# Patient Record
Sex: MALE | Race: WHITE | NOT HISPANIC OR LATINO | Employment: OTHER | URBAN - METROPOLITAN AREA
[De-identification: names, ages, dates, MRNs, and addresses within clinical notes are randomized per-mention and may not be internally consistent; named-entity substitution may affect disease eponyms.]

---

## 2021-05-22 ENCOUNTER — APPOINTMENT (OUTPATIENT)
Dept: CT IMAGING | Facility: HOSPITAL | Age: 76
End: 2021-05-22

## 2021-05-22 ENCOUNTER — APPOINTMENT (OUTPATIENT)
Dept: GENERAL RADIOLOGY | Facility: HOSPITAL | Age: 76
End: 2021-05-22

## 2021-05-22 ENCOUNTER — HOSPITAL ENCOUNTER (INPATIENT)
Facility: HOSPITAL | Age: 76
LOS: 27 days | Discharge: NURSING, REHAB OR LONG-TERM CARE | End: 2021-06-18
Attending: EMERGENCY MEDICINE | Admitting: STUDENT IN AN ORGANIZED HEALTH CARE EDUCATION/TRAINING PROGRAM

## 2021-05-22 ENCOUNTER — APPOINTMENT (OUTPATIENT)
Dept: ULTRASOUND IMAGING | Facility: HOSPITAL | Age: 76
End: 2021-05-22

## 2021-05-22 ENCOUNTER — APPOINTMENT (OUTPATIENT)
Dept: CARDIOLOGY | Facility: HOSPITAL | Age: 76
End: 2021-05-22

## 2021-05-22 DIAGNOSIS — I21.4 NON-STEMI (NON-ST ELEVATED MYOCARDIAL INFARCTION) (HCC): ICD-10-CM

## 2021-05-22 DIAGNOSIS — I50.9 ACUTE CONGESTIVE HEART FAILURE, UNSPECIFIED HEART FAILURE TYPE (HCC): ICD-10-CM

## 2021-05-22 DIAGNOSIS — Z86.79 HISTORY OF CORONARY ARTERY DISEASE: ICD-10-CM

## 2021-05-22 DIAGNOSIS — R13.12 OROPHARYNGEAL DYSPHAGIA: ICD-10-CM

## 2021-05-22 DIAGNOSIS — I25.10 CORONARY ARTERY DISEASE INVOLVING NATIVE CORONARY ARTERY OF NATIVE HEART, ANGINA PRESENCE UNSPECIFIED: Primary | ICD-10-CM

## 2021-05-22 DIAGNOSIS — N17.9 AKI (ACUTE KIDNEY INJURY) (HCC): ICD-10-CM

## 2021-05-22 PROBLEM — D72.829 LEUKOCYTOSIS: Status: ACTIVE | Noted: 2021-05-22

## 2021-05-22 PROBLEM — D72.829 LEUKOCYTOSIS: Status: RESOLVED | Noted: 2021-05-22 | Resolved: 2021-05-22

## 2021-05-22 PROBLEM — I10 ESSENTIAL HYPERTENSION: Status: ACTIVE | Noted: 2021-05-22

## 2021-05-22 PROBLEM — E78.5 HYPERLIPIDEMIA LDL GOAL <70: Status: ACTIVE | Noted: 2021-05-22

## 2021-05-22 LAB
ALBUMIN SERPL-MCNC: 3.9 G/DL (ref 3.5–5.2)
ALBUMIN/GLOB SERPL: 1.7 G/DL
ALP SERPL-CCNC: 112 U/L (ref 39–117)
ALT SERPL W P-5'-P-CCNC: 35 U/L (ref 1–41)
ANION GAP SERPL CALCULATED.3IONS-SCNC: 11 MMOL/L (ref 5–15)
APTT PPP: 27.4 SECONDS (ref 50–95)
AST SERPL-CCNC: 67 U/L (ref 1–40)
BASOPHILS # BLD AUTO: 0.03 10*3/MM3 (ref 0–0.2)
BASOPHILS NFR BLD AUTO: 0.2 % (ref 0–1.5)
BH CV ECHO MEAS - AO MAX PG (FULL): 19.1 MMHG
BH CV ECHO MEAS - AO MAX PG: 23 MMHG
BH CV ECHO MEAS - AO MEAN PG (FULL): 9.5 MMHG
BH CV ECHO MEAS - AO MEAN PG: 13 MMHG
BH CV ECHO MEAS - AO ROOT AREA (BSA CORRECTED): 1.6
BH CV ECHO MEAS - AO ROOT AREA: 8.6 CM^2
BH CV ECHO MEAS - AO ROOT DIAM: 3.3 CM
BH CV ECHO MEAS - AO V2 MAX: 242 CM/SEC
BH CV ECHO MEAS - AO V2 MEAN: 156 CM/SEC
BH CV ECHO MEAS - AO V2 VTI: 47.9 CM
BH CV ECHO MEAS - ASC AORTA: 3.3 CM
BH CV ECHO MEAS - AVA(I,A): 1.3 CM^2
BH CV ECHO MEAS - AVA(I,D): 1.3 CM^2
BH CV ECHO MEAS - AVA(V,A): 1.1 CM^2
BH CV ECHO MEAS - AVA(V,D): 1.1 CM^2
BH CV ECHO MEAS - BSA(HAYCOCK): 2.1 M^2
BH CV ECHO MEAS - BSA: 2 M^2
BH CV ECHO MEAS - BZI_BMI: 30.5 KILOGRAMS/M^2
BH CV ECHO MEAS - BZI_METRIC_HEIGHT: 170.2 CM
BH CV ECHO MEAS - BZI_METRIC_WEIGHT: 88.5 KG
BH CV ECHO MEAS - CI(CUBED): 2 L/MIN/M^2
BH CV ECHO MEAS - CI(MOD-SP2): 0.84 L/MIN/M^2
BH CV ECHO MEAS - CI(MOD-SP4): 1.2 L/MIN/M^2
BH CV ECHO MEAS - CI(TEICH): 1.8 L/MIN/M^2
BH CV ECHO MEAS - CO(CUBED): 4 L/MIN
BH CV ECHO MEAS - CO(MOD-SP2): 1.7 L/MIN
BH CV ECHO MEAS - CO(MOD-SP4): 2.5 L/MIN
BH CV ECHO MEAS - CO(TEICH): 3.7 L/MIN
BH CV ECHO MEAS - EDV(CUBED): 79.5 ML
BH CV ECHO MEAS - EDV(MOD-SP2): 51.4 ML
BH CV ECHO MEAS - EDV(MOD-SP4): 76.7 ML
BH CV ECHO MEAS - EDV(TEICH): 83.1 ML
BH CV ECHO MEAS - EF(CUBED): 69.3 %
BH CV ECHO MEAS - EF(MOD-BP): 45.1 %
BH CV ECHO MEAS - EF(MOD-SP2): 45.1 %
BH CV ECHO MEAS - EF(MOD-SP4): 45.1 %
BH CV ECHO MEAS - EF(TEICH): 61.2 %
BH CV ECHO MEAS - ESV(CUBED): 24.4 ML
BH CV ECHO MEAS - ESV(MOD-SP2): 28.2 ML
BH CV ECHO MEAS - ESV(MOD-SP4): 42.1 ML
BH CV ECHO MEAS - ESV(TEICH): 32.2 ML
BH CV ECHO MEAS - FS: 32.6 %
BH CV ECHO MEAS - IVS/LVPW: 1
BH CV ECHO MEAS - IVSD: 1.1 CM
BH CV ECHO MEAS - LA DIMENSION: 3.9 CM
BH CV ECHO MEAS - LA/AO: 1.2
BH CV ECHO MEAS - LV DIASTOLIC VOL/BSA (35-75): 38.3 ML/M^2
BH CV ECHO MEAS - LV MASS(C)D: 162.9 GRAMS
BH CV ECHO MEAS - LV MASS(C)DI: 81.5 GRAMS/M^2
BH CV ECHO MEAS - LV MAX PG: 2.9 MMHG
BH CV ECHO MEAS - LV MEAN PG: 2 MMHG
BH CV ECHO MEAS - LV SYSTOLIC VOL/BSA (12-30): 21 ML/M^2
BH CV ECHO MEAS - LV V1 MAX: 85.5 CM/SEC
BH CV ECHO MEAS - LV V1 MEAN: 63.1 CM/SEC
BH CV ECHO MEAS - LV V1 VTI: 20.4 CM
BH CV ECHO MEAS - LVIDD: 4.3 CM
BH CV ECHO MEAS - LVIDS: 2.9 CM
BH CV ECHO MEAS - LVLD AP2: 9.1 CM
BH CV ECHO MEAS - LVLD AP4: 8.8 CM
BH CV ECHO MEAS - LVLS AP2: 8.5 CM
BH CV ECHO MEAS - LVLS AP4: 8.7 CM
BH CV ECHO MEAS - LVOT AREA (M): 3.1 CM^2
BH CV ECHO MEAS - LVOT AREA: 3.1 CM^2
BH CV ECHO MEAS - LVOT DIAM: 2 CM
BH CV ECHO MEAS - LVPWD: 1.1 CM
BH CV ECHO MEAS - MM HR: 72 BPM
BH CV ECHO MEAS - MM R-R INT: 0.83 SEC
BH CV ECHO MEAS - MV A MAX VEL: 139 CM/SEC
BH CV ECHO MEAS - MV DEC SLOPE: 213 CM/SEC^2
BH CV ECHO MEAS - MV DEC TIME: 0.24 SEC
BH CV ECHO MEAS - MV E MAX VEL: 110 CM/SEC
BH CV ECHO MEAS - MV E/A: 0.79
BH CV ECHO MEAS - MV MAX PG: 10.1 MMHG
BH CV ECHO MEAS - MV MEAN PG: 4 MMHG
BH CV ECHO MEAS - MV P1/2T MAX VEL: 97.1 CM/SEC
BH CV ECHO MEAS - MV P1/2T: 133.5 MSEC
BH CV ECHO MEAS - MV V2 MAX: 159 CM/SEC
BH CV ECHO MEAS - MV V2 MEAN: 95 CM/SEC
BH CV ECHO MEAS - MV V2 VTI: 36.8 CM
BH CV ECHO MEAS - MVA P1/2T LCG: 2.3 CM^2
BH CV ECHO MEAS - MVA(P1/2T): 1.6 CM^2
BH CV ECHO MEAS - MVA(VTI): 1.7 CM^2
BH CV ECHO MEAS - RAP SYSTOLE: 3 MMHG
BH CV ECHO MEAS - RVSP: 18 MMHG
BH CV ECHO MEAS - SI(AO): 204.6 ML/M^2
BH CV ECHO MEAS - SI(CUBED): 27.6 ML/M^2
BH CV ECHO MEAS - SI(LVOT): 32 ML/M^2
BH CV ECHO MEAS - SI(MOD-SP2): 11.6 ML/M^2
BH CV ECHO MEAS - SI(MOD-SP4): 17.3 ML/M^2
BH CV ECHO MEAS - SI(TEICH): 25.4 ML/M^2
BH CV ECHO MEAS - SV(AO): 409.3 ML
BH CV ECHO MEAS - SV(CUBED): 55.1 ML
BH CV ECHO MEAS - SV(LVOT): 64.1 ML
BH CV ECHO MEAS - SV(MOD-SP2): 23.2 ML
BH CV ECHO MEAS - SV(MOD-SP4): 34.6 ML
BH CV ECHO MEAS - SV(TEICH): 50.9 ML
BH CV ECHO MEAS - TR MAX PG: 15 MMHG
BH CV ECHO MEAS - TR MAX VEL: 191 CM/SEC
BH CV VAS BP LEFT ARM: NORMAL MMHG
BILIRUB SERPL-MCNC: 0.2 MG/DL (ref 0–1.2)
BUN SERPL-MCNC: 43 MG/DL (ref 8–23)
BUN/CREAT SERPL: 22.6 (ref 7–25)
CALCIUM SPEC-SCNC: 9 MG/DL (ref 8.6–10.5)
CHLORIDE SERPL-SCNC: 105 MMOL/L (ref 98–107)
CHOLEST SERPL-MCNC: 168 MG/DL (ref 0–200)
CO2 SERPL-SCNC: 24 MMOL/L (ref 22–29)
CREAT SERPL-MCNC: 1.9 MG/DL (ref 0.76–1.27)
CRP SERPL-MCNC: <0.3 MG/DL (ref 0–0.5)
D-LACTATE SERPL-SCNC: 1.5 MMOL/L (ref 0.5–2)
DEPRECATED RDW RBC AUTO: 47.5 FL (ref 37–54)
EOSINOPHIL # BLD AUTO: 0.28 10*3/MM3 (ref 0–0.4)
EOSINOPHIL NFR BLD AUTO: 1.6 % (ref 0.3–6.2)
ERYTHROCYTE [DISTWIDTH] IN BLOOD BY AUTOMATED COUNT: 13.9 % (ref 12.3–15.4)
FLUAV SUBTYP SPEC NAA+PROBE: NOT DETECTED
FLUBV RNA ISLT QL NAA+PROBE: NOT DETECTED
GFR SERPL CREATININE-BSD FRML MDRD: 35 ML/MIN/1.73
GLOBULIN UR ELPH-MCNC: 2.3 GM/DL
GLUCOSE BLDC GLUCOMTR-MCNC: 279 MG/DL (ref 70–130)
GLUCOSE BLDC GLUCOMTR-MCNC: 83 MG/DL (ref 70–130)
GLUCOSE SERPL-MCNC: 118 MG/DL (ref 65–99)
HBA1C MFR BLD: 7.4 % (ref 4.8–5.6)
HCT VFR BLD AUTO: 37.5 % (ref 37.5–51)
HDLC SERPL-MCNC: 34 MG/DL (ref 40–60)
HGB BLD-MCNC: 12.8 G/DL (ref 13–17.7)
HOLD SPECIMEN: NORMAL
IMM GRANULOCYTES # BLD AUTO: 0.13 10*3/MM3 (ref 0–0.05)
IMM GRANULOCYTES NFR BLD AUTO: 0.8 % (ref 0–0.5)
INR PPP: 0.95 (ref 0.85–1.16)
LDLC SERPL CALC-MCNC: 88 MG/DL (ref 0–100)
LDLC/HDLC SERPL: 2.34 {RATIO}
LIPASE SERPL-CCNC: 32 U/L (ref 13–60)
LV EF 2D ECHO EST: 55 %
LYMPHOCYTES # BLD AUTO: 2.46 10*3/MM3 (ref 0.7–3.1)
LYMPHOCYTES NFR BLD AUTO: 14.5 % (ref 19.6–45.3)
MAGNESIUM SERPL-MCNC: 1.9 MG/DL (ref 1.6–2.4)
MCH RBC QN AUTO: 32.2 PG (ref 26.6–33)
MCHC RBC AUTO-ENTMCNC: 34.1 G/DL (ref 31.5–35.7)
MCV RBC AUTO: 94.5 FL (ref 79–97)
MONOCYTES # BLD AUTO: 1.09 10*3/MM3 (ref 0.1–0.9)
MONOCYTES NFR BLD AUTO: 6.4 % (ref 5–12)
NEUTROPHILS NFR BLD AUTO: 13.02 10*3/MM3 (ref 1.7–7)
NEUTROPHILS NFR BLD AUTO: 76.5 % (ref 42.7–76)
NRBC BLD AUTO-RTO: 0 /100 WBC (ref 0–0.2)
NT-PROBNP SERPL-MCNC: 4175 PG/ML (ref 0–900)
PLATELET # BLD AUTO: 226 10*3/MM3 (ref 140–450)
PMV BLD AUTO: 10.4 FL (ref 6–12)
POTASSIUM SERPL-SCNC: 3.9 MMOL/L (ref 3.5–5.2)
PROCALCITONIN SERPL-MCNC: 0.08 NG/ML (ref 0–0.25)
PROCALCITONIN SERPL-MCNC: 0.13 NG/ML (ref 0–0.25)
PROT SERPL-MCNC: 6.2 G/DL (ref 6–8.5)
PROTHROMBIN TIME: 12.4 SECONDS (ref 11.4–14.4)
QT INTERVAL: 416 MS
QT INTERVAL: 418 MS
QTC INTERVAL: 474 MS
QTC INTERVAL: 491 MS
RBC # BLD AUTO: 3.97 10*6/MM3 (ref 4.14–5.8)
SARS-COV-2 RNA PNL SPEC NAA+PROBE: NOT DETECTED
SODIUM SERPL-SCNC: 140 MMOL/L (ref 136–145)
TRIGL SERPL-MCNC: 273 MG/DL (ref 0–150)
TROPONIN T SERPL-MCNC: 1.55 NG/ML (ref 0–0.03)
TROPONIN T SERPL-MCNC: 1.82 NG/ML (ref 0–0.03)
TROPONIN T SERPL-MCNC: 1.89 NG/ML (ref 0–0.03)
UFH PPP CHRO-ACNC: 0.1 IU/ML (ref 0.3–0.7)
UFH PPP CHRO-ACNC: 0.25 IU/ML (ref 0.3–0.7)
UFH PPP CHRO-ACNC: 0.45 IU/ML (ref 0.3–0.7)
VLDLC SERPL-MCNC: 46 MG/DL (ref 5–40)
WBC # BLD AUTO: 17.01 10*3/MM3 (ref 3.4–10.8)
WHOLE BLOOD HOLD SPECIMEN: NORMAL
WHOLE BLOOD HOLD SPECIMEN: NORMAL

## 2021-05-22 PROCEDURE — 85520 HEPARIN ASSAY: CPT

## 2021-05-22 PROCEDURE — 84145 PROCALCITONIN (PCT): CPT | Performed by: INTERNAL MEDICINE

## 2021-05-22 PROCEDURE — 84484 ASSAY OF TROPONIN QUANT: CPT | Performed by: EMERGENCY MEDICINE

## 2021-05-22 PROCEDURE — 85025 COMPLETE CBC W/AUTO DIFF WBC: CPT | Performed by: EMERGENCY MEDICINE

## 2021-05-22 PROCEDURE — 84484 ASSAY OF TROPONIN QUANT: CPT | Performed by: NURSE PRACTITIONER

## 2021-05-22 PROCEDURE — 63710000001 INSULIN LISPRO (HUMAN) PER 5 UNITS: Performed by: INTERNAL MEDICINE

## 2021-05-22 PROCEDURE — 25010000002 FUROSEMIDE PER 20 MG: Performed by: EMERGENCY MEDICINE

## 2021-05-22 PROCEDURE — 71275 CT ANGIOGRAPHY CHEST: CPT

## 2021-05-22 PROCEDURE — 86140 C-REACTIVE PROTEIN: CPT | Performed by: INTERNAL MEDICINE

## 2021-05-22 PROCEDURE — 84145 PROCALCITONIN (PCT): CPT | Performed by: NURSE PRACTITIONER

## 2021-05-22 PROCEDURE — 83690 ASSAY OF LIPASE: CPT | Performed by: EMERGENCY MEDICINE

## 2021-05-22 PROCEDURE — 71045 X-RAY EXAM CHEST 1 VIEW: CPT

## 2021-05-22 PROCEDURE — 87636 SARSCOV2 & INF A&B AMP PRB: CPT | Performed by: INTERNAL MEDICINE

## 2021-05-22 PROCEDURE — 85730 THROMBOPLASTIN TIME PARTIAL: CPT | Performed by: EMERGENCY MEDICINE

## 2021-05-22 PROCEDURE — 80053 COMPREHEN METABOLIC PANEL: CPT | Performed by: EMERGENCY MEDICINE

## 2021-05-22 PROCEDURE — 99284 EMERGENCY DEPT VISIT MOD MDM: CPT

## 2021-05-22 PROCEDURE — 83735 ASSAY OF MAGNESIUM: CPT | Performed by: INTERNAL MEDICINE

## 2021-05-22 PROCEDURE — 80061 LIPID PANEL: CPT | Performed by: NURSE PRACTITIONER

## 2021-05-22 PROCEDURE — 99223 1ST HOSP IP/OBS HIGH 75: CPT | Performed by: INTERNAL MEDICINE

## 2021-05-22 PROCEDURE — 85610 PROTHROMBIN TIME: CPT | Performed by: EMERGENCY MEDICINE

## 2021-05-22 PROCEDURE — 93321 DOPPLER ECHO F-UP/LMTD STD: CPT

## 2021-05-22 PROCEDURE — 99222 1ST HOSP IP/OBS MODERATE 55: CPT | Performed by: INTERNAL MEDICINE

## 2021-05-22 PROCEDURE — 83880 ASSAY OF NATRIURETIC PEPTIDE: CPT | Performed by: EMERGENCY MEDICINE

## 2021-05-22 PROCEDURE — 93308 TTE F-UP OR LMTD: CPT

## 2021-05-22 PROCEDURE — 83605 ASSAY OF LACTIC ACID: CPT | Performed by: NURSE PRACTITIONER

## 2021-05-22 PROCEDURE — 93005 ELECTROCARDIOGRAM TRACING: CPT | Performed by: EMERGENCY MEDICINE

## 2021-05-22 PROCEDURE — 93325 DOPPLER ECHO COLOR FLOW MAPG: CPT

## 2021-05-22 PROCEDURE — 93306 TTE W/DOPPLER COMPLETE: CPT | Performed by: INTERNAL MEDICINE

## 2021-05-22 PROCEDURE — 76775 US EXAM ABDO BACK WALL LIM: CPT

## 2021-05-22 PROCEDURE — 82962 GLUCOSE BLOOD TEST: CPT

## 2021-05-22 PROCEDURE — 63710000001 INSULIN DETEMIR PER 5 UNITS: Performed by: INTERNAL MEDICINE

## 2021-05-22 PROCEDURE — 25010000002 HEPARIN (PORCINE) PER 1000 UNITS: Performed by: EMERGENCY MEDICINE

## 2021-05-22 PROCEDURE — 85520 HEPARIN ASSAY: CPT | Performed by: EMERGENCY MEDICINE

## 2021-05-22 PROCEDURE — 83036 HEMOGLOBIN GLYCOSYLATED A1C: CPT | Performed by: INTERNAL MEDICINE

## 2021-05-22 PROCEDURE — 0 IOPAMIDOL PER 1 ML: Performed by: EMERGENCY MEDICINE

## 2021-05-22 PROCEDURE — 25010000002 HEPARIN (PORCINE) 25000-0.45 UT/250ML-% SOLUTION: Performed by: EMERGENCY MEDICINE

## 2021-05-22 RX ORDER — ASPIRIN 81 MG/1
81 TABLET ORAL DAILY
COMMUNITY
End: 2021-06-18 | Stop reason: HOSPADM

## 2021-05-22 RX ORDER — ONDANSETRON 2 MG/ML
4 INJECTION INTRAMUSCULAR; INTRAVENOUS EVERY 6 HOURS PRN
Status: DISCONTINUED | OUTPATIENT
Start: 2021-05-22 | End: 2021-05-29

## 2021-05-22 RX ORDER — ACETAMINOPHEN 160 MG/5ML
650 SOLUTION ORAL EVERY 4 HOURS PRN
Status: DISCONTINUED | OUTPATIENT
Start: 2021-05-22 | End: 2021-05-29

## 2021-05-22 RX ORDER — ASPIRIN 81 MG/1
81 TABLET ORAL DAILY
Status: DISCONTINUED | OUTPATIENT
Start: 2021-05-22 | End: 2021-05-28

## 2021-05-22 RX ORDER — MAGNESIUM SULFATE 1 G/100ML
1 INJECTION INTRAVENOUS AS NEEDED
Status: DISCONTINUED | OUTPATIENT
Start: 2021-05-22 | End: 2021-06-18 | Stop reason: HOSPADM

## 2021-05-22 RX ORDER — MAGNESIUM SULFATE HEPTAHYDRATE 40 MG/ML
2 INJECTION, SOLUTION INTRAVENOUS AS NEEDED
Status: DISCONTINUED | OUTPATIENT
Start: 2021-05-22 | End: 2021-06-18 | Stop reason: HOSPADM

## 2021-05-22 RX ORDER — GABAPENTIN 300 MG/1
600 CAPSULE ORAL 3 TIMES DAILY
COMMUNITY
End: 2021-06-25 | Stop reason: HOSPADM

## 2021-05-22 RX ORDER — SODIUM CHLORIDE 0.9 % (FLUSH) 0.9 %
10 SYRINGE (ML) INJECTION AS NEEDED
Status: DISCONTINUED | OUTPATIENT
Start: 2021-05-22 | End: 2021-05-29

## 2021-05-22 RX ORDER — CLOPIDOGREL 300 MG/1
300 TABLET, FILM COATED ORAL DAILY
Status: ON HOLD | COMMUNITY
End: 2021-05-22

## 2021-05-22 RX ORDER — METOPROLOL SUCCINATE 50 MG/1
75 TABLET, EXTENDED RELEASE ORAL DAILY
COMMUNITY
End: 2021-06-18 | Stop reason: HOSPADM

## 2021-05-22 RX ORDER — CLOPIDOGREL BISULFATE 75 MG/1
300 TABLET ORAL ONCE
Status: COMPLETED | OUTPATIENT
Start: 2021-05-22 | End: 2021-05-22

## 2021-05-22 RX ORDER — AMLODIPINE BESYLATE AND BENAZEPRIL HYDROCHLORIDE 10; 20 MG/1; MG/1
1 CAPSULE ORAL DAILY
COMMUNITY
End: 2021-06-18 | Stop reason: HOSPADM

## 2021-05-22 RX ORDER — ACETAMINOPHEN 650 MG/1
650 SUPPOSITORY RECTAL EVERY 4 HOURS PRN
Status: DISCONTINUED | OUTPATIENT
Start: 2021-05-22 | End: 2021-05-29

## 2021-05-22 RX ORDER — DEXTROSE MONOHYDRATE 25 G/50ML
25 INJECTION, SOLUTION INTRAVENOUS
Status: DISCONTINUED | OUTPATIENT
Start: 2021-05-22 | End: 2021-05-31 | Stop reason: ALTCHOICE

## 2021-05-22 RX ORDER — CLONIDINE HYDROCHLORIDE 0.1 MG/1
0.1 TABLET ORAL DAILY
COMMUNITY
End: 2021-06-18 | Stop reason: HOSPADM

## 2021-05-22 RX ORDER — MAGNESIUM SULFATE HEPTAHYDRATE 40 MG/ML
4 INJECTION, SOLUTION INTRAVENOUS AS NEEDED
Status: DISCONTINUED | OUTPATIENT
Start: 2021-05-22 | End: 2021-06-18 | Stop reason: HOSPADM

## 2021-05-22 RX ORDER — ATORVASTATIN CALCIUM 40 MG/1
80 TABLET, FILM COATED ORAL NIGHTLY
Status: DISCONTINUED | OUTPATIENT
Start: 2021-05-22 | End: 2021-05-22

## 2021-05-22 RX ORDER — ALLOPURINOL 300 MG/1
300 TABLET ORAL DAILY
COMMUNITY

## 2021-05-22 RX ORDER — ASPIRIN 81 MG/1
81 TABLET ORAL DAILY
Status: DISCONTINUED | OUTPATIENT
Start: 2021-05-23 | End: 2021-05-22 | Stop reason: SDUPTHER

## 2021-05-22 RX ORDER — GLIPIZIDE 5 MG/1
5 TABLET ORAL
COMMUNITY

## 2021-05-22 RX ORDER — ALLOPURINOL 100 MG/1
100 TABLET ORAL DAILY
Status: DISCONTINUED | OUTPATIENT
Start: 2021-05-22 | End: 2021-05-24

## 2021-05-22 RX ORDER — HYDROCHLOROTHIAZIDE 12.5 MG/1
12.5 TABLET ORAL DAILY
Status: ON HOLD | COMMUNITY
End: 2021-05-24

## 2021-05-22 RX ORDER — CLOPIDOGREL BISULFATE 75 MG/1
75 TABLET ORAL DAILY
Status: DISCONTINUED | OUTPATIENT
Start: 2021-05-23 | End: 2021-05-22 | Stop reason: SDUPTHER

## 2021-05-22 RX ORDER — ATORVASTATIN CALCIUM 40 MG/1
40 TABLET, FILM COATED ORAL NIGHTLY
Status: DISCONTINUED | OUTPATIENT
Start: 2021-05-22 | End: 2021-05-29

## 2021-05-22 RX ORDER — HEPARIN SODIUM 1000 [USP'U]/ML
4000 INJECTION, SOLUTION INTRAVENOUS; SUBCUTANEOUS ONCE
Status: COMPLETED | OUTPATIENT
Start: 2021-05-22 | End: 2021-05-22

## 2021-05-22 RX ORDER — PANTOPRAZOLE SODIUM 40 MG/1
40 TABLET, DELAYED RELEASE ORAL
Status: DISCONTINUED | OUTPATIENT
Start: 2021-05-22 | End: 2021-05-29

## 2021-05-22 RX ORDER — CLOPIDOGREL BISULFATE 75 MG/1
75 TABLET ORAL DAILY
Status: DISCONTINUED | OUTPATIENT
Start: 2021-05-23 | End: 2021-05-24

## 2021-05-22 RX ORDER — CLOPIDOGREL BISULFATE 75 MG/1
75 TABLET ORAL DAILY
Status: ON HOLD | COMMUNITY
End: 2021-05-24

## 2021-05-22 RX ORDER — HEPARIN SODIUM 1000 [USP'U]/ML
60 INJECTION, SOLUTION INTRAVENOUS; SUBCUTANEOUS AS NEEDED
Status: DISCONTINUED | OUTPATIENT
Start: 2021-05-22 | End: 2021-05-22 | Stop reason: SDUPTHER

## 2021-05-22 RX ORDER — INSULIN GLARGINE 100 [IU]/ML
30 INJECTION, SOLUTION SUBCUTANEOUS NIGHTLY
Status: ON HOLD | COMMUNITY
End: 2021-06-25 | Stop reason: SDUPTHER

## 2021-05-22 RX ORDER — HEPARIN SODIUM 1000 [USP'U]/ML
30 INJECTION, SOLUTION INTRAVENOUS; SUBCUTANEOUS AS NEEDED
Status: DISCONTINUED | OUTPATIENT
Start: 2021-05-22 | End: 2021-05-22 | Stop reason: SDUPTHER

## 2021-05-22 RX ORDER — AMOXICILLIN 250 MG
2 CAPSULE ORAL 2 TIMES DAILY PRN
Status: DISCONTINUED | OUTPATIENT
Start: 2021-05-22 | End: 2021-05-29

## 2021-05-22 RX ORDER — SODIUM CHLORIDE 0.9 % (FLUSH) 0.9 %
10 SYRINGE (ML) INJECTION EVERY 12 HOURS SCHEDULED
Status: DISCONTINUED | OUTPATIENT
Start: 2021-05-22 | End: 2021-06-18 | Stop reason: HOSPADM

## 2021-05-22 RX ORDER — POLYETHYLENE GLYCOL 3350 17 G/17G
17 POWDER, FOR SOLUTION ORAL DAILY PRN
Status: DISCONTINUED | OUTPATIENT
Start: 2021-05-22 | End: 2021-05-29

## 2021-05-22 RX ORDER — ACETAMINOPHEN 325 MG/1
650 TABLET ORAL EVERY 4 HOURS PRN
Status: DISCONTINUED | OUTPATIENT
Start: 2021-05-22 | End: 2021-05-29

## 2021-05-22 RX ORDER — HEPARIN SODIUM 10000 [USP'U]/100ML
10.5 INJECTION, SOLUTION INTRAVENOUS
Status: DISCONTINUED | OUTPATIENT
Start: 2021-05-22 | End: 2021-05-28

## 2021-05-22 RX ORDER — NICOTINE POLACRILEX 4 MG
15 LOZENGE BUCCAL
Status: DISCONTINUED | OUTPATIENT
Start: 2021-05-22 | End: 2021-05-31 | Stop reason: ALTCHOICE

## 2021-05-22 RX ORDER — BISACODYL 10 MG
10 SUPPOSITORY, RECTAL RECTAL DAILY PRN
Status: DISCONTINUED | OUTPATIENT
Start: 2021-05-22 | End: 2021-05-29

## 2021-05-22 RX ORDER — BISACODYL 5 MG/1
5 TABLET, DELAYED RELEASE ORAL DAILY PRN
Status: DISCONTINUED | OUTPATIENT
Start: 2021-05-22 | End: 2021-05-29

## 2021-05-22 RX ORDER — NITROGLYCERIN 20 MG/100ML
5-200 INJECTION INTRAVENOUS
Status: DISCONTINUED | OUTPATIENT
Start: 2021-05-22 | End: 2021-06-02

## 2021-05-22 RX ORDER — HYDRALAZINE HYDROCHLORIDE 10 MG/1
10 TABLET, FILM COATED ORAL EVERY 8 HOURS SCHEDULED
Status: DISCONTINUED | OUTPATIENT
Start: 2021-05-22 | End: 2021-05-24

## 2021-05-22 RX ORDER — FUROSEMIDE 10 MG/ML
40 INJECTION INTRAMUSCULAR; INTRAVENOUS ONCE
Status: COMPLETED | OUTPATIENT
Start: 2021-05-22 | End: 2021-05-22

## 2021-05-22 RX ORDER — SIMVASTATIN 20 MG
20 TABLET ORAL NIGHTLY
Status: ON HOLD | COMMUNITY
End: 2021-05-24

## 2021-05-22 RX ORDER — GABAPENTIN 100 MG/1
200 CAPSULE ORAL 2 TIMES DAILY
Status: DISCONTINUED | OUTPATIENT
Start: 2021-05-22 | End: 2021-05-24

## 2021-05-22 RX ORDER — ASPIRIN 81 MG/1
324 TABLET, CHEWABLE ORAL ONCE
Status: DISCONTINUED | OUTPATIENT
Start: 2021-05-22 | End: 2021-05-22

## 2021-05-22 RX ADMIN — NITROGLYCERIN 10 MCG/MIN: 20 INJECTION INTRAVENOUS at 06:52

## 2021-05-22 RX ADMIN — INSULIN DETEMIR 20 UNITS: 100 INJECTION, SOLUTION SUBCUTANEOUS at 20:50

## 2021-05-22 RX ADMIN — INSULIN LISPRO 4 UNITS: 100 INJECTION, SOLUTION INTRAVENOUS; SUBCUTANEOUS at 17:40

## 2021-05-22 RX ADMIN — HEPARIN SODIUM 4000 UNITS: 1000 INJECTION, SOLUTION INTRAVENOUS; SUBCUTANEOUS at 06:45

## 2021-05-22 RX ADMIN — METOPROLOL TARTRATE 25 MG: 25 TABLET, FILM COATED ORAL at 17:35

## 2021-05-22 RX ADMIN — FUROSEMIDE 40 MG: 10 INJECTION, SOLUTION INTRAMUSCULAR; INTRAVENOUS at 06:30

## 2021-05-22 RX ADMIN — CLOPIDOGREL BISULFATE 300 MG: 75 TABLET ORAL at 06:30

## 2021-05-22 RX ADMIN — HEPARIN SODIUM 11.3 UNITS/KG/HR: 10000 INJECTION, SOLUTION INTRAVENOUS at 06:45

## 2021-05-22 RX ADMIN — PANTOPRAZOLE SODIUM 40 MG: 40 TABLET, DELAYED RELEASE ORAL at 17:37

## 2021-05-22 RX ADMIN — ATORVASTATIN CALCIUM 40 MG: 40 TABLET, FILM COATED ORAL at 20:50

## 2021-05-22 RX ADMIN — ALLOPURINOL 100 MG: 100 TABLET ORAL at 17:36

## 2021-05-22 RX ADMIN — IOPAMIDOL 66 ML: 755 INJECTION, SOLUTION INTRAVENOUS at 05:43

## 2021-05-22 RX ADMIN — ASPIRIN 81 MG: 81 TABLET, COATED ORAL at 17:35

## 2021-05-22 RX ADMIN — HYDRALAZINE HYDROCHLORIDE 10 MG: 10 TABLET ORAL at 17:35

## 2021-05-22 RX ADMIN — GABAPENTIN 200 MG: 100 CAPSULE ORAL at 17:35

## 2021-05-23 ENCOUNTER — APPOINTMENT (OUTPATIENT)
Dept: CARDIOLOGY | Facility: HOSPITAL | Age: 76
End: 2021-05-23

## 2021-05-23 LAB
ANION GAP SERPL CALCULATED.3IONS-SCNC: 9 MMOL/L (ref 5–15)
BASOPHILS # BLD AUTO: 0.05 10*3/MM3 (ref 0–0.2)
BASOPHILS NFR BLD AUTO: 0.5 % (ref 0–1.5)
BH CV ECHO MEAS - AO MAX PG (FULL): 17.4 MMHG
BH CV ECHO MEAS - AO MAX PG: 19.9 MMHG
BH CV ECHO MEAS - AO MEAN PG (FULL): 12.7 MMHG
BH CV ECHO MEAS - AO MEAN PG: 14.1 MMHG
BH CV ECHO MEAS - AO ROOT AREA (BSA CORRECTED): 1.9
BH CV ECHO MEAS - AO ROOT AREA: 10.5 CM^2
BH CV ECHO MEAS - AO ROOT DIAM: 3.7 CM
BH CV ECHO MEAS - AO V2 MAX: 223.3 CM/SEC
BH CV ECHO MEAS - AO V2 MEAN: 182 CM/SEC
BH CV ECHO MEAS - AO V2 VTI: 54.5 CM
BH CV ECHO MEAS - AVA(I,A): 1.2 CM^2
BH CV ECHO MEAS - AVA(I,D): 1.2 CM^2
BH CV ECHO MEAS - AVA(V,A): 1.1 CM^2
BH CV ECHO MEAS - AVA(V,D): 1.1 CM^2
BH CV ECHO MEAS - BSA(HAYCOCK): 2 M^2
BH CV ECHO MEAS - BSA: 1.9 M^2
BH CV ECHO MEAS - BZI_BMI: 28.3 KILOGRAMS/M^2
BH CV ECHO MEAS - BZI_METRIC_HEIGHT: 170.2 CM
BH CV ECHO MEAS - BZI_METRIC_WEIGHT: 82.1 KG
BH CV ECHO MEAS - EDV(CUBED): 67.5 ML
BH CV ECHO MEAS - EDV(MOD-SP2): 169 ML
BH CV ECHO MEAS - EDV(MOD-SP4): 177 ML
BH CV ECHO MEAS - EDV(TEICH): 73 ML
BH CV ECHO MEAS - EF(CUBED): 70.1 %
BH CV ECHO MEAS - EF(MOD-BP): 60 %
BH CV ECHO MEAS - EF(MOD-SP2): 59.8 %
BH CV ECHO MEAS - EF(MOD-SP4): 60.5 %
BH CV ECHO MEAS - EF(TEICH): 62.2 %
BH CV ECHO MEAS - ESV(CUBED): 20.2 ML
BH CV ECHO MEAS - ESV(MOD-SP2): 68 ML
BH CV ECHO MEAS - ESV(MOD-SP4): 70 ML
BH CV ECHO MEAS - ESV(TEICH): 27.6 ML
BH CV ECHO MEAS - FS: 33.1 %
BH CV ECHO MEAS - IVS/LVPW: 0.76
BH CV ECHO MEAS - IVSD: 1.1 CM
BH CV ECHO MEAS - LA DIMENSION: 3.7 CM
BH CV ECHO MEAS - LA/AO: 1
BH CV ECHO MEAS - LAD MAJOR: 4.6 CM
BH CV ECHO MEAS - LAT PEAK E' VEL: 7.5 CM/SEC
BH CV ECHO MEAS - LATERAL E/E' RATIO: 7.4
BH CV ECHO MEAS - LV DIASTOLIC VOL/BSA (35-75): 91.3 ML/M^2
BH CV ECHO MEAS - LV MASS(C)D: 175.5 GRAMS
BH CV ECHO MEAS - LV MASS(C)DI: 90.5 GRAMS/M^2
BH CV ECHO MEAS - LV MAX PG: 2.5 MMHG
BH CV ECHO MEAS - LV MEAN PG: 1.4 MMHG
BH CV ECHO MEAS - LV SYSTOLIC VOL/BSA (12-30): 36.1 ML/M^2
BH CV ECHO MEAS - LV V1 MAX: 79.4 CM/SEC
BH CV ECHO MEAS - LV V1 MEAN: 55.5 CM/SEC
BH CV ECHO MEAS - LV V1 VTI: 20.5 CM
BH CV ECHO MEAS - LVIDD: 4.1 CM
BH CV ECHO MEAS - LVIDS: 2.7 CM
BH CV ECHO MEAS - LVLD AP2: 10.4 CM
BH CV ECHO MEAS - LVLD AP4: 10.4 CM
BH CV ECHO MEAS - LVLS AP2: 9.2 CM
BH CV ECHO MEAS - LVLS AP4: 9.5 CM
BH CV ECHO MEAS - LVOT AREA (M): 3.1 CM^2
BH CV ECHO MEAS - LVOT AREA: 3.2 CM^2
BH CV ECHO MEAS - LVOT DIAM: 2 CM
BH CV ECHO MEAS - LVPWD: 1.4 CM
BH CV ECHO MEAS - MED PEAK E' VEL: 4.2 CM/SEC
BH CV ECHO MEAS - MEDIAL E/E' RATIO: 13
BH CV ECHO MEAS - MV A MAX VEL: 120.4 CM/SEC
BH CV ECHO MEAS - MV DEC TIME: 0.23 SEC
BH CV ECHO MEAS - MV E MAX VEL: 56.3 CM/SEC
BH CV ECHO MEAS - MV E/A: 0.47
BH CV ECHO MEAS - MV MAX PG: 8 MMHG
BH CV ECHO MEAS - MV MEAN PG: 3.2 MMHG
BH CV ECHO MEAS - MV V2 MAX: 141.2 CM/SEC
BH CV ECHO MEAS - MV V2 MEAN: 81.8 CM/SEC
BH CV ECHO MEAS - MV V2 VTI: 35.4 CM
BH CV ECHO MEAS - MVA(VTI): 1.8 CM^2
BH CV ECHO MEAS - RAP SYSTOLE: 3 MMHG
BH CV ECHO MEAS - RVSP: 21 MMHG
BH CV ECHO MEAS - SI(AO): 295.1 ML/M^2
BH CV ECHO MEAS - SI(CUBED): 24.4 ML/M^2
BH CV ECHO MEAS - SI(LVOT): 33.4 ML/M^2
BH CV ECHO MEAS - SI(MOD-SP2): 52.1 ML/M^2
BH CV ECHO MEAS - SI(MOD-SP4): 55.2 ML/M^2
BH CV ECHO MEAS - SI(TEICH): 23.4 ML/M^2
BH CV ECHO MEAS - SV(AO): 571.8 ML
BH CV ECHO MEAS - SV(CUBED): 47.3 ML
BH CV ECHO MEAS - SV(LVOT): 64.7 ML
BH CV ECHO MEAS - SV(MOD-SP2): 101 ML
BH CV ECHO MEAS - SV(MOD-SP4): 107 ML
BH CV ECHO MEAS - SV(TEICH): 45.4 ML
BH CV ECHO MEAS - TAPSE (>1.6): 2.9 CM
BH CV ECHO MEAS - TR MAX PG: 18 MMHG
BH CV ECHO MEAS - TR MAX VEL: 195.8 CM/SEC
BH CV ECHO MEASUREMENTS AVERAGE E/E' RATIO: 9.62
BH CV XLRA - RV BASE: 4.6 CM
BH CV XLRA - RV LENGTH: 8.6 CM
BH CV XLRA - RV MID: 3.1 CM
BH CV XLRA - TDI S': 12.7 CM/SEC
BUN SERPL-MCNC: 36 MG/DL (ref 8–23)
BUN/CREAT SERPL: 22.2 (ref 7–25)
CALCIUM SPEC-SCNC: 8.8 MG/DL (ref 8.6–10.5)
CHLORIDE SERPL-SCNC: 105 MMOL/L (ref 98–107)
CO2 SERPL-SCNC: 26 MMOL/L (ref 22–29)
CREAT SERPL-MCNC: 1.62 MG/DL (ref 0.76–1.27)
DEPRECATED RDW RBC AUTO: 47.6 FL (ref 37–54)
EOSINOPHIL # BLD AUTO: 0.29 10*3/MM3 (ref 0–0.4)
EOSINOPHIL NFR BLD AUTO: 2.8 % (ref 0.3–6.2)
ERYTHROCYTE [DISTWIDTH] IN BLOOD BY AUTOMATED COUNT: 13.8 % (ref 12.3–15.4)
GFR SERPL CREATININE-BSD FRML MDRD: 42 ML/MIN/1.73
GLUCOSE BLDC GLUCOMTR-MCNC: 139 MG/DL (ref 70–130)
GLUCOSE BLDC GLUCOMTR-MCNC: 178 MG/DL (ref 70–130)
GLUCOSE BLDC GLUCOMTR-MCNC: 192 MG/DL (ref 70–130)
GLUCOSE BLDC GLUCOMTR-MCNC: 282 MG/DL (ref 70–130)
GLUCOSE SERPL-MCNC: 87 MG/DL (ref 65–99)
HCT VFR BLD AUTO: 34.7 % (ref 37.5–51)
HGB BLD-MCNC: 11.7 G/DL (ref 13–17.7)
IMM GRANULOCYTES # BLD AUTO: 0.07 10*3/MM3 (ref 0–0.05)
IMM GRANULOCYTES NFR BLD AUTO: 0.7 % (ref 0–0.5)
LEFT ATRIUM VOLUME INDEX: 24.3 ML/M^2
LEFT ATRIUM VOLUME: 47 ML
LV EF 2D ECHO EST: 60 %
LYMPHOCYTES # BLD AUTO: 2.17 10*3/MM3 (ref 0.7–3.1)
LYMPHOCYTES NFR BLD AUTO: 21.2 % (ref 19.6–45.3)
MAGNESIUM SERPL-MCNC: 1.8 MG/DL (ref 1.6–2.4)
MAXIMAL PREDICTED HEART RATE: 146 BPM
MCH RBC QN AUTO: 31.8 PG (ref 26.6–33)
MCHC RBC AUTO-ENTMCNC: 33.7 G/DL (ref 31.5–35.7)
MCV RBC AUTO: 94.3 FL (ref 79–97)
MONOCYTES # BLD AUTO: 0.84 10*3/MM3 (ref 0.1–0.9)
MONOCYTES NFR BLD AUTO: 8.2 % (ref 5–12)
NEUTROPHILS NFR BLD AUTO: 6.8 10*3/MM3 (ref 1.7–7)
NEUTROPHILS NFR BLD AUTO: 66.6 % (ref 42.7–76)
NRBC BLD AUTO-RTO: 0 /100 WBC (ref 0–0.2)
PLATELET # BLD AUTO: 189 10*3/MM3 (ref 140–450)
PMV BLD AUTO: 10.8 FL (ref 6–12)
POTASSIUM SERPL-SCNC: 3.9 MMOL/L (ref 3.5–5.2)
RBC # BLD AUTO: 3.68 10*6/MM3 (ref 4.14–5.8)
SODIUM SERPL-SCNC: 140 MMOL/L (ref 136–145)
STRESS TARGET HR: 124 BPM
UFH PPP CHRO-ACNC: 0.33 IU/ML (ref 0.3–0.7)
UFH PPP CHRO-ACNC: 0.38 IU/ML (ref 0.3–0.7)
WBC # BLD AUTO: 10.22 10*3/MM3 (ref 3.4–10.8)

## 2021-05-23 PROCEDURE — 25010000003 MAGNESIUM SULFATE 4 GM/100ML SOLUTION: Performed by: INTERNAL MEDICINE

## 2021-05-23 PROCEDURE — 83735 ASSAY OF MAGNESIUM: CPT | Performed by: INTERNAL MEDICINE

## 2021-05-23 PROCEDURE — 99233 SBSQ HOSP IP/OBS HIGH 50: CPT | Performed by: INTERNAL MEDICINE

## 2021-05-23 PROCEDURE — 84300 ASSAY OF URINE SODIUM: CPT | Performed by: INTERNAL MEDICINE

## 2021-05-23 PROCEDURE — 84540 ASSAY OF URINE/UREA-N: CPT | Performed by: INTERNAL MEDICINE

## 2021-05-23 PROCEDURE — 81001 URINALYSIS AUTO W/SCOPE: CPT | Performed by: INTERNAL MEDICINE

## 2021-05-23 PROCEDURE — 85520 HEPARIN ASSAY: CPT

## 2021-05-23 PROCEDURE — 87205 SMEAR GRAM STAIN: CPT | Performed by: INTERNAL MEDICINE

## 2021-05-23 PROCEDURE — 99232 SBSQ HOSP IP/OBS MODERATE 35: CPT | Performed by: INTERNAL MEDICINE

## 2021-05-23 PROCEDURE — 85025 COMPLETE CBC W/AUTO DIFF WBC: CPT | Performed by: NURSE PRACTITIONER

## 2021-05-23 PROCEDURE — 93306 TTE W/DOPPLER COMPLETE: CPT | Performed by: INTERNAL MEDICINE

## 2021-05-23 PROCEDURE — 63710000001 INSULIN DETEMIR PER 5 UNITS: Performed by: INTERNAL MEDICINE

## 2021-05-23 PROCEDURE — 63710000001 INSULIN LISPRO (HUMAN) PER 5 UNITS: Performed by: INTERNAL MEDICINE

## 2021-05-23 PROCEDURE — 82962 GLUCOSE BLOOD TEST: CPT

## 2021-05-23 PROCEDURE — 93306 TTE W/DOPPLER COMPLETE: CPT

## 2021-05-23 PROCEDURE — 25010000002 HEPARIN (PORCINE) 25000-0.45 UT/250ML-% SOLUTION

## 2021-05-23 PROCEDURE — 80048 BASIC METABOLIC PNL TOTAL CA: CPT | Performed by: INTERNAL MEDICINE

## 2021-05-23 PROCEDURE — 82570 ASSAY OF URINE CREATININE: CPT | Performed by: INTERNAL MEDICINE

## 2021-05-23 RX ORDER — METOPROLOL TARTRATE 50 MG/1
50 TABLET, FILM COATED ORAL EVERY 12 HOURS SCHEDULED
Status: DISCONTINUED | OUTPATIENT
Start: 2021-05-23 | End: 2021-05-24

## 2021-05-23 RX ORDER — AMLODIPINE BESYLATE 5 MG/1
5 TABLET ORAL
Status: DISCONTINUED | OUTPATIENT
Start: 2021-05-23 | End: 2021-05-24

## 2021-05-23 RX ADMIN — GABAPENTIN 200 MG: 100 CAPSULE ORAL at 09:06

## 2021-05-23 RX ADMIN — SODIUM CHLORIDE, PRESERVATIVE FREE 10 ML: 5 INJECTION INTRAVENOUS at 21:29

## 2021-05-23 RX ADMIN — AMLODIPINE BESYLATE 5 MG: 5 TABLET ORAL at 12:46

## 2021-05-23 RX ADMIN — INSULIN LISPRO 2 UNITS: 100 INJECTION, SOLUTION INTRAVENOUS; SUBCUTANEOUS at 17:32

## 2021-05-23 RX ADMIN — PANTOPRAZOLE SODIUM 40 MG: 40 TABLET, DELAYED RELEASE ORAL at 05:38

## 2021-05-23 RX ADMIN — ATORVASTATIN CALCIUM 40 MG: 40 TABLET, FILM COATED ORAL at 21:26

## 2021-05-23 RX ADMIN — INSULIN LISPRO 4 UNITS: 100 INJECTION, SOLUTION INTRAVENOUS; SUBCUTANEOUS at 12:47

## 2021-05-23 RX ADMIN — METOPROLOL TARTRATE 50 MG: 50 TABLET, FILM COATED ORAL at 09:05

## 2021-05-23 RX ADMIN — CLOPIDOGREL BISULFATE 75 MG: 75 TABLET ORAL at 09:05

## 2021-05-23 RX ADMIN — INSULIN LISPRO 2 UNITS: 100 INJECTION, SOLUTION INTRAVENOUS; SUBCUTANEOUS at 21:27

## 2021-05-23 RX ADMIN — METOPROLOL TARTRATE 50 MG: 50 TABLET, FILM COATED ORAL at 21:27

## 2021-05-23 RX ADMIN — HYDRALAZINE HYDROCHLORIDE 10 MG: 10 TABLET ORAL at 21:27

## 2021-05-23 RX ADMIN — INSULIN DETEMIR 20 UNITS: 100 INJECTION, SOLUTION SUBCUTANEOUS at 21:57

## 2021-05-23 RX ADMIN — ALLOPURINOL 100 MG: 100 TABLET ORAL at 09:06

## 2021-05-23 RX ADMIN — HYDRALAZINE HYDROCHLORIDE 10 MG: 10 TABLET ORAL at 05:38

## 2021-05-23 RX ADMIN — HEPARIN SODIUM 13 UNITS/KG/HR: 10000 INJECTION, SOLUTION INTRAVENOUS at 04:12

## 2021-05-23 RX ADMIN — HYDRALAZINE HYDROCHLORIDE 10 MG: 10 TABLET ORAL at 12:46

## 2021-05-23 RX ADMIN — ASPIRIN 81 MG: 81 TABLET, COATED ORAL at 09:05

## 2021-05-23 RX ADMIN — GABAPENTIN 200 MG: 100 CAPSULE ORAL at 21:26

## 2021-05-23 RX ADMIN — MAGNESIUM SULFATE HEPTAHYDRATE 4 G: 40 INJECTION, SOLUTION INTRAVENOUS at 09:06

## 2021-05-24 ENCOUNTER — APPOINTMENT (OUTPATIENT)
Dept: CARDIOLOGY | Facility: HOSPITAL | Age: 76
End: 2021-05-24

## 2021-05-24 PROBLEM — Z79.4 TYPE 2 DIABETES MELLITUS, WITH LONG-TERM CURRENT USE OF INSULIN: Status: ACTIVE | Noted: 2021-05-24

## 2021-05-24 PROBLEM — I21.4 NON-STEMI (NON-ST ELEVATED MYOCARDIAL INFARCTION): Status: ACTIVE | Noted: 2021-05-22

## 2021-05-24 PROBLEM — E11.9 TYPE 2 DIABETES MELLITUS, WITH LONG-TERM CURRENT USE OF INSULIN (HCC): Status: ACTIVE | Noted: 2021-05-24

## 2021-05-24 PROBLEM — I21.4 NON-STEMI (NON-ST ELEVATED MYOCARDIAL INFARCTION): Status: RESOLVED | Noted: 2021-05-22 | Resolved: 2021-05-24

## 2021-05-24 LAB
ALBUMIN SERPL-MCNC: 3.2 G/DL (ref 3.5–5.2)
ALBUMIN/GLOB SERPL: 1.1 G/DL
ALP SERPL-CCNC: 107 U/L (ref 39–117)
ALT SERPL W P-5'-P-CCNC: 23 U/L (ref 1–41)
ANION GAP SERPL CALCULATED.3IONS-SCNC: 11 MMOL/L (ref 5–15)
AST SERPL-CCNC: 24 U/L (ref 1–40)
BACTERIA UR QL AUTO: ABNORMAL /HPF
BH CV ECHO MEAS - BSA(HAYCOCK): 2 M^2
BH CV ECHO MEAS - BSA: 1.9 M^2
BH CV ECHO MEAS - BZI_BMI: 27.7 KILOGRAMS/M^2
BH CV ECHO MEAS - BZI_METRIC_HEIGHT: 170.2 CM
BH CV ECHO MEAS - BZI_METRIC_WEIGHT: 80.3 KG
BH CV XLRA MEAS LEFT DIST CCA EDV: 13.2 CM/SEC
BH CV XLRA MEAS LEFT DIST CCA PSV: 57.1 CM/SEC
BH CV XLRA MEAS LEFT DIST ICA EDV: 23 CM/SEC
BH CV XLRA MEAS LEFT DIST ICA PSV: 73 CM/SEC
BH CV XLRA MEAS LEFT ICA/CCA RATIO: 0.92
BH CV XLRA MEAS LEFT MID CCA EDV: 15.4 CM/SEC
BH CV XLRA MEAS LEFT MID CCA PSV: 79 CM/SEC
BH CV XLRA MEAS LEFT MID ICA EDV: 17.6 CM/SEC
BH CV XLRA MEAS LEFT MID ICA PSV: 48.3 CM/SEC
BH CV XLRA MEAS LEFT PROX CCA EDV: 13.2 CM/SEC
BH CV XLRA MEAS LEFT PROX CCA PSV: 72.4 CM/SEC
BH CV XLRA MEAS LEFT PROX ECA EDV: 12.6 CM/SEC
BH CV XLRA MEAS LEFT PROX ECA PSV: 112.1 CM/SEC
BH CV XLRA MEAS LEFT PROX ICA EDV: 15.9 CM/SEC
BH CV XLRA MEAS LEFT PROX ICA PSV: 47.2 CM/SEC
BH CV XLRA MEAS LEFT PROX SCLA EDV: 0 CM/SEC
BH CV XLRA MEAS LEFT PROX SCLA PSV: 140.5 CM/SEC
BH CV XLRA MEAS LEFT VERTEBRAL A EDV: 9.8 CM/SEC
BH CV XLRA MEAS LEFT VERTEBRAL A PSV: 29.4 CM/SEC
BH CV XLRA MEAS RIGHT DIST CCA EDV: 14.9 CM/SEC
BH CV XLRA MEAS RIGHT DIST CCA PSV: 65.9 CM/SEC
BH CV XLRA MEAS RIGHT DIST ICA EDV: 21.2 CM/SEC
BH CV XLRA MEAS RIGHT DIST ICA PSV: 61.9 CM/SEC
BH CV XLRA MEAS RIGHT ICA/CCA RATIO: 1.11
BH CV XLRA MEAS RIGHT MID CCA EDV: 14.1 CM/SEC
BH CV XLRA MEAS RIGHT MID CCA PSV: 65.9 CM/SEC
BH CV XLRA MEAS RIGHT MID ICA EDV: 22 CM/SEC
BH CV XLRA MEAS RIGHT MID ICA PSV: 62.7 CM/SEC
BH CV XLRA MEAS RIGHT PROX CCA EDV: 14.1 CM/SEC
BH CV XLRA MEAS RIGHT PROX CCA PSV: 72.1 CM/SEC
BH CV XLRA MEAS RIGHT PROX ECA EDV: 12.5 CM/SEC
BH CV XLRA MEAS RIGHT PROX ECA PSV: 126.2 CM/SEC
BH CV XLRA MEAS RIGHT PROX ICA EDV: 16.5 CM/SEC
BH CV XLRA MEAS RIGHT PROX ICA PSV: 72.9 CM/SEC
BH CV XLRA MEAS RIGHT PROX SCLA EDV: 0 CM/SEC
BH CV XLRA MEAS RIGHT PROX SCLA PSV: 116 CM/SEC
BH CV XLRA MEAS RIGHT VERTEBRAL A EDV: 14.9 CM/SEC
BH CV XLRA MEAS RIGHT VERTEBRAL A PSV: 40.4 CM/SEC
BILIRUB SERPL-MCNC: 0.2 MG/DL (ref 0–1.2)
BILIRUB UR QL STRIP: NEGATIVE
BUN SERPL-MCNC: 36 MG/DL (ref 8–23)
BUN/CREAT SERPL: 18.5 (ref 7–25)
CALCIUM SPEC-SCNC: 9.2 MG/DL (ref 8.6–10.5)
CHLORIDE SERPL-SCNC: 104 MMOL/L (ref 98–107)
CLARITY UR: CLEAR
CO2 SERPL-SCNC: 25 MMOL/L (ref 22–29)
COLOR UR: YELLOW
CREAT SERPL-MCNC: 1.95 MG/DL (ref 0.76–1.27)
CREAT UR-MCNC: 65.5 MG/DL
EOSINOPHIL SPEC QL MICRO: 0 % EOS/100 CELLS (ref 0–0)
GFR SERPL CREATININE-BSD FRML MDRD: 34 ML/MIN/1.73
GLOBULIN UR ELPH-MCNC: 2.9 GM/DL
GLUCOSE BLDC GLUCOMTR-MCNC: 146 MG/DL (ref 70–130)
GLUCOSE BLDC GLUCOMTR-MCNC: 149 MG/DL (ref 70–130)
GLUCOSE BLDC GLUCOMTR-MCNC: 227 MG/DL (ref 70–130)
GLUCOSE BLDC GLUCOMTR-MCNC: 240 MG/DL (ref 70–130)
GLUCOSE SERPL-MCNC: 144 MG/DL (ref 65–99)
GLUCOSE UR STRIP-MCNC: ABNORMAL MG/DL
HGB UR QL STRIP.AUTO: ABNORMAL
HYALINE CASTS UR QL AUTO: ABNORMAL /LPF
KETONES UR QL STRIP: NEGATIVE
LEUKOCYTE ESTERASE UR QL STRIP.AUTO: NEGATIVE
MAXIMAL PREDICTED HEART RATE: 146 BPM
NITRITE UR QL STRIP: NEGATIVE
PH UR STRIP.AUTO: 6.5 [PH] (ref 5–8)
POTASSIUM SERPL-SCNC: 4.4 MMOL/L (ref 3.5–5.2)
PROT SERPL-MCNC: 6.1 G/DL (ref 6–8.5)
PROT UR QL STRIP: ABNORMAL
RBC # UR: ABNORMAL /HPF
REF LAB TEST METHOD: ABNORMAL
SODIUM SERPL-SCNC: 140 MMOL/L (ref 136–145)
SODIUM UR-SCNC: 84 MMOL/L
SP GR UR STRIP: 1.02 (ref 1–1.03)
SQUAMOUS #/AREA URNS HPF: ABNORMAL /HPF
STRESS TARGET HR: 124 BPM
UFH PPP CHRO-ACNC: 0.81 IU/ML (ref 0.3–0.7)
UFH PPP CHRO-ACNC: 0.84 IU/ML (ref 0.3–0.7)
UROBILINOGEN UR QL STRIP: ABNORMAL
UUN 24H UR-MCNC: 510 MG/DL
WBC UR QL AUTO: ABNORMAL /HPF

## 2021-05-24 PROCEDURE — 80053 COMPREHEN METABOLIC PANEL: CPT | Performed by: INTERNAL MEDICINE

## 2021-05-24 PROCEDURE — C1894 INTRO/SHEATH, NON-LASER: HCPCS | Performed by: INTERNAL MEDICINE

## 2021-05-24 PROCEDURE — 4A023N7 MEASUREMENT OF CARDIAC SAMPLING AND PRESSURE, LEFT HEART, PERCUTANEOUS APPROACH: ICD-10-PCS | Performed by: INTERNAL MEDICINE

## 2021-05-24 PROCEDURE — 99222 1ST HOSP IP/OBS MODERATE 55: CPT | Performed by: PHYSICIAN ASSISTANT

## 2021-05-24 PROCEDURE — 82962 GLUCOSE BLOOD TEST: CPT

## 2021-05-24 PROCEDURE — 85520 HEPARIN ASSAY: CPT

## 2021-05-24 PROCEDURE — 93880 EXTRACRANIAL BILAT STUDY: CPT | Performed by: INTERNAL MEDICINE

## 2021-05-24 PROCEDURE — 63710000001 INSULIN LISPRO (HUMAN) PER 5 UNITS: Performed by: INTERNAL MEDICINE

## 2021-05-24 PROCEDURE — 25010000002 FENTANYL CITRATE (PF) 50 MCG/ML SOLUTION: Performed by: INTERNAL MEDICINE

## 2021-05-24 PROCEDURE — 85520 HEPARIN ASSAY: CPT | Performed by: INTERNAL MEDICINE

## 2021-05-24 PROCEDURE — 63710000001 INSULIN DETEMIR PER 5 UNITS: Performed by: INTERNAL MEDICINE

## 2021-05-24 PROCEDURE — S0260 H&P FOR SURGERY: HCPCS | Performed by: PHYSICIAN ASSISTANT

## 2021-05-24 PROCEDURE — 93458 L HRT ARTERY/VENTRICLE ANGIO: CPT | Performed by: INTERNAL MEDICINE

## 2021-05-24 PROCEDURE — 25010000002 HEPARIN (PORCINE) 25000-0.45 UT/250ML-% SOLUTION

## 2021-05-24 PROCEDURE — B2111ZZ FLUOROSCOPY OF MULTIPLE CORONARY ARTERIES USING LOW OSMOLAR CONTRAST: ICD-10-PCS | Performed by: INTERNAL MEDICINE

## 2021-05-24 PROCEDURE — 0 IOPAMIDOL PER 1 ML: Performed by: INTERNAL MEDICINE

## 2021-05-24 PROCEDURE — 25010000002 MIDAZOLAM PER 1 MG: Performed by: INTERNAL MEDICINE

## 2021-05-24 PROCEDURE — 93880 EXTRACRANIAL BILAT STUDY: CPT

## 2021-05-24 PROCEDURE — 99233 SBSQ HOSP IP/OBS HIGH 50: CPT | Performed by: INTERNAL MEDICINE

## 2021-05-24 PROCEDURE — 25010000002 HEPARIN (PORCINE) PER 1000 UNITS: Performed by: INTERNAL MEDICINE

## 2021-05-24 RX ORDER — AMLODIPINE BESYLATE 10 MG/1
10 TABLET ORAL
Status: DISCONTINUED | OUTPATIENT
Start: 2021-05-25 | End: 2021-05-28

## 2021-05-24 RX ORDER — LIDOCAINE HYDROCHLORIDE 10 MG/ML
INJECTION, SOLUTION EPIDURAL; INFILTRATION; INTRACAUDAL; PERINEURAL AS NEEDED
Status: DISCONTINUED | OUTPATIENT
Start: 2021-05-24 | End: 2021-05-24 | Stop reason: HOSPADM

## 2021-05-24 RX ORDER — HYDRALAZINE HYDROCHLORIDE 25 MG/1
25 TABLET, FILM COATED ORAL EVERY 8 HOURS SCHEDULED
Status: DISCONTINUED | OUTPATIENT
Start: 2021-05-24 | End: 2021-05-25

## 2021-05-24 RX ORDER — GABAPENTIN 300 MG/1
600 CAPSULE ORAL NIGHTLY
Status: DISCONTINUED | OUTPATIENT
Start: 2021-05-25 | End: 2021-05-29

## 2021-05-24 RX ORDER — ASPIRIN 81 MG/1
81 TABLET ORAL DAILY
Status: DISCONTINUED | OUTPATIENT
Start: 2021-05-24 | End: 2021-05-24 | Stop reason: SDUPTHER

## 2021-05-24 RX ORDER — ALLOPURINOL 300 MG/1
300 TABLET ORAL DAILY
Status: DISCONTINUED | OUTPATIENT
Start: 2021-05-25 | End: 2021-05-28

## 2021-05-24 RX ORDER — FENTANYL CITRATE 50 UG/ML
INJECTION, SOLUTION INTRAMUSCULAR; INTRAVENOUS AS NEEDED
Status: DISCONTINUED | OUTPATIENT
Start: 2021-05-24 | End: 2021-05-24 | Stop reason: HOSPADM

## 2021-05-24 RX ORDER — ACETAMINOPHEN 325 MG/1
650 TABLET ORAL EVERY 4 HOURS PRN
Status: DISCONTINUED | OUTPATIENT
Start: 2021-05-24 | End: 2021-05-29

## 2021-05-24 RX ORDER — METOPROLOL TARTRATE 50 MG/1
100 TABLET, FILM COATED ORAL EVERY 12 HOURS SCHEDULED
Status: DISCONTINUED | OUTPATIENT
Start: 2021-05-24 | End: 2021-05-28

## 2021-05-24 RX ORDER — ALLOPURINOL 100 MG/1
200 TABLET ORAL ONCE
Status: COMPLETED | OUTPATIENT
Start: 2021-05-24 | End: 2021-05-24

## 2021-05-24 RX ORDER — SODIUM CHLORIDE 9 MG/ML
5 INJECTION, SOLUTION INTRAVENOUS CONTINUOUS
Status: ACTIVE | OUTPATIENT
Start: 2021-05-24 | End: 2021-05-24

## 2021-05-24 RX ORDER — MIDAZOLAM HYDROCHLORIDE 1 MG/ML
INJECTION INTRAMUSCULAR; INTRAVENOUS AS NEEDED
Status: DISCONTINUED | OUTPATIENT
Start: 2021-05-24 | End: 2021-05-24 | Stop reason: HOSPADM

## 2021-05-24 RX ADMIN — SODIUM CHLORIDE 5 ML/KG/HR: 9 INJECTION, SOLUTION INTRAVENOUS at 12:27

## 2021-05-24 RX ADMIN — ALLOPURINOL 100 MG: 100 TABLET ORAL at 08:45

## 2021-05-24 RX ADMIN — ASPIRIN 81 MG: 81 TABLET, COATED ORAL at 08:45

## 2021-05-24 RX ADMIN — PANTOPRAZOLE SODIUM 40 MG: 40 TABLET, DELAYED RELEASE ORAL at 05:40

## 2021-05-24 RX ADMIN — SODIUM CHLORIDE, PRESERVATIVE FREE 10 ML: 5 INJECTION INTRAVENOUS at 21:04

## 2021-05-24 RX ADMIN — INSULIN LISPRO 3 UNITS: 100 INJECTION, SOLUTION INTRAVENOUS; SUBCUTANEOUS at 17:25

## 2021-05-24 RX ADMIN — SODIUM CHLORIDE 5 ML/KG/HR: 9 INJECTION, SOLUTION INTRAVENOUS at 15:55

## 2021-05-24 RX ADMIN — NITROGLYCERIN 40 MCG/MIN: 20 INJECTION INTRAVENOUS at 15:55

## 2021-05-24 RX ADMIN — SODIUM CHLORIDE, PRESERVATIVE FREE 10 ML: 5 INJECTION INTRAVENOUS at 08:46

## 2021-05-24 RX ADMIN — AMLODIPINE BESYLATE 5 MG: 5 TABLET ORAL at 08:45

## 2021-05-24 RX ADMIN — INSULIN LISPRO 3 UNITS: 100 INJECTION, SOLUTION INTRAVENOUS; SUBCUTANEOUS at 21:03

## 2021-05-24 RX ADMIN — METOPROLOL TARTRATE 100 MG: 100 TABLET, FILM COATED ORAL at 21:03

## 2021-05-24 RX ADMIN — GABAPENTIN 200 MG: 100 CAPSULE ORAL at 08:45

## 2021-05-24 RX ADMIN — CLOPIDOGREL BISULFATE 75 MG: 75 TABLET ORAL at 08:44

## 2021-05-24 RX ADMIN — NITROGLYCERIN 50 MCG/MIN: 20 INJECTION INTRAVENOUS at 21:04

## 2021-05-24 RX ADMIN — HEPARIN SODIUM 13 UNITS/KG/HR: 10000 INJECTION, SOLUTION INTRAVENOUS at 02:47

## 2021-05-24 RX ADMIN — HYDRALAZINE HYDROCHLORIDE 10 MG: 10 TABLET ORAL at 05:40

## 2021-05-24 RX ADMIN — HYDRALAZINE HYDROCHLORIDE 25 MG: 25 TABLET, FILM COATED ORAL at 21:03

## 2021-05-24 RX ADMIN — ALLOPURINOL 200 MG: 100 TABLET ORAL at 13:14

## 2021-05-24 RX ADMIN — INSULIN DETEMIR 20 UNITS: 100 INJECTION, SOLUTION SUBCUTANEOUS at 21:05

## 2021-05-24 RX ADMIN — NITROGLYCERIN 50 MCG/MIN: 20 INJECTION INTRAVENOUS at 18:32

## 2021-05-24 RX ADMIN — METOPROLOL TARTRATE 50 MG: 50 TABLET, FILM COATED ORAL at 08:45

## 2021-05-24 RX ADMIN — HYDRALAZINE HYDROCHLORIDE 10 MG: 10 TABLET ORAL at 13:14

## 2021-05-24 RX ADMIN — ATORVASTATIN CALCIUM 40 MG: 40 TABLET, FILM COATED ORAL at 21:03

## 2021-05-25 ENCOUNTER — APPOINTMENT (OUTPATIENT)
Dept: PULMONOLOGY | Facility: HOSPITAL | Age: 76
End: 2021-05-25

## 2021-05-25 PROBLEM — I25.10 CORONARY ARTERY DISEASE INVOLVING NATIVE CORONARY ARTERY OF NATIVE HEART: Status: ACTIVE | Noted: 2021-05-22

## 2021-05-25 LAB
ANION GAP SERPL CALCULATED.3IONS-SCNC: 11 MMOL/L (ref 5–15)
BUN SERPL-MCNC: 40 MG/DL (ref 8–23)
BUN/CREAT SERPL: 19 (ref 7–25)
CALCIUM SPEC-SCNC: 9 MG/DL (ref 8.6–10.5)
CHLORIDE SERPL-SCNC: 105 MMOL/L (ref 98–107)
CO2 SERPL-SCNC: 21 MMOL/L (ref 22–29)
CREAT SERPL-MCNC: 2.1 MG/DL (ref 0.76–1.27)
DEPRECATED RDW RBC AUTO: 47.1 FL (ref 37–54)
ERYTHROCYTE [DISTWIDTH] IN BLOOD BY AUTOMATED COUNT: 13.6 % (ref 12.3–15.4)
GFR SERPL CREATININE-BSD FRML MDRD: 31 ML/MIN/1.73
GLUCOSE BLDC GLUCOMTR-MCNC: 166 MG/DL (ref 70–130)
GLUCOSE BLDC GLUCOMTR-MCNC: 181 MG/DL (ref 70–130)
GLUCOSE BLDC GLUCOMTR-MCNC: 296 MG/DL (ref 70–130)
GLUCOSE BLDC GLUCOMTR-MCNC: 300 MG/DL (ref 70–130)
GLUCOSE SERPL-MCNC: 239 MG/DL (ref 65–99)
HCT VFR BLD AUTO: 33.9 % (ref 37.5–51)
HGB BLD-MCNC: 11.7 G/DL (ref 13–17.7)
MCH RBC QN AUTO: 32.6 PG (ref 26.6–33)
MCHC RBC AUTO-ENTMCNC: 34.5 G/DL (ref 31.5–35.7)
MCV RBC AUTO: 94.4 FL (ref 79–97)
PA ADP PRP-ACNC: 173 PRU
PA ADP PRP-ACNC: 177 PRU
PLATELET # BLD AUTO: 202 10*3/MM3 (ref 140–450)
PMV BLD AUTO: 10.5 FL (ref 6–12)
POTASSIUM SERPL-SCNC: 4.1 MMOL/L (ref 3.5–5.2)
RBC # BLD AUTO: 3.59 10*6/MM3 (ref 4.14–5.8)
SODIUM SERPL-SCNC: 137 MMOL/L (ref 136–145)
UFH PPP CHRO-ACNC: 0.2 IU/ML (ref 0.3–0.7)
UFH PPP CHRO-ACNC: 0.51 IU/ML (ref 0.3–0.7)
UFH PPP CHRO-ACNC: 0.56 IU/ML (ref 0.3–0.7)
WBC # BLD AUTO: 11.56 10*3/MM3 (ref 3.4–10.8)

## 2021-05-25 PROCEDURE — 63710000001 INSULIN LISPRO (HUMAN) PER 5 UNITS: Performed by: NURSE PRACTITIONER

## 2021-05-25 PROCEDURE — 82962 GLUCOSE BLOOD TEST: CPT

## 2021-05-25 PROCEDURE — 25010000002 HEPARIN (PORCINE) 25000-0.45 UT/250ML-% SOLUTION

## 2021-05-25 PROCEDURE — 85027 COMPLETE CBC AUTOMATED: CPT | Performed by: INTERNAL MEDICINE

## 2021-05-25 PROCEDURE — 85576 BLOOD PLATELET AGGREGATION: CPT | Performed by: PHYSICIAN ASSISTANT

## 2021-05-25 PROCEDURE — G0108 DIAB MANAGE TRN  PER INDIV: HCPCS

## 2021-05-25 PROCEDURE — 99232 SBSQ HOSP IP/OBS MODERATE 35: CPT | Performed by: INTERNAL MEDICINE

## 2021-05-25 PROCEDURE — 86900 BLOOD TYPING SEROLOGIC ABO: CPT

## 2021-05-25 PROCEDURE — 80048 BASIC METABOLIC PNL TOTAL CA: CPT | Performed by: INTERNAL MEDICINE

## 2021-05-25 PROCEDURE — 63710000001 INSULIN LISPRO (HUMAN) PER 5 UNITS: Performed by: INTERNAL MEDICINE

## 2021-05-25 PROCEDURE — 25010000002 HEPARIN (PORCINE) PER 1000 UNITS

## 2021-05-25 PROCEDURE — 85520 HEPARIN ASSAY: CPT

## 2021-05-25 PROCEDURE — 94010 BREATHING CAPACITY TEST: CPT

## 2021-05-25 PROCEDURE — 99232 SBSQ HOSP IP/OBS MODERATE 35: CPT | Performed by: NURSE PRACTITIONER

## 2021-05-25 PROCEDURE — 94010 BREATHING CAPACITY TEST: CPT | Performed by: INTERNAL MEDICINE

## 2021-05-25 PROCEDURE — 86901 BLOOD TYPING SEROLOGIC RH(D): CPT

## 2021-05-25 PROCEDURE — 63710000001 INSULIN DETEMIR PER 5 UNITS: Performed by: NURSE PRACTITIONER

## 2021-05-25 PROCEDURE — 99232 SBSQ HOSP IP/OBS MODERATE 35: CPT | Performed by: PHYSICIAN ASSISTANT

## 2021-05-25 RX ORDER — IPRATROPIUM BROMIDE AND ALBUTEROL SULFATE 2.5; .5 MG/3ML; MG/3ML
3 SOLUTION RESPIRATORY (INHALATION) EVERY 4 HOURS PRN
Status: DISCONTINUED | OUTPATIENT
Start: 2021-05-25 | End: 2021-05-27

## 2021-05-25 RX ORDER — HEPARIN SODIUM 1000 [USP'U]/ML
2000 INJECTION, SOLUTION INTRAVENOUS; SUBCUTANEOUS ONCE
Status: COMPLETED | OUTPATIENT
Start: 2021-05-25 | End: 2021-05-25

## 2021-05-25 RX ORDER — CHLORHEXIDINE GLUCONATE 500 MG/1
1 CLOTH TOPICAL EVERY 12 HOURS PRN
Status: DISCONTINUED | OUTPATIENT
Start: 2021-05-27 | End: 2021-05-29

## 2021-05-25 RX ORDER — CHLORHEXIDINE GLUCONATE 0.12 MG/ML
15 RINSE ORAL EVERY 12 HOURS
Status: COMPLETED | OUTPATIENT
Start: 2021-05-27 | End: 2021-05-27

## 2021-05-25 RX ORDER — SODIUM CHLORIDE 0.9 % (FLUSH) 0.9 %
10 SYRINGE (ML) INJECTION EVERY 12 HOURS SCHEDULED
Status: DISCONTINUED | OUTPATIENT
Start: 2021-05-25 | End: 2021-05-29

## 2021-05-25 RX ORDER — HYDRALAZINE HYDROCHLORIDE 25 MG/1
50 TABLET, FILM COATED ORAL EVERY 8 HOURS SCHEDULED
Status: DISCONTINUED | OUTPATIENT
Start: 2021-05-25 | End: 2021-05-28

## 2021-05-25 RX ORDER — SODIUM CHLORIDE 0.9 % (FLUSH) 0.9 %
10 SYRINGE (ML) INJECTION AS NEEDED
Status: DISCONTINUED | OUTPATIENT
Start: 2021-05-25 | End: 2021-05-29

## 2021-05-25 RX ORDER — SODIUM CHLORIDE 9 MG/ML
50 INJECTION, SOLUTION INTRAVENOUS CONTINUOUS
Status: DISCONTINUED | OUTPATIENT
Start: 2021-05-25 | End: 2021-05-30

## 2021-05-25 RX ADMIN — INSULIN LISPRO 4 UNITS: 100 INJECTION, SOLUTION INTRAVENOUS; SUBCUTANEOUS at 12:38

## 2021-05-25 RX ADMIN — ATORVASTATIN CALCIUM 40 MG: 40 TABLET, FILM COATED ORAL at 21:08

## 2021-05-25 RX ADMIN — INSULIN LISPRO 3 UNITS: 100 INJECTION, SOLUTION INTRAVENOUS; SUBCUTANEOUS at 17:38

## 2021-05-25 RX ADMIN — HYDRALAZINE HYDROCHLORIDE 50 MG: 50 TABLET, FILM COATED ORAL at 21:08

## 2021-05-25 RX ADMIN — METOPROLOL TARTRATE 100 MG: 100 TABLET, FILM COATED ORAL at 08:29

## 2021-05-25 RX ADMIN — HYDRALAZINE HYDROCHLORIDE 25 MG: 25 TABLET, FILM COATED ORAL at 05:47

## 2021-05-25 RX ADMIN — ALLOPURINOL 300 MG: 300 TABLET ORAL at 08:30

## 2021-05-25 RX ADMIN — SODIUM CHLORIDE, PRESERVATIVE FREE 10 ML: 5 INJECTION INTRAVENOUS at 21:08

## 2021-05-25 RX ADMIN — PANTOPRAZOLE SODIUM 40 MG: 40 TABLET, DELAYED RELEASE ORAL at 05:47

## 2021-05-25 RX ADMIN — INSULIN LISPRO 2 UNITS: 100 INJECTION, SOLUTION INTRAVENOUS; SUBCUTANEOUS at 21:07

## 2021-05-25 RX ADMIN — HYDRALAZINE HYDROCHLORIDE 50 MG: 50 TABLET, FILM COATED ORAL at 14:28

## 2021-05-25 RX ADMIN — INSULIN LISPRO 3 UNITS: 100 INJECTION, SOLUTION INTRAVENOUS; SUBCUTANEOUS at 13:44

## 2021-05-25 RX ADMIN — INSULIN DETEMIR 25 UNITS: 100 INJECTION, SOLUTION SUBCUTANEOUS at 21:09

## 2021-05-25 RX ADMIN — ASPIRIN 81 MG: 81 TABLET, COATED ORAL at 08:30

## 2021-05-25 RX ADMIN — INSULIN LISPRO 5 UNITS: 100 INJECTION, SOLUTION INTRAVENOUS; SUBCUTANEOUS at 08:29

## 2021-05-25 RX ADMIN — SODIUM CHLORIDE, PRESERVATIVE FREE 10 ML: 5 INJECTION INTRAVENOUS at 14:15

## 2021-05-25 RX ADMIN — INSULIN LISPRO 2 UNITS: 100 INJECTION, SOLUTION INTRAVENOUS; SUBCUTANEOUS at 17:37

## 2021-05-25 RX ADMIN — METOPROLOL TARTRATE 100 MG: 100 TABLET, FILM COATED ORAL at 21:08

## 2021-05-25 RX ADMIN — HEPARIN SODIUM 2000 UNITS: 1000 INJECTION, SOLUTION INTRAVENOUS; SUBCUTANEOUS at 02:15

## 2021-05-25 RX ADMIN — SODIUM CHLORIDE 50 ML/HR: 9 INJECTION, SOLUTION INTRAVENOUS at 14:15

## 2021-05-25 RX ADMIN — AMLODIPINE BESYLATE 10 MG: 10 TABLET ORAL at 08:30

## 2021-05-25 RX ADMIN — NITROGLYCERIN 50 MCG/MIN: 20 INJECTION INTRAVENOUS at 17:37

## 2021-05-25 RX ADMIN — HEPARIN SODIUM 10.5 UNITS/KG/HR: 10000 INJECTION, SOLUTION INTRAVENOUS at 21:30

## 2021-05-25 RX ADMIN — SODIUM CHLORIDE, PRESERVATIVE FREE 10 ML: 5 INJECTION INTRAVENOUS at 08:34

## 2021-05-25 RX ADMIN — GABAPENTIN 600 MG: 300 CAPSULE ORAL at 21:08

## 2021-05-26 LAB
ABO GROUP BLD: NORMAL
ABO GROUP BLD: NORMAL
ANION GAP SERPL CALCULATED.3IONS-SCNC: 11 MMOL/L (ref 5–15)
BLD GP AB SCN SERPL QL: NEGATIVE
BUN SERPL-MCNC: 39 MG/DL (ref 8–23)
BUN/CREAT SERPL: 21.1 (ref 7–25)
CALCIUM SPEC-SCNC: 9.3 MG/DL (ref 8.6–10.5)
CHLORIDE SERPL-SCNC: 105 MMOL/L (ref 98–107)
CO2 SERPL-SCNC: 21 MMOL/L (ref 22–29)
CREAT SERPL-MCNC: 1.85 MG/DL (ref 0.76–1.27)
DEPRECATED RDW RBC AUTO: 46.6 FL (ref 37–54)
ERYTHROCYTE [DISTWIDTH] IN BLOOD BY AUTOMATED COUNT: 13.8 % (ref 12.3–15.4)
GFR SERPL CREATININE-BSD FRML MDRD: 36 ML/MIN/1.73
GLUCOSE BLDC GLUCOMTR-MCNC: 107 MG/DL (ref 70–130)
GLUCOSE BLDC GLUCOMTR-MCNC: 173 MG/DL (ref 70–130)
GLUCOSE BLDC GLUCOMTR-MCNC: 174 MG/DL (ref 70–130)
GLUCOSE BLDC GLUCOMTR-MCNC: 270 MG/DL (ref 70–130)
GLUCOSE SERPL-MCNC: 101 MG/DL (ref 65–99)
HCT VFR BLD AUTO: 32.8 % (ref 37.5–51)
HGB BLD-MCNC: 11.2 G/DL (ref 13–17.7)
MCH RBC QN AUTO: 31.6 PG (ref 26.6–33)
MCHC RBC AUTO-ENTMCNC: 34.1 G/DL (ref 31.5–35.7)
MCV RBC AUTO: 92.7 FL (ref 79–97)
PA ADP PRP-ACNC: 255 PRU
PLATELET # BLD AUTO: 205 10*3/MM3 (ref 140–450)
PMV BLD AUTO: 10.5 FL (ref 6–12)
POTASSIUM SERPL-SCNC: 4.1 MMOL/L (ref 3.5–5.2)
RBC # BLD AUTO: 3.54 10*6/MM3 (ref 4.14–5.8)
RH BLD: POSITIVE
RH BLD: POSITIVE
SODIUM SERPL-SCNC: 137 MMOL/L (ref 136–145)
T&S EXPIRATION DATE: NORMAL
UFH PPP CHRO-ACNC: 0.53 IU/ML (ref 0.3–0.7)
WBC # BLD AUTO: 12.67 10*3/MM3 (ref 3.4–10.8)

## 2021-05-26 PROCEDURE — 86923 COMPATIBILITY TEST ELECTRIC: CPT

## 2021-05-26 PROCEDURE — 85027 COMPLETE CBC AUTOMATED: CPT | Performed by: PHYSICIAN ASSISTANT

## 2021-05-26 PROCEDURE — 80048 BASIC METABOLIC PNL TOTAL CA: CPT | Performed by: PHYSICIAN ASSISTANT

## 2021-05-26 PROCEDURE — 63710000001 INSULIN DETEMIR PER 5 UNITS: Performed by: NURSE PRACTITIONER

## 2021-05-26 PROCEDURE — 86900 BLOOD TYPING SEROLOGIC ABO: CPT | Performed by: PHYSICIAN ASSISTANT

## 2021-05-26 PROCEDURE — 85520 HEPARIN ASSAY: CPT

## 2021-05-26 PROCEDURE — 63710000001 INSULIN LISPRO (HUMAN) PER 5 UNITS: Performed by: NURSE PRACTITIONER

## 2021-05-26 PROCEDURE — 85576 BLOOD PLATELET AGGREGATION: CPT | Performed by: PHYSICIAN ASSISTANT

## 2021-05-26 PROCEDURE — 86850 RBC ANTIBODY SCREEN: CPT | Performed by: PHYSICIAN ASSISTANT

## 2021-05-26 PROCEDURE — 99231 SBSQ HOSP IP/OBS SF/LOW 25: CPT | Performed by: NURSE PRACTITIONER

## 2021-05-26 PROCEDURE — 99232 SBSQ HOSP IP/OBS MODERATE 35: CPT | Performed by: PHYSICIAN ASSISTANT

## 2021-05-26 PROCEDURE — 86901 BLOOD TYPING SEROLOGIC RH(D): CPT | Performed by: PHYSICIAN ASSISTANT

## 2021-05-26 PROCEDURE — 63710000001 INSULIN LISPRO (HUMAN) PER 5 UNITS: Performed by: INTERNAL MEDICINE

## 2021-05-26 PROCEDURE — 99232 SBSQ HOSP IP/OBS MODERATE 35: CPT | Performed by: NURSE PRACTITIONER

## 2021-05-26 PROCEDURE — 82962 GLUCOSE BLOOD TEST: CPT

## 2021-05-26 RX ADMIN — INSULIN LISPRO 3 UNITS: 100 INJECTION, SOLUTION INTRAVENOUS; SUBCUTANEOUS at 11:57

## 2021-05-26 RX ADMIN — GABAPENTIN 600 MG: 300 CAPSULE ORAL at 21:01

## 2021-05-26 RX ADMIN — HYDRALAZINE HYDROCHLORIDE 50 MG: 50 TABLET, FILM COATED ORAL at 15:20

## 2021-05-26 RX ADMIN — METOPROLOL TARTRATE 100 MG: 100 TABLET, FILM COATED ORAL at 08:09

## 2021-05-26 RX ADMIN — HYDRALAZINE HYDROCHLORIDE 50 MG: 50 TABLET, FILM COATED ORAL at 06:10

## 2021-05-26 RX ADMIN — ACETAMINOPHEN 650 MG: 325 TABLET, FILM COATED ORAL at 15:21

## 2021-05-26 RX ADMIN — HYDRALAZINE HYDROCHLORIDE 50 MG: 50 TABLET, FILM COATED ORAL at 21:01

## 2021-05-26 RX ADMIN — INSULIN LISPRO 2 UNITS: 100 INJECTION, SOLUTION INTRAVENOUS; SUBCUTANEOUS at 21:01

## 2021-05-26 RX ADMIN — ACETAMINOPHEN 650 MG: 325 TABLET ORAL at 14:50

## 2021-05-26 RX ADMIN — ASPIRIN 81 MG: 81 TABLET, COATED ORAL at 08:09

## 2021-05-26 RX ADMIN — SODIUM CHLORIDE, PRESERVATIVE FREE 10 ML: 5 INJECTION INTRAVENOUS at 21:03

## 2021-05-26 RX ADMIN — SODIUM CHLORIDE, PRESERVATIVE FREE 10 ML: 5 INJECTION INTRAVENOUS at 21:01

## 2021-05-26 RX ADMIN — SODIUM CHLORIDE, PRESERVATIVE FREE 10 ML: 5 INJECTION INTRAVENOUS at 08:09

## 2021-05-26 RX ADMIN — INSULIN LISPRO 2 UNITS: 100 INJECTION, SOLUTION INTRAVENOUS; SUBCUTANEOUS at 17:42

## 2021-05-26 RX ADMIN — INSULIN LISPRO 3 UNITS: 100 INJECTION, SOLUTION INTRAVENOUS; SUBCUTANEOUS at 08:08

## 2021-05-26 RX ADMIN — METOPROLOL TARTRATE 100 MG: 100 TABLET, FILM COATED ORAL at 21:01

## 2021-05-26 RX ADMIN — INSULIN LISPRO 4 UNITS: 100 INJECTION, SOLUTION INTRAVENOUS; SUBCUTANEOUS at 11:56

## 2021-05-26 RX ADMIN — PANTOPRAZOLE SODIUM 40 MG: 40 TABLET, DELAYED RELEASE ORAL at 06:10

## 2021-05-26 RX ADMIN — CHLORHEXIDINE GLUCONATE 15 ML: 1.2 SOLUTION ORAL at 23:56

## 2021-05-26 RX ADMIN — SODIUM CHLORIDE 50 ML/HR: 9 INJECTION, SOLUTION INTRAVENOUS at 12:24

## 2021-05-26 RX ADMIN — INSULIN DETEMIR 25 UNITS: 100 INJECTION, SOLUTION SUBCUTANEOUS at 21:01

## 2021-05-26 RX ADMIN — ATORVASTATIN CALCIUM 40 MG: 40 TABLET, FILM COATED ORAL at 21:01

## 2021-05-26 RX ADMIN — ALLOPURINOL 300 MG: 300 TABLET ORAL at 08:09

## 2021-05-26 RX ADMIN — INSULIN LISPRO 6 UNITS: 100 INJECTION, SOLUTION INTRAVENOUS; SUBCUTANEOUS at 17:43

## 2021-05-26 RX ADMIN — AMLODIPINE BESYLATE 10 MG: 10 TABLET ORAL at 08:09

## 2021-05-26 RX ADMIN — ACETAMINOPHEN 650 MG: 325 TABLET ORAL at 23:32

## 2021-05-26 RX ADMIN — NITROGLYCERIN 50 MCG/MIN: 20 INJECTION INTRAVENOUS at 12:21

## 2021-05-26 RX ADMIN — SODIUM CHLORIDE, PRESERVATIVE FREE 10 ML: 5 INJECTION INTRAVENOUS at 08:10

## 2021-05-26 RX ADMIN — MUPIROCIN 1 APPLICATION: 20 OINTMENT TOPICAL at 23:57

## 2021-05-27 ENCOUNTER — APPOINTMENT (OUTPATIENT)
Dept: GENERAL RADIOLOGY | Facility: HOSPITAL | Age: 76
End: 2021-05-27

## 2021-05-27 ENCOUNTER — ANESTHESIA EVENT (OUTPATIENT)
Dept: PERIOP | Facility: HOSPITAL | Age: 76
End: 2021-05-27

## 2021-05-27 PROBLEM — N18.9 CKD (CHRONIC KIDNEY DISEASE): Status: ACTIVE | Noted: 2021-05-27

## 2021-05-27 PROBLEM — N18.4 CKD (CHRONIC KIDNEY DISEASE) STAGE 4, GFR 15-29 ML/MIN: Status: ACTIVE | Noted: 2021-05-27

## 2021-05-27 PROBLEM — N17.9 AKI (ACUTE KIDNEY INJURY): Status: RESOLVED | Noted: 2021-05-22 | Resolved: 2021-05-27

## 2021-05-27 PROBLEM — N18.32 STAGE 3B CHRONIC KIDNEY DISEASE (HCC): Status: ACTIVE | Noted: 2021-05-27

## 2021-05-27 LAB
ANION GAP SERPL CALCULATED.3IONS-SCNC: 12 MMOL/L (ref 5–15)
BUN SERPL-MCNC: 42 MG/DL (ref 8–23)
BUN/CREAT SERPL: 21.8 (ref 7–25)
CALCIUM SPEC-SCNC: 9 MG/DL (ref 8.6–10.5)
CHLORIDE SERPL-SCNC: 106 MMOL/L (ref 98–107)
CO2 SERPL-SCNC: 18 MMOL/L (ref 22–29)
CREAT SERPL-MCNC: 1.93 MG/DL (ref 0.76–1.27)
DEPRECATED RDW RBC AUTO: 47.8 FL (ref 37–54)
ERYTHROCYTE [DISTWIDTH] IN BLOOD BY AUTOMATED COUNT: 13.9 % (ref 12.3–15.4)
GFR SERPL CREATININE-BSD FRML MDRD: 34 ML/MIN/1.73
GLUCOSE BLDC GLUCOMTR-MCNC: 110 MG/DL (ref 70–130)
GLUCOSE BLDC GLUCOMTR-MCNC: 125 MG/DL (ref 70–130)
GLUCOSE BLDC GLUCOMTR-MCNC: 218 MG/DL (ref 70–130)
GLUCOSE BLDC GLUCOMTR-MCNC: 77 MG/DL (ref 70–130)
GLUCOSE SERPL-MCNC: 122 MG/DL (ref 65–99)
HCT VFR BLD AUTO: 30.6 % (ref 37.5–51)
HGB BLD-MCNC: 10.3 G/DL (ref 13–17.7)
MCH RBC QN AUTO: 32 PG (ref 26.6–33)
MCHC RBC AUTO-ENTMCNC: 33.7 G/DL (ref 31.5–35.7)
MCV RBC AUTO: 95 FL (ref 79–97)
PA ADP PRP-ACNC: 275 PRU
PLATELET # BLD AUTO: 182 10*3/MM3 (ref 140–450)
PMV BLD AUTO: 10.2 FL (ref 6–12)
POTASSIUM SERPL-SCNC: 4.1 MMOL/L (ref 3.5–5.2)
RBC # BLD AUTO: 3.22 10*6/MM3 (ref 4.14–5.8)
SODIUM SERPL-SCNC: 136 MMOL/L (ref 136–145)
UFH PPP CHRO-ACNC: 0.24 IU/ML (ref 0.3–0.7)
UFH PPP CHRO-ACNC: 0.44 IU/ML (ref 0.3–0.7)
UFH PPP CHRO-ACNC: 0.57 IU/ML (ref 0.3–0.7)
WBC # BLD AUTO: 10.55 10*3/MM3 (ref 3.4–10.8)

## 2021-05-27 PROCEDURE — 93010 ELECTROCARDIOGRAM REPORT: CPT | Performed by: INTERNAL MEDICINE

## 2021-05-27 PROCEDURE — 85576 BLOOD PLATELET AGGREGATION: CPT | Performed by: PHYSICIAN ASSISTANT

## 2021-05-27 PROCEDURE — 85027 COMPLETE CBC AUTOMATED: CPT | Performed by: NURSE PRACTITIONER

## 2021-05-27 PROCEDURE — 85520 HEPARIN ASSAY: CPT

## 2021-05-27 PROCEDURE — 25010000002 HEPARIN (PORCINE) 25000-0.45 UT/250ML-% SOLUTION

## 2021-05-27 PROCEDURE — 63710000001 INSULIN LISPRO (HUMAN) PER 5 UNITS: Performed by: NURSE PRACTITIONER

## 2021-05-27 PROCEDURE — 25010000002 HEPARIN (PORCINE) PER 1000 UNITS

## 2021-05-27 PROCEDURE — 93005 ELECTROCARDIOGRAM TRACING: CPT | Performed by: STUDENT IN AN ORGANIZED HEALTH CARE EDUCATION/TRAINING PROGRAM

## 2021-05-27 PROCEDURE — 71045 X-RAY EXAM CHEST 1 VIEW: CPT

## 2021-05-27 PROCEDURE — 82962 GLUCOSE BLOOD TEST: CPT

## 2021-05-27 PROCEDURE — 63710000001 INSULIN LISPRO (HUMAN) PER 5 UNITS: Performed by: INTERNAL MEDICINE

## 2021-05-27 PROCEDURE — 99231 SBSQ HOSP IP/OBS SF/LOW 25: CPT | Performed by: NURSE PRACTITIONER

## 2021-05-27 PROCEDURE — 99024 POSTOP FOLLOW-UP VISIT: CPT | Performed by: STUDENT IN AN ORGANIZED HEALTH CARE EDUCATION/TRAINING PROGRAM

## 2021-05-27 PROCEDURE — 99232 SBSQ HOSP IP/OBS MODERATE 35: CPT | Performed by: NURSE PRACTITIONER

## 2021-05-27 PROCEDURE — 80048 BASIC METABOLIC PNL TOTAL CA: CPT | Performed by: NURSE PRACTITIONER

## 2021-05-27 RX ORDER — CHLORHEXIDINE GLUCONATE 0.12 MG/ML
15 RINSE ORAL EVERY 12 HOURS
Status: COMPLETED | OUTPATIENT
Start: 2021-05-27 | End: 2021-05-27

## 2021-05-27 RX ORDER — SODIUM CHLORIDE 0.9 % (FLUSH) 0.9 %
10 SYRINGE (ML) INJECTION EVERY 12 HOURS SCHEDULED
Status: DISCONTINUED | OUTPATIENT
Start: 2021-05-27 | End: 2021-05-29

## 2021-05-27 RX ORDER — HEPARIN SODIUM 1000 [USP'U]/ML
2500 INJECTION, SOLUTION INTRAVENOUS; SUBCUTANEOUS ONCE
Status: COMPLETED | OUTPATIENT
Start: 2021-05-27 | End: 2021-05-27

## 2021-05-27 RX ORDER — SODIUM CHLORIDE 0.9 % (FLUSH) 0.9 %
10 SYRINGE (ML) INJECTION AS NEEDED
Status: DISCONTINUED | OUTPATIENT
Start: 2021-05-27 | End: 2021-05-29

## 2021-05-27 RX ORDER — ASPIRIN 81 MG/1
81 TABLET ORAL ONCE
Status: COMPLETED | OUTPATIENT
Start: 2021-05-27 | End: 2021-05-27

## 2021-05-27 RX ORDER — IPRATROPIUM BROMIDE AND ALBUTEROL SULFATE 2.5; .5 MG/3ML; MG/3ML
3 SOLUTION RESPIRATORY (INHALATION) EVERY 4 HOURS PRN
Status: DISCONTINUED | OUTPATIENT
Start: 2021-05-27 | End: 2021-06-18 | Stop reason: HOSPADM

## 2021-05-27 RX ORDER — CHLORHEXIDINE GLUCONATE 500 MG/1
1 CLOTH TOPICAL EVERY 12 HOURS PRN
Status: DISCONTINUED | OUTPATIENT
Start: 2021-05-27 | End: 2021-05-29

## 2021-05-27 RX ADMIN — NITROGLYCERIN 50 MCG/MIN: 20 INJECTION INTRAVENOUS at 04:48

## 2021-05-27 RX ADMIN — ASPIRIN 81 MG: 81 TABLET, COATED ORAL at 07:58

## 2021-05-27 RX ADMIN — ACETAMINOPHEN 650 MG: 325 TABLET ORAL at 17:39

## 2021-05-27 RX ADMIN — METOPROLOL TARTRATE 100 MG: 100 TABLET, FILM COATED ORAL at 21:00

## 2021-05-27 RX ADMIN — ATORVASTATIN CALCIUM 40 MG: 40 TABLET, FILM COATED ORAL at 21:00

## 2021-05-27 RX ADMIN — HYDRALAZINE HYDROCHLORIDE 50 MG: 50 TABLET, FILM COATED ORAL at 14:36

## 2021-05-27 RX ADMIN — METOPROLOL TARTRATE 100 MG: 100 TABLET, FILM COATED ORAL at 07:57

## 2021-05-27 RX ADMIN — HYDRALAZINE HYDROCHLORIDE 50 MG: 50 TABLET, FILM COATED ORAL at 06:25

## 2021-05-27 RX ADMIN — CHLORHEXIDINE GLUCONATE 15 ML: 1.2 SOLUTION ORAL at 21:00

## 2021-05-27 RX ADMIN — INSULIN LISPRO 6 UNITS: 100 INJECTION, SOLUTION INTRAVENOUS; SUBCUTANEOUS at 07:59

## 2021-05-27 RX ADMIN — CHLORHEXIDINE GLUCONATE 1 APPLICATION: 500 CLOTH TOPICAL at 21:00

## 2021-05-27 RX ADMIN — INSULIN LISPRO 3 UNITS: 100 INJECTION, SOLUTION INTRAVENOUS; SUBCUTANEOUS at 17:40

## 2021-05-27 RX ADMIN — HEPARIN SODIUM 10.5 UNITS/KG/HR: 10000 INJECTION, SOLUTION INTRAVENOUS at 04:47

## 2021-05-27 RX ADMIN — AMLODIPINE BESYLATE 10 MG: 10 TABLET ORAL at 07:56

## 2021-05-27 RX ADMIN — GABAPENTIN 600 MG: 300 CAPSULE ORAL at 21:00

## 2021-05-27 RX ADMIN — SODIUM CHLORIDE, PRESERVATIVE FREE 10 ML: 5 INJECTION INTRAVENOUS at 08:00

## 2021-05-27 RX ADMIN — INSULIN LISPRO 6 UNITS: 100 INJECTION, SOLUTION INTRAVENOUS; SUBCUTANEOUS at 17:40

## 2021-05-27 RX ADMIN — MUPIROCIN 1 APPLICATION: 20 OINTMENT TOPICAL at 17:48

## 2021-05-27 RX ADMIN — HYDRALAZINE HYDROCHLORIDE 50 MG: 50 TABLET, FILM COATED ORAL at 21:00

## 2021-05-27 RX ADMIN — CHLORHEXIDINE GLUCONATE 15 ML: 1.2 SOLUTION ORAL at 17:48

## 2021-05-27 RX ADMIN — MUPIROCIN 1 APPLICATION: 20 OINTMENT TOPICAL at 12:04

## 2021-05-27 RX ADMIN — MUPIROCIN 1 APPLICATION: 20 OINTMENT TOPICAL at 21:00

## 2021-05-27 RX ADMIN — ALLOPURINOL 300 MG: 300 TABLET ORAL at 07:57

## 2021-05-27 RX ADMIN — PANTOPRAZOLE SODIUM 40 MG: 40 TABLET, DELAYED RELEASE ORAL at 06:25

## 2021-05-27 RX ADMIN — HEPARIN SODIUM 2500 UNITS: 1000 INJECTION, SOLUTION INTRAVENOUS; SUBCUTANEOUS at 09:32

## 2021-05-27 RX ADMIN — SODIUM CHLORIDE, PRESERVATIVE FREE 10 ML: 5 INJECTION INTRAVENOUS at 21:00

## 2021-05-27 RX ADMIN — ASPIRIN 81 MG: 81 TABLET, COATED ORAL at 21:00

## 2021-05-27 RX ADMIN — CHLORHEXIDINE GLUCONATE 15 ML: 1.2 SOLUTION ORAL at 12:04

## 2021-05-27 RX ADMIN — INSULIN LISPRO 6 UNITS: 100 INJECTION, SOLUTION INTRAVENOUS; SUBCUTANEOUS at 12:03

## 2021-05-28 ENCOUNTER — ANCILLARY PROCEDURE (OUTPATIENT)
Dept: PERIOP | Facility: HOSPITAL | Age: 76
End: 2021-05-28

## 2021-05-28 ENCOUNTER — APPOINTMENT (OUTPATIENT)
Dept: GENERAL RADIOLOGY | Facility: HOSPITAL | Age: 76
End: 2021-05-28

## 2021-05-28 ENCOUNTER — ANESTHESIA (OUTPATIENT)
Dept: PERIOP | Facility: HOSPITAL | Age: 76
End: 2021-05-28

## 2021-05-28 PROBLEM — Z79.4 TYPE 2 DIABETES MELLITUS, WITH LONG-TERM CURRENT USE OF INSULIN (HCC): Chronic | Status: ACTIVE | Noted: 2021-05-24

## 2021-05-28 PROBLEM — E78.5 HYPERLIPIDEMIA LDL GOAL <70: Chronic | Status: ACTIVE | Noted: 2021-05-22

## 2021-05-28 PROBLEM — E11.9 TYPE 2 DIABETES MELLITUS, WITH LONG-TERM CURRENT USE OF INSULIN (HCC): Chronic | Status: ACTIVE | Noted: 2021-05-24

## 2021-05-28 PROBLEM — M10.9 GOUT: Status: ACTIVE | Noted: 2021-05-28

## 2021-05-28 PROBLEM — I10 ESSENTIAL HYPERTENSION: Chronic | Status: ACTIVE | Noted: 2021-05-22

## 2021-05-28 PROBLEM — M10.9 GOUT: Chronic | Status: ACTIVE | Noted: 2021-05-28

## 2021-05-28 PROBLEM — Z87.891 FORMER SMOKELESS TOBACCO USE: Chronic | Status: ACTIVE | Noted: 2021-05-28

## 2021-05-28 PROBLEM — Z87.891 FORMER SMOKELESS TOBACCO USE: Status: ACTIVE | Noted: 2021-05-28

## 2021-05-28 PROBLEM — I25.110 CORONARY ARTERY DISEASE INVOLVING NATIVE CORONARY ARTERY OF NATIVE HEART WITH UNSTABLE ANGINA PECTORIS (HCC): Status: ACTIVE | Noted: 2021-05-22

## 2021-05-28 PROBLEM — I25.10 CORONARY ARTERY DISEASE INVOLVING NATIVE CORONARY ARTERY OF NATIVE HEART: Chronic | Status: ACTIVE | Noted: 2021-05-22

## 2021-05-28 LAB
ALBUMIN SERPL-MCNC: 3.1 G/DL (ref 3.5–5.2)
ALBUMIN SERPL-MCNC: 3.4 G/DL (ref 3.5–5.2)
ALBUMIN SERPL-MCNC: 3.4 G/DL (ref 3.5–5.2)
ALBUMIN SERPL-MCNC: 3.5 G/DL (ref 3.5–5.2)
ALBUMIN SERPL-MCNC: 3.5 G/DL (ref 3.5–5.2)
ALBUMIN/GLOB SERPL: 1.8 G/DL
ALBUMIN/GLOB SERPL: 2.1 G/DL
ALP SERPL-CCNC: 63 U/L (ref 39–117)
ALP SERPL-CCNC: 66 U/L (ref 39–117)
ALT SERPL W P-5'-P-CCNC: 23 U/L (ref 1–41)
ALT SERPL W P-5'-P-CCNC: 25 U/L (ref 1–41)
AMPHET+METHAMPHET UR QL: NEGATIVE
AMPHETAMINES UR QL: NEGATIVE
ANION GAP SERPL CALCULATED.3IONS-SCNC: 12 MMOL/L (ref 5–15)
ANION GAP SERPL CALCULATED.3IONS-SCNC: 13 MMOL/L (ref 5–15)
ANION GAP SERPL CALCULATED.3IONS-SCNC: 14 MMOL/L (ref 5–15)
ANION GAP SERPL CALCULATED.3IONS-SCNC: 14 MMOL/L (ref 5–15)
ANION GAP SERPL CALCULATED.3IONS-SCNC: 15 MMOL/L (ref 5–15)
ANION GAP SERPL CALCULATED.3IONS-SCNC: 15 MMOL/L (ref 5–15)
APTT PPP: 31.9 SECONDS (ref 22–39)
APTT PPP: 35.5 SECONDS (ref 22–39)
ARTERIAL PATENCY WRIST A: ABNORMAL
ARTERIAL PATENCY WRIST A: ABNORMAL
AST SERPL-CCNC: 54 U/L (ref 1–40)
AST SERPL-CCNC: 60 U/L (ref 1–40)
ATMOSPHERIC PRESS: ABNORMAL MM[HG]
ATMOSPHERIC PRESS: ABNORMAL MM[HG]
BARBITURATES UR QL SCN: NEGATIVE
BASE EXCESS BLDA CALC-SCNC: -3.2 MMOL/L (ref 0–2)
BASE EXCESS BLDA CALC-SCNC: -6.6 MMOL/L (ref 0–2)
BDY SITE: ABNORMAL
BDY SITE: ABNORMAL
BENZODIAZ UR QL SCN: NEGATIVE
BILIRUB SERPL-MCNC: 0.5 MG/DL (ref 0–1.2)
BILIRUB SERPL-MCNC: 0.7 MG/DL (ref 0–1.2)
BODY TEMPERATURE: 37 C
BODY TEMPERATURE: 37 C
BUN SERPL-MCNC: 42 MG/DL (ref 8–23)
BUN SERPL-MCNC: 42 MG/DL (ref 8–23)
BUN SERPL-MCNC: 43 MG/DL (ref 8–23)
BUN SERPL-MCNC: 43 MG/DL (ref 8–23)
BUN SERPL-MCNC: 44 MG/DL (ref 8–23)
BUN SERPL-MCNC: 44 MG/DL (ref 8–23)
BUN/CREAT SERPL: 17.5 (ref 7–25)
BUN/CREAT SERPL: 18.6 (ref 7–25)
BUN/CREAT SERPL: 19.3 (ref 7–25)
BUN/CREAT SERPL: 19.3 (ref 7–25)
BUN/CREAT SERPL: 19.5 (ref 7–25)
BUN/CREAT SERPL: 21.2 (ref 7–25)
BUPRENORPHINE SERPL-MCNC: NEGATIVE NG/ML
CA-I SERPL ISE-MCNC: 1.45 MMOL/L (ref 1.12–1.32)
CALCIUM SPEC-SCNC: 10.2 MG/DL (ref 8.6–10.5)
CALCIUM SPEC-SCNC: 8.8 MG/DL (ref 8.6–10.5)
CALCIUM SPEC-SCNC: 8.9 MG/DL (ref 8.6–10.5)
CALCIUM SPEC-SCNC: 9.6 MG/DL (ref 8.6–10.5)
CANNABINOIDS SERPL QL: NEGATIVE
CHLORIDE SERPL-SCNC: 105 MMOL/L (ref 98–107)
CHLORIDE SERPL-SCNC: 105 MMOL/L (ref 98–107)
CHLORIDE SERPL-SCNC: 106 MMOL/L (ref 98–107)
CO2 BLDA-SCNC: 20.9 MMOL/L (ref 22–33)
CO2 BLDA-SCNC: 22.3 MMOL/L (ref 22–33)
CO2 SERPL-SCNC: 17 MMOL/L (ref 22–29)
CO2 SERPL-SCNC: 20 MMOL/L (ref 22–29)
CO2 SERPL-SCNC: 20 MMOL/L (ref 22–29)
CO2 SERPL-SCNC: 21 MMOL/L (ref 22–29)
CO2 SERPL-SCNC: 21 MMOL/L (ref 22–29)
CO2 SERPL-SCNC: 22 MMOL/L (ref 22–29)
COCAINE UR QL: NEGATIVE
COHGB MFR BLD: 0.9 % (ref 0–2)
COHGB MFR BLD: 0.9 % (ref 0–2)
CREAT SERPL-MCNC: 2.08 MG/DL (ref 0.76–1.27)
CREAT SERPL-MCNC: 2.18 MG/DL (ref 0.76–1.27)
CREAT SERPL-MCNC: 2.21 MG/DL (ref 0.76–1.27)
CREAT SERPL-MCNC: 2.23 MG/DL (ref 0.76–1.27)
CREAT SERPL-MCNC: 2.36 MG/DL (ref 0.76–1.27)
CREAT SERPL-MCNC: 2.4 MG/DL (ref 0.76–1.27)
DEPRECATED RDW RBC AUTO: 47.5 FL (ref 37–54)
DEPRECATED RDW RBC AUTO: 47.7 FL (ref 37–54)
DEPRECATED RDW RBC AUTO: 47.8 FL (ref 37–54)
DEPRECATED RDW RBC AUTO: 48.4 FL (ref 37–54)
DEPRECATED RDW RBC AUTO: 48.6 FL (ref 37–54)
EPAP: 0
EPAP: 0
ERYTHROCYTE [DISTWIDTH] IN BLOOD BY AUTOMATED COUNT: 13.9 % (ref 12.3–15.4)
ERYTHROCYTE [DISTWIDTH] IN BLOOD BY AUTOMATED COUNT: 14.1 % (ref 12.3–15.4)
ERYTHROCYTE [DISTWIDTH] IN BLOOD BY AUTOMATED COUNT: 14.2 % (ref 12.3–15.4)
ERYTHROCYTE [DISTWIDTH] IN BLOOD BY AUTOMATED COUNT: 14.5 % (ref 12.3–15.4)
ERYTHROCYTE [DISTWIDTH] IN BLOOD BY AUTOMATED COUNT: 14.6 % (ref 12.3–15.4)
FIBRINOGEN PPP-MCNC: 427 MG/DL (ref 220–470)
FSP PPP LA-ACNC: NORMAL
GFR SERPL CREATININE-BSD FRML MDRD: 27 ML/MIN/1.73
GFR SERPL CREATININE-BSD FRML MDRD: 27 ML/MIN/1.73
GFR SERPL CREATININE-BSD FRML MDRD: 29 ML/MIN/1.73
GFR SERPL CREATININE-BSD FRML MDRD: 29 ML/MIN/1.73
GFR SERPL CREATININE-BSD FRML MDRD: 30 ML/MIN/1.73
GFR SERPL CREATININE-BSD FRML MDRD: 31 ML/MIN/1.73
GLOBULIN UR ELPH-MCNC: 1.6 GM/DL
GLOBULIN UR ELPH-MCNC: 1.9 GM/DL
GLUCOSE BLDC GLUCOMTR-MCNC: 112 MG/DL (ref 70–130)
GLUCOSE BLDC GLUCOMTR-MCNC: 113 MG/DL (ref 70–130)
GLUCOSE BLDC GLUCOMTR-MCNC: 114 MG/DL (ref 70–130)
GLUCOSE BLDC GLUCOMTR-MCNC: 115 MG/DL (ref 70–130)
GLUCOSE BLDC GLUCOMTR-MCNC: 123 MG/DL (ref 70–130)
GLUCOSE BLDC GLUCOMTR-MCNC: 124 MG/DL (ref 70–130)
GLUCOSE BLDC GLUCOMTR-MCNC: 126 MG/DL (ref 70–130)
GLUCOSE BLDC GLUCOMTR-MCNC: 126 MG/DL (ref 70–130)
GLUCOSE BLDC GLUCOMTR-MCNC: 141 MG/DL (ref 70–130)
GLUCOSE BLDC GLUCOMTR-MCNC: 161 MG/DL (ref 70–130)
GLUCOSE BLDC GLUCOMTR-MCNC: 165 MG/DL (ref 70–130)
GLUCOSE BLDC GLUCOMTR-MCNC: 184 MG/DL (ref 70–130)
GLUCOSE BLDC GLUCOMTR-MCNC: 84 MG/DL (ref 70–130)
GLUCOSE SERPL-MCNC: 101 MG/DL (ref 65–99)
GLUCOSE SERPL-MCNC: 109 MG/DL (ref 65–99)
GLUCOSE SERPL-MCNC: 110 MG/DL (ref 65–99)
GLUCOSE SERPL-MCNC: 114 MG/DL (ref 65–99)
GLUCOSE SERPL-MCNC: 162 MG/DL (ref 65–99)
GLUCOSE SERPL-MCNC: 180 MG/DL (ref 65–99)
HCO3 BLDA-SCNC: 19.7 MMOL/L (ref 20–26)
HCO3 BLDA-SCNC: 21.2 MMOL/L (ref 20–26)
HCT VFR BLD AUTO: 27.5 % (ref 37.5–51)
HCT VFR BLD AUTO: 28 % (ref 37.5–51)
HCT VFR BLD AUTO: 28.9 % (ref 37.5–51)
HCT VFR BLD AUTO: 29.9 % (ref 37.5–51)
HCT VFR BLD AUTO: 30.8 % (ref 37.5–51)
HCT VFR BLD CALC: 31.3 %
HCT VFR BLD CALC: 33.1 %
HGB BLD-MCNC: 10.1 G/DL (ref 13–17.7)
HGB BLD-MCNC: 10.2 G/DL (ref 13–17.7)
HGB BLD-MCNC: 9.3 G/DL (ref 13–17.7)
HGB BLD-MCNC: 9.5 G/DL (ref 13–17.7)
HGB BLD-MCNC: 9.7 G/DL (ref 13–17.7)
HGB BLDA-MCNC: 10.2 G/DL (ref 13.5–17.5)
HGB BLDA-MCNC: 10.8 G/DL (ref 13.5–17.5)
INHALED O2 CONCENTRATION: 100 %
INHALED O2 CONCENTRATION: 40 %
INR PPP: 1.25 (ref 0.85–1.16)
INR PPP: 1.29 (ref 0.85–1.16)
IPAP: 0
IPAP: 0
MAGNESIUM SERPL-MCNC: 2.1 MG/DL (ref 1.6–2.4)
MAGNESIUM SERPL-MCNC: 2.3 MG/DL (ref 1.6–2.4)
MAGNESIUM SERPL-MCNC: 2.7 MG/DL (ref 1.6–2.4)
MCH RBC QN AUTO: 30.7 PG (ref 26.6–33)
MCH RBC QN AUTO: 30.9 PG (ref 26.6–33)
MCH RBC QN AUTO: 31.2 PG (ref 26.6–33)
MCH RBC QN AUTO: 31.4 PG (ref 26.6–33)
MCH RBC QN AUTO: 32.5 PG (ref 26.6–33)
MCHC RBC AUTO-ENTMCNC: 33.1 G/DL (ref 31.5–35.7)
MCHC RBC AUTO-ENTMCNC: 33.6 G/DL (ref 31.5–35.7)
MCHC RBC AUTO-ENTMCNC: 33.8 G/DL (ref 31.5–35.7)
MCHC RBC AUTO-ENTMCNC: 33.8 G/DL (ref 31.5–35.7)
MCHC RBC AUTO-ENTMCNC: 33.9 G/DL (ref 31.5–35.7)
MCV RBC AUTO: 91.2 FL (ref 79–97)
MCV RBC AUTO: 92.3 FL (ref 79–97)
MCV RBC AUTO: 92.8 FL (ref 79–97)
MCV RBC AUTO: 93.5 FL (ref 79–97)
MCV RBC AUTO: 96.1 FL (ref 79–97)
METHADONE UR QL SCN: NEGATIVE
METHGB BLD QL: 0.6 % (ref 0–1.5)
METHGB BLD QL: 0.8 % (ref 0–1.5)
MODALITY: ABNORMAL
MODALITY: ABNORMAL
NOTE: ABNORMAL
NOTE: ABNORMAL
OPIATES UR QL: NEGATIVE
OXYCODONE UR QL SCN: NEGATIVE
OXYHGB MFR BLDV: 92.7 % (ref 94–99)
OXYHGB MFR BLDV: 98.6 % (ref 94–99)
PA ADP PRP-ACNC: 289 PRU
PAW @ PEAK INSP FLOW SETTING VENT: 0 CMH2O
PAW @ PEAK INSP FLOW SETTING VENT: 0 CMH2O
PCO2 BLDA: 34.8 MM HG (ref 35–45)
PCO2 BLDA: 41.1 MM HG (ref 35–45)
PCO2 TEMP ADJ BLD: 34.8 MM HG (ref 35–48)
PCO2 TEMP ADJ BLD: 41.1 MM HG (ref 35–48)
PCP UR QL SCN: NEGATIVE
PH BLDA: 7.29 PH UNITS (ref 7.35–7.45)
PH BLDA: 7.39 PH UNITS (ref 7.35–7.45)
PH, TEMP CORRECTED: 7.29 PH UNITS
PH, TEMP CORRECTED: 7.39 PH UNITS
PHOSPHATE SERPL-MCNC: 4.3 MG/DL (ref 2.5–4.5)
PHOSPHATE SERPL-MCNC: 4.9 MG/DL (ref 2.5–4.5)
PHOSPHATE SERPL-MCNC: 4.9 MG/DL (ref 2.5–4.5)
PLATELET # BLD AUTO: 138 10*3/MM3 (ref 140–450)
PLATELET # BLD AUTO: 146 10*3/MM3 (ref 140–450)
PLATELET # BLD AUTO: 160 10*3/MM3 (ref 140–450)
PLATELET # BLD AUTO: 167 10*3/MM3 (ref 140–450)
PLATELET # BLD AUTO: 185 10*3/MM3 (ref 140–450)
PMV BLD AUTO: 10.4 FL (ref 6–12)
PMV BLD AUTO: 10.5 FL (ref 6–12)
PMV BLD AUTO: 10.6 FL (ref 6–12)
PMV BLD AUTO: 10.6 FL (ref 6–12)
PMV BLD AUTO: 10.8 FL (ref 6–12)
PO2 BLDA: 244 MM HG (ref 83–108)
PO2 BLDA: 77.2 MM HG (ref 83–108)
PO2 TEMP ADJ BLD: 244 MM HG (ref 83–108)
PO2 TEMP ADJ BLD: 77.2 MM HG (ref 83–108)
POTASSIUM SERPL-SCNC: 3.8 MMOL/L (ref 3.5–5.2)
POTASSIUM SERPL-SCNC: 3.9 MMOL/L (ref 3.5–5.2)
POTASSIUM SERPL-SCNC: 4 MMOL/L (ref 3.5–5.2)
POTASSIUM SERPL-SCNC: 4.1 MMOL/L (ref 3.5–5.2)
POTASSIUM SERPL-SCNC: 4.1 MMOL/L (ref 3.5–5.2)
POTASSIUM SERPL-SCNC: 4.6 MMOL/L (ref 3.5–5.2)
PROPOXYPH UR QL: NEGATIVE
PROT SERPL-MCNC: 5 G/DL (ref 6–8.5)
PROT SERPL-MCNC: 5.3 G/DL (ref 6–8.5)
PROTHROMBIN TIME: 15.3 SECONDS (ref 11.4–14.4)
PROTHROMBIN TIME: 15.7 SECONDS (ref 11.4–14.4)
QT INTERVAL: 428 MS
QT INTERVAL: 428 MS
QTC INTERVAL: 493 MS
QTC INTERVAL: 505 MS
RBC # BLD AUTO: 2.98 10*6/MM3 (ref 4.14–5.8)
RBC # BLD AUTO: 3.07 10*6/MM3 (ref 4.14–5.8)
RBC # BLD AUTO: 3.09 10*6/MM3 (ref 4.14–5.8)
RBC # BLD AUTO: 3.11 10*6/MM3 (ref 4.14–5.8)
RBC # BLD AUTO: 3.32 10*6/MM3 (ref 4.14–5.8)
SODIUM SERPL-SCNC: 136 MMOL/L (ref 136–145)
SODIUM SERPL-SCNC: 139 MMOL/L (ref 136–145)
SODIUM SERPL-SCNC: 140 MMOL/L (ref 136–145)
SODIUM SERPL-SCNC: 141 MMOL/L (ref 136–145)
TOTAL RATE: 0 BREATHS/MINUTE
TOTAL RATE: 0 BREATHS/MINUTE
TRICYCLICS UR QL SCN: NEGATIVE
WBC # BLD AUTO: 10.61 10*3/MM3 (ref 3.4–10.8)
WBC # BLD AUTO: 12.89 10*3/MM3 (ref 3.4–10.8)
WBC # BLD AUTO: 13.12 10*3/MM3 (ref 3.4–10.8)
WBC # BLD AUTO: 13.26 10*3/MM3 (ref 3.4–10.8)
WBC # BLD AUTO: 15.39 10*3/MM3 (ref 3.4–10.8)

## 2021-05-28 PROCEDURE — 82962 GLUCOSE BLOOD TEST: CPT

## 2021-05-28 PROCEDURE — 5A1221Z PERFORMANCE OF CARDIAC OUTPUT, CONTINUOUS: ICD-10-PCS | Performed by: STUDENT IN AN ORGANIZED HEALTH CARE EDUCATION/TRAINING PROGRAM

## 2021-05-28 PROCEDURE — C1751 CATH, INF, PER/CENT/MIDLINE: HCPCS | Performed by: ANESTHESIOLOGY

## 2021-05-28 PROCEDURE — 86927 PLASMA FRESH FROZEN: CPT

## 2021-05-28 PROCEDURE — P9035 PLATELET PHERES LEUKOREDUCED: HCPCS

## 2021-05-28 PROCEDURE — 36430 TRANSFUSION BLD/BLD COMPNT: CPT

## 2021-05-28 PROCEDURE — 99231 SBSQ HOSP IP/OBS SF/LOW 25: CPT | Performed by: NURSE PRACTITIONER

## 2021-05-28 PROCEDURE — 83735 ASSAY OF MAGNESIUM: CPT | Performed by: STUDENT IN AN ORGANIZED HEALTH CARE EDUCATION/TRAINING PROGRAM

## 2021-05-28 PROCEDURE — 82330 ASSAY OF CALCIUM: CPT

## 2021-05-28 PROCEDURE — 06BP4ZZ EXCISION OF RIGHT SAPHENOUS VEIN, PERCUTANEOUS ENDOSCOPIC APPROACH: ICD-10-PCS | Performed by: STUDENT IN AN ORGANIZED HEALTH CARE EDUCATION/TRAINING PROGRAM

## 2021-05-28 PROCEDURE — 33518 CABG ARTERY-VEIN TWO: CPT | Performed by: STUDENT IN AN ORGANIZED HEALTH CARE EDUCATION/TRAINING PROGRAM

## 2021-05-28 PROCEDURE — 80069 RENAL FUNCTION PANEL: CPT | Performed by: STUDENT IN AN ORGANIZED HEALTH CARE EDUCATION/TRAINING PROGRAM

## 2021-05-28 PROCEDURE — 85384 FIBRINOGEN ACTIVITY: CPT | Performed by: STUDENT IN AN ORGANIZED HEALTH CARE EDUCATION/TRAINING PROGRAM

## 2021-05-28 PROCEDURE — 25010000002 PROPOFOL 10 MG/ML EMULSION: Performed by: ANESTHESIOLOGY

## 2021-05-28 PROCEDURE — P9016 RBC LEUKOCYTES REDUCED: HCPCS

## 2021-05-28 PROCEDURE — 82805 BLOOD GASES W/O2 SATURATION: CPT

## 2021-05-28 PROCEDURE — 97530 THERAPEUTIC ACTIVITIES: CPT

## 2021-05-28 PROCEDURE — 25010000002 CEFUROXIME: Performed by: PHYSICIAN ASSISTANT

## 2021-05-28 PROCEDURE — C1729 CATH, DRAINAGE: HCPCS | Performed by: STUDENT IN AN ORGANIZED HEALTH CARE EDUCATION/TRAINING PROGRAM

## 2021-05-28 PROCEDURE — 83050 HGB METHEMOGLOBIN QUAN: CPT

## 2021-05-28 PROCEDURE — 85730 THROMBOPLASTIN TIME PARTIAL: CPT | Performed by: PHYSICIAN ASSISTANT

## 2021-05-28 PROCEDURE — 25010000002 MIDAZOLAM PER 1 MG: Performed by: ANESTHESIOLOGY

## 2021-05-28 PROCEDURE — 82330 ASSAY OF CALCIUM: CPT | Performed by: PHYSICIAN ASSISTANT

## 2021-05-28 PROCEDURE — 80306 DRUG TEST PRSMV INSTRMNT: CPT | Performed by: PHYSICIAN ASSISTANT

## 2021-05-28 PROCEDURE — 33518 CABG ARTERY-VEIN TWO: CPT | Performed by: THORACIC SURGERY (CARDIOTHORACIC VASCULAR SURGERY)

## 2021-05-28 PROCEDURE — 80053 COMPREHEN METABOLIC PANEL: CPT | Performed by: NURSE PRACTITIONER

## 2021-05-28 PROCEDURE — 33508 ENDOSCOPIC VEIN HARVEST: CPT | Performed by: STUDENT IN AN ORGANIZED HEALTH CARE EDUCATION/TRAINING PROGRAM

## 2021-05-28 PROCEDURE — 86900 BLOOD TYPING SEROLOGIC ABO: CPT

## 2021-05-28 PROCEDURE — 85362 FIBRIN DEGRADATION PRODUCTS: CPT | Performed by: STUDENT IN AN ORGANIZED HEALTH CARE EDUCATION/TRAINING PROGRAM

## 2021-05-28 PROCEDURE — 94799 UNLISTED PULMONARY SVC/PX: CPT

## 2021-05-28 PROCEDURE — 63710000001 INSULIN REGULAR HUMAN PER 5 UNITS: Performed by: ANESTHESIOLOGY

## 2021-05-28 PROCEDURE — 33508 ENDOSCOPIC VEIN HARVEST: CPT | Performed by: THORACIC SURGERY (CARDIOTHORACIC VASCULAR SURGERY)

## 2021-05-28 PROCEDURE — 25010000002 HEPARIN (PORCINE) PER 1000 UNITS: Performed by: ANESTHESIOLOGY

## 2021-05-28 PROCEDURE — 93010 ELECTROCARDIOGRAM REPORT: CPT | Performed by: INTERNAL MEDICINE

## 2021-05-28 PROCEDURE — 84132 ASSAY OF SERUM POTASSIUM: CPT

## 2021-05-28 PROCEDURE — 33533 CABG ARTERIAL SINGLE: CPT | Performed by: THORACIC SURGERY (CARDIOTHORACIC VASCULAR SURGERY)

## 2021-05-28 PROCEDURE — 83735 ASSAY OF MAGNESIUM: CPT | Performed by: PHYSICIAN ASSISTANT

## 2021-05-28 PROCEDURE — 85027 COMPLETE CBC AUTOMATED: CPT | Performed by: PHYSICIAN ASSISTANT

## 2021-05-28 PROCEDURE — 82375 ASSAY CARBOXYHB QUANT: CPT

## 2021-05-28 PROCEDURE — 85014 HEMATOCRIT: CPT

## 2021-05-28 PROCEDURE — 021109W BYPASS CORONARY ARTERY, TWO ARTERIES FROM AORTA WITH AUTOLOGOUS VENOUS TISSUE, OPEN APPROACH: ICD-10-PCS | Performed by: STUDENT IN AN ORGANIZED HEALTH CARE EDUCATION/TRAINING PROGRAM

## 2021-05-28 PROCEDURE — 25010000003 CEFUROXIME SODIUM 1.5 G RECONSTITUTED SOLUTION: Performed by: STUDENT IN AN ORGANIZED HEALTH CARE EDUCATION/TRAINING PROGRAM

## 2021-05-28 PROCEDURE — 25010000003 CEFUROXIME SODIUM 1.5 G RECONSTITUTED SOLUTION: Performed by: ANESTHESIOLOGY

## 2021-05-28 PROCEDURE — 93318 ECHO TRANSESOPHAGEAL INTRAOP: CPT | Performed by: ANESTHESIOLOGY

## 2021-05-28 PROCEDURE — 99223 1ST HOSP IP/OBS HIGH 75: CPT | Performed by: INTERNAL MEDICINE

## 2021-05-28 PROCEDURE — 82803 BLOOD GASES ANY COMBINATION: CPT

## 2021-05-28 PROCEDURE — P9017 PLASMA 1 DONOR FRZ W/IN 8 HR: HCPCS

## 2021-05-28 PROCEDURE — 71045 X-RAY EXAM CHEST 1 VIEW: CPT

## 2021-05-28 PROCEDURE — 85610 PROTHROMBIN TIME: CPT | Performed by: PHYSICIAN ASSISTANT

## 2021-05-28 PROCEDURE — 84295 ASSAY OF SERUM SODIUM: CPT

## 2021-05-28 PROCEDURE — 80053 COMPREHEN METABOLIC PANEL: CPT | Performed by: STUDENT IN AN ORGANIZED HEALTH CARE EDUCATION/TRAINING PROGRAM

## 2021-05-28 PROCEDURE — 82947 ASSAY GLUCOSE BLOOD QUANT: CPT

## 2021-05-28 PROCEDURE — 25010000002 FENTANYL CITRATE (PF) 50 MCG/ML SOLUTION: Performed by: NURSE PRACTITIONER

## 2021-05-28 PROCEDURE — C1894 INTRO/SHEATH, NON-LASER: HCPCS | Performed by: STUDENT IN AN ORGANIZED HEALTH CARE EDUCATION/TRAINING PROGRAM

## 2021-05-28 PROCEDURE — 25010000002 EPINEPHRINE 1 MG/10ML SOLUTION PREFILLED SYRINGE: Performed by: NURSE PRACTITIONER

## 2021-05-28 PROCEDURE — A4648 IMPLANTABLE TISSUE MARKER: HCPCS | Performed by: STUDENT IN AN ORGANIZED HEALTH CARE EDUCATION/TRAINING PROGRAM

## 2021-05-28 PROCEDURE — 97165 OT EVAL LOW COMPLEX 30 MIN: CPT

## 2021-05-28 PROCEDURE — 25010000002 PROTAMINE SULFATE PER 10 MG: Performed by: ANESTHESIOLOGY

## 2021-05-28 PROCEDURE — C1889 IMPLANT/INSERT DEVICE, NOC: HCPCS | Performed by: STUDENT IN AN ORGANIZED HEALTH CARE EDUCATION/TRAINING PROGRAM

## 2021-05-28 PROCEDURE — 85730 THROMBOPLASTIN TIME PARTIAL: CPT | Performed by: STUDENT IN AN ORGANIZED HEALTH CARE EDUCATION/TRAINING PROGRAM

## 2021-05-28 PROCEDURE — 85576 BLOOD PLATELET AGGREGATION: CPT | Performed by: PHYSICIAN ASSISTANT

## 2021-05-28 PROCEDURE — C1751 CATH, INF, PER/CENT/MIDLINE: HCPCS | Performed by: STUDENT IN AN ORGANIZED HEALTH CARE EDUCATION/TRAINING PROGRAM

## 2021-05-28 PROCEDURE — 25010000002 PROPOFOL 10 MG/ML EMULSION: Performed by: STUDENT IN AN ORGANIZED HEALTH CARE EDUCATION/TRAINING PROGRAM

## 2021-05-28 PROCEDURE — 33533 CABG ARTERIAL SINGLE: CPT | Performed by: STUDENT IN AN ORGANIZED HEALTH CARE EDUCATION/TRAINING PROGRAM

## 2021-05-28 PROCEDURE — 85027 COMPLETE CBC AUTOMATED: CPT | Performed by: NURSE PRACTITIONER

## 2021-05-28 PROCEDURE — 94002 VENT MGMT INPAT INIT DAY: CPT

## 2021-05-28 PROCEDURE — 93005 ELECTROCARDIOGRAM TRACING: CPT | Performed by: THORACIC SURGERY (CARDIOTHORACIC VASCULAR SURGERY)

## 2021-05-28 PROCEDURE — 85347 COAGULATION TIME ACTIVATED: CPT

## 2021-05-28 PROCEDURE — 85027 COMPLETE CBC AUTOMATED: CPT | Performed by: STUDENT IN AN ORGANIZED HEALTH CARE EDUCATION/TRAINING PROGRAM

## 2021-05-28 PROCEDURE — 25010000002 PAPAVERINE PER 60 MG: Performed by: STUDENT IN AN ORGANIZED HEALTH CARE EDUCATION/TRAINING PROGRAM

## 2021-05-28 PROCEDURE — 25010000002 HEPARIN (PORCINE) PER 1000 UNITS: Performed by: STUDENT IN AN ORGANIZED HEALTH CARE EDUCATION/TRAINING PROGRAM

## 2021-05-28 PROCEDURE — 93005 ELECTROCARDIOGRAM TRACING: CPT | Performed by: PHYSICIAN ASSISTANT

## 2021-05-28 PROCEDURE — 25810000003 DEXTROSE 5 % WITH KCL 20 MEQ 20-5 MEQ/L-% SOLUTION: Performed by: PHYSICIAN ASSISTANT

## 2021-05-28 PROCEDURE — B24BZZ4 ULTRASONOGRAPHY OF HEART WITH AORTA, TRANSESOPHAGEAL: ICD-10-PCS | Performed by: ANESTHESIOLOGY

## 2021-05-28 PROCEDURE — 85610 PROTHROMBIN TIME: CPT | Performed by: STUDENT IN AN ORGANIZED HEALTH CARE EDUCATION/TRAINING PROGRAM

## 2021-05-28 PROCEDURE — 80069 RENAL FUNCTION PANEL: CPT | Performed by: PHYSICIAN ASSISTANT

## 2021-05-28 PROCEDURE — 02100Z9 BYPASS CORONARY ARTERY, ONE ARTERY FROM LEFT INTERNAL MAMMARY, OPEN APPROACH: ICD-10-PCS | Performed by: STUDENT IN AN ORGANIZED HEALTH CARE EDUCATION/TRAINING PROGRAM

## 2021-05-28 DEVICE — LIGACLIP MCA MULTIPLE CLIP APPLIERS, 20 SMALL CLIPS
Type: IMPLANTABLE DEVICE | Site: LEG | Status: FUNCTIONAL
Brand: LIGACLIP

## 2021-05-28 DEVICE — DISK-SHAPED STYLE, SILICONE (1 PER STERILE PKG)
Type: IMPLANTABLE DEVICE | Site: HEART | Status: FUNCTIONAL
Brand: SCANLAN® RADIOMARK® GRAFT MARKERS

## 2021-05-28 RX ORDER — MORPHINE SULFATE 2 MG/ML
2 INJECTION, SOLUTION INTRAMUSCULAR; INTRAVENOUS
Status: DISCONTINUED | OUTPATIENT
Start: 2021-05-28 | End: 2021-05-30

## 2021-05-28 RX ORDER — DOBUTAMINE HYDROCHLORIDE 100 MG/100ML
2-20 INJECTION INTRAVENOUS CONTINUOUS PRN
Status: DISCONTINUED | OUTPATIENT
Start: 2021-05-28 | End: 2021-05-30

## 2021-05-28 RX ORDER — CHLORHEXIDINE GLUCONATE 0.12 MG/ML
15 RINSE ORAL EVERY 12 HOURS SCHEDULED
Status: DISCONTINUED | OUTPATIENT
Start: 2021-05-28 | End: 2021-05-30

## 2021-05-28 RX ORDER — ONDANSETRON 2 MG/ML
4 INJECTION INTRAMUSCULAR; INTRAVENOUS EVERY 6 HOURS PRN
Status: DISCONTINUED | OUTPATIENT
Start: 2021-05-28 | End: 2021-06-18 | Stop reason: HOSPADM

## 2021-05-28 RX ORDER — PROTAMINE SULFATE 10 MG/ML
50 INJECTION, SOLUTION INTRAVENOUS ONCE
Status: DISCONTINUED | OUTPATIENT
Start: 2021-05-28 | End: 2021-05-29

## 2021-05-28 RX ORDER — CALCIUM CHLORIDE 100 MG/ML
INJECTION INTRAVENOUS; INTRAVENTRICULAR AS NEEDED
Status: DISCONTINUED | OUTPATIENT
Start: 2021-05-28 | End: 2021-05-28 | Stop reason: SURG

## 2021-05-28 RX ORDER — NOREPINEPHRINE BIT/0.9 % NACL 8 MG/250ML
.02-.3 INFUSION BOTTLE (ML) INTRAVENOUS CONTINUOUS PRN
Status: DISCONTINUED | OUTPATIENT
Start: 2021-05-28 | End: 2021-06-02

## 2021-05-28 RX ORDER — ROCURONIUM BROMIDE 10 MG/ML
INJECTION, SOLUTION INTRAVENOUS AS NEEDED
Status: DISCONTINUED | OUTPATIENT
Start: 2021-05-28 | End: 2021-05-28 | Stop reason: SURG

## 2021-05-28 RX ORDER — PAPAVERINE HYDROCHLORIDE 30 MG/ML
INJECTION INTRAMUSCULAR; INTRAVENOUS AS NEEDED
Status: DISCONTINUED | OUTPATIENT
Start: 2021-05-28 | End: 2021-05-28 | Stop reason: HOSPADM

## 2021-05-28 RX ORDER — POTASSIUM CHLORIDE 1.5 G/1.77G
40 POWDER, FOR SOLUTION ORAL AS NEEDED
Status: DISCONTINUED | OUTPATIENT
Start: 2021-05-28 | End: 2021-05-29

## 2021-05-28 RX ORDER — PHENYLEPHRINE HCL IN 0.9% NACL 0.5 MG/5ML
.5-3 SYRINGE (ML) INTRAVENOUS CONTINUOUS PRN
Status: DISCONTINUED | OUTPATIENT
Start: 2021-05-28 | End: 2021-05-31

## 2021-05-28 RX ORDER — NOREPINEPHRINE BITARTRATE 1 MG/ML
INJECTION, SOLUTION INTRAVENOUS CONTINUOUS PRN
Status: DISCONTINUED | OUTPATIENT
Start: 2021-05-28 | End: 2021-05-28 | Stop reason: SURG

## 2021-05-28 RX ORDER — MEPERIDINE HYDROCHLORIDE 25 MG/ML
25 INJECTION INTRAMUSCULAR; INTRAVENOUS; SUBCUTANEOUS EVERY 4 HOURS PRN
Status: DISCONTINUED | OUTPATIENT
Start: 2021-05-28 | End: 2021-05-30

## 2021-05-28 RX ORDER — POTASSIUM CHLORIDE 29.8 MG/ML
20 INJECTION INTRAVENOUS
Status: DISCONTINUED | OUTPATIENT
Start: 2021-05-28 | End: 2021-06-05 | Stop reason: SINTOL

## 2021-05-28 RX ORDER — EPINEPHRINE 0.1 MG/ML
SYRINGE (ML) INJECTION
Status: COMPLETED | OUTPATIENT
Start: 2021-05-28 | End: 2021-05-28

## 2021-05-28 RX ORDER — THROMBIN HUMAN AND FIBRINOGEN 2; 5.5 [USP'U]/1; MG/1
PATCH TOPICAL AS NEEDED
Status: DISCONTINUED | OUTPATIENT
Start: 2021-05-28 | End: 2021-05-28 | Stop reason: HOSPADM

## 2021-05-28 RX ORDER — PROTAMINE SULFATE 10 MG/ML
INJECTION, SOLUTION INTRAVENOUS AS NEEDED
Status: DISCONTINUED | OUTPATIENT
Start: 2021-05-28 | End: 2021-05-28 | Stop reason: SURG

## 2021-05-28 RX ORDER — CEFUROXIME SODIUM 1.5 G/16ML
INJECTION, POWDER, FOR SOLUTION INTRAVENOUS AS NEEDED
Status: DISCONTINUED | OUTPATIENT
Start: 2021-05-28 | End: 2021-05-28 | Stop reason: SURG

## 2021-05-28 RX ORDER — HEPARIN SODIUM 5000 [USP'U]/ML
5000 INJECTION, SOLUTION INTRAVENOUS; SUBCUTANEOUS EVERY 12 HOURS SCHEDULED
Status: DISCONTINUED | OUTPATIENT
Start: 2021-05-29 | End: 2021-05-30

## 2021-05-28 RX ORDER — ASPIRIN 325 MG
325 TABLET ORAL ONCE
Status: DISCONTINUED | OUTPATIENT
Start: 2021-05-28 | End: 2021-05-29

## 2021-05-28 RX ORDER — NALOXONE HYDROCHLORIDE 0.4 MG/ML
0.2 INJECTION, SOLUTION INTRAMUSCULAR; INTRAVENOUS; SUBCUTANEOUS AS NEEDED
Status: DISCONTINUED | OUTPATIENT
Start: 2021-05-28 | End: 2021-06-18 | Stop reason: HOSPADM

## 2021-05-28 RX ORDER — DOPAMINE HYDROCHLORIDE 160 MG/100ML
2-20 INJECTION, SOLUTION INTRAVENOUS CONTINUOUS PRN
Status: DISCONTINUED | OUTPATIENT
Start: 2021-05-28 | End: 2021-05-30

## 2021-05-28 RX ORDER — SODIUM CHLORIDE 0.9 % (FLUSH) 0.9 %
30 SYRINGE (ML) INJECTION ONCE AS NEEDED
Status: DISCONTINUED | OUTPATIENT
Start: 2021-05-28 | End: 2021-05-29

## 2021-05-28 RX ORDER — METOPROLOL TARTRATE 5 MG/5ML
2.5 INJECTION INTRAVENOUS EVERY 6 HOURS SCHEDULED
Status: DISCONTINUED | OUTPATIENT
Start: 2021-05-28 | End: 2021-05-29

## 2021-05-28 RX ORDER — OXYCODONE HYDROCHLORIDE AND ACETAMINOPHEN 5; 325 MG/1; MG/1
2 TABLET ORAL EVERY 4 HOURS PRN
Status: DISCONTINUED | OUTPATIENT
Start: 2021-05-28 | End: 2021-05-29

## 2021-05-28 RX ORDER — SODIUM CHLORIDE, SODIUM LACTATE, POTASSIUM CHLORIDE, CALCIUM CHLORIDE 600; 310; 30; 20 MG/100ML; MG/100ML; MG/100ML; MG/100ML
INJECTION, SOLUTION INTRAVENOUS CONTINUOUS PRN
Status: DISCONTINUED | OUTPATIENT
Start: 2021-05-28 | End: 2021-05-28 | Stop reason: SURG

## 2021-05-28 RX ORDER — AMINOCAPROIC ACID 250 MG/ML
INJECTION, SOLUTION INTRAVENOUS AS NEEDED
Status: DISCONTINUED | OUTPATIENT
Start: 2021-05-28 | End: 2021-05-28 | Stop reason: SURG

## 2021-05-28 RX ORDER — ACETAMINOPHEN 325 MG/1
650 TABLET ORAL EVERY 4 HOURS PRN
Status: DISCONTINUED | OUTPATIENT
Start: 2021-05-28 | End: 2021-05-29

## 2021-05-28 RX ORDER — SODIUM CHLORIDE 9 MG/ML
30 INJECTION, SOLUTION INTRAVENOUS CONTINUOUS PRN
Status: DISCONTINUED | OUTPATIENT
Start: 2021-05-28 | End: 2021-05-29

## 2021-05-28 RX ORDER — MAGNESIUM HYDROXIDE 1200 MG/15ML
LIQUID ORAL AS NEEDED
Status: DISCONTINUED | OUTPATIENT
Start: 2021-05-28 | End: 2021-05-28 | Stop reason: HOSPADM

## 2021-05-28 RX ORDER — ALBUTEROL SULFATE 2.5 MG/3ML
2.5 SOLUTION RESPIRATORY (INHALATION) EVERY 4 HOURS PRN
Status: ACTIVE | OUTPATIENT
Start: 2021-05-28 | End: 2021-05-29

## 2021-05-28 RX ORDER — LIDOCAINE HYDROCHLORIDE 10 MG/ML
0.5 INJECTION, SOLUTION EPIDURAL; INFILTRATION; INTRACAUDAL; PERINEURAL ONCE AS NEEDED
Status: DISCONTINUED | OUTPATIENT
Start: 2021-05-28 | End: 2021-05-28 | Stop reason: HOSPADM

## 2021-05-28 RX ORDER — HEPARIN SODIUM 1000 [USP'U]/ML
INJECTION, SOLUTION INTRAVENOUS; SUBCUTANEOUS AS NEEDED
Status: DISCONTINUED | OUTPATIENT
Start: 2021-05-28 | End: 2021-05-28 | Stop reason: SURG

## 2021-05-28 RX ORDER — SODIUM CHLORIDE 0.9 % (FLUSH) 0.9 %
10 SYRINGE (ML) INJECTION AS NEEDED
Status: DISCONTINUED | OUTPATIENT
Start: 2021-05-28 | End: 2021-05-28 | Stop reason: HOSPADM

## 2021-05-28 RX ORDER — ASPIRIN 325 MG
325 TABLET, DELAYED RELEASE (ENTERIC COATED) ORAL DAILY
Status: DISCONTINUED | OUTPATIENT
Start: 2021-05-29 | End: 2021-05-29

## 2021-05-28 RX ORDER — DEXMEDETOMIDINE HYDROCHLORIDE 4 UG/ML
.2-1.5 INJECTION, SOLUTION INTRAVENOUS CONTINUOUS PRN
Status: DISCONTINUED | OUTPATIENT
Start: 2021-05-28 | End: 2021-05-30

## 2021-05-28 RX ORDER — CHLORHEXIDINE GLUCONATE 0.12 MG/ML
15 RINSE ORAL EVERY 12 HOURS SCHEDULED
Status: COMPLETED | OUTPATIENT
Start: 2021-05-28 | End: 2021-05-28

## 2021-05-28 RX ORDER — MIDAZOLAM HYDROCHLORIDE 1 MG/ML
0.5 INJECTION INTRAMUSCULAR; INTRAVENOUS
Status: DISCONTINUED | OUTPATIENT
Start: 2021-05-28 | End: 2021-05-28 | Stop reason: HOSPADM

## 2021-05-28 RX ORDER — SODIUM CHLORIDE 0.9 % (FLUSH) 0.9 %
10 SYRINGE (ML) INJECTION EVERY 12 HOURS SCHEDULED
Status: DISCONTINUED | OUTPATIENT
Start: 2021-05-28 | End: 2021-05-28 | Stop reason: HOSPADM

## 2021-05-28 RX ORDER — ALBUMIN, HUMAN INJ 5% 5 %
500 SOLUTION INTRAVENOUS AS NEEDED
Status: DISCONTINUED | OUTPATIENT
Start: 2021-05-28 | End: 2021-05-29

## 2021-05-28 RX ORDER — FENTANYL CITRATE 50 UG/ML
50 INJECTION, SOLUTION INTRAMUSCULAR; INTRAVENOUS ONCE
Status: COMPLETED | OUTPATIENT
Start: 2021-05-28 | End: 2021-05-28

## 2021-05-28 RX ORDER — POTASSIUM CHLORIDE, DEXTROSE MONOHYDRATE 150; 5 MG/100ML; G/100ML
30 INJECTION, SOLUTION INTRAVENOUS CONTINUOUS
Status: DISCONTINUED | OUTPATIENT
Start: 2021-05-28 | End: 2021-06-04

## 2021-05-28 RX ORDER — ETOMIDATE 2 MG/ML
INJECTION INTRAVENOUS AS NEEDED
Status: DISCONTINUED | OUTPATIENT
Start: 2021-05-28 | End: 2021-05-28 | Stop reason: SURG

## 2021-05-28 RX ORDER — MIDAZOLAM HYDROCHLORIDE 1 MG/ML
INJECTION INTRAMUSCULAR; INTRAVENOUS AS NEEDED
Status: DISCONTINUED | OUTPATIENT
Start: 2021-05-28 | End: 2021-05-28 | Stop reason: SURG

## 2021-05-28 RX ORDER — PROPOFOL 10 MG/ML
VIAL (ML) INTRAVENOUS CONTINUOUS PRN
Status: DISCONTINUED | OUTPATIENT
Start: 2021-05-28 | End: 2021-05-28 | Stop reason: SURG

## 2021-05-28 RX ORDER — NITROGLYCERIN 20 MG/100ML
5-200 INJECTION INTRAVENOUS CONTINUOUS PRN
Status: DISCONTINUED | OUTPATIENT
Start: 2021-05-28 | End: 2021-05-30 | Stop reason: SDUPTHER

## 2021-05-28 RX ORDER — POTASSIUM CHLORIDE 750 MG/1
40 CAPSULE, EXTENDED RELEASE ORAL AS NEEDED
Status: DISCONTINUED | OUTPATIENT
Start: 2021-05-28 | End: 2021-05-29

## 2021-05-28 RX ORDER — SODIUM CHLORIDE, SODIUM LACTATE, POTASSIUM CHLORIDE, CALCIUM CHLORIDE 600; 310; 30; 20 MG/100ML; MG/100ML; MG/100ML; MG/100ML
9 INJECTION, SOLUTION INTRAVENOUS CONTINUOUS
Status: DISCONTINUED | OUTPATIENT
Start: 2021-05-28 | End: 2021-05-29

## 2021-05-28 RX ORDER — HYDROCODONE BITARTRATE AND ACETAMINOPHEN 7.5; 325 MG/1; MG/1
1 TABLET ORAL EVERY 4 HOURS PRN
Status: DISCONTINUED | OUTPATIENT
Start: 2021-05-28 | End: 2021-05-29

## 2021-05-28 RX ORDER — ATORVASTATIN CALCIUM 40 MG/1
40 TABLET, FILM COATED ORAL NIGHTLY
Status: DISCONTINUED | OUTPATIENT
Start: 2021-05-28 | End: 2021-05-28

## 2021-05-28 RX ORDER — SUFENTANIL CITRATE 50 UG/ML
INJECTION EPIDURAL; INTRAVENOUS AS NEEDED
Status: DISCONTINUED | OUTPATIENT
Start: 2021-05-28 | End: 2021-05-28 | Stop reason: SURG

## 2021-05-28 RX ORDER — FENTANYL CITRATE 50 UG/ML
25 INJECTION, SOLUTION INTRAMUSCULAR; INTRAVENOUS
Status: DISCONTINUED | OUTPATIENT
Start: 2021-05-28 | End: 2021-05-31

## 2021-05-28 RX ORDER — SODIUM CHLORIDE 9 MG/ML
INJECTION, SOLUTION INTRAVENOUS AS NEEDED
Status: DISCONTINUED | OUTPATIENT
Start: 2021-05-28 | End: 2021-05-28 | Stop reason: HOSPADM

## 2021-05-28 RX ADMIN — NOREPINEPHRINE BITARTRATE 0.02 MCG/KG/MIN: 1 INJECTION, SOLUTION, CONCENTRATE INTRAVENOUS at 11:12

## 2021-05-28 RX ADMIN — SUFENTANIL CITRATE 100 MCG: 50 INJECTION, SOLUTION EPIDURAL; INTRAVENOUS at 10:41

## 2021-05-28 RX ADMIN — LIDOCAINE HYDROCHLORIDE 0.5 ML: 10 INJECTION, SOLUTION EPIDURAL; INFILTRATION; INTRACAUDAL; PERINEURAL at 06:40

## 2021-05-28 RX ADMIN — CEFUROXIME 1.5 G: 1.5 INJECTION, POWDER, FOR SOLUTION INTRAVENOUS at 07:30

## 2021-05-28 RX ADMIN — FENTANYL CITRATE 50 MCG: 50 INJECTION, SOLUTION INTRAMUSCULAR; INTRAVENOUS at 18:24

## 2021-05-28 RX ADMIN — CEFUROXIME SODIUM 1.5 G: 1.5 INJECTION, POWDER, FOR SOLUTION INTRAVENOUS at 11:09

## 2021-05-28 RX ADMIN — SODIUM CHLORIDE, POTASSIUM CHLORIDE, SODIUM LACTATE AND CALCIUM CHLORIDE: 600; 310; 30; 20 INJECTION, SOLUTION INTRAVENOUS at 07:12

## 2021-05-28 RX ADMIN — CHLORHEXIDINE GLUCONATE 15 ML: 1.2 SOLUTION ORAL at 20:38

## 2021-05-28 RX ADMIN — SUFENTANIL CITRATE 100 MCG: 50 INJECTION, SOLUTION EPIDURAL; INTRAVENOUS at 07:12

## 2021-05-28 RX ADMIN — INSULIN HUMAN 3 UNITS/HR: 1 INJECTION, SOLUTION INTRAVENOUS at 12:58

## 2021-05-28 RX ADMIN — METOPROLOL TARTRATE 100 MG: 100 TABLET, FILM COATED ORAL at 05:13

## 2021-05-28 RX ADMIN — SODIUM CHLORIDE, PRESERVATIVE FREE 10 ML: 5 INJECTION INTRAVENOUS at 20:39

## 2021-05-28 RX ADMIN — ETOMIDATE 18 MG: 2 INJECTION, SOLUTION INTRAVENOUS at 07:12

## 2021-05-28 RX ADMIN — SODIUM CHLORIDE 3 UNITS/HR: 9 INJECTION, SOLUTION INTRAVENOUS at 07:31

## 2021-05-28 RX ADMIN — NITROGLYCERIN 20 MCG/MIN: 20 INJECTION INTRAVENOUS at 01:09

## 2021-05-28 RX ADMIN — PROPOFOL 50 MCG/KG/MIN: 10 INJECTION, EMULSION INTRAVENOUS at 14:25

## 2021-05-28 RX ADMIN — PROPOFOL 25 MCG/KG/MIN: 10 INJECTION, EMULSION INTRAVENOUS at 11:34

## 2021-05-28 RX ADMIN — PROPOFOL 50 MCG/KG/MIN: 10 INJECTION, EMULSION INTRAVENOUS at 12:56

## 2021-05-28 RX ADMIN — SUFENTANIL CITRATE 50 MCG: 50 INJECTION, SOLUTION EPIDURAL; INTRAVENOUS at 08:45

## 2021-05-28 RX ADMIN — PROPOFOL 50 MCG/KG/MIN: 10 INJECTION, EMULSION INTRAVENOUS at 18:22

## 2021-05-28 RX ADMIN — MIDAZOLAM HYDROCHLORIDE 2 MG: 1 INJECTION, SOLUTION INTRAMUSCULAR; INTRAVENOUS at 10:41

## 2021-05-28 RX ADMIN — CHLORHEXIDINE GLUCONATE 15 ML: 1.2 SOLUTION ORAL at 05:13

## 2021-05-28 RX ADMIN — CEFUROXIME SODIUM 1.5 G: 1.5 INJECTION, POWDER, FOR SOLUTION INTRAVENOUS at 20:38

## 2021-05-28 RX ADMIN — MIDAZOLAM HYDROCHLORIDE 3 MG: 1 INJECTION, SOLUTION INTRAMUSCULAR; INTRAVENOUS at 07:12

## 2021-05-28 RX ADMIN — ROCURONIUM BROMIDE 30 MG: 10 INJECTION, SOLUTION INTRAVENOUS at 10:41

## 2021-05-28 RX ADMIN — AMINOCAPROIC ACID 5 G: 250 INJECTION, SOLUTION INTRAVENOUS at 07:31

## 2021-05-28 RX ADMIN — SODIUM CHLORIDE, PRESERVATIVE FREE 10 ML: 5 INJECTION INTRAVENOUS at 20:40

## 2021-05-28 RX ADMIN — POTASSIUM CHLORIDE AND DEXTROSE MONOHYDRATE 30 ML/HR: 150; 5 INJECTION, SOLUTION INTRAVENOUS at 12:58

## 2021-05-28 RX ADMIN — HEPARIN SODIUM 35000 UNITS: 1000 INJECTION, SOLUTION INTRAVENOUS; SUBCUTANEOUS at 08:45

## 2021-05-28 RX ADMIN — MUPIROCIN 1 APPLICATION: 20 OINTMENT TOPICAL at 21:54

## 2021-05-28 RX ADMIN — NOREPINEPHRINE BITARTRATE 0.07 MCG/KG/MIN: 1 INJECTION, SOLUTION, CONCENTRATE INTRAVENOUS at 12:57

## 2021-05-28 RX ADMIN — PROPOFOL 40 MCG/KG/MIN: 10 INJECTION, EMULSION INTRAVENOUS at 21:54

## 2021-05-28 RX ADMIN — EPINEPHRINE 1 MG: 0.1 INJECTION, SOLUTION ENDOTRACHEAL; INTRACARDIAC; INTRAVENOUS at 18:14

## 2021-05-28 RX ADMIN — PANTOPRAZOLE SODIUM 40 MG: 40 TABLET, DELAYED RELEASE ORAL at 05:13

## 2021-05-28 RX ADMIN — MUPIROCIN: 20 OINTMENT TOPICAL at 05:14

## 2021-05-28 RX ADMIN — PROTAMINE SULFATE 350 MG: 10 INJECTION, SOLUTION INTRAVENOUS at 11:00

## 2021-05-28 RX ADMIN — DEXMEDETOMIDINE HYDROCHLORIDE 0.2 MCG/KG/HR: 4 INJECTION, SOLUTION INTRAVENOUS at 14:00

## 2021-05-28 RX ADMIN — CALCIUM CHLORIDE 1 G: 100 INJECTION INTRAVENOUS; INTRAVENTRICULAR at 11:00

## 2021-05-28 RX ADMIN — SODIUM CHLORIDE, PRESERVATIVE FREE 10 ML: 5 INJECTION INTRAVENOUS at 20:38

## 2021-05-28 RX ADMIN — ROCURONIUM BROMIDE 70 MG: 10 INJECTION, SOLUTION INTRAVENOUS at 07:12

## 2021-05-28 RX ADMIN — ATORVASTATIN CALCIUM 40 MG: 40 TABLET, FILM COATED ORAL at 20:39

## 2021-05-28 NOTE — ANESTHESIA POSTPROCEDURE EVALUATION
Patient: Augusto Lorenzana Jr.    Procedure Summary     Date: 05/28/21 Room / Location:  [facility]    Anesthesia Start: 0700 Anesthesia Stop: 1224    Procedures:       MEDIAN STERNOTOMY CORONARY ARTERY BYPASS X 3 UTILIZING THE LEFT INTERNAL MAMMARY ARTERY GRAFT, EVH OF THE GREATER RIGHT SAPHENOUS VEIN, AND PILO PER ANESTHESIA (N/A Chest)      TRANSESOPHAGEAL ECHOCARDIOGRAM WITH ANESTHESIA (N/A Chest) Diagnosis:       Coronary artery disease involving native coronary artery of native heart, angina presence unspecified      (Coronary artery disease involving native coronary artery of native heart, angina presence unspecified [I25.10])    Surgeons: Jacek Miller MD Provider: Torey Samayoa MD    Anesthesia Type: Not recorded ASA Status: 4          Anesthesia Type: No value filed.    Vitals  Vitals Value Taken Time   BP     Temp     Pulse 82 05/28/21 1223   Resp     SpO2 100 % 05/28/21 1223   Vitals shown include unvalidated device data.        Post Anesthesia Care and Evaluation    Patient location during evaluation: ICU  Patient participation: complete - patient cannot participate  Level of consciousness: obtunded/minimal responses  Airway patency: patent    Cardiovascular status: acceptable  Respiratory status: acceptable, ETT, ventilator and intubated  Hydration status: acceptable

## 2021-05-28 NOTE — ANESTHESIA PROCEDURE NOTES
Preanesthesia Checklist:  Patient identified, IV assessed, risks and benefits discussed, monitors and equipment assessed, procedure being performed at surgeon's request and anesthesia consent obtained.    General Procedure Information  Diagnostic Indications for Echo:  assessment of ascending aorta, assessment of surgical repair and hemodynamic monitoring  Physician Requesting Echo: Jacek Miller MD  Location performed:  OR  Intubated  Bite block not placed  Heart visualized  Probe Insertion:  Easy  Probe Type:  Multiplane  Modalities:  Color flow mapping, continuous wave Doppler and pulse wave Doppler    Echocardiographic and Doppler Measurements    Ventricles    Right Ventricle:  Cavity size normal.  Hypertrophy not present.  Thrombus not present.  Global function normal.    Left Ventricle:  Cavity size normal.  Thrombus not present.  Global Function normal.  Ejection Fraction 55%.          Valves    Aortic Valve:  Annulus normal.  Stenosis moderate.  Area: 1.3 cm².  Mean Gradient: 17 mean 10 mmHg.  Regurgitation absent.  Leaflets calcified.  Leaflet motions restricted.      Mitral Valve:  Annulus normal.  Stenosis not present.  Area: 3.8 cm².  Mean Gradient: 5 mean 2 mmHg.  Vena Contracta Width: 0.38 cm.  Regurgitation mild.  Leaflets normal.  Leaflet motions normal.      Tricuspid Valve:  Annulus normal.  Stenosis not present.  Regurgitation trace.  Leaflets normal.  Leaflet motions normal.    Pulmonic Valve:  Annulus normal.  Stenosis not present.  Regurgitation trace.          Aorta    Ascending Aorta:  Size dilated.  Diameter 3.6 cm.  Dissection not present.  Plaque thickness less than 3 mm.  Mobile plaque not present.    Aortic Arch:  Size normal.  Dissection not present.  Plaque thickness less than 3 mm.  Mobile plaque not present.    Descending Aorta:  Size normal.  Dissection not present.  Plaque thickness less than 3 mm.  Mobile plaque not present.          Atria    Right Atrium:  Size normal.   Spontaneous echo contrast not present.  Thrombus not present.  Tumor not present.  Device present.    Left Atrium:  Size normal.  Spontaneous echo contrast not present.  Thrombus not present.  Tumor not present.  Left atrial appendage normal.      Septa    Atrial Septum:  Intra-atrial septal morphology normal.      Ventricular Septum:  Intra-ventricular septum morphology normal.      Diastolic Function Measurements:  Diastolic Dysfunction Grade=  E= ms  A= ms  E/A Ratio= 1.2  DT= ms  S/D= 0.8  IVRT=    Other Findings  Pericardium:  normal  Pleural Effusion:  none  Pulmonary Arteries:  normal  Pulmonary Venous Flow:  normal    Anesthesia Information  Performed Personally  Anesthesiologist:  Torey Samayoa MD      Echocardiogram Comments:       Informed consent was obtained preoperatively.  Noé probe passed without difficulty. Post CABG:  Ef 45% on inotropess, marked HK inferior wall.  Findings discussed with surgeon  Findings discussed with surgeon.

## 2021-05-28 NOTE — ANESTHESIA PROCEDURE NOTES
Central Line      Patient reassessed immediately prior to procedure    Patient location during procedure: OR  Indications: vascular access  Preanesthetic Checklist  Completed: patient identified, IV checked, site marked, risks and benefits discussed, surgical consent, monitors and equipment checked, pre-op evaluation and timeout performed  Central Line Prep  Sterile Tech:cap, gloves, gown, mask and sterile barriers  Prep: chloraprep  Patient monitoring: blood pressure monitoring, continuous pulse oximetry and EKG  Central Line Procedure  Laterality:right  Location:internal jugular  Catheter Type:Cordis  Catheter Size:9 Fr  Guidance:ultrasound guided  PROCEDURE NOTE/ULTRASOUND INTERPRETATION.  Using ultrasound guidance the potential vascular sites for insertion of the catheter were visualized to determine the patency of the vessel to be used for vascular access.  After selecting the appropriate site for insertion, the needle was visualized under ultrasound being inserted into the internal jugular vein, followed by ultrasound confirmation of wire and catheter placement. There were no abnormalities seen on ultrasound; an image was taken; and the patient tolerated the procedure with no complications. Images: still images not obtained  Assessment  Post procedure:biopatch applied, line sutured, occlusive dressing applied and secured with tape  Assessement:blood return through all ports, free fluid flow, chest x-ray ordered and Willard Test  Complications:no  Patient Tolerance:patient tolerated the procedure well with no apparent complications

## 2021-05-28 NOTE — ANESTHESIA PROCEDURE NOTES
Airway  Urgency: elective    Date/Time: 5/28/2021 7:12 AM  Airway not difficult    General Information and Staff    Patient location during procedure: OR    Indications and Patient Condition  Indications for airway management: airway protection    Preoxygenated: yes  MILS not maintained throughout  Mask difficulty assessment: 0 - not attempted    Final Airway Details  Final airway type: endotracheal airway      Successful airway: ETT  Cuffed: yes   Successful intubation technique: direct laryngoscopy  Endotracheal tube insertion site: oral  Blade: Lauro  Blade size: 4  ETT size (mm): 8.0  Cormack-Lehane Classification: grade I - full view of glottis  Placement verified by: chest auscultation and capnometry   Measured from: lips  ETT/EBT  to lips (cm): 20  Number of attempts at approach: 1  Assessment: lips, teeth, and gum same as pre-op and atraumatic intubation    Additional Comments  Negative epigastric sounds, Breath sound equal bilaterally with symmetric chest rise and fall

## 2021-05-28 NOTE — ANESTHESIA PREPROCEDURE EVALUATION
Anesthesia Evaluation     Patient summary reviewed and Nursing notes reviewed   NPO Solid Status: > 8 hours  NPO Liquid Status: > 8 hours           Airway   Mallampati: I  TM distance: >3 FB  Neck ROM: full  No difficulty expected  Dental    (+) edentulous    Pulmonary     breath sounds clear to auscultation  Cardiovascular     ECG reviewed  Rhythm: regular  Rate: normal    (+) hypertension, valvular problems/murmurs AS, CAD, angina,       Neuro/Psych  GI/Hepatic/Renal/Endo    (+)   renal disease, diabetes mellitus using insulin,     Musculoskeletal     Abdominal    Substance History      OB/GYN          Other                        Anesthesia Plan    ASA 4     intravenous induction   Postoperative Plan: Expected vent after surgery  Anesthetic plan, all risks, benefits, and alternatives have been provided, discussed and informed consent has been obtained with: patient.

## 2021-05-29 ENCOUNTER — APPOINTMENT (OUTPATIENT)
Dept: GENERAL RADIOLOGY | Facility: HOSPITAL | Age: 76
End: 2021-05-29

## 2021-05-29 LAB
ARTERIAL PATENCY WRIST A: ABNORMAL
ATMOSPHERIC PRESS: ABNORMAL MM[HG]
BASE EXCESS BLDA CALC-SCNC: -2.4 MMOL/L (ref 0–2)
BASOPHILS # BLD AUTO: 0.03 10*3/MM3 (ref 0–0.2)
BASOPHILS NFR BLD AUTO: 0.2 % (ref 0–1.5)
BDY SITE: ABNORMAL
BH BB BLOOD EXPIRATION DATE: NORMAL
BH BB BLOOD TYPE BARCODE: 5100
BH BB BLOOD TYPE BARCODE: 6200
BH BB DISPENSE STATUS: NORMAL
BH BB PRODUCT CODE: NORMAL
BH BB UNIT NUMBER: NORMAL
BODY TEMPERATURE: 37 C
CO2 BLDA-SCNC: 22.7 MMOL/L (ref 22–33)
COHGB MFR BLD: 0.8 % (ref 0–2)
CROSSMATCH INTERPRETATION: NORMAL
DEPRECATED RDW RBC AUTO: 47.8 FL (ref 37–54)
EOSINOPHIL # BLD AUTO: 0.09 10*3/MM3 (ref 0–0.4)
EOSINOPHIL NFR BLD AUTO: 0.7 % (ref 0.3–6.2)
EPAP: 0
ERYTHROCYTE [DISTWIDTH] IN BLOOD BY AUTOMATED COUNT: 14.6 % (ref 12.3–15.4)
GLUCOSE BLDC GLUCOMTR-MCNC: 107 MG/DL (ref 70–130)
GLUCOSE BLDC GLUCOMTR-MCNC: 109 MG/DL (ref 70–130)
GLUCOSE BLDC GLUCOMTR-MCNC: 110 MG/DL (ref 70–130)
GLUCOSE BLDC GLUCOMTR-MCNC: 113 MG/DL (ref 70–130)
GLUCOSE BLDC GLUCOMTR-MCNC: 117 MG/DL (ref 70–130)
GLUCOSE BLDC GLUCOMTR-MCNC: 118 MG/DL (ref 70–130)
GLUCOSE BLDC GLUCOMTR-MCNC: 119 MG/DL (ref 70–130)
GLUCOSE BLDC GLUCOMTR-MCNC: 121 MG/DL (ref 70–130)
GLUCOSE BLDC GLUCOMTR-MCNC: 130 MG/DL (ref 70–130)
GLUCOSE BLDC GLUCOMTR-MCNC: 132 MG/DL (ref 70–130)
GLUCOSE BLDC GLUCOMTR-MCNC: 132 MG/DL (ref 70–130)
GLUCOSE BLDC GLUCOMTR-MCNC: 133 MG/DL (ref 70–130)
GLUCOSE BLDC GLUCOMTR-MCNC: 134 MG/DL (ref 70–130)
GLUCOSE BLDC GLUCOMTR-MCNC: 134 MG/DL (ref 70–130)
GLUCOSE BLDC GLUCOMTR-MCNC: 135 MG/DL (ref 70–130)
GLUCOSE BLDC GLUCOMTR-MCNC: 139 MG/DL (ref 70–130)
GLUCOSE BLDC GLUCOMTR-MCNC: 140 MG/DL (ref 70–130)
GLUCOSE BLDC GLUCOMTR-MCNC: 144 MG/DL (ref 70–130)
GLUCOSE BLDC GLUCOMTR-MCNC: 149 MG/DL (ref 70–130)
GLUCOSE BLDC GLUCOMTR-MCNC: 150 MG/DL (ref 70–130)
GLUCOSE BLDC GLUCOMTR-MCNC: 152 MG/DL (ref 70–130)
HCO3 BLDA-SCNC: 21.7 MMOL/L (ref 20–26)
HCT VFR BLD AUTO: 26.9 % (ref 37.5–51)
HCT VFR BLD CALC: 28.5 %
HGB BLD-MCNC: 9.1 G/DL (ref 13–17.7)
HGB BLDA-MCNC: 9.3 G/DL (ref 13.5–17.5)
IMM GRANULOCYTES # BLD AUTO: 0.11 10*3/MM3 (ref 0–0.05)
IMM GRANULOCYTES NFR BLD AUTO: 0.8 % (ref 0–0.5)
INHALED O2 CONCENTRATION: 40 %
INR PPP: 1.22 (ref 0.85–1.16)
IPAP: 0
LYMPHOCYTES # BLD AUTO: 1.09 10*3/MM3 (ref 0.7–3.1)
LYMPHOCYTES NFR BLD AUTO: 8 % (ref 19.6–45.3)
MCH RBC QN AUTO: 30.7 PG (ref 26.6–33)
MCHC RBC AUTO-ENTMCNC: 33.8 G/DL (ref 31.5–35.7)
MCV RBC AUTO: 90.9 FL (ref 79–97)
METHGB BLD QL: 0.5 % (ref 0–1.5)
MODALITY: ABNORMAL
MONOCYTES # BLD AUTO: 1.65 10*3/MM3 (ref 0.1–0.9)
MONOCYTES NFR BLD AUTO: 12.1 % (ref 5–12)
NEUTROPHILS NFR BLD AUTO: 10.69 10*3/MM3 (ref 1.7–7)
NEUTROPHILS NFR BLD AUTO: 78.2 % (ref 42.7–76)
NOTE: ABNORMAL
NRBC BLD AUTO-RTO: 0 /100 WBC (ref 0–0.2)
OXYHGB MFR BLDV: 95.9 % (ref 94–99)
PA ADP PRP-ACNC: 290 PRU
PAW @ PEAK INSP FLOW SETTING VENT: 0 CMH2O
PCO2 BLDA: 33.3 MM HG (ref 35–45)
PCO2 TEMP ADJ BLD: 33.3 MM HG (ref 35–48)
PH BLDA: 7.42 PH UNITS (ref 7.35–7.45)
PH, TEMP CORRECTED: 7.42 PH UNITS
PLATELET # BLD AUTO: 164 10*3/MM3 (ref 140–450)
PMV BLD AUTO: 11.3 FL (ref 6–12)
PO2 BLDA: 88.4 MM HG (ref 83–108)
PO2 TEMP ADJ BLD: 88.4 MM HG (ref 83–108)
PROTHROMBIN TIME: 15 SECONDS (ref 11.4–14.4)
RBC # BLD AUTO: 2.96 10*6/MM3 (ref 4.14–5.8)
TOTAL RATE: 0 BREATHS/MINUTE
UNIT  ABO: NORMAL
UNIT  RH: NORMAL
WBC # BLD AUTO: 13.66 10*3/MM3 (ref 3.4–10.8)

## 2021-05-29 PROCEDURE — 93005 ELECTROCARDIOGRAM TRACING: CPT | Performed by: PHYSICIAN ASSISTANT

## 2021-05-29 PROCEDURE — 99233 SBSQ HOSP IP/OBS HIGH 50: CPT | Performed by: INTERNAL MEDICINE

## 2021-05-29 PROCEDURE — 85576 BLOOD PLATELET AGGREGATION: CPT | Performed by: PHYSICIAN ASSISTANT

## 2021-05-29 PROCEDURE — 25010000002 HEPARIN (PORCINE) PER 1000 UNITS: Performed by: PHYSICIAN ASSISTANT

## 2021-05-29 PROCEDURE — 85025 COMPLETE CBC W/AUTO DIFF WBC: CPT | Performed by: PHYSICIAN ASSISTANT

## 2021-05-29 PROCEDURE — 94799 UNLISTED PULMONARY SVC/PX: CPT

## 2021-05-29 PROCEDURE — 25010000003 CEFUROXIME SODIUM 1.5 G RECONSTITUTED SOLUTION: Performed by: STUDENT IN AN ORGANIZED HEALTH CARE EDUCATION/TRAINING PROGRAM

## 2021-05-29 PROCEDURE — 83050 HGB METHEMOGLOBIN QUAN: CPT

## 2021-05-29 PROCEDURE — 82375 ASSAY CARBOXYHB QUANT: CPT

## 2021-05-29 PROCEDURE — 82805 BLOOD GASES W/O2 SATURATION: CPT

## 2021-05-29 PROCEDURE — 93005 ELECTROCARDIOGRAM TRACING: CPT | Performed by: INTERNAL MEDICINE

## 2021-05-29 PROCEDURE — 25010000002 MORPHINE PER 10 MG: Performed by: STUDENT IN AN ORGANIZED HEALTH CARE EDUCATION/TRAINING PROGRAM

## 2021-05-29 PROCEDURE — 25010000002 ALBUMIN HUMAN 5% PER 50 ML: Performed by: PHYSICIAN ASSISTANT

## 2021-05-29 PROCEDURE — 94003 VENT MGMT INPAT SUBQ DAY: CPT

## 2021-05-29 PROCEDURE — 71045 X-RAY EXAM CHEST 1 VIEW: CPT

## 2021-05-29 PROCEDURE — 25010000002 FENTANYL CITRATE (PF) 50 MCG/ML SOLUTION: Performed by: STUDENT IN AN ORGANIZED HEALTH CARE EDUCATION/TRAINING PROGRAM

## 2021-05-29 PROCEDURE — 93010 ELECTROCARDIOGRAM REPORT: CPT | Performed by: INTERNAL MEDICINE

## 2021-05-29 PROCEDURE — 99232 SBSQ HOSP IP/OBS MODERATE 35: CPT | Performed by: INTERNAL MEDICINE

## 2021-05-29 PROCEDURE — 82962 GLUCOSE BLOOD TEST: CPT

## 2021-05-29 PROCEDURE — 25010000002 PROPOFOL 10 MG/ML EMULSION: Performed by: STUDENT IN AN ORGANIZED HEALTH CARE EDUCATION/TRAINING PROGRAM

## 2021-05-29 PROCEDURE — 85610 PROTHROMBIN TIME: CPT | Performed by: PHYSICIAN ASSISTANT

## 2021-05-29 PROCEDURE — P9041 ALBUMIN (HUMAN),5%, 50ML: HCPCS | Performed by: PHYSICIAN ASSISTANT

## 2021-05-29 RX ORDER — FUROSEMIDE 10 MG/ML
80 INJECTION INTRAMUSCULAR; INTRAVENOUS ONCE
Status: DISCONTINUED | OUTPATIENT
Start: 2021-05-29 | End: 2021-05-29

## 2021-05-29 RX ORDER — ACETAMINOPHEN 325 MG/1
650 TABLET ORAL EVERY 4 HOURS PRN
Status: DISCONTINUED | OUTPATIENT
Start: 2021-05-29 | End: 2021-06-18 | Stop reason: HOSPADM

## 2021-05-29 RX ORDER — POTASSIUM CHLORIDE 750 MG/1
40 CAPSULE, EXTENDED RELEASE ORAL AS NEEDED
Status: DISCONTINUED | OUTPATIENT
Start: 2021-05-29 | End: 2021-06-05 | Stop reason: SINTOL

## 2021-05-29 RX ORDER — GABAPENTIN 300 MG/1
600 CAPSULE ORAL NIGHTLY
Status: DISCONTINUED | OUTPATIENT
Start: 2021-05-29 | End: 2021-06-04

## 2021-05-29 RX ORDER — METOPROLOL TARTRATE 5 MG/5ML
2.5 INJECTION INTRAVENOUS ONCE
Status: COMPLETED | OUTPATIENT
Start: 2021-05-29 | End: 2021-05-29

## 2021-05-29 RX ORDER — HYDROCODONE BITARTRATE AND ACETAMINOPHEN 7.5; 325 MG/1; MG/1
1 TABLET ORAL EVERY 4 HOURS PRN
Status: DISPENSED | OUTPATIENT
Start: 2021-05-29 | End: 2021-06-07

## 2021-05-29 RX ORDER — ASPIRIN 325 MG
325 TABLET ORAL DAILY
Status: DISCONTINUED | OUTPATIENT
Start: 2021-05-29 | End: 2021-06-13

## 2021-05-29 RX ORDER — ATORVASTATIN CALCIUM 40 MG/1
40 TABLET, FILM COATED ORAL NIGHTLY
Status: DISCONTINUED | OUTPATIENT
Start: 2021-05-29 | End: 2021-06-18 | Stop reason: HOSPADM

## 2021-05-29 RX ORDER — ACETAMINOPHEN 160 MG/5ML
650 SOLUTION ORAL EVERY 4 HOURS PRN
Status: DISCONTINUED | OUTPATIENT
Start: 2021-05-29 | End: 2021-06-18 | Stop reason: HOSPADM

## 2021-05-29 RX ORDER — DOCUSATE SODIUM 50 MG/5 ML
100 LIQUID (ML) ORAL 2 TIMES DAILY
Status: DISCONTINUED | OUTPATIENT
Start: 2021-05-29 | End: 2021-06-16

## 2021-05-29 RX ORDER — METOPROLOL TARTRATE 5 MG/5ML
INJECTION INTRAVENOUS
Status: ACTIVE
Start: 2021-05-29 | End: 2021-05-30

## 2021-05-29 RX ORDER — OXYCODONE HYDROCHLORIDE AND ACETAMINOPHEN 5; 325 MG/1; MG/1
2 TABLET ORAL EVERY 4 HOURS PRN
Status: DISPENSED | OUTPATIENT
Start: 2021-05-29 | End: 2021-06-07

## 2021-05-29 RX ORDER — POTASSIUM CHLORIDE 1.5 G/1.77G
40 POWDER, FOR SOLUTION ORAL AS NEEDED
Status: DISCONTINUED | OUTPATIENT
Start: 2021-05-29 | End: 2021-06-05 | Stop reason: SINTOL

## 2021-05-29 RX ORDER — PANTOPRAZOLE SODIUM 40 MG/10ML
40 INJECTION, POWDER, LYOPHILIZED, FOR SOLUTION INTRAVENOUS
Status: DISCONTINUED | OUTPATIENT
Start: 2021-05-29 | End: 2021-06-08

## 2021-05-29 RX ORDER — ACETAMINOPHEN 650 MG/1
650 SUPPOSITORY RECTAL EVERY 4 HOURS PRN
Status: DISCONTINUED | OUTPATIENT
Start: 2021-05-29 | End: 2021-06-18 | Stop reason: HOSPADM

## 2021-05-29 RX ORDER — POLYETHYLENE GLYCOL 3350 17 G/17G
17 POWDER, FOR SOLUTION ORAL DAILY PRN
Status: DISCONTINUED | OUTPATIENT
Start: 2021-05-29 | End: 2021-06-18 | Stop reason: HOSPADM

## 2021-05-29 RX ORDER — BISACODYL 10 MG
10 SUPPOSITORY, RECTAL RECTAL DAILY PRN
Status: DISCONTINUED | OUTPATIENT
Start: 2021-05-29 | End: 2021-06-18 | Stop reason: HOSPADM

## 2021-05-29 RX ORDER — BUMETANIDE 0.25 MG/ML
2 INJECTION INTRAMUSCULAR; INTRAVENOUS ONCE
Status: COMPLETED | OUTPATIENT
Start: 2021-05-29 | End: 2021-05-29

## 2021-05-29 RX ADMIN — ALBUMIN HUMAN 500 ML: 0.05 INJECTION, SOLUTION INTRAVENOUS at 08:17

## 2021-05-29 RX ADMIN — CEFUROXIME SODIUM 1.5 G: 1.5 INJECTION, POWDER, FOR SOLUTION INTRAVENOUS at 12:48

## 2021-05-29 RX ADMIN — CEFUROXIME SODIUM 1.5 G: 1.5 INJECTION, POWDER, FOR SOLUTION INTRAVENOUS at 20:07

## 2021-05-29 RX ADMIN — HEPARIN SODIUM 5000 UNITS: 5000 INJECTION INTRAVENOUS; SUBCUTANEOUS at 20:08

## 2021-05-29 RX ADMIN — PROPOFOL 50 MCG/KG/MIN: 10 INJECTION, EMULSION INTRAVENOUS at 04:19

## 2021-05-29 RX ADMIN — CHLORHEXIDINE GLUCONATE 15 ML: 1.2 SOLUTION ORAL at 08:25

## 2021-05-29 RX ADMIN — DOCUSATE SODIUM 100 MG: 50 LIQUID ORAL at 08:25

## 2021-05-29 RX ADMIN — ATORVASTATIN CALCIUM 40 MG: 40 TABLET, FILM COATED ORAL at 20:08

## 2021-05-29 RX ADMIN — HEPARIN SODIUM 5000 UNITS: 5000 INJECTION INTRAVENOUS; SUBCUTANEOUS at 08:25

## 2021-05-29 RX ADMIN — DOCUSATE SODIUM 100 MG: 50 LIQUID ORAL at 20:07

## 2021-05-29 RX ADMIN — INSULIN HUMAN 0.5 UNITS/HR: 1 INJECTION, SOLUTION INTRAVENOUS at 17:49

## 2021-05-29 RX ADMIN — PROPOFOL 50 MCG/KG/MIN: 10 INJECTION, EMULSION INTRAVENOUS at 08:17

## 2021-05-29 RX ADMIN — PANTOPRAZOLE SODIUM 40 MG: 40 INJECTION, POWDER, FOR SOLUTION INTRAVENOUS at 06:04

## 2021-05-29 RX ADMIN — CEFUROXIME SODIUM 1.5 G: 1.5 INJECTION, POWDER, FOR SOLUTION INTRAVENOUS at 03:58

## 2021-05-29 RX ADMIN — MORPHINE SULFATE 2 MG: 2 INJECTION, SOLUTION INTRAMUSCULAR; INTRAVENOUS at 23:40

## 2021-05-29 RX ADMIN — CHLORHEXIDINE GLUCONATE 15 ML: 1.2 SOLUTION ORAL at 20:07

## 2021-05-29 RX ADMIN — METOPROLOL TARTRATE 2.5 MG: 5 INJECTION INTRAVENOUS at 16:51

## 2021-05-29 RX ADMIN — METOPROLOL TARTRATE 12.5 MG: 25 TABLET, FILM COATED ORAL at 20:07

## 2021-05-29 RX ADMIN — Medication 0.12 MCG/KG/MIN: at 00:32

## 2021-05-29 RX ADMIN — BUMETANIDE 2 MG: 0.25 INJECTION, SOLUTION INTRAMUSCULAR; INTRAVENOUS at 10:55

## 2021-05-29 RX ADMIN — SENNOSIDES 10 ML: 8.8 LIQUID ORAL at 20:07

## 2021-05-29 RX ADMIN — FENTANYL CITRATE 25 MCG: 50 INJECTION, SOLUTION INTRAMUSCULAR; INTRAVENOUS at 02:43

## 2021-05-29 RX ADMIN — ACETAMINOPHEN 650 MG: 160 SOLUTION ORAL at 16:25

## 2021-05-29 RX ADMIN — SODIUM CHLORIDE, PRESERVATIVE FREE 10 ML: 5 INJECTION INTRAVENOUS at 20:08

## 2021-05-29 RX ADMIN — METOPROLOL TARTRATE 12.5 MG: 25 TABLET, FILM COATED ORAL at 15:40

## 2021-05-29 RX ADMIN — ASPIRIN 325 MG ORAL TABLET 325 MG: 325 PILL ORAL at 08:25

## 2021-05-29 RX ADMIN — PROPOFOL 50 MCG/KG/MIN: 10 INJECTION, EMULSION INTRAVENOUS at 00:34

## 2021-05-29 RX ADMIN — SENNOSIDES 10 ML: 8.8 LIQUID ORAL at 08:25

## 2021-05-29 RX ADMIN — SODIUM CHLORIDE, PRESERVATIVE FREE 10 ML: 5 INJECTION INTRAVENOUS at 08:26

## 2021-05-30 ENCOUNTER — APPOINTMENT (OUTPATIENT)
Dept: GENERAL RADIOLOGY | Facility: HOSPITAL | Age: 76
End: 2021-05-30

## 2021-05-30 LAB
ABO GROUP BLD: NORMAL
ANION GAP SERPL CALCULATED.3IONS-SCNC: 15 MMOL/L (ref 5–15)
APTT PPP: 43.6 SECONDS (ref 22–39)
BH BB BLOOD EXPIRATION DATE: NORMAL
BH BB BLOOD TYPE BARCODE: 5100
BH BB DISPENSE STATUS: NORMAL
BH BB PRODUCT CODE: NORMAL
BH BB UNIT NUMBER: NORMAL
BLD GP AB SCN SERPL QL: NEGATIVE
BUN SERPL-MCNC: 52 MG/DL (ref 8–23)
BUN/CREAT SERPL: 19.1 (ref 7–25)
CALCIUM SPEC-SCNC: 8.7 MG/DL (ref 8.6–10.5)
CHLORIDE SERPL-SCNC: 106 MMOL/L (ref 98–107)
CO2 SERPL-SCNC: 19 MMOL/L (ref 22–29)
CREAT SERPL-MCNC: 2.72 MG/DL (ref 0.76–1.27)
CREAT UR-MCNC: 136.1 MG/DL
CROSSMATCH INTERPRETATION: NORMAL
DEPRECATED RDW RBC AUTO: 49.2 FL (ref 37–54)
DEPRECATED RDW RBC AUTO: 49.7 FL (ref 37–54)
DEPRECATED RDW RBC AUTO: 49.7 FL (ref 37–54)
EOSINOPHIL SPEC QL MICRO: 0 % EOS/100 CELLS (ref 0–0)
ERYTHROCYTE [DISTWIDTH] IN BLOOD BY AUTOMATED COUNT: 14.6 % (ref 12.3–15.4)
ERYTHROCYTE [DISTWIDTH] IN BLOOD BY AUTOMATED COUNT: 14.7 % (ref 12.3–15.4)
ERYTHROCYTE [DISTWIDTH] IN BLOOD BY AUTOMATED COUNT: 15.2 % (ref 12.3–15.4)
FIBRINOGEN PPP-MCNC: 804 MG/DL (ref 220–470)
FSP PPP LA-ACNC: NORMAL
GFR SERPL CREATININE-BSD FRML MDRD: 23 ML/MIN/1.73
GLUCOSE BLDC GLUCOMTR-MCNC: 102 MG/DL (ref 70–130)
GLUCOSE BLDC GLUCOMTR-MCNC: 103 MG/DL (ref 70–130)
GLUCOSE BLDC GLUCOMTR-MCNC: 111 MG/DL (ref 70–130)
GLUCOSE BLDC GLUCOMTR-MCNC: 111 MG/DL (ref 70–130)
GLUCOSE BLDC GLUCOMTR-MCNC: 117 MG/DL (ref 70–130)
GLUCOSE BLDC GLUCOMTR-MCNC: 117 MG/DL (ref 70–130)
GLUCOSE BLDC GLUCOMTR-MCNC: 126 MG/DL (ref 70–130)
GLUCOSE BLDC GLUCOMTR-MCNC: 132 MG/DL (ref 70–130)
GLUCOSE BLDC GLUCOMTR-MCNC: 133 MG/DL (ref 70–130)
GLUCOSE BLDC GLUCOMTR-MCNC: 166 MG/DL (ref 70–130)
GLUCOSE BLDC GLUCOMTR-MCNC: 197 MG/DL (ref 70–130)
GLUCOSE BLDC GLUCOMTR-MCNC: 209 MG/DL (ref 70–130)
GLUCOSE BLDC GLUCOMTR-MCNC: 269 MG/DL (ref 70–130)
GLUCOSE BLDC GLUCOMTR-MCNC: 97 MG/DL (ref 70–130)
GLUCOSE SERPL-MCNC: 111 MG/DL (ref 65–99)
HCT VFR BLD AUTO: 23.7 % (ref 37.5–51)
HCT VFR BLD AUTO: 24.3 % (ref 37.5–51)
HCT VFR BLD AUTO: 25.2 % (ref 37.5–51)
HCT VFR BLD AUTO: 25.6 % (ref 37.5–51)
HGB BLD-MCNC: 8 G/DL (ref 13–17.7)
HGB BLD-MCNC: 8.1 G/DL (ref 13–17.7)
HGB BLD-MCNC: 8.3 G/DL (ref 13–17.7)
HGB BLD-MCNC: 8.4 G/DL (ref 13–17.7)
INR PPP: 1.73 (ref 0.85–1.16)
MCH RBC QN AUTO: 30.3 PG (ref 26.6–33)
MCH RBC QN AUTO: 30.5 PG (ref 26.6–33)
MCH RBC QN AUTO: 31.6 PG (ref 26.6–33)
MCHC RBC AUTO-ENTMCNC: 32.8 G/DL (ref 31.5–35.7)
MCHC RBC AUTO-ENTMCNC: 33.3 G/DL (ref 31.5–35.7)
MCHC RBC AUTO-ENTMCNC: 33.8 G/DL (ref 31.5–35.7)
MCV RBC AUTO: 91.4 FL (ref 79–97)
MCV RBC AUTO: 92.4 FL (ref 79–97)
MCV RBC AUTO: 93.7 FL (ref 79–97)
PLATELET # BLD AUTO: 129 10*3/MM3 (ref 140–450)
PLATELET # BLD AUTO: 131 10*3/MM3 (ref 140–450)
PLATELET # BLD AUTO: 140 10*3/MM3 (ref 140–450)
PMV BLD AUTO: 11.1 FL (ref 6–12)
PMV BLD AUTO: 11.3 FL (ref 6–12)
PMV BLD AUTO: 11.4 FL (ref 6–12)
POTASSIUM SERPL-SCNC: 3.8 MMOL/L (ref 3.5–5.2)
PROT UR-MCNC: 252.5 MG/DL
PROTHROMBIN TIME: 19.6 SECONDS (ref 11.4–14.4)
RBC # BLD AUTO: 2.53 10*6/MM3 (ref 4.14–5.8)
RBC # BLD AUTO: 2.66 10*6/MM3 (ref 4.14–5.8)
RBC # BLD AUTO: 2.77 10*6/MM3 (ref 4.14–5.8)
RH BLD: POSITIVE
SODIUM SERPL-SCNC: 140 MMOL/L (ref 136–145)
SODIUM UR-SCNC: 22 MMOL/L
T&S EXPIRATION DATE: NORMAL
UNIT  ABO: NORMAL
UNIT  RH: NORMAL
WBC # BLD AUTO: 12.36 10*3/MM3 (ref 3.4–10.8)
WBC # BLD AUTO: 12.4 10*3/MM3 (ref 3.4–10.8)
WBC # BLD AUTO: 12.41 10*3/MM3 (ref 3.4–10.8)

## 2021-05-30 PROCEDURE — 63710000001 INSULIN DETEMIR PER 5 UNITS: Performed by: INTERNAL MEDICINE

## 2021-05-30 PROCEDURE — 36430 TRANSFUSION BLD/BLD COMPNT: CPT

## 2021-05-30 PROCEDURE — 25010000002 HEPARIN (PORCINE) PER 1000 UNITS: Performed by: PHYSICIAN ASSISTANT

## 2021-05-30 PROCEDURE — 84300 ASSAY OF URINE SODIUM: CPT | Performed by: INTERNAL MEDICINE

## 2021-05-30 PROCEDURE — 25010000002 PROTAMINE SULFATE PER 10 MG: Performed by: THORACIC SURGERY (CARDIOTHORACIC VASCULAR SURGERY)

## 2021-05-30 PROCEDURE — 99232 SBSQ HOSP IP/OBS MODERATE 35: CPT | Performed by: INTERNAL MEDICINE

## 2021-05-30 PROCEDURE — 80048 BASIC METABOLIC PNL TOTAL CA: CPT | Performed by: PHYSICIAN ASSISTANT

## 2021-05-30 PROCEDURE — P9016 RBC LEUKOCYTES REDUCED: HCPCS

## 2021-05-30 PROCEDURE — 87205 SMEAR GRAM STAIN: CPT | Performed by: INTERNAL MEDICINE

## 2021-05-30 PROCEDURE — 25010000002 MORPHINE PER 10 MG: Performed by: STUDENT IN AN ORGANIZED HEALTH CARE EDUCATION/TRAINING PROGRAM

## 2021-05-30 PROCEDURE — 85027 COMPLETE CBC AUTOMATED: CPT | Performed by: THORACIC SURGERY (CARDIOTHORACIC VASCULAR SURGERY)

## 2021-05-30 PROCEDURE — 25010000003 CEFUROXIME SODIUM 1.5 G RECONSTITUTED SOLUTION: Performed by: STUDENT IN AN ORGANIZED HEALTH CARE EDUCATION/TRAINING PROGRAM

## 2021-05-30 PROCEDURE — 85384 FIBRINOGEN ACTIVITY: CPT | Performed by: PHYSICIAN ASSISTANT

## 2021-05-30 PROCEDURE — P9035 PLATELET PHERES LEUKOREDUCED: HCPCS

## 2021-05-30 PROCEDURE — 97163 PT EVAL HIGH COMPLEX 45 MIN: CPT

## 2021-05-30 PROCEDURE — 97530 THERAPEUTIC ACTIVITIES: CPT

## 2021-05-30 PROCEDURE — 71045 X-RAY EXAM CHEST 1 VIEW: CPT

## 2021-05-30 PROCEDURE — 85027 COMPLETE CBC AUTOMATED: CPT | Performed by: PHYSICIAN ASSISTANT

## 2021-05-30 PROCEDURE — P9017 PLASMA 1 DONOR FRZ W/IN 8 HR: HCPCS

## 2021-05-30 PROCEDURE — 93010 ELECTROCARDIOGRAM REPORT: CPT | Performed by: INTERNAL MEDICINE

## 2021-05-30 PROCEDURE — 82962 GLUCOSE BLOOD TEST: CPT

## 2021-05-30 PROCEDURE — 85730 THROMBOPLASTIN TIME PARTIAL: CPT | Performed by: PHYSICIAN ASSISTANT

## 2021-05-30 PROCEDURE — 84156 ASSAY OF PROTEIN URINE: CPT | Performed by: INTERNAL MEDICINE

## 2021-05-30 PROCEDURE — 86900 BLOOD TYPING SEROLOGIC ABO: CPT | Performed by: PHYSICIAN ASSISTANT

## 2021-05-30 PROCEDURE — 86923 COMPATIBILITY TEST ELECTRIC: CPT

## 2021-05-30 PROCEDURE — 85362 FIBRIN DEGRADATION PRODUCTS: CPT | Performed by: PHYSICIAN ASSISTANT

## 2021-05-30 PROCEDURE — 86900 BLOOD TYPING SEROLOGIC ABO: CPT

## 2021-05-30 PROCEDURE — 86901 BLOOD TYPING SEROLOGIC RH(D): CPT | Performed by: PHYSICIAN ASSISTANT

## 2021-05-30 PROCEDURE — 94799 UNLISTED PULMONARY SVC/PX: CPT

## 2021-05-30 PROCEDURE — 86927 PLASMA FRESH FROZEN: CPT

## 2021-05-30 PROCEDURE — 82570 ASSAY OF URINE CREATININE: CPT | Performed by: INTERNAL MEDICINE

## 2021-05-30 PROCEDURE — 63710000001 INSULIN LISPRO (HUMAN) PER 5 UNITS: Performed by: INTERNAL MEDICINE

## 2021-05-30 PROCEDURE — 99024 POSTOP FOLLOW-UP VISIT: CPT | Performed by: THORACIC SURGERY (CARDIOTHORACIC VASCULAR SURGERY)

## 2021-05-30 PROCEDURE — 86850 RBC ANTIBODY SCREEN: CPT | Performed by: PHYSICIAN ASSISTANT

## 2021-05-30 PROCEDURE — 85018 HEMOGLOBIN: CPT | Performed by: STUDENT IN AN ORGANIZED HEALTH CARE EDUCATION/TRAINING PROGRAM

## 2021-05-30 PROCEDURE — 85014 HEMATOCRIT: CPT | Performed by: STUDENT IN AN ORGANIZED HEALTH CARE EDUCATION/TRAINING PROGRAM

## 2021-05-30 PROCEDURE — 93005 ELECTROCARDIOGRAM TRACING: CPT | Performed by: PHYSICIAN ASSISTANT

## 2021-05-30 PROCEDURE — 85610 PROTHROMBIN TIME: CPT | Performed by: PHYSICIAN ASSISTANT

## 2021-05-30 PROCEDURE — 25010000002 ANTI-INHIBITOR COAGULANT COMPLEX: Performed by: THORACIC SURGERY (CARDIOTHORACIC VASCULAR SURGERY)

## 2021-05-30 RX ORDER — PROTAMINE SULFATE 10 MG/ML
50 INJECTION, SOLUTION INTRAVENOUS ONCE
Status: COMPLETED | OUTPATIENT
Start: 2021-05-30 | End: 2021-05-30

## 2021-05-30 RX ORDER — METOPROLOL TARTRATE 5 MG/5ML
2.5 INJECTION INTRAVENOUS ONCE
Status: COMPLETED | OUTPATIENT
Start: 2021-05-30 | End: 2021-05-30

## 2021-05-30 RX ORDER — MORPHINE SULFATE 2 MG/ML
2 INJECTION, SOLUTION INTRAMUSCULAR; INTRAVENOUS
Status: DISCONTINUED | OUTPATIENT
Start: 2021-05-30 | End: 2021-06-07

## 2021-05-30 RX ADMIN — MORPHINE SULFATE 2 MG: 2 INJECTION, SOLUTION INTRAMUSCULAR; INTRAVENOUS at 02:13

## 2021-05-30 RX ADMIN — PROTAMINE SULFATE 50 MG: 10 INJECTION, SOLUTION INTRAVENOUS at 12:53

## 2021-05-30 RX ADMIN — METOPROLOL TARTRATE 25 MG: 25 TABLET, FILM COATED ORAL at 08:53

## 2021-05-30 RX ADMIN — HYDROCODONE BITARTRATE AND ACETAMINOPHEN 1 TABLET: 7.5; 325 TABLET ORAL at 20:14

## 2021-05-30 RX ADMIN — INSULIN DETEMIR 10 UNITS: 100 INJECTION, SOLUTION SUBCUTANEOUS at 20:14

## 2021-05-30 RX ADMIN — NITROGLYCERIN 5 MCG/MIN: 20 INJECTION INTRAVENOUS at 04:28

## 2021-05-30 RX ADMIN — SENNOSIDES 10 ML: 8.8 LIQUID ORAL at 08:53

## 2021-05-30 RX ADMIN — HEPARIN SODIUM 5000 UNITS: 5000 INJECTION INTRAVENOUS; SUBCUTANEOUS at 08:52

## 2021-05-30 RX ADMIN — DOCUSATE SODIUM 100 MG: 50 LIQUID ORAL at 08:53

## 2021-05-30 RX ADMIN — SENNOSIDES 10 ML: 8.8 LIQUID ORAL at 20:14

## 2021-05-30 RX ADMIN — MORPHINE SULFATE 2 MG: 2 INJECTION, SOLUTION INTRAMUSCULAR; INTRAVENOUS at 13:43

## 2021-05-30 RX ADMIN — METOPROLOL TARTRATE 25 MG: 25 TABLET, FILM COATED ORAL at 20:14

## 2021-05-30 RX ADMIN — INSULIN LISPRO 2 UNITS: 100 INJECTION, SOLUTION INTRAVENOUS; SUBCUTANEOUS at 17:41

## 2021-05-30 RX ADMIN — NITROGLYCERIN 30 MCG/MIN: 20 INJECTION INTRAVENOUS at 21:49

## 2021-05-30 RX ADMIN — NICARDIPINE HYDROCHLORIDE 2.5 MG/HR: 0.2 INJECTION, SOLUTION INTRAVENOUS at 03:11

## 2021-05-30 RX ADMIN — PANTOPRAZOLE SODIUM 40 MG: 40 INJECTION, POWDER, FOR SOLUTION INTRAVENOUS at 06:09

## 2021-05-30 RX ADMIN — ATORVASTATIN CALCIUM 40 MG: 40 TABLET, FILM COATED ORAL at 20:14

## 2021-05-30 RX ADMIN — CHLORHEXIDINE GLUCONATE 15 ML: 1.2 SOLUTION ORAL at 08:53

## 2021-05-30 RX ADMIN — ASPIRIN 325 MG ORAL TABLET 325 MG: 325 PILL ORAL at 08:53

## 2021-05-30 RX ADMIN — METOPROLOL TARTRATE 2.5 MG: 5 INJECTION INTRAVENOUS at 00:36

## 2021-05-30 RX ADMIN — HYDROCODONE BITARTRATE AND ACETAMINOPHEN 1 TABLET: 7.5; 325 TABLET ORAL at 13:44

## 2021-05-30 RX ADMIN — DOCUSATE SODIUM 100 MG: 50 LIQUID ORAL at 20:14

## 2021-05-30 RX ADMIN — GABAPENTIN 600 MG: 300 CAPSULE ORAL at 20:14

## 2021-05-30 RX ADMIN — MORPHINE SULFATE 2 MG: 2 INJECTION, SOLUTION INTRAMUSCULAR; INTRAVENOUS at 20:13

## 2021-05-30 RX ADMIN — CEFUROXIME SODIUM 1.5 G: 1.5 INJECTION, POWDER, FOR SOLUTION INTRAVENOUS at 04:05

## 2021-05-31 ENCOUNTER — APPOINTMENT (OUTPATIENT)
Dept: GENERAL RADIOLOGY | Facility: HOSPITAL | Age: 76
End: 2021-05-31

## 2021-05-31 PROBLEM — Z98.890 POSTOPERATIVE HYPOXEMIA: Status: ACTIVE | Noted: 2021-05-31

## 2021-05-31 PROBLEM — R09.02 POSTOPERATIVE HYPOXEMIA: Status: ACTIVE | Noted: 2021-05-31

## 2021-05-31 PROBLEM — Z95.1 S/P CABG X 3: Status: ACTIVE | Noted: 2021-05-31

## 2021-05-31 LAB
ALBUMIN SERPL-MCNC: 3.3 G/DL (ref 3.5–5.2)
ALBUMIN SERPL-MCNC: 3.3 G/DL (ref 3.5–5.2)
ALP SERPL-CCNC: 91 U/L (ref 39–117)
ALT SERPL W P-5'-P-CCNC: 22 U/L (ref 1–41)
ANION GAP SERPL CALCULATED.3IONS-SCNC: 13 MMOL/L (ref 5–15)
ANION GAP SERPL CALCULATED.3IONS-SCNC: 14 MMOL/L (ref 5–15)
AST SERPL-CCNC: 22 U/L (ref 1–40)
BH BB BLOOD EXPIRATION DATE: NORMAL
BH BB BLOOD TYPE BARCODE: 5100
BH BB BLOOD TYPE BARCODE: 8400
BH BB DISPENSE STATUS: NORMAL
BH BB PRODUCT CODE: NORMAL
BH BB UNIT NUMBER: NORMAL
BILIRUB SERPL-MCNC: 0.6 MG/DL (ref 0–1.2)
BUN SERPL-MCNC: 71 MG/DL (ref 8–23)
BUN SERPL-MCNC: 76 MG/DL (ref 8–23)
BUN/CREAT SERPL: 25.4 (ref 7–25)
C3 SERPL-MCNC: 123 MG/DL (ref 82–167)
C4 SERPL-MCNC: 28 MG/DL (ref 14–44)
CALCIUM SPEC-SCNC: 9 MG/DL (ref 8.6–10.5)
CALCIUM SPEC-SCNC: 9.1 MG/DL (ref 8.6–10.5)
CHLORIDE SERPL-SCNC: 105 MMOL/L (ref 98–107)
CHLORIDE SERPL-SCNC: 105 MMOL/L (ref 98–107)
CHOLEST SERPL-MCNC: 99 MG/DL (ref 0–200)
CO2 SERPL-SCNC: 19 MMOL/L (ref 22–29)
CO2 SERPL-SCNC: 21 MMOL/L (ref 22–29)
CREAT SERPL-MCNC: 2.79 MG/DL (ref 0.76–1.27)
CREAT SERPL-MCNC: 3.09 MG/DL (ref 0.76–1.27)
CROSSMATCH INTERPRETATION: NORMAL
CRP SERPL-MCNC: 26.04 MG/DL (ref 0–0.5)
DEPRECATED RDW RBC AUTO: 52.6 FL (ref 37–54)
ERYTHROCYTE [DISTWIDTH] IN BLOOD BY AUTOMATED COUNT: 15.6 % (ref 12.3–15.4)
GFR SERPL CREATININE-BSD FRML MDRD: 22 ML/MIN/1.73
GLUCOSE BLDC GLUCOMTR-MCNC: 147 MG/DL (ref 70–130)
GLUCOSE BLDC GLUCOMTR-MCNC: 156 MG/DL (ref 70–130)
GLUCOSE BLDC GLUCOMTR-MCNC: 174 MG/DL (ref 70–130)
GLUCOSE SERPL-MCNC: 143 MG/DL (ref 65–99)
GLUCOSE SERPL-MCNC: 153 MG/DL (ref 65–99)
HCT VFR BLD AUTO: 24.8 % (ref 37.5–51)
HGB BLD-MCNC: 8.1 G/DL (ref 13–17.7)
IRON 24H UR-MRATE: 25 MCG/DL (ref 59–158)
IRON SATN MFR SERPL: 10 % (ref 20–50)
MAGNESIUM SERPL-MCNC: 2.4 MG/DL (ref 1.6–2.4)
MCH RBC QN AUTO: 30.5 PG (ref 26.6–33)
MCHC RBC AUTO-ENTMCNC: 32.7 G/DL (ref 31.5–35.7)
MCV RBC AUTO: 93.2 FL (ref 79–97)
PHOSPHATE SERPL-MCNC: 5.6 MG/DL (ref 2.5–4.5)
PHOSPHATE SERPL-MCNC: 5.9 MG/DL (ref 2.5–4.5)
PLATELET # BLD AUTO: 174 10*3/MM3 (ref 140–450)
PMV BLD AUTO: 10.8 FL (ref 6–12)
POTASSIUM SERPL-SCNC: 3.8 MMOL/L (ref 3.5–5.2)
POTASSIUM SERPL-SCNC: 4.3 MMOL/L (ref 3.5–5.2)
PREALB SERPL-MCNC: 12.9 MG/DL (ref 20–40)
PROT SERPL-MCNC: 6.2 G/DL (ref 6–8.5)
QT INTERVAL: 410 MS
QTC INTERVAL: 476 MS
RBC # BLD AUTO: 2.66 10*6/MM3 (ref 4.14–5.8)
SODIUM SERPL-SCNC: 138 MMOL/L (ref 136–145)
SODIUM SERPL-SCNC: 139 MMOL/L (ref 136–145)
TIBC SERPL-MCNC: 247 MCG/DL (ref 298–536)
TRANSFERRIN SERPL-MCNC: 166 MG/DL (ref 200–360)
TRIGL SERPL-MCNC: 101 MG/DL (ref 0–150)
UNIT  ABO: NORMAL
UNIT  RH: NORMAL
WBC # BLD AUTO: 12.45 10*3/MM3 (ref 3.4–10.8)

## 2021-05-31 PROCEDURE — 63710000001 INSULIN REGULAR HUMAN PER 5 UNITS: Performed by: NURSE PRACTITIONER

## 2021-05-31 PROCEDURE — 80053 COMPREHEN METABOLIC PANEL: CPT | Performed by: INTERNAL MEDICINE

## 2021-05-31 PROCEDURE — 25010000002 NA FERRIC GLUC CPLX PER 12.5 MG: Performed by: INTERNAL MEDICINE

## 2021-05-31 PROCEDURE — 92610 EVALUATE SWALLOWING FUNCTION: CPT

## 2021-05-31 PROCEDURE — 80069 RENAL FUNCTION PANEL: CPT | Performed by: INTERNAL MEDICINE

## 2021-05-31 PROCEDURE — 25010000002 MORPHINE PER 10 MG: Performed by: STUDENT IN AN ORGANIZED HEALTH CARE EDUCATION/TRAINING PROGRAM

## 2021-05-31 PROCEDURE — 94799 UNLISTED PULMONARY SVC/PX: CPT

## 2021-05-31 PROCEDURE — 86160 COMPLEMENT ANTIGEN: CPT | Performed by: INTERNAL MEDICINE

## 2021-05-31 PROCEDURE — 82962 GLUCOSE BLOOD TEST: CPT

## 2021-05-31 PROCEDURE — 99232 SBSQ HOSP IP/OBS MODERATE 35: CPT | Performed by: INTERNAL MEDICINE

## 2021-05-31 PROCEDURE — 71045 X-RAY EXAM CHEST 1 VIEW: CPT

## 2021-05-31 PROCEDURE — 97530 THERAPEUTIC ACTIVITIES: CPT

## 2021-05-31 PROCEDURE — 25010000002 ALBUMIN HUMAN 25% PER 50 ML: Performed by: INTERNAL MEDICINE

## 2021-05-31 PROCEDURE — P9047 ALBUMIN (HUMAN), 25%, 50ML: HCPCS | Performed by: INTERNAL MEDICINE

## 2021-05-31 PROCEDURE — 84466 ASSAY OF TRANSFERRIN: CPT | Performed by: INTERNAL MEDICINE

## 2021-05-31 PROCEDURE — 86140 C-REACTIVE PROTEIN: CPT | Performed by: INTERNAL MEDICINE

## 2021-05-31 PROCEDURE — 99024 POSTOP FOLLOW-UP VISIT: CPT | Performed by: PHYSICIAN ASSISTANT

## 2021-05-31 PROCEDURE — 82465 ASSAY BLD/SERUM CHOLESTEROL: CPT | Performed by: INTERNAL MEDICINE

## 2021-05-31 PROCEDURE — 83540 ASSAY OF IRON: CPT | Performed by: INTERNAL MEDICINE

## 2021-05-31 PROCEDURE — 84134 ASSAY OF PREALBUMIN: CPT | Performed by: INTERNAL MEDICINE

## 2021-05-31 PROCEDURE — 83735 ASSAY OF MAGNESIUM: CPT | Performed by: INTERNAL MEDICINE

## 2021-05-31 PROCEDURE — 84478 ASSAY OF TRIGLYCERIDES: CPT | Performed by: INTERNAL MEDICINE

## 2021-05-31 PROCEDURE — 63710000001 INSULIN DETEMIR PER 5 UNITS: Performed by: INTERNAL MEDICINE

## 2021-05-31 PROCEDURE — 84100 ASSAY OF PHOSPHORUS: CPT | Performed by: INTERNAL MEDICINE

## 2021-05-31 PROCEDURE — 85027 COMPLETE CBC AUTOMATED: CPT | Performed by: PHYSICIAN ASSISTANT

## 2021-05-31 PROCEDURE — 63710000001 INSULIN LISPRO (HUMAN) PER 5 UNITS: Performed by: INTERNAL MEDICINE

## 2021-05-31 RX ORDER — ALBUMIN (HUMAN) 12.5 G/50ML
25 SOLUTION INTRAVENOUS ONCE
Status: COMPLETED | OUTPATIENT
Start: 2021-05-31 | End: 2021-05-31

## 2021-05-31 RX ORDER — ISOSORBIDE MONONITRATE 30 MG/1
30 TABLET, EXTENDED RELEASE ORAL
Status: DISCONTINUED | OUTPATIENT
Start: 2021-05-31 | End: 2021-06-01

## 2021-05-31 RX ORDER — AMLODIPINE BESYLATE 10 MG/1
10 TABLET ORAL
Status: DISCONTINUED | OUTPATIENT
Start: 2021-05-31 | End: 2021-06-01

## 2021-05-31 RX ORDER — NICOTINE POLACRILEX 4 MG
15 LOZENGE BUCCAL
Status: DISCONTINUED | OUTPATIENT
Start: 2021-05-31 | End: 2021-06-03

## 2021-05-31 RX ORDER — DEXTROSE MONOHYDRATE 25 G/50ML
25 INJECTION, SOLUTION INTRAVENOUS
Status: DISCONTINUED | OUTPATIENT
Start: 2021-05-31 | End: 2021-06-18 | Stop reason: HOSPADM

## 2021-05-31 RX ORDER — BUMETANIDE 0.25 MG/ML
2 INJECTION INTRAMUSCULAR; INTRAVENOUS EVERY 12 HOURS
Status: DISPENSED | OUTPATIENT
Start: 2021-05-31 | End: 2021-06-01

## 2021-05-31 RX ADMIN — MORPHINE SULFATE 2 MG: 2 INJECTION, SOLUTION INTRAMUSCULAR; INTRAVENOUS at 05:35

## 2021-05-31 RX ADMIN — AMLODIPINE BESYLATE 10 MG: 10 TABLET ORAL at 17:49

## 2021-05-31 RX ADMIN — BUMETANIDE 2 MG: 0.25 INJECTION, SOLUTION INTRAMUSCULAR; INTRAVENOUS at 12:21

## 2021-05-31 RX ADMIN — HYDROCODONE BITARTRATE AND ACETAMINOPHEN 1 TABLET: 7.5; 325 TABLET ORAL at 21:03

## 2021-05-31 RX ADMIN — GABAPENTIN 600 MG: 300 CAPSULE ORAL at 21:04

## 2021-05-31 RX ADMIN — SODIUM CHLORIDE, PRESERVATIVE FREE 10 ML: 5 INJECTION INTRAVENOUS at 21:00

## 2021-05-31 RX ADMIN — METOPROLOL TARTRATE 25 MG: 25 TABLET, FILM COATED ORAL at 08:41

## 2021-05-31 RX ADMIN — SENNOSIDES 10 ML: 8.8 LIQUID ORAL at 08:42

## 2021-05-31 RX ADMIN — INSULIN LISPRO 2 UNITS: 100 INJECTION, SOLUTION INTRAVENOUS; SUBCUTANEOUS at 08:41

## 2021-05-31 RX ADMIN — ATORVASTATIN CALCIUM 40 MG: 40 TABLET, FILM COATED ORAL at 21:03

## 2021-05-31 RX ADMIN — NITROGLYCERIN 85 MCG/MIN: 20 INJECTION INTRAVENOUS at 10:03

## 2021-05-31 RX ADMIN — OXYCODONE HYDROCHLORIDE AND ACETAMINOPHEN 2 TABLET: 5; 325 TABLET ORAL at 02:00

## 2021-05-31 RX ADMIN — INSULIN HUMAN 2 UNITS: 100 INJECTION, SOLUTION PARENTERAL at 18:21

## 2021-05-31 RX ADMIN — METOPROLOL TARTRATE 25 MG: 25 TABLET, FILM COATED ORAL at 21:04

## 2021-05-31 RX ADMIN — INSULIN DETEMIR 10 UNITS: 100 INJECTION, SOLUTION SUBCUTANEOUS at 21:04

## 2021-05-31 RX ADMIN — SODIUM CHLORIDE 125 MG: 9 INJECTION, SOLUTION INTRAVENOUS at 13:52

## 2021-05-31 RX ADMIN — BUMETANIDE 2 MG: 0.25 INJECTION, SOLUTION INTRAMUSCULAR; INTRAVENOUS at 22:35

## 2021-05-31 RX ADMIN — PANTOPRAZOLE SODIUM 40 MG: 40 INJECTION, POWDER, FOR SOLUTION INTRAVENOUS at 05:35

## 2021-05-31 RX ADMIN — DOCUSATE SODIUM 100 MG: 50 LIQUID ORAL at 21:04

## 2021-05-31 RX ADMIN — SODIUM CHLORIDE, PRESERVATIVE FREE 10 ML: 5 INJECTION INTRAVENOUS at 02:00

## 2021-05-31 RX ADMIN — DOCUSATE SODIUM 100 MG: 50 LIQUID ORAL at 08:42

## 2021-05-31 RX ADMIN — ISOSORBIDE MONONITRATE 30 MG: 30 TABLET, EXTENDED RELEASE ORAL at 12:22

## 2021-05-31 RX ADMIN — NITROGLYCERIN 85 MCG/MIN: 20 INJECTION INTRAVENOUS at 22:34

## 2021-05-31 RX ADMIN — SENNOSIDES 10 ML: 8.8 LIQUID ORAL at 21:04

## 2021-05-31 RX ADMIN — ASPIRIN 325 MG ORAL TABLET 325 MG: 325 PILL ORAL at 08:41

## 2021-05-31 RX ADMIN — ALBUMIN HUMAN 25 G: 0.25 SOLUTION INTRAVENOUS at 12:21

## 2021-06-01 ENCOUNTER — APPOINTMENT (OUTPATIENT)
Dept: GENERAL RADIOLOGY | Facility: HOSPITAL | Age: 76
End: 2021-06-01

## 2021-06-01 ENCOUNTER — APPOINTMENT (OUTPATIENT)
Dept: CARDIOLOGY | Facility: HOSPITAL | Age: 76
End: 2021-06-01

## 2021-06-01 ENCOUNTER — ANCILLARY PROCEDURE (OUTPATIENT)
Dept: SPEECH THERAPY | Facility: HOSPITAL | Age: 76
End: 2021-06-01

## 2021-06-01 LAB
ACT BLD: 125 SECONDS (ref 82–152)
ACT BLD: 147 SECONDS (ref 82–152)
ACT BLD: 428 SECONDS (ref 82–152)
ACT BLD: 450 SECONDS (ref 82–152)
ACT BLD: 477 SECONDS (ref 82–152)
ACT BLD: 566 SECONDS (ref 82–152)
ANION GAP SERPL CALCULATED.3IONS-SCNC: 14 MMOL/L (ref 5–15)
ARTERIAL PATENCY WRIST A: ABNORMAL
ATMOSPHERIC PRESS: ABNORMAL MM[HG]
BASE EXCESS BLDA CALC-SCNC: -1 MMOL/L (ref -5–5)
BASE EXCESS BLDA CALC-SCNC: -2 MMOL/L (ref -5–5)
BASE EXCESS BLDA CALC-SCNC: -3.5 MMOL/L (ref 0–2)
BASE EXCESS BLDA CALC-SCNC: -5 MMOL/L (ref -5–5)
BASE EXCESS BLDA CALC-SCNC: -5 MMOL/L (ref -5–5)
BASE EXCESS BLDA CALC-SCNC: -6 MMOL/L (ref -5–5)
BASE EXCESS BLDA CALC-SCNC: -7 MMOL/L (ref -5–5)
BASE EXCESS BLDA CALC-SCNC: 2 MMOL/L (ref -5–5)
BASE EXCESS BLDA CALC-SCNC: 2 MMOL/L (ref -5–5)
BASE EXCESS BLDA CALC-SCNC: 4 MMOL/L (ref -5–5)
BDY SITE: ABNORMAL
BH CV ECHO MEAS - BSA(HAYCOCK): 2 M^2
BH CV ECHO MEAS - BSA: 2 M^2
BH CV ECHO MEAS - BZI_BMI: 29.3 KILOGRAMS/M^2
BH CV ECHO MEAS - BZI_METRIC_HEIGHT: 170.2 CM
BH CV ECHO MEAS - BZI_METRIC_WEIGHT: 84.8 KG
BH CV LOWER VASCULAR LEFT COMMON FEMORAL AUGMENT: NORMAL
BH CV LOWER VASCULAR LEFT COMMON FEMORAL COMPRESS: NORMAL
BH CV LOWER VASCULAR LEFT COMMON FEMORAL PHASIC: NORMAL
BH CV LOWER VASCULAR LEFT COMMON FEMORAL SPONT: NORMAL
BH CV LOWER VASCULAR LEFT DISTAL FEMORAL AUGMENT: NORMAL
BH CV LOWER VASCULAR LEFT DISTAL FEMORAL COMPRESS: NORMAL
BH CV LOWER VASCULAR LEFT DISTAL FEMORAL PHASIC: NORMAL
BH CV LOWER VASCULAR LEFT DISTAL FEMORAL SPONT: NORMAL
BH CV LOWER VASCULAR LEFT GASTRONEMIUS COMPRESS: NORMAL
BH CV LOWER VASCULAR LEFT GREATER SAPH AK COMPRESS: NORMAL
BH CV LOWER VASCULAR LEFT GREATER SAPH BK COMPRESS: NORMAL
BH CV LOWER VASCULAR LEFT LESSER SAPH COMPRESS: NORMAL
BH CV LOWER VASCULAR LEFT MID FEMORAL AUGMENT: NORMAL
BH CV LOWER VASCULAR LEFT MID FEMORAL COMPRESS: NORMAL
BH CV LOWER VASCULAR LEFT MID FEMORAL PHASIC: NORMAL
BH CV LOWER VASCULAR LEFT MID FEMORAL SPONT: NORMAL
BH CV LOWER VASCULAR LEFT PERONEAL COMPRESS: NORMAL
BH CV LOWER VASCULAR LEFT POPLITEAL AUGMENT: NORMAL
BH CV LOWER VASCULAR LEFT POPLITEAL COMPRESS: NORMAL
BH CV LOWER VASCULAR LEFT POPLITEAL PHASIC: NORMAL
BH CV LOWER VASCULAR LEFT POPLITEAL SPONT: NORMAL
BH CV LOWER VASCULAR LEFT POSTERIOR TIBIAL COMPRESS: NORMAL
BH CV LOWER VASCULAR LEFT PROFUNDA FEMORAL AUGMENT: NORMAL
BH CV LOWER VASCULAR LEFT PROFUNDA FEMORAL COMPRESS: NORMAL
BH CV LOWER VASCULAR LEFT PROFUNDA FEMORAL PHASIC: NORMAL
BH CV LOWER VASCULAR LEFT PROFUNDA FEMORAL SPONT: NORMAL
BH CV LOWER VASCULAR LEFT PROXIMAL FEMORAL AUGMENT: NORMAL
BH CV LOWER VASCULAR LEFT PROXIMAL FEMORAL COMPRESS: NORMAL
BH CV LOWER VASCULAR LEFT PROXIMAL FEMORAL PHASIC: NORMAL
BH CV LOWER VASCULAR LEFT PROXIMAL FEMORAL SPONT: NORMAL
BH CV LOWER VASCULAR LEFT SAPHENOFEMORAL JUNCTION AUGMENT: NORMAL
BH CV LOWER VASCULAR LEFT SAPHENOFEMORAL JUNCTION COMPRESS: NORMAL
BH CV LOWER VASCULAR LEFT SAPHENOFEMORAL JUNCTION PHASIC: NORMAL
BH CV LOWER VASCULAR LEFT SAPHENOFEMORAL JUNCTION SPONT: NORMAL
BH CV LOWER VASCULAR LEFT SOLEAL COMPRESS: NORMAL
BH CV LOWER VASCULAR RIGHT COMMON FEMORAL AUGMENT: NORMAL
BH CV LOWER VASCULAR RIGHT COMMON FEMORAL COMPRESS: NORMAL
BH CV LOWER VASCULAR RIGHT COMMON FEMORAL PHASIC: NORMAL
BH CV LOWER VASCULAR RIGHT COMMON FEMORAL SPONT: NORMAL
BH CV LOWER VASCULAR RIGHT DISTAL FEMORAL AUGMENT: NORMAL
BH CV LOWER VASCULAR RIGHT DISTAL FEMORAL COMPRESS: NORMAL
BH CV LOWER VASCULAR RIGHT DISTAL FEMORAL PHASIC: NORMAL
BH CV LOWER VASCULAR RIGHT DISTAL FEMORAL SPONT: NORMAL
BH CV LOWER VASCULAR RIGHT GASTRONEMIUS COMPRESS: NORMAL
BH CV LOWER VASCULAR RIGHT GREATER SAPH AK COMPRESS: NORMAL
BH CV LOWER VASCULAR RIGHT GREATER SAPH BK COMPRESS: NORMAL
BH CV LOWER VASCULAR RIGHT LESSER SAPH COMPRESS: NORMAL
BH CV LOWER VASCULAR RIGHT MID FEMORAL AUGMENT: NORMAL
BH CV LOWER VASCULAR RIGHT MID FEMORAL COMPRESS: NORMAL
BH CV LOWER VASCULAR RIGHT MID FEMORAL PHASIC: NORMAL
BH CV LOWER VASCULAR RIGHT MID FEMORAL SPONT: NORMAL
BH CV LOWER VASCULAR RIGHT PERONEAL COMPRESS: NORMAL
BH CV LOWER VASCULAR RIGHT POPLITEAL AUGMENT: NORMAL
BH CV LOWER VASCULAR RIGHT POPLITEAL COMPRESS: NORMAL
BH CV LOWER VASCULAR RIGHT POPLITEAL PHASIC: NORMAL
BH CV LOWER VASCULAR RIGHT POPLITEAL SPONT: NORMAL
BH CV LOWER VASCULAR RIGHT POSTERIOR TIBIAL COMPRESS: NORMAL
BH CV LOWER VASCULAR RIGHT PROFUNDA FEMORAL AUGMENT: NORMAL
BH CV LOWER VASCULAR RIGHT PROFUNDA FEMORAL COMPRESS: NORMAL
BH CV LOWER VASCULAR RIGHT PROFUNDA FEMORAL PHASIC: NORMAL
BH CV LOWER VASCULAR RIGHT PROFUNDA FEMORAL SPONT: NORMAL
BH CV LOWER VASCULAR RIGHT PROXIMAL FEMORAL AUGMENT: NORMAL
BH CV LOWER VASCULAR RIGHT PROXIMAL FEMORAL COMPRESS: NORMAL
BH CV LOWER VASCULAR RIGHT PROXIMAL FEMORAL PHASIC: NORMAL
BH CV LOWER VASCULAR RIGHT PROXIMAL FEMORAL SPONT: NORMAL
BH CV LOWER VASCULAR RIGHT SAPHENOFEMORAL JUNCTION AUGMENT: NORMAL
BH CV LOWER VASCULAR RIGHT SAPHENOFEMORAL JUNCTION COMPRESS: NORMAL
BH CV LOWER VASCULAR RIGHT SAPHENOFEMORAL JUNCTION PHASIC: NORMAL
BH CV LOWER VASCULAR RIGHT SAPHENOFEMORAL JUNCTION SPONT: NORMAL
BH CV LOWER VASCULAR RIGHT SOLEAL COMPRESS: NORMAL
BODY TEMPERATURE: 37 C
BUN SERPL-MCNC: 92 MG/DL (ref 8–23)
BUN/CREAT SERPL: 28.8 (ref 7–25)
CA-I BLDA-SCNC: 1.03 MMOL/L (ref 1.2–1.32)
CA-I BLDA-SCNC: 1.05 MMOL/L (ref 1.2–1.32)
CA-I BLDA-SCNC: 1.06 MMOL/L (ref 1.2–1.32)
CA-I BLDA-SCNC: 1.1 MMOL/L (ref 1.2–1.32)
CA-I BLDA-SCNC: 1.23 MMOL/L (ref 1.2–1.32)
CA-I BLDA-SCNC: 1.26 MMOL/L (ref 1.2–1.32)
CA-I BLDA-SCNC: 1.5 MMOL/L (ref 1.2–1.32)
CALCIUM SPEC-SCNC: 8.7 MG/DL (ref 8.6–10.5)
CHLORIDE SERPL-SCNC: 104 MMOL/L (ref 98–107)
CO2 BLDA-SCNC: 19 MMOL/L (ref 24–29)
CO2 BLDA-SCNC: 20.8 MMOL/L (ref 22–33)
CO2 BLDA-SCNC: 21 MMOL/L (ref 24–29)
CO2 BLDA-SCNC: 21 MMOL/L (ref 24–29)
CO2 BLDA-SCNC: 23 MMOL/L (ref 24–29)
CO2 BLDA-SCNC: 25 MMOL/L (ref 24–29)
CO2 BLDA-SCNC: 25 MMOL/L (ref 24–29)
CO2 BLDA-SCNC: 27 MMOL/L (ref 24–29)
CO2 BLDA-SCNC: 28 MMOL/L (ref 24–29)
CO2 BLDA-SCNC: 30 MMOL/L (ref 24–29)
CO2 SERPL-SCNC: 19 MMOL/L (ref 22–29)
COHGB MFR BLD: 0.9 % (ref 0–2)
CREAT SERPL-MCNC: 3.2 MG/DL (ref 0.76–1.27)
DEPRECATED RDW RBC AUTO: 51 FL (ref 37–54)
ERYTHROCYTE [DISTWIDTH] IN BLOOD BY AUTOMATED COUNT: 15.5 % (ref 12.3–15.4)
GFR SERPL CREATININE-BSD FRML MDRD: 19 ML/MIN/1.73
GLUCOSE BLDC GLUCOMTR-MCNC: 124 MG/DL (ref 70–130)
GLUCOSE BLDC GLUCOMTR-MCNC: 126 MG/DL (ref 70–130)
GLUCOSE BLDC GLUCOMTR-MCNC: 135 MG/DL (ref 70–130)
GLUCOSE BLDC GLUCOMTR-MCNC: 136 MG/DL (ref 70–130)
GLUCOSE BLDC GLUCOMTR-MCNC: 138 MG/DL (ref 70–130)
GLUCOSE BLDC GLUCOMTR-MCNC: 139 MG/DL (ref 70–130)
GLUCOSE BLDC GLUCOMTR-MCNC: 139 MG/DL (ref 70–130)
GLUCOSE BLDC GLUCOMTR-MCNC: 143 MG/DL (ref 70–130)
GLUCOSE BLDC GLUCOMTR-MCNC: 155 MG/DL (ref 70–130)
GLUCOSE BLDC GLUCOMTR-MCNC: 166 MG/DL (ref 70–130)
GLUCOSE BLDC GLUCOMTR-MCNC: 203 MG/DL (ref 70–130)
GLUCOSE BLDC GLUCOMTR-MCNC: 230 MG/DL (ref 70–130)
GLUCOSE BLDC GLUCOMTR-MCNC: 259 MG/DL (ref 70–130)
GLUCOSE BLDC GLUCOMTR-MCNC: 261 MG/DL (ref 70–130)
GLUCOSE BLDC GLUCOMTR-MCNC: 265 MG/DL (ref 70–130)
GLUCOSE SERPL-MCNC: 166 MG/DL (ref 65–99)
HCO3 BLDA-SCNC: 17.9 MMOL/L (ref 22–26)
HCO3 BLDA-SCNC: 19.5 MMOL/L (ref 22–26)
HCO3 BLDA-SCNC: 19.7 MMOL/L (ref 22–26)
HCO3 BLDA-SCNC: 19.9 MMOL/L (ref 20–26)
HCO3 BLDA-SCNC: 21.7 MMOL/L (ref 22–26)
HCO3 BLDA-SCNC: 23.5 MMOL/L (ref 22–26)
HCO3 BLDA-SCNC: 24.1 MMOL/L (ref 22–26)
HCO3 BLDA-SCNC: 26 MMOL/L (ref 22–26)
HCO3 BLDA-SCNC: 26.4 MMOL/L (ref 22–26)
HCO3 BLDA-SCNC: 28.7 MMOL/L (ref 22–26)
HCT VFR BLD AUTO: 23 % (ref 37.5–51)
HCT VFR BLD AUTO: 24.7 % (ref 37.5–51)
HCT VFR BLD CALC: 25.5 %
HCT VFR BLDA CALC: 21 % (ref 38–51)
HCT VFR BLDA CALC: 22 % (ref 38–51)
HCT VFR BLDA CALC: 23 % (ref 38–51)
HCT VFR BLDA CALC: 24 % (ref 38–51)
HCT VFR BLDA CALC: 24 % (ref 38–51)
HCT VFR BLDA CALC: 26 % (ref 38–51)
HCT VFR BLDA CALC: 26 % (ref 38–51)
HCT VFR BLDA CALC: 28 % (ref 38–51)
HCT VFR BLDA CALC: 28 % (ref 38–51)
HGB BLD-MCNC: 7.8 G/DL (ref 13–17.7)
HGB BLD-MCNC: 8.1 G/DL (ref 13–17.7)
HGB BLDA-MCNC: 7.1 G/DL (ref 12–17)
HGB BLDA-MCNC: 7.5 G/DL (ref 12–17)
HGB BLDA-MCNC: 7.8 G/DL (ref 12–17)
HGB BLDA-MCNC: 8.2 G/DL (ref 12–17)
HGB BLDA-MCNC: 8.2 G/DL (ref 12–17)
HGB BLDA-MCNC: 8.3 G/DL (ref 13.5–17.5)
HGB BLDA-MCNC: 8.8 G/DL (ref 12–17)
HGB BLDA-MCNC: 8.8 G/DL (ref 12–17)
HGB BLDA-MCNC: 9.5 G/DL (ref 12–17)
HGB BLDA-MCNC: 9.5 G/DL (ref 12–17)
INHALED O2 CONCENTRATION: 44 %
MCH RBC QN AUTO: 30.8 PG (ref 26.6–33)
MCHC RBC AUTO-ENTMCNC: 33.9 G/DL (ref 31.5–35.7)
MCV RBC AUTO: 90.9 FL (ref 79–97)
METHGB BLD QL: 0.9 % (ref 0–1.5)
MODALITY: ABNORMAL
NOTE: ABNORMAL
OXYHGB MFR BLDV: 93.9 % (ref 94–99)
PCO2 BLDA: 27.2 MM HG (ref 35–45)
PCO2 BLDA: 29 MM HG (ref 35–45)
PCO2 BLDA: 33.1 MM HG (ref 35–45)
PCO2 BLDA: 39.5 MM HG (ref 35–45)
PCO2 BLDA: 39.7 MM HG (ref 35–45)
PCO2 BLDA: 39.9 MM HG (ref 35–45)
PCO2 BLDA: 42.1 MM HG (ref 35–45)
PCO2 BLDA: 43.5 MM HG (ref 35–45)
PCO2 BLDA: 44.1 MM HG (ref 35–45)
PCO2 BLDA: 45.6 MM HG (ref 35–45)
PCO2 TEMP ADJ BLD: 29 MM HG (ref 35–48)
PH BLDA: 7.3 PH UNITS (ref 7.35–7.6)
PH BLDA: 7.3 PH UNITS (ref 7.35–7.6)
PH BLDA: 7.34 PH UNITS (ref 7.35–7.6)
PH BLDA: 7.38 PH UNITS (ref 7.35–7.6)
PH BLDA: 7.39 PH UNITS (ref 7.35–7.6)
PH BLDA: 7.4 PH UNITS (ref 7.35–7.6)
PH BLDA: 7.41 PH UNITS (ref 7.35–7.6)
PH BLDA: 7.42 PH UNITS (ref 7.35–7.6)
PH BLDA: 7.43 PH UNITS (ref 7.35–7.6)
PH BLDA: 7.45 PH UNITS (ref 7.35–7.45)
PH, TEMP CORRECTED: 7.45 PH UNITS
PLATELET # BLD AUTO: 185 10*3/MM3 (ref 140–450)
PMV BLD AUTO: 10.9 FL (ref 6–12)
PO2 BLDA: 224 MMHG (ref 80–105)
PO2 BLDA: 368 MMHG (ref 80–105)
PO2 BLDA: 378 MMHG (ref 80–105)
PO2 BLDA: 382 MMHG (ref 80–105)
PO2 BLDA: 39 MMHG (ref 80–105)
PO2 BLDA: 43 MMHG (ref 80–105)
PO2 BLDA: 436 MMHG (ref 80–105)
PO2 BLDA: 51 MMHG (ref 80–105)
PO2 BLDA: 74.2 MM HG (ref 83–108)
PO2 BLDA: 76 MMHG (ref 80–105)
PO2 TEMP ADJ BLD: 74.2 MM HG (ref 83–108)
POTASSIUM BLDA-SCNC: 3.8 MMOL/L (ref 3.5–4.9)
POTASSIUM BLDA-SCNC: 4 MMOL/L (ref 3.5–4.9)
POTASSIUM BLDA-SCNC: 4 MMOL/L (ref 3.5–4.9)
POTASSIUM BLDA-SCNC: 4.5 MMOL/L (ref 3.5–4.9)
POTASSIUM BLDA-SCNC: 4.6 MMOL/L (ref 3.5–4.9)
POTASSIUM BLDA-SCNC: 4.6 MMOL/L (ref 3.5–4.9)
POTASSIUM BLDA-SCNC: 4.7 MMOL/L (ref 3.5–4.9)
POTASSIUM BLDA-SCNC: 4.7 MMOL/L (ref 3.5–4.9)
POTASSIUM BLDA-SCNC: 4.8 MMOL/L (ref 3.5–4.9)
POTASSIUM SERPL-SCNC: 3.9 MMOL/L (ref 3.5–5.2)
QT INTERVAL: 400 MS
QT INTERVAL: 444 MS
QTC INTERVAL: 494 MS
QTC INTERVAL: 640 MS
RBC # BLD AUTO: 2.53 10*6/MM3 (ref 4.14–5.8)
SAO2 % BLDA: 100 % (ref 95–98)
SAO2 % BLDA: 73 % (ref 95–98)
SAO2 % BLDA: 73 % (ref 95–98)
SAO2 % BLDA: 87 % (ref 95–98)
SAO2 % BLDA: 95 % (ref 95–98)
SODIUM BLD-SCNC: 135 MMOL/L (ref 138–146)
SODIUM BLD-SCNC: 135 MMOL/L (ref 138–146)
SODIUM BLD-SCNC: 136 MMOL/L (ref 138–146)
SODIUM BLD-SCNC: 137 MMOL/L (ref 138–146)
SODIUM BLD-SCNC: 137 MMOL/L (ref 138–146)
SODIUM BLD-SCNC: 139 MMOL/L (ref 138–146)
SODIUM BLD-SCNC: 139 MMOL/L (ref 138–146)
SODIUM SERPL-SCNC: 137 MMOL/L (ref 136–145)
VENTILATOR MODE: ABNORMAL
WBC # BLD AUTO: 13.34 10*3/MM3 (ref 3.4–10.8)

## 2021-06-01 PROCEDURE — 63710000001 INSULIN REGULAR HUMAN PER 5 UNITS: Performed by: NURSE PRACTITIONER

## 2021-06-01 PROCEDURE — 25010000002 NA FERRIC GLUC CPLX PER 12.5 MG: Performed by: INTERNAL MEDICINE

## 2021-06-01 PROCEDURE — 82375 ASSAY CARBOXYHB QUANT: CPT

## 2021-06-01 PROCEDURE — 93970 EXTREMITY STUDY: CPT

## 2021-06-01 PROCEDURE — 25010000002 HEPARIN (PORCINE) PER 1000 UNITS: Performed by: INTERNAL MEDICINE

## 2021-06-01 PROCEDURE — 92612 ENDOSCOPY SWALLOW (FEES) VID: CPT

## 2021-06-01 PROCEDURE — 93970 EXTREMITY STUDY: CPT | Performed by: INTERNAL MEDICINE

## 2021-06-01 PROCEDURE — 71045 X-RAY EXAM CHEST 1 VIEW: CPT

## 2021-06-01 PROCEDURE — 99232 SBSQ HOSP IP/OBS MODERATE 35: CPT | Performed by: INTERNAL MEDICINE

## 2021-06-01 PROCEDURE — 85027 COMPLETE CBC AUTOMATED: CPT | Performed by: STUDENT IN AN ORGANIZED HEALTH CARE EDUCATION/TRAINING PROGRAM

## 2021-06-01 PROCEDURE — 82805 BLOOD GASES W/O2 SATURATION: CPT

## 2021-06-01 PROCEDURE — 99291 CRITICAL CARE FIRST HOUR: CPT | Performed by: INTERNAL MEDICINE

## 2021-06-01 PROCEDURE — 83050 HGB METHEMOGLOBIN QUAN: CPT

## 2021-06-01 PROCEDURE — 80048 BASIC METABOLIC PNL TOTAL CA: CPT | Performed by: PHYSICIAN ASSISTANT

## 2021-06-01 PROCEDURE — 63710000001 INSULIN DETEMIR PER 5 UNITS: Performed by: INTERNAL MEDICINE

## 2021-06-01 PROCEDURE — 85014 HEMATOCRIT: CPT | Performed by: INTERNAL MEDICINE

## 2021-06-01 PROCEDURE — 25010000002 CEFTRIAXONE PER 250 MG: Performed by: INTERNAL MEDICINE

## 2021-06-01 PROCEDURE — 97530 THERAPEUTIC ACTIVITIES: CPT

## 2021-06-01 PROCEDURE — 85018 HEMOGLOBIN: CPT | Performed by: INTERNAL MEDICINE

## 2021-06-01 PROCEDURE — 92610 EVALUATE SWALLOWING FUNCTION: CPT

## 2021-06-01 PROCEDURE — 82962 GLUCOSE BLOOD TEST: CPT

## 2021-06-01 RX ORDER — HEPARIN SODIUM 5000 [USP'U]/ML
5000 INJECTION, SOLUTION INTRAVENOUS; SUBCUTANEOUS EVERY 8 HOURS SCHEDULED
Status: DISCONTINUED | OUTPATIENT
Start: 2021-06-01 | End: 2021-06-08

## 2021-06-01 RX ORDER — AMLODIPINE BESYLATE 5 MG/1
5 TABLET ORAL
Status: DISCONTINUED | OUTPATIENT
Start: 2021-06-02 | End: 2021-06-03

## 2021-06-01 RX ORDER — ISOSORBIDE DINITRATE 10 MG/1
10 TABLET ORAL
Status: DISCONTINUED | OUTPATIENT
Start: 2021-06-02 | End: 2021-06-03

## 2021-06-01 RX ORDER — CARVEDILOL 12.5 MG/1
12.5 TABLET ORAL 2 TIMES DAILY WITH MEALS
Status: DISCONTINUED | OUTPATIENT
Start: 2021-06-01 | End: 2021-06-03

## 2021-06-01 RX ADMIN — HEPARIN SODIUM 5000 UNITS: 5000 INJECTION INTRAVENOUS; SUBCUTANEOUS at 21:00

## 2021-06-01 RX ADMIN — INSULIN HUMAN 2 UNITS: 100 INJECTION, SOLUTION PARENTERAL at 00:33

## 2021-06-01 RX ADMIN — SENNOSIDES 10 ML: 8.8 LIQUID ORAL at 09:01

## 2021-06-01 RX ADMIN — ASPIRIN 325 MG ORAL TABLET 325 MG: 325 PILL ORAL at 09:01

## 2021-06-01 RX ADMIN — SODIUM CHLORIDE 1 G: 900 INJECTION INTRAVENOUS at 17:59

## 2021-06-01 RX ADMIN — INSULIN HUMAN 3 UNITS: 100 INJECTION, SOLUTION PARENTERAL at 11:38

## 2021-06-01 RX ADMIN — ATORVASTATIN CALCIUM 40 MG: 40 TABLET, FILM COATED ORAL at 20:54

## 2021-06-01 RX ADMIN — CARVEDILOL 12.5 MG: 12.5 TABLET, FILM COATED ORAL at 09:02

## 2021-06-01 RX ADMIN — CARVEDILOL 12.5 MG: 12.5 TABLET, FILM COATED ORAL at 17:59

## 2021-06-01 RX ADMIN — ISOSORBIDE MONONITRATE 30 MG: 30 TABLET, EXTENDED RELEASE ORAL at 09:00

## 2021-06-01 RX ADMIN — SENNOSIDES 10 ML: 8.8 LIQUID ORAL at 20:54

## 2021-06-01 RX ADMIN — INSULIN HUMAN 4 UNITS: 100 INJECTION, SOLUTION PARENTERAL at 18:00

## 2021-06-01 RX ADMIN — AMLODIPINE BESYLATE 10 MG: 10 TABLET ORAL at 09:01

## 2021-06-01 RX ADMIN — GABAPENTIN 600 MG: 300 CAPSULE ORAL at 20:54

## 2021-06-01 RX ADMIN — INSULIN DETEMIR 10 UNITS: 100 INJECTION, SOLUTION SUBCUTANEOUS at 20:54

## 2021-06-01 RX ADMIN — SODIUM CHLORIDE 125 MG: 9 INJECTION, SOLUTION INTRAVENOUS at 09:00

## 2021-06-01 RX ADMIN — PANTOPRAZOLE SODIUM 40 MG: 40 INJECTION, POWDER, FOR SOLUTION INTRAVENOUS at 05:31

## 2021-06-01 RX ADMIN — NITROGLYCERIN 85 MCG/MIN: 20 INJECTION INTRAVENOUS at 09:03

## 2021-06-01 RX ADMIN — INSULIN HUMAN 3 UNITS: 100 INJECTION, SOLUTION PARENTERAL at 05:31

## 2021-06-01 RX ADMIN — DOCUSATE SODIUM 100 MG: 50 LIQUID ORAL at 09:01

## 2021-06-01 NOTE — PLAN OF CARE
Goal Outcome Evaluation:  Plan of Care Reviewed With: patient        SLP re-evaluation completed. Will  plan for FEES today to further assess swallow function . Please see note for further details and recommendations.

## 2021-06-01 NOTE — MBS/VFSS/FEES
Acute Care - Speech Language Pathology   Swallow Initial Evaluation [facility]   Fiberoptic Endoscopic Evaluation of Swallowing (FEES)     Patient Name: Augusto Lorenzana Jr.  : 1945  MRN: [id number]  Today's Date: 2021               Admit Date: 2021    Visit Dx:     ICD-10-CM ICD-9-CM   1. Non-STEMI (non-ST elevated myocardial infarction) (CMS/Formerly [facility])  I21.4 410.70   2. Acute congestive heart failure, unspecified heart failure type (CMS/Formerly [facility])  I50.9 428.0   3. History of coronary artery disease  Z86.79 V12.59   4. Coronary artery disease involving native coronary artery of native heart, angina presence unspecified  I25.10 414.01   5. Dysphagia, unspecified type  R13.10 787.20     Patient Active Problem List   Diagnosis   • NSTEMI 2021   • Hyperlipidemia LDL goal <70   • Essential hypertension   • T2DM on oral agents and insulin. 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   • Postoperative hypoxemia     Past Medical History:   Diagnosis Date   • CAD (coronary artery disease)    • CKD (chronic kidney disease) stage 3, GFR 30-59 ml/min (CMS/[facility])    • Diabetes mellitus (CMS/[facility])    • Hyperlipidemia    • Hypertension    • NSTEMI (non-ST elevated myocardial infarction) (CMS/Former[facility])      Past Surgical History:   Procedure Laterality Date   • CARDIAC CATHETERIZATION N/A 2021    Procedure: Left Heart Cath;  Surgeon: Blade Greene IV, MD;  Location: [facility] CATH INVASIVE LOCATION;  Service: Cardiovascular;  Laterality: N/A;   • CARDIAC SURGERY      2 stents placed .   • CORONARY ARTERY BYPASS GRAFT N/A 2021    Procedure: MEDIAN STERNOTOMY CORONARY ARTERY BYPASS X 3 UTILIZING THE LEFT INTERNAL MAMMARY ARTERY GRAFT, EVH OF THE GREATER RIGHT SAPHENOUS VEIN, AND PILO PER ANESTHESIA;  Surgeon: Jacek Miller MD;  Location: [facility] OR;  Service: Cardiothoracic;   Laterality: N/A;        SWALLOW EVALUATION (last 72 hours)      SLP Adult Swallow Evaluation     Row Name 06/01/21 1310 06/01/21 0935 05/31/21 1053             Rehab Evaluation    Document Type  evaluation  -MP  evaluation  -MP  evaluation  -AC      Subjective Information  no complaints  -MP  no complaints  -MP  no complaints  -AC      Patient Observations  alert;cooperative  -MP  alert;cooperative  -MP  lethargic;cooperative  -AC      Patient/Family/Caregiver Comments/Observations  No family present  -MP  --  Pt had difficulty keeping eyes open/maintaining alertness. No family present.   -AC      Care Plan Review  evaluation/treatment results reviewed;patient/other agree to care plan  -MP  evaluation/treatment results reviewed;patient/other agree to care plan  -MP  --      Patient Effort  good  -MP  good  -MP  fair  -AC         General Information    Patient Profile Reviewed  yes  -MP  yes  -MP  yes  -AC      Pertinent History Of Current Problem  See AM eval.  -MP  Adm w/ NSTEMI s/p CABG x3 5/28, post-op cardiac arrest w/ 1 round compressions. Extubated 5/29 1340 (~30 hrs total).  -MP  Adm w/ NSTEMI s/p CABG x3 5/28, post-op cardiac arrest w/ 1 round compressions. Extubated 5/29 1340 (~30 hrs total).  -AC      Current Method of Nutrition  NPO;nasogastric feedings  -MP  NPO;nasogastric feedings  -MP  NPO NG placed, TF ordered, but not started  -AC      Precautions/Limitations, Vision  --  WFL;for purposes of eval  -MP  WFL;for purposes of eval  -AC      Precautions/Limitations, Hearing  --  WFL;for purposes of eval  -MP  WFL;for purposes of eval  -AC      Prior Level of Function-Communication  --  WFL  -MP  --      Prior Level of Function-Swallowing  --  no diet consistency restrictions  -MP  no diet consistency restrictions  -AC      Plans/Goals Discussed with  --  patient;agreed upon  -MP  patient;agreed upon  -AC      Barriers to Rehab  --  medically complex  -MP  none identified  -AC      Patient's Goals for  Discharge  --  patient did not state  -MP  patient did not state  -AC         Pain    Additional Documentation  Pain Scale: FACES Pre/Post-Treatment (Group)  -MP  Pain Scale: FACES Pre/Post-Treatment (Group)  -MP  Pain Scale: FACES Pre/Post-Treatment (Group)  -AC         Pain Scale: FACES Pre/Post-Treatment    Pain: FACES Scale, Pretreatment  0-->no hurt  -MP  0-->no hurt  -MP  0-->no hurt  -AC      Posttreatment Pain Rating  0-->no hurt  -MP  0-->no hurt  -MP  0-->no hurt  -AC         Oral Motor Structure and Function    Dentition Assessment  --  edentulous, dentures not available  -  edentulous, dentures not available  -AC      Secretion Management  --  WNL/WFL  -MP  WNL/WFL  -AC      Mucosal Quality  --  moist, healthy  -MP  dry  -         Oral Musculature and Cranial Nerve Assessment    Oral Motor General Assessment  --  generalized oral motor weakness  -  generalized oral motor weakness  -         General Eating/Swallowing Observations    Respiratory Support Currently in Use  --  nasal cannula  -  nasal cannula  -      Eating/Swallowing Skills  --  fed by SLP  -  fed by SLP  -      Positioning During Eating  --  upright in chair  -  upright 90 degree;upright in chair  -      Utensils Used  --  spoon;cup  -  spoon  -      Consistencies Trialed  --  thin liquids;nectar/syrup-thick liquids;pureed  -  ice chips;thin liquids;nectar/syrup-thick liquids;pureed  -AC         Clinical Swallow Eval    Oral Prep Phase  --  --  impaired  -      Oral Transit  --  --  WFL  -      Oral Residue  --  --  WFL  -AC      Pharyngeal Phase  --  suspected pharyngeal impairment  -  suspected pharyngeal impairment  -      Clinical Swallow Evaluation Summary  --  Immediate hard cough w/ thin H2O. No s/sxs w/ nectar-thick or puree. Rec continue NPO & SLP will plan for FEES today to further assess.  -MP  --         Oral Prep Concerns    Oral Prep Concerns  --  --  incomplete or weak lip closure around  spoon  -AC      Incomplete or Weak Lip Closure Around Spoon  --  --  all consistencies  -AC      Oral Prep Concerns, Comment  --  --  2'  lethargy  -AC         Pharyngeal Phase Concerns    Pharyngeal Phase Concerns  --  cough  -MP  multiple swallows;throat clear  -AC      Multiple Swallows  --  --  all consistencies;other (see comments) x ice  -AC      Cough  --  thin  -MP  --      Throat Clear  --  --  pudding  -AC      Pharyngeal Phase Concerns, Comment  --  --  Pt had difficulty maintaining alertness t/o eval. Weak, slightly dysphonic vocal quality. Clinical s/sxs aspiration noted w/ all consistencies x ice.   -AC         Fiberoptic Endoscopic Evaluation of Swallowing (FEES)    Risks/Benefits Reviewed  risks/benefits explained;patient;agreed to eval  -MP  --  --      Nasal Entry  left:  -MP  --  --      Scope serial number/identification  [id number]  -MP  --  --         Anatomy and Physiology    Anatomic Considerations  erythema;vocal folds  -MP  --  --      Velopharyngeal  WFL  -MP  --  --      Base of Tongue  symmetrical  -MP  --  --      Epiglottis  WFL  -MP  --  --      Laryngeal Function Breathing  symmetrical  -MP  --  --      Laryngeal Function Phonation  symmetrical  -MP  --  --      Laryngeal Function to Breath Hold  TVF/FVF/Arytenoid;sustained closure  -MP  --  --      Secretion Rating Scale (Yuriy et al. 1996)  1- secretions present around the laryngeal vestibule  -MP  --  --      Secretion Description  thin;clear  -MP  --  --      Ice Chips  DNA  -MP  --  --      Spontaneous Swallow  frequency adequate  -MP  --  --      Sensory  sensed scope  -MP  --  --      Utensils Used  Spoon;Cup;Straw  -MP  --  --      Consistencies Trialed  thin liquids;nectar-thick liquids;honey-thick liquids;pudding/puree;regular textures  -MP  --  --         FEES Interpretation    Oral Phase  other (see comments) u/a to masticate solid  -MP  --  --         Initiation of Pharyngeal Swallow    Initiation of Pharyngeal Swallow  bolus  in valleculae  -MP  --  --      Pharyngeal Phase  impaired pharyngeal phase of swallowing  -MP  --  --      Aspiration During the Swallow  thin liquids;nectar-thick liquids;secondary to delayed swallow initiation or mistiming;secondary to reduced laryngeal elevation;secondary to reduced vestibular closure  -MP  --  --      Response to Aspiration  no response, silent aspiration  -MP  --  --      Rosenbek's Scale  thin:;nectar:;8-->Level 8  -MP  --  --      Residue  all consistencies tested;diffuse within pharynx;secondary to reduced base of tongue retraction;secondary to reduced posterior pharyngeal wall stripping;secondary to reduced laryngeal elevation;secondary to reduced hyolaryngeal excursion;other (see comments) mild-mod  -MP  --  --      Response to Residue  other (see comments) reduced w/ spontaneous subsequent swallows  -MP  --  --      Attempted Compensatory Maneuvers  bolus size;bolus presentation style  -MP  --  --      Response to Attempted Compensatory Maneuvers  did not prevent penetration;did not prevent aspiration  -MP  --  --      FEES Summary  Moderate pharyngeal dysphagia. U/a to masticate regular solid 2' edentulous. Aspiration during the swallow w/ thin & nectar-thick liquids. Aspiration was silent. Penetration during w/ honey-thick via straw - eliminated w/ cup. Mild-moderate diffuse pharyngeal residue w/ all consistencies - reduced w/ spontaneous subsequent swallows. Rec: dysphagia level III (puree/some mashed), honey-thick liquids. No straws. Meds whole w/ pudding/puree. SLP will continue to follow for dysphagia tx.  -MP  --  --         Clinical Impression    SLP Swallowing Diagnosis  moderate;pharyngeal dysphagia  -MP  suspected pharyngeal dysphagia  -MP  suspected pharyngeal dysphagia  -AC      Functional Impact  risk of aspiration/pneumonia  -MP  risk of aspiration/pneumonia  -MP  risk of aspiration/pneumonia;risk of malnutrition  -AC      Rehab Potential/Prognosis, Swallowing  good, to  achieve stated therapy goals  -MP  good, to achieve stated therapy goals  -MP  good, to achieve stated therapy goals  -AC      Swallow Criteria for Skilled Therapeutic Interventions Met  demonstrates skilled criteria  -MP  demonstrates skilled criteria  -MP  demonstrates skilled criteria  -         Recommendations    Therapy Frequency (Swallow)  5 days per week  -MP  --  --      Predicted Duration Therapy Intervention (Days)  until discharge  -MP  until discharge  -MP  --      SLP Diet Recommendation  puree with some mashed;honey thick liquids  -MP  NPO;temporary alternate methods of nutrition/hydration;other (see comments) until FEES today  -MP  NPO;temporary alternate methods of nutrition/hydration;other (see comments) single ice chips sparingly, after oral care, only if alert  -      Recommended Diagnostics  --  FEES  -MP  reassess via clinical swallow evaluation  -      Recommended Precautions and Strategies  upright posture during/after eating;small bites of food and sips of liquid;no straw;general aspiration precautions  -MP  --  --      Oral Care Recommendations  Oral Care BID/PRN  -MP  Oral Care BID/PRN  -MP  Oral Care BID/PRN  -AC      SLP Rec. for Method of Medication Administration  meds whole;with pudding or applesauce;as tolerated  -MP  meds via alternate route until FEES  -MP  meds via alternate route  -      Monitor for Signs of Aspiration  yes;notify SLP if any concerns  -MP  --  --      Anticipated Discharge Disposition (SLP)  unknown;anticipate therapy at next level of care  -MP  unknown;anticipate therapy at next level of care  -MP  anticipate therapy at next level of care  -        User Key  (r) = Recorded By, (t) = Taken By, (c) = Cosigned By    Initials Name Effective Dates     Tia Clark MS CCC-SLP 07/27/17 -     Ismael Velarde MS CCC-SLP 06/19/19 -           EDUCATION  The patient has been educated in the following areas:   Dysphagia (Swallowing Impairment).    SLP  Recommendation and Plan  SLP Swallowing Diagnosis: moderate, pharyngeal dysphagia  SLP Diet Recommendation: puree with some mashed, honey thick liquids  Recommended Precautions and Strategies: upright posture during/after eating, small bites of food and sips of liquid, no straw, general aspiration precautions  SLP Rec. for Method of Medication Administration: meds whole, with pudding or applesauce, as tolerated     Monitor for Signs of Aspiration: yes, notify SLP if any concerns  Recommended Diagnostics: FEES  Swallow Criteria for Skilled Therapeutic Interventions Met: demonstrates skilled criteria  Anticipated Discharge Disposition (SLP): unknown, anticipate therapy at next level of care  Rehab Potential/Prognosis, Swallowing: good, to achieve stated therapy goals  Therapy Frequency (Swallow): 5 days per week  Predicted Duration Therapy Intervention (Days): until discharge                         Plan of Care Reviewed With: patient    SLP GOALS     Row Name 06/01/21 1310             Oral Nutrition/Hydration Goal 1 (SLP)    Oral Nutrition/Hydration Goal 1, SLP  LTG: Pt will return to regular diet, thin liquids w/ no overt s/sxs aspiration/distress w/ 100% acc and no cues  -MP      Time Frame (Oral Nutrition/Hydration Goal 1, SLP)  by discharge  -MP         Oral Nutrition/Hydration Goal 2 (SLP)    Oral Nutrition/Hydration Goal 2, SLP  Pt will tolerate puree/some mashed & honey-thick liquids w/ no overt s/sxs aspiration/distress w/ 100% acc and no cues  -MP      Time Frame (Oral Nutrition/Hydration Goal 2, SLP)  by discharge  -MP         Oral Nutrition/Hydration Goal (SLP)    Oral Nutrition/Hydration Goal, SLP  Pt will tolerate therapeutic trials of thin H2O w/ no overt s/sxs aspiration/distress w/ 70% acc and no cues in order to assess readiness for repeat instrumental eval  -MP      Time Frame (Oral Nutrition/Hydration Goal, SLP)  short term goal (STG)  -MP         Lingual Strengthening Goal 1 (SLP)    Activity  (Lingual Strengthening Goal 1, SLP)  increase tongue back strength  -MP      Increase Tongue Back Strength  swallow trials;lingual resistance exercises  -MP      [place]/Accuracy (Lingual Strengthening Goal 1, SLP)  with minimal cues (75-90% accuracy)  -MP      Time Frame (Lingual Strengthening Goal 1, SLP)  short term goal (STG)  -MP         Pharyngeal Strengthening Exercise Goal 1 (SLP)    Activity (Pharyngeal Strengthening Goal 1, SLP)  increase timing;increase superior movement of the hyolaryngeal complex;increase anterior movement of the hyolaryngeal complex;increase closure at entrance to airway/closure of airway at glottis;increase squeeze/positive pressure generation;increase tongue base retraction  -MP      Increase Timing  prepping - 3 second prep or suck swallow or 3-step swallow  -MP      Increase Superior Movement of the Hyolaryngeal Complex  effortful pitch glide (falsetto + pharyngeal squeeze)  -MP      Increase Anterior Movement of the Hyolaryngeal Complex  chin tuck against resistance (CTAR)  -MP      Increase Closure at Entrance to Airway/Closure of Airway at Glottis  supraglottic swallow  -MP      Increase Squeeze/Positive Pressure Generation  hard effortful swallow  -MP      Increase Tongue Base Retraction  [place]wyn  -MP      [place]/Accuracy (Pharyngeal Strengthening Goal 1, SLP)  with minimal cues (75-90% accuracy)  -MP      Time Frame (Pharyngeal Strengthening Goal 1, SLP)  short term goal (STG)  -MP         Swallow Management Recall Goal 1 (SLP)    Activity (Swallow Management Recall Goal 1, SLP)  independent recall of;safe diet/liquid level;safe diet level/texture  -MP      [place]/Accuracy (Swallow Management Recall Goal 1, SLP)  independently (over 90% accuracy)  -MP      Time Frame (Swallow Management Recall Goal 1, SLP)  short term goal (STG)  -MP         Swallow Compensatory Strategies Goal 1 (SLP)    Activity (Swallow Compensatory Strategies/Techniques Goal 1, SLP)   compensatory strategies;small cup sips;other (see comments);during p.o. trials;during meal intake no straws  -MP      [place]/Accuracy (Swallow Compensatory Strategies/Techniques Goal 1, SLP)  independently (over 90% accuracy)  -MP      Time Frame (Swallow Compensatory Strategies/Techniques Goal 1, SLP)  short term goal (STG)  -MP        User Key  (r) = Recorded By, (t) = Taken By, (c) = Cosigned By    Initials Name Provider Type    Ismael Velarde MS CCC-SLP Speech and Language Pathologist             Time Calculation:   Time Calculation- SLP     Row Name 06/01/21 1417 06/01/21 1004          Time Calculation- SLP    SLP Start Time  1310  -MP  0935  -MP     SLP Received On  06/01/21  -MP  06/01/21  -MP        Untimed Charges    SLP Eval/Re-eval   ST Fiberoptic Endo Eval Swallow - 69206  -MP  --     [id number] Eval Oral Pharyng Swallow Minutes  --  40  -MP     [id number] Fiberoptic Endo Eval Swallow Minutes  90  -MP  --        Total Minutes    Untimed Charges Total Minutes  90  -MP  40  -MP      Total Minutes  90  -MP  40  -MP       User Key  (r) = Recorded By, (t) = Taken By, (c) = Cosigned By    Initials Name Provider Type    Ismael Velarde MS CCC-SLP Speech and Language Pathologist          Therapy Charges for Today     Code Description Service Date Service Provider Modifiers Qty    79626800390 HC ST EVAL ORAL PHARYNG SWALLOW 3 6/1/2021 Ismael Hall MS CCC-SLP GN 1    [id number] HC ST FIBEROPTIC ENDO EVAL SWALL 6 6/1/2021 Ismael Hall MS CCC-SLP GN 1          Patient was not wearing a face mask and did exhibit coughing during this therapy encounter.  Procedure performed was aerosolizing, involved close contact (within 6 feet for at least 15 minutes or longer), and did not involve contact with infectious secretions or specimens.  Therapist used appropriate personal protective equipment including gloves, standard procedure mask and eye protection.  Appropriate PPE was worn during the entire  therapy session.  Hand hygiene was completed before and after therapy session.        Ismael Hall, MS RUIZ-SLP  6/1/2021

## 2021-06-01 NOTE — PROGRESS NOTES
Intensivist Note     6/1/2021  Hospital Day: 10  4 Days Post-Op  ICU Stays Timeline            Hospital Admission: 05/22/21 0431 - Current  ICU stays: 1      In Date/Time Event Department ICU Stay Duration     05/22/21 0431 Admission [facility] EMERGENCY DEPT      05/22/21 0813 Transfer In [facility] 6B      05/24/21 1006 Transfer In [facility] CATH LAB      05/24/21 1122 Transfer In [facility] 6B      05/28/21 0619 Transfer In [facility] OR      05/28/21 1209 Transfer In [facility] 2HSIC 3 days 19 hours 25 minutes             Mr. Augusto Lorenzana ., 76 y.o. 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 HbA1c 7.4     Gout    Former smokeless tobacco use       SUBJECTIVE     76-year-old white male with a history of CAD s/p RCA stent 2009, diabetes mellitus (HbA1c 7.4), hypertension, hyperlipidemia, CKD, gout, and smokeless tobacco use.  She presented to [facility] ER 5/22/2021 complaining of dyspnea and was noted to be hypoxemic.  He had elevated troponins consistent with NSTEMI and cardiology proceeded with [facility] 5/24/2021 revealing multivessel CAD but preserved LVEF of 55%.  Creatinine was 1.9 on admission and transiently increased to 2.1 after cardiac catheterization only to return to baseline. Patient subsequently underwent CABG x 3 on 5/28/2021 although apparently developed ventricular fibrillation and asystole and coded twice the day of surgery requiring 48 hours of pacing before resuming a sinus rhythm.  In addition required multiple units of packed RBCs, FFP, and platelets (total postop was 3 units PRBC, 4 units FFP and 1 unit platelets). Patient was subsequently extubated 5/29/2021 although has had persistent high FiO2 requirement.  In addition his renal function deteriorated in association with low UOP despite significantly positive fluid balance    Interval history: Patient  "remains afebrile with WBC 13.34 off all antimicrobial coverage.  Hemodynamics are adequate with a blood pressure 131/59 although is requiring nitroglycerin drip to keep his pressure down. He continues with a loud pericardial rub.  Does not appear dyspneic but is requiring 7 L of nasal oxygen to maintain an O2 saturation of 94%.  UOP remains low and fluid balance is positive by 718 cc over the last 24 hours. This is in association with significant lower extremity edema right greater than left. Has been continued n.p.o. due to failed dysphagia evaluation but is tolerating Isosource 1.5. at goal and speech evaluation is to be repeated today.  MT/pleural tubes remain in place but only 100 cc out over the last 24 hours so will probably be removed today.      ROS: Per subjective, all other systems reviewed and were negative.    The patient's relevant PMH, PSH, FH, and SH were reviewed and updated in Epic as appropriate. Allergies and Medications reviewed.    OBJECTIVE     /73 (BP Location: Right arm, Patient Position: Lying)   Pulse 69   Temp 97.2 °F (36.2 °C) (Axillary)   Resp 18   Ht 170 cm (66.93\")   Wt 84.8 kg (187 lb)   SpO2 92%   BMI 29.35 kg/m²   Flow (L/min): 6    Flowsheet Rows      First Filed Value   Admission Height  170.2 cm (67\") Documented at 05/22/2021 0433   Admission Weight  88.5 kg (195 lb) Documented at 05/22/2021 0433        Intake & Output (last day)       05/31 0701 - 06/01 0700 06/01 0701 - 06/02 0700    I.V. (mL/kg) 951 (11.2) 245 (2.9)    Blood      Other 402 126    NG/ 226    IV Piggyback 100 100    Total Intake(mL/kg) 2008 (23.7) 697 (8.2)    Urine (mL/kg/hr) 1190 (0.6) 350 (0.4)    Chest Tube 100 50    Total Output 1290 400    Net +718 +297                Exam:  General Exam:  Elderly white male propped up in bed in NAD  HEENT: Pupils equal and reactive. Nose and throat clear but oral mucosa extremely dry diffusely.  NG tube in place  Neck:                          Supple, " no JVD, thyromegaly, or adenopathy. Right IJ cordis in place  Lungs: Clear anteriorly but diminished in the left base.  Chest:                         MT/CT tubes x 2 in place.  No air leak  Cardiovascular: RRR without murmurs or gallops.  HR 82 bpm.  Loud pericardial friction rub.  Abdomen: Soft nontender without organomegaly or masses.   and rectal: Last catheter in place  Extremities: No cyanosis or clubbing but bilateral lower extremity edema (2+ on the right and 1+ on the left).  Right radial arterial line in place  Neurologic:                 Symmetric strength but diffusely weak. No focal deficits.  Somewhat dysarthric but I think this is due to extremely dry oral mucosa    Chest X-Ray: Cardiomegaly with some bilateral mild congestive changes.  Left lower lobe mild atelectasis.  Multiple MT/CT tubes in position right IJ line in position.    INFUSIONS  dextrose 5 % with KCl 20 mEq, 30 mL/hr, Last Rate: 30 mL/hr (05/28/21 1258)  nitroglycerin, 5-200 mcg/min, Last Rate: 85 mcg/min (06/01/21 1041)      Results from last 7 days   Lab Units 06/01/21  1541 06/01/21  0353 05/31/21  0341 05/30/21  1544   WBC 10*3/mm3  --  13.34* 12.45* 12.40*   HEMOGLOBIN g/dL 8.1* 7.8* 8.1* 8.1*   HEMATOCRIT % 24.7* 23.0* 24.8* 24.3*   PLATELETS 10*3/mm3  --  185 174 140     Results from last 7 days   Lab Units 06/01/21  0353 05/31/21  1141   SODIUM mmol/L 137 138   POTASSIUM mmol/L 3.9 4.3   CHLORIDE mmol/L 104 105   CO2 mmol/L 19.0* 19.0*   BUN mg/dL 92* 76*   CREATININE mg/dL 3.20* 3.09*   GLUCOSE mg/dL 166* 143*   CALCIUM mg/dL 8.7 9.0     Results from last 7 days   Lab Units 05/31/21  1141 05/31/21  0341 05/28/21  2207 05/28/21  1559   MAGNESIUM mg/dL 2.4  --  2.1 2.3   PHOSPHORUS mg/dL 5.9* 5.6* 4.9* 4.3     Results from last 7 days   Lab Units 05/31/21  1141 05/28/21  1757 05/28/21  1559   ALK PHOS U/L 91 66 63   BILIRUBIN mg/dL 0.6 0.5 0.7   ALT (SGPT) U/L 22 25 23   AST (SGOT) U/L 22 60* 54*       No results found for:  SEDRATE  No results found for: BNP  Lab Results   Component Value Date    TROPONINT 1.550 (C) 05/22/2021     No results found for: TSH  Lab Results   Component Value Date    LACTATE 1.5 05/22/2021     No results found for: CORTISOL    Results from last 7 days   Lab Units 06/01/21  0934 05/29/21  1331 05/28/21  1827 05/28/21  1236 05/28/21  1116   PH, ARTERIAL pH units 7.446 7.421 7.288* 7.394 7.38   PCO2, ARTERIAL mm Hg 29.0* 33.3* 41.1 34.8*  --    PO2 ART mm Hg 74.2* 88.4 77.2* 244.0*  --    HCO3 ART mmol/L 19.9* 21.7 19.7* 21.2  --    FIO2 % 44 40 40 100  --          I reviewed the patient's results, images and medication.    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 HbA1c 7.4     Gout    Former smokeless tobacco use      DISCUSSION: Status remains marginal due to high FiO2 requirement, acute on chronic renal failure, ongoing dysphagia with the need for enteral feeds, and profound weakness.  He is a set up for secondary infections and will need to watch closely.  Because of his high FiO2 requirement and lower extremity edema I am going to rule out occult DVT as he has been sedentary for quite a while even before his surgery.  With his severe azotemia would avoid aggressive diuresis.  Despite his edema, he had a normal LVEF preop and he should be able to handle volume.  If any problems with gas exchange tonight would place on BiPAP    PLAN     1.  MT/CT tubes out today per surgery.  Hopefully will help decrease pain and improve ventilation and gas exchange  2.  Bilateral lower extremity venous duplexes to rule out occult DVT  3.  Once MT/CT tubes out will begin pharmacologic DVT prophylaxis  4.  Continue Levemir and SSI for glycemic control  5.  Resume antihypertensives and try to wean off nitroglycerin  6.  Dysphagia evaluation and if  passes can begin oral intake  7.  Would avoid diuresis at this point because of increasing BUN.  Reassess tomorrow  8.  If any problems with gas exchange tonight place on BiPAP  9.  Low-dose heparin for DVT prophylaxis    Plan of care and goals reviewed with multidisciplinary team at daily rounds.    I discussed the patient's findings and my recommendations with patient and nursing staff    Patient is critically ill due to innumerable problems as outlined above and discussion.  He has a high risk of life-threatening decline in condition and requires continuous monitoring and frequent reassessment of condition for adjustment of management in order to lessen risk.     Time spent Critical care 35 min (It does not include procedure time).    Electronically signed by Ish Loaiza MD, 06/01/21, 7:34 AM EDT.   Pulmonary / Critical care medicine

## 2021-06-01 NOTE — THERAPY TREATMENT NOTE
Patient Name: Augusto Lorenzana Jr.  : 1945    MRN: [id number]                              Today's Date: 2021       Admit Date: 2021    Visit Dx:     ICD-10-CM ICD-9-CM   1. Non-STEMI (non-ST elevated myocardial infarction) (CMS/[facility])  I21.4 410.70   2. Acute congestive heart failure, unspecified heart failure type (CMS/[facility])  I50.9 428.0   3. History of coronary artery disease  Z86.79 V12.59   4. Coronary artery disease involving native coronary artery of native heart, angina presence unspecified  I25.10 414.01   5. Dysphagia, unspecified type  R13.10 787.20     Patient Active Problem List   Diagnosis   • NSTEMI 2021   • Hyperlipidemia LDL goal <70   • Essential hypertension   • T2DM on oral agents and insulin. HbA1c 7.4    • Severe 3 vessel CAD with preserved LV function (CMS/[facility])   • CKD with post op MICHAEL   • Gout   • Former smokeless tobacco use   • S/P CABG x 3 on 21   • Postoperative hypoxemia     Past Medical History:   Diagnosis Date   • CAD (coronary artery disease)    • CKD (chronic kidney disease) stage 3, GFR 30-59 ml/min (CMS/[facility])    • Diabetes mellitus (CMS/[facility])    • Hyperlipidemia    • Hypertension    • NSTEMI (non-ST elevated myocardial infarction) (CMS/[facility])      Past Surgical History:   Procedure Laterality Date   • CARDIAC CATHETERIZATION N/A 2021    Procedure: Left Heart Cath;  Surgeon: Blade Greene IV, MD;  Location:  [facility];  Service: Cardiovascular;  Laterality: N/A;   • CARDIAC SURGERY      2 stents placed .   • CORONARY ARTERY BYPASS GRAFT N/A 2021    Procedure: MEDIAN STERNOTOMY CORONARY ARTERY BYPASS X 3 UTILIZING THE LEFT INTERNAL MAMMARY ARTERY GRAFT, EVH OF THE GREATER RIGHT SAPHENOUS VEIN, AND PILO PER ANESTHESIA;  Surgeon: Jacek Miller MD;  Location: [facility] OR;  Service: Cardiothoracic;  Laterality: N/A;     General Information     Row Name 21 7700          Physical Therapy Time and Intention    Document  Type  therapy note (daily note)  -KG     Mode of Treatment  physical therapy  -KG     Row Name 06/01/21 1330          General Information    Existing Precautions/Restrictions  cardiac;fall;oxygen therapy device and L/min;sternal  -KG     Row Name 06/01/21 1330          Cognition    Orientation Status (Cognition)  oriented to;person confusion; lethargy  -KG     Row Name 06/01/21 1330          Safety Issues, Functional Mobility    Safety Issues Affecting Function (Mobility)  ability to follow commands;awareness of need for assistance;insight into deficits/self-awareness;safety precaution awareness;safety precautions follow-through/compliance;sequencing abilities  -KG     Impairments Affecting Function (Mobility)  balance;cognition;coordination;endurance/activity tolerance;motor control;motor planning;postural/trunk control;pain;shortness of breath;strength  -KG     Cognitive Impairments, Mobility Safety/Performance  attention;awareness, need for assistance;insight into deficits/self-awareness;safety precaution awareness;safety precaution follow-through  -KG       User Key  (r) = Recorded By, (t) = Taken By, (c) = Cosigned By    Initials Name Provider Type    KG Aliza Shen, PT Physical Therapist        Mobility     Row Name 06/01/21 1331          Bed Mobility    Bed Mobility  sit-supine  -KG     Sit-Supine [place] (Bed Mobility)  maximum assist (25% patient effort);2 person assist;verbal cues  -KG     Assistive Device (Bed Mobility)  head of bed elevated  -KG     Comment (Bed Mobility)  VC's for sequencing. Pt required assistance at trunk and BLEs.  -KG     Row Name 06/01/21 1331          Transfers    Comment (Transfers)  STS x3 throughout session. Increased cueing for sequencing and safe hand placement to maintain sternal precautions. Upon standing pt slides butch feet too far in front contributing to significant posterior lean at hips. Pt unable to correct posture despite max verbal and tactile cues.  Unable to achieve full upright posture on any attempt.  -KG     Row Name 06/01/21 1331          Sit-Stand Transfer    Sit-Stand [place] (Transfers)  maximum assist (25% patient effort);2 person assist;verbal cues  -KG     Row Name 06/01/21 1331          Gait/Stairs (Locomotion)    [place] Level (Gait)  maximum assist (25% patient effort);2 person assist;verbal cues  -KG     Assistive Device (Gait)  other (see comments) B UE support  -KG     Distance in Feet (Gait)  3  -KG     Deviations/Abnormal Patterns (Gait)  chniyere decreased;festinating/shuffling;stride length decreased  -KG     Bilateral Gait Deviations  forward flexed posture;heel strike decreased;weight shift ability decreased  -KG     Comment (Gait/Stairs)  Pt able to take shuffled steps from chair to bed. Pt demonstrated slow, shuffled steps with forward flexed trunk and posterior lean at hips. Pt with decreased weight bearing on butch LEs due to tendency to keep feet too far out in front of hips. Pt required max cueing to correct. Minimal improvement noted. Deferred additional mobility.  -KG       User Key  (r) = Recorded By, (t) = Taken By, (c) = Cosigned By    Initials Name Provider Type    Aliza Bhandari, PT Physical Therapist        Obj/Interventions     Row Name 06/01/21 1515          Balance    Balance Assessment  sitting static balance;standing static balance;standing dynamic balance  -KG     Static Sitting Balance  mild impairment;supported;sitting in chair  -KG     Static Standing Balance  severe impairment;supported;standing  -KG     Dynamic Standing Balance  severe impairment;supported;standing  -KG       User Key  (r) = Recorded By, (t) = Taken By, (c) = Cosigned By    Initials Name Provider Type    Aliza Bhandari, PT Physical Therapist        Goals/Plan    No documentation.       Clinical Impression     Row Name 06/01/21 1515          Pain    Additional Documentation  Pain Scale: Numbers Pre/Post-Treatment (Group)   -KG     Row Name 06/01/21 1515          Pain Scale: Numbers Pre/Post-Treatment    Pretreatment Pain Rating  5/10  -KG     Posttreatment Pain Rating  5/10  -KG     Pain Location - Orientation  incisional  -KG     Pain Location  chest  -KG     Pain Intervention(s)  Repositioned;Ambulation/increased activity  -KG     Row Name 06/01/21 1515          Plan of Care Review    Plan of Care Reviewed With  patient  -KG     Progress  improving  -KG     Outcome Summary  Pt performed STS x3 with maxA x2. Pt able to ambulate 3ft from chair to bed with maxA x2 and B UE support. Pt continues to demonstrate poor posture and decreased weight bearing through B LEs. Required max cueing to correct. Continue to progress as appropriate.  -KG     Row Name 06/01/21 1515          Vital Signs    Pre Systolic BP Rehab  118  -KG     Pre Treatment Diastolic BP  74  -KG     Post Systolic BP Rehab  119  -KG     Post Treatment Diastolic BP  67  -KG     Pretreatment Heart Rate (beats/min)  80  -KG     Posttreatment Heart Rate (beats/min)  73  -KG     Pre SpO2 (%)  92  -KG     O2 Delivery Pre Treatment  supplemental O2  -KG     Post SpO2 (%)  92  -KG     O2 Delivery Post Treatment  supplemental O2  -KG     Pre Patient Position  Sitting  -KG     Intra Patient Position  Standing  -KG     Post Patient Position  Supine  -KG     Row Name 06/01/21 1515          Positioning and Restraints    Pre-Treatment Position  sitting in chair/recliner  -KG     Post Treatment Position  bed  -KG     In Bed  notified nsg;supine;call light within reach;encouraged to call for assist;side rails up x3;RUE elevated;LUE elevated  -KG       User Key  (r) = Recorded By, (t) = Taken By, (c) = Cosigned By    Initials Name Provider Type    KG Aliza Shen, PT Physical Therapist        Outcome Measures     Row Name 06/01/21 1517          How much help from another person do you currently need...    Turning from your back to your side while in flat bed without using bedrails?   2  -KG     Moving from lying on back to sitting on the side of a flat bed without bedrails?  2  -KG     Moving to and from a bed to a chair (including a wheelchair)?  2  -KG     Standing up from a chair using your arms (e.g., wheelchair, bedside chair)?  2  -KG     Climbing 3-5 steps with a railing?  1  -KG     To walk in hospital room?  1  -KG     AM-PAC 6 Clicks Score (PT)  10  -KG     Row Name 06/01/21 1517          Functional Assessment    Outcome Measure Options  AM-PAC 6 Clicks Basic Mobility (PT)  -KG       User Key  (r) = Recorded By, (t) = Taken By, (c) = Cosigned By    Initials Name Provider Type    KG Aliza Shen, PT Physical Therapist        Physical Therapy Education                 Title: PT OT SLP Therapies (In Progress)     Topic: Physical Therapy (In Progress)     Point: Mobility training (In Progress)     Learning Progress Summary           Patient Acceptance, E, NR by KG at 6/1/2021 0933    Acceptance, E, NR by KG at 5/31/2021 1118    Acceptance, E,TB, VU,NR by AY at 5/30/2021 1107                   Point: Home exercise program (In Progress)     Learning Progress Summary           Patient Acceptance, E, NR by KG at 6/1/2021 0933    Acceptance, E, NR by KG at 5/31/2021 1118                   Point: Body mechanics (In Progress)     Learning Progress Summary           Patient Acceptance, E, NR by KG at 6/1/2021 0933    Acceptance, E, NR by KG at 5/31/2021 1118    Acceptance, E,TB, VU,NR by AY at 5/30/2021 1107                   Point: Precautions (In Progress)     Learning Progress Summary           Patient Acceptance, E, NR by KG at 6/1/2021 0933    Acceptance, E, NR by KG at 5/31/2021 1118    Acceptance, E,TB, VU,NR by AY at 5/30/2021 1107                               User Key     Initials Effective Dates Name Provider Type Discipline    KG 05/22/20 -  Aliza Shen, PT Physical Therapist PT    JET 11/10/20 -  Megan Moffett PT Physical Therapist PT              PT Recommendation  and Plan     Plan of Care Reviewed With: patient  Progress: improving  Outcome Summary: Pt performed STS x3 with maxA x2. Pt able to ambulate 3ft from chair to bed with maxA x2 and B UE support. Pt continues to demonstrate poor posture and decreased weight bearing through B LEs. Required max cueing to correct. Continue to progress as appropriate.     Time Calculation:   PT Charges     Row Name 06/01/21 0933             Time Calculation    Start Time  0933  -KG      PT Received On  06/01/21  -KG      PT Goal Re-Cert Due Date  06/09/21  -KG         Time Calculation- PT    Total Timed Code Minutes- PT  24 minute(s)  -KG         Timed Charges    85996 - PT Therapeutic Activity Minutes  24  -KG         Total Minutes    Timed Charges Total Minutes  24  -KG       Total Minutes  24  -KG        User Key  (r) = Recorded By, (t) = Taken By, (c) = Cosigned By    Initials Name Provider Type    KG Aliza Shen, PT Physical Therapist        Therapy Charges for Today     Code Description Service Date Service Provider Modifiers Qty    75885950551 HC PT THERAPEUTIC ACT EA 15 MIN 5/31/2021 Aliza Shen, PT GP 2    99836534148 HC PT THER SUPP EA 15 MIN 5/31/2021 Aliza Shen, PT GP 2    22916630287 HC PT THERAPEUTIC ACT EA 15 MIN 6/1/2021 Aliza Shen, PT GP 2          PT G-Codes  Outcome Measure Options: AM-PAC 6 Clicks Basic Mobility (PT)  AM-PAC 6 Clicks Score (PT): 10    Jenise Shen PT  6/1/2021

## 2021-06-01 NOTE — PROGRESS NOTES
Cardiology Progress Note      Reason for visit:    · NSTEMI/multivessel CAD/status post CABG  · Acute CHF  · Hypertension    IDENTIFICATION: 76-year-old gentleman who resides in [place], [place]    Active Hospital Problems    Diagnosis  POA   • **NSTEMI 5/22/2021 [I21.4]  Yes     Priority: High     · History of previous PCI, data deficit  · [facility] ER presentation with chest pain and elevated troponin, 5/22/2021  · Echo (5/22/2021): LVEF 55%.  Inferolateral hypokinesis.  Mild MR.  Mild aortic stenosis.  · Cardiac catheterization for NSTEMI (5/24/2021): Severe multivessel CAD (LAD, LCx, RCA).  Surgical revascularization recommended  · CABG with Dr. Miller (5/28/2021):LIMA to LAD.  SVG to first OM.  SVG to PDA2. Greater saphenous vein to first obtuse marginal     • S/P CABG x 3 on 5/28/21 [Z95.1]  Not Applicable   • Postoperative hypoxemia [R09.02, Z98.890]  No   • Gout [M10.9]  Yes   • Former smokeless tobacco use [Z87.891]  Not Applicable   • CKD with post op MICHAEL [N18.9]  Yes   • T2DM on oral agents and insulin. HbA1c 7.4  [E11.9, Z79.4]  Not Applicable   • Hyperlipidemia LDL goal <70 [E78.5]  Yes   • Essential hypertension [I10]  Yes            · Up in chair  · Patient having difficult to control hypertension  · No present complaints other than dry mouth           Vital Sign Min/Max for last 24 hours  Temp  Min: 97.8 °F (36.6 °C)  Max: 98.1 °F (36.7 °C)   BP  Min: 110/70  Max: 148/79   Pulse  Min: 72  Max: 86   Resp  Min: 10  Max: 20   SpO2  Min: 89 %  Max: 95 %   Flow (L/min)  Min: 5  Max: 6      Intake/Output Summary (Last 24 hours) at 6/1/2021 0828  Last data filed at 6/1/2021 0600  Gross per 24 hour   Intake 2008 ml   Output 1260 ml   Net 748 ml           Physical Exam  Cardiovascular:      Rate and Rhythm: Normal rate and regular rhythm.   Pulmonary:      Effort: Pulmonary effort is normal.         Tele: NSR    Results Review (reviewed the patient's recent labs in the electronic medical record):     EKG  (5/27/2021): Normal sinus rhythm, right bundle branch block    CXR (5/27/2021): Borderline heart shadow enlargement mild pulmonary venous hypertension.  No new chest disease    ECHO (5/23/2021): LVEF 60%.  Grade 1 diastolic dysfunction.  Mild AS    Result Review:      Results from last 7 days   Lab Units 06/01/21  0353 05/31/21  1141 05/31/21  0341 05/30/21  0327 05/28/21  2207 05/28/21  1757 05/28/21  1559   SODIUM mmol/L 137 138 139 140 141 141 141  140   POTASSIUM mmol/L 3.9 4.3 3.8 3.8 4.1 4.6 3.9  3.8   CHLORIDE mmol/L 104 105 105 106 106 106 106  105   BUN mg/dL 92* 76* 71* 52* 44* 42* 42*  43*   CREATININE mg/dL 3.20* 3.09* 2.79* 2.72* 2.36* 2.40* 2.18*  2.21*   MAGNESIUM mg/dL  --  2.4  --   --  2.1  --  2.3         Results from last 7 days   Lab Units 06/01/21  0353 05/31/21  0341 05/30/21  1544   WBC 10*3/mm3 13.34* 12.45* 12.40*   HEMOGLOBIN g/dL 7.8* 8.1* 8.1*   HEMATOCRIT % 23.0* 24.8* 24.3*   PLATELETS 10*3/mm3 185 174 140       Lab Results   Component Value Date    HGBA1C 7.40 (H) 05/22/2021       Lab Results   Component Value Date    CHOL 99 05/31/2021    TRIG 101 05/31/2021    HDL 34 (L) 05/22/2021    LDL 88 05/22/2021                 NSTEMI  · Status post CABG 5/28/2021  · On aspirin and beta-blocker       Hypertension  · DC metoprolol and start carvedilol 12.5 mg twice daily for better blood pressure control  · No ACE/ARB due to MICHAEL     Hyperlipidemia  · Continue atorvastatin     Acute kidney injury  · Worsening     Type 2 diabetes mellitus  · Management per hospitalist  · Hemoglobin A1c 7.4             · Change metoprolol to carvedilol 12.5 mg twice daily for better blood pressure control  · No ACE/ARB due to MICHAEL    Electronically signed by Blade Greene IV, MD, 06/01/21, 8:30 AM EDT.

## 2021-06-01 NOTE — PROGRESS NOTES
Cardiothoracic Surgery Progress Note      POD # 4 s/p CABG       LOS: 10 days      Subjective:  Awake and talkative though drowsy      Objective:  Vital Signs  Temp:  [96.8 °F (36 °C)-98.1 °F (36.7 °C)] 97.8 °F (36.6 °C)  Heart Rate:  [72-86] 79  Resp:  [10-20] 16  BP: (110-148)/(67-90) 138/69  Arterial Line BP: (119-149)/(57-73) 137/59    Physical Exam:   General Appearance: alert, appears stated age and cooperative   Lungs: clear to auscultation, respirations regular, respirations even and respirations unlabored   Heart: regular rhythm & normal rate, normal S1, S2, no murmur, no gallop, no rub and no click   Skin: Incision c/d/i     Results:  Results from last 7 days   Lab Units 06/01/21  0353   WBC 10*3/mm3 13.34*   HEMOGLOBIN g/dL 7.8*   HEMATOCRIT % 23.0*   PLATELETS 10*3/mm3 185     Results from last 7 days   Lab Units 06/01/21  0353   SODIUM mmol/L 137   POTASSIUM mmol/L 3.9   CHLORIDE mmol/L 104   CO2 mmol/L 19.0*   BUN mg/dL 92*   CREATININE mg/dL 3.20*   GLUCOSE mg/dL 166*   CALCIUM mg/dL 8.7         Assessment:    NSTEMI 5/22/2021    Hyperlipidemia LDL goal <70    Essential hypertension    T2DM on oral agents and insulin. 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

## 2021-06-01 NOTE — PLAN OF CARE
Goal Outcome Evaluation:  Plan of Care Reviewed With: patient  Progress: improving  Outcome Summary: Pt performed STS x3 with maxA x2. Pt able to ambulate 3ft from chair to bed with maxA x2 and B UE support. Pt continues to demonstrate poor posture and decreased weight bearing through B LEs. Required max cueing to correct. Continue to progress as appropriate.

## 2021-06-01 NOTE — NURSING NOTE
Patient unavailable at time of visit patient working with OT.  Cardiac Rehab staff will follow up with patient at a later time.

## 2021-06-01 NOTE — DISCHARGE INSTR - DIET
FEES  6/1/2021  Reason for Referral 6/11/2021    Patient was referred for a FEES to assess the efficiency of his/her swallow function, rule out aspiration and make recommendations regarding safe dietary consistencies, effective compensatory strategies, and safe eating environment.         Recommendations/Treatment  SLP Swallowing Diagnosis: moderate, pharyngeal dysphagia  Functional Impact: risk of aspiration/pneumonia  Rehab Potential/Prognosis, Swallowing: good, to achieve stated therapy goals  Swallow Criteria for Skilled Therapeutic Interventions Met: demonstrates skilled criteria  Therapy Frequency (Swallow): 5 days per week  Predicted Duration Therapy Intervention (Days): until discharge  SLP Diet Recommendation: puree with some mashed, honey thick liquids  Recommended Diagnostics: FEES  Recommended Precautions and Strategies: upright posture during/after eating, small bites of food and sips of liquid, no straw, general aspiration precautions  SLP Rec. for Method of Medication Administration: meds whole, with pudding or applesauce, as tolerated  Monitor for Signs of Aspiration: yes, notify SLP if any concerns  Anticipated Discharge Disposition (SLP): unknown, anticipate therapy at next level of care    Instrumental Set-up  Risks/Benefits Reviewed: risks/benefits explained, patient, agreed to eval  Nasal Entry: left:  Anatomic Considerations: erythema, vocal folds  Utensils Used: Spoon, Cup, Straw  Consistencies Trialed: thin liquids, nectar-thick liquids, honey-thick liquids, pudding/puree, regular textures    Oral Preparation/ Oral Phase  Oral Phase: other (see comments) (u/a to masticate solid)    Pharyngeal Phase  Initiation of Pharyngeal Swallow: bolus in valleculae  Pharyngeal Phase: impaired pharyngeal phase of swallowing  Aspiration During the Swallow: thin liquids, nectar-thick liquids, secondary to delayed swallow initiation or mistiming, secondary to reduced laryngeal elevation, secondary to reduced  vestibular closure  Response to Aspiration: no response, silent aspiration  Rosenbek's Scale: thin:, nectar:, 8-->Level 8  Residue: all consistencies tested, diffuse within pharynx, secondary to reduced base of tongue retraction, secondary to reduced posterior pharyngeal wall stripping, secondary to reduced laryngeal elevation, secondary to reduced hyolaryngeal excursion, other (see comments) (mild-mod)  Response to Residue: other (see comments) (reduced w/ spontaneous subsequent swallows)  Attempted Compensatory Maneuvers: bolus size, bolus presentation style  Response to Attempted Compensatory Maneuvers: did not prevent penetration, did not prevent aspiration  FEES Summary: Moderate pharyngeal dysphagia. U/a to masticate regular solid 2' edentulous. Aspiration during the swallow w/ thin & nectar-thick liquids. Aspiration was silent. Penetration during w/ honey-thick via straw - eliminated w/ cup. Mild-moderate diffuse pharyngeal residue w/ all consistencies - reduced w/ spontaneous subsequent swallows. Rec: dysphagia level III (puree/some mashed), honey-thick liquids. No straws. Meds whole w/ pudding/puree. SLP will continue to follow for dysphagia tx.           FEES Reason for Referral  Patient was referred for a FEES to assess the efficiency of his/her swallow function, rule out aspiration and make recommendations regarding safe dietary consistencies, effective compensatory strategies, and safe eating environment.         Recommendations/Treatment  SLP Swallowing Diagnosis: severe, pharyngeal dysphagia  Functional Impact: risk of aspiration/pneumonia  Rehab Potential/Prognosis, Swallowing: good, to achieve stated therapy goals  Swallow Criteria for Skilled Therapeutic Interventions Met: demonstrates skilled criteria  Therapy Frequency (Swallow): 5 days per week  Predicted Duration Therapy Intervention (Days): until discharge  SLP Diet Recommendation: NPO, temporary alternate methods of nutrition/hydration, other  (see comments) (Ice chips x4/hr w/SPV, p oral care. REC: downsize to keofeed)  Recommended Precautions and Strategies: general aspiration precautions  SLP Rec. for Method of Medication Administration: meds via alternate route  Monitor for Signs of Aspiration: yes, notify SLP if any concerns  Anticipated Discharge Disposition (SLP): unknown, anticipate therapy at next level of care    Instrumental Set-up  Risks/Benefits Reviewed: risks/benefits explained, patient, agreed to eval  Nasal Entry: left:  Anatomic Considerations: edema, vocal folds, false vocal folds, arytenoids, anatomic deviation observed (see comments)  Comment: Small round protrusion noted on the R ventricular fold, anteriorly. Present on prior exam as well. Does not inferfere w/ closure. Diffuse pharyngeal edema, greater on the L side in line with large-bore NG tube.    Utensils Used: Spoon, Cup  Consistencies Trialed: pudding/puree, thin liquids    Oral Preparation/ Oral Phase  Oral Phase: WFL    Pharyngeal Phase  Initiation of Pharyngeal Swallow: bolus in valleculae  Pharyngeal Phase: impaired pharyngeal phase of swallowing  Aspiration During the Swallow: thin liquids, pudding/puree, secondary to reduced laryngeal elevation, secondary to reduced vestibular closure, secondary to reduced laryngeal (VF) closure  Response to Aspiration: inconsistent response, other (see comments) (cough w/ aspiration of thin, no response w/ pudding asp)  Rosenbek's Scale: thin:, 7-->Level 7, pudding/puree:, 8-->Level 8  Residue: all consistencies tested, diffuse within pharynx, secondary to reduced base of tongue retraction, secondary to reduced posterior pharyngeal wall stripping, secondary to reduced laryngeal elevation, secondary to reduced hyolaryngeal excursion  Response to Residue: unable to clear residue, with spontaneous subsequent swallow, with cued swallow  Attempted Compensatory Maneuvers: chin tuck, head turn to left, multiple swallows, effortful (hard  swallow), breath hold, throat clear after swallow, combination of maneuvers (see comments)  Response to Attempted Compensatory Maneuvers: did not prevent aspiration, did not reduce residue  FEES Summary: FEES completed this am. Severe pharyngeal dysphagia. Pt continues w/ diffuse pharyngeal edema, greater on the L side in line w/ large bore NG. Aspiration occurred during the swallow with both thin liquids and pudding. Pt exhibited cough response to aspiration of thin liquids, but silent response to aspiration of pudding. Pt unable to clear aspirated material with cued cough. Chin tuck, head turn to L, and breath hold ineffective to prevent aspiration. Moderate diffuse residue w/all trials - pt unable to fully clear w/ effortful or multiple swallows. Cannot safely recommend PO diet at this time. Continue NPO - discussed w/ RN the benefit of downsizing from large-bore NG to keofeed to reduce pt's edema and pain, and maximize swallow rehabilitation. Continue ice chips x4/hr w/SPV and after oral care is completed.           MBS/VFSS6/15/21   Reason for Referral  Patient was referred for a MBS to assess the efficiency of his/her swallow function, rule out aspiration and make recommendations regarding safe dietary consistencies, effective compensatory strategies, and safe eating environment.             Recommendations/Treatment  SLP Swallowing Diagnosis: mild-moderate, oral dysphagia, moderate, pharyngeal dysphagia  Functional Impact: risk of aspiration/pneumonia, risk of malnutrition, risk of dehydration  Rehab Potential/Prognosis, Swallowing: good, to achieve stated therapy goals  Swallow Criteria for Skilled Therapeutic Interventions Met: demonstrates skilled criteria  Therapy Frequency (Swallow): 5 days per week  Predicted Duration Therapy Intervention (Days): until discharge  SLP Diet Recommendation: mechanical soft with no mixed consistencies, honey thick liquids, other (see comments) (liquids via teaspoon  only)  Recommended Precautions and Strategies: upright posture during/after eating, small bites of food and sips of liquid, no straw, liquid via spoon only, general aspiration precautions, 1:1 supervision  SLP Rec. for Method of Medication Administration: meds whole, meds crushed, with pudding or applesauce, as tolerated  Monitor for Signs of Aspiration: yes, notify SLP if any concerns  Anticipated Discharge Disposition (SLP): unknown, anticipate therapy at next level of care    Instrumental Set-up  Utensils Used: spoon, cup, straw  Consistencies Trialed: thin liquids, nectar/syrup-thick liquids, honey-thick liquids, pudding thick, regular textures    Oral Preparation/ Oral Phase  Oral Prep Phase: impaired oral phase of swallowing  Oral Transit Phase: impaired  Oral Residue: impaired  Oral Preparatory Phase: prolonged manipulation  Prolonged Manipulation: regular textures, secondary to reduced lingual strength  Impaired Oral Transit Phase: piecemeal oral transit, premature spillage of liquids into pharynx  Piecemeal Oral Transit: thin liquids, nectar-thick liquids, secondary to reduced lingual control  Premature Spillage of Liquids into Pharynx: thin liquids, nectar-thick liquids, secondary to reduced lingual control  Impaired Oral Residue: diffuse residue throughout oral cavity  Diffuse Residue throughout Oral Cavity: all consistencies tested, secondary to reduced lingual strength  Response to Oral Residue: cleared residue, with spontaneous subsequent swallow    Pharyngeal Phase  Initiation of Pharyngeal Swallow: bolus in pyriform sinuses  Pharyngeal Phase: impaired pharyngeal phase of swallowing  Penetration Before the Swallow: thin liquids, nectar-thick liquids, secondary to reduced back of tongue control, secondary to delayed swallow initiation or mistiming  Penetration During the Swallow: thin liquids, nectar-thick liquids, honey-thick liquids, secondary to delayed swallow initiation or mistiming, secondary to  reduced laryngeal elevation, secondary to reduced vestibular closure, other (see comments) (honey-thick via cup only)  Aspiration After the Swallow: thin liquids, nectar-thick liquids, secondary to residue, in laryngeal vestibule  Response to Penetration: no response  Response to Aspiration: inconsistent response, throat clear, no response, silent aspiration, other (see comments) (inconsistent throat clear thins, silent nectar)  Pharyngeal Residue: all consistencies tested, base of tongue, valleculae, pyriform sinuses, laryngeal vestibule, secondary to reduced base of tongue retraction, secondary to reduced laryngeal elevation, secondary to reduced hyolaryngeal excursion, other (see comments) (mild )  Response to Residue: unable to clear residue  Attempted Compensatory Maneuvers: bolus size, bolus presentation style, additional subsequent swallow, throat clear after swallow, other (see comments) (cognition impacting carryover)  Response to Attempted Compensatory Maneuvers: prevented penetration, prevented aspiration  Pharyngeal Phase, Comment: Moderate pharyngeal dysphagia. Penetration before/during the swallow w/ thin & nectar-thick liquids (sometimes on second swallow w/ piecemeal oral transit) 2' pre-spill, delayed initiation, and reduced vestibular closure. Penetration did not clear from laryngeal vestibule & pt eventually aspirated thin/nectar-thick vestibular residue upon subsequent swallows. Penetration during the swallow w/ honey-thick liquids via cup that did not clear from vestibule posing aspiration risk. Pt did not sense to clear, eventual aspiration inevitable. No penetration/aspiration w/ honey-thick liquids via tsp only, pudding, or solids. Mild diffuse pharyngeal residue w/ all consistencies 2' reduced BOT retraction, decr'd elevation, and reduced excursion. Cognition impacting carryover of further compensations, so DNT at this time. Pt may resume modified PO diet, but given that diet is very  restricted, pt may need additional alternate nutrition per MD discretion. SLP will continue to address dysphagia w/ pharyngeal strengthening during treatment.     Cervical Esophageal Phase  Esophageal Phase: see radiology report for further details

## 2021-06-01 NOTE — PROGRESS NOTES
"   LOS: 10 days    Patient Care Team:  Rob Polanco MD as PCP - General (Internal Medicine)    Reason For Visit:  F/U MICHAEL.   Subjective           Review of Systems:    Pulm: SOME soa   CV:  No CP. + EDEMA. GI: NO N/V/D.       Objective     [START ON 6/2/2021] amLODIPine, 5 mg, Oral, Q24H  aspirin, 325 mg, Nasogastric, Daily  atorvastatin, 40 mg, Nasogastric, Nightly  bumetanide, 2 mg, Intravenous, Q12H  carvedilol, 12.5 mg, Oral, BID With Meals  sennosides, 10 mL, Nasogastric, BID   And  docusate sodium, 100 mg, Nasogastric, BID  ferric gluconate, 125 mg, Intravenous, Daily  gabapentin, 600 mg, Nasogastric, Nightly  insulin detemir, 10 Units, Subcutaneous, Nightly  insulin regular, 0-7 Units, Subcutaneous, Q6H  isosorbide mononitrate, 30 mg, Oral, Q24H  pantoprazole, 40 mg, Intravenous, Q AM  pharmacy consult - [place], , Does not apply, Daily  sodium chloride, 10 mL, Intravenous, Q12H      dextrose 5 % with KCl 20 mEq, 30 mL/hr, Last Rate: 30 mL/hr (05/28/21 1258)  niCARdipine, 5-15 mg/hr, Last Rate: Stopped (05/30/21 0350)  nitroglycerin, 10-50 mcg/min, Last Rate: 85 mcg/min (06/01/21 0903)  norepinephrine, 0.02-0.3 mcg/kg/min, Last Rate: Stopped (05/29/21 1245)          Vital Signs:  Blood pressure 138/69, pulse 79, temperature 97.8 °F (36.6 °C), temperature source Axillary, resp. rate 16, height 170.2 cm (67\"), weight 84.8 kg (187 lb), SpO2 94 %.    Flowsheet Rows      First Filed Value   Admission Height  170.2 cm (67\") Documented at 05/22/2021 0433   Admission Weight  88.5 kg (195 lb) Documented at 05/22/2021 0433          05/31 0701 - 06/01 0700  In: 2008 [I.V.:951]  Out: 1290 [Urine:1190]    Physical Exam:    General Appearance: NAD, alert and cooperative, Ox3  Eyes: PER, conjunctivae and sclerae normal, no icterus  Lungs: RHONCHI.  Heart/CV: regular rhythm & normal rate, no murmur, no gallop, no rub and 1-2+ edema  Abdomen: not distended, soft, non-tender, no masses,  bowel sounds present  Skin: No rash, " Warm and dry. : GRIFFITHS    Radiology:            Labs:  Results from last 7 days   Lab Units 06/01/21  0353 05/31/21  0341 05/30/21  1544   WBC 10*3/mm3 13.34* 12.45* 12.40*   HEMOGLOBIN g/dL 7.8* 8.1* 8.1*   HEMATOCRIT % 23.0* 24.8* 24.3*   PLATELETS 10*3/mm3 185 174 140     Results from last 7 days   Lab Units 06/01/21  0353 05/31/21  1141 05/31/21  0341 05/30/21  0327 05/28/21  2207 05/28/21  1757 05/28/21  1559 05/28/21  1231   SODIUM mmol/L 137 138 139 140 141 141 141  140 139   POTASSIUM mmol/L 3.9 4.3 3.8 3.8 4.1 4.6 3.9  3.8 4.0   CHLORIDE mmol/L 104 105 105 106 106 106 106  105 106   CO2 mmol/L 19.0* 19.0* 21.0* 19.0* 22.0 20.0* 20.0*  21.0* 21.0*   BUN mg/dL 92* 76* 71* 52* 44* 42* 42*  43* 43*   CREATININE mg/dL 3.20* 3.09* 2.79* 2.72* 2.36* 2.40* 2.18*  2.21* 2.23*   CALCIUM mg/dL 8.7 9.0 9.1 8.7 8.9 9.6 9.6  9.6 10.2   PHOSPHORUS mg/dL  --  5.9* 5.6*  --  4.9*  --  4.3 4.9*   MAGNESIUM mg/dL  --  2.4  --   --  2.1  --  2.3 2.7*   ALBUMIN g/dL  --  3.30* 3.30*  --  3.10* 3.40* 3.40*  3.50 3.50     Results from last 7 days   Lab Units 06/01/21  0353   GLUCOSE mg/dL 166*       Results from last 7 days   Lab Units 05/31/21  1141   ALK PHOS U/L 91   BILIRUBIN mg/dL 0.6   ALT (SGPT) U/L 22   AST (SGOT) U/L 22     Results from last 7 days   Lab Units 06/01/21  0934   PH, ARTERIAL pH units 7.446   PO2 ART mm Hg 74.2*   PCO2, ARTERIAL mm Hg 29.0*   HCO3 ART mmol/L 19.9*             Estimated Creatinine Clearance: 21.1 mL/min (A) (by C-G formula based on SCr of 3.2 mg/dL (H)).      Assessment       NSTEMI 5/22/2021    Hyperlipidemia LDL goal <70    Essential hypertension    T2DM on oral agents and insulin. 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 NONOLIGURIC. WORSE GFR/AZOTEMIA. HTN WITH SOME EDEMA FROM AMLODIPINE             Recommendations: DECREASE AMLODIPINE. MONITOR.      Aiden  Gavin Godfrey MD  06/01/21  09:46 EDT

## 2021-06-01 NOTE — CASE MANAGEMENT/SOCIAL WORK
Continued Stay Note  [facility]     Patient Name: Augusto Lorenzana Jr.  MRN: [id number]  Today's Date: 6/1/2021    Admit Date: 5/22/2021    Discharge Plan     Row Name 06/01/21 1325       Plan    Plan Comments  PT is currently recommending inpt rehab at discharge. Plan is for a FEES today. Once this is completed and plan of care is established for feeding CM can make referrals if pt agreeable.        Discharge Codes    No documentation.       Expected Discharge Date and Time     Expected Discharge Date Expected Discharge Time    Tesfaye 3, 2021             Ny Chou RN

## 2021-06-01 NOTE — PLAN OF CARE
Goal Outcome Evaluation:  Plan of Care Reviewed With: patient  Progress: improving  Pt still very weak and drowsy but Or x4. Pt has a productive cough and will use IS: 750-1000. Pt did de-sat when trying to cough early in the night, put on a partial rebreather for a couple hours and patient is now on 6L NC. Pt has had very minimal output in chest tubes. Pt SR and BP controlled with nitro per order. Vwires taped and not pacing. Pt tube feeding started yesterday, increased to 45ml/ hr @2000. Bumex given, creatinine still increasing. Pt will pivot with 3, max assist, very weak and not stable. Pt VSS, will continue to monitor.

## 2021-06-01 NOTE — THERAPY EVALUATION
Acute Care - Speech Language Pathology   Swallow Initial Evaluation [facility]   Clinical Swallow Re-Evaluation     Patient Name: Augusto Lorenzana Jr.  : 1945  MRN: [id number]  Today's Date: 2021               Admit Date: 2021    Visit Dx:     ICD-10-CM ICD-9-CM   1. Non-STEMI (non-ST elevated myocardial infarction) (CMS/[facility])  I21.4 410.70   2. Acute congestive heart failure, unspecified heart failure type (CMS/[facility])  I50.9 428.0   3. History of coronary artery disease  Z86.79 V12.59   4. Coronary artery disease involving native coronary artery of native heart, angina presence unspecified  I25.10 414.01   5. Dysphagia, unspecified type  R13.10 787.20     Patient Active Problem List   Diagnosis   • NSTEMI 2021   • Hyperlipidemia LDL goal <70   • Essential hypertension   • T2DM on oral agents and insulin. HbA1c 7.4    • Severe 3 vessel CAD with preserved LV function (CMS/[facility])   • CKD with post op MICHAEL   • Gout   • Former smokeless tobacco use   • S/P CABG x 3 on 21   • Postoperative hypoxemia     Past Medical History:   Diagnosis Date   • CAD (coronary artery disease)    • CKD (chronic kidney disease) stage 3, GFR 30-59 ml/min (CMS/[facility])    • Diabetes mellitus (CMS/[facility])    • Hyperlipidemia    • Hypertension    • NSTEMI (non-ST elevated myocardial infarction) (CMS/[facility])      Past Surgical History:   Procedure Laterality Date   • CARDIAC CATHETERIZATION N/A 2021    Procedure: Left Heart Cath;  Surgeon: Blade Greene IV, MD;  Location: [facility] CATH INVASIVE LOCATION;  Service: Cardiovascular;  Laterality: N/A;   • CARDIAC SURGERY      2 stents placed .   • CORONARY ARTERY BYPASS GRAFT N/A 2021    Procedure: MEDIAN STERNOTOMY CORONARY ARTERY BYPASS X 3 UTILIZING THE LEFT INTERNAL MAMMARY ARTERY GRAFT, EVH OF THE GREATER RIGHT SAPHENOUS VEIN, AND PILO PER ANESTHESIA;  Surgeon: Jacek Miller MD;  Location: [facility] OR;  Service: Cardiothoracic;  Laterality: N/A;         SWALLOW EVALUATION (last 72 hours)      SLP Adult Swallow Evaluation     Row Name 06/01/21 0935 05/31/21 1057                Rehab Evaluation    Document Type  evaluation  -MP  evaluation  -AC       Subjective Information  no complaints  -MP  no complaints  -AC       Patient Observations  alert;cooperative  -MP  lethargic;cooperative  -       Patient/Family/Caregiver Comments/Observations  --  Pt had difficulty keeping eyes open/maintaining alertness. No family present.   -AC       Care Plan Review  evaluation/treatment results reviewed;patient/other agree to care plan  -MP  --       Patient Effort  good  -MP  fair  -          General Information    Patient Profile Reviewed  yes  -MP  yes  -AC       Pertinent History Of Current Problem  Adm w/ NSTEMI s/p CABG x3 5/28, post-op cardiac arrest w/ 1 round compressions. Extubated 5/29 1340 (~30 hrs total).  -MP  Adm w/ NSTEMI s/p CABG x3 5/28, post-op cardiac arrest w/ 1 round compressions. Extubated 5/29 1340 (~30 hrs total).  -       Current Method of Nutrition  NPO;nasogastric feedings  -  NPO NG placed, TF ordered, but not started  -AC       Precautions/Limitations, Vision  WFL;for purposes of eval  -MP  WFL;for purposes of eval  -AC       Precautions/Limitations, Hearing  WFL;for purposes of eval  -MP  WFL;for purposes of eval  -AC       Prior Level of Function-Communication  WFL  -  --       Prior Level of Function-Swallowing  no diet consistency restrictions  -  no diet consistency restrictions  -AC       Plans/Goals Discussed with  patient;agreed upon  -MP  patient;agreed upon  -AC       Barriers to Rehab  medically complex  -MP  none identified  -AC       Patient's Goals for Discharge  patient did not state  -MP  patient did not state  -AC          Pain    Additional Documentation  Pain Scale: FACES Pre/Post-Treatment (Group)  -MP  Pain Scale: FACES Pre/Post-Treatment (Group)  -AC          Pain Scale: FACES Pre/Post-Treatment    Pain: FACES Scale,  Pretreatment  0-->no hurt  -MP  0-->no hurt  -AC       Posttreatment Pain Rating  0-->no hurt  -MP  0-->no hurt  -AC          Oral Motor Structure and Function    Dentition Assessment  edentulous, dentures not available  -MP  edentulous, dentures not available  -AC       Secretion Management  WNL/WFL  -MP  WNL/WFL  -AC       Mucosal Quality  moist, healthy  -MP  dry  -AC          Oral Musculature and Cranial Nerve Assessment    Oral Motor General Assessment  generalized oral motor weakness  -MP  generalized oral motor weakness  -AC          General Eating/Swallowing Observations    Respiratory Support Currently in Use  nasal cannula  -MP  nasal cannula  -AC       Eating/Swallowing Skills  fed by SLP  -MP  fed by SLP  -AC       Positioning During Eating  upright in chair  -MP  upright 90 degree;upright in chair  -AC       Utensils Used  spoon;cup  -MP  spoon  -AC       Consistencies Trialed  thin liquids;nectar/syrup-thick liquids;pureed  -MP  ice chips;thin liquids;nectar/syrup-thick liquids;pureed  -AC          Clinical Swallow Eval    Oral Prep Phase  --  impaired  -AC       Oral Transit  --  WFL  -AC       Oral Residue  --  WFL  -AC       Pharyngeal Phase  suspected pharyngeal impairment  -MP  suspected pharyngeal impairment  -AC       Clinical Swallow Evaluation Summary  Immediate hard cough w/ thin H2O. No s/sxs w/ nectar-thick or puree. Rec continue NPO & SLP will plan for FEES today to further assess.  -MP  --          Oral Prep Concerns    Oral Prep Concerns  --  incomplete or weak lip closure around spoon  -AC       Incomplete or Weak Lip Closure Around Spoon  --  all consistencies  -AC       Oral Prep Concerns, Comment  --  2'  lethargy  -AC          Pharyngeal Phase Concerns    Pharyngeal Phase Concerns  cough  -MP  multiple swallows;throat clear  -AC       Multiple Swallows  --  all consistencies;other (see comments) x ice  -AC       Cough  thin  -MP  --       Throat Clear  --  pudding  -AC        Pharyngeal Phase Concerns, Comment  --  Pt had difficulty maintaining alertness t/o eval. Weak, slightly dysphonic vocal quality. Clinical s/sxs aspiration noted w/ all consistencies x ice.   -AC          Clinical Impression    SLP Swallowing Diagnosis  suspected pharyngeal dysphagia  -MP  suspected pharyngeal dysphagia  -AC       Functional Impact  risk of aspiration/pneumonia  -MP  risk of aspiration/pneumonia;risk of malnutrition  -AC       Rehab Potential/Prognosis, Swallowing  good, to achieve stated therapy goals  -MP  good, to achieve stated therapy goals  -AC       Swallow Criteria for Skilled Therapeutic Interventions Met  demonstrates skilled criteria  -MP  demonstrates skilled criteria  -          Recommendations    Predicted Duration Therapy Intervention (Days)  until discharge  -  --       SLP Diet Recommendation  NPO;temporary alternate methods of nutrition/hydration;other (see comments) until FEES today  -MP  NPO;temporary alternate methods of nutrition/hydration;other (see comments) single ice chips sparingly, after oral care, only if alert  -       Recommended Diagnostics  FEES  -MP  reassess via clinical swallow evaluation  -       Oral Care Recommendations  Oral Care BID/PRN  -MP  Oral Care BID/PRN  -AC       SLP Rec. for Method of Medication Administration  meds via alternate route until FEES  -MP  meds via alternate route  -       Anticipated Discharge Disposition (SLP)  unknown;anticipate therapy at next level of care  -MP  anticipate therapy at next level of care  -         User Key  (r) = Recorded By, (t) = Taken By, (c) = Cosigned By    Initials Name Effective Dates    AC Tia Clark, MS CCC-SLP 07/27/17 -     Ismael Velarde MS CCC-SLP 06/19/19 -           EDUCATION  The patient has been educated in the following areas:   Dysphagia (Swallowing Impairment).    SLP Recommendation and Plan  SLP Swallowing Diagnosis: suspected pharyngeal dysphagia  SLP Diet Recommendation:  NPO, temporary alternate methods of nutrition/hydration, other (see comments) (until FEES today)     SLP Rec. for Method of Medication Administration: meds via alternate route (until FEES)        Recommended Diagnostics: FEES  Swallow Criteria for Skilled Therapeutic Interventions Met: demonstrates skilled criteria  Anticipated Discharge Disposition (SLP): unknown, anticipate therapy at next level of care  Rehab Potential/Prognosis, Swallowing: good, to achieve stated therapy goals     Predicted Duration Therapy Intervention (Days): until discharge                         Plan of Care Reviewed With: patient           Time Calculation:   Time Calculation- SLP     Row Name 06/01/21 1004             Time Calculation- SLP    SLP Start Time  0935  -MP      SLP Received On  06/01/21  -MP         Untimed Charges    [id number]-WV Eval Oral Pharyng Swallow Minutes  40  -MP         Total Minutes    Untimed Charges Total Minutes  40  -MP       Total Minutes  40  -MP        User Key  (r) = Recorded By, (t) = Taken By, (c) = Cosigned By    Initials Name Provider Type    Ismael Velarde MS CCC-SLP Speech and Language Pathologist          Therapy Charges for Today     Code Description Service Date Service Provider Modifiers Qty    [id number] HC ST EVAL ORAL PHARYNG SWALLOW 3 6/1/2021 Ismael Hall MS CCC-SLP GN 1          Patient was not wearing a face mask and did exhibit coughing during this therapy encounter.  Procedure performed was aerosolizing, involved close contact (within 6 feet for at least 15 minutes or longer), and did not involve contact with infectious secretions or specimens.  Therapist used appropriate personal protective equipment including gloves, standard procedure mask and eye protection.  Appropriate PPE was worn during the entire therapy session.  Hand hygiene was completed before and after therapy session.        Ismael Hall MS CCC-SLP  6/1/2021

## 2021-06-02 ENCOUNTER — APPOINTMENT (OUTPATIENT)
Dept: GENERAL RADIOLOGY | Facility: HOSPITAL | Age: 76
End: 2021-06-02

## 2021-06-02 LAB
ALBUMIN SERPL-MCNC: 3.2 G/DL (ref 3.5–5.2)
ANION GAP SERPL CALCULATED.3IONS-SCNC: 13 MMOL/L (ref 5–15)
APTT PPP: 38.6 SECONDS (ref 50–95)
BASOPHILS # BLD AUTO: 0.02 10*3/MM3 (ref 0–0.2)
BASOPHILS NFR BLD AUTO: 0.2 % (ref 0–1.5)
BUN SERPL-MCNC: 111 MG/DL (ref 8–23)
BUN/CREAT SERPL: 34.3 (ref 7–25)
CALCIUM SPEC-SCNC: 8.8 MG/DL (ref 8.6–10.5)
CHLORIDE SERPL-SCNC: 105 MMOL/L (ref 98–107)
CO2 SERPL-SCNC: 19 MMOL/L (ref 22–29)
CREAT SERPL-MCNC: 3.24 MG/DL (ref 0.76–1.27)
DEPRECATED RDW RBC AUTO: 51.8 FL (ref 37–54)
EOSINOPHIL # BLD AUTO: 0.24 10*3/MM3 (ref 0–0.4)
EOSINOPHIL NFR BLD AUTO: 2.1 % (ref 0.3–6.2)
ERYTHROCYTE [DISTWIDTH] IN BLOOD BY AUTOMATED COUNT: 15.8 % (ref 12.3–15.4)
GFR SERPL CREATININE-BSD FRML MDRD: 19 ML/MIN/1.73
GLUCOSE BLDC GLUCOMTR-MCNC: 205 MG/DL (ref 70–130)
GLUCOSE BLDC GLUCOMTR-MCNC: 230 MG/DL (ref 70–130)
GLUCOSE BLDC GLUCOMTR-MCNC: 325 MG/DL (ref 70–130)
GLUCOSE SERPL-MCNC: 227 MG/DL (ref 65–99)
HCT VFR BLD AUTO: 25.1 % (ref 37.5–51)
HGB BLD-MCNC: 8.2 G/DL (ref 13–17.7)
IMM GRANULOCYTES # BLD AUTO: 0.32 10*3/MM3 (ref 0–0.05)
IMM GRANULOCYTES NFR BLD AUTO: 2.7 % (ref 0–0.5)
LYMPHOCYTES # BLD AUTO: 0.87 10*3/MM3 (ref 0.7–3.1)
LYMPHOCYTES NFR BLD AUTO: 7.5 % (ref 19.6–45.3)
MCH RBC QN AUTO: 29.9 PG (ref 26.6–33)
MCHC RBC AUTO-ENTMCNC: 32.7 G/DL (ref 31.5–35.7)
MCV RBC AUTO: 91.6 FL (ref 79–97)
MONOCYTES # BLD AUTO: 0.87 10*3/MM3 (ref 0.1–0.9)
MONOCYTES NFR BLD AUTO: 7.5 % (ref 5–12)
NEUTROPHILS NFR BLD AUTO: 80 % (ref 42.7–76)
NEUTROPHILS NFR BLD AUTO: 9.32 10*3/MM3 (ref 1.7–7)
NRBC BLD AUTO-RTO: 1.2 /100 WBC (ref 0–0.2)
NT-PROBNP SERPL-MCNC: ABNORMAL PG/ML (ref 0–900)
PHOSPHATE SERPL-MCNC: 3.7 MG/DL (ref 2.5–4.5)
PLATELET # BLD AUTO: 199 10*3/MM3 (ref 140–450)
PMV BLD AUTO: 11 FL (ref 6–12)
POTASSIUM SERPL-SCNC: 3.8 MMOL/L (ref 3.5–5.2)
PROCALCITONIN SERPL-MCNC: 2.04 NG/ML (ref 0–0.25)
RBC # BLD AUTO: 2.74 10*6/MM3 (ref 4.14–5.8)
SODIUM SERPL-SCNC: 137 MMOL/L (ref 136–145)
WBC # BLD AUTO: 11.64 10*3/MM3 (ref 3.4–10.8)

## 2021-06-02 PROCEDURE — 99291 CRITICAL CARE FIRST HOUR: CPT | Performed by: INTERNAL MEDICINE

## 2021-06-02 PROCEDURE — 84145 PROCALCITONIN (PCT): CPT | Performed by: INTERNAL MEDICINE

## 2021-06-02 PROCEDURE — 85025 COMPLETE CBC W/AUTO DIFF WBC: CPT | Performed by: INTERNAL MEDICINE

## 2021-06-02 PROCEDURE — 99232 SBSQ HOSP IP/OBS MODERATE 35: CPT | Performed by: INTERNAL MEDICINE

## 2021-06-02 PROCEDURE — 82962 GLUCOSE BLOOD TEST: CPT

## 2021-06-02 PROCEDURE — 25010000002 CEFTRIAXONE PER 250 MG: Performed by: INTERNAL MEDICINE

## 2021-06-02 PROCEDURE — 25010000002 HEPARIN (PORCINE) PER 1000 UNITS: Performed by: INTERNAL MEDICINE

## 2021-06-02 PROCEDURE — 63710000001 INSULIN DETEMIR PER 5 UNITS: Performed by: INTERNAL MEDICINE

## 2021-06-02 PROCEDURE — 63710000001 INSULIN REGULAR HUMAN PER 5 UNITS: Performed by: NURSE PRACTITIONER

## 2021-06-02 PROCEDURE — 80069 RENAL FUNCTION PANEL: CPT | Performed by: INTERNAL MEDICINE

## 2021-06-02 PROCEDURE — 83880 ASSAY OF NATRIURETIC PEPTIDE: CPT | Performed by: INTERNAL MEDICINE

## 2021-06-02 PROCEDURE — 63710000001 INSULIN REGULAR HUMAN PER 5 UNITS: Performed by: INTERNAL MEDICINE

## 2021-06-02 PROCEDURE — 71045 X-RAY EXAM CHEST 1 VIEW: CPT

## 2021-06-02 PROCEDURE — 25010000002 NA FERRIC GLUC CPLX PER 12.5 MG: Performed by: INTERNAL MEDICINE

## 2021-06-02 PROCEDURE — 85730 THROMBOPLASTIN TIME PARTIAL: CPT | Performed by: INTERNAL MEDICINE

## 2021-06-02 PROCEDURE — 97530 THERAPEUTIC ACTIVITIES: CPT

## 2021-06-02 RX ORDER — SODIUM CHLORIDE 450 MG/100ML
70 INJECTION, SOLUTION INTRAVENOUS CONTINUOUS
Status: DISCONTINUED | OUTPATIENT
Start: 2021-06-03 | End: 2021-06-03

## 2021-06-02 RX ADMIN — INSULIN HUMAN 4 UNITS: 100 INJECTION, SOLUTION PARENTERAL at 00:56

## 2021-06-02 RX ADMIN — ATORVASTATIN CALCIUM 40 MG: 40 TABLET, FILM COATED ORAL at 22:05

## 2021-06-02 RX ADMIN — DOCUSATE SODIUM 100 MG: 50 LIQUID ORAL at 09:00

## 2021-06-02 RX ADMIN — ASPIRIN 325 MG ORAL TABLET 325 MG: 325 PILL ORAL at 08:59

## 2021-06-02 RX ADMIN — PANTOPRAZOLE SODIUM 40 MG: 40 INJECTION, POWDER, FOR SOLUTION INTRAVENOUS at 05:54

## 2021-06-02 RX ADMIN — INSULIN HUMAN 3 UNITS: 100 INJECTION, SOLUTION PARENTERAL at 05:55

## 2021-06-02 RX ADMIN — AMLODIPINE BESYLATE 5 MG: 5 TABLET ORAL at 08:59

## 2021-06-02 RX ADMIN — INSULIN DETEMIR 25 UNITS: 100 INJECTION, SOLUTION SUBCUTANEOUS at 22:05

## 2021-06-02 RX ADMIN — ISOSORBIDE DINITRATE 10 MG: 10 TABLET ORAL at 17:18

## 2021-06-02 RX ADMIN — HEPARIN SODIUM 5000 UNITS: 5000 INJECTION INTRAVENOUS; SUBCUTANEOUS at 22:06

## 2021-06-02 RX ADMIN — INSULIN HUMAN 7 UNITS: 100 INJECTION, SOLUTION PARENTERAL at 17:39

## 2021-06-02 RX ADMIN — INSULIN HUMAN 4 UNITS: 100 INJECTION, SOLUTION PARENTERAL at 12:29

## 2021-06-02 RX ADMIN — CARVEDILOL 12.5 MG: 12.5 TABLET, FILM COATED ORAL at 08:59

## 2021-06-02 RX ADMIN — ISOSORBIDE DINITRATE 10 MG: 10 TABLET ORAL at 09:03

## 2021-06-02 RX ADMIN — ISOSORBIDE DINITRATE 10 MG: 10 TABLET ORAL at 12:29

## 2021-06-02 RX ADMIN — SENNOSIDES 10 ML: 8.8 LIQUID ORAL at 22:06

## 2021-06-02 RX ADMIN — SODIUM CHLORIDE 125 MG: 9 INJECTION, SOLUTION INTRAVENOUS at 08:59

## 2021-06-02 RX ADMIN — ACETAMINOPHEN 650 MG: 160 SOLUTION ORAL at 04:24

## 2021-06-02 RX ADMIN — CARVEDILOL 12.5 MG: 12.5 TABLET, FILM COATED ORAL at 17:17

## 2021-06-02 RX ADMIN — HEPARIN SODIUM 5000 UNITS: 5000 INJECTION INTRAVENOUS; SUBCUTANEOUS at 05:54

## 2021-06-02 RX ADMIN — SODIUM CHLORIDE 1 G: 900 INJECTION INTRAVENOUS at 17:17

## 2021-06-02 RX ADMIN — ACETAMINOPHEN 650 MG: 160 SOLUTION ORAL at 22:06

## 2021-06-02 RX ADMIN — GABAPENTIN 600 MG: 300 CAPSULE ORAL at 22:05

## 2021-06-02 NOTE — PROGRESS NOTES
Intensivist Note     6/2/2021  Hospital Day: 11  5 Days Post-Op  ICU Stays Timeline            Hospital Admission: 05/22/21 0431 - Current  ICU stays: 1      In Date/Time Event Department ICU Stay Duration     05/22/21 0431 Admission [facility] EMERGENCY DEPT      05/22/21 0813 Transfer In [facility] 6B      05/24/21 1006 Transfer In [facility] CATH LAB      05/24/21 1122 Transfer In [facility] 6B      05/28/21 0619 Transfer In [facility] OR      05/28/21 1209 Transfer In [facility] 2HSIC 4 days 21 hours 53 minutes             Mr. Augusto Lorenzana ., 76 y.o. 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 HbA1c 7.4     Gout    Former smokeless tobacco use       SUBJECTIVE     76-year-old white male with a history of CAD s/p RCA stent 2009, diabetes mellitus (HbA1c 7.4), hypertension, hyperlipidemia, CKD, gout, and smokeless tobacco use.  She presented to [facility] ER 5/22/2021 complaining of dyspnea and was noted to be hypoxemic.  He had elevated troponins consistent with NSTEMI and cardiology proceeded with [facility] 5/24/2021 revealing multivessel CAD but preserved LVEF of 55%.  Creatinine was 1.9 on admission and transiently increased to 2.1 after cardiac catheterization only to return to baseline. Patient subsequently underwent CABG x 3 on 5/28/2021 and course complicated by the development of ventricular fibrillation and asystole requiring ACLS twice the day of surgery.  Subsequently required 48 hours of pacing before resuming to a sinus rhythm.  In addition required 3 units of packed RBCs, 4 units FFP, and 1 sixpack of platelets. Patient was subsequently extubated 5/29/2021 although has had persistent high FiO2 requirement.  In addition his renal function deteriorated in association with low UOP despite significantly positive fluid balance.    Interval history: Continues to have  "adequate hemodynamics with blood pressure 143/79 and no need for pressors.  In addition he has had no recurrence of arrhythmias.  Gas exchange has improved and O2 sat now 96% on 3 L.  Unfortunately UOP remains low and yet renal impairment persists.  Although creatinine has only increased slightly from 3.2, to 3.24.  BUN continues to rise from 92, to 111 (despite no diuretics being given yesterday).  He is hemodynamically stable, does not appear catabolic (is afebrile with WBC only 11.64), is receiving no steroids, no obvious GI blood losses, and is on no tetracycline agents to explain his excessive azotemia.  He remains profoundly weak although his dysarthria is better.  Nutritionally he is at goal with Isosource 1.5 enteral feeds.  He denies excessive chest pain, cough, purulent sputum production, hemoptysis, or pleuritic pain.  Denies dyspnea, nausea, vomiting, diarrhea, melena, hematochezia, or hematemesis.  Last catheter remains in place       ROS: Per subjective, all other systems reviewed and were negative.    The patient's relevant PMH, PSH, FH, and SH were reviewed and updated in Epic as appropriate. Allergies and Medications reviewed.    OBJECTIVE     /97   Pulse 69   Temp 98.6 °F (37 °C) (Oral)   Resp 20   Ht 170 cm (66.93\")   Wt 84.8 kg (187 lb)   SpO2 96%   BMI 29.35 kg/m²   Flow (L/min): 3    Flowsheet Rows      First Filed Value   Admission Height  170.2 cm (67\") Documented at 05/22/2021 0433   Admission Weight  88.5 kg (195 lb) Documented at 05/22/2021 0433        Intake & Output (last day)       06/01 0701 - 06/02 0700 06/02 0701 - 06/03 0700    I.V. (mL/kg) 433.4 (5.1)     Other 621     NG/     IV Piggyback 125     Total Intake(mL/kg) 2164.4 (25.5)     Urine (mL/kg/hr) 925 (0.5)     Chest Tube 50     Total Output 975     Net +1189.4                 Exam:  General Exam:  Elderly white male sitting up in a chair.  NAD but appears quite weak and debilitated  HEENT: Pupils equal and " reactive. Nose and throat clear.  Neck:                          Supple, no JVD, thyromegaly, or adenopathy.  Right IJ line in place  Lungs: Clear to auscultation and percussion anteriorly and posteriorly.  Cardiovascular: RRR without murmurs or gallops.  HR 60 bpm  Abdomen: Soft nontender without organomegaly or masses.   and rectal: Last catheter in place  Extremities: No cyanosis or clubbing but still with lower extremity edema right greater than left as well as edema of the dorsum of both hands associated with some ecchymoses of his forearms.  Neurologic:                 Symmetric strength but diffusely weak. No focal deficits.  Oriented x3    Chest X-Ray: Cardiomegaly with intact sternal wires.  Bilateral congestive changes persist.  Still with bibasilar atelectasis/effusions.  Right IJ catheter remains in place as does the NG tube which ends in the stomach    INFUSIONS  dextrose 5 % with KCl 20 mEq, 30 mL/hr, Last Rate: 30 mL/hr (05/28/21 1258)  niCARdipine, 5-15 mg/hr, Last Rate: Stopped (05/30/21 0350)  nitroglycerin, 5-200 mcg/min, Last Rate: 85 mcg/min (06/01/21 1041)  norepinephrine, 0.02-0.3 mcg/kg/min, Last Rate: Stopped (05/29/21 1245)        Results from last 7 days   Lab Units 06/02/21  0352 06/01/21  1541 06/01/21  0353 05/31/21  0341   WBC 10*3/mm3 11.64*  --  13.34* 12.45*   HEMOGLOBIN g/dL 8.2* 8.1* 7.8* 8.1*   HEMATOCRIT % 25.1* 24.7* 23.0* 24.8*   PLATELETS 10*3/mm3 199  --  185 174     Results from last 7 days   Lab Units 06/02/21  0352 06/01/21  0353   SODIUM mmol/L 137 137   POTASSIUM mmol/L 3.8 3.9   CHLORIDE mmol/L 105 104   CO2 mmol/L 19.0* 19.0*   BUN mg/dL 111* 92*   CREATININE mg/dL 3.24* 3.20*   GLUCOSE mg/dL 227* 166*   CALCIUM mg/dL 8.8 8.7     Results from last 7 days   Lab Units 06/02/21  0352 05/31/21  1141 05/31/21  0341 05/28/21  2207 05/28/21  1559   MAGNESIUM mg/dL  --  2.4  --  2.1 2.3   PHOSPHORUS mg/dL 3.7 5.9* 5.6* 4.9* 4.3     Results from last 7 days   Lab Units  05/31/21  1141 05/28/21  1757 05/28/21  1559   ALK PHOS U/L 91 66 63   BILIRUBIN mg/dL 0.6 0.5 0.7   ALT (SGPT) U/L 22 25 23   AST (SGOT) U/L 22 60* 54*       No results found for: SEDRATE  No results found for: BNP  Lab Results   Component Value Date    TROPONINT 1.550 (C) 05/22/2021     No results found for: TSH  Lab Results   Component Value Date    LACTATE 1.5 05/22/2021     No results found for: CORTISOL    Results from last 7 days   Lab Units 06/01/21  0934 05/29/21  1331 05/28/21  1827 05/28/21  1236 05/28/21  1116   PH, ARTERIAL pH units 7.446 7.421 7.288* 7.394 7.38   PCO2, ARTERIAL mm Hg 29.0* 33.3* 41.1 34.8*  --    PO2 ART mm Hg 74.2* 88.4 77.2* 244.0*  --    HCO3 ART mmol/L 19.9* 21.7 19.7* 21.2  --    FIO2 % 44 40 40 100  --          I reviewed the patient's results, images and medication.    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 HbA1c 7.4     Gout    Former smokeless tobacco use      DISCUSSION: Clinically looks improved but is profoundly weak and we need to continue with PT/OT.  Unfortunately renal recovery is not occurring, but if he continues to remain hemodynamically stable I am hoping to see a turnaround by tomorrow.  If continues to deteriorate or becomes confused with increased azotemia nephrology will consider RRT.  I'd like to avoid this if possible as will make renal recovery more difficult.    PLAN     1.  Continue to allow oral intake as per speech guidelines  2.  Change enteral feeds to Nepro to see if this helps minimize his azotemia  3.  OT to see as well as PT and continue mobilization  4.  Follow-up renal function in a.m.  5.  May need RRT if renal function does not improve  6.  When held n.p.o. at midnight tonight place on half-normal saline at 70 cc/hour until oral intake and NG feedings resumed  tomorrow    Plan of care and goals reviewed with multidisciplinary team at daily rounds.    I discussed the patient's findings and my recommendations with patient and nursing staff    Patient is critically ill due to progressive renal failure and has a high risk of life-threatening decline in condition, volume overload, and diastolic CHF.  He requires continuous monitoring and frequent reassessment of condition for adjustment of management in order to lessen risk.  I visited the patient's bedside and interacted with nurse numerous times today.    Time spent Critical care 35 min (It does not include procedure time).    Electronically signed by Ish Loaiza MD, 06/02/21, 10:02 AM EDT.   Pulmonary / Critical care medicine

## 2021-06-02 NOTE — PROGRESS NOTES
Order received for Phase II Cardiac Rehab. Patient was asleep during time of consult. Staff will follow up on a later date.

## 2021-06-02 NOTE — THERAPY TREATMENT NOTE
Patient Name: Augusto Lorenzana Jr.  : 1945    MRN: [id number]                              Today's Date: 2021       Admit Date: 2021    Visit Dx:     ICD-10-CM ICD-9-CM   1. Non-STEMI (non-ST elevated myocardial infarction) (CMS/[facility])  I21.4 410.70   2. Acute congestive heart failure, unspecified heart failure type (CMS/[facility])  I50.9 428.0   3. History of coronary artery disease  Z86.79 V12.59   4. Coronary artery disease involving native coronary artery of native heart, angina presence unspecified  I25.10 414.01   5. Dysphagia, unspecified type  R13.10 787.20     Patient Active Problem List   Diagnosis   • NSTEMI 2021   • Hyperlipidemia LDL goal <70   • Essential hypertension   • T2DM on oral agents and insulin. HbA1c 7.4    • Severe 3 vessel CAD with preserved LV function (CMS/[facility])   • CKD with post op MICHAEL   • Gout   • Former smokeless tobacco use   • S/P CABG x 3 on 21   • Postoperative hypoxemia     Past Medical History:   Diagnosis Date   • CAD (coronary artery disease)    • CKD (chronic kidney disease) stage 3, GFR 30-59 ml/min (CMS/[facility])    • Diabetes mellitus (CMS/[facility])    • Hyperlipidemia    • Hypertension    • NSTEMI (non-ST elevated myocardial infarction) (CMS/[facility])      Past Surgical History:   Procedure Laterality Date   • CARDIAC CATHETERIZATION N/A 2021    Procedure: Left Heart Cath;  Surgeon: Blade Greene IV, MD;  Location:  [facility];  Service: Cardiovascular;  Laterality: N/A;   • CARDIAC SURGERY      2 stents placed .   • CORONARY ARTERY BYPASS GRAFT N/A 2021    Procedure: MEDIAN STERNOTOMY CORONARY ARTERY BYPASS X 3 UTILIZING THE LEFT INTERNAL MAMMARY ARTERY GRAFT, EVH OF THE GREATER RIGHT SAPHENOUS VEIN, AND PILO PER ANESTHESIA;  Surgeon: Jacek Miller MD;  Location: [facility] OR;  Service: Cardiothoracic;  Laterality: N/A;     General Information     Row Name 21 1131          Physical Therapy Time and Intention    Document  Type  therapy note (daily note)  -KG     Mode of Treatment  physical therapy  -KG     Row Name 06/02/21 1131          General Information    Existing Precautions/Restrictions  cardiac;fall;oxygen therapy device and L/min;sternal;other (see comments) NG  -KG     Row Name 06/02/21 1131          Cognition    Orientation Status (Cognition)  oriented to;person  -KG     Row Name 06/02/21 1131          Safety Issues, Functional Mobility    Safety Issues Affecting Function (Mobility)  ability to follow commands;awareness of need for assistance;insight into deficits/self-awareness;safety precaution awareness;safety precautions follow-through/compliance  -KG     Impairments Affecting Function (Mobility)  balance;cognition;coordination;endurance/activity tolerance;motor control;motor planning;postural/trunk control;pain;shortness of breath;strength  -KG     Cognitive Impairments, Mobility Safety/Performance  attention;awareness, need for assistance;insight into deficits/self-awareness;safety precaution awareness;safety precaution follow-through  -KG     Comment, Safety Issues/Impairments (Mobility)  pt lethargic; intermittent confusion; delayed processing and slow to follow commands  -KG       User Key  (r) = Recorded By, (t) = Taken By, (c) = Cosigned By    Initials Name Provider Type    KG Aliza Shen, PT Physical Therapist        Mobility     Row Name 06/02/21 1132          Bed Mobility    Comment (Bed Mobility)  UIC  -KG     Row Name 06/02/21 1132          Transfers    Comment (Transfers)  STS x4 from chair with PT/tech in front on either side to block butch knees. Pt required max cueing for hand placement to maintain sternal precautions. Pt with delayed processing; required frequent repetition of instructions. Upon standing pt with tendency to slide butch feet too far in front decreasing ability to weight bear. Pt with significant posterior lean. Improved posture noted this date with increased verbal and tactile  cues. Pt able to achieve full upright posture on two standing attempts.  -KG     Row Name 06/02/21 1132          Sit-Stand Transfer    Sit-Stand [place] (Transfers)  maximum assist (25% patient effort);2 person assist;verbal cues progressed to modA x2 for 4th attempt  -KG     Row Name 06/02/21 1132          Gait/Stairs (Locomotion)    [place] Level (Gait)  maximum assist (25% patient effort);2 person assist;verbal cues  -KG     Assistive Device (Gait)  other (see comments) B UE support  -KG     Distance in Feet (Gait)  8  -KG     Deviations/Abnormal Patterns (Gait)  bilateral deviations;base of support, narrow;chinyere decreased;festinating/shuffling;stride length decreased  -KG     Bilateral Gait Deviations  forward flexed posture;heel strike decreased;weight shift ability decreased  -KG     Comment (Gait/Stairs)  Pt able to take steps forward and backward at edge of chair. Required increased cueing for sequencing and technique. Physical assistance required to weight shift to advance LEs. Pt unsteady with impaired coordination. Continued to demonstrate posterior lean during mobility.  Required one seated rest break. Limited by confusion and periods of difficulty following commands.  -KG       User Key  (r) = Recorded By, (t) = Taken By, (c) = Cosigned By    Initials Name Provider Type    KG Aliza Shen PT Physical Therapist        Obj/Interventions     Row Name 06/02/21 1137          Balance    Balance Assessment  sitting static balance;standing static balance;standing dynamic balance  -KG     Static Sitting Balance  mild impairment;supported;sitting in chair  -KG     Static Standing Balance  moderate impairment;supported;standing  -KG     Dynamic Standing Balance  severe impairment;supported;standing  -KG       User Key  (r) = Recorded By, (t) = Taken By, (c) = Cosigned By    Initials Name Provider Type    KG Aliza Shen, PT Physical Therapist        Goals/Plan    No documentation.        Clinical Impression     Row Name 06/02/21 1137          Pain    Additional Documentation  Pain Scale: Numbers Pre/Post-Treatment (Group)  -KG     Row Name 06/02/21 1137          Pain Scale: Numbers Pre/Post-Treatment    Pretreatment Pain Rating  7/10  -KG     Posttreatment Pain Rating  7/10  -KG     Pain Location - Orientation  incisional  -KG     Pain Location  chest  -KG     Pain Intervention(s)  Repositioned;Ambulation/increased activity  -KG     Row Name 06/02/21 1137          Plan of Care Review    Plan of Care Reviewed With  patient  -KG     Progress  improving  -KG     Outcome Summary  Pt performed STS x4 from chair with maxA x2, progressing to modA x2. Pt ambulated 8ft total with maxA x2 and B UE support. Required one seated rest break. Pt continues to demonstrate posteior lean throughout mobility. Required physical assistance to weight shift to advance LEs. Pt limited by periods of confusion and decreased command following. Continue to progress as appropriate.  -KG     Row Name 06/02/21 1137          Vital Signs    Pre Systolic BP Rehab  149  -KG     Pre Treatment Diastolic BP  97  -KG     Post Systolic BP Rehab  150  -KG     Post Treatment Diastolic BP  77  -KG     Pretreatment Heart Rate (beats/min)  68  -KG     Posttreatment Heart Rate (beats/min)  70  -KG     Pre SpO2 (%)  96  -KG     O2 Delivery Pre Treatment  supplemental O2  -KG     Post SpO2 (%)  97  -KG     O2 Delivery Post Treatment  supplemental O2  -KG     Pre Patient Position  Sitting  -KG     Intra Patient Position  Standing  -KG     Post Patient Position  Sitting  -KG     Row Name 06/02/21 1137          Positioning and Restraints    Pre-Treatment Position  sitting in chair/recliner  -KG     Post Treatment Position  chair  -KG     In Chair  notified nsg;reclined;call light within reach;encouraged to call for assist;RUE elevated;LUE elevated;legs elevated  -KG       User Key  (r) = Recorded By, (t) = Taken By, (c) = Cosigned By    Initials  Name Provider Type    Aliza Bhandari PT Physical Therapist        Outcome Measures     Row Name 06/02/21 1140          How much help from another person do you currently need...    Turning from your back to your side while in flat bed without using bedrails?  2  -KG     Moving from lying on back to sitting on the side of a flat bed without bedrails?  2  -KG     Moving to and from a bed to a chair (including a wheelchair)?  2  -KG     Standing up from a chair using your arms (e.g., wheelchair, bedside chair)?  2  -KG     Climbing 3-5 steps with a railing?  1  -KG     To walk in hospital room?  2  -KG     AM-PAC 6 Clicks Score (PT)  11  -KG     Row Name 06/02/21 1140          Functional Assessment    Outcome Measure Options  AM-PAC 6 Clicks Basic Mobility (PT)  -KG       User Key  (r) = Recorded By, (t) = Taken By, (c) = Cosigned By    Initials Name Provider Type    Aliza Bhandari PT Physical Therapist        Physical Therapy Education                 Title: PT OT SLP Therapies (In Progress)     Topic: Physical Therapy (In Progress)     Point: Mobility training (In Progress)     Learning Progress Summary           Patient Acceptance, E, NR by KG at 6/2/2021 0909    Acceptance, E, NR by KG at 6/1/2021 0933    Acceptance, E, NR by KG at 5/31/2021 1118    Acceptance, E,TB, VU,NR by AY at 5/30/2021 1107                   Point: Home exercise program (In Progress)     Learning Progress Summary           Patient Acceptance, E, NR by KG at 6/2/2021 0909    Acceptance, E, NR by KG at 6/1/2021 0933    Acceptance, E, NR by KG at 5/31/2021 1118                   Point: Body mechanics (In Progress)     Learning Progress Summary           Patient Acceptance, E, NR by KG at 6/2/2021 0909    Acceptance, E, NR by KG at 6/1/2021 0933    Acceptance, E, NR by KG at 5/31/2021 1118    Acceptance, E,TB, VU,NR by AY at 5/30/2021 1107                   Point: Precautions (In Progress)     Learning Progress Summary            Patient Acceptance, E, NR by KG at 6/2/2021 0909    Acceptance, E, NR by KG at 6/1/2021 0933    Acceptance, E, NR by KG at 5/31/2021 1118    Acceptance, E,TB, VU,NR by AY at 5/30/2021 1107                               User Key     Initials Effective Dates Name Provider Type Discipline    KG 05/22/20 -  Aliza Shen PT Physical Therapist PT    JET 11/10/20 -  Megan Moffett PT Physical Therapist PT              PT Recommendation and Plan     Plan of Care Reviewed With: patient  Progress: improving  Outcome Summary: Pt performed STS x4 from chair with maxA x2, progressing to modA x2. Pt ambulated 8ft total with maxA x2 and B UE support. Required one seated rest break. Pt continues to demonstrate posteior lean throughout mobility. Required physical assistance to weight shift to advance LEs. Pt limited by periods of confusion and decreased command following. Continue to progress as appropriate.     Time Calculation:   PT Charges     Row Name 06/02/21 0909             Time Calculation    Start Time  0909  -KG      PT Received On  06/02/21  -KG      PT Goal Re-Cert Due Date  06/09/21  -KG         Time Calculation- PT    Total Timed Code Minutes- PT  23 minute(s)  -KG         Timed Charges    31443 - PT Therapeutic Activity Minutes  23  -KG         Total Minutes    Timed Charges Total Minutes  23  -KG       Total Minutes  23  -KG        User Key  (r) = Recorded By, (t) = Taken By, (c) = Cosigned By    Initials Name Provider Type    KG Aliza Shen, PT Physical Therapist        Therapy Charges for Today     Code Description Service Date Service Provider Modifiers Qty    77116599058 HC PT THERAPEUTIC ACT EA 15 MIN 6/1/2021 Aliza Shen, PT GP 2    96653094042 HC PT THERAPEUTIC ACT EA 15 MIN 6/2/2021 Aliza Shen, PT GP 2    58939994580 HC PT THER SUPP EA 15 MIN 6/2/2021 Aliza Shen, PT GP 2          PT G-Codes  Outcome Measure Options: AM-PAC 6 Clicks Basic Mobility  (PT)  AM-[facility] 6 Clicks Score (PT): 11    Jenise Shen, PT  6/2/2021

## 2021-06-02 NOTE — PLAN OF CARE
Goal Outcome Evaluation:  Plan of Care Reviewed With: patient  Progress: improving  Outcome Summary: Pt performed STS x4 from chair with maxA x2, progressing to modA x2. Pt ambulated 8ft total with maxA x2 and B UE support. Required one seated rest break. Pt continues to demonstrate posteior lean throughout mobility. Required physical assistance to weight shift to advance LEs. Pt limited by periods of confusion and decreased command following. Continue to progress as appropriate.

## 2021-06-02 NOTE — PLAN OF CARE
Goal Outcome Evaluation:  Plan of Care Reviewed With: patient  Progress: improving  Outcome Summary: No significant changes overnight. Neurologically remains intact and oriented x4 however speech is somewhat garbled. Moves all extremities equally. Tolerating honey-thickened liquids with no signs of aspiration.Tube feed continues at goal rate, no residual. Still requiring 6L NC to maintain SpO2 >90%, lung sounds clear, intermittently coughing up very thick tan sputum. Remains in normal sinus rhythm. -140mmHg off all gtt's. UOP marginal but adequate for shift. AM labs, CXR pending.

## 2021-06-02 NOTE — PROGRESS NOTES
Cardiology Progress Note      Reason for visit:    · NSTEMI/multivessel CAD/status post CABG        IDENTIFICATION: 76-year-old gentleman who resides in [facility] Problems    Diagnosis  POA   • **NSTEMI 5/22/2021 [I21.4]  Yes     Priority: High     · History of previous PCI, data deficit  · [facility] ER presentation with chest pain and elevated troponin, 5/22/2021  · Echo (5/22/2021): LVEF 55%.  Inferolateral hypokinesis.  Mild MR.  Mild aortic stenosis.  · Cardiac catheterization for NSTEMI (5/24/2021): Severe multivessel CAD (LAD, LCx, RCA).  Surgical revascularization recommended  · CABG with Dr. Miller (5/28/2021):LIMA to LAD.  SVG to first OM.  SVG to PDA2. Greater saphenous vein to first obtuse marginal     • CKD with post op MICHAEL [N18.9]  Yes     Priority: High   • T2DM on oral agents and insulin. HbA1c 7.4  [E11.9, Z79.4]  Not Applicable     Priority: High   • Hyperlipidemia LDL goal <70 [E78.5]  Yes     Priority: Medium   • Essential hypertension [I10]  Yes     Priority: Medium   • Gout [M10.9]  Yes     Priority: Low   • Former smokeless tobacco use [Z87.891]  Not Applicable     Priority: Low   • S/P CABG x 3 on 5/28/21 [Z95.1]  Not Applicable   • Postoperative hypoxemia [R09.02, Z98.890]  No            · No new events overnight  · Ambulated with assistance this morning  · Discussed with daughter in room         Vital Sign Min/Max for last 24 hours  Temp  Min: 97.2 °F (36.2 °C)  Max: 98.9 °F (37.2 °C)   BP  Min: 89/60  Max: 151/71   Pulse  Min: 64  Max: 84   Resp  Min: 16  Max: 20   SpO2  Min: 90 %  Max: 96 %   Flow (L/min)  Min: 3  Max: 6      Intake/Output Summary (Last 24 hours) at 6/2/2021 0816  Last data filed at 6/2/2021 0600  Gross per 24 hour   Intake 2164.4 ml   Output 955 ml   Net 1209.4 ml           Physical Exam  Constitutional:       General: He is awake.   Cardiovascular:      Rate and Rhythm: Normal rate and regular rhythm.   Pulmonary:      Effort: Pulmonary  effort is normal.      Breath sounds: Normal breath sounds.   Musculoskeletal:      Right lower le+ Pitting Edema present.      Left lower le+ Pitting Edema present.   Skin:     General: Skin is warm and dry.   Neurological:      Mental Status: He is alert and oriented to person, place, and time.   Psychiatric:         Mood and Affect: Mood normal.         Behavior: Behavior normal. Behavior is cooperative.       Tele: NSR     Results Review (reviewed the patient's recent labs in the electronic medical record):      EKG (2021): Normal sinus rhythm, right bundle branch block     CXR (2021): Borderline heart shadow enlargement mild pulmonary venous hypertension.  No new chest disease     ECHO (2021): LVEF 60%.  Grade 1 diastolic dysfunction.  Mild AS       Result Review:      Results from last 7 days   Lab Units 21  0352 21  0353 21  1141 21  0341 21  0327 21  2207 21  1757 21  1559   SODIUM mmol/L 137 137 138 139 140 141 141 141  140   POTASSIUM mmol/L 3.8 3.9 4.3 3.8 3.8 4.1 4.6 3.9  3.8   CHLORIDE mmol/L 105 104 105 105 106 106 106 106  105   BUN mg/dL 111* 92* 76* 71* 52* 44* 42* 42*  43*   CREATININE mg/dL 3.24* 3.20* 3.09* 2.79* 2.72* 2.36* 2.40* 2.18*  2.21*   MAGNESIUM mg/dL  --   --  2.4  --   --  2.1  --  2.3         Results from last 7 days   Lab Units 21  0352 21  1541 21  0353 21  0341   WBC 10*3/mm3 11.64*  --  13.34* 12.45*   HEMOGLOBIN g/dL 8.2* 8.1* 7.8* 8.1*   HEMATOCRIT % 25.1* 24.7* 23.0* 24.8*   PLATELETS 10*3/mm3 199  --  185 174       Lab Results   Component Value Date    HGBA1C 7.40 (H) 2021       Lab Results   Component Value Date    CHOL 99 2021    TRIG 101 2021    HDL 34 (L) 2021    LDL 88 2021              NSTEMI  · CABG 2021  · Continue aspirin, beta-blocker, statin    Hypertension  · Controlled       Hyperlipidemia  · Continue atorvastatin     Acute kidney  injury  · Creatinine 3.24  · No ACE/ARB due to MICHAEL     Type 2 diabetes mellitus  · Management per hospitalist  · Hemoglobin A1c 7.4                · Continue aspirin, statin and beta-blocker  · Continue to work with physical therapy  · Monitor creatinine closely.  Avoid ACE/ARB/Entresto due to MICHAEL    Electronically signed by Blade Gerene IV, MD, 06/02/21, 11:49 AM EDT.

## 2021-06-02 NOTE — PROGRESS NOTES
Clinical Nutrition     Nutrition Support Assessment  Reason for Visit:   Follow-up protocol, NPO/Clear liquid, EN      Patient Name: Augusto Lorenzana Jr.  YOB: 1945  MRN: [id number]  Date of Encounter: 06/02/21 08:41 EDT  Admission date: 5/22/2021        Nutrition Assessment   Assessment   NSTEMI  CAD  s/p CABG x3 (5-28-21)  Coded x2  (5-28-21)  Extubated 5-29-21  MICHAEL/CKD    PMH: He  has a past medical history of CAD (coronary artery disease), CKD (chronic kidney disease) stage 3, GFR 30-59 ml/min (CMS/[facility]), Diabetes mellitus (CMS/[facility]), Hyperlipidemia, Hypertension, and NSTEMI (non-ST elevated myocardial infarction) (CMS/[facility]).Obesity, Anemia   PSxH: He  has a past surgical history that includes Cardiac surgery; Cardiac catheterization (N/A, 5/24/2021); and Coronary artery bypass graft (N/A, 5/28/2021).     Substance history: Tobacco remote    SLP Recommendation and Plan/ FEES 6-1-21  SLP Swallowing Diagnosis: moderate, pharyngeal dysphagia  SLP Diet Recommendation: puree with some mashed, honey thick liquids  Recommended Precautions and Strategies: upright posture during/after eating, small bites of food and sips of liquid, no straw, general aspiration precautions  SLP Rec. for Method of Medication Administration: meds whole, with pudding or applesauce, as tolerated  Monitor for Signs of Aspiration: yes, notify SLP if any concerns  Reported/Observed/Food/Nutrition Related History:     Pt sitting up in chair, on nasal cannula, his voice is stronger, pt attempting to eat breakfast, but hands are very swollen    Per RN: pt too weak to feed himself, plan to consult OT, tolerating TF    Anthropometrics     Height: 67in  Last filed wt: 187lb  Weight Method: Standing scale    BMI: BMI (Calculated): 29.4  Overweight: 25.0-29.9kg/m2             Last 15 Recorded Weights  View Complete Flowsheet  Weight Weight (kg) Weight (lbs) Weight Method   5/28/2021 84.823 kg 187 lb Standing scale   5/27/2021  85.73 kg 189 lb -   5/27/2021 85.503 kg 188 lb 8 oz Bed scale   5/26/2021 82.691 kg 182 lb 4.8 oz Bed scale   5/25/2021 81.421 kg 179 lb 8 oz Bed scale   5/24/2021 80.513 kg 177 lb 8 oz Standing scale   5/23/2021 82.328 kg 181 lb 8 oz Standing scale   5/22/2021 88.451 kg 195 lb -   5/22/2021 88.451 kg 195 lb Stated         Labs reviewed     Results from last 7 days   Lab Units 06/02/21  0352 06/01/21  0353 05/31/21  1141 05/31/21  0341 05/28/21  2207 05/28/21  1757 05/28/21  1559   GLUCOSE mg/dL 227* 166* 143* 153* 114* 180* 109*  110*   BUN mg/dL 111* 92* 76* 71* 44* 42* 42*  43*   CREATININE mg/dL 3.24* 3.20* 3.09* 2.79* 2.36* 2.40* 2.18*  2.21*   SODIUM mmol/L 137 137 138 139 141 141 141  140   CHLORIDE mmol/L 105 104 105 105 106 106 106  105   POTASSIUM mmol/L 3.8 3.9 4.3 3.8 4.1 4.6 3.9  3.8   PHOSPHORUS mg/dL 3.7  --  5.9* 5.6* 4.9*  --  4.3   MAGNESIUM mg/dL  --   --  2.4  --  2.1  --  2.3   ALT (SGPT) U/L  --   --  22  --   --  25 23     Results from last 7 days   Lab Units 06/02/21  0352 05/31/21  1141 05/31/21  0341 05/28/21  1231   ALBUMIN g/dL 3.20* 3.30* 3.30* 3.50   PREALBUMIN mg/dL  --  12.9*  --   --    CRP mg/dL  --  26.04*  --   --    IONIZED CALCIUM mmol/L  --   --   --  1.45*   CHOLESTEROL mg/dL  --  99  --   --    TRIGLYCERIDES mg/dL  --  101  --   --        Results from last 7 days   Lab Units 06/02/21  0538 06/01/21  2347 06/01/21  1753 06/01/21  1546 06/01/21  1121 06/01/21  0527   GLUCOSE mg/dL 205* 261* 265* 259* 230* 203*     Lab Results   Lab Value Date/Time    HGBA1C 7.40 (H) 05/22/2021 0433         Medications reviewed   Pertinent:abx, insulin, protonix, senokot, colace, iron, heparin      Intake/Ouptut 24 hrs (7:00AM - 6:59 AM)     Intake & Output (last day)       06/01 0701 - 06/02 0700 06/02 0701 - 06/03 0700    I.V. (mL/kg) 433.4 (5.1)     Other 621     NG/     IV Piggyback 125     Total Intake(mL/kg) 2164.4 (25.5)     Urine (mL/kg/hr) 925 (0.5)     Chest Tube 50      Total Output 975     Net +1189.4                      GI: wnl    SKIN: surgical sites, R. Arm tear, R. 5th toe- stubbed    Needs Assessment 5-31-21       Height used 67in   Weight used 187lb         Estimated need Method/Equation used Result    Energy/Calorie need   kcals/d 20kcal per kg  1700kcal    MSJ x 1.2 1859kcal     goal ~1800kcal   Protein g/day 0.8-1.2g protein per kg 68-102g protein     goal ~85g protein                         Current Nutrition Prescription     PO: Diet Dysphagia; III - Pureed With Some Mashed; Honey Thick; No Straws; Cardiac, Consistent Carbohydrate  NPO Diet  Intake: no data    EN: Isosource 1.5 @60ml/hr, free water @25ml/hr    Intake: 985ml, 82% goal volume    EN at goal will provide: 1200ml, 1800kcal, 82g protein, 1412ml free water including flush    Nutrition Diagnosis     5-31-21, 6-2  Problem Inadequate energy intake   Etiology Per Clinical Status   Signs/Symptoms NPO 3 days   Statua: improved -EN started 5-31    Nutrition Intervention   1.  Follow treatment progress, Nutrition support order placed    Rec maintain EN for now,  as do not anticipate pt is able to eat enough to support his recovery    Consider increasing long acting insulin, appears pt was on 30 units of lantus at home    Goal:   General: Nutrition support treatment  EN/PN: Maintain EN  Establish PO    Monitoring/Evaluation:   Per protocol, I&O, Pertinent labs, Weight, Skin status, GI status, Symptoms    Melvi Hawkins RD, CNSC  Time Spent: 30min

## 2021-06-02 NOTE — PROGRESS NOTES
Cardiothoracic Surgery Progress Note      POD # 5 s/p CABG       LOS: 11 days      Subjective:  On 6L NC. Productive cough.       Objective:  Vital Signs  Temp:  [97.2 °F (36.2 °C)-98.9 °F (37.2 °C)] 98.6 °F (37 °C)  Heart Rate:  [64-84] 64  Resp:  [16-22] 20  BP: ()/(59-81) 135/74  Arterial Line BP: (100-149)/(48-68) 127/56    Physical Exam:   General Appearance: alert, appears stated age and cooperative   Lungs: clear to auscultation, respirations regular, respirations even and respirations unlabored   Heart: regular rhythm & normal rate, normal S1, S2, no murmur, no gallop, no rub and no click   Skin: Incision c/d/i     Results:    Results from last 7 days   Lab Units 06/02/21  0352   WBC 10*3/mm3 11.64*   HEMOGLOBIN g/dL 8.2*   HEMATOCRIT % 25.1*   PLATELETS 10*3/mm3 199     Results from last 7 days   Lab Units 06/02/21  0352   SODIUM mmol/L 137   POTASSIUM mmol/L 3.8   CHLORIDE mmol/L 105   CO2 mmol/L 19.0*   BUN mg/dL 111*   CREATININE mg/dL 3.24*   GLUCOSE mg/dL 227*   CALCIUM mg/dL 8.8         Assessment:    NSTEMI 5/22/2021    Hyperlipidemia LDL goal <70    Essential hypertension    T2DM on oral agents and insulin. HbA1c 7.4     CKD with post op MICHAEL    Gout    Former smokeless tobacco use    S/P CABG x 3 on 5/28/21    Postoperative hypoxemia    POD 5 CABG x3    Plan:  Cr continues to increase, made 865 urine  SLP recommended honey thick liquids  Aggressive pulmonary toilet        Mireille Cedillo PA-C  06/02/21  06:59 EDT

## 2021-06-02 NOTE — PROGRESS NOTES
"   LOS: 11 days    Patient Care Team:  Rob Polanco MD as PCP - General (Internal Medicine)    Reason For Visit:  F/U MICHAEL  Subjective           Review of Systems:    Pulm: No soa   CV:  No CP. GI: NO N/V/D.      Objective     amLODIPine, 5 mg, Oral, Q24H  aspirin, 325 mg, Nasogastric, Daily  atorvastatin, 40 mg, Nasogastric, Nightly  carvedilol, 12.5 mg, Oral, BID With Meals  cefTRIAXone, 1 g, Intravenous, Q24H  sennosides, 10 mL, Nasogastric, BID   And  docusate sodium, 100 mg, Nasogastric, BID  ferric gluconate, 125 mg, Intravenous, Daily  gabapentin, 600 mg, Nasogastric, Nightly  heparin (porcine), 5,000 Units, Subcutaneous, Q8H  insulin detemir, 10 Units, Subcutaneous, Nightly  insulin regular, 0-7 Units, Subcutaneous, Q6H  isosorbide dinitrate, 10 mg, Oral, TID - Nitrates  pantoprazole, 40 mg, Intravenous, Q AM  pharmacy consult - [place], , Does not apply, Daily  sodium chloride, 10 mL, Intravenous, Q12H      dextrose 5 % with KCl 20 mEq, 30 mL/hr, Last Rate: 30 mL/hr (05/28/21 1258)  niCARdipine, 5-15 mg/hr, Last Rate: Stopped (05/30/21 0350)  nitroglycerin, 5-200 mcg/min, Last Rate: 85 mcg/min (06/01/21 1041)  norepinephrine, 0.02-0.3 mcg/kg/min, Last Rate: Stopped (05/29/21 1245)          Vital Signs:  Blood pressure 139/87, pulse 69, temperature 98.6 °F (37 °C), temperature source Oral, resp. rate 20, height 170 cm (66.93\"), weight 84.8 kg (187 lb), SpO2 96 %.    Flowsheet Rows      First Filed Value   Admission Height  170.2 cm (67\") Documented at 05/22/2021 0433   Admission Weight  88.5 kg (195 lb) Documented at 05/22/2021 0433          06/01 0701 - 06/02 0700  In: 2164.4 [I.V.:433.4]  Out: 975 [Urine:925]    Physical Exam:    General Appearance: NAD, alert and cooperative, Ox3  Eyes: PER, conjunctivae and sclerae normal, no icterus  Lungs: FEW RHONCHI  Heart/CV: regular rhythm & normal rate, no murmur, no gallop, no rub and 1+ edema  Abdomen: not distended, soft, non-tender, no masses,  bowel sounds " present  Skin: No rash, Warm and dry. : GRIFFITHS    Radiology:            Labs:  Results from last 7 days   Lab Units 06/02/21  0352 06/01/21  1541 06/01/21  0353 05/31/21  0341   WBC 10*3/mm3 11.64*  --  13.34* 12.45*   HEMOGLOBIN g/dL 8.2* 8.1* 7.8* 8.1*   HEMATOCRIT % 25.1* 24.7* 23.0* 24.8*   PLATELETS 10*3/mm3 199  --  185 174     Results from last 7 days   Lab Units 06/02/21  0352 06/01/21  0353 05/31/21  1141 05/31/21  0341 05/28/21  2207 05/28/21  1559 05/28/21  1231   SODIUM mmol/L 137 137 138 139 141 141  140 139   POTASSIUM mmol/L 3.8 3.9 4.3 3.8 4.1 3.9  3.8 4.0   CHLORIDE mmol/L 105 104 105 105 106 106  105 106   CO2 mmol/L 19.0* 19.0* 19.0* 21.0* 22.0 20.0*  21.0* 21.0*   BUN mg/dL 111* 92* 76* 71* 44* 42*  43* 43*   CREATININE mg/dL 3.24* 3.20* 3.09* 2.79* 2.36* 2.18*  2.21* 2.23*   CALCIUM mg/dL 8.8 8.7 9.0 9.1 8.9 9.6  9.6 10.2   PHOSPHORUS mg/dL 3.7  --  5.9* 5.6* 4.9* 4.3 4.9*   MAGNESIUM mg/dL  --   --  2.4  --  2.1 2.3 2.7*   ALBUMIN g/dL 3.20*  --  3.30* 3.30* 3.10* 3.40*  3.50 3.50     Results from last 7 days   Lab Units 06/02/21  0352   GLUCOSE mg/dL 227*       Results from last 7 days   Lab Units 05/31/21  1141   ALK PHOS U/L 91   BILIRUBIN mg/dL 0.6   ALT (SGPT) U/L 22   AST (SGOT) U/L 22     Results from last 7 days   Lab Units 06/01/21  0934   PH, ARTERIAL pH units 7.446   PO2 ART mm Hg 74.2*   PCO2, ARTERIAL mm Hg 29.0*   HCO3 ART mmol/L 19.9*             Estimated Creatinine Clearance: 20.8 mL/min (A) (by C-G formula based on SCr of 3.24 mg/dL (H)).      Assessment       NSTEMI 5/22/2021    Hyperlipidemia LDL goal <70    Essential hypertension    T2DM on oral agents and insulin. 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 STABLE GFR. WORSE AZOTEMIA. ANEMIA.             Recommendations: PRESENT CARE. WILL KEEP NPO AFTER MIDNIGHT IN CASE PATIENT NEEDS A THD  CATH PLACED FOR RRT.      Aiden Godfrey MD  06/02/21  08:16 EDT

## 2021-06-03 ENCOUNTER — APPOINTMENT (OUTPATIENT)
Dept: NEPHROLOGY | Facility: HOSPITAL | Age: 76
End: 2021-06-03

## 2021-06-03 ENCOUNTER — APPOINTMENT (OUTPATIENT)
Dept: GENERAL RADIOLOGY | Facility: HOSPITAL | Age: 76
End: 2021-06-03

## 2021-06-03 LAB
ALBUMIN SERPL-MCNC: 3.1 G/DL (ref 3.5–5.2)
ANION GAP SERPL CALCULATED.3IONS-SCNC: 13 MMOL/L (ref 5–15)
BASOPHILS # BLD AUTO: 0.04 10*3/MM3 (ref 0–0.2)
BASOPHILS NFR BLD AUTO: 0.3 % (ref 0–1.5)
BUN SERPL-MCNC: 123 MG/DL (ref 8–23)
BUN/CREAT SERPL: 37.2 (ref 7–25)
CALCIUM SPEC-SCNC: 8.7 MG/DL (ref 8.6–10.5)
CHLORIDE SERPL-SCNC: 106 MMOL/L (ref 98–107)
CO2 SERPL-SCNC: 20 MMOL/L (ref 22–29)
CORTIS SERPL-MCNC: 18.88 MCG/DL
CREAT SERPL-MCNC: 3.31 MG/DL (ref 0.76–1.27)
DEPRECATED RDW RBC AUTO: 52.5 FL (ref 37–54)
EOSINOPHIL # BLD AUTO: 0.36 10*3/MM3 (ref 0–0.4)
EOSINOPHIL NFR BLD AUTO: 2.6 % (ref 0.3–6.2)
ERYTHROCYTE [DISTWIDTH] IN BLOOD BY AUTOMATED COUNT: 15.6 % (ref 12.3–15.4)
ERYTHROCYTE [SEDIMENTATION RATE] IN BLOOD: 52 MM/HR (ref 0–20)
GFR SERPL CREATININE-BSD FRML MDRD: 18 ML/MIN/1.73
GLUCOSE BLDC GLUCOMTR-MCNC: 122 MG/DL (ref 70–130)
GLUCOSE BLDC GLUCOMTR-MCNC: 134 MG/DL (ref 70–130)
GLUCOSE BLDC GLUCOMTR-MCNC: 221 MG/DL (ref 70–130)
GLUCOSE BLDC GLUCOMTR-MCNC: 284 MG/DL (ref 70–130)
GLUCOSE BLDC GLUCOMTR-MCNC: 58 MG/DL (ref 70–130)
GLUCOSE BLDC GLUCOMTR-MCNC: 84 MG/DL (ref 70–130)
GLUCOSE SERPL-MCNC: 144 MG/DL (ref 65–99)
HCT VFR BLD AUTO: 26 % (ref 37.5–51)
HGB BLD-MCNC: 8.6 G/DL (ref 13–17.7)
IMM GRANULOCYTES # BLD AUTO: 0.63 10*3/MM3 (ref 0–0.05)
IMM GRANULOCYTES NFR BLD AUTO: 4.6 % (ref 0–0.5)
INR PPP: 1.19 (ref 0.85–1.16)
LYMPHOCYTES # BLD AUTO: 1.07 10*3/MM3 (ref 0.7–3.1)
LYMPHOCYTES NFR BLD AUTO: 7.8 % (ref 19.6–45.3)
MCH RBC QN AUTO: 30.9 PG (ref 26.6–33)
MCHC RBC AUTO-ENTMCNC: 33.1 G/DL (ref 31.5–35.7)
MCV RBC AUTO: 93.5 FL (ref 79–97)
MONOCYTES # BLD AUTO: 1.05 10*3/MM3 (ref 0.1–0.9)
MONOCYTES NFR BLD AUTO: 7.7 % (ref 5–12)
NEUTROPHILS NFR BLD AUTO: 10.49 10*3/MM3 (ref 1.7–7)
NEUTROPHILS NFR BLD AUTO: 77 % (ref 42.7–76)
NRBC BLD AUTO-RTO: 1 /100 WBC (ref 0–0.2)
PHOSPHATE SERPL-MCNC: 3.7 MG/DL (ref 2.5–4.5)
PLATELET # BLD AUTO: 225 10*3/MM3 (ref 140–450)
PMV BLD AUTO: 10.9 FL (ref 6–12)
POTASSIUM SERPL-SCNC: 3.9 MMOL/L (ref 3.5–5.2)
PROTHROMBIN TIME: 14.8 SECONDS (ref 11.4–14.4)
RBC # BLD AUTO: 2.78 10*6/MM3 (ref 4.14–5.8)
SODIUM SERPL-SCNC: 139 MMOL/L (ref 136–145)
SODIUM UR-SCNC: 20 MMOL/L
TSH SERPL DL<=0.05 MIU/L-ACNC: 4.03 UIU/ML (ref 0.27–4.2)
WBC # BLD AUTO: 13.64 10*3/MM3 (ref 3.4–10.8)

## 2021-06-03 PROCEDURE — 25010000002 HEPARIN (PORCINE) PER 1000 UNITS: Performed by: INTERNAL MEDICINE

## 2021-06-03 PROCEDURE — 80069 RENAL FUNCTION PANEL: CPT | Performed by: INTERNAL MEDICINE

## 2021-06-03 PROCEDURE — 82962 GLUCOSE BLOOD TEST: CPT

## 2021-06-03 PROCEDURE — 84300 ASSAY OF URINE SODIUM: CPT | Performed by: INTERNAL MEDICINE

## 2021-06-03 PROCEDURE — 63710000001 INSULIN DETEMIR PER 5 UNITS: Performed by: INTERNAL MEDICINE

## 2021-06-03 PROCEDURE — 85610 PROTHROMBIN TIME: CPT | Performed by: INTERNAL MEDICINE

## 2021-06-03 PROCEDURE — 71045 X-RAY EXAM CHEST 1 VIEW: CPT

## 2021-06-03 PROCEDURE — 63710000001 INSULIN REGULAR HUMAN PER 5 UNITS: Performed by: INTERNAL MEDICINE

## 2021-06-03 PROCEDURE — 99233 SBSQ HOSP IP/OBS HIGH 50: CPT | Performed by: INTERNAL MEDICINE

## 2021-06-03 PROCEDURE — 5A1D70Z PERFORMANCE OF URINARY FILTRATION, INTERMITTENT, LESS THAN 6 HOURS PER DAY: ICD-10-PCS | Performed by: INTERNAL MEDICINE

## 2021-06-03 PROCEDURE — 25010000002 NA FERRIC GLUC CPLX PER 12.5 MG: Performed by: INTERNAL MEDICINE

## 2021-06-03 PROCEDURE — 82533 TOTAL CORTISOL: CPT | Performed by: INTERNAL MEDICINE

## 2021-06-03 PROCEDURE — 97530 THERAPEUTIC ACTIVITIES: CPT

## 2021-06-03 PROCEDURE — B548ZZA ULTRASONOGRAPHY OF SUPERIOR VENA CAVA, GUIDANCE: ICD-10-PCS | Performed by: NURSE PRACTITIONER

## 2021-06-03 PROCEDURE — 02HV33Z INSERTION OF INFUSION DEVICE INTO SUPERIOR VENA CAVA, PERCUTANEOUS APPROACH: ICD-10-PCS | Performed by: NURSE PRACTITIONER

## 2021-06-03 PROCEDURE — 36556 INSERT NON-TUNNEL CV CATH: CPT | Performed by: NURSE PRACTITIONER

## 2021-06-03 PROCEDURE — 85025 COMPLETE CBC W/AUTO DIFF WBC: CPT | Performed by: INTERNAL MEDICINE

## 2021-06-03 PROCEDURE — 85652 RBC SED RATE AUTOMATED: CPT | Performed by: INTERNAL MEDICINE

## 2021-06-03 PROCEDURE — 92526 ORAL FUNCTION THERAPY: CPT

## 2021-06-03 PROCEDURE — 76937 US GUIDE VASCULAR ACCESS: CPT | Performed by: NURSE PRACTITIONER

## 2021-06-03 PROCEDURE — 84443 ASSAY THYROID STIM HORMONE: CPT | Performed by: INTERNAL MEDICINE

## 2021-06-03 PROCEDURE — 99231 SBSQ HOSP IP/OBS SF/LOW 25: CPT | Performed by: NURSE PRACTITIONER

## 2021-06-03 PROCEDURE — 25010000002 CEFTRIAXONE PER 250 MG: Performed by: INTERNAL MEDICINE

## 2021-06-03 RX ORDER — AMLODIPINE BESYLATE 5 MG/1
5 TABLET ORAL
Status: DISCONTINUED | OUTPATIENT
Start: 2021-06-04 | End: 2021-06-10

## 2021-06-03 RX ORDER — HEPARIN SODIUM 1000 [USP'U]/ML
1500 INJECTION, SOLUTION INTRAVENOUS; SUBCUTANEOUS AS NEEDED
Status: DISCONTINUED | OUTPATIENT
Start: 2021-06-03 | End: 2021-06-18 | Stop reason: HOSPADM

## 2021-06-03 RX ORDER — CARVEDILOL 12.5 MG/1
12.5 TABLET ORAL 2 TIMES DAILY WITH MEALS
Status: DISCONTINUED | OUTPATIENT
Start: 2021-06-03 | End: 2021-06-10

## 2021-06-03 RX ORDER — ALBUMIN (HUMAN) 12.5 G/50ML
12.5 SOLUTION INTRAVENOUS AS NEEDED
Status: CANCELLED | OUTPATIENT
Start: 2021-06-03 | End: 2021-06-04

## 2021-06-03 RX ORDER — MANNITOL 20 G/100ML
12.5 INJECTION, SOLUTION INTRAVENOUS AS NEEDED
Status: DISCONTINUED | OUTPATIENT
Start: 2021-06-03 | End: 2021-06-04

## 2021-06-03 RX ORDER — ISOSORBIDE DINITRATE 20 MG/1
10 TABLET ORAL
Status: DISCONTINUED | OUTPATIENT
Start: 2021-06-03 | End: 2021-06-14

## 2021-06-03 RX ADMIN — SODIUM CHLORIDE 70 ML/HR: 4.5 INJECTION, SOLUTION INTRAVENOUS at 01:45

## 2021-06-03 RX ADMIN — SENNOSIDES 10 ML: 8.8 LIQUID ORAL at 22:59

## 2021-06-03 RX ADMIN — Medication: at 15:29

## 2021-06-03 RX ADMIN — DOCUSATE SODIUM 100 MG: 50 LIQUID ORAL at 09:04

## 2021-06-03 RX ADMIN — ISOSORBIDE DINITRATE 10 MG: 20 TABLET ORAL at 17:46

## 2021-06-03 RX ADMIN — HEPARIN SODIUM 1500 UNITS: 1000 INJECTION INTRAVENOUS; SUBCUTANEOUS at 17:26

## 2021-06-03 RX ADMIN — CARVEDILOL 12.5 MG: 12.5 TABLET, FILM COATED ORAL at 09:29

## 2021-06-03 RX ADMIN — ISOSORBIDE DINITRATE 10 MG: 10 TABLET ORAL at 09:04

## 2021-06-03 RX ADMIN — CARVEDILOL 12.5 MG: 12.5 TABLET, FILM COATED ORAL at 17:46

## 2021-06-03 RX ADMIN — ASPIRIN 325 MG ORAL TABLET 325 MG: 325 PILL ORAL at 09:04

## 2021-06-03 RX ADMIN — ATORVASTATIN CALCIUM 40 MG: 40 TABLET, FILM COATED ORAL at 23:00

## 2021-06-03 RX ADMIN — MANNITOL 12.5 G: 20 INJECTION, SOLUTION INTRAVENOUS at 16:31

## 2021-06-03 RX ADMIN — HEPARIN SODIUM 5000 UNITS: 5000 INJECTION INTRAVENOUS; SUBCUTANEOUS at 22:59

## 2021-06-03 RX ADMIN — MANNITOL 12.5 G: 20 INJECTION, SOLUTION INTRAVENOUS at 15:44

## 2021-06-03 RX ADMIN — HEPARIN SODIUM 5000 UNITS: 5000 INJECTION INTRAVENOUS; SUBCUTANEOUS at 06:25

## 2021-06-03 RX ADMIN — GABAPENTIN 600 MG: 300 CAPSULE ORAL at 23:00

## 2021-06-03 RX ADMIN — INSULIN HUMAN 4 UNITS: 100 INJECTION, SOLUTION PARENTERAL at 01:44

## 2021-06-03 RX ADMIN — AMLODIPINE BESYLATE 5 MG: 5 TABLET ORAL at 09:04

## 2021-06-03 RX ADMIN — SODIUM CHLORIDE, PRESERVATIVE FREE 10 ML: 5 INJECTION INTRAVENOUS at 09:04

## 2021-06-03 RX ADMIN — INSULIN DETEMIR 25 UNITS: 100 INJECTION, SOLUTION SUBCUTANEOUS at 23:01

## 2021-06-03 RX ADMIN — PANTOPRAZOLE SODIUM 40 MG: 40 INJECTION, POWDER, FOR SOLUTION INTRAVENOUS at 06:25

## 2021-06-03 RX ADMIN — SODIUM CHLORIDE 1 G: 900 INJECTION INTRAVENOUS at 17:46

## 2021-06-03 RX ADMIN — SODIUM CHLORIDE, PRESERVATIVE FREE 10 ML: 5 INJECTION INTRAVENOUS at 23:03

## 2021-06-03 RX ADMIN — ISOSORBIDE DINITRATE 10 MG: 20 TABLET ORAL at 13:57

## 2021-06-03 RX ADMIN — SENNOSIDES 10 ML: 8.8 LIQUID ORAL at 09:04

## 2021-06-03 RX ADMIN — SODIUM CHLORIDE 125 MG: 9 INJECTION, SOLUTION INTRAVENOUS at 09:04

## 2021-06-03 RX ADMIN — DOCUSATE SODIUM 100 MG: 50 LIQUID ORAL at 22:59

## 2021-06-03 RX ADMIN — HYDROCODONE BITARTRATE AND ACETAMINOPHEN 1 TABLET: 7.5; 325 TABLET ORAL at 23:07

## 2021-06-03 NOTE — PLAN OF CARE
Goal Outcome Evaluation:         Pt had temporary dialysis access placed today and pt had HD this afternoon with plans for HD tomorrow.  Pt confused this am but now no longer confused.  VSS. Novosource renal TF started via NG tube and dysphagia diet reordered.  Pt to have FEES tomorrow. Spouse and daughter here today and updated on POC.

## 2021-06-03 NOTE — PROGRESS NOTES
Cardiology Progress Note      Reason for visit:    · NSTEMI/multivessel CAD/status post CABG    IDENTIFICATION: 76-year-old gentleman who resides in [facility] Problems    Diagnosis  POA   • **NSTEMI 5/22/2021 [I21.4]  Yes     Priority: High     · History of previous PCI, data deficit  · [facility] ER presentation with chest pain and elevated troponin, 5/22/2021  · Echo (5/22/2021): LVEF 55%.  Inferolateral hypokinesis.  Mild MR.  Mild aortic stenosis.  · Cardiac catheterization for NSTEMI (5/24/2021): Severe multivessel CAD (LAD, LCx, RCA).  Surgical revascularization recommended  · CABG with Dr. Miller (5/28/2021):LIMA to LAD.  SVG to first OM.  SVG to PDA2. Greater saphenous vein to first obtuse marginal     • S/P CABG x 3 on 5/28/21 [Z95.1]  Not Applicable     Priority: High   • CKD with post op MICHAEL [N18.9]  Yes     Priority: High   • T2DM on oral agents and insulin. HbA1c 7.4  [E11.9, Z79.4]  Not Applicable     Priority: High   • Hyperlipidemia LDL goal <70 [E78.5]  Yes     Priority: Medium   • Essential hypertension [I10]  Yes     Priority: Medium   • Postoperative hypoxemia [R09.02, Z98.890]  No     Priority: Low   • Gout [M10.9]  Yes     Priority: Low   • Former smokeless tobacco use [Z87.891]  Not Applicable     Priority: Low            No events noted overnight.  The patient is sitting up in the chair.  He denies any chest pain and is breathing easy on room air.  Creatinine remains elevated 3.31.           Vital Sign Min/Max for last 24 hours  Temp  Min: 97.5 °F (36.4 °C)  Max: 98.8 °F (37.1 °C)   BP  Min: 123/67  Max: 150/79   Pulse  Min: 61  Max: 69   Resp  Min: 18  Max: 24   SpO2  Min: 90 %  Max: 97 %   Flow (L/min)  Min: 2  Max: 4      Intake/Output Summary (Last 24 hours) at 6/3/2021 0825  Last data filed at 6/3/2021 0600  Gross per 24 hour   Intake 2024 ml   Output 670 ml   Net 1354 ml           Physical Exam  Constitutional:       General: He is awake.      Appearance: He  is well-developed.   HENT:      Head: Normocephalic.   Neck:      Vascular: No carotid bruit or JVD.   Cardiovascular:      Rate and Rhythm: Normal rate and regular rhythm.      Heart sounds: Normal heart sounds. No murmur heard.   No friction rub. No gallop.    Pulmonary:      Effort: Pulmonary effort is normal.      Breath sounds: Normal breath sounds.   Abdominal:      General: Bowel sounds are normal. There is no distension.      Palpations: Abdomen is soft.   Musculoskeletal:      Right lower le+ Pitting Edema present.      Left lower le+ Pitting Edema present.   Skin:     General: Skin is warm and dry.   Neurological:      Mental Status: He is alert.   Psychiatric:         Behavior: Behavior is cooperative.            Tele: NSR     Results Review (reviewed the patient's recent labs in the electronic medical record):      EKG (2021): Sinus tachycardia, right bundle branch block     CXR (2021): Borderline heart shadow enlargement mild pulmonary venous hypertension.  No new chest disease     ECHO (2021): LVEF 60%.  Grade 1 diastolic dysfunction.  Mild AS     Venous duplex study (2021): Normal without evidence of DVT    Result Review:  I have personally reviewed the results from the time of this admission to 6/3/2021 08:25 EDT and agree with these findings:  [x]  Laboratory  []  Microbiology  [x]  Radiology  [x]  EKG/Telemetry   [x]  Cardiology/Vascular   []  Pathology  []  Old records  []  Other:  Most notable findings include: WBC 13.64, hemoglobin 8.6, hematocrit 26.0, sedimentation rate 52, creatinine 3.31    Results from last 7 days   Lab Units 21  0343 21  0352 21  0353 21  1141 21  0341 21  0327 21  2207 21  1559   SODIUM mmol/L 139 137 137 138 139 140 141 141  140   POTASSIUM mmol/L 3.9 3.8 3.9 4.3 3.8 3.8 4.1 3.9  3.8   CHLORIDE mmol/L 106 105 104 105 105 106 106 106  105   BUN mg/dL 123* 111* 92* 76* 71* 52* 44* 42*  43*    CREATININE mg/dL 3.31* 3.24* 3.20* 3.09* 2.79* 2.72* 2.36* 2.18*  2.21*   MAGNESIUM mg/dL  --   --   --  2.4  --   --  2.1 2.3         Results from last 7 days   Lab Units 06/03/21  0343 06/02/21  0352 06/01/21  1541 06/01/21  0353   WBC 10*3/mm3 13.64* 11.64*  --  13.34*   HEMOGLOBIN g/dL 8.6* 8.2* 8.1* 7.8*   HEMATOCRIT % 26.0* 25.1* 24.7* 23.0*   PLATELETS 10*3/mm3 225 199  --  185       Lab Results   Component Value Date    HGBA1C 7.40 (H) 05/22/2021       Lab Results   Component Value Date    CHOL 99 05/31/2021    TRIG 101 05/31/2021    HDL 34 (L) 05/22/2021    LDL 88 05/22/2021                 NSTEMI  · CABG 5/28/2021  · Continue aspirin, beta-blocker, statin     Hypertension  · Controlled        Hyperlipidemia  · Continue atorvastatin     Acute kidney injury  · Creatinine 3.24  · No ACE/ARB due to MICHAEL  · Nephrology recommending dialysis.     Type 2 diabetes mellitus  · Management per hospitalist  · Hemoglobin A1c 7.4                · Defer ACE/ARB/Entresto due to acute kidney injury  · Continue aspirin, statin and beta-blocker  · Patient n.p.o. for dialysis catheter placement today per nephrology    Electronically signed by CHRALES Bernal, 06/03/21, 8:25 AM EDT.

## 2021-06-03 NOTE — PROGRESS NOTES
"   LOS: 12 days    Patient Care Team:  Rob Polanco MD as PCP - General (Internal Medicine)    Chief Complaint: CAD s/p CABG  Patient admission creatinine was initially 1.9, came down to 1.6 on a previous admission, patient had cardiac cath done creatinine went up to 2.1 on 25 May.  Since 2 episode of cardiac arrest his creatinine has gone up to 2.72 renal function is deteriorating urine output has dropped.  Patient is unable to give much information at this time.  Subjective     Interval History:   Nonoliguric.  Increasing BUN and creatinine, mild confusion  Critically ill in ICU setting.    Review of Systems:   Generalized weakness, mild confusion, positive edema noted. All other systems negative.    Objective     Vital Sign Min/Max for last 24 hours  Temp  Min: 97.5 °F (36.4 °C)  Max: 98.8 °F (37.1 °C)   BP  Min: 123/67  Max: 150/79   Pulse  Min: 61  Max: 68   Resp  Min: 18  Max: 24   SpO2  Min: 90 %  Max: 97 %   Flow (L/min)  Min: 2  Max: 4   No data recorded     Flowsheet Rows      First Filed Value   Admission Height  170.2 cm (67\") Documented at 05/22/2021 0433   Admission Weight  88.5 kg (195 lb) Documented at 05/22/2021 0433          I/O this shift:  In: -   Out: 100 [Urine:100]  I/O last 3 completed shifts:  In: 2813 [I.V.:430; Other:898; NG/GT:1285; IV Piggyback:200]  Out: 1170 [Urine:1170]    Physical Exam:  General Appearance: Pleasant  male sitting in chair, mild confusion.  Eyes: PER, EOMI.  Neck: Supple no JVD.  Lungs: Lateral rhonchi's are heard few Rales at the base. Equal chest movement, nonlabored.  Heart: No gallop, murmur, rub, RRR.  Abdomen: Soft, nontender, positive bowel sounds, no organomegaly.  Extremities: +2 to 3+ lower extremity edema, positive upper extremity edema.  No cyanosis.  Neuro: Confused at time according to the nursing, moving all extremities.  : Last catheter in place. Urine clear.  Skin: Warm and dry.  WBC WBC   Date Value Ref Range Status   06/03/2021 " 13.64 (H) 3.40 - 10.80 10*3/mm3 Final   06/02/2021 11.64 (H) 3.40 - 10.80 10*3/mm3 Final   06/01/2021 13.34 (H) 3.40 - 10.80 10*3/mm3 Final      HGB Hemoglobin   Date Value Ref Range Status   06/03/2021 8.6 (L) 13.0 - 17.7 g/dL Final   06/02/2021 8.2 (L) 13.0 - 17.7 g/dL Final   06/01/2021 8.1 (L) 13.0 - 17.7 g/dL Final   06/01/2021 7.8 (L) 13.0 - 17.7 g/dL Final      HCT Hematocrit   Date Value Ref Range Status   06/03/2021 26.0 (L) 37.5 - 51.0 % Final   06/02/2021 25.1 (L) 37.5 - 51.0 % Final   06/01/2021 24.7 (L) 37.5 - 51.0 % Final   06/01/2021 23.0 (L) 37.5 - 51.0 % Final      Platlets No results found for: LABPLAT   MCV MCV   Date Value Ref Range Status   06/03/2021 93.5 79.0 - 97.0 fL Final   06/02/2021 91.6 79.0 - 97.0 fL Final   06/01/2021 90.9 79.0 - 97.0 fL Final          Sodium Sodium   Date Value Ref Range Status   06/03/2021 139 136 - 145 mmol/L Final   06/02/2021 137 136 - 145 mmol/L Final   06/01/2021 137 136 - 145 mmol/L Final   05/31/2021 138 136 - 145 mmol/L Final      Potassium Potassium   Date Value Ref Range Status   06/03/2021 3.9 3.5 - 5.2 mmol/L Final   06/02/2021 3.8 3.5 - 5.2 mmol/L Final   06/01/2021 3.9 3.5 - 5.2 mmol/L Final   05/31/2021 4.3 3.5 - 5.2 mmol/L Final      Chloride Chloride   Date Value Ref Range Status   06/03/2021 106 98 - 107 mmol/L Final   06/02/2021 105 98 - 107 mmol/L Final   06/01/2021 104 98 - 107 mmol/L Final   05/31/2021 105 98 - 107 mmol/L Final      CO2 CO2   Date Value Ref Range Status   06/03/2021 20.0 (L) 22.0 - 29.0 mmol/L Final   06/02/2021 19.0 (L) 22.0 - 29.0 mmol/L Final   06/01/2021 19.0 (L) 22.0 - 29.0 mmol/L Final   05/31/2021 19.0 (L) 22.0 - 29.0 mmol/L Final      BUN BUN   Date Value Ref Range Status   06/03/2021 123 (H) 8 - 23 mg/dL Final   06/02/2021 111 (H) 8 - 23 mg/dL Final   06/01/2021 92 (H) 8 - 23 mg/dL Final   05/31/2021 76 (H) 8 - 23 mg/dL Final      Creatinine Creatinine   Date Value Ref Range Status   06/03/2021 3.31 (H) 0.76 - 1.27  mg/dL Final   06/02/2021 3.24 (H) 0.76 - 1.27 mg/dL Final   06/01/2021 3.20 (H) 0.76 - 1.27 mg/dL Final   05/31/2021 3.09 (H) 0.76 - 1.27 mg/dL Final      Calcium Calcium   Date Value Ref Range Status   06/03/2021 8.7 8.6 - 10.5 mg/dL Final   06/02/2021 8.8 8.6 - 10.5 mg/dL Final   06/01/2021 8.7 8.6 - 10.5 mg/dL Final   05/31/2021 9.0 8.6 - 10.5 mg/dL Final      PO4 No results found for: CAPO4   Albumin Albumin   Date Value Ref Range Status   06/03/2021 3.10 (L) 3.50 - 5.20 g/dL Final   06/02/2021 3.20 (L) 3.50 - 5.20 g/dL Final   05/31/2021 3.30 (L) 3.50 - 5.20 g/dL Final      Magnesium Magnesium   Date Value Ref Range Status   05/31/2021 2.4 1.6 - 2.4 mg/dL Final      Uric Acid No results found for: URICACID        Results Review:     I reviewed the patient's new clinical results.    amLODIPine, 5 mg, Oral, Q24H  aspirin, 325 mg, Nasogastric, Daily  atorvastatin, 40 mg, Nasogastric, Nightly  carvedilol, 12.5 mg, Oral, BID With Meals  cefTRIAXone, 1 g, Intravenous, Q24H  sennosides, 10 mL, Nasogastric, BID   And  docusate sodium, 100 mg, Nasogastric, BID  ferric gluconate, 125 mg, Intravenous, Daily  gabapentin, 600 mg, Nasogastric, Nightly  heparin (porcine), 5,000 Units, Subcutaneous, Q8H  insulin detemir, 25 Units, Subcutaneous, Nightly  insulin regular, 0-9 Units, Subcutaneous, Q6H  isosorbide dinitrate, 10 mg, Oral, TID - Nitrates  pantoprazole, 40 mg, Intravenous, Q AM  pharmacy consult - MTM, , Does not apply, Daily  sodium chloride, 10 mL, Intravenous, Q12H      dextrose 5 % with KCl 20 mEq, 30 mL/hr, Last Rate: 30 mL/hr (05/28/21 1258)  sodium chloride, 70 mL/hr, Last Rate: 70 mL/hr (06/03/21 0145)        Medication Review: Reviewed    Assessment/Plan       NSTEMI 5/22/2021    Hyperlipidemia LDL goal <70    Essential hypertension    T2DM on oral agents and insulin. 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  MICHAEL on  CKD baseline creatinine 1.6-1.9.  Recent events included cardiac catheterization with contrast, history of multiple units of blood transfusion during surgery, episode of cardiac arrest x2 post surgery.  Multiple blood transfusion    Ultrasound of the kidney shows right kidney 12.1 cm, left kidney 10.6 cm. Urine eos 0 urine sodium 122 urine creatinine 136. Goes along with prerenal state.  2.  Anemia: As noted above. Iron saturation 10% will need IV iron.  3.  CKD etiology unknown at this time.    4.  S/p CABG.  5.  Mild metabolic acidosis secondary to recent events  6. Proteinuria: Mild: Random urine protein 252, urine creatinine 136. Will need further work-up when patient stable.  Plan  Discussed with Dr. Loaiza regards to dialysis due to increasing BUN and creatinine, increasing encephalopathy.  We will start dialysis at this time to bring the BUN down.  May need a few dialysis.  We will try to keep him nonoliguric at this time.  Avoid nephrotoxic medications.  Keep systolic blood pressure greater than 100  Daily evaluation for renal replacement therapy will be done.  Adjust medication for the new GFR.  Case discussed with the medical staff taking care of the patient.  High risk patient with multiple medical problems.    Jovanny Chambers MD  06/03/21  09:30 EDT

## 2021-06-03 NOTE — PLAN OF CARE
Goal Outcome Evaluation:  Plan of Care Reviewed With: patient        SLP treatment completed. Will continue to address dysphagia with repeat FEES tomorrow, 6/4. Please see note for further details and recommendations.

## 2021-06-03 NOTE — PLAN OF CARE
Goal Outcome Evaluation:  Plan of Care Reviewed With: patient, daughter  Progress: improving  Outcome Summary: patient able to increase ambulation to 100 ft with one sitting rest. He has decreased step length on the right and needs assist in maneuvering the walker both on straightaways and to turn. patient also has decreased  strength to hold onto walker with right UE. anticipate patient to need IP rehab at D/C patient is wanting to go home with family assist.

## 2021-06-03 NOTE — THERAPY TREATMENT NOTE
Patient Name: Augusto Lorenzana Jr.  : 1945    MRN: [id number]                              Today's Date: 6/3/2021       Admit Date: 2021    Visit Dx:     ICD-10-CM ICD-9-CM   1. Non-STEMI (non-ST elevated myocardial infarction) (CMS/HCC)  I21.4 410.70   2. Acute congestive heart failure, unspecified heart failure type (CMS/HCC)  I50.9 428.0   3. History of coronary artery disease  Z86.79 V12.59   4. Coronary artery disease involving native coronary artery of native heart, angina presence unspecified  I25.10 414.01   5. Pharyngeal dysphagia  R13.13 787.23   6. MICHAEL (acute kidney injury) (CMS/HCC)  N17.9 584.9     Patient Active Problem List   Diagnosis   • NSTEMI 2021   • Hyperlipidemia LDL goal <70   • Essential hypertension   • T2DM on oral agents and insulin. 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   • Postoperative hypoxemia     Past Medical History:   Diagnosis Date   • CAD (coronary artery disease)    • CKD (chronic kidney disease) stage 3, GFR 30-59 ml/min (CMS/HCC)    • Diabetes mellitus (CMS/HCC)    • Hyperlipidemia    • Hypertension    • NSTEMI (non-ST elevated myocardial infarction) (CMS/HCC)      Past Surgical History:   Procedure Laterality Date   • CARDIAC CATHETERIZATION N/A 2021    Procedure: Left Heart Cath;  Surgeon: Blade Greene IV, MD;  Location: [facility] CATH INVASIVE LOCATION;  Service: Cardiovascular;  Laterality: N/A;   • CARDIAC SURGERY      2 stents placed .   • CORONARY ARTERY BYPASS GRAFT N/A 2021    Procedure: MEDIAN STERNOTOMY CORONARY ARTERY BYPASS X 3 UTILIZING THE LEFT INTERNAL MAMMARY ARTERY GRAFT, EVH OF THE GREATER RIGHT SAPHENOUS VEIN, AND PILO PER ANESTHESIA;  Surgeon: Jacek Miller MD;  Location: [facility] OR;  Service: Cardiothoracic;  Laterality: N/A;     General Information     Row Name 21 7825           Physical Therapy Time and Intention    Document Type  therapy note (daily note)  -     Mode of Treatment  physical therapy  -     Row Name 06/03/21 1340          General Information    Patient Profile Reviewed  yes  -EMANUEL     Prior Level of Function  independent:;gait;transfer;ADL's;bed mobility  -     Existing Precautions/Restrictions  cardiac;oxygen therapy device and L/min;sternal  -EMANUEL     Barriers to Rehab  medically complex  -     Row Name 06/03/21 1340          Living Environment    Lives With  spouse  -     Row Name 06/03/21 1340          Home Main Entrance    Number of Stairs, Main Entrance  four  -EMANUEL     Row Name 06/03/21 1340          Cognition    Orientation Status (Cognition)  oriented x 3  -EMANUEL     Row Name 06/03/21 1340          Safety Issues, Functional Mobility    Safety Issues Affecting Function (Mobility)  safety precautions follow-through/compliance;insight into deficits/self-awareness;awareness of need for assistance;safety precaution awareness  -     Impairments Affecting Function (Mobility)  balance;endurance/activity tolerance;shortness of breath;strength  -EMANUEL     Cognitive Impairments, Mobility Safety/Performance  safety precaution awareness;awareness, need for assistance;safety precaution follow-through;insight into deficits/self-awareness  -       User Key  (r) = Recorded By, (t) = Taken By, (c) = Cosigned By    Initials Name Provider Type    EMANUEL Sierra Kitchen PT Physical Therapist        Mobility     Row Name 06/03/21 1341          Bed Mobility    Comment (Bed Mobility)  patient is OOB and returns to the chair  -     Row Name 06/03/21 1341          Transfers    Comment (Transfers)  cues for sternal precautions and sequencing, initially with standing patient tends to get weight shifted backward. needs assist to gain midline but then is able to maintain midline with walker support  -     Row Name 06/03/21 1341          Bed-Chair Transfer    Bed-Chair [place]  (Transfers)  moderate assist (50% patient effort);2 person assist  -EMANUEL     Row Name 06/03/21 1341          Sit-Stand Transfer    Sit-Stand [place] (Transfers)  moderate assist (50% patient effort);2 person assist  -EMANUEL     Row Name 06/03/21 1341          Gait/Stairs (Locomotion)    [place] Level (Gait)  moderate assist (50% patient effort);2 person assist;1 person to manage equipment  -EMANUEL     Assistive Device (Gait)  walker, front-wheeled  -EMANUEL     Distance in Feet (Gait)  100  -EMANUEL     Deviations/Abnormal Patterns (Gait)  base of support, narrow;weight shifting decreased;stride length decreased  -EMANUEL     Bilateral Gait Deviations  forward flexed posture  -EMANUEL     Comment (Gait/Stairs)  patient has decreased step length on the right with decreased weight bearing on the left. patient also has weak  on the walker on the right side. patient is however able to advance his LE's on his own. patient required one sitting rest after 50 ft at the end of 100 ft patient had significant lean to the left chair brought up behind him.  -       User Key  (r) = Recorded By, (t) = Taken By, (c) = Cosigned By    Initials Name Provider Type    EMANUEL Sierra Kitchen, PT Physical Therapist        Obj/Interventions     Row Name 06/03/21 1346          Knee (Therapeutic Exercise)    Knee AROM (Therapeutic Exercise)  bilateral;flexion;extension;10 repetitions;sitting  -[facility] Name 06/03/21 1346          Ankle (Therapeutic Exercise)    Ankle (Therapeutic Exercise)  AROM (active range of motion)  -     Ankle AROM (Therapeutic Exercise)  bilateral;dorsiflexion;plantarflexion;10 repetitions;sitting  -     Row Name 06/03/21 1346          Balance    Balance Assessment  sitting static balance;sitting dynamic balance;standing static balance;standing dynamic balance  -     Static Sitting Balance  WFL  -EMANUEL     Dynamic Sitting Balance  mild impairment  -EMANUEL     Static Standing Balance  mild impairment  -EMANUEL     Dynamic Standing  Balance  moderate impairment  -EMANUEL     Balance Interventions  sit to stand;dynamic;weight shifting activity  -EMANUEL     Comment, Balance  patient able to maintain midline in sitting at the edge of the chair. patient initally on standing has significant backward lean it improves after he finds midline.  -EMANUEL       User Key  (r) = Recorded By, (t) = Taken By, (c) = Cosigned By    Initials Name Provider Type    EMANUEL Sierra Kitchen, PT Physical Therapist        Goals/Plan    No documentation.       Clinical Impression     Row Name 06/03/21 8180          Pain Scale: Numbers Pre/Post-Treatment    Pretreatment Pain Rating  2/10  -EMANUEL     Posttreatment Pain Rating  2/10  -EMANUEL     Pain Location - Orientation  incisional  -EMANUEL     Pain Location  chest  -EMANUEL     Pain Intervention(s)  Repositioned;Ambulation/increased activity;Splinting  -     Row Name 06/03/21 2353          Plan of Care Review    Plan of Care Reviewed With  patient;daughter  -EMANUEL     Progress  improving  -EMANUEL     Outcome Summary  patient able to increase ambulation to 100 ft with one sitting rest. He has decreased step length on the right and needs assist in maneuvering the walker both on straightaways and to turn. patient also has decreased  strength to hold onto walker with right UE. anticipate patient to need IP rehab at D/C patient is wanting to go home with family assist.  -     Row Name 06/03/21 9569          Therapy Assessment/Plan (PT)    Patient/Family Therapy Goals Statement (PT)  return to PLOF and go home  -EMANUEL     Rehab Potential (PT)  good, to achieve stated therapy goals  -EMANUEL     Criteria for Skilled Interventions Met (PT)  yes;skilled treatment is necessary  -     Row Name 06/03/21 0113          Vital Signs    Pre Systolic BP Rehab  130  -EMANUEL     Pre Treatment Diastolic BP  67  -EMANUEL     Post Systolic BP Rehab  118  -EMANUEL     Post Treatment Diastolic BP  74  -EMANUEL     Pretreatment Heart Rate (beats/min)  60  -EMANUEL     Posttreatment Heart Rate  (beats/min)  60  -EMANUEL     Pre SpO2 (%)  94  -EMANUEL     O2 Delivery Pre Treatment  room air  -EMANUEL     Post SpO2 (%)  95  -EMANUEL     O2 Delivery Post Treatment  room air  -EMANUEL     Pre Patient Position  Sitting  -EMANUEL     Intra Patient Position  Standing  -EMANUEL     Post Patient Position  Sitting  -EMANUEL     Row Name 06/03/21 1348          Positioning and Restraints    Pre-Treatment Position  sitting in chair/recliner  -EMANUEL     Post Treatment Position  chair  -EMANUEL     In Chair  notified nsg;reclined;sitting;call light within reach;encouraged to call for assist;with family/caregiver  -EMANUEL       User Key  (r) = Recorded By, (t) = Taken By, (c) = Cosigned By    Initials Name Provider Type    Sierra Betancourt PT Physical Therapist        Outcome Measures     Row Name 06/03/21 1352          How much help from another person do you currently need...    Turning from your back to your side while in flat bed without using bedrails?  2  -EMANUEL     Moving from lying on back to sitting on the side of a flat bed without bedrails?  2  -EMANUEL     Moving to and from a bed to a chair (including a wheelchair)?  2  -EMANUEL     Standing up from a chair using your arms (e.g., wheelchair, bedside chair)?  2  -EMANUEL     Climbing 3-5 steps with a railing?  1  -EMANUEL     To walk in hospital room?  2  -EMANUEL     AM-PAC 6 Clicks Score (PT)  11  -EMANUEL     Row Name 06/03/21 1352          Functional Assessment    Outcome Measure Options  AM-PAC 6 Clicks Basic Mobility (PT)  -EMANUEL       User Key  (r) = Recorded By, (t) = Taken By, (c) = Cosigned By    Initials Name Provider Type    Sierra Betancourt PT Physical Therapist        Physical Therapy Education                 Title: PT OT SLP Therapies (In Progress)     Topic: Physical Therapy (In Progress)     Point: Mobility training (In Progress)     Learning Progress Summary           Patient Acceptance, E, NR by EMANUEL at 6/3/2021 1015    Acceptance, E, NR by KG at 6/2/2021 0909    Acceptance, E, NR by KG at 6/1/2021 0933     Acceptance, E, NR by KG at 5/31/2021 1118    Acceptance, E,TB, VU,NR by AY at 5/30/2021 1107                   Point: Home exercise program (In Progress)     Learning Progress Summary           Patient Acceptance, E, NR by EMANUEL at 6/3/2021 1015    Acceptance, E, NR by KG at 6/2/2021 0909    Acceptance, E, NR by KG at 6/1/2021 0933    Acceptance, E, NR by KG at 5/31/2021 1118                   Point: Body mechanics (In Progress)     Learning Progress Summary           Patient Acceptance, E, NR by EMANUEL at 6/3/2021 1015    Acceptance, E, NR by KG at 6/2/2021 0909    Acceptance, E, NR by KG at 6/1/2021 0933    Acceptance, E, NR by KG at 5/31/2021 1118    Acceptance, E,TB, VU,NR by AY at 5/30/2021 1107                   Point: Precautions (In Progress)     Learning Progress Summary           Patient Acceptance, E, NR by EMANUEL at 6/3/2021 1015    Acceptance, E, NR by KG at 6/2/2021 0909    Acceptance, E, NR by KG at 6/1/2021 0933    Acceptance, E, NR by KG at 5/31/2021 1118    Acceptance, E,TB, VU,NR by AY at 5/30/2021 1107                               User Key     Initials Effective Dates Name Provider Type Discipline    EMANUEL 06/19/15 -  Sierra Kitchen, PT Physical Therapist PT    KG 05/22/20 -  Aliza Shen PT Physical Therapist PT    AY 11/10/20 -  Megan Moffett, [place] Physical Therapist PT              PT Recommendation and Plan  Planned Therapy Interventions (PT): balance training, bed mobility training, gait training, home exercise program, transfer training, strengthening  Plan of Care Reviewed With: patient, daughter  Progress: improving  Outcome Summary: patient able to increase ambulation to 100 ft with one sitting rest. He has decreased step length on the right and needs assist in maneuvering the walker both on straightaways and to turn. patient also has decreased  strength to hold onto walker with right UE. anticipate patient to need IP rehab at D/C patient is wanting to go home with family assist.      Time Calculation:   PT Charges     Row Name 06/03/21 1353             Time Calculation    Start Time  1015  -EMANUEL      PT Received On  06/03/21  -EMANUEL      PT Goal Re-Cert Due Date  06/09/21  -EMANUEL         Time Calculation- PT    Total Timed Code Minutes- PT  25 minute(s)  -EMANUEL         Timed Charges    58517 - PT Therapeutic Activity Minutes  25  -EMANUEL         Total Minutes    Timed Charges Total Minutes  25  -EMANUEL       Total Minutes  25  -EMANUEL        User Key  (r) = Recorded By, (t) = Taken By, (c) = Cosigned By    Initials Name Provider Type    Sierra Betancourt PT Physical Therapist        Therapy Charges for Today     Code Description Service Date Service Provider Modifiers Qty    80134337484 HC PT THERAPEUTIC ACT EA 15 MIN 6/3/2021 Sierra Kitchen PT GP 2          PT G-Codes  Outcome Measure Options: AM-PAC 6 Clicks Basic Mobility (PT)  AM-PAC 6 Clicks Score (PT): 11  AM-PAC 6 Clicks Score (OT): 11    Sierra Kitchen PT  6/3/2021

## 2021-06-03 NOTE — PROCEDURES
Non-Tunneled HD Catheter    Date/Time: 6/3/2021 1:02 PM  Performed by: Brooklynn Ng APRN  Authorized by: Brooklynn Ng APRN   Indications: vascular access and central pressure monitoring    Anesthesia:  Local Anesthetic: lidocaine 1% without epinephrine  Anesthetic total: 5 mL    Sedation:  Patient sedated: no    Preparation: skin prepped with ChloraPrep  Skin prep agent dried: skin prep agent completely dried prior to procedure  Sterile barriers: all five maximum sterile barriers used - cap, mask, sterile gown, sterile gloves, and large sterile sheet  Hand hygiene: hand hygiene performed prior to central venous catheter insertion  Location details: right internal jugular  Patient position: flat  Catheter type: triple lumen  Catheter size: 12 Fr  Pre-procedure: landmarks identified  Ultrasound guidance: yes  Number of attempts: 1  Post-procedure: line sutured and dressing applied  Assessment: blood return through all ports,  free fluid flow,  placement verified by x-ray and no pneumothorax on x-ray  Patient tolerance: patient tolerated the procedure well with no immediate complications  Comments: HD access placed via wire exchange with discontinuation of previous Cordis. Wire placement confirmed in the RIJ. Suture X2 and sterile dressing applied.     *ADDENDUM*   Additional suture placed around diameter of catheter lumen due to ongoing oozing from insertions site with adequate hemostasis.     CHARLES Cali, AGACNP-BC, FNP-BC   Pulmonary and Critical Care

## 2021-06-03 NOTE — CASE MANAGEMENT/SOCIAL WORK
Continued Stay Note  [facility]     Patient Name: Augusto Lorenzana Jr.  MRN: [id number]  Today's Date: 6/3/2021    Admit Date: 5/22/2021    Discharge Plan     Row Name 06/03/21 1608       Plan    Plan  discharge plan    Plan Comments  Pt currently receiving dialysis and not on dialysis prior to admission. CM spoke with pt's spouse, Hedy on cell regarding discharge plan. Pt and spouse lives 3 hours away and spouse states they plan to stay with daughter in [place] at discharge. Discharge plan evolving/pending hospital course. . Pt having a repeat fees test tomorrow. CM will cont to follow.    Final Discharge Disposition Code  30 - still a patient        Discharge Codes    No documentation.       Expected Discharge Date and Time     Expected Discharge Date Expected Discharge Time    Jun 5, 2021             Adore Lopez RN

## 2021-06-03 NOTE — THERAPY TREATMENT NOTE
Acute Care - Speech Language Pathology   Swallow Treatment Note [facility]     Patient Name: Augusto Lorenzana Jr.  : 1945  MRN: [id number]  Today's Date: 6/3/2021               Admit Date: 2021    Visit Dx:     ICD-10-CM ICD-9-CM   1. Non-STEMI (non-ST elevated myocardial infarction) (CMS/HCC)  I21.4 410.70   2. Acute congestive heart failure, unspecified heart failure type (CMS/HCC)  I50.9 428.0   3. History of coronary artery disease  Z86.79 V12.59   4. Coronary artery disease involving native coronary artery of native heart, angina presence unspecified  I25.10 414.01   5. Pharyngeal dysphagia  R13.13 787.23   6. MICHAEL (acute kidney injury) (CMS/HCC)  N17.9 584.9     Patient Active Problem List   Diagnosis   • NSTEMI 2021   • Hyperlipidemia LDL goal <70   • Essential hypertension   • T2DM on oral agents and insulin. 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   • Postoperative hypoxemia     Past Medical History:   Diagnosis Date   • CAD (coronary artery disease)    • CKD (chronic kidney disease) stage 3, GFR 30-59 ml/min (CMS/HCC)    • Diabetes mellitus (CMS/HCC)    • Hyperlipidemia    • Hypertension    • NSTEMI (non-ST elevated myocardial infarction) (CMS/HCC)      Past Surgical History:   Procedure Laterality Date   • CARDIAC CATHETERIZATION N/A 2021    Procedure: Left Heart Cath;  Surgeon: Blade Greene IV, MD;  Location: [facility] CATH INVASIVE LOCATION;  Service: Cardiovascular;  Laterality: N/A;   • CARDIAC SURGERY      2 stents placed .   • CORONARY ARTERY BYPASS GRAFT N/A 2021    Procedure: MEDIAN STERNOTOMY CORONARY ARTERY BYPASS X 3 UTILIZING THE LEFT INTERNAL MAMMARY ARTERY GRAFT, EVH OF THE GREATER RIGHT SAPHENOUS VEIN, AND PILO PER ANESTHESIA;  Surgeon: Jacek Miller MD;  Location: [facility] OR;  Service: Cardiothoracic;  Laterality: N/A;         SWALLOW EVALUATION (last 72 hours)      SLP Adult Swallow Evaluation     Row Name 06/03/21 1330 06/01/21 1310 06/01/21 0820             Rehab Evaluation    Document Type  therapy note (daily note)  -  evaluation  -  evaluation  -MP      Subjective Information  no complaints  -SM  no complaints  -MP  no complaints  -MP      Patient Observations  alert;cooperative  -SM  alert;cooperative  -MP  alert;cooperative  -MP      Patient/Family/Caregiver Comments/Observations  --  No family present  -MP  --      Care Plan Review  evaluation/treatment results reviewed;care plan/treatment goals reviewed;risks/benefits reviewed;patient/other agree to care plan  -SM  evaluation/treatment results reviewed;patient/other agree to care plan  -MP  evaluation/treatment results reviewed;patient/other agree to care plan  -MP      Care Plan Review, Other Participant(s)  spouse  -  --  --      Patient Effort  good  -SM  good  -MP  good  -MP      Comment  Limited session as observed with bleeding at site on R of neck. RNs made aware and arrived to address.   -  --  --         General Information    Patient Profile Reviewed  --  yes  -MP  yes  -MP      Pertinent History Of Current Problem  --  See AM eval.  -MP  Adm w/ NSTEMI s/p CABG x3 5/28, post-op cardiac arrest w/ 1 round compressions. Extubated 5/29 1340 (~30 hrs total).  -MP      Current Method of Nutrition  --  NPO;nasogastric feedings  -MP  NPO;nasogastric feedings  -MP      Precautions/Limitations, Vision  --  --  WFL;for purposes of eval  -MP      Precautions/Limitations, Hearing  --  --  WFL;for purposes of eval  -MP      Prior Level of Function-Communication  --  --  WFL  -MP      Prior Level of Function-Swallowing  --  --  no diet consistency restrictions  -MP      Plans/Goals Discussed with  --  --  patient;agreed upon  -MP      Barriers to Rehab  --  --  medically complex  -MP      Patient's Goals for Discharge  --  --  patient did not state  -         Pain     Additional Documentation  --  Pain Scale: FACES Pre/Post-Treatment (Group)  -MP  Pain Scale: FACES Pre/Post-Treatment (Group)  -MP         Pain Scale: Numbers Pre/Post-Treatment    Pretreatment Pain Rating  0/10 - no pain  -SM  --  --      Posttreatment Pain Rating  0/10 - no pain  -SM  --  --         Pain Scale: FACES Pre/Post-Treatment    Pain: FACES Scale, Pretreatment  --  0-->no hurt  -MP  0-->no hurt  -MP      Posttreatment Pain Rating  --  0-->no hurt  -MP  0-->no hurt  -MP         Oral Motor Structure and Function    Dentition Assessment  --  --  edentulous, dentures not available  -MP      Secretion Management  --  --  WNL/WFL  -MP      Mucosal Quality  --  --  moist, healthy  -MP         Oral Musculature and Cranial Nerve Assessment    Oral Motor General Assessment  --  --  generalized oral motor weakness  -         General Eating/Swallowing Observations    Respiratory Support Currently in Use  --  --  nasal cannula  -MP      Eating/Swallowing Skills  --  --  fed by SLP  -MP      Positioning During Eating  --  --  upright in chair  -MP      Utensils Used  --  --  spoon;cup  -MP      Consistencies Trialed  --  --  thin liquids;nectar/syrup-thick liquids;pureed  -MP         Clinical Swallow Eval    Pharyngeal Phase  --  --  suspected pharyngeal impairment  -      Clinical Swallow Evaluation Summary  --  --  Immediate hard cough w/ thin H2O. No s/sxs w/ nectar-thick or puree. Rec continue NPO & SLP will plan for FEES today to further assess.  -MP         Pharyngeal Phase Concerns    Pharyngeal Phase Concerns  --  --  cough  -MP      Cough  --  --  thin  -MP         Fiberoptic Endoscopic Evaluation of Swallowing (FEES)    Risks/Benefits Reviewed  --  risks/benefits explained;patient;agreed to eval  -MP  --      Nasal Entry  --  left:  -MP  --      Scope serial number/identification  --  338  -MP  --         Anatomy and Physiology    Anatomic Considerations  --  erythema;vocal folds  -MP  --       Velopharyngeal  --  WFL  -MP  --      Base of Tongue  --  symmetrical  -MP  --      Epiglottis  --  WFL  -MP  --      Laryngeal Function Breathing  --  symmetrical  -MP  --      Laryngeal Function Phonation  --  symmetrical  -MP  --      Laryngeal Function to Breath Hold  --  TVF/FVF/Arytenoid;sustained closure  -MP  --      Secretion Rating Scale (Yuriy et al. 1996)  --  1- secretions present around the laryngeal vestibule  -MP  --      Secretion Description  --  thin;clear  -MP  --      Ice Chips  --  DNA  -MP  --      Spontaneous Swallow  --  frequency adequate  -MP  --      Sensory  --  sensed scope  -MP  --      Utensils Used  --  Spoon;Cup;Straw  -MP  --      Consistencies Trialed  --  thin liquids;nectar-thick liquids;honey-thick liquids;pudding/puree;regular textures  -MP  --         FEES Interpretation    Oral Phase  --  other (see comments) u/a to masticate solid  -MP  --         Initiation of Pharyngeal Swallow    Initiation of Pharyngeal Swallow  --  bolus in valleculae  -MP  --      Pharyngeal Phase  --  impaired pharyngeal phase of swallowing  -MP  --      Aspiration During the Swallow  --  thin liquids;nectar-thick liquids;secondary to delayed swallow initiation or mistiming;secondary to reduced laryngeal elevation;secondary to reduced vestibular closure  -MP  --      Response to Aspiration  --  no response, silent aspiration  -MP  --      Rosenbek's Scale  --  thin:;nectar:;8-->Level 8  -MP  --      Residue  --  all consistencies tested;diffuse within pharynx;secondary to reduced base of tongue retraction;secondary to reduced posterior pharyngeal wall stripping;secondary to reduced laryngeal elevation;secondary to reduced hyolaryngeal excursion;other (see comments) mild-mod  -MP  --      Response to Residue  --  other (see comments) reduced w/ spontaneous subsequent swallows  -MP  --      Attempted Compensatory Maneuvers  --  bolus size;bolus presentation style  -MP  --      Response to Attempted  Compensatory Maneuvers  --  did not prevent penetration;did not prevent aspiration  -MP  --      FEES Summary  --  Moderate pharyngeal dysphagia. U/a to masticate regular solid 2' edentulous. Aspiration during the swallow w/ thin & nectar-thick liquids. Aspiration was silent. Penetration during w/ honey-thick via straw - eliminated w/ cup. Mild-moderate diffuse pharyngeal residue w/ all consistencies - reduced w/ spontaneous subsequent swallows. Rec: dysphagia level III (puree/some mashed), honey-thick liquids. No straws. Meds whole w/ pudding/puree. SLP will continue to follow for dysphagia tx.  -MP  --         Clinical Impression    Daily Summary of Progress (SLP)  progress toward functional goals is good  -  --  --      SLP Swallowing Diagnosis  --  moderate;pharyngeal dysphagia  -MP  suspected pharyngeal dysphagia  -MP      Functional Impact  --  risk of aspiration/pneumonia  -MP  risk of aspiration/pneumonia  -MP      Rehab Potential/Prognosis, Swallowing  --  good, to achieve stated therapy goals  -MP  good, to achieve stated therapy goals  -MP      Swallow Criteria for Skilled Therapeutic Interventions Met  --  demonstrates skilled criteria  -MP  demonstrates skilled criteria  -MP      Plan for Continued Treatment (SLP)  Repeat FEES tomorrow for ? PO diet advancement.   -SM  --  --         Recommendations    Therapy Frequency (Swallow)  --  5 days per week  -MP  --      Predicted Duration Therapy Intervention (Days)  --  until discharge  -MP  until discharge  -      SLP Diet Recommendation  --  puree with some mashed;honey thick liquids  -  NPO;temporary alternate methods of nutrition/hydration;other (see comments) until FEES today  -MP      Recommended Diagnostics  --  --  FEES  -MP      Recommended Precautions and Strategies  --  upright posture during/after eating;small bites of food and sips of liquid;no straw;general aspiration precautions  -  --      Oral Care Recommendations  --  Oral Care  BID/PRN  -MP  Oral Care BID/PRN  -MP      SLP Rec. for Method of Medication Administration  --  meds whole;with pudding or applesauce;as tolerated  -MP  meds via alternate route until FEES  -MP      Monitor for Signs of Aspiration  --  yes;notify SLP if any concerns  -MP  --      Anticipated Discharge Disposition (SLP)  --  unknown;anticipate therapy at next level of care  -MP  unknown;anticipate therapy at next level of care  -MP        User Key  (r) = Recorded By, (t) = Taken By, (c) = Cosigned By    Initials Name Effective Dates    Chantal Mcneil, MS CCC-SLP 08/09/20 -     MP Ismael Hall MS CCC-SLP 06/19/19 -           EDUCATION  The patient has been educated in the following areas:   Dysphagia (Swallowing Impairment) Oral Care/Hydration.    SLP Recommendation and Plan               Daily Summary of Progress (SLP): progress toward functional goals is good    Plan for Continued Treatment (SLP): Repeat FEES tomorrow for ? PO diet advancement.               Plan of Care Reviewed With: patient    SLP GOALS     Row Name 06/03/21 1330 06/01/21 1310          Oral Nutrition/Hydration Goal 1 (SLP)    Oral Nutrition/Hydration Goal 1, SLP  LTG: Pt will return to regular diet, thin liquids w/ no overt s/sxs aspiration/distress w/ 100% acc and no cues  -SM  LTG: Pt will return to regular diet, thin liquids w/ no overt s/sxs aspiration/distress w/ 100% acc and no cues  -MP     Time Frame (Oral Nutrition/Hydration Goal 1, SLP)  by discharge  -SM  by discharge  -MP     Progress/Outcomes (Oral Nutrition/Hydration Goal 1, SLP)  good progress toward goal  -SM  --        Oral Nutrition/Hydration Goal 2 (SLP)    Oral Nutrition/Hydration Goal 2, SLP  Pt will tolerate puree/some mashed & honey-thick liquids w/ no overt s/sxs aspiration/distress w/ 100% acc and no cues  -SM  Pt will tolerate puree/some mashed & honey-thick liquids w/ no overt s/sxs aspiration/distress w/ 100% acc and no cues  -MP     Time Frame (Oral  Nutrition/Hydration Goal 2, SLP)  by discharge  -SM  by discharge  -MP     Progress/Outcomes (Oral Nutrition/Hydration Goal 2, SLP)  goal ongoing  -SM  --        Oral Nutrition/Hydration Goal (SLP)    Oral Nutrition/Hydration Goal, SLP  Pt will tolerate therapeutic trials of thin H2O w/ no overt s/sxs aspiration/distress w/ 70% acc and no cues in order to assess readiness for repeat instrumental eval  -SM  Pt will tolerate therapeutic trials of thin H2O w/ no overt s/sxs aspiration/distress w/ 70% acc and no cues in order to assess readiness for repeat instrumental eval  -MP     Time Frame (Oral Nutrition/Hydration Goal, SLP)  short term goal (STG)  -SM  short term goal (STG)  -MP     Barriers (Oral Nutrition/Hydration Goal, SLP)  Cough with large straw sip. No s/s with small sips. Voice stronger. Showing overall readiness for repeat FEES. Pt/spouse in agreement.   -SM  --        Lingual Strengthening Goal 1 (SLP)    Activity (Lingual Strengthening Goal 1, SLP)  increase tongue back strength  -SM  increase tongue back strength  -MP     Increase Tongue Back Strength  swallow trials;lingual resistance exercises  -SM  swallow trials;lingual resistance exercises  -MP     [place]/Accuracy (Lingual Strengthening Goal 1, SLP)  with minimal cues (75-90% accuracy)  -SM  with minimal cues (75-90% accuracy)  -MP     Time Frame (Lingual Strengthening Goal 1, SLP)  short term goal (STG)  -SM  short term goal (STG)  -MP     Progress/Outcomes (Lingual Strengthening Goal 1, SLP)  goal ongoing  -SM  --        Pharyngeal Strengthening Exercise Goal 1 (SLP)    Activity (Pharyngeal Strengthening Goal 1, SLP)  --  increase timing;increase superior movement of the hyolaryngeal complex;increase anterior movement of the hyolaryngeal complex;increase closure at entrance to airway/closure of airway at glottis;increase squeeze/positive pressure generation;increase tongue base retraction  -MP     Increase Timing  prepping - 3 second prep  or suck swallow or 3-step swallow  -SM  prepping - 3 second prep or suck swallow or 3-step swallow  -MP     Increase Superior Movement of the Hyolaryngeal Complex  effortful pitch glide (falsetto + pharyngeal squeeze)  -SM  effortful pitch glide (falsetto + pharyngeal squeeze)  -MP     Increase Anterior Movement of the Hyolaryngeal Complex  chin tuck against resistance (CTAR)  -SM  chin tuck against resistance (CTAR)  -MP     Increase Closure at Entrance to Airway/Closure of Airway at Glottis  supraglottic swallow  -SM  supraglottic swallow  -MP     Increase Squeeze/Positive Pressure Generation  hard effortful swallow  -SM  hard effortful swallow  -MP     Increase Tongue Base Retraction  bronwyn  -SM  bronwyn  -MP     [place]/Accuracy (Pharyngeal Strengthening Goal 1, SLP)  with minimal cues (75-90% accuracy)  -SM  with minimal cues (75-90% accuracy)  -MP     Time Frame (Pharyngeal Strengthening Goal 1, SLP)  short term goal (STG)  -SM  short term goal (STG)  -MP     Progress/Outcomes (Pharyngeal Strengthening Goal 1, SLP)  goal ongoing  -SM  --        Swallow Management Recall Goal 1 (SLP)    Activity (Swallow Management Recall Goal 1, SLP)  independent recall of;safe diet/liquid level;safe diet level/texture  -SM  independent recall of;safe diet/liquid level;safe diet level/texture  -MP     [place]/Accuracy (Swallow Management Recall Goal 1, SLP)  independently (over 90% accuracy)  -SM  independently (over 90% accuracy)  -MP     Time Frame (Swallow Management Recall Goal 1, SLP)  short term goal (STG)  -SM  short term goal (STG)  -MP     Progress/Outcomes (Swallow Management Recall Goal 1, SLP)  goal ongoing  -SM  --        Swallow Compensatory Strategies Goal 1 (SLP)    Activity (Swallow Compensatory Strategies/Techniques Goal 1, SLP)  compensatory strategies;small cup sips;other (see comments);during p.o. trials;during meal intake  -SM  compensatory strategies;small cup sips;other (see comments);during  p.o. trials;during meal intake no straws  -MP     [place]/Accuracy (Swallow Compensatory Strategies/Techniques Goal 1, SLP)  independently (over 90% accuracy)  -SM  independently (over 90% accuracy)  -MP     Time Frame (Swallow Compensatory Strategies/Techniques Goal 1, SLP)  short term goal (STG)  -SM  short term goal (STG)  -MP     Progress/Outcomes (Swallow Compensatory Strategies/Techniques Goal 1, SLP)  goal ongoing  -SM  --       User Key  (r) = Recorded By, (t) = Taken By, (c) = Cosigned By    Initials Name Provider Type    Chantal Mcneil MS CCC-SLP Speech and Language Pathologist    Ismael Velarde MS CCC-SLP Speech and Language Pathologist             Time Calculation:   Time Calculation- SLP     Row Name 06/03/21 1357             Time Calculation- SLP    SLP Start Time  1330  -SM      SLP Received On  06/03/21  -SM         Untimed Charges    73239-FB Treatment Swallow Minutes  28  -SM         Total Minutes    Untimed Charges Total Minutes  28  -SM       Total Minutes  28  -SM        User Key  (r) = Recorded By, (t) = Taken By, (c) = Cosigned By    Initials Name Provider Type    Chantal Mcneil MS CCC-SLP Speech and Language Pathologist          Therapy Charges for Today     Code Description Service Date Service Provider Modifiers Qty    [id number]  ST TREATMENT SWALLOW 2 6/3/2021 Chantal Taylor MS CCC-SLP GN 1            Patient was not wearing a face mask and did exhibit coughing during this therapy encounter.  Procedure performed was aerosolizing, did not involve close contact (within 6 feet for at least 15 minutes or longer), and did not involve contact with infectious secretions or specimens.  Therapist used appropriate personal protective equipment including gloves, standard procedure mask and eye protection.  Appropriate PPE was worn during the entire therapy session.  Hand hygiene was completed before and after therapy session.       Chantal Taylor MS  CCC-SLP  6/3/2021

## 2021-06-03 NOTE — PROGRESS NOTES
Intensivist Note     6/3/2021  Hospital Day: 12  6 Days Post-Op  ICU Stays Timeline            Hospital Admission: 05/22/21 0431 - Current  ICU stays: 1      In Date/Time Event Department ICU Stay Duration     05/22/21 0431 Admission [facility] EMERGENCY DEPT      05/22/21 0813 Transfer In [facility] 6B      05/24/21 1006 Transfer In [facility] CATH LAB      05/24/21 1122 Transfer In [facility] 6B      05/28/21 0619 Transfer In [facility] OR      05/28/21 1209 Transfer In [facility] 2HSIC 5 days 19 hours 9 minutes             Mr. Augusto Lorenzana ., 76 y.o. 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 HbA1c 7.4     Gout    Former smokeless tobacco use       SUBJECTIVE     76-year-old white male with a history of CAD s/p RCA stent 2009, diabetes mellitus (HbA1c 7.4), hypertension, hyperlipidemia, CKD, gout, and smokeless tobacco use.  She presented to [facility] ER 5/22/2021 complaining of dyspnea and was noted to be hypoxemic.  He had elevated troponins consistent with NSTEMI and cardiology proceeded with [facility] 5/24/2021 revealing multivessel CAD but preserved LVEF of 55%.  Creatinine was 1.9 on admission and transiently increased to 2.1 after cardiac catheterization only to return to baseline. Patient subsequently underwent CABG x 3 on 5/28/2021 and course complicated by the development of ventricular fibrillation and asystole requiring ACLS twice the day of surgery.  Subsequently required 48 hours of pacing before resuming to a sinus rhythm.  In addition required 3 units of packed RBCs, 4 units FFP, and 1 sixpack of platelets. Patient was subsequently extubated 5/29/2021 although has had persistent high FiO2 requirement.  In addition his renal function deteriorated in association with low UOP despite significantly positive fluid balance.    Interval history: Hemodynamics adequate  "off all pressors.  Unfortunately renal function remains poor with decreased urine output and progressive worsening in BUN and creatinine.  Creatinine is slowly creeping upward but BUN increasing out of proportion.  He is not however catabolic at this time, not on steroids, not on doxycycline, and has no evidence of blood in the gut to explain his increasing BUN out of proportion to creatinine.  No fever although WBC of 13.6.  Gas exchange adequate on nasal oxygen.  Remains extremely weak but is able to mobilize.  Mental status worse with increased confusion as his BUN continues to increase ( today).  Nutritionally he is at goal with enteral feeds.        ROS: Unable to obtain due to confusion    The patient's relevant PMH, PSH, FH, and SH were reviewed and updated in Epic as appropriate. Allergies and Medications reviewed.    OBJECTIVE     /79 (BP Location: Left arm, Patient Position: Lying)   Pulse 65   Temp 98.1 °F (36.7 °C) (Oral)   Resp 24   Ht 170 cm (66.93\")   Wt 84.8 kg (187 lb)   SpO2 93%   BMI 29.35 kg/m²   Oxygen Concentration (%): 40  Flow (L/min): 2    Flowsheet Rows      First Filed Value   Admission Height  170.2 cm (67\") Documented at 05/22/2021 0433   Admission Weight  88.5 kg (195 lb) Documented at 05/22/2021 0433        Intake & Output (last day)       06/02 0701 - 06/03 0700 06/03 0701 - 06/04 0700    I.V. (mL/kg) 370 (4.4)     Other 553     NG/     IV Piggyback 200     Total Intake(mL/kg) 2024 (23.9)     Urine (mL/kg/hr) 770 (0.4)     Chest Tube      Total Output 770     Net +1254                 Exam:  General Exam:  Elderly white male sitting up in chair.  Appears chronically ill, weak, debilitated  HEENT: Pupils equal and reactive. Nose and throat clear.  Neck:                          Supple, no JVD, thyromegaly, or adenopathy.  Right IJ line in place  Lungs: Clear anteriorly and laterally  Cardiovascular: Regular rate and rhythm without murmurs or gallops.  HR 65 " bpm  Abdomen: Soft nontender without organomegaly or masses.   and rectal: Last catheter in place  Extremities: No cyanosis or clubbing but still with extensive ecchymosis of his forearms and lower extremity edema right much greater than left (lower extremity venous duplex negative)  Neurologic:                 Symmetric strength but diffusely weak. No focal deficits.  Confused but cooperative    Chest X-Ray: Cardiomegaly, patchy bibasilar atelectasis/infiltrate    INFUSIONS  dextrose 5 % with KCl 20 mEq, 30 mL/hr, Last Rate: 30 mL/hr (05/28/21 1258)  sodium chloride, 70 mL/hr, Last Rate: 70 mL/hr (06/03/21 0145)        Results from last 7 days   Lab Units 06/03/21  0343 06/02/21  0352 06/01/21  1541 06/01/21  0353   WBC 10*3/mm3 13.64* 11.64*  --  13.34*   HEMOGLOBIN g/dL 8.6* 8.2* 8.1* 7.8*   HEMATOCRIT % 26.0* 25.1* 24.7* 23.0*   PLATELETS 10*3/mm3 225 199  --  185     Results from last 7 days   Lab Units 06/03/21  0343 06/02/21  0352   SODIUM mmol/L 139 137   POTASSIUM mmol/L 3.9 3.8   CHLORIDE mmol/L 106 105   CO2 mmol/L 20.0* 19.0*   BUN mg/dL 123* 111*   CREATININE mg/dL 3.31* 3.24*   GLUCOSE mg/dL 144* 227*   CALCIUM mg/dL 8.7 8.8     Results from last 7 days   Lab Units 06/03/21  0343 06/02/21  0352 05/31/21  1141 05/28/21  2207 05/28/21  1559   MAGNESIUM mg/dL  --   --  2.4 2.1 2.3   PHOSPHORUS mg/dL 3.7 3.7 5.9* 4.9* 4.3     Results from last 7 days   Lab Units 05/31/21  1141 05/28/21  1757 05/28/21  1559   ALK PHOS U/L 91 66 63   BILIRUBIN mg/dL 0.6 0.5 0.7   ALT (SGPT) U/L 22 25 23   AST (SGOT) U/L 22 60* 54*       Lab Results   Component Value Date    SEDRATE 52 (H) 06/03/2021     No results found for: BNP  Lab Results   Component Value Date    TROPONINT 1.550 (C) 05/22/2021     Lab Results   Component Value Date    TSH 4.030 06/03/2021     Lab Results   Component Value Date    LACTATE 1.5 05/22/2021     Lab Results   Component Value Date    CORTISOL 18.88 06/03/2021       Results from last 7 days    Lab Units 06/01/21  0934 05/29/21  1331 05/28/21  1827 05/28/21  1236 05/28/21  1116   PH, ARTERIAL pH units 7.446 7.421 7.288* 7.394 7.38   PCO2, ARTERIAL mm Hg 29.0* 33.3* 41.1 34.8*  --    PO2 ART mm Hg 74.2* 88.4 77.2* 244.0*  --    HCO3 ART mmol/L 19.9* 21.7 19.7* 21.2  --    FIO2 % 44 40 40 100  --          I reviewed the patient's results, images and medication.    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 HbA1c 7.4     Gout    Former smokeless tobacco use      DISCUSSION: No improvement in renal function slowly worsening    PLAN     1.  Nephrology plans on proceeding with RRT today.  2.  We will have temporary dialysis catheter placed over patient's present IJ    Plan of care and goals reviewed with multidisciplinary team at daily rounds.    I discussed the patient's findings and my recommendations with patient and nursing staff    Patient is critically ill due to progressive renal failure with the need for dialysis and has a high risk of life-threatening decline in condition.  They require continuous monitoring and frequent reassessment of condition for adjustment of management in order to lessen risk.  I visited the patient's bedside and interacted with nurse numerous times today.    Time spent Critical care 30 min (It does not include procedure time).    Electronically signed by Ish Loaiza MD, 06/03/21, 7:18 AM EDT.   Pulmonary / Critical care medicine

## 2021-06-03 NOTE — PLAN OF CARE
Problem: Adult Inpatient Plan of Care  Goal: Plan of Care Review  Recent Flowsheet Documentation  Taken 6/3/2021 1245 by Carlos Harris OT  Progress: improving  Plan of Care Reviewed With:   patient   daughter  Outcome Summary: Initial OT evaluation completed. Pt presents w/ generalized weakness, decreased activitiy tolerance, and impaired balance/coordination warranting skilled OT services to promote return to PLOF. Pt received UIC, ModA w/2 for transfers, dependent for LB dressing, MaxA for UB dressing. Pt progressed to SBA for static sitting balance, CGA for unsupported targeted reaching activities. Frequent cues for posture and sequencing. Education on ADL completion w/in sternal precautions initiated, requires further training. Recommend d/c to IRF for optimal fxl outcomes.

## 2021-06-03 NOTE — PROGRESS NOTES
Clinical Nutrition     Multidisciplinary Rounds      Patient Name: Augusto Lorenzana Jr.  Date of Encounter: 06/03/21 10:49 EDT  MRN: [id number]  Admission date: 5/22/2021      Reason for visit: MDR. RD to continue to follow per protocol.     Pt sitting in chair, on room air, not alert, + 1/2NS@70ml/hr, TF held at midnight for placement of access    Per RN: pt intermittently confused    Plan to place temporary HD access today, start HD    Current diet: NPO Diet  Orders Placed This Encounter      Diet, Tube Feeding Tube Feeding Formula: Novasource Renal (Electrolyte Restricted)  goal rate @45ml/hr, free water 35ml/hr    Intervention:  Follow treatment plan  Care plan reviewed    Follow up:   Per protocol      Melvi Hawkins RD  10:49 EDT  Time: 20min

## 2021-06-03 NOTE — PLAN OF CARE
Goal Outcome Evaluation:  Plan of Care Reviewed With: patient  Progress: improving  Outcome Summary: No significant changes overnight. Gradually regaining strength. Remains neuro intact. Now maintaining SpO2 >90% on room air. Small amount of thin tan sputum. Remains in normal sinus rhythm. -140mmHg - now off all gtt's. Made NPO after midnight for possible HD access placement today. Denies pain overnight. AM labs reviewed - creatinine and BUN increasing. Electrolytes WNL. UOP remains marginal but adequate. WBC slightly elevated from yesterday. Afebrile overnight. Up to chair this AM.

## 2021-06-03 NOTE — PROGRESS NOTES
Cardiothoracic Surgery Progress Note      POD # 6 s/p CABG       LOS: 12 days      Subjective:  No changes      Objective:  Vital Signs  Temp:  [97.5 °F (36.4 °C)-98.8 °F (37.1 °C)] 98.1 °F (36.7 °C)  Heart Rate:  [61-70] 65  Resp:  [18-24] 24  BP: (123-150)/() 150/79    Physical Exam:   General Appearance: alert, appears stated age and cooperative   Lungs: clear to auscultation, respirations regular, respirations even and respirations unlabored   Heart: regular rhythm & normal rate, normal S1, S2, no murmur, no gallop, no rub and no click   Skin: Incision c/d/i     Results:    Results from last 7 days   Lab Units 06/03/21  0343   WBC 10*3/mm3 13.64*   HEMOGLOBIN g/dL 8.6*   HEMATOCRIT % 26.0*   PLATELETS 10*3/mm3 225     Results from last 7 days   Lab Units 06/03/21  0343   SODIUM mmol/L 139   POTASSIUM mmol/L 3.9   CHLORIDE mmol/L 106   CO2 mmol/L 20.0*   BUN mg/dL 123*   CREATININE mg/dL 3.31*   GLUCOSE mg/dL 144*   CALCIUM mg/dL 8.7         Assessment:    NSTEMI 5/22/2021    Hyperlipidemia LDL goal <70    Essential hypertension    T2DM on oral agents and insulin. 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

## 2021-06-03 NOTE — THERAPY EVALUATION
Patient Name: Augusto Lorenzana Jr.  : 1945    MRN: [id number]                              Today's Date: 6/3/2021       Admit Date: 2021    Visit Dx:     ICD-10-CM ICD-9-CM   1. Non-STEMI (non-ST elevated myocardial infarction) (CMS/Formerly [facility])  I21.4 410.70   2. Acute congestive heart failure, unspecified heart failure type (CMS/Formerly [facility])  I50.9 428.0   3. History of coronary artery disease  Z86.79 V12.59   4. Coronary artery disease involving native coronary artery of native heart, angina presence unspecified  I25.10 414.01   5. Dysphagia, unspecified type  R13.10 787.20     Patient Active Problem List   Diagnosis   • NSTEMI 2021   • Hyperlipidemia LDL goal <70   • Essential hypertension   • T2DM on oral agents and insulin. 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   • Postoperative hypoxemia     Past Medical History:   Diagnosis Date   • CAD (coronary artery disease)    • CKD (chronic kidney disease) stage 3, GFR 30-59 ml/min (CMS/Formerly [facility])    • Diabetes mellitus (CMS/Formerly [facility])    • Hyperlipidemia    • Hypertension    • NSTEMI (non-ST elevated myocardial infarction) (CMS/Formerly [facility])      Past Surgical History:   Procedure Laterality Date   • CARDIAC CATHETERIZATION N/A 2021    Procedure: Left Heart Cath;  Surgeon: Blade Greene IV, MD;  Location:  [facility];  Service: Cardiovascular;  Laterality: N/A;   • CARDIAC SURGERY      2 stents placed .   • CORONARY ARTERY BYPASS GRAFT N/A 2021    Procedure: MEDIAN STERNOTOMY CORONARY ARTERY BYPASS X 3 UTILIZING THE LEFT INTERNAL MAMMARY ARTERY GRAFT, EVH OF THE GREATER RIGHT SAPHENOUS VEIN, AND PILO PER ANESTHESIA;  Surgeon: Jacek Miller MD;  Location:  [facility];  Service: Cardiothoracic;  Laterality: N/A;     General Information     Row Name 21 1235          OT Time and Intention    Document Type   evaluation  -CS     Mode of Treatment  occupational therapy  -CS     Row Name 06/03/21 1235          General Information    Patient Profile Reviewed  yes  -CS     Prior Level of Function  independent:;bed mobility;transfer;all household mobility;home management;using stairs;yard work;ADL's;driving Pt/Dtr reports no use of AD at baseline  -CS     Existing Precautions/Restrictions  cardiac;sternal;other (see comments) [place] SEAMAN  -CS     Barriers to Rehab  medically complex  -CS     Row Name 06/03/21 1235          Living Environment    Lives With  spouse  -CS     Row Name 06/03/21 1235          Home Main Entrance    Number of Stairs, Main Entrance  four  -CS     Stair Railings, Main Entrance  railings on both sides of stairs;railings safe and in good condition  -CS     Row Name 06/03/21 1235          Stairs Within Home, Primary    Stairs, Within Home, Primary  One level home, both step-in and walk-in showers  -CS     Number of Stairs, Within Home, Primary  none  -CS     Row Name 06/03/21 1235          Cognition    Orientation Status (Cognition)  oriented to;person;place;verbal cues/prompts needed for orientation;time  -CS     Row Name 06/03/21 1235          Safety Issues, Functional Mobility    Safety Issues Affecting Function (Mobility)  awareness of need for assistance;insight into deficits/self-awareness;safety precaution awareness;safety precautions follow-through/compliance;sequencing abilities  -CS     Impairments Affecting Function (Mobility)  balance;coordination;endurance/activity tolerance;postural/trunk control;strength;cognition  -CS     Cognitive Impairments, Mobility Safety/Performance  insight into deficits/self-awareness;safety precaution awareness;safety precaution follow-through;awareness, need for assistance  -CS       User Key  (r) = Recorded By, (t) = Taken By, (c) = Cosigned By    Initials Name Provider Type    CS Carlos Harris OT Occupational Therapist          Mobility/ADL's     Row Name  06/03/21 1237          Bed Mobility    Comment (Bed Mobility)  UIC, returned to chair  -     Row Name 06/03/21 1237          Transfers    Transfers  sit-stand transfer  -CS     Comment (Transfers)  verbal cues for sequencing and maintenance of sternal precautions during STS transfers, verbal/tactile cues for upright posture and neck extension, mild posterior lean upon standing  -     Sit-Stand [place] (Transfers)  moderate assist (50% patient effort);2 person assist;verbal cues  -     Row Name 06/03/21 1237          Sit-Stand Transfer    Assistive Device (Sit-Stand Transfers)  walker, front-wheeled  -     Row Name 06/03/21 1237          Functional Mobility    Functional Mobility- Comment  MinAx2 and tactile cues for RW management for in-room functional distances  -     Row Name 06/03/21 1237          Activities of Daily Living    BADL Assessment/Intervention  lower body dressing;feeding;upper body dressing  -     Row Name 06/03/21 1237          Lower Body Dressing Assessment/Training    [place]aines Level (Lower Body Dressing)  doff;don;socks;dependent (less than 25% patient effort)  -CS     Position (Lower Body Dressing)  unsupported sitting  -     Row Name 06/03/21 1237          Self-Feeding Assessment/Training    [place]aines Level (Feeding)  not tested;feeding skills  -     Row Name 06/03/21 1237          Upper Body Dressing Assessment/Training    [place]aines Level (Upper Body Dressing)  don;doff;pajama/robe;maximum assist (25% patient effort)  -CS     Position (Upper Body Dressing)  supported sitting  -CS     Comment (Upper Body Dressing)  cues for sequencing and AAROM assist for BUE sleeve threading  -       User Key  (r) = Recorded By, (t) = Taken By, (c) = Cosigned By    Initials Name Provider Type    Carlos Lopez, OT Occupational Therapist        Obj/Interventions     Row Name 06/03/21 1241          Sensory Assessment (Somatosensory)    Sensory Assessment (Somatosensory)  UE  sensation intact  -     Row Name 06/03/21 1241          Vision Assessment/Intervention    Visual Impairment/Limitations  WFL;corrective lenses for reading  -     Row Name 06/03/21 1241          Range of Motion Comprehensive    General Range of Motion  bilateral upper extremity ROM WFL  -     Row Name 06/03/21 1241          Strength Comprehensive (MMT)    General Manual Muscle Testing (MMT) Assessment  upper extremity strength deficits identified  -     Comment, General Manual Muscle Testing (MMT) Assessment  Formal BUE MMT deferred d/t sternal precautions, B grasp 4-/5, demo'd 3+/5 BUE strength during mobility and therapeutic activities  -     Row Name 06/03/21 1241          Motor Skills    Motor Skills  functional endurance;posture  -     Functional Endurance  Pt fatigues w/ longer distances, O2 sats remained WNL on RA throughout activities  -     Row Name 06/03/21 1241          Balance    Balance Assessment  sitting static balance;sitting dynamic balance;standing static balance;standing dynamic balance  -     Static Sitting Balance  WFL;unsupported;sitting in chair  -     Dynamic Sitting Balance  mild impairment;unsupported;sitting in chair  -     Static Standing Balance  mild impairment;supported;standing  -     Dynamic Standing Balance  moderate impairment;supported;standing  -     Balance Interventions  sitting;weight shifting activity;dynamic reaching  -     Comment, Balance  Pt progressed to SBA for static sitting balance following A/P weight shifting activities and cues for BUE support on arm rests, CGA for targeted reaching in sitting  -     Row Name 06/03/21 1241          Posture    Posture  other (see comments) Mild posterior lean in both sitting/standing, improves w/ verbal/tactile cues  -       User Key  (r) = Recorded By, (t) = Taken By, (c) = Cosigned By    Initials Name Provider Type    Carlos Lopez OT Occupational Therapist        Goals/Plan     Row Name  06/03/21 1252          Bed Mobility Goal 1 (OT)    Activity/Assistive Device (Bed Mobility Goal 1, OT)  bed mobility activities, all  -CS     [place] Level/Cues Needed (Bed Mobility Goal 1, OT)  moderate assist (50-74% patient effort)  -CS     Time Frame (Bed Mobility Goal 1, OT)  long term goal (LTG);10 days  -CS     Strategies/Barriers (Bed Mobility Goal 1, OT)  sternal precautions, log rolling  -CS     Progress/Outcomes (Bed Mobility Goal 1, OT)  goal ongoing  -CS     Row Name 06/03/21 1252          Transfer Goal 1 (OT)    Activity/Assistive Device (Transfer Goal 1, OT)  sit-to-stand/stand-to-sit;toilet;walker, rolling  -CS     [place] Level/Cues Needed (Transfer Goal 1, OT)  contact guard assist  -CS     Time Frame (Transfer Goal 1, OT)  long term goal (LTG);10 days  -CS     Progress/Outcome (Transfer Goal 1, OT)  goal ongoing  -CS     Row Name 06/03/21 1252          Dressing Goal 1 (OT)    Activity/Device (Dressing Goal 1, OT)  upper body dressing  -CS     [place]/Cues Needed (Dressing Goal 1, OT)  supervision required  -CS     Time Frame (Dressing Goal 1, OT)  long term goal (LTG);10 days  -CS     Strategies/Barriers (Dressing Goal 1, OT)  w/in sternal precautions  -CS     Row Name 06/03/21 1252          ROM Goal 1 (OT)    ROM Goal 1 (OT)  Pt will perform 1/8reps BUE AROM HEP w/ verbal cues by discharge to promote fxl strength and ROM for independence w/ ADL completion  -CS     Time Frame (ROM Goal 1, OT)  long term goal (LTG);10 days  -CS     Progress/Outcome (ROM Goal 1, OT)  goal ongoing  -CS     Row Name 06/03/21 1252          Therapy Assessment/Plan (OT)    Planned Therapy Interventions (OT)  activity tolerance training;functional balance retraining;occupation/activity based interventions;strengthening exercise;ROM/therapeutic exercise;transfer/mobility retraining;BADL retraining;adaptive equipment training;patient/caregiver education/training  -CS       User Key  (r) = Recorded By, (t) =  Taken By, (c) = Cosigned By    Initials Name Provider Type    CS Carlos Harris, OT Occupational Therapist        Clinical Impression     Row Name 06/03/21 1245          Pain Assessment    Additional Documentation  Pain Scale: Numbers Pre/Post-Treatment (Group)  -CS     Row Name 06/03/21 1245          Pain Scale: Numbers Pre/Post-Treatment    Pretreatment Pain Rating  0/10 - no pain  -CS     Posttreatment Pain Rating  0/10 - no pain  -CS     Pre/Posttreatment Pain Comment  no visual/verbal indications of pain during eval  -CS     Pain Intervention(s)  Repositioned;Ambulation/increased activity;Rest  -CS     Row Name 06/03/21 124          Plan of Care Review    Plan of Care Reviewed With  patient;daughter  -CS     Progress  improving  -CS     Outcome Summary  Initial OT evaluation completed. Pt presents w/ generalized weakness, decreased activitiy tolerance, and impaired balance/coordination warranting skilled OT services to promote return to PLOF. Pt received UIC, ModA w/2 for transfers, dependent for LB dressing, MaxA for UB dressing. Pt progressed to SBA for static sitting balance, CGA for unsupported targeted reaching activities. Frequent cues for posture and sequencing. Education on ADL completion w/in sternal precautions initiated, requires further training. Recommend d/c to IRF for optimal fxl outcomes.  -CS     Row Name 06/03/21 124          Therapy Assessment/Plan (OT)    Rehab Potential (OT)  good, to achieve stated therapy goals  -CS     Criteria for Skilled Therapeutic Interventions Met (OT)  yes;meets criteria;skilled treatment is necessary  -CS     Therapy Frequency (OT)  daily  -CS     Row Name 06/03/21 1240          Therapy Plan Review/Discharge Plan (OT)    Anticipated Discharge Disposition (OT)  inpatient rehabilitation facility  -CS     Row Name 06/03/21 1247          Vital Signs    Pre Systolic BP Rehab  131 RN cleared for eval, VSS on RA  -CS     Pre Treatment Diastolic BP  67  -CS     Post  Systolic BP Rehab  118  -CS     Post Treatment Diastolic BP  74  -CS     Posttreatment Heart Rate (beats/min)  59  -CS     O2 Delivery Pre Treatment  room air  -CS     O2 Delivery Intra Treatment  room air  -CS     Post SpO2 (%)  94  -CS     O2 Delivery Post Treatment  room air  -CS     Pre Patient Position  Sitting  -CS     Intra Patient Position  Standing  -CS     Post Patient Position  Sitting  -CS     Row Name 06/03/21 1245          Positioning and Restraints    Pre-Treatment Position  in bed  -CS     Post Treatment Position  chair  -CS     In Chair  notified nsg;reclined;sitting;call light within reach;encouraged to call for assist;exit alarm on;with family/caregiver;RUE elevated;LUE elevated;waffle cushion;legs elevated  -CS       User Key  (r) = Recorded By, (t) = Taken By, (c) = Cosigned By    Initials Name Provider Type    Carlos Lopez OT Occupational Therapist        Outcome Measures     Row Name 06/03/21 1254          How much help from another is currently needed...    Putting on and taking off regular lower body clothing?  1  -CS     Bathing (including washing, rinsing, and drying)  1  -CS     Toileting (which includes using toilet bed pan or urinal)  2  -CS     Putting on and taking off regular upper body clothing  2  -CS     Taking care of personal grooming (such as brushing teeth)  3  -CS     Eating meals  2  -CS     AM-PAC 6 Clicks Score (OT)  11  -CS     Row Name 06/03/21 1254          Functional Assessment    Outcome Measure Options  AM-PAC 6 Clicks Daily Activity (OT)  -CS       User Key  (r) = Recorded By, (t) = Taken By, (c) = Cosigned By    Initials Name Provider Type    Carlos Lopez OT Occupational Therapist        Occupational Therapy Education                 Title: PT OT SLP Therapies (In Progress)     Topic: Occupational Therapy (In Progress)     Point: ADL training (In Progress)     Description:   Instruct learner(s) on proper safety adaptation and remediation techniques  during self care or transfers.   Instruct in proper use of assistive devices.              Learning Progress Summary           Patient Acceptance, E,D, NR by CS at 6/3/2021 1255    Comment: OT role and POC, sternal precautions re: ADL completion, safety awareness   Family Acceptance, E,D, NR by CS at 6/3/2021 1255    Comment: OT role and POC, sternal precautions re: ADL completion, safety awareness                   Point: Home exercise program (Not Started)     Description:   Instruct learner(s) on appropriate technique for monitoring, assisting and/or progressing therapeutic exercises/activities.              Learner Progress:  Not documented in this visit.          Point: Precautions (In Progress)     Description:   Instruct learner(s) on prescribed precautions during self-care and functional transfers.              Learning Progress Summary           Patient Acceptance, E,D, NR by CS at 6/3/2021 1255    Comment: OT role and POC, sternal precautions re: ADL completion, safety awareness   Family Acceptance, E,D, NR by CS at 6/3/2021 1255    Comment: OT role and POC, sternal precautions re: ADL completion, safety awareness                   Point: Body mechanics (In Progress)     Description:   Instruct learner(s) on proper positioning and spine alignment during self-care, functional mobility activities and/or exercises.              Learning Progress Summary           Patient Acceptance, E,D, NR by CS at 6/3/2021 1255    Comment: OT role and POC, sternal precautions re: ADL completion, safety awareness   Family Acceptance, E,D, NR by CS at 6/3/2021 1255    Comment: OT role and POC, sternal precautions re: ADL completion, safety awareness                               User Key     Initials Effective Dates Name Provider Type Discipline     10/21/20 -  Carlos Harris OT Occupational Therapist OT              OT Recommendation and Plan  Planned Therapy Interventions (OT): activity tolerance training, functional  balance retraining, occupation/activity based interventions, strengthening exercise, ROM/therapeutic exercise, transfer/mobility retraining, BADL retraining, adaptive equipment training, patient/caregiver education/training  Therapy Frequency (OT): daily  Plan of Care Review  Plan of Care Reviewed With: patient, daughter  Progress: improving  Outcome Summary: Initial OT evaluation completed. Pt presents w/ generalized weakness, decreased activitiy tolerance, and impaired balance/coordination warranting skilled OT services to promote return to PLOF. Pt received UIC, ModA w/2 for transfers, dependent for LB dressing, MaxA for UB dressing. Pt progressed to SBA for static sitting balance, CGA for unsupported targeted reaching activities. Frequent cues for posture and sequencing. Education on ADL completion w/in sternal precautions initiated, requires further training. Recommend d/c to IRF for optimal fxl outcomes.     Time Calculation:   Time Calculation- OT     Row Name 06/03/21 1256             Time Calculation- OT    OT Start Time  1012  -CS      OT Received On  06/03/21  -CS      OT Goal Re-Cert Due Date  06/13/21  -CS         Timed Charges    14256 - OT Therapeutic Activity Minutes  10  -CS         Untimed Charges    OT Eval/Re-eval Minutes  37  -CS         Total Minutes    Timed Charges Total Minutes  10  -CS      Untimed Charges Total Minutes  37  -CS       Total Minutes  47  -CS        User Key  (r) = Recorded By, (t) = Taken By, (c) = Cosigned By    Initials Name Provider Type    Carlos Lopez OT Occupational Therapist                 Carlos Harris OT  6/3/2021

## 2021-06-04 ENCOUNTER — APPOINTMENT (OUTPATIENT)
Dept: NEPHROLOGY | Facility: HOSPITAL | Age: 76
End: 2021-06-04

## 2021-06-04 ENCOUNTER — ANCILLARY PROCEDURE (OUTPATIENT)
Dept: SPEECH THERAPY | Facility: HOSPITAL | Age: 76
End: 2021-06-04

## 2021-06-04 PROBLEM — R09.02 POSTOPERATIVE HYPOXEMIA: Status: RESOLVED | Noted: 2021-05-31 | Resolved: 2021-06-04

## 2021-06-04 PROBLEM — I49.01 CARDIAC ARREST WITH VENTRICULAR FIBRILLATION (HCC): Status: ACTIVE | Noted: 2021-06-04

## 2021-06-04 PROBLEM — I46.9 CARDIAC ARREST WITH VENTRICULAR FIBRILLATION: Status: ACTIVE | Noted: 2021-06-04

## 2021-06-04 PROBLEM — Z98.890 POSTOPERATIVE HYPOXEMIA: Status: RESOLVED | Noted: 2021-05-31 | Resolved: 2021-06-04

## 2021-06-04 LAB
ALBUMIN SERPL-MCNC: 3 G/DL (ref 3.5–5.2)
ANION GAP SERPL CALCULATED.3IONS-SCNC: 11 MMOL/L (ref 5–15)
BASOPHILS # BLD AUTO: 0.04 10*3/MM3 (ref 0–0.2)
BASOPHILS NFR BLD AUTO: 0.3 % (ref 0–1.5)
BUN SERPL-MCNC: 98 MG/DL (ref 8–23)
BUN/CREAT SERPL: 34.8 (ref 7–25)
CALCIUM SPEC-SCNC: 8.7 MG/DL (ref 8.6–10.5)
CHLORIDE SERPL-SCNC: 102 MMOL/L (ref 98–107)
CO2 SERPL-SCNC: 24 MMOL/L (ref 22–29)
CREAT SERPL-MCNC: 2.82 MG/DL (ref 0.76–1.27)
DEPRECATED RDW RBC AUTO: 51.6 FL (ref 37–54)
EOSINOPHIL # BLD AUTO: 0.24 10*3/MM3 (ref 0–0.4)
EOSINOPHIL NFR BLD AUTO: 1.8 % (ref 0.3–6.2)
ERYTHROCYTE [DISTWIDTH] IN BLOOD BY AUTOMATED COUNT: 15.7 % (ref 12.3–15.4)
GFR SERPL CREATININE-BSD FRML MDRD: 22 ML/MIN/1.73
GLUCOSE BLDC GLUCOMTR-MCNC: 104 MG/DL (ref 70–130)
GLUCOSE BLDC GLUCOMTR-MCNC: 135 MG/DL (ref 70–130)
GLUCOSE BLDC GLUCOMTR-MCNC: 194 MG/DL (ref 70–130)
GLUCOSE BLDC GLUCOMTR-MCNC: 200 MG/DL (ref 70–130)
GLUCOSE SERPL-MCNC: 170 MG/DL (ref 65–99)
HAV IGM SERPL QL IA: NORMAL
HBV CORE IGM SERPL QL IA: NORMAL
HBV SURFACE AG SERPL QL IA: NORMAL
HCT VFR BLD AUTO: 25.5 % (ref 37.5–51)
HCV AB SER DONR QL: NORMAL
HGB BLD-MCNC: 8.1 G/DL (ref 13–17.7)
IMM GRANULOCYTES # BLD AUTO: 0.51 10*3/MM3 (ref 0–0.05)
IMM GRANULOCYTES NFR BLD AUTO: 3.8 % (ref 0–0.5)
LYMPHOCYTES # BLD AUTO: 1.19 10*3/MM3 (ref 0.7–3.1)
LYMPHOCYTES NFR BLD AUTO: 8.9 % (ref 19.6–45.3)
MAGNESIUM SERPL-MCNC: 2.4 MG/DL (ref 1.6–2.4)
MCH RBC QN AUTO: 30.1 PG (ref 26.6–33)
MCHC RBC AUTO-ENTMCNC: 31.8 G/DL (ref 31.5–35.7)
MCV RBC AUTO: 94.8 FL (ref 79–97)
MONOCYTES # BLD AUTO: 1.02 10*3/MM3 (ref 0.1–0.9)
MONOCYTES NFR BLD AUTO: 7.7 % (ref 5–12)
NEUTROPHILS NFR BLD AUTO: 10.33 10*3/MM3 (ref 1.7–7)
NEUTROPHILS NFR BLD AUTO: 77.5 % (ref 42.7–76)
NRBC BLD AUTO-RTO: 0.5 /100 WBC (ref 0–0.2)
PHOSPHATE SERPL-MCNC: 4.4 MG/DL (ref 2.5–4.5)
PLATELET # BLD AUTO: 225 10*3/MM3 (ref 140–450)
PMV BLD AUTO: 11 FL (ref 6–12)
POTASSIUM SERPL-SCNC: 3.8 MMOL/L (ref 3.5–5.2)
RBC # BLD AUTO: 2.69 10*6/MM3 (ref 4.14–5.8)
SODIUM SERPL-SCNC: 137 MMOL/L (ref 136–145)
WBC # BLD AUTO: 13.33 10*3/MM3 (ref 3.4–10.8)

## 2021-06-04 PROCEDURE — 25010000002 HALOPERIDOL LACTATE PER 5 MG: Performed by: NURSE PRACTITIONER

## 2021-06-04 PROCEDURE — 25010000002 NA FERRIC GLUC CPLX PER 12.5 MG: Performed by: INTERNAL MEDICINE

## 2021-06-04 PROCEDURE — 83735 ASSAY OF MAGNESIUM: CPT | Performed by: STUDENT IN AN ORGANIZED HEALTH CARE EDUCATION/TRAINING PROGRAM

## 2021-06-04 PROCEDURE — 25010000002 HEPARIN (PORCINE) PER 1000 UNITS: Performed by: INTERNAL MEDICINE

## 2021-06-04 PROCEDURE — 85025 COMPLETE CBC W/AUTO DIFF WBC: CPT | Performed by: STUDENT IN AN ORGANIZED HEALTH CARE EDUCATION/TRAINING PROGRAM

## 2021-06-04 PROCEDURE — 25010000002 CEFTRIAXONE PER 250 MG: Performed by: INTERNAL MEDICINE

## 2021-06-04 PROCEDURE — 99232 SBSQ HOSP IP/OBS MODERATE 35: CPT | Performed by: NURSE PRACTITIONER

## 2021-06-04 PROCEDURE — 99291 CRITICAL CARE FIRST HOUR: CPT | Performed by: INTERNAL MEDICINE

## 2021-06-04 PROCEDURE — 63710000001 INSULIN DETEMIR PER 5 UNITS: Performed by: INTERNAL MEDICINE

## 2021-06-04 PROCEDURE — 92612 ENDOSCOPY SWALLOW (FEES) VID: CPT

## 2021-06-04 PROCEDURE — 3E0G76Z INTRODUCTION OF NUTRITIONAL SUBSTANCE INTO UPPER GI, VIA NATURAL OR ARTIFICIAL OPENING: ICD-10-PCS | Performed by: INTERNAL MEDICINE

## 2021-06-04 PROCEDURE — 80074 ACUTE HEPATITIS PANEL: CPT | Performed by: INTERNAL MEDICINE

## 2021-06-04 PROCEDURE — 63710000001 INSULIN REGULAR HUMAN PER 5 UNITS: Performed by: INTERNAL MEDICINE

## 2021-06-04 PROCEDURE — 82962 GLUCOSE BLOOD TEST: CPT

## 2021-06-04 PROCEDURE — 80069 RENAL FUNCTION PANEL: CPT | Performed by: STUDENT IN AN ORGANIZED HEALTH CARE EDUCATION/TRAINING PROGRAM

## 2021-06-04 RX ORDER — QUETIAPINE FUMARATE 25 MG/1
25 TABLET, FILM COATED ORAL NIGHTLY
Status: DISCONTINUED | OUTPATIENT
Start: 2021-06-04 | End: 2021-06-05

## 2021-06-04 RX ORDER — DEXMEDETOMIDINE HYDROCHLORIDE 4 UG/ML
.2-1.5 INJECTION, SOLUTION INTRAVENOUS
Status: DISCONTINUED | OUTPATIENT
Start: 2021-06-04 | End: 2021-06-04

## 2021-06-04 RX ORDER — HALOPERIDOL 5 MG/ML
2 INJECTION INTRAMUSCULAR EVERY 6 HOURS PRN
Status: DISCONTINUED | OUTPATIENT
Start: 2021-06-04 | End: 2021-06-06

## 2021-06-04 RX ORDER — WHEY PROTEIN ISOLATE 6 G-25/7 G
1 POWDER (GRAM) ORAL 4 TIMES DAILY
Status: DISCONTINUED | OUTPATIENT
Start: 2021-06-04 | End: 2021-06-15

## 2021-06-04 RX ORDER — WATER 1000 ML/1000ML
INJECTION, SOLUTION INTRAVENOUS
Status: ACTIVE
Start: 2021-06-04 | End: 2021-06-04

## 2021-06-04 RX ORDER — ALBUMIN (HUMAN) 12.5 G/50ML
12.5 SOLUTION INTRAVENOUS AS NEEDED
Status: ACTIVE | OUTPATIENT
Start: 2021-06-05 | End: 2021-06-05

## 2021-06-04 RX ORDER — CHOLECALCIFEROL (VITAMIN D3) 125 MCG
5 CAPSULE ORAL NIGHTLY
Status: DISCONTINUED | OUTPATIENT
Start: 2021-06-05 | End: 2021-06-07

## 2021-06-04 RX ORDER — HALOPERIDOL 5 MG/ML
5 INJECTION INTRAMUSCULAR ONCE
Status: DISCONTINUED | OUTPATIENT
Start: 2021-06-05 | End: 2021-06-06

## 2021-06-04 RX ORDER — ALBUMIN (HUMAN) 12.5 G/50ML
12.5 SOLUTION INTRAVENOUS AS NEEDED
Status: DISCONTINUED | OUTPATIENT
Start: 2021-06-04 | End: 2021-06-04

## 2021-06-04 RX ORDER — GABAPENTIN 300 MG/1
300 CAPSULE ORAL NIGHTLY
Status: DISCONTINUED | OUTPATIENT
Start: 2021-06-04 | End: 2021-06-18 | Stop reason: HOSPADM

## 2021-06-04 RX ORDER — QUETIAPINE FUMARATE 25 MG/1
25 TABLET, FILM COATED ORAL ONCE
Status: COMPLETED | OUTPATIENT
Start: 2021-06-05 | End: 2021-06-05

## 2021-06-04 RX ADMIN — SODIUM CHLORIDE 125 MG: 9 INJECTION, SOLUTION INTRAVENOUS at 12:41

## 2021-06-04 RX ADMIN — ISOSORBIDE DINITRATE 10 MG: 20 TABLET ORAL at 18:33

## 2021-06-04 RX ADMIN — HEPARIN SODIUM 5000 UNITS: 5000 INJECTION INTRAVENOUS; SUBCUTANEOUS at 06:03

## 2021-06-04 RX ADMIN — SODIUM CHLORIDE, PRESERVATIVE FREE 10 ML: 5 INJECTION INTRAVENOUS at 12:38

## 2021-06-04 RX ADMIN — HEPARIN SODIUM 5000 UNITS: 5000 INJECTION INTRAVENOUS; SUBCUTANEOUS at 13:12

## 2021-06-04 RX ADMIN — SODIUM CHLORIDE, PRESERVATIVE FREE 10 ML: 5 INJECTION INTRAVENOUS at 20:26

## 2021-06-04 RX ADMIN — SENNOSIDES 10 ML: 8.8 LIQUID ORAL at 12:36

## 2021-06-04 RX ADMIN — INSULIN DETEMIR 25 UNITS: 100 INJECTION, SOLUTION SUBCUTANEOUS at 20:27

## 2021-06-04 RX ADMIN — DOCUSATE SODIUM 100 MG: 50 LIQUID ORAL at 12:35

## 2021-06-04 RX ADMIN — INSULIN HUMAN 2 UNITS: 100 INJECTION, SOLUTION PARENTERAL at 00:19

## 2021-06-04 RX ADMIN — SENNOSIDES 10 ML: 8.8 LIQUID ORAL at 20:25

## 2021-06-04 RX ADMIN — HEPARIN SODIUM 5000 UNITS: 5000 INJECTION INTRAVENOUS; SUBCUTANEOUS at 21:04

## 2021-06-04 RX ADMIN — SODIUM CHLORIDE 1 G: 900 INJECTION INTRAVENOUS at 18:25

## 2021-06-04 RX ADMIN — DOCUSATE SODIUM 100 MG: 50 LIQUID ORAL at 20:25

## 2021-06-04 RX ADMIN — HALOPERIDOL LACTATE 2 MG: 5 INJECTION, SOLUTION INTRAMUSCULAR at 22:08

## 2021-06-04 RX ADMIN — ATORVASTATIN CALCIUM 40 MG: 40 TABLET, FILM COATED ORAL at 20:26

## 2021-06-04 RX ADMIN — HEPARIN SODIUM 1500 UNITS: 1000 INJECTION INTRAVENOUS; SUBCUTANEOUS at 11:17

## 2021-06-04 RX ADMIN — INSULIN HUMAN 4 UNITS: 100 INJECTION, SOLUTION PARENTERAL at 07:43

## 2021-06-04 RX ADMIN — AMLODIPINE BESYLATE 5 MG: 5 TABLET ORAL at 12:36

## 2021-06-04 RX ADMIN — Medication 1 PACKET: at 18:23

## 2021-06-04 RX ADMIN — CARVEDILOL 12.5 MG: 12.5 TABLET, FILM COATED ORAL at 18:25

## 2021-06-04 RX ADMIN — QUETIAPINE FUMARATE 25 MG: 25 TABLET ORAL at 20:31

## 2021-06-04 RX ADMIN — ASPIRIN 325 MG ORAL TABLET 325 MG: 325 PILL ORAL at 12:36

## 2021-06-04 RX ADMIN — GABAPENTIN 300 MG: 300 CAPSULE ORAL at 20:26

## 2021-06-04 RX ADMIN — ISOSORBIDE DINITRATE 10 MG: 20 TABLET ORAL at 12:37

## 2021-06-04 RX ADMIN — DEXMEDETOMIDINE HYDROCHLORIDE 0.2 MCG/KG/HR: 4 INJECTION, SOLUTION INTRAVENOUS at 05:58

## 2021-06-04 RX ADMIN — Medication 1 PACKET: at 20:25

## 2021-06-04 RX ADMIN — CARVEDILOL 12.5 MG: 12.5 TABLET, FILM COATED ORAL at 12:41

## 2021-06-04 RX ADMIN — PANTOPRAZOLE SODIUM 40 MG: 40 INJECTION, POWDER, FOR SOLUTION INTRAVENOUS at 06:02

## 2021-06-04 NOTE — PROGRESS NOTES
Intensivist Note     6/4/2021  Hospital Day: 13  7 Days Post-Op  ICU Stays Timeline            Hospital Admission: 05/22/21 0431 - Current  ICU stays: 1      In Date/Time Event Department ICU Stay Duration     05/22/21 0431 Admission [facility] EMERGENCY DEPT      05/22/21 0813 Transfer In [facility] 6B      05/24/21 1006 Transfer In [facility] CATH LAB      05/24/21 1122 Transfer In [facility] 6B      05/28/21 0619 Transfer In [facility] OR      05/28/21 1209 Transfer In [facility] 2HSIC 6 days 19 hours 13 minutes             Mr. Augusto Lorenzana ., 76 y.o. 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 HbA1c 7.4     Gout    Former smokeless tobacco use       SUBJECTIVE     76-year-old white male with a history of CAD s/p RCA stent 2009, diabetes mellitus (HbA1c 7.4), hypertension, hyperlipidemia, CKD, gout, and smokeless tobacco use.  She presented to [facility] ER 5/22/2021 complaining of dyspnea and was noted to be hypoxemic.  He had elevated troponins consistent with NSTEMI and cardiology proceeded with [facility] 5/24/2021 revealing multivessel CAD but preserved LVEF of 55%.  Creatinine was 1.9 on admission and transiently increased to 2.1 after cardiac catheterization only to return to baseline. Patient subsequently underwent CABG x 3 on 5/28/2021 and course complicated by the development of ventricular fibrillation and asystole requiring ACLS twice the day of surgery.  Subsequently required 48 hours of pacing before resuming to a sinus rhythm.  In addition required 3 units of packed RBCs, 4 units FFP, and 1 sixpack of platelets. Patient was subsequently extubated 5/29/2021 although has had persistent high FiO2 requirement. Additionally developed MICHAEL with low UOP despite significantly positive fluid balance.  Unfortunately renal function continued to deteriorate slowly and by  "6/3/2021 BUN had increased to 123 in association with progressive confusional state.  Because of this patient was begun on dialysis.    Interval history: Patient tolerated dialysis yesterday without hemodynamic compromise and BUN/creatinine has improved to 98/2.82.  Blood pressure remains adequate without pressors and gas exchange is excellent on only 2 L nasal O2.  UOP remains low with a total of 615 cc out over 24 hours (only 235 cc over the last 12 hours).  At present he is again on dialysis today with plans to pull 2 L.  He remains lethargic but with loud verbal stimulation and tactile stimulation will awaken and answer questions.  Last evening was placed on Precedex because of some agitation but is better now.  He remains dysarthric due to profound diffuse weakness and remains difficult to understand, but is cooperative.  Failed fees study by speech today so will be continued n.p.o. and will continue enteral feeds which are at goal.    ROS: Dysarthria makes ROS difficult to obtain    The patient's relevant PMH, PSH, FH, and SH were reviewed and updated in Epic as appropriate. Allergies and Medications reviewed.    OBJECTIVE     /69   Pulse 65   Temp 98.8 °F (37.1 °C)   Resp 20   Ht 170 cm (66.93\")   Wt 84.8 kg (187 lb)   SpO2 96%   BMI 29.35 kg/m²   Flow (L/min): 2    Flowsheet Rows      First Filed Value   Admission Height  170.2 cm (67\") Documented at 05/22/2021 0433   Admission Weight  88.5 kg (195 lb) Documented at 05/22/2021 0433        Intake & Output (last day)       06/03 0701 - 06/04 0700 06/04 0701 - 06/05 0700    I.V. (mL/kg) 36.5 (0.4) 21.5 (0.3)    Other 410 50    NG/ 106    IV Piggyback 201     Total Intake(mL/kg) 1433.5 (16.9) 177.5 (2.1)    Urine (mL/kg/hr) 615 (0.3) 60 (0.1)    Other 2030 2550    Total Output 2645 2610    Net -1211.5 -2432.5                Exam:  General Exam:  Elderly, chronically ill white male who appears older than stated age.  Is in NAD  HEENT: Pupils " equal and reactive.  NG tube in place  Neck:                          Supple, no JVD, thyromegaly, or adenopathy.  Right IJ temporary dialysis catheter in place  Lungs: Clear anteriorly and laterally without wheezes, rales, rhonchi  Cardiovascular: RRR without murmurs or gallops.  Abdomen: Soft nontender without organomegaly or masses.  Tolerating enteral feeds at goal   and rectal: Last catheter in place  Extremities: No cyanosis or clubbing but ecchymoses over forearms.  Also has persistent lower extremity edema right greater than left but definitely improved.  Neurologic:                 Symmetric strength but diffusely weak. No focal deficits.  More arousable today to verbal and tactile stimulation.  Still with dysphagia and failed fees study    Chest X-Ray: No film today    INFUSIONS  dexmedetomidine, 0.2-1.5 mcg/kg/hr, Last Rate: 0.6 mcg/kg/hr (06/04/21 0630)        Results from last 7 days   Lab Units 06/04/21  0330 06/03/21  0343 06/02/21  0352   WBC 10*3/mm3 13.33* 13.64* 11.64*   HEMOGLOBIN g/dL 8.1* 8.6* 8.2*   HEMATOCRIT % 25.5* 26.0* 25.1*   PLATELETS 10*3/mm3 225 225 199     Results from last 7 days   Lab Units 06/04/21  0329 06/03/21  0343   SODIUM mmol/L 137 139   POTASSIUM mmol/L 3.8 3.9   CHLORIDE mmol/L 102 106   CO2 mmol/L 24.0 20.0*   BUN mg/dL 98* 123*   CREATININE mg/dL 2.82* 3.31*   GLUCOSE mg/dL 170* 144*   CALCIUM mg/dL 8.7 8.7     Results from last 7 days   Lab Units 06/04/21  0329 06/03/21  0343 06/02/21  0352 05/31/21  1141 05/28/21  2207   MAGNESIUM mg/dL 2.4  --   --  2.4 2.1   PHOSPHORUS mg/dL 4.4 3.7 3.7 5.9* 4.9*     Results from last 7 days   Lab Units 05/31/21  1141 05/28/21  1757 05/28/21  1559   ALK PHOS U/L 91 66 63   BILIRUBIN mg/dL 0.6 0.5 0.7   ALT (SGPT) U/L 22 25 23   AST (SGOT) U/L 22 60* 54*       Lab Results   Component Value Date    SEDRATE 52 (H) 06/03/2021     No results found for: BNP  Lab Results   Component Value Date    TROPONINT 1.550 (C) 05/22/2021     Lab  Results   Component Value Date    TSH 4.030 06/03/2021     Lab Results   Component Value Date    LACTATE 1.5 05/22/2021     Lab Results   Component Value Date    CORTISOL 18.88 06/03/2021       Results from last 7 days   Lab Units 06/01/21  0934 05/29/21  1331 05/28/21  1827   PH, ARTERIAL pH units 7.446 7.421 7.288*   PCO2, ARTERIAL mm Hg 29.0* 33.3* 41.1   PO2 ART mm Hg 74.2* 88.4 77.2*   HCO3 ART mmol/L 19.9* 21.7 19.7*   FIO2 % 44 40 40       I reviewed the patient's results, images and medication.    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 HbA1c 7.4     Gout    Former smokeless tobacco use      DISCUSSION: Still quite ill due to his acute kidney injury and severe azotemia.  Is however improving with dialysis (especially mentation) and is tolerating it well without hemodynamic compromise.  Still encephalopathic but improving as BUN is lowered with dialysis.  Had some agitation last evening and placed on Precedex but will DC this today.  Still with dysphagia requiring continued enteral feeds but I think as his mentation improves so will his dysphagia.  Remains anemic and is being given IV iron today by nephrology.    PLAN     1.  Continue dialysis (will be dialyzed again Saturday)  2.  Continue enteral feeds  3.  As mentation improves reassess dysphagia  4.  Mobilization with PT  5.  Follow-up labs/chest x-ray in a.m.  6.  Recheck WBC    Plan of care and goals reviewed with multidisciplinary team at daily rounds.    I discussed the patient's findings and my recommendations with patient, nursing staff and consulting provider    Patient is critically ill due to acute on chronic renal failure requiring dialysis, encephalopathy, and postoperative state.  He has a high risk of life-threatening decline in condition especially if develops  sepsis due to either deep lines poor Last catheter.  He requires continuous monitoring and frequent reassessment of condition for adjustment of management in order to lessen risk.  I visited the patient's bedside and interacted with nurse numerous times today.    Time spent Critical care 35 min (It does not include procedure time).    Electronically signed by Ish Loaiza MD, 06/04/21, 7:22 AM EDT.   Pulmonary / Critical care medicine     Electronically signed by Ish Loaiza MD, 06/04/21, 7:22 AM EDT.

## 2021-06-04 NOTE — CASE MANAGEMENT/SOCIAL WORK
Continued Stay Note  [facility]     Patient Name: Augusto Lorenzana Jr.  MRN: [id number]  Today's Date: 6/4/2021    Admit Date: 5/22/2021    Discharge Plan     Row Name 06/04/21 1646       Plan    Plan  TBD    Plan Comments  Chart reviewed. Patient is still NPO and on enteral feeds. Now on room air. He will have HD tomorrow. HD is for MICHAEL. He is not a regular HD patient. PT, OT, SLP following. CM anticipates patient will need some type of rehab at [facility]. POC ongoing. Remains in ICU. CM will continue to follow.    Final Discharge Disposition Code  30 - still a patient        Discharge Codes    No documentation.       Expected Discharge Date and Time     Expected Discharge Date Expected Discharge Time    Jun 5, 2021             Danii Schmid RN

## 2021-06-04 NOTE — PROGRESS NOTES
Clinical Nutrition     Nutrition Support Assessment  Reason for Visit:   Follow-up protocol, NPO/Clear liquid, EN      Patient Name: Augusto Lorenzana Jr.  YOB: 1945  MRN: [id number]  Date of Encounter: 06/04/21 10:55 EDT  Admission date: 5/22/2021        Nutrition Assessment   Assessment   NSTEMI  CAD  s/p CABG x3 (5-28-21)  Coded x2  (5-28-21)  Extubated 5-29-21  MICHAEL/CKD  HD initiated 6-3-21    PMH: He  has a past medical history of CAD (coronary artery disease), CKD (chronic kidney disease) stage 3, GFR 30-59 ml/min (CMS/[facility]), Diabetes mellitus (CMS/[facility]), Hyperlipidemia, Hypertension, and NSTEMI (non-ST elevated myocardial infarction) (CMS/[facility]).Obesity, Anemia   PSxH: He  has a past surgical history that includes Cardiac surgery; Cardiac catheterization (N/A, 5/24/2021); and Coronary artery bypass graft (N/A, 5/28/2021).     Substance history: Tobacco remote    Reported/Observed/Food/Nutrition Related History:     Pt resting in bed, on room air, more alert today, on HD at present    Per RN: pt tolerating TF, plan for FEES today, had 225ml UOP last night    Anthropometrics     Height: 67in  Last filed wt: 187lb  Weight Method: Standing scale    BMI: BMI (Calculated): 29.4  Overweight: 25.0-29.9kg/m2             Last 15 Recorded Weights  View Complete Flowsheet  Weight Weight (kg) Weight (lbs) Weight Method   5/28/2021 84.823 kg 187 lb Standing scale   5/27/2021 85.73 kg 189 lb -   5/27/2021 85.503 kg 188 lb 8 oz Bed scale   5/26/2021 82.691 kg 182 lb 4.8 oz Bed scale   5/25/2021 81.421 kg 179 lb 8 oz Bed scale   5/24/2021 80.513 kg 177 lb 8 oz Standing scale   5/23/2021 82.328 kg 181 lb 8 oz Standing scale   5/22/2021 88.451 kg 195 lb -   5/22/2021 88.451 kg 195 lb Stated         Labs reviewed     Results from last 7 days   Lab Units 06/04/21  0329 06/03/21  0343 06/02/21  0352 05/31/21  1141 05/30/21  0327 05/28/21  2207 05/28/21  1757 05/28/21  1559   GLUCOSE mg/dL 170* 144* 227*  143*  --  114* 180* 109*  110*   BUN mg/dL 98* 123* 111* 76*  --  44* 42* 42*  43*   CREATININE mg/dL 2.82* 3.31* 3.24* 3.09*  --  2.36* 2.40* 2.18*  2.21*   SODIUM mmol/L 137 139 137 138  --  141 141 141  140   CHLORIDE mmol/L 102 106 105 105  --  106 106 106  105   POTASSIUM mmol/L 3.8 3.9 3.8 4.3  --  4.1 4.6 3.9  3.8   PHOSPHORUS mg/dL 4.4 3.7 3.7 5.9*   < > 4.9*  --  4.3   MAGNESIUM mg/dL 2.4  --   --  2.4  --  2.1  --  2.3   ALT (SGPT) U/L  --   --   --  22  --   --  25 23    < > = values in this interval not displayed.     Results from last 7 days   Lab Units 06/04/21  0329 06/03/21  0343 06/02/21  0352 05/31/21  1141 05/28/21  1231   ALBUMIN g/dL 3.00* 3.10* 3.20* 3.30* 3.50   PREALBUMIN mg/dL  --   --   --  12.9*  --    CRP mg/dL  --   --   --  26.04*  --    IONIZED CALCIUM mmol/L  --   --   --   --  1.45*   CHOLESTEROL mg/dL  --   --   --  99  --    TRIGLYCERIDES mg/dL  --   --   --  101  --        Results from last 7 days   Lab Units 06/04/21  0550 06/03/21  2346 06/03/21  1806 06/03/21  1300 06/03/21  1204 06/03/21  0549   GLUCOSE mg/dL 200* 194* 134* 122 58* 84     Lab Results   Lab Value Date/Time    HGBA1C 7.40 (H) 05/22/2021 0433         Medications reviewed   Pertinent:abx, insulin, protonix, senokot, colace, iron, heparin      Intake/Ouptut 24 hrs (7:00AM - 6:59 AM)     Intake & Output (last day)       06/03 0701 - 06/04 0700 06/04 0701 - 06/05 0700    I.V. (mL/kg) 36.5 (0.4) 21.5 (0.3)    Other 410 50    NG/ 106    IV Piggyback 201     Total Intake(mL/kg) 1433.5 (16.9) 177.5 (2.1)    Urine (mL/kg/hr) 615 (0.3) 60 (0.2)    Other 2030     Total Output 2645 60    Net -1211.5 +117.5                     GI: wnl    SKIN: surgical sites, L. hand blisters R. 5th toe- stubbed/ nail ripped off    Needs Assessment 6-4-21       Height used 67in   Weight used 187lb   BMI 29.3     Estimated need Method/Equation used Result    Energy/Calorie need   kcals/d 20-25 kcal per kg  1700-2125kcal    MSJ x  1.2 1859kcal     goal ~1900kcal   Protein g/day 1.2g protein per kg 102g protein -goal                             Current Nutrition Prescription     PO: Diet Dysphagia; III - Pureed With Some Mashed; Honey Thick    Intake: 0% of 1 meal    EN: Novasource Renal @45ml/hr, free water @25ml/hr    Intake: 422ml, 47% goal volume    TF at goal volume will provide 900ml, 1800kcal, 95% kcal needs, 82g protein, 80% protein needs, 1348ml free water including flush        Nutrition Diagnosis     5-31-21, 6-4  Problem Inadequate energy intake/ protein intake   Etiology Per Clinical Status   Signs/Symptoms 47% goal volume EN   Statua: ongoing    Nutrition Intervention   1.  Follow treatment progress, Nutrition support order placed    As pt now on HD, nisa adjust EN to better meet est protein + kcal needs    Add Beneprotein 4x/day    TF + Beneprotein will  provide: 900ml, 1900kcal, 100% kcal needs,  106g protein, 104% protein needs, 1348ml free water    Goal:   General: Nutrition support treatment  EN/PN: Adjust EN     Establish PO    Monitoring/Evaluation:   Per protocol, I&O, Pertinent labs, Weight, Skin status, GI status, Symptoms    Melvi Hawkins RD, CNSC  Time Spent: 30min

## 2021-06-04 NOTE — PROGRESS NOTES
Cardiology Progress Note      Reason for visit:    · NSTEMI/multivessel CAD/status post CABG      IDENTIFICATION: 76-year-old gentleman who resides in [facility] Problems    Diagnosis  POA   • **NSTEMI 5/22/2021 [I21.4]  Yes     Priority: High     · History of previous PCI, data deficit  · [facility] ER presentation with chest pain and elevated troponin, 5/22/2021  · Echo (5/22/2021): LVEF 55%.  Inferolateral hypokinesis.  Mild MR.  Mild aortic stenosis.  · Cardiac catheterization for NSTEMI (5/24/2021): Severe multivessel CAD (LAD, LCx, RCA).  Surgical revascularization recommended  · CABG with Dr. Miller (5/28/2021):LIMA to LAD.  SVG to first OM.  SVG to PDA2. Greater saphenous vein to first obtuse marginal     • S/P CABG x 3 on 5/28/21 [Z95.1]  Not Applicable     Priority: High   • CKD with post op MICHAEL [N18.9]  Yes     Priority: High   • T2DM on oral agents and insulin. HbA1c 7.4  [E11.9, Z79.4]  Not Applicable     Priority: High   • Hyperlipidemia LDL goal <70 [E78.5]  Yes     Priority: Medium   • Essential hypertension [I10]  Yes     Priority: Medium   • Postoperative hypoxemia [R09.02, Z98.890]  No     Priority: Low   • Gout [M10.9]  Yes     Priority: Low   • Former smokeless tobacco use [Z87.891]  Not Applicable     Priority: Low          Patient had temporary dialysis catheter placed yesterday and underwent hemodialysis yesterday afternoon.  He is currently lying in bed confused.  He is undergoing repeat hemodialysis today.  He is maintaining normal sinus rhythm             Vital Sign Min/Max for last 24 hours  Temp  Min: 97.8 °F (36.6 °C)  Max: 98.3 °F (36.8 °C)   BP  Min: 105/60  Max: 154/79   Pulse  Min: 55  Max: 68   Resp  Min: 20  Max: 24   SpO2  Min: 87 %  Max: 97 %   No data recorded      Intake/Output Summary (Last 24 hours) at 6/4/2021 0805  Last data filed at 6/4/2021 0330  Gross per 24 hour   Intake 944.5 ml   Output 2515 ml   Net -1570.5 ml           Physical  Exam  Constitutional:       General: He is awake.   Cardiovascular:      Rate and Rhythm: Normal rate and regular rhythm.   Pulmonary:      Effort: Pulmonary effort is normal.      Breath sounds: Decreased breath sounds and rhonchi present.   Musculoskeletal:      Right lower le+ Pitting Edema present.      Left lower le+ Pitting Edema present.   Skin:     General: Skin is warm and dry.   Neurological:      Mental Status: He is disoriented.   Psychiatric:         Behavior: Behavior is cooperative.         Tele: NSR     Results Review (reviewed the patient's recent labs in the electronic medical record):      EKG (2021): Sinus tachycardia, right bundle branch block     CXR (6/3/2021): Mild improvement in small bilateral pleural effusions.  No pneumothorax.     ECHO (2021): LVEF 60%.  Grade 1 diastolic dysfunction.  Mild AS     Venous duplex study (2021): Normal without evidence of DVT       Result Review:  I have personally reviewed the results from the time of this admission to 2021 08:05 EDT and agree with these findings:  [x]  Laboratory  []  Microbiology  [x]  Radiology  [x]  EKG/Telemetry   [x]  Cardiology/Vascular   []  Pathology  []  Old records  []  Other:  Most notable findings include: Creatinine 2.82, BUN 98, WBC 13.33, hemoglobin 8.1, hematocrit 25.5    Results from last 7 days   Lab Units 21  0329 21  0343 21  0352 21  0353 21  1141 21  0341 21  0327 21  2207   SODIUM mmol/L 137 139 137 137 138 139 140 141   POTASSIUM mmol/L 3.8 3.9 3.8 3.9 4.3 3.8 3.8 4.1   CHLORIDE mmol/L 102 106 105 104 105 105 106 106   BUN mg/dL 98* 123* 111* 92* 76* 71* 52* 44*   CREATININE mg/dL 2.82* 3.31* 3.24* 3.20* 3.09* 2.79* 2.72* 2.36*   MAGNESIUM mg/dL 2.4  --   --   --  2.4  --   --  2.1         Results from last 7 days   Lab Units 21  0330 21  0343 21  0352   WBC 10*3/mm3 13.33* 13.64* 11.64*   HEMOGLOBIN g/dL 8.1* 8.6* 8.2*    HEMATOCRIT % 25.5* 26.0* 25.1*   PLATELETS 10*3/mm3 225 225 199       Lab Results   Component Value Date    HGBA1C 7.40 (H) 05/22/2021       Lab Results   Component Value Date    CHOL 99 05/31/2021    TRIG 101 05/31/2021    HDL 34 (L) 05/22/2021    LDL 88 05/22/2021              NSTEMI  · CABG 5/28/2021  · Continue aspirin, beta-blocker, statin     Hypertension  · Controlled        Hyperlipidemia  · Continue atorvastatin     Acute kidney injury  · Creatinine 2.82  · No ACE/ARB due to MICHAEL  · Nephrology following, patient receiving hemodialysis     Type 2 diabetes mellitus  · Management per hospitalist  · Hemoglobin A1c 7.4                   · Defer nephrotoxic medications due to elevated creatinine and on temporary hemodialysis  · Continue aspirin, statin and beta-blocker  · Please call over the weekend and we will revisit the patient on Monday    Electronically signed by CHARLES Bernal, 06/04/21, 8:05 AM EDT.

## 2021-06-04 NOTE — PROGRESS NOTES
"   LOS: 13 days    Patient Care Team:  Rob Polanco MD as PCP - General (Internal Medicine)    Chief Complaint: CAD s/p CABG  Patient admission creatinine was initially 1.9, came down to 1.6 on a previous admission, patient had cardiac cath done creatinine went up to 2.1 on 25 May.  Since 2 episode of cardiac arrest his creatinine has gone up to 2.72 renal function is deteriorating urine output has dropped.  Patient is unable to give much information at this time.  Subjective     Interval History:   Nonoliguric.  Increasing BUN and creatinine, mild confusion  Patient dialysis done today.  Critically ill in ICU setting.    Review of Systems:   Review of system unchanged, generalized weakness, mild confusion, positive edema noted. All other systems negative.    Objective     Vital Sign Min/Max for last 24 hours  Temp  Min: 97.8 °F (36.6 °C)  Max: 98.5 °F (36.9 °C)   BP  Min: 114/73  Max: 154/79   Pulse  Min: 55  Max: 68   Resp  Min: 20  Max: 24   SpO2  Min: 91 %  Max: 97 %   No data recorded   No data recorded     Flowsheet Rows      First Filed Value   Admission Height  170.2 cm (67\") Documented at 05/22/2021 0433   Admission Weight  88.5 kg (195 lb) Documented at 05/22/2021 0433          I/O this shift:  In: 177.5 [I.V.:21.5; Other:50; NG/GT:106]  Out: 60 [Urine:60]  I/O last 3 completed shifts:  In: 2203.5 [I.V.:345.5; Other:650; NG/GT:1007; IV Piggyback:201]  Out: 3015 [Urine:985; Other:2030]    Physical Exam:  General Appearance: Pleasantly confused comfortable in bed  male  Eyes: PER, EOMI.  Neck: Supple no JVD.  Lungs: Lateral rhonchi's are heard few Rales at the base. Equal chest movement, nonlabored.  Heart: No gallop, murmur, rub, RRR.  Abdomen: Soft, nontender, positive bowel sounds, no organomegaly.  Extremities: Bilateral lower extremity edema 2+, positive upper extremity edema.  No cyanosis.  Neuro: Confused at time according to the nursing, moving all extremities.  : Last catheter in " place. Urine clear.  Skin: Warm and dry.  WBC WBC   Date Value Ref Range Status   06/04/2021 13.33 (H) 3.40 - 10.80 10*3/mm3 Final   06/03/2021 13.64 (H) 3.40 - 10.80 10*3/mm3 Final   06/02/2021 11.64 (H) 3.40 - 10.80 10*3/mm3 Final      HGB Hemoglobin   Date Value Ref Range Status   06/04/2021 8.1 (L) 13.0 - 17.7 g/dL Final   06/03/2021 8.6 (L) 13.0 - 17.7 g/dL Final   06/02/2021 8.2 (L) 13.0 - 17.7 g/dL Final   06/01/2021 8.1 (L) 13.0 - 17.7 g/dL Final      HCT Hematocrit   Date Value Ref Range Status   06/04/2021 25.5 (L) 37.5 - 51.0 % Final   06/03/2021 26.0 (L) 37.5 - 51.0 % Final   06/02/2021 25.1 (L) 37.5 - 51.0 % Final   06/01/2021 24.7 (L) 37.5 - 51.0 % Final      Platlets No results found for: LABPLAT   MCV MCV   Date Value Ref Range Status   06/04/2021 94.8 79.0 - 97.0 fL Final   06/03/2021 93.5 79.0 - 97.0 fL Final   06/02/2021 91.6 79.0 - 97.0 fL Final          Sodium Sodium   Date Value Ref Range Status   06/04/2021 137 136 - 145 mmol/L Final   06/03/2021 139 136 - 145 mmol/L Final   06/02/2021 137 136 - 145 mmol/L Final      Potassium Potassium   Date Value Ref Range Status   06/04/2021 3.8 3.5 - 5.2 mmol/L Final   06/03/2021 3.9 3.5 - 5.2 mmol/L Final   06/02/2021 3.8 3.5 - 5.2 mmol/L Final      Chloride Chloride   Date Value Ref Range Status   06/04/2021 102 98 - 107 mmol/L Final   06/03/2021 106 98 - 107 mmol/L Final   06/02/2021 105 98 - 107 mmol/L Final      CO2 CO2   Date Value Ref Range Status   06/04/2021 24.0 22.0 - 29.0 mmol/L Final   06/03/2021 20.0 (L) 22.0 - 29.0 mmol/L Final   06/02/2021 19.0 (L) 22.0 - 29.0 mmol/L Final      BUN BUN   Date Value Ref Range Status   06/04/2021 98 (H) 8 - 23 mg/dL Final   06/03/2021 123 (H) 8 - 23 mg/dL Final   06/02/2021 111 (H) 8 - 23 mg/dL Final      Creatinine Creatinine   Date Value Ref Range Status   06/04/2021 2.82 (H) 0.76 - 1.27 mg/dL Final   06/03/2021 3.31 (H) 0.76 - 1.27 mg/dL Final   06/02/2021 3.24 (H) 0.76 - 1.27 mg/dL Final      Calcium  Calcium   Date Value Ref Range Status   06/04/2021 8.7 8.6 - 10.5 mg/dL Final   06/03/2021 8.7 8.6 - 10.5 mg/dL Final   06/02/2021 8.8 8.6 - 10.5 mg/dL Final      PO4 No results found for: CAPO4   Albumin Albumin   Date Value Ref Range Status   06/04/2021 3.00 (L) 3.50 - 5.20 g/dL Final   06/03/2021 3.10 (L) 3.50 - 5.20 g/dL Final   06/02/2021 3.20 (L) 3.50 - 5.20 g/dL Final      Magnesium Magnesium   Date Value Ref Range Status   06/04/2021 2.4 1.6 - 2.4 mg/dL Final      Uric Acid No results found for: URICACID        Results Review:     I reviewed the patient's new clinical results.    amLODIPine, 5 mg, Nasogastric, Q24H  aspirin, 325 mg, Nasogastric, Daily  atorvastatin, 40 mg, Nasogastric, Nightly  carvedilol, 12.5 mg, Nasogastric, BID With Meals  cefTRIAXone, 1 g, Intravenous, Q24H  sennosides, 10 mL, Nasogastric, BID   And  docusate sodium, 100 mg, Nasogastric, BID  ferric gluconate, 125 mg, Intravenous, Daily  gabapentin, 300 mg, Nasogastric, Nightly  heparin (porcine), 5,000 Units, Subcutaneous, Q8H  insulin detemir, 25 Units, Subcutaneous, Nightly  insulin regular, 0-9 Units, Subcutaneous, Q6H  isosorbide dinitrate, 10 mg, Nasogastric, TID - Nitrates  pantoprazole, 40 mg, Intravenous, Q AM  pharmacy consult - MTM, , Does not apply, Daily  sodium chloride, 10 mL, Intravenous, Q12H  sterile water (preservative free), , ,       dexmedetomidine, 0.2-1.5 mcg/kg/hr, Last Rate: 0.6 mcg/kg/hr (06/04/21 0630)  dextrose 5 % with KCl 20 mEq, 30 mL/hr, Last Rate: 30 mL/hr (05/28/21 1258)        Medication Review: Reviewed    Assessment/Plan       NSTEMI 5/22/2021    Hyperlipidemia LDL goal <70    Essential hypertension    T2DM on oral agents and insulin. 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  MICHAEL on CKD baseline creatinine 1.6-1.9.  Recent events included cardiac catheterization with contrast, history of multiple units of  blood transfusion during surgery, episode of cardiac arrest x2 post surgery.  Multiple blood transfusion    Ultrasound of the kidney shows right kidney 12.1 cm, left kidney 10.6 cm. Urine eos 0 urine sodium 122 urine creatinine 136. Goes along with prerenal state.  2.  Anemia: As noted above. Iron saturation 10% will need IV iron.  3.  CKD etiology unknown at this time.    4.  S/p CABG.  5.  Mild metabolic acidosis secondary to recent events  6. Proteinuria: Mild: Random urine protein 252, urine creatinine 136. Will need further work-up when patient stable.  Plan  Plan for dialysis again tomorrow.  This will bring the BUN down, also electrolytes and volume can be corrected.  Avoid nephrotoxic medications.  Keep systolic blood pressure greater than 100  Daily evaluation for renal replacement therapy will be done.  Adjust medication for the new GFR.  Case discussed with the medical staff.  High risk patient with multiple medical problems.    Jovanny Chambers MD  06/04/21  11:05 EDT

## 2021-06-04 NOTE — PROGRESS NOTES
Cardiothoracic Surgery Progress Note      POD # 7 s/p CABG       LOS: 13 days      Subjective:  Tolerated HD yesterday. Intermittent confusion.       Objective:  Vital Signs  Temp:  [97.8 °F (36.6 °C)-98.3 °F (36.8 °C)] 98.3 °F (36.8 °C)  Heart Rate:  [55-68] 67  Resp:  [20-24] 24  BP: (105-154)/() 154/79    Physical Exam:   General Appearance: alert, appears stated age and cooperative   Lungs: clear to auscultation, respirations regular, respirations even and respirations unlabored   Heart: regular rhythm & normal rate, normal S1, S2, no murmur, no gallop, no rub and no click   Skin: erythema surrounding right leg EVH incision, painful to touch     Results:    Results from last 7 days   Lab Units 06/04/21  0330   WBC 10*3/mm3 13.33*   HEMOGLOBIN g/dL 8.1*   HEMATOCRIT % 25.5*   PLATELETS 10*3/mm3 225     Results from last 7 days   Lab Units 06/04/21  0329   SODIUM mmol/L 137   POTASSIUM mmol/L 3.8   CHLORIDE mmol/L 102   CO2 mmol/L 24.0   BUN mg/dL 98*   CREATININE mg/dL 2.82*   GLUCOSE mg/dL 170*   CALCIUM mg/dL 8.7         Assessment:    NSTEMI 5/22/2021    Hyperlipidemia LDL goal <70    Essential hypertension    T2DM on oral agents and insulin. HbA1c 7.4     CKD with post op MICHAEL    Gout    Former smokeless tobacco use    S/P CABG x 3 on 5/28/21    Postoperative hypoxemia    POD 7 CABG x3    Plan:  Cr 2.8, 585 urine  HD today  FEES today  Marked edges of right leg EVH incision, continue to monitor for increased erythema, edema        Mireille Cedillo PA-C  06/04/21  07:08 EDT

## 2021-06-04 NOTE — MBS/VFSS/FEES
Acute Care - Speech Language Pathology   Swallow Re-Evaluation [facility]   Fiberoptic Endoscopic Evaluation of Swallowing (FEES)       Patient Name: Augusto Lorenzana Jr.  : 1945  MRN: [id number]  Today's Date: 2021               Admit Date: 2021    Visit Dx:     ICD-10-CM ICD-9-CM   1. Non-STEMI (non-ST elevated myocardial infarction) (CMS/HCC)  I21.4 410.70   2. Acute congestive heart failure, unspecified heart failure type (CMS/HCC)  I50.9 428.0   3. History of coronary artery disease  Z86.79 V12.59   4. Coronary artery disease involving native coronary artery of native heart, angina presence unspecified  I25.10 414.01   5. Pharyngeal dysphagia  R13.13 787.23   6. MICHAEL (acute kidney injury) (CMS/HCC)  N17.9 584.9     Patient Active Problem List   Diagnosis   • NSTEMI 2021   • Hyperlipidemia LDL goal <70   • Essential hypertension   • T2DM on oral agents and insulin. 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   • CORONARY ARTERY BYPASS GRAFT N/A 2021    Procedure: MEDIAN STERNOTOMY CORONARY ARTERY BYPASS X 3 UTILIZING THE LEFT INTERNAL MAMMARY ARTERY GRAFT, EVH OF THE GREATER RIGHT SAPHENOUS VEIN, AND PILO PER ANESTHESIA;  Surgeon: Jacek Miller MD;  Location:   [facility];  Service: Cardiothoracic;  Laterality: N/A;        SWALLOW EVALUATION (last 72 hours)      SLP Adult Swallow Evaluation     Row Name 06/04/21 1400 06/03/21 2067                Rehab Evaluation    Document Type  re-evaluation  -DV  therapy note (daily note)  -       Subjective Information  complains of;fatigue  -DV  no complaints  -       Patient Observations  lethargic;cooperative  -DV  alert;cooperative  -       Patient/Family/Caregiver Comments/Observations  spouse present upon arrival, left for exam  -DV  --       Care Plan Review  evaluation/treatment results reviewed;care plan/treatment goals reviewed;risks/benefits reviewed;current/potential barriers reviewed;patient/other agree to care plan  -DV  evaluation/treatment results reviewed;care plan/treatment goals reviewed;risks/benefits reviewed;patient/other agree to care plan  -SM       Care Plan Review, Other Participant(s)  spouse  -DV  spouse  -SM       Patient Effort  good  -DV  good  -SM       Comment  evaluation completed in collaboration with CJ  -DV  Limited session as observed with bleeding at site on R of neck. RNs made aware and arrived to address.   -          General Information    Patient Profile Reviewed  yes  -DV  --       Pertinent History Of Current Problem  see initial eval  -DV  --       Current Method of Nutrition  pureed;honey-thick liquids  -DV  --       Precautions/Limitations, Vision  WFL;for purposes of eval  -DV  --       Precautions/Limitations, Hearing  WFL;for purposes of eval  -DV  --       Prior Level of Function-Communication  WFL  -DV  --       Prior Level of Function-Swallowing  no diet consistency restrictions  -DV  --       Plans/Goals Discussed with  patient;spouse/S.O.;agreed upon  -DV  --       Barriers to Rehab  medically complex  -DV  --       Patient's Goals for Discharge  patient did not state  -DV  --          Pain    Additional Documentation  Pain Scale: Numbers Pre/Post-Treatment (Group)  -DV  --           Pain Scale: Numbers Pre/Post-Treatment    Pretreatment Pain Rating  0/10 - no pain  -DV  0/10 - no pain  -SM       Posttreatment Pain Rating  0/10 - no pain  -DV  0/10 - no pain  -SM          General Eating/Swallowing Observations    Respiratory Support Currently in Use  room air  -DV  --       Eating/Swallowing Skills  fed by SLP  -DV  --       Positioning During Eating  upright in chair  -DV  --          Respiratory    Respiratory Status  WFL  -DV  --          Fiberoptic Endoscopic Evaluation of Swallowing (FEES)    Risks/Benefits Reviewed  risks/benefits explained;patient;agreed to eval  -DV  --       Nasal Entry  right:  -DV  --       Scope serial number/identification  [id number]  -DV  --          Anatomy and Physiology    Anatomic Considerations  edema;erythema;vocal folds;false vocal folds;arytenoids;anatomic deviation observed (see comments)  -DV  --       Comment  Smooth, white, round lesion protruding from the R ventricular fold, anteriorly. Present on prior exam as well. Diffuse pharyngeal edema appears slightly greater compared to prior exam, especially on L side in line with large-bore NG tube.  -DV  --       Velopharyngeal  CNA  -DV  --       Base of Tongue  CNA  -DV  --       Epiglottis  edematous;other (see comments) greater on L  -DV  --       Laryngeal Function Breathing  symmetrical  -DV  --       Laryngeal Function Phonation  symmetrical  -DV  --       Laryngeal Function to Breath Hold  incomplete closure  -DV  --       Secretion Rating Scale (Yuriy et al. 1996)  3- secretions inside the laryngeal vestibule/aspiration, not cleared  -DV  --       Secretion Description  thick;white  -DV  --       Ice Chips  DNA  -DV  --       Spontaneous Swallow  frequency reduced  -DV  --       Sensory  reduced sensation  -DV  --       Utensils Used  Spoon  -DV  --       Consistencies Trialed  pudding/puree;honey-thick liquids  -DV  --          FEES Interpretation    Oral Phase  solids not tested  -DV  --           Initiation of Pharyngeal Swallow    Initiation of Pharyngeal Swallow  bolus in pyriform sinuses  -DV  --       Pharyngeal Phase  impaired pharyngeal phase of swallowing  -DV  --       Aspiration During the Swallow  honey-thick liquids;secondary to delayed swallow initiation or mistiming;secondary to reduced laryngeal elevation;secondary to reduced vestibular closure;secondary to reduced laryngeal (VF) closure  -DV  --       Aspiration After the Swallow  honey-thick liquids;pudding/puree;secondary to residue;in pyriform sinuses  -DV  --       Response to Aspiration  no response, silent aspiration  -DV  --       Rosenbek's Scale  honey:;pudding/puree:;8-->Level 8  -DV  --       Residue  all consistencies tested;diffuse within pharynx;secondary to reduced base of tongue retraction;secondary to reduced posterior pharyngeal wall stripping;secondary to reduced laryngeal elevation;secondary to reduced hyolaryngeal excursion  -DV  --       Response to Residue  unable to clear residue;with cued swallow  -DV  --       Attempted Compensatory Maneuvers  throat clear after swallow;additional subsequent swallow  -DV  --       Response to Attempted Compensatory Maneuvers  did not reduce residue;did not prevent aspiration  -DV  --       FEES Summary  FEES completed this pm. Severe pharyngeal dysphagia. Solids not tested. Pt with slightly greater pharyngeal edema today compared to prior exam on 6/1. Swallow initiation delayed to the pyriform sinuses. Pt aspirated honey-thick liquids during the swallow, and aspirated both honey-thick liquids and pudding after the swallow from residue in the pyriform sinuses. Moderate diffuse residue not well cleared with throat clear or subsequent swallows. REC: downgrade to NPO as pt not safe for any consistencies at this time. Ice chips x4/hr with SPV after oral care. Meds and nutrition via alternate route. SLP will continue to follow in tx.   -DV  --          Clinical Impression    Daily  Summary of Progress (SLP)  --  progress toward functional goals is good  -       SLP Swallowing Diagnosis  severe;pharyngeal dysphagia  -DV  --       Functional Impact  risk of aspiration/pneumonia  -DV  --       Rehab Potential/Prognosis, Swallowing  good, to achieve stated therapy goals  -DV  --       Swallow Criteria for Skilled Therapeutic Interventions Met  demonstrates skilled criteria  -DV  --       Plan for Continued Treatment (SLP)  --  Repeat FEES tomorrow for ? PO diet advancement.   -          Recommendations    Therapy Frequency (Swallow)  5 days per week  -DV  --       Predicted Duration Therapy Intervention (Days)  until discharge  -DV  --       SLP Diet Recommendation  NPO;temporary alternate methods of nutrition/hydration;other (see comments) ice chips x4/hr with SPV and after oral care.  -DV  --       Recommended Precautions and Strategies  general aspiration precautions  -DV  --       Oral Care Recommendations  Oral Care BID/PRN;Suction toothbrush  -DV  --       SLP Rec. for Method of Medication Administration  meds via alternate route  -DV  --       Anticipated Discharge Disposition (SLP)  unknown;anticipate therapy at next level of care  -DV  --         User Key  (r) = Recorded By, (t) = Taken By, (c) = Cosigned By    Initials Name Effective Dates     Chantal Taylor, MS CCC-SLP 08/09/20 -     DV Christine Sahni MS CCC-SLP 02/28/20 -           EDUCATION  The patient has been educated in the following areas:   Dysphagia (Swallowing Impairment) Oral Care/Hydration NPO rationale.    SLP Recommendation and Plan  SLP Swallowing Diagnosis: severe, pharyngeal dysphagia  SLP Diet Recommendation: NPO, temporary alternate methods of nutrition/hydration, other (see comments) (ice chips x4/hr with SPV and after oral care.)  Recommended Precautions and Strategies: general aspiration precautions  SLP Rec. for Method of Medication Administration: meds via alternate route           Swallow Criteria for  Skilled Therapeutic Interventions Met: demonstrates skilled criteria  Anticipated Discharge Disposition (SLP): unknown, anticipate therapy at next level of care  Rehab Potential/Prognosis, Swallowing: good, to achieve stated therapy goals  Therapy Frequency (Swallow): 5 days per week  Predicted Duration Therapy Intervention (Days): until discharge                         Plan of Care Reviewed With: patient    SLP GOALS     Row Name 06/04/21 1400 06/03/21 1330          Oral Nutrition/Hydration Goal 1 (SLP)    Oral Nutrition/Hydration Goal 1, SLP  LTG: Pt will return to regular diet, thin liquids w/ no overt s/sxs aspiration/distress w/ 100% acc and no cues  -DV  LTG: Pt will return to regular diet, thin liquids w/ no overt s/sxs aspiration/distress w/ 100% acc and no cues  -SM     Time Frame (Oral Nutrition/Hydration Goal 1, SLP)  by discharge  -DV  by discharge  -SM     Progress/Outcomes (Oral Nutrition/Hydration Goal 1, SLP)  progress slower than expected  -DV  good progress toward goal  -SM        Oral Nutrition/Hydration Goal 2 (SLP)    Oral Nutrition/Hydration Goal 2, SLP  Pt will tolerate puree/some mashed & honey-thick liquids w/ no overt s/sxs aspiration/distress w/ 100% acc and no cues  -DV  Pt will tolerate puree/some mashed & honey-thick liquids w/ no overt s/sxs aspiration/distress w/ 100% acc and no cues  -SM     Time Frame (Oral Nutrition/Hydration Goal 2, SLP)  by discharge  -DV  by discharge  -SM     Barriers (Oral Nutrition/Hydration Goal 2, SLP)  Pt currently NPO  -DV  --     Progress/Outcomes (Oral Nutrition/Hydration Goal 2, SLP)  goal no longer appropriate  -DV  goal ongoing  -SM        Oral Nutrition/Hydration Goal (SLP)    Oral Nutrition/Hydration Goal, SLP  Pt will tolerate therapeutic trials of ice chips, thin H2O, and puree w/ no overt s/sxs aspiration/distress w/ 70% acc and no cues in order to assess readiness for repeat instrumental eval.  -DV  Pt will tolerate therapeutic trials of thin  H2O w/ no overt s/sxs aspiration/distress w/ 70% acc and no cues in order to assess readiness for repeat instrumental eval  -SM     Time Frame (Oral Nutrition/Hydration Goal, SLP)  short term goal (STG)  -DV  short term goal (STG)  -SM     Barriers (Oral Nutrition/Hydration Goal, SLP)  --  Cough with large straw sip. No s/s with small sips. Voice stronger. Showing overall readiness for repeat FEES. Pt/spouse in agreement.   -SM     Progress/Outcomes (Oral Nutrition/Hydration Goal, SLP)  goal revised this date  -DV  --        Lingual Strengthening Goal 1 (SLP)    Activity (Lingual Strengthening Goal 1, SLP)  increase tongue back strength  -DV  increase tongue back strength  -SM     Increase Tongue Back Strength  swallow trials;lingual resistance exercises  -DV  swallow trials;lingual resistance exercises  -SM     [place]/Accuracy (Lingual Strengthening Goal 1, SLP)  with minimal cues (75-90% accuracy)  -DV  with minimal cues (75-90% accuracy)  -SM     Time Frame (Lingual Strengthening Goal 1, SLP)  short term goal (STG)  -DV  short term goal (STG)  -SM     Progress/Outcomes (Lingual Strengthening Goal 1, SLP)  goal ongoing  -DV  goal ongoing  -SM        Pharyngeal Strengthening Exercise Goal 1 (SLP)    Activity (Pharyngeal Strengthening Goal 1, SLP)  increase timing;increase superior movement of the hyolaryngeal complex;increase anterior movement of the hyolaryngeal complex;increase closure at entrance to airway/closure of airway at glottis;increase squeeze/positive pressure generation;increase tongue base retraction  -DV  --     Increase Timing  prepping - 3 second prep or suck swallow or 3-step swallow  -DV  prepping - 3 second prep or suck swallow or 3-step swallow  -SM     Increase Superior Movement of the Hyolaryngeal Complex  effortful pitch glide (falsetto + pharyngeal squeeze)  -DV  effortful pitch glide (falsetto + pharyngeal squeeze)  -SM     Increase Anterior Movement of the Hyolaryngeal Complex  chin  tuck against resistance (CTAR)  -DV  chin tuck against resistance (CTAR)  -SM     Increase Closure at Entrance to Airway/Closure of Airway at Glottis  supraglottic swallow  -DV  supraglottic swallow  -SM     Increase Squeeze/Positive Pressure Generation  hard effortful swallow  -DV  hard effortful swallow  -SM     Increase Tongue Base Retraction  bronwyn  -DV  bronwyn  -SM     [place]/Accuracy (Pharyngeal Strengthening Goal 1, SLP)  with minimal cues (75-90% accuracy)  -DV  with minimal cues (75-90% accuracy)  -SM     Time Frame (Pharyngeal Strengthening Goal 1, SLP)  short term goal (STG)  -DV  short term goal (STG)  -SM     Progress/Outcomes (Pharyngeal Strengthening Goal 1, SLP)  goal ongoing  -DV  goal ongoing  -SM        Swallow Management Recall Goal 1 (SLP)    Activity (Swallow Management Recall Goal 1, SLP)  independent recall of;safe diet/liquid level;safe diet level/texture  -DV  independent recall of;safe diet/liquid level;safe diet level/texture  -SM     [place]/Accuracy (Swallow Management Recall Goal 1, SLP)  independently (over 90% accuracy)  -DV  independently (over 90% accuracy)  -SM     Time Frame (Swallow Management Recall Goal 1, SLP)  short term goal (STG)  -DV  short term goal (STG)  -SM     Barriers (Swallow Management Recall Goal 1, SLP)  Pt now NPO  -DV  --     Progress/Outcomes (Swallow Management Recall Goal 1, SLP)  goal no longer appropriate  -DV  goal ongoing  -SM        Swallow Compensatory Strategies Goal 1 (SLP)    Activity (Swallow Compensatory Strategies/Techniques Goal 1, SLP)  compensatory strategies;small cup sips;other (see comments);during p.o. trials;during meal intake  -DV  compensatory strategies;small cup sips;other (see comments);during p.o. trials;during meal intake  -SM     [place]/Accuracy (Swallow Compensatory Strategies/Techniques Goal 1, SLP)  independently (over 90% accuracy)  -DV  independently (over 90% accuracy)  -SM     Time Frame (Swallow  Compensatory Strategies/Techniques Goal 1, SLP)  short term goal (STG)  -DV  short term goal (STG)  -SM     Barriers (Swallow Compensatory Strategies/Techniques Goal 1, SLP)  Pt now NPO  -DV  --     Progress/Outcomes (Swallow Compensatory Strategies/Techniques Goal 1, SLP)  goal no longer appropriate  -DV  goal ongoing  -SM       User Key  (r) = Recorded By, (t) = Taken By, (c) = Cosigned By    Initials Name Provider Type    Chantal Mcneil MS CCC-SLP Speech and Language Pathologist    Christine Atkinson MS CCC-SLP Speech and Language Pathologist             Time Calculation:   Time Calculation- SLP     Row Name 06/04/21 1452             Time Calculation- SLP    SLP Start Time  1400  -DV      SLP Received On  06/04/21  -DV         Untimed Charges    SLP Eval/Re-eval   ST Fiberoptic Endo Eval Swallow - 29091  -DV      05297-[facility] Fiberoptic Endo Eval Swallow Minutes  95  -DV         Total Minutes    Untimed Charges Total Minutes  95  -DV       Total Minutes  95  -DV        User Key  (r) = Recorded By, (t) = Taken By, (c) = Cosigned By    Initials Name Provider Type    Christine Atkinson MS CCC-SLP Speech and Language Pathologist          Therapy Charges for Today     Code Description Service Date Service Provider Modifiers Qty    [id number] HC ST FIBEROPTIC ENDO EVAL SWALL 6 6/4/2021 Christine Sahni MS CCC-SLP GN 1            Patient was not wearing a face mask and did exhibit coughing during this therapy encounter.  Procedure performed was aerosolizing, involved close contact (within 6 feet for at least 15 minutes or longer), and involved contact with infectious secretions or specimens.  Therapist used appropriate personal protective equipment including gloves, standard procedure mask and eye protection.  Appropriate PPE was worn during the entire therapy session.  Hand hygiene was completed before and after therapy session.  MIGUEL Ogden was also present during this encounter and the aforementioned applies to  them, as well.       Christine Sahni, MS CCC-SLP  6/4/2021

## 2021-06-05 ENCOUNTER — APPOINTMENT (OUTPATIENT)
Dept: GENERAL RADIOLOGY | Facility: HOSPITAL | Age: 76
End: 2021-06-05

## 2021-06-05 ENCOUNTER — APPOINTMENT (OUTPATIENT)
Dept: NEPHROLOGY | Facility: HOSPITAL | Age: 76
End: 2021-06-05

## 2021-06-05 LAB
ALBUMIN SERPL-MCNC: 2.9 G/DL (ref 3.5–5.2)
ANION GAP SERPL CALCULATED.3IONS-SCNC: 11 MMOL/L (ref 5–15)
BASOPHILS # BLD AUTO: 0.03 10*3/MM3 (ref 0–0.2)
BASOPHILS NFR BLD AUTO: 0.2 % (ref 0–1.5)
BUN SERPL-MCNC: 72 MG/DL (ref 8–23)
BUN/CREAT SERPL: 28.2 (ref 7–25)
CALCIUM SPEC-SCNC: 8.6 MG/DL (ref 8.6–10.5)
CHLORIDE SERPL-SCNC: 100 MMOL/L (ref 98–107)
CO2 SERPL-SCNC: 25 MMOL/L (ref 22–29)
CREAT SERPL-MCNC: 2.55 MG/DL (ref 0.76–1.27)
DEPRECATED RDW RBC AUTO: 51.2 FL (ref 37–54)
EOSINOPHIL # BLD AUTO: 0.23 10*3/MM3 (ref 0–0.4)
EOSINOPHIL NFR BLD AUTO: 1.5 % (ref 0.3–6.2)
ERYTHROCYTE [DISTWIDTH] IN BLOOD BY AUTOMATED COUNT: 16.3 % (ref 12.3–15.4)
GFR SERPL CREATININE-BSD FRML MDRD: 25 ML/MIN/1.73
GLUCOSE BLDC GLUCOMTR-MCNC: 127 MG/DL (ref 70–130)
GLUCOSE BLDC GLUCOMTR-MCNC: 127 MG/DL (ref 70–130)
GLUCOSE BLDC GLUCOMTR-MCNC: 132 MG/DL (ref 70–130)
GLUCOSE BLDC GLUCOMTR-MCNC: 185 MG/DL (ref 70–130)
GLUCOSE BLDC GLUCOMTR-MCNC: 70 MG/DL (ref 70–130)
GLUCOSE SERPL-MCNC: 100 MG/DL (ref 65–99)
HCT VFR BLD AUTO: 25.1 % (ref 37.5–51)
HGB BLD-MCNC: 8 G/DL (ref 13–17.7)
IMM GRANULOCYTES # BLD AUTO: 0.43 10*3/MM3 (ref 0–0.05)
IMM GRANULOCYTES NFR BLD AUTO: 2.8 % (ref 0–0.5)
LYMPHOCYTES # BLD AUTO: 1.23 10*3/MM3 (ref 0.7–3.1)
LYMPHOCYTES NFR BLD AUTO: 8 % (ref 19.6–45.3)
MAGNESIUM SERPL-MCNC: 2.1 MG/DL (ref 1.6–2.4)
MCH RBC QN AUTO: 30.5 PG (ref 26.6–33)
MCHC RBC AUTO-ENTMCNC: 31.9 G/DL (ref 31.5–35.7)
MCV RBC AUTO: 95.8 FL (ref 79–97)
MONOCYTES # BLD AUTO: 1.24 10*3/MM3 (ref 0.1–0.9)
MONOCYTES NFR BLD AUTO: 8.1 % (ref 5–12)
NEUTROPHILS NFR BLD AUTO: 12.17 10*3/MM3 (ref 1.7–7)
NEUTROPHILS NFR BLD AUTO: 79.4 % (ref 42.7–76)
NRBC BLD AUTO-RTO: 0.3 /100 WBC (ref 0–0.2)
PHOSPHATE SERPL-MCNC: 4.1 MG/DL (ref 2.5–4.5)
PLATELET # BLD AUTO: 220 10*3/MM3 (ref 140–450)
PMV BLD AUTO: 10.9 FL (ref 6–12)
POTASSIUM SERPL-SCNC: 3.8 MMOL/L (ref 3.5–5.2)
PROCALCITONIN SERPL-MCNC: 0.72 NG/ML (ref 0–0.25)
RBC # BLD AUTO: 2.62 10*6/MM3 (ref 4.14–5.8)
SODIUM SERPL-SCNC: 136 MMOL/L (ref 136–145)
WBC # BLD AUTO: 15.33 10*3/MM3 (ref 3.4–10.8)

## 2021-06-05 PROCEDURE — 25010000002 HEPARIN (PORCINE) PER 1000 UNITS: Performed by: INTERNAL MEDICINE

## 2021-06-05 PROCEDURE — 85025 COMPLETE CBC W/AUTO DIFF WBC: CPT | Performed by: INTERNAL MEDICINE

## 2021-06-05 PROCEDURE — 63710000001 INSULIN REGULAR HUMAN PER 5 UNITS: Performed by: INTERNAL MEDICINE

## 2021-06-05 PROCEDURE — 99233 SBSQ HOSP IP/OBS HIGH 50: CPT | Performed by: INTERNAL MEDICINE

## 2021-06-05 PROCEDURE — 25010000002 CEFTRIAXONE PER 250 MG: Performed by: INTERNAL MEDICINE

## 2021-06-05 PROCEDURE — 84145 PROCALCITONIN (PCT): CPT | Performed by: INTERNAL MEDICINE

## 2021-06-05 PROCEDURE — 83735 ASSAY OF MAGNESIUM: CPT | Performed by: INTERNAL MEDICINE

## 2021-06-05 PROCEDURE — 25010000002 ONDANSETRON PER 1 MG: Performed by: PHYSICIAN ASSISTANT

## 2021-06-05 PROCEDURE — 25010000002 HALOPERIDOL LACTATE PER 5 MG: Performed by: NURSE PRACTITIONER

## 2021-06-05 PROCEDURE — 80069 RENAL FUNCTION PANEL: CPT | Performed by: INTERNAL MEDICINE

## 2021-06-05 PROCEDURE — 87040 BLOOD CULTURE FOR BACTERIA: CPT | Performed by: INTERNAL MEDICINE

## 2021-06-05 PROCEDURE — 71045 X-RAY EXAM CHEST 1 VIEW: CPT

## 2021-06-05 PROCEDURE — 25010000002 MORPHINE PER 10 MG: Performed by: STUDENT IN AN ORGANIZED HEALTH CARE EDUCATION/TRAINING PROGRAM

## 2021-06-05 PROCEDURE — 82962 GLUCOSE BLOOD TEST: CPT

## 2021-06-05 PROCEDURE — 63710000001 INSULIN DETEMIR PER 5 UNITS: Performed by: INTERNAL MEDICINE

## 2021-06-05 RX ORDER — DEXMEDETOMIDINE HYDROCHLORIDE 4 UG/ML
.2-1.5 INJECTION, SOLUTION INTRAVENOUS
Status: DISCONTINUED | OUTPATIENT
Start: 2021-06-05 | End: 2021-06-10

## 2021-06-05 RX ORDER — HALOPERIDOL 5 MG/ML
5 INJECTION INTRAMUSCULAR ONCE
Status: COMPLETED | OUTPATIENT
Start: 2021-06-05 | End: 2021-06-05

## 2021-06-05 RX ORDER — ALBUMIN (HUMAN) 12.5 G/50ML
SOLUTION INTRAVENOUS
Status: DISCONTINUED
Start: 2021-06-05 | End: 2021-06-05 | Stop reason: WASHOUT

## 2021-06-05 RX ORDER — QUETIAPINE FUMARATE 25 MG/1
50 TABLET, FILM COATED ORAL NIGHTLY
Status: DISCONTINUED | OUTPATIENT
Start: 2021-06-05 | End: 2021-06-07

## 2021-06-05 RX ADMIN — HEPARIN SODIUM 5000 UNITS: 5000 INJECTION INTRAVENOUS; SUBCUTANEOUS at 21:06

## 2021-06-05 RX ADMIN — SENNOSIDES 10 ML: 8.8 LIQUID ORAL at 20:06

## 2021-06-05 RX ADMIN — QUETIAPINE FUMARATE 50 MG: 25 TABLET ORAL at 20:07

## 2021-06-05 RX ADMIN — ONDANSETRON 4 MG: 2 INJECTION INTRAMUSCULAR; INTRAVENOUS at 17:29

## 2021-06-05 RX ADMIN — DOCUSATE SODIUM 100 MG: 50 LIQUID ORAL at 12:43

## 2021-06-05 RX ADMIN — Medication 1 PACKET: at 08:30

## 2021-06-05 RX ADMIN — DEXMEDETOMIDINE HYDROCHLORIDE 0.8 MCG/KG/HR: 4 INJECTION, SOLUTION INTRAVENOUS at 13:50

## 2021-06-05 RX ADMIN — Medication 1 PACKET: at 21:06

## 2021-06-05 RX ADMIN — DOCUSATE SODIUM 100 MG: 50 LIQUID ORAL at 20:06

## 2021-06-05 RX ADMIN — Medication 1 PACKET: at 12:45

## 2021-06-05 RX ADMIN — HALOPERIDOL LACTATE 5 MG: 5 INJECTION, SOLUTION INTRAMUSCULAR at 00:52

## 2021-06-05 RX ADMIN — INSULIN HUMAN 2 UNITS: 100 INJECTION, SOLUTION PARENTERAL at 17:54

## 2021-06-05 RX ADMIN — ASPIRIN 325 MG ORAL TABLET 325 MG: 325 PILL ORAL at 12:44

## 2021-06-05 RX ADMIN — DEXMEDETOMIDINE HYDROCHLORIDE 0.3 MCG/KG/HR: 4 INJECTION, SOLUTION INTRAVENOUS at 23:22

## 2021-06-05 RX ADMIN — MORPHINE SULFATE 2 MG: 2 INJECTION, SOLUTION INTRAMUSCULAR; INTRAVENOUS at 00:10

## 2021-06-05 RX ADMIN — HEPARIN SODIUM 1500 UNITS: 1000 INJECTION INTRAVENOUS; SUBCUTANEOUS at 11:55

## 2021-06-05 RX ADMIN — HEPARIN SODIUM 5000 UNITS: 5000 INJECTION INTRAVENOUS; SUBCUTANEOUS at 13:51

## 2021-06-05 RX ADMIN — DEXMEDETOMIDINE HYDROCHLORIDE 0.5 MCG/KG/HR: 4 INJECTION, SOLUTION INTRAVENOUS at 09:02

## 2021-06-05 RX ADMIN — Medication 5 MG: at 00:14

## 2021-06-05 RX ADMIN — ATORVASTATIN CALCIUM 40 MG: 40 TABLET, FILM COATED ORAL at 20:07

## 2021-06-05 RX ADMIN — HYDROCODONE BITARTRATE AND ACETAMINOPHEN 1 TABLET: 7.5; 325 TABLET ORAL at 00:14

## 2021-06-05 RX ADMIN — ISOSORBIDE DINITRATE 10 MG: 20 TABLET ORAL at 12:44

## 2021-06-05 RX ADMIN — QUETIAPINE FUMARATE 25 MG: 25 TABLET ORAL at 00:14

## 2021-06-05 RX ADMIN — GABAPENTIN 300 MG: 300 CAPSULE ORAL at 20:06

## 2021-06-05 RX ADMIN — HEPARIN SODIUM 5000 UNITS: 5000 INJECTION INTRAVENOUS; SUBCUTANEOUS at 05:50

## 2021-06-05 RX ADMIN — Medication 5 MG: at 21:14

## 2021-06-05 RX ADMIN — CARVEDILOL 12.5 MG: 12.5 TABLET, FILM COATED ORAL at 12:44

## 2021-06-05 RX ADMIN — INSULIN DETEMIR 25 UNITS: 100 INJECTION, SOLUTION SUBCUTANEOUS at 20:07

## 2021-06-05 RX ADMIN — Medication 1 PACKET: at 17:33

## 2021-06-05 RX ADMIN — SODIUM CHLORIDE 1 G: 900 INJECTION INTRAVENOUS at 17:37

## 2021-06-05 RX ADMIN — ISOSORBIDE DINITRATE 10 MG: 20 TABLET ORAL at 17:37

## 2021-06-05 RX ADMIN — SENNOSIDES 10 ML: 8.8 LIQUID ORAL at 12:43

## 2021-06-05 RX ADMIN — AMLODIPINE BESYLATE 5 MG: 5 TABLET ORAL at 12:44

## 2021-06-05 RX ADMIN — PANTOPRAZOLE SODIUM 40 MG: 40 INJECTION, POWDER, FOR SOLUTION INTRAVENOUS at 05:49

## 2021-06-05 NOTE — PLAN OF CARE
Goal Outcome Evaluation:      Plan of care reviewed with family    Neuro: remains confused, oriented to self only, follows commands, moves all extremities, agitated and restless this AM - Precedex started - more calm, slept for approx 3 hours uninterrupted, once awake patient was more calm but still confused, picking and pulling at lines and tubes, restraints required, Seroquel increased to 50 at HS    Resp: remains on room air, sats > 90%, normal RR    Card: SB-SR 50-70's, -70's, no drips    GI: NPO, tolerating tube feedings, no BM    : HD today with UF of 2L, plan for HD on Monday,

## 2021-06-05 NOTE — PLAN OF CARE
Goal Outcome Evaluate    Pt. Still confused, oriented to person only but VEGA equal and follows commands.  Pt. Became restless/very agitated/thrashing in bed, attempting to get up: pt. Had to be medically sedated to ensure he did not bust his chest open. Bilateral upper restraints continue. On room air. In sinus rhythm. Tube feedings continue through NG. 170 ml UOP from aleman. Plan for HD today?

## 2021-06-05 NOTE — PROGRESS NOTES
Cardiothoracic Surgery Progress Note      POD # 7 s/p CABG       LOS: 14 days      Subjective:  More confused yesterday required Haldol      Objective:  Vital Signs  Temp:  [98.4 °F (36.9 °C)-98.8 °F (37.1 °C)] 98.4 °F (36.9 °C)  Heart Rate:  [61-76] 73  Resp:  [16-24] 20  BP: (114-172)/() 150/76    Physical Exam:   General Appearance: alert, appears stated age and cooperative   Lungs: clear to auscultation, respirations regular, respirations even and respirations unlabored   Heart: regular rhythm & normal rate, normal S1, S2, no murmur, no gallop, no rub and no click   Skin: erythema surrounding right leg EVH incision, painful to touch     Results:    Results from last 7 days   Lab Units 06/05/21  0337   WBC 10*3/mm3 15.33*   HEMOGLOBIN g/dL 8.0*   HEMATOCRIT % 25.1*   PLATELETS 10*3/mm3 220     Results from last 7 days   Lab Units 06/05/21  0337   SODIUM mmol/L 136   POTASSIUM mmol/L 3.8   CHLORIDE mmol/L 100   CO2 mmol/L 25.0   BUN mg/dL 72*   CREATININE mg/dL 2.55*   GLUCOSE mg/dL 100*   CALCIUM mg/dL 8.6         Assessment:    NSTEMI 5/22/2021    Hyperlipidemia LDL goal <70    Essential hypertension    T2DM on oral agents and insulin. HbA1c 7.4     A/C kidney disease.  Presumably ATN due to postoperative arrest and CPR    Gout    Former smokeless tobacco use    S/P CABG x 3 on 5/28/21    Cardiac arrest with ventricular fibrillation (CMS/HCC)    POD 8 CABG x3    Plan:  HD again today, creatinine decreased  Encephalopathic, received Haldol overnight.  Would avoid as able  Seroquevincent Miller MD  06/05/21  09:40 EDT

## 2021-06-05 NOTE — PROGRESS NOTES
"   LOS: 14 days    Patient Care Team:  Rob Polanco MD as PCP - General (Internal Medicine)    Chief Complaint: CAD s/p CABG  Patient admission creatinine was initially 1.9, came down to 1.6 on a previous admission, patient had cardiac cath done creatinine went up to 2.1 on 25 May.  Since 2 episode of cardiac arrest his creatinine has gone up to 2.72 renal function is deteriorating urine output has dropped.  Patient is unable to give much information at this time.  Subjective     Interval History:   Nonoliguric, patient remain confused, dialysis in progress.  BUN and creatinine slowly coming down.  Critically ill in ICU setting.    Review of Systems:   Review of system remain unchanged.  Generalized weakness, mild confusion, positive edema noted. All other systems negative.    Objective     Vital Sign Min/Max for last 24 hours  Temp  Min: 98.4 °F (36.9 °C)  Max: 98.8 °F (37.1 °C)   BP  Min: 114/73  Max: 172/73   Pulse  Min: 61  Max: 76   Resp  Min: 16  Max: 24   SpO2  Min: 89 %  Max: 98 %   Flow (L/min)  Min: 1  Max: 2   No data recorded     Flowsheet Rows      First Filed Value   Admission Height  170.2 cm (67\") Documented at 05/22/2021 0433   Admission Weight  88.5 kg (195 lb) Documented at 05/22/2021 0433          No intake/output data recorded.  I/O last 3 completed shifts:  In: 2803 [I.V.:409; Other:825; NG/GT:1493; IV Piggyback:76]  Out: 3150 [Urine:600; Other:2550]    Physical Exam:  General Appearance: Remain confused at this time he is oriented to person only.  Eyes: PER, EOMI.  Neck: Supple no JVD.  Lungs: Lateral rhonchi's are heard few Rales at the base. Equal chest movement, nonlabored.  Heart: No gallop, murmur, rub, RRR.  Abdomen: Soft, nontender, positive bowel sounds, no organomegaly.  Extremities: Bilateral lower extremity edema 1+, positive upper extremity edema.  No cyanosis.  Neuro: Confused at time according to the nursing, moving all extremities.  : Last catheter in place. Urine " clear.  Skin: Warm and dry.  WBC WBC   Date Value Ref Range Status   06/05/2021 15.33 (H) 3.40 - 10.80 10*3/mm3 Final   06/04/2021 13.33 (H) 3.40 - 10.80 10*3/mm3 Final   06/03/2021 13.64 (H) 3.40 - 10.80 10*3/mm3 Final      HGB Hemoglobin   Date Value Ref Range Status   06/05/2021 8.0 (L) 13.0 - 17.7 g/dL Final   06/04/2021 8.1 (L) 13.0 - 17.7 g/dL Final   06/03/2021 8.6 (L) 13.0 - 17.7 g/dL Final      HCT Hematocrit   Date Value Ref Range Status   06/05/2021 25.1 (L) 37.5 - 51.0 % Final   06/04/2021 25.5 (L) 37.5 - 51.0 % Final   06/03/2021 26.0 (L) 37.5 - 51.0 % Final      Platlets No results found for: LABPLAT   MCV MCV   Date Value Ref Range Status   06/05/2021 95.8 79.0 - 97.0 fL Final   06/04/2021 94.8 79.0 - 97.0 fL Final   06/03/2021 93.5 79.0 - 97.0 fL Final          Sodium Sodium   Date Value Ref Range Status   06/05/2021 136 136 - 145 mmol/L Final   06/04/2021 137 136 - 145 mmol/L Final   06/03/2021 139 136 - 145 mmol/L Final      Potassium Potassium   Date Value Ref Range Status   06/05/2021 3.8 3.5 - 5.2 mmol/L Final   06/04/2021 3.8 3.5 - 5.2 mmol/L Final   06/03/2021 3.9 3.5 - 5.2 mmol/L Final      Chloride Chloride   Date Value Ref Range Status   06/05/2021 100 98 - 107 mmol/L Final   06/04/2021 102 98 - 107 mmol/L Final   06/03/2021 106 98 - 107 mmol/L Final      CO2 CO2   Date Value Ref Range Status   06/05/2021 25.0 22.0 - 29.0 mmol/L Final   06/04/2021 24.0 22.0 - 29.0 mmol/L Final   06/03/2021 20.0 (L) 22.0 - 29.0 mmol/L Final      BUN BUN   Date Value Ref Range Status   06/05/2021 72 (H) 8 - 23 mg/dL Final   06/04/2021 98 (H) 8 - 23 mg/dL Final   06/03/2021 123 (H) 8 - 23 mg/dL Final      Creatinine Creatinine   Date Value Ref Range Status   06/05/2021 2.55 (H) 0.76 - 1.27 mg/dL Final   06/04/2021 2.82 (H) 0.76 - 1.27 mg/dL Final   06/03/2021 3.31 (H) 0.76 - 1.27 mg/dL Final      Calcium Calcium   Date Value Ref Range Status   06/05/2021 8.6 8.6 - 10.5 mg/dL Final   06/04/2021 8.7 8.6 - 10.5  mg/dL Final   06/03/2021 8.7 8.6 - 10.5 mg/dL Final      PO4 No results found for: CAPO4   Albumin Albumin   Date Value Ref Range Status   06/05/2021 2.90 (L) 3.50 - 5.20 g/dL Final   06/04/2021 3.00 (L) 3.50 - 5.20 g/dL Final   06/03/2021 3.10 (L) 3.50 - 5.20 g/dL Final      Magnesium Magnesium   Date Value Ref Range Status   06/05/2021 2.1 1.6 - 2.4 mg/dL Final   06/04/2021 2.4 1.6 - 2.4 mg/dL Final      Uric Acid No results found for: URICACID        Results Review:     I reviewed the patient's new clinical results.    albumin human, , ,   amLODIPine, 5 mg, Nasogastric, Q24H  aspirin, 325 mg, Nasogastric, Daily  atorvastatin, 40 mg, Nasogastric, Nightly  Beneprotein, 1 packet, Nasogastric, 4x Daily  carvedilol, 12.5 mg, Nasogastric, BID With Meals  cefTRIAXone, 1 g, Intravenous, Q24H  sennosides, 10 mL, Nasogastric, BID   And  docusate sodium, 100 mg, Nasogastric, BID  gabapentin, 300 mg, Nasogastric, Nightly  haloperidol lactate, 5 mg, Intravenous, Once  heparin (porcine), 5,000 Units, Subcutaneous, Q8H  insulin detemir, 25 Units, Subcutaneous, Nightly  insulin regular, 0-9 Units, Subcutaneous, Q6H  isosorbide dinitrate, 10 mg, Nasogastric, TID - Nitrates  melatonin, 5 mg, Oral, Nightly  pantoprazole, 40 mg, Intravenous, Q AM  pharmacy consult - MTM, , Does not apply, Daily  QUEtiapine, 25 mg, Oral, Nightly  sodium chloride, 10 mL, Intravenous, Q12H      dexmedetomidine, 0.2-1.5 mcg/kg/hr, Last Rate: 0.5 mcg/kg/hr (06/05/21 0902)        Medication Review: Reviewed    Assessment/Plan       NSTEMI 5/22/2021    Hyperlipidemia LDL goal <70    Essential hypertension    T2DM on oral agents and insulin. HbA1c 7.4     A/C kidney disease.  Presumably ATN due to postoperative arrest and CPR    Gout    Former smokeless tobacco use    S/P CABG x 3 on 5/28/21    Cardiac arrest with ventricular fibrillation (CMS/HCC)    1.  MICHAEL on CKD baseline creatinine 1.6-1.9.  Recent events included cardiac catheterization with contrast,  history of multiple units of blood transfusion during surgery, episode of cardiac arrest x2 post surgery.  Multiple blood transfusion    Ultrasound of the kidney shows right kidney 12.1 cm, left kidney 10.6 cm. Urine eos 0 urine sodium 122 urine creatinine 136. Goes along with prerenal state.  2.  Anemia:Iron saturation 10% will need IV iron.  3.  CKD etiology unknown at this time.    4.  S/p CABG.  5.  Mild metabolic acidosis: Corrected with dialysis  6. Proteinuria: Mild: Random urine protein 252, urine creatinine 136. Will need further work-up when patient stable.  Plan  Next dialysis on Monday.  Dialysis will bring the BUN down, also electrolytes and volume can be corrected.  Avoid nephrotoxic medications.  Keep systolic blood pressure greater than 100  Daily evaluation for renal replacement therapy will be done.  Adjust medication for the new GFR.  Case discussed with the medical staff.  High risk patient with multiple medical problems.  Case discussed with the dialysis RN, staff RN and Dr. Ish Loaiza.  Jovanny Chambers MD  06/05/21  10:05 EDT

## 2021-06-05 NOTE — PROGRESS NOTES
Intensivist Note     6/5/2021  Hospital Day: 14  8 Days Post-Op  ICU Stays Timeline            Hospital Admission: 05/22/21 0431 - Current  ICU stays: 1      In Date/Time Event Department ICU Stay Duration     05/22/21 0431 Admission [facility] EMERGENCY DEPT      05/22/21 0813 Transfer In [facility] 6B      05/24/21 1006 Transfer In [facility] CATH LAB      05/24/21 1122 Transfer In [facility] 6B      05/28/21 0619 Transfer In [facility] OR      05/28/21 1209 Transfer In [facility] 2HSIC 8 days 1 hour 12 minutes             Mr. Augusto Lorenzana ., 76 y.o. male is followed for:    NSTEMI 5/22/2021    S/P CABG x 3 on 5/28/21    Perioperative cardiac arrest with ventricular fibrillation (CMS/HCC)    A/C kidney disease. ATN due to postoperative arrest. Dialysis begun 6/3/2021    Hyperlipidemia LDL goal <70    Essential hypertension    T2DM on oral agents and insulin. HbA1c 7.4     Gout    Former smokeless tobacco use       SUBJECTIVE     76-year-old white male with a history of CAD s/p RCA stent 2009, diabetes mellitus (HbA1c 7.4), hypertension, hyperlipidemia, CKD, gout, and smokeless tobacco use.  She presented to [facility] ER 5/22/2021 complaining of dyspnea and was noted to be hypoxemic.  He had elevated troponins consistent with NSTEMI and cardiology proceeded with [facility] 5/24/2021 revealing multivessel CAD but preserved LVEF of 55%.  Creatinine was 1.9 on admission and transiently increased to 2.1 after cardiac catheterization only to return to baseline. Patient subsequently underwent CABG x 3 on 5/28/2021 and course complicated by the development of ventricular fibrillation and asystole requiring ACLS twice the day of surgery.  Subsequently required 48 hours of pacing before resuming to a sinus rhythm.  In addition required 3 units of packed RBCs, 4 units FFP, and 1 sixpack of platelets. Patient was subsequently extubated 5/29/2021 although has had persistent high FiO2 requirement. Additionally developed MICHAEL with low UOP despite significantly  "positive fluid balance.  Unfortunately renal function continued to deteriorate slowly and by 6/3/2021 BUN had increased to 123 in association with progressive confusional state and dialysis was begun.    Interval history: POD #8.  Patient on his third day of sequential dialysis and tolerating it well without hypotension. Still nonoliguric and put out 365 cc of urine over the last 24 hours (only 170 cc over the last 12). Mental status appeared good after dialysis yesterday, but last evening developed progressive agitated encephalopathy requiring Haldol, Precedex, and restraints.  Today he remains agitated, but can be redirected and will follow commands.  He remains afebrile on Rocephin alone which was started 6/1/2021 for cystitis (although nothing ever grew out).  WBC however has increased slightly to 15.33.  Gas exchange remains good and O2 sats are 95% on room air.  Continues to tolerate enteral feeds with nova source renal at goal.      ROS: Per subjective, all other systems reviewed and were negative.    The patient's relevant PMH, PSH, FH, and SH were reviewed and updated in Epic as appropriate. Allergies and Medications reviewed.    OBJECTIVE     /65   Pulse 56   Temp 98.2 °F (36.8 °C) (Axillary)   Resp 24   Ht 170 cm (66.93\")   Wt 84.8 kg (187 lb)   SpO2 92%   BMI 29.35 kg/m²   Flow (L/min): 1 liters nasal oxygen    Flowsheet Rows      First Filed Value   Admission Height  170.2 cm (67\") Documented at 05/22/2021 0433   Admission Weight  88.5 kg (195 lb) Documented at 05/22/2021 0433        Intake & Output (last day)       06/04 0701 - 06/05 0700 06/05 0701 - 06/06 0700    I.V. (mL/kg) 396.5 (4.7) 70 (0.8)    Other 575 246    NG/ 302    IV Piggyback      Total Intake(mL/kg) 1893.5 (22.3) 618 (7.3)    Urine (mL/kg/hr) 365 (0.2) 100 (0.1)    Other 2550 2330    Total Output 2915 2430    Net -1021.5 -1812                Exam:  General Exam:  Elderly confused white male propped up in bed pulling " at restraints but is not combative  HEENT: Pupils equal and reactive.  NG tube in place  Neck:                          Supple, no JVD, thyromegaly, or adenopathy.  Right temporary IJ dialysis catheter in place  Lungs: Scattered rhonchi.  No wheezes or rales  Cardiovascular: RRR without murmurs or gallops.  Abdomen: Soft nontender without organomegaly or masses.   and rectal: Last catheter in place  Extremities: No cyanosis or clubbing and only trace right lower extremity edema. Forearm ecchymoses. ower extremity edema almost completely resolved. Still with trace right ankle.  Neurologic:                 Symmetric strength but diffusely weak. No focal deficits.  Confused, restless, and pulling at restraints but able to redirect and follow commands.    Chest X-Ray: Persistent cardiomegaly (bag-like) with intact sternal wires.  Bilateral congestive changes that look the same (film today is much lighter making markings more prominent).  Right IJ temporary dialysis catheter in place    INFUSIONS  dexmedetomidine, 0.2-1.5 mcg/kg/hr, Last Rate: 0.6 mcg/kg/hr (06/05/21 1455)        Results from last 7 days   Lab Units 06/05/21 0337 06/04/21  0330 06/03/21  0343   WBC 10*3/mm3 15.33* 13.33* 13.64*   HEMOGLOBIN g/dL 8.0* 8.1* 8.6*   HEMATOCRIT % 25.1* 25.5* 26.0*   PLATELETS 10*3/mm3 220 225 225     Results from last 7 days   Lab Units 06/05/21  0337 06/04/21  0329   SODIUM mmol/L 136 137   POTASSIUM mmol/L 3.8 3.8   CHLORIDE mmol/L 100 102   CO2 mmol/L 25.0 24.0   BUN mg/dL 72* 98*   CREATININE mg/dL 2.55* 2.82*   GLUCOSE mg/dL 100* 170*   CALCIUM mg/dL 8.6 8.7     Results from last 7 days   Lab Units 06/05/21  0337 06/04/21  0329 06/03/21  0343 05/31/21  1141   MAGNESIUM mg/dL 2.1 2.4  --  2.4   PHOSPHORUS mg/dL 4.1 4.4 3.7 5.9*     Results from last 7 days   Lab Units 05/31/21  1141   ALK PHOS U/L 91   BILIRUBIN mg/dL 0.6   ALT (SGPT) U/L 22   AST (SGOT) U/L 22       Lab Results   Component Value Date    SEDRATE 52  (H) 06/03/2021     No results found for: BNP  Lab Results   Component Value Date    TROPONINT 1.550 (C) 05/22/2021     Lab Results   Component Value Date    TSH 4.030 06/03/2021     Lab Results   Component Value Date    LACTATE 1.5 05/22/2021     Lab Results   Component Value Date    CORTISOL 18.88 06/03/2021       Results from last 7 days   Lab Units 06/01/21  0934   PH, ARTERIAL pH units 7.446   PCO2, ARTERIAL mm Hg 29.0*   PO2 ART mm Hg 74.2*   HCO3 ART mmol/L 19.9*   FIO2 % 44       I reviewed the patient's results, images and medication.    Assessment/Plan   ASSESSMENT        NSTEMI 5/22/2021    S/P CABG x 3 on 5/28/21    Perioperative cardiac arrest with ventricular fibrillation (CMS/HCC)    A/C kidney disease. ATN due to postoperative arrest. Dialysis begun 6/3/2021    Hyperlipidemia LDL goal <70    Essential hypertension    T2DM on oral agents and insulin. HbA1c 7.4     Gout    Former smokeless tobacco use      DISCUSSION: Remains critically ill but hemodynamics and gas exchange are good at this time.  Renal failure persists and is on dialysis for the third consecutive day.  Encephalopathy also persists.  Although afebrile WBC increased slightly and is a set up for a secondary infection.  Presently on Rocephin but will watch closely and check WBC and procalcitonin in a.m.  Will also obtain blood cultures from 2 sites today    PLAN     1.  Blood cultures  2.  Continue Rocephin  3.  Repeat WBC and obtain procalcitonin in a.m.  4.  Dialysis as per nephrology  5.  Precedex but minimize dose and try to wean it as soon as possible  6.  Continue nocturnal Seroquel for encephalopathy and to help him sleep  7.  Continue mobilization  8.  Continue enteral feeds.  Since on dialysis can probably shift to a different formulation tomorrow    Plan of care and goals reviewed with multidisciplinary team at daily rounds.    I discussed the patient's findings and my recommendations with patient and nursing staff    Patient is  critically ill due to innumerable problems as outlined above and has a high risk of life-threatening decline in condition.  They require continuous monitoring and frequent reassessment of condition for adjustment of management in order to lessen risk.      Time spent Critical care 25 min (It does not include procedure time).    Electronically signed by Ish Loaiza MD, 06/05/21, 1:21 PM EDT.   Pulmonary / Critical care medicine     .

## 2021-06-06 ENCOUNTER — APPOINTMENT (OUTPATIENT)
Dept: GENERAL RADIOLOGY | Facility: HOSPITAL | Age: 76
End: 2021-06-06

## 2021-06-06 PROBLEM — G93.40 ENCEPHALOPATHY ACUTE: Status: ACTIVE | Noted: 2021-06-06

## 2021-06-06 LAB
ALBUMIN SERPL-MCNC: 2.8 G/DL (ref 3.5–5.2)
ALBUMIN/GLOB SERPL: 0.9 G/DL
ALP SERPL-CCNC: 159 U/L (ref 39–117)
ALT SERPL W P-5'-P-CCNC: 25 U/L (ref 1–41)
ANION GAP SERPL CALCULATED.3IONS-SCNC: 11 MMOL/L (ref 5–15)
AST SERPL-CCNC: 28 U/L (ref 1–40)
BASOPHILS # BLD AUTO: 0.03 10*3/MM3 (ref 0–0.2)
BASOPHILS NFR BLD AUTO: 0.2 % (ref 0–1.5)
BILIRUB SERPL-MCNC: 0.4 MG/DL (ref 0–1.2)
BUN SERPL-MCNC: 55 MG/DL (ref 8–23)
BUN/CREAT SERPL: 23.8 (ref 7–25)
CALCIUM SPEC-SCNC: 8.9 MG/DL (ref 8.6–10.5)
CHLORIDE SERPL-SCNC: 98 MMOL/L (ref 98–107)
CO2 SERPL-SCNC: 27 MMOL/L (ref 22–29)
CREAT SERPL-MCNC: 2.31 MG/DL (ref 0.76–1.27)
DEPRECATED RDW RBC AUTO: 51 FL (ref 37–54)
EOSINOPHIL # BLD AUTO: 0.2 10*3/MM3 (ref 0–0.4)
EOSINOPHIL NFR BLD AUTO: 1.6 % (ref 0.3–6.2)
ERYTHROCYTE [DISTWIDTH] IN BLOOD BY AUTOMATED COUNT: 16.5 % (ref 12.3–15.4)
GFR SERPL CREATININE-BSD FRML MDRD: 28 ML/MIN/1.73
GLOBULIN UR ELPH-MCNC: 3 GM/DL
GLUCOSE BLDC GLUCOMTR-MCNC: 147 MG/DL (ref 70–130)
GLUCOSE BLDC GLUCOMTR-MCNC: 160 MG/DL (ref 70–130)
GLUCOSE BLDC GLUCOMTR-MCNC: 172 MG/DL (ref 70–130)
GLUCOSE BLDC GLUCOMTR-MCNC: 190 MG/DL (ref 70–130)
GLUCOSE SERPL-MCNC: 133 MG/DL (ref 65–99)
HCT VFR BLD AUTO: 26.2 % (ref 37.5–51)
HGB BLD-MCNC: 8.5 G/DL (ref 13–17.7)
IMM GRANULOCYTES # BLD AUTO: 0.28 10*3/MM3 (ref 0–0.05)
IMM GRANULOCYTES NFR BLD AUTO: 2.2 % (ref 0–0.5)
LYMPHOCYTES # BLD AUTO: 0.88 10*3/MM3 (ref 0.7–3.1)
LYMPHOCYTES NFR BLD AUTO: 6.9 % (ref 19.6–45.3)
MAGNESIUM SERPL-MCNC: 2.1 MG/DL (ref 1.6–2.4)
MCH RBC QN AUTO: 30.5 PG (ref 26.6–33)
MCHC RBC AUTO-ENTMCNC: 32.4 G/DL (ref 31.5–35.7)
MCV RBC AUTO: 93.9 FL (ref 79–97)
MONOCYTES # BLD AUTO: 0.74 10*3/MM3 (ref 0.1–0.9)
MONOCYTES NFR BLD AUTO: 5.8 % (ref 5–12)
NEUTROPHILS NFR BLD AUTO: 10.62 10*3/MM3 (ref 1.7–7)
NEUTROPHILS NFR BLD AUTO: 83.3 % (ref 42.7–76)
NRBC BLD AUTO-RTO: 0.2 /100 WBC (ref 0–0.2)
PHOSPHATE SERPL-MCNC: 4.3 MG/DL (ref 2.5–4.5)
PLATELET # BLD AUTO: 231 10*3/MM3 (ref 140–450)
PMV BLD AUTO: 11.7 FL (ref 6–12)
POTASSIUM SERPL-SCNC: 4.4 MMOL/L (ref 3.5–5.2)
PROCALCITONIN SERPL-MCNC: 0.51 NG/ML (ref 0–0.25)
PROT SERPL-MCNC: 5.8 G/DL (ref 6–8.5)
RBC # BLD AUTO: 2.79 10*6/MM3 (ref 4.14–5.8)
SODIUM SERPL-SCNC: 136 MMOL/L (ref 136–145)
WBC # BLD AUTO: 12.75 10*3/MM3 (ref 3.4–10.8)

## 2021-06-06 PROCEDURE — 25010000002 HEPARIN (PORCINE) PER 1000 UNITS: Performed by: INTERNAL MEDICINE

## 2021-06-06 PROCEDURE — 84145 PROCALCITONIN (PCT): CPT | Performed by: INTERNAL MEDICINE

## 2021-06-06 PROCEDURE — 63710000001 INSULIN DETEMIR PER 5 UNITS: Performed by: INTERNAL MEDICINE

## 2021-06-06 PROCEDURE — 84100 ASSAY OF PHOSPHORUS: CPT | Performed by: INTERNAL MEDICINE

## 2021-06-06 PROCEDURE — 83735 ASSAY OF MAGNESIUM: CPT | Performed by: INTERNAL MEDICINE

## 2021-06-06 PROCEDURE — 25010000002 CEFTRIAXONE PER 250 MG: Performed by: INTERNAL MEDICINE

## 2021-06-06 PROCEDURE — 71045 X-RAY EXAM CHEST 1 VIEW: CPT

## 2021-06-06 PROCEDURE — 80053 COMPREHEN METABOLIC PANEL: CPT | Performed by: INTERNAL MEDICINE

## 2021-06-06 PROCEDURE — 25010000002 HALOPERIDOL LACTATE PER 5 MG: Performed by: NURSE PRACTITIONER

## 2021-06-06 PROCEDURE — 25010000002 MORPHINE PER 10 MG: Performed by: STUDENT IN AN ORGANIZED HEALTH CARE EDUCATION/TRAINING PROGRAM

## 2021-06-06 PROCEDURE — 82962 GLUCOSE BLOOD TEST: CPT

## 2021-06-06 PROCEDURE — 99291 CRITICAL CARE FIRST HOUR: CPT | Performed by: INTERNAL MEDICINE

## 2021-06-06 PROCEDURE — 85025 COMPLETE CBC W/AUTO DIFF WBC: CPT | Performed by: INTERNAL MEDICINE

## 2021-06-06 PROCEDURE — 63710000001 INSULIN REGULAR HUMAN PER 5 UNITS: Performed by: INTERNAL MEDICINE

## 2021-06-06 RX ORDER — HALOPERIDOL 5 MG/ML
2 INJECTION INTRAMUSCULAR EVERY 4 HOURS PRN
Status: DISCONTINUED | OUTPATIENT
Start: 2021-06-06 | End: 2021-06-18 | Stop reason: HOSPADM

## 2021-06-06 RX ORDER — HALOPERIDOL 5 MG/ML
5 INJECTION INTRAMUSCULAR ONCE
Status: COMPLETED | OUTPATIENT
Start: 2021-06-07 | End: 2021-06-06

## 2021-06-06 RX ADMIN — CARVEDILOL 12.5 MG: 12.5 TABLET, FILM COATED ORAL at 18:38

## 2021-06-06 RX ADMIN — HALOPERIDOL LACTATE 2 MG: 5 INJECTION, SOLUTION INTRAMUSCULAR at 22:51

## 2021-06-06 RX ADMIN — DOCUSATE SODIUM 100 MG: 50 LIQUID ORAL at 20:15

## 2021-06-06 RX ADMIN — ISOSORBIDE DINITRATE 10 MG: 20 TABLET ORAL at 18:38

## 2021-06-06 RX ADMIN — Medication 1 PACKET: at 18:39

## 2021-06-06 RX ADMIN — ASPIRIN 325 MG ORAL TABLET 325 MG: 325 PILL ORAL at 08:51

## 2021-06-06 RX ADMIN — GABAPENTIN 300 MG: 300 CAPSULE ORAL at 20:15

## 2021-06-06 RX ADMIN — AMLODIPINE BESYLATE 5 MG: 5 TABLET ORAL at 08:52

## 2021-06-06 RX ADMIN — PANTOPRAZOLE SODIUM 40 MG: 40 INJECTION, POWDER, FOR SOLUTION INTRAVENOUS at 05:32

## 2021-06-06 RX ADMIN — HEPARIN SODIUM 5000 UNITS: 5000 INJECTION INTRAVENOUS; SUBCUTANEOUS at 05:32

## 2021-06-06 RX ADMIN — QUETIAPINE FUMARATE 50 MG: 25 TABLET ORAL at 20:15

## 2021-06-06 RX ADMIN — ISOSORBIDE DINITRATE 10 MG: 20 TABLET ORAL at 12:03

## 2021-06-06 RX ADMIN — Medication 5 MG: at 20:15

## 2021-06-06 RX ADMIN — INSULIN HUMAN 2 UNITS: 100 INJECTION, SOLUTION PARENTERAL at 23:19

## 2021-06-06 RX ADMIN — MORPHINE SULFATE 2 MG: 2 INJECTION, SOLUTION INTRAMUSCULAR; INTRAVENOUS at 23:51

## 2021-06-06 RX ADMIN — HEPARIN SODIUM 5000 UNITS: 5000 INJECTION INTRAVENOUS; SUBCUTANEOUS at 21:49

## 2021-06-06 RX ADMIN — SODIUM CHLORIDE 1 G: 900 INJECTION INTRAVENOUS at 20:45

## 2021-06-06 RX ADMIN — ISOSORBIDE DINITRATE 10 MG: 20 TABLET ORAL at 08:51

## 2021-06-06 RX ADMIN — INSULIN DETEMIR 25 UNITS: 100 INJECTION, SOLUTION SUBCUTANEOUS at 20:15

## 2021-06-06 RX ADMIN — CARVEDILOL 12.5 MG: 12.5 TABLET, FILM COATED ORAL at 08:51

## 2021-06-06 RX ADMIN — DOCUSATE SODIUM 100 MG: 50 LIQUID ORAL at 08:51

## 2021-06-06 RX ADMIN — INSULIN HUMAN 2 UNITS: 100 INJECTION, SOLUTION PARENTERAL at 18:37

## 2021-06-06 RX ADMIN — SENNOSIDES 10 ML: 8.8 LIQUID ORAL at 20:15

## 2021-06-06 RX ADMIN — SENNOSIDES 10 ML: 8.8 LIQUID ORAL at 08:51

## 2021-06-06 RX ADMIN — Medication 1 PACKET: at 20:45

## 2021-06-06 RX ADMIN — HEPARIN SODIUM 5000 UNITS: 5000 INJECTION INTRAVENOUS; SUBCUTANEOUS at 14:25

## 2021-06-06 RX ADMIN — Medication 1 PACKET: at 12:03

## 2021-06-06 RX ADMIN — HALOPERIDOL LACTATE 5 MG: 5 INJECTION, SOLUTION INTRAMUSCULAR at 23:36

## 2021-06-06 RX ADMIN — Medication 1 PACKET: at 09:00

## 2021-06-06 RX ADMIN — ATORVASTATIN CALCIUM 40 MG: 40 TABLET, FILM COATED ORAL at 20:15

## 2021-06-06 NOTE — PLAN OF CARE
Goal Outcome Evaluation:  Plan of Care Reviewed With: patient  Progress: improving  Outcome Summary: Pt more alert/talkative today.  Pt able to stand with assist x 2.  TF continues.   Dialysis in the a.m. will continue to monitor.  at 6/6/2021 [id number]

## 2021-06-06 NOTE — PROGRESS NOTES
Cardiothoracic Surgery Progress Note      POD # 9 s/p CABG       LOS: 15 days      Subjective:  Rested well overnight, no agitation      Objective:  Vital Signs  Temp:  [98 °F (36.7 °C)-98.5 °F (36.9 °C)] 98.5 °F (36.9 °C)  Heart Rate:  [54-92] 67  Resp:  [18-24] 20  BP: (103-172)/() 130/69    Physical Exam:   General Appearance: alert, appears stated age and cooperative   Lungs: clear to auscultation, respirations regular, respirations even and respirations unlabored   Heart: regular rhythm & normal rate, normal S1, S2, no murmur, no gallop, no rub and no click   Skin: erythema surrounding right leg EVH incision, painful to touch     Results:    Results from last 7 days   Lab Units 06/06/21  0357   WBC 10*3/mm3 12.75*   HEMOGLOBIN g/dL 8.5*   HEMATOCRIT % 26.2*   PLATELETS 10*3/mm3 231     Results from last 7 days   Lab Units 06/06/21  0357   SODIUM mmol/L 136   POTASSIUM mmol/L 4.4   CHLORIDE mmol/L 98   CO2 mmol/L 27.0   BUN mg/dL 55*   CREATININE mg/dL 2.31*   GLUCOSE mg/dL 133*   CALCIUM mg/dL 8.9         Assessment:    NSTEMI 5/22/2021    Hyperlipidemia LDL goal <70    Essential hypertension    T2DM on oral agents and insulin. HbA1c 7.4     A/C kidney disease. ATN due to postoperative arrest. Dialysis begun 6/3/2021    Gout    Former smokeless tobacco use    S/P CABG x 3 on 5/28/21    Perioperative cardiac arrest with ventricular fibrillation (CMS/HCC)    POD 9 CABG x3    Plan:  Off Precedex  Encephalopathy improved  HD tomorrow        Mireille Cedillo PA-C  06/06/21  07:09 EDT

## 2021-06-06 NOTE — PROGRESS NOTES
"   LOS: 15 days    Patient Care Team:  Rob oPlanco MD as PCP - General (Internal Medicine)    Chief Complaint: CAD s/p CABG  Patient admission creatinine was initially 1.9, came down to 1.6 on a previous admission, patient had cardiac cath done creatinine went up to 2.1 on 25 May.  Since 2 episode of cardiac arrest his creatinine has gone up to 2.72 renal function is deteriorating urine output has dropped.  Patient is unable to give much information at this time.  Subjective     Interval History:   Nonoliguric, creatinine BUN remains high despite dialysis.  Encephalopathy slightly improved.  No added distress, no new events    Review of Systems:   Review of system remain unchanged.  Generalized weakness, mild confusion, no edema noted. All other systems negative.    Objective     Vital Sign Min/Max for last 24 hours  Temp  Min: 98 °F (36.7 °C)  Max: 98.5 °F (36.9 °C)   BP  Min: 103/61  Max: 172/73   Pulse  Min: 54  Max: 92   Resp  Min: 18  Max: 24   SpO2  Min: 90 %  Max: 97 %   No data recorded   No data recorded     Flowsheet Rows      First Filed Value   Admission Height  170.2 cm (67\") Documented at 05/22/2021 0433   Admission Weight  88.5 kg (195 lb) Documented at 05/22/2021 0433          No intake/output data recorded.  I/O last 3 completed shifts:  In: 3156.4 [I.V.:543.4; Other:1038; NG/GT:1475; IV Piggyback:100]  Out: 2970 [Urine:640; Other:2330]    Physical Exam:  General Appearance: Oriented to person only sitting in chair comfortable.  Eyes: PER, EOMI.  Neck: Supple no JVD.  Lungs: Bilateral rhonchi's are heard few Rales at the base. Equal chest movement, nonlabored.  Heart: No gallop, murmur, rub, RRR.  Abdomen: Soft, nontender, positive bowel sounds, no organomegaly.  Extremities: No edema left lower leg.  Trace edema noted on the right with redness around the incision site for vein harvesting.  No cyanosis.  Neuro: Confused at time according to the nursing, moving all extremities.  : Last " catheter in place. Urine clear.  Skin: Warm and dry.  WBC WBC   Date Value Ref Range Status   06/06/2021 12.75 (H) 3.40 - 10.80 10*3/mm3 Final   06/05/2021 15.33 (H) 3.40 - 10.80 10*3/mm3 Final   06/04/2021 13.33 (H) 3.40 - 10.80 10*3/mm3 Final      HGB Hemoglobin   Date Value Ref Range Status   06/06/2021 8.5 (L) 13.0 - 17.7 g/dL Final   06/05/2021 8.0 (L) 13.0 - 17.7 g/dL Final   06/04/2021 8.1 (L) 13.0 - 17.7 g/dL Final      HCT Hematocrit   Date Value Ref Range Status   06/06/2021 26.2 (L) 37.5 - 51.0 % Final   06/05/2021 25.1 (L) 37.5 - 51.0 % Final   06/04/2021 25.5 (L) 37.5 - 51.0 % Final      Platlets No results found for: LABPLAT   MCV MCV   Date Value Ref Range Status   06/06/2021 93.9 79.0 - 97.0 fL Final   06/05/2021 95.8 79.0 - 97.0 fL Final   06/04/2021 94.8 79.0 - 97.0 fL Final          Sodium Sodium   Date Value Ref Range Status   06/06/2021 136 136 - 145 mmol/L Final   06/05/2021 136 136 - 145 mmol/L Final   06/04/2021 137 136 - 145 mmol/L Final      Potassium Potassium   Date Value Ref Range Status   06/06/2021 4.4 3.5 - 5.2 mmol/L Final     Comment:     Slight hemolysis detected by analyzer. Results may be affected.   06/05/2021 3.8 3.5 - 5.2 mmol/L Final   06/04/2021 3.8 3.5 - 5.2 mmol/L Final      Chloride Chloride   Date Value Ref Range Status   06/06/2021 98 98 - 107 mmol/L Final   06/05/2021 100 98 - 107 mmol/L Final   06/04/2021 102 98 - 107 mmol/L Final      CO2 CO2   Date Value Ref Range Status   06/06/2021 27.0 22.0 - 29.0 mmol/L Final   06/05/2021 25.0 22.0 - 29.0 mmol/L Final   06/04/2021 24.0 22.0 - 29.0 mmol/L Final      BUN BUN   Date Value Ref Range Status   06/06/2021 55 (H) 8 - 23 mg/dL Final   06/05/2021 72 (H) 8 - 23 mg/dL Final   06/04/2021 98 (H) 8 - 23 mg/dL Final      Creatinine Creatinine   Date Value Ref Range Status   06/06/2021 2.31 (H) 0.76 - 1.27 mg/dL Final   06/05/2021 2.55 (H) 0.76 - 1.27 mg/dL Final   06/04/2021 2.82 (H) 0.76 - 1.27 mg/dL Final      Calcium Calcium    Date Value Ref Range Status   06/06/2021 8.9 8.6 - 10.5 mg/dL Final   06/05/2021 8.6 8.6 - 10.5 mg/dL Final   06/04/2021 8.7 8.6 - 10.5 mg/dL Final      PO4 No results found for: CAPO4   Albumin Albumin   Date Value Ref Range Status   06/06/2021 2.80 (L) 3.50 - 5.20 g/dL Final   06/05/2021 2.90 (L) 3.50 - 5.20 g/dL Final   06/04/2021 3.00 (L) 3.50 - 5.20 g/dL Final      Magnesium Magnesium   Date Value Ref Range Status   06/06/2021 2.1 1.6 - 2.4 mg/dL Final   06/05/2021 2.1 1.6 - 2.4 mg/dL Final   06/04/2021 2.4 1.6 - 2.4 mg/dL Final      Uric Acid No results found for: URICACID        Results Review:     I reviewed the patient's new clinical results.    amLODIPine, 5 mg, Nasogastric, Q24H  aspirin, 325 mg, Nasogastric, Daily  atorvastatin, 40 mg, Nasogastric, Nightly  Beneprotein, 1 packet, Nasogastric, 4x Daily  carvedilol, 12.5 mg, Nasogastric, BID With Meals  cefTRIAXone, 1 g, Intravenous, Q24H  sennosides, 10 mL, Nasogastric, BID   And  docusate sodium, 100 mg, Nasogastric, BID  gabapentin, 300 mg, Nasogastric, Nightly  haloperidol lactate, 5 mg, Intravenous, Once  heparin (porcine), 5,000 Units, Subcutaneous, Q8H  insulin detemir, 25 Units, Subcutaneous, Nightly  insulin regular, 0-9 Units, Subcutaneous, Q6H  isosorbide dinitrate, 10 mg, Nasogastric, TID - Nitrates  melatonin, 5 mg, Oral, Nightly  pantoprazole, 40 mg, Intravenous, Q AM  pharmacy consult - MTM, , Does not apply, Daily  QUEtiapine, 50 mg, Oral, Nightly  sodium chloride, 10 mL, Intravenous, Q12H      dexmedetomidine, 0.2-1.5 mcg/kg/hr, Last Rate: Stopped (06/06/21 0547)        Medication Review: Reviewed    Assessment/Plan       NSTEMI 5/22/2021    Hyperlipidemia LDL goal <70    Essential hypertension    T2DM on oral agents and insulin. HbA1c 7.4     A/C kidney disease. ATN due to postoperative arrest. Dialysis begun 6/3/2021    Gout    Former smokeless tobacco use    S/P CABG x 3 on 5/28/21    Perioperative cardiac arrest with ventricular  fibrillation (CMS/HCC)    1.  MICHAEL on CKD baseline creatinine 1.6-1.9.  Recent events included cardiac catheterization with contrast, history of multiple units of blood transfusion during surgery, episode of cardiac arrest x2 post surgery.  Multiple blood transfusion    Ultrasound of the kidney shows right kidney 12.1 cm, left kidney 10.6 cm. Urine eos 0 urine sodium 122 urine creatinine 136. Goes along with prerenal state.  2.  Anemia:Iron saturation 10% will need IV iron.  3.  CKD etiology unknown at this time.    4.  S/p CABG.  5.  Mild metabolic acidosis: Corrected with dialysis  6. Proteinuria: Mild: Random urine protein 252, urine creatinine 136. Will need further work-up when patient stable.  Plan  Dialysis planned for Monday to get the urea down, and see what his mental status, I think his encephalopathy is secondary to multiple episodes of hypotension code 500.  This may improve slowly.   Avoid nephrotoxic medications.  Keep systolic blood pressure greater than 100  Daily evaluation for renal replacement therapy will be done.  Adjust medication for the new GFR.  Case discussed with the medical staff.  High risk patient with multiple medical problems.  Case discussed with staff RN and Dr. Ish Loaiza.  Jovanny Chambers MD  06/06/21  08:30 EDT

## 2021-06-06 NOTE — PROGRESS NOTES
Intensivist Note     6/6/2021  Hospital Day: 15  9 Days Post-Op  ICU Stays Timeline            Hospital Admission: 05/22/21 0431 - Current  ICU stays: 1      In Date/Time Event Department ICU Stay Duration     05/22/21 0431 Admission [facility] EMERGENCY DEPT      05/22/21 0813 Transfer In [facility] 6B      05/24/21 1006 Transfer In [facility] CATH LAB      05/24/21 1122 Transfer In [facility] 6B      05/28/21 0619 Transfer In [facility] OR      05/28/21 1209 Transfer In [facility] 2HSIC 8 days 18 hours 29 minutes             Mr. Augusto Lorenzana ., 76 y.o. male is followed for:    NSTEMI 5/22/2021    S/P CABG x 3 on 5/28/21    Perioperative cardiac arrest with ventricular fibrillation (CMS/HCC)    Postop encephalopathy post code. Combination of anoxic insult and azotemia    A/C kidney disease. ATN due to postoperative arrest. Dialysis begun 6/3/2021    Hyperlipidemia LDL goal <70    Essential hypertension    T2DM on oral agents and insulin. HbA1c 7.4     Gout    Former smokeless tobacco use       SUBJECTIVE     76-year-old white male with a history of CAD s/p RCA stent 2009, diabetes mellitus (HbA1c 7.4), hypertension, hyperlipidemia, CKD, gout, and smokeless tobacco use.  She presented to [facility] ER 5/22/2021 complaining of dyspnea and was noted to be hypoxemic.  He had elevated troponins consistent with NSTEMI and cardiology proceeded with [facility] 5/24/2021 revealing multivessel CAD but preserved LVEF of 55%.  Creatinine was 1.9 on admission and transiently increased to 2.1 after cardiac catheterization only to return to baseline. Patient subsequently underwent CABG x 3 on 5/28/2021 and course complicated by the development of ventricular fibrillation and asystole requiring ACLS twice the day of surgery.  Subsequently required 48 hours of pacing before resuming sinus rhythm.  In addition required 3 units of packed RBCs, 4 units FFP, and 1 sixpack of platelets. Patient was subsequently extubated 5/29/2021 although temporarily had high FiO2  "requirement. Additionally developed MICHAEL with low UOP despite significantly positive fluid balance.  Unfortunately renal function continued to deteriorate slowly and by 6/3/2021 BUN had increased to 123 in association with progressive confusional state. Subsequently underwent dialysis 6/3, 6/4, and 6/5 with improvement in azotemia.    Interval history: POD #9 despite dialysis continued to have intermittent agitated delirium especially at night. Last evening however seem to improve with increasing Seroquel to 50 and adding Precedex. He slept better and did not require restraints. Today he sitting up in a chair and much more interactive. Last dialysis was 6/5/2021 and UOP remains low, but better over the last 12 hours (315 cc out) and BUN/creatinine better today (55/2.31). Plans are for dialysis tomorrow depending on the numbers. Continues to tolerate enteral feeds at goal (Novasource renal at 45 cc an hour with 35 cc an hour of free water) and glycemic control adequate on present insulin. Remains hypertensive today on amlodipine and Coreg. No complaints of dyspnea, chest pain, nausea, vomiting, diarrhea, purulent cough, or hemoptysis. Patient remains afebrile and WBC has decreased to 12.75 on Rocephin       ROS: Per subjective, all other systems reviewed and were negative.    The patient's relevant PMH, PSH, FH, and SH were reviewed and updated in Epic as appropriate. Allergies and Medications reviewed.    OBJECTIVE     /62   Pulse 67   Temp 98.9 °F (37.2 °C) (Oral)   Resp 18   Ht 170 cm (66.93\")   Wt 84.8 kg (187 lb)   SpO2 94%   BMI 29.35 kg/m²   Flow (L/min): 1    Flowsheet Rows      First Filed Value   Admission Height  170.2 cm (67\") Documented at 05/22/2021 0433   Admission Weight  88.5 kg (195 lb) Documented at 05/22/2021 0433        Intake & Output (last day)        intake 2,243 cc                 output 2,800 cc       Exam:  General Exam:  Elderly white male who appears somewhat older than stated " age. Sitting up in a chair in NAD  HEENT: Pupils equal and reactive. ND tube in place.  Neck:                          Supple, no JVD, thyromegaly, or adenopathy. Right IJ temporary dialysis catheter in place.  Lungs: Clear to auscultation and percussion anteriorly and posteriorly.  Cardiovascular: RRR without murmurs or gallops. HR 67 bpm  Abdomen: Soft nontender without organomegaly or masses. Protuberant.   and rectal: Last catheter in place  Extremities: No cyanosis or clubbing. Right leg swollen and tender especially at the saphenous vein harvest site.  Neurologic:                 Symmetric strength but diffusely weak. No focal deficits. Confused but much more interactive.    Chest X-Ray: Cardiomegaly with a bag like heart. Sternal wires intact. Right IJ temporary dialysis catheter in place. Still with bilateral hazy opacities in the bases that I suspect are combination of atelectasis/effusion    INFUSIONS  dexmedetomidine, 0.2-1.5 mcg/kg/hr, Last Rate: Stopped (06/06/21 0547)        Results from last 7 days   Lab Units 06/06/21 0357 06/05/21 0337 06/04/21  0330   WBC 10*3/mm3 12.75* 15.33* 13.33*   HEMOGLOBIN g/dL 8.5* 8.0* 8.1*   HEMATOCRIT % 26.2* 25.1* 25.5*   PLATELETS 10*3/mm3 231 220 225     Results from last 7 days   Lab Units 06/06/21  0357 06/05/21  0337   SODIUM mmol/L 136 136   POTASSIUM mmol/L 4.4 3.8   CHLORIDE mmol/L 98 100   CO2 mmol/L 27.0 25.0   BUN mg/dL 55* 72*   CREATININE mg/dL 2.31* 2.55*   GLUCOSE mg/dL 133* 100*   CALCIUM mg/dL 8.9 8.6     Results from last 7 days   Lab Units 06/06/21  0357 06/05/21  0337 06/04/21  0329   MAGNESIUM mg/dL 2.1 2.1 2.4   PHOSPHORUS mg/dL 4.3 4.1 4.4     Results from last 7 days   Lab Units 06/06/21  0357 05/31/21  1141   ALK PHOS U/L 159* 91   BILIRUBIN mg/dL 0.4 0.6   ALT (SGPT) U/L 25 22   AST (SGOT) U/L 28 22       Lab Results   Component Value Date    [facility] 52 (H) 06/03/2021     No results found for: BNP  Lab Results   Component Value Date     TROPONINT 1.550 (C) 05/22/2021     Lab Results   Component Value Date    TSH 4.030 06/03/2021     Lab Results   Component Value Date    LACTATE 1.5 05/22/2021     Lab Results   Component Value Date    CORTISOL 18.88 06/03/2021       Results from last 7 days   Lab Units 06/01/21  0934   PH, ARTERIAL pH units 7.446   PCO2, ARTERIAL mm Hg 29.0*   PO2 ART mm Hg 74.2*   HCO3 ART mmol/L 19.9*   FIO2 % 44       I reviewed the patient's results, images and medication.    Assessment/Plan   ASSESSMENT        NSTEMI 5/22/2021    S/P CABG x 3 on 5/28/21    Perioperative cardiac arrest with ventricular fibrillation (CMS/HCC)    Postop encephalopathy post code. Combination of anoxic insult and azotemia    A/C kidney disease. ATN due to postoperative arrest. Dialysis begun 6/3/2021    Hyperlipidemia LDL goal <70    Essential hypertension    T2DM on oral agents and insulin. HbA1c 7.4     Gout    Former smokeless tobacco use      DISCUSSION: Seems improved from the standpoint of his encephalopathy since dialysis begun. Also afebrile on day 6 Rocephin. This was begun because I felt he had a cystitis and his Last catheter had to remain in place because of progressive renal failure. We do not however have a positive culture. (Did have significantly elevated procalcitonin which has decreased on Rocephin). Renal numbers look better but he received 3 days of dialysis Wednesday Thursday Friday and is to be dialyzed tomorrow (unless UOP picks up and his renal numbers look good). I am a little concerned about the area of tenderness at his saphenous vein harvest site and this needs to be observed closely (venous duplex obtained 6/1/2021 was negative for DVT but I cannot rule out a cellulitis resistant to Rocephin).    PLAN     1. Dialysis tomorrow unless UOP picks up and renal numbers improve.  2. Watch right saphenous vein harvest site closely. If worsens may have to empirically begin MRSA coverage (last duplex was -5 days ago)  3. Continue  glycemic control  4. Continue mobilization  5. Continue Rocephin    Plan of care and goals reviewed with multidisciplinary team at daily rounds.    I discussed the patient's findings and my recommendations with patient and nursing staff    Patient is critically ill due to persistent renal failure requiring dialysis, concern about possible cellulitis right leg on Rocephin, and encephalopathy although it seems slightly improved. He continues to have a high risk of life-threatening decline in conditi and requires.  They requireon continuous monitoring and frequent reassessment of condition for adjustment of management in order to lessen risk.  I visited the patient's bedside and interacted with nurse numerous times today.    Time spent Critical care 30 min (It does not include procedure time).    Electronically signed by Ish Loaiza MD, 06/06/21, 6:38 AM EDT.   Pulmonary / Critical care medicine

## 2021-06-06 NOTE — PLAN OF CARE
Goal Outcome Evaluation:  Plan of Care Reviewed With: patient  Progress: improving       Pt stable overnight on RA, no vasoactive drips.  Neurologically, remains confused but calm, follows commands, oriented to self.  Rested well overnight, with no episodes of agitation, did not require use of restraints.  Weaning Precedex.   Tolerating tube feed at goal rate.  Awaiting AM lab results.   Will continue to monitor.

## 2021-06-07 ENCOUNTER — APPOINTMENT (OUTPATIENT)
Dept: NEPHROLOGY | Facility: HOSPITAL | Age: 76
End: 2021-06-07

## 2021-06-07 LAB
ALBUMIN SERPL-MCNC: 2.9 G/DL (ref 3.5–5.2)
ALP SERPL-CCNC: 158 U/L (ref 39–117)
ALT SERPL W P-5'-P-CCNC: 23 U/L (ref 1–41)
ANION GAP SERPL CALCULATED.3IONS-SCNC: 10 MMOL/L (ref 5–15)
AST SERPL-CCNC: 21 U/L (ref 1–40)
BASOPHILS # BLD AUTO: 0.03 10*3/MM3 (ref 0–0.2)
BASOPHILS NFR BLD AUTO: 0.2 % (ref 0–1.5)
BILIRUB SERPL-MCNC: 0.4 MG/DL (ref 0–1.2)
BUN SERPL-MCNC: 84 MG/DL (ref 8–23)
CALCIUM SPEC-SCNC: 8.8 MG/DL (ref 8.6–10.5)
CHLORIDE SERPL-SCNC: 98 MMOL/L (ref 98–107)
CHOLEST SERPL-MCNC: 60 MG/DL (ref 0–200)
CO2 SERPL-SCNC: 26 MMOL/L (ref 22–29)
CREAT SERPL-MCNC: 2.92 MG/DL (ref 0.76–1.27)
CRP SERPL-MCNC: 10.41 MG/DL (ref 0–0.5)
DEPRECATED RDW RBC AUTO: 52.3 FL (ref 37–54)
EOSINOPHIL # BLD AUTO: 0.17 10*3/MM3 (ref 0–0.4)
EOSINOPHIL NFR BLD AUTO: 1.2 % (ref 0.3–6.2)
ERYTHROCYTE [DISTWIDTH] IN BLOOD BY AUTOMATED COUNT: 16.5 % (ref 12.3–15.4)
GLUCOSE BLDC GLUCOMTR-MCNC: 166 MG/DL (ref 70–130)
GLUCOSE BLDC GLUCOMTR-MCNC: 90 MG/DL (ref 70–130)
GLUCOSE BLDC GLUCOMTR-MCNC: 94 MG/DL (ref 70–130)
GLUCOSE BLDC GLUCOMTR-MCNC: 96 MG/DL (ref 70–130)
GLUCOSE BLDC GLUCOMTR-MCNC: 97 MG/DL (ref 70–130)
GLUCOSE SERPL-MCNC: 106 MG/DL (ref 65–99)
HCT VFR BLD AUTO: 22.9 % (ref 37.5–51)
HGB BLD-MCNC: 7.5 G/DL (ref 13–17.7)
IMM GRANULOCYTES # BLD AUTO: 0.24 10*3/MM3 (ref 0–0.05)
IMM GRANULOCYTES NFR BLD AUTO: 1.7 % (ref 0–0.5)
LYMPHOCYTES # BLD AUTO: 1.04 10*3/MM3 (ref 0.7–3.1)
LYMPHOCYTES NFR BLD AUTO: 7.3 % (ref 19.6–45.3)
MAGNESIUM SERPL-MCNC: 2.4 MG/DL (ref 1.6–2.4)
MCH RBC QN AUTO: 30.7 PG (ref 26.6–33)
MCHC RBC AUTO-ENTMCNC: 32.8 G/DL (ref 31.5–35.7)
MCV RBC AUTO: 93.9 FL (ref 79–97)
MONOCYTES # BLD AUTO: 0.97 10*3/MM3 (ref 0.1–0.9)
MONOCYTES NFR BLD AUTO: 6.8 % (ref 5–12)
NEUTROPHILS NFR BLD AUTO: 11.87 10*3/MM3 (ref 1.7–7)
NEUTROPHILS NFR BLD AUTO: 82.8 % (ref 42.7–76)
NRBC BLD AUTO-RTO: 0 /100 WBC (ref 0–0.2)
PHOSPHATE SERPL-MCNC: 5.2 MG/DL (ref 2.5–4.5)
PLATELET # BLD AUTO: 212 10*3/MM3 (ref 140–450)
PMV BLD AUTO: 11.1 FL (ref 6–12)
POTASSIUM SERPL-SCNC: 3.9 MMOL/L (ref 3.5–5.2)
PREALB SERPL-MCNC: 12.3 MG/DL (ref 20–40)
PROT SERPL-MCNC: 5.6 G/DL (ref 6–8.5)
QT INTERVAL: 316 MS
QTC INTERVAL: 466 MS
RBC # BLD AUTO: 2.44 10*6/MM3 (ref 4.14–5.8)
SODIUM SERPL-SCNC: 134 MMOL/L (ref 136–145)
TRIGL SERPL-MCNC: 51 MG/DL (ref 0–150)
WBC # BLD AUTO: 14.32 10*3/MM3 (ref 3.4–10.8)

## 2021-06-07 PROCEDURE — 99232 SBSQ HOSP IP/OBS MODERATE 35: CPT | Performed by: NURSE PRACTITIONER

## 2021-06-07 PROCEDURE — 97110 THERAPEUTIC EXERCISES: CPT

## 2021-06-07 PROCEDURE — 63710000001 INSULIN REGULAR HUMAN PER 5 UNITS: Performed by: INTERNAL MEDICINE

## 2021-06-07 PROCEDURE — 86140 C-REACTIVE PROTEIN: CPT | Performed by: INTERNAL MEDICINE

## 2021-06-07 PROCEDURE — 82962 GLUCOSE BLOOD TEST: CPT

## 2021-06-07 PROCEDURE — 80053 COMPREHEN METABOLIC PANEL: CPT | Performed by: INTERNAL MEDICINE

## 2021-06-07 PROCEDURE — 63710000001 INSULIN DETEMIR PER 5 UNITS: Performed by: INTERNAL MEDICINE

## 2021-06-07 PROCEDURE — 82465 ASSAY BLD/SERUM CHOLESTEROL: CPT | Performed by: INTERNAL MEDICINE

## 2021-06-07 PROCEDURE — 85025 COMPLETE CBC W/AUTO DIFF WBC: CPT | Performed by: INTERNAL MEDICINE

## 2021-06-07 PROCEDURE — 25010000002 HEPARIN (PORCINE) PER 1000 UNITS: Performed by: INTERNAL MEDICINE

## 2021-06-07 PROCEDURE — 83735 ASSAY OF MAGNESIUM: CPT | Performed by: INTERNAL MEDICINE

## 2021-06-07 PROCEDURE — 99233 SBSQ HOSP IP/OBS HIGH 50: CPT | Performed by: INTERNAL MEDICINE

## 2021-06-07 PROCEDURE — 25010000002 CEFTRIAXONE PER 250 MG: Performed by: INTERNAL MEDICINE

## 2021-06-07 PROCEDURE — 97530 THERAPEUTIC ACTIVITIES: CPT

## 2021-06-07 PROCEDURE — 84134 ASSAY OF PREALBUMIN: CPT | Performed by: INTERNAL MEDICINE

## 2021-06-07 PROCEDURE — 84478 ASSAY OF TRIGLYCERIDES: CPT | Performed by: INTERNAL MEDICINE

## 2021-06-07 PROCEDURE — 25010000002 HALOPERIDOL LACTATE PER 5 MG: Performed by: NURSE PRACTITIONER

## 2021-06-07 PROCEDURE — 84100 ASSAY OF PHOSPHORUS: CPT | Performed by: INTERNAL MEDICINE

## 2021-06-07 RX ORDER — QUETIAPINE FUMARATE 25 MG/1
50 TABLET, FILM COATED ORAL NIGHTLY
Status: DISCONTINUED | OUTPATIENT
Start: 2021-06-07 | End: 2021-06-09

## 2021-06-07 RX ORDER — CHOLECALCIFEROL (VITAMIN D3) 125 MCG
5 CAPSULE ORAL NIGHTLY
Status: DISCONTINUED | OUTPATIENT
Start: 2021-06-07 | End: 2021-06-18 | Stop reason: HOSPADM

## 2021-06-07 RX ADMIN — ISOSORBIDE DINITRATE 10 MG: 20 TABLET ORAL at 08:03

## 2021-06-07 RX ADMIN — DEXMEDETOMIDINE HYDROCHLORIDE 1 MCG/KG/HR: 4 INJECTION, SOLUTION INTRAVENOUS at 22:30

## 2021-06-07 RX ADMIN — ISOSORBIDE DINITRATE 10 MG: 20 TABLET ORAL at 17:07

## 2021-06-07 RX ADMIN — HEPARIN SODIUM 5000 UNITS: 5000 INJECTION INTRAVENOUS; SUBCUTANEOUS at 21:49

## 2021-06-07 RX ADMIN — CARVEDILOL 12.5 MG: 12.5 TABLET, FILM COATED ORAL at 17:07

## 2021-06-07 RX ADMIN — HEPARIN SODIUM 5000 UNITS: 5000 INJECTION INTRAVENOUS; SUBCUTANEOUS at 06:30

## 2021-06-07 RX ADMIN — INSULIN DETEMIR 25 UNITS: 100 INJECTION, SOLUTION SUBCUTANEOUS at 20:15

## 2021-06-07 RX ADMIN — SENNOSIDES 10 ML: 8.8 LIQUID ORAL at 20:03

## 2021-06-07 RX ADMIN — PANTOPRAZOLE SODIUM 40 MG: 40 INJECTION, POWDER, FOR SOLUTION INTRAVENOUS at 06:30

## 2021-06-07 RX ADMIN — DOCUSATE SODIUM 100 MG: 50 LIQUID ORAL at 08:02

## 2021-06-07 RX ADMIN — AMLODIPINE BESYLATE 5 MG: 5 TABLET ORAL at 08:03

## 2021-06-07 RX ADMIN — DEXMEDETOMIDINE HYDROCHLORIDE 0.2 MCG/KG/HR: 4 INJECTION, SOLUTION INTRAVENOUS at 15:24

## 2021-06-07 RX ADMIN — Medication 1 PACKET: at 12:28

## 2021-06-07 RX ADMIN — CARVEDILOL 12.5 MG: 12.5 TABLET, FILM COATED ORAL at 08:03

## 2021-06-07 RX ADMIN — ATORVASTATIN CALCIUM 40 MG: 40 TABLET, FILM COATED ORAL at 20:04

## 2021-06-07 RX ADMIN — DEXMEDETOMIDINE HYDROCHLORIDE 0.91 MCG/KG/HR: 4 INJECTION, SOLUTION INTRAVENOUS at 04:00

## 2021-06-07 RX ADMIN — HEPARIN SODIUM 5000 UNITS: 5000 INJECTION INTRAVENOUS; SUBCUTANEOUS at 15:25

## 2021-06-07 RX ADMIN — SODIUM CHLORIDE 1 G: 900 INJECTION INTRAVENOUS at 19:30

## 2021-06-07 RX ADMIN — Medication 5 MG: at 20:04

## 2021-06-07 RX ADMIN — ISOSORBIDE DINITRATE 10 MG: 20 TABLET ORAL at 12:28

## 2021-06-07 RX ADMIN — Medication 1 PACKET: at 20:06

## 2021-06-07 RX ADMIN — Medication 1 PACKET: at 17:07

## 2021-06-07 RX ADMIN — SENNOSIDES 10 ML: 8.8 LIQUID ORAL at 08:03

## 2021-06-07 RX ADMIN — DOCUSATE SODIUM 100 MG: 50 LIQUID ORAL at 20:04

## 2021-06-07 RX ADMIN — Medication 1 PATCH: at 06:34

## 2021-06-07 RX ADMIN — ASPIRIN 325 MG ORAL TABLET 325 MG: 325 PILL ORAL at 08:03

## 2021-06-07 RX ADMIN — QUETIAPINE FUMARATE 50 MG: 25 TABLET ORAL at 20:04

## 2021-06-07 RX ADMIN — GABAPENTIN 300 MG: 300 CAPSULE ORAL at 20:04

## 2021-06-07 RX ADMIN — INSULIN HUMAN 2 UNITS: 100 INJECTION, SOLUTION PARENTERAL at 19:30

## 2021-06-07 RX ADMIN — HEPARIN SODIUM 1500 UNITS: 1000 INJECTION INTRAVENOUS; SUBCUTANEOUS at 11:59

## 2021-06-07 NOTE — PLAN OF CARE
Goal Outcome Evaluation:  Plan of Care Reviewed With: patient        Progress: no change  Outcome Summary: Pt performed STS x3 from EOB with modA x2 and B UE support. Pt demonstrated significant posterior lean with butch knee flexion and feet anterior to hips. Pt required max cueing to correct, minimal improvement noted. Pt able to MIP, but unable to advance LEs. Pt's mobility limited by confusion and increased fatigue. Continue to progress as appropriate.

## 2021-06-07 NOTE — PLAN OF CARE
Goal Outcome Evaluation:  Plan of Care Reviewed With: patient  Progress: no change       Hemodynamics stable overnight on RA, no vasoactive gtts.  Adequate UOP per aleman.  BUN/Cr elevated in AM labs, HD planned for today. Tolerating TF @ goal rate w/ increased residual (250 mL) noted this AM. No BM yet.  Of concern, pt's mental status sharply declined overnight.  Pt was AO to self and place at start of shift.  Scheduled doses of melatonin and Seroquel given before bed. Pt became increasingly confused, agitated, and eventually combative. PRN and scheduled Haldol given, as well as morphine, to no avail.  Pt placed in soft wrist restraints and Precedex restarted for pt safety.  Able to wean to low dose Precedex this morning, but pt remains confused. Pt states he has been kidnapped and that nursing staff is intending to kill him.

## 2021-06-07 NOTE — NURSING NOTE
WOC consult for RLE EVH incision.     Wound presents with light dry scab-left edge lifting-removed without difficulty.     Incision site is slightly red/edematous-scant sanguineous drainage noted-per border marking area has reduced.     Cleaned with sterile normal saline-covered with xeroform and foam dressing.     Staff to change dressing daily-ADEN Gandhi at bedside and updated on POC-no other concerns at this time. Skin tears resolving well.     Continue with good general skin care. WOC will continue to follow.

## 2021-06-07 NOTE — PROGRESS NOTES
Clinical Nutrition     Nutrition Support Assessment  Reason for Visit:   MDR, Follow-up protocol, EN      Patient Name: Augusto Lorenzana Jr.  YOB: 1945  MRN: [id number]  Date of Encounter: 06/07/21 09:54 EDT  Admission date: 5/22/2021        Nutrition Assessment   Assessment   NSTEMI  CAD  s/p CABG x3 (5-28)  Coded x2  (5-28)  Extubated 5-29  MICHAEL/CKD  HD initiated 6-3    PMH: He  has a past medical history of CAD (coronary artery disease), CKD (chronic kidney disease) stage 3, GFR 30-59 ml/min (CMS/[facility]), Diabetes mellitus (CMS/[facility]), Hyperlipidemia, Hypertension, and NSTEMI (non-ST elevated myocardial infarction) (CMS/[facility]).Obesity, Anemia   PSxH: He  has a past surgical history that includes Cardiac surgery; Cardiac catheterization (N/A, 5/24/2021); and Coronary artery bypass graft (N/A, 5/28/2021).       Applicable nutrition-related information:  (6/4) SLP FEES rec NPO, temporary alternate methods of nutrition/hydration (ice chips x4/hr with SPV and after oral care.)    Reported/Observed/Food/Nutrition Related History:     Pt receiving HD this AM. Planning for HD again tomorrow (6/8). Resting in bed at time of RD visit. Pt tolerating EN.     Per I&O- no bowel movement documented since admission (x16 days)    Anthropometrics     Height: 67in  Last filed wt: 187lb  Weight Method: Standing scale    BMI: BMI (Calculated): 29.4  Overweight: 25.0-29.9kg/m2     Date Weight (kg) Weight (lbs) Weight Method   6/1/2021 84.823 kg 187 lb -   5/28/2021 84.823 kg 187 lb Standing scale   5/27/2021 85.73 kg 189 lb -   5/27/2021 85.503 kg 188 lb 8 oz Bed scale   5/26/2021 82.691 kg 182 lb 4.8 oz Bed scale   5/25/2021 81.421 kg 179 lb 8 oz Bed scale   5/24/2021 80.513 kg 177 lb 8 oz Standing scale   5/23/2021 82.328 kg 181 lb 8 oz Standing scale   5/22/2021 88.451 kg 195 lb -   5/22/2021 88.451 kg 195 lb Stated       Labs reviewed     Results from last 7 days   Lab Units 06/07/21  0301 06/06/21  0353  06/05/21  0337 06/01/21  0353 05/31/21  1141   GLUCOSE mg/dL 106* 133* 100*  --  143*   BUN mg/dL 84* 55* 72*  --  76*   CREATININE mg/dL 2.92* 2.31* 2.55*  --  3.09*   SODIUM mmol/L 134* 136 136  --  138   CHLORIDE mmol/L 98 98 100  --  105   POTASSIUM mmol/L 3.9 4.4 3.8  --  4.3   PHOSPHORUS mg/dL 5.2* 4.3 4.1   < > 5.9*   MAGNESIUM mg/dL 2.4 2.1 2.1   < > 2.4   ALT (SGPT) U/L 23 25  --   --  22    < > = values in this interval not displayed.     Results from last 7 days   Lab Units 06/07/21  0301 06/06/21  0357 06/05/21  0337 05/31/21  1141   ALBUMIN g/dL 2.90* 2.80* 2.90* 3.30*   PREALBUMIN mg/dL 12.3*  --   --  12.9*   CRP mg/dL 10.41*  --   --  26.04*   CHOLESTEROL mg/dL 60  --   --  99   TRIGLYCERIDES mg/dL 51  --   --  101       Results from last 7 days   Lab Units 06/07/21  0628 06/07/21  0545 06/06/21  2305 06/06/21  1747 06/06/21  1210 06/06/21  0511   GLUCOSE mg/dL 94 90 190* 172* 160* 147*     Lab Results   Lab Value Date/Time    HGBA1C 7.40 (H) 05/22/2021 0433       Medications reviewed   Pertinent: antibiotic, senokot, colace, neurontin, insulin, melatonin, protonix, seroquel  GTT: precedex  PRN: haldol      Needs Assessment 6-4-21   Height used: 67 in/170 cm  Weight used: 187 lb/84 kg    Estimated calorie needs: ~1900 kcal/day  20-25 kcal/kg= 3884-8051 kcal    Estimated protein needs: ~100 g pro/day  1.2 g/kg= 100 g pro      Current Nutrition Prescription     PO: NPO Diet      EN: Novasource Renal at 45 ml/hr (goal volume= 900) with 1 packet beneprotein 4x/day and free water at 35 ml/hr  Route: NG  Verified at the bedside: Yes  Provides at goal volume: 1900 kcal, 105 g pro, 0 g fiber, 24 meq K, 737 mg phos, 645 ml water from EN, 1345 ml water total      EN delivery;  3 Days:  887 ml, 99% +4 beneprotein  1874 kcal, 99%  104 g pro, 104%  0 g fiber  24 meq K  726 mg phos  636 ml water from EN  1349 ml water total      Nutrition Diagnosis     5-31-21, 6-7  Problem Inadequate energy intake/ protein intake    Etiology Per Clinical Status   Signs/Symptoms 47% goal volume EN   Statua: improved    Nutrition Intervention    Follow treatment progress, Care plan reviewed   -Will continue with current EN order at this time and adjust as appropriate      Goal:   General: Nutrition support treatment  EN/PN: Maintain EN       Monitoring/Evaluation:   Per protocol, I&O, Pertinent labs, Weight, Skin status, GI status, Symptoms    Brit Be, MS RD/LD CNSC  Time Spent: 45 minutes

## 2021-06-07 NOTE — PROGRESS NOTES
0915Cardiology Progress Note      Reason for visit:    · NSTEMI/multivessel CAD/status post CABG    IDENTIFICATION: 76-year-old gentleman who resides in [facility] Hospital Problems    Diagnosis  POA   • **NSTEMI 5/22/2021 [I21.4]  Yes     Priority: High     · History of previous PCI, data deficit  · [facility] ER presentation with chest pain and elevated troponin, 5/22/2021  · Echo (5/22/2021): LVEF 55%.  Inferolateral hypokinesis.  Mild MR.  Mild aortic stenosis.  · Cardiac catheterization for NSTEMI (5/24/2021): Severe multivessel CAD (LAD, LCx, RCA).  Surgical revascularization recommended  · CABG with Dr. Miller (5/28/2021):LIMA to LAD.  SVG to first OM.  SVG to PDA2. Greater saphenous vein to first obtuse marginal     • S/P CABG x 3 on 5/28/21 [Z95.1]  Not Applicable     Priority: High   • A/C kidney disease. ATN due to postoperative arrest. Dialysis begun 6/3/2021 [N18.9]  Yes     Priority: High   • T2DM on oral agents and insulin. HbA1c 7.4  [E11.9, Z79.4]  Not Applicable     Priority: High   • Hyperlipidemia LDL goal <70 [E78.5]  Yes     Priority: Medium   • Essential hypertension [I10]  Yes     Priority: Medium   • Gout [M10.9]  Yes     Priority: Low   • Former smokeless tobacco use [Z87.891]  Not Applicable     Priority: Low   • Postop encephalopathy post code. Combination of anoxic insult and azotemia [G93.40]  No   • Perioperative cardiac arrest with ventricular fibrillation (CMS/HCC) [I46.9, I49.01]  Yes            Patient lying in bed in two-point wrist restraints.  He remains confused and is on Precedex drip.  He has been receiving Haldol intermittently.  He is currently undergoing hemodialysis.            Vital Sign Min/Max for last 24 hours  Temp  Min: 97 °F (36.1 °C)  Max: 98.9 °F (37.2 °C)   BP  Min: 111/61  Max: 153/96   Pulse  Min: 53  Max: 74   Resp  Min: 16  Max: 20   SpO2  Min: 90 %  Max: 98 %   No data recorded      Intake/Output Summary (Last 24 hours) at 6/7/2021  0800  Last data filed at 6/7/2021 0600  Gross per 24 hour   Intake 2232.2 ml   Output 690 ml   Net 1542.2 ml           Physical Exam  Cardiovascular:      Rate and Rhythm: Normal rate and regular rhythm.   Pulmonary:      Effort: Pulmonary effort is normal.      Breath sounds: Decreased breath sounds present.   Skin:     General: Skin is warm and dry.   Neurological:      Mental Status: He is alert.   Psychiatric:         Behavior: Behavior is uncooperative.         Thought Content: Thought content is paranoid and delusional.         Cognition and Memory: Memory is impaired.         Judgment: Judgment is impulsive and inappropriate.         Tele: NSR     Results Review (reviewed the patient's recent labs in the electronic medical record):      EKG (5/30/2021): Sinus tachycardia, right bundle branch block     CXR (6/3/2021): Mild improvement in small bilateral pleural effusions.  No pneumothorax.     ECHO (5/23/2021): LVEF 60%.  Grade 1 diastolic dysfunction.  Mild AS     Venous duplex study (6/1/2021): Normal without evidence of DVT       Result Review:  I have personally reviewed the results from the time of this admission to 6/7/2021 08:00 EDT and agree with these findings:  [x]  Laboratory  []  Microbiology  [x]  Radiology  [x]  EKG/Telemetry   [x]  Cardiology/Vascular   []  Pathology  []  Old records  []  Other:  Most notable findings include: C-reactive protein 10.41, hemoglobin 7.5, hematocrit 22.9, WBC 14.32, creatinine 2.31    Results from last 7 days   Lab Units 06/07/21  0301 06/06/21  0357 06/05/21  0337 06/04/21  0329 06/03/21  0343 06/02/21  0352 06/01/21  0353 05/31/21  1141   SODIUM mmol/L 134* 136 136 137 139 137 137   < >   POTASSIUM mmol/L 3.9 4.4 3.8 3.8 3.9 3.8 3.9   < >   CHLORIDE mmol/L 98 98 100 102 106 105 104  --    BUN mg/dL 84* 55* 72* 98* 123* 111* 92*   < >   CREATININE mg/dL 2.92* 2.31* 2.55* 2.82* 3.31* 3.24* 3.20*   < >   MAGNESIUM mg/dL 2.4 2.1 2.1 2.4  --   --   --   --     < > =  values in this interval not displayed.         Results from last 7 days   Lab Units 06/07/21  0301 06/06/21  0357 06/05/21  0337   WBC 10*3/mm3 14.32* 12.75* 15.33*   HEMOGLOBIN g/dL 7.5* 8.5* 8.0*   HEMATOCRIT % 22.9* 26.2* 25.1*   PLATELETS 10*3/mm3 212 231 220       Lab Results   Component Value Date    HGBA1C 7.40 (H) 05/22/2021       Lab Results   Component Value Date    CHOL 60 06/07/2021    TRIG 51 06/07/2021    HDL 34 (L) 05/22/2021    LDL 88 05/22/2021                    NSTEMI  · CABG 5/28/2021  · Continue aspirin, beta-blocker, statin     Hypertension  · Amlodipine 5 mg daily  · Carvedilol 12.5 mg twice daily  · Isordil 10 mg 3 times daily        Hyperlipidemia  · Continue atorvastatin     Acute kidney injury  · Creatinine 2.31, BUN 55  · No ACE/ARB due to MICHAEL  · Nephrology following, patient receiving hemodialysis     Type 2 diabetes mellitus  · Management per hospitalist  · Hemoglobin A1c 7.4     Acute encephalopathy  · Management per intensivist         · Continue aspirin, statin and beta-blocker  · Nothing new to add from cardiovascular standpoint    Electronically signed by CHARLES Bernal, 06/07/21, 8:00 AM EDT.

## 2021-06-07 NOTE — THERAPY TREATMENT NOTE
Patient Name: Augusto Lorenzana Jr.  : 1945    MRN: [id number]                              Today's Date: 2021       Admit Date: 2021    Visit Dx:     ICD-10-CM ICD-9-CM   1. Non-STEMI (non-ST elevated myocardial infarction) (CMS/HCC)  I21.4 410.70   2. Acute congestive heart failure, unspecified heart failure type (CMS/HCC)  I50.9 428.0   3. History of coronary artery disease  Z86.79 V12.59   4. Coronary artery disease involving native coronary artery of native heart, angina presence unspecified  I25.10 414.01   5. Pharyngeal dysphagia  R13.13 787.23   6. MICHAEL (acute kidney injury) (CMS/HCC)  N17.9 584.9     Patient Active Problem List   Diagnosis   • NSTEMI 2021   • Hyperlipidemia LDL goal <70   • Essential hypertension   • T2DM on oral agents and insulin. HbA1c 7.4    • Severe 3 vessel CAD with preserved LV function (CMS/HCC)   • A/C kidney disease. ATN due to postoperative arrest. 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 Combination of anoxic insult and azotemia     Past Medical History:   Diagnosis Date   • CAD (coronary artery disease)    • CKD (chronic kidney disease) stage 3, GFR 30-59 ml/min (CMS/HCC)    • Diabetes mellitus (CMS/[facility])    • Hyperlipidemia    • Hypertension    • NSTEMI (non-ST elevated myocardial infarction) (CMS/HCC)      Past Surgical History:   Procedure Laterality Date   • CARDIAC CATHETERIZATION N/A 2021    Procedure: Left Heart Cath;  Surgeon: Blade Greene IV, MD;  Location: [facility] CATH INVASIVE LOCATION;  Service: Cardiovascular;  Laterality: N/A;   • CARDIAC SURGERY      2 stents placed .   • CORONARY ARTERY BYPASS GRAFT N/A 2021    Procedure: MEDIAN STERNOTOMY CORONARY ARTERY BYPASS X 3 UTILIZING THE LEFT INTERNAL MAMMARY ARTERY GRAFT, EVH OF THE GREATER RIGHT SAPHENOUS VEIN, AND  PILO PER ANESTHESIA;  Surgeon: Jacek Miller MD;  Location: [facility];  Service: Cardiothoracic;  Laterality: N/A;     General Information     Row Name 06/07/21 1455          Physical Therapy Time and Intention    Document Type  therapy note (daily note)  -KG     Mode of Treatment  physical therapy  -KG     Row Name 06/07/21 1455          General Information    Existing Precautions/Restrictions  cardiac;fall;sternal;other (see comments) NG  -KG     Row Name 06/07/21 1455          Cognition    Orientation Status (Cognition)  oriented to;person;place  -KG     Row Name 06/07/21 1455          Safety Issues, Functional Mobility    Safety Issues Affecting Function (Mobility)  ability to follow commands;awareness of need for assistance;insight into deficits/self-awareness;safety precaution awareness;safety precautions follow-through/compliance;sequencing abilities  -KG     Impairments Affecting Function (Mobility)  balance;cognition;coordination;endurance/activity tolerance;postural/trunk control;strength  -KG     Cognitive Impairments, Mobility Safety/Performance  attention;awareness, need for assistance;insight into deficits/self-awareness;safety precaution awareness;safety precaution follow-through  -KG       User Key  (r) = Recorded By, (t) = Taken By, (c) = Cosigned By    Initials Name Provider Type    KG Aliza Shen, PT Physical Therapist        Mobility     Row Name 06/07/21 1456          Bed Mobility    Bed Mobility  scooting/bridging;supine-sit;sit-supine  -KG     Scooting/Bridging [place] (Bed Mobility)  maximum assist (25% patient effort);2 person assist;verbal cues  -KG     Supine-Sit [place] (Bed Mobility)  maximum assist (25% patient effort);2 person assist;verbal cues  -KG     Sit-Supine [place] (Bed Mobility)  maximum assist (25% patient effort);2 person assist;verbal cues  -KG     Assistive Device (Bed Mobility)  draw sheet;head of bed elevated  -KG     Comment (Bed Mobility)   VC's for sequencing. Pt required assistance at trunk and BLEs. Upon sitting EOB pt demonstrated significant posterior lean.  -KG     Row Name 06/07/21 1456          Transfers    Comment (Transfers)  STS x3 from EOB with PT/student in front on either side to block butch knees and provide B UE support. Pt demonstrated significant posterior lean with feet too far out in front. Pt required max cueing to correct posture. Minimal improvement despite verbal and tactile cues. Pt able to MIP and perform weight shifting L to R, but unable to advance LEs.  -KG     Row Name 06/07/21 1456          Sit-Stand Transfer    Sit-Stand [place] (Transfers)  moderate assist (50% patient effort);2 person assist;verbal cues  -KG     Assistive Device (Sit-Stand Transfers)  other (see comments) B UE support  -KG     Row Name 06/07/21 1456          Gait/Stairs (Locomotion)    [place] Level (Gait)  unable to assess  -KG     Comment (Gait/Stairs)  Ambulation deferred. Not appropriate to assess.  -KG       User Key  (r) = Recorded By, (t) = Taken By, (c) = Cosigned By    Initials Name Provider Type    KG Aliza Shen, PT Physical Therapist        Obj/Interventions     Row Name 06/07/21 1501          Motor Skills    Therapeutic Exercise  hip;knee;ankle  -KG     Row Name 06/07/21 1501          Hip (Therapeutic Exercise)    Hip (Therapeutic Exercise)  strengthening exercise;isometric exercises  -KG     Hip Isometrics (Therapeutic Exercise)  bilateral;gluteal sets;supine;10 repetitions  -KG     Hip Strengthening (Therapeutic Exercise)  bilateral;flexion;extension;aBduction;aDduction;heel slides;supine;10 repetitions  -KG     Row Name 06/07/21 1501          Knee (Therapeutic Exercise)    Knee (Therapeutic Exercise)  strengthening exercise;isometric exercises  -KG     Knee Isometrics (Therapeutic Exercise)  bilateral;quad sets;supine;10 repetitions  -KG     Knee Strengthening (Therapeutic Exercise)  bilateral;flexion;extension;SLR  (straight leg raise);supine;10 repetitions  -KG     Row Name 06/07/21 1501          Ankle (Therapeutic Exercise)    Ankle (Therapeutic Exercise)  strengthening exercise  -KG     Ankle Strengthening (Therapeutic Exercise)  bilateral;dorsiflexion;plantarflexion;supine;10 repetitions  -KG     [facility] Name 06/07/21 1501          Balance    Balance Assessment  sitting static balance;sitting dynamic balance;standing static balance  -KG     Static Sitting Balance  mild impairment;supported;sitting, edge of bed  -KG     Dynamic Sitting Balance  mild impairment;supported;sitting, edge of bed  -KG     Static Standing Balance  moderate impairment;supported;standing  -KG       User Key  (r) = Recorded By, (t) = Taken By, (c) = Cosigned By    Initials Name Provider Type    KG Aliza Shen, PT Physical Therapist        Goals/Plan    No documentation.       Clinical Impression     Row Name 06/07/21 1502          Pain    Additional Documentation  Pain Scale: Numbers Pre/Post-Treatment (Group)  -KG     Row Name 06/07/21 1502          Pain Scale: Numbers Pre/Post-Treatment    Pretreatment Pain Rating  0/10 - no pain  -KG     Posttreatment Pain Rating  0/10 - no pain  -KG     Row Name 06/07/21 1502          Plan of Care Review    Plan of Care Reviewed With  patient  -KG     Progress  no change  -KG     Outcome Summary  Pt performed STS x3 from EOB with modA x2 and B UE support. Pt demonstrated significant posterior lean with butch knee flexion and feet anterior to hips. Pt required max cueing to correct, minimal improvement noted. Pt able to MIP, but unable to advance LEs. Pt's mobility limited by confusion and increased fatigue. Continue to progress as appropriate.  -KG     [facility] Name 06/07/21 1502          Vital Signs    Pre Systolic BP Rehab  117  -KG     Pre Treatment Diastolic BP  60  -KG     Post Systolic BP Rehab  129  -KG     Post Treatment Diastolic BP  67  -KG     Pretreatment Heart Rate (beats/min)  65  -KG      Posttreatment Heart Rate (beats/min)  68  -KG     Pre SpO2 (%)  95  -KG     O2 Delivery Pre Treatment  room air  -KG     Post SpO2 (%)  92  -KG     O2 Delivery Post Treatment  room air  -KG     Pre Patient Position  Supine  -KG     Intra Patient Position  Standing  -KG     Post Patient Position  Supine  -KG     Row Name 06/07/21 1502          Positioning and Restraints    Pre-Treatment Position  in bed  -KG     Post Treatment Position  bed  -KG     In Bed  notified nsg;supine;call light within reach;encouraged to call for assist;side rails up x3;RUE elevated;LUE elevated  -KG     Restraints  released:;reapplied:;notified nsg:;soft limb  -KG       User Key  (r) = Recorded By, (t) = Taken By, (c) = Cosigned By    Initials Name Provider Type    Aliza Bhandari, PT Physical Therapist        Outcome Measures     Row Name 06/07/21 1505 06/07/21 0800       How much help from another person do you currently need...    Turning from your back to your side while in flat bed without using bedrails?  2  -KG  3  -DENNIS    Moving from lying on back to sitting on the side of a flat bed without bedrails?  2  -KG  2  -DENNIS    Moving to and from a bed to a chair (including a wheelchair)?  2  -KG  1  -DENNIS    Standing up from a chair using your arms (e.g., wheelchair, bedside chair)?  2  -KG  2  -DENNIS    Climbing 3-5 steps with a railing?  1  -KG  1  -DENNIS    To walk in hospital room?  1  -KG  1  -DENNIS    AM-PAC 6 Clicks Score (PT)  10  -KG  10  -DENNIS    Row Name 06/07/21 1505          Functional Assessment    Outcome Measure Options  AM-PAC 6 Clicks Basic Mobility (PT)  -KG       User Key  (r) = Recorded By, (t) = Taken By, (c) = Cosigned By    Initials Name Provider Type    Oksana Whitlock RN Registered Nurse    Aliza Bhandari, PT Physical Therapist        Physical Therapy Education                 Title: PT OT SLP Therapies (In Progress)     Topic: Physical Therapy (In Progress)     Point: Mobility training (In Progress)      Learning Progress Summary           Patient Acceptance, E, NR by KG at 6/7/2021 1305    Acceptance, E, NR by EMANUEL at 6/3/2021 1015    Acceptance, E, NR by KG at 6/2/2021 0909    Acceptance, E, NR by KG at 6/1/2021 0933    Acceptance, E, NR by KG at 5/31/2021 1118    Acceptance, E,TB, VU,NR by AY at 5/30/2021 1107                   Point: Home exercise program (In Progress)     Learning Progress Summary           Patient Acceptance, E, NR by KG at 6/7/2021 1305    Acceptance, E, NR by EMANUEL at 6/3/2021 1015    Acceptance, E, NR by KG at 6/2/2021 0909    Acceptance, E, NR by KG at 6/1/2021 0933    Acceptance, E, NR by KG at 5/31/2021 1118                   Point: Body mechanics (In Progress)     Learning Progress Summary           Patient Acceptance, E, NR by KG at 6/7/2021 1305    Acceptance, E, NR by EMANUEL at 6/3/2021 1015    Acceptance, E, NR by KG at 6/2/2021 0909    Acceptance, E, NR by KG at 6/1/2021 0933    Acceptance, E, NR by KG at 5/31/2021 1118    Acceptance, E,TB, VU,NR by AY at 5/30/2021 1107                   Point: Precautions (In Progress)     Learning Progress Summary           Patient Acceptance, E, NR by KG at 6/7/2021 1305    Acceptance, E, NR by EMANUEL at 6/3/2021 1015    Acceptance, E, NR by KG at 6/2/2021 0909    Acceptance, E, NR by KG at 6/1/2021 0933    Acceptance, E, NR by KG at 5/31/2021 1118    Acceptance, E,TB, VU,NR by AY at 5/30/2021 1107                               User Key     Initials Effective Dates Name Provider Type Discipline    EMANUEL 06/19/15 -  Sierra Kitchen, PT Physical Therapist PT    KG 05/22/20 -  Aliza Shen, PT Physical Therapist PT    AY 11/10/20 -  Megan Moffett, PT Physical Therapist PT              PT Recommendation and Plan     Plan of Care Reviewed With: patient  Progress: no change  Outcome Summary: Pt performed STS x3 from EOB with modA x2 and B UE support. Pt demonstrated significant posterior lean with butch knee flexion and feet anterior to hips. Pt required  max cueing to correct, minimal improvement noted. Pt able to MIP, but unable to advance LEs. Pt's mobility limited by confusion and increased fatigue. Continue to progress as appropriate.     Time Calculation:   PT Charges     Row Name 06/07/21 1305             Time Calculation    Start Time  1305  -KG      PT Received On  06/07/21  -KG      PT Goal Re-Cert Due Date  06/09/21  -KG         Time Calculation- PT    Total Timed Code Minutes- PT  23 minute(s)  -KG         Timed Charges    78918 - PT Therapeutic Exercise Minutes  8  -KG      97092 - PT Therapeutic Activity Minutes  15  -KG         Total Minutes    Timed Charges Total Minutes  23  -KG       Total Minutes  23  -KG        User Key  (r) = Recorded By, (t) = Taken By, (c) = Cosigned By    Initials Name Provider Type    KG Aliza Shen, PT Physical Therapist        Therapy Charges for Today     Code Description Service Date Service Provider Modifiers Qty    25925578525 HC PT THER PROC EA 15 MIN 6/7/2021 Aliza Shen, PT GP 1    86152963030 HC PT THERAPEUTIC ACT EA 15 MIN 6/7/2021 Aliza Shen, PT GP 1          PT G-Codes  Outcome Measure Options: AM-PAC 6 Clicks Basic Mobility (PT)  AM-PAC 6 Clicks Score (PT): 10  AM-PAC 6 Clicks Score (OT): 11    Jenise Shen PT  6/7/2021

## 2021-06-07 NOTE — CASE MANAGEMENT/SOCIAL WORK
Continued Stay Note  [facility]     Patient Name: Augusto Lorenzana Jr.  MRN: [id number]  Today's Date: 6/7/2021    Admit Date: 5/22/2021    Discharge Plan     Row Name 06/07/21 1052       Plan    Plan  TBD    Plan Comments  Per chart, patient remains confused in restraints and on Precedex gtt.  Still with tube feeds via NGT.  Nephrology assessing daily for hemodialysis.  Patient will likely need rehab at discharge.  CM will continue to follow and make referrals when appropriate.  Leslie Rodriguez RN [phone]    Final Discharge Disposition Code  30 - still a patient        Discharge Codes    No documentation.       Expected Discharge Date and Time     Expected Discharge Date Expected Discharge Time    Jun 14, 2021             Leslie Rodriguez RN

## 2021-06-07 NOTE — PROGRESS NOTES
Cardiothoracic Surgery Progress Note      POD # 10 s/p CABG       LOS: 16 days      Subjective:  Confused. Patient scratched EVH incision yesterday.       Objective:  Vital Signs  Temp:  [97 °F (36.1 °C)-98.9 °F (37.2 °C)] 97.3 °F (36.3 °C)  Heart Rate:  [53-74] 53  Resp:  [16-20] 20  BP: (111-153)/() 115/70    Physical Exam:   General Appearance: alert, appears stated age and cooperative   Lungs: clear to auscultation, respirations regular, respirations even and respirations unlabored   Heart: regular rhythm & normal rate, normal S1, S2, no murmur, no gallop, no rub and no click   Skin: Sternal Incision c/d/i, leg EVH incision hematoma slightly increased from yesterday with some drainage     Results:    Results from last 7 days   Lab Units 06/07/21  0301   WBC 10*3/mm3 14.32*   HEMOGLOBIN g/dL 7.5*   HEMATOCRIT % 22.9*   PLATELETS 10*3/mm3 212     Results from last 7 days   Lab Units 06/07/21  0301   SODIUM mmol/L 134*   POTASSIUM mmol/L 3.9   CHLORIDE mmol/L 98   CO2 mmol/L 26.0   BUN mg/dL 84*   CREATININE mg/dL 2.92*   GLUCOSE mg/dL 106*   CALCIUM mg/dL 8.8         Assessment:    NSTEMI 5/22/2021    Hyperlipidemia LDL goal <70    Essential hypertension    T2DM on oral agents and insulin. HbA1c 7.4     A/C kidney disease. ATN due to postoperative arrest. 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 Combination of anoxic insult and azotemia    POD 10 CABG x3    Plan:  HD today  Pulmonary toilet  BP >100  Wound culture EVH incision      Mireille Cedillo PA-C  06/07/21  06:52 EDT

## 2021-06-07 NOTE — PROGRESS NOTES
"   LOS: 16 days    Patient Care Team:  Rob Polanco MD as PCP - General (Internal Medicine)    Reason For Visit:  F/U MICHAEL ON CKD. SEEN ON DIALYSIS.  Subjective           Review of Systems: UNOBTAINABLE      Objective     amLODIPine, 5 mg, Nasogastric, Q24H  aspirin, 325 mg, Nasogastric, Daily  atorvastatin, 40 mg, Nasogastric, Nightly  Beneprotein, 1 packet, Nasogastric, 4x Daily  carvedilol, 12.5 mg, Nasogastric, BID With Meals  cefTRIAXone, 1 g, Intravenous, Q24H  sennosides, 10 mL, Nasogastric, BID   And  docusate sodium, 100 mg, Nasogastric, BID  gabapentin, 300 mg, Nasogastric, Nightly  heparin (porcine), 5,000 Units, Subcutaneous, Q8H  insulin detemir, 25 Units, Subcutaneous, Nightly  insulin regular, 0-9 Units, Subcutaneous, Q6H  isosorbide dinitrate, 10 mg, Nasogastric, TID - Nitrates  melatonin, 5 mg, Nasogastric, Nightly  pantoprazole, 40 mg, Intravenous, Q AM  pharmacy consult - MTM, , Does not apply, Daily  QUEtiapine, 50 mg, Nasogastric, Nightly  sodium chloride, 10 mL, Intravenous, Q12H      dexmedetomidine, 0.2-1.5 mcg/kg/hr, Last Rate: 0.4 mcg/kg/hr (06/07/21 0548)          Vital Signs:  Blood pressure 124/76, pulse 62, temperature 97.1 °F (36.2 °C), temperature source Axillary, resp. rate 20, height 170 cm (66.93\"), weight 84.8 kg (187 lb), SpO2 96 %.    Flowsheet Rows      First Filed Value   Admission Height  170.2 cm (67\") Documented at 05/22/2021 0433   Admission Weight  88.5 kg (195 lb) Documented at 05/22/2021 0433          06/06 0701 - 06/07 0700  In: 2232.2 [I.V.:315.2]  Out: 765 [Urine:765]    Physical Exam:    General Appearance: NAD. LETHARGIC. CONFUSED.   Eyes: PER, conjunctivae and sclerae normal, no icterus  Lungs: RHONCHI  Heart/CV: regular rhythm & normal rate, no murmur, no gallop, no rub and 1+ edema  Abdomen: not distended, soft, non-tender, no masses,  bowel sounds present  Skin: No rash, Warm and dry. TEMP IJ HD CATH.    Radiology:            Labs:  Results from last 7 " days   Lab Units 06/07/21  0301 06/06/21  0357 06/05/21  0337   WBC 10*3/mm3 14.32* 12.75* 15.33*   HEMOGLOBIN g/dL 7.5* 8.5* 8.0*   HEMATOCRIT % 22.9* 26.2* 25.1*   PLATELETS 10*3/mm3 212 231 220     Results from last 7 days   Lab Units 06/07/21  0301 06/06/21  0357 06/05/21  0337 06/04/21  0329   SODIUM mmol/L 134* 136 136 137   POTASSIUM mmol/L 3.9 4.4 3.8 3.8   CHLORIDE mmol/L 98 98 100 102   CO2 mmol/L 26.0 27.0 25.0 24.0   BUN mg/dL 84* 55* 72* 98*   CREATININE mg/dL 2.92* 2.31* 2.55* 2.82*   CALCIUM mg/dL 8.8 8.9 8.6 8.7   PHOSPHORUS mg/dL 5.2* 4.3 4.1 4.4   MAGNESIUM mg/dL 2.4 2.1 2.1 2.4   ALBUMIN g/dL 2.90* 2.80* 2.90* 3.00*     Results from last 7 days   Lab Units 06/07/21  0301   GLUCOSE mg/dL 106*       Results from last 7 days   Lab Units 06/07/21  0301   ALK PHOS U/L 158*   BILIRUBIN mg/dL 0.4   ALT (SGPT) U/L 23   AST (SGOT) U/L 21     Results from last 7 days   Lab Units 06/01/21  0934   PH, ARTERIAL pH units 7.446   PO2 ART mm Hg 74.2*   PCO2, ARTERIAL mm Hg 29.0*   HCO3 ART mmol/L 19.9*             Estimated Creatinine Clearance: 23.1 mL/min (A) (by C-G formula based on SCr of 2.92 mg/dL (H)).      Assessment       NSTEMI 5/22/2021    Hyperlipidemia LDL goal <70    Essential hypertension    T2DM on oral agents and insulin. HbA1c 7.4     A/C kidney disease. ATN due to postoperative arrest. 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 Combination of anoxic insult and azotemia            Impression: NONOLIGURIC MICHAEL WITH WORSE GFR/AZOTEMIA.. ANEMIA.            Recommendations: HD TODAY AND 6/8/21 DUE TO AZOTEMIA.      Aiden Godfrey MD  06/07/21  09:43 EDT

## 2021-06-07 NOTE — PROGRESS NOTES
Intensive Care Follow-up     Hospital:  LOS: 16 days   Mr. Augusto Lorenzana Jr., 76 y.o. male is followed for:   NSTEMI (non-ST elevated myocardial infarction) (CMS/Formerly [facility])            History of present illness:   76-year-old male with history of coronary disease status post RCA stent back in 2009, diabetes mellitus, hypertension, dyslipidemia, chronic kidney disease, gout and smokeless tobacco use.  Patient presented to [facility] ER on May 22 complaining of dyspnea.  Patient was found to have non-STEMI and cardiology team performed left heart catheter was found to have multivessel coronary disease.  Patient subsequently underwent coronary bypass graft surgery x3 on May 28.  Course was complicated by involvement of ventricular fibrillation and asystole requiring resuscitation x2 on the day of surgery.  Subsequently underwent paced rhythm for 48 hours before resuming sinus rhythm.  Patient also required 3 units of PRBCs, 4 units FFP and 1 sixpack of platelets.  Patient was extubated on May 29.  Patient however developed worsening MICHAEL azotemia requiring dialysis support.      Subjective   Interval History:  Overnight episodes of agitation requiring Precedex infusion.  Failed fees today so no source renal.  Patient is confused intermittently.  And is drainage noted from her right lower extremity EVH site will have wound care evaluate him.               The patient's past medical, surgical and social history were reviewed and updated in Epic as appropriate.       Objective     Infusions:  dexmedetomidine, 0.2-1.5 mcg/kg/hr, Last Rate: 0.4 mcg/kg/hr (06/07/21 0548)      Medications:  amLODIPine, 5 mg, Nasogastric, Q24H  aspirin, 325 mg, Nasogastric, Daily  atorvastatin, 40 mg, Nasogastric, Nightly  Beneprotein, 1 packet, Nasogastric, 4x Daily  carvedilol, 12.5 mg, Nasogastric, BID With Meals  cefTRIAXone, 1 g, Intravenous, Q24H  sennosides, 10 mL, Nasogastric, BID   And  docusate sodium, 100 mg, Nasogastric,  BID  gabapentin, 300 mg, Nasogastric, Nightly  heparin (porcine), 5,000 Units, Subcutaneous, Q8H  insulin detemir, 25 Units, Subcutaneous, Nightly  insulin regular, 0-9 Units, Subcutaneous, Q6H  isosorbide dinitrate, 10 mg, Nasogastric, TID - Nitrates  melatonin, 5 mg, Nasogastric, Nightly  pantoprazole, 40 mg, Intravenous, Q AM  pharmacy consult - MTM, , Does not apply, Daily  QUEtiapine, 50 mg, Nasogastric, Nightly  sodium chloride, 10 mL, Intravenous, Q12H        Vital Sign Min/Max for last 24 hours  Temp  Min: 96.9 °F (36.1 °C)  Max: 98.9 °F (37.2 °C)   BP  Min: 111/61  Max: 152/74   Pulse  Min: 53  Max: 71   Resp  Min: 16  Max: 20   SpO2  Min: 90 %  Max: 98 %   No data recorded       Input/Output for last 24 hour shift  06/06 0701 - 06/07 0700  In: 2232.2 [I.V.:315.2]  Out: 765 [Urine:765]      Objective  General Appearance: Awake, in no acute distress  Head:    Atraumatic, Normocephalic, without obvious abnormality, Pupils reactive & symmetrical B/L.  Lungs:   B/L Breath sounds present with decreased breath sounds on bases, no wheezing heard, no crackles.   Heart: S1 and S2 present, no murmur  Abdomen: Soft, nontender, no guarding or rigidity, bowel sounds positive  Extremities: Dressing intact  no edema, wamr to touch.  Pulses: Positive and symmetric.  Neurologic:  Moving all four extremities.  Following simple commands.  Results from last 7 days   Lab Units 06/07/21  0301 06/06/21 0357 06/05/21 0337   WBC 10*3/mm3 14.32* 12.75* 15.33*   HEMOGLOBIN g/dL 7.5* 8.5* 8.0*   PLATELETS 10*3/mm3 212 231 220     Results from last 7 days   Lab Units 06/07/21  0301 06/06/21  0357 06/05/21 0337   SODIUM mmol/L 134* 136 136   POTASSIUM mmol/L 3.9 4.4 3.8   CO2 mmol/L 26.0 27.0 25.0   BUN mg/dL 84* 55* 72*   CREATININE mg/dL 2.92* 2.31* 2.55*   MAGNESIUM mg/dL 2.4 2.1 2.1   PHOSPHORUS mg/dL 5.2* 4.3 4.1   GLUCOSE mg/dL 106* 133* 100*     Estimated Creatinine Clearance: 23.1 mL/min (A) (by C-G formula based on SCr of  2.92 mg/dL (H)).    Results from last 7 days   Lab Units 06/01/21  0934   PH, ARTERIAL pH units 7.446   PCO2, ARTERIAL mm Hg 29.0*   PO2 ART mm Hg 74.2*       Images:   None new  Previous chest x-rays reviewed personally and showed cardiomegaly with probable small bilateral pleural effusions.  No definite consolidation or significant atelectasis noted.  I reviewed the patient's results and images.     Assessment/Plan   Impression        NSTEMI 5/22/2021    Hyperlipidemia LDL goal <70    Essential hypertension    T2DM on oral agents and insulin. HbA1c 7.4     A/C kidney disease. ATN due to postoperative arrest. 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 Combination of anoxic insult and azotemia       Plan        1.  Patient with ongoing delirium state post coronary bypass graft surgery.  Will monitor QTC as patient continues to require as needed Haldol.  On Precedex we will try to wean that down.  On Seroquel nightly as well.  2.  On Rocephin for urine infection.  Will monitor for now.  No new fevers noted.  No new hemodynamic instability.  WBC count is still elevated.  Chest x-ray is not showing any pneumonia.  Will follow chest x-ray again in the morning.  3.  Drop in hemoglobin noted from yesterday without any ongoing acute bleed.  Will check again in the morning and if drops below 7 will consider transfusion.  4.  Nephrology is managing renal failure.  Plan is for dialysis today due to acidemia.  Bilateral help stabilize and neurological status as well.  5.  Enteral nutrition as tolerated.  Speech to reevaluate to make sure when he is able to tolerate oral diet.  Continue nova source renal for now.  6.  Wound care to evaluate right EVH site for drainage.  Does not appear cellulitis picture.  No new fevers.  Will keep close eye on it and if he continues to have  elevated WBC count will broaden antibiotics.  7.  No bowel movement is charted but per nursing staff he has had couple of smears.  Will monitor bowel function closely.  Continue bowel regimen for now.  8.  GI prophylaxis to continue.  9.  Heparin for DVT prophylaxis.  10.  Insulin regimen for hyperglycemia.    Overall guarded prognosis we will continue to provide supportive care at this time.    Plan of care and goals reviewed with multidisciplinary/antibiotic stewardship team during rounds.   I discussed the patient's findings and my recommendations with patient and nursing staff     Time spent 35 min (exclusive of procedure time)  including high complexity decision making to assess, manipulate, and support vital organ system failure in this individual who has impairment of one or more vital organ systems such that there is a high probability of imminent or life threatening deterioration in the patient’s condition.      Godwin Ko MD, [facility]  Pulmonary, Critical care and Sleep Medicine

## 2021-06-07 NOTE — SIGNIFICANT NOTE
06/07/21 1529   SLP Deferred Reason   SLP Deferred Reason Routine  (F/u this pm for dysphagia tx; RN requests deferral 2/2 pt just now resting. Will defer today and continue to f/u for dysphagia tx.)

## 2021-06-08 ENCOUNTER — APPOINTMENT (OUTPATIENT)
Dept: GENERAL RADIOLOGY | Facility: HOSPITAL | Age: 76
End: 2021-06-08

## 2021-06-08 ENCOUNTER — APPOINTMENT (OUTPATIENT)
Dept: NEPHROLOGY | Facility: HOSPITAL | Age: 76
End: 2021-06-08

## 2021-06-08 LAB
ALBUMIN SERPL-MCNC: 2.5 G/DL (ref 3.5–5.2)
ALBUMIN/GLOB SERPL: 0.8 G/DL
ALP SERPL-CCNC: 157 U/L (ref 39–117)
ALT SERPL W P-5'-P-CCNC: 21 U/L (ref 1–41)
AMMONIA BLD-SCNC: 23 UMOL/L (ref 16–60)
ANION GAP SERPL CALCULATED.3IONS-SCNC: 8 MMOL/L (ref 5–15)
AST SERPL-CCNC: 24 U/L (ref 1–40)
BASOPHILS # BLD AUTO: 0.02 10*3/MM3 (ref 0–0.2)
BASOPHILS NFR BLD AUTO: 0.2 % (ref 0–1.5)
BILIRUB SERPL-MCNC: 0.4 MG/DL (ref 0–1.2)
BUN SERPL-MCNC: 56 MG/DL (ref 8–23)
BUN/CREAT SERPL: 28.3 (ref 7–25)
CALCIUM SPEC-SCNC: 8.5 MG/DL (ref 8.6–10.5)
CHLORIDE SERPL-SCNC: 99 MMOL/L (ref 98–107)
CO2 SERPL-SCNC: 27 MMOL/L (ref 22–29)
CREAT SERPL-MCNC: 1.98 MG/DL (ref 0.76–1.27)
D-LACTATE SERPL-SCNC: 0.9 MMOL/L (ref 0.5–2)
DEPRECATED RDW RBC AUTO: 53.5 FL (ref 37–54)
EOSINOPHIL # BLD AUTO: 0.19 10*3/MM3 (ref 0–0.4)
EOSINOPHIL NFR BLD AUTO: 1.7 % (ref 0.3–6.2)
ERYTHROCYTE [DISTWIDTH] IN BLOOD BY AUTOMATED COUNT: 16.4 % (ref 12.3–15.4)
GFR SERPL CREATININE-BSD FRML MDRD: 33 ML/MIN/1.73
GLOBULIN UR ELPH-MCNC: 3 GM/DL
GLUCOSE BLDC GLUCOMTR-MCNC: 104 MG/DL (ref 70–130)
GLUCOSE BLDC GLUCOMTR-MCNC: 111 MG/DL (ref 70–130)
GLUCOSE BLDC GLUCOMTR-MCNC: 153 MG/DL (ref 70–130)
GLUCOSE BLDC GLUCOMTR-MCNC: 182 MG/DL (ref 70–130)
GLUCOSE SERPL-MCNC: 94 MG/DL (ref 65–99)
HCT VFR BLD AUTO: 24.3 % (ref 37.5–51)
HCT VFR BLD AUTO: 26.1 % (ref 37.5–51)
HGB BLD-MCNC: 7.8 G/DL (ref 13–17.7)
HGB BLD-MCNC: 8.3 G/DL (ref 13–17.7)
IMM GRANULOCYTES # BLD AUTO: 0.15 10*3/MM3 (ref 0–0.05)
IMM GRANULOCYTES NFR BLD AUTO: 1.3 % (ref 0–0.5)
LYMPHOCYTES # BLD AUTO: 0.81 10*3/MM3 (ref 0.7–3.1)
LYMPHOCYTES NFR BLD AUTO: 7.1 % (ref 19.6–45.3)
MAGNESIUM SERPL-MCNC: 2 MG/DL (ref 1.6–2.4)
MCH RBC QN AUTO: 30.7 PG (ref 26.6–33)
MCHC RBC AUTO-ENTMCNC: 32.1 G/DL (ref 31.5–35.7)
MCV RBC AUTO: 95.7 FL (ref 79–97)
MONOCYTES # BLD AUTO: 0.78 10*3/MM3 (ref 0.1–0.9)
MONOCYTES NFR BLD AUTO: 6.8 % (ref 5–12)
NEUTROPHILS NFR BLD AUTO: 82.9 % (ref 42.7–76)
NEUTROPHILS NFR BLD AUTO: 9.49 10*3/MM3 (ref 1.7–7)
NRBC BLD AUTO-RTO: 0 /100 WBC (ref 0–0.2)
PHOSPHATE SERPL-MCNC: 3.8 MG/DL (ref 2.5–4.5)
PLATELET # BLD AUTO: 185 10*3/MM3 (ref 140–450)
PMV BLD AUTO: 10.8 FL (ref 6–12)
POTASSIUM SERPL-SCNC: 3.9 MMOL/L (ref 3.5–5.2)
PROT SERPL-MCNC: 5.5 G/DL (ref 6–8.5)
RBC # BLD AUTO: 2.54 10*6/MM3 (ref 4.14–5.8)
SODIUM SERPL-SCNC: 134 MMOL/L (ref 136–145)
WBC # BLD AUTO: 11.44 10*3/MM3 (ref 3.4–10.8)

## 2021-06-08 PROCEDURE — 25010000002 CEFTRIAXONE PER 250 MG: Performed by: INTERNAL MEDICINE

## 2021-06-08 PROCEDURE — 25010000002 HEPARIN (PORCINE) PER 1000 UNITS: Performed by: INTERNAL MEDICINE

## 2021-06-08 PROCEDURE — 99233 SBSQ HOSP IP/OBS HIGH 50: CPT | Performed by: INTERNAL MEDICINE

## 2021-06-08 PROCEDURE — 63710000001 INSULIN DETEMIR PER 5 UNITS: Performed by: INTERNAL MEDICINE

## 2021-06-08 PROCEDURE — 99231 SBSQ HOSP IP/OBS SF/LOW 25: CPT | Performed by: NURSE PRACTITIONER

## 2021-06-08 PROCEDURE — 85025 COMPLETE CBC W/AUTO DIFF WBC: CPT | Performed by: INTERNAL MEDICINE

## 2021-06-08 PROCEDURE — 85018 HEMOGLOBIN: CPT | Performed by: INTERNAL MEDICINE

## 2021-06-08 PROCEDURE — 63710000001 INSULIN REGULAR HUMAN PER 5 UNITS: Performed by: INTERNAL MEDICINE

## 2021-06-08 PROCEDURE — 71045 X-RAY EXAM CHEST 1 VIEW: CPT

## 2021-06-08 PROCEDURE — 97530 THERAPEUTIC ACTIVITIES: CPT

## 2021-06-08 PROCEDURE — 84100 ASSAY OF PHOSPHORUS: CPT | Performed by: INTERNAL MEDICINE

## 2021-06-08 PROCEDURE — 85014 HEMATOCRIT: CPT | Performed by: INTERNAL MEDICINE

## 2021-06-08 PROCEDURE — 82962 GLUCOSE BLOOD TEST: CPT

## 2021-06-08 PROCEDURE — 83605 ASSAY OF LACTIC ACID: CPT | Performed by: INTERNAL MEDICINE

## 2021-06-08 PROCEDURE — 97110 THERAPEUTIC EXERCISES: CPT

## 2021-06-08 PROCEDURE — 74018 RADEX ABDOMEN 1 VIEW: CPT

## 2021-06-08 PROCEDURE — 82140 ASSAY OF AMMONIA: CPT | Performed by: INTERNAL MEDICINE

## 2021-06-08 PROCEDURE — 80053 COMPREHEN METABOLIC PANEL: CPT | Performed by: INTERNAL MEDICINE

## 2021-06-08 PROCEDURE — 83735 ASSAY OF MAGNESIUM: CPT | Performed by: INTERNAL MEDICINE

## 2021-06-08 PROCEDURE — 25010000002 METHYLNALTREXONE 12 MG/0.6ML SOLUTION: Performed by: PHYSICIAN ASSISTANT

## 2021-06-08 PROCEDURE — 99222 1ST HOSP IP/OBS MODERATE 55: CPT | Performed by: PHYSICIAN ASSISTANT

## 2021-06-08 RX ORDER — PANTOPRAZOLE SODIUM 40 MG/10ML
40 INJECTION, POWDER, LYOPHILIZED, FOR SOLUTION INTRAVENOUS 2 TIMES DAILY
Status: DISCONTINUED | OUTPATIENT
Start: 2021-06-08 | End: 2021-06-18 | Stop reason: HOSPADM

## 2021-06-08 RX ADMIN — SENNOSIDES 10 ML: 8.8 LIQUID ORAL at 08:30

## 2021-06-08 RX ADMIN — HEPARIN SODIUM 5000 UNITS: 5000 INJECTION INTRAVENOUS; SUBCUTANEOUS at 05:22

## 2021-06-08 RX ADMIN — METHYLNALTREXONE BROMIDE 12 MG: 12 INJECTION, SOLUTION SUBCUTANEOUS at 16:56

## 2021-06-08 RX ADMIN — DEXMEDETOMIDINE HYDROCHLORIDE 1 MCG/KG/HR: 4 INJECTION, SOLUTION INTRAVENOUS at 05:22

## 2021-06-08 RX ADMIN — HEPARIN SODIUM 1500 UNITS: 1000 INJECTION INTRAVENOUS; SUBCUTANEOUS at 17:56

## 2021-06-08 RX ADMIN — Medication 1 PACKET: at 08:31

## 2021-06-08 RX ADMIN — Medication 1 PACKET: at 11:59

## 2021-06-08 RX ADMIN — SODIUM CHLORIDE, PRESERVATIVE FREE 10 ML: 5 INJECTION INTRAVENOUS at 21:56

## 2021-06-08 RX ADMIN — INSULIN HUMAN 2 UNITS: 100 INJECTION, SOLUTION PARENTERAL at 12:10

## 2021-06-08 RX ADMIN — ACETAMINOPHEN 650 MG: 160 SOLUTION ORAL at 16:42

## 2021-06-08 RX ADMIN — SENNOSIDES 10 ML: 8.8 LIQUID ORAL at 21:57

## 2021-06-08 RX ADMIN — Medication 1 PACKET: at 18:30

## 2021-06-08 RX ADMIN — THIAMINE HCL TAB 100 MG 100 MG: 100 TAB at 11:59

## 2021-06-08 RX ADMIN — ATORVASTATIN CALCIUM 40 MG: 40 TABLET, FILM COATED ORAL at 21:56

## 2021-06-08 RX ADMIN — INSULIN HUMAN 2 UNITS: 100 INJECTION, SOLUTION PARENTERAL at 23:20

## 2021-06-08 RX ADMIN — CARVEDILOL 12.5 MG: 12.5 TABLET, FILM COATED ORAL at 18:30

## 2021-06-08 RX ADMIN — INSULIN DETEMIR 25 UNITS: 100 INJECTION, SOLUTION SUBCUTANEOUS at 21:57

## 2021-06-08 RX ADMIN — AMLODIPINE BESYLATE 5 MG: 5 TABLET ORAL at 08:31

## 2021-06-08 RX ADMIN — ISOSORBIDE DINITRATE 10 MG: 20 TABLET ORAL at 18:30

## 2021-06-08 RX ADMIN — GABAPENTIN 300 MG: 300 CAPSULE ORAL at 21:56

## 2021-06-08 RX ADMIN — SODIUM CHLORIDE, PRESERVATIVE FREE 10 ML: 5 INJECTION INTRAVENOUS at 08:32

## 2021-06-08 RX ADMIN — CARVEDILOL 12.5 MG: 12.5 TABLET, FILM COATED ORAL at 08:31

## 2021-06-08 RX ADMIN — ISOSORBIDE DINITRATE 10 MG: 20 TABLET ORAL at 08:30

## 2021-06-08 RX ADMIN — DOCUSATE SODIUM 100 MG: 50 LIQUID ORAL at 08:30

## 2021-06-08 RX ADMIN — Medication 1 PACKET: at 22:00

## 2021-06-08 RX ADMIN — ISOSORBIDE DINITRATE 10 MG: 20 TABLET ORAL at 16:43

## 2021-06-08 RX ADMIN — Medication 5 MG: at 21:56

## 2021-06-08 RX ADMIN — DOCUSATE SODIUM 100 MG: 50 LIQUID ORAL at 22:04

## 2021-06-08 RX ADMIN — DEXMEDETOMIDINE HYDROCHLORIDE 1.4 MCG/KG/HR: 4 INJECTION, SOLUTION INTRAVENOUS at 02:17

## 2021-06-08 RX ADMIN — SODIUM CHLORIDE 1 G: 900 INJECTION INTRAVENOUS at 18:32

## 2021-06-08 RX ADMIN — PANTOPRAZOLE SODIUM 40 MG: 40 INJECTION, POWDER, FOR SOLUTION INTRAVENOUS at 21:56

## 2021-06-08 RX ADMIN — PANTOPRAZOLE SODIUM 40 MG: 40 INJECTION, POWDER, FOR SOLUTION INTRAVENOUS at 05:21

## 2021-06-08 RX ADMIN — ASPIRIN 325 MG ORAL TABLET 325 MG: 325 PILL ORAL at 08:31

## 2021-06-08 NOTE — PROGRESS NOTES
Cardiothoracic Surgery Progress Note      POD # 11 s/p CABG       LOS: 17 days      Subjective:  Confused, on precedex      Objective:  Vital Signs  Temp:  [96.9 °F (36.1 °C)-98.9 °F (37.2 °C)] 97.7 °F (36.5 °C)  Heart Rate:  [49-92] 50  Resp:  [16-22] 18  BP: (105-150)/() 109/58    Physical Exam:   General Appearance: easily roused   Lungs: clear to auscultation, respirations regular, respirations even and respirations unlabored   Heart: regular rhythm & normal rate, normal S1, S2, no murmur, no gallop, no rub and no click   Skin: Sternal Incision c/d/i, leg EVH incision draining scant serosanguinous fluid     Results:    Results from last 7 days   Lab Units 06/08/21  0340   WBC 10*3/mm3 11.44*   HEMOGLOBIN g/dL 7.8*   HEMATOCRIT % 24.3*   PLATELETS 10*3/mm3 185     Results from last 7 days   Lab Units 06/08/21  0340   SODIUM mmol/L 134*   POTASSIUM mmol/L 3.9   CHLORIDE mmol/L 99   CO2 mmol/L 27.0   BUN mg/dL 56*   CREATININE mg/dL 1.98*   GLUCOSE mg/dL 94   CALCIUM mg/dL 8.5*         Assessment:    NSTEMI 5/22/2021    Hyperlipidemia LDL goal <70    Essential hypertension    T2DM on oral agents and insulin. HbA1c 7.4     A/C kidney disease. ATN due to postoperative arrest. 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 Combination of anoxic insult and azotemia    POD 11 CABG x3    Plan:  HD today  Pulmonary toilet  BP >100        Mireille Cedillo PA-C  06/08/21  06:40 EDT

## 2021-06-08 NOTE — PLAN OF CARE
Goal Outcome Evaluation:  Plan of Care Reviewed With: patient        Progress: improving  Outcome Summary: Pt restless, agitated and pleasantly confused.  Pt on room air sats >93%.  TF continues. Dialysis today.  Precedex gtt weaned.  will continue monitor.  at 6/7/2021 0942

## 2021-06-08 NOTE — PROGRESS NOTES
Multidisciplinary Rounds    Time: 20min  Patient Name: Augusto Lorenzana Jr.  Date of Encounter: 06/08/21 09:45 EDT  MRN: [id number]  Admission date: 5/22/2021      Reason for visit: MDR. RD to continue to follow per protocol.     Additional information obtained during MDR: Pt tolerating EN, with 100% of EN goal volume delivered over the past 24 hrs. Planning for HD today. GI consulted for bloody stools. Planning to start thiamin today.     Per I&O over the past 24 hrs:  1 bowel movement    Current diet: NPO Diet    EN: [employer] Renal at 45 ml/hr with 4 beneprotein/day and free water at 35 ml/hr via NGT    Intervention:  Follow treatment plan  Care plan reviewed    Follow up:   Per protocol      Brit Be MS RD/LD [facility]  09:45 EDT

## 2021-06-08 NOTE — THERAPY TREATMENT NOTE
Patient Name: Augusto Lorenzana Jr.  : 1945    MRN: [id number]                              Today's Date: 2021       Admit Date: 2021    Visit Dx:     ICD-10-CM ICD-9-CM   1. Non-STEMI (non-ST elevated myocardial infarction) (CMS/HCC)  I21.4 410.70   2. Acute congestive heart failure, unspecified heart failure type (CMS/HCC)  I50.9 428.0   3. History of coronary artery disease  Z86.79 V12.59   4. Coronary artery disease involving native coronary artery of native heart, angina presence unspecified  I25.10 414.01   5. Pharyngeal dysphagia  R13.13 787.23   6. MICHAEL (acute kidney injury) (CMS/HCC)  N17.9 584.9     Patient Active Problem List   Diagnosis   • NSTEMI 2021   • Hyperlipidemia LDL goal <70   • Essential hypertension   • T2DM on oral agents and insulin. HbA1c 7.4    • Severe 3 vessel CAD with preserved LV function (CMS/HCC)   • A/C kidney disease. ATN due to postoperative arrest. 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 Combination of anoxic insult and azotemia     Past Medical History:   Diagnosis Date   • CAD (coronary artery disease)    • CKD (chronic kidney disease) stage 3, GFR 30-59 ml/min (CMS/HCC)    • Diabetes mellitus (CMS/[facility])    • Hyperlipidemia    • Hypertension    • NSTEMI (non-ST elevated myocardial infarction) (CMS/[facility])      Past Surgical History:   Procedure Laterality Date   • CARDIAC CATHETERIZATION N/A 2021    Procedure: Left Heart Cath;  Surgeon: Blade Greene IV, MD;  Location: [facility] CATH INVASIVE LOCATION;  Service: Cardiovascular;  Laterality: N/A;   • CARDIAC SURGERY      2 stents placed .   • CORONARY ARTERY BYPASS GRAFT N/A 2021    Procedure: MEDIAN STERNOTOMY CORONARY ARTERY BYPASS X 3 UTILIZING THE LEFT INTERNAL MAMMARY ARTERY GRAFT, EVH OF THE GREATER RIGHT SAPHENOUS VEIN, AND  PILO PER ANESTHESIA;  Surgeon: Jacek Miller MD;  Location: [facility];  Service: Cardiothoracic;  Laterality: N/A;     General Information     Row Name 06/08/21 1310          OT Time and Intention    Document Type  therapy note (daily note)  -CS     Mode of Treatment  occupational therapy  -CS     Row Name 06/08/21 1310          General Information    Patient Profile Reviewed  yes  -CS     Existing Precautions/Restrictions  cardiac;fall;oxygen therapy device and L/min;sternal;other (see comments) URSZULA Last  -CS     Barriers to Rehab  medically complex  -CS     Row Name 06/08/21 1310          Cognition    Orientation Status (Cognition)  oriented to;person;place;verbal cues/prompts needed for orientation;time  -CS     Row Name 06/08/21 1310          Safety Issues, Functional Mobility    Safety Issues Affecting Function (Mobility)  awareness of need for assistance;insight into deficits/self-awareness;safety precaution awareness;safety precautions follow-through/compliance  -CS     Impairments Affecting Function (Mobility)  balance;endurance/activity tolerance;shortness of breath;strength  -CS     Cognitive Impairments, Mobility Safety/Performance  awareness, need for assistance;insight into deficits/self-awareness;safety precaution awareness;safety precaution follow-through  -CS       User Key  (r) = Recorded By, (t) = Taken By, (c) = Cosigned By    Initials Name Provider Type    CS Carlos Harris OT Occupational Therapist          Mobility/ADL's     Row Name 06/08/21 1311          Bed Mobility    Comment (Bed Mobility)  EOB/OOB activities deferred d/t scheduled dialysis tx  -CS     Row Name 06/08/21 1311          Transfers    Comment (Transfers)  EOB/OOB activities deferred d/t scheduled dialysis  -CS     Row Name 06/08/21 1311          Activities of Daily Living    BADL Assessment/Intervention  grooming  -CS     Row Name 06/08/21 1311          Grooming Assessment/Training    [place] Level (Grooming)  wash  face, hands;set up;supervision  -     Position (Grooming)  supine  -     Comment (Grooming)  cues for quality to task completion, supervision for safety d/t lines  -       User Key  (r) = Recorded By, (t) = Taken By, (c) = Cosigned By    Initials Name Provider Type     Carlos Harris OT Occupational Therapist        Obj/Interventions     Row Name 06/08/21 132          Shoulder (Therapeutic Exercise)    Shoulder (Therapeutic Exercise)  AROM (active range of motion)  -     Shoulder AROM (Therapeutic Exercise)  bilateral;flexion;extension;aBduction;aDduction;scapular retraction;supine;10 repetitions  -     Row Name 06/08/21 132          Elbow/Forearm (Therapeutic Exercise)    Elbow/Forearm (Therapeutic Exercise)  AROM (active range of motion)  -     Elbow/Forearm AROM (Therapeutic Exercise)  bilateral;flexion;extension;supination;pronation;10 repetitions;supine  -     Row Name 06/08/21 132          Wrist (Therapeutic Exercise)    Wrist (Therapeutic Exercise)  AROM (active range of motion)  -     Wrist AROM (Therapeutic Exercise)  bilateral;flexion;extension;10 repetitions  -     Row Name 06/08/21 132          Hand (Therapeutic Exercise)    Hand (Therapeutic Exercise)  AROM (active range of motion)  -     Hand AROM/AAROM (Therapeutic Exercise)  bilateral;AROM (active range of motion);finger flexion;finger extension;thumb flexion;thumb extension;2 sets;5 repetitions  -     Row Name 06/08/21 1327          Therapeutic Exercise    Therapeutic Exercise  shoulder;elbow/forearm;wrist;hand;other (see comments) All ther-ex performed w/in sternal precautions  -       User Key  (r) = Recorded By, (t) = Taken By, (c) = Cosigned By    Initials Name Provider Type    Carlos Lopez OT Occupational Therapist        Goals/Plan    No documentation.       Clinical Impression     Row Name 06/08/21 1329          Pain Assessment    Additional Documentation  Pain Scale: FACES Pre/Post-Treatment (Group)   -CS     Row Name 06/08/21 1329          Pain Scale: FACES Pre/Post-Treatment    Pain: FACES Scale, Pretreatment  2-->hurts little bit  -CS     Posttreatment Pain Rating  2-->hurts little bit  -CS     Pain Location - Orientation  incisional  -CS     Pain Location  chest  -CS     Pre/Posttreatment Pain Comment  c/o pain with coughing, reviewed splinting w/ pillow, nursing aware and managing  -CS     Row Name 06/08/21 1329          Plan of Care Review    Plan of Care Reviewed With  patient  -CS     Progress  no change  -CS     Outcome Summary  EOB/OOB activities deferred d/t pending dialysis treatment. Pt setup for grooming task w/ supervision for line safety. Pt performed 1/10reps BUE AROM ther-ex (all joints/planes) w/in sternal precautions. Will cont IPOT per POC as tolerated. Rec d/c to IRF.  -CS     Row Name 06/08/21 1325          Therapy Plan Review/Discharge Plan (OT)    Anticipated Discharge Disposition (OT)  inpatient rehabilitation facility  -CS     Row Name 06/08/21 1329          Vital Signs    Pre Systolic BP Rehab  122 RN cleared for tx, VSS on RA  -CS     Pre Treatment Diastolic BP  67  -CS     Posttreatment Heart Rate (beats/min)  62  -CS     Pre SpO2 (%)  97  -CS     O2 Delivery Pre Treatment  room air  -CS     O2 Delivery Intra Treatment  room air  -CS     Post SpO2 (%)  95  -CS     O2 Delivery Post Treatment  room air  -CS     Pre Patient Position  Supine  -CS     Intra Patient Position  Supine  -CS     Post Patient Position  Supine  -CS     Row Name 06/08/21 1322          Positioning and Restraints    Post Treatment Position  bed  -CS     In Bed  notified nsg;supine;call light within reach;encouraged to call for assist;exit alarm on  -CS       User Key  (r) = Recorded By, (t) = Taken By, (c) = Cosigned By    Initials Name Provider Type    CS Carlos Harris, OT Occupational Therapist        Outcome Measures     Row Name 06/08/21 2652          How much help from another is currently needed...     Putting on and taking off regular lower body clothing?  1  -CS     Bathing (including washing, rinsing, and drying)  1  -CS     Toileting (which includes using toilet bed pan or urinal)  1  -CS     Putting on and taking off regular upper body clothing  3  -CS     Taking care of personal grooming (such as brushing teeth)  3  -CS     Eating meals  1  -CS     AM-PAC 6 Clicks Score (OT)  10  -CS     Row Name 06/08/21 1113          How much help from another person do you currently need...    Turning from your back to your side while in flat bed without using bedrails?  2  -EMANUEL     Moving from lying on back to sitting on the side of a flat bed without bedrails?  2  -EMANUEL     Moving to and from a bed to a chair (including a wheelchair)?  2  -EMANUEL     Standing up from a chair using your arms (e.g., wheelchair, bedside chair)?  2  -EMANUEL     Climbing 3-5 steps with a railing?  1  -EMANUEL     To walk in hospital room?  1  -EMANUEL     AM-PAC 6 Clicks Score (PT)  10  -EMANUEL     Row Name 06/08/21 1335          Functional Assessment    Outcome Measure Options  AM-PAC 6 Clicks Daily Activity (OT)  -CS       User Key  (r) = Recorded By, (t) = Taken By, (c) = Cosigned By    Initials Name Provider Type    Sierra Betancourt, PT Physical Therapist    Carlos Lopez, OT Occupational Therapist        Occupational Therapy Education                 Title: PT OT SLP Therapies (In Progress)     Topic: Occupational Therapy (In Progress)     Point: ADL training (In Progress)     Description:   Instruct learner(s) on proper safety adaptation and remediation techniques during self care or transfers.   Instruct in proper use of assistive devices.              Learning Progress Summary           Patient Acceptance, E,D, NR by CS at 6/8/2021 1335    Acceptance, E,D, NR by CS at 6/3/2021 1255    Comment: OT role and POC, sternal precautions re: ADL completion, safety awareness   Family Acceptance, E,D, NR by CS at 6/3/2021 1255    Comment: OT role and POC,  sternal precautions re: ADL completion, safety awareness                   Point: Home exercise program (In Progress)     Description:   Instruct learner(s) on appropriate technique for monitoring, assisting and/or progressing therapeutic exercises/activities.              Learning Progress Summary           Patient Acceptance, E,D, NR by CS at 6/8/2021 1335                   Point: Precautions (In Progress)     Description:   Instruct learner(s) on prescribed precautions during self-care and functional transfers.              Learning Progress Summary           Patient Acceptance, E,D, NR by CS at 6/8/2021 1335    Acceptance, E,D, NR by CS at 6/3/2021 1255    Comment: OT role and POC, sternal precautions re: ADL completion, safety awareness   Family Acceptance, E,D, NR by CS at 6/3/2021 1255    Comment: OT role and POC, sternal precautions re: ADL completion, safety awareness                   Point: Body mechanics (In Progress)     Description:   Instruct learner(s) on proper positioning and spine alignment during self-care, functional mobility activities and/or exercises.              Learning Progress Summary           Patient Acceptance, E,D, NR by CS at 6/3/2021 1255    Comment: OT role and POC, sternal precautions re: ADL completion, safety awareness   Family Acceptance, E,D, NR by CS at 6/3/2021 1255    Comment: OT role and POC, sternal precautions re: ADL completion, safety awareness                               User Key     Initials Effective Dates Name Provider Type Discipline     10/21/20 -  Carlos Harris OT Occupational Therapist OT              OT Recommendation and Plan  Planned Therapy Interventions (OT): activity tolerance training, functional balance retraining, occupation/activity based interventions, strengthening exercise, ROM/therapeutic exercise, transfer/mobility retraining, BADL retraining, adaptive equipment training, patient/caregiver education/training  Therapy Frequency (OT):  daily  Plan of Care Review  Plan of Care Reviewed With: patient  Progress: no change  Outcome Summary: EOB/OOB activities deferred d/t pending dialysis treatment. Pt setup for grooming task w/ supervision for line safety. Pt performed 1/10reps BUE AROM ther-ex (all joints/planes) w/in sternal precautions. Will cont IPOT per POC as tolerated. Rec d/c to IRF.     Time Calculation:   Time Calculation- OT     Row Name 06/08/21 1336             Time Calculation- OT    OT Start Time  1236  -CS      OT Received On  06/08/21  -CS      OT Goal Re-Cert Due Date  06/13/21  -CS         Timed Charges    01580 - OT Therapeutic Exercise Minutes  10  -CS      18548 - OT Self Care/Mgmt Minutes  2  -CS         Total Minutes    Timed Charges Total Minutes  12  -CS       Total Minutes  12  -CS        User Key  (r) = Recorded By, (t) = Taken By, (c) = Cosigned By    Initials Name Provider Type    CS Carlos Harris, OT Occupational Therapist        Therapy Charges for Today     Code Description Service Date Service Provider Modifiers Qty    [id number] HC OT THER PROC EA 15 MIN 6/8/2021 Carlos Harris OT GO 1               Carlos Harris OT  6/8/2021

## 2021-06-08 NOTE — PLAN OF CARE
Problem: Adult Inpatient Plan of Care  Goal: Plan of Care Review  Recent Flowsheet Documentation  Taken 6/8/2021 1329 by Carlos Harris, OT  Progress: no change  Plan of Care Reviewed With: patient  Outcome Summary: EOB/OOB activities deferred d/t pending dialysis treatment. Pt setup for grooming task w/ supervision for line safety. Pt performed 1/10reps BUE AROM ther-ex (all joints/planes) w/in sternal precautions. Will cont IPOT per POC as tolerated. Rec d/c to IRF.

## 2021-06-08 NOTE — PROGRESS NOTES
Cardiology Progress Note      Reason for visit:    · NSTEMI/multivessel CAD/status post CABG    IDENTIFICATION: 76-year-old gentleman who resides in [facility] Problems    Diagnosis  POA   • **NSTEMI 5/22/2021 [I21.4]  Yes     Priority: High     · History of previous PCI, data deficit  · [facility] ER presentation with chest pain and elevated troponin, 5/22/2021  · Echo (5/22/2021): LVEF 55%.  Inferolateral hypokinesis.  Mild MR.  Mild aortic stenosis.  · Cardiac catheterization for NSTEMI (5/24/2021): Severe multivessel CAD (LAD, LCx, RCA).  Surgical revascularization recommended  · CABG with Dr. Miller (5/28/2021):LIMA to LAD.  SVG to first OM.  SVG to PDA2. Greater saphenous vein to first obtuse marginal     • S/P CABG x 3 on 5/28/21 [Z95.1]  Not Applicable     Priority: High   • A/C kidney disease. ATN due to postoperative arrest. Dialysis begun 6/3/2021 [N18.9]  Yes     Priority: High   • T2DM on oral agents and insulin. HbA1c 7.4  [E11.9, Z79.4]  Not Applicable     Priority: High   • Hyperlipidemia LDL goal <70 [E78.5]  Yes     Priority: Medium   • Essential hypertension [I10]  Yes     Priority: Medium   • Gout [M10.9]  Yes     Priority: Low   • Former smokeless tobacco use [Z87.891]  Not Applicable     Priority: Low   • Postop encephalopathy post code. Combination of anoxic insult and azotemia [G93.40]  No   • Perioperative cardiac arrest with ventricular fibrillation (CMS/HCC) [I46.9, I49.01]  Yes            Patient lying in bed confusion has improved but remains on Precedex drip.  He is still receiving tube feeds.  He is scheduled for repeat hemodialysis today.           Vital Sign Min/Max for last 24 hours  Temp  Min: 96.9 °F (36.1 °C)  Max: 98.9 °F (37.2 °C)   BP  Min: 105/61  Max: 150/79   Pulse  Min: 49  Max: 92   Resp  Min: 16  Max: 22   SpO2  Min: 89 %  Max: 98 %   No data recorded      Intake/Output Summary (Last 24 hours) at 6/8/2021 0743  Last data filed at 6/8/2021  0600  Gross per 24 hour   Intake 1800.75 ml   Output 840 ml   Net 960.75 ml           Physical Exam  Constitutional:       General: He is awake.   Cardiovascular:      Rate and Rhythm: Normal rate and regular rhythm.   Pulmonary:      Effort: Pulmonary effort is normal.      Breath sounds: Normal breath sounds.   Psychiatric:         Mood and Affect: Mood and affect normal.         Speech: Speech normal.         Behavior: Behavior is cooperative.         Cognition and Memory: Memory is impaired.         Tele: NSR    Results Review (reviewed the patient's recent labs in the electronic medical record):     EKG (5/29/2021): Sinus tachycardia, right bundle branch block, lateral and inferior infarct    CXR (6/6/2020): Some improvement in aeration of left lung with minimal residual markings throughout right perihilar region.  Small bilateral pleural effusions with low lung volumes bilaterally    ECHO (5/23/2021): LVEF 60%.  Grade 1 diastolic dysfunction.  Mild AS    Result Review:  I have personally reviewed the results from the time of this admission to 6/8/2021 07:43 EDT and agree with these findings:  [x]  Laboratory  []  Microbiology  [x]  Radiology  [x]  EKG/Telemetry    [x]  Cardiology/Vascular   []  Pathology  []  Old records  []  Other:  Most notable findings include: Creatinine 1.98, BUN 56    Results from last 7 days   Lab Units 06/08/21  0340 06/07/21  0301 06/06/21  0357 06/05/21  0337 06/04/21  0329 06/03/21  0343 06/03/21  0343 06/02/21  0352   SODIUM mmol/L 134* 134* 136 136 137  --  139 137   POTASSIUM mmol/L 3.9 3.9 4.4 3.8 3.8  --  3.9 3.8   CHLORIDE mmol/L 99 98 98 100 102  --  106 105   BUN mg/dL 56* 84* 55* 72* 98*  --  123* 111*   CREATININE mg/dL 1.98* 2.92* 2.31* 2.55* 2.82*  --  3.31* 3.24*   MAGNESIUM mg/dL 2.0 2.4 2.1 2.1 2.4   < >  --   --     < > = values in this interval not displayed.         Results from last 7 days   Lab Units 06/08/21  0340 06/07/21  0301 06/06/21  0357   WBC 10*3/mm3  11.44* 14.32* 12.75*   HEMOGLOBIN g/dL 7.8* 7.5* 8.5*   HEMATOCRIT % 24.3* 22.9* 26.2*   PLATELETS 10*3/mm3 185 212 231       Lab Results   Component Value Date    HGBA1C 7.40 (H) 05/22/2021       Lab Results   Component Value Date    CHOL 60 06/07/2021    TRIG 51 06/07/2021    HDL 34 (L) 05/22/2021    LDL 88 05/22/2021              NSTEMI  · CABG 5/28/2021  · Continue aspirin, beta-blocker, statin     Hypertension  · Amlodipine 5 mg daily  · Carvedilol 12.5 mg twice daily  · Isordil 10 mg 3 times daily        Hyperlipidemia  · Continue atorvastatin     Acute kidney injury  · Creatinine 2.31, BUN 55  · No ACE/ARB due to MICHAEL  · Nephrology following, patient receiving hemodialysis     Type 2 diabetes mellitus  · Management per hospitalist  · Hemoglobin A1c 7.4     Acute encephalopathy  · Management per intensivist             · Continue current medications  · Work with physical therapy  · Please call tomorrow with any questions we will revisit the patient on Thursday.    Electronically signed by CHARLES Bernal, 06/08/21, 7:43 AM EDT.

## 2021-06-08 NOTE — PROGRESS NOTES
"   LOS: 17 days    Patient Care Team:  Rob Polanco MD as PCP - General (Internal Medicine)    Reason For Visit:  F/U MICHAEL ON CKD  Subjective           Review of Systems: UNOBTAINABLE      Objective     amLODIPine, 5 mg, Nasogastric, Q24H  aspirin, 325 mg, Nasogastric, Daily  atorvastatin, 40 mg, Nasogastric, Nightly  Beneprotein, 1 packet, Nasogastric, 4x Daily  carvedilol, 12.5 mg, Nasogastric, BID With Meals  cefTRIAXone, 1 g, Intravenous, Q24H  sennosides, 10 mL, Nasogastric, BID   And  docusate sodium, 100 mg, Nasogastric, BID  gabapentin, 300 mg, Nasogastric, Nightly  heparin (porcine), 5,000 Units, Subcutaneous, Q8H  insulin detemir, 25 Units, Subcutaneous, Nightly  insulin regular, 0-9 Units, Subcutaneous, Q6H  isosorbide dinitrate, 10 mg, Nasogastric, TID - Nitrates  melatonin, 5 mg, Nasogastric, Nightly  pantoprazole, 40 mg, Intravenous, Q AM  pharmacy consult - MTM, , Does not apply, Daily  QUEtiapine, 50 mg, Nasogastric, Nightly  sodium chloride, 10 mL, Intravenous, Q12H      dexmedetomidine, 0.2-1.5 mcg/kg/hr, Last Rate: 0.4 mcg/kg/hr (06/08/21 0650)          Vital Signs:  Blood pressure 118/64, pulse 62, temperature 97.7 °F (36.5 °C), temperature source Axillary, resp. rate 18, height 170 cm (66.93\"), weight 84.8 kg (187 lb), SpO2 97 %.    Flowsheet Rows      First Filed Value   Admission Height  170.2 cm (67\") Documented at 05/22/2021 0433   Admission Weight  88.5 kg (195 lb) Documented at 05/22/2021 0433          06/07 0701 - 06/08 0700  In: 1800.8 [I.V.:72.8]  Out: 840 [Urine:840]    Physical Exam:    General Appearance: NAD. CONFUSED  Eyes: PER, conjunctivae and sclerae normal, no icterus  Lungs: FEW RHONCHI  Heart/CV: regular rhythm & normal rate, no murmur, no gallop, no rub and TR edema  Abdomen: not distended, soft, non-tender, no masses,  bowel sounds present  Skin: No rash, Warm and dry. TEMP IJ HD CATH. : [facility]    Radiology:            Labs:  Results from last 7 days   Lab Units " 06/08/21  0340 06/07/21  0301 06/06/21  0357   WBC 10*3/mm3 11.44* 14.32* 12.75*   HEMOGLOBIN g/dL 7.8* 7.5* 8.5*   HEMATOCRIT % 24.3* 22.9* 26.2*   PLATELETS 10*3/mm3 185 212 231     Results from last 7 days   Lab Units 06/08/21  0340 06/07/21  0301 06/06/21  0357 06/05/21  0337   SODIUM mmol/L 134* 134* 136 136   POTASSIUM mmol/L 3.9 3.9 4.4 3.8   CHLORIDE mmol/L 99 98 98 100   CO2 mmol/L 27.0 26.0 27.0 25.0   BUN mg/dL 56* 84* 55* 72*   CREATININE mg/dL 1.98* 2.92* 2.31* 2.55*   CALCIUM mg/dL 8.5* 8.8 8.9 8.6   PHOSPHORUS mg/dL 3.8 5.2* 4.3 4.1   MAGNESIUM mg/dL 2.0 2.4 2.1 2.1   ALBUMIN g/dL 2.50* 2.90* 2.80* 2.90*     Results from last 7 days   Lab Units 06/08/21  0340   GLUCOSE mg/dL 94       Results from last 7 days   Lab Units 06/08/21  0340   ALK PHOS U/L 157*   BILIRUBIN mg/dL 0.4   ALT (SGPT) U/L 21   AST (SGOT) U/L 24     Results from last 7 days   Lab Units 06/01/21  0934   PH, ARTERIAL pH units 7.446   PO2 ART mm Hg 74.2*   PCO2, ARTERIAL mm Hg 29.0*   HCO3 ART mmol/L 19.9*             Estimated Creatinine Clearance: 34 mL/min (A) (by C-G formula based on SCr of 1.98 mg/dL (H)).      Assessment       NSTEMI 5/22/2021    Hyperlipidemia LDL goal <70    Essential hypertension    T2DM on oral agents and insulin. HbA1c 7.4     A/C kidney disease. ATN due to postoperative arrest. 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 Combination of anoxic insult and azotemia            Impression: NONOLIGURIC MICHAEL ON CKD. AZOTEMIA BETTER BUT STILL AMS DESPITE IMPROVED AZOTEMIA. ANEMIA.            Recommendations: HD AGAIN TODAY TO TRY AND IMPROVE AZOTEMIA FURTHER.      Aiden Godfrey MD  06/08/21  08:37 EDT

## 2021-06-08 NOTE — THERAPY TREATMENT NOTE
Patient Name: Augusto Lorenzana Jr.  : 1945    MRN: [id number]                              Today's Date: 2021       Admit Date: 2021    Visit Dx:     ICD-10-CM ICD-9-CM   1. Non-STEMI (non-ST elevated myocardial infarction) (CMS/HCC)  I21.4 410.70   2. Acute congestive heart failure, unspecified heart failure type (CMS/HCC)  I50.9 428.0   3. History of coronary artery disease  Z86.79 V12.59   4. Coronary artery disease involving native coronary artery of native heart, angina presence unspecified  I25.10 414.01   5. Pharyngeal dysphagia  R13.13 787.23   6. MICHAEL (acute kidney injury) (CMS/HCC)  N17.9 584.9     Patient Active Problem List   Diagnosis   • NSTEMI 2021   • Hyperlipidemia LDL goal <70   • Essential hypertension   • T2DM on oral agents and insulin. HbA1c 7.4    • Severe 3 vessel CAD with preserved LV function (CMS/HCC)   • A/C kidney disease. ATN due to postoperative arrest. 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 Combination of anoxic insult and azotemia     Past Medical History:   Diagnosis Date   • CAD (coronary artery disease)    • CKD (chronic kidney disease) stage 3, GFR 30-59 ml/min (CMS/HCC)    • Diabetes mellitus (CMS/[facility])    • Hyperlipidemia    • Hypertension    • NSTEMI (non-ST elevated myocardial infarction) (CMS/[facility])      Past Surgical History:   Procedure Laterality Date   • CARDIAC CATHETERIZATION N/A 2021    Procedure: Left Heart Cath;  Surgeon: Blade Greene IV, MD;  Location: [facility] CATH INVASIVE LOCATION;  Service: Cardiovascular;  Laterality: N/A;   • CARDIAC SURGERY      2 stents placed .   • CORONARY ARTERY BYPASS GRAFT N/A 2021    Procedure: MEDIAN STERNOTOMY CORONARY ARTERY BYPASS X 3 UTILIZING THE LEFT INTERNAL MAMMARY ARTERY GRAFT, EVH OF THE GREATER RIGHT SAPHENOUS VEIN, AND  PILO PER ANESTHESIA;  Surgeon: Jacek Miller MD;  Location: [facility];  Service: Cardiothoracic;  Laterality: N/A;     General Information     Row Name 06/08/21 1103          Physical Therapy Time and Intention    Document Type  therapy note (daily note)  -EMANUEL     Mode of Treatment  physical therapy  -EMANUEL     Row Name 06/08/21 1103          General Information    Patient Profile Reviewed  yes  -EMANUEL     Prior Level of Function  independent:;gait;transfer;bed mobility;ADL's  -EMANUEL     Existing Precautions/Restrictions  cardiac;fall;oxygen therapy device and L/min;sternal  -EMANEUL     Barriers to Rehab  medically complex  -EMAUNEL     Row Name 06/08/21 1103          Living Environment    Lives With  spouse  -EMANUEL     Row Name 06/08/21 1103          Home Main Entrance    Number of Stairs, Main Entrance  four  -EMANUEL     Row Name 06/08/21 1103          Cognition    Orientation Status (Cognition)  oriented to;person;place  -EMANUEL     Row Name 06/08/21 1103          Safety Issues, Functional Mobility    Safety Issues Affecting Function (Mobility)  safety precautions follow-through/compliance;insight into deficits/self-awareness;awareness of need for assistance;safety precaution awareness  -EMANUEL     Impairments Affecting Function (Mobility)  balance;endurance/activity tolerance;shortness of breath;strength  -EMANUEL     Cognitive Impairments, Mobility Safety/Performance  attention;awareness, need for assistance;safety precaution awareness;safety precaution follow-through;insight into deficits/self-awareness  -EMANUEL     Comment, Safety Issues/Impairments (Mobility)  patient is easy to arouse. he fatigues easily and needs frequent verbal cues  -EMANUEL       User Key  (r) = Recorded By, (t) = Taken By, (c) = Cosigned By    Initials Name Provider Type    Sierra Betancourt PT Physical Therapist        Mobility     Row Name 06/08/21 1104          Bed Mobility    Bed Mobility  rolling left;rolling right;scooting/bridging;supine-sit;sit-supine  -EMANUEL      Rolling Left Gonzales (Bed Mobility)  moderate assist (50% patient effort);2 person assist  -EMANUEL     Rolling Right Gonzales (Bed Mobility)  moderate assist (50% patient effort);2 person assist  -EMANUEL     Scooting/Bridging Gonzales (Bed Mobility)  moderate assist (50% patient effort);2 person assist  -EMANUEL     Supine-Sit Gonzales (Bed Mobility)  moderate assist (50% patient effort);2 person assist  -EMANUEL     Sit-Supine Gonzales (Bed Mobility)  moderate assist (50% patient effort);2 person assist  -EMANUEL     Assistive Device (Bed Mobility)  draw sheet;head of bed elevated  -EMANUEL     Comment (Bed Mobility)  cues for sequencing and sternal precautions patient assists with legs needs most assist for trunk  -EMANUEL     Row Name 06/08/21 1104          Transfers    Comment (Transfers)  STS x3 from edge of the bed. patient able to get to a more upright posture today with less posterior lean than on previous treatments. patient was able to take a few side steps up toward the top of the bed. He requires assist for weight shifting  -EMANUEL     Row Name 06/08/21 1104          Bed-Chair Transfer    Bed-Chair Gonzales (Transfers)  moderate assist (50% patient effort);2 person assist  -EMANUEL     Row Name 06/08/21 1104          Sit-Stand Transfer    Sit-Stand Gonzales (Transfers)  moderate assist (50% patient effort);2 person assist  -EMANUEL     Row Name 06/08/21 1104          Gait/Stairs (Locomotion)    Gonzales Level (Gait)  maximum assist (25% patient effort);2 person assist  -EMANUEL     Distance in Feet (Gait)  5  -EMANUEL     Deviations/Abnormal Patterns (Gait)  base of support, narrow;stride length decreased  -EMANUEL     Comment (Gait/Stairs)  patient needs assist with weight shifting. but is able to take a few steps today he is limited by fatigue  -EMANUEL       User Key  (r) = Recorded By, (t) = Taken By, (c) = Cosigned By    Initials Name Provider Type    Sierra Betancourt PT Physical Therapist        Obj/Interventions     Row Name  06/08/21 1108          Motor Skills    Therapeutic Exercise  hip;knee;ankle  -[facility] Name 06/08/21 1108          Hip (Therapeutic Exercise)    Hip (Therapeutic Exercise)  AAROM (active assistive range of motion)  -     Hip AAROM (Therapeutic Exercise)  bilateral;flexion;extension;aBduction;aDduction;10 repetitions;sitting  -[facility] Name 06/08/21 1108          Knee (Therapeutic Exercise)    Knee (Therapeutic Exercise)  AAROM (active assistive range of motion)  -EMANUEL     Knee AAROM (Therapeutic Exercise)  bilateral;flexion;extension;sitting;10 repetitions  -[facility] Name 06/08/21 1108          Ankle (Therapeutic Exercise)    Ankle AROM (Therapeutic Exercise)  bilateral;dorsiflexion;plantarflexion;10 repetitions;sitting  -[facility] Name 06/08/21 1108          Balance    Balance Assessment  sitting static balance;sitting dynamic balance;standing static balance;standing dynamic balance  -     Static Sitting Balance  sitting, edge of bed;mild impairment  -     Dynamic Sitting Balance  mild impairment;sitting, edge of bed;supported  -     Static Standing Balance  moderate impairment;supported  -     Dynamic Standing Balance  moderate impairment;supported  -EMANUEL     Balance Interventions  sitting;dynamic;weight shifting activity  -       User Key  (r) = Recorded By, (t) = Taken By, (c) = Cosigned By    Initials Name Provider Type    Sierra Betancourt, PT Physical Therapist        Goals/Plan    No documentation.       Clinical Impression     [facility] Name 06/08/21 1110          Pain Scale: Numbers Pre/Post-Treatment    Pretreatment Pain Rating  0/10 - no pain  -EMANUEL     Posttreatment Pain Rating  0/10 - no pain  -[facility] Name 06/08/21 1110          Plan of Care Review    Plan of Care Reviewed With  patient  -EMANUEL     Progress  no change  -     Outcome Summary  patient was able to perform bed mobility with mod assist and stand with mod assist of 2. he had better standing balance today with less posterior  lean. patient was able to take a few steps toward the head of the bed. we will continue to work on pre gait activity. patient fatigues easily  -EMANUEL     Row Name 06/08/21 1110          Therapy Assessment/Plan (PT)    Patient/Family Therapy Goals Statement (PT)  return to PLOF  -EMANUEL     Rehab Potential (PT)  fair, will monitor progress closely  -EMANUEL     Criteria for Skilled Interventions Met (PT)  yes;skilled treatment is necessary  -EMANUEL     Row Name 06/08/21 1110          Vital Signs    Pre Systolic BP Rehab  118  -EMANUEL     Pre Treatment Diastolic BP  64  -EMANUEL     Post Systolic BP Rehab  136  -EMANUEL     Post Treatment Diastolic BP  80  -EMANUEL     Pretreatment Heart Rate (beats/min)  55  -EMANUEL     Posttreatment Heart Rate (beats/min)  59  -EMANUEL     Pre SpO2 (%)  97  -EMANUEL     O2 Delivery Pre Treatment  room air  -EMANUEL     Post SpO2 (%)  95  -EMANUEL     O2 Delivery Post Treatment  room air  -EMANUEL     Pre Patient Position  Supine  -EMANUEL     Intra Patient Position  Standing  -EMANUEL     Post Patient Position  Supine  -EMANUEL     Row Name 06/08/21 1110          Positioning and Restraints    Pre-Treatment Position  in bed  -EMANUEL     Post Treatment Position  bed  -EMANUEL     In Bed  notified nsg;supine;encouraged to call for assist;call light within reach;with nsg  -EMANUEL     Restraints  released:;reapplied:;notified nsg:;soft limb  -EMANUEL       User Key  (r) = Recorded By, (t) = Taken By, (c) = Cosigned By    Initials Name Provider Type    Sierra Betancourt, PT Physical Therapist        Outcome Measures     Row Name 06/08/21 1113          How much help from another person do you currently need...    Turning from your back to your side while in flat bed without using bedrails?  2  -EMANUEL     Moving from lying on back to sitting on the side of a flat bed without bedrails?  2  -EMANUEL     Moving to and from a bed to a chair (including a wheelchair)?  2  -EMANUEL     Standing up from a chair using your arms (e.g., wheelchair, bedside chair)?  2  -EMANUEL     Climbing 3-5 steps with a  railing?  1  -EMANUEL     To walk in hospital room?  1  -EMANUEL     AM-PAC 6 Clicks Score (PT)  10  -EMANUEL       User Key  (r) = Recorded By, (t) = Taken By, (c) = Cosigned By    Initials Name Provider Type    Sierra Betancourt PT Physical Therapist        Physical Therapy Education                 Title: PT OT SLP Therapies (In Progress)     Topic: Physical Therapy (In Progress)     Point: Mobility training (In Progress)     Learning Progress Summary           Patient Acceptance, E, NR by EMANUEL at 6/8/2021 0800    Acceptance, E, NR by KG at 6/7/2021 1305    Acceptance, E, NR by EMANUEL at 6/3/2021 1015    Acceptance, E, NR by KG at 6/2/2021 0909    Acceptance, E, NR by KG at 6/1/2021 0933    Acceptance, E, NR by KG at 5/31/2021 1118    Acceptance, E,TB, VU,NR by AY at 5/30/2021 1107                   Point: Home exercise program (In Progress)     Learning Progress Summary           Patient Acceptance, E, NR by EMANUEL at 6/8/2021 0800    Acceptance, E, NR by KG at 6/7/2021 1305    Acceptance, E, NR by EMANUEL at 6/3/2021 1015    Acceptance, E, NR by KG at 6/2/2021 0909    Acceptance, E, NR by KG at 6/1/2021 0933    Acceptance, E, NR by KG at 5/31/2021 1118                   Point: Body mechanics (In Progress)     Learning Progress Summary           Patient Acceptance, E, NR by EMANUEL at 6/8/2021 0800    Acceptance, E, NR by KG at 6/7/2021 1305    Acceptance, E, NR by EMANUEL at 6/3/2021 1015    Acceptance, E, NR by KG at 6/2/2021 0909    Acceptance, E, NR by KG at 6/1/2021 0933    Acceptance, E, NR by KG at 5/31/2021 1118    Acceptance, E,TB, VU,NR by AY at 5/30/2021 1107                   Point: Precautions (In Progress)     Learning Progress Summary           Patient Acceptance, E, NR by EMANUEL at 6/8/2021 0800    Acceptance, E, NR by KG at 6/7/2021 1305    Acceptance, E, NR by EMANUEL at 6/3/2021 1015    Acceptance, E, NR by KG at 6/2/2021 0909    Acceptance, E, NR by KG at 6/1/2021 0933    Acceptance, E, NR by KG at 5/31/2021 1118    Acceptance, E,TB,  CARIDAD,NR by AY at 5/30/2021 1107                               User Key     Initials Effective Dates Name Provider Type Discipline    EMANUEL 06/19/15 -  Sierra Kitchen, PT Physical Therapist PT    KG 05/22/20 -  Aliza Shen, PT Physical Therapist PT    AY 11/10/20 -  Megan Moffett PT Physical Therapist PT              PT Recommendation and Plan  Planned Therapy Interventions (PT): balance training, bed mobility training, gait training, home exercise program, transfer training, strengthening  Plan of Care Reviewed With: patient  Progress: no change  Outcome Summary: patient was able to perform bed mobility with mod assist and stand with mod assist of 2. he had better standing balance today with less posterior lean. patient was able to take a few steps toward the head of the bed. we will continue to work on pre gait activity. patient fatigues easily     Time Calculation:   PT Charges     Row Name 06/08/21 1114             Time Calculation    Start Time  0800  -EMANUEL      PT Received On  06/08/21  -EMANUEL      PT Goal Re-Cert Due Date  06/09/21  -EMANUEL         Time Calculation- PT    Total Timed Code Minutes- PT  24 minute(s)  -EMANUEL         Timed Charges    32815 - PT Therapeutic Activity Minutes  24  -EMANUEL         Total Minutes    Timed Charges Total Minutes  24  -EMANUEL       Total Minutes  24  -EMANUEL        User Key  (r) = Recorded By, (t) = Taken By, (c) = Cosigned By    Initials Name Provider Type    Sierra Betancourt, PT Physical Therapist        Therapy Charges for Today     Code Description Service Date Service Provider Modifiers Qty    23111138267 HC PT THERAPEUTIC ACT EA 15 MIN 6/8/2021 Sierra Kitchen, PT GP 2    52079219341 HC PT THER SUPP EA 15 MIN 6/8/2021 Sierra Kitchen, PT GP 2          PT G-Codes  Outcome Measure Options: AM-PAC 6 Clicks Basic Mobility (PT)  AM-PAC 6 Clicks Score (PT): 10  AM-PAC 6 Clicks Score (OT): 11    Sierra Kitchen PT  6/8/2021

## 2021-06-08 NOTE — PLAN OF CARE
Goal Outcome Evaluation:  Plan of Care Reviewed With: patient        Progress: no change  Outcome Summary: patient was able to perform bed mobility with mod assist and stand with mod assist of 2. he had better standing balance today with less posterior lean. patient was able to take a few steps toward the head of the bed. we will continue to work on pre gait activity. patient fatigues easily

## 2021-06-08 NOTE — PLAN OF CARE
Goal Outcome Evaluation:      Pt. Confused, agitated, attempting to pull at lines/get out of bed: precedex drip continues. On room air. SR, HR 45-95. VSS. Tube feedings continue through NG. 530 ml UOP from aleman. Plan for HD today.

## 2021-06-08 NOTE — PROGRESS NOTES
Intensive Care Follow-up     Hospital:  LOS: 17 days   Mr. Augusto Lorenzana Jr., 76 y.o. male is followed for:   NSTEMI (non-ST elevated myocardial infarction) (CMS/[facility])            History of present illness:   76-year-old male with history of coronary disease status post RCA stent back in 2009, diabetes mellitus, hypertension, dyslipidemia, chronic kidney disease, gout and smokeless tobacco use.  Patient presented to [facility] ER on May 22 complaining of dyspnea.  Patient was found to have non-STEMI and cardiology team performed left heart catheter was found to have multivessel coronary disease.  Patient subsequently underwent coronary bypass graft surgery x3 on May 28.  Course was complicated by involvement of ventricular fibrillation and asystole requiring resuscitation x2 on the day of surgery.  Subsequently underwent paced rhythm for 48 hours before resuming sinus rhythm.  Patient also required 3 units of PRBCs, 4 units FFP and 1 sixpack of platelets.  Patient was extubated on May 29.  Patient however developed worsening MICHAEL azotemia requiring dialysis support.  Lower extremity Doppler was negative for DVT.    Subjective   Interval History:  Overnight episodes of agitation requiring Precedex infusion and wrist restraints.  Remains on tube feeds.  Had hemodialysis yesterday and plan is to do another hemodialysis today to improve his BUN.  Wound care is evaluating the right lower extremity wound site and acute infection noted.             The patient's past medical, surgical and social history were reviewed and updated in Epic as appropriate.       Objective     Infusions:  dexmedetomidine, 0.2-1.5 mcg/kg/hr, Last Rate: 0.4 mcg/kg/hr (06/08/21 0650)      Medications:  amLODIPine, 5 mg, Nasogastric, Q24H  aspirin, 325 mg, Nasogastric, Daily  atorvastatin, 40 mg, Nasogastric, Nightly  Beneprotein, 1 packet, Nasogastric, 4x Daily  carvedilol, 12.5 mg, Nasogastric, BID With Meals  cefTRIAXone, 1 g, Intravenous,  "Q24H  sennosides, 10 mL, Nasogastric, BID   And  docusate sodium, 100 mg, Nasogastric, BID  gabapentin, 300 mg, Nasogastric, Nightly  insulin detemir, 25 Units, Subcutaneous, Nightly  insulin regular, 0-9 Units, Subcutaneous, Q6H  isosorbide dinitrate, 10 mg, Nasogastric, TID - Nitrates  melatonin, 5 mg, Nasogastric, Nightly  pantoprazole, 40 mg, Intravenous, BID  pharmacy consult - MTM, , Does not apply, Daily  QUEtiapine, 50 mg, Nasogastric, Nightly  sodium chloride, 10 mL, Intravenous, Q12H  thiamine, 100 mg, Nasogastric, Daily        Vital Sign Min/Max for last 24 hours  Temp  Min: 96.9 °F (36.1 °C)  Max: 98.9 °F (37.2 °C)   BP  Min: 105/61  Max: 142/78   Pulse  Min: 49  Max: 92   Resp  Min: 16  Max: 22   SpO2  Min: 89 %  Max: 97 %   No data recorded       Input/Output for last 24 hour shift  06/07 0701 - 06/08 0700  In: 1800.8 [I.V.:72.8]  Out: 840 [Urine:840]      Objective:  Vital signs: (most recent): Blood pressure 118/64, pulse 62, temperature 97.7 °F (36.5 °C), temperature source Axillary, resp. rate 18, height 170 cm (66.93\"), weight 84.8 kg (187 lb), SpO2 97 %.            General Appearance: Awake, in no acute distress  Head:    Pupils reactive & symmetrical B/L.  Lungs:   B/L Breath sounds present with decreased breath sounds on bases, no wheezing heard, no crackles.   Heart: S1 and S2 present, no murmur  Abdomen: Soft, nontender, no guarding or rigidity, bowel sounds positive  Extremities: Dressing intact  no edema, warm to touch.  Pulses: Positive and symmetric.  Neurologic:  Moving all four extremities.  Following simple commands.  Confused as to place    Results from last 7 days   Lab Units 06/08/21  0340 06/07/21  0301 06/06/21  0357   WBC 10*3/mm3 11.44* 14.32* 12.75*   HEMOGLOBIN g/dL 7.8* 7.5* 8.5*   PLATELETS 10*3/mm3 185 212 231     Results from last 7 days   Lab Units 06/08/21  0340 06/07/21  0301 06/06/21  0357   SODIUM mmol/L 134* 134* 136   POTASSIUM mmol/L 3.9 3.9 4.4   CO2 mmol/L 27.0 " 26.0 27.0   BUN mg/dL 56* 84* 55*   CREATININE mg/dL 1.98* 2.92* 2.31*   MAGNESIUM mg/dL 2.0 2.4 2.1   PHOSPHORUS mg/dL 3.8 5.2* 4.3   GLUCOSE mg/dL 94 106* 133*     Estimated Creatinine Clearance: 34 mL/min (A) (by C-G formula based on SCr of 1.98 mg/dL (H)).          Images:   None new  Chest x-ray reviewed personally and showed cardiomegaly with probable small layering pleural effusion bilaterally.  I reviewed the patient's results and images.     Assessment/Plan   Impression        NSTEMI 5/22/2021    Hyperlipidemia LDL goal <70    Essential hypertension    T2DM on oral agents and insulin. HbA1c 7.4     A/C kidney disease. ATN due to postoperative arrest. 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 Combination of anoxic insult and azotemia       Plan        1.  Patient with ongoing delirium state post coronary bypass graft surgery.  Was on Precedex but taken off today morning.  Somewhat confused.  Mostly has sundowning episodes at night.  Unclear if Seroquel is causing the problem.  Will have nursing staff monitor his status after getting Seroquel dose and see if that is playing a role then we can wean that off.  No history of alcohol abuse.  Patient is prolonged delirium state after surgery and taking time to recover.  We will add thiamine daily.  2.  On Rocephin for urine infection.  Will monitor for now.  No new fevers noted.  No new hemodynamic instability.  WBC count is improving.  Chest x-ray without any definite consolidation either.    3.  Hemoglobin stable overnight.  Apparently had some bloody bowel movement.  Will increase Protonix to twice a day.  Recheck hemoglobin later on today and if drop below 7 will consider transfusion.  4.  Nephrology is managing renal failure.  Plan is for dialysis today due to azotemia.  5.  Enteral nutrition as  tolerated.  Speech to reevaluate to make sure when he is able to tolerate oral diet.  Continue nova source renal for now.  6.  Wound care following EVH site or drainage.  Continue current dressings.  No definite evidence of cellulitis.  DVT study was negative.  7.  Heparin for DVT prophylaxis, unless starts to have more bleeding per rectum..  8.  Insulin regimen for hyperglycemia.    Overall guarded prognosis we will continue to provide supportive care at this time.    Plan of care and goals reviewed with multidisciplinary/antibiotic stewardship team during rounds.   I discussed the patient's findings and my recommendations with patient and nursing staff     Time spent 35 min (exclusive of procedure time)  including high complexity decision making to assess, manipulate, and support vital organ system failure in this individual who has impairment of one or more vital organ systems such that there is a high probability of imminent or life threatening deterioration in the patient’s condition.      Godwin Ko MD, [facility]  Pulmonary, Critical care and Sleep Medicine

## 2021-06-09 ENCOUNTER — APPOINTMENT (OUTPATIENT)
Dept: CARDIOLOGY | Facility: HOSPITAL | Age: 76
End: 2021-06-09

## 2021-06-09 LAB
ALBUMIN SERPL-MCNC: 2.4 G/DL (ref 3.5–5.2)
ALBUMIN/GLOB SERPL: 0.7 G/DL
ALP SERPL-CCNC: 162 U/L (ref 39–117)
ALT SERPL W P-5'-P-CCNC: 21 U/L (ref 1–41)
ANION GAP SERPL CALCULATED.3IONS-SCNC: 11 MMOL/L (ref 5–15)
AST SERPL-CCNC: 24 U/L (ref 1–40)
BASOPHILS # BLD AUTO: 0.03 10*3/MM3 (ref 0–0.2)
BASOPHILS NFR BLD AUTO: 0.2 % (ref 0–1.5)
BH CV LOWER VASCULAR LEFT COMMON FEMORAL AUGMENT: NORMAL
BH CV LOWER VASCULAR LEFT COMMON FEMORAL COMPETENT: NORMAL
BH CV LOWER VASCULAR LEFT COMMON FEMORAL COMPRESS: NORMAL
BH CV LOWER VASCULAR LEFT COMMON FEMORAL PHASIC: NORMAL
BH CV LOWER VASCULAR LEFT COMMON FEMORAL SPONT: NORMAL
BH CV LOWER VASCULAR RIGHT COMMON FEMORAL AUGMENT: NORMAL
BH CV LOWER VASCULAR RIGHT COMMON FEMORAL COMPETENT: NORMAL
BH CV LOWER VASCULAR RIGHT COMMON FEMORAL COMPRESS: NORMAL
BH CV LOWER VASCULAR RIGHT COMMON FEMORAL PHASIC: NORMAL
BH CV LOWER VASCULAR RIGHT COMMON FEMORAL SPONT: NORMAL
BH CV LOWER VASCULAR RIGHT DISTAL FEMORAL AUGMENT: NORMAL
BH CV LOWER VASCULAR RIGHT DISTAL FEMORAL COMPETENT: NORMAL
BH CV LOWER VASCULAR RIGHT DISTAL FEMORAL COMPRESS: NORMAL
BH CV LOWER VASCULAR RIGHT DISTAL FEMORAL PHASIC: NORMAL
BH CV LOWER VASCULAR RIGHT DISTAL FEMORAL SPONT: NORMAL
BH CV LOWER VASCULAR RIGHT GASTRONEMIUS COMPRESS: NORMAL
BH CV LOWER VASCULAR RIGHT GREATER SAPH BK COMPRESS: NORMAL
BH CV LOWER VASCULAR RIGHT LESSER SAPH COMPRESS: NORMAL
BH CV LOWER VASCULAR RIGHT MID FEMORAL AUGMENT: NORMAL
BH CV LOWER VASCULAR RIGHT MID FEMORAL COMPETENT: NORMAL
BH CV LOWER VASCULAR RIGHT MID FEMORAL COMPRESS: NORMAL
BH CV LOWER VASCULAR RIGHT MID FEMORAL PHASIC: NORMAL
BH CV LOWER VASCULAR RIGHT MID FEMORAL SPONT: NORMAL
BH CV LOWER VASCULAR RIGHT PERONEAL AUGMENT: NORMAL
BH CV LOWER VASCULAR RIGHT POPLITEAL AUGMENT: NORMAL
BH CV LOWER VASCULAR RIGHT POPLITEAL COMPETENT: NORMAL
BH CV LOWER VASCULAR RIGHT POPLITEAL COMPRESS: NORMAL
BH CV LOWER VASCULAR RIGHT POPLITEAL PHASIC: NORMAL
BH CV LOWER VASCULAR RIGHT POPLITEAL SPONT: NORMAL
BH CV LOWER VASCULAR RIGHT POSTERIOR TIBIAL AUGMENT: NORMAL
BH CV LOWER VASCULAR RIGHT PROFUNDA FEMORAL AUGMENT: NORMAL
BH CV LOWER VASCULAR RIGHT PROFUNDA FEMORAL COMPETENT: NORMAL
BH CV LOWER VASCULAR RIGHT PROFUNDA FEMORAL COMPRESS: NORMAL
BH CV LOWER VASCULAR RIGHT PROFUNDA FEMORAL PHASIC: NORMAL
BH CV LOWER VASCULAR RIGHT PROFUNDA FEMORAL SPONT: NORMAL
BH CV LOWER VASCULAR RIGHT PROXIMAL FEMORAL AUGMENT: NORMAL
BH CV LOWER VASCULAR RIGHT PROXIMAL FEMORAL COMPETENT: NORMAL
BH CV LOWER VASCULAR RIGHT PROXIMAL FEMORAL COMPRESS: NORMAL
BH CV LOWER VASCULAR RIGHT PROXIMAL FEMORAL PHASIC: NORMAL
BH CV LOWER VASCULAR RIGHT PROXIMAL FEMORAL SPONT: NORMAL
BH CV LOWER VASCULAR RIGHT SAPHENOFEMORAL JUNCTION AUGMENT: NORMAL
BH CV LOWER VASCULAR RIGHT SAPHENOFEMORAL JUNCTION COMPETENT: NORMAL
BH CV LOWER VASCULAR RIGHT SAPHENOFEMORAL JUNCTION COMPRESS: NORMAL
BH CV LOWER VASCULAR RIGHT SAPHENOFEMORAL JUNCTION PHASIC: NORMAL
BH CV LOWER VASCULAR RIGHT SAPHENOFEMORAL JUNCTION SPONT: NORMAL
BILIRUB SERPL-MCNC: 0.4 MG/DL (ref 0–1.2)
BUN SERPL-MCNC: 46 MG/DL (ref 8–23)
BUN/CREAT SERPL: 26.9 (ref 7–25)
CALCIUM SPEC-SCNC: 8.2 MG/DL (ref 8.6–10.5)
CHLORIDE SERPL-SCNC: 100 MMOL/L (ref 98–107)
CO2 SERPL-SCNC: 25 MMOL/L (ref 22–29)
CREAT SERPL-MCNC: 1.71 MG/DL (ref 0.76–1.27)
DEPRECATED RDW RBC AUTO: 53.1 FL (ref 37–54)
EOSINOPHIL # BLD AUTO: 0.15 10*3/MM3 (ref 0–0.4)
EOSINOPHIL NFR BLD AUTO: 1.1 % (ref 0.3–6.2)
ERYTHROCYTE [DISTWIDTH] IN BLOOD BY AUTOMATED COUNT: 16.5 % (ref 12.3–15.4)
GFR SERPL CREATININE-BSD FRML MDRD: 39 ML/MIN/1.73
GLOBULIN UR ELPH-MCNC: 3.3 GM/DL
GLUCOSE BLDC GLUCOMTR-MCNC: 142 MG/DL (ref 70–130)
GLUCOSE BLDC GLUCOMTR-MCNC: 155 MG/DL (ref 70–130)
GLUCOSE BLDC GLUCOMTR-MCNC: 87 MG/DL (ref 70–130)
GLUCOSE BLDC GLUCOMTR-MCNC: 89 MG/DL (ref 70–130)
GLUCOSE SERPL-MCNC: 112 MG/DL (ref 65–99)
HCT VFR BLD AUTO: 26 % (ref 37.5–51)
HGB BLD-MCNC: 8.3 G/DL (ref 13–17.7)
IMM GRANULOCYTES # BLD AUTO: 0.16 10*3/MM3 (ref 0–0.05)
IMM GRANULOCYTES NFR BLD AUTO: 1.1 % (ref 0–0.5)
LYMPHOCYTES # BLD AUTO: 0.88 10*3/MM3 (ref 0.7–3.1)
LYMPHOCYTES NFR BLD AUTO: 6.2 % (ref 19.6–45.3)
MAXIMAL PREDICTED HEART RATE: 146 BPM
MCH RBC QN AUTO: 30.1 PG (ref 26.6–33)
MCHC RBC AUTO-ENTMCNC: 31.9 G/DL (ref 31.5–35.7)
MCV RBC AUTO: 94.2 FL (ref 79–97)
MONOCYTES # BLD AUTO: 0.91 10*3/MM3 (ref 0.1–0.9)
MONOCYTES NFR BLD AUTO: 6.4 % (ref 5–12)
NEUTROPHILS NFR BLD AUTO: 11.99 10*3/MM3 (ref 1.7–7)
NEUTROPHILS NFR BLD AUTO: 85 % (ref 42.7–76)
NRBC BLD AUTO-RTO: 0 /100 WBC (ref 0–0.2)
PHOSPHATE SERPL-MCNC: 3.1 MG/DL (ref 2.5–4.5)
PLATELET # BLD AUTO: 235 10*3/MM3 (ref 140–450)
PMV BLD AUTO: 10.9 FL (ref 6–12)
POTASSIUM SERPL-SCNC: 3.5 MMOL/L (ref 3.5–5.2)
PROT SERPL-MCNC: 5.7 G/DL (ref 6–8.5)
RBC # BLD AUTO: 2.76 10*6/MM3 (ref 4.14–5.8)
SODIUM SERPL-SCNC: 136 MMOL/L (ref 136–145)
STRESS TARGET HR: 124 BPM
WBC # BLD AUTO: 14.12 10*3/MM3 (ref 3.4–10.8)

## 2021-06-09 PROCEDURE — 93971 EXTREMITY STUDY: CPT | Performed by: INTERNAL MEDICINE

## 2021-06-09 PROCEDURE — 25010000002 CEFTRIAXONE PER 250 MG: Performed by: INTERNAL MEDICINE

## 2021-06-09 PROCEDURE — 92526 ORAL FUNCTION THERAPY: CPT

## 2021-06-09 PROCEDURE — 99232 SBSQ HOSP IP/OBS MODERATE 35: CPT | Performed by: INTERNAL MEDICINE

## 2021-06-09 PROCEDURE — 63710000001 INSULIN REGULAR HUMAN PER 5 UNITS: Performed by: INTERNAL MEDICINE

## 2021-06-09 PROCEDURE — 99233 SBSQ HOSP IP/OBS HIGH 50: CPT | Performed by: INTERNAL MEDICINE

## 2021-06-09 PROCEDURE — 97530 THERAPEUTIC ACTIVITIES: CPT

## 2021-06-09 PROCEDURE — 85025 COMPLETE CBC W/AUTO DIFF WBC: CPT | Performed by: INTERNAL MEDICINE

## 2021-06-09 PROCEDURE — 84100 ASSAY OF PHOSPHORUS: CPT | Performed by: INTERNAL MEDICINE

## 2021-06-09 PROCEDURE — 82962 GLUCOSE BLOOD TEST: CPT

## 2021-06-09 PROCEDURE — 80053 COMPREHEN METABOLIC PANEL: CPT | Performed by: INTERNAL MEDICINE

## 2021-06-09 PROCEDURE — 63710000001 INSULIN DETEMIR PER 5 UNITS: Performed by: INTERNAL MEDICINE

## 2021-06-09 PROCEDURE — 93971 EXTREMITY STUDY: CPT

## 2021-06-09 RX ORDER — GUAIFENESIN/DEXTROMETHORPHAN 100-10MG/5
5 SYRUP ORAL EVERY 4 HOURS PRN
Status: DISCONTINUED | OUTPATIENT
Start: 2021-06-09 | End: 2021-06-18 | Stop reason: HOSPADM

## 2021-06-09 RX ORDER — BUMETANIDE 0.25 MG/ML
2 INJECTION INTRAMUSCULAR; INTRAVENOUS ONCE
Status: COMPLETED | OUTPATIENT
Start: 2021-06-09 | End: 2021-06-09

## 2021-06-09 RX ADMIN — DOCUSATE SODIUM 100 MG: 50 LIQUID ORAL at 20:15

## 2021-06-09 RX ADMIN — GUAIFENESIN AND DEXTROMETHORPHAN 5 ML: 100; 10 SYRUP ORAL at 19:25

## 2021-06-09 RX ADMIN — SODIUM CHLORIDE, PRESERVATIVE FREE 10 ML: 5 INJECTION INTRAVENOUS at 20:16

## 2021-06-09 RX ADMIN — ACETAMINOPHEN 650 MG: 160 SOLUTION ORAL at 08:27

## 2021-06-09 RX ADMIN — GABAPENTIN 300 MG: 300 CAPSULE ORAL at 20:15

## 2021-06-09 RX ADMIN — SODIUM CHLORIDE, PRESERVATIVE FREE 10 ML: 5 INJECTION INTRAVENOUS at 08:24

## 2021-06-09 RX ADMIN — INSULIN DETEMIR 25 UNITS: 100 INJECTION, SOLUTION SUBCUTANEOUS at 20:14

## 2021-06-09 RX ADMIN — DOCUSATE SODIUM 100 MG: 50 LIQUID ORAL at 08:24

## 2021-06-09 RX ADMIN — CARVEDILOL 12.5 MG: 12.5 TABLET, FILM COATED ORAL at 17:39

## 2021-06-09 RX ADMIN — SENNOSIDES 10 ML: 8.8 LIQUID ORAL at 20:15

## 2021-06-09 RX ADMIN — PANTOPRAZOLE SODIUM 40 MG: 40 INJECTION, POWDER, FOR SOLUTION INTRAVENOUS at 20:16

## 2021-06-09 RX ADMIN — Medication 1 PACKET: at 08:32

## 2021-06-09 RX ADMIN — Medication 5 MG: at 20:16

## 2021-06-09 RX ADMIN — INSULIN HUMAN 2 UNITS: 100 INJECTION, SOLUTION PARENTERAL at 18:03

## 2021-06-09 RX ADMIN — ATORVASTATIN CALCIUM 40 MG: 40 TABLET, FILM COATED ORAL at 20:16

## 2021-06-09 RX ADMIN — ACETAMINOPHEN 650 MG: 160 SOLUTION ORAL at 14:25

## 2021-06-09 RX ADMIN — SODIUM CHLORIDE 1 G: 900 INJECTION INTRAVENOUS at 17:45

## 2021-06-09 RX ADMIN — ISOSORBIDE DINITRATE 10 MG: 20 TABLET ORAL at 17:39

## 2021-06-09 RX ADMIN — CARVEDILOL 12.5 MG: 12.5 TABLET, FILM COATED ORAL at 08:23

## 2021-06-09 RX ADMIN — ASPIRIN 325 MG ORAL TABLET 325 MG: 325 PILL ORAL at 08:23

## 2021-06-09 RX ADMIN — SENNOSIDES 10 ML: 8.8 LIQUID ORAL at 08:23

## 2021-06-09 RX ADMIN — Medication 1 PACKET: at 17:45

## 2021-06-09 RX ADMIN — THIAMINE HCL TAB 100 MG 100 MG: 100 TAB at 08:23

## 2021-06-09 RX ADMIN — BUMETANIDE 2 MG: 0.25 INJECTION, SOLUTION INTRAMUSCULAR; INTRAVENOUS at 08:23

## 2021-06-09 RX ADMIN — Medication 1 PACKET: at 12:26

## 2021-06-09 RX ADMIN — ISOSORBIDE DINITRATE 10 MG: 20 TABLET ORAL at 12:35

## 2021-06-09 RX ADMIN — ISOSORBIDE DINITRATE 10 MG: 20 TABLET ORAL at 08:27

## 2021-06-09 RX ADMIN — Medication 1 PACKET: at 20:19

## 2021-06-09 RX ADMIN — AMLODIPINE BESYLATE 5 MG: 5 TABLET ORAL at 08:23

## 2021-06-09 RX ADMIN — PANTOPRAZOLE SODIUM 40 MG: 40 INJECTION, POWDER, FOR SOLUTION INTRAVENOUS at 08:23

## 2021-06-09 NOTE — PLAN OF CARE
Goal Outcome Evaluation:  Plan of Care Reviewed With: patient        Progress: improving  Outcome Summary: Pt oriented x 3.  Pt on room air sats >93%.  Pt had hemodialysis 2 liters taken off.  Pt stood with pt today.  TF continues.  GI consulted.  will continue to monitor.  at 6/8/2021 2013

## 2021-06-09 NOTE — PROGRESS NOTES
Cardiothoracic Surgery Progress Note      POD # 11 s/p CABG       LOS: 18 days      Subjective:  Mentation much improved      Objective:  Vital Signs  Temp:  [98.2 °F (36.8 °C)-99.4 °F (37.4 °C)] 98.2 °F (36.8 °C)  Heart Rate:  [63-77] 70  Resp:  [18-22] 18  BP: (113-164)/(61-98) 133/67    Physical Exam:   General Appearance: easily roused   Lungs: clear to auscultation, respirations regular, respirations even and respirations unlabored   Heart: regular rhythm & normal rate, normal S1, S2, no murmur, no gallop, no rub and no click   Skin: Sternal Incision c/d/i, leg EVH incision draining scant serosanguinous fluid     Results:    Results from last 7 days   Lab Units 06/09/21  0346   WBC 10*3/mm3 14.12*   HEMOGLOBIN g/dL 8.3*   HEMATOCRIT % 26.0*   PLATELETS 10*3/mm3 235     Results from last 7 days   Lab Units 06/09/21  1013   SODIUM mmol/L 136   POTASSIUM mmol/L 3.5   CHLORIDE mmol/L 100   CO2 mmol/L 25.0   BUN mg/dL 46*   CREATININE mg/dL 1.71*   GLUCOSE mg/dL 112*   CALCIUM mg/dL 8.2*         Assessment:    NSTEMI 5/22/2021    Hyperlipidemia LDL goal <70    Essential hypertension    T2DM on oral agents and insulin. HbA1c 7.4     A/C kidney disease. ATN due to postoperative arrest. 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 Combination of anoxic insult and azotemia    POD 11 CABG x3    Plan:  Doing much better  Cr 1.7  If does not require restraints/precedex etc can tx to tele 6/10  Will need rehab at discharge  Question of need for HD and feeding issues to be addressed in the next few days        Jacek Miller MD  06/09/21  14:47 EDT

## 2021-06-09 NOTE — PLAN OF CARE
Goal Outcome Evaluation:  Plan of Care Reviewed With: patient        Progress: improving  Outcome Summary: Pt increased ambulation distance to 110ft with Michaela x2 and B UE support on tele monitor. Frequent cues for upright posture and increased stride length. Pt required one standing rest break. Limited by fatigue and weakness. Continue to progress as appropriate.

## 2021-06-09 NOTE — PROGRESS NOTES
Intensive Care Follow-up     Hospital:  LOS: 18 days   Mr. Augusto Lorenzana Jr., 76 y.o. male is followed for:   NSTEMI (non-ST elevated myocardial infarction) (CMS/Formerly [facility])            History of present illness:   76-year-old male with history of coronary disease status post RCA stent back in 2009, diabetes mellitus, hypertension, dyslipidemia, chronic kidney disease, gout and smokeless tobacco use.  Patient presented to [facility] ER on May 22 complaining of dyspnea.  Patient was found to have non-STEMI and cardiology team performed left heart catheter was found to have multivessel coronary disease.  Patient subsequently underwent coronary bypass graft surgery x3 on May 28.  Course was complicated by involvement of ventricular fibrillation and asystole requiring resuscitation x2 on the day of surgery.  Subsequently underwent paced rhythm for 48 hours before resuming sinus rhythm.  Patient also required 3 units of PRBCs, 4 units FFP and 1 sixpack of platelets.  Patient was extubated on May 29.  Patient however developed worsening MICHAEL azotemia requiring dialysis support.  Lower extremity Doppler was negative for DVT.    Subjective   Interval History:  Overnight did not have any evidence of excessive GI bleed.  There is some bleeding noted with the stool smears.  No other acute issues overnight.  Remained off Precedex.  Seroquel was held yesterday and did not have any more periods of agitation overnight.  Off wrist restraints as well..             The patient's past medical, surgical and social history were reviewed and updated in Epic as appropriate.       Objective     Infusions:  dexmedetomidine, 0.2-1.5 mcg/kg/hr, Last Rate: Stopped (06/08/21 1030)      Medications:  amLODIPine, 5 mg, Nasogastric, Q24H  aspirin, 325 mg, Nasogastric, Daily  atorvastatin, 40 mg, Nasogastric, Nightly  Beneprotein, 1 packet, Nasogastric, 4x Daily  carvedilol, 12.5 mg, Nasogastric, BID With Meals  cefTRIAXone, 1 g, Intravenous,  "Q24H  sennosides, 10 mL, Nasogastric, BID   And  docusate sodium, 100 mg, Nasogastric, BID  gabapentin, 300 mg, Nasogastric, Nightly  insulin detemir, 25 Units, Subcutaneous, Nightly  insulin regular, 0-9 Units, Subcutaneous, Q6H  isosorbide dinitrate, 10 mg, Nasogastric, TID - Nitrates  melatonin, 5 mg, Nasogastric, Nightly  pantoprazole, 40 mg, Intravenous, BID  pharmacy consult - MTM, , Does not apply, Daily  sodium chloride, 10 mL, Intravenous, Q12H  thiamine, 100 mg, Nasogastric, Daily        Vital Sign Min/Max for last 24 hours  Temp  Min: 97.7 °F (36.5 °C)  Max: 99.4 °F (37.4 °C)   BP  Min: 113/61  Max: 164/80   Pulse  Min: 63  Max: 77   Resp  Min: 18  Max: 22   SpO2  Min: 92 %  Max: 97 %   No data recorded       Input/Output for last 24 hour shift  06/08 0701 - 06/09 0700  In: 1697.4 [I.V.:187]  Out: 3065 [Urine:715]         Objective:  Vital signs: (most recent): Blood pressure 135/68, pulse 68, temperature 98.8 °F (37.1 °C), temperature source Oral, resp. rate 20, height 170 cm (66.93\"), weight 84.8 kg (187 lb), SpO2 95 %.            General Appearance: Awake, in no acute distress  Head:    Pupils reactive & symmetrical B/L.  Lungs:   B/L Breath sounds present with decreased breath sounds on bases, no wheezing heard, no crackles.   Heart: S1 and S2 present, no murmur  Abdomen: Soft, nontender, no guarding or rigidity, bowel sounds positive  Extremities: Dressing intact, right lower extremity edematous  Pulses: Positive and symmetric.  Neurologic:  Moving all four extremities.  Following simple commands.  More awake and alert today.    Results from last 7 days   Lab Units 06/09/21  0346 06/08/21  1211 06/08/21  0340 06/07/21  0301   WBC 10*3/mm3 14.12*  --  11.44* 14.32*   HEMOGLOBIN g/dL 8.3* 8.3* 7.8* 7.5*   PLATELETS 10*3/mm3 235  --  185 212     Results from last 7 days   Lab Units 06/09/21  1013 06/08/21  0340 06/07/21  0301 06/06/21  0357   SODIUM mmol/L 136 134* 134* 136   POTASSIUM mmol/L 3.5 3.9 " 3.9 4.4   CO2 mmol/L 25.0 27.0 26.0 27.0   BUN mg/dL 46* 56* 84* 55*   CREATININE mg/dL 1.71* 1.98* 2.92* 2.31*   MAGNESIUM mg/dL  --  2.0 2.4 2.1   PHOSPHORUS mg/dL 3.1 3.8 5.2* 4.3   GLUCOSE mg/dL 112* 94 106* 133*     Estimated Creatinine Clearance: 39.4 mL/min (A) (by C-G formula based on SCr of 1.71 mg/dL (H)).          Images:   None new    I reviewed the patient's results and images.     Assessment/Plan   Impression        NSTEMI 5/22/2021    Hyperlipidemia LDL goal <70    Essential hypertension    T2DM on oral agents and insulin. HbA1c 7.4     A/C kidney disease. ATN due to postoperative arrest. 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 Combination of anoxic insult and azotemia       Plan        1.  Patient with ongoing delirium state post coronary bypass graft surgery.  Continue thiamine.  Improving neurologically and more awake and alert.  We will continue monitoring.  Will discontinue Seroquel.  2.  On Rocephin for urine infection.  Will monitor for now.  No new fevers noted.  No new hemodynamic instability.  WBC count is slightly worse but no bandemia noted.    3.  Hemoglobin stable overnight.  Continue Protonix twice a day.  GI team evaluated and no intervention planned at this time.    4.  Nephrology is managing renal failure.  BUN has improved after aggressive hemodialysis.  Plan is to give him a dose of Bumex today.  5.  Enteral nutrition as tolerated.  Speech to reevaluate to make sure when he is able to tolerate oral diet.  Continue nova source renal for now.  If unable to clear speech evaluation then will likely need PEG tube placement.  6.  Wound care following EVH site or drainage.  Continue current dressings.  No definite evidence of cellulitis.  DVT study was negative.  We will go ahead and repeat lower extremity Doppler.  7.  Heparin for  DVT prophylaxis.  8.  Insulin regimen for hyperglycemia.  Blood sugars are acceptable.    Overall guarded prognosis we will continue to provide supportive care at this time.  Patient need continued ICU care due to delirium state and multiple other medical issues and high risk of decline.    Plan of care and goals reviewed with multidisciplinary/antibiotic stewardship team during rounds.   I discussed the patient's findings and my recommendations with patient and nursing staff     Time spent 35 min (exclusive of procedure time)  including high complexity decision making to assess, manipulate, and support vital organ system failure in this individual who has impairment of one or more vital organ systems such that there is a high probability of imminent or life threatening deterioration in the patient’s condition.      Godwin Ko MD, FCCP  Pulmonary, Critical care and Sleep Medicine

## 2021-06-09 NOTE — PROGRESS NOTES
"   LOS: 18 days    Patient Care Team:  Rob Polanco MD as PCP - General (Internal Medicine)    Reason For Visit:  F/U MICHAEL ON CKD  Subjective           Review of Systems:    Pulm: No soa   CV:  No CP      Objective     amLODIPine, 5 mg, Nasogastric, Q24H  aspirin, 325 mg, Nasogastric, Daily  atorvastatin, 40 mg, Nasogastric, Nightly  Beneprotein, 1 packet, Nasogastric, 4x Daily  bumetanide, 2 mg, Intravenous, Once  carvedilol, 12.5 mg, Nasogastric, BID With Meals  cefTRIAXone, 1 g, Intravenous, Q24H  sennosides, 10 mL, Nasogastric, BID   And  docusate sodium, 100 mg, Nasogastric, BID  gabapentin, 300 mg, Nasogastric, Nightly  insulin detemir, 25 Units, Subcutaneous, Nightly  insulin regular, 0-9 Units, Subcutaneous, Q6H  isosorbide dinitrate, 10 mg, Nasogastric, TID - Nitrates  melatonin, 5 mg, Nasogastric, Nightly  pantoprazole, 40 mg, Intravenous, BID  pharmacy consult - [place], , Does not apply, Daily  QUEtiapine, 50 mg, Nasogastric, Nightly  sodium chloride, 10 mL, Intravenous, Q12H  thiamine, 100 mg, Nasogastric, Daily      dexmedetomidine, 0.2-1.5 mcg/kg/hr, Last Rate: Stopped (06/08/21 1030)          Vital Signs:  Blood pressure 164/80, pulse 75, temperature 99.4 °F (37.4 °C), temperature source Oral, resp. rate 20, height 170 cm (66.93\"), weight 84.8 kg (187 lb), SpO2 95 %.    Flowsheet Rows      First Filed Value   Admission Height  170.2 cm (67\") Documented at 05/22/2021 0433   Admission Weight  88.5 kg (195 lb) Documented at 05/22/2021 0433          06/08 0701 - 06/09 0700  In: 1697.4 [I.V.:187]  Out: 3065 [Urine:715]    Physical Exam:    General Appearance: NAD, MORE alert and cooperative. Ox2  Eyes: PER, conjunctivae and sclerae normal, no icterus  Lungs: FEW RHONCHI  Heart/CV: regular rhythm & normal rate, no murmur, no gallop, no rub and 2+ edema  Abdomen: not distended, soft, non-tender, no masses,  bowel sounds present  Skin: No rash, Warm and dry. . GRIFFITHS. TEMP IJ HD " CATH.    Radiology:            Labs:  Results from last 7 days   Lab Units 06/09/21  0346 06/08/21  1211 06/08/21  0340 06/07/21  0301   WBC 10*3/mm3 14.12*  --  11.44* 14.32*   HEMOGLOBIN g/dL 8.3* 8.3* 7.8* 7.5*   HEMATOCRIT % 26.0* 26.1* 24.3* 22.9*   PLATELETS 10*3/mm3 235  --  185 212     Results from last 7 days   Lab Units 06/08/21  0340 06/07/21  0301 06/06/21  0357 06/05/21  0337   SODIUM mmol/L 134* 134* 136 136   POTASSIUM mmol/L 3.9 3.9 4.4 3.8   CHLORIDE mmol/L 99 98 98 100   CO2 mmol/L 27.0 26.0 27.0 25.0   BUN mg/dL 56* 84* 55* 72*   CREATININE mg/dL 1.98* 2.92* 2.31* 2.55*   CALCIUM mg/dL 8.5* 8.8 8.9 8.6   PHOSPHORUS mg/dL 3.8 5.2* 4.3 4.1   MAGNESIUM mg/dL 2.0 2.4 2.1 2.1   ALBUMIN g/dL 2.50* 2.90* 2.80* 2.90*     Results from last 7 days   Lab Units 06/08/21  0340   GLUCOSE mg/dL 94       Results from last 7 days   Lab Units 06/08/21  0340   ALK PHOS U/L 157*   BILIRUBIN mg/dL 0.4   ALT (SGPT) U/L 21   AST (SGOT) U/L 24                 Estimated Creatinine Clearance: 34 mL/min (A) (by C-G formula based on SCr of 1.98 mg/dL (H)).      Assessment       NSTEMI 5/22/2021    Hyperlipidemia LDL goal <70    Essential hypertension    T2DM on oral agents and insulin. HbA1c 7.4     A/C kidney disease. ATN due to postoperative arrest. 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 Combination of anoxic insult and azotemia            Impression: NONOLIGURIC MICHAEL. AMS BETTER WITH HD AND IMPROVED AZOTEMIA. ANEMIA. VOLUME OVERLOAD. LAB PENDING TODAY.            Recommendations: BUMEX IV. HD  TX'S PRN.      Aiden Godfrey MD  06/09/21  08:11 EDT

## 2021-06-09 NOTE — PLAN OF CARE
Goal Outcome Evaluation:           Frequent coughing today, robitussin ordered, pulmonary hygiene promoted.  Pt more alert and oriented today. He ambulated 110-200ft, he is unsteady and often leans back when just standing but was steady with assist x 2 for walking.   NSR, on room air.  Bumex given, 800ml UOP.

## 2021-06-09 NOTE — CASE MANAGEMENT/SOCIAL WORK
Continued Stay Note  [facility]     Patient Name: Augusto Lorenzana Jr.  MRN: [id number]  Today's Date: 6/9/2021    Admit Date: 5/22/2021    Discharge Plan     Row Name 06/09/21 1342       Plan    Plan  TBD    Plan Comments  Per chart, patient out of restraints.  Still with tube feeds via NGT.  SLP eval pending, may possibly need PEG.  Nephrology following, evaluating for HD.  CM will continue to follow.  Callie Rodriguez RN [phone]    Final Discharge Disposition Code  30 - still a patient        Discharge Codes    No documentation.       Expected Discharge Date and Time     Expected Discharge Date Expected Discharge Time    Jun 14, 2021             Leslie Rodriguez RN

## 2021-06-09 NOTE — PLAN OF CARE
Problem: Adult Inpatient Plan of Care  Goal: Plan of Care Review  Outcome: Ongoing, Progressing  Flowsheets (Taken 6/9/2021 0930)  Plan of Care Reviewed With:   patient   daughter   Goal Outcome Evaluation:  Plan of Care Reviewed With: patient, daughter            SLP treatment completed. Will continue to address dysphagia in tx. Please see note for further details and recommendations.

## 2021-06-09 NOTE — THERAPY TREATMENT NOTE
Acute Care - Speech Language Pathology   Swallow Treatment Note [facility]     Patient Name: Augusto Lorenzana Jr.  : 1945  MRN: [id number]  Today's Date: 2021               Admit Date: 2021    Visit Dx:     ICD-10-CM ICD-9-CM   1. Non-STEMI (non-ST elevated myocardial infarction) (CMS/Formerly [facility])  I21.4 410.70   2. Acute congestive heart failure, unspecified heart failure type (CMS/Formerly [facility])  I50.9 428.0   3. History of coronary artery disease  Z86.79 V12.59   4. Coronary artery disease involving native coronary artery of native heart, angina presence unspecified  I25.10 414.01   5. Pharyngeal dysphagia  R13.13 787.23   6. MICHAEL (acute kidney injury) (CMS/Formerly [facility])  N17.9 584.9     Patient Active Problem List   Diagnosis   • NSTEMI 2021   • Hyperlipidemia LDL goal <70   • Essential hypertension   • T2DM on oral agents and insulin. HbA1c 7.4    • Severe 3 vessel CAD with preserved LV function (CMS/Formerly [facility])   • A/C kidney disease. ATN due to postoperative arrest. Dialysis begun 6/3/2021   • Gout   • Former smokeless tobacco use   • S/P CABG x 3 on 21   • Perioperative cardiac arrest with ventricular fibrillation (CMS/Formerly [facility])   • Postop encephalopathy post code. Combination of anoxic insult and azotemia     Past Medical History:   Diagnosis Date   • CAD (coronary artery disease)    • CKD (chronic kidney disease) stage 3, GFR 30-59 ml/min (CMS/[facility]ly [facility])    • Diabetes mellitus (CMS/[facility]ly [facility])    • Hyperlipidemia    • Hypertension    • NSTEMI (non-ST elevated myocardial infarction) (CMS/Formerly [facility])      Past Surgical History:   Procedure Laterality Date   • CARDIAC CATHETERIZATION N/A 2021    Procedure: Left Heart Cath;  Surgeon: Blade Greene IV, MD;  Location: [facility] INVASIVE LOCATION;  Service: Cardiovascular;  Laterality: N/A;   • CARDIAC SURGERY      2 stents placed .   • CORONARY ARTERY BYPASS GRAFT N/A 2021    Procedure: MEDIAN STERNOTOMY CORONARY ARTERY BYPASS X 3 UTILIZING THE LEFT INTERNAL MAMMARY  ARTERY GRAFT, EVH OF THE GREATER RIGHT SAPHENOUS VEIN, AND PILO PER ANESTHESIA;  Surgeon: Jacek Miller MD;  Location: Critical access hospital;  Service: Cardiothoracic;  Laterality: N/A;        SWALLOW EVALUATION (last 72 hours)      SLP Adult Swallow Evaluation     Row Name 06/09/21 5950                   Rehab Evaluation    Document Type  therapy note (daily note)  -DV        Subjective Information  complains of;fatigue;pain  -DV        Patient Observations  alert;cooperative  -DV        Patient/Family/Caregiver Comments/Observations  daughter present  -DV        Care Plan Review  evaluation/treatment results reviewed;care plan/treatment goals reviewed;risks/benefits reviewed;current/potential barriers reviewed;patient/other agree to care plan  -DV        Care Plan Review, Other Participant(s)  daughter  -DV        Patient Effort  good  -DV        Symptoms Noted During/After Treatment  none  -DV           Pain    Additional Documentation  Pain Scale: Numbers Pre/Post-Treatment (Group)  -DV           Pain Scale: Numbers Pre/Post-Treatment    Pretreatment Pain Rating  0/10 - no pain  -DV        Posttreatment Pain Rating  0/10 - no pain  -DV        Pre/Posttreatment Pain Comment  incisional pain when coughing  -DV           Clinical Impression    Daily Summary of Progress (SLP)  progress toward functional goals as expected  -DV        Plan for Continued Treatment (SLP)  Pt continues with very hoarse vocal quality and increased coughing per RN, almost constant throughout this encounter, with or without PO. Overt WVQ with thin liquid. Pudding and nectar-thick liquids trialed as well. Difficult assessment 2' hoarse vocal quality and coughing at baseline. Pt requiring suction for secretion management. Not ready for repeat FEES. Exercises completed.  -DV          User Key  (r) = Recorded By, (t) = Taken By, (c) = Cosigned By    Initials Name Effective Dates    DV Christine Sahni MS CCC-SLP 02/28/20 -           EDUCATION  The patient  has been educated in the following areas:   Dysphagia (Swallowing Impairment) Oral Care/Hydration NPO rationale.    SLP Recommendation and Plan                                         Daily Summary of Progress (SLP): progress toward functional goals as expected    Plan for Continued Treatment (SLP): Pt continues with very hoarse vocal quality and increased coughing per RN, almost constant throughout this encounter, with or without PO. Overt WVQ with thin liquid. Pudding and nectar-thick liquids trialed as well. Difficult assessment 2' hoarse vocal quality and coughing at baseline. Pt requiring suction for secretion management. Not ready for repeat FEES. Exercises completed.              Plan of Care Reviewed With: patient, daughter    SLP GOALS     Row Name 06/09/21 2235             Oral Nutrition/Hydration Goal 1 (SLP)    Oral Nutrition/Hydration Goal 1, SLP  LTG: Pt will return to regular diet, thin liquids w/ no overt s/sxs aspiration/distress w/ 100% acc and no cues  -DV      Time Frame (Oral Nutrition/Hydration Goal 1, SLP)  by discharge  -DV      Progress/Outcomes (Oral Nutrition/Hydration Goal 1, SLP)  progress slower than expected  -DV         Oral Nutrition/Hydration Goal 2 (SLP)    Oral Nutrition/Hydration Goal 2, SLP  Pt will tolerate puree/some mashed & honey-thick liquids w/ no overt s/sxs aspiration/distress w/ 100% acc and no cues  -DV      Time Frame (Oral Nutrition/Hydration Goal 2, SLP)  by discharge  -DV      Progress/Outcomes (Oral Nutrition/Hydration Goal 2, SLP)  goal no longer appropriate  -DV         Oral Nutrition/Hydration Goal (SLP)    Oral Nutrition/Hydration Goal, SLP  Pt will tolerate therapeutic trials of ice chips, thin H2O, and puree w/ no overt s/sxs aspiration/distress w/ 70% acc and no cues in order to assess readiness for repeat instrumental eval.  -DV      Time Frame (Oral Nutrition/Hydration Goal, SLP)  short term goal (STG)  -DV      Barriers (Oral Nutrition/Hydration Goal,  SLP)  overt WVQ with thin liquid. hoarse vocal quality and coughing at baseline and t/o trials.   -DV      Progress/Outcomes (Oral Nutrition/Hydration Goal, SLP)  continuing progress toward goal  -DV         Lingual Strengthening Goal 1 (SLP)    Activity (Lingual Strengthening Goal 1, SLP)  increase tongue back strength  -DV      Increase Tongue Back Strength  swallow trials;lingual resistance exercises  -DV      [place]/Accuracy (Lingual Strengthening Goal 1, SLP)  with minimal cues (75-90% accuracy)  -DV      Time Frame (Lingual Strengthening Goal 1, SLP)  short term goal (STG)  -DV      Progress/Outcomes (Lingual Strengthening Goal 1, SLP)  goal ongoing  -DV         Pharyngeal Strengthening Exercise Goal 1 (SLP)    Activity (Pharyngeal Strengthening Goal 1, SLP)  increase timing;increase superior movement of the hyolaryngeal complex;increase anterior movement of the hyolaryngeal complex;increase closure at entrance to airway/closure of airway at glottis;increase squeeze/positive pressure generation;increase tongue base retraction  -DV      Increase Timing  prepping - 3 second prep or suck swallow or 3-step swallow  -DV      Increase Superior Movement of the Hyolaryngeal Complex  effortful pitch glide (falsetto + pharyngeal squeeze)  -DV      Increase Anterior Movement of the Hyolaryngeal Complex  chin tuck against resistance (CTAR)  -DV      Increase Closure at Entrance to Airway/Closure of Airway at Glottis  supraglottic swallow  -DV      Increase Squeeze/Positive Pressure Generation  hard effortful swallow  -DV      Increase Tongue Base Retraction  [place]wyn  -DV      [place]/Accuracy (Pharyngeal Strengthening Goal 1, SLP)  with minimal cues (75-90% accuracy)  -DV      Time Frame (Pharyngeal Strengthening Goal 1, SLP)  short term goal (STG)  -DV      Barriers (Pharyngeal Strengthening Goal 1, SLP)  exercises completed  -DV      Progress/Outcomes (Pharyngeal Strengthening Goal 1, SLP)  continuing progress  toward goal  -DV         Swallow Management Recall Goal 1 (SLP)    Activity (Swallow Management Recall Goal 1, SLP)  independent recall of;safe diet/liquid level;safe diet level/texture  -DV      [place]/Accuracy (Swallow Management Recall Goal 1, SLP)  independently (over 90% accuracy)  -DV      Time Frame (Swallow Management Recall Goal 1, SLP)  short term goal (STG)  -DV      Progress/Outcomes (Swallow Management Recall Goal 1, SLP)  goal no longer appropriate  -DV         Swallow Compensatory Strategies Goal 1 (SLP)    Activity (Swallow Compensatory Strategies/Techniques Goal 1, SLP)  compensatory strategies;small cup sips;other (see comments);during p.o. trials;during meal intake  -DV      [place]/Accuracy (Swallow Compensatory Strategies/Techniques Goal 1, SLP)  independently (over 90% accuracy)  -DV      Time Frame (Swallow Compensatory Strategies/Techniques Goal 1, SLP)  short term goal (STG)  -DV      Progress/Outcomes (Swallow Compensatory Strategies/Techniques Goal 1, SLP)  goal no longer appropriate  -DV        User Key  (r) = Recorded By, (t) = Taken By, (c) = Cosigned By    Initials Name Provider Type    Christine Atkinson MS CCC-SLP Speech and Language Pathologist             Time Calculation:   Time Calculation- SLP     Row Name 06/09/21 1142             Time Calculation- SLP    SLP Start Time  0930  -DV      SLP Received On  06/09/21  -DV         Untimed Charges    32300-XH Treatment Swallow Minutes  50  -DV         Total Minutes    Untimed Charges Total Minutes  50  -DV       Total Minutes  50  -DV        User Key  (r) = Recorded By, (t) = Taken By, (c) = Cosigned By    Initials Name Provider Type    Christine Atkinson MS CCC-SLP Speech and Language Pathologist          Therapy Charges for Today     Code Description Service Date Service Provider Modifiers Qty    [id number]  ST TREATMENT SWALLOW 3 6/9/2021 Christine Sahni MS CCC-MIGUEL GN 1               Christine Sahni MS CCC-MIGUEL  6/9/2021

## 2021-06-09 NOTE — NURSING NOTE
Pt. Referred for Phase II Cardiac Rehab. Staff discussed benefits of exercise and program protocol. Pt. Refused the program at this time related to 1.5 hour drive to nearest Cardiac Rehab facility which is too far to travel. Home exercise guideline teaching completed with patient and his daughter with printed reference and contact information for [facility]. Teach back verified.

## 2021-06-09 NOTE — PROGRESS NOTES
"GI Daily Progress Note  Subjective:    Chief Complaint:  Constipation/blood in stool    Patient reports had a good bowel movements.  He is sitting in chair.  When he coughs he uses his own suction to get out white phlegm.  Spoke with RN who reports stool had small amount of blood but suspects from hemorrhoids.  No blood in NG.  Patient on tube feeds    Objective:    /80 (BP Location: Left arm, Patient Position: Lying)   Pulse 75   Temp 99.4 °F (37.4 °C) (Oral)   Resp 20   Ht 170 cm (66.93\")   Wt 84.8 kg (187 lb)   SpO2 95%   BMI 29.35 kg/m²     Physical Exam  HENT:      Head: Normocephalic and atraumatic.      Nose:      Comments: NG tube in place     Mouth/Throat:      Mouth: Mucous membranes are dry.      Pharynx: Oropharynx is clear.   Eyes:      Pupils: Pupils are equal, round, and reactive to light.   Cardiovascular:      Rate and Rhythm: Normal rate.      Comments: ISHMAEL  Pulmonary:      Effort: Pulmonary effort is normal.      Breath sounds: Normal breath sounds.      Comments: Chest wound c/d/i  Abdominal:      General: Abdomen is flat. Bowel sounds are normal.      Palpations: Abdomen is soft.   Skin:     General: Skin is warm and dry.   Neurological:      General: No focal deficit present.      Mental Status: He is alert and oriented to person, place, and time.   Psychiatric:         Mood and Affect: Mood normal.         Behavior: Behavior normal.         Thought Content: Thought content normal.         Lab  Lab Results   Component Value Date    WBC 14.12 (H) 06/09/2021    HGB 8.3 (L) 06/09/2021    HGB 8.3 (L) 06/08/2021    HGB 7.8 (L) 06/08/2021    MCV 94.2 06/09/2021     06/09/2021    INR 1.19 (H) 06/03/2021    INR 1.73 (H) 05/30/2021    INR 1.22 (H) 05/29/2021    INR 1.25 (H) 05/28/2021    INR 1.29 (H) 05/28/2021       Lab Results   Component Value Date    GLUCOSE 94 06/08/2021    BUN 56 (H) 06/08/2021    CREATININE 1.98 (H) 06/08/2021    EGFRIFNONA 33 (L) 06/08/2021    BCR 28.3 (H) " 06/08/2021     (L) 06/08/2021    K 3.9 06/08/2021    CO2 27.0 06/08/2021    CALCIUM 8.5 (L) 06/08/2021    ALBUMIN 2.50 (L) 06/08/2021    ALKPHOS 157 (H) 06/08/2021    BILITOT 0.4 06/08/2021    ALT 21 06/08/2021    AST 24 06/08/2021   KUB June 8    IMPRESSION:  A nasogastric tube terminates in the stomach. Bowel gas  pattern is nonobstructive. There is mild fecal loading of the colon. No  overt pneumoperitoneum.         Assessment:      NSTEMI 5/22/2021    Hyperlipidemia LDL goal <70    Essential hypertension    T2DM on oral agents and insulin. HbA1c 7.4     A/C kidney disease. ATN due to postoperative arrest. 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 Combination of anoxic insult and azotemia      Small amount of blood in stool suspect hemorrhoids  Constipation    Plan:    1.  I do not recommend endoscopy at this time; last colonoscopy feb 2021  2.  May repeat relistor 12 mg SQ if needed for opiate induced constipation  3.  Continue colace 100 BID  4.  Continue PPI    Will sign off for now.  Please call for questions or concerns.  Discussed with patient's RN    Marcela Peres MD  06/09/21  09:39 EDT

## 2021-06-09 NOTE — PROGRESS NOTES
Clinical Nutrition     Nutrition Support Assessment  Reason for Visit:   MDR, Follow-up protocol, EN      Patient Name: Augusto Lorenzana Jr.  YOB: 1945  MRN: [id number]  Date of Encounter: 06/09/21 10:09 EDT  Admission date: 5/22/2021        Nutrition Assessment   Assessment   NSTEMI  CAD  s/p CABG x3 (5-28)  Coded x2  (5-28)  Extubated 5-29  MICHAEL/CKD  HD initiated 6-3  BRBPR, likely hemorrhoids    PMH: He  has a past medical history of CAD (coronary artery disease), CKD (chronic kidney disease) stage 3, GFR 30-59 ml/min (CMS/[facility]), Diabetes mellitus (CMS/[facility]), Hyperlipidemia, Hypertension, and NSTEMI (non-ST elevated myocardial infarction) (CMS/[facility]).Obesity, Anemia   PSxH: He  has a past surgical history that includes Cardiac surgery; Cardiac catheterization (N/A, 5/24/2021); and Coronary artery bypass graft (N/A, 5/28/2021).       Applicable nutrition-related information:  (6/4) SLP FEES rec NPO, temporary alternate methods of nutrition/hydration (ice chips x4/hr with SPV and after oral care.)    Reported/Observed/Food/Nutrition Related History:     Pt tolerating EN. Pt up in chair at time of RD visit. States that he is not having any abdominal pain and no nausea. Planning to have SLP re-assess pt's swallow.     Per I&O over the past 24 hrs:  1 bowel movement    Anthropometrics     Height: 67in  Last filed wt: 187lb  Weight Method: Standing scale    BMI: BMI (Calculated): 29.4  Overweight: 25.0-29.9kg/m2     Date Weight (kg) Weight (lbs) Weight Method   6/1/2021 84.823 kg 187 lb -   5/28/2021 84.823 kg 187 lb Standing scale   5/27/2021 85.73 kg 189 lb -   5/27/2021 85.503 kg 188 lb 8 oz Bed scale   5/26/2021 82.691 kg 182 lb 4.8 oz Bed scale   5/25/2021 81.421 kg 179 lb 8 oz Bed scale   5/24/2021 80.513 kg 177 lb 8 oz Standing scale   5/23/2021 82.328 kg 181 lb 8 oz Standing scale   5/22/2021 88.451 kg 195 lb -   5/22/2021 88.451 kg 195 lb Stated       Labs reviewed     Results from  last 7 days   Lab Units 06/08/21  0340 06/07/21  0301 06/06/21  0357   GLUCOSE mg/dL 94 106* 133*   BUN mg/dL 56* 84* 55*   CREATININE mg/dL 1.98* 2.92* 2.31*   SODIUM mmol/L 134* 134* 136   CHLORIDE mmol/L 99 98 98   POTASSIUM mmol/L 3.9 3.9 4.4   PHOSPHORUS mg/dL 3.8 5.2* 4.3   MAGNESIUM mg/dL 2.0 2.4 2.1   ALT (SGPT) U/L 21 23 25     Results from last 7 days   Lab Units 06/08/21  0340 06/07/21  0301 06/06/21  0357   ALBUMIN g/dL 2.50* 2.90* 2.80*   PREALBUMIN mg/dL  --  12.3*  --    CRP mg/dL  --  10.41*  --    CHOLESTEROL mg/dL  --  60  --    TRIGLYCERIDES mg/dL  --  51  --        Results from last 7 days   Lab Units 06/09/21  0646 06/08/21  2312 06/08/21  1744 06/08/21  1149 06/08/21  0519 06/07/21  2349   GLUCOSE mg/dL 87 153* 104 182* 111 96     Lab Results   Lab Value Date/Time    HGBA1C 7.40 (H) 05/22/2021 0433       Medications reviewed   Pertinent: antibiotic, senokot, colace, neurontin, insulin, melatonin, protonix  PRN: tylenol    Pt received 1 dose of relistor on (6/8)      Needs Assessment 6-4-21   Height used: 67 in/170 cm  Weight used: 187 lb/84 kg    Estimated calorie needs: ~1900 kcal/day  20-25 kcal/kg= 2133-8578 kcal    Estimated protein needs: ~100 g pro/day  1.2 g/kg= 100 g pro      Current Nutrition Prescription     PO: NPO Diet      EN: Novasource Renal at 45 ml/hr (goal volume= 900) with 1 packet beneprotein 4x/day and free water at 35 ml/hr  Route: NG  Verified at the bedside: Yes  Provides at goal volume: 1900 kcal, 105 g pro, 0 g fiber, 24 meq K, 737 mg phos, 645 ml water from EN, 1345 ml water total      EN delivery;  1 Day:  745 ml, 83% +4 beneprotein  1590 kcal, 84%  91 g pro, 91%  0 g fiber  21 meq K  610 mg phos  534 ml water from EN  1114 ml water total      Nutrition Diagnosis     5-31-21, 6-7  Problem Inadequate energy intake/ protein intake   Etiology Per Clinical Status   Signs/Symptoms 47% goal volume EN   Statua: improved    Nutrition Intervention    Follow treatment  progress, Care plan reviewed   -Will continue with current EN order at this time and will adjust as appropriate      Goal:   General: Nutrition support treatment  EN/PN: Maintain EN       Monitoring/Evaluation:   Per protocol, I&O, Pertinent labs, Weight, Skin status, GI status, Symptoms    Brit Be MS RD/LD CNSC  Time Spent: 45 minutes

## 2021-06-09 NOTE — PLAN OF CARE
Goal Outcome Evaluation:  Plan of Care Reviewed With: patient        Progress: improving       Pt alert, disoriented only to time. Very pleasant throughout shift, Seroquel withheld due to possible side effects, no outburst/confusion noted. Up x 3 to chair this AM.    Respiratory remains stable on room air. Frequent productive cough with thick tan secretions noted.    NSR throughout shift.     Tube feed continued. Residual 25 mL. Large BM x 1, see charting.      mL.     Skin care completed per WOC orders, see charting. Temperature max 99.4*F.

## 2021-06-09 NOTE — THERAPY PROGRESS REPORT/RE-CERT
Patient Name: Augusto Lorenzana Jr.  : 1945    MRN: [id number]                              Today's Date: 2021       Admit Date: 2021    Visit Dx:     ICD-10-CM ICD-9-CM   1. Non-STEMI (non-ST elevated myocardial infarction) (CMS/HCC)  I21.4 410.70   2. Acute congestive heart failure, unspecified heart failure type (CMS/HCC)  I50.9 428.0   3. History of coronary artery disease  Z86.79 V12.59   4. Coronary artery disease involving native coronary artery of native heart, angina presence unspecified  I25.10 414.01   5. Pharyngeal dysphagia  R13.13 787.23   6. MICHAEL (acute kidney injury) (CMS/HCC)  N17.9 584.9     Patient Active Problem List   Diagnosis   • NSTEMI 2021   • Hyperlipidemia LDL goal <70   • Essential hypertension   • T2DM on oral agents and insulin. HbA1c 7.4    • Severe 3 vessel CAD with preserved LV function (CMS/HCC)   • A/C kidney disease. ATN due to postoperative arrest. 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 Combination of anoxic insult and azotemia     Past Medical History:   Diagnosis Date   • CAD (coronary artery disease)    • CKD (chronic kidney disease) stage 3, GFR 30-59 ml/min (CMS/HCC)    • Diabetes mellitus (CMS/[facility])    • Hyperlipidemia    • Hypertension    • NSTEMI (non-ST elevated myocardial infarction) (CMS/[facility])      Past Surgical History:   Procedure Laterality Date   • CARDIAC CATHETERIZATION N/A 2021    Procedure: Left Heart Cath;  Surgeon: Blade Greene IV, MD;  Location: [facility] CATH INVASIVE LOCATION;  Service: Cardiovascular;  Laterality: N/A;   • CARDIAC SURGERY      2 stents placed .   • CORONARY ARTERY BYPASS GRAFT N/A 2021    Procedure: MEDIAN STERNOTOMY CORONARY ARTERY BYPASS X 3 UTILIZING THE LEFT INTERNAL MAMMARY ARTERY GRAFT, EVH OF THE GREATER RIGHT SAPHENOUS VEIN, AND  PILO PER ANESTHESIA;  Surgeon: Jacek Miller MD;  Location: [facility];  Service: Cardiothoracic;  Laterality: N/A;     General Information     Row Name 06/09/21 1157          Physical Therapy Time and Intention    Document Type  progress note/recertification  -KG     Mode of Treatment  physical therapy  -KG     Row Name 06/09/21 1157          General Information    Existing Precautions/Restrictions  cardiac;fall;sternal;other (see comments) NG  -KG     Row Name 06/09/21 1157          Cognition    Orientation Status (Cognition)  oriented to;person;place;situation  -KG     Row Name 06/09/21 1157          Safety Issues, Functional Mobility    Safety Issues Affecting Function (Mobility)  insight into deficits/self-awareness;safety precaution awareness;safety precautions follow-through/compliance  -KG     Impairments Affecting Function (Mobility)  balance;coordination;endurance/activity tolerance;postural/trunk control;pain;strength  -KG       User Key  (r) = Recorded By, (t) = Taken By, (c) = Cosigned By    Initials Name Provider Type    KG Aliza Shen, PT Physical Therapist        Mobility     Row Name 06/09/21 1157          Bed Mobility    Comment (Bed Mobility)  UIC  -KG     Row Name 06/09/21 1157          Transfers    Comment (Transfers)  VC's for sequencing and safe hand placement to maintain sternal precautions. Upon standing pt demonstrated significant posterior lean, but able to correct with verbal and tactile cues.  -KG     Row Name 06/09/21 1157          Sit-Stand Transfer    Sit-Stand [place] (Transfers)  moderate assist (50% patient effort);2 person assist;verbal cues  -KG     Row Name 06/09/21 1157          Gait/Stairs (Locomotion)    [place] Level (Gait)  minimum assist (75% patient effort);2 person assist;verbal cues  -KG     Assistive Device (Gait)  other (see comments) B UE support on tele monitor  -KG     Distance in Feet (Gait)  110  -KG     Deviations/Abnormal Patterns (Gait)   base of support, narrow;chinyere decreased;stride length decreased  -KG     Bilateral Gait Deviations  forward flexed posture;heel strike decreased  -KG     Comment (Gait/Stairs)  Pt demonstrated step to gait pattern with slow chinyere and decreased step length. Frequent cues for upright posture and increased anterior shift at hips. Pt required one standing rest break. Demonstrated significant improvement in balance and stability this session. Motivated to progress.  -KG       User Key  (r) = Recorded By, (t) = Taken By, (c) = Cosigned By    Initials Name Provider Type    Aliza Bhandari PT Physical Therapist        Obj/Interventions     Row Name 06/09/21 1159          Balance    Balance Assessment  sitting static balance;standing static balance  -KG     Static Sitting Balance  WFL;supported;sitting in chair  -KG     Static Standing Balance  mild impairment;supported;standing  -KG     Dynamic Standing Balance  moderate impairment;supported;standing  -KG       User Key  (r) = Recorded By, (t) = Taken By, (c) = Cosigned By    Initials Name Provider Type    Aliza Bhandari PT Physical Therapist        Goals/Plan     Row Name 06/09/21 1201          Bed Mobility Goal 1 (PT)    Activity/Assistive Device (Bed Mobility Goal 1, PT)  sit to supine;supine to sit  -KG     [place] Level/Cues Needed (Bed Mobility Goal 1, PT)  minimum assist (75% or more patient effort)  -KG     Time Frame (Bed Mobility Goal 1, PT)  2 weeks  -KG     Progress/Outcomes (Bed Mobility Goal 1, PT)  goal ongoing;goal revised this date  -KG     Row Name 06/09/21 1201          Transfer Goal 1 (PT)    Activity/Assistive Device (Transfer Goal 1, PT)  sit-to-stand/stand-to-sit;bed-to-chair/chair-to-bed;walker, rolling  -KG     [place] Level/Cues Needed (Transfer Goal 1, PT)  minimum assist (75% or more patient effort)  -KG     Time Frame (Transfer Goal 1, PT)  2 weeks  -KG     Progress/Outcome (Transfer Goal 1, PT)  goal  ongoing;goal revised this date  -KG     Row Name 06/09/21 1201          Gait Training Goal 1 (PT)    Activity/Assistive Device (Gait Training Goal 1, PT)  gait (walking locomotion);assistive device use;walker, rolling  -KG     [place] Level (Gait Training Goal 1, PT)  minimum assist (75% or more patient effort)  -KG     Distance (Gait Training Goal 1, PT)  200 feet  -KG     Time Frame (Gait Training Goal 1, PT)  2 weeks  -KG     Progress/Outcome (Gait Training Goal 1, PT)  goal ongoing;goal revised this date  -KG       User Key  (r) = Recorded By, (t) = Taken By, (c) = Cosigned By    Initials Name Provider Type    KG Aliza Shen, PT Physical Therapist        Clinical Impression     Row Name 06/09/21 1159          Pain    Additional Documentation  Pain Scale: Numbers Pre/Post-Treatment (Group)  -KG     Row Name 06/09/21 1159          Pain Scale: Numbers Pre/Post-Treatment    Pretreatment Pain Rating  3/10  -KG     Posttreatment Pain Rating  3/10  -KG     Pain Location - Orientation  generalized  -KG     Pain Intervention(s)  Repositioned;Ambulation/increased activity  -KG     Row Name 06/09/21 1159          Plan of Care Review    Plan of Care Reviewed With  patient  -KG     Progress  improving  -KG     Outcome Summary  Pt increased ambulation distance to 110ft with Michaela x2 and B UE support on tele monitor. Frequent cues for upright posture and increased stride length. Pt required one standing rest break. Limited by fatigue and weakness. Continue to progress as appropriate.  -KG     Row Name 06/09/21 1159          Vital Signs    Pre Systolic BP Rehab  136  -KG     Pre Treatment Diastolic BP  74  -KG     Post Systolic BP Rehab  139  -KG     Post Treatment Diastolic BP  67  -KG     Pretreatment Heart Rate (beats/min)  74  -KG     Posttreatment Heart Rate (beats/min)  71  -KG     Pre SpO2 (%)  95  -KG     O2 Delivery Pre Treatment  room air  -KG     Post SpO2 (%)  96  -KG     O2 Delivery Post Treatment   room air  -KG     Pre Patient Position  Sitting  -KG     Intra Patient Position  Standing  -KG     Post Patient Position  Sitting  -KG     Row Name 06/09/21 1159          Positioning and Restraints    Pre-Treatment Position  sitting in chair/recliner  -KG     Post Treatment Position  chair  -KG     In Chair  notified nsg;reclined;call light within reach;encouraged to call for assist;RUE elevated;LUE elevated;legs elevated  -KG       User Key  (r) = Recorded By, (t) = Taken By, (c) = Cosigned By    Initials Name Provider Type    Aliza Bhandari, PT Physical Therapist        Outcome Measures     Row Name 06/09/21 1201          How much help from another person do you currently need...    Turning from your back to your side while in flat bed without using bedrails?  2  -KG     Moving from lying on back to sitting on the side of a flat bed without bedrails?  2  -KG     Moving to and from a bed to a chair (including a wheelchair)?  3  -KG     Standing up from a chair using your arms (e.g., wheelchair, bedside chair)?  2  -KG     Climbing 3-5 steps with a railing?  1  -KG     To walk in hospital room?  3  -KG     AM-PAC 6 Clicks Score (PT)  13  -KG     Row Name 06/09/21 1201          Functional Assessment    Outcome Measure Options  AM-PAC 6 Clicks Basic Mobility (PT)  -KG       User Key  (r) = Recorded By, (t) = Taken By, (c) = Cosigned By    Initials Name Provider Type    Aliza Bhandari PT Physical Therapist        Physical Therapy Education                 Title: PT OT SLP Therapies (In Progress)     Topic: Physical Therapy (In Progress)     Point: Mobility training (In Progress)     Learning Progress Summary           Patient Acceptance, E, NR by KG at 6/9/2021 0859    Acceptance, E, NR by EMANUEL at 6/8/2021 0800    Acceptance, E, NR by KG at 6/7/2021 1305    Acceptance, E, NR by EMANUEL at 6/3/2021 1015    Acceptance, E, NR by KG at 6/2/2021 0909    Acceptance, E, NR by KG at 6/1/2021 0933    Acceptance,  E, NR by KG at 5/31/2021 1118    Acceptance, E,TB, VU,NR by AY at 5/30/2021 1107                   Point: Home exercise program (In Progress)     Learning Progress Summary           Patient Acceptance, E, NR by KG at 6/9/2021 0859    Acceptance, E, NR by EMANUEL at 6/8/2021 0800    Acceptance, E, NR by KG at 6/7/2021 1305    Acceptance, E, NR by EMANUEL at 6/3/2021 1015    Acceptance, E, NR by KG at 6/2/2021 0909    Acceptance, E, NR by KG at 6/1/2021 0933    Acceptance, E, NR by KG at 5/31/2021 1118                   Point: Body mechanics (In Progress)     Learning Progress Summary           Patient Acceptance, E, NR by KG at 6/9/2021 0859    Acceptance, E, NR by EMANUEL at 6/8/2021 0800    Acceptance, E, NR by KG at 6/7/2021 1305    Acceptance, E, NR by EMANUEL at 6/3/2021 1015    Acceptance, E, NR by KG at 6/2/2021 0909    Acceptance, E, NR by KG at 6/1/2021 0933    Acceptance, E, NR by KG at 5/31/2021 1118    Acceptance, E,TB, VU,NR by AY at 5/30/2021 1107                   Point: Precautions (In Progress)     Learning Progress Summary           Patient Acceptance, E, NR by KG at 6/9/2021 0859    Acceptance, E, NR by EMANUEL at 6/8/2021 0800    Acceptance, E, NR by KG at 6/7/2021 1305    Acceptance, E, NR by EMANUEL at 6/3/2021 1015    Acceptance, E, NR by KG at 6/2/2021 0909    Acceptance, E, NR by KG at 6/1/2021 0933    Acceptance, E, NR by KG at 5/31/2021 1118    Acceptance, E,TB, VU,NR by AY at 5/30/2021 1107                               User Key     Initials Effective Dates Name Provider Type Discipline    EMANUEL 06/19/15 -  Sierra Kitchen, PT Physical Therapist PT    KG 05/22/20 -  Aliza Shen, PT Physical Therapist PT    AY 11/10/20 -  Megan Moffett, PT Physical Therapist PT              PT Recommendation and Plan     Plan of Care Reviewed With: patient  Progress: improving  Outcome Summary: Pt increased ambulation distance to 110ft with Michaela x2 and B UE support on tele monitor. Frequent cues for upright posture and  increased stride length. Pt required one standing rest break. Limited by fatigue and weakness. Continue to progress as appropriate.     Time Calculation:   PT Charges     Row Name 06/09/21 0859             Time Calculation    Start Time  0859  -KG      PT Received On  06/09/21  -KG      PT Goal Re-Cert Due Date  06/19/21  -KG         Time Calculation- PT    Total Timed Code Minutes- PT  23 minute(s)  -KG         Timed Charges    67877 - PT Therapeutic Activity Minutes  23  -KG         Total Minutes    Timed Charges Total Minutes  23  -KG       Total Minutes  23  -KG        User Key  (r) = Recorded By, (t) = Taken By, (c) = Cosigned By    Initials Name Provider Type    KG Aliza Shen, PT Physical Therapist        Therapy Charges for Today     Code Description Service Date Service Provider Modifiers Qty    88167432039 HC PT THERAPEUTIC ACT EA 15 MIN 6/9/2021 Aliza Shen, PT GP 2    76996771991 HC PT THER SUPP EA 15 MIN 6/9/2021 Aliza Shen, PT GP 2          PT G-Codes  Outcome Measure Options: AM-PAC 6 Clicks Basic Mobility (PT)  AM-PAC 6 Clicks Score (PT): 13  AM-PAC 6 Clicks Score (OT): 10    Jenise Shen PT  6/9/2021

## 2021-06-10 ENCOUNTER — APPOINTMENT (OUTPATIENT)
Dept: GENERAL RADIOLOGY | Facility: HOSPITAL | Age: 76
End: 2021-06-10

## 2021-06-10 LAB
ALBUMIN SERPL-MCNC: 2.7 G/DL (ref 3.5–5.2)
ANION GAP SERPL CALCULATED.3IONS-SCNC: 10 MMOL/L (ref 5–15)
BACTERIA SPEC AEROBE CULT: NORMAL
BACTERIA SPEC AEROBE CULT: NORMAL
BASOPHILS # BLD AUTO: 0.04 10*3/MM3 (ref 0–0.2)
BASOPHILS NFR BLD AUTO: 0.3 % (ref 0–1.5)
BUN SERPL-MCNC: 60 MG/DL (ref 8–23)
BUN/CREAT SERPL: 33.5 (ref 7–25)
CALCIUM SPEC-SCNC: 8.6 MG/DL (ref 8.6–10.5)
CHLORIDE SERPL-SCNC: 100 MMOL/L (ref 98–107)
CO2 SERPL-SCNC: 27 MMOL/L (ref 22–29)
CREAT SERPL-MCNC: 1.79 MG/DL (ref 0.76–1.27)
DEPRECATED RDW RBC AUTO: 52.2 FL (ref 37–54)
EOSINOPHIL # BLD AUTO: 0.2 10*3/MM3 (ref 0–0.4)
EOSINOPHIL NFR BLD AUTO: 1.4 % (ref 0.3–6.2)
ERYTHROCYTE [DISTWIDTH] IN BLOOD BY AUTOMATED COUNT: 16.2 % (ref 12.3–15.4)
GFR SERPL CREATININE-BSD FRML MDRD: 37 ML/MIN/1.73
GLUCOSE BLDC GLUCOMTR-MCNC: 109 MG/DL (ref 70–130)
GLUCOSE BLDC GLUCOMTR-MCNC: 143 MG/DL (ref 70–130)
GLUCOSE BLDC GLUCOMTR-MCNC: 181 MG/DL (ref 70–130)
GLUCOSE BLDC GLUCOMTR-MCNC: 186 MG/DL (ref 70–130)
GLUCOSE SERPL-MCNC: 103 MG/DL (ref 65–99)
HCT VFR BLD AUTO: 26.3 % (ref 37.5–51)
HGB BLD-MCNC: 8.5 G/DL (ref 13–17.7)
IMM GRANULOCYTES # BLD AUTO: 0.11 10*3/MM3 (ref 0–0.05)
IMM GRANULOCYTES NFR BLD AUTO: 0.7 % (ref 0–0.5)
LYMPHOCYTES # BLD AUTO: 0.87 10*3/MM3 (ref 0.7–3.1)
LYMPHOCYTES NFR BLD AUTO: 5.9 % (ref 19.6–45.3)
MAGNESIUM SERPL-MCNC: 2.1 MG/DL (ref 1.6–2.4)
MCH RBC QN AUTO: 30.1 PG (ref 26.6–33)
MCHC RBC AUTO-ENTMCNC: 32.3 G/DL (ref 31.5–35.7)
MCV RBC AUTO: 93.3 FL (ref 79–97)
MONOCYTES # BLD AUTO: 1.01 10*3/MM3 (ref 0.1–0.9)
MONOCYTES NFR BLD AUTO: 6.8 % (ref 5–12)
NEUTROPHILS NFR BLD AUTO: 12.54 10*3/MM3 (ref 1.7–7)
NEUTROPHILS NFR BLD AUTO: 84.9 % (ref 42.7–76)
NRBC BLD AUTO-RTO: 0 /100 WBC (ref 0–0.2)
PHOSPHATE SERPL-MCNC: 3.4 MG/DL (ref 2.5–4.5)
PLATELET # BLD AUTO: 241 10*3/MM3 (ref 140–450)
PMV BLD AUTO: 10.6 FL (ref 6–12)
POTASSIUM SERPL-SCNC: 3.6 MMOL/L (ref 3.5–5.2)
RBC # BLD AUTO: 2.82 10*6/MM3 (ref 4.14–5.8)
SODIUM SERPL-SCNC: 137 MMOL/L (ref 136–145)
WBC # BLD AUTO: 14.77 10*3/MM3 (ref 3.4–10.8)

## 2021-06-10 PROCEDURE — 63710000001 INSULIN DETEMIR PER 5 UNITS: Performed by: INTERNAL MEDICINE

## 2021-06-10 PROCEDURE — 94799 UNLISTED PULMONARY SVC/PX: CPT

## 2021-06-10 PROCEDURE — 25010000002 HEPARIN (PORCINE) PER 1000 UNITS: Performed by: INTERNAL MEDICINE

## 2021-06-10 PROCEDURE — 25010000002 CEFTRIAXONE PER 250 MG: Performed by: INTERNAL MEDICINE

## 2021-06-10 PROCEDURE — 63710000001 INSULIN REGULAR HUMAN PER 5 UNITS: Performed by: INTERNAL MEDICINE

## 2021-06-10 PROCEDURE — 97530 THERAPEUTIC ACTIVITIES: CPT

## 2021-06-10 PROCEDURE — 99232 SBSQ HOSP IP/OBS MODERATE 35: CPT | Performed by: NURSE PRACTITIONER

## 2021-06-10 PROCEDURE — 85025 COMPLETE CBC W/AUTO DIFF WBC: CPT | Performed by: INTERNAL MEDICINE

## 2021-06-10 PROCEDURE — 80069 RENAL FUNCTION PANEL: CPT | Performed by: INTERNAL MEDICINE

## 2021-06-10 PROCEDURE — 82962 GLUCOSE BLOOD TEST: CPT

## 2021-06-10 PROCEDURE — 83735 ASSAY OF MAGNESIUM: CPT | Performed by: INTERNAL MEDICINE

## 2021-06-10 PROCEDURE — 99233 SBSQ HOSP IP/OBS HIGH 50: CPT | Performed by: INTERNAL MEDICINE

## 2021-06-10 RX ORDER — HEPARIN SODIUM 5000 [USP'U]/ML
5000 INJECTION, SOLUTION INTRAVENOUS; SUBCUTANEOUS EVERY 12 HOURS SCHEDULED
Status: DISCONTINUED | OUTPATIENT
Start: 2021-06-10 | End: 2021-06-13

## 2021-06-10 RX ORDER — CARVEDILOL 12.5 MG/1
25 TABLET ORAL 2 TIMES DAILY WITH MEALS
Status: DISCONTINUED | OUTPATIENT
Start: 2021-06-10 | End: 2021-06-18 | Stop reason: HOSPADM

## 2021-06-10 RX ORDER — AMLODIPINE BESYLATE 10 MG/1
10 TABLET ORAL
Status: DISCONTINUED | OUTPATIENT
Start: 2021-06-11 | End: 2021-06-18 | Stop reason: HOSPADM

## 2021-06-10 RX ADMIN — DOCUSATE SODIUM 100 MG: 50 LIQUID ORAL at 08:15

## 2021-06-10 RX ADMIN — ASPIRIN 325 MG ORAL TABLET 325 MG: 325 PILL ORAL at 08:16

## 2021-06-10 RX ADMIN — ISOSORBIDE DINITRATE 10 MG: 20 TABLET ORAL at 08:16

## 2021-06-10 RX ADMIN — THIAMINE HCL TAB 100 MG 100 MG: 100 TAB at 08:15

## 2021-06-10 RX ADMIN — ATORVASTATIN CALCIUM 40 MG: 40 TABLET, FILM COATED ORAL at 21:29

## 2021-06-10 RX ADMIN — SODIUM CHLORIDE, PRESERVATIVE FREE 10 ML: 5 INJECTION INTRAVENOUS at 21:29

## 2021-06-10 RX ADMIN — SODIUM CHLORIDE, PRESERVATIVE FREE 10 ML: 5 INJECTION INTRAVENOUS at 08:13

## 2021-06-10 RX ADMIN — HEPARIN SODIUM 5000 UNITS: 5000 INJECTION INTRAVENOUS; SUBCUTANEOUS at 10:47

## 2021-06-10 RX ADMIN — Medication 1 PACKET: at 17:00

## 2021-06-10 RX ADMIN — SODIUM CHLORIDE 1 G: 900 INJECTION INTRAVENOUS at 17:06

## 2021-06-10 RX ADMIN — Medication 1 PACKET: at 21:31

## 2021-06-10 RX ADMIN — Medication 1 PACKET: at 14:33

## 2021-06-10 RX ADMIN — SENNOSIDES 10 ML: 8.8 LIQUID ORAL at 21:29

## 2021-06-10 RX ADMIN — ISOSORBIDE DINITRATE 10 MG: 20 TABLET ORAL at 12:55

## 2021-06-10 RX ADMIN — INSULIN DETEMIR 25 UNITS: 100 INJECTION, SOLUTION SUBCUTANEOUS at 21:30

## 2021-06-10 RX ADMIN — DOCUSATE SODIUM 100 MG: 50 LIQUID ORAL at 21:29

## 2021-06-10 RX ADMIN — ACETAMINOPHEN 649.6 MG: 160 SOLUTION ORAL at 06:12

## 2021-06-10 RX ADMIN — SENNOSIDES 10 ML: 8.8 LIQUID ORAL at 08:15

## 2021-06-10 RX ADMIN — GUAIFENESIN AND DEXTROMETHORPHAN 5 ML: 100; 10 SYRUP ORAL at 06:12

## 2021-06-10 RX ADMIN — ISOSORBIDE DINITRATE 10 MG: 20 TABLET ORAL at 17:00

## 2021-06-10 RX ADMIN — GUAIFENESIN AND DEXTROMETHORPHAN 5 ML: 100; 10 SYRUP ORAL at 21:29

## 2021-06-10 RX ADMIN — CARVEDILOL 12.5 MG: 12.5 TABLET, FILM COATED ORAL at 08:16

## 2021-06-10 RX ADMIN — PANTOPRAZOLE SODIUM 40 MG: 40 INJECTION, POWDER, FOR SOLUTION INTRAVENOUS at 08:15

## 2021-06-10 RX ADMIN — INSULIN HUMAN 2 UNITS: 100 INJECTION, SOLUTION PARENTERAL at 18:33

## 2021-06-10 RX ADMIN — AMLODIPINE BESYLATE 5 MG: 5 TABLET ORAL at 08:16

## 2021-06-10 RX ADMIN — ACETAMINOPHEN 650 MG: 160 SOLUTION ORAL at 14:34

## 2021-06-10 RX ADMIN — PANTOPRAZOLE SODIUM 40 MG: 40 INJECTION, POWDER, FOR SOLUTION INTRAVENOUS at 21:30

## 2021-06-10 RX ADMIN — HEPARIN SODIUM 5000 UNITS: 5000 INJECTION INTRAVENOUS; SUBCUTANEOUS at 21:30

## 2021-06-10 RX ADMIN — Medication 1 PACKET: at 08:15

## 2021-06-10 RX ADMIN — Medication 5 MG: at 21:30

## 2021-06-10 RX ADMIN — CARVEDILOL 25 MG: 12.5 TABLET, FILM COATED ORAL at 17:00

## 2021-06-10 RX ADMIN — GABAPENTIN 300 MG: 300 CAPSULE ORAL at 21:29

## 2021-06-10 NOTE — PLAN OF CARE
Goal Outcome Evaluation:  Plan of Care Reviewed With: patient        Progress: improving       Pt disoriented only to time, but remained pleasant and cooperative throughout shift. VEGA =.     VSS on room air. Cough is frequent, nonproductive. PRN robitussin given, see MAR.     NPO, tube feed continued without complications.  mL.

## 2021-06-10 NOTE — PROGRESS NOTES
"  [facility] at [facility]   Inpatient Progress Note       LOS: 19 days   Patient Care Team:  Rob Polanco MD as PCP - General (Internal Medicine)    Chief Complaint:  Follow-up for dyslipidemia    Subjective     Interval History:   Patient up in chair.  No specific cardiac complaints.  Denies any abdominal pain.  Notes bowels have moved since surgery.  Blood pressure has been elevated.      Review of Systems:   Pertinent positives noted in history, exam, and assessment. Otherwise reviewed and negative.      Objective     Vitals:  Blood pressure 158/94, pulse 77, temperature 98.5 °F (36.9 °C), temperature source Oral, resp. rate 20, height 170 cm (66.93\"), weight 84.8 kg (187 lb), SpO2 94 %.     Intake/Output Summary (Last 24 hours) at 6/10/2021 0847  Last data filed at 6/10/2021 0600  Gross per 24 hour   Intake 2068 ml   Output 1510 ml   Net 558 ml     Vitals reviewed.   Constitutional:       Appearance: Well-developed and not in distress.   Neck:      Vascular: No JVD.      Trachea: No tracheal deviation.   Pulmonary:      Effort: Pulmonary effort is normal.      Comments: Right sided rhonchi.  Cardiovascular:      Normal rate. Regular rhythm.   Edema:     Peripheral edema present.     Ankle: trace edema of the right ankle.  Abdominal:      General: Bowel sounds are normal.      Palpations: Abdomen is soft.      Tenderness: There is no abdominal tenderness.   Musculoskeletal:         General: No deformity. Neurological:      Mental Status: Alert and oriented to person, place, and time.            Results Review:     I reviewed the patient's new clinical results.    Results from last 7 days   Lab Units 06/10/21  0351   WBC 10*3/mm3 14.77*   HEMOGLOBIN g/dL 8.5*   HEMATOCRIT % 26.3*   PLATELETS 10*3/mm3 241     Results from last 7 days   Lab Units 06/10/21  0351 06/09/21  1013   SODIUM mmol/L 137 136   POTASSIUM mmol/L 3.6 3.5   CHLORIDE mmol/L 100 100   CO2 mmol/L 27.0 25.0   BUN mg/dL 60* 46* "   CREATININE mg/dL 1.79* 1.71*   CALCIUM mg/dL 8.6 8.2*   BILIRUBIN mg/dL  --  0.4   ALK PHOS U/L  --  162*   ALT (SGPT) U/L  --  21   AST (SGOT) U/L  --  24   GLUCOSE mg/dL 103* 112*     Results from last 7 days   Lab Units 06/10/21  0351   SODIUM mmol/L 137   POTASSIUM mmol/L 3.6   CHLORIDE mmol/L 100   CO2 mmol/L 27.0   BUN mg/dL 60*   CREATININE mg/dL 1.79*   GLUCOSE mg/dL 103*   CALCIUM mg/dL 8.6         No results found for: TROPONINI      Results from last 7 days   Lab Units 06/07/21  0301   CHOLESTEROL mg/dL 60   TRIGLYCERIDES mg/dL 51             Tele: Sinus rhythm    Assessment/Plan       NSTEMI 5/22/2021    Hyperlipidemia LDL goal <70    Essential hypertension    T2DM on oral agents and insulin. HbA1c 7.4     A/C kidney disease. ATN due to postoperative arrest. 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 Combination of anoxic insult and azotemia      NSTEMI  · CABG 5/28/2021  · Continue aspirin, beta-blocker, statin  · Normal EF pre op     Hypertension  · Amlodipine 5 mg daily  · Carvedilol 12.5 mg twice daily  · Isordil 10 mg 3 times daily  · Uncontrolled         Hyperlipidemia  · Continue atorvastatin     Acute kidney injury  · Renal function improving  · No ACE/ARB due to MICHAEL  · Nephrology following, patient receiving hemodialysis     Type 2 diabetes mellitus  · Management per hospitalist  · Hemoglobin A1c 7.4     Acute encephalopathy  · Management per intensivist    Plan:  · Increase amlodipine to 10 mg daily for hypertension  · Increase carvedilol to 12.5 mg twice daily for hypertension.  · Continue other current cardiac medications.  · Encouraged ambulation and I-S use.  · We will continue to follow along.    CHARLES Smith   Dictated utilizing Dragon dictation

## 2021-06-10 NOTE — THERAPY TREATMENT NOTE
Patient Name: Augusto Lorenzana Jr.  : 1945    MRN: [id number]                              Today's Date: 6/10/2021       Admit Date: 2021    Visit Dx:     ICD-10-CM ICD-9-CM   1. Non-STEMI (non-ST elevated myocardial infarction) (CMS/HCC)  I21.4 410.70   2. Acute congestive heart failure, unspecified heart failure type (CMS/HCC)  I50.9 428.0   3. History of coronary artery disease  Z86.79 V12.59   4. Coronary artery disease involving native coronary artery of native heart, angina presence unspecified  I25.10 414.01   5. Pharyngeal dysphagia  R13.13 787.23   6. MICHAEL (acute kidney injury) (CMS/HCC)  N17.9 584.9     Patient Active Problem List   Diagnosis   • NSTEMI 2021   • Hyperlipidemia LDL goal <70   • Essential hypertension   • T2DM on oral agents and insulin. HbA1c 7.4    • Severe 3 vessel CAD with preserved LV function (CMS/HCC)   • A/C kidney disease. ATN due to postoperative arrest. 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 Combination of anoxic insult and azotemia     Past Medical History:   Diagnosis Date   • CAD (coronary artery disease)    • CKD (chronic kidney disease) stage 3, GFR 30-59 ml/min (CMS/HCC)    • Diabetes mellitus (CMS/[facility])    • Hyperlipidemia    • Hypertension    • NSTEMI (non-ST elevated myocardial infarction) (CMS/HCC)      Past Surgical History:   Procedure Laterality Date   • CARDIAC CATHETERIZATION N/A 2021    Procedure: Left Heart Cath;  Surgeon: Blade Greene IV, MD;  Location: [facility] CATH INVASIVE LOCATION;  Service: Cardiovascular;  Laterality: N/A;   • CARDIAC SURGERY      2 stents placed .   • CORONARY ARTERY BYPASS GRAFT N/A 2021    Procedure: MEDIAN STERNOTOMY CORONARY ARTERY BYPASS X 3 UTILIZING THE LEFT INTERNAL MAMMARY ARTERY GRAFT, EVH OF THE GREATER RIGHT SAPHENOUS VEIN, AND  PILO PER ANESTHESIA;  Surgeon: Jacek Miller MD;  Location: [facility];  Service: Cardiothoracic;  Laterality: N/A;     General Information     Row Name 06/10/21 1117          Physical Therapy Time and Intention    Document Type  therapy note (daily note)  -KG     Mode of Treatment  physical therapy  -KG     Row Name 06/10/21 1117          General Information    Existing Precautions/Restrictions  cardiac;fall;sternal;other (see comments) NG  -KG     Row Name 06/10/21 1117          Cognition    Orientation Status (Cognition)  oriented to;person;place;situation  -KG     Row Name 06/10/21 1117          Safety Issues, Functional Mobility    Safety Issues Affecting Function (Mobility)  awareness of need for assistance;insight into deficits/self-awareness;safety precaution awareness;safety precautions follow-through/compliance  -KG     Impairments Affecting Function (Mobility)  balance;coordination;endurance/activity tolerance;postural/trunk control;pain;strength  -KG       User Key  (r) = Recorded By, (t) = Taken By, (c) = Cosigned By    Initials Name Provider Type    KG Aliza Shen, [place] Physical Therapist        Mobility     Row Name 06/10/21 1118          Bed Mobility    Comment (Bed Mobility)  UIC  -KG     Row Name 06/10/21 1118          Transfers    Comment (Transfers)  VC's for sequencing and safe hand placement to maintain sternal precautions. Continues to require frequent cueing for correct posture.  -KG     Row Name 06/10/21 1118          Sit-Stand Transfer    Sit-Stand [place] (Transfers)  moderate assist (50% patient effort);2 person assist;verbal cues  -KG     Row Name 06/10/21 1118          Gait/Stairs (Locomotion)    [place] Level (Gait)  minimum assist (75% patient effort);2 person assist;verbal cues  -KG     Assistive Device (Gait)  other (see comments) B UE support on tele monitor  -KG     Distance in Feet (Gait)  220  -KG     Deviations/Abnormal Patterns (Gait)  base of support,  narrow;chinyere decreased;stride length decreased  -KG     Bilateral Gait Deviations  forward flexed posture;heel strike decreased  -KG     Comment (Gait/Stairs)  Pt demonstrated step through gait pattern with slow chinyere and decreased step length. Improved balance and stability this date. One brief standing rest break due to fatigue.  -KG       User Key  (r) = Recorded By, (t) = Taken By, (c) = Cosigned By    Initials Name Provider Type    KG Aliza Shen PT Physical Therapist        Obj/Interventions     Row Name 06/10/21 1120          Balance    Balance Assessment  sitting static balance;standing static balance;standing dynamic balance  -KG     Static Sitting Balance  WFL;sitting in chair  -KG     Static Standing Balance  mild impairment;supported;standing  -KG     Dynamic Standing Balance  mild impairment;supported;standing  -KG       User Key  (r) = Recorded By, (t) = Taken By, (c) = Cosigned By    Initials Name Provider Type    KG Aliza Shen, PT Physical Therapist        Goals/Plan    No documentation.       Clinical Impression     Row Name 06/10/21 1120          Pain    Additional Documentation  Pain Scale: Numbers Pre/Post-Treatment (Group)  -KG     Row Name 06/10/21 1120          Pain Scale: Numbers Pre/Post-Treatment    Pretreatment Pain Rating  3/10  -KG     Posttreatment Pain Rating  4/10  -KG     Pain Location - Orientation  incisional  -KG     Pain Location  chest  -KG     Pain Intervention(s)  Repositioned;Ambulation/increased activity  -KG     [facility] Name 06/10/21 1120          Plan of Care Review    Plan of Care Reviewed With  patient  -KG     Progress  improving  -KG     Outcome Summary  Pt increased ambulation distance to 220ft with Michaela x2 and B UE supoport on tele monitor. Pt demonstrated improved balance and stability with increased step length. Pt required one brief standing rest break. Mobility limited by fatigue. Continue to progress as appropriate.  -KG     Row Name  06/10/21 1120          Vital Signs    Pre Systolic BP Rehab  155  -KG     Pre Treatment Diastolic BP  82  -KG     Post Systolic BP Rehab  173  -KG     Post Treatment Diastolic BP  81  -KG     Pretreatment Heart Rate (beats/min)  78  -KG     Posttreatment Heart Rate (beats/min)  77  -KG     Pre SpO2 (%)  97  -KG     O2 Delivery Pre Treatment  room air  -KG     Post SpO2 (%)  97  -KG     O2 Delivery Post Treatment  room air  -KG     Pre Patient Position  Sitting  -KG     Intra Patient Position  Standing  -KG     Post Patient Position  Sitting  -KG     Row Name 06/10/21 1120          Positioning and Restraints    Pre-Treatment Position  sitting in chair/recliner  -KG     Post Treatment Position  chair  -KG     In Chair  reclined;call light within reach;encouraged to call for assist;exit alarm on;with nsg;RUE elevated;LUE elevated;legs elevated  -KG       User Key  (r) = Recorded By, (t) = Taken By, (c) = Cosigned By    Initials Name Provider Type    Aliza Bhandari PT Physical Therapist        Outcome Measures     Row Name 06/10/21 1135          How much help from another person do you currently need...    Turning from your back to your side while in flat bed without using bedrails?  2  -KG     Moving from lying on back to sitting on the side of a flat bed without bedrails?  2  -KG     Moving to and from a bed to a chair (including a wheelchair)?  3  -KG     Standing up from a chair using your arms (e.g., wheelchair, bedside chair)?  2  -KG     Climbing 3-5 steps with a railing?  2  -KG     To walk in hospital room?  3  -KG     AM-PAC 6 Clicks Score (PT)  14  -KG     Row Name 06/10/21 1135          Functional Assessment    Outcome Measure Options  AM-PAC 6 Clicks Basic Mobility (PT)  -KG       User Key  (r) = Recorded By, (t) = Taken By, (c) = Cosigned By    Initials Name Provider Type    Aliza Bhandari PT Physical Therapist        Physical Therapy Education                 Title: PT OT SLP  Therapies (In Progress)     Topic: Physical Therapy (In Progress)     Point: Mobility training (In Progress)     Learning Progress Summary           Patient Acceptance, E, NR by KG at 6/10/2021 0811    Acceptance, E, NR by KG at 6/9/2021 0859    Acceptance, E, NR by EMANUEL at 6/8/2021 0800    Acceptance, E, NR by KG at 6/7/2021 1305    Acceptance, E, NR by EMANUEL at 6/3/2021 1015    Acceptance, E, NR by KG at 6/2/2021 0909    Acceptance, E, NR by KG at 6/1/2021 0933    Acceptance, E, NR by KG at 5/31/2021 1118    Acceptance, E,TB, VU,NR by AY at 5/30/2021 1107                   Point: Home exercise program (In Progress)     Learning Progress Summary           Patient Acceptance, E, NR by KG at 6/10/2021 0811    Acceptance, E, NR by KG at 6/9/2021 0859    Acceptance, E, NR by EMANUEL at 6/8/2021 0800    Acceptance, E, NR by KG at 6/7/2021 1305    Acceptance, E, NR by EMANUEL at 6/3/2021 1015    Acceptance, E, NR by KG at 6/2/2021 0909    Acceptance, E, NR by KG at 6/1/2021 0933    Acceptance, E, NR by KG at 5/31/2021 1118                   Point: Body mechanics (In Progress)     Learning Progress Summary           Patient Acceptance, E, NR by KG at 6/10/2021 0811    Acceptance, E, NR by KG at 6/9/2021 0859    Acceptance, E, NR by EMANUEL at 6/8/2021 0800    Acceptance, E, NR by KG at 6/7/2021 1305    Acceptance, E, NR by EMANUEL at 6/3/2021 1015    Acceptance, E, NR by KG at 6/2/2021 0909    Acceptance, E, NR by KG at 6/1/2021 0933    Acceptance, E, NR by KG at 5/31/2021 1118    Acceptance, E,TB, VU,NR by AY at 5/30/2021 1107                   Point: Precautions (In Progress)     Learning Progress Summary           Patient Acceptance, E, NR by KG at 6/10/2021 0811    Acceptance, E, NR by KG at 6/9/2021 0859    Acceptance, E, NR by EMANUEL at 6/8/2021 0800    Acceptance, E, NR by KG at 6/7/2021 1305    Acceptance, E, NR by EMANUEL at 6/3/2021 1015    Acceptance, E, NR by KG at 6/2/2021 0909    Acceptance, E, NR by KG at 6/1/2021 0933    Acceptance, E, NR  by KG at 5/31/2021 1118    Acceptance, E,TB, VU,NR by AY at 5/30/2021 1107                               User Key     Initials Effective Dates Name Provider Type Discipline    EMANUEL 06/19/15 -  Sierra Kitchen, PT Physical Therapist PT    KG 05/22/20 -  Aliza Shen, PT Physical Therapist PT    AY 11/10/20 -  Megan Moffett PT Physical Therapist PT              PT Recommendation and Plan     Plan of Care Reviewed With: patient  Progress: improving  Outcome Summary: Pt increased ambulation distance to 220ft with Michaela x2 and B UE supoport on tele monitor. Pt demonstrated improved balance and stability with increased step length. Pt required one brief standing rest break. Mobility limited by fatigue. Continue to progress as appropriate.     Time Calculation:   PT Charges     Row Name 06/10/21 0811             Time Calculation    Start Time  0811  -KG      PT Received On  06/10/21  -KG      PT Goal Re-Cert Due Date  06/19/21  -KG         Time Calculation- PT    Total Timed Code Minutes- PT  24 minute(s)  -KG         Timed Charges    67499 - PT Therapeutic Activity Minutes  24  -KG         Total Minutes    Timed Charges Total Minutes  24  -KG       Total Minutes  24  -KG        User Key  (r) = Recorded By, (t) = Taken By, (c) = Cosigned By    Initials Name Provider Type    KG Aliza Shen, PT Physical Therapist        Therapy Charges for Today     Code Description Service Date Service Provider Modifiers Qty    14387870159 HC PT THERAPEUTIC ACT EA 15 MIN 6/9/2021 Aliza Shen, PT GP 2    98476085374 HC PT THER SUPP EA 15 MIN 6/9/2021 Aliza Shen, PT GP 2    99403292309 HC PT THERAPEUTIC ACT EA 15 MIN 6/10/2021 Aliza Shen, PT GP 2          PT G-Codes  Outcome Measure Options: AM-PAC 6 Clicks Basic Mobility (PT)  AM-PAC 6 Clicks Score (PT): 14  AM-PAC 6 Clicks Score (OT): 10    Jenise Shen PT  6/10/2021

## 2021-06-10 NOTE — PROGRESS NOTES
Cardiothoracic Surgery Progress Note      POD # 13 s/p CABG       LOS: 19 days      Subjective:  Mentation much improved, making urine      Objective:  Vital Signs  Temp:  [97.7 °F (36.5 °C)-98.7 °F (37.1 °C)] 98.5 °F (36.9 °C)  Heart Rate:  [63-78] 77  Resp:  [16-20] 20  BP: (113-163)/(61-94) 158/94    Physical Exam:   General Appearance: easily roused   Lungs: clear to auscultation, respirations regular, respirations even and respirations unlabored   Heart: regular rhythm & normal rate, normal S1, S2, no murmur, no gallop, no rub and no click   Skin: Sternal Incision c/d/i, leg EVH incision draining scant serosanguinous fluid     Results:    Results from last 7 days   Lab Units 06/10/21  0351   WBC 10*3/mm3 14.77*   HEMOGLOBIN g/dL 8.5*   HEMATOCRIT % 26.3*   PLATELETS 10*3/mm3 241     Results from last 7 days   Lab Units 06/10/21  0351   SODIUM mmol/L 137   POTASSIUM mmol/L 3.6   CHLORIDE mmol/L 100   CO2 mmol/L 27.0   BUN mg/dL 60*   CREATININE mg/dL 1.79*   GLUCOSE mg/dL 103*   CALCIUM mg/dL 8.6         Assessment:    NSTEMI 5/22/2021    Hyperlipidemia LDL goal <70    Essential hypertension    T2DM on oral agents and insulin. HbA1c 7.4     A/C kidney disease. ATN due to postoperative arrest. 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 Combination of anoxic insult and azotemia    POD 13 CABG x3    Plan:  Doing much better  Cr 1.7, stable, made 1600 urine  tx to tele  Will need rehab at discharge, estimate beginning of next week  Question of need for HD and feeding issues to be addressed in the next few days        Jacek Miller MD  06/10/21  08:12 EDT

## 2021-06-10 NOTE — PROGRESS NOTES
"   LOS: 19 days    Patient Care Team:  Rob Polanco MD as PCP - General (Internal Medicine)    Reason For Visit:  F/U MICHAEL ON CKD  Subjective           Review of Systems:    Pulm: No soa   CV:  No CP      Objective     [START ON 6/11/2021] amLODIPine, 10 mg, Nasogastric, Q24H  aspirin, 325 mg, Nasogastric, Daily  atorvastatin, 40 mg, Nasogastric, Nightly  Beneprotein, 1 packet, Nasogastric, 4x Daily  carvedilol, 25 mg, Nasogastric, BID With Meals  cefTRIAXone, 1 g, Intravenous, Q24H  sennosides, 10 mL, Nasogastric, BID   And  docusate sodium, 100 mg, Nasogastric, BID  gabapentin, 300 mg, Nasogastric, Nightly  heparin (porcine), 5,000 Units, Subcutaneous, Q12H  insulin detemir, 25 Units, Subcutaneous, Nightly  insulin regular, 0-9 Units, Subcutaneous, Q6H  isosorbide dinitrate, 10 mg, Nasogastric, TID - Nitrates  melatonin, 5 mg, Nasogastric, Nightly  pantoprazole, 40 mg, Intravenous, BID  pharmacy consult - MTM, , Does not apply, Daily  sodium chloride, 10 mL, Intravenous, Q12H  thiamine, 100 mg, Nasogastric, Daily      dexmedetomidine, 0.2-1.5 mcg/kg/hr, Last Rate: Stopped (06/08/21 1030)          Vital Signs:  Blood pressure 146/72, pulse 75, temperature 98.3 °F (36.8 °C), temperature source Oral, resp. rate 18, height 170 cm (66.93\"), weight 84.8 kg (187 lb), SpO2 95 %.    Flowsheet Rows      First Filed Value   Admission Height  170.2 cm (67\") Documented at 05/22/2021 0433   Admission Weight  88.5 kg (195 lb) Documented at 05/22/2021 0433          06/09 0701 - 06/10 0700  In: 2068 [I.V.:161]  Out: 1660 [Urine:1660]    Physical Exam:    General Appearance: NAD, alert and cooperative, Ox3  Eyes: PER, conjunctivae and sclerae normal, no icterus  Lungs: OCC RHONCHI  Heart/CV: regular rhythm & normal rate, no murmur, no gallop, no rub and TR-1+ edema  Abdomen: not distended, soft, non-tender, no masses,  bowel sounds present  Skin: No rash, Warm and dry. TEMP IJ HD CATH. . GRIFFITHS "     Radiology:            Labs:  Results from last 7 days   Lab Units 06/10/21  0351 06/09/21  0346 06/08/21  1211 06/08/21  0340   WBC 10*3/mm3 14.77* 14.12*  --  11.44*   HEMOGLOBIN g/dL 8.5* 8.3* 8.3* 7.8*   HEMATOCRIT % 26.3* 26.0* 26.1* 24.3*   PLATELETS 10*3/mm3 241 235  --  185     Results from last 7 days   Lab Units 06/10/21  0351 06/09/21  1013 06/08/21  0340 06/07/21  0301 06/06/21  0357   SODIUM mmol/L 137 136 134* 134* 136   POTASSIUM mmol/L 3.6 3.5 3.9 3.9 4.4   CHLORIDE mmol/L 100 100 99 98 98   CO2 mmol/L 27.0 25.0 27.0 26.0 27.0   BUN mg/dL 60* 46* 56* 84* 55*   CREATININE mg/dL 1.79* 1.71* 1.98* 2.92* 2.31*   CALCIUM mg/dL 8.6 8.2* 8.5* 8.8 8.9   PHOSPHORUS mg/dL 3.4 3.1 3.8 5.2* 4.3   MAGNESIUM mg/dL 2.1  --  2.0 2.4 2.1   ALBUMIN g/dL 2.70* 2.40* 2.50* 2.90* 2.80*     Results from last 7 days   Lab Units 06/10/21  0351   GLUCOSE mg/dL 103*       Results from last 7 days   Lab Units 06/09/21  1013   ALK PHOS U/L 162*   BILIRUBIN mg/dL 0.4   ALT (SGPT) U/L 21   AST (SGOT) U/L 24                 Estimated Creatinine Clearance: 37.6 mL/min (A) (by C-G formula based on SCr of 1.79 mg/dL (H)).      Assessment       NSTEMI 5/22/2021    Hyperlipidemia LDL goal <70    Essential hypertension    T2DM on oral agents and insulin. HbA1c 7.4     A/C kidney disease. ATN due to postoperative arrest. 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 Combination of anoxic insult and azotemia            Impression: NONOLIGURIC MICHAEL. WORSE GFR/AZOTEMIA. GOOD U/O. ANEMIA.             Recommendations: HD 6/11/21 WITH PARAMETERS.      Aiden Godfrey MD  06/10/21  10:09 EDT

## 2021-06-10 NOTE — PROGRESS NOTES
Clinical Nutrition     Multidisciplinary Rounds      Patient Name: Augusto Lorenzana Jr.  Date of Encounter: 06/10/21 19:39 EDT  MRN: [id number]  Admission date: 5/22/2021      Reason for visit: MDR. RD to continue to follow per protocol.     Additional information obtained during MDR: Plan for SLP eval to determine if will need PEG.     Current diet: NPO Diet  Orders Placed This Encounter      Dietary Nutrition Supplements Beneprotein      Diet, Tube Feeding Tube Feeding Formula: Novasource Renal (Electrolyte Restricted)  45 ml/hr Water at 35ml/hr 4 Beneprotein/da    Infusing at goal at time of visit per NGT    Delivery 1 day:  1005 ml TF  831 ml Water 4 Beneprotein for  2110 Kcal  111% est need  115 g % est need  723 ml H20 in TF  1554 total ml Free Water     EMR reviewed   Labs reviewed    Intervention:  Follow treatment plan  Care plan reviewed    Follow up:   Per protocol      Marry Sneed RD  19:39 EDT  Time: 20min

## 2021-06-10 NOTE — PROGRESS NOTES
Intensive Care Follow-up     Hospital:  LOS: 19 days   Mr. Augusto Lorenzana Jr., 76 y.o. male is followed for:   NSTEMI (non-ST elevated myocardial infarction) (CMS/Formerly [facility])            History of present illness:   76-year-old male with history of coronary disease status post RCA stent back in 2009, diabetes mellitus, hypertension, dyslipidemia, chronic kidney disease, gout and smokeless tobacco use.  Patient presented to [facility] ER on May 22 complaining of dyspnea.  Patient was found to have non-STEMI and cardiology team performed left heart catheter was found to have multivessel coronary disease.  Patient subsequently underwent coronary bypass graft surgery x3 on May 28.  Course was complicated by involvement of ventricular fibrillation and asystole requiring resuscitation x2 on the day of surgery.  Subsequently underwent paced rhythm for 48 hours before resuming sinus rhythm.  Patient also required 3 units of PRBCs, 4 units FFP and 1 sixpack of platelets.  Patient was extubated on May 29.  Patient however developed worsening MICHAEL azotemia requiring dialysis support.  Lower extremity Doppler was negative for DVT.    Subjective   Interval History:  Overnight did well.  No further GI bleed noted.  Difficulty clearing secretions and will add flutter valve.  No further episodes of agitation.  Remains off Precedex.  Remains off Seroquel as well..             The patient's past medical, surgical and social history were reviewed and updated in Epic as appropriate.       Objective     Infusions:  dexmedetomidine, 0.2-1.5 mcg/kg/hr, Last Rate: Stopped (06/08/21 1030)      Medications:  [START ON 6/11/2021] amLODIPine, 10 mg, Nasogastric, Q24H  aspirin, 325 mg, Nasogastric, Daily  atorvastatin, 40 mg, Nasogastric, Nightly  Beneprotein, 1 packet, Nasogastric, 4x Daily  carvedilol, 25 mg, Nasogastric, BID With Meals  cefTRIAXone, 1 g, Intravenous, Q24H  sennosides, 10 mL, Nasogastric, BID   And  docusate sodium, 100 mg,  "Nasogastric, BID  gabapentin, 300 mg, Nasogastric, Nightly  heparin (porcine), 5,000 Units, Subcutaneous, Q12H  insulin detemir, 25 Units, Subcutaneous, Nightly  insulin regular, 0-9 Units, Subcutaneous, Q6H  isosorbide dinitrate, 10 mg, Nasogastric, TID - Nitrates  melatonin, 5 mg, Nasogastric, Nightly  pantoprazole, 40 mg, Intravenous, BID  pharmacy consult - MTM, , Does not apply, Daily  sodium chloride, 10 mL, Intravenous, Q12H  thiamine, 100 mg, Nasogastric, Daily        Vital Sign Min/Max for last 24 hours  Temp  Min: 97.7 °F (36.5 °C)  Max: 98.7 °F (37.1 °C)   BP  Min: 120/70  Max: 163/93   Pulse  Min: 66  Max: 78   Resp  Min: 16  Max: 20   SpO2  Min: 93 %  Max: 97 %   No data recorded       Input/Output for last 24 hour shift  06/09 0701 - 06/10 0700  In: 2068 [I.V.:161]  Out: 1660 [Urine:1660]         Objective:  Vital signs: (most recent): Blood pressure 127/71, pulse 68, temperature 98.3 °F (36.8 °C), temperature source Oral, resp. rate 18, height 170 cm (66.93\"), weight 84.8 kg (187 lb), SpO2 97 %.            General Appearance: Awake, in no acute distress  Head:    Pupils reactive & symmetrical B/L.  Lungs:   B/L Breath sounds present with decreased breath sounds on bases, no wheezing heard, no crackles.   Heart: S1 and S2 present, no murmur  Abdomen: Soft, nontender, no guarding or rigidity, bowel sounds positive  Extremities: Dressing intact on right lower extremity edematous  Pulses: Positive and symmetric.  Neurologic: Awake, alert, moving all four extremities.  Following commands.    Results from last 7 days   Lab Units 06/10/21  0351 06/09/21  0346 06/08/21  1211 06/08/21  0340   WBC 10*3/mm3 14.77* 14.12*  --  11.44*   HEMOGLOBIN g/dL 8.5* 8.3* 8.3* 7.8*   PLATELETS 10*3/mm3 241 235  --  185     Results from last 7 days   Lab Units 06/10/21  0351 06/09/21  1013 06/08/21  0340 06/07/21  0301   SODIUM mmol/L 137 136 134* 134*   POTASSIUM mmol/L 3.6 3.5 3.9 3.9   CO2 mmol/L 27.0 25.0 27.0 26.0   BUN " mg/dL 60* 46* 56* 84*   CREATININE mg/dL 1.79* 1.71* 1.98* 2.92*   MAGNESIUM mg/dL 2.1  --  2.0 2.4   PHOSPHORUS mg/dL 3.4 3.1 3.8 5.2*   GLUCOSE mg/dL 103* 112* 94 106*     Estimated Creatinine Clearance: 37.6 mL/min (A) (by C-G formula based on SCr of 1.79 mg/dL (H)).          Images:   None new    I reviewed the patient's results and images.     Assessment/Plan   Impression        NSTEMI 5/22/2021    Hyperlipidemia LDL goal <70    Essential hypertension    T2DM on oral agents and insulin. HbA1c 7.4     A/C kidney disease. ATN due to postoperative arrest. 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 Combination of anoxic insult and azotemia       Plan        1.  Patient with ongoing delirium state post CABG.  Continue thiamine.  Improving neurologically and more awake and alert.  Continue close monitoring and reorientation.    2.  On Rocephin for urine infection.  Will monitor for now.  No new fevers noted.  No new hemodynamic instability.  WBC count stable.  No bandemia or hemodynamic instability noted.  Difficulty clearing secretions and will add flutter valve.  3.  Hemoglobin stable overnight.  Continue Protonix twice a day.  GI team evaluated and no intervention planned at this time.    4.  Nephrology is managing renal failure.  BUN has improved after aggressive hemodialysis.  Diuretics were given yesterday with improvement in urine output but BUN is still elevated.  Plan is for hemodialysis tomorrow.  5.  Enteral nutrition as tolerated.  Speech to reevaluate to make sure when he is able to tolerate oral diet.  Continue nova source renal for now.  If unable to clear speech evaluation then will likely need PEG tube placement.  6.  Wound care following EVH site or drainage.  Continue current dressings.  No definite evidence of cellulitis.  DVT study was  negative.  Repeat Doppler is negative as well.  7.  Resume Heparin 5000 units subcu every 12 for DVT prophylaxis.  8.  Insulin regimen for hyperglycemia.  Blood sugars are acceptable.    Overall guarded prognosis we will continue to provide supportive care at this time.  Okay to transfer out to telemetry floor if okay with surgical team..    Plan of care and goals reviewed with multidisciplinary/antibiotic stewardship team during rounds.   I discussed the patient's findings and my recommendations with patient and nursing staff     Time spent 35 min (exclusive of procedure time)  including high complexity decision making to assess, manipulate, and support vital organ system failure in this individual who has impairment of one or more vital organ systems such that there is a high probability of imminent or life threatening deterioration in the patient’s condition.      Godwin Ko MD, [facility]  Pulmonary, Critical care and Sleep Medicine

## 2021-06-11 ENCOUNTER — ANCILLARY PROCEDURE (OUTPATIENT)
Dept: SPEECH THERAPY | Facility: HOSPITAL | Age: 76
End: 2021-06-11

## 2021-06-11 ENCOUNTER — APPOINTMENT (OUTPATIENT)
Dept: GENERAL RADIOLOGY | Facility: HOSPITAL | Age: 76
End: 2021-06-11

## 2021-06-11 LAB
ANION GAP SERPL CALCULATED.3IONS-SCNC: 9 MMOL/L (ref 5–15)
BASOPHILS # BLD AUTO: 0.05 10*3/MM3 (ref 0–0.2)
BASOPHILS NFR BLD AUTO: 0.4 % (ref 0–1.5)
BUN SERPL-MCNC: 64 MG/DL (ref 8–23)
BUN/CREAT SERPL: 36.2 (ref 7–25)
CALCIUM SPEC-SCNC: 9.1 MG/DL (ref 8.6–10.5)
CHLORIDE SERPL-SCNC: 100 MMOL/L (ref 98–107)
CO2 SERPL-SCNC: 27 MMOL/L (ref 22–29)
CREAT SERPL-MCNC: 1.77 MG/DL (ref 0.76–1.27)
DEPRECATED RDW RBC AUTO: 53.9 FL (ref 37–54)
EOSINOPHIL # BLD AUTO: 0.22 10*3/MM3 (ref 0–0.4)
EOSINOPHIL NFR BLD AUTO: 1.7 % (ref 0.3–6.2)
ERYTHROCYTE [DISTWIDTH] IN BLOOD BY AUTOMATED COUNT: 16.2 % (ref 12.3–15.4)
GFR SERPL CREATININE-BSD FRML MDRD: 38 ML/MIN/1.73
GLUCOSE BLDC GLUCOMTR-MCNC: 128 MG/DL (ref 70–130)
GLUCOSE BLDC GLUCOMTR-MCNC: 130 MG/DL (ref 70–130)
GLUCOSE BLDC GLUCOMTR-MCNC: 147 MG/DL (ref 70–130)
GLUCOSE BLDC GLUCOMTR-MCNC: 156 MG/DL (ref 70–130)
GLUCOSE BLDC GLUCOMTR-MCNC: 157 MG/DL (ref 70–130)
GLUCOSE SERPL-MCNC: 144 MG/DL (ref 65–99)
HCT VFR BLD AUTO: 27.2 % (ref 37.5–51)
HGB BLD-MCNC: 8.7 G/DL (ref 13–17.7)
IMM GRANULOCYTES # BLD AUTO: 0.12 10*3/MM3 (ref 0–0.05)
IMM GRANULOCYTES NFR BLD AUTO: 0.9 % (ref 0–0.5)
LYMPHOCYTES # BLD AUTO: 0.91 10*3/MM3 (ref 0.7–3.1)
LYMPHOCYTES NFR BLD AUTO: 7.1 % (ref 19.6–45.3)
MCH RBC QN AUTO: 30.1 PG (ref 26.6–33)
MCHC RBC AUTO-ENTMCNC: 32 G/DL (ref 31.5–35.7)
MCV RBC AUTO: 94.1 FL (ref 79–97)
MONOCYTES # BLD AUTO: 0.89 10*3/MM3 (ref 0.1–0.9)
MONOCYTES NFR BLD AUTO: 7 % (ref 5–12)
NEUTROPHILS NFR BLD AUTO: 10.54 10*3/MM3 (ref 1.7–7)
NEUTROPHILS NFR BLD AUTO: 82.9 % (ref 42.7–76)
NRBC BLD AUTO-RTO: 0 /100 WBC (ref 0–0.2)
PLATELET # BLD AUTO: 268 10*3/MM3 (ref 140–450)
PMV BLD AUTO: 10.9 FL (ref 6–12)
POTASSIUM SERPL-SCNC: 3.6 MMOL/L (ref 3.5–5.2)
RBC # BLD AUTO: 2.89 10*6/MM3 (ref 4.14–5.8)
SODIUM SERPL-SCNC: 136 MMOL/L (ref 136–145)
WBC # BLD AUTO: 12.73 10*3/MM3 (ref 3.4–10.8)

## 2021-06-11 PROCEDURE — 92526 ORAL FUNCTION THERAPY: CPT

## 2021-06-11 PROCEDURE — 99232 SBSQ HOSP IP/OBS MODERATE 35: CPT | Performed by: NURSE PRACTITIONER

## 2021-06-11 PROCEDURE — 80048 BASIC METABOLIC PNL TOTAL CA: CPT | Performed by: INTERNAL MEDICINE

## 2021-06-11 PROCEDURE — 97530 THERAPEUTIC ACTIVITIES: CPT

## 2021-06-11 PROCEDURE — 99232 SBSQ HOSP IP/OBS MODERATE 35: CPT | Performed by: INTERNAL MEDICINE

## 2021-06-11 PROCEDURE — 25010000002 ALBUMIN HUMAN 5% PER 50 ML: Performed by: INTERNAL MEDICINE

## 2021-06-11 PROCEDURE — 92612 ENDOSCOPY SWALLOW (FEES) VID: CPT

## 2021-06-11 PROCEDURE — 63710000001 INSULIN DETEMIR PER 5 UNITS: Performed by: INTERNAL MEDICINE

## 2021-06-11 PROCEDURE — 25010000002 HEPARIN (PORCINE) PER 1000 UNITS: Performed by: INTERNAL MEDICINE

## 2021-06-11 PROCEDURE — 82962 GLUCOSE BLOOD TEST: CPT

## 2021-06-11 PROCEDURE — 74018 RADEX ABDOMEN 1 VIEW: CPT

## 2021-06-11 PROCEDURE — 85025 COMPLETE CBC W/AUTO DIFF WBC: CPT | Performed by: INTERNAL MEDICINE

## 2021-06-11 PROCEDURE — 63710000001 INSULIN REGULAR HUMAN PER 5 UNITS: Performed by: INTERNAL MEDICINE

## 2021-06-11 PROCEDURE — P9041 ALBUMIN (HUMAN),5%, 50ML: HCPCS | Performed by: INTERNAL MEDICINE

## 2021-06-11 RX ORDER — ALBUMIN, HUMAN INJ 5% 5 %
25 SOLUTION INTRAVENOUS ONCE
Status: COMPLETED | OUTPATIENT
Start: 2021-06-11 | End: 2021-06-11

## 2021-06-11 RX ORDER — BUMETANIDE 0.25 MG/ML
1 INJECTION INTRAMUSCULAR; INTRAVENOUS ONCE
Status: COMPLETED | OUTPATIENT
Start: 2021-06-11 | End: 2021-06-11

## 2021-06-11 RX ADMIN — ASPIRIN 325 MG ORAL TABLET 325 MG: 325 PILL ORAL at 08:26

## 2021-06-11 RX ADMIN — PANTOPRAZOLE SODIUM 40 MG: 40 INJECTION, POWDER, FOR SOLUTION INTRAVENOUS at 21:17

## 2021-06-11 RX ADMIN — SENNOSIDES 10 ML: 8.8 LIQUID ORAL at 21:17

## 2021-06-11 RX ADMIN — HEPARIN SODIUM 5000 UNITS: 5000 INJECTION INTRAVENOUS; SUBCUTANEOUS at 08:26

## 2021-06-11 RX ADMIN — INSULIN HUMAN 2 UNITS: 100 INJECTION, SOLUTION PARENTERAL at 00:15

## 2021-06-11 RX ADMIN — ACETAMINOPHEN 649.6 MG: 160 SOLUTION ORAL at 00:23

## 2021-06-11 RX ADMIN — INSULIN HUMAN 2 UNITS: 100 INJECTION, SOLUTION PARENTERAL at 12:11

## 2021-06-11 RX ADMIN — ISOSORBIDE DINITRATE 10 MG: 20 TABLET ORAL at 12:00

## 2021-06-11 RX ADMIN — PANTOPRAZOLE SODIUM 40 MG: 40 INJECTION, POWDER, FOR SOLUTION INTRAVENOUS at 08:26

## 2021-06-11 RX ADMIN — Medication 1 PACKET: at 21:50

## 2021-06-11 RX ADMIN — CARVEDILOL 25 MG: 12.5 TABLET, FILM COATED ORAL at 08:26

## 2021-06-11 RX ADMIN — THIAMINE HCL TAB 100 MG 100 MG: 100 TAB at 08:26

## 2021-06-11 RX ADMIN — SENNOSIDES 10 ML: 8.8 LIQUID ORAL at 08:26

## 2021-06-11 RX ADMIN — Medication 5 MG: at 21:18

## 2021-06-11 RX ADMIN — ATORVASTATIN CALCIUM 40 MG: 40 TABLET, FILM COATED ORAL at 22:10

## 2021-06-11 RX ADMIN — Medication 1 PACKET: at 11:59

## 2021-06-11 RX ADMIN — AMLODIPINE BESYLATE 10 MG: 10 TABLET ORAL at 08:26

## 2021-06-11 RX ADMIN — DOCUSATE SODIUM 100 MG: 50 LIQUID ORAL at 21:17

## 2021-06-11 RX ADMIN — Medication 1 PACKET: at 17:37

## 2021-06-11 RX ADMIN — HEPARIN SODIUM 5000 UNITS: 5000 INJECTION INTRAVENOUS; SUBCUTANEOUS at 21:17

## 2021-06-11 RX ADMIN — DOCUSATE SODIUM 100 MG: 50 LIQUID ORAL at 08:26

## 2021-06-11 RX ADMIN — BUMETANIDE 1 MG: 0.25 INJECTION, SOLUTION INTRAMUSCULAR; INTRAVENOUS at 10:36

## 2021-06-11 RX ADMIN — GABAPENTIN 300 MG: 300 CAPSULE ORAL at 21:18

## 2021-06-11 RX ADMIN — CARVEDILOL 25 MG: 12.5 TABLET, FILM COATED ORAL at 17:34

## 2021-06-11 RX ADMIN — ISOSORBIDE DINITRATE 10 MG: 20 TABLET ORAL at 17:26

## 2021-06-11 RX ADMIN — INSULIN DETEMIR 25 UNITS: 100 INJECTION, SOLUTION SUBCUTANEOUS at 21:18

## 2021-06-11 RX ADMIN — ALBUMIN HUMAN 25 G: 0.05 INJECTION, SOLUTION INTRAVENOUS at 10:35

## 2021-06-11 RX ADMIN — ACETAMINOPHEN 650 MG: 160 SOLUTION ORAL at 08:40

## 2021-06-11 RX ADMIN — ISOSORBIDE DINITRATE 10 MG: 20 TABLET ORAL at 08:29

## 2021-06-11 RX ADMIN — Medication 1 PACKET: at 08:29

## 2021-06-11 NOTE — CASE MANAGEMENT/SOCIAL WORK
Continued Stay Note  [facility]     Patient Name: Augusto Lorenzana Jr.  MRN: [id number]  Today's Date: 6/11/2021    Admit Date: 5/22/2021    Discharge Plan     Row Name 06/11/21 1410       Plan    Plan  TBD    Plan Comments  Patient ambulated with  ft.  Still has tube feeds via NGT.  FEES pending, patient may possibly need PEG.  Nephrology following, will hold HD again today.  No family at bedside.  Attempted to speak with patient's wife by phone, no answer.  CM will continue to follow.  Leslie Rodriguez RN [phone]    Final Discharge Disposition Code  30 - still a patient        Discharge Codes    No documentation.       Expected Discharge Date and Time     Expected Discharge Date Expected Discharge Time    Jun 17, 2021             Leslie Rodriguez RN

## 2021-06-11 NOTE — PROGRESS NOTES
Intensive Care Follow-up     Hospital:  LOS: 20 days   Mr. Augusto Lorenzana Jr., 76 y.o. male is followed for:   NSTEMI (non-ST elevated myocardial infarction) (CMS/[facility])            History of present illness:   76-year-old male with history of coronary disease status post RCA stent back in 2009, diabetes mellitus, hypertension, dyslipidemia, chronic kidney disease, gout and smokeless tobacco use.  Patient presented to [facility] ER on May 22 complaining of dyspnea.  Patient was found to have non-STEMI and cardiology team performed left heart catheter was found to have multivessel coronary disease.  Patient subsequently underwent coronary bypass graft surgery x3 on May 28.  Course was complicated by involvement of ventricular fibrillation and asystole requiring resuscitation x2 on the day of surgery.  Subsequently underwent paced rhythm for 48 hours before resuming sinus rhythm.  Patient also required 3 units of PRBCs, 4 units FFP and 1 sixpack of platelets.  Patient was extubated on May 29.  Patient however developed worsening MICHAEL azotemia requiring dialysis support.  Lower extremity Doppler was negative for DVT.  Repeat Doppler was done and was negative again.  Renal function started to recover and trying diuretics.  Encephalopathy was a concern for few days but starting to improve.  Was concern for GI bleed but GI evaluated and no intervention planned.  Recently had colonoscopy and polyps were removed.  No further recurrence of bleed.    Subjective   Interval History:  Overnight did well.  Patient felt that he slept well.  Feeling more awake and alert today.  Voice quality is much better.  Using flutter valve and clearing secretions well.  Remains on tube feeds and speech to follow and do fees today.             The patient's past medical, surgical and social history were reviewed and updated in Epic as appropriate.       Objective     Infusions:     Medications:  albumin human, 25 g, Intravenous,  "Once  amLODIPine, 10 mg, Nasogastric, Q24H  aspirin, 325 mg, Nasogastric, Daily  atorvastatin, 40 mg, Nasogastric, Nightly  Beneprotein, 1 packet, Nasogastric, 4x Daily  bumetanide, 1 mg, Intravenous, Once  carvedilol, 25 mg, Nasogastric, BID With Meals  sennosides, 10 mL, Nasogastric, BID   And  docusate sodium, 100 mg, Nasogastric, BID  gabapentin, 300 mg, Nasogastric, Nightly  heparin (porcine), 5,000 Units, Subcutaneous, Q12H  insulin detemir, 25 Units, Subcutaneous, Nightly  insulin regular, 0-9 Units, Subcutaneous, Q6H  isosorbide dinitrate, 10 mg, Nasogastric, TID - Nitrates  melatonin, 5 mg, Nasogastric, Nightly  pantoprazole, 40 mg, Intravenous, BID  pharmacy consult - MTM, , Does not apply, Daily  sodium chloride, 10 mL, Intravenous, Q12H  thiamine, 100 mg, Nasogastric, Daily        Vital Sign Min/Max for last 24 hours  Temp  Min: 97.9 °F (36.6 °C)  Max: 98.6 °F (37 °C)   BP  Min: 127/71  Max: 167/91   Pulse  Min: 66  Max: 76   Resp  Min: 14  Max: 20   SpO2  Min: 94 %  Max: 100 %   No data recorded       Input/Output for last 24 hour shift  06/10 0701 - 06/11 0700  In: 1581 [I.V.:132]  Out: 1585 [Urine:1585]         Objective:  Vital signs: (most recent): Blood pressure 139/84, pulse 75, temperature 97.9 °F (36.6 °C), temperature source Oral, resp. rate 16, height 170 cm (66.93\"), weight 84.8 kg (187 lb), SpO2 96 %.            General Appearance: Awake, in no acute distress  Head:    Pupils reactive & symmetrical B/L.  Lungs:   B/L Breath sounds present with decreased breath sounds on bases, no wheezing heard, no crackles.   Heart: S1 and S2 present, no murmur  Abdomen: Soft, nontender, no guarding or rigidity, bowel sounds positive  Extremities: Dressing intact on right lower extremity, right lower extremity edematous but nontender to touch, no erythema noted either.  Pulses: Positive and symmetric.  Neurologic: Awake, alert, moving all four extremities.  Following commands.    Results from last 7 days "   Lab Units 06/11/21  0401 06/10/21  0351 06/09/21  0346   WBC 10*3/mm3 12.73* 14.77* 14.12*   HEMOGLOBIN g/dL 8.7* 8.5* 8.3*   PLATELETS 10*3/mm3 268 241 235     Results from last 7 days   Lab Units 06/11/21  0401 06/10/21  0351 06/09/21  1013 06/08/21  0340 06/07/21  0301   SODIUM mmol/L 136 137 136 134* 134*   POTASSIUM mmol/L 3.6 3.6 3.5 3.9 3.9   CO2 mmol/L 27.0 27.0 25.0 27.0 26.0   BUN mg/dL 64* 60* 46* 56* 84*   CREATININE mg/dL 1.77* 1.79* 1.71* 1.98* 2.92*   MAGNESIUM mg/dL  --  2.1  --  2.0 2.4   PHOSPHORUS mg/dL  --  3.4 3.1 3.8 5.2*   GLUCOSE mg/dL 144* 103* 112* 94 106*     Estimated Creatinine Clearance: 38.1 mL/min (A) (by C-G formula based on SCr of 1.77 mg/dL (H)).          Images:   None new    I reviewed the patient's results and images.     Assessment/Plan   Impression        NSTEMI 5/22/2021    Hyperlipidemia LDL goal <70    Essential hypertension    T2DM on oral agents and insulin. HbA1c 7.4     A/C kidney disease. ATN due to postoperative arrest. 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 Combination of anoxic insult and azotemia       Plan        1.  Delirium state is improving.  Continue thiamine.  Seroquel was stopped and he is tolerating it well.  Continue monitoring.    2.  On Rocephin for urine infection.  Will complete 10 days of therapy. No new fevers noted.  No new hemodynamic instability.  WBC count improving.  3.  Hemoglobin stable overnight.  Continue Protonix twice a day.  GI team evaluated and no intervention planned at this time.    4.  Nephrology is managing renal failure.  BUN has improved after aggressive hemodialysis.  Discussed with nephrology and will hold dialysis again today and try albumin with Bumex   5.  Enteral nutrition as tolerated.  Speech to reevaluate to make sure when he is able to tolerate oral diet.   Continue nova source renal for now.  If unable to clear speech evaluation then will likely need PEG tube placement.  We could consider Keofeed tube placement in the interim.  6.  Wound care following EVH site, no drainage or cellulitis noted.  Continue current dressings.DVT study was negative.  Repeat Doppler is negative as well.  7.  Continue Heparin 5000 units subcu every 12 for DVT prophylaxis.  No acute bleed noted currently.  8.  Insulin regimen for hyperglycemia.  Blood sugars are acceptable.  9.  Patient is more awake and will go ahead and discontinue Last catheter.    Overall guarded prognosis we will continue to provide supportive care at this time.  Patient awaiting bed to go to telemetry floor.    Plan of care and goals reviewed with multidisciplinary/antibiotic stewardship team during rounds.   I discussed the patient's findings and my recommendations with patient and nursing staff     Time spent 30 min (exclusive of procedure time)  including high complexity decision making to assess, manipulate, and support vital organ system failure in this individual who has impairment of one or more vital organ systems such that there is a high probability of imminent or life threatening deterioration in the patient’s condition.      Godwin Ko MD, [facility]  Pulmonary, Critical care and Sleep Medicine

## 2021-06-11 NOTE — PLAN OF CARE
Problem: Adult Inpatient Plan of Care  Goal: Plan of Care Review  Outcome: Ongoing, Progressing  Flowsheets (Taken 6/11/2021 0930)  Plan of Care Reviewed With: patient   Goal Outcome Evaluation:  Plan of Care Reviewed With: patient            SLP treatment completed. Will address dysphagia with repeat FEES today. Please see note for further details and recommendations.

## 2021-06-11 NOTE — PROGRESS NOTES
"   LOS: 20 days    Patient Care Team:  Rob Polanco MD as PCP - General (Internal Medicine)    Reason For Visit:  F/U MICHAEL ON CKD  Subjective           Review of Systems:    Pulm: No soa   CV:  No CP/+EDEMA. GI: NO N/V/D. NEURO: NO CONFUSION OR HALLUCINATIONS.      Objective     albumin human, 25 g, Intravenous, Once  amLODIPine, 10 mg, Nasogastric, Q24H  aspirin, 325 mg, Nasogastric, Daily  atorvastatin, 40 mg, Nasogastric, Nightly  Beneprotein, 1 packet, Nasogastric, 4x Daily  bumetanide, 1 mg, Intravenous, Once  carvedilol, 25 mg, Nasogastric, BID With Meals  sennosides, 10 mL, Nasogastric, BID   And  docusate sodium, 100 mg, Nasogastric, BID  gabapentin, 300 mg, Nasogastric, Nightly  heparin (porcine), 5,000 Units, Subcutaneous, Q12H  insulin detemir, 25 Units, Subcutaneous, Nightly  insulin regular, 0-9 Units, Subcutaneous, Q6H  isosorbide dinitrate, 10 mg, Nasogastric, TID - Nitrates  melatonin, 5 mg, Nasogastric, Nightly  pantoprazole, 40 mg, Intravenous, BID  pharmacy consult - MTM, , Does not apply, Daily  sodium chloride, 10 mL, Intravenous, Q12H  thiamine, 100 mg, Nasogastric, Daily             Vital Signs:  Blood pressure 139/84, pulse 75, temperature 97.9 °F (36.6 °C), temperature source Oral, resp. rate 16, height 170 cm (66.93\"), weight 84.8 kg (187 lb), SpO2 96 %.    Flowsheet Rows      First Filed Value   Admission Height  170.2 cm (67\") Documented at 05/22/2021 0433   Admission Weight  88.5 kg (195 lb) Documented at 05/22/2021 0433          06/10 0701 - 06/11 0700  In: 1581 [I.V.:132]  Out: 1585 [Urine:1585]    Physical Exam:    General Appearance: NAD, alert and cooperative, Ox3  Eyes: PER, conjunctivae and sclerae normal, no icterus  Lungs: OCC RHONCHI  Heart/CV: regular rhythm & normal rate, no murmur, no gallop, no rub and 1-2+ edema  Abdomen: not distended, soft, non-tender, no masses,  bowel sounds present  Skin: No rash, Warm and dry. TEMP IJ HD " CATH.    Radiology:            Labs:  Results from last 7 days   Lab Units 06/11/21  0401 06/10/21  0351 06/09/21  0346   WBC 10*3/mm3 12.73* 14.77* 14.12*   HEMOGLOBIN g/dL 8.7* 8.5* 8.3*   HEMATOCRIT % 27.2* 26.3* 26.0*   PLATELETS 10*3/mm3 268 241 235     Results from last 7 days   Lab Units 06/11/21  0401 06/10/21  0351 06/09/21  1013 06/08/21  0340 06/07/21  0301 06/06/21  0357   SODIUM mmol/L 136 137 136 134* 134* 136   POTASSIUM mmol/L 3.6 3.6 3.5 3.9 3.9 4.4   CHLORIDE mmol/L 100 100 100 99 98 98   CO2 mmol/L 27.0 27.0 25.0 27.0 26.0 27.0   BUN mg/dL 64* 60* 46* 56* 84* 55*   CREATININE mg/dL 1.77* 1.79* 1.71* 1.98* 2.92* 2.31*   CALCIUM mg/dL 9.1 8.6 8.2* 8.5* 8.8 8.9   PHOSPHORUS mg/dL  --  3.4 3.1 3.8 5.2* 4.3   MAGNESIUM mg/dL  --  2.1  --  2.0 2.4 2.1   ALBUMIN g/dL  --  2.70* 2.40* 2.50* 2.90* 2.80*     Results from last 7 days   Lab Units 06/11/21  0401   GLUCOSE mg/dL 144*       Results from last 7 days   Lab Units 06/09/21  1013   ALK PHOS U/L 162*   BILIRUBIN mg/dL 0.4   ALT (SGPT) U/L 21   AST (SGOT) U/L 24                 Estimated Creatinine Clearance: 38.1 mL/min (A) (by C-G formula based on SCr of 1.77 mg/dL (H)).      Assessment       NSTEMI 5/22/2021    Hyperlipidemia LDL goal <70    Essential hypertension    T2DM on oral agents and insulin. HbA1c 7.4     A/C kidney disease. ATN due to postoperative arrest. 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 Combination of anoxic insult and azotemia            Impression: NONOLIGURIC MICHAEL ON CKD. GFR A LITTLE BETTER. BUN STABLE. ANEMIA. EDEMA WITH LOW ONCOTIC PRESSURE.            Recommendations: IV BUMEX AND ALBUMIN. HOLD ON DIALYSIS TODAY.      Aiden Godfrey MD  06/11/21  09:51 EDT

## 2021-06-11 NOTE — THERAPY TREATMENT NOTE
Acute Care - Speech Language Pathology   Swallow Treatment Note [facility]     Patient Name: Augusto Lorenzana Jr.  : 1945  MRN: [id number]  Today's Date: 2021               Admit Date: 2021    Visit Dx:     ICD-10-CM ICD-9-CM   1. Non-STEMI (non-ST elevated myocardial infarction) (CMS/[facility])  I21.4 410.70   2. Acute congestive heart failure, unspecified heart failure type (CMS/[facility])  I50.9 428.0   3. History of coronary artery disease  Z86.79 V12.59   4. Coronary artery disease involving native coronary artery of native heart, angina presence unspecified  I25.10 414.01   5. Pharyngeal dysphagia  R13.13 787.23   6. MICHAEL (acute kidney injury) (CMS/[facility])  N17.9 584.9     Patient Active Problem List   Diagnosis   • NSTEMI 2021   • Hyperlipidemia LDL goal <70   • Essential hypertension   • T2DM on oral agents and insulin. HbA1c 7.4    • Severe 3 vessel CAD with preserved LV function (CMS/[facility])   • A/C kidney disease. ATN due to postoperative arrest. 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 Combination of anoxic insult and azotemia     Past Medical History:   Diagnosis Date   • CAD (coronary artery disease)    • CKD (chronic kidney disease) stage 3, GFR 30-59 ml/min (CMS/[facility])    • Diabetes mellitus (CMS/[facility])    • Hyperlipidemia    • Hypertension    • NSTEMI (non-ST elevated myocardial infarction) (CMS/[facility])      Past Surgical History:   Procedure Laterality Date   • CARDIAC CATHETERIZATION N/A 2021    Procedure: Left Heart Cath;  Surgeon: Blade Greene IV, MD;  Location: [facility] INVASIVE LOCATION;  Service: Cardiovascular;  Laterality: N/A;   • CARDIAC SURGERY      2 stents placed .   • CORONARY ARTERY BYPASS GRAFT N/A 2021    Procedure: MEDIAN STERNOTOMY CORONARY ARTERY BYPASS X 3 UTILIZING THE LEFT INTERNAL MAMMARY  ARTERY GRAFT, EVH OF THE GREATER RIGHT SAPHENOUS VEIN, AND PILO PER ANESTHESIA;  Surgeon: Jacek Miller MD;  Location: [facility];  Service: Cardiothoracic;  Laterality: N/A;        SWALLOW EVALUATION (last 72 hours)      SLP Adult Swallow Evaluation     Row Name 06/11/21 0930 06/09/21 0930                Rehab Evaluation    Document Type  therapy note (daily note)  -DV  therapy note (daily note)  -DV       Subjective Information  complains of;pain  -DV  complains of;fatigue;pain  -DV       Patient Observations  alert;cooperative  -DV  alert;cooperative  -DV       Patient/Family/Caregiver Comments/Observations  no family present  -DV  daughter present  -DV       Care Plan Review  evaluation/treatment results reviewed;care plan/treatment goals reviewed;risks/benefits reviewed;current/potential barriers reviewed;patient/other agree to care plan  -DV  evaluation/treatment results reviewed;care plan/treatment goals reviewed;risks/benefits reviewed;current/potential barriers reviewed;patient/other agree to care plan  -DV       Care Plan Review, Other Participant(s)  --  daughter  -DV       Patient Effort  good  -DV  good  -DV       Symptoms Noted During/After Treatment  none  -DV  none  -DV          Pain    Additional Documentation  Pain Scale: Numbers Pre/Post-Treatment (Group)  -DV  Pain Scale: Numbers Pre/Post-Treatment (Group)  -DV          Pain Scale: Numbers Pre/Post-Treatment    Pretreatment Pain Rating  4/10  -DV  0/10 - no pain  -DV       Posttreatment Pain Rating  4/10  -DV  0/10 - no pain  -DV       Pain Location  nose;throat  -DV  --       Pre/Posttreatment Pain Comment  R nasal passage and throat 2' large bore NG  -DV  incisional pain when coughing  -DV          Clinical Impression    Daily Summary of Progress (SLP)  progress toward functional goals is gradual  -DV  progress toward functional goals as expected  -DV       Plan for Continued Treatment (SLP)  Throat clear and WVQ w/ thin liquids. Multiple  swallows intermittently with puree. Repeat FEES planned for today.  -DV  Pt continues with very hoarse vocal quality and increased coughing per RN, almost constant throughout this encounter, with or without PO. Overt WVQ with thin liquid. Pudding and nectar-thick liquids trialed as well. Difficult assessment 2' hoarse vocal quality and coughing at baseline. Pt requiring suction for secretion management. Not ready for repeat FEES. Exercises completed.  -DV         User Key  (r) = Recorded By, (t) = Taken By, (c) = Cosigned By    Initials Name Effective Dates    DV Christine Sahni, MS CCC-SLP 02/28/20 -           EDUCATION  The patient has been educated in the following areas:   Dysphagia (Swallowing Impairment) NPO rationale.    SLP Recommendation and Plan                                         Daily Summary of Progress (SLP): progress toward functional goals is gradual    Plan for Continued Treatment (SLP): Throat clear and WVQ w/ thin liquids. Multiple swallows intermittently with puree. Repeat FEES planned for today.              Plan of Care Reviewed With: patient    SLP GOALS     Row Name 06/11/21 1000 06/09/21 0930          Oral Nutrition/Hydration Goal 1 (SLP)    Oral Nutrition/Hydration Goal 1, SLP  LTG: Pt will return to regular diet, thin liquids w/ no overt s/sxs aspiration/distress w/ 100% acc and no cues  -DV  LTG: Pt will return to regular diet, thin liquids w/ no overt s/sxs aspiration/distress w/ 100% acc and no cues  -DV     Time Frame (Oral Nutrition/Hydration Goal 1, SLP)  by discharge  -DV  by discharge  -DV     Progress/Outcomes (Oral Nutrition/Hydration Goal 1, SLP)  progress slower than expected  -DV  progress slower than expected  -DV        Oral Nutrition/Hydration Goal 2 (SLP)    Oral Nutrition/Hydration Goal 2, SLP  Pt will tolerate puree/some mashed & honey-thick liquids w/ no overt s/sxs aspiration/distress w/ 100% acc and no cues  -DV  Pt will tolerate puree/some mashed & honey-thick  liquids w/ no overt s/sxs aspiration/distress w/ 100% acc and no cues  -DV     Time Frame (Oral Nutrition/Hydration Goal 2, SLP)  by discharge  -DV  by discharge  -DV     Progress/Outcomes (Oral Nutrition/Hydration Goal 2, SLP)  goal no longer appropriate  -DV  goal no longer appropriate  -DV        Oral Nutrition/Hydration Goal (SLP)    Oral Nutrition/Hydration Goal, SLP  Pt will tolerate therapeutic trials of ice chips, thin H2O, and puree w/ no overt s/sxs aspiration/distress w/ 70% acc and no cues in order to assess readiness for repeat instrumental eval.  -DV  Pt will tolerate therapeutic trials of ice chips, thin H2O, and puree w/ no overt s/sxs aspiration/distress w/ 70% acc and no cues in order to assess readiness for repeat instrumental eval.  -DV     Time Frame (Oral Nutrition/Hydration Goal, SLP)  short term goal (STG)  -DV  short term goal (STG)  -DV     Barriers (Oral Nutrition/Hydration Goal, SLP)  throat clear and WVQ w/thin liquid, multiple swallows inconsistently w/puree. Voice still hoarse.  -DV  overt WVQ with thin liquid. hoarse vocal quality and coughing at baseline and t/o trials.   -DV     Progress/Outcomes (Oral Nutrition/Hydration Goal, SLP)  progress slower than expected  -DV  continuing progress toward goal  -DV        Lingual Strengthening Goal 1 (SLP)    Activity (Lingual Strengthening Goal 1, SLP)  increase tongue back strength  -DV  increase tongue back strength  -DV     Increase Tongue Back Strength  swallow trials;lingual resistance exercises  -DV  swallow trials;lingual resistance exercises  -DV     [place]/Accuracy (Lingual Strengthening Goal 1, SLP)  with minimal cues (75-90% accuracy)  -DV  with minimal cues (75-90% accuracy)  -DV     Time Frame (Lingual Strengthening Goal 1, SLP)  short term goal (STG)  -DV  short term goal (STG)  -DV     Progress/Outcomes (Lingual Strengthening Goal 1, SLP)  goal ongoing  -DV  goal ongoing  -DV        Pharyngeal Strengthening Exercise Goal  1 (SLP)    Activity (Pharyngeal Strengthening Goal 1, SLP)  increase timing;increase superior movement of the hyolaryngeal complex;increase anterior movement of the hyolaryngeal complex;increase closure at entrance to airway/closure of airway at glottis;increase squeeze/positive pressure generation;increase tongue base retraction  -DV  increase timing;increase superior movement of the hyolaryngeal complex;increase anterior movement of the hyolaryngeal complex;increase closure at entrance to airway/closure of airway at glottis;increase squeeze/positive pressure generation;increase tongue base retraction  -DV     Increase Timing  prepping - 3 second prep or suck swallow or 3-step swallow  -DV  prepping - 3 second prep or suck swallow or 3-step swallow  -DV     Increase Superior Movement of the Hyolaryngeal Complex  effortful pitch glide (falsetto + pharyngeal squeeze)  -DV  effortful pitch glide (falsetto + pharyngeal squeeze)  -DV     Increase Anterior Movement of the Hyolaryngeal Complex  chin tuck against resistance (CTAR)  -DV  chin tuck against resistance (CTAR)  -DV     Increase Closure at Entrance to Airway/Closure of Airway at Glottis  supraglottic swallow  -DV  supraglottic swallow  -DV     Increase Squeeze/Positive Pressure Generation  hard effortful swallow  -DV  hard effortful swallow  -DV     Increase Tongue Base Retraction  bronwyn  -DV  bronwyn  -DV     [place]/Accuracy (Pharyngeal Strengthening Goal 1, SLP)  with minimal cues (75-90% accuracy)  -DV  with minimal cues (75-90% accuracy)  -DV     Time Frame (Pharyngeal Strengthening Goal 1, SLP)  short term goal (STG)  -DV  short term goal (STG)  -DV     Barriers (Pharyngeal Strengthening Goal 1, SLP)  --  exercises completed  -DV     Progress/Outcomes (Pharyngeal Strengthening Goal 1, SLP)  goal ongoing  -DV  continuing progress toward goal  -DV        Swallow Management Recall Goal 1 (SLP)    Activity (Swallow Management Recall Goal 1, SLP)   independent recall of;safe diet/liquid level;safe diet level/texture  -DV  independent recall of;safe diet/liquid level;safe diet level/texture  -DV     [place]/Accuracy (Swallow Management Recall Goal 1, SLP)  independently (over 90% accuracy)  -DV  independently (over 90% accuracy)  -DV     Time Frame (Swallow Management Recall Goal 1, SLP)  short term goal (STG)  -DV  short term goal (STG)  -DV     Progress/Outcomes (Swallow Management Recall Goal 1, SLP)  goal no longer appropriate  -DV  goal no longer appropriate  -DV        Swallow Compensatory Strategies Goal 1 (SLP)    Activity (Swallow Compensatory Strategies/Techniques Goal 1, SLP)  compensatory strategies;small cup sips;other (see comments);during p.o. trials;during meal intake  -DV  compensatory strategies;small cup sips;other (see comments);during p.o. trials;during meal intake  -DV     [place]/Accuracy (Swallow Compensatory Strategies/Techniques Goal 1, SLP)  independently (over 90% accuracy)  -DV  independently (over 90% accuracy)  -DV     Time Frame (Swallow Compensatory Strategies/Techniques Goal 1, SLP)  short term goal (STG)  -DV  short term goal (STG)  -DV     Progress/Outcomes (Swallow Compensatory Strategies/Techniques Goal 1, SLP)  goal no longer appropriate  -DV  goal no longer appropriate  -DV       User Key  (r) = Recorded By, (t) = Taken By, (c) = Cosigned By    Initials Name Provider Type    Christine Atkinson MS CCC-SLP Speech and Language Pathologist             Time Calculation:   Time Calculation- SLP     Row Name 06/11/21 1041             Time Calculation- SLP    SLP Start Time  0930  -DV      SLP Received On  06/11/21  -DV         Untimed Charges    96752-WC Treatment Swallow Minutes  30  -DV         Total Minutes    Untimed Charges Total Minutes  30  -DV       Total Minutes  30  -DV        User Key  (r) = Recorded By, (t) = Taken By, (c) = Cosigned By    Initials Name Provider Type    Christine Atkinson MS CCC-MIGUEL Speech and  Language Pathologist          Therapy Charges for Today     Code Description Service Date Service Provider Modifiers Qty    [id number]  ST TREATMENT SWALLOW 2 6/11/2021 Christine Sahni, MS CCC-SLP GN 1               Christine Sahni MS CCC-SLP  6/11/2021

## 2021-06-11 NOTE — THERAPY TREATMENT NOTE
Patient Name: Augusto Lorenzana Jr.  : 1945    MRN: [id number]                              Today's Date: 2021       Admit Date: 2021    Visit Dx:     ICD-10-CM ICD-9-CM   1. Non-STEMI (non-ST elevated myocardial infarction) (CMS/HCC)  I21.4 410.70   2. Acute congestive heart failure, unspecified heart failure type (CMS/HCC)  I50.9 428.0   3. History of coronary artery disease  Z86.79 V12.59   4. Coronary artery disease involving native coronary artery of native heart, angina presence unspecified  I25.10 414.01   5. Pharyngeal dysphagia  R13.13 787.23   6. MICHAEL (acute kidney injury) (CMS/HCC)  N17.9 584.9     Patient Active Problem List   Diagnosis   • NSTEMI 2021   • Hyperlipidemia LDL goal <70   • Essential hypertension   • T2DM on oral agents and insulin. HbA1c 7.4    • Severe 3 vessel CAD with preserved LV function (CMS/HCC)   • A/C kidney disease. ATN due to postoperative arrest. 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 Combination of anoxic insult and azotemia     Past Medical History:   Diagnosis Date   • CAD (coronary artery disease)    • CKD (chronic kidney disease) stage 3, GFR 30-59 ml/min (CMS/HCC)    • Diabetes mellitus (CMS/[facility])    • Hyperlipidemia    • Hypertension    • NSTEMI (non-ST elevated myocardial infarction) (CMS/HCC)      Past Surgical History:   Procedure Laterality Date   • CARDIAC CATHETERIZATION N/A 2021    Procedure: Left Heart Cath;  Surgeon: Blade Greene IV, MD;  Location: [facility] CATH INVASIVE LOCATION;  Service: Cardiovascular;  Laterality: N/A;   • CARDIAC SURGERY      2 stents placed .   • CORONARY ARTERY BYPASS GRAFT N/A 2021    Procedure: MEDIAN STERNOTOMY CORONARY ARTERY BYPASS X 3 UTILIZING THE LEFT INTERNAL MAMMARY ARTERY GRAFT, EVH OF THE GREATER RIGHT SAPHENOUS VEIN, AND  PILO PER ANESTHESIA;  Surgeon: Jacek Miller MD;  Location: [facility];  Service: Cardiothoracic;  Laterality: N/A;     General Information     Row Name 06/11/21 1141          Physical Therapy Time and Intention    Document Type  therapy note (daily note)  -EMANUEL     Mode of Treatment  physical therapy  -EMANUEL     Row Name 06/11/21 1141          General Information    Patient Profile Reviewed  yes  -EMANUEL     Prior Level of Function  independent:;gait;transfer;ADL's;bed mobility  -EMANUEL     Existing Precautions/Restrictions  cardiac;fall;sternal  -EMANUEL     Barriers to Rehab  medically complex  -EMANUEL     Row Name 06/11/21 1141          Living Environment    Lives With  spouse  -EMANUEL     Row Name 06/11/21 1141          Home Main Entrance    Number of Stairs, Main Entrance  four  -EMANUEL     Row Name 06/11/21 1141          Cognition    Orientation Status (Cognition)  oriented to;person;place;situation  -EMANUEL     Row Name 06/11/21 1141          Safety Issues, Functional Mobility    Safety Issues Affecting Function (Mobility)  safety precautions follow-through/compliance;safety precaution awareness  -EMANUEL     Impairments Affecting Function (Mobility)  balance;endurance/activity tolerance;shortness of breath;strength  -EMANUEL     Cognitive Impairments, Mobility Safety/Performance  safety precaution awareness;safety precaution follow-through  -EMANUEL       User Key  (r) = Recorded By, (t) = Taken By, (c) = Cosigned By    Initials Name Provider Type    Sierra Betancourt PT Physical Therapist        Mobility     Row Name 06/11/21 1142          Bed Mobility    Bed Mobility  rolling left;rolling right;scooting/bridging;sit-supine  -EMANUEL     Rolling Left Story (Bed Mobility)  minimum assist (75% patient effort)  -EMANUEL     Rolling Right Story (Bed Mobility)  minimum assist (75% patient effort)  -EMANUEL     Scooting/Bridging Story (Bed Mobility)  minimum assist (75% patient effort)  -EMANUEL     Sit-Supine Story (Bed Mobility)  minimum  assist (75% patient effort);2 person assist  -EMANUEL     Assistive Device (Bed Mobility)  draw sheet;head of bed elevated  -EMANUEL     Comment (Bed Mobility)  cues for sequencing  -EMANUEL     Row Name 06/11/21 1142          Bed-Chair Transfer    Bed-Chair [place] (Transfers)  minimum assist (75% patient effort);2 person assist  -EMANUEL     Assistive Device (Bed-Chair Transfers)  walker, front-wheeled  -EMANUEL     Row Name 06/11/21 1142          Sit-Stand Transfer    Sit-Stand [place] (Transfers)  minimum assist (75% patient effort);2 person assist  -EMANUEL     Assistive Device (Sit-Stand Transfers)  walker, front-wheeled  -EMANUEL     Row Name 06/11/21 1142          Gait/Stairs (Locomotion)    [place] Level (Gait)  minimum assist (75% patient effort);2 person assist  -EMANUEL     Assistive Device (Gait)  walker, front-wheeled  -EMANUEL     Distance in Feet (Gait)  220  -EMANUEL     Deviations/Abnormal Patterns (Gait)  base of support, narrow;stride length decreased  -EMANUEL     Bilateral Gait Deviations  forward flexed posture  -EMANUEL     Comment (Gait/Stairs)  patient needs cues for increased stride length patient did not require any rest breaks today  -EMANUEL       User Key  (r) = Recorded By, (t) = Taken By, (c) = Cosigned By    Initials Name Provider Type    Sierra Betancourt, PT Physical Therapist        Obj/Interventions     Row Name 06/11/21 1144          Motor Skills    Therapeutic Exercise  hip;knee;ankle  -     Row Name 06/11/21 1144          Hip (Therapeutic Exercise)    Hip (Therapeutic Exercise)  AROM (active range of motion)  -     Hip AROM (Therapeutic Exercise)  bilateral;flexion;extension;aBduction;aDduction;sitting;10 repetitions  -     Row Name 06/11/21 1144          Knee (Therapeutic Exercise)    Knee (Therapeutic Exercise)  AROM (active range of motion)  -     Knee AROM (Therapeutic Exercise)  bilateral;flexion;extension;sitting;10 repetitions  -     Row Name 06/11/21 1144          Ankle (Therapeutic Exercise)    Ankle  (Therapeutic Exercise)  AROM (active range of motion)  -EMANUEL     Ankle AROM (Therapeutic Exercise)  bilateral;dorsiflexion;plantarflexion;10 repetitions;sitting  -EMANUEL     Row Name 06/11/21 1144          Balance    Balance Assessment  sitting static balance;sitting dynamic balance;standing static balance;standing dynamic balance  -EMANUEL     Static Sitting Balance  WFL  -EMANUEL     Dynamic Sitting Balance  WFL  -EMANUEL     Static Standing Balance  mild impairment  -EMANUEL     Dynamic Standing Balance  mild impairment  -EMANUEL     Balance Interventions  standing;dynamic;weight shifting activity  -EMANUEL     Comment, Balance  patient has better standing balance today with support of the walker. patient does not have posterior lean with getting up today  -EMANUEL       User Key  (r) = Recorded By, (t) = Taken By, (c) = Cosigned By    Initials Name Provider Type    Sierra Betancourt PT Physical Therapist        Goals/Plan     Row Name 06/11/21 1148          Gait Training Goal 1 (PT)    Activity/Assistive Device (Gait Training Goal 1, PT)  gait (walking locomotion);assistive device use;walker, rolling  -EMANUEL     [place] Level (Gait Training Goal 1, PT)  contact guard assist  -EMANUEL     Distance (Gait Training Goal 1, PT)  400  -EMANUEL     Time Frame (Gait Training Goal 1, PT)  long term goal (LTG);2 weeks  -EMANUEL     Progress/Outcome (Gait Training Goal 1, PT)  goal ongoing;goal revised this date;goal met  -EMANUEL       User Key  (r) = Recorded By, (t) = Taken By, (c) = Cosigned By    Initials Name Provider Type    Sierra Betancourt PT Physical Therapist        Clinical Impression     Row Name 06/11/21 1146          Pain    Additional Documentation  Pain Scale: Numbers Pre/Post-Treatment (Group)  -     Row Name 06/11/21 1146          Pain Scale: Numbers Pre/Post-Treatment    Pretreatment Pain Rating  2/10  -EMANUEL     Posttreatment Pain Rating  2/10  -EMANUEL     Pain Location - Orientation  incisional  -EMANUEL     Pain Location  chest  -     Row Name 06/11/21  1146          Plan of Care Review    Plan of Care Reviewed With  patient  -EMANUEL     Progress  improving  -EMANUEL     Outcome Summary  patient able to ambulate with walker and min assist for 220 ft He demonstrates improved standing balance with no posterior lean today upon initially standing. we will continue to progress activity as tolerated  -EMANUEL     Row Name 06/11/21 1146          Therapy Assessment/Plan (PT)    Patient/Family Therapy Goals Statement (PT)  return to PLOF  -EMANUEL     Rehab Potential (PT)  good, to achieve stated therapy goals  -EMANUEL     Criteria for Skilled Interventions Met (PT)  yes;skilled treatment is necessary  -EMANUEL     Row Name 06/11/21 1146          Vital Signs    Pre Systolic BP Rehab  150  -EMANUEL     Pre Treatment Diastolic BP  78  -EMANUEL     Post Systolic BP Rehab  160  -EMANUEL     Post Treatment Diastolic BP  84  -EMANUEL     Pretreatment Heart Rate (beats/min)  79  -EMANUEL     Posttreatment Heart Rate (beats/min)  84  -EMANUEL     Pre SpO2 (%)  96  -EMANUEL     O2 Delivery Pre Treatment  room air  -EMANUEL     Post SpO2 (%)  95  -EMANUEL     O2 Delivery Post Treatment  room air  -EMANUEL     Pre Patient Position  Sitting  -EMANUEL     Intra Patient Position  Standing  -EMANUEL     Post Patient Position  Supine  -EMANUEL     Row Name 06/11/21 1146          Positioning and Restraints    Pre-Treatment Position  sitting in chair/recliner  -EMANUEL     Post Treatment Position  bed  -EMANUEL     In Bed  notified nsg;supine;encouraged to call for assist;call light within reach;with nsg  -EMANUEL       User Key  (r) = Recorded By, (t) = Taken By, (c) = Cosigned By    Initials Name Provider Type    Sierra Betancourt, PT Physical Therapist        Outcome Measures     Row Name 06/11/21 1149          How much help from another person do you currently need...    Turning from your back to your side while in flat bed without using bedrails?  3  -EMANUEL     Moving from lying on back to sitting on the side of a flat bed without bedrails?  3  -EMANUEL     Moving to and from a bed to a chair  (including a wheelchair)?  3  -EMANUEL     Standing up from a chair using your arms (e.g., wheelchair, bedside chair)?  3  -EMANUEL     Climbing 3-5 steps with a railing?  2  -EMANUEL     To walk in hospital room?  3  -EMANUEL     AM-PAC 6 Clicks Score (PT)  17  -EMANUEL       User Key  (r) = Recorded By, (t) = Taken By, (c) = Cosigned By    Initials Name Provider Type    Sierra Betancourt PT Physical Therapist        Physical Therapy Education                 Title: PT OT SLP Therapies (In Progress)     Topic: Physical Therapy (In Progress)     Point: Mobility training (In Progress)     Learning Progress Summary           Patient Acceptance, E, NR by EMANUEL at 6/11/2021 0840    Acceptance, E, NR by KG at 6/10/2021 0811    Acceptance, E, NR by KG at 6/9/2021 0859    Acceptance, E, NR by EMANUEL at 6/8/2021 0800    Acceptance, E, NR by KG at 6/7/2021 1305    Acceptance, E, NR by EMANUEL at 6/3/2021 1015    Acceptance, E, NR by KG at 6/2/2021 0909    Acceptance, E, NR by KG at 6/1/2021 0933    Acceptance, E, NR by KG at 5/31/2021 1118    Acceptance, E,TB, VU,NR by AY at 5/30/2021 1107                   Point: Home exercise program (In Progress)     Learning Progress Summary           Patient Acceptance, E, NR by EMANUEL at 6/11/2021 0840    Acceptance, E, NR by KG at 6/10/2021 0811    Acceptance, E, NR by KG at 6/9/2021 0859    Acceptance, E, NR by EMANUEL at 6/8/2021 0800    Acceptance, E, NR by KG at 6/7/2021 1305    Acceptance, E, NR by EMANUEL at 6/3/2021 1015    Acceptance, E, NR by KG at 6/2/2021 0909    Acceptance, E, NR by KG at 6/1/2021 0933    Acceptance, E, NR by KG at 5/31/2021 1118                   Point: Body mechanics (In Progress)     Learning Progress Summary           Patient Acceptance, E, NR by EMANUEL at 6/11/2021 0840    Acceptance, E, NR by KG at 6/10/2021 0811    Acceptance, E, NR by KG at 6/9/2021 0859    Acceptance, E, NR by EMANUEL at 6/8/2021 0800    Acceptance, E, NR by KG at 6/7/2021 1305    Acceptance, E, NR by EMANUEL at 6/3/2021 1015     Acceptance, E, NR by KG at 6/2/2021 0909    Acceptance, E, NR by KG at 6/1/2021 0933    Acceptance, E, NR by KG at 5/31/2021 1118    Acceptance, E,TB, VU,NR by AY at 5/30/2021 1107                   Point: Precautions (In Progress)     Learning Progress Summary           Patient Acceptance, E, NR by EMANUEL at 6/11/2021 0840    Acceptance, E, NR by KG at 6/10/2021 0811    Acceptance, E, NR by KG at 6/9/2021 0859    Acceptance, E, NR by EMANUEL at 6/8/2021 0800    Acceptance, E, NR by KG at 6/7/2021 1305    Acceptance, E, NR by EMANUEL at 6/3/2021 1015    Acceptance, E, NR by KG at 6/2/2021 0909    Acceptance, E, NR by KG at 6/1/2021 0933    Acceptance, E, NR by KG at 5/31/2021 1118    Acceptance, E,TB, VU,NR by AY at 5/30/2021 1107                               User Key     Initials Effective Dates Name Provider Type Discipline    EMANUEL 06/19/15 -  Sierra Kitchen, PT Physical Therapist PT    KG 05/22/20 -  Aliza Shen, PT Physical Therapist PT    AY 11/10/20 -  Megan Moffett, PT Physical Therapist PT              PT Recommendation and Plan  Planned Therapy Interventions (PT): balance training, bed mobility training, gait training, transfer training, strengthening  Plan of Care Reviewed With: patient  Progress: improving  Outcome Summary: patient able to ambulate with walker and min assist for 220 ft He demonstrates improved standing balance with no posterior lean today upon initially standing. we will continue to progress activity as tolerated     Time Calculation:   PT Charges     Row Name 06/11/21 1150             Time Calculation    Start Time  0840  -EMANUEL      PT Received On  06/11/21  -EMANUEL      PT Goal Re-Cert Due Date  06/19/21  -EMANUEL         Time Calculation- PT    Total Timed Code Minutes- PT  23 minute(s)  -EMANUEL         Timed Charges    52776 - PT Therapeutic Activity Minutes  23  -EMANUEL         Total Minutes    Timed Charges Total Minutes  23  -EMANUEL       Total Minutes  23  -EMANUEL        User Key  (r) = Recorded By, (t) =  Taken By, (c) = Cosigned By    Initials Name Provider Type    Sierra Betancourt, PT Physical Therapist        Therapy Charges for Today     Code Description Service Date Service Provider Modifiers Qty    37725015223  PT THERAPEUTIC ACT EA 15 MIN 6/11/2021 Sierra Kitchen, PT GP 2    34586317433 HC PT THER SUPP EA 15 MIN 6/11/2021 Sierra Kitchen, PT GP 2          PT G-Codes  Outcome Measure Options: AM-PAC 6 Clicks Basic Mobility (PT)  AM-PAC 6 Clicks Score (PT): 17  AM-PAC 6 Clicks Score (OT): 10    Sierra Kitchen, PT  6/11/2021

## 2021-06-11 NOTE — PROGRESS NOTES
Clinical Nutrition     Nutrition Support Assessment  Reason for Visit:   MDR, Follow-up protocol, EN      Patient Name: Augusto Lorenzana Jr.  YOB: 1945  MRN: [id number]  Date of Encounter: 06/11/21 10:38 EDT  Admission date: 5/22/2021        Nutrition Assessment   Assessment   NSTEMI  CAD  s/p CABG x3 (5-28)  Coded x2  (5-28)  Extubated 5-29  MICHAEL/CKD  HD initiated 6-3  BRBPR, likely hemorrhoids    PMH: He  has a past medical history of CAD (coronary artery disease), CKD (chronic kidney disease) stage 3, GFR 30-59 ml/min (CMS/[facility]), Diabetes mellitus (CMS/[facility]), Hyperlipidemia, Hypertension, and NSTEMI (non-ST elevated myocardial infarction) (CMS/[facility]).Obesity, Anemia   PSxH: He  has a past surgical history that includes Cardiac surgery; Cardiac catheterization (N/A, 5/24/2021); and Coronary artery bypass graft (N/A, 5/28/2021).       Applicable nutrition-related information:  (6/4) SLP FEES rec NPO, temporary alternate methods of nutrition/hydration (ice chips x4/hr with SPV and after oral care.)  (6/11) SLP FEES rec NPO    Reported/Observed/Food/Nutrition Related History:     Pt tolerating EN. Pt resting in bed at time of RD visit, about to do FEES. RD spoke with RN post-FEES and pt did not pass. Will likely need a PEG. Planning to possibly change large-bore NG to small-bore feeding tube. No plans for HD today.     Per I&O- last bowel movement documented (6/9-6/10)    Anthropometrics     Height: 67in  Last filed wt: 187lb  Weight Method: Standing scale    BMI: BMI (Calculated): 29.4  Overweight: 25.0-29.9kg/m2     Date Weight (kg) Weight (lbs) Weight Method   6/1/2021 84.823 kg 187 lb -   5/28/2021 84.823 kg 187 lb Standing scale   5/27/2021 85.73 kg 189 lb -   5/27/2021 85.503 kg 188 lb 8 oz Bed scale   5/26/2021 82.691 kg 182 lb 4.8 oz Bed scale   5/25/2021 81.421 kg 179 lb 8 oz Bed scale   5/24/2021 80.513 kg 177 lb 8 oz Standing scale   5/23/2021 82.328 kg 181 lb 8 oz Standing scale    5/22/2021 88.451 kg 195 lb -   5/22/2021 88.451 kg 195 lb Stated       Labs reviewed     Results from last 7 days   Lab Units 06/11/21  0401 06/10/21  0351 06/09/21  1013 06/08/21  0340 06/07/21  0301   GLUCOSE mg/dL 144* 103* 112* 94 106*   BUN mg/dL 64* 60* 46* 56* 84*   CREATININE mg/dL 1.77* 1.79* 1.71* 1.98* 2.92*   SODIUM mmol/L 136 137 136 134* 134*   CHLORIDE mmol/L 100 100 100 99 98   POTASSIUM mmol/L 3.6 3.6 3.5 3.9 3.9   PHOSPHORUS mg/dL  --  3.4 3.1 3.8 5.2*   MAGNESIUM mg/dL  --  2.1  --  2.0 2.4   ALT (SGPT) U/L  --   --  21 21 23     Results from last 7 days   Lab Units 06/10/21  0351 06/09/21  1013 06/08/21  0340 06/07/21  0301   ALBUMIN g/dL 2.70* 2.40* 2.50* 2.90*   PREALBUMIN mg/dL  --   --   --  12.3*   CRP mg/dL  --   --   --  10.41*   CHOLESTEROL mg/dL  --   --   --  60   TRIGLYCERIDES mg/dL  --   --   --  51       Results from last 7 days   Lab Units 06/11/21  0517 06/10/21  2312 06/10/21  1808 06/10/21  1247 06/10/21  0522 06/09/21  2330   GLUCOSE mg/dL 130 181* 186* 143* 109 142*     Lab Results   Lab Value Date/Time    HGBA1C 7.40 (H) 05/22/2021 0433       Medications reviewed   Pertinent: senokot, colace, neurontin, insulin, melatonin, protonix, thiamin  PRN: tylenol    Pt received 1 dose of relistor on (6/8)      Needs Assessment 6-7   Height used: 67 in/170 cm  Weight used: 187 lb/84 kg    Estimated calorie needs: ~1900 kcal/day  20-25 kcal/kg= 8427-7908 kcal    Estimated protein needs: ~100 g pro/day  1.2 g/kg= 100 g pro      Current Nutrition Prescription     PO: NPO Diet      EN: Novasource Renal at 45 ml/hr (goal volume= 900) with 1 packet beneprotein 4x/day and free water at 35 ml/hr  Route: NG  Verified at the bedside: Yes  Provides at goal volume: 1900 kcal, 105 g pro, 0 g fiber, 24 meq K, 737 mg phos, 645 ml water from EN, 1345 ml water total      EN delivery;  1 Day:  827 ml, 92% +4 beneprotein  1754 kcal, 92%  99 g pro, 99%  0 g fiber  23 meq K  677 mg phos  593 ml water from  EN  1215 ml water total      Nutrition Diagnosis     5-31-21, 6-7  Problem Inadequate energy intake/ protein intake   Etiology Per Clinical Status   Signs/Symptoms 47% goal volume EN   Statua: improved    Nutrition Intervention    Follow treatment progress, Care plan reviewed   -Will continue with current EN order at this time, and will adjust as appropriate      Goal:   General: Nutrition support treatment  EN/PN: Maintain EN       Monitoring/Evaluation:   Per protocol, I&O, Pertinent labs, Weight, Skin status, GI status, Symptoms    Brit Be MS RD/LD CNSC  Time Spent: 45 minutes

## 2021-06-11 NOTE — PLAN OF CARE
Goal Outcome Evaluation:  Plan of Care Reviewed With: patient        Progress: improving  Outcome Summary: VSS and will continue to monitor. Orders and awaiting tele bed.

## 2021-06-11 NOTE — PROGRESS NOTES
"  [facility]   Inpatient Progress Note       LOS: 20 days   Patient Care Team:  Rob Polanco MD as PCP - General (Internal Medicine)    Chief Complaint:  Follow-up for dyslipidemia    Subjective     Interval History:   Patient in bed.  No specific cardiac complaints.  Blood pressure medications adjusted.  Current blood pressure much improved.      Review of Systems:   Pertinent positives noted in history, exam, and assessment. Otherwise reviewed and negative.      Objective     Vitals:  Blood pressure 128/86, pulse 62, temperature 97.9 °F (36.6 °C), temperature source Oral, resp. rate 16, height 170 cm (66.93\"), weight 84.8 kg (187 lb), SpO2 95 %.     Intake/Output Summary (Last 24 hours) at 6/11/2021 1150  Last data filed at 6/11/2021 1040  Gross per 24 hour   Intake 1417 ml   Output 1665 ml   Net -248 ml     Vitals reviewed.   Constitutional:       Appearance: Well-developed and not in distress.   Neck:      Vascular: No JVD.      Trachea: No tracheal deviation.   Pulmonary:      Effort: Pulmonary effort is normal.      Comments: Right sided rhonchi.  Cardiovascular:      Normal rate. Regular rhythm.   Edema:     Peripheral edema present.     Ankle: trace edema of the right ankle.  Abdominal:      General: Bowel sounds are normal.      Palpations: Abdomen is soft.      Tenderness: There is no abdominal tenderness.   Musculoskeletal:         General: No deformity. Neurological:      Mental Status: Alert and oriented to person, place, and time.            Results Review:     I reviewed the patient's new clinical results.    Results from last 7 days   Lab Units 06/11/21  0401   WBC 10*3/mm3 12.73*   HEMOGLOBIN g/dL 8.7*   HEMATOCRIT % 27.2*   PLATELETS 10*3/mm3 268     Results from last 7 days   Lab Units 06/11/21  0401 06/09/21  1013   SODIUM mmol/L 136 136   POTASSIUM mmol/L 3.6 3.5   CHLORIDE mmol/L 100 100   CO2 mmol/L 27.0 25.0   BUN mg/dL 64* 46*   CREATININE mg/dL 1.77* 1.71* "   CALCIUM mg/dL 9.1 8.2*   BILIRUBIN mg/dL  --  0.4   ALK PHOS U/L  --  162*   ALT (SGPT) U/L  --  21   AST (SGOT) U/L  --  24   GLUCOSE mg/dL 144* 112*     Results from last 7 days   Lab Units 06/11/21  0401   SODIUM mmol/L 136   POTASSIUM mmol/L 3.6   CHLORIDE mmol/L 100   CO2 mmol/L 27.0   BUN mg/dL 64*   CREATININE mg/dL 1.77*   GLUCOSE mg/dL 144*   CALCIUM mg/dL 9.1         No results found for: TROPONINI      Results from last 7 days   Lab Units 06/07/21  0301   CHOLESTEROL mg/dL 60   TRIGLYCERIDES mg/dL 51             Tele: Sinus rhythm    Assessment/Plan       NSTEMI 5/22/2021    Hyperlipidemia LDL goal <70    Essential hypertension    T2DM on oral agents and insulin. HbA1c 7.4     A/C kidney disease. ATN due to postoperative arrest. 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 Combination of anoxic insult and azotemia      NSTEMI  · CABG 5/28/2021  · Continue aspirin, beta-blocker, statin  · Normal EF pre op     Hypertension  · Amlodipine 10 mg daily  · Carvedilol 25 mg twice daily  · Isordil 10 mg 3 times daily  · Improved        Hyperlipidemia  · Continue atorvastatin     Acute kidney injury  · Renal function improving  · No ACE/ARB due to MICHAEL  · Nephrology following, patient receiving hemodialysis     Type 2 diabetes mellitus  · Management per hospitalist  · Hemoglobin A1c 7.4     Acute encephalopathy  · Management per intensivist    Plan:  · Patient currently stable from CV standpoint.  · Continue current medications  · We will see again on Monday.  At that time Dr. Greene/CHARLES Bernal to resume cardiac care.    CHARLES Smith   Dictated utilizing Dragon dictation

## 2021-06-11 NOTE — MBS/VFSS/FEES
Acute Care - Speech Language Pathology   Swallow Re-Evaluation [facility]   Fiberoptic Endoscopic Evaluation of Swallowing (FEES)       Patient Name: Augusto Lorenzana Jr.  : 1945  MRN: [id number]  Today's Date: 2021               Admit Date: 2021    Visit Dx:     ICD-10-CM ICD-9-CM   1. Non-STEMI (non-ST elevated myocardial infarction) (CMS/Formerly [facility])  I21.4 410.70   2. Acute congestive heart failure, unspecified heart failure type (CMS/Formerly [facility])  I50.9 428.0   3. History of coronary artery disease  Z86.79 V12.59   4. Coronary artery disease involving native coronary artery of native heart, angina presence unspecified  I25.10 414.01   5. Pharyngeal dysphagia  R13.13 787.23   6. MICHAEL (acute kidney injury) (CMS/Formerly [facility])  N17.9 584.9     Patient Active Problem List   Diagnosis   • NSTEMI 2021   • Hyperlipidemia LDL goal <70   • Essential hypertension   • T2DM on oral agents and insulin. HbA1c 7.4    • Severe 3 vessel CAD with preserved LV function (CMS/Formerly [facility])   • A/C kidney disease. ATN due to postoperative arrest. 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 Combination of anoxic insult and azotemia     Past Medical History:   Diagnosis Date   • CAD (coronary artery disease)    • CKD (chronic kidney disease) stage 3, GFR 30-59 ml/min (CMS/HCC)    • Diabetes mellitus (CMS/Formerly [facility])    • Hyperlipidemia    • Hypertension    • NSTEMI (non-ST elevated myocardial infarction) (CMS/Formerly [facility])      Past Surgical History:   Procedure Laterality Date   • CARDIAC CATHETERIZATION N/A 2021    Procedure: Left Heart Cath;  Surgeon: Blade Greene IV, MD;  Location: [facility] CATH INVASIVE LOCATION;  Service: Cardiovascular;  Laterality: N/A;   • CARDIAC SURGERY      2 stents placed .   • CORONARY ARTERY BYPASS GRAFT N/A 2021    Procedure: MEDIAN STERNOTOMY  CORONARY ARTERY BYPASS X 3 UTILIZING THE LEFT INTERNAL MAMMARY ARTERY GRAFT, EVH OF THE GREATER RIGHT SAPHENOUS VEIN, AND PILO PER ANESTHESIA;  Surgeon: Jacek Miller MD;  Location: [facility];  Service: Cardiothoracic;  Laterality: N/A;        SWALLOW EVALUATION (last 72 hours)      SLP Adult Swallow Evaluation     Row Name 06/11/21 1000 06/11/21 0930 06/09/21 0930             Rehab Evaluation    Document Type  re-evaluation  -DV  therapy note (daily note)  -DV  therapy note (daily note)  -DV      Subjective Information  complains of;pain  -DV  complains of;pain  -DV  complains of;fatigue;pain  -DV      Patient Observations  alert;cooperative  -DV  alert;cooperative  -DV  alert;cooperative  -DV      Patient/Family/Caregiver Comments/Observations  no family present  -DV  no family present  -DV  daughter present  -DV      Care Plan Review  evaluation/treatment results reviewed;risks/benefits reviewed;care plan/treatment goals reviewed;current/potential barriers reviewed;patient/other agree to care plan  -DV  evaluation/treatment results reviewed;care plan/treatment goals reviewed;risks/benefits reviewed;current/potential barriers reviewed;patient/other agree to care plan  -DV  evaluation/treatment results reviewed;care plan/treatment goals reviewed;risks/benefits reviewed;current/potential barriers reviewed;patient/other agree to care plan  -DV      Care Plan Review, Other Participant(s)  --  --  daughter  -DV      Patient Effort  good  -DV  good  -DV  good  -DV      Comment  evaluation completed in collaboration with SINDI  -MIKE  --  --      Symptoms Noted During/After Treatment  none  -DV  none  -DV  none  -DV         General Information    Patient Profile Reviewed  yes  -DV  --  --      Pertinent History Of Current Problem  See initial eval.   -DV  --  --      Current Method of Nutrition  NPO;nasogastric feedings  -DV  --  --      Precautions/Limitations, Vision  WFL;for purposes of eval  -DV  --  --       Precautions/Limitations, Hearing  WFL;for purposes of eval  -DV  --  --      Prior Level of Function-Communication  WFL  -DV  --  --      Prior Level of Function-Swallowing  no diet consistency restrictions  -DV  --  --      Plans/Goals Discussed with  patient;agreed upon  -DV  --  --      Barriers to Rehab  medically complex  -DV  --  --      Patient's Goals for Discharge  return to PO diet  -DV  --  --         Pain    Additional Documentation  Pain Scale: Numbers Pre/Post-Treatment (Group)  -DV  Pain Scale: Numbers Pre/Post-Treatment (Group)  -DV  Pain Scale: Numbers Pre/Post-Treatment (Group)  -DV         Pain Scale: Numbers Pre/Post-Treatment    Pretreatment Pain Rating  4/10  -DV  4/10  -DV  0/10 - no pain  -DV      Posttreatment Pain Rating  4/10  -DV  4/10  -DV  0/10 - no pain  -DV      Pain Location  nose;throat  -DV  nose;throat  -DV  --      Pre/Posttreatment Pain Comment  R nasal passage and throat 2' large bore NG  -DV  R nasal passage and throat 2' large bore NG  -DV  incisional pain when coughing  -DV      Pain Intervention(s)  Nursing Notified  -DV  --  --         General Eating/Swallowing Observations    Respiratory Support Currently in Use  room air  -DV  --  --      Eating/Swallowing Skills  fed by staff/caregiver  -DV  --  --      Positioning During Eating  upright 90 degree;upright in bed  -DV  --  --         Fiberoptic Endoscopic Evaluation of Swallowing (FEES)    Risks/Benefits Reviewed  risks/benefits explained;patient;agreed to eval  -DV  --  --      Nasal Entry  left:  -DV  --  --      Scope serial number/identification  [id number]  -DV  --  --         Anatomy and Physiology    Anatomic Considerations  edema;vocal folds;false vocal folds;arytenoids;anatomic deviation observed (see comments)  -DV  --  --      Comment  Small round protrusion noted on the R ventricular fold, anteriorly. Present on prior exam as well. Does not inferfere w/ closure. Diffuse pharyngeal edema, greater on the L side in  line with large-bore NG tube.    -DV  --  --      Velopharyngeal  CNA  -DV  --  --      Base of Tongue  symmetrical;range reduced  -DV  --  --      Epiglottis  edematous;other (see comments) on the L side where makes contact w/ NG  -DV  --  --      Laryngeal Function Breathing  symmetrical  -DV  --  --      Laryngeal Function Phonation  symmetrical  -DV  --  --      Laryngeal Function to Breath Hold  TVF/FVF/Arytenoid  -DV  --  --      Secretion Rating Scale (Yuriy et al. 1996)  3- secretions inside the laryngeal vestibule/aspiration, not cleared  -DV  --  --      Secretion Description  thin;clear  -DV  --  --      Ice Chips  did not clear secretions  -DV  --  --      Spontaneous Swallow  frequency reduced  -DV  --  --      Sensory  reduced sensation  -DV  --  --      Utensils Used  Spoon;Cup  -DV  --  --      Consistencies Trialed  pudding/puree;thin liquids  -DV  --  --         FEES Interpretation    Oral Phase  WFL  -DV  --  --         Initiation of Pharyngeal Swallow    Initiation of Pharyngeal Swallow  bolus in valleculae  -DV  --  --      Pharyngeal Phase  impaired pharyngeal phase of swallowing  -DV  --  --      Aspiration During the Swallow  thin liquids;pudding/puree;secondary to reduced laryngeal elevation;secondary to reduced vestibular closure;secondary to reduced laryngeal (VF) closure  -DV  --  --      Response to Aspiration  inconsistent response;other (see comments) cough w/ aspiration of thin, no response w/ pudding asp  -DV  --  --      Rosenbek's Scale  thin:;7-->Level 7;pudding/puree:;8-->Level 8  -DV  --  --      Residue  all consistencies tested;diffuse within pharynx;secondary to reduced base of tongue retraction;secondary to reduced posterior pharyngeal wall stripping;secondary to reduced laryngeal elevation;secondary to reduced hyolaryngeal excursion  -DV  --  --      Response to Residue  unable to clear residue;with spontaneous subsequent swallow;with cued swallow  -DV  --  --       Attempted Compensatory Maneuvers  chin tuck;head turn to left;multiple swallows;effortful (hard swallow);breath hold;throat clear after swallow;combination of maneuvers (see comments)  -DV  --  --      Response to Attempted Compensatory Maneuvers  did not prevent aspiration;did not reduce residue  -DV  --  --      FEES Summary  FEES completed this am. Severe pharyngeal dysphagia. Pt continues w/ diffuse pharyngeal edema, greater on the L side in line w/ large bore NG. Aspiration occurred during the swallow with both thin liquids and pudding. Pt exhibited cough response to aspiration of thin liquids, but silent response to aspiration of pudding. Pt unable to clear aspirated material with cued cough. Chin tuck, head turn to L, and breath hold ineffective to prevent aspiration. Moderate diffuse residue w/all trials - pt unable to fully clear w/ effortful or multiple swallows. Cannot safely recommend PO diet at this time. Continue NPO - discussed w/ RN the benefit of downsizing from large-bore NG to keofeed to reduce pt's edema and pain, and maximize swallow rehabilitation. Continue ice chips x4/hr w/SPV and after oral care is completed.  -DV  --  --         Clinical Impression    Daily Summary of Progress (SLP)  progress toward functional goals is gradual  -DV  progress toward functional goals is gradual  -DV  progress toward functional goals as expected  -DV      Barriers to Overall Progress (SLP)  large-bore NG - would benefit from downsizing to keofeed, see summary above  -DV  --  --      SLP Swallowing Diagnosis  severe;pharyngeal dysphagia  -DV  --  --      Functional Impact  risk of aspiration/pneumonia  -DV  --  --      Rehab Potential/Prognosis, Swallowing  good, to achieve stated therapy goals  -DV  --  --      Swallow Criteria for Skilled Therapeutic Interventions Met  demonstrates skilled criteria  -DV  --  --      Plan for Continued Treatment (SLP)  Will continue to follow in tx for swallowing exercise.  Recommend downsizing from large-bore NG to keofeed to maximize rehab potential, minimizing edema, pain, and interference with swallowing.  -DV  Throat clear and WVQ w/ thin liquids. Multiple swallows intermittently with puree. Repeat FEES planned for today.  -DV  Pt continues with very hoarse vocal quality and increased coughing per RN, almost constant throughout this encounter, with or without PO. Overt WVQ with thin liquid. Pudding and nectar-thick liquids trialed as well. Difficult assessment 2' hoarse vocal quality and coughing at baseline. Pt requiring suction for secretion management. Not ready for repeat FEES. Exercises completed.  -DV         Recommendations    Therapy Frequency (Swallow)  5 days per week  -DV  --  --      Predicted Duration Therapy Intervention (Days)  until discharge  -DV  --  --      SLP Diet Recommendation  NPO;temporary alternate methods of nutrition/hydration;other (see comments) Ice chips x4/hr w/SPV, p oral care. REC: downsize to keofeed  -DV  --  --      Recommended Precautions and Strategies  general aspiration precautions  -DV  --  --      Oral Care Recommendations  Oral Care BID/PRN;Suction toothbrush  -DV  --  --      SLP Rec. for Method of Medication Administration  meds via alternate route  -DV  --  --      Monitor for Signs of Aspiration  yes;notify SLP if any concerns  -DV  --  --      Anticipated Discharge Disposition (SLP)  unknown;anticipate therapy at next level of care  -DV  --  --        User Key  (r) = Recorded By, (t) = Taken By, (c) = Cosigned By    Initials Name Effective Dates    Christine Atkinson MS CCC-SLP 02/28/20 -           EDUCATION  The patient has been educated in the following areas:   Dysphagia (Swallowing Impairment) Oral Care/Hydration NPO rationale.    SLP Recommendation and Plan  SLP Swallowing Diagnosis: severe, pharyngeal dysphagia  SLP Diet Recommendation: NPO, temporary alternate methods of nutrition/hydration, other (see comments) (Ice chips x4/hr  w/SPV, p oral care. REC: downsize to keofeed)  Recommended Precautions and Strategies: general aspiration precautions  SLP Rec. for Method of Medication Administration: meds via alternate route     Monitor for Signs of Aspiration: yes, notify SLP if any concerns     Swallow Criteria for Skilled Therapeutic Interventions Met: demonstrates skilled criteria  Anticipated Discharge Disposition (SLP): unknown, anticipate therapy at next level of care  Rehab Potential/Prognosis, Swallowing: good, to achieve stated therapy goals  Therapy Frequency (Swallow): 5 days per week  Predicted Duration Therapy Intervention (Days): until discharge     Daily Summary of Progress (SLP): progress toward functional goals is gradual    Plan for Continued Treatment (SLP): Will continue to follow in tx for swallowing exercise. Recommend downsizing from large-bore NG to keofeed to maximize rehab potential, minimizing edema, pain, and interference with swallowing.              Plan of Care Reviewed With: patient    SLP GOALS     Row Name 06/11/21 1000 06/09/21 0908          Oral Nutrition/Hydration Goal 1 (SLP)    Oral Nutrition/Hydration Goal 1, SLP  LTG: Pt will improve swallow function to safely return to PO diet w/ no overt s/sxs aspiration/distress w/ 100% acc and no cues  -DV  LTG: Pt will return to regular diet, thin liquids w/ no overt s/sxs aspiration/distress w/ 100% acc and no cues  -DV     Time Frame (Oral Nutrition/Hydration Goal 1, SLP)  by discharge  -DV  by discharge  -DV     Progress/Outcomes (Oral Nutrition/Hydration Goal 1, SLP)  goal revised this date  -DV  progress slower than expected  -DV        Oral Nutrition/Hydration Goal 2 (SLP)    Oral Nutrition/Hydration Goal 2, SLP  Pt will tolerate puree/some mashed & honey-thick liquids w/ no overt s/sxs aspiration/distress w/ 100% acc and no cues  -DV  Pt will tolerate puree/some mashed & honey-thick liquids w/ no overt s/sxs aspiration/distress w/ 100% acc and no cues  -DV      Time Frame (Oral Nutrition/Hydration Goal 2, SLP)  by discharge  -DV  by discharge  -DV     Progress/Outcomes (Oral Nutrition/Hydration Goal 2, SLP)  goal no longer appropriate  -DV  goal no longer appropriate  -DV        Oral Nutrition/Hydration Goal (SLP)    Oral Nutrition/Hydration Goal, SLP  Pt will tolerate therapeutic trials of ice chips, thin H2O, and puree w/ no overt s/sxs aspiration/distress w/ 70% acc and no cues in order to assess readiness for repeat instrumental eval.  -DV  Pt will tolerate therapeutic trials of ice chips, thin H2O, and puree w/ no overt s/sxs aspiration/distress w/ 70% acc and no cues in order to assess readiness for repeat instrumental eval.  -DV     Time Frame (Oral Nutrition/Hydration Goal, SLP)  short term goal (STG)  -DV  short term goal (STG)  -DV     Barriers (Oral Nutrition/Hydration Goal, SLP)  --  -DV  overt WVQ with thin liquid. hoarse vocal quality and coughing at baseline and t/o trials.   -DV     Progress/Outcomes (Oral Nutrition/Hydration Goal, SLP)  goal ongoing  -DV  continuing progress toward goal  -DV        Lingual Strengthening Goal 1 (SLP)    Activity (Lingual Strengthening Goal 1, SLP)  increase tongue back strength  -DV  increase tongue back strength  -DV     Increase Tongue Back Strength  swallow trials;lingual resistance exercises  -DV  swallow trials;lingual resistance exercises  -DV     [place]/Accuracy (Lingual Strengthening Goal 1, SLP)  with minimal cues (75-90% accuracy)  -DV  with minimal cues (75-90% accuracy)  -DV     Time Frame (Lingual Strengthening Goal 1, SLP)  short term goal (STG)  -DV  short term goal (STG)  -DV     Progress/Outcomes (Lingual Strengthening Goal 1, SLP)  goal no longer appropriate  -DV  goal ongoing  -DV        Pharyngeal Strengthening Exercise Goal 1 (SLP)    Activity (Pharyngeal Strengthening Goal 1, SLP)  increase superior movement of the hyolaryngeal complex;increase anterior movement of the hyolaryngeal  complex;increase closure at entrance to airway/closure of airway at glottis;increase squeeze/positive pressure generation;increase tongue base retraction  -DV  increase timing;increase superior movement of the hyolaryngeal complex;increase anterior movement of the hyolaryngeal complex;increase closure at entrance to airway/closure of airway at glottis;increase squeeze/positive pressure generation;increase tongue base retraction  -DV     Increase Timing  --  -DV  prepping - 3 second prep or suck swallow or 3-step swallow  -DV     Increase Superior Movement of the Hyolaryngeal Complex  effortful pitch glide (falsetto + pharyngeal squeeze)  -DV  effortful pitch glide (falsetto + pharyngeal squeeze)  -DV     Increase Anterior Movement of the Hyolaryngeal Complex  chin tuck against resistance (CTAR)  -DV  chin tuck against resistance (CTAR)  -DV     Increase Closure at Entrance to Airway/Closure of Airway at Glottis  supraglottic swallow  -DV  supraglottic swallow  -DV     Increase Squeeze/Positive Pressure Generation  hard effortful swallow  -DV  hard effortful swallow  -DV     Increase Tongue Base Retraction  bronwyn  -DV  bronwyn  -DV     [place]/Accuracy (Pharyngeal Strengthening Goal 1, SLP)  with minimal cues (75-90% accuracy)  -DV  with minimal cues (75-90% accuracy)  -DV     Time Frame (Pharyngeal Strengthening Goal 1, SLP)  short term goal (STG)  -DV  short term goal (STG)  -DV     Barriers (Pharyngeal Strengthening Goal 1, SLP)  --  exercises completed  -DV     Progress/Outcomes (Pharyngeal Strengthening Goal 1, SLP)  goal revised this date  -DV  continuing progress toward goal  -DV        Swallow Management Recall Goal 1 (SLP)    Activity (Swallow Management Recall Goal 1, SLP)  independent recall of;safe diet/liquid level;safe diet level/texture  -DV  independent recall of;safe diet/liquid level;safe diet level/texture  -DV     [place]/Accuracy (Swallow Management Recall Goal 1, SLP)  independently  (over 90% accuracy)  -DV  independently (over 90% accuracy)  -DV     Time Frame (Swallow Management Recall Goal 1, SLP)  short term goal (STG)  -DV  short term goal (STG)  -DV     Progress/Outcomes (Swallow Management Recall Goal 1, SLP)  goal no longer appropriate  -DV  goal no longer appropriate  -DV        Swallow Compensatory Strategies Goal 1 (SLP)    Activity (Swallow Compensatory Strategies/Techniques Goal 1, SLP)  compensatory strategies;small cup sips;other (see comments);during p.o. trials;during meal intake  -DV  compensatory strategies;small cup sips;other (see comments);during p.o. trials;during meal intake  -DV     [place]/Accuracy (Swallow Compensatory Strategies/Techniques Goal 1, SLP)  independently (over 90% accuracy)  -DV  independently (over 90% accuracy)  -DV     Time Frame (Swallow Compensatory Strategies/Techniques Goal 1, SLP)  short term goal (STG)  -DV  short term goal (STG)  -DV     Progress/Outcomes (Swallow Compensatory Strategies/Techniques Goal 1, SLP)  goal no longer appropriate  -DV  goal no longer appropriate  -DV       User Key  (r) = Recorded By, (t) = Taken By, (c) = Cosigned By    Initials Name Provider Type    Christine Atkinson MS CCC-SLP Speech and Language Pathologist             Time Calculation:   Time Calculation- SLP     Row Name 06/11/21 1539 06/11/21 1041          Time Calculation- SLP    SLP Start Time  1000  -DV  0930  -DV     SLP Received On  06/11/21  -DV  06/11/21  -DV        Untimed Charges    SLP Eval/Re-eval   ST Fiberoptic Endo Eval Swallow - 84928  -DV  --     [id number] Fiberoptic Endo Eval Swallow Minutes  100  -DV  --     30967-CL Treatment Swallow Minutes  --  30  -DV        Total Minutes    Untimed Charges Total Minutes  100  -DV  30  -DV      Total Minutes  100  -DV  30  -DV       User Key  (r) = Recorded By, (t) = Taken By, (c) = Cosigned By    Initials Name Provider Type    Christine Atkinson MS CCC-SLP Speech and Language Pathologist          Therapy  Charges for Today     Code Description Service Date Service Provider Modifiers Qty    [id number]  ST TREATMENT SWALLOW 2 6/11/2021 Christine Sahni, MS CCC-SLP GN 1    [id number] [facility] FIBEROPTIC ENDO EVAL SWALL 7 6/11/2021 Christine Sahni, MS RUIZ-SLP GN 1            Patient was not wearing a face mask and did exhibit coughing during this therapy encounter.  Procedure performed was aerosolizing, involved close contact (within 6 feet for at least 15 minutes or longer), and involved contact with infectious secretions or specimens.  Therapist used appropriate personal protective equipment including gloves, standard procedure mask and eye protection.  Appropriate PPE was worn during the entire therapy session.  Hand hygiene was completed before and after therapy session.       MS EDMUND RileySLP  6/11/2021

## 2021-06-11 NOTE — PLAN OF CARE
Problem: Adult Inpatient Plan of Care  Goal: Plan of Care Review  6/11/2021 1117 by Christine Sahni MS CCC-SLP  Outcome: Ongoing, Progressing  Flowsheets (Taken 6/11/2021 1000)  Plan of Care Reviewed With: patient   Goal Outcome Evaluation:  Plan of Care Reviewed With: patient            SLP re-evaluation completed. Will continue to address dysphagia in tx. Please see note for further details and recommendations.

## 2021-06-11 NOTE — PROGRESS NOTES
Cardiothoracic Surgery Progress Note      POD # 14 s/p CABG       LOS: 20 days      Subjective:  Continues to improve daily      Objective:  Vital Signs  Temp:  [97.9 °F (36.6 °C)-98.6 °F (37 °C)] 97.9 °F (36.6 °C)  Heart Rate:  [66-76] 75  Resp:  [14-20] 16  BP: (127-167)/(66-92) 139/84    Physical Exam:   General Appearance: easily roused   Lungs: clear to auscultation, respirations regular, respirations even and respirations unlabored   Heart: regular rhythm & normal rate, normal S1, S2, no murmur, no gallop, no rub and no click   Skin: Sternal Incision c/d/i, leg EVH incision draining scant serosanguinous fluid     Results:    Results from last 7 days   Lab Units 06/11/21  0401   WBC 10*3/mm3 12.73*   HEMOGLOBIN g/dL 8.7*   HEMATOCRIT % 27.2*   PLATELETS 10*3/mm3 268     Results from last 7 days   Lab Units 06/11/21  0401   SODIUM mmol/L 136   POTASSIUM mmol/L 3.6   CHLORIDE mmol/L 100   CO2 mmol/L 27.0   BUN mg/dL 64*   CREATININE mg/dL 1.77*   GLUCOSE mg/dL 144*   CALCIUM mg/dL 9.1         Assessment:    NSTEMI 5/22/2021    Hyperlipidemia LDL goal <70    Essential hypertension    T2DM on oral agents and insulin. HbA1c 7.4     A/C kidney disease. ATN due to postoperative arrest. 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 Combination of anoxic insult and azotemia    POD 14 CABG x3    Plan:  Doing much better  Table creatinine, will undergo hemodialysis today.  tx to tele  Will need rehab at discharge, estimate beginning of next week  Question of need for HD and feeding issues to be addressed in the next few days        Jacek Miller MD  06/11/21  09:43 EDT

## 2021-06-11 NOTE — PLAN OF CARE
Goal Outcome Evaluation:  Plan of Care Reviewed With: patient        Progress: improving       Pt A&Ox4, pleasant. Ambu x 220ft.     VSS on room air.     Tube feed continued without difficulty.  mL. HD planned for today.    Orders to floor.

## 2021-06-11 NOTE — PLAN OF CARE
Goal Outcome Evaluation:  Plan of Care Reviewed With: patient        Progress: improving  Outcome Summary: patient able to ambulate with walker and min assist for 220 ft He demonstrates improved standing balance with no posterior lean today upon initially standing. we will continue to progress activity as tolerated

## 2021-06-12 ENCOUNTER — APPOINTMENT (OUTPATIENT)
Dept: GENERAL RADIOLOGY | Facility: HOSPITAL | Age: 76
End: 2021-06-12

## 2021-06-12 LAB
ALBUMIN SERPL-MCNC: 3 G/DL (ref 3.5–5.2)
ANION GAP SERPL CALCULATED.3IONS-SCNC: 9 MMOL/L (ref 5–15)
BASOPHILS # BLD AUTO: 0.02 10*3/MM3 (ref 0–0.2)
BASOPHILS NFR BLD AUTO: 0.2 % (ref 0–1.5)
BUN SERPL-MCNC: 67 MG/DL (ref 8–23)
BUN/CREAT SERPL: 39.6 (ref 7–25)
CALCIUM SPEC-SCNC: 9 MG/DL (ref 8.6–10.5)
CHLORIDE SERPL-SCNC: 100 MMOL/L (ref 98–107)
CO2 SERPL-SCNC: 26 MMOL/L (ref 22–29)
CREAT SERPL-MCNC: 1.69 MG/DL (ref 0.76–1.27)
DEPRECATED RDW RBC AUTO: 54.2 FL (ref 37–54)
EOSINOPHIL # BLD AUTO: 0.21 10*3/MM3 (ref 0–0.4)
EOSINOPHIL NFR BLD AUTO: 2.1 % (ref 0.3–6.2)
ERYTHROCYTE [DISTWIDTH] IN BLOOD BY AUTOMATED COUNT: 16.3 % (ref 12.3–15.4)
GFR SERPL CREATININE-BSD FRML MDRD: 40 ML/MIN/1.73
GLUCOSE BLDC GLUCOMTR-MCNC: 113 MG/DL (ref 70–130)
GLUCOSE BLDC GLUCOMTR-MCNC: 118 MG/DL (ref 70–130)
GLUCOSE BLDC GLUCOMTR-MCNC: 140 MG/DL (ref 70–130)
GLUCOSE BLDC GLUCOMTR-MCNC: 65 MG/DL (ref 70–130)
GLUCOSE BLDC GLUCOMTR-MCNC: 96 MG/DL (ref 70–130)
GLUCOSE SERPL-MCNC: 116 MG/DL (ref 65–99)
HCT VFR BLD AUTO: 26.3 % (ref 37.5–51)
HGB BLD-MCNC: 8.4 G/DL (ref 13–17.7)
IMM GRANULOCYTES # BLD AUTO: 0.07 10*3/MM3 (ref 0–0.05)
IMM GRANULOCYTES NFR BLD AUTO: 0.7 % (ref 0–0.5)
LYMPHOCYTES # BLD AUTO: 0.94 10*3/MM3 (ref 0.7–3.1)
LYMPHOCYTES NFR BLD AUTO: 9.5 % (ref 19.6–45.3)
MAGNESIUM SERPL-MCNC: 1.9 MG/DL (ref 1.6–2.4)
MCH RBC QN AUTO: 30 PG (ref 26.6–33)
MCHC RBC AUTO-ENTMCNC: 31.9 G/DL (ref 31.5–35.7)
MCV RBC AUTO: 93.9 FL (ref 79–97)
MONOCYTES # BLD AUTO: 0.87 10*3/MM3 (ref 0.1–0.9)
MONOCYTES NFR BLD AUTO: 8.8 % (ref 5–12)
NEUTROPHILS NFR BLD AUTO: 7.74 10*3/MM3 (ref 1.7–7)
NEUTROPHILS NFR BLD AUTO: 78.7 % (ref 42.7–76)
NRBC BLD AUTO-RTO: 0 /100 WBC (ref 0–0.2)
PHOSPHATE SERPL-MCNC: 3.6 MG/DL (ref 2.5–4.5)
PLATELET # BLD AUTO: 260 10*3/MM3 (ref 140–450)
PMV BLD AUTO: 10.6 FL (ref 6–12)
POTASSIUM SERPL-SCNC: 3.4 MMOL/L (ref 3.5–5.2)
RBC # BLD AUTO: 2.8 10*6/MM3 (ref 4.14–5.8)
SODIUM SERPL-SCNC: 135 MMOL/L (ref 136–145)
WBC # BLD AUTO: 9.85 10*3/MM3 (ref 3.4–10.8)

## 2021-06-12 PROCEDURE — 85025 COMPLETE CBC W/AUTO DIFF WBC: CPT | Performed by: INTERNAL MEDICINE

## 2021-06-12 PROCEDURE — 99233 SBSQ HOSP IP/OBS HIGH 50: CPT | Performed by: INTERNAL MEDICINE

## 2021-06-12 PROCEDURE — 80069 RENAL FUNCTION PANEL: CPT | Performed by: INTERNAL MEDICINE

## 2021-06-12 PROCEDURE — 74018 RADEX ABDOMEN 1 VIEW: CPT

## 2021-06-12 PROCEDURE — 83735 ASSAY OF MAGNESIUM: CPT | Performed by: INTERNAL MEDICINE

## 2021-06-12 PROCEDURE — 63710000001 INSULIN DETEMIR PER 5 UNITS: Performed by: INTERNAL MEDICINE

## 2021-06-12 PROCEDURE — 25010000002 HEPARIN (PORCINE) PER 1000 UNITS: Performed by: INTERNAL MEDICINE

## 2021-06-12 PROCEDURE — 99024 POSTOP FOLLOW-UP VISIT: CPT | Performed by: THORACIC SURGERY (CARDIOTHORACIC VASCULAR SURGERY)

## 2021-06-12 PROCEDURE — 82962 GLUCOSE BLOOD TEST: CPT

## 2021-06-12 PROCEDURE — 97530 THERAPEUTIC ACTIVITIES: CPT

## 2021-06-12 RX ORDER — POTASSIUM CHLORIDE 1.5 G/1.77G
40 POWDER, FOR SOLUTION ORAL ONCE
Status: COMPLETED | OUTPATIENT
Start: 2021-06-12 | End: 2021-06-12

## 2021-06-12 RX ORDER — BUMETANIDE 0.25 MG/ML
1 INJECTION INTRAMUSCULAR; INTRAVENOUS ONCE
Status: COMPLETED | OUTPATIENT
Start: 2021-06-12 | End: 2021-06-12

## 2021-06-12 RX ADMIN — CARVEDILOL 25 MG: 12.5 TABLET, FILM COATED ORAL at 09:31

## 2021-06-12 RX ADMIN — Medication 1 PACKET: at 17:02

## 2021-06-12 RX ADMIN — Medication 1 PACKET: at 09:36

## 2021-06-12 RX ADMIN — SODIUM CHLORIDE, PRESERVATIVE FREE 10 ML: 5 INJECTION INTRAVENOUS at 21:48

## 2021-06-12 RX ADMIN — HEPARIN SODIUM 5000 UNITS: 5000 INJECTION INTRAVENOUS; SUBCUTANEOUS at 09:32

## 2021-06-12 RX ADMIN — ISOSORBIDE DINITRATE 10 MG: 20 TABLET ORAL at 09:36

## 2021-06-12 RX ADMIN — DEXTROSE MONOHYDRATE 25 G: 500 INJECTION PARENTERAL at 11:18

## 2021-06-12 RX ADMIN — HEPARIN SODIUM 5000 UNITS: 5000 INJECTION INTRAVENOUS; SUBCUTANEOUS at 21:48

## 2021-06-12 RX ADMIN — GABAPENTIN 300 MG: 300 CAPSULE ORAL at 21:47

## 2021-06-12 RX ADMIN — BUMETANIDE 1 MG: 0.25 INJECTION, SOLUTION INTRAMUSCULAR; INTRAVENOUS at 09:32

## 2021-06-12 RX ADMIN — Medication 1 PACKET: at 12:22

## 2021-06-12 RX ADMIN — ATORVASTATIN CALCIUM 40 MG: 40 TABLET, FILM COATED ORAL at 21:50

## 2021-06-12 RX ADMIN — PANTOPRAZOLE SODIUM 40 MG: 40 INJECTION, POWDER, FOR SOLUTION INTRAVENOUS at 21:50

## 2021-06-12 RX ADMIN — INSULIN DETEMIR 15 UNITS: 100 INJECTION, SOLUTION SUBCUTANEOUS at 21:50

## 2021-06-12 RX ADMIN — ASPIRIN 325 MG ORAL TABLET 325 MG: 325 PILL ORAL at 09:31

## 2021-06-12 RX ADMIN — CARVEDILOL 25 MG: 12.5 TABLET, FILM COATED ORAL at 17:01

## 2021-06-12 RX ADMIN — OXYCODONE HYDROCHLORIDE 1 ML: 5 SOLUTION ORAL at 16:02

## 2021-06-12 RX ADMIN — SODIUM CHLORIDE, PRESERVATIVE FREE 10 ML: 5 INJECTION INTRAVENOUS at 09:37

## 2021-06-12 RX ADMIN — DOCUSATE SODIUM 100 MG: 50 LIQUID ORAL at 09:31

## 2021-06-12 RX ADMIN — POTASSIUM CHLORIDE 40 MEQ: 1.5 POWDER, FOR SOLUTION ORAL at 09:31

## 2021-06-12 RX ADMIN — THIAMINE HCL TAB 100 MG 100 MG: 100 TAB at 09:32

## 2021-06-12 RX ADMIN — PANTOPRAZOLE SODIUM 40 MG: 40 INJECTION, POWDER, FOR SOLUTION INTRAVENOUS at 09:32

## 2021-06-12 RX ADMIN — ISOSORBIDE DINITRATE 10 MG: 20 TABLET ORAL at 12:22

## 2021-06-12 RX ADMIN — ISOSORBIDE DINITRATE 10 MG: 20 TABLET ORAL at 17:02

## 2021-06-12 RX ADMIN — AMLODIPINE BESYLATE 10 MG: 10 TABLET ORAL at 09:32

## 2021-06-12 RX ADMIN — SENNOSIDES 10 ML: 8.8 LIQUID ORAL at 09:31

## 2021-06-12 RX ADMIN — OXYCODONE HYDROCHLORIDE 1 ML: 5 SOLUTION ORAL at 23:31

## 2021-06-12 RX ADMIN — Medication 5 MG: at 21:47

## 2021-06-12 RX ADMIN — ACETAMINOPHEN 650 MG: 325 TABLET, FILM COATED ORAL at 01:29

## 2021-06-12 NOTE — THERAPY TREATMENT NOTE
Patient Name: Augusto Lorenzana Jr.  : 1945    MRN: [id number]                              Today's Date: 2021       Admit Date: 2021    Visit Dx:     ICD-10-CM ICD-9-CM   1. Non-STEMI (non-ST elevated myocardial infarction) (CMS/HCC)  I21.4 410.70   2. Acute congestive heart failure, unspecified heart failure type (CMS/HCC)  I50.9 428.0   3. History of coronary artery disease  Z86.79 V12.59   4. Coronary artery disease involving native coronary artery of native heart, angina presence unspecified  I25.10 414.01   5. Pharyngeal dysphagia  R13.13 787.23   6. MICHAEL (acute kidney injury) (CMS/HCC)  N17.9 584.9     Patient Active Problem List   Diagnosis   • NSTEMI 2021   • Hyperlipidemia LDL goal <70   • Essential hypertension   • T2DM on oral agents and insulin. HbA1c 7.4    • Severe 3 vessel CAD with preserved LV function (CMS/HCC)   • A/C kidney disease. ATN due to postoperative arrest. 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 Combination of anoxic insult and azotemia     Past Medical History:   Diagnosis Date   • CAD (coronary artery disease)    • CKD (chronic kidney disease) stage 3, GFR 30-59 ml/min (CMS/HCC)    • Diabetes mellitus (CMS/[facility])    • Hyperlipidemia    • Hypertension    • NSTEMI (non-ST elevated myocardial infarction) (CMS/HCC)      Past Surgical History:   Procedure Laterality Date   • CARDIAC CATHETERIZATION N/A 2021    Procedure: Left Heart Cath;  Surgeon: Blade Greene IV, MD;  Location: [facility] CATH INVASIVE LOCATION;  Service: Cardiovascular;  Laterality: N/A;   • CARDIAC SURGERY      2 stents placed .   • CORONARY ARTERY BYPASS GRAFT N/A 2021    Procedure: MEDIAN STERNOTOMY CORONARY ARTERY BYPASS X 3 UTILIZING THE LEFT INTERNAL MAMMARY ARTERY GRAFT, EVH OF THE GREATER RIGHT SAPHENOUS VEIN, AND  PILO PER ANESTHESIA;  Surgeon: Jacek Miller MD;  Location: [facility];  Service: Cardiothoracic;  Laterality: N/A;     General Information     Row Name 06/12/21 1321          Physical Therapy Time and Intention    Document Type  therapy note (daily note)  -EMANUEL     Mode of Treatment  physical therapy  -EMANUEL     Row Name 06/12/21 1321          General Information    Patient Profile Reviewed  yes  -EMANUEL     Prior Level of Function  independent:;gait;transfer;ADL's;bed mobility  -EMANUEL     Existing Precautions/Restrictions  cardiac;fall;sternal  -EMANUEL     Barriers to Rehab  medically complex  -EMANUEL     Row Name 06/12/21 1321          Living Environment    Lives With  spouse  -EMANUEL     Row Name 06/12/21 1321          Cognition    Orientation Status (Cognition)  oriented to;person;place;situation  -EMANUEL     Row Name 06/12/21 1321          Safety Issues, Functional Mobility    Safety Issues Affecting Function (Mobility)  safety precautions follow-through/compliance;safety precaution awareness  -EMANUEL     Impairments Affecting Function (Mobility)  balance;endurance/activity tolerance;shortness of breath;strength  -EMANUEL     Cognitive Impairments, Mobility Safety/Performance  safety precaution awareness;safety precaution follow-through  -EMANUEL       User Key  (r) = Recorded By, (t) = Taken By, (c) = Cosigned By    Initials Name Provider Type    EMANUEL Sierra Kitchen PT Physical Therapist        Mobility     Row Name 06/12/21 1322          Bed Mobility    Bed Mobility  rolling left;rolling right;scooting/bridging;supine-sit  -EMANUEL     Rolling Left [place] (Bed Mobility)  contact guard  -EMANUEL     Rolling Right [place] (Bed Mobility)  contact guard  -EMANUEL     Scooting/Bridging [place] (Bed Mobility)  contact guard  -EMANUEL     Supine-Sit [place] (Bed Mobility)  minimum assist (75% patient effort)  -EMANUEL     Assistive Device (Bed Mobility)  draw sheet;head of bed elevated  -EMANUEL     Row Name 06/12/21 1322          Bed-Chair Transfer     Bed-Chair [place] (Transfers)  contact guard  -EMANUEL     Assistive Device (Bed-Chair Transfers)  walker, front-wheeled  -EMANUEL     Row Name 06/12/21 1322          Sit-Stand Transfer    Sit-Stand [place] (Transfers)  contact guard  -EMANUEL     Assistive Device (Sit-Stand Transfers)  walker, front-wheeled  -EMANUEL     Row Name 06/12/21 1322          Gait/Stairs (Locomotion)    [place] Level (Gait)  contact guard  -EMANUEL     Assistive Device (Gait)  walker, front-wheeled  -EMANUEL     Distance in Feet (Gait)  180  -EMANUEL     Deviations/Abnormal Patterns (Gait)  weight shifting decreased;stride length decreased  -EMANUEL     Bilateral Gait Deviations  forward flexed posture  -EMANUEL     Comment (Gait/Stairs)  patient limited by faitgue and SOA  -EMANUEL       User Key  (r) = Recorded By, (t) = Taken By, (c) = Cosigned By    Initials Name Provider Type    Sierra Betancourt, PT Physical Therapist        Obj/Interventions     Row Name 06/12/21 1323          Motor Skills    Therapeutic Exercise  hip;knee;ankle  -[facility] Name 06/12/21 1323          Hip (Therapeutic Exercise)    Hip AROM (Therapeutic Exercise)  bilateral;flexion;extension;10 repetitions;sitting  -[facility] Name 06/12/21 1323          Knee (Therapeutic Exercise)    Knee (Therapeutic Exercise)  AROM (active range of motion)  -     Knee AROM (Therapeutic Exercise)  bilateral;flexion;extension;10 repetitions;sitting  -[facility] Name 06/12/21 1323          Ankle (Therapeutic Exercise)    Ankle (Therapeutic Exercise)  AROM (active range of motion)  -EMANUEL     Ankle AROM (Therapeutic Exercise)  bilateral;dorsiflexion;plantarflexion;10 repetitions;sitting  -[facility] Name 06/12/21 1323          Balance    Static Sitting Balance  WFL  -EMANUEL     Dynamic Sitting Balance  WFL  -EMANUEL     Static Standing Balance  mild impairment  -EMANUEL     Dynamic Standing Balance  mild impairment  -EMANUEL     Balance Interventions  standing;dynamic;weight shifting activity  -EMANUEL       User Key  (r) = Recorded By,  (t) = Taken By, (c) = Cosigned By    Initials Name Provider Type    Sierra Betancourt, PT Physical Therapist        Goals/Plan    No documentation.       Clinical Impression     Row Name 06/12/21 1323          Pain Scale: Numbers Pre/Post-Treatment    Pretreatment Pain Rating  2/10  -EMANUEL     Posttreatment Pain Rating  4/10  -EMANUEL     Pain Location - Orientation  incisional  -EMANUEL     Pain Location  chest  -EMANUEL     Pain Intervention(s)  Repositioned;Ambulation/increased activity;Splinting  -     Row Name 06/12/21 1323          Plan of Care Review    Plan of Care Reviewed With  patient  -EMANUEL     Progress  no change  -EMANUEL     Outcome Summary  patient was able to ambulate 180 ft with walker and CGA. He is limited by fatigue as patient states he hasn't slept in days. patient needs less assist for bed mobility and is progressing toward mobility goals  -     Row Name 06/12/21 1323          Therapy Assessment/Plan (PT)    Patient/Family Therapy Goals Statement (PT)  return home  -EMANUEL     Rehab Potential (PT)  good, to achieve stated therapy goals  -EMANUEL     Criteria for Skilled Interventions Met (PT)  yes;skilled treatment is necessary  -EMANUEL     Row Name 06/12/21 1323          Vital Signs    Pre Systolic BP Rehab  159  -EMANUEL     Pre Treatment Diastolic BP  80  -EMANUEL     Post Systolic BP Rehab  166  -EMANUEL     Post Treatment Diastolic BP  84  -EMANUEL     Pretreatment Heart Rate (beats/min)  72  -EMANUEL     Posttreatment Heart Rate (beats/min)  82  -EMANUEL     Pre SpO2 (%)  92  -EMANUEL     O2 Delivery Pre Treatment  room air  -EMANUEL     Post SpO2 (%)  93  -EMANUEL     O2 Delivery Post Treatment  room air  -EMANUEL     Pre Patient Position  Supine  -EMANUEL     Intra Patient Position  Standing  -EMANUEL     Post Patient Position  Sitting  -EMANUEL     Row Name 06/12/21 1323          Positioning and Restraints    Pre-Treatment Position  in bed  -EMANUEL     Post Treatment Position  chair  -EMANUEL     In Chair  notified nsg;reclined;sitting;call light within reach;encouraged to call for  assist  -EMANUEL       User Key  (r) = Recorded By, (t) = Taken By, (c) = Cosigned By    Initials Name Provider Type    Sierra Betancourt PT Physical Therapist        Outcome Measures     Row Name 06/12/21 1327          How much help from another person do you currently need...    Turning from your back to your side while in flat bed without using bedrails?  3  -EMANUEL     Moving from lying on back to sitting on the side of a flat bed without bedrails?  3  -EMANUEL     Moving to and from a bed to a chair (including a wheelchair)?  3  -EMANUEL     Standing up from a chair using your arms (e.g., wheelchair, bedside chair)?  3  -EMANUEL     Climbing 3-5 steps with a railing?  3  -EMANUEL     To walk in hospital room?  3  -EMANUEL     AM-PAC 6 Clicks Score (PT)  18  -EMANUEL       User Key  (r) = Recorded By, (t) = Taken By, (c) = Cosigned By    Initials Name Provider Type    Sierra Betancourt PT Physical Therapist        Physical Therapy Education                 Title: PT OT SLP Therapies (In Progress)     Topic: Physical Therapy (In Progress)     Point: Mobility training (In Progress)     Learning Progress Summary           Patient Acceptance, E, NR by EMANUEL at 6/12/2021 1100    Acceptance, E, VU by JB1 at 6/12/2021 0124    Acceptance, E, NR by EMANUEL at 6/11/2021 0840    Acceptance, E, NR by KG at 6/10/2021 0811    Acceptance, E, NR by KG at 6/9/2021 0859    Acceptance, E, NR by EMANUEL at 6/8/2021 0800    Acceptance, E, NR by KG at 6/7/2021 1305    Acceptance, E, NR by EMANUEL at 6/3/2021 1015    Acceptance, E, NR by KG at 6/2/2021 0909    Acceptance, E, NR by KG at 6/1/2021 0933    Acceptance, E, NR by KG at 5/31/2021 1118    Acceptance, E,TB, VU,NR by AY at 5/30/2021 1107                   Point: Home exercise program (In Progress)     Learning Progress Summary           Patient Acceptance, E, NR by EMANUEL at 6/12/2021 1100    Acceptance, E, NR by EMANUEL at 6/11/2021 0840    Acceptance, E, NR by KG at 6/10/2021 0811    Acceptance, E, NR by KG at 6/9/2021 0859     Acceptance, E, NR by EMANUEL at 6/8/2021 0800    Acceptance, E, NR by KG at 6/7/2021 1305    Acceptance, E, NR by EMANUEL at 6/3/2021 1015    Acceptance, E, NR by KG at 6/2/2021 0909    Acceptance, E, NR by KG at 6/1/2021 0933    Acceptance, E, NR by KG at 5/31/2021 1118                   Point: Body mechanics (In Progress)     Learning Progress Summary           Patient Acceptance, E, NR by EMANUEL at 6/12/2021 1100    Acceptance, E, VU by JB1 at 6/12/2021 0124    Acceptance, E, NR by EMANUEL at 6/11/2021 0840    Acceptance, E, NR by KG at 6/10/2021 0811    Acceptance, E, NR by KG at 6/9/2021 0859    Acceptance, E, NR by EMANUEL at 6/8/2021 0800    Acceptance, E, NR by KG at 6/7/2021 1305    Acceptance, E, NR by EMANUEL at 6/3/2021 1015    Acceptance, E, NR by KG at 6/2/2021 0909    Acceptance, E, NR by KG at 6/1/2021 0933    Acceptance, E, NR by KG at 5/31/2021 1118    Acceptance, E,TB, VU,NR by AY at 5/30/2021 1107                   Point: Precautions (In Progress)     Learning Progress Summary           Patient Acceptance, E, NR by EMANUEL at 6/12/2021 1100    Acceptance, E, NR by EMANUEL at 6/11/2021 0840    Acceptance, E, NR by KG at 6/10/2021 0811    Acceptance, E, NR by KG at 6/9/2021 0859    Acceptance, E, NR by EMANUEL at 6/8/2021 0800    Acceptance, E, NR by KG at 6/7/2021 1305    Acceptance, E, NR by EMANUEL at 6/3/2021 1015    Acceptance, E, NR by KG at 6/2/2021 0909    Acceptance, E, NR by KG at 6/1/2021 0933    Acceptance, E, NR by KG at 5/31/2021 1118    Acceptance, E,TB, VU,NR by AY at 5/30/2021 1107                               User Key     Initials Effective Dates Name Provider Type Discipline    EMANUEL 06/19/15 -  Sierra Kitchen, PT Physical Therapist PT    JB1 06/16/16 -  Brooks Bright RN Registered Nurse Nurse    KG 05/22/20 -  Aliza Shen, PT Physical Therapist PT    AY 11/10/20 -  Megan Moffett, [place] Physical Therapist PT              PT Recommendation and Plan  Planned Therapy Interventions (PT): balance training, bed  mobility training, gait training, home exercise program, transfer training, strengthening  Plan of Care Reviewed With: patient  Progress: no change  Outcome Summary: patient was able to ambulate 180 ft with walker and CGA. He is limited by fatigue as patient states he hasn't slept in days. patient needs less assist for bed mobility and is progressing toward mobility goals     Time Calculation:   PT Charges     Row Name 06/12/21 1327             Time Calculation    Start Time  1100  -EMANUEL      PT Received On  06/12/21  -EMANUEL      PT Goal Re-Cert Due Date  06/19/21  -EMANUEL         Time Calculation- PT    Total Timed Code Minutes- PT  24 minute(s)  -EMANUEL         Timed Charges    45091 - PT Therapeutic Activity Minutes  24  -EMANUEL         Total Minutes    Timed Charges Total Minutes  24  -EMANUEL       Total Minutes  24  -EMANUEL        User Key  (r) = Recorded By, (t) = Taken By, (c) = Cosigned By    Initials Name Provider Type    Sierra Betancourt PT Physical Therapist        Therapy Charges for Today     Code Description Service Date Service Provider Modifiers Qty    27198376715 HC PT THERAPEUTIC ACT EA 15 MIN 6/11/2021 Sierra Kitchen, PT GP 2    50908871598 HC PT THER SUPP EA 15 MIN 6/11/2021 Sierra Kitchen, PT GP 2    16560561463 HC PT THERAPEUTIC ACT EA 15 MIN 6/12/2021 Sierra Kitchen, PT GP 2          PT G-Codes  Outcome Measure Options: AM-PAC 6 Clicks Basic Mobility (PT)  AM-PAC 6 Clicks Score (PT): 18  AM-PAC 6 Clicks Score (OT): 10    Sierra Kitchen PT  6/12/2021

## 2021-06-12 NOTE — PLAN OF CARE
Goal Outcome Evaluation:  Plan of Care Reviewed With: patient        Progress: no change  Outcome Summary: patient was able to ambulate 180 ft with walker and CGA. He is limited by fatigue as patient states he hasn't slept in days. patient needs less assist for bed mobility and is progressing toward mobility goals

## 2021-06-12 NOTE — PROGRESS NOTES
"   LOS: 21 days    Patient Care Team:  Rob Polanco MD as PCP - General (Internal Medicine)    Reason For Visit:  F/U MICHAEL ON CKD.  Subjective           Review of Systems:    Pulm: No soa   CV:  No CP      Objective     amLODIPine, 10 mg, Nasogastric, Q24H  aspirin, 325 mg, Nasogastric, Daily  atorvastatin, 40 mg, Nasogastric, Nightly  Beneprotein, 1 packet, Nasogastric, 4x Daily  bumetanide, 1 mg, Intravenous, Once  carvedilol, 25 mg, Nasogastric, BID With Meals  sennosides, 10 mL, Nasogastric, BID   And  docusate sodium, 100 mg, Nasogastric, BID  gabapentin, 300 mg, Nasogastric, Nightly  heparin (porcine), 5,000 Units, Subcutaneous, Q12H  insulin detemir, 25 Units, Subcutaneous, Nightly  insulin regular, 0-9 Units, Subcutaneous, Q6H  isosorbide dinitrate, 10 mg, Nasogastric, TID - Nitrates  melatonin, 5 mg, Nasogastric, Nightly  pantoprazole, 40 mg, Intravenous, BID  pharmacy consult - MTM, , Does not apply, Daily  potassium chloride, 40 mEq, Nasogastric, Once  sodium chloride, 10 mL, Intravenous, Q12H  thiamine, 100 mg, Nasogastric, Daily             Vital Signs:  Blood pressure 151/80, pulse 69, temperature 98.2 °F (36.8 °C), temperature source Oral, resp. rate 18, height 170 cm (66.93\"), weight 84.8 kg (187 lb), SpO2 97 %.    Flowsheet Rows      First Filed Value   Admission Height  170.2 cm (67\") Documented at 05/22/2021 0433   Admission Weight  88.5 kg (195 lb) Documented at 05/22/2021 0433          06/11 0701 - 06/12 0700  In: 2287 [I.V.:329]  Out: 1690 [Urine:1690]    Physical Exam:    General Appearance: NAD, alert and cooperative, Ox3  Eyes: PER, conjunctivae and sclerae normal, no icterus  Lungs: OCC RHONCHI  Heart/CV: regular rhythm & normal rate, no murmur, no gallop, no rub and TR edema  Abdomen: not distended, soft, non-tender, no masses,  bowel sounds present  Skin: No rash, Warm and dry. TEMP IJ HD CATH.    Radiology:            Labs:  Results from last 7 days   Lab Units 06/12/21  0401 " 06/11/21  0401 06/10/21  0351   WBC 10*3/mm3 9.85 12.73* 14.77*   HEMOGLOBIN g/dL 8.4* 8.7* 8.5*   HEMATOCRIT % 26.3* 27.2* 26.3*   PLATELETS 10*3/mm3 260 268 241     Results from last 7 days   Lab Units 06/12/21  0401 06/11/21  0401 06/10/21  0351 06/09/21  1013 06/08/21  0340 06/07/21  0301   SODIUM mmol/L 135* 136 137 136 134* 134*   POTASSIUM mmol/L 3.4* 3.6 3.6 3.5 3.9 3.9   CHLORIDE mmol/L 100 100 100 100 99 98   CO2 mmol/L 26.0 27.0 27.0 25.0 27.0 26.0   BUN mg/dL 67* 64* 60* 46* 56* 84*   CREATININE mg/dL 1.69* 1.77* 1.79* 1.71* 1.98* 2.92*   CALCIUM mg/dL 9.0 9.1 8.6 8.2* 8.5* 8.8   PHOSPHORUS mg/dL 3.6  --  3.4 3.1 3.8 5.2*   MAGNESIUM mg/dL 1.9  --  2.1  --  2.0 2.4   ALBUMIN g/dL 3.00*  --  2.70* 2.40* 2.50* 2.90*     Results from last 7 days   Lab Units 06/12/21  0401   GLUCOSE mg/dL 116*       Results from last 7 days   Lab Units 06/09/21  1013   ALK PHOS U/L 162*   BILIRUBIN mg/dL 0.4   ALT (SGPT) U/L 21   AST (SGOT) U/L 24                 Estimated Creatinine Clearance: 39.9 mL/min (A) (by C-G formula based on SCr of 1.69 mg/dL (H)).      Assessment       NSTEMI 5/22/2021    Hyperlipidemia LDL goal <70    Essential hypertension    T2DM on oral agents and insulin. HbA1c 7.4     A/C kidney disease. ATN due to postoperative arrest. 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 Combination of anoxic insult and azotemia            Impression: NONOLIGURIC MICHAEL ON CKD. IMPROVED GFR. BUN STABLE. HYPOKALEMIA. ANEMIA.             Recommendations: HOLD DIALYSIS. KCL PER FT. NISREEN GAITAN.      Aiden Godfrey MD  06/12/21  07:55 EDT

## 2021-06-12 NOTE — PROGRESS NOTES
Critical Care Note     LOS: 21 days   Patient Care Team:  Rob Polanco MD as PCP - General (Internal Medicine)    Chief Complaint/Reason for visit:    Chief Complaint   Patient presents with   • Shortness of Breath   Coronary artery disease  Diabetes mellitus  Hypertension  Dyslipidemia  Gout  Chronic kidney disease  CABG x3 May 28, 2021  Acute blood loss anemia  Dysphagia    Subjective     76-year-old gentleman with a known history of coronary artery disease, previous stent to his RCA in 2009, diabetes, hypertension, dyslipidemia, gout, chronic kidney disease, smokeless tobacco use who presented to the emergency room May 22 complaining of shortness of air and found to have a non-ST elevation MI.  Heart catheterization revealed multivessel disease.  He underwent CABG x3 vessels on May 28.  Course was complicated by ventricular fibrillation and asystole requiring resuscitation x2 on the day of surgery.  He subsequently underwent paced rhythm for about 48 hours before resuming sinus rhythm.  He required 3 units of blood, 4 units of FFP and 1 sixpack of platelets for bleeding.  He was extubated on the 29th but developed progressive renal failure and azotemia requiring hemodialysis.  Lower extremity duplex was performed and was negative for DVT.  Renal function started to recover and he underwent some diuresis.  He was also noted to be encephalopathic with no acute stroke.  Mentation also started to improve.  He has a recent history of colonoscopy with polypectomy.    Interval History:   He remains afebrile.  On room air his oxygen saturation is 94 to 97%.  He is tolerating tube feedings via a CorPak.  His family notes that his voice is stronger.  This morning his blood sugar was 65 and he received a half amp of D50.  He did walk with physical therapy today.  He was given 1 mg of Bumex with a good urine output response and 40 mEq of potassium.  He is in sinus rhythm.  Yesterday he failed his fees evaluation and  "his nasogastric tube was changed to a CorPak.  Feeding tube is coiled in the stomach      Review of Systems:    All systems were reviewed and negative except as noted in subjective.    Medical history, surgical history, social history, family history reviewed    Objective     Intake/Output:    Intake/Output Summary (Last 24 hours) at 6/12/2021 1345  Last data filed at 6/12/2021 1200  Gross per 24 hour   Intake 1961 ml   Output 1630 ml   Net 331 ml       Nutrition:  NPO Diet    Infusions:       Telemetry: Sinus rhythm             Vital Signs  Blood pressure 119/65, pulse 61, temperature 98 °F (36.7 °C), temperature source Axillary, resp. rate 22, height 170 cm (66.93\"), weight 84.8 kg (187 lb), SpO2 98 %.    Physical Exam:  General Appearance:   Older gentleman sitting upright in the chair, slightly pale   Head:   Normocephalic, atraumatic   Eyes:          Pupils are equal and reactive to light, conjunctiva pale, no jaundice   Ears:     Throat:  Oral mucosa, CorPak in place   Neck:  Trachea midline, no palpable thyroid   Back:      Lungs:    Sternotomy incision is healing.  Breath sounds are bilateral without rhonchi    Heart:   Regular rhythm, S1, S2 auscultated, no rub   Abdomen:    Nondistended, bowel sounds present, soft   Rectal:   Deferred   Extremities:  Right lower lobe incisions are intact.  No pretibial edema.  Ecchymosis over the left lower extremity   Pulses:    Skin:  Warm and dry   Lymph nodes:    Neurologic:  Awake and conversant, speech fluent, generalized weakness but no focal weakness      Results Review:     I reviewed the patient's new clinical results.   Results from last 7 days   Lab Units 06/12/21  0401 06/11/21  0401 06/10/21  0351 06/09/21  1013 06/08/21  0340 06/07/21  0301   SODIUM mmol/L 135* 136 137 136 134* 134*   POTASSIUM mmol/L 3.4* 3.6 3.6 3.5 3.9 3.9   CHLORIDE mmol/L 100 100 100 100 99 98   CO2 mmol/L 26.0 27.0 27.0 25.0 27.0 26.0   BUN mg/dL 67* 64* 60* 46* 56* 84*   CREATININE " mg/dL 1.69* 1.77* 1.79* 1.71* 1.98* 2.92*   CALCIUM mg/dL 9.0 9.1 8.6 8.2* 8.5* 8.8   BILIRUBIN mg/dL  --   --   --  0.4 0.4 0.4   ALK PHOS U/L  --   --   --  162* 157* 158*   ALT (SGPT) U/L  --   --   --  21 21 23   AST (SGOT) U/L  --   --   --  24 24 21   GLUCOSE mg/dL 116* 144* 103* 112* 94 106*     Results from last 7 days   Lab Units 06/12/21  0401 06/11/21  0401 06/10/21  0351   WBC 10*3/mm3 9.85 12.73* 14.77*   HEMOGLOBIN g/dL 8.4* 8.7* 8.5*   HEMATOCRIT % 26.3* 27.2* 26.3*   PLATELETS 10*3/mm3 260 268 241         Lab Results   Component Value Date    BLOODCX No growth at 5 days 06/05/2021     No results found for: URINECX    I reviewed the patient's new imaging including images and reports.  KUB reveals feeding tube to be coiled in the stomach    All medications reviewed.   amLODIPine, 10 mg, Nasogastric, Q24H  aspirin, 325 mg, Nasogastric, Daily  atorvastatin, 40 mg, Nasogastric, Nightly  Beneprotein, 1 packet, Nasogastric, 4x Daily  carvedilol, 25 mg, Nasogastric, BID With Meals  sennosides, 10 mL, Nasogastric, BID   And  docusate sodium, 100 mg, Nasogastric, BID  gabapentin, 300 mg, Nasogastric, Nightly  heparin (porcine), 5,000 Units, Subcutaneous, Q12H  insulin detemir, 15 Units, Subcutaneous, Nightly  insulin regular, 0-9 Units, Subcutaneous, Q6H  isosorbide dinitrate, 10 mg, Nasogastric, TID - Nitrates  melatonin, 5 mg, Nasogastric, Nightly  pantoprazole, 40 mg, Intravenous, BID  pharmacy consult - MTM, , Does not apply, Daily  Poly-Vitamin/Iron, 1 mL, Oral, BID  sodium chloride, 10 mL, Intravenous, Q12H  thiamine, 100 mg, Nasogastric, Daily          Assessment/Plan       NSTEMI 5/22/2021    Hyperlipidemia LDL goal <70    Essential hypertension    T2DM on oral agents and insulin. HbA1c 7.4     A/C kidney disease. ATN due to postoperative arrest. Dialysis begun 6/3/2021    Gout    Former smokeless tobacco use    S/P CABG x 3 on 5/28/21    Perioperative cardiac arrest with ventricular fibrillation  (CMS/[facility])    Postop encephalopathy post code. Combination of anoxic insult and azotemia    #1 CABG x3, May 28, 2021 with some perioperative ventricular fibrillation.  He has had no further ventricular arrhythmias.  Intraoperative echocardiogram revealed moderate aortic valve stenosis with a valve area of 1.3 cm², mild MR.  He is off supplemental oxygen.  His hemoglobin is 8.4 and he has not required blood products since his initial perioperative timeframe.    #2 diabetes mellitus type 2 glucose did dip down to 65 this morning.  He does receive 25 units of long-acting at night which might be too much.    #3 hypertension systolic blood pressure has ranged from 117-159 on Norvasc, carvedilol    #4 acute on chronic kidney disease, he did require dialysis after surgery but now creatinine has normalized and urine output is adequate.  He did receive Bumex today as well as potassium.  Serum creatinine is elevated at 1.69 with BUN of 67.  Potassium this morning was slightly decreased at 3.4.  Serum creatinine continues to to improve with yesterday's level 1.77.    #5 postop encephalopathy, clinically improved    #6 perioperative cardiac arrest with ventricular arrhythmia, currently without any further arrhythmias    PLAN:  Continue tube feeding through the weekend and reevaluate swallowing Monday  Diuresis  Monitor H&H  Add liquid iron  Decrease Levemir to 15 units at night compared to 25 units  Aspirin, statin  Norvasc 10 mg, carvedilol 25 mg twice daily  Gabapentin at night  Mobilize  Telemetry    VTE Prophylaxis: Foot pumps    Stress Ulcer Prophylaxis: Protonix    Ruth Luong MD  06/12/21  13:45 EDT      Time: 35 minutes

## 2021-06-12 NOTE — PLAN OF CARE
Goal Outcome Evaluation:  Plan of Care Reviewed With: patient, family        Progress: no change  Outcome Summary: Still awaiting telemetry bed. VSS stable. Neuro intact, but patient has not sound sleep for days. Room air. Persistent dry cough which is nonproductive. PT is using spirometer upto 1000. 1mg Bumex given. No BM as of yet. Adequate UOP. 40meQ of K given per Nephrology.

## 2021-06-12 NOTE — PLAN OF CARE
Goal Outcome Evaluation:  Plan of Care Reviewed With: patient        Progress: improving       VSS. No acute changes. Awaiting tele bed.

## 2021-06-12 NOTE — PROGRESS NOTES
CTS Progress Note       LOS: 21 days   Patient Care Team:  Rob Polanco MD as PCP - General (Internal Medicine)    Chief Complaint: NSTEMI (non-ST elevated myocardial infarction) (CMS/HCC)    Vital Signs:  Temp:  [97.9 °F (36.6 °C)-98.8 °F (37.1 °C)] 98.2 °F (36.8 °C)  Heart Rate:  [62-76] 69  Resp:  [16-22] 18  BP: (116-162)/() 151/80    Physical Exam:       Results:   Results from last 7 days   Lab Units 06/12/21  0401   WBC 10*3/mm3 9.85   HEMOGLOBIN g/dL 8.4*   HEMATOCRIT % 26.3*   PLATELETS 10*3/mm3 260     Results from last 7 days   Lab Units 06/12/21  0401   SODIUM mmol/L 135*   POTASSIUM mmol/L 3.4*   CHLORIDE mmol/L 100   CO2 mmol/L 26.0   BUN mg/dL 67*   CREATININE mg/dL 1.69*   GLUCOSE mg/dL 116*   CALCIUM mg/dL 9.0           Imaging Results (Last 24 Hours)     Procedure Component Value Units Date/Time    XR Abdomen KUB [[id number]] Collected: 06/11/21 1234     Updated: 06/11/21 1636    Narrative:      EXAMINATION: XR ABDOMEN KUB-      INDICATION: Keofeed placement; I21.4-Non-ST elevation (NSTEMI)  myocardial infarction; I50.9-Heart failure, unspecified; Z86.79-Personal  history of other diseases of the circulatory system;  I25.10-Atherosclerotic heart disease of native coronary artery without  angina pectoris; R13.13-Dysphagia, pharyngeal phase; N17.9-Acute kidney  failure, unspecified.      COMPARISON: None.     FINDINGS: KUB reveals feeding tube identified with tip in the fourth  portion of the duodenum. Nasogastric tube with tip in the stomach. Bowel  gas pattern is unremarkable. Degenerative change is seen within the  spine with curvature with convexity to the right.           Impression:      Nasogastric tube with tip in the stomach. Feeding tube  identified with tip in the fourth portion of the duodenum.     D:  06/11/2021  E:  06/11/2021     This report was finalized on 6/11/2021 4:33 PM by Dr. Sherron Gandara MD.       [facility] FEES - Fiberoptic Endo Eval Swallow [[id number]] Resulted:  06/11/21 1459     Updated: 06/11/21 1459    Narrative:      This procedure was auto-finalized with no dictation required.          Assessment      NSTEMI 5/22/2021    Hyperlipidemia LDL goal <70    Essential hypertension    T2DM on oral agents and insulin. HbA1c 7.4     A/C kidney disease. ATN due to postoperative arrest. 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 Combination of anoxic insult and azotemia        Plan   Still awaiting telemetry bed    Please note that portions of this note were completed with a voice recognition program. Efforts were made to edit the dictations, but occasionally words are mistranscribed.    August Richardson MD  06/12/21  07:15 EDT

## 2021-06-13 ENCOUNTER — APPOINTMENT (OUTPATIENT)
Dept: GENERAL RADIOLOGY | Facility: HOSPITAL | Age: 76
End: 2021-06-13

## 2021-06-13 LAB
GLUCOSE BLDC GLUCOMTR-MCNC: 114 MG/DL (ref 70–130)
GLUCOSE BLDC GLUCOMTR-MCNC: 138 MG/DL (ref 70–130)
GLUCOSE BLDC GLUCOMTR-MCNC: 147 MG/DL (ref 70–130)
GLUCOSE BLDC GLUCOMTR-MCNC: 167 MG/DL (ref 70–130)
GLUCOSE BLDC GLUCOMTR-MCNC: 62 MG/DL (ref 70–130)
GLUCOSE BLDC GLUCOMTR-MCNC: 73 MG/DL (ref 70–130)
GLUCOSE BLDC GLUCOMTR-MCNC: 76 MG/DL (ref 70–130)
GLUCOSE BLDC GLUCOMTR-MCNC: 98 MG/DL (ref 70–130)

## 2021-06-13 PROCEDURE — 25010000002 HEPARIN (PORCINE) PER 1000 UNITS: Performed by: PHYSICIAN ASSISTANT

## 2021-06-13 PROCEDURE — 97110 THERAPEUTIC EXERCISES: CPT

## 2021-06-13 PROCEDURE — 82962 GLUCOSE BLOOD TEST: CPT

## 2021-06-13 PROCEDURE — 71045 X-RAY EXAM CHEST 1 VIEW: CPT

## 2021-06-13 PROCEDURE — 93005 ELECTROCARDIOGRAM TRACING: CPT | Performed by: FAMILY MEDICINE

## 2021-06-13 PROCEDURE — 97530 THERAPEUTIC ACTIVITIES: CPT

## 2021-06-13 PROCEDURE — 99024 POSTOP FOLLOW-UP VISIT: CPT | Performed by: THORACIC SURGERY (CARDIOTHORACIC VASCULAR SURGERY)

## 2021-06-13 PROCEDURE — 99233 SBSQ HOSP IP/OBS HIGH 50: CPT | Performed by: FAMILY MEDICINE

## 2021-06-13 PROCEDURE — 25010000002 HALOPERIDOL LACTATE PER 5 MG: Performed by: PHYSICIAN ASSISTANT

## 2021-06-13 PROCEDURE — 93010 ELECTROCARDIOGRAM REPORT: CPT | Performed by: INTERNAL MEDICINE

## 2021-06-13 RX ORDER — ALBUMIN (HUMAN) 12.5 G/50ML
12.5 SOLUTION INTRAVENOUS AS NEEDED
Status: ACTIVE | OUTPATIENT
Start: 2021-06-13 | End: 2021-06-13

## 2021-06-13 RX ADMIN — Medication 1 PACKET: at 08:31

## 2021-06-13 RX ADMIN — HALOPERIDOL LACTATE 2 MG: 5 INJECTION, SOLUTION INTRAMUSCULAR at 01:28

## 2021-06-13 RX ADMIN — CARVEDILOL 25 MG: 12.5 TABLET, FILM COATED ORAL at 08:30

## 2021-06-13 RX ADMIN — DOCUSATE SODIUM 100 MG: 50 LIQUID ORAL at 21:23

## 2021-06-13 RX ADMIN — SENNOSIDES 10 ML: 8.8 LIQUID ORAL at 08:30

## 2021-06-13 RX ADMIN — ASPIRIN 325 MG ORAL TABLET 325 MG: 325 PILL ORAL at 08:30

## 2021-06-13 RX ADMIN — SODIUM CHLORIDE, PRESERVATIVE FREE 10 ML: 5 INJECTION INTRAVENOUS at 08:31

## 2021-06-13 RX ADMIN — GABAPENTIN 300 MG: 300 CAPSULE ORAL at 21:22

## 2021-06-13 RX ADMIN — THIAMINE HCL TAB 100 MG 100 MG: 100 TAB at 08:30

## 2021-06-13 RX ADMIN — HALOPERIDOL LACTATE 2 MG: 5 INJECTION, SOLUTION INTRAMUSCULAR at 21:24

## 2021-06-13 RX ADMIN — Medication 5 MG: at 21:22

## 2021-06-13 RX ADMIN — Medication 1 PACKET: at 17:46

## 2021-06-13 RX ADMIN — HEPARIN SODIUM 5000 UNITS: 5000 INJECTION INTRAVENOUS; SUBCUTANEOUS at 08:30

## 2021-06-13 RX ADMIN — HALOPERIDOL LACTATE 2 MG: 5 INJECTION, SOLUTION INTRAMUSCULAR at 05:28

## 2021-06-13 RX ADMIN — SODIUM CHLORIDE, PRESERVATIVE FREE 10 ML: 5 INJECTION INTRAVENOUS at 21:23

## 2021-06-13 RX ADMIN — AMLODIPINE BESYLATE 10 MG: 10 TABLET ORAL at 08:30

## 2021-06-13 RX ADMIN — ATORVASTATIN CALCIUM 40 MG: 40 TABLET, FILM COATED ORAL at 21:22

## 2021-06-13 RX ADMIN — OXYCODONE HYDROCHLORIDE 1 ML: 5 SOLUTION ORAL at 21:22

## 2021-06-13 RX ADMIN — Medication 1 PACKET: at 13:06

## 2021-06-13 RX ADMIN — DOCUSATE SODIUM 100 MG: 50 LIQUID ORAL at 08:30

## 2021-06-13 RX ADMIN — ACETAMINOPHEN 650 MG: 160 SOLUTION ORAL at 03:20

## 2021-06-13 RX ADMIN — HALOPERIDOL LACTATE 2 MG: 5 INJECTION, SOLUTION INTRAMUSCULAR at 17:46

## 2021-06-13 RX ADMIN — PANTOPRAZOLE SODIUM 40 MG: 40 INJECTION, POWDER, FOR SOLUTION INTRAVENOUS at 21:22

## 2021-06-13 RX ADMIN — ISOSORBIDE DINITRATE 10 MG: 20 TABLET ORAL at 08:30

## 2021-06-13 RX ADMIN — ISOSORBIDE DINITRATE 10 MG: 20 TABLET ORAL at 17:46

## 2021-06-13 RX ADMIN — ACETAMINOPHEN 649.6 MG: 160 SOLUTION ORAL at 21:22

## 2021-06-13 RX ADMIN — DEXTROSE MONOHYDRATE 25 G: 500 INJECTION PARENTERAL at 06:00

## 2021-06-13 RX ADMIN — PANTOPRAZOLE SODIUM 40 MG: 40 INJECTION, POWDER, FOR SOLUTION INTRAVENOUS at 08:30

## 2021-06-13 RX ADMIN — SENNOSIDES 10 ML: 8.8 LIQUID ORAL at 21:23

## 2021-06-13 RX ADMIN — HALOPERIDOL LACTATE 2 MG: 5 INJECTION, SOLUTION INTRAMUSCULAR at 10:01

## 2021-06-13 RX ADMIN — HALOPERIDOL LACTATE 2 MG: 5 INJECTION, SOLUTION INTRAMUSCULAR at 13:47

## 2021-06-13 RX ADMIN — CARVEDILOL 25 MG: 12.5 TABLET, FILM COATED ORAL at 17:46

## 2021-06-13 RX ADMIN — OXYCODONE HYDROCHLORIDE 1 ML: 5 SOLUTION ORAL at 08:30

## 2021-06-13 NOTE — THERAPY PROGRESS REPORT/RE-CERT
Patient Name: Augusto Lorenzana Jr.  : 1945    MRN: [id number]                              Today's Date: 2021       Admit Date: 2021    Visit Dx:     ICD-10-CM ICD-9-CM   1. Non-STEMI (non-ST elevated myocardial infarction) (CMS/HCC)  I21.4 410.70   2. Acute congestive heart failure, unspecified heart failure type (CMS/HCC)  I50.9 428.0   3. History of coronary artery disease  Z86.79 V12.59   4. Coronary artery disease involving native coronary artery of native heart, angina presence unspecified  I25.10 414.01   5. Pharyngeal dysphagia  R13.13 787.23   6. MICHAEL (acute kidney injury) (CMS/HCC)  N17.9 584.9     Patient Active Problem List   Diagnosis   • NSTEMI 2021   • Hyperlipidemia LDL goal <70   • Essential hypertension   • T2DM on oral agents and insulin. HbA1c 7.4    • Severe 3 vessel CAD with preserved LV function (CMS/HCC)   • A/C kidney disease. ATN due to postoperative arrest. 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 Combination of anoxic insult and azotemia     Past Medical History:   Diagnosis Date   • CAD (coronary artery disease)    • CKD (chronic kidney disease) stage 3, GFR 30-59 ml/min (CMS/HCC)    • Diabetes mellitus (CMS/[facility])    • Hyperlipidemia    • Hypertension    • NSTEMI (non-ST elevated myocardial infarction) (CMS/HCC)      Past Surgical History:   Procedure Laterality Date   • CARDIAC CATHETERIZATION N/A 2021    Procedure: Left Heart Cath;  Surgeon: Blade Greene IV, MD;  Location: [facility] CATH INVASIVE LOC[facility];  Service: Cardiovascular;  Laterality: N/A;   • CARDIAC SURGERY      2 stents placed .   • CORONARY ARTERY BYPASS GRAFT N/A 2021    Procedure: MEDIAN STERNOTOMY CORONARY ARTERY BYPASS X 3 UTILIZING THE LEFT INTERNAL MAMMARY ARTERY GRAFT, EVH OF THE GREATER RIGHT SAPHENOUS VEIN, AND  PILO PER ANESTHESIA;  Surgeon: Jacek Miller MD;  Location: [facility];  Service: Cardiothoracic;  Laterality: N/A;     General Information     Row Name 06/13/21 0836          OT Time and Intention    Document Type  progress note/recertification  -KF     Mode of Treatment  occupational therapy  -KF     Row Name 06/13/21 0836          General Information    Patient Profile Reviewed  yes  -KF     Existing Precautions/Restrictions  cardiac;fall;sternal  -KF     Barriers to Rehab  medically complex;cognitive status  -KF     Row Name 06/13/21 0836          Cognition    Orientation Status (Cognition)  oriented to;person;verbal cues/prompts needed for orientation;place;disoriented to;situation;time increased confusion today and decreased alertness  -KF     Row Name 06/13/21 0836          Safety Issues, Functional Mobility    Impairments Affecting Function (Mobility)  balance;endurance/activity tolerance;shortness of breath;strength;cognition  -KF     Cognitive Impairments, Mobility Safety/Performance  attention;awareness, need for assistance;impulsivity;insight into deficits/self-awareness  -KF     Comment, Safety Issues/Impairments (Mobility)  frequent cues throughout for management of sternal precautions and maintaining attention to task and alertness  -KF       User Key  (r) = Recorded By, (t) = Taken By, (c) = Cosigned By    Initials Name Provider Type    KF Emily Goode OT Occupational Therapist          Mobility/ADL's     Row Name 06/13/21 0842          Bed Mobility    Bed Mobility  rolling left;rolling right;scooting/bridging;sidelying-sit;sit-sidelying  -KF     Rolling Right [place] (Bed Mobility)  minimum assist (75% patient effort);nonverbal cues (demo/gesture);verbal cues  -KF     Scooting/Bridging [place] (Bed Mobility)  minimum assist (75% patient effort);verbal cues;nonverbal cues (demo/gesture)  -KF     Supine-Sit [place] (Bed Mobility)  minimum assist (75% patient effort);nonverbal  cues (demo/gesture);verbal cues  -KF     Sit-Supine [place] (Bed Mobility)  minimum assist (75% patient effort);nonverbal cues (demo/gesture);verbal cues  -KF     Bed Mobility, Safety Issues  cognitive deficits limit understanding;decreased use of arms for pushing/pulling  -KF     Assistive Device (Bed Mobility)  bed rails;head of bed elevated  -KF     Comment (Bed Mobility)  log rolling seq, cues for management of UEs to maintain sternal precautions will require repetition due to confusion and decreased alertness  -KF     Row Name 06/13/21 0842          Transfers    Transfers  sit-stand transfer  -KF     Comment (Transfers)  STS x3 limited by fall asleep intermittently, frequent cues to maintain alertness, BP elevated RN notified  -KF     Sit-Stand [place] (Transfers)  contact guard  -     Row Name 06/13/21 0842          Sit-Stand Transfer    Assistive Device (Sit-Stand Transfers)  walker, front-wheeled  -     Row Name 06/13/21 0842          Functional Mobility    Functional Mobility- Comment  deferred 2/2 decreased alertness  -     Row Name 06/13/21 0842          Activities of Daily Living    BADL Assessment/Intervention  upper body dressing  -     Row Name 06/13/21 0842          Upper Body Dressing Assessment/Training    [place] Level (Upper Body Dressing)  doff;don;front opening garment;moderate assist (50% patient effort)  -KF     Position (Upper Body Dressing)  sitting up in bed;long sitting  -     Comment (Upper Body Dressing)  assist for seq UBD and attention to task  -       User Key  (r) = Recorded By, (t) = Taken By, (c) = Cosigned By    Initials Name Provider Type    KF Emily Goode, OT Occupational Therapist        Obj/Interventions     Row Name 06/13/21 0845          Shoulder (Therapeutic Exercise)    Shoulder (Therapeutic Exercise)  AROM (active range of motion)  -     Shoulder AROM (Therapeutic Exercise)  bilateral;flexion;extension;10 repetitions;standing  -KF      Row Name 06/13/21 0845          Balance    Balance Assessment  sitting static balance;sitting dynamic balance;standing static balance;standing dynamic balance  -KF     Static Sitting Balance  WNL;unsupported  -KF     Dynamic Sitting Balance  mild impairment;unsupported  -KF     Static Standing Balance  mild impairment;supported;standing  -KF     Dynamic Standing Balance  mild impairment;supported;standing  -KF     Balance Interventions  standing;UE activity with balance activity  -KF     Comment, Balance  intermittent posterior lean able to correct with cues however limited by alertness  -KF     Row Name 06/13/21 0845          Therapeutic Exercise    Therapeutic Exercise  shoulder  -KF       User Key  (r) = Recorded By, (t) = Taken By, (c) = Cosigned By    Initials Name Provider Type    KF Emily Goode, OT Occupational Therapist        Goals/Plan     Row Name 06/13/21 0850          Bed Mobility Goal 1 (OT)    Activity/Assistive Device (Bed Mobility Goal 1, OT)  sidelying to sit/sit to sidelying  -KF     Mobile Level/Cues Needed (Bed Mobility Goal 1, OT)  contact guard assist;verbal cues required  -KF     Time Frame (Bed Mobility Goal 1, OT)  long term goal (LTG);10 days  -KF     Strategies/Barriers (Bed Mobility Goal 1, OT)  sternal precautions, log rolling  -KF     Progress/Outcomes (Bed Mobility Goal 1, OT)  goal revised this date;goal met  -KF     Row Name 06/13/21 0850          Transfer Goal 1 (OT)    Activity/Assistive Device (Transfer Goal 1, OT)  sit-to-stand/stand-to-sit;toilet;walker, rolling  -KF     Mobile Level/Cues Needed (Transfer Goal 1, OT)  contact guard assist  -KF     Time Frame (Transfer Goal 1, OT)  long term goal (LTG);10 days  -KF     Progress/Outcome (Transfer Goal 1, OT)  goal partially met;goal ongoing STS met  -KF     Row Name 06/13/21 0850          Dressing Goal 1 (OT)    Activity/Device (Dressing Goal 1, OT)  upper body dressing  -KF     Mobile/Cues Needed  (Dressing Goal 1, OT)  supervision required  -KF     Time Frame (Dressing Goal 1, OT)  long term goal (LTG);10 days  -KF     Strategies/Barriers (Dressing Goal 1, OT)  w/in sternal precautions  -KF     Progress/Outcome (Dressing Goal 1, OT)  goal ongoing  -KF     Row Name 06/13/21 0850          ROM Goal 1 (OT)    ROM Goal 1 (OT)  Pt will perform 1/8reps BUE AROM HEP w/ verbal cues by discharge to promote fxl strength and ROM for independence w/ ADL completion  -KF     Time Frame (ROM Goal 1, OT)  long term goal (LTG);10 days  -KF     Progress/Outcome (ROM Goal 1, OT)  goal ongoing  -KF       User Key  (r) = Recorded By, (t) = Taken By, (c) = Cosigned By    Initials Name Provider Type    KF Emily Goode, OT Occupational Therapist        Clinical Impression     Row Name 06/13/21 0847          Pain Assessment    Additional Documentation  Pain Scale: FACES Pre/Post-Treatment (Group)  -KF     Row Name 06/13/21 0847          Pain Scale: FACES Pre/Post-Treatment    Pain: FACES Scale, Pretreatment  4-->hurts little more  -KF     Posttreatment Pain Rating  4-->hurts little more  -KF     Pain Location - Orientation  incisional  -KF     Pain Location  chest  -KF     Pre/Posttreatment Pain Comment  tolerated  -KF     Row Name 06/13/21 0847          Plan of Care Review    Plan of Care Reviewed With  patient  -KF     Progress  no change  -KF     Outcome Summary  Pt completed STS x3 reps with CGA at [facility], limited by decreased alertness and confusion, completed bed mob Carly with max VCs for sequencing, cont IPOT per POC cont recom rehab  -KF     Row Name 06/13/21 0847          Therapy Assessment/Plan (OT)    Rehab Potential (OT)  good, to achieve stated therapy goals  -KF     Criteria for Skilled Therapeutic Interventions Met (OT)  yes;meets criteria;skilled treatment is necessary  -KF     Therapy Frequency (OT)  daily  -KF     Row Name 06/13/21 0847          Therapy Plan Review/Discharge Plan (OT)    Anticipated Discharge  Disposition (OT)  inpatient rehabilitation facility  -KF     Row Name 06/13/21 0847          Vital Signs    Pre Systolic BP Rehab  119  -KF     Pre Treatment Diastolic BP  76  -KF     Post Systolic BP Rehab  159  -KF     Post Treatment Diastolic BP  94  -KF     Pre SpO2 (%)  98  -KF     O2 Delivery Pre Treatment  room air  -KF     Post SpO2 (%)  97  -KF     O2 Delivery Post Treatment  room air  -KF     Pre Patient Position  Supine  -KF     Intra Patient Position  Standing  -KF     Post Patient Position  Supine  -KF     Rest Breaks   2  -KF     Row Name 06/13/21 0847          Positioning and Restraints    Pre-Treatment Position  in bed  -KF     Post Treatment Position  bed  -KF     In Bed  notified nsg;supine;with other staff;encouraged to call for assist;exit alarm on;call light within reach;fowlers;side rails up x3;legs elevated;heels elevated exit x2  -KF       User Key  (r) = Recorded By, (t) = Taken By, (c) = Cosigned By    Initials Name Provider Type    Emily Cabral OT Occupational Therapist        Outcome Measures     Row Name 06/13/21 0853          How much help from another is currently needed...    Putting on and taking off regular lower body clothing?  2  -KF     Bathing (including washing, rinsing, and drying)  2  -KF     Toileting (which includes using toilet bed pan or urinal)  2  -KF     Putting on and taking off regular upper body clothing  3  -KF     Taking care of personal grooming (such as brushing teeth)  3  -KF     Eating meals  1 NG  -KF     AM-PAC 6 Clicks Score (OT)  13  -KF     Row Name 06/13/21 0853          Functional Assessment    Outcome Measure Options  AM-PAC 6 Clicks Daily Activity (OT)  -KF       User Key  (r) = Recorded By, (t) = Taken By, (c) = Cosigned By    Initials Name Provider Type    Emily Cabral OT Occupational Therapist        Occupational Therapy Education                 Title: PT OT SLP Therapies (In Progress)     Topic: Occupational Therapy (In  Progress)     Point: ADL training (In Progress)     Description:   Instruct learner(s) on proper safety adaptation and remediation techniques during self care or transfers.   Instruct in proper use of assistive devices.              Learning Progress Summary           Patient Acceptance, E,D,TB, VU,NR by KF at 6/13/2021 0853    Acceptance, E,D, NR by CS at 6/8/2021 1335    Acceptance, E,D, NR by CS at 6/3/2021 1255    Comment: OT role and POC, sternal precautions re: ADL completion, safety awareness   Family Acceptance, E,D, NR by CS at 6/3/2021 1255    Comment: OT role and POC, sternal precautions re: ADL completion, safety awareness                   Point: Home exercise program (Done)     Description:   Instruct learner(s) on appropriate technique for monitoring, assisting and/or progressing therapeutic exercises/activities.              Learning Progress Summary           Patient Acceptance, E,D,TB, VU,NR by KF at 6/13/2021 0853    Acceptance, E,D, NR by CS at 6/8/2021 1335                   Point: Precautions (In Progress)     Description:   Instruct learner(s) on prescribed precautions during self-care and functional transfers.              Learning Progress Summary           Patient Acceptance, E,D,TB, VU,NR by KF at 6/13/2021 0853    Acceptance, E,D, NR by CS at 6/8/2021 1335    Acceptance, E,D, NR by CS at 6/3/2021 1255    Comment: OT role and POC, sternal precautions re: ADL completion, safety awareness   Family Acceptance, E,D, NR by CS at 6/3/2021 1255    Comment: OT role and POC, sternal precautions re: ADL completion, safety awareness                   Point: Body mechanics (In Progress)     Description:   Instruct learner(s) on proper positioning and spine alignment during self-care, functional mobility activities and/or exercises.              Learning Progress Summary           Patient Acceptance, E,D,TB, VU,NR by KF at 6/13/2021 0853    Acceptance, E,D, NR by CS at 6/3/2021 1255    Comment: OT role  and POC, sternal precautions re: ADL completion, safety awareness   Family Acceptance, E,D, NR by CS at 6/3/2021 1255    Comment: OT role and POC, sternal precautions re: ADL completion, safety awareness                               User Key     Initials Effective Dates Name Provider Type Discipline    KF 04/03/18 -  Emily Goode, OT Occupational Therapist OT     10/21/20 -  Carlos Harris OT Occupational Therapist OT              OT Recommendation and Plan  Therapy Frequency (OT): daily  Plan of Care Review  Plan of Care Reviewed With: patient  Progress: no change  Outcome Summary: Pt completed STS x3 reps with CGA at [facility], limited by decreased alertness and confusion, completed bed mob Carly with max VCs for sequencing, cont IPOT per POC cont recom rehab     Time Calculation:   Time Calculation- OT     Row Name 06/13/21 0743             Time Calculation- OT    OT Start Time  0743  -KF      OT Stop Time  0802  -KF      OT Time Calculation (min)  19 min  -KF      OT Received On  06/13/21  -KF      OT Goal Re-Cert Due Date  06/23/21  -KF         Timed Charges    53878 - OT Therapeutic Activity Minutes  17  -KF      07379 - OT Self Care/Mgmt Minutes  2  -KF         Total Minutes    Timed Charges Total Minutes  19  -KF       Total Minutes  19  -KF        User Key  (r) = Recorded By, (t) = Taken By, (c) = Cosigned By    Initials Name Provider Type    KF Emily Goode OT Occupational Therapist        Therapy Charges for Today     Code Description Service Date Service Provider Modifiers Qty    [id number] HC OT THERAPEUTIC ACT EA 15 MIN 6/13/2021 Emily Goode OT GO 1               Emily Goode OT  6/13/2021

## 2021-06-13 NOTE — SIGNIFICANT NOTE
RN was called into patients room. RN found the tech holding pressure on pts JI with blood oozing from insertion site. Pt is confused. RN called charge nurse and house supervisor. Tech and RN held pressure until they arrived. Tai Garcia paged at this time. Lo (House supervisor) did not think it was pulled out completely after cleaning up the blood. RN agrees. STAT chest xray was ordered to confirm placement. X-ray appeared unchanged from the previous. Site was redressed in a sterile fashion. Tai Garcia called RN and asked to put an order in to d/c heparin and asprin at this time. Site continues to appear to be bleeding with a small amount of bright red blood under the dressing. RN held pressure at this and called house supervisor once more. Lo suggested to hold pressure and let the site clot for now. Pt remains confused and agitated. RN continues to monitor patient.

## 2021-06-13 NOTE — PLAN OF CARE
Goal Outcome Evaluation:     Pt had increased situational confusion and agitation overnight requiring PRN Haldol x2. Low blood glucose this AM, x1 amp of d50 given. Tolerating tube feed at goal. Placed on 1L nasal cannula to maintain o2 >92%

## 2021-06-13 NOTE — PLAN OF CARE
Goal Outcome Evaluation:  Plan of Care Reviewed With: patient        Progress: no change  Outcome Summary: Pt completed STS x3 reps with CGA at [facility], limited by decreased alertness and confusion, completed bed mob Carly with max VCs for sequencing, cont IPOT per POC cont recom rehab

## 2021-06-13 NOTE — PROGRESS NOTES
CTS Progress Note       LOS: 22 days   Patient Care Team:  Rob Polanco MD as PCP - General (Internal Medicine)  Postop day 16  Chief Complaint: NSTEMI (non-ST elevated myocardial infarction) (CMS/HCC)  Subjective  Increased confusion and agitation throughout the night    Vital Signs:  Temp:  [97.3 °F (36.3 °C)-98.7 °F (37.1 °C)] 98 °F (36.7 °C)  Heart Rate:  [60-84] 69  Resp:  [18-22] 18  BP: (117-163)/(65-88) 119/76    Physical Exam:       Results:     Results from last 7 days   Lab Units 06/12/21  0401   WBC 10*3/mm3 9.85   HEMOGLOBIN g/dL 8.4*   HEMATOCRIT % 26.3*   PLATELETS 10*3/mm3 260     Results from last 7 days   Lab Units 06/12/21  0401   SODIUM mmol/L 135*   POTASSIUM mmol/L 3.4*   CHLORIDE mmol/L 100   CO2 mmol/L 26.0   BUN mg/dL 67*   CREATININE mg/dL 1.69*   GLUCOSE mg/dL 116*   CALCIUM mg/dL 9.0           Imaging Results (Last 24 Hours)     ** No results found for the last 24 hours. **          Assessment      NSTEMI 5/22/2021    Hyperlipidemia LDL goal <70    Essential hypertension    T2DM on oral agents and insulin. HbA1c 7.4     A/C kidney disease. ATN due to postoperative arrest. 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 Combination of anoxic insult and azotemia        Plan   Getting Haldol as needed for agitation  Nephrology is following for renal function  Tolerating tube feeding  Please note that portions of this note were completed with a voice recognition program. Efforts were made to edit the dictations, but occasionally words are mistranscribed.    Nico Garcia PA-C  06/13/21  07:43 EDT

## 2021-06-14 LAB
ALBUMIN SERPL-MCNC: 3.1 G/DL (ref 3.5–5.2)
ANION GAP SERPL CALCULATED.3IONS-SCNC: 10 MMOL/L (ref 5–15)
BUN SERPL-MCNC: 65 MG/DL (ref 8–23)
BUN/CREAT SERPL: 40.1 (ref 7–25)
CALCIUM SPEC-SCNC: 9.3 MG/DL (ref 8.6–10.5)
CHLORIDE SERPL-SCNC: 101 MMOL/L (ref 98–107)
CO2 SERPL-SCNC: 26 MMOL/L (ref 22–29)
CREAT SERPL-MCNC: 1.62 MG/DL (ref 0.76–1.27)
DEPRECATED RDW RBC AUTO: 56.1 FL (ref 37–54)
ERYTHROCYTE [DISTWIDTH] IN BLOOD BY AUTOMATED COUNT: 16.8 % (ref 12.3–15.4)
GFR SERPL CREATININE-BSD FRML MDRD: 42 ML/MIN/1.73
GLUCOSE BLDC GLUCOMTR-MCNC: 133 MG/DL (ref 70–130)
GLUCOSE BLDC GLUCOMTR-MCNC: 183 MG/DL (ref 70–130)
GLUCOSE BLDC GLUCOMTR-MCNC: 207 MG/DL (ref 70–130)
GLUCOSE SERPL-MCNC: 188 MG/DL (ref 65–99)
HCT VFR BLD AUTO: 27.8 % (ref 37.5–51)
HGB BLD-MCNC: 8.8 G/DL (ref 13–17.7)
MAGNESIUM SERPL-MCNC: 1.8 MG/DL (ref 1.6–2.4)
MCH RBC QN AUTO: 29.9 PG (ref 26.6–33)
MCHC RBC AUTO-ENTMCNC: 31.7 G/DL (ref 31.5–35.7)
MCV RBC AUTO: 94.6 FL (ref 79–97)
PHOSPHATE SERPL-MCNC: 3.9 MG/DL (ref 2.5–4.5)
PLATELET # BLD AUTO: 291 10*3/MM3 (ref 140–450)
PMV BLD AUTO: 10.6 FL (ref 6–12)
POTASSIUM SERPL-SCNC: 3.9 MMOL/L (ref 3.5–5.2)
QT INTERVAL: 434 MS
QTC INTERVAL: 465 MS
RBC # BLD AUTO: 2.94 10*6/MM3 (ref 4.14–5.8)
SODIUM SERPL-SCNC: 137 MMOL/L (ref 136–145)
WBC # BLD AUTO: 8.24 10*3/MM3 (ref 3.4–10.8)

## 2021-06-14 PROCEDURE — 80069 RENAL FUNCTION PANEL: CPT | Performed by: INTERNAL MEDICINE

## 2021-06-14 PROCEDURE — 85027 COMPLETE CBC AUTOMATED: CPT | Performed by: FAMILY MEDICINE

## 2021-06-14 PROCEDURE — 63710000001 INSULIN LISPRO (HUMAN) PER 5 UNITS: Performed by: NURSE PRACTITIONER

## 2021-06-14 PROCEDURE — 83735 ASSAY OF MAGNESIUM: CPT | Performed by: FAMILY MEDICINE

## 2021-06-14 PROCEDURE — 92526 ORAL FUNCTION THERAPY: CPT

## 2021-06-14 PROCEDURE — 99232 SBSQ HOSP IP/OBS MODERATE 35: CPT | Performed by: NURSE PRACTITIONER

## 2021-06-14 PROCEDURE — 97116 GAIT TRAINING THERAPY: CPT

## 2021-06-14 PROCEDURE — 97530 THERAPEUTIC ACTIVITIES: CPT

## 2021-06-14 PROCEDURE — 82962 GLUCOSE BLOOD TEST: CPT

## 2021-06-14 PROCEDURE — 25010000002 MAGNESIUM SULFATE 2 GM/50ML SOLUTION: Performed by: PHYSICIAN ASSISTANT

## 2021-06-14 RX ORDER — QUETIAPINE FUMARATE 25 MG/1
12.5 TABLET, FILM COATED ORAL EVERY 12 HOURS SCHEDULED
Status: DISCONTINUED | OUTPATIENT
Start: 2021-06-14 | End: 2021-06-18 | Stop reason: HOSPADM

## 2021-06-14 RX ORDER — ISOSORBIDE DINITRATE 20 MG/1
20 TABLET ORAL
Status: DISCONTINUED | OUTPATIENT
Start: 2021-06-14 | End: 2021-06-18 | Stop reason: HOSPADM

## 2021-06-14 RX ORDER — TERAZOSIN 2 MG/1
2 CAPSULE ORAL NIGHTLY
Status: DISCONTINUED | OUTPATIENT
Start: 2021-06-14 | End: 2021-06-14

## 2021-06-14 RX ADMIN — OXYCODONE HYDROCHLORIDE 1 ML: 5 SOLUTION ORAL at 08:29

## 2021-06-14 RX ADMIN — QUETIAPINE FUMARATE 12.5 MG: 25 TABLET ORAL at 21:01

## 2021-06-14 RX ADMIN — MAGNESIUM SULFATE HEPTAHYDRATE 2 G: 2 INJECTION, SOLUTION INTRAVENOUS at 08:21

## 2021-06-14 RX ADMIN — SODIUM CHLORIDE, PRESERVATIVE FREE 10 ML: 5 INJECTION INTRAVENOUS at 08:22

## 2021-06-14 RX ADMIN — CARVEDILOL 25 MG: 12.5 TABLET, FILM COATED ORAL at 17:20

## 2021-06-14 RX ADMIN — INSULIN LISPRO 2 UNITS: 100 INJECTION, SOLUTION INTRAVENOUS; SUBCUTANEOUS at 08:22

## 2021-06-14 RX ADMIN — ISOSORBIDE DINITRATE 20 MG: 20 TABLET ORAL at 17:20

## 2021-06-14 RX ADMIN — ATORVASTATIN CALCIUM 40 MG: 40 TABLET, FILM COATED ORAL at 21:01

## 2021-06-14 RX ADMIN — SENNOSIDES 10 ML: 8.8 LIQUID ORAL at 21:03

## 2021-06-14 RX ADMIN — QUETIAPINE FUMARATE 12.5 MG: 25 TABLET ORAL at 11:57

## 2021-06-14 RX ADMIN — SODIUM CHLORIDE, PRESERVATIVE FREE 10 ML: 5 INJECTION INTRAVENOUS at 21:00

## 2021-06-14 RX ADMIN — AMLODIPINE BESYLATE 10 MG: 10 TABLET ORAL at 08:22

## 2021-06-14 RX ADMIN — PANTOPRAZOLE SODIUM 40 MG: 40 INJECTION, POWDER, FOR SOLUTION INTRAVENOUS at 21:00

## 2021-06-14 RX ADMIN — PANTOPRAZOLE SODIUM 40 MG: 40 INJECTION, POWDER, FOR SOLUTION INTRAVENOUS at 08:22

## 2021-06-14 RX ADMIN — DOCUSATE SODIUM 100 MG: 50 LIQUID ORAL at 08:22

## 2021-06-14 RX ADMIN — ISOSORBIDE DINITRATE 20 MG: 20 TABLET ORAL at 11:57

## 2021-06-14 RX ADMIN — Medication 5 MG: at 21:00

## 2021-06-14 RX ADMIN — OXYCODONE HYDROCHLORIDE 1 ML: 5 SOLUTION ORAL at 21:01

## 2021-06-14 RX ADMIN — SENNOSIDES 10 ML: 8.8 LIQUID ORAL at 08:28

## 2021-06-14 RX ADMIN — Medication 1 PACKET: at 11:57

## 2021-06-14 RX ADMIN — INSULIN LISPRO 4 UNITS: 100 INJECTION, SOLUTION INTRAVENOUS; SUBCUTANEOUS at 11:58

## 2021-06-14 RX ADMIN — Medication 1 PACKET: at 17:20

## 2021-06-14 RX ADMIN — Medication 1 PACKET: at 21:00

## 2021-06-14 RX ADMIN — DOCUSATE SODIUM 100 MG: 50 LIQUID ORAL at 21:00

## 2021-06-14 RX ADMIN — CARVEDILOL 25 MG: 12.5 TABLET, FILM COATED ORAL at 08:22

## 2021-06-14 RX ADMIN — THIAMINE HCL TAB 100 MG 100 MG: 100 TAB at 08:22

## 2021-06-14 RX ADMIN — ISOSORBIDE DINITRATE 10 MG: 20 TABLET ORAL at 08:22

## 2021-06-14 RX ADMIN — GABAPENTIN 300 MG: 300 CAPSULE ORAL at 21:00

## 2021-06-14 RX ADMIN — Medication 1 PACKET: at 00:25

## 2021-06-14 RX ADMIN — Medication 1 PACKET: at 08:28

## 2021-06-14 NOTE — THERAPY TREATMENT NOTE
Acute Care - Speech Language Pathology   Swallow Treatment Note [facility]     Patient Name: Augusto Lorenzana Jr.  : 1945  MRN: [id number]  Today's Date: 2021               Admit Date: 2021    Visit Dx:     ICD-10-CM ICD-9-CM   1. Non-STEMI (non-ST elevated myocardial infarction) (CMS/[facility])  I21.4 410.70   2. Acute congestive heart failure, unspecified heart failure type (CMS/[facility])  I50.9 428.0   3. History of coronary artery disease  Z86.79 V12.59   4. Coronary artery disease involving native coronary artery of native heart, angina presence unspecified  I25.10 414.01   5. Pharyngeal dysphagia  R13.13 787.23   6. MICHAEL (acute kidney injury) (CMS/[facility])  N17.9 584.9     Patient Active Problem List   Diagnosis   • NSTEMI 2021   • Hyperlipidemia LDL goal <70   • Essential hypertension   • T2DM on oral agents and insulin. HbA1c 7.4    • Severe 3 vessel CAD with preserved LV function (CMS/[facility])   • A/C kidney disease. ATN due to postoperative arrest. Dialysis begun 6/3/2021   • Gout   • Former smokeless tobacco use   • S/P CABG x 3 on 21   • Perioperative cardiac arrest with ventricular fibrillation (CMS/[facility])   • Postop encephalopathy post code. Combination of anoxic insult and azotemia     Past Medical History:   Diagnosis Date   • CAD (coronary artery disease)    • CKD (chronic kidney disease) stage 3, GFR 30-59 ml/min (CMS/[facility])    • Diabetes mellitus (CMS/[facility])    • Hyperlipidemia    • Hypertension    • NSTEMI (non-ST elevated myocardial infarction) (CMS/[facility])      Past Surgical History:   Procedure Laterality Date   • CARDIAC CATHETERIZATION N/A 2021    Procedure: Left Heart Cath;  Surgeon: Blade Greene IV, MD;  Location: [facility] INVASIVE LOCATION;  Service: Cardiovascular;  Laterality: N/A;   • CARDIAC SURGERY      2 stents placed .   • CORONARY ARTERY BYPASS GRAFT N/A 2021    Procedure: MEDIAN STERNOTOMY CORONARY ARTERY BYPASS X 3 UTILIZING THE LEFT INTERNAL MAMMARY  ARTERY GRAFT, EVH OF THE GREATER RIGHT SAPHENOUS VEIN, AND PILO PER ANESTHESIA;  Surgeon: Jacek Miller MD;  Location: [facility];  Service: Cardiothoracic;  Laterality: N/A;        SWALLOW EVALUATION (last 72 hours)      SLP Adult Swallow Evaluation     Row Name 06/14/21 1110                   Rehab Evaluation    Document Type  therapy note (daily note)  -RD        Subjective Information  no complaints  -RD        Patient Observations  alert;cooperative;agree to therapy confused  -RD        Patient/Family/Caregiver Comments/Observations  no family present  -RD        Care Plan Review  evaluation/treatment results reviewed;care plan/treatment goals reviewed;risks/benefits reviewed;current/potential barriers reviewed;patient/other agree to care plan  -RD        Patient Effort  adequate  -RD           Pain    Additional Documentation  Pain Scale: FACES Pre/Post-Treatment (Group)  -RD           Pain Scale: FACES Pre/Post-Treatment    Pain: FACES Scale, Pretreatment  0-->no hurt  -RD        Posttreatment Pain Rating  0-->no hurt  -RD           Clinical Impression    Daily Summary of Progress (SLP)  progress toward functional goals as expected  -RD        SLP Swallowing Diagnosis  severe;pharyngeal dysphagia  -RD        Functional Impact  risk of aspiration/pneumonia  -RD        Rehab Potential/Prognosis, Swallowing  good, to achieve stated therapy goals  -RD        Swallow Criteria for Skilled Therapeutic Interventions Met  demonstrates skilled criteria  -RD        Plan for Continued Treatment (SLP)  Saw for dysphagia treatment. Pt has keofeed in place. Pt alert, but generally confused. Participated well in exercises. Therapeutic trials of thins given w/ immediate throat clearing observed. Suspect cont'd pharyngeal dysphagia. MD requesting repeat evaluation per note. Plan for MBS tomorrow to see if safe for any PO.   -RD           Recommendations    Therapy Frequency (Swallow)  5 days per week  -RD        Predicted  Duration Therapy Intervention (Days)  until discharge  -RD        SLP Diet Recommendation  NPO;temporary alternate methods of nutrition/hydration;other (see comments) ice chips PRN post oral care if alert  -RD        Recommended Diagnostics  reassess via VFSS (MBS);other (see comments) 6/15/21  -RD        Recommended Precautions and Strategies  general aspiration precautions  -RD        Oral Care Recommendations  Oral Care BID/PRN;Suction toothbrush  -RD        SLP Rec. for Method of Medication Administration  meds via alternate route  -RD        Monitor for Signs of Aspiration  yes;notify SLP if any concerns  -RD        Anticipated Discharge Disposition (SLP)  unknown;anticipate therapy at next level of care  -RD          User Key  (r) = Recorded By, (t) = Taken By, (c) = Cosigned By    Initials Name Effective Dates    Mireille Taylor MS CCC-SLP 04/03/18 -           EDUCATION  The patient has been educated in the following areas:   Dysphagia (Swallowing Impairment) Oral Care/Hydration NPO rationale.    SLP Recommendation and Plan  SLP Swallowing Diagnosis: severe, pharyngeal dysphagia  SLP Diet Recommendation: NPO, temporary alternate methods of nutrition/hydration, other (see comments) (ice chips PRN post oral care if alert)  Recommended Precautions and Strategies: general aspiration precautions  SLP Rec. for Method of Medication Administration: meds via alternate route     Monitor for Signs of Aspiration: yes, notify SLP if any concerns  Recommended Diagnostics: reassess via VFSS (MBS), other (see comments) (6/15/21)  Swallow Criteria for Skilled Therapeutic Interventions Met: demonstrates skilled criteria  Anticipated Discharge Disposition (SLP): unknown, anticipate therapy at next level of care  Rehab Potential/Prognosis, Swallowing: good, to achieve stated therapy goals  Therapy Frequency (Swallow): 5 days per week  Predicted Duration Therapy Intervention (Days): until discharge     Daily Summary of  Progress (SLP): progress toward functional goals as expected    Plan for Continued Treatment (SLP): Saw for dysphagia treatment. Pt has keofeed in place. Pt alert, but generally confused. Participated well in exercises. Therapeutic trials of thins given w/ immediate throat clearing observed. Suspect cont'd pharyngeal dysphagia. MD requesting repeat evaluation per note. Plan for MBS tomorrow to see if safe for any PO.               Plan of Care Reviewed With: patient  Progress: no change    SLP GOALS     Row Name 06/14/21 1110             Oral Nutrition/Hydration Goal 1 (SLP)    Oral Nutrition/Hydration Goal 1, SLP  LTG: Pt will improve swallow function to safely return to PO diet w/ no overt s/sxs aspiration/distress w/ 100% acc and no cues  -RD      Time Frame (Oral Nutrition/Hydration Goal 1, SLP)  by discharge  -RD      Progress/Outcomes (Oral Nutrition/Hydration Goal 1, SLP)  continuing progress toward goal  -RD         Oral Nutrition/Hydration Goal (SLP)    Oral Nutrition/Hydration Goal, SLP  Pt will tolerate therapeutic trials of ice chips, thin H2O, and puree w/ no overt s/sxs aspiration/distress w/ 70% acc and no cues in order to assess readiness for repeat instrumental eval.  -RD      Time Frame (Oral Nutrition/Hydration Goal, SLP)  short term goal (STG)  -RD      Barriers (Oral Nutrition/Hydration Goal, SLP)  immediate throat clearing w/ thins.   -RD      Progress/Outcomes (Oral Nutrition/Hydration Goal, SLP)  continuing progress toward goal  -RD         Pharyngeal Strengthening Exercise Goal 1 (SLP)    Activity (Pharyngeal Strengthening Goal 1, SLP)  increase superior movement of the hyolaryngeal complex;increase anterior movement of the hyolaryngeal complex;increase closure at entrance to airway/closure of airway at glottis;increase squeeze/positive pressure generation;increase tongue base retraction  -RD      Increase Timing  prepping - 3 second prep or suck swallow or 3-step swallow  -RD      Increase  Superior Movement of the Hyolaryngeal Complex  effortful pitch glide (falsetto + pharyngeal squeeze)  -RD      Increase Anterior Movement of the Hyolaryngeal Complex  chin tuck against resistance (CTAR)  -RD      Increase Closure at Entrance to Airway/Closure of Airway at Glottis  supraglottic swallow  -RD      Increase Squeeze/Positive Pressure Generation  hard effortful swallow  -RD      Increase Tongue Base Retraction  bronwyn  -RD      [place]/Accuracy (Pharyngeal Strengthening Goal 1, SLP)  with minimal cues (75-90% accuracy)  -RD      Time Frame (Pharyngeal Strengthening Goal 1, SLP)  short term goal (STG)  -RD      Barriers (Pharyngeal Strengthening Goal 1, SLP)  reviewed and completed. Pt participated well  -RD      Progress/Outcomes (Pharyngeal Strengthening Goal 1, SLP)  continuing progress toward goal  -RD        User Key  (r) = Recorded By, (t) = Taken By, (c) = Cosigned By    Initials Name Provider Type    Mireille Taylor MS CCC-SLP Speech and Language Pathologist             Time Calculation:   Time Calculation- SLP     Row Name 06/14/21 1431             Time Calculation- SLP    SLP Start Time  1110  -RD      SLP Received On  06/14/21  -RD         Untimed Charges    10405-NG Treatment Swallow Minutes  40  -RD         Total Minutes    Untimed Charges Total Minutes  40  -RD       Total Minutes  40  -RD        User Key  (r) = Recorded By, (t) = Taken By, (c) = Cosigned By    Initials Name Provider Type    Mireille Taylor MS CCC-SLP Speech and Language Pathologist          Therapy Charges for Today     Code Description Service Date Service Provider Modifiers Qty    [id number] HC ST TREATMENT SWALLOW 3 6/14/2021 Mireille Ayers MS CCC-SLP GN 1        Patient was not wearing a face mask and did exhibit coughing during this therapy encounter.  Procedure performed was aerosolizing, involved close contact (within 6 feet for at least 15 minutes or longer), and did not involve contact with  infectious secretions or specimens.  Therapist used appropriate personal protective equipment including gloves, standard procedure mask and eye protection.  Appropriate PPE was worn during the entire therapy session.  Hand hygiene was completed before and after therapy session.        Mireille Ayers MS CCC-SLP  6/14/2021

## 2021-06-14 NOTE — NURSING NOTE
WOC follow up for RLE EVH-wound appears a bit better-still draining serosanguineous drainage-periwound is red/slighly callused but decreased edema.     When removing foam dressing noted that no xeroform in place. Please see wound care orders that were entered on 06/07.     Cleaned wound-applied xeroform and placed foam dressing. Please continue with wound care orders as written.    WOC will continue to follow. Will continue to assess drainage-may changed to hydrofiber if drainage persists. Please notify for questions or concerns. Thank you.

## 2021-06-14 NOTE — PROGRESS NOTES
Cardiology Progress Note      Reason for visit:    · NSTEMI/multivessel CAD/status post CABG    IDENTIFICATION: 76-year-old gentleman who resides in [facility] Problems    Diagnosis  POA   • **NSTEMI 5/22/2021 [I21.4]  Yes     Priority: High     · History of previous PCI, data deficit  · [facility] ER presentation with chest pain and elevated troponin, 5/22/2021  · Echo (5/22/2021): LVEF 55%.  Inferolateral hypokinesis.  Mild MR.  Mild aortic stenosis.  · Cardiac catheterization for NSTEMI (5/24/2021): Severe multivessel CAD (LAD, LCx, RCA).  Surgical revascularization recommended  · CABG with Dr. Miller (5/28/2021):LIMA to LAD.  SVG to first OM.  SVG to PDA2. Greater saphenous vein to first obtuse marginal     • S/P CABG x 3 on 5/28/21 [Z95.1]  Not Applicable     Priority: High   • A/C kidney disease. ATN due to postoperative arrest. Dialysis begun 6/3/2021 [N18.9]  Yes     Priority: High   • T2DM on oral agents and insulin. HbA1c 7.4  [E11.9, Z79.4]  Not Applicable     Priority: High   • Hyperlipidemia LDL goal <70 [E78.5]  Yes     Priority: Medium   • Essential hypertension [I10]  Yes     Priority: Medium   • Gout [M10.9]  Yes     Priority: Low   • Former smokeless tobacco use [Z87.891]  Not Applicable     Priority: Low   • Postop encephalopathy post code. Combination of anoxic insult and azotemia [G93.40]  No   • Perioperative cardiac arrest with ventricular fibrillation (CMS/HCC) [I46.9, I49.01]  Yes            Patient was transferred to telemetry over the weekend.  Hemodialysis has been on hold as creatinine has improved and currently 1.62.  The patient remains confused and requires intermittent Haldol.  He is currently lying flat in the bed breathing easy on room air.  He remains n.p.o. with NG tube and tube feeds.           Vital Sign Min/Max for last 24 hours  Temp  Min: 97.6 °F (36.4 °C)  Max: 98.4 °F (36.9 °C)   BP  Min: 117/65  Max: 174/84   Pulse  Min: 65  Max: 90   Resp  Min:  18  Max: 20   SpO2  Min: 95 %  Max: 97 %   Flow (L/min)  Min: 1  Max: 2      Intake/Output Summary (Last 24 hours) at 6/14/2021 0837  Last data filed at 6/14/2021 0559  Gross per 24 hour   Intake 1787 ml   Output 0 ml   Net 1787 ml           Physical Exam  Cardiovascular:      Rate and Rhythm: Normal rate and regular rhythm.   Pulmonary:      Effort: Pulmonary effort is normal.      Breath sounds: Decreased breath sounds present.   Skin:     General: Skin is warm and dry.   Neurological:      Mental Status: He is alert and oriented to person, place, and time.   Psychiatric:         Mood and Affect: Mood normal.         Behavior: Behavior normal. Behavior is cooperative.         Tele: NSR     Results Review (reviewed the patient's recent labs in the electronic medical record):      EKG (6/13/2021):  NSR     CXR (6/13/2020):  Unchanged aeration with prominent bilateral interstitial opacities.  No effusion or pneumothorax.     ECHO (5/23/2021): LVEF 60%.  Grade 1 diastolic dysfunction.  Mild AS       Result Review:  I have personally reviewed the results from the time of this admission to 6/14/2021 08:37 EDT and agree with these findings:  [x]  Laboratory  []  Microbiology  [x]  Radiology  [x]  EKG/Telemetry   [x]  Cardiology/Vascular   []  Pathology  []  Old records  []  Other:  Most notable findings include: Hemoglobin 8.8, hematocrit 27.8, creatinine 1.62, BUN 65    Results from last 7 days   Lab Units 06/14/21  0518 06/12/21  0401 06/11/21  0401 06/10/21  0351 06/09/21  1013 06/08/21  0340 06/08/21  0340   SODIUM mmol/L 137 135* 136 137 136   < > 134*   POTASSIUM mmol/L 3.9 3.4* 3.6 3.6 3.5   < > 3.9   CHLORIDE mmol/L 101 100 100 100 100  --  99   BUN mg/dL 65* 67* 64* 60* 46*   < > 56*   CREATININE mg/dL 1.62* 1.69* 1.77* 1.79* 1.71*   < > 1.98*   MAGNESIUM mg/dL 1.8 1.9  --  2.1  --   --  2.0    < > = values in this interval not displayed.         Results from last 7 days   Lab Units 06/14/21  0518 06/12/21  5490  06/11/21  0401   WBC 10*3/mm3 8.24 9.85 12.73*   HEMOGLOBIN g/dL 8.8* 8.4* 8.7*   HEMATOCRIT % 27.8* 26.3* 27.2*   PLATELETS 10*3/mm3 291 260 268       Lab Results   Component Value Date    HGBA1C 7.40 (H) 05/22/2021       Lab Results   Component Value Date    CHOL 60 06/07/2021    TRIG 51 06/07/2021    HDL 34 (L) 05/22/2021    LDL 88 05/22/2021              NSTEMI  · CABG 5/28/2021  · Continue aspirin, beta-blocker, statin     Hypertension  · Amlodipine 10 mg daily  · Coreg 25 mg twice daily  · Isordil 10 mg 3 times daily  · BP remains borderline elevated        Hyperlipidemia  · Lipitor 40 mg daily     Acute kidney injury  · Creatinine 1.62  · No ACE/ARB due to MICHAEL  · Nephrology following, patient receiving hemodialysis     Type 2 diabetes mellitus  · Management per hospitalist  · Hemoglobin A1c 7.4     Acute encephalopathy  · Management per intensivist                · Increase Isordil 20 mg 3 times daily for uncontrolled BP  · Continue aspirin, statin and beta-blocker therapy  · No ACE/ARB/Entresto due to elevated creatinine    Electronically signed by CHARLES Bernal, 06/14/21, 8:37 AM EDT.

## 2021-06-14 NOTE — PROGRESS NOTES
Cardiothoracic Surgery Progress Note      POD # 17 s/p CABG       LOS: 23 days      Subjective:  Continues to improve daily. Daughter at bedside. States he has been very restless all night. Pulling at lines, and confused. This am, oriented to place only. Denies chest pain or SOA. Tube feed in place.        Objective:  Vital Signs  Temp:  [97.8 °F (36.6 °C)-98.4 °F (36.9 °C)] 97.8 °F (36.6 °C)  Heart Rate:  [65-90] 90  Resp:  [18-20] 18  BP: (117-174)/(65-92) 123/92    Physical Exam:   General Appearance: easily roused   Lungs: clear to auscultation, respirations regular, respirations even and respirations unlabored   Heart: regular rhythm & normal rate, normal S1, S2, no murmur, no gallop, no rub and no click   Skin: Incision c/d/i     Results:    Results from last 7 days   Lab Units 06/14/21  0518   WBC 10*3/mm3 8.24   HEMOGLOBIN g/dL 8.8*   HEMATOCRIT % 27.8*   PLATELETS 10*3/mm3 291     Results from last 7 days   Lab Units 06/14/21  0518   SODIUM mmol/L 137   POTASSIUM mmol/L 3.9   CHLORIDE mmol/L 101   CO2 mmol/L 26.0   BUN mg/dL 65*   CREATININE mg/dL 1.62*   GLUCOSE mg/dL 188*   CALCIUM mg/dL 9.3         Assessment:    NSTEMI 5/22/2021    Hyperlipidemia LDL goal <70    Essential hypertension    T2DM on oral agents and insulin. HbA1c 7.4     A/C kidney disease. ATN due to postoperative arrest. 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 Combination of anoxic insult and azotemia    POD #17 CABG x3    Plan:  Nephrology following for A/C Kidney Disease.   Pulmonary Toilet if able  Plan form DC to rehab facility hopefully early this week.        Moi Tubbs PA-C  06/14/21  06:59 EDT

## 2021-06-14 NOTE — CASE MANAGEMENT/SOCIAL WORK
Continued Stay Note  [facility]     Patient Name: Augusto Lorenzana Jr.  MRN: [id number]  Today's Date: 6/14/2021    Admit Date: 5/22/2021    Discharge Plan     Row Name 06/14/21 1652       Plan    Plan  SNF    Patient/Family in Agreement with Plan  yes    Plan Comments  Spoke with patient's daughter at bedside. Patient confused, restless, trying to get out of bed. TF via NG tube.  They are agreeable for referrals they are interested in [facility] or facilities near [facility] Will place referrals when medicaly appropriate.    Final Discharge Disposition Code  03 - skilled nursing facility (SNF)        Discharge Codes    No documentation.       Expected Discharge Date and Time     Expected Discharge Date Expected Discharge Time    Jun 17, 2021             Alisha Norman RN

## 2021-06-14 NOTE — PROGRESS NOTES
[facility] Medicine Services  PROGRESS NOTE    Patient Name: Augusto Lorenzana Jr.  : 1945  MRN: [id number]    Date of Admission: 2021  Primary Care Physician: Rob Polanco MD    Subjective   Subjective     CC:  F/U med mgmt s/p CABG    HPI:  Spoke with nursing staff this morning. Patient remains agitated, especially at night. Attempts to pull on feeding tube.     ROS:  Unable to obtain reliable review of systems due to patient's current mental status    Objective   Objective     Vital Signs:   Temp:  [97.6 °F (36.4 °C)-98.4 °F (36.9 °C)] 97.6 °F (36.4 °C)  Heart Rate:  [71-90] 71  Resp:  [18-20] 18  BP: (123-174)/(75-92) 137/77        Physical Exam:  Constitutional: No acute distress, awake, alert, in bed, SLP at bedside for eval  HENT: NCAT, mucous membranes moist, keofeed in place  Respiratory: Clear to auscultation bilaterally, respiratory effort normal on room air  Cardiovascular: RRR  Gastrointestinal: Positive bowel sounds, soft, nontender, nondistended, obese   Musculoskeletal: No bilateral ankle edema  Psychiatric: Flat affect, cooperative  Neurologic: Oriented x 3, strength symmetric in all extremities, Cranial Nerves grossly intact to confrontation, speech clear  Skin: No rashes noted to exposed skin       Results Reviewed:  Results from last 7 days   Lab Units 21  0401   WBC 10*3/mm3 8.24 9.85 12.73*   HEMOGLOBIN g/dL 8.8* 8.4* 8.7*   HEMATOCRIT % 27.8* 26.3* 27.2*   PLATELETS 10*3/mm3 291 260 268     Results from last 7 days   Lab Units 21  0518 21  0401 21  0401 21  1013 21  0340   SODIUM mmol/L 137 135* 136 136 134*   POTASSIUM mmol/L 3.9 3.4* 3.6 3.5 3.9   CHLORIDE mmol/L 101 100 100 100 99   CO2 mmol/L 26.0 26.0 27.0 25.0 27.0   BUN mg/dL 65* 67* 64* 46* 56*   CREATININE mg/dL 1.62* 1.69* 1.77* 1.71* 1.98*   GLUCOSE mg/dL 188* 116* 144* 112* 94   CALCIUM mg/dL 9.3 9.0 9.1 8.2* 8.5*   ALT  (SGPT) U/L  --   --   --  21 21   AST (SGOT) U/L  --   --   --  24 24     Estimated Creatinine Clearance: 41.6 mL/min (A) (by C-G formula based on SCr of 1.62 mg/dL (H)).    Microbiology Results Abnormal     Procedure Component Value - Date/Time    Blood Culture - Blood, Arm, Right [[id number]] Collected: 06/05/21 1412    Lab Status: Final result Specimen: Blood from Arm, Right Updated: 06/10/21 1431     Blood Culture No growth at 5 days    Blood Culture - Blood, Cannula [[id number]] Collected: 06/05/21 1201    Lab Status: Final result Specimen: Blood from Cannula Updated: 06/10/21 1245     Blood Culture No growth at 5 days    Eosinophil Smear - Urine, Urine, Clean Catch [[id number]]  (Normal) Collected: 05/30/21 1416    Lab Status: Final result Specimen: Urine, Clean Catch Updated: 05/30/21 1614     Eosinophil Smear 0 % EOS/100 Cells     Narrative:      No eosinophil seen    Eosinophil Smear - Urine, Urine, Clean Catch [[id number]]  (Normal) Collected: 05/23/21 1838    Lab Status: Final result Specimen: Urine, Clean Catch Updated: 05/24/21 0459     Eosinophil Smear 0 % EOS/100 Cells     Narrative:      No eosinophil seen    COVID PRE-OP / PRE-PROCEDURE SCREENING ORDER (NO ISOLATION) - Swab, Nasopharynx [[id number]]  (Normal) Collected: 05/22/21 0626    Lab Status: Final result Specimen: Swab from Nasopharynx Updated: 05/22/21 0702    Narrative:      The following orders were created for panel order COVID PRE-OP / PRE-PROCEDURE SCREENING ORDER (NO ISOLATION) - Swab, Nasopharynx.  Procedure                               Abnormality         Status                     ---------                               -----------         ------                     COVID-19 and FLU A/B PCR...[[id number]]  Normal              Final result                 Please view results for these tests on the individual orders.    COVID-19 and FLU A/B PCR - Swab, Nasopharynx [[id number]]  (Normal) Collected: 05/22/21 0626    Lab Status: Final result  Specimen: Swab from Nasopharynx Updated: 05/22/21 0702     COVID19 Not Detected     Influenza A PCR Not Detected     Influenza B PCR Not Detected          Imaging Results (Last 24 Hours)     Procedure Component Value Units Date/Time    XR Chest 1 View [[id number]] Collected: 06/13/21 1814     Updated: 06/13/21 1818    Narrative:      EXAMINATION: XR CHEST 1 VW-      INDICATION: confirm IJ placement due patient pulling; I21.4-Non-ST  elevation (NSTEMI) myocardial infarction; I50.9-Heart failure,  unspecified; Z86.79-Personal history of other diseases of the  circulatory system; I25.10-Atherosclerotic heart disease of native  coronary artery without angina pectoris; R13.13-Dysphagia, pharyngeal  phase; N17.9-Acute kidney failure, unspecified      COMPARISON: 6/8/2021     FINDINGS: Right IJ line terminates in the SVC, grossly unchanged.  Unchanged aeration with prominent bilateral interstitial opacities. No  effusion or pneumothorax. Unchanged degree of cardiac enlargement.  Remaining support hardware also unchanged.       Impression:      Right IJ line terminates in the SVC, grossly unchanged.  Unchanged aeration with prominent bilateral interstitial opacities. No  effusion or pneumothorax. Unchanged degree of cardiac enlargement.  Remaining support hardware also unchanged.     This report was finalized on 6/13/2021 6:14 PM by Aiden De Anda.             Results for orders placed during the hospital encounter of 05/22/21    Adult Transthoracic Echo Complete w/ Color, Spectral and Contrast if necessary per protocol    Interpretation Summary  · Left ventricular systolic function is normal. Estimated left ventricular EF = 60%.  · Left ventricular diastolic function is consistent with (grade I) impaired relaxation.  · Mild aortic valve stenosis is present.  · Estimated right ventricular systolic pressure from tricuspid regurgitation is normal (<35 mmHg).      I have reviewed the medications:  Scheduled Meds:amLODIPine,  10 mg, Nasogastric, Q24H  atorvastatin, 40 mg, Nasogastric, Nightly  Beneprotein, 1 packet, Nasogastric, 4x Daily  carvedilol, 25 mg, Nasogastric, BID With Meals  sennosides, 10 mL, Nasogastric, BID   And  docusate sodium, 100 mg, Nasogastric, BID  gabapentin, 300 mg, Nasogastric, Nightly  insulin lispro, 0-9 Units, Subcutaneous, TID AC  isosorbide dinitrate, 20 mg, Nasogastric, TID - Nitrates  melatonin, 5 mg, Nasogastric, Nightly  pantoprazole, 40 mg, Intravenous, BID  pharmacy consult - MTM, , Does not apply, Daily  Poly-Vitamin/Iron, 1 mL, Oral, BID  QUEtiapine, 12.5 mg, Oral, Q12H  sodium chloride, 10 mL, Intravenous, Q12H  thiamine, 100 mg, Nasogastric, Daily      Continuous Infusions:   PRN Meds:.•  acetaminophen **OR** acetaminophen **OR** acetaminophen  •  [DISCONTINUED] senna-docusate sodium **AND** polyethylene glycol **AND** [DISCONTINUED] bisacodyl **AND** bisacodyl  •  dextrose  •  guaiFENesin-dextromethorphan  •  haloperidol lactate  •  heparin (porcine)  •  ipratropium-albuterol  •  magnesium sulfate **OR** magnesium sulfate in D5W 1g/100mL (PREMIX) **OR** magnesium sulfate  •  naloxone  •  ondansetron    Assessment/Plan   Assessment & Plan     Active Hospital Problems    Diagnosis  POA   • **NSTEMI 5/22/2021 [I21.4]  Yes   • Postop encephalopathy post code. Combination of anoxic insult and azotemia [G93.40]  No   • Perioperative cardiac arrest with ventricular fibrillation (CMS/HCC) [I46.9, I49.01]  Yes   • S/P CABG x 3 on 5/28/21 [Z95.1]  Not Applicable   • Gout [M10.9]  Yes   • Former smokeless tobacco use [Z87.891]  Not Applicable   • A/C kidney disease. ATN due to postoperative arrest. Dialysis begun 6/3/2021 [N18.9]  Yes   • T2DM on oral agents and insulin. HbA1c 7.4  [E11.9, Z79.4]  Not Applicable   • Hyperlipidemia LDL goal <70 [E78.5]  Yes   • Essential hypertension [I10]  Yes      Resolved Hospital Problems    Diagnosis Date Resolved POA   • Postoperative hypoxemia [R09.02, Z98.890]  06/04/2021 No   • Leukocytosis [D72.829] 05/22/2021 Yes   • Acute CHF (CMS/[facility]) [I50.9] 05/22/2021 Yes   • MICHAEL (acute kidney injury) (CMS/[facility]) [N17.9] 05/27/2021 Yes   • Non-STEMI (non-ST elevated myocardial infarction) (CMS/[facility]) [I21.4] 05/22/2021 Yes   • Non-STEMI (non-ST elevated myocardial infarction) (CMS/[facility]) [I21.4] 05/24/2021 Yes        Brief Hospital Course to date:  Augusto Lorenzana Jr. is a 76 y.o. male with a known history of coronary artery disease, previous stent to his RCA in 2009, diabetes, hypertension, dyslipidemia, gout, chronic kidney disease, smokeless tobacco use who presented to the emergency room May 22 complaining of shortness of air and found to have a non-ST elevation MI.  Heart catheterization revealed multivessel disease.  He underwent CABG x3 vessels on May 28.  Course was complicated by ventricular fibrillation and asystole requiring resuscitation x2 on the day of surgery.  He subsequently underwent paced rhythm for about 48 hours before resuming sinus rhythm.  He required 3 units of blood, 4 units of FFP and 1 sixpack of platelets for bleeding.  He was extubated on the 29th but developed progressive renal failure and azotemia requiring hemodialysis.  Lower extremity duplex was performed and was negative for DVT.  Renal function started to recover and he underwent some diuresis.  He was also noted to be encephalopathic with no acute stroke.  Mentation also started to improve.  He has a recent history of colonoscopy with polypectomy.  Failed fees evaluation on 6/11. transferred to [facility] from ICU on 6/12.     This patient's problems and plans were partially entered by my partner and updated as appropriate by me 06/14/21.  All problems are new to me today     CABG x3  Evelin-operative cardiac arrest with ventricular arrhythmia  -5/28/2021 per Dr. Miller  -Patient with some perioperative V. Fib  -He has had no further ventricular arrhythmias  -Intraoperative echocardiogram revealed moderate  aortic valve stenosis    Diabetes mellitus type 2  -Continue on moderate dose sliding scale only due to some hypoglycemia.    Hypertension  -BP stable, continue Norvasc, Coreg, Isordil-changes made by cardiology    Acute on chronic kidney disease  -She did require dialysis after surgery  -Creatinine improving. Hasn't required HD in 5 days.   -Nephrology following    Postop encephalopathy  -Patient remains confused/agitated-  -Continue as needed Haldol for agitation  -Repeat EKG is  on 5/30-now WNL.  Will initiate seroquel 12.5 mg BID. Repeat EKG in am.     Severe pharyngeal dysphagia  -SLP following, possible repeat swallow evaluation today  -Continue Keofeed    DVT prophylaxis:  Medical and mechanical DVT prophylaxis orders are present.       Disposition: I expect the patient to be discharged TBD. Likely needs rehab. CM to follow.     CODE STATUS:   Code Status and Medical Interventions:   Ordered at: 05/22/21 0639     Code Status:    CPR     Medical Interventions (Level of Support Prior to Arrest):    Full       Anali Malone, APRN  06/14/21

## 2021-06-14 NOTE — THERAPY TREATMENT NOTE
Patient Name: Augusto Lorenzana Jr.  : 1945    MRN: [id number]                              Today's Date: 2021       Admit Date: 2021    Visit Dx:     ICD-10-CM ICD-9-CM   1. Non-STEMI (non-ST elevated myocardial infarction) (CMS/HCC)  I21.4 410.70   2. Acute congestive heart failure, unspecified heart failure type (CMS/HCC)  I50.9 428.0   3. History of coronary artery disease  Z86.79 V12.59   4. Coronary artery disease involving native coronary artery of native heart, angina presence unspecified  I25.10 414.01   5. Pharyngeal dysphagia  R13.13 787.23   6. MICHAEL (acute kidney injury) (CMS/HCC)  N17.9 584.9     Patient Active Problem List   Diagnosis   • NSTEMI 2021   • Hyperlipidemia LDL goal <70   • Essential hypertension   • T2DM on oral agents and insulin. HbA1c 7.4    • Severe 3 vessel CAD with preserved LV function (CMS/HCC)   • A/C kidney disease. ATN due to postoperative arrest. 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 Combination of anoxic insult and azotemia     Past Medical History:   Diagnosis Date   • CAD (coronary artery disease)    • CKD (chronic kidney disease) stage 3, GFR 30-59 ml/min (CMS/HCC)    • Diabetes mellitus (CMS/[facility])    • Hyperlipidemia    • Hypertension    • NSTEMI (non-ST elevated myocardial infarction) (CMS/HCC)      Past Surgical History:   Procedure Laterality Date   • CARDIAC CATHETERIZATION N/A 2021    Procedure: Left Heart Cath;  Surgeon: Blade Greene IV, MD;  Location: [facility] CATH INVASIVE LOCATION;  Service: Cardiovascular;  Laterality: N/A;   • CARDIAC SURGERY      2 stents placed .   • CORONARY ARTERY BYPASS GRAFT N/A 2021    Procedure: MEDIAN STERNOTOMY CORONARY ARTERY BYPASS X 3 UTILIZING THE LEFT INTERNAL MAMMARY ARTERY GRAFT, EVH OF THE GREATER RIGHT SAPHENOUS VEIN, AND  PILO PER ANESTHESIA;  Surgeon: Jacek Miller MD;  Location: [facility];  Service: Cardiothoracic;  Laterality: N/A;     General Information     Row Name 06/14/21 1621          Physical Therapy Time and Intention    Document Type  therapy note (daily note)  -     Mode of Treatment  physical therapy;individual therapy  -     Row Name 06/14/21 1621          General Information    Patient Profile Reviewed  yes  -LO     Existing Precautions/Restrictions  cardiac;fall;sternal  -     Row Name 06/14/21 1621          Cognition    Orientation Status (Cognition)  oriented to;person;verbal cues/prompts needed for orientation;place;disoriented to;situation;time  -     Row Name 06/14/21 1621          Safety Issues, Functional Mobility    Impairments Affecting Function (Mobility)  balance;endurance/activity tolerance;shortness of breath;strength;cognition  -       User Key  (r) = Recorded By, (t) = Taken By, (c) = Cosigned By    Initials Name Provider Type     Kailee Navarro PT Physical Therapist        Mobility     Row Name 06/14/21 1621          Bed Mobility    Bed Mobility  rolling left;rolling right;scooting/bridging;sidelying-sit;sit-sidelying  -LO     Rolling Left [place] (Bed Mobility)  contact guard  -LO     Rolling Right [place] (Bed Mobility)  minimum assist (75% patient effort);nonverbal cues (demo/gesture);verbal cues  -LO     Scooting/Bridging [place] (Bed Mobility)  minimum assist (75% patient effort);verbal cues;nonverbal cues (demo/gesture)  -LO     Sidelying-Sit [place] (Bed Mobility)  minimum assist (75% patient effort);verbal cues  -LO     Sit-Sidelying [place] (Bed Mobility)  verbal cues;minimum assist (75% patient effort)  -LO     Assistive Device (Bed Mobility)  head of bed elevated  -LO     Comment (Bed Mobility)  vc for log rolling sequencing, physical assist at BLE  -     Row Name 06/14/21 1621          Transfers    Comment (Transfers)  requires bed to be elevated  in order to maintain sternal precautions with sit to stand  -LO     Row Name 06/14/21 1621          Sit-Stand Transfer    Sit-Stand [place] (Transfers)  contact guard  -LO     Assistive Device (Sit-Stand Transfers)  walker, front-wheeled  -LO     Row Name 06/14/21 1621          Gait/Stairs (Locomotion)    [place] Level (Gait)  contact guard;1 person assist;1 person to manage equipment  -LO     Assistive Device (Gait)  walker, front-wheeled  -LO     Distance in Feet (Gait)  120'  -LO     Deviations/Abnormal Patterns (Gait)  weight shifting decreased;stride length decreased  -LO     Bilateral Gait Deviations  forward flexed posture  -LO     Comment (Gait/Stairs)  Notable narrow RADHA, SOA, early fatigue. Requires chair follow for safety. No loss of balance noted  -LO       User Key  (r) = Recorded By, (t) = Taken By, (c) = Cosigned By    Initials Name Provider Type    Kailee Calabrese, [place] Physical Therapist        Obj/Interventions     Row Name 06/14/21 1624          Balance    Balance Assessment  sitting static balance;sitting dynamic balance;standing static balance;standing dynamic balance  -LO     Static Sitting Balance  WNL;supported;sitting, edge of bed  -LO     Dynamic Sitting Balance  WNL;supported;sitting, edge of bed  -LO     Static Standing Balance  mild impairment;supported;standing  -LO     Dynamic Standing Balance  mild impairment;supported;standing  -LO     Comment, Balance  Requires vc for weight shifting to achieve safe balance in standing prior to stepping  -LO       User Key  (r) = Recorded By, (t) = Taken By, (c) = Cosigned By    Initials Name Provider Type    Kailee Calabrese, [place] Physical Therapist        Goals/Plan    No documentation.       Clinical Impression     Row Name 06/14/21 1625          Pain Scale: Numbers Pre/Post-Treatment    Pretreatment Pain Rating  2/10  -LO     Posttreatment Pain Rating  2/10  -LO     Pain Location - Orientation  incisional  -LO     Pain Location  chest  -LO      Pre/Posttreatment Pain Comment  Rn aware  -LO     Pain Intervention(s)  Repositioned  -LO     Row Name 06/14/21 1625          Plan of Care Review    Plan of Care Reviewed With  patient;daughter;spouse  -LO     Progress  no change  -LO     Outcome Summary  Patient continues to require vc throughout bed mobility and transfers in order to maintain sternal precautions. Ambulates x 120' with CGA with FWW with chair follow for safety as patient continues to demonstrate early fatigue and SOA. Cont IP PT POC.  -LO     Row Name 06/14/21 1625          Therapy Assessment/Plan (PT)    Rehab Potential (PT)  good, to achieve stated therapy goals  -LO     Criteria for Skilled Interventions Met (PT)  yes;skilled treatment is necessary  -LO     Row Name 06/14/21 1625          Vital Signs    Pre Systolic BP Rehab  157  -LO     Pre Treatment Diastolic BP  113  -LO     Post Systolic BP Rehab  130  -LO     Post Treatment Diastolic BP  87  -LO     Pretreatment Heart Rate (beats/min)  73  -LO     Posttreatment Heart Rate (beats/min)  77  -LO     O2 Delivery Pre Treatment  room air  -LO     O2 Delivery Intra Treatment  room air  -LO     O2 Delivery Post Treatment  room air  -LO     Pre Patient Position  Supine  -LO     Intra Patient Position  Standing  -LO     Post Patient Position  Supine  -LO     Rest Breaks   1  -LO     Row Name 06/14/21 1625          Positioning and Restraints    Pre-Treatment Position  in bed  -LO     Post Treatment Position  bed  -LO     In Bed  notified nsg;supine;encouraged to call for assist;exit alarm on;call light within reach;with family/caregiver;side rails up x2  -LO       User Key  (r) = Recorded By, (t) = Taken By, (c) = Cosigned By    Initials Name Provider Type    Kailee Calabrese, PT Physical Therapist        Outcome Measures     Row Name 06/14/21 1629 06/14/21 0815       How much help from another person do you currently need...    Turning from your back to your side while in flat bed without using  bedrails?  3  -LO  4  -CB    Moving from lying on back to sitting on the side of a flat bed without bedrails?  3  -LO  4  -CB    Moving to and from a bed to a chair (including a wheelchair)?  3  -LO  3  -CB    Standing up from a chair using your arms (e.g., wheelchair, bedside chair)?  3  -LO  3  -CB    Climbing 3-5 steps with a railing?  3  -LO  3  -CB    To walk in hospital room?  3  -LO  3  -CB    AM-PAC 6 Clicks Score (PT)  18  -LO  20  -CB      User Key  (r) = Recorded By, (t) = Taken By, (c) = Cosigned By    Initials Name Provider Type    Hortencia Rocha, RN Registered Nurse    Kailee Calabrese, PT Physical Therapist        Physical Therapy Education                 Title: PT OT SLP Therapies (In Progress)     Topic: Physical Therapy (Done)     Point: Mobility training (Done)     Learning Progress Summary           Patient Acceptance, E, VU,NR by LO at 6/14/2021 1538    Comment: Patient instructed through sternal precautions and sequencing for bed mobility and transfers.    Acceptance, E, NR by EMANUEL at 6/12/2021 1100    Acceptance, E, VU by JB1 at 6/12/2021 0124    Acceptance, E, NR by EMANUEL at 6/11/2021 0840    Acceptance, E, NR by KG at 6/10/2021 0811    Acceptance, E, NR by KG at 6/9/2021 0859    Acceptance, E, NR by EMANUEL at 6/8/2021 0800    Acceptance, E, NR by KG at 6/7/2021 1305    Acceptance, E, NR by EMANUEL at 6/3/2021 1015    Acceptance, E, NR by KG at 6/2/2021 0909    Acceptance, E, NR by KG at 6/1/2021 0933    Acceptance, E, NR by KG at 5/31/2021 1118    Acceptance, E,TB, VU,NR by AY at 5/30/2021 1107                   Point: Home exercise program (Done)     Learning Progress Summary           Patient Acceptance, E, VU,NR by LO at 6/14/2021 1538    Comment: Patient instructed through sternal precautions and sequencing for bed mobility and transfers.    Acceptance, E, NR by EMANUEL at 6/12/2021 1100    Acceptance, E, NR by EMANUEL at 6/11/2021 0840    Acceptance, E, NR by KG at 6/10/2021 0811    Acceptance, E, NR by KG at  6/9/2021 0859    Acceptance, E, NR by EMANUEL at 6/8/2021 0800    Acceptance, E, NR by KG at 6/7/2021 1305    Acceptance, E, NR by EMANUEL at 6/3/2021 1015    Acceptance, E, NR by KG at 6/2/2021 0909    Acceptance, E, NR by KG at 6/1/2021 0933    Acceptance, E, NR by KG at 5/31/2021 1118                   Point: Body mechanics (Done)     Learning Progress Summary           Patient Acceptance, E, VU,NR by LO at 6/14/2021 1538    Comment: Patient instructed through sternal precautions and sequencing for bed mobility and transfers.    Acceptance, E, NR by EMANUEL at 6/12/2021 1100    Acceptance, E, VU by JB1 at 6/12/2021 0124    Acceptance, E, NR by EMANUEL at 6/11/2021 0840    Acceptance, E, NR by KG at 6/10/2021 0811    Acceptance, E, NR by KG at 6/9/2021 0859    Acceptance, E, NR by EMANUEL at 6/8/2021 0800    Acceptance, E, NR by KG at 6/7/2021 1305    Acceptance, E, NR by EMANUEL at 6/3/2021 1015    Acceptance, E, NR by KG at 6/2/2021 0909    Acceptance, E, NR by KG at 6/1/2021 0933    Acceptance, E, NR by KG at 5/31/2021 1118    Acceptance, E,TB, VU,NR by AY at 5/30/2021 1107                   Point: Precautions (Done)     Learning Progress Summary           Patient Acceptance, E, VU,NR by LO at 6/14/2021 1538    Comment: Patient instructed through sternal precautions and sequencing for bed mobility and transfers.    Acceptance, E, NR by EMANUEL at 6/12/2021 1100    Acceptance, E, NR by EMANUEL at 6/11/2021 0840    Acceptance, E, NR by KG at 6/10/2021 0811    Acceptance, E, NR by KG at 6/9/2021 0859    Acceptance, E, NR by EMANUEL at 6/8/2021 0800    Acceptance, E, NR by KG at 6/7/2021 1305    Acceptance, E, NR by EMANUEL at 6/3/2021 1015    Acceptance, E, NR by KG at 6/2/2021 0909    Acceptance, E, NR by KG at 6/1/2021 0933    Acceptance, E, NR by KG at 5/31/2021 1118    Acceptance, E,TB, VU,NR by AY at 5/30/2021 1107                               User Key     Initials Effective Dates Name Provider Type Discipline    EMANUEL 06/19/15 -  Sierra Kitchen, PT  Physical Therapist PT    JB1 06/16/16 -  Brooks Bright RN Registered Nurse Nurse    KG 05/22/20 -  Aliza Shen, PT Physical Therapist PT    LO 03/11/20 -  Kailee Navarro, PT Physical Therapist PT    AY 11/10/20 -  Megan Moffett PT Physical Therapist PT              PT Recommendation and Plan     Plan of Care Reviewed With: patient, daughter, spouse  Progress: no change  Outcome Summary: Patient continues to require vc throughout bed mobility and transfers in order to maintain sternal precautions. Ambulates x 120' with CGA with FWW with chair follow for safety as patient continues to demonstrate early fatigue and SOA. Cont IP PT POC.     Time Calculation:   PT Charges     Row Name 06/14/21 1538             Time Calculation    Start Time  1538  -LO      PT Received On  06/14/21  -LO      PT Goal Re-Cert Due Date  06/19/21  -LO         Timed Charges    50474 -  PT Neuromuscular Reeducation Minutes  4  -LO      83471 - Gait Training Minutes   20  -LO      89879 - PT Therapeutic Activity Minutes  15  -LO         Total Minutes    Timed Charges Total Minutes  39  -LO       Total Minutes  39  -LO        User Key  (r) = Recorded By, (t) = Taken By, (c) = Cosigned By    Initials Name Provider Type    LO Kailee Navarro, PT Physical Therapist        Therapy Charges for Today     Code Description Service Date Service Provider Modifiers Qty    97431341619 HC GAIT TRAINING EA 15 MIN 6/14/2021 Kailee Navarro, PT GP 2    [id number] HC PT THERAPEUTIC ACT EA 15 MIN 6/14/2021 Kailee Navarro, PT GP 1          PT G-Codes  Outcome Measure Options: AM-PAC 6 Clicks Daily Activity (OT)  AM-PAC 6 Clicks Score (PT): 18  AM-PAC 6 Clicks Score (OT): 13    Kailee Navarro PT  6/14/2021

## 2021-06-14 NOTE — PLAN OF CARE
Goal Outcome Evaluation:  Plan of Care Reviewed With: patient, daughter, spouse        Progress: no change  Outcome Summary: Patient continues to require vc throughout bed mobility and transfers in order to maintain sternal precautions. Ambulates x 120' with CGA with FWW with chair follow for safety as patient continues to demonstrate early fatigue and SOA. Cont IP PT POC.

## 2021-06-14 NOTE — PLAN OF CARE
Goal Outcome Evaluation:  Plan of Care Reviewed With: patient      Progress: no change   SLP treatment completed. Will continue to address dysphagia w/ plan for MBS tomorrow to see if safe for any PO. Please see note for further details and recommendations.

## 2021-06-14 NOTE — PROGRESS NOTES
"   LOS: 23 days    Patient Care Team:  Rob Polanco MD as PCP - General (Internal Medicine)    Reason For Visit:  F/U MICHAEL ON CKD  Subjective           Review of Systems:    Pulm: No soa   CV:  No CP      Objective     amLODIPine, 10 mg, Nasogastric, Q24H  atorvastatin, 40 mg, Nasogastric, Nightly  Beneprotein, 1 packet, Nasogastric, 4x Daily  carvedilol, 25 mg, Nasogastric, BID With Meals  sennosides, 10 mL, Nasogastric, BID   And  docusate sodium, 100 mg, Nasogastric, BID  gabapentin, 300 mg, Nasogastric, Nightly  insulin lispro, 0-9 Units, Subcutaneous, TID AC  isosorbide dinitrate, 20 mg, Nasogastric, TID - Nitrates  melatonin, 5 mg, Nasogastric, Nightly  pantoprazole, 40 mg, Intravenous, BID  pharmacy consult - MTM, , Does not apply, Daily  Poly-Vitamin/Iron, 1 mL, Oral, BID  QUEtiapine, 12.5 mg, Oral, Q12H  sodium chloride, 10 mL, Intravenous, Q12H  thiamine, 100 mg, Nasogastric, Daily             Vital Signs:  Blood pressure 137/77, pulse 71, temperature 97.6 °F (36.4 °C), temperature source Oral, resp. rate 18, height 170 cm (66.93\"), weight 85 kg (187 lb 8 oz), SpO2 96 %.    Flowsheet Rows      First Filed Value   Admission Height  170.2 cm (67\") Documented at 05/22/2021 0433   Admission Weight  88.5 kg (195 lb) Documented at 05/22/2021 0433          06/13 0701 - 06/14 0700  In: 1787   Out: 0     Physical Exam:    General Appearance: NAD, alert and cooperative. A LITTLE CONFUSED. FEEDING TUBE.  Eyes: PER, conjunctivae and sclerae normal, no icterus  Lungs: respirations regular and unlabored, no crepitus, clear to auscultation  Heart/CV: regular rhythm & normal rate, no murmur, no gallop, no rub and TR edema  Abdomen: not distended, soft, non-tender, no masses,  bowel sounds present  Skin: No rash, Warm and dry. TEMP IJ HD CATH.    Radiology:            Labs:  Results from last 7 days   Lab Units 06/14/21  0518 06/12/21  0401 06/11/21  0401   WBC 10*3/mm3 8.24 9.85 12.73*   HEMOGLOBIN g/dL 8.8* 8.4* " 8.7*   HEMATOCRIT % 27.8* 26.3* 27.2*   PLATELETS 10*3/mm3 291 260 268     Results from last 7 days   Lab Units 06/14/21  0518 06/12/21  0401 06/11/21  0401 06/10/21  0351 06/09/21  1013 06/08/21  0340   SODIUM mmol/L 137 135* 136 137 136 134*   POTASSIUM mmol/L 3.9 3.4* 3.6 3.6 3.5 3.9   CHLORIDE mmol/L 101 100 100 100 100 99   CO2 mmol/L 26.0 26.0 27.0 27.0 25.0 27.0   BUN mg/dL 65* 67* 64* 60* 46* 56*   CREATININE mg/dL 1.62* 1.69* 1.77* 1.79* 1.71* 1.98*   CALCIUM mg/dL 9.3 9.0 9.1 8.6 8.2* 8.5*   PHOSPHORUS mg/dL 3.9 3.6  --  3.4 3.1 3.8   MAGNESIUM mg/dL 1.8 1.9  --  2.1  --  2.0   ALBUMIN g/dL 3.10* 3.00*  --  2.70* 2.40* 2.50*     Results from last 7 days   Lab Units 06/14/21  0518   GLUCOSE mg/dL 188*       Results from last 7 days   Lab Units 06/09/21  1013   ALK PHOS U/L 162*   BILIRUBIN mg/dL 0.4   ALT (SGPT) U/L 21   AST (SGOT) U/L 24                 Estimated Creatinine Clearance: 41.6 mL/min (A) (by C-G formula based on SCr of 1.62 mg/dL (H)).      Assessment       NSTEMI 5/22/2021    Hyperlipidemia LDL goal <70    Essential hypertension    T2DM on oral agents and insulin. HbA1c 7.4     A/C kidney disease. ATN due to postoperative arrest. 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 Combination of anoxic insult and azotemia            Impression: MICHAEL ON CKD. GFR/AZOTEMIA IMPROVING. HAS NOT HAD DIALYSIS IN 5 DAYS. ANEMIA.             Recommendations: REMOVE TEMP IJ HD CATH. NAL CAN F/U PATIENT IN [facility] AFTER DISCHARGE.      Aiden Godfrey MD  06/14/21  13:52 EDT

## 2021-06-14 NOTE — NURSING NOTE
Prior to central line removal, order for the removal of catheter was verified, patient was assessed, necessary materials were gathered and patient was unable to be educated due to patient condition. Educated family at bedside.    Patient was positioned supine to ensure that the insertion site was at or below the level of the heart.    Hands were washed, clean gloves were applied and central line dressing was gently removed. Catheter exit site was not cultured.     A new pair of clean gloves were then applied. Insertion site was cleansed with 2% Chlorhexidine swab using a circular motion beginning at the insertion site and moving outward for 30 seconds and allowed to dry.     Clamp on line was present and clamped.     Patient unable to perform Valsalva maneuver or hold breath during central line removal due to current condition.     The central line was grasped at the insertion site and slowly pulled outward parallel to the skin. Resistance was not met.    After central line was completely removed, a sterile 4x4 gauze pad was used to apply light pressure until bleeding stopped. At that time, petroleum-based gauze and a sterile occlusive dressing was applied to exit site.     Patient was instructed to keep dressing in place for at least 24 hours and was unable to remain in a flat or reclining position for 30 minutes post-removal due to current condition.     Catheter was inspected after removal and was intact. Tip of central line was not sent for culture. Patient tolerated procedure.

## 2021-06-15 ENCOUNTER — APPOINTMENT (OUTPATIENT)
Dept: GENERAL RADIOLOGY | Facility: HOSPITAL | Age: 76
End: 2021-06-15

## 2021-06-15 LAB
GLUCOSE BLDC GLUCOMTR-MCNC: 151 MG/DL (ref 70–130)
GLUCOSE BLDC GLUCOMTR-MCNC: 161 MG/DL (ref 70–130)
GLUCOSE BLDC GLUCOMTR-MCNC: 170 MG/DL (ref 70–130)
GLUCOSE BLDC GLUCOMTR-MCNC: 175 MG/DL (ref 70–130)
MAGNESIUM SERPL-MCNC: 2 MG/DL (ref 1.6–2.4)

## 2021-06-15 PROCEDURE — 63710000001 INSULIN LISPRO (HUMAN) PER 5 UNITS: Performed by: NURSE PRACTITIONER

## 2021-06-15 PROCEDURE — 92611 MOTION FLUOROSCOPY/SWALLOW: CPT

## 2021-06-15 PROCEDURE — 94799 UNLISTED PULMONARY SVC/PX: CPT

## 2021-06-15 PROCEDURE — 93010 ELECTROCARDIOGRAM REPORT: CPT | Performed by: INTERNAL MEDICINE

## 2021-06-15 PROCEDURE — 99232 SBSQ HOSP IP/OBS MODERATE 35: CPT | Performed by: NURSE PRACTITIONER

## 2021-06-15 PROCEDURE — 93005 ELECTROCARDIOGRAM TRACING: CPT | Performed by: NURSE PRACTITIONER

## 2021-06-15 PROCEDURE — 82962 GLUCOSE BLOOD TEST: CPT

## 2021-06-15 PROCEDURE — 83735 ASSAY OF MAGNESIUM: CPT | Performed by: FAMILY MEDICINE

## 2021-06-15 PROCEDURE — 71045 X-RAY EXAM CHEST 1 VIEW: CPT

## 2021-06-15 PROCEDURE — 74230 X-RAY XM SWLNG FUNCJ C+: CPT

## 2021-06-15 RX ORDER — ASPIRIN 81 MG/1
81 TABLET ORAL DAILY
Status: DISCONTINUED | OUTPATIENT
Start: 2021-06-15 | End: 2021-06-15

## 2021-06-15 RX ORDER — ASPIRIN 325 MG
325 TABLET ORAL DAILY
Status: DISCONTINUED | OUTPATIENT
Start: 2021-06-15 | End: 2021-06-18 | Stop reason: HOSPADM

## 2021-06-15 RX ORDER — ISOSORBIDE DINITRATE 20 MG/1
20 TABLET ORAL
Qty: 180 TABLET | Refills: 1 | Status: CANCELLED | OUTPATIENT
Start: 2021-06-15

## 2021-06-15 RX ADMIN — BARIUM SULFATE 100 ML: 0.81 POWDER, FOR SUSPENSION ORAL at 10:37

## 2021-06-15 RX ADMIN — BARIUM SULFATE 10 ML: 400 SUSPENSION ORAL at 10:37

## 2021-06-15 RX ADMIN — GABAPENTIN 300 MG: 300 CAPSULE ORAL at 22:21

## 2021-06-15 RX ADMIN — THIAMINE HCL TAB 100 MG 100 MG: 100 TAB at 12:13

## 2021-06-15 RX ADMIN — AMLODIPINE BESYLATE 10 MG: 10 TABLET ORAL at 12:13

## 2021-06-15 RX ADMIN — CARVEDILOL 25 MG: 12.5 TABLET, FILM COATED ORAL at 12:12

## 2021-06-15 RX ADMIN — ASPIRIN 325 MG ORAL TABLET 325 MG: 325 PILL ORAL at 12:12

## 2021-06-15 RX ADMIN — OXYCODONE HYDROCHLORIDE 1 ML: 5 SOLUTION ORAL at 12:12

## 2021-06-15 RX ADMIN — BARIUM SULFATE 20 ML: 400 PASTE ORAL at 10:37

## 2021-06-15 RX ADMIN — INSULIN LISPRO 2 UNITS: 100 INJECTION, SOLUTION INTRAVENOUS; SUBCUTANEOUS at 12:12

## 2021-06-15 RX ADMIN — ATORVASTATIN CALCIUM 40 MG: 40 TABLET, FILM COATED ORAL at 22:21

## 2021-06-15 RX ADMIN — QUETIAPINE FUMARATE 12.5 MG: 25 TABLET ORAL at 22:21

## 2021-06-15 RX ADMIN — DOCUSATE SODIUM 100 MG: 50 LIQUID ORAL at 22:21

## 2021-06-15 RX ADMIN — BARIUM SULFATE 50 ML: 400 SUSPENSION ORAL at 10:37

## 2021-06-15 RX ADMIN — DOCUSATE SODIUM 100 MG: 50 LIQUID ORAL at 12:13

## 2021-06-15 RX ADMIN — Medication 5 MG: at 22:21

## 2021-06-15 RX ADMIN — OXYCODONE HYDROCHLORIDE 1 ML: 5 SOLUTION ORAL at 22:27

## 2021-06-15 RX ADMIN — SODIUM CHLORIDE, PRESERVATIVE FREE 10 ML: 5 INJECTION INTRAVENOUS at 22:21

## 2021-06-15 RX ADMIN — INSULIN LISPRO 2 UNITS: 100 INJECTION, SOLUTION INTRAVENOUS; SUBCUTANEOUS at 17:56

## 2021-06-15 RX ADMIN — CARVEDILOL 25 MG: 12.5 TABLET, FILM COATED ORAL at 17:56

## 2021-06-15 RX ADMIN — ISOSORBIDE DINITRATE 20 MG: 20 TABLET ORAL at 17:56

## 2021-06-15 RX ADMIN — QUETIAPINE FUMARATE 12.5 MG: 25 TABLET ORAL at 12:15

## 2021-06-15 RX ADMIN — SENNOSIDES 10 ML: 8.8 LIQUID ORAL at 22:22

## 2021-06-15 RX ADMIN — PANTOPRAZOLE SODIUM 40 MG: 40 INJECTION, POWDER, FOR SOLUTION INTRAVENOUS at 12:13

## 2021-06-15 RX ADMIN — PANTOPRAZOLE SODIUM 40 MG: 40 INJECTION, POWDER, FOR SOLUTION INTRAVENOUS at 22:21

## 2021-06-15 RX ADMIN — SENNOSIDES 10 ML: 8.8 LIQUID ORAL at 12:13

## 2021-06-15 RX ADMIN — ISOSORBIDE DINITRATE 20 MG: 20 TABLET ORAL at 12:20

## 2021-06-15 NOTE — PLAN OF CARE
Goal Outcome Evaluation:  Plan of Care Reviewed With: patient  Progress: improving   SLP MBS evaluation completed. Will address mild-moderate oral and moderate pharyngeal dysphagia during treatment. Please see note for further details and recommendations.

## 2021-06-15 NOTE — PROGRESS NOTES
[facility]  PROGRESS NOTE    Patient Name: Augusto Lorenzana Jr.  : 1945  MRN: [id number]    Date of Admission: 2021  Primary Care Physician: Rob Polanco MD    Subjective   Subjective     CC:  F/U med mgmt s/p CABG    HPI:    Chart reviewed. Pulled feeding tube out last night. Wife reports that he sneezed twice last night and if came out. Speech is planning a MBS today. Currently up in chair.        ROS:  Unable to obtain reliable review of systems due to patient's current mental status    Objective   Objective     Vital Signs:   Temp:  [97.5 °F (36.4 °C)-98.4 °F (36.9 °C)] 97.6 °F (36.4 °C)  Heart Rate:  [63-75] 72  Resp:  [16-18] 16  BP: (137-154)/(76-91) 154/83        Physical Exam:  Constitutional: No acute distress, awake, alert,up in chair, family at bedside.   HENT: NCAT, mucous membranes moist, keofeed discontinued last night by patient  Respiratory: Clear to auscultation bilaterally, respiratory effort normal on room air  Cardiovascular: RRR  Gastrointestinal: Positive bowel sounds, soft, nontender, nondistended, obese   Musculoskeletal: No bilateral ankle edema  Psychiatric: Flat affect, cooperative  Neurologic: Oriented x 3, strength symmetric in all extremities, Cranial Nerves grossly intact to confrontation, speech clear  Skin: No rashes noted to exposed skin, midsternal incision open to air-no drainage.       Results Reviewed:  Results from last 7 days   Lab Units 21   WBC 10*3/mm3 8.24 9.85 12.73*   HEMOGLOBIN g/dL 8.8* 8.4* 8.7*   HEMATOCRIT % 27.8* 26.3* 27.2*   PLATELETS 10*3/mm3 291 260 268     Results from last 7 days   Lab Units 21  04021  1013   SODIUM mmol/L 137 135* 136 136   POTASSIUM mmol/L 3.9 3.4* 3.6 3.5   CHLORIDE mmol/L 101 100 100 100   CO2 mmol/L 26.0 26.0 27.0 25.0   BUN mg/dL 65* 67* 64* 46*   CREATININE mg/dL 1.62* 1.69* 1.77* 1.71*    GLUCOSE mg/dL 188* 116* 144* 112*   CALCIUM mg/dL 9.3 9.0 9.1 8.2*   ALT (SGPT) U/L  --   --   --  21   AST (SGOT) U/L  --   --   --  24     Estimated Creatinine Clearance: 41.6 mL/min (A) (by C-G formula based on SCr of 1.62 mg/dL (H)).    Microbiology Results Abnormal     Procedure Component Value - Date/Time    Blood Culture - Blood, Arm, Right [[id number]] Collected: 06/05/21 1412    Lab Status: Final result Specimen: Blood from Arm, Right Updated: 06/10/21 1431     Blood Culture No growth at 5 days    Blood Culture - Blood, Cannula [[id number]] Collected: 06/05/21 1201    Lab Status: Final result Specimen: Blood from Cannula Updated: 06/10/21 1245     Blood Culture No growth at 5 days    Eosinophil Smear - Urine, Urine, Clean Catch [[id number]]  (Normal) Collected: 05/30/21 1416    Lab Status: Final result Specimen: Urine, Clean Catch Updated: 05/30/21 1614     Eosinophil Smear 0 % EOS/100 Cells     Narrative:      No eosinophil seen    Eosinophil Smear - Urine, Urine, Clean Catch [[id number]]  (Normal) Collected: 05/23/21 1838    Lab Status: Final result Specimen: Urine, Clean Catch Updated: 05/24/21 0459     Eosinophil Smear 0 % EOS/100 Cells     Narrative:      No eosinophil seen    COVID PRE-OP / PRE-PROCEDURE SCREENING ORDER (NO ISOLATION) - Swab, Nasopharynx [[id number]]  (Normal) Collected: 05/22/21 0626    Lab Status: Final result Specimen: Swab from Nasopharynx Updated: 05/22/21 0702    Narrative:      The following orders were created for panel order COVID PRE-OP / PRE-PROCEDURE SCREENING ORDER (NO ISOLATION) - Swab, Nasopharynx.  Procedure                               Abnormality         Status                     ---------                               -----------         ------                     COVID-19 and FLU A/B PCR...[[id number]]  Normal              Final result                 Please view results for these tests on the individual orders.    COVID-19 and FLU A/B PCR - Swab, Nasopharynx  [[id number]]  (Normal) Collected: 05/22/21 0626    Lab Status: Final result Specimen: Swab from Nasopharynx Updated: 05/22/21 0702     COVID19 Not Detected     Influenza A PCR Not Detected     Influenza B PCR Not Detected          Imaging Results (Last 24 Hours)     ** No results found for the last 24 hours. **          Results for orders placed during the hospital encounter of 05/22/21    Adult Transthoracic Echo Complete w/ Color, Spectral and Contrast if necessary per protocol    Interpretation Summary  · Left ventricular systolic function is normal. Estimated left ventricular EF = 60%.  · Left ventricular diastolic function is consistent with (grade I) impaired relaxation.  · Mild aortic valve stenosis is present.  · Estimated right ventricular systolic pressure from tricuspid regurgitation is normal (<35 mmHg).      I have reviewed the medications:  Scheduled Meds:amLODIPine, 10 mg, Nasogastric, Q24H  aspirin, 81 mg, Oral, Daily  atorvastatin, 40 mg, Nasogastric, Nightly  Beneprotein, 1 packet, Nasogastric, 4x Daily  carvedilol, 25 mg, Nasogastric, BID With Meals  sennosides, 10 mL, Nasogastric, BID   And  docusate sodium, 100 mg, Nasogastric, BID  gabapentin, 300 mg, Nasogastric, Nightly  insulin lispro, 0-9 Units, Subcutaneous, TID AC  isosorbide dinitrate, 20 mg, Nasogastric, TID - Nitrates  melatonin, 5 mg, Nasogastric, Nightly  pantoprazole, 40 mg, Intravenous, BID  pharmacy consult - MTM, , Does not apply, Daily  Poly-Vitamin/Iron, 1 mL, Oral, BID  QUEtiapine, 12.5 mg, Oral, Q12H  sodium chloride, 10 mL, Intravenous, Q12H  thiamine, 100 mg, Nasogastric, Daily      Continuous Infusions:   PRN Meds:.•  acetaminophen **OR** acetaminophen **OR** acetaminophen  •  [DISCONTINUED] senna-docusate sodium **AND** polyethylene glycol **AND** [DISCONTINUED] bisacodyl **AND** bisacodyl  •  dextrose  •  guaiFENesin-dextromethorphan  •  haloperidol lactate  •  heparin (porcine)  •  ipratropium-albuterol  •  magnesium  sulfate **OR** magnesium sulfate in D5W 1g/100mL (PREMIX) **OR** magnesium sulfate  •  naloxone  •  ondansetron    Assessment/Plan   Assessment & Plan     Active Hospital Problems    Diagnosis  POA   • **NSTEMI 5/22/2021 [I21.4]  Yes   • Postop encephalopathy post code. Combination of anoxic insult and azotemia [G93.40]  No   • Perioperative cardiac arrest with ventricular fibrillation (CMS/[facility]) [I46.9, I49.01]  Yes   • S/P CABG x 3 on 5/28/21 [Z95.1]  Not Applicable   • Gout [M10.9]  Yes   • Former smokeless tobacco use [Z87.891]  Not Applicable   • A/C kidney disease. ATN due to postoperative arrest. Dialysis begun 6/3/2021 [N18.9]  Yes   • T2DM on oral agents and insulin. HbA1c 7.4  [E11.9, Z79.4]  Not Applicable   • Hyperlipidemia LDL goal <70 [E78.5]  Yes   • Essential hypertension [I10]  Yes      Resolved Hospital Problems    Diagnosis Date Resolved POA   • Postoperative hypoxemia [R09.02, Z98.890] 06/04/2021 No   • Leukocytosis [D72.829] 05/22/2021 Yes   • Acute CHF (CMS/HCC) [I50.9] 05/22/2021 Yes   • MICAHEL (acute kidney injury) (CMS/HCC) [N17.9] 05/27/2021 Yes   • Non-STEMI (non-ST elevated myocardial infarction) (CMS/HCC) [I21.4] 05/22/2021 Yes   • Non-STEMI (non-ST elevated myocardial infarction) (CMS/HCC) [I21.4] 05/24/2021 Yes        Brief Hospital Course to date:  Augusto Lorenzana Jr. is a 76 y.o. male with a known history of coronary artery disease, previous stent to his RCA in 2009, diabetes, hypertension, dyslipidemia, gout, chronic kidney disease, smokeless tobacco use who presented to the emergency room May 22 complaining of shortness of air and found to have a non-ST elevation MI.  Heart catheterization revealed multivessel disease.  He underwent CABG x3 vessels on May 28.  Course was complicated by ventricular fibrillation and asystole requiring resuscitation x2 on the day of surgery.  He subsequently underwent paced rhythm for about 48 hours before resuming sinus rhythm.  He required 3 units of  blood, 4 units of FFP and 1 sixpack of platelets for bleeding.  He was extubated on the 29th but developed progressive renal failure and azotemia requiring hemodialysis.  Lower extremity duplex was performed and was negative for DVT.  Renal function started to recover and he underwent some diuresis.  He was also noted to be encephalopathic with no acute stroke.  Mentation also started to improve.  He has a recent history of colonoscopy with polypectomy.  Failed fees evaluation on 6/11. transferred to [facility] from ICU on 6/12.     This patient's problems and plans were partially entered by my partner and updated as appropriate by me 06/15/21.      CABG x3  Evelin-operative cardiac arrest with ventricular arrhythmia  -5/28/2021 per Dr. Miller  -Patient with some perioperative V. Fib  -He has had no further ventricular arrhythmias  -Intraoperative echocardiogram revealed moderate aortic valve stenosis    Diabetes mellitus type 2  -Continue on moderate dose sliding scale only due to some hypoglycemia.  -monitor closely. Patient's glucoses remain controlled.     Hypertension  -BP remains stable, continue Norvasc, Coreg, Isordil-changes made by cardiology    Acute on chronic kidney disease  -He did require dialysis after surgery  -Creatinine improving. Hasn't required HD in 5 days.   -Nephrology following- dialysis cath removed on 6/14.     Postop encephalopathy  -Patient remains confused/agitated-  -Continue as needed Haldol for agitation  -Repeat EKG is  on 5/30-improved.     -continue seroquel 12.5 mg BID.   -EKG this am with QTi 490.  Repeat again in am.      Severe pharyngeal dysphagia  -SLP following, bedside re-eval complete 6/14. Plans for MBS today.    - Keofeed removed overnight by patient. Will await results of MBS before replacing feeding tube.     DVT prophylaxis:  Medical and mechanical DVT prophylaxis orders are present.       Disposition: I expect the patient to be discharged TBD. Likely needs rehab. GINO  to follow.     CODE STATUS:   Code Status and Medical Interventions:   Ordered at: 05/22/21 0639     Code Status:    CPR     Medical Interventions (Level of Support Prior to Arrest):    Full       CHARLES Ireland  06/15/21

## 2021-06-15 NOTE — PROGRESS NOTES
Clinical Nutrition       Patient Name: Augusto Lorenzana Jr.  YOB: 1945  MRN: [id number]  Date of Encounter: 06/15/21 12:14 EDT  Admission date: 5/22/2021      Reason for Visit   Follow-up protocol      EMR  Reviewed   Yes  Noted results of MBS today and recs/orders for dysphagia level 4 texture foods with Honey thick liquids.     Reported/Observed/Food/Nutrition Related - Comments     RD spoke with pt in room. FT has been removed and pt eager to eat. Pt reports he likes Boost plus supplements; RD will order/send 2x/d.     Current Nutrition Prescription     Diet Dysphagia; IV - Mechanical Soft No Mixed Consistencies; Honey Thick; Other, No Straws; liquids via teaspoon only; Cardiac, Consistent Carbohydrate      Average po Intake: insuffic data      Actions     Follow treatment progress, Advise alternate selection, Interview for preferences, Encourage intake, Supplement provided   Ordered Boost plus 2x/d.  D/c'd TF order and modulars.    Monitor Per Protocol      Carol Macias, MS,RD,LD,   Time Spent: 25 mins

## 2021-06-15 NOTE — MBS/VFSS/FEES
Acute Care - Speech Language Pathology   Swallow Re-Evaluation  [facility]   Modified Barium Swallow Study (MBS)     Patient Name: Augusto Lorenzana Jr.  : 1945  MRN: [id number]  Today's Date: 6/15/2021               Admit Date: 2021    Visit Dx:     ICD-10-CM ICD-9-CM   1. Non-STEMI (non-ST elevated myocardial infarction) (CMS/[facility])  I21.4 410.70   2. Acute congestive heart failure, unspecified heart failure type (CMS/[facility])  I50.9 428.0   3. History of coronary artery disease  Z86.79 V12.59   4. Coronary artery disease involving native coronary artery of native heart, angina presence unspecified  I25.10 414.01   5. Oropharyngeal dysphagia  R13.12 787.22   6. MICHAEL (acute kidney injury) (CMS/[facility])  N17.9 584.9     Patient Active Problem List   Diagnosis   • NSTEMI 2021   • Hyperlipidemia LDL goal <70   • Essential hypertension   • T2DM on oral agents and insulin. HbA1c 7.4    • Severe 3 vessel CAD with preserved LV function (CMS/[facility])   • A/C kidney disease. ATN due to postoperative arrest. Dialysis begun 6/3/2021   • Gout   • Former smokeless tobacco use   • S/P CABG x 3 on 21   • Perioperative cardiac arrest with ventricular fibrillation (CMS/[facility])   • Postop encephalopathy post code. Combination of anoxic insult and azotemia     Past Medical History:   Diagnosis Date   • CAD (coronary artery disease)    • CKD (chronic kidney disease) stage 3, GFR 30-59 ml/min (CMS/[facility])    • Diabetes mellitus (CMS/[facility])    • Hyperlipidemia    • Hypertension    • NSTEMI (non-ST elevated myocardial infarction) (CMS/[facility])      Past Surgical History:   Procedure Laterality Date   • CARDIAC CATHETERIZATION N/A 2021    Procedure: Left Heart Cath;  Surgeon: Blade Greene IV, MD;  Location: [facility] CATH INVASIVE LOCATION;  Service: Cardiovascular;  Laterality: N/A;   • CARDIAC SURGERY      2 stents placed .   • CORONARY ARTERY BYPASS GRAFT N/A 2021    Procedure: MEDIAN STERNOTOMY CORONARY ARTERY BYPASS  X 3 UTILIZING THE LEFT INTERNAL MAMMARY ARTERY GRAFT, EVH OF THE GREATER RIGHT SAPHENOUS VEIN, AND PILO PER ANESTHESIA;  Surgeon: Jacek Miller MD;  Location: [facility];  Service: Cardiothoracic;  Laterality: N/A;        SWALLOW EVALUATION (last 72 hours)      SLP Adult Swallow Evaluation     Row Name 06/15/21 1020                Rehab Evaluation    Document Type  re-evaluation  -RD       Subjective Information  no complaints  -RD       Patient Observations  alert;cooperative;agree to therapy  -RD       Patient/Family/Caregiver Comments/Observations  no family present  -RD       Care Plan Review  evaluation/treatment results reviewed;care plan/treatment goals reviewed;risks/benefits reviewed;current/potential barriers reviewed;patient/other agree to care plan  -RD       Patient Effort  good  -RD          General Information    Patient Profile Reviewed  yes  -RD       Pertinent History Of Current Problem  See initial evaluation. MBS to see if safe for any PO. Pt pulled out keofeed last night per RN.   -RD       Current Method of Nutrition  NPO  -RD       Precautions/Limitations, Vision  WFL;for purposes of eval  -RD       Precautions/Limitations, Hearing  WFL;for purposes of eval  -RD       Prior Level of Function-Communication  unknown  -RD       Prior Level of Function-Swallowing  no diet consistency restrictions  -RD       Plans/Goals Discussed with  patient;agreed upon  -RD       Barriers to Rehab  medically complex  -RD       Patient's Goals for Discharge  patient did not state  -RD          Pain    Additional Documentation  Pain Scale: FACES Pre/Post-Treatment (Group)  -RD          Pain Scale: FACES Pre/Post-Treatment    Pain: FACES Scale, Pretreatment  0-->no hurt  -RD       Posttreatment Pain Rating  0-->no hurt  -RD          MBS/VFSS    Utensils Used  spoon;cup;straw  -RD       Consistencies Trialed  thin liquids;nectar/syrup-thick liquids;honey-thick liquids;pudding thick;regular textures  -RD           MBS/VFSS Interpretation    Oral Prep Phase  impaired oral phase of swallowing  -RD       Oral Transit Phase  impaired  -RD       Oral Residue  impaired  -RD          Oral Preparatory Phase    Oral Preparatory Phase  prolonged manipulation  -RD       Prolonged Manipulation  regular textures;secondary to reduced lingual strength  -RD          Oral Transit Phase    Impaired Oral Transit Phase  piecemeal oral transit;premature spillage of liquids into pharynx  -RD       Piecemeal Oral Transit  thin liquids;nectar-thick liquids;secondary to reduced lingual control  -RD       Premature Spillage of Liquids into Pharynx  thin liquids;nectar-thick liquids;secondary to reduced lingual control  -RD          Oral Residue    Impaired Oral Residue  diffuse residue throughout oral cavity  -RD       Diffuse Residue throughout Oral Cavity  all consistencies tested;secondary to reduced lingual strength  -RD       Response to Oral Residue  cleared residue;with spontaneous subsequent swallow  -RD          Initiation of Pharyngeal Swallow    Initiation of Pharyngeal Swallow  bolus in pyriform sinuses  -RD       Pharyngeal Phase  impaired pharyngeal phase of swallowing  -RD       Penetration Before the Swallow  thin liquids;nectar-thick liquids;secondary to reduced back of tongue control;secondary to delayed swallow initiation or mistiming  -RD       Penetration During the Swallow  thin liquids;nectar-thick liquids;honey-thick liquids;secondary to delayed swallow initiation or mistiming;secondary to reduced laryngeal elevation;secondary to reduced vestibular closure;other (see comments) honey-thick via cup only  -RD       Aspiration After the Swallow  thin liquids;nectar-thick liquids;secondary to residue;in laryngeal vestibule  -RD       Response to Penetration  no response  -RD       Response to Aspiration  inconsistent response;throat clear;no response, silent aspiration;other (see comments) inconsistent throat clear thins, silent  nectar  -RD       Rosenbek's Scale  thin:;nectar:;8--->level 8;honey:;3--->level 3;pudding/puree:;regular textures:;1--->level 1  -RD       Pharyngeal Residue  all consistencies tested;base of tongue;valleculae;pyriform sinuses;laryngeal vestibule;secondary to reduced base of tongue retraction;secondary to reduced laryngeal elevation;secondary to reduced hyolaryngeal excursion;other (see comments) mild   -RD       Response to Residue  unable to clear residue  -RD       Attempted Compensatory Maneuvers  bolus size;bolus presentation style;additional subsequent swallow;throat clear after swallow;other (see comments) cognition impacting carryover  -RD       Response to Attempted Compensatory Maneuvers  prevented penetration;prevented aspiration  -RD       Pharyngeal Phase, Comment  Moderate pharyngeal dysphagia. Penetration before/during the swallow w/ thin & nectar-thick liquids (sometimes on second swallow w/ piecemeal oral transit) 2' pre-spill, delayed initiation, and reduced vestibular closure. Penetration did not clear from laryngeal vestibule & pt eventually aspirated thin/nectar-thick vestibular residue upon subsequent swallows. Penetration during the swallow w/ honey-thick liquids via cup that did not clear from vestibule posing aspiration risk. Pt did not sense to clear, eventual aspiration inevitable. No penetration/aspiration w/ honey-thick liquids via tsp only, pudding, or solids. Mild diffuse pharyngeal residue w/ all consistencies 2' reduced BOT retraction, decr'd elevation, and reduced excursion. Cognition impacting carryover of further compensations, so DNT at this time. Pt may resume modified PO diet, but given that diet is very restricted, pt may need additional alternate nutrition per MD discretion. SLP will continue to address dysphagia w/ pharyngeal strengthening during treatment.   -RD          Esophageal Phase    Esophageal Phase  see radiology report for further details  -RD          Clinical  Impression    Daily Summary of Progress (SLP)  --       Barriers to Overall Progress (SLP)  pt self removed keofeed per RN  -RD       SLP Swallowing Diagnosis  mild-moderate;oral dysphagia;moderate;pharyngeal dysphagia  -RD       Functional Impact  risk of aspiration/pneumonia;risk of malnutrition;risk of dehydration  -RD       Rehab Potential/Prognosis, Swallowing  good, to achieve stated therapy goals  -RD       Swallow Criteria for Skilled Therapeutic Interventions Met  demonstrates skilled criteria  -RD       Plan for Continued Treatment (SLP)  --          Recommendations    Therapy Frequency (Swallow)  5 days per week  -RD       Predicted Duration Therapy Intervention (Days)  until discharge  -RD       SLP Diet Recommendation  mechanical soft with no mixed consistencies;honey thick liquids;other (see comments) liquids via teaspoon only  -RD       Recommended Diagnostics  --       Recommended Precautions and Strategies  upright posture during/after eating;small bites of food and sips of liquid;no straw;liquid via spoon only;general aspiration precautions;1:1 supervision  -RD       Oral Care Recommendations  Oral Care BID/PRN  -RD       SLP Rec. for Method of Medication Administration  meds whole;meds crushed;with pudding or applesauce;as tolerated  -RD       Monitor for Signs of Aspiration  yes;notify SLP if any concerns  -RD       Anticipated Discharge Disposition (SLP)  unknown;anticipate therapy at next level of care  -RD         User Key  (r) = Recorded By, (t) = Taken By, (c) = Cosigned By    Initials Name Effective Dates    Mireille Taylor MS CCC-SLP 04/03/18 -           EDUCATION  The patient has been educated in the following areas:   Dysphagia (Swallowing Impairment) Oral Care/Hydration Modified Diet Instruction.    SLP Recommendation and Plan  SLP Swallowing Diagnosis: mild-moderate, oral dysphagia, moderate, pharyngeal dysphagia  SLP Diet Recommendation: mechanical soft with no mixed  consistencies, honey thick liquids, other (see comments) (liquids via teaspoon only)  Recommended Precautions and Strategies: upright posture during/after eating, small bites of food and sips of liquid, no straw, liquid via spoon only, general aspiration precautions, 1:1 supervision  SLP Rec. for Method of Medication Administration: meds whole, meds crushed, with pudding or applesauce, as tolerated     Monitor for Signs of Aspiration: yes, notify SLP if any concerns     Swallow Criteria for Skilled Therapeutic Interventions Met: demonstrates skilled criteria  Anticipated Discharge Disposition (SLP): unknown, anticipate therapy at next level of care  Rehab Potential/Prognosis, Swallowing: good, to achieve stated therapy goals  Therapy Frequency (Swallow): 5 days per week  Predicted Duration Therapy Intervention (Days): until discharge                         Plan of Care Reviewed With: patient  Progress: improving    SLP GOALS     Row Name 06/15/21 1020 06/14/21 1110          Oral Nutrition/Hydration Goal 1 (SLP)    Oral Nutrition/Hydration Goal 1, SLP  LTG: Pt will improve swallow function to safely return to PO diet w/ no overt s/sxs aspiration/distress w/ 100% acc and no cues  -RD  LTG: Pt will improve swallow function to safely return to PO diet w/ no overt s/sxs aspiration/distress w/ 100% acc and no cues  -RD     Time Frame (Oral Nutrition/Hydration Goal 1, SLP)  by discharge  -RD  by discharge  -RD     Progress/Outcomes (Oral Nutrition/Hydration Goal 1, SLP)  continuing progress toward goal  -RD  continuing progress toward goal  -RD        Oral Nutrition/Hydration Goal 2 (SLP)    Oral Nutrition/Hydration Goal 2, SLP  Pt will tolerate soft no mixed diet & honey-thick liquids via teaspoon only w/ no overt s/sxs aspiration/distress w/ 100% acc and no cues  -RD  --     Time Frame (Oral Nutrition/Hydration Goal 2, SLP)  short term goal (STG)  -RD  --     Progress/Outcomes (Oral Nutrition/Hydration Goal 2, SLP)   goal revised this date;goal ongoing  -RD  --        Oral Nutrition/Hydration Goal (SLP)    Oral Nutrition/Hydration Goal, SLP  Pt will tolerate therapeutic trials of ice chips, thin H2O, and puree w/ no overt s/sxs aspiration/distress w/ 70% acc and no cues in order to assess readiness for repeat instrumental eval.  -RD  Pt will tolerate therapeutic trials of ice chips, thin H2O, and puree w/ no overt s/sxs aspiration/distress w/ 70% acc and no cues in order to assess readiness for repeat instrumental eval.  -RD     Time Frame (Oral Nutrition/Hydration Goal, SLP)  short term goal (STG)  -RD  short term goal (STG)  -RD     Barriers (Oral Nutrition/Hydration Goal, SLP)  --  immediate throat clearing w/ thins.   -RD     Progress/Outcomes (Oral Nutrition/Hydration Goal, SLP)  goal ongoing  -RD  continuing progress toward goal  -RD        Lingual Strengthening Goal 1 (SLP)    Activity (Lingual Strengthening Goal 1, SLP)  increase tongue back strength  -RD  --     Increase Tongue Back Strength  swallow trials;lingual resistance exercises  -RD  --     [place]/Accuracy (Lingual Strengthening Goal 1, SLP)  with minimal cues (75-90% accuracy)  -RD  --     Time Frame (Lingual Strengthening Goal 1, SLP)  short term goal (STG)  -RD  --     Progress/Outcomes (Lingual Strengthening Goal 1, SLP)  goal ongoing  -RD  --        Pharyngeal Strengthening Exercise Goal 1 (SLP)    Activity (Pharyngeal Strengthening Goal 1, SLP)  increase superior movement of the hyolaryngeal complex;increase anterior movement of the hyolaryngeal complex;increase closure at entrance to airway/closure of airway at glottis;increase squeeze/positive pressure generation;increase tongue base retraction  -RD  increase superior movement of the hyolaryngeal complex;increase anterior movement of the hyolaryngeal complex;increase closure at entrance to airway/closure of airway at glottis;increase squeeze/positive pressure generation;increase tongue base  retraction  -RD     Increase Timing  prepping - 3 second prep or suck swallow or 3-step swallow  -RD  prepping - 3 second prep or suck swallow or 3-step swallow  -RD     Increase Superior Movement of the Hyolaryngeal Complex  effortful pitch glide (falsetto + pharyngeal squeeze)  -RD  effortful pitch glide (falsetto + pharyngeal squeeze)  -RD     Increase Anterior Movement of the Hyolaryngeal Complex  chin tuck against resistance (CTAR)  -RD  chin tuck against resistance (CTAR)  -RD     Increase Closure at Entrance to Airway/Closure of Airway at Glottis  supraglottic swallow  -RD  supraglottic swallow  -RD     Increase Squeeze/Positive Pressure Generation  --  hard effortful swallow  -RD     Increase Tongue Base Retraction  hard effortful swallow  -RD  [place]  -RD     [place]/Accuracy (Pharyngeal Strengthening Goal 1, SLP)  with minimal cues (75-90% accuracy)  -RD  with minimal cues (75-90% accuracy)  -RD     Time Frame (Pharyngeal Strengthening Goal 1, SLP)  short term goal (STG)  -RD  short term goal (STG)  -RD     Barriers (Pharyngeal Strengthening Goal 1, SLP)  --  reviewed and completed. Pt participated well  -RD     Progress/Outcomes (Pharyngeal Strengthening Goal 1, SLP)  goal revised this date;goal ongoing  -RD  continuing progress toward goal  -RD        Swallow Management Recall Goal 1 (SLP)    Activity (Swallow Management Recall Goal 1, SLP)  independent recall of;safe diet/liquid level;safe diet level/texture  -RD  --     [place]/Accuracy (Swallow Management Recall Goal 1, SLP)  independently (over 90% accuracy)  -RD  --     Time Frame (Swallow Management Recall Goal 1, SLP)  short term goal (STG)  -RD  --     Progress/Outcomes (Swallow Management Recall Goal 1, SLP)  goal ongoing  -RD  --        Swallow Compensatory Strategies Goal 1 (SLP)    Activity (Swallow Compensatory Strategies/Techniques Goal 1, SLP)  compensatory strategies;during p.o. trials;during meal intake;liquids by teaspoon only   -RD  --     [place]/Accuracy (Swallow Compensatory Strategies/Techniques Goal 1, SLP)  independently (over 90% accuracy)  -RD  --     Time Frame (Swallow Compensatory Strategies/Techniques Goal 1, SLP)  short term goal (STG)  -RD  --     Progress/Outcomes (Swallow Compensatory Strategies/Techniques Goal 1, SLP)  goal revised this date;goal ongoing  -RD  --       User Key  (r) = Recorded By, (t) = Taken By, (c) = Cosigned By    Initials Name Provider Type    Mireille Taylor MS CCC-SLP Speech and Language Pathologist             Time Calculation:   Time Calculation- SLP     Row Name 06/15/21 1136             Time Calculation- SLP    SLP Start Time  1020  -RD      SLP Received On  06/15/21  -RD         Untimed Charges    SLP Eval/Re-eval   [facility] Fluoro Eval Swallow - 19123  -RD      52117-[facility] Fluoro Eval Swallow Minutes  68  -RD         Total Minutes    Untimed Charges Total Minutes  68  -RD       Total Minutes  68  -RD        User Key  (r) = Recorded By, (t) = Taken By, (c) = Cosigned By    Initials Name Provider Type    Mireille Taylor MS CCC-SLP Speech and Language Pathologist          Therapy Charges for Today     Code Description Service Date Service Provider Modifiers Qty    42311634024 HC ST TREATMENT SWALLOW 3 6/14/2021 Mireille Ayers MS CCC-SLP GN 1    38407080834 HC ST MOTION FLUORO EVAL SWALLOW 5 6/15/2021 Mireille Ayers MS CCC-SLP GN 1        Patient was not wearing a face mask and did exhibit coughing during this therapy encounter.  Procedure performed was aerosolizing, involved close contact (within 6 feet for at least 15 minutes or longer), and did not involve contact with infectious secretions or specimens.  Therapist used appropriate personal protective equipment including gloves, standard procedure mask and eye protection.  Appropriate PPE was worn during the entire therapy session.  Hand hygiene was completed before and after therapy session.        Mireille BILLINGSLEY  [place], MS CCC-SLP  6/15/2021

## 2021-06-15 NOTE — PROGRESS NOTES
"   LOS: 24 days    Patient Care Team:  Rob Polanco MD as PCP - General (Internal Medicine)    Reason For Visit:  F/U MICHAEL ON CKD  Subjective           Review of Systems:    Pulm: No soa   CV:  No CP      Objective     amLODIPine, 10 mg, Nasogastric, Q24H  aspirin, 325 mg, Oral, Daily  atorvastatin, 40 mg, Nasogastric, Nightly  carvedilol, 25 mg, Nasogastric, BID With Meals  sennosides, 10 mL, Nasogastric, BID   And  docusate sodium, 100 mg, Nasogastric, BID  gabapentin, 300 mg, Nasogastric, Nightly  insulin lispro, 0-9 Units, Subcutaneous, TID AC  isosorbide dinitrate, 20 mg, Nasogastric, TID - Nitrates  melatonin, 5 mg, Nasogastric, Nightly  pantoprazole, 40 mg, Intravenous, BID  pharmacy consult - MTM, , Does not apply, Daily  Poly-Vitamin/Iron, 1 mL, Oral, BID  QUEtiapine, 12.5 mg, Oral, Q12H  sodium chloride, 10 mL, Intravenous, Q12H  thiamine, 100 mg, Nasogastric, Daily             Vital Signs:  Blood pressure 155/84, pulse 71, temperature 97.6 °F (36.4 °C), temperature source Oral, resp. rate 16, height 170 cm (66.93\"), weight 85 kg (187 lb 8 oz), SpO2 92 %.    Flowsheet Rows      First Filed Value   Admission Height  170.2 cm (67\") Documented at 05/22/2021 0433   Admission Weight  88.5 kg (195 lb) Documented at 05/22/2021 0433          06/14 0701 - 06/15 0700  In: 1069   Out: 900 [Urine:900]    Physical Exam:    General Appearance: NAD, MORE alert and cooperative.  Eyes: PER, conjunctivae and sclerae normal, no icterus  Lungs: respirations regular and unlabored, no crepitus, clear to auscultation  Heart/CV: regular rhythm & normal rate, no murmur, no gallop, no rub and TR edema  Abdomen: not distended, soft, non-tender, no masses,  bowel sounds present  Skin: No rash, Warm and dry    Radiology:            Labs:  Results from last 7 days   Lab Units 06/14/21  0518 06/12/21  0401 06/11/21  0401   WBC 10*3/mm3 8.24 9.85 12.73*   HEMOGLOBIN g/dL 8.8* 8.4* 8.7*   HEMATOCRIT % 27.8* 26.3* 27.2*   PLATELETS " 10*3/mm3 291 260 268     Results from last 7 days   Lab Units 06/15/21  0426 06/14/21  0518 06/12/21  0401 06/11/21  0401 06/10/21  0351 06/09/21  1013   SODIUM mmol/L  --  137 135* 136 137 136   POTASSIUM mmol/L  --  3.9 3.4* 3.6 3.6 3.5   CHLORIDE mmol/L  --  101 100 100 100 100   CO2 mmol/L  --  26.0 26.0 27.0 27.0 25.0   BUN mg/dL  --  65* 67* 64* 60* 46*   CREATININE mg/dL  --  1.62* 1.69* 1.77* 1.79* 1.71*   CALCIUM mg/dL  --  9.3 9.0 9.1 8.6 8.2*   PHOSPHORUS mg/dL  --  3.9 3.6  --  3.4 3.1   MAGNESIUM mg/dL 2.0 1.8 1.9  --  2.1  --    ALBUMIN g/dL  --  3.10* 3.00*  --  2.70* 2.40*     Results from last 7 days   Lab Units 06/14/21  0518   GLUCOSE mg/dL 188*       Results from last 7 days   Lab Units 06/09/21  1013   ALK PHOS U/L 162*   BILIRUBIN mg/dL 0.4   ALT (SGPT) U/L 21   AST (SGOT) U/L 24                 Estimated Creatinine Clearance: 41.6 mL/min (A) (by C-G formula based on SCr of 1.62 mg/dL (H)).      Assessment       NSTEMI 5/22/2021    Hyperlipidemia LDL goal <70    Essential hypertension    T2DM on oral agents and insulin. HbA1c 7.4     A/C kidney disease. ATN due to postoperative arrest. 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 Combination of anoxic insult and azotemia            Impression: MICHAEL ON CKD. OFF DIALYSIS FOR 6 DAYS NOW. TEMP IJ DIALYSIS CATH OUT. ANEMIA.            Recommendations: LAB AM. F/U APPT WITH [facility] 8/19/21 @ 10:45 AM AFTER DISCHARGE      Aiden Godfrey MD  06/15/21  13:08 EDT

## 2021-06-15 NOTE — PROGRESS NOTES
"Cardiothoracic Surgery Progress Note      POD # 18 s/p CABG       LOS: 24 days      Subjective:  Still confused. Oriented to person only. Up in chair this am. Pulled out NG tube last night. Wife at bedside this morning, states \"he sneezed hard and the tube just came out.\" Patient denies any acute complaints this morning.         Objective:  Vital Signs  Temp:  [97.5 °F (36.4 °C)-98.4 °F (36.9 °C)] 97.6 °F (36.4 °C)  Heart Rate:  [63-77] 72  Resp:  [16-18] 16  BP: (137-154)/(76-91) 154/83    Physical Exam:   General Appearance: easily roused   Lungs: Coarse lung sounds bilaterally with wheezing. Breathing unlabored.    Heart: regular rhythm & normal rate, normal S1, S2, no murmur, no gallop, no rub and no click   Skin: Incision c/d/i     Results:    Results from last 7 days   Lab Units 06/14/21  0518   WBC 10*3/mm3 8.24   HEMOGLOBIN g/dL 8.8*   HEMATOCRIT % 27.8*   PLATELETS 10*3/mm3 291     Results from last 7 days   Lab Units 06/14/21  0518   SODIUM mmol/L 137   POTASSIUM mmol/L 3.9   CHLORIDE mmol/L 101   CO2 mmol/L 26.0   BUN mg/dL 65*   CREATININE mg/dL 1.62*   GLUCOSE mg/dL 188*   CALCIUM mg/dL 9.3         Assessment:    NSTEMI 5/22/2021    Hyperlipidemia LDL goal <70    Essential hypertension    T2DM on oral agents and insulin. HbA1c 7.4     A/C kidney disease. ATN due to postoperative arrest. 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 Combination of anoxic insult and azotemia    POD #18 CABG x3    Plan:  Nephrology following for A/C Kidney Disease.   Pulmonary Toilet if able.  Swallow eval today. Possible PEG tube placement.   CXR today.  Plan form DC to rehab facility hopefully early this week.        Moi Tubbs PA-C  06/15/21  07:00 EDT    "

## 2021-06-15 NOTE — CASE MANAGEMENT/SOCIAL WORK
Continued Stay Note   [facility]     Patient Name: Augusto Lorenzana Jr.  MRN: [id number]  Today's Date: 6/15/2021    Admit Date: 5/22/2021    Discharge Plan     Row Name 06/15/21 1529       Plan    Plan  IPR    Patient/Family in Agreement with Plan  other (see comments)    Plan Comments  Referral called to [facility] at  [facility].    Final Discharge Disposition Code  62 - inpatient rehab facility        Discharge Codes    No documentation.       Expected Discharge Date and Time     Expected Discharge Date Expected Discharge Time    Jun 17, 2021             Alisha Norman RN

## 2021-06-15 NOTE — PROGRESS NOTES
Cardiology Progress Note      Reason for visit:    · NSTEMI/multivessel CAD/status post CABG  · Hypertension    IDENTIFICATION: 76-year-old gentleman who resides in [facility] Hospital Problems    Diagnosis  POA   • **NSTEMI 5/22/2021 [I21.4]  Yes     Priority: High     · History of previous PCI, data deficit  · [facility] ER presentation with chest pain and elevated troponin, 5/22/2021  · Echo (5/22/2021): LVEF 55%.  Inferolateral hypokinesis.  Mild MR.  Mild aortic stenosis.  · Cardiac catheterization for NSTEMI (5/24/2021): Severe multivessel CAD (LAD, LCx, RCA).  Surgical revascularization recommended  · CABG with Dr. Miller (5/28/2021):LIMA to LAD.  SVG to first OM.  SVG to PDA2. Greater saphenous vein to first obtuse marginal     • S/P CABG x 3 on 5/28/21 [Z95.1]  Not Applicable     Priority: High   • A/C kidney disease. ATN due to postoperative arrest. Dialysis begun 6/3/2021 [N18.9]  Yes     Priority: High   • T2DM on oral agents and insulin. HbA1c 7.4  [E11.9, Z79.4]  Not Applicable     Priority: High   • Hyperlipidemia LDL goal <70 [E78.5]  Yes     Priority: Medium   • Essential hypertension [I10]  Yes     Priority: Medium   • Gout [M10.9]  Yes     Priority: Low   • Former smokeless tobacco use [Z87.891]  Not Applicable     Priority: Low   • Postop encephalopathy post code. Combination of anoxic insult and azotemia [G93.40]  No   • Perioperative cardiac arrest with ventricular fibrillation (CMS/HCC) [I46.9, I49.01]  Yes            No events noted overnight.  Patient is sitting up in the chair sleeping but arouses easily.  He remains confused and is drowsy.  His wife is at bedside.  He pulled his NG tube out last night.  A barium swallow is planned for today.           Vital Sign Min/Max for last 24 hours  Temp  Min: 97.5 °F (36.4 °C)  Max: 98.4 °F (36.9 °C)   BP  Min: 137/77  Max: 154/83   Pulse  Min: 63  Max: 77   Resp  Min: 16  Max: 18   SpO2  Min: 91 %  Max: 96 %   No data recorded       Intake/Output Summary (Last 24 hours) at 6/15/2021 0750  Last data filed at 6/15/2021 0500  Gross per 24 hour   Intake 1069 ml   Output 900 ml   Net 169 ml           Physical Exam  Constitutional:       General: He is sleeping.   Cardiovascular:      Rate and Rhythm: Normal rate and regular rhythm.   Pulmonary:      Effort: Pulmonary effort is normal.      Breath sounds: Decreased breath sounds present.   Musculoskeletal:      Right lower le+ Pitting Edema present.      Left lower le+ Pitting Edema present.   Neurological:      Mental Status: He is easily aroused. He is confused.   Psychiatric:         Behavior: Behavior is cooperative.         Tele: NSR     Results Review (reviewed the patient's recent labs in the electronic medical record):      EKG (2021):  NSR     CXR (2020):  Unchanged aeration with prominent bilateral interstitial opacities.  No effusion or pneumothorax.     ECHO (2021): LVEF 60%.  Grade 1 diastolic dysfunction.  Mild AS       Result Review:  I have personally reviewed the results from the time of this admission to 6/15/2021 07:50 EDT and agree with these findings:  [x]  Laboratory  []  Microbiology  [x]  Radiology  [x]  EKG/Telemetry   [x]  Cardiology/Vascular   []  Pathology  []  Old records  []  Other:  Most notable findings include: Creatinine 1.62, BUN 65    Results from last 7 days   Lab Units 06/15/21  0426 21  04021  0401 06/10/21  0351 21  1013 21  1013   SODIUM mmol/L  --  137 135* 136 137  --  136   POTASSIUM mmol/L  --  3.9 3.4* 3.6 3.6  --  3.5   CHLORIDE mmol/L  --  101 100 100 100  --  100   BUN mg/dL  --  65* 67* 64* 60*  --  46*   CREATININE mg/dL  --  1.62* 1.69* 1.77* 1.79*  --  1.71*   MAGNESIUM mg/dL 2.0 1.8 1.9  --  2.1   < >  --     < > = values in this interval not displayed.         Results from last 7 days   Lab Units 2118 21  0401 21  0401   WBC 10*3/mm3 8.24 9.85 12.73*    HEMOGLOBIN g/dL 8.8* 8.4* 8.7*   HEMATOCRIT % 27.8* 26.3* 27.2*   PLATELETS 10*3/mm3 291 260 268       Lab Results   Component Value Date    HGBA1C 7.40 (H) 05/22/2021       Lab Results   Component Value Date    CHOL 60 06/07/2021    TRIG 51 06/07/2021    HDL 34 (L) 05/22/2021    LDL 88 05/22/2021              NSTEMI  · Perioperative cardiac arrest with ventricular arrhythmia  · CABG 5/28/2021  · Intraoperative PILO revealed moderate aortic stenosis     Hypertension  · Amlodipine 10 mg daily  · Coreg 25 mg twice daily  · Isordil 10 mg 3 times daily  · BP remains borderline elevated        Hyperlipidemia  · Lipitor 40 mg daily     Acute kidney injury  · Creatinine 1.62  · No ACE/ARB due to MICHAEL  · Nephrology following, dialysis on hold due to improving creatinine     Type 2 diabetes mellitus  · Management per hospitalist  · Hemoglobin A1c 7.4     Acute encephalopathy  · Management per intensivist     Dysphagia  · Barium swallow planned for today, may need PEG tube placement          · Restart aspirin 325 mg daily.  · Continue Isordil, Lipitor, amlodipine and Coreg  · Please call with any further questions.  Patient will follow up with cardiology in 4 weeks after discharge    Electronically signed by CHARLES Bernal, 06/15/21, 7:50 AM EDT.

## 2021-06-16 LAB
ALBUMIN SERPL-MCNC: 2.6 G/DL (ref 3.5–5.2)
ANION GAP SERPL CALCULATED.3IONS-SCNC: 12 MMOL/L (ref 5–15)
BUN SERPL-MCNC: 68 MG/DL (ref 8–23)
BUN/CREAT SERPL: 40.2 (ref 7–25)
CALCIUM SPEC-SCNC: 8.9 MG/DL (ref 8.6–10.5)
CHLORIDE SERPL-SCNC: 105 MMOL/L (ref 98–107)
CO2 SERPL-SCNC: 21 MMOL/L (ref 22–29)
CREAT SERPL-MCNC: 1.69 MG/DL (ref 0.76–1.27)
GFR SERPL CREATININE-BSD FRML MDRD: 40 ML/MIN/1.73
GLUCOSE BLDC GLUCOMTR-MCNC: 114 MG/DL (ref 70–130)
GLUCOSE BLDC GLUCOMTR-MCNC: 135 MG/DL (ref 70–130)
GLUCOSE BLDC GLUCOMTR-MCNC: 202 MG/DL (ref 70–130)
GLUCOSE BLDC GLUCOMTR-MCNC: 205 MG/DL (ref 70–130)
GLUCOSE SERPL-MCNC: 127 MG/DL (ref 65–99)
PHOSPHATE SERPL-MCNC: 4.3 MG/DL (ref 2.5–4.5)
POTASSIUM SERPL-SCNC: 3.8 MMOL/L (ref 3.5–5.2)
SODIUM SERPL-SCNC: 138 MMOL/L (ref 136–145)

## 2021-06-16 PROCEDURE — 82962 GLUCOSE BLOOD TEST: CPT

## 2021-06-16 PROCEDURE — 93005 ELECTROCARDIOGRAM TRACING: CPT | Performed by: NURSE PRACTITIONER

## 2021-06-16 PROCEDURE — 99232 SBSQ HOSP IP/OBS MODERATE 35: CPT | Performed by: NURSE PRACTITIONER

## 2021-06-16 PROCEDURE — 94799 UNLISTED PULMONARY SVC/PX: CPT

## 2021-06-16 PROCEDURE — 93010 ELECTROCARDIOGRAM REPORT: CPT | Performed by: INTERNAL MEDICINE

## 2021-06-16 PROCEDURE — 97530 THERAPEUTIC ACTIVITIES: CPT

## 2021-06-16 PROCEDURE — 63710000001 INSULIN LISPRO (HUMAN) PER 5 UNITS: Performed by: NURSE PRACTITIONER

## 2021-06-16 PROCEDURE — 92526 ORAL FUNCTION THERAPY: CPT

## 2021-06-16 PROCEDURE — 80069 RENAL FUNCTION PANEL: CPT | Performed by: INTERNAL MEDICINE

## 2021-06-16 RX ORDER — MULTIPLE VITAMINS W/ MINERALS TAB 9MG-400MCG
1 TAB ORAL DAILY
Status: DISCONTINUED | OUTPATIENT
Start: 2021-06-16 | End: 2021-06-18 | Stop reason: HOSPADM

## 2021-06-16 RX ORDER — AMOXICILLIN 250 MG
2 CAPSULE ORAL 2 TIMES DAILY
Status: DISCONTINUED | OUTPATIENT
Start: 2021-06-16 | End: 2021-06-18 | Stop reason: HOSPADM

## 2021-06-16 RX ADMIN — ISOSORBIDE DINITRATE 20 MG: 20 TABLET ORAL at 12:47

## 2021-06-16 RX ADMIN — ASPIRIN 325 MG ORAL TABLET 325 MG: 325 PILL ORAL at 08:36

## 2021-06-16 RX ADMIN — DOCUSATE SODIUM 50MG AND SENNOSIDES 8.6MG 2 TABLET: 8.6; 5 TABLET, FILM COATED ORAL at 20:39

## 2021-06-16 RX ADMIN — PANTOPRAZOLE SODIUM 40 MG: 40 INJECTION, POWDER, FOR SOLUTION INTRAVENOUS at 09:00

## 2021-06-16 RX ADMIN — QUETIAPINE FUMARATE 12.5 MG: 25 TABLET ORAL at 20:39

## 2021-06-16 RX ADMIN — GABAPENTIN 300 MG: 300 CAPSULE ORAL at 20:39

## 2021-06-16 RX ADMIN — GUAIFENESIN AND DEXTROMETHORPHAN 5 ML: 100; 10 SYRUP ORAL at 12:48

## 2021-06-16 RX ADMIN — AMLODIPINE BESYLATE 10 MG: 10 TABLET ORAL at 08:37

## 2021-06-16 RX ADMIN — ISOSORBIDE DINITRATE 20 MG: 20 TABLET ORAL at 17:24

## 2021-06-16 RX ADMIN — SODIUM CHLORIDE, PRESERVATIVE FREE 10 ML: 5 INJECTION INTRAVENOUS at 20:39

## 2021-06-16 RX ADMIN — CARVEDILOL 25 MG: 12.5 TABLET, FILM COATED ORAL at 17:26

## 2021-06-16 RX ADMIN — QUETIAPINE FUMARATE 12.5 MG: 25 TABLET ORAL at 08:37

## 2021-06-16 RX ADMIN — GUAIFENESIN AND DEXTROMETHORPHAN 5 ML: 100; 10 SYRUP ORAL at 20:38

## 2021-06-16 RX ADMIN — ATORVASTATIN CALCIUM 40 MG: 40 TABLET, FILM COATED ORAL at 20:38

## 2021-06-16 RX ADMIN — PANTOPRAZOLE SODIUM 40 MG: 40 INJECTION, POWDER, FOR SOLUTION INTRAVENOUS at 20:38

## 2021-06-16 RX ADMIN — Medication 5 MG: at 20:38

## 2021-06-16 RX ADMIN — Medication 1 TABLET: at 10:06

## 2021-06-16 RX ADMIN — CARVEDILOL 25 MG: 12.5 TABLET, FILM COATED ORAL at 08:36

## 2021-06-16 RX ADMIN — THIAMINE HCL TAB 100 MG 100 MG: 100 TAB at 08:37

## 2021-06-16 RX ADMIN — SODIUM CHLORIDE, PRESERVATIVE FREE 10 ML: 5 INJECTION INTRAVENOUS at 08:37

## 2021-06-16 RX ADMIN — INSULIN LISPRO 4 UNITS: 100 INJECTION, SOLUTION INTRAVENOUS; SUBCUTANEOUS at 12:48

## 2021-06-16 RX ADMIN — DOCUSATE SODIUM 50MG AND SENNOSIDES 8.6MG 2 TABLET: 8.6; 5 TABLET, FILM COATED ORAL at 10:06

## 2021-06-16 RX ADMIN — ISOSORBIDE DINITRATE 20 MG: 20 TABLET ORAL at 08:36

## 2021-06-16 NOTE — PLAN OF CARE
Goal Outcome Evaluation:  Plan of Care Reviewed With: patient        Progress: improving  Outcome Summary: Pt increased ambulation distance to 175ft with RW and Michaela. VC's for upright posture. Pt required intermittent assistance to steer RW. Pt with improved balance and stability. Mobility limited by fatigue. Continue to progress as appropriate.

## 2021-06-16 NOTE — PROGRESS NOTES
[facility] Medicine Services  PROGRESS NOTE    Patient Name: Augusto Lorenzana Jr.  : 1945  MRN: [id number]    Date of Admission: 2021  Primary Care Physician: Rob Polanco MD    Subjective   Subjective     CC:  F/U med mgmt s/p CABG    HPI:  Up in chair, NAD. Family in room.  Feels well today, is using his IS and forcing a cough intermittently. States his cough is very aggravating.     ROS:  Gen- No fevers, chills  CV- No chest pain, palpitations  Resp- + cough, no dyspnea  GI- No N/V/D, abd pain       Objective   Objective     Vital Signs:   Temp:  [96.4 °F (35.8 °C)-98.4 °F (36.9 °C)] 97.5 °F (36.4 °C)  Heart Rate:  [61-74] 64  Resp:  [16-18] 17  BP: (116-146)/(71-94) 124/90        Physical Exam:  Constitutional: No acute distress, awake, alert, up in chair   HENT: NCAT, mucous membranes moist  Respiratory: Clear to auscultation bilaterally, respiratory effort normal   Cardiovascular: RRR, no murmurs, rubs, or gallops  Gastrointestinal: Positive bowel sounds, soft, nontender, nondistended  Musculoskeletal: No bilateral ankle edema  Psychiatric: Appropriate affect, cooperative  Neurologic: Oriented x 3, strength symmetric in all extremities, Cranial Nerves grossly intact to confrontation, speech clear  Skin: No rashes, midline chest incision LUIS, CDI       Results Reviewed:  Results from last 7 days   Lab Units 21  0518 21  0401 21  0401   WBC 10*3/mm3 8.24 9.85 12.73*   HEMOGLOBIN g/dL 8.8* 8.4* 8.7*   HEMATOCRIT % 27.8* 26.3* 27.2*   PLATELETS 10*3/mm3 291 260 268     Results from last 7 days   Lab Units 21  0559 21  0518 21  0401   SODIUM mmol/L 138 137 135*   POTASSIUM mmol/L 3.8 3.9 3.4*   CHLORIDE mmol/L 105 101 100   CO2 mmol/L 21.0* 26.0 26.0   BUN mg/dL 68* 65* 67*   CREATININE mg/dL 1.69* 1.62* 1.69*   GLUCOSE mg/dL 127* 188* 116*   CALCIUM mg/dL 8.9 9.3 9.0     Estimated Creatinine Clearance: 39.3 mL/min (A) (by C-G formula  based on SCr of 1.69 mg/dL (H)).    Microbiology Results Abnormal     Procedure Component Value - Date/Time    Blood Culture - Blood, Arm, Right [[id number]] Collected: 06/05/21 1412    Lab Status: Final result Specimen: Blood from Arm, Right Updated: 06/10/21 1431     Blood Culture No growth at 5 days    Blood Culture - Blood, Cannula [[id number]] Collected: 06/05/21 1201    Lab Status: Final result Specimen: Blood from Cannula Updated: 06/10/21 1245     Blood Culture No growth at 5 days    Eosinophil Smear - Urine, Urine, Clean Catch [[id number]]  (Normal) Collected: 05/30/21 1416    Lab Status: Final result Specimen: Urine, Clean Catch Updated: 05/30/21 1614     Eosinophil Smear 0 % EOS/100 Cells     Narrative:      No eosinophil seen    Eosinophil Smear - Urine, Urine, Clean Catch [[id number]]  (Normal) Collected: 05/23/21 1838    Lab Status: Final result Specimen: Urine, Clean Catch Updated: 05/24/21 0459     Eosinophil Smear 0 % EOS/100 Cells     Narrative:      No eosinophil seen    COVID PRE-OP / PRE-PROCEDURE SCREENING ORDER (NO ISOLATION) - Swab, Nasopharynx [[id number]]  (Normal) Collected: 05/22/21 0626    Lab Status: Final result Specimen: Swab from Nasopharynx Updated: 05/22/21 0702    Narrative:      The following orders were created for panel order COVID PRE-OP / PRE-PROCEDURE SCREENING ORDER (NO ISOLATION) - Swab, Nasopharynx.  Procedure                               Abnormality         Status                     ---------                               -----------         ------                     COVID-19 and FLU A/B PCR...[[id number]]  Normal              Final result                 Please view results for these tests on the individual orders.    COVID-19 and FLU A/B PCR - Swab, Nasopharynx [[id number]]  (Normal) Collected: 05/22/21 0626    Lab Status: Final result Specimen: Swab from Nasopharynx Updated: 05/22/21 0702     COVID19 Not Detected     Influenza A PCR Not Detected     Influenza B PCR Not  Detected          Imaging Results (Last 24 Hours)     ** No results found for the last 24 hours. **          Results for orders placed during the hospital encounter of 05/22/21    Adult Transthoracic Echo Complete w/ Color, Spectral and Contrast if necessary per protocol    Interpretation Summary  · Left ventricular systolic function is normal. Estimated left ventricular EF = 60%.  · Left ventricular diastolic function is consistent with (grade I) impaired relaxation.  · Mild aortic valve stenosis is present.  · Estimated right ventricular systolic pressure from tricuspid regurgitation is normal (<35 mmHg).      I have reviewed the medications:  Scheduled Meds:amLODIPine, 10 mg, Nasogastric, Q24H  aspirin, 325 mg, Oral, Daily  atorvastatin, 40 mg, Nasogastric, Nightly  carvedilol, 25 mg, Nasogastric, BID With Meals  gabapentin, 300 mg, Nasogastric, Nightly  insulin lispro, 0-9 Units, Subcutaneous, TID AC  isosorbide dinitrate, 20 mg, Nasogastric, TID - Nitrates  melatonin, 5 mg, Nasogastric, Nightly  multivitamin with minerals, 1 tablet, Oral, Daily  pantoprazole, 40 mg, Intravenous, BID  pharmacy consult - MTM, , Does not apply, Daily  QUEtiapine, 12.5 mg, Oral, Q12H  senna-docusate sodium, 2 tablet, Oral, BID  sodium chloride, 10 mL, Intravenous, Q12H  thiamine, 100 mg, Nasogastric, Daily      Continuous Infusions:   PRN Meds:.•  acetaminophen **OR** acetaminophen **OR** acetaminophen  •  [DISCONTINUED] senna-docusate sodium **AND** polyethylene glycol **AND** [DISCONTINUED] bisacodyl **AND** bisacodyl  •  dextrose  •  guaiFENesin-dextromethorphan  •  haloperidol lactate  •  heparin (porcine)  •  ipratropium-albuterol  •  magnesium sulfate **OR** magnesium sulfate in D5W 1g/100mL (PREMIX) **OR** magnesium sulfate  •  naloxone  •  ondansetron    Assessment/Plan   Assessment & Plan     Active Hospital Problems    Diagnosis  POA   • **NSTEMI 5/22/2021 [I21.4]  Yes   • Postop encephalopathy post code. Combination of  anoxic insult and azotemia [G93.40]  No   • Perioperative cardiac arrest with ventricular fibrillation (CMS/HCC) [I46.9, I49.01]  Yes   • S/P CABG x 3 on 5/28/21 [Z95.1]  Not Applicable   • Gout [M10.9]  Yes   • Former smokeless tobacco use [Z87.891]  Not Applicable   • A/C kidney disease. ATN due to postoperative arrest. Dialysis begun 6/3/2021 [N18.9]  Yes   • T2DM on oral agents and insulin. HbA1c 7.4  [E11.9, Z79.4]  Not Applicable   • Hyperlipidemia LDL goal <70 [E78.5]  Yes   • Essential hypertension [I10]  Yes      Resolved Hospital Problems    Diagnosis Date Resolved POA   • Postoperative hypoxemia [R09.02, Z98.890] 06/04/2021 No   • Leukocytosis [D72.829] 05/22/2021 Yes   • Acute CHF (CMS/[facility]) [I50.9] 05/22/2021 Yes   • MICHAEL (acute kidney injury) (CMS/[facility]) [N17.9] 05/27/2021 Yes   • Non-STEMI (non-ST elevated myocardial infarction) (CMS/HCC) [I21.4] 05/22/2021 Yes   • Non-STEMI (non-ST elevated myocardial infarction) (CMS/HCC) [I21.4] 05/24/2021 Yes        Brief Hospital Course to date:  Augusto Lorenzana Jr. is a 76 y.o. male with a known history of coronary artery disease, previous stent to his RCA in 2009, diabetes, hypertension, dyslipidemia, gout, chronic kidney disease, smokeless tobacco use who presented to the emergency room May 22 complaining of shortness of air and found to have a non-ST elevation MI.  Heart catheterization revealed multivessel disease.  He underwent CABG x3 vessels on May 28.  Course was complicated by ventricular fibrillation and asystole requiring resuscitation x2 on the day of surgery.  He subsequently underwent paced rhythm for about 48 hours before resuming sinus rhythm.  He required 3 units of blood, 4 units of FFP and 1 sixpack of platelets for bleeding.  He was extubated on the 29th but developed progressive renal failure and azotemia requiring hemodialysis.  Lower extremity duplex was performed and was negative for DVT.  Renal function started to recover and he underwent  some diuresis.  He was also noted to be encephalopathic with no acute stroke.  Mentation also started to improve.  He has a recent history of colonoscopy with polypectomy.  Failed fees evaluation on 6/11. transferred to [facility] from ICU on 6/12.     This patient's problems and plans were partially entered by my partner and updated as appropriate by me 06/16/21.      CABG x3  Evelin-operative cardiac arrest with ventricular arrhythmia  -5/28/2021 per Dr. Miller  -Patient with some perioperative V. Fib  -He has had no further ventricular arrhythmias  -Intraoperative echocardiogram revealed moderate aortic valve stenosis    Diabetes mellitus type 2  -MDSSI continues, well controlled     Hypertension  -BP remains stable, continue Norvasc, Coreg, Isordil -changes made by cardiology    Acute on chronic kidney disease  -He did require dialysis after surgery  -Creatinine improving, stablizing.  -Nephrology following- dialysis cath removed on 6/14.     Postop encephalopathy  -Patient remains confused/agitated-   -Continue as needed Haldol for agitation  -Repeat EKG is  on 5/30-improved.     -continue seroquel 12.5 mg BID.   -EKG continues to show decrease in QTc, today of 486    Severe pharyngeal dysphagia  -SLP following, bedside re-eval complete 6/14. Plans for MBS today.    - Keofeed removed overnight by patient. Will await results of MBS before replacing feeding tube.     DVT prophylaxis:  Medical and mechanical DVT prophylaxis orders are present.       Disposition: I expect the patient to be discharged TBD. Likely needs rehab. CM to follow.     CODE STATUS:   Code Status and Medical Interventions:   Ordered at: 05/22/21 0639     Code Status:    CPR     Medical Interventions (Level of Support Prior to Arrest):    Full       Annmarie Ackerman, APRENEDINA  06/16/21

## 2021-06-16 NOTE — THERAPY TREATMENT NOTE
Acute Care - Speech Language Pathology   Swallow Treatment Note [facility]     Patient Name: Augusto Lorenzana Jr.  : 1945  MRN: [id number]  Today's Date: 2021               Admit Date: 2021    Visit Dx:     ICD-10-CM ICD-9-CM   1. Non-STEMI (non-ST elevated myocardial infarction) (CMS/[facility])  I21.4 410.70   2. Acute congestive heart failure, unspecified heart failure type (CMS/[facility])  I50.9 428.0   3. History of coronary artery disease  Z86.79 V12.59   4. Coronary artery disease involving native coronary artery of native heart, angina presence unspecified  I25.10 414.01   5. Oropharyngeal dysphagia  R13.12 787.22   6. MICHAEL (acute kidney injury) (CMS/[facility])  N17.9 584.9     Patient Active Problem List   Diagnosis   • NSTEMI 2021   • Hyperlipidemia LDL goal <70   • Essential hypertension   • T2DM on oral agents and insulin. HbA1c 7.4    • Severe 3 vessel CAD with preserved LV function (CMS/[facility])   • A/C kidney disease. ATN due to postoperative arrest. 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 Combination of anoxic insult and azotemia     Past Medical History:   Diagnosis Date   • CAD (coronary artery disease)    • CKD (chronic kidney disease) stage 3, GFR 30-59 ml/min (CMS/[facility])    • Diabetes mellitus (CMS/[facility])    • Hyperlipidemia    • Hypertension    • NSTEMI (non-ST elevated myocardial infarction) (CMS/[facility])      Past Surgical History:   Procedure Laterality Date   • CARDIAC CATHETERIZATION N/A 2021    Procedure: Left Heart Cath;  Surgeon: Blade Greene IV, MD;  Location: [facility] INVASIVE LOCATION;  Service: Cardiovascular;  Laterality: N/A;   • CARDIAC SURGERY      2 stents placed .   • CORONARY ARTERY BYPASS GRAFT N/A 2021    Procedure: MEDIAN STERNOTOMY CORONARY ARTERY BYPASS X 3 UTILIZING THE LEFT INTERNAL  MAMMARY ARTERY GRAFT, EVH OF THE GREATER RIGHT SAPHENOUS VEIN, AND PILO PER ANESTHESIA;  Surgeon: Jacek Miller MD;  Location: [facility];  Service: Cardiothoracic;  Laterality: N/A;        SWALLOW EVALUATION (last 72 hours)      SLP Adult Swallow Evaluation     Row Name 06/16/21 0900 06/15/21 1020 06/14/21 1110             Rehab Evaluation    Document Type  --  re-evaluation  -RD  therapy note (daily note)  -RD      Subjective Information  no complaints  -CH  no complaints  -RD  no complaints  -RD      Patient Observations  alert;cooperative;agree to therapy  -CH  alert;cooperative;agree to therapy  -RD  alert;cooperative;agree to therapy confused  -RD      Patient/Family/Caregiver Comments/Observations  No family present  -CH  no family present  -RD  no family present  -RD      Care Plan Review  evaluation/treatment results reviewed;care plan/treatment goals reviewed;risks/benefits reviewed;current/potential barriers reviewed;patient/other agree to care plan  -CH  evaluation/treatment results reviewed;care plan/treatment goals reviewed;risks/benefits reviewed;current/potential barriers reviewed;patient/other agree to care plan  -RD  evaluation/treatment results reviewed;care plan/treatment goals reviewed;risks/benefits reviewed;current/potential barriers reviewed;patient/other agree to care plan  -RD      Patient Effort  excellent  -CH  good  -RD  adequate  -RD      Symptoms Noted During/After Treatment  none  -CH  --  --         General Information    Patient Profile Reviewed  yes  -CH  yes  -RD  --      Pertinent History Of Current Problem  --  See initial evaluation. MBS to see if safe for any PO. Pt pulled out keofeed last night per RN.   -RD  --      Current Method of Nutrition  --  NPO  -RD  --      Precautions/Limitations, Vision  --  WFL;for purposes of eval  -RD  --      Precautions/Limitations, Hearing  --  WFL;for purposes of eval  -RD  --      Prior Level of Function-Communication  --  unknown  -RD   --      Prior Level of Function-Swallowing  --  no diet consistency restrictions  -RD  --      Plans/Goals Discussed with  --  patient;agreed upon  -RD  --      Barriers to Rehab  --  medically complex  -RD  --      Patient's Goals for Discharge  --  patient did not state  -RD  --         Pain    Additional Documentation  Pain Scale: FACES Pre/Post-Treatment (Group);Pain Scale: Numbers Pre/Post-Treatment (Group)  -CH  Pain Scale: FACES Pre/Post-Treatment (Group)  -RD  Pain Scale: FACES Pre/Post-Treatment (Group)  -RD         Pain Scale: Numbers Pre/Post-Treatment    Pretreatment Pain Rating  0/10 - no pain  -CH  --  --      Posttreatment Pain Rating  0/10 - no pain  -CH  --  --         Pain Scale: FACES Pre/Post-Treatment    Pain: FACES Scale, Pretreatment  0-->no hurt  -CH  0-->no hurt  -RD  0-->no hurt  -RD      Posttreatment Pain Rating  0-->no hurt  -CH  0-->no hurt  -RD  0-->no hurt  -RD         MBS/VFSS    Utensils Used  --  spoon;cup;straw  -RD  --      Consistencies Trialed  --  thin liquids;nectar/syrup-thick liquids;honey-thick liquids;pudding thick;regular textures  -RD  --         MBS/VFSS Interpretation    Oral Prep Phase  --  impaired oral phase of swallowing  -RD  --      Oral Transit Phase  --  impaired  -RD  --      Oral Residue  --  impaired  -RD  --         Oral Preparatory Phase    Oral Preparatory Phase  --  prolonged manipulation  -RD  --      Prolonged Manipulation  --  regular textures;secondary to reduced lingual strength  -RD  --         Oral Transit Phase    Impaired Oral Transit Phase  --  piecemeal oral transit;premature spillage of liquids into pharynx  -RD  --      Piecemeal Oral Transit  --  thin liquids;nectar-thick liquids;secondary to reduced lingual control  -RD  --      Premature Spillage of Liquids into Pharynx  --  thin liquids;nectar-thick liquids;secondary to reduced lingual control  -RD  --         Oral Residue    Impaired Oral Residue  --  diffuse residue throughout oral  cavity  -RD  --      Diffuse Residue throughout Oral Cavity  --  all consistencies tested;secondary to reduced lingual strength  -RD  --      Response to Oral Residue  --  cleared residue;with spontaneous subsequent swallow  -RD  --         Initiation of Pharyngeal Swallow    Initiation of Pharyngeal Swallow  --  bolus in pyriform sinuses  -RD  --      Pharyngeal Phase  --  impaired pharyngeal phase of swallowing  -RD  --      Penetration Before the Swallow  --  thin liquids;nectar-thick liquids;secondary to reduced back of tongue control;secondary to delayed swallow initiation or mistiming  -RD  --      Penetration During the Swallow  --  thin liquids;nectar-thick liquids;honey-thick liquids;secondary to delayed swallow initiation or mistiming;secondary to reduced laryngeal elevation;secondary to reduced vestibular closure;other (see comments) honey-thick via cup only  -RD  --      Aspiration After the Swallow  --  thin liquids;nectar-thick liquids;secondary to residue;in laryngeal vestibule  -RD  --      Response to Penetration  --  no response  -RD  --      Response to Aspiration  --  inconsistent response;throat clear;no response, silent aspiration;other (see comments) inconsistent throat clear thins, silent nectar  -RD  --      Rosenbek's Scale  --  thin:;nectar:;8--->level 8;honey:;3--->level 3;pudding/puree:;regular textures:;1--->level 1  -RD  --      Pharyngeal Residue  --  all consistencies tested;base of tongue;valleculae;pyriform sinuses;laryngeal vestibule;secondary to reduced base of tongue retraction;secondary to reduced laryngeal elevation;secondary to reduced hyolaryngeal excursion;other (see comments) mild   -RD  --      Response to Residue  --  unable to clear residue  -RD  --      Attempted Compensatory Maneuvers  --  bolus size;bolus presentation style;additional subsequent swallow;throat clear after swallow;other (see comments) cognition impacting carryover  -RD  --      Response to Attempted  Compensatory Maneuvers  --  prevented penetration;prevented aspiration  -RD  --      Pharyngeal Phase, Comment  --  Moderate pharyngeal dysphagia. Penetration before/during the swallow w/ thin & nectar-thick liquids (sometimes on second swallow w/ piecemeal oral transit) 2' pre-spill, delayed initiation, and reduced vestibular closure. Penetration did not clear from laryngeal vestibule & pt eventually aspirated thin/nectar-thick vestibular residue upon subsequent swallows. Penetration during the swallow w/ honey-thick liquids via cup that did not clear from vestibule posing aspiration risk. Pt did not sense to clear, eventual aspiration inevitable. No penetration/aspiration w/ honey-thick liquids via tsp only, pudding, or solids. Mild diffuse pharyngeal residue w/ all consistencies 2' reduced BOT retraction, decr'd elevation, and reduced excursion. Cognition impacting carryover of further compensations, so DNT at this time. Pt may resume modified PO diet, but given that diet is very restricted, pt may need additional alternate nutrition per MD discretion. SLP will continue to address dysphagia w/ pharyngeal strengthening during treatment.   -RD  --         Esophageal Phase    Esophageal Phase  --  see radiology report for further details  -RD  --         Clinical Impression    Daily Summary of Progress (SLP)  progress toward functional goals as expected  -CH  --  progress toward functional goals as expected  -RD      Barriers to Overall Progress (SLP)  --  pt self removed keofeed per RN  -RD  --      SLP Swallowing Diagnosis  mild-moderate;oral dysphagia;moderate;pharyngeal dysphagia  -CH  mild-moderate;oral dysphagia;moderate;pharyngeal dysphagia  -RD  severe;pharyngeal dysphagia  -RD      Functional Impact  risk of aspiration/pneumonia;risk of malnutrition;risk of dehydration  -CH  risk of aspiration/pneumonia;risk of malnutrition;risk of dehydration  -RD  risk of aspiration/pneumonia  -RD      Rehab  Potential/Prognosis, Swallowing  good, to achieve stated therapy goals  -CH  good, to achieve stated therapy goals  -RD  good, to achieve stated therapy goals  -RD      Swallow Criteria for Skilled Therapeutic Interventions Met  demonstrates skilled criteria  -CH  demonstrates skilled criteria  -RD  demonstrates skilled criteria  -RD      Plan for Continued Treatment (SLP)  Saw patient for dysphagia tx at am meal. Baseline cough prior to PO observed. Dry cough throughout meal did not appear to be consistent with food/liquid trials. Patient independent with use of spoon sips of HT liquids and with small bites/slow rate. Provided handout of swallow exercises, reviewed rationale and instructions. Patient completed each x 5 with min cues. Continue to follow to address dysphagia in tx. Monitor diet tolerance closely.   -CH  --  Saw for dysphagia treatment. Pt has keofeed in place. Pt alert, but generally confused. Participated well in exercises. Therapeutic trials of thins given w/ immediate throat clearing observed. Suspect cont'd pharyngeal dysphagia. MD requesting repeat evaluation per note. Plan for MBS tomorrow to see if safe for any PO.   -RD         Recommendations    Therapy Frequency (Swallow)  5 days per week  -CH  5 days per week  -RD  5 days per week  -RD      Predicted Duration Therapy Intervention (Days)  until discharge  -CH  until discharge  -RD  until discharge  -RD      SLP Diet Recommendation  mechanical soft with no mixed consistencies;honey thick liquids;other (see comments)  -CH  mechanical soft with no mixed consistencies;honey thick liquids;other (see comments) liquids via teaspoon only  -RD  NPO;temporary alternate methods of nutrition/hydration;other (see comments) ice chips PRN post oral care if alert  -RD      Recommended Diagnostics  --  --  reassess via VFSS (MBS);other (see comments) 6/15/21  -RD      Recommended Precautions and Strategies  upright posture during/after eating;small bites of  food and sips of liquid;no straw;liquid via spoon only;general aspiration precautions;1:1 supervision  -CH  upright posture during/after eating;small bites of food and sips of liquid;no straw;liquid via spoon only;general aspiration precautions;1:1 supervision  -RD  general aspiration precautions  -RD      Oral Care Recommendations  Oral Care BID/PRN  -CH  Oral Care BID/PRN  -RD  Oral Care BID/PRN;Suction toothbrush  -RD      SLP Rec. for Method of Medication Administration  meds whole;meds crushed;with pudding or applesauce;as tolerated  -CH  meds whole;meds crushed;with pudding or applesauce;as tolerated  -RD  meds via alternate route  -RD      Monitor for Signs of Aspiration  yes;notify SLP if any concerns  -CH  yes;notify SLP if any concerns  -RD  yes;notify SLP if any concerns  -RD      Anticipated Discharge Disposition (SLP)  unknown;anticipate therapy at next level of care  -CH  unknown;anticipate therapy at next level of care  -RD  unknown;anticipate therapy at next level of care  -RD        User Key  (r) = Recorded By, (t) = Taken By, (c) = Cosigned By    Initials Name Effective Dates    RD Mireille Ayers, MS CCC-SLP 04/03/18 -      Jacquie Villarreal, MS CCC-SLP 12/04/20 -           EDUCATION  The patient has been educated in the following areas:   Home Exercise Program (HEP) Dysphagia (Swallowing Impairment) Oral Care/Hydration Modified Diet Instruction.    SLP Recommendation and Plan  SLP Swallowing Diagnosis: mild-moderate, oral dysphagia, moderate, pharyngeal dysphagia  SLP Diet Recommendation: mechanical soft with no mixed consistencies, honey thick liquids, other (see comments)  Recommended Precautions and Strategies: upright posture during/after eating, small bites of food and sips of liquid, no straw, liquid via spoon only, general aspiration precautions, 1:1 supervision  SLP Rec. for Method of Medication Administration: meds whole, meds crushed, with pudding or applesauce, as tolerated      Monitor for Signs of Aspiration: yes, notify SLP if any concerns     Swallow Criteria for Skilled Therapeutic Interventions Met: demonstrates skilled criteria  Anticipated Discharge Disposition (SLP): unknown, anticipate therapy at next level of care  Rehab Potential/Prognosis, Swallowing: good, to achieve stated therapy goals  Therapy Frequency (Swallow): 5 days per week  Predicted Duration Therapy Intervention (Days): until discharge     Daily Summary of Progress (SLP): progress toward functional goals as expected    Plan for Continued Treatment (SLP): Saw patient for dysphagia tx at am meal. Baseline cough prior to PO observed. Dry cough throughout meal did not appear to be consistent with food/liquid trials. Patient independent with use of spoon sips of HT liquids and with small bites/slow rate. Provided handout of swallow exercises, reviewed rationale and instructions. Patient completed each x 5 with min cues. Continue to follow to address dysphagia in tx. Monitor diet tolerance closely.                    SLP GOALS     Row Name 06/16/21 0900 06/15/21 1020 06/14/21 1110       Oral Nutrition/Hydration Goal 1 (SLP)    Oral Nutrition/Hydration Goal 1, SLP  LTG: Pt will improve swallow function to safely return to PO diet w/ no overt s/sxs aspiration/distress w/ 100% acc and no cues  -CH  LTG: Pt will improve swallow function to safely return to PO diet w/ no overt s/sxs aspiration/distress w/ 100% acc and no cues  -RD  LTG: Pt will improve swallow function to safely return to PO diet w/ no overt s/sxs aspiration/distress w/ 100% acc and no cues  -RD    Time Frame (Oral Nutrition/Hydration Goal 1, SLP)  by discharge  -CH  by discharge  -RD  by discharge  -RD    Barriers (Oral Nutrition/Hydration Goal 1, SLP)  Dry cough at baseline during am meal this date. Did not appear swallow related. Continue to monitor diet tolerance  -CH  --  --    Progress/Outcomes (Oral Nutrition/Hydration Goal 1, SLP)  continuing  progress toward goal  -CH  continuing progress toward goal  -RD  continuing progress toward goal  -RD       Oral Nutrition/Hydration Goal 2 (SLP)    Oral Nutrition/Hydration Goal 2, SLP  Pt will tolerate soft no mixed diet & honey-thick liquids via teaspoon only w/ no overt s/sxs aspiration/distress w/ 100% acc and no cues  -CH  Pt will tolerate soft no mixed diet & honey-thick liquids via teaspoon only w/ no overt s/sxs aspiration/distress w/ 100% acc and no cues  -RD  --    Time Frame (Oral Nutrition/Hydration Goal 2, SLP)  short term goal (STG)  -CH  short term goal (STG)  -RD  --    Barriers (Oral Nutrition/Hydration Goal 2, SLP)  Dry baseline cough and inconsistent t/o am meal. Patient independently compliant with spoon sips of HT liquids and sm bites of food as well as slow rate.   -CH  --  --    Progress/Outcomes (Oral Nutrition/Hydration Goal 2, SLP)  continuing progress toward goal  -CH  goal revised this date;goal ongoing  -RD  --       Oral Nutrition/Hydration Goal (SLP)    Oral Nutrition/Hydration Goal, SLP  Pt will tolerate therapeutic trials of ice chips, thin H2O, and puree w/ no overt s/sxs aspiration/distress w/ 70% acc and no cues in order to assess readiness for repeat instrumental eval.  -CH  Pt will tolerate therapeutic trials of ice chips, thin H2O, and puree w/ no overt s/sxs aspiration/distress w/ 70% acc and no cues in order to assess readiness for repeat instrumental eval.  -RD  Pt will tolerate therapeutic trials of ice chips, thin H2O, and puree w/ no overt s/sxs aspiration/distress w/ 70% acc and no cues in order to assess readiness for repeat instrumental eval.  -RD    Time Frame (Oral Nutrition/Hydration Goal, SLP)  short term goal (STG)  -CH  short term goal (STG)  -RD  short term goal (STG)  -RD    Barriers (Oral Nutrition/Hydration Goal, SLP)  immediate throat clearing w/ thins.   -CH  --  immediate throat clearing w/ thins.   -RD    Progress/Outcomes (Oral Nutrition/Hydration Goal,  SLP)  goal ongoing  -CH  goal ongoing  -RD  continuing progress toward goal  -RD       Lingual Strengthening Goal 1 (SLP)    Activity (Lingual Strengthening Goal 1, SLP)  increase tongue back strength  -CH  increase tongue back strength  -RD  --    Increase Tongue Back Strength  swallow trials;lingual resistance exercises  -CH  swallow trials;lingual resistance exercises  -RD  --    [place]/Accuracy (Lingual Strengthening Goal 1, SLP)  with minimal cues (75-90% accuracy)  -CH  with minimal cues (75-90% accuracy)  -RD  --    Time Frame (Lingual Strengthening Goal 1, SLP)  short term goal (STG)  -CH  short term goal (STG)  -RD  --    Barriers (Lingual Strengthening Goal 1, SLP)  Handout provided and rationale/instructions reviewed. Patient completed all x 5 and agreeable/motivated to complete independently   -  --  --    Progress/Outcomes (Lingual Strengthening Goal 1, SLP)  continuing progress toward goal  -CH  goal ongoing  -RD  --       Pharyngeal Strengthening Exercise Goal 1 (SLP)    Activity (Pharyngeal Strengthening Goal 1, SLP)  increase superior movement of the hyolaryngeal complex;increase anterior movement of the hyolaryngeal complex;increase closure at entrance to airway/closure of airway at glottis;increase squeeze/positive pressure generation;increase tongue base retraction  -CH  increase superior movement of the hyolaryngeal complex;increase anterior movement of the hyolaryngeal complex;increase closure at entrance to airway/closure of airway at glottis;increase squeeze/positive pressure generation;increase tongue base retraction  -RD  increase superior movement of the hyolaryngeal complex;increase anterior movement of the hyolaryngeal complex;increase closure at entrance to airway/closure of airway at glottis;increase squeeze/positive pressure generation;increase tongue base retraction  -RD    Increase Timing  prepping - 3 second prep or suck swallow or 3-step swallow  -  prepping - 3 second  prep or suck swallow or 3-step swallow  -RD  prepping - 3 second prep or suck swallow or 3-step swallow  -RD    Increase Superior Movement of the Hyolaryngeal Complex  effortful pitch glide (falsetto + pharyngeal squeeze)  -CH  effortful pitch glide (falsetto + pharyngeal squeeze)  -RD  effortful pitch glide (falsetto + pharyngeal squeeze)  -RD    Increase Anterior Movement of the Hyolaryngeal Complex  chin tuck against resistance (CTAR)  -CH  chin tuck against resistance (CTAR)  -RD  chin tuck against resistance (CTAR)  -RD    Increase Closure at Entrance to Airway/Closure of Airway at Glottis  supraglottic swallow  -CH  supraglottic swallow  -RD  supraglottic swallow  -RD    Increase Squeeze/Positive Pressure Generation  hard effortful swallow  -CH  --  hard effortful swallow  -RD    Increase Tongue Base Retraction  hard effortful swallow  -CH  hard effortful swallow  -RD  [place]  -RD    [place]/Accuracy (Pharyngeal Strengthening Goal 1, SLP)  with minimal cues (75-90% accuracy)  -CH  with minimal cues (75-90% accuracy)  -RD  with minimal cues (75-90% accuracy)  -RD    Time Frame (Pharyngeal Strengthening Goal 1, SLP)  short term goal (STG)  -CH  short term goal (STG)  -RD  short term goal (STG)  -RD    Barriers (Pharyngeal Strengthening Goal 1, SLP)  Handout provided and rationale/instructions reviewed. Patient completed all x 5 and agreeable/motivated to complete independently   -CH  --  reviewed and completed. Pt participated well  -RD    Progress/Outcomes (Pharyngeal Strengthening Goal 1, SLP)  continuing progress toward goal  -CH  goal revised this date;goal ongoing  -RD  continuing progress toward goal  -RD       Swallow Management Recall Goal 1 (SLP)    Activity (Swallow Management Recall Goal 1, SLP)  independent recall of;safe diet/liquid level;safe diet level/texture  -CH  independent recall of;safe diet/liquid level;safe diet level/texture  -RD  --    [place]/Accuracy (Swallow Management  Recall Goal 1, SLP)  independently (over 90% accuracy)  -CH  independently (over 90% accuracy)  -RD  --    Time Frame (Swallow Management Recall Goal 1, SLP)  short term goal (STG)  -CH  short term goal (STG)  -RD  --    Barriers (Swallow Management Recall Goal 1, SLP)  Patient able to recall liquid level, diet level w  cues.   -CH  --  --    Progress/Outcomes (Swallow Management Recall Goal 1, SLP)  continuing progress toward goal  -CH  goal ongoing  -RD  --       Swallow Compensatory Strategies Goal 1 (SLP)    Activity (Swallow Compensatory Strategies/Techniques Goal 1, SLP)  compensatory strategies;during p.o. trials;during meal intake;liquids by teaspoon only  -CH  compensatory strategies;during p.o. trials;during meal intake;liquids by teaspoon only  -RD  --    [place]/Accuracy (Swallow Compensatory Strategies/Techniques Goal 1, SLP)  independently (over 90% accuracy)  -CH  independently (over 90% accuracy)  -RD  --    Time Frame (Swallow Compensatory Strategies/Techniques Goal 1, SLP)  short term goal (STG)  -CH  short term goal (STG)  -RD  --    Barriers (Swallow Compensatory Strategies/Techniques Goal 1, SLP)  Patient independent with use of spoon sips of HT liquids, small bites of food and slow rate of intake.   -CH  --  --    Progress/Outcomes (Swallow Compensatory Strategies/Techniques Goal 1, SLP)  continuing progress toward goal  -CH  goal revised this date;goal ongoing  -RD  --      User Key  (r) = Recorded By, (t) = Taken By, (c) = Cosigned By    Initials Name Provider Type    Mireille Talyor MS CCC-SLP Speech and Language Pathologist    Jacquie Mcdermott MS CCC-SLP Speech and Language Pathologist             Time Calculation:   Time Calculation- SLP     Row Name 06/16/21 1032             Time Calculation- SLP    SLP Start Time  0900  -CH      SLP Received On  06/16/21  -         Untimed Charges    [id number] Treatment Swallow Minutes  53  -CH         Total Minutes    Untimed Charges  Total Minutes  53  -CH       Total Minutes  53  -CH        User Key  (r) = Recorded By, (t) = Taken By, (c) = Cosigned By    Initials Name Provider Type    Jacquie Mcdermott MS CCC-SLP Speech and Language Pathologist          Therapy Charges for Today     Code Description Service Date Service Provider Modifiers Qty    [id number] HC ST TREATMENT SWALLOW 4 6/16/2021 Jacquie Villarreal MS CCC-SLP GN 1               MS SONIA Kirkpatrick  6/16/2021     Patient was not wearing a face mask and did exhibit coughing during this therapy encounter.  Procedure performed was aerosolizing, involved close contact (within 6 feet for at least 15 minutes or longer), and did not involve contact with infectious secretions or specimens.  Therapist used appropriate personal protective equipment including gloves, standard procedure mask and eye protection.  Appropriate PPE was worn during the entire therapy session.  Hand hygiene was completed before and after therapy session.

## 2021-06-16 NOTE — PLAN OF CARE
Goal Outcome Evaluation:  Plan of Care Reviewed With: patient        Progress: improving  Outcome Summary: Vital signs wnl. Oxygen level greater than 90% on room air.Patient alert and oriented x 4. Patient has walked twice today and sat in chair. No complaints of pain or shortness of breath. Awaiting rehab placement. Will continue to monitor.

## 2021-06-16 NOTE — CASE MANAGEMENT/SOCIAL WORK
Continued Stay Note   [facility]     Patient Name: Augusto Lorenzana Jr.  MRN: [id number]  Today's Date: 6/16/2021    Admit Date: 5/22/2021    Discharge Plan     Row Name 06/16/21 1604       Plan    Plan  [facility]    Patient/Family in Agreement with Plan  yes    Plan Comments  Spoke with patient and his wife at bedside. [facility] following. Will need negative covid swab prior to discharge.  EMS arranged for Friday at 1430, PCS in [place]. Family may be able to transport depending on progress with therapy. Please call report to [phone],  [facility] has [employer] access to obtain dc info.     Final Discharge Disposition Code  62 - inpatient rehab facility        Discharge Codes    No documentation.       Expected Discharge Date and Time     Expected Discharge Date Expected Discharge Time    Jun 17, 2021             Alisha Norman RN

## 2021-06-16 NOTE — PROGRESS NOTES
Cardiothoracic Surgery Progress Note      POD # 19 s/p CABG       LOS: 25 days      Subjective:  More alert this morning. Alert and oriented to person and place this morning. Laying in bed. No complaints.        Objective:  Vital Signs  Temp:  [96.4 °F (35.8 °C)-98.4 °F (36.9 °C)] 96.5 °F (35.8 °C)  Heart Rate:  [61-74] 71  Resp:  [16-18] 18  BP: (128-155)/(73-94) 146/79    Physical Exam:   General Appearance: easily roused   Lungs: Coarse lung sounds bilaterally with wheezing. Breathing unlabored.    Heart: regular rhythm & normal rate, normal S1, S2, no murmur, no gallop, no rub and no click   Skin: Incision c/d/i     Results:    Results from last 7 days   Lab Units 06/14/21  0518   WBC 10*3/mm3 8.24   HEMOGLOBIN g/dL 8.8*   HEMATOCRIT % 27.8*   PLATELETS 10*3/mm3 291     Results from last 7 days   Lab Units 06/16/21  0559   SODIUM mmol/L 138   POTASSIUM mmol/L 3.8   CHLORIDE mmol/L 105   CO2 mmol/L 21.0*   BUN mg/dL 68*   CREATININE mg/dL 1.69*   GLUCOSE mg/dL 127*   CALCIUM mg/dL 8.9         Assessment:    NSTEMI 5/22/2021    Hyperlipidemia LDL goal <70    Essential hypertension    T2DM on oral agents and insulin. HbA1c 7.4     A/C kidney disease. ATN due to postoperative arrest. 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 Combination of anoxic insult and azotemia    POD #19 CABG x3    Plan:  Nephrology following for A/C Kidney Disease.   Pulmonary Toilet if able.  SLP managing dysphagia  D/C to rehab if all agree.        Moi Tubbs PA-C  06/16/21  09:13 EDT

## 2021-06-16 NOTE — THERAPY TREATMENT NOTE
Patient Name: Augusto Lorenzana Jr.  : 1945    MRN: [id number]                              Today's Date: 2021       Admit Date: 2021    Visit Dx:     ICD-10-CM ICD-9-CM   1. Non-STEMI (non-ST elevated myocardial infarction) (CMS/HCC)  I21.4 410.70   2. Acute congestive heart failure, unspecified heart failure type (CMS/HCC)  I50.9 428.0   3. History of coronary artery disease  Z86.79 V12.59   4. Coronary artery disease involving native coronary artery of native heart, angina presence unspecified  I25.10 414.01   5. Oropharyngeal dysphagia  R13.12 787.22   6. MICHAEL (acute kidney injury) (CMS/HCC)  N17.9 584.9     Patient Active Problem List   Diagnosis   • NSTEMI 2021   • Hyperlipidemia LDL goal <70   • Essential hypertension   • T2DM on oral agents and insulin. HbA1c 7.4    • Severe 3 vessel CAD with preserved LV function (CMS/HCC)   • A/C kidney disease. ATN due to postoperative arrest. 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 Combination of anoxic insult and azotemia     Past Medical History:   Diagnosis Date   • CAD (coronary artery disease)    • CKD (chronic kidney disease) stage 3, GFR 30-59 ml/min (CMS/HCC)    • Diabetes mellitus (CMS/[facility])    • Hyperlipidemia    • Hypertension    • NSTEMI (non-ST elevated myocardial infarction) (CMS/[facility])      Past Surgical History:   Procedure Laterality Date   • CARDIAC CATHETERIZATION N/A 2021    Procedure: Left Heart Cath;  Surgeon: Blade Greene IV, MD;  Location: [facility] CATH INVASIVE LOCATION;  Service: Cardiovascular;  Laterality: N/A;   • CARDIAC SURGERY      2 stents placed .   • CORONARY ARTERY BYPASS GRAFT N/A 2021    Procedure: MEDIAN STERNOTOMY CORONARY ARTERY BYPASS X 3 UTILIZING THE LEFT INTERNAL MAMMARY ARTERY GRAFT, EVH OF THE GREATER RIGHT SAPHENOUS VEIN,  AND PILO PER ANESTHESIA;  Surgeon: Jacek Miller MD;  Location: [facility];  Service: Cardiothoracic;  Laterality: N/A;     General Information     Row Name 06/16/21 1532          Physical Therapy Time and Intention    Document Type  therapy note (daily note)  -KG     Mode of Treatment  physical therapy  -KG     Row Name 06/16/21 1532          General Information    Existing Precautions/Restrictions  cardiac;fall;sternal  -KG     Row Name 06/16/21 1532          Cognition    Orientation Status (Cognition)  oriented x 3  -KG     Row Name 06/16/21 1532          Safety Issues, Functional Mobility    Safety Issues Affecting Function (Mobility)  insight into deficits/self-awareness;safety precaution awareness;safety precautions follow-through/compliance  -KG     Impairments Affecting Function (Mobility)  balance;endurance/activity tolerance;postural/trunk control;strength  -KG       User Key  (r) = Recorded By, (t) = Taken By, (c) = Cosigned By    Initials Name Provider Type    KG Aliza Shen N, PT Physical Therapist        Mobility     Row Name 06/16/21 1534          Bed Mobility    Comment (Bed Mobility)  UIC  -KG     Row Name 06/16/21 1534          Transfers    Comment (Transfers)  VC's for sequencing and safe hand placement to maintain sternal precautions. Pt with mild instability upon initial stand.  -KG     Row Name 06/16/21 1534          Sit-Stand Transfer    Sit-Stand Benoit (Transfers)  minimum assist (75% patient effort);verbal cues  -KG     Assistive Device (Sit-Stand Transfers)  walker, front-wheeled  -KG     Row Name 06/16/21 1534          Gait/Stairs (Locomotion)    Benoit Level (Gait)  minimum assist (75% patient effort);verbal cues  -KG     Assistive Device (Gait)  walker, front-wheeled  -KG     Distance in Feet (Gait)  175  -KG     Deviations/Abnormal Patterns (Gait)  base of support, narrow;chinyere decreased;stride length decreased  -KG     Bilateral Gait Deviations  forward flexed  posture;heel strike decreased  -KG     Comment (Gait/Stairs)  Pt demonstrated step through gait pattern with slow chinyere and decreased step length. VC's for upright posture. Pt required intermittent assistance to steer RW. Pt with mild instability, but able to correct with cueing. Pt's mobility limited by fatigue.  -KG       User Key  (r) = Recorded By, (t) = Taken By, (c) = Cosigned By    Initials Name Provider Type    Aliza Bhandari PT Physical Therapist        Obj/Interventions     Row Name 06/16/21 1538          Balance    Balance Assessment  sitting static balance;standing static balance;standing dynamic balance  -KG     Static Sitting Balance  WFL;sitting in chair  -KG     Static Standing Balance  mild impairment;supported;standing  -KG     Dynamic Standing Balance  mild impairment;supported;standing  -KG       User Key  (r) = Recorded By, (t) = Taken By, (c) = Cosigned By    Initials Name Provider Type    Aliza Bhandari PT Physical Therapist        Goals/Plan    No documentation.       Clinical Impression     Row Name 06/16/21 1539          Pain    Additional Documentation  Pain Scale: Numbers Pre/Post-Treatment (Group)  -KG     Row Name 06/16/21 1539          Pain Scale: Numbers Pre/Post-Treatment    Pretreatment Pain Rating  0/10 - no pain  -KG     Posttreatment Pain Rating  0/10 - no pain  -KG     Row Name 06/16/21 1539          Plan of Care Review    Plan of Care Reviewed With  patient  -KG     Progress  improving  -KG     Outcome Summary  Pt increased ambulation distance to 175ft with RW and Michaela. VC's for upright posture. Pt required intermittent assistance to steer RW. Pt with improved balance and stability. Mobility limited by fatigue. Continue to progress as appropriate.  -KG     Row Name 06/16/21 1539          Vital Signs    Pre Systolic BP Rehab  124  -KG     Pre Treatment Diastolic BP  90  -KG     Pretreatment Heart Rate (beats/min)  66  -KG     Posttreatment Heart Rate  (beats/min)  69  -KG     Pre SpO2 (%)  98  -KG     O2 Delivery Pre Treatment  room air  -KG     Post SpO2 (%)  99  -KG     O2 Delivery Post Treatment  room air  -KG     Pre Patient Position  Sitting  -KG     Intra Patient Position  Standing  -KG     Post Patient Position  Sitting  -KG     Row Name 06/16/21 1539          Positioning and Restraints    Pre-Treatment Position  sitting in chair/recliner  -KG     Post Treatment Position  chair  -KG     In Chair  notified nsg;reclined;call light within reach;encouraged to call for assist;exit alarm on;with family/caregiver;RUE elevated;LUE elevated;legs elevated  -KG       User Key  (r) = Recorded By, (t) = Taken By, (c) = Cosigned By    Initials Name Provider Type    Aliza Bhandari PT Physical Therapist        Outcome Measures     Row Name 06/16/21 1552          How much help from another person do you currently need...    Turning from your back to your side while in flat bed without using bedrails?  3  -KG     Moving from lying on back to sitting on the side of a flat bed without bedrails?  3  -KG     Moving to and from a bed to a chair (including a wheelchair)?  3  -KG     Standing up from a chair using your arms (e.g., wheelchair, bedside chair)?  3  -KG     Climbing 3-5 steps with a railing?  2  -KG     To walk in hospital room?  3  -KG     AM-PAC 6 Clicks Score (PT)  17  -KG     Row Name 06/16/21 1552          Functional Assessment    Outcome Measure Options  AM-PAC 6 Clicks Basic Mobility (PT)  -KG       User Key  (r) = Recorded By, (t) = Taken By, (c) = Cosigned By    Initials Name Provider Type    Aliza Bhandari PT Physical Therapist        Physical Therapy Education                 Title: PT OT SLP Therapies (In Progress)     Topic: Physical Therapy (Done)     Point: Mobility training (Done)     Learning Progress Summary           Patient Acceptance, E, VU,NR by VICTORINO at 6/16/2021 1306    Acceptance, E, VU,NR by MARIA GUADALUPE at 6/14/2021 1538    Comment:  Patient instructed through sternal precautions and sequencing for bed mobility and transfers.    Acceptance, E, NR by EMANUEL at 6/12/2021 1100    Acceptance, E, VU by JB1 at 6/12/2021 0124    Acceptance, E, NR by EMANUEL at 6/11/2021 0840    Acceptance, E, NR by KG at 6/10/2021 0811    Acceptance, E, NR by KG at 6/9/2021 0859    Acceptance, E, NR by EMANUEL at 6/8/2021 0800    Acceptance, E, NR by KG at 6/7/2021 1305    Acceptance, E, NR by EMANUEL at 6/3/2021 1015    Acceptance, E, NR by KG at 6/2/2021 0909    Acceptance, E, NR by KG at 6/1/2021 0933    Acceptance, E, NR by KG at 5/31/2021 1118    Acceptance, E,TB, VU,NR by AY at 5/30/2021 1107   Family Acceptance, E, VU,NR by KG at 6/16/2021 1306                   Point: Home exercise program (Done)     Learning Progress Summary           Patient Acceptance, E, VU,NR by KG at 6/16/2021 1306    Acceptance, E, VU,NR by LO at 6/14/2021 1538    Comment: Patient instructed through sternal precautions and sequencing for bed mobility and transfers.    Acceptance, E, NR by EMANUEL at 6/12/2021 1100    Acceptance, E, NR by EMANUEL at 6/11/2021 0840    Acceptance, E, NR by KG at 6/10/2021 0811    Acceptance, E, NR by KG at 6/9/2021 0859    Acceptance, E, NR by EMANUEL at 6/8/2021 0800    Acceptance, E, NR by KG at 6/7/2021 1305    Acceptance, E, NR by EMANUEL at 6/3/2021 1015    Acceptance, E, NR by KG at 6/2/2021 0909    Acceptance, E, NR by KG at 6/1/2021 0933    Acceptance, E, NR by KG at 5/31/2021 1118   Family Acceptance, E, VU,NR by KG at 6/16/2021 1306                   Point: Body mechanics (Done)     Learning Progress Summary           Patient Acceptance, E, VU,NR by KG at 6/16/2021 1306    Acceptance, E, VU,NR by LO at 6/14/2021 1538    Comment: Patient instructed through sternal precautions and sequencing for bed mobility and transfers.    Acceptance, E, NR by EMANUEL at 6/12/2021 1100    Acceptance, E, VU by JB1 at 6/12/2021 0124    Acceptance, E, NR by EMANUEL at 6/11/2021 0840    Acceptance, E, NR by KG at  6/10/2021 0811    Acceptance, E, NR by KG at 6/9/2021 0859    Acceptance, E, NR by EMANUEL at 6/8/2021 0800    Acceptance, E, NR by KG at 6/7/2021 1305    Acceptance, E, NR by EMANUEL at 6/3/2021 1015    Acceptance, E, NR by KG at 6/2/2021 0909    Acceptance, E, NR by KG at 6/1/2021 0933    Acceptance, E, NR by KG at 5/31/2021 1118    Acceptance, E,TB, VU,NR by AY at 5/30/2021 1107   Family Acceptance, E, VU,NR by KG at 6/16/2021 1306                   Point: Precautions (Done)     Learning Progress Summary           Patient Acceptance, E, VU,NR by KG at 6/16/2021 1306    Acceptance, E, VU,NR by LO at 6/14/2021 1538    Comment: Patient instructed through sternal precautions and sequencing for bed mobility and transfers.    Acceptance, E, NR by EMANUEL at 6/12/2021 1100    Acceptance, E, NR by EMANUEL at 6/11/2021 0840    Acceptance, E, NR by KG at 6/10/2021 0811    Acceptance, E, NR by KG at 6/9/2021 0859    Acceptance, E, NR by EMANUEL at 6/8/2021 0800    Acceptance, E, NR by KG at 6/7/2021 1305    Acceptance, E, NR by EMANUEL at 6/3/2021 1015    Acceptance, E, NR by KG at 6/2/2021 0909    Acceptance, E, NR by KG at 6/1/2021 0933    Acceptance, E, NR by KG at 5/31/2021 1118    Acceptance, E,TB, VU,NR by AY at 5/30/2021 1107   Family Acceptance, E, VU,NR by KG at 6/16/2021 1306                               User Key     Initials Effective Dates Name Provider Type Discipline    EMANUEL 06/19/15 - 06/15/21 Sierra Kitchen, PT Physical Therapist PT    JB1 06/16/16 - 06/15/21 Brooks Bright RN Registered Nurse Nurse    KG 05/22/20 -  Aliza Shen, PT Physical Therapist PT    LO 03/11/20 - 06/15/21 Kailee Navarro, PT Physical Therapist PT    AY 11/10/20 -  Megan Moffett, PT Physical Therapist PT              PT Recommendation and Plan     Plan of Care Reviewed With: patient  Progress: improving  Outcome Summary: Pt increased ambulation distance to 175ft with RW and Michaela. VC's for upright posture. Pt required intermittent assistance to steer  RW. Pt with improved balance and stability. Mobility limited by fatigue. Continue to progress as appropriate.     Time Calculation:   PT Charges     Row Name 06/16/21 1306             Time Calculation    Start Time  1306  -KG      PT Received On  06/16/21  -KG      PT Goal Re-Cert Due Date  06/19/21  -KG         Time Calculation- PT    Total Timed Code Minutes- PT  27 minute(s)  -KG         Timed Charges    45600 - PT Therapeutic Activity Minutes  27  -KG         Total Minutes    Timed Charges Total Minutes  27  -KG       Total Minutes  27  -KG        User Key  (r) = Recorded By, (t) = Taken By, (c) = Cosigned By    Initials Name Provider Type    KG Aliza Shen, PT Physical Therapist        Therapy Charges for Today     Code Description Service Date Service Provider Modifiers Qty    [id number] HC PT THERAPEUTIC ACT EA 15 MIN 6/16/2021 Aliza Shen, PT GP 2          PT G-Codes  Outcome Measure Options: AM-PAC 6 Clicks Basic Mobility (PT)  AM-PAC 6 Clicks Score (PT): 17  AM-PAC 6 Clicks Score (OT): 13    Jenise Shen PT  6/16/2021

## 2021-06-16 NOTE — PLAN OF CARE
Goal Outcome Evaluation:  Plan of Care Reviewed With: patient    SLP treatment completed. Will continue to address dysphagia in tx. Please see note for further details and recommendations.

## 2021-06-16 NOTE — PLAN OF CARE
Goal Outcome Evaluation:  Plan of Care Reviewed With: patient           Outcome Summary: A/DO to time and place, SR with right bundle branch block, VSS, RA. Pt was slightly agitated at beginning of shift and refused his walk, but he calmed down and stated he just wanted to rest. He has rested off and on through shift. Honey thick liquids in a spoon with small sips provided, however pt still aspirated slightly on this. Othwerwise no complaints.

## 2021-06-16 NOTE — PROGRESS NOTES
"   LOS: 25 days    Patient Care Team:  Rob Polanco MD as PCP - General (Internal Medicine)    Reason For Visit:  F/U MICHAEL ON CKD  Subjective           Review of Systems:    Pulm: No soa   CV:  No CP      Objective     amLODIPine, 10 mg, Nasogastric, Q24H  aspirin, 325 mg, Oral, Daily  atorvastatin, 40 mg, Nasogastric, Nightly  carvedilol, 25 mg, Nasogastric, BID With Meals  gabapentin, 300 mg, Nasogastric, Nightly  insulin lispro, 0-9 Units, Subcutaneous, TID AC  isosorbide dinitrate, 20 mg, Nasogastric, TID - Nitrates  melatonin, 5 mg, Nasogastric, Nightly  multivitamin with minerals, 1 tablet, Oral, Daily  pantoprazole, 40 mg, Intravenous, BID  pharmacy consult - MTM, , Does not apply, Daily  QUEtiapine, 12.5 mg, Oral, Q12H  senna-docusate sodium, 2 tablet, Oral, BID  sodium chloride, 10 mL, Intravenous, Q12H  thiamine, 100 mg, Nasogastric, Daily             Vital Signs:  Blood pressure 124/90, pulse 64, temperature 97.5 °F (36.4 °C), temperature source Oral, resp. rate 17, height 170 cm (66.93\"), weight 82.3 kg (181 lb 6.4 oz), SpO2 98 %.    Flowsheet Rows      First Filed Value   Admission Height  170.2 cm (67\") Documented at 05/22/2021 0433   Admission Weight  88.5 kg (195 lb) Documented at 05/22/2021 0433          06/15 0701 - 06/16 0700  In: 480 [P.O.:480]  Out: 1125 [Urine:1125]    Physical Exam:    General Appearance: NAD, alert and cooperative, Ox3  Eyes: PER, conjunctivae and sclerae normal, no icterus  Lungs: respirations regular and unlabored, no crepitus, clear to auscultation  Heart/CV: regular rhythm & normal rate, no murmur, no gallop, no rub and TR edema  Abdomen: not distended, soft, non-tender, no masses,  bowel sounds present  Skin: No rash, Warm and dry    Radiology:            Labs:  Results from last 7 days   Lab Units 06/14/21  0518 06/12/21  0401 06/11/21  0401   WBC 10*3/mm3 8.24 9.85 12.73*   HEMOGLOBIN g/dL 8.8* 8.4* 8.7*   HEMATOCRIT % 27.8* 26.3* 27.2*   PLATELETS 10*3/mm3 291 " 260 268     Results from last 7 days   Lab Units 06/16/21  0559 06/15/21  0426 06/14/21  0518 06/12/21  0401 06/11/21  0401 06/10/21  0351   SODIUM mmol/L 138  --  137 135* 136 137   POTASSIUM mmol/L 3.8  --  3.9 3.4* 3.6 3.6   CHLORIDE mmol/L 105  --  101 100 100 100   CO2 mmol/L 21.0*  --  26.0 26.0 27.0 27.0   BUN mg/dL 68*  --  65* 67* 64* 60*   CREATININE mg/dL 1.69*  --  1.62* 1.69* 1.77* 1.79*   CALCIUM mg/dL 8.9  --  9.3 9.0 9.1 8.6   PHOSPHORUS mg/dL 4.3  --  3.9 3.6  --  3.4   MAGNESIUM mg/dL  --  2.0 1.8 1.9  --  2.1   ALBUMIN g/dL 2.60*  --  3.10* 3.00*  --  2.70*     Results from last 7 days   Lab Units 06/16/21  0559   GLUCOSE mg/dL 127*                       Estimated Creatinine Clearance: 39.3 mL/min (A) (by C-G formula based on SCr of 1.69 mg/dL (H)).      Assessment       NSTEMI 5/22/2021    Hyperlipidemia LDL goal <70    Essential hypertension    T2DM on oral agents and insulin. HbA1c 7.4     A/C kidney disease. ATN due to postoperative arrest. 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 Combination of anoxic insult and azotemia            Impression: MICHAEL ON CKD. STABLE.  OFF DIALYSIS. ANEMIA.            Recommendations: DISCHARGE OK WITH RENAL. F/U NAL ARRANGED IN [place].      Aiden Godfrey MD  06/16/21  14:15 EDT

## 2021-06-17 LAB
GLUCOSE BLDC GLUCOMTR-MCNC: 132 MG/DL (ref 70–130)
GLUCOSE BLDC GLUCOMTR-MCNC: 187 MG/DL (ref 70–130)
GLUCOSE BLDC GLUCOMTR-MCNC: 199 MG/DL (ref 70–130)
GLUCOSE BLDC GLUCOMTR-MCNC: 221 MG/DL (ref 70–130)
QT INTERVAL: 448 MS
QT INTERVAL: 454 MS
QTC INTERVAL: 486 MS
QTC INTERVAL: 490 MS
SARS-COV-2 RDRP RESP QL NAA+PROBE: NORMAL

## 2021-06-17 PROCEDURE — 99232 SBSQ HOSP IP/OBS MODERATE 35: CPT | Performed by: NURSE PRACTITIONER

## 2021-06-17 PROCEDURE — 94799 UNLISTED PULMONARY SVC/PX: CPT

## 2021-06-17 PROCEDURE — 97110 THERAPEUTIC EXERCISES: CPT

## 2021-06-17 PROCEDURE — 82962 GLUCOSE BLOOD TEST: CPT

## 2021-06-17 PROCEDURE — 63710000001 INSULIN LISPRO (HUMAN) PER 5 UNITS: Performed by: NURSE PRACTITIONER

## 2021-06-17 PROCEDURE — 87635 SARS-COV-2 COVID-19 AMP PRB: CPT | Performed by: FAMILY MEDICINE

## 2021-06-17 PROCEDURE — 92526 ORAL FUNCTION THERAPY: CPT

## 2021-06-17 RX ADMIN — ASPIRIN 325 MG ORAL TABLET 325 MG: 325 PILL ORAL at 08:35

## 2021-06-17 RX ADMIN — CARVEDILOL 25 MG: 12.5 TABLET, FILM COATED ORAL at 08:35

## 2021-06-17 RX ADMIN — ISOSORBIDE DINITRATE 20 MG: 20 TABLET ORAL at 12:04

## 2021-06-17 RX ADMIN — INSULIN LISPRO 2 UNITS: 100 INJECTION, SOLUTION INTRAVENOUS; SUBCUTANEOUS at 18:06

## 2021-06-17 RX ADMIN — DOCUSATE SODIUM 50MG AND SENNOSIDES 8.6MG 2 TABLET: 8.6; 5 TABLET, FILM COATED ORAL at 22:02

## 2021-06-17 RX ADMIN — ISOSORBIDE DINITRATE 20 MG: 20 TABLET ORAL at 18:07

## 2021-06-17 RX ADMIN — GABAPENTIN 300 MG: 300 CAPSULE ORAL at 22:01

## 2021-06-17 RX ADMIN — PANTOPRAZOLE SODIUM 40 MG: 40 INJECTION, POWDER, FOR SOLUTION INTRAVENOUS at 08:34

## 2021-06-17 RX ADMIN — QUETIAPINE FUMARATE 12.5 MG: 25 TABLET ORAL at 08:35

## 2021-06-17 RX ADMIN — SODIUM CHLORIDE, PRESERVATIVE FREE 10 ML: 5 INJECTION INTRAVENOUS at 22:02

## 2021-06-17 RX ADMIN — QUETIAPINE FUMARATE 12.5 MG: 25 TABLET ORAL at 22:01

## 2021-06-17 RX ADMIN — ATORVASTATIN CALCIUM 40 MG: 40 TABLET, FILM COATED ORAL at 22:02

## 2021-06-17 RX ADMIN — AMLODIPINE BESYLATE 10 MG: 10 TABLET ORAL at 08:35

## 2021-06-17 RX ADMIN — SODIUM CHLORIDE, PRESERVATIVE FREE 10 ML: 5 INJECTION INTRAVENOUS at 08:36

## 2021-06-17 RX ADMIN — Medication 5 MG: at 22:01

## 2021-06-17 RX ADMIN — INSULIN LISPRO 2 UNITS: 100 INJECTION, SOLUTION INTRAVENOUS; SUBCUTANEOUS at 12:03

## 2021-06-17 RX ADMIN — DOCUSATE SODIUM 50MG AND SENNOSIDES 8.6MG 2 TABLET: 8.6; 5 TABLET, FILM COATED ORAL at 08:35

## 2021-06-17 RX ADMIN — GUAIFENESIN AND DEXTROMETHORPHAN 5 ML: 100; 10 SYRUP ORAL at 22:01

## 2021-06-17 RX ADMIN — ISOSORBIDE DINITRATE 20 MG: 20 TABLET ORAL at 08:35

## 2021-06-17 RX ADMIN — Medication 1 TABLET: at 08:35

## 2021-06-17 RX ADMIN — THIAMINE HCL TAB 100 MG 100 MG: 100 TAB at 08:35

## 2021-06-17 RX ADMIN — PANTOPRAZOLE SODIUM 40 MG: 40 INJECTION, POWDER, FOR SOLUTION INTRAVENOUS at 22:01

## 2021-06-17 RX ADMIN — CARVEDILOL 25 MG: 12.5 TABLET, FILM COATED ORAL at 18:07

## 2021-06-17 NOTE — PLAN OF CARE
Goal Outcome Evaluation:  Plan of Care Reviewed With: patient, spouse        Progress: improving  Outcome Summary: Pt with good effort for all activities, ambulated in hallway using RW with tactile cues for optimal upright posture/CGA, performed scapular TE seated in chair. VSS on RA. Continue per OT POC.

## 2021-06-17 NOTE — PLAN OF CARE
Goal Outcome Evaluation:  Plan of Care Reviewed With: patient              SLP treatment completed. Will continue to address dysphagia. Please see note for further details and recommendations.

## 2021-06-17 NOTE — THERAPY TREATMENT NOTE
Patient Name: Augusto Lorenzana Jr.  : 1945    MRN: [id number]                              Today's Date: 2021       Admit Date: 2021    Visit Dx:     ICD-10-CM ICD-9-CM   1. Non-STEMI (non-ST elevated myocardial infarction) (CMS/HCC)  I21.4 410.70   2. Acute congestive heart failure, unspecified heart failure type (CMS/HCC)  I50.9 428.0   3. History of coronary artery disease  Z86.79 V12.59   4. Coronary artery disease involving native coronary artery of native heart, angina presence unspecified  I25.10 414.01   5. Oropharyngeal dysphagia  R13.12 787.22   6. MICHAEL (acute kidney injury) (CMS/HCC)  N17.9 584.9     Patient Active Problem List   Diagnosis   • NSTEMI 2021   • Hyperlipidemia LDL goal <70   • Essential hypertension   • T2DM on oral agents and insulin. HbA1c 7.4    • Severe 3 vessel CAD with preserved LV function (CMS/HCC)   • A/C kidney disease. ATN due to postoperative arrest. 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 Combination of anoxic insult and azotemia     Past Medical History:   Diagnosis Date   • CAD (coronary artery disease)    • CKD (chronic kidney disease) stage 3, GFR 30-59 ml/min (CMS/HCC)    • Diabetes mellitus (CMS/[facility])    • Hyperlipidemia    • Hypertension    • NSTEMI (non-ST elevated myocardial infarction) (CMS/[facility])      Past Surgical History:   Procedure Laterality Date   • CARDIAC CATHETERIZATION N/A 2021    Procedure: Left Heart Cath;  Surgeon: Blade Greene IV, MD;  Location: [facility] CATH INVASIVE LOCATION;  Service: Cardiovascular;  Laterality: N/A;   • CARDIAC SURGERY      2 stents placed .   • CORONARY ARTERY BYPASS GRAFT N/A 2021    Procedure: MEDIAN STERNOTOMY CORONARY ARTERY BYPASS X 3 UTILIZING THE LEFT INTERNAL MAMMARY ARTERY GRAFT, EVH OF THE GREATER RIGHT SAPHENOUS VEIN,  AND PILO PER ANESTHESIA;  Surgeon: Jacek Miller MD;  Location: [facility];  Service: Cardiothoracic;  Laterality: N/A;     General Information     Row Name 06/17/21 1451          OT Time and Intention    Document Type  therapy note (daily note)  -     Mode of Treatment  occupational therapy  -     Row Name 06/17/21 1451          General Information    Patient Profile Reviewed  yes  -EMANUEL     Existing Precautions/Restrictions  cardiac;fall;sternal  -EMANUEL     Barriers to Rehab  medically complex  -EMANUEL     Row Name 06/17/21 1451          Cognition    Orientation Status (Cognition)  oriented x 3  -     Row Name 06/17/21 1451          Safety Issues, Functional Mobility    Safety Issues Affecting Function (Mobility)  insight into deficits/self-awareness;judgment;problem-solving;safety precaution awareness;safety precautions follow-through/compliance  -     Impairments Affecting Function (Mobility)  endurance/activity tolerance;postural/trunk control;strength  -EMANUEL       User Key  (r) = Recorded By, (t) = Taken By, (c) = Cosigned By    Initials Name Provider Type    EMANUEL Salina Escalante OT Occupational Therapist          Mobility/ADL's     Row Name 06/17/21 1457          Bed Mobility    Bed Mobility  supine-sit  -     Supine-Sit [place] (Bed Mobility)  verbal cues;nonverbal cues (demo/gesture);contact guard  -     Assistive Device (Bed Mobility)  head of bed elevated  -     Row Name 06/17/21 1457          Transfers    Transfers  sit-stand transfer  -     Sit-Stand [place] (Transfers)  minimum assist (75% patient effort);verbal cues  -[facility] Name 06/17/21 1457          Sit-Stand Transfer    Assistive Device (Sit-Stand Transfers)  walker, front-wheeled  -     Row Name 06/17/21 1457          Functional Mobility    Functional Mobility- Ind. Level  contact guard assist  -     Functional Mobility- Device  rolling walker  -     Functional Mobility-Distance (Feet)  150  -     Functional Mobility-  Comment  Pt ambulated in hallway, per his request; cues for upright posture, to relax shoulders, and for pacing  -EMANUEL       User Key  (r) = Recorded By, (t) = Taken By, (c) = Cosigned By    Initials Name Provider Type    Salina Mcclellan OT Occupational Therapist        Obj/Interventions     Row Name 06/17/21 1500          Shoulder (Therapeutic Exercise)    Shoulder (Therapeutic Exercise)  AROM (active range of motion)  -EMANUEL     Shoulder AROM (Therapeutic Exercise)  scapular elevation;scapular protraction;scapular retraction;sitting;10 repetitions;other (see comments) B shoulder rolls forward and back  -EMANUEL     Row Name 06/17/21 1500          Therapeutic Exercise    Therapeutic Exercise  shoulder  -EMANUEL       User Key  (r) = Recorded By, (t) = Taken By, (c) = Cosigned By    Initials Name Provider Type    Salina Mcclellan OT Occupational Therapist        Goals/Plan    No documentation.       Clinical Impression     Row Name 06/17/21 1501          Pain Assessment    Additional Documentation  Pain Scale: Numbers Pre/Post-Treatment (Group)  -[facility] Name 06/17/21 1501          Pain Scale: Numbers Pre/Post-Treatment    Pretreatment Pain Rating  0/10 - no pain  -EMANUEL     Posttreatment Pain Rating  0/10 - no pain  -EMANUEL     Row Name 06/17/21 1501          Plan of Care Review    Plan of Care Reviewed With  patient;spouse  -EMANUEL     Progress  improving  -EMANUEL     Outcome Summary  Pt with good effort for all activities, ambulated in hallway using RW with tactile cues for optimal upright posture/CGA, performed scapular TE seated in chair. VSS on RA. Continue per OT POC.  -EMANUEL     Row Name 06/17/21 1501          Vital Signs    O2 Delivery Pre Treatment  room air  -EMANUEL     O2 Delivery Intra Treatment  room air  -EMANUEL     O2 Delivery Post Treatment  room air  -EMANUEL     Pre Patient Position  Supine  -EMANUEL     Intra Patient Position  Standing  -EMANUEL     Post Patient Position  Sitting  -EMANUEL     Row Name 06/17/21 1501          Positioning and  Restraints    In Chair  sitting;call light within reach;encouraged to call for assist;with family/caregiver;with other staff  -       User Key  (r) = Recorded By, (t) = Taken By, (c) = Cosigned By    Initials Name Provider Type    Salina Mcclellan OT Occupational Therapist        Outcome Measures     Row Name 06/17/21 1506          How much help from another is currently needed...    Putting on and taking off regular lower body clothing?  3  -EMANUEL     Bathing (including washing, rinsing, and drying)  2  -EMANUEL     Toileting (which includes using toilet bed pan or urinal)  2  -EMANUEL     Putting on and taking off regular upper body clothing  3  -EMANUEL     Taking care of personal grooming (such as brushing teeth)  3  -EMANUEL     Eating meals  3  -EMANUEL     AM-PAC 6 Clicks Score (OT)  16  -EMANUEL     Row Name 06/17/21 0835          How much help from another person do you currently need...    Turning from your back to your side while in flat bed without using bedrails?  3  -MW     Moving from lying on back to sitting on the side of a flat bed without bedrails?  3  -MW     Moving to and from a bed to a chair (including a wheelchair)?  3  -MW     Standing up from a chair using your arms (e.g., wheelchair, bedside chair)?  3  -MW     Climbing 3-5 steps with a railing?  2  -MW     To walk in hospital room?  3  -MW     AM-PAC 6 Clicks Score (PT)  17  -MW     Row Name 06/17/21 1506          Functional Assessment    Outcome Measure Options  AM-PAC 6 Clicks Daily Activity (OT)  -       User Key  (r) = Recorded By, (t) = Taken By, (c) = Cosigned By    Initials Name Provider Type    Monique Silva RN Registered Nurse    Salina Mcclellan OT Occupational Therapist        Occupational Therapy Education                 Title: PT OT SLP Therapies (In Progress)     Topic: Occupational Therapy (In Progress)     Point: ADL training (In Progress)     Description:   Instruct learner(s) on proper safety adaptation and remediation techniques during  self care or transfers.   Instruct in proper use of assistive devices.              Learning Progress Summary           Patient Acceptance, E,D,TB, VU,NR by  at 6/13/2021 0853    Acceptance, E,D, NR by CS at 6/8/2021 1335    Acceptance, E,D, NR by CS at 6/3/2021 1255    Comment: OT role and POC, sternal precautions re: ADL completion, safety awareness   Family Acceptance, E,D, NR by CS at 6/3/2021 1255    Comment: OT role and POC, sternal precautions re: ADL completion, safety awareness                   Point: Home exercise program (Done)     Description:   Instruct learner(s) on appropriate technique for monitoring, assisting and/or progressing therapeutic exercises/activities.              Learning Progress Summary           Patient Acceptance, E,TB, DU by EMANUEL at 6/17/2021 1507    Comment: Body mechanics for STS; pacing; UE HEP;    Acceptance, E,D,TB, VU,NR by KF at 6/13/2021 0853    Acceptance, E,D, NR by CS at 6/8/2021 1335   Family Acceptance, E,TB, DU by EMANUEL at 6/17/2021 1507    Comment: Body mechanics for STS; pacing; UE HEP;                   Point: Precautions (Done)     Description:   Instruct learner(s) on prescribed precautions during self-care and functional transfers.              Learning Progress Summary           Patient Acceptance, E,TB, DU by EMANUEL at 6/17/2021 1507    Comment: Body mechanics for STS; pacing; UE HEP;    Acceptance, E,D,TB, VU,NR by  at 6/13/2021 0853    Acceptance, E,D, NR by CS at 6/8/2021 1335    Acceptance, E,D, NR by CS at 6/3/2021 1255    Comment: OT role and POC, sternal precautions re: ADL completion, safety awareness   Family Acceptance, E,TB, DU by EMANUEL at 6/17/2021 1507    Comment: Body mechanics for STS; pacing; UE HEP;    Acceptance, E,D, NR by CS at 6/3/2021 1255    Comment: OT role and POC, sternal precautions re: ADL completion, safety awareness                   Point: Body mechanics (Done)     Description:   Instruct learner(s) on proper positioning and spine  alignment during self-care, functional mobility activities and/or exercises.              Learning Progress Summary           Patient Acceptance, E,TB, DU by EMANUEL at 6/17/2021 1507    Comment: Body mechanics for STS; pacing; UE HEP;    Acceptance, E,D,TB, VU,NR by KF at 6/13/2021 0853    Acceptance, E,D, NR by CS at 6/3/2021 1255    Comment: OT role and POC, sternal precautions re: ADL completion, safety awareness   Family Acceptance, E,TB, DU by EMANUEL at 6/17/2021 1507    Comment: Body mechanics for STS; pacing; UE HEP;    Acceptance, E,D, NR by CS at 6/3/2021 1255    Comment: OT role and POC, sternal precautions re: ADL completion, safety awareness                               User Key     Initials Effective Dates Name Provider Type Discipline    KF 04/03/18 - 06/15/21 Emily Goode, OT Occupational Therapist OT    EMANUEL 06/16/21 -  Salina Escalante OT Occupational Therapist OT    TIM 10/21/20 - 06/15/21 Carlos Harris, OT Occupational Therapist OT              OT Recommendation and Plan     Plan of Care Review  Plan of Care Reviewed With: patient, spouse  Progress: improving  Outcome Summary: Pt with good effort for all activities, ambulated in hallway using RW with tactile cues for optimal upright posture/CGA, performed scapular TE seated in chair. VSS on RA. Continue per OT POC.     Time Calculation:   Time Calculation- OT     Row Name 06/17/21 1405             Time Calculation- OT    OT Start Time  1405  -EMANUEL      OT Received On  06/17/21  -EMANUEL         Timed Charges    71384 - OT Therapeutic Exercise Minutes  23  -EMANUEL         Total Minutes    Timed Charges Total Minutes  23  -EMANUEL       Total Minutes  23  -EMANUEL        User Key  (r) = Recorded By, (t) = Taken By, (c) = Cosigned By    Initials Name Provider Type    Salina Mcclellan OT Occupational Therapist                 Salina Escalante OT  6/17/2021

## 2021-06-17 NOTE — THERAPY TREATMENT NOTE
Acute Care - Speech Language Pathology   Swallow Treatment Note [facility]     Patient Name: Augusto Lorenzana Jr.  : 1945  MRN: [id number]  Today's Date: 2021               Admit Date: 2021    Visit Dx:     ICD-10-CM ICD-9-CM   1. Non-STEMI (non-ST elevated myocardial infarction) (CMS/[facility])  I21.4 410.70   2. Acute congestive heart failure, unspecified heart failure type (CMS/[facility])  I50.9 428.0   3. History of coronary artery disease  Z86.79 V12.59   4. Coronary artery disease involving native coronary artery of native heart, angina presence unspecified  I25.10 414.01   5. Oropharyngeal dysphagia  R13.12 787.22   6. IMCHAEL (acute kidney injury) (CMS/[facility])  N17.9 584.9     Patient Active Problem List   Diagnosis   • NSTEMI 2021   • Hyperlipidemia LDL goal <70   • Essential hypertension   • T2DM on oral agents and insulin. HbA1c 7.4    • Severe 3 vessel CAD with preserved LV function (CMS/[facility])   • A/C kidney disease. ATN due to postoperative arrest. 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 insult and azotemia     Past Medical History:   Diagnosis Date   • CAD (coronary artery disease)    • CKD (chronic kidney disease) stage 3, GFR 30-59 ml/min (CMS/[facility])    • Diabetes mellitus (CMS/[facility])    • Hyperlipidemia    • Hypertension    • NSTEMI (non-ST elevated myocardial infarction) (CMS/[facility])      Past Surgical History:   Procedure Laterality Date   • CARDIAC CATHETERIZATION N/A 2021    Procedure: Left Heart Cath;  Surgeon: Blade Greene IV, MD;  Location: [facility] INVASIVE LOCATION;  Service: Cardiovascular;  Laterality: N/A;   • CARDIAC SURGERY      2 stents placed .   • CORONARY ARTERY BYPASS GRAFT N/A 2021    Procedure: MEDIAN STERNOTOMY CORONARY ARTERY BYPASS X 3 UTILIZING THE LEFT INTERNAL  MAMMARY ARTERY GRAFT, EVH OF THE GREATER RIGHT SAPHENOUS VEIN, AND PILO PER ANESTHESIA;  Surgeon: Jacek Miller MD;  Location: [facility];  Service: Cardiothoracic;  Laterality: N/A;        SWALLOW EVALUATION (last 72 hours)      SLP Adult Swallow Evaluation     Row Name 06/17/21 1420 06/16/21 0900 06/15/21 1020             Rehab Evaluation    Document Type  therapy note (daily note)  -MP  --  re-evaluation  -RD      Subjective Information  no complaints  -MP  no complaints  -CH  no complaints  -RD      Patient Observations  alert;cooperative  -MP  alert;cooperative;agree to therapy  -CH  alert;cooperative;agree to therapy  -RD      Patient/Family/Caregiver Comments/Observations  Family present  -MP  No family present  -CH  no family present  -RD      Care Plan Review  care plan/treatment goals reviewed;patient/other agree to care plan  -MP  evaluation/treatment results reviewed;care plan/treatment goals reviewed;risks/benefits reviewed;current/potential barriers reviewed;patient/other agree to care plan  -CH  evaluation/treatment results reviewed;care plan/treatment goals reviewed;risks/benefits reviewed;current/potential barriers reviewed;patient/other agree to care plan  -RD      Patient Effort  good  -MP  excellent  -CH  good  -RD      Symptoms Noted During/After Treatment  --  none  -CH  --         General Information    Patient Profile Reviewed  --  yes  -CH  yes  -RD      Pertinent History Of Current Problem  --  --  See initial evaluation. MBS to see if safe for any PO. Pt pulled out keofeed last night per RN.   -RD      Current Method of Nutrition  --  --  NPO  -RD      Precautions/Limitations, Vision  --  --  WFL;for purposes of eval  -RD      Precautions/Limitations, Hearing  --  --  WFL;for purposes of eval  -RD      Prior Level of Function-Communication  --  --  unknown  -RD      Prior Level of Function-Swallowing  --  --  no diet consistency restrictions  -RD      Plans/Goals Discussed with  --  --   patient;agreed upon  -RD      Barriers to Rehab  --  --  medically complex  -RD      Patient's Goals for Discharge  --  --  patient did not state  -RD         Pain    Additional Documentation  Pain Scale: FACES Pre/Post-Treatment (Group)  -MP  Pain Scale: FACES Pre/Post-Treatment (Group);Pain Scale: Numbers Pre/Post-Treatment (Group)  -CH  Pain Scale: FACES Pre/Post-Treatment (Group)  -RD         Pain Scale: Numbers Pre/Post-Treatment    Pretreatment Pain Rating  --  0/10 - no pain  -CH  --      Posttreatment Pain Rating  --  0/10 - no pain  -CH  --         Pain Scale: FACES Pre/Post-Treatment    Pain: FACES Scale, Pretreatment  0-->no hurt  -MP  0-->no hurt  -CH  0-->no hurt  -RD      Posttreatment Pain Rating  0-->no hurt  -MP  0-->no hurt  -CH  0-->no hurt  -RD         MBS/VFSS    Utensils Used  --  --  spoon;cup;straw  -RD      Consistencies Trialed  --  --  thin liquids;nectar/syrup-thick liquids;honey-thick liquids;pudding thick;regular textures  -RD         MBS/VFSS Interpretation    Oral Prep Phase  --  --  impaired oral phase of swallowing  -RD      Oral Transit Phase  --  --  impaired  -RD      Oral Residue  --  --  impaired  -RD         Oral Preparatory Phase    Oral Preparatory Phase  --  --  prolonged manipulation  -RD      Prolonged Manipulation  --  --  regular textures;secondary to reduced lingual strength  -RD         Oral Transit Phase    Impaired Oral Transit Phase  --  --  piecemeal oral transit;premature spillage of liquids into pharynx  -RD      Piecemeal Oral Transit  --  --  thin liquids;nectar-thick liquids;secondary to reduced lingual control  -RD      Premature Spillage of Liquids into Pharynx  --  --  thin liquids;nectar-thick liquids;secondary to reduced lingual control  -RD         Oral Residue    Impaired Oral Residue  --  --  diffuse residue throughout oral cavity  -RD      Diffuse Residue throughout Oral Cavity  --  --  all consistencies tested;secondary to reduced lingual strength   -RD      Response to Oral Residue  --  --  cleared residue;with spontaneous subsequent swallow  -RD         Initiation of Pharyngeal Swallow    Initiation of Pharyngeal Swallow  --  --  bolus in pyriform sinuses  -RD      Pharyngeal Phase  --  --  impaired pharyngeal phase of swallowing  -RD      Penetration Before the Swallow  --  --  thin liquids;nectar-thick liquids;secondary to reduced back of tongue control;secondary to delayed swallow initiation or mistiming  -RD      Penetration During the Swallow  --  --  thin liquids;nectar-thick liquids;honey-thick liquids;secondary to delayed swallow initiation or mistiming;secondary to reduced laryngeal elevation;secondary to reduced vestibular closure;other (see comments) honey-thick via cup only  -RD      Aspiration After the Swallow  --  --  thin liquids;nectar-thick liquids;secondary to residue;in laryngeal vestibule  -RD      Response to Penetration  --  --  no response  -RD      Response to Aspiration  --  --  inconsistent response;throat clear;no response, silent aspiration;other (see comments) inconsistent throat clear thins, silent nectar  -RD      Rosenbek's Scale  --  --  thin:;nectar:;8--->level 8;honey:;3--->level 3;pudding/puree:;regular textures:;1--->level 1  -RD      Pharyngeal Residue  --  --  all consistencies tested;base of tongue;valleculae;pyriform sinuses;laryngeal vestibule;secondary to reduced base of tongue retraction;secondary to reduced laryngeal elevation;secondary to reduced hyolaryngeal excursion;other (see comments) mild   -RD      Response to Residue  --  --  unable to clear residue  -RD      Attempted Compensatory Maneuvers  --  --  bolus size;bolus presentation style;additional subsequent swallow;throat clear after swallow;other (see comments) cognition impacting carryover  -RD      Response to Attempted Compensatory Maneuvers  --  --  prevented penetration;prevented aspiration  -RD      Pharyngeal Phase, Comment  --  --  Moderate  pharyngeal dysphagia. Penetration before/during the swallow w/ thin & nectar-thick liquids (sometimes on second swallow w/ piecemeal oral transit) 2' pre-spill, delayed initiation, and reduced vestibular closure. Penetration did not clear from laryngeal vestibule & pt eventually aspirated thin/nectar-thick vestibular residue upon subsequent swallows. Penetration during the swallow w/ honey-thick liquids via cup that did not clear from vestibule posing aspiration risk. Pt did not sense to clear, eventual aspiration inevitable. No penetration/aspiration w/ honey-thick liquids via tsp only, pudding, or solids. Mild diffuse pharyngeal residue w/ all consistencies 2' reduced BOT retraction, decr'd elevation, and reduced excursion. Cognition impacting carryover of further compensations, so DNT at this time. Pt may resume modified PO diet, but given that diet is very restricted, pt may need additional alternate nutrition per MD discretion. SLP will continue to address dysphagia w/ pharyngeal strengthening during treatment.   -RD         Esophageal Phase    Esophageal Phase  --  --  see radiology report for further details  -RD         Clinical Impression    Daily Summary of Progress (SLP)  progress toward functional goals is good  -MP  progress toward functional goals as expected  -CH  --      Barriers to Overall Progress (SLP)  --  --  pt self removed keofeed per RN  -RD      SLP Swallowing Diagnosis  --  mild-moderate;oral dysphagia;moderate;pharyngeal dysphagia  -CH  mild-moderate;oral dysphagia;moderate;pharyngeal dysphagia  -RD      Functional Impact  risk of aspiration/pneumonia;risk of malnutrition;risk of dehydration  -MP  risk of aspiration/pneumonia;risk of malnutrition;risk of dehydration  -CH  risk of aspiration/pneumonia;risk of malnutrition;risk of dehydration  -RD      Rehab Potential/Prognosis, Swallowing  good, to achieve stated therapy goals  -MP  good, to achieve stated therapy goals  -CH  good, to  achieve stated therapy goals  -RD      Swallow Criteria for Skilled Therapeutic Interventions Met  demonstrates skilled criteria  -MP  demonstrates skilled criteria  -CH  demonstrates skilled criteria  -RD      Plan for Continued Treatment (SLP)  Good participation in dysphagia tx - very motivated. Pt encouraged to independently completed exercises. SLP will continue to follow - pt would benefit from continued SLP services @ next level of care.  -MP  Saw patient for dysphagia tx at am meal. Baseline cough prior to PO observed. Dry cough throughout meal did not appear to be consistent with food/liquid trials. Patient independent with use of spoon sips of HT liquids and with small bites/slow rate. Provided handout of swallow exercises, reviewed rationale and instructions. Patient completed each x 5 with min cues. Continue to follow to address dysphagia in tx. Monitor diet tolerance closely.   -CH  --         Recommendations    Therapy Frequency (Swallow)  5 days per week  -MP  5 days per week  -CH  5 days per week  -RD      Predicted Duration Therapy Intervention (Days)  until discharge  -MP  until discharge  -CH  until discharge  -RD      SLP Diet Recommendation  mechanical soft with no mixed consistencies;honey thick liquids;other (see comments) via tsp only  -MP  mechanical soft with no mixed consistencies;honey thick liquids;other (see comments)  -CH  mechanical soft with no mixed consistencies;honey thick liquids;other (see comments) liquids via teaspoon only  -RD      Recommended Precautions and Strategies  upright posture during/after eating;small bites of food and sips of liquid;no straw;liquid via spoon only;general aspiration precautions;1:1 supervision  -MP  upright posture during/after eating;small bites of food and sips of liquid;no straw;liquid via spoon only;general aspiration precautions;1:1 supervision  -CH  upright posture during/after eating;small bites of food and sips of liquid;no straw;liquid via  spoon only;general aspiration precautions;1:1 supervision  -RD      Oral Care Recommendations  Oral Care BID/PRN  -MP  Oral Care BID/PRN  -CH  Oral Care BID/PRN  -RD      SLP Rec. for Method of Medication Administration  meds whole;meds crushed;with pudding or applesauce;as tolerated  -MP  meds whole;meds crushed;with pudding or applesauce;as tolerated  -CH  meds whole;meds crushed;with pudding or applesauce;as tolerated  -RD      Monitor for Signs of Aspiration  yes;notify SLP if any concerns  -MP  yes;notify SLP if any concerns  -CH  yes;notify SLP if any concerns  -RD      Anticipated Discharge Disposition (SLP)  unknown;anticipate therapy at next level of care  -MP  unknown;anticipate therapy at next level of care  -CH  unknown;anticipate therapy at next level of care  -RD        User Key  (r) = Recorded By, (t) = Taken By, (c) = Cosigned By    Initials Name Effective Dates    RD Mireille Ayers, MS CCC-SLP 04/03/18 - 06/15/21    Ismael Velarde, MS CCC-SLP 06/16/21 -      Jacquie Villarreal, MS CCC-SLP 06/16/21 -           EDUCATION  The patient has been educated in the following areas:   Dysphagia (Swallowing Impairment) Modified Diet Instruction.    SLP Recommendation and Plan     SLP Diet Recommendation: mechanical soft with no mixed consistencies, honey thick liquids, other (see comments) (via tsp only)  Recommended Precautions and Strategies: upright posture during/after eating, small bites of food and sips of liquid, no straw, liquid via spoon only, general aspiration precautions, 1:1 supervision  SLP Rec. for Method of Medication Administration: meds whole, meds crushed, with pudding or applesauce, as tolerated     Monitor for Signs of Aspiration: yes, notify SLP if any concerns     Swallow Criteria for Skilled Therapeutic Interventions Met: demonstrates skilled criteria  Anticipated Discharge Disposition (SLP): unknown, anticipate therapy at next level of care  Rehab Potential/Prognosis,  Swallowing: good, to achieve stated therapy goals  Therapy Frequency (Swallow): 5 days per week  Predicted Duration Therapy Intervention (Days): until discharge     Daily Summary of Progress (SLP): progress toward functional goals is good    Plan for Continued Treatment (SLP): Good participation in dysphagia tx - very motivated. Pt encouraged to independently completed exercises. SLP will continue to follow - pt would benefit from continued SLP services @ next level of care.              Plan of Care Reviewed With: patient    SLP GOALS     Row Name 06/17/21 1420 06/16/21 0900 06/15/21 1020       Oral Nutrition/Hydration Goal 1 (SLP)    Oral Nutrition/Hydration Goal 1, SLP  LTG: Pt will improve swallow function to safely return to PO diet w/ no overt s/sxs aspiration/distress w/ 100% acc and no cues  -MP  LTG: Pt will improve swallow function to safely return to PO diet w/ no overt s/sxs aspiration/distress w/ 100% acc and no cues  -CH  LTG: Pt will improve swallow function to safely return to PO diet w/ no overt s/sxs aspiration/distress w/ 100% acc and no cues  -RD    Time Frame (Oral Nutrition/Hydration Goal 1, SLP)  by discharge  -MP  by discharge  -CH  by discharge  -RD    Barriers (Oral Nutrition/Hydration Goal 1, SLP)  --  Dry cough at baseline during am meal this date. Did not appear swallow related. Continue to monitor diet tolerance  -CH  --    Progress/Outcomes (Oral Nutrition/Hydration Goal 1, SLP)  continuing progress toward goal  -MP  continuing progress toward goal  -CH  continuing progress toward goal  -RD       Oral Nutrition/Hydration Goal 2 (SLP)    Oral Nutrition/Hydration Goal 2, SLP  Pt will tolerate soft no mixed diet & honey-thick liquids via teaspoon only w/ no overt s/sxs aspiration/distress w/ 100% acc and no cues  -MP  Pt will tolerate soft no mixed diet & honey-thick liquids via teaspoon only w/ no overt s/sxs aspiration/distress w/ 100% acc and no cues  -CH  Pt will tolerate soft no  mixed diet & honey-thick liquids via teaspoon only w/ no overt s/sxs aspiration/distress w/ 100% acc and no cues  -RD    Time Frame (Oral Nutrition/Hydration Goal 2, SLP)  short term goal (STG)  -MP  short term goal (STG)  -CH  short term goal (STG)  -RD    Barriers (Oral Nutrition/Hydration Goal 2, SLP)  No overt s/sxs aspiration w/ HTL  -MP  Dry baseline cough and inconsistent t/o am meal. Patient independently compliant with spoon sips of HT liquids and sm bites of food as well as slow rate.   -CH  --    Progress/Outcomes (Oral Nutrition/Hydration Goal 2, SLP)  continuing progress toward goal  -MP  continuing progress toward goal  -CH  goal revised this date;goal ongoing  -RD       Oral Nutrition/Hydration Goal (SLP)    Oral Nutrition/Hydration Goal, SLP  Pt will tolerate therapeutic trials of ice chips, thin H2O, and puree w/ no overt s/sxs aspiration/distress w/ 70% acc and no cues in order to assess readiness for repeat instrumental eval.  -MP  Pt will tolerate therapeutic trials of ice chips, thin H2O, and puree w/ no overt s/sxs aspiration/distress w/ 70% acc and no cues in order to assess readiness for repeat instrumental eval.  -CH  Pt will tolerate therapeutic trials of ice chips, thin H2O, and puree w/ no overt s/sxs aspiration/distress w/ 70% acc and no cues in order to assess readiness for repeat instrumental eval.  -RD    Time Frame (Oral Nutrition/Hydration Goal, SLP)  short term goal (STG)  -MP  short term goal (STG)  -CH  short term goal (STG)  -RD    Barriers (Oral Nutrition/Hydration Goal, SLP)  WVQ w/ thin H2O  -MP  immediate throat clearing w/ thins.   -CH  --    Progress/Outcomes (Oral Nutrition/Hydration Goal, SLP)  continuing progress toward goal  -MP  goal ongoing  -CH  goal ongoing  -RD       Lingual Strengthening Goal 1 (SLP)    Activity (Lingual Strengthening Goal 1, SLP)  increase tongue back strength  -MP  increase tongue back strength  -CH  increase tongue back strength  -RD     Increase Tongue Back Strength  swallow trials;lingual resistance exercises  -MP  swallow trials;lingual resistance exercises  -CH  swallow trials;lingual resistance exercises  -RD    Sharp/Accuracy (Lingual Strengthening Goal 1, SLP)  with minimal cues (75-90% accuracy)  -MP  with minimal cues (75-90% accuracy)  -CH  with minimal cues (75-90% accuracy)  -RD    Time Frame (Lingual Strengthening Goal 1, SLP)  short term goal (STG)  -MP  short term goal (STG)  -CH  short term goal (STG)  -RD    Barriers (Lingual Strengthening Goal 1, SLP)  --  Handout provided and rationale/instructions reviewed. Patient completed all x 5 and agreeable/motivated to complete independently   -CH  --    Progress/Outcomes (Lingual Strengthening Goal 1, SLP)  continuing progress toward goal  -MP  continuing progress toward goal  -CH  goal ongoing  -RD       Pharyngeal Strengthening Exercise Goal 1 (SLP)    Activity (Pharyngeal Strengthening Goal 1, SLP)  increase superior movement of the hyolaryngeal complex;increase anterior movement of the hyolaryngeal complex;increase closure at entrance to airway/closure of airway at glottis;increase squeeze/positive pressure generation;increase tongue base retraction  -MP  increase superior movement of the hyolaryngeal complex;increase anterior movement of the hyolaryngeal complex;increase closure at entrance to airway/closure of airway at glottis;increase squeeze/positive pressure generation;increase tongue base retraction  -CH  increase superior movement of the hyolaryngeal complex;increase anterior movement of the hyolaryngeal complex;increase closure at entrance to airway/closure of airway at glottis;increase squeeze/positive pressure generation;increase tongue base retraction  -RD    Increase Timing  prepping - 3 second prep or suck swallow or 3-step swallow  -MP  prepping - 3 second prep or suck swallow or 3-step swallow  -CH  prepping - 3 second prep or suck swallow or 3-step swallow  -RD     Increase Superior Movement of the Hyolaryngeal Complex  effortful pitch glide (falsetto + pharyngeal squeeze)  -MP  effortful pitch glide (falsetto + pharyngeal squeeze)  -CH  effortful pitch glide (falsetto + pharyngeal squeeze)  -RD    Increase Anterior Movement of the Hyolaryngeal Complex  chin tuck against resistance (CTAR)  -MP  chin tuck against resistance (CTAR)  -CH  chin tuck against resistance (CTAR)  -RD    Increase Closure at Entrance to Airway/Closure of Airway at Glottis  supraglottic swallow  -MP  supraglottic swallow  -CH  supraglottic swallow  -RD    Increase Squeeze/Positive Pressure Generation  hard effortful swallow  -MP  hard effortful swallow  -CH  --    Increase Tongue Base Retraction  hard effortful swallow  -MP  hard effortful swallow  -CH  hard effortful swallow  -RD    [place]/Accuracy (Pharyngeal Strengthening Goal 1, SLP)  with minimal cues (75-90% accuracy)  -MP  with minimal cues (75-90% accuracy)  -CH  with minimal cues (75-90% accuracy)  -RD    Time Frame (Pharyngeal Strengthening Goal 1, SLP)  short term goal (STG)  -MP  short term goal (STG)  -CH  short term goal (STG)  -RD    Barriers (Pharyngeal Strengthening Goal 1, SLP)  Good participation, motivated. Has handout in room - encouraged independent practice   -MP  Handout provided and rationale/instructions reviewed. Patient completed all x 5 and agreeable/motivated to complete independently   -CH  --    Progress/Outcomes (Pharyngeal Strengthening Goal 1, SLP)  continuing progress toward goal  -MP  continuing progress toward goal  -CH  goal revised this date;goal ongoing  -RD       Swallow Management Recall Goal 1 (SLP)    Activity (Swallow Management Recall Goal 1, SLP)  independent recall of;safe diet/liquid level;safe diet level/texture  -MP  independent recall of;safe diet/liquid level;safe diet level/texture  -CH  independent recall of;safe diet/liquid level;safe diet level/texture  -RD    [place]/Accuracy  (Swallow Management Recall Goal 1, SLP)  independently (over 90% accuracy)  -MP  independently (over 90% accuracy)  -CH  independently (over 90% accuracy)  -RD    Time Frame (Swallow Management Recall Goal 1, SLP)  short term goal (STG)  -MP  short term goal (STG)  -CH  short term goal (STG)  -RD    Barriers (Swallow Management Recall Goal 1, SLP)  Pt independently taking tsp HTL  -MP  Patient able to recall liquid level, diet level w  cues.   -CH  --    Progress/Outcomes (Swallow Management Recall Goal 1, SLP)  continuing progress toward goal  -MP  continuing progress toward goal  -CH  goal ongoing  -RD       Swallow Compensatory Strategies Goal 1 (SLP)    Activity (Swallow Compensatory Strategies/Techniques Goal 1, SLP)  compensatory strategies;during p.o. trials;during meal intake;liquids by teaspoon only  -MP  compensatory strategies;during p.o. trials;during meal intake;liquids by teaspoon only  -CH  compensatory strategies;during p.o. trials;during meal intake;liquids by teaspoon only  -RD    [place]/Accuracy (Swallow Compensatory Strategies/Techniques Goal 1, SLP)  independently (over 90% accuracy)  -MP  independently (over 90% accuracy)  -CH  independently (over 90% accuracy)  -RD    Time Frame (Swallow Compensatory Strategies/Techniques Goal 1, SLP)  short term goal (STG)  -MP  short term goal (STG)  -CH  short term goal (STG)  -RD    Barriers (Swallow Compensatory Strategies/Techniques Goal 1, SLP)  --  Patient independent with use of spoon sips of HT liquids, small bites of food and slow rate of intake.   -CH  --    Progress/Outcomes (Swallow Compensatory Strategies/Techniques Goal 1, SLP)  continuing progress toward goal  -MP  continuing progress toward goal  -CH  goal revised this date;goal ongoing  -RD      User Key  (r) = Recorded By, (t) = Taken By, (c) = Cosigned By    Initials Name Provider Type    Mireille Taylor MS CCC-SLP Speech and Language Pathologist    Ismael Velarde MS  CCC-SLP Speech and Language Pathologist    Jacquie Mcdermott MS CCC-SLP Speech and Language Pathologist             Time Calculation:   Time Calculation- SLP     Row Name 06/17/21 1528             Time Calculation- SLP    SLP Start Time  1430  -MP      SLP Received On  06/17/21  -MP         Untimed Charges    45568-CC Treatment Swallow Minutes  40  -MP         Total Minutes    Untimed Charges Total Minutes  40  -MP       Total Minutes  40  -MP        User Key  (r) = Recorded By, (t) = Taken By, (c) = Cosigned By    Initials Name Provider Type    Ismael Velarde MS CCC-SLP Speech and Language Pathologist          Therapy Charges for Today     Code Description Service Date Service Provider Modifiers Qty    [id number] HC ST TREATMENT SWALLOW 3 6/17/2021 Ismael Hall MS CCC-SLP GN 1        Patient was not wearing a face mask and did exhibit coughing during this therapy encounter.  Procedure performed was aerosolizing, involved close contact (within 6 feet for at least 15 minutes or longer), and did not involve contact with infectious secretions or specimens.  Therapist used appropriate personal protective equipment including gloves, standard procedure mask and eye protection.  Appropriate PPE was worn during the entire therapy session.  Hand hygiene was completed before and after therapy session.        MS SONIA Nava  6/17/2021

## 2021-06-17 NOTE — PROGRESS NOTES
"   LOS: 26 days    Patient Care Team:  Rob Polanco MD as PCP - General (Internal Medicine)    Reason For Visit:  F/U MICHAEL ON CKD  Subjective       Patient overall improving no added distress no new events.  Review of Systems:   Denies any nausea vomiting chest pain shortness of breath no dysuria hematuria dysuria    Objective     amLODIPine, 10 mg, Nasogastric, Q24H  aspirin, 325 mg, Oral, Daily  atorvastatin, 40 mg, Nasogastric, Nightly  carvedilol, 25 mg, Nasogastric, BID With Meals  gabapentin, 300 mg, Nasogastric, Nightly  insulin lispro, 0-9 Units, Subcutaneous, TID AC  isosorbide dinitrate, 20 mg, Nasogastric, TID - Nitrates  melatonin, 5 mg, Nasogastric, Nightly  multivitamin with minerals, 1 tablet, Oral, Daily  pantoprazole, 40 mg, Intravenous, BID  pharmacy consult - MTM, , Does not apply, Daily  QUEtiapine, 12.5 mg, Oral, Q12H  senna-docusate sodium, 2 tablet, Oral, BID  sodium chloride, 10 mL, Intravenous, Q12H  thiamine, 100 mg, Nasogastric, Daily             Vital Signs:  Blood pressure 139/69, pulse 70, temperature 98.4 °F (36.9 °C), temperature source Oral, resp. rate 18, height 170 cm (66.93\"), weight 81.9 kg (180 lb 9.6 oz), SpO2 96 %.    Flowsheet Rows      First Filed Value   Admission Height  170.2 cm (67\") Documented at 05/22/2021 0433   Admission Weight  88.5 kg (195 lb) Documented at 05/22/2021 0433          06/16 0701 - 06/17 0700  In: 1040 [P.O.:1040]  Out: 1500 [Urine:1500]    Physical Exam:    General Appearance: Awake alert oriented x4.  Eyes: PER, conjunctivae and sclerae normal, no icterus  Lungs: respirations regular and unlabored, no crepitus, clear to auscultation  Heart/CV: regular rhythm & normal rate, no murmur, no gallop, no rub.  Abdomen: not distended, soft, non-tender, no masses,  bowel sounds present.  Extremities no edema cyanosis  Skin: No rash, Warm and dry    Radiology:    Labs:  Results from last 7 days   Lab Units 06/14/21  0518 06/12/21  0401 06/11/21  0401 "   WBC 10*3/mm3 8.24 9.85 12.73*   HEMOGLOBIN g/dL 8.8* 8.4* 8.7*   HEMATOCRIT % 27.8* 26.3* 27.2*   PLATELETS 10*3/mm3 291 260 268     Results from last 7 days   Lab Units 06/16/21  0559 06/15/21  0426 06/14/21  0518 06/12/21  0401 06/11/21  0401   SODIUM mmol/L 138  --  137 135* 136   POTASSIUM mmol/L 3.8  --  3.9 3.4* 3.6   CHLORIDE mmol/L 105  --  101 100 100   CO2 mmol/L 21.0*  --  26.0 26.0 27.0   BUN mg/dL 68*  --  65* 67* 64*   CREATININE mg/dL 1.69*  --  1.62* 1.69* 1.77*   CALCIUM mg/dL 8.9  --  9.3 9.0 9.1   PHOSPHORUS mg/dL 4.3  --  3.9 3.6  --    MAGNESIUM mg/dL  --  2.0 1.8 1.9  --    ALBUMIN g/dL 2.60*  --  3.10* 3.00*  --      Results from last 7 days   Lab Units 06/16/21  0559   GLUCOSE mg/dL 127*                       Estimated Creatinine Clearance: 39.2 mL/min (A) (by C-G formula based on SCr of 1.69 mg/dL (H)).      Assessment       NSTEMI 5/22/2021    Hyperlipidemia LDL goal <70    Essential hypertension    T2DM on oral agents and insulin. HbA1c 7.4     A/C kidney disease. ATN due to postoperative arrest. 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 Combination of anoxic insult and azotemia    1.  MICHAEL: Patient is off dialysis now renal function stable.  2.  Anemia: Monitor closely outpatient.  3.  S/p CABG x3 on 5/28/2021.  Plan:  Okay to discharge from [facility], make arrangement for outpatient follow-up in [facility]'s office.  #[phone]       Jovanny Chambers MD  06/17/21  13:59 EDT

## 2021-06-17 NOTE — CASE MANAGEMENT/SOCIAL WORK
Case Management Discharge Note      Final Note: Per Crystal with  [facility], patient is approved to come to their facility. Spoke with patient and his wife at bedside. Anticipate discharge tomorrow. Patient's wife will transport. Patient will need a covid swab upon discharge. Please call report to [phone],  [facility] has Epic access to obtain dc info.    Provided Post Acute Provider List?: N/A  N/A Provider List Comment: denies need    Selected Continued Care - Admitted Since 5/22/2021     Destination Coordination complete    Service Provider Selected Services Address Phone Fax Patient Preferred    [facility]  Inpatient Rehabilitation [address] [place] [id number] [phone] --          Durable Medical Equipment    No services have been selected for the patient.              Dialysis/Infusion    No services have been selected for the patient.              Home Medical Care    No services have been selected for the patient.              Therapy    No services have been selected for the patient.              Community Resources    No services have been selected for the patient.              Community & DME    No services have been selected for the patient.                       Final Discharge Disposition Code: 62 - inpatient rehab facility

## 2021-06-17 NOTE — PROGRESS NOTES
Cardiothoracic Surgery Progress Note      POD # 19 s/p CABG       LOS: 26 days      Subjective:  Wide awake and conversant this morning.      Objective:  Vital Signs  Temp:  [97.2 °F (36.2 °C)-97.8 °F (36.6 °C)] 97.6 °F (36.4 °C)  Heart Rate:  [64-91] 74  Resp:  [16-18] 16  BP: (116-154)/(71-92) 154/90    Physical Exam:   General Appearance: easily roused   Lungs: Coarse lung sounds bilaterally with wheezing. Breathing unlabored.    Heart: regular rhythm & normal rate, normal S1, S2, no murmur, no gallop, no rub and no click   Skin: Incision c/d/i     Results:    Results from last 7 days   Lab Units 06/14/21  0518   WBC 10*3/mm3 8.24   HEMOGLOBIN g/dL 8.8*   HEMATOCRIT % 27.8*   PLATELETS 10*3/mm3 291     Results from last 7 days   Lab Units 06/16/21  0559   SODIUM mmol/L 138   POTASSIUM mmol/L 3.8   CHLORIDE mmol/L 105   CO2 mmol/L 21.0*   BUN mg/dL 68*   CREATININE mg/dL 1.69*   GLUCOSE mg/dL 127*   CALCIUM mg/dL 8.9         Assessment:    NSTEMI 5/22/2021    Hyperlipidemia LDL goal <70    Essential hypertension    T2DM on oral agents and insulin. HbA1c 7.4     A/C kidney disease. ATN due to postoperative arrest. 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 Combination of anoxic insult and azotemia    POD #20 CABG x3    Plan:  Continue his modified diet  To rehab tomorrow  Looks the best he has since prior to surgery        Jacek Miller MD  06/17/21  08:01 EDT

## 2021-06-17 NOTE — PLAN OF CARE
Goal Outcome Evaluation:  Plan of Care Reviewed With: patient           Outcome Summary: A/DO to place only. VSS. RA. NSR. Pt ambulated in room with this RN before bed. Pt has rested well through shift. Uneventful night. Pt is pleased with his progress and excited to DC. Waiting rehab placement. Plan is to DC tomorrow 6-18 if accepted somewhere. No complaints. Will continue to monitor.

## 2021-06-17 NOTE — PROGRESS NOTES
"    [facility] Medicine Services  PROGRESS NOTE    Patient Name: Augusto Lorenzana Jr.  : 1945  MRN: [id number]    Date of Admission: 2021  Primary Care Physician: Rob Polanco MD    Subjective   Subjective     CC:  F/U med mgmt s/p CABG    HPI:  Up in chair, NAD. Wife in room. Has a coarse cough that is \"nagging\". No new concerns, tolerating his diet well     ROS:  Gen- No fevers, chills  CV- No chest pain, palpitations  Resp- + cough, no dyspnea  GI- No N/V/D, abd pain       Objective   Objective     Vital Signs:   Temp:  [97.2 °F (36.2 °C)-98.4 °F (36.9 °C)] 98.4 °F (36.9 °C)  Heart Rate:  [64-91] 70  Resp:  [16-18] 18  BP: (124-154)/(69-92) 139/69        Physical Exam:  Constitutional: No acute distress, awake, alert, up in chair   HENT: NCAT, mucous membranes moist  Respiratory: right sided coarse rhonchi, respiratory effort normal   Cardiovascular: RRR, no murmurs, rubs, or gallops  Gastrointestinal: Positive bowel sounds, soft, nontender, nondistended  Musculoskeletal: No bilateral ankle edema  Psychiatric: Appropriate affect, cooperative  Neurologic: Oriented x 3, strength symmetric in all extremities, Cranial Nerves grossly intact to confrontation, speech clear  Skin: No rashes, midline chest incision LUIS, CDI       Results Reviewed:  Results from last 7 days   Lab Units 21  0518 21  0401 21  0401   WBC 10*3/mm3 8.24 9.85 12.73*   HEMOGLOBIN g/dL 8.8* 8.4* 8.7*   HEMATOCRIT % 27.8* 26.3* 27.2*   PLATELETS 10*3/mm3 291 260 268     Results from last 7 days   Lab Units 21  0559 21  0518 21  0401   SODIUM mmol/L 138 137 135*   POTASSIUM mmol/L 3.8 3.9 3.4*   CHLORIDE mmol/L 105 101 100   CO2 mmol/L 21.0* 26.0 26.0   BUN mg/dL 68* 65* 67*   CREATININE mg/dL 1.69* 1.62* 1.69*   GLUCOSE mg/dL 127* 188* 116*   CALCIUM mg/dL 8.9 9.3 9.0     Estimated Creatinine Clearance: 39.2 mL/min (A) (by C-G formula based on SCr of 1.69 mg/dL " (H)).    Microbiology Results Abnormal     Procedure Component Value - Date/Time    Blood Culture - Blood, Arm, Right [[id number]] Collected: 06/05/21 1412    Lab Status: Final result Specimen: Blood from Arm, Right Updated: 06/10/21 1431     Blood Culture No growth at 5 days    Blood Culture - Blood, Cannula [[id number]] Collected: 06/05/21 1201    Lab Status: Final result Specimen: Blood from Cannula Updated: 06/10/21 1245     Blood Culture No growth at 5 days    Eosinophil Smear - Urine, Urine, Clean Catch [[id number]]  (Normal) Collected: 05/30/21 1416    Lab Status: Final result Specimen: Urine, Clean Catch Updated: 05/30/21 1614     Eosinophil Smear 0 % EOS/100 Cells     Narrative:      No eosinophil seen    Eosinophil Smear - Urine, Urine, Clean Catch [[id number]]  (Normal) Collected: 05/23/21 1838    Lab Status: Final result Specimen: Urine, Clean Catch Updated: 05/24/21 0459     Eosinophil Smear 0 % EOS/100 Cells     Narrative:      No eosinophil seen    COVID PRE-OP / PRE-PROCEDURE SCREENING ORDER (NO ISOLATION) - Swab, Nasopharynx [[id number]]  (Normal) Collected: 05/22/21 0626    Lab Status: Final result Specimen: Swab from Nasopharynx Updated: 05/22/21 0702    Narrative:      The following orders were created for panel order COVID PRE-OP / PRE-PROCEDURE SCREENING ORDER (NO ISOLATION) - Swab, Nasopharynx.  Procedure                               Abnormality         Status                     ---------                               -----------         ------                     COVID-19 and FLU A/B PCR...[[id number]]  Normal              Final result                 Please view results for these tests on the individual orders.    COVID-19 and FLU A/B PCR - Swab, Nasopharynx [[id number]]  (Normal) Collected: 05/22/21 0626    Lab Status: Final result Specimen: Swab from Nasopharynx Updated: 05/22/21 0702     COVID19 Not Detected     Influenza A PCR Not Detected     Influenza B PCR Not Detected          Imaging  Results (Last 24 Hours)     ** No results found for the last 24 hours. **          Results for orders placed during the hospital encounter of 05/22/21    Adult Transthoracic Echo Complete w/ Color, Spectral and Contrast if necessary per protocol    Interpretation Summary  · Left ventricular systolic function is normal. Estimated left ventricular EF = 60%.  · Left ventricular diastolic function is consistent with (grade I) impaired relaxation.  · Mild aortic valve stenosis is present.  · Estimated right ventricular systolic pressure from tricuspid regurgitation is normal (<35 mmHg).      I have reviewed the medications:  Scheduled Meds:amLODIPine, 10 mg, Nasogastric, Q24H  aspirin, 325 mg, Oral, Daily  atorvastatin, 40 mg, Nasogastric, Nightly  carvedilol, 25 mg, Nasogastric, BID With Meals  gabapentin, 300 mg, Nasogastric, Nightly  insulin lispro, 0-9 Units, Subcutaneous, TID AC  isosorbide dinitrate, 20 mg, Nasogastric, TID - Nitrates  melatonin, 5 mg, Nasogastric, Nightly  multivitamin with minerals, 1 tablet, Oral, Daily  pantoprazole, 40 mg, Intravenous, BID  pharmacy consult - MTM, , Does not apply, Daily  QUEtiapine, 12.5 mg, Oral, Q12H  senna-docusate sodium, 2 tablet, Oral, BID  sodium chloride, 10 mL, Intravenous, Q12H  thiamine, 100 mg, Nasogastric, Daily      Continuous Infusions:   PRN Meds:.•  acetaminophen **OR** acetaminophen **OR** acetaminophen  •  [DISCONTINUED] senna-docusate sodium **AND** polyethylene glycol **AND** [DISCONTINUED] bisacodyl **AND** bisacodyl  •  dextrose  •  guaiFENesin-dextromethorphan  •  haloperidol lactate  •  heparin (porcine)  •  ipratropium-albuterol  •  magnesium sulfate **OR** magnesium sulfate in D5W 1g/100mL (PREMIX) **OR** magnesium sulfate  •  naloxone  •  ondansetron    Assessment/Plan   Assessment & Plan     Active Hospital Problems    Diagnosis  POA   • **NSTEMI 5/22/2021 [I21.4]  Yes   • Postop encephalopathy post code. Combination of anoxic insult and azotemia  [G93.40]  No   • Perioperative cardiac arrest with ventricular fibrillation (CMS/HCC) [I46.9, I49.01]  Yes   • S/P CABG x 3 on 5/28/21 [Z95.1]  Not Applicable   • Gout [M10.9]  Yes   • Former smokeless tobacco use [Z87.891]  Not Applicable   • A/C kidney disease. ATN due to postoperative arrest. Dialysis begun 6/3/2021 [N18.9]  Yes   • T2DM on oral agents and insulin. HbA1c 7.4  [E11.9, Z79.4]  Not Applicable   • Hyperlipidemia LDL goal <70 [E78.5]  Yes   • Essential hypertension [I10]  Yes      Resolved Hospital Problems    Diagnosis Date Resolved POA   • Postoperative hypoxemia [R09.02, Z98.890] 06/04/2021 No   • Leukocytosis [D72.829] 05/22/2021 Yes   • Acute CHF (CMS/HCC) [I50.9] 05/22/2021 Yes   • MICHAEL (acute kidney injury) (CMS/[facility]) [N17.9] 05/27/2021 Yes   • Non-STEMI (non-ST elevated myocardial infarction) (CMS/HCC) [I21.4] 05/22/2021 Yes   • Non-STEMI (non-ST elevated myocardial infarction) (CMS/HCC) [I21.4] 05/24/2021 Yes        Brief Hospital Course to date:  Augusto Lorenzana Jr. is a 76 y.o. male with a known history of coronary artery disease, previous stent to his RCA in 2009, diabetes, hypertension, dyslipidemia, gout, chronic kidney disease, smokeless tobacco use who presented to the emergency room May 22 complaining of shortness of air and found to have a non-ST elevation MI.  Heart catheterization revealed multivessel disease.  He underwent CABG x3 vessels on May 28.  Course was complicated by ventricular fibrillation and asystole requiring resuscitation x2 on the day of surgery.  He subsequently underwent paced rhythm for about 48 hours before resuming sinus rhythm.  He required 3 units of blood, 4 units of FFP and 1 sixpack of platelets for bleeding.  He was extubated on the 29th but developed progressive renal failure and azotemia requiring hemodialysis.  Lower extremity duplex was performed and was negative for DVT.  Renal function started to recover and he underwent some diuresis.  He was also  noted to be encephalopathic with no acute stroke.  Mentation also started to improve.  He has a recent history of colonoscopy with polypectomy.  Failed fees evaluation on 6/11. transferred to [facility] from ICU on 6/12.     This patient's problems and plans were partially entered by my partner and updated as appropriate by me 06/17/21.      CABG x3  Evelin-operative cardiac arrest with ventricular arrhythmia  -5/28/2021 per Dr. Miller  -Patient with some perioperative V. Fib  -He has had no further ventricular arrhythmias  -Intraoperative echocardiogram revealed moderate aortic valve stenosis  --IS/ OPEP added today for pulm toilet     Diabetes mellitus type 2  -MDSSI continues, well controlled     Hypertension  -BP remains stable, continue Norvasc, Coreg, Isordil -changes made by cardiology    Acute on chronic kidney disease  -He did require dialysis after surgery  -Creatinine improving, stablizing  -Nephrology following- dialysis cath removed on 6/14    Postop encephalopathy  -resolved   -Continue as needed Haldol for agitation     -continue seroquel 12.5 mg BID.   -EKG continues to show decrease in QTc, 486    Severe pharyngeal dysphagia  -MBS has allowed for modified diet, he is tolerating well     DVT prophylaxis:  Medical and mechanical DVT prophylaxis orders are present.       Disposition: I expect the patient to be discharged TBD. Likely needs rehab. CM to follow.     CODE STATUS:   Code Status and Medical Interventions:   Ordered at: 05/22/21 0639     Code Status:    CPR     Medical Interventions (Level of Support Prior to Arrest):    Full       Annmarie Ackerman, APRN  06/17/21

## 2021-06-18 ENCOUNTER — HOSPITAL ENCOUNTER (INPATIENT)
Facility: HOSPITAL | Age: 76
LOS: 7 days | Discharge: HOME | End: 2021-06-25
Attending: FAMILY MEDICINE | Admitting: FAMILY MEDICINE

## 2021-06-18 VITALS
TEMPERATURE: 97.1 F | SYSTOLIC BLOOD PRESSURE: 170 MMHG | OXYGEN SATURATION: 97 % | WEIGHT: 177.1 LBS | RESPIRATION RATE: 18 BRPM | DIASTOLIC BLOOD PRESSURE: 93 MMHG | HEIGHT: 67 IN | HEART RATE: 68 BPM | BODY MASS INDEX: 27.8 KG/M2

## 2021-06-18 DIAGNOSIS — Z87.891 FORMER SMOKELESS TOBACCO USE: ICD-10-CM

## 2021-06-18 DIAGNOSIS — E78.5 HYPERLIPIDEMIA LDL GOAL <70: ICD-10-CM

## 2021-06-18 DIAGNOSIS — N18.9 CHRONIC KIDNEY DISEASE, UNSPECIFIED CKD STAGE: ICD-10-CM

## 2021-06-18 DIAGNOSIS — I10 ESSENTIAL HYPERTENSION: ICD-10-CM

## 2021-06-18 DIAGNOSIS — I46.9 CARDIAC ARREST WITH VENTRICULAR FIBRILLATION (HCC): ICD-10-CM

## 2021-06-18 DIAGNOSIS — R53.81 DEBILITY: Primary | ICD-10-CM

## 2021-06-18 DIAGNOSIS — Z79.4 TYPE 2 DIABETES MELLITUS WITH OTHER SPECIFIED COMPLICATION, WITH LONG-TERM CURRENT USE OF INSULIN (HCC): ICD-10-CM

## 2021-06-18 DIAGNOSIS — E11.69 TYPE 2 DIABETES MELLITUS WITH OTHER SPECIFIED COMPLICATION, WITH LONG-TERM CURRENT USE OF INSULIN (HCC): ICD-10-CM

## 2021-06-18 DIAGNOSIS — I25.110 CORONARY ARTERY DISEASE INVOLVING NATIVE CORONARY ARTERY OF NATIVE HEART WITH UNSTABLE ANGINA PECTORIS (HCC): ICD-10-CM

## 2021-06-18 DIAGNOSIS — I49.01 CARDIAC ARREST WITH VENTRICULAR FIBRILLATION (HCC): ICD-10-CM

## 2021-06-18 DIAGNOSIS — Z95.1 S/P CABG X 3: ICD-10-CM

## 2021-06-18 DIAGNOSIS — I21.4 NSTEMI (NON-ST ELEVATED MYOCARDIAL INFARCTION) (HCC): ICD-10-CM

## 2021-06-18 DIAGNOSIS — G93.40 ENCEPHALOPATHY ACUTE: ICD-10-CM

## 2021-06-18 LAB
GLUCOSE BLDC GLUCOMTR-MCNC: 121 MG/DL (ref 70–130)
GLUCOSE BLDC GLUCOMTR-MCNC: 147 MG/DL (ref 70–130)
GLUCOSE BLDC GLUCOMTR-MCNC: 183 MG/DL (ref 70–130)

## 2021-06-18 PROCEDURE — 82962 GLUCOSE BLOOD TEST: CPT

## 2021-06-18 PROCEDURE — 63710000001 INSULIN DETEMIR PER 5 UNITS: Performed by: FAMILY MEDICINE

## 2021-06-18 PROCEDURE — 25010000002 HEPARIN (PORCINE) PER 1000 UNITS: Performed by: FAMILY MEDICINE

## 2021-06-18 PROCEDURE — 99232 SBSQ HOSP IP/OBS MODERATE 35: CPT | Performed by: INTERNAL MEDICINE

## 2021-06-18 PROCEDURE — 63710000001 INSULIN LISPRO (HUMAN) PER 5 UNITS: Performed by: NURSE PRACTITIONER

## 2021-06-18 PROCEDURE — 99223 1ST HOSP IP/OBS HIGH 75: CPT | Performed by: FAMILY MEDICINE

## 2021-06-18 RX ORDER — GABAPENTIN 300 MG/1
600 CAPSULE ORAL NIGHTLY
Status: DISCONTINUED | OUTPATIENT
Start: 2021-06-18 | End: 2021-06-25 | Stop reason: HOSPADM

## 2021-06-18 RX ORDER — HEPARIN SODIUM 5000 [USP'U]/ML
5000 INJECTION, SOLUTION INTRAVENOUS; SUBCUTANEOUS EVERY 12 HOURS SCHEDULED
Status: DISCONTINUED | OUTPATIENT
Start: 2021-06-18 | End: 2021-06-25 | Stop reason: HOSPADM

## 2021-06-18 RX ORDER — ISOSORBIDE DINITRATE 20 MG/1
20 TABLET ORAL
Status: DISCONTINUED | OUTPATIENT
Start: 2021-06-18 | End: 2021-06-25 | Stop reason: HOSPADM

## 2021-06-18 RX ORDER — ASPIRIN 81 MG/1
81 TABLET ORAL DAILY
COMMUNITY

## 2021-06-18 RX ORDER — ASPIRIN 325 MG
325 TABLET, DELAYED RELEASE (ENTERIC COATED) ORAL DAILY
Status: DISCONTINUED | OUTPATIENT
Start: 2021-06-19 | End: 2021-06-25 | Stop reason: HOSPADM

## 2021-06-18 RX ORDER — QUETIAPINE FUMARATE 25 MG/1
12.5 TABLET, FILM COATED ORAL EVERY 12 HOURS SCHEDULED
Qty: 5 TABLET | Refills: 0 | Status: ON HOLD | OUTPATIENT
Start: 2021-06-18 | End: 2021-06-18

## 2021-06-18 RX ORDER — ISOSORBIDE DINITRATE 20 MG/1
20 TABLET ORAL 3 TIMES DAILY
Qty: 90 TABLET | Refills: 0 | Status: ON HOLD | OUTPATIENT
Start: 2021-06-18 | End: 2021-06-18

## 2021-06-18 RX ORDER — GUAIFENESIN/DEXTROMETHORPHAN 100-10MG/5
5 SYRUP ORAL EVERY 4 HOURS PRN
Status: ON HOLD
Start: 2021-06-18 | End: 2021-06-18

## 2021-06-18 RX ORDER — AMLODIPINE BESYLATE AND BENAZEPRIL HYDROCHLORIDE 10; 20 MG/1; MG/1
1 CAPSULE ORAL DAILY
COMMUNITY
End: 2021-06-25 | Stop reason: HOSPADM

## 2021-06-18 RX ORDER — ATORVASTATIN CALCIUM 40 MG/1
40 TABLET, FILM COATED ORAL NIGHTLY
Qty: 90 TABLET | Refills: 0 | Status: ON HOLD | OUTPATIENT
Start: 2021-06-18 | End: 2021-06-18

## 2021-06-18 RX ORDER — GLIPIZIDE 5 MG/1
5 TABLET ORAL
Status: DISCONTINUED | OUTPATIENT
Start: 2021-06-18 | End: 2021-06-25 | Stop reason: HOSPADM

## 2021-06-18 RX ORDER — GUAIFENESIN/DEXTROMETHORPHAN 100-10MG/5
5 SYRUP ORAL EVERY 4 HOURS PRN
Status: DISCONTINUED | OUTPATIENT
Start: 2021-06-18 | End: 2021-06-25 | Stop reason: HOSPADM

## 2021-06-18 RX ORDER — ASPIRIN 325 MG
325 TABLET ORAL DAILY
Qty: 30 TABLET | Refills: 3 | Status: ON HOLD | OUTPATIENT
Start: 2021-06-19 | End: 2021-06-18

## 2021-06-18 RX ORDER — CLOPIDOGREL BISULFATE 75 MG/1
75 TABLET ORAL DAILY
COMMUNITY
End: 2021-06-25 | Stop reason: HOSPADM

## 2021-06-18 RX ORDER — CLONIDINE HYDROCHLORIDE 0.1 MG/1
0.1 TABLET ORAL DAILY
COMMUNITY
End: 2021-06-25 | Stop reason: HOSPADM

## 2021-06-18 RX ORDER — ALLOPURINOL 300 MG/1
300 TABLET ORAL DAILY
Status: DISCONTINUED | OUTPATIENT
Start: 2021-06-19 | End: 2021-06-25 | Stop reason: HOSPADM

## 2021-06-18 RX ORDER — AMLODIPINE BESYLATE 10 MG/1
10 TABLET ORAL
Qty: 90 TABLET | Refills: 0 | Status: ON HOLD | OUTPATIENT
Start: 2021-06-18 | End: 2021-06-18

## 2021-06-18 RX ORDER — ATORVASTATIN CALCIUM 40 MG/1
40 TABLET, FILM COATED ORAL NIGHTLY
Status: DISCONTINUED | OUTPATIENT
Start: 2021-06-18 | End: 2021-06-25 | Stop reason: HOSPADM

## 2021-06-18 RX ORDER — SIMVASTATIN 20 MG
20 TABLET ORAL NIGHTLY
COMMUNITY
End: 2021-06-25 | Stop reason: HOSPADM

## 2021-06-18 RX ORDER — QUETIAPINE FUMARATE 25 MG/1
12.5 TABLET, FILM COATED ORAL EVERY 12 HOURS SCHEDULED
Status: DISCONTINUED | OUTPATIENT
Start: 2021-06-18 | End: 2021-06-25 | Stop reason: HOSPADM

## 2021-06-18 RX ORDER — AMLODIPINE BESYLATE 10 MG/1
10 TABLET ORAL
Status: DISCONTINUED | OUTPATIENT
Start: 2021-06-19 | End: 2021-06-25 | Stop reason: HOSPADM

## 2021-06-18 RX ORDER — CARVEDILOL 25 MG/1
25 TABLET ORAL 2 TIMES DAILY WITH MEALS
Qty: 90 TABLET | Refills: 0 | Status: ON HOLD | OUTPATIENT
Start: 2021-06-18 | End: 2021-06-18

## 2021-06-18 RX ORDER — CARVEDILOL 25 MG/1
25 TABLET ORAL 2 TIMES DAILY WITH MEALS
Status: DISCONTINUED | OUTPATIENT
Start: 2021-06-18 | End: 2021-06-25 | Stop reason: HOSPADM

## 2021-06-18 RX ADMIN — CARVEDILOL 25 MG: 25 TABLET, FILM COATED ORAL at 21:17

## 2021-06-18 RX ADMIN — AMLODIPINE BESYLATE 10 MG: 10 TABLET ORAL at 11:06

## 2021-06-18 RX ADMIN — INSULIN LISPRO 2 UNITS: 100 INJECTION, SOLUTION INTRAVENOUS; SUBCUTANEOUS at 13:03

## 2021-06-18 RX ADMIN — ISOSORBIDE DINITRATE 20 MG: 20 TABLET ORAL at 11:07

## 2021-06-18 RX ADMIN — ISOSORBIDE DINITRATE 20 MG: 20 TABLET ORAL at 13:03

## 2021-06-18 RX ADMIN — QUETIAPINE FUMARATE 12.5 MG: 25 TABLET, FILM COATED ORAL at 21:18

## 2021-06-18 RX ADMIN — ATORVASTATIN CALCIUM 40 MG: 40 TABLET, FILM COATED ORAL at 21:18

## 2021-06-18 RX ADMIN — Medication 1 TABLET: at 11:09

## 2021-06-18 RX ADMIN — GUAIFENESIN AND DEXTROMETHORPHAN 5 ML: 100; 10 SYRUP ORAL at 21:35

## 2021-06-18 RX ADMIN — INSULIN DETEMIR 30 UNITS: 100 INJECTION, SOLUTION SUBCUTANEOUS at 21:59

## 2021-06-18 RX ADMIN — ISOSORBIDE DINITRATE 20 MG: 20 TABLET ORAL at 21:18

## 2021-06-18 RX ADMIN — QUETIAPINE FUMARATE 12.5 MG: 25 TABLET ORAL at 11:06

## 2021-06-18 RX ADMIN — CARVEDILOL 25 MG: 12.5 TABLET, FILM COATED ORAL at 11:09

## 2021-06-18 RX ADMIN — THIAMINE HCL TAB 100 MG 100 MG: 100 TAB at 11:09

## 2021-06-18 RX ADMIN — GLIPIZIDE 5 MG: 5 TABLET ORAL at 21:18

## 2021-06-18 RX ADMIN — ASPIRIN 325 MG ORAL TABLET 325 MG: 325 PILL ORAL at 11:07

## 2021-06-18 RX ADMIN — GABAPENTIN 600 MG: 300 CAPSULE ORAL at 21:20

## 2021-06-18 RX ADMIN — HEPARIN SODIUM 5000 UNITS: 5000 INJECTION INTRAVENOUS; SUBCUTANEOUS at 21:36

## 2021-06-18 NOTE — H&P
[facility] REHAB HISTORY AND PHYSICAL    Patient Identification:  Name:  Augusto Lorenzana Jr.  Age:  76 y.o.  Sex:  male  :  1945  MRN:  [id number]   Visit Number:  [id number]  Room number:  101/3S  Primary Care Physician:  Rob Polanco MD     Subjective     2021   Chief complaint:  No chief complaint on file.      History of presenting illness:  76 y.o. male  This pt is seen today for post admission physician evaluation to the inpatient rehabilitation facility.  Patient 76-year-old gentleman with a history of coronary disease requiring stents in , Beatties mellitus, and gout.  Patient apparently had been short of breath with poor blood pressure control and feeling tired and had lower extremity edema which was new.  Patient became worse and was more short of breath and was given aspirin nitroglycerin in route to the hospital.  He was placed on O2 and did have an elevated troponin of 1.89 white count of 17,000 BUN of 436 and creatinine of 1.9.  Patient was diagnosed with non-STEMI and was started hip on a heparin drip and given Lasix for CHF.  Patient had cardiac catheterization on May 24 and was found to have three-vessel disease with a normal ventricle.  Patient was scheduled for CABG on May 28 and had CABG x3 with greater saphenous vein on the right harvested.  In the postoperative course patient had cardiac arrest x2 and did require multiple units of packed red blood cells fresh frozen plasma and platelets.  Patient's renal function continued to worsen and he he did did necessitate him having intermittent hemodialysis.  Fortunately, the acute kidney injury did improve and he is no longer requiring dialysis.  Patient did have some encephalopathy and an effusion in the postoperative course.  Patient also had difficulty with swallowing and did require NG tube during the admission patient still has some difficulty but the NG tube is out and patient is on restricted diet at this  time.  Patient states he has little memory of what happened while he was in the hospital.      Patient continued to progress medically.  Physical therapy and Occupational therapy evaluations were completed with recommended acute inpatient rehabilitation referral for continued functional mobility intervention and reeducation.  Acute rehab referral ordered for continued medical monitoring and management post prolonged hospitalization, continued respiratory status monitoring, lab monitoring, pain mgt needs, bowel/bladder care with new medication education, skin monitoring and breakdown prevention along with ongoing medical comorbidities that require ongoing care.    The preadmission mini-FIM score as assessed by the referring facility as follows: Eating for; grooming 4; bathing 3; dressing upper body for; bladder management for; bowel management for; dressing lower body 2; toileting to; transfers to bed chair wheelchair for; transfers to toilet for; locomotion 4; comprehension 3; expression 4; social reaction 5; problem-solving 3; memory 3.  His estimate the patient will require 7 to 10days in rehab for improvement.  Patient was independent at home prior to MI.    ---------------------------------------------------------------------------------------------------------------------   Review of Systems:  General: Generalized weakness  HEENT: Negative  Heart: Recent MI with CABG x3  Lungs: No shortness of breath; mild cough at times  Abdomen: No nausea vomiting or diarrhea  : History of prostate CA  Musculoskeletal: Negative  Neuro: Has been slightly forgetful and at times confused while in the hospital  Skin: Wound on right lower extremity were greater saphenous vein was harvested appears to be healing well  ---------------------------------------------------------------------------------------------------------------------   Past Medical History:   Diagnosis Date   • CAD (coronary artery disease)    • CKD (chronic  kidney disease) stage 3, GFR 30-59 ml/min (CMS/[facility])    • Diabetes mellitus (CMS/[facility])    • Hyperlipidemia    • Hypertension    • NSTEMI (non-ST elevated myocardial infarction) (CMS/[facility])      Past Surgical History:   Procedure Laterality Date   • CARDIAC CATHETERIZATION N/A 5/24/2021    Procedure: Left Heart Cath;  Surgeon: Blade Greene IV, MD;  Location:  [facility];  Service: Cardiovascular;  Laterality: N/A;   • CARDIAC SURGERY      2 stents placed 2012.   • CORONARY ARTERY BYPASS GRAFT N/A 5/28/2021    Procedure: MEDIAN STERNOTOMY CORONARY ARTERY BYPASS X 3 UTILIZING THE LEFT INTERNAL MAMMARY ARTERY GRAFT, EVH OF THE GREATER RIGHT SAPHENOUS VEIN, AND PILO PER ANESTHESIA;  Surgeon: Jacek Miller MD;  Location: [facility] OR;  Service: Cardiothoracic;  Laterality: N/A;     Family History   Family history unknown: Yes     Social History     Socioeconomic History   • Marital status:      Spouse name: Not on file   • Number of children: Not on file   • Years of education: Not on file   • Highest education level: Not on file   Tobacco Use   • Smoking status: Never Smoker   • Smokeless tobacco: Current User   Substance and Sexual Activity   • Alcohol use: Not Currently     ---------------------------------------------------------------------------------------------------------------------   Allergies:  Patient has no known allergies.  ---------------------------------------------------------------------------------------------------------------------   Medications below are reported home medications pulling from within the system; at this time, these medications have not been reconciled unless otherwise specified and are in the verification process for further verifcation as current home medications.    Prior to Admission Medications     Prescriptions Last Dose Informant Patient Reported? Taking?    allopurinol (ZYLOPRIM) 300 MG tablet Unknown Pharmacy Yes No    Take 300 mg by mouth  Daily.    amLODIPine-benazepril (LOTREL) 10-20 MG per capsule Unknown Pharmacy Yes No    Take 1 capsule by mouth Daily.    aspirin 81 MG EC tablet Unknown Self Yes No    Take 81 mg by mouth Daily.    cloNIDine (CATAPRES) 0.1 MG tablet Unknown Pharmacy Yes No    Take 0.1 mg by mouth Daily.    clopidogrel (PLAVIX) 75 MG tablet Unknown Spouse/Significant Other Yes No    Take 75 mg by mouth Daily.    gabapentin (NEURONTIN) 300 MG capsule Unknown Pharmacy Yes No    Take 600 mg by mouth 3 (Three) Times a Day.    glipizide (GLUCOTROL) 5 MG tablet Unknown  Yes No    Take 5 mg by mouth 2 (Two) Times a Day Before Meals.    insulin glargine (LANTUS, SEMGLEE) 100 UNIT/ML injection Unknown Pharmacy Yes No    Inject 30 Units under the skin into the appropriate area as directed Every Night.    simvastatin (ZOCOR) 20 MG tablet Unknown Pharmacy Yes No    Take 20 mg by mouth Every Night.        Objective     Vital Signs:  Temp:  [97.1 °F (36.2 °C)-98.4 °F (36.9 °C)] 98.4 °F (36.9 °C)  Heart Rate:  [68-85] 71  Resp:  [18] 18  BP: (120-170)/(79-94) 155/79    No data found.  SpO2:  [93 %-98 %] 95 %  on   ;   Device (Oxygen Therapy): room air  There is no height or weight on file to calculate BMI.    Wt Readings from Last 3 Encounters:   06/18/21 80.3 kg (177 lb 1.6 oz)      ---------------------------------------------------------------------------------------------------------------------     Physical exam:  Constitutional:   76-year-old gentleman lying in bed no distress  HEENT: Normocephalic atraumatic  Neck: Supple   Cardiovascular: Regular rate and rhythm with a grade 3/6 systolic ejection murmur MI: Trace edema in the right lower extremity  Pulmonary/Chest: Clear to auscultation  Abdominal: Positive bowel sounds soft.   Musculoskeletal: No arthropathy  Neurological: No focal deficits  Skin: Healing wound on the anterior chest and right lower extremity where saphenous vein was harvested.  Peripheral  vascular:  Genitourinary::  ---------------------------------------------------------------------------------------------------------------------  EKG: Normal sinus rhythm with a right bundle branch block.  Patient does have Q waves in the inferior leads--EKG from June 16, 2021  No orders to display           Last echocardiogram:  Results for orders placed during the hospital encounter of 05/22/21    Adult Transthoracic Echo Complete w/ Color, Spectral and Contrast if necessary per protocol    Interpretation Summary  · Left ventricular systolic function is normal. Estimated left ventricular EF = 60%.  · Left ventricular diastolic function is consistent with (grade I) impaired relaxation.  · Mild aortic valve stenosis is present.  · Estimated right ventricular systolic pressure from tricuspid regurgitation is normal (<35 mmHg).    --------------------------------------------------------------------------------------------------------------------  Labs:  Results from last 7 days   Lab Units 06/14/21 0518 06/12/21  0401   WBC 10*3/mm3 8.24 9.85   HEMOGLOBIN g/dL 8.8* 8.4*   HEMATOCRIT % 27.8* 26.3*   MCV fL 94.6 93.9   MCHC g/dL 31.7 31.9   PLATELETS 10*3/mm3 291 260         Results from last 7 days   Lab Units 06/16/21  0559 06/15/21  0426 06/14/21 0518 06/12/21  0401   SODIUM mmol/L 138  --  137 135*   POTASSIUM mmol/L 3.8  --  3.9 3.4*   MAGNESIUM mg/dL  --  2.0 1.8 1.9   CHLORIDE mmol/L 105  --  101 100   CO2 mmol/L 21.0*  --  26.0 26.0   BUN mg/dL 68*  --  65* 67*   CREATININE mg/dL 1.69*  --  1.62* 1.69*   EGFR IF NONAFRICN AM mL/min/1.73 40*  --  42* 40*   CALCIUM mg/dL 8.9  --  9.3 9.0   PHOSPHORUS mg/dL 4.3  --  3.9 3.6   GLUCOSE mg/dL 127*  --  188* 116*   ALBUMIN g/dL 2.60*  --  3.10* 3.00*   Estimated Creatinine Clearance: 38.9 mL/min (A) (by C-G formula based on SCr of 1.69 mg/dL (H)).  No results found for: AMMONIA          Glucose   Date/Time Value Ref Range Status   06/18/2021 1136 183 (H) 70 - 130  mg/dL Final   06/18/2021 0740 147 (H) 70 - 130 mg/dL Final   06/17/2021 2023 221 (H) 70 - 130 mg/dL Final   06/17/2021 1641 187 (H) 70 - 130 mg/dL Final   06/17/2021 1131 199 (H) 70 - 130 mg/dL Final   06/17/2021 0729 132 (H) 70 - 130 mg/dL Final   06/16/2021 1957 202 (H) 70 - 130 mg/dL Final   06/16/2021 1749 114 70 - 130 mg/dL Final     Lab Results   Component Value Date    TSH 4.030 06/03/2021     No results found for: PREGTESTUR, PREGSERUM, HCG, HCGQUANT  Pain Management Panel     Pain Management Panel Latest Ref Rng & Units 5/30/2021 5/28/2021    CREATININE UR mg/dL 136.1 -    AMPHETAMINES SCREEN, URINE Negative - Negative    BARBITURATES SCREEN Negative - Negative    BENZODIAZEPINE SCREEN, URINE Negative - Negative    BUPRENORPHINEUR Negative - Negative    COCAINE SCREEN, URINE Negative - Negative    METHADONE SCREEN, URINE Negative - Negative    METHAMPHETAMINEUR Negative - Negative        Brief Urine Lab Results  (Last result in the past 365 days)      Color   Clarity   Blood   Leuk Est   Nitrite   Protein   CREAT   Urine HCG        05/30/21 1416             136.1           No results found for: BLOODCX  No results found for: URINECX  No results found for: WOUNDCX  No results found for: STOOLCX    Last Urine Toxicity     LAST URINE TOXICITY RESULTS Latest Ref Rng & Units 5/30/2021 5/28/2021    CREATININE UR mg/dL 136.1 -    AMPHETAMINES SCREEN, URINE Negative - Negative    BARBITURATES SCREEN Negative - Negative    BENZODIAZEPINE SCREEN, URINE Negative - Negative    BUPRENORPHINEUR Negative - Negative    COCAINE SCREEN, URINE Negative - Negative    METHADONE SCREEN, URINE Negative - Negative    METHAMPHETAMINEUR Negative - Negative          I have personally looked at the labs and they are summarized above.  ----------------------------------------------------------------------------------------------------------------------  Detailed radiology reports for the last 24 hours:    Imaging Results (Last 24  Hours)     ** No results found for the last 24 hours. **        Final impressions for the last 30 days of radiology reports:    FL Video Swallow With Speech Single Contrast    Result Date: 6/15/2021  Fluoroscopy provided for a modified barium swallow. Please see speech therapy report for full details and recommendations.    This report was finalized on 6/15/2021 4:42 PM by Dr. Sherron Gandara MD.      XR Chest 1 View    Result Date: 6/15/2021  Cardiac size enlarged without overt edema or pleural effusion of decompensation.  D:  06/15/2021 E:  06/15/2021  This report was finalized on 6/15/2021 5:20 PM by Dr. Shreyas Ames.      XR Chest 1 View    Result Date: 6/13/2021  Right IJ line terminates in the SVC, grossly unchanged. Unchanged aeration with prominent bilateral interstitial opacities. No effusion or pneumothorax. Unchanged degree of cardiac enlargement. Remaining support hardware also unchanged.  This report was finalized on 6/13/2021 6:14 PM by Aiden De Anda.      XR Chest 1 View    Result Date: 6/8/2021  No interval change.  D:  06/08/2021 E:  06/08/2021  This report was finalized on 6/8/2021 12:52 PM by Dr. Shreyas Ames.      XR Chest 1 View    Result Date: 6/7/2021  Some improvement seen in aeration of the left lung with minimal residual markings seen throughout the right perihilar region. Small bilateral pleural effusions with low lung volumes bilaterally.  DICTATED:   06/06/2021 EDITED/ls :   06/06/2021  This report was finalized on 6/7/2021 1:06 PM by Dr. Sherron Gandara MD.      XR Chest 1 View    Result Date: 6/5/2021  Slight worsening of the markings in the lung fields in the interval. Lung volumes remain low. Deep line catheter on the right with tip in the SVC.  DICTATED:   06/05/2021 EDITED/ls :   06/05/2021  This report was finalized on 6/5/2021 3:51 PM by Dr. Sherron Gandara MD.      XR Chest 1 View    Result Date: 6/3/2021  Right IJ catheter terminates in the SVC. Remaining support  hardware projects unchanged. Mild interval improvement in small bilateral pleural effusions. Persistent scattered airspace opacities. No pneumothorax.  This report was finalized on 6/3/2021 1:19 PM by Aiden De Anda.      XR Chest 1 View    Result Date: 6/2/2021  Mildly increased pulmonary vascular congestion. Stable left basilar atelectasis and effusion.  D:  06/02/2021 E:  06/02/2021     This report was finalized on 6/2/2021 10:41 PM by Dr. Jerod Bonilla MD.      XR Chest 1 View    Result Date: 6/1/2021  Chest tubes or pleural drains removed in the interim without pneumothorax overall stable appearance with right internal jugular central venous catheter remaining in place.  D:  06/01/2021 E:  06/01/2021  This report was finalized on 6/1/2021 7:03 PM by Dr. Shreyas Ames.      XR Chest 1 View    Result Date: 6/1/2021  No interval change.  D:  06/01/2021 E:  06/01/2021  This report was finalized on 6/1/2021 12:09 PM by Dr. Shreyas Ames.      XR Chest 1 View    Result Date: 6/1/2021  No interval change with persistent cardiomegaly and central vascular congestion.  DICTATED:   05/31/2021 EDITED/ls :   05/31/2021  This report was finalized on 6/1/2021 12:08 PM by Dr. Shreyas Ames.      XR Chest 1 View    Result Date: 5/30/2021  Stable exam. Low lung volumes with atelectasis.  This report was finalized on 5/30/2021 9:51 AM by Eliezer Yan.      XR Chest 1 View    Result Date: 5/29/2021  Interval extubation with slight improvement in aeration of both lungs. No pneumothorax is seen. Signer Name: August Engel MD  Signed: 5/29/2021 11:10 PM  Workstation [facility]: [facility]  [facility]    XR Chest 1 View    Result Date: 5/29/2021  Endotracheal tube terminates just above the jonas. Slight retraction may be warranted.  Low lung volumes with airspace opacities bilaterally which may be related to crowded pulmonary markings or potential multifocal infection.  This report was finalized on 5/29/2021 8:57 AM  by Eliezer Yan.      XR Chest 1 View    Result Date: 5/28/2021  1. No acute interval change from same day chest x-ray. Support hardware remains in appropriate position. Right internal jugular central venous catheter appears to have been advanced with tip terminating above the SVC.  This report was finalized on 5/28/2021 6:58 PM by Eliezer Yan.      XR Chest 1 View    Result Date: 5/28/2021  Endotracheal tube within 1 cm of the jonas with bilateral chest tubes in place and no definite pneumothorax. Increased markings seen throughout the lung fields with small bilateral pleural effusions.  D:  05/28/2021 E:  05/28/2021  This report was finalized on 5/28/2021 5:15 PM by Dr. Sherron Gandara MD.      XR Chest 1 View    Result Date: 5/27/2021  Borderline heart shadow enlargement and mild pulmonary venous hypertension. No new chest disease.   D:  05/27/2021 E:  05/27/2021  This report was finalized on 5/27/2021 10:34 PM by Dr. Jerod Bonilla MD.      XR Chest 1 View    Result Date: 5/22/2021  No acute cardiopulmonary findings. Signer Name: August Pineda MD  Signed: 5/22/2021 5:46 AM  Workstation Name: [facility]  Radiology [facility] Renal Bilateral    Result Date: 5/22/2021  No obstructing uropathy evident as there is no hydronephrosis with bilateral simple appearing renal cortical cysts. No calculi.  DICTATED:   05/22/2021 EDITED/ls :   05/22/2021    This report was finalized on 5/22/2021 6:51 PM by Dr. Shreyas Ames.      CT Angiogram Chest    Result Date: 5/22/2021  Prominent interstitial markings are noted in the lower lung field bilaterally with a trace volume of pleural fluid and subtle groundglass densities. Correlate with COVID-19 test status. No evidence of pulmonary embolism. Signer Name: August Pineda MD  Signed: 5/22/2021 5:56 AM  Workstation [facility]: [facility]  Radiology [facility]    XR Abdomen KUB    Result Date: 6/12/2021  Feeding tube is coiled in the proximal stomach.   This report was finalized on 6/12/2021 10:14 AM by Aiden De Anda.      XR Abdomen KUB    Result Date: 6/11/2021  Nasogastric tube with tip in the stomach. Feeding tube identified with tip in the fourth portion of the duodenum.  D:  06/11/2021 E:  06/11/2021  This report was finalized on 6/11/2021 4:33 PM by Dr. Sherron Gandara MD.      XR Abdomen KUB    Result Date: 6/9/2021  A nasogastric tube terminates in the stomach. Bowel gas pattern is nonobstructive. There is mild fecal loading of the colon. No overt pneumoperitoneum.  This report was finalized on 6/9/2021 8:13 AM by Aiden De Anda.      I have personally looked at the radiology images and read the final radiology report.    Assessment & Plan    Debility secondary to prolonged stay with non-STEMI, CABG x3, acute kidney injury--patient will require physical therapy 90 minutes/day 5 to 6 days/week for help with therapeutic exercise, range of motion, endurance training, gait training, safety and stair navigation, and strengthening.  Will also require occupational therapy 90 minutes/day 5 to 6 days/week for help with dressing, ADLs, feeding, home skills, safety and toileting techniques.  We will consult speech therapy to evaluate for dysphagia issues.  The goal is that patient be able to safely perform activities of daily living using adaptive equipment for improved functional mobility.  Independent safe bowel bladder function.  Able to safely consume food and fluids without signs of aspiration.  Able to navigate home and community territory safe without injury or falls.    Coronary artery disease--status post CABG x3--continue aspirin 325 mg daily, Coreg 25 mg twice daily, Lipitor 40 mg daily, Isordil 20 mg 3 times daily    Hypertension--Norvasc along with Coreg.    Dysphagia--speech therapy consultation.  Patient is on mechanical soft diet with honey thickened liquids.    History of gout--allopurinol 300 grams daily    Acute kidney injury--much  improved.  We will continue to monitor BMP.    Diabetes mellitus--we will continue Levemir 30 units nightly and glipizide 5 mg twice a day with meals.  Will monitor closely      VTE Prophylaxis:   Mechanical Order History:     None      Pharmalogical Order History:      Ordered     Dose Route Frequency Stop    06/18/21 3847  heparin (porcine) 5000 UNIT/ML injection 5,000 Units      5,000 Units SC Every 12 Hours Scheduled --                Fall risk evaluation: High risk for falls at this time secondary to generalized weakness        Primitivo Stevens MD  [facility]  06/18/21  17:42 EDT

## 2021-06-18 NOTE — PROGRESS NOTES
Case Management  Inpatient Rehabilitation Plan of Care and Discharge Plan Note    Rehabilitation Diagnosis:  Debility secondary to NSTEMI, post-op encephalopathy,  perioperative cardiac arrest with ventricular fibrillation, s/p CABG x 3  Date of Onset:  05/22/21    Medical Summary:  PMH: CAD, DM type 2, neuropathy, hyperlipidemia, gout, cardiac  surgery    Plan of Care      Expected Intensity:  Average of 3 hours of therapy 5 days/week.  Interdisciplinary Team:  Interdisciplinary Team: Medical Supervision and 24 Hour Rehabilitation Nursing.,  Physical Therapy:, Occupational Therapy:, Speech and Language Therapy:, Social  Work, Therapeutic Recreation.  Physical Therapy Intensity/Duration: 1-1.5 hrs/day, 5-6 days/week  Occupational Therapy Intensity/Duration: 1-1.5 hrs/day, 5-6 days/week  Speech Language Pathology  Intensity/Duration: 0.5-1 hr/day, 3-5 days/week  Estimated Length of Stay/Anticipated Discharge Date: 7-10 days  Anticipated Discharge Destination:  Anticipated discharge destination from inpatient rehabilitation is community  discharge with assistance. Pt plans to return home with spouse at discharge.      Based on the patient's medical and functional status, their prognosis and  expected level of functional improvement is:  Good    Signed by: Salina Perkins, Supervisor

## 2021-06-18 NOTE — PROGRESS NOTES
Rehabilitation Nursing  Inpatient Rehabilitation Plan of Care Note    Plan of Care  Pain    Pain Management (Active)  Current Status (6/18/2021 4:09:00 PM): at risk for pain  Weekly Goal: no pain this week  Discharge Goal: no pain    Safety    Potential for Injury (Active)  Current Status (6/18/2021 4:09:00 PM): at risk for injury  Weekly Goal: no injuries this week  Discharge Goal: no injury    Signed by: Matt Crowley RN

## 2021-06-18 NOTE — PROGRESS NOTES
Patient Assessment Instrument  Quality Indicators - Admission    Section B. Hearing, Speech Vision  Expression of Ideas and Wants: Expresses complex messages without difficulty and  with speech that is clear and easy to understand.  Understanding Verbal and Non-Verbal Content: Understands: Clear comprehension  without cues or repetitions.    Section C. Cognitive Patterns      Section FE0741. Prior Functioning      Section OD0542. Prior Device Use      Section ZT2020. Self Care Performance      Section CD4462. Self Care Discharge Goals      Section TG7463. Mobility Performance      Section CK6027. Mobility Discharge Goals      Section H. Bladder and Bowel      Section I. Active Diagnosis      Section J. Health Conditions      Section K. Swallowing/Nutritional Status      Section M. Skin Conditions      Section N. Medication      Section O. Special Treatments, Procedures, and Programs      OPTIONAL BRANCH FOR TRACKING FALLS  Fall(s) During Shift:    Signed by: Matt Crowley RN

## 2021-06-18 NOTE — PROGRESS NOTES
Patient Assessment Instrument  Quality Indicators - Admission    Section B. Hearing, Speech Vision      Section C. Cognitive Patterns      Section EM2164. Prior Functioning      Section PV9579. Prior Device Use  Patient does not use manual or motorized wheelchair or scooter, mechanical lift,  walker, or an orthotic/prosthesis.    Section GZ0041. Self Care Performance      Section KQ8041. Self Care Discharge Goals      Section HC2805. Mobility Performance      Section XK1231. Mobility Discharge Goals      Section H. Bladder and Bowel      Section I. Active Diagnosis      Section J. Health Conditions      Section K. Swallowing/Nutritional Status      Section M. Skin Conditions      Section N. Medication      Section O. Special Treatments, Procedures, and Programs      OPTIONAL BRANCH FOR TRACKING FALLS  Fall(s) During Shift:    Signed by: Salina Perkins, Supervisor

## 2021-06-18 NOTE — PROGRESS NOTES
Cardiothoracic Surgery Progress Note      POD # 21 s/p CABG       LOS: 27 days      Subjective:  Wide awake and conversant this morning. Most alert and oriented he has been in several days. No complaints.      Objective:  Vital Signs  Temp:  [97.1 °F (36.2 °C)-98.4 °F (36.9 °C)] 97.1 °F (36.2 °C)  Heart Rate:  [68-94] 79  Resp:  [18] 18  BP: (120-161)/(69-94) 161/94    Physical Exam:   General Appearance: easily roused   Lungs: Coarse lung sounds bilaterally with wheezing. Breathing unlabored.    Heart: regular rhythm & normal rate, normal S1, S2, no murmur, no gallop, no rub and no click   Skin: Incision c/d/i     Results:    Results from last 7 days   Lab Units 06/14/21  0518   WBC 10*3/mm3 8.24   HEMOGLOBIN g/dL 8.8*   HEMATOCRIT % 27.8*   PLATELETS 10*3/mm3 291     Results from last 7 days   Lab Units 06/16/21  0559   SODIUM mmol/L 138   POTASSIUM mmol/L 3.8   CHLORIDE mmol/L 105   CO2 mmol/L 21.0*   BUN mg/dL 68*   CREATININE mg/dL 1.69*   GLUCOSE mg/dL 127*   CALCIUM mg/dL 8.9         Assessment:    NSTEMI 5/22/2021    Hyperlipidemia LDL goal <70    Essential hypertension    T2DM on oral agents and insulin. HbA1c 7.4     A/C kidney disease. ATN due to postoperative arrest. 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 Combination of anoxic insult and azotemia    POD #21 CABG x3    Plan:  Continue his modified diet  Plan for DC to rehab today If all agree.  Looks the best he has since prior to surgery        Moi Tubbs PA-C  06/18/21  07:29 EDT

## 2021-06-18 NOTE — PLAN OF CARE
Goal Outcome Evaluation:  Plan of Care Reviewed With: patient           Outcome Summary: A/Ox4, VSS, RA, NSR with right BBB, pleasant man. Dysphagia IV diet continued. Pt has rested well through shift. Plan to DC today 6/18 with wife to rehab in [place]. No complaints. Will continue to monitor.

## 2021-06-18 NOTE — PROGRESS NOTES
Clinical Nutrition       Patient Name: Augusto Lorenzana Jr.  YOB: 1945  MRN: [id number]  Date of Encounter: 06/18/21 09:10 EDT  Admission date: 5/22/2021      Reason for Visit   Follow-up protocol      EMR  Reviewed   Yes         Reported/Observed/Food/Nutrition Related - Comments          Current Nutrition Prescription     Diet Dysphagia; IV - Mechanical Soft No Mixed Consistencies; Honey Thick; Other, No Straws; liquids via teaspoon only; Cardiac, Consistent Carbohydrate        Dietary Nutrition Supplements: Boost Plus 2x/d; chocolate      Average po Intake: 81% @ past 4 meals      Actions     Follow treatment progress, Supplement provided    Monitor Per Protocol      Carol Macias, MS,RD,LD,   Time Spent: 10 mins

## 2021-06-18 NOTE — DISCHARGE SUMMARY
CTS Discharge Summary    Patient Care Team:  Rob Polanco MD as PCP - General (Internal Medicine)      Date of Admission: 5/22/2021  4:31 AM  Date of Discharge:  6/18/2021    Discharge Diagnosis  Past Medical History:   Diagnosis Date   • CAD (coronary artery disease)    • CKD (chronic kidney disease) stage 3, GFR 30-59 ml/min (CMS/[facility])    • Diabetes mellitus (CMS/[facility])    • Hyperlipidemia    • Hypertension    • NSTEMI (non-ST elevated myocardial infarction) (CMS/[facility])          NSTEMI 5/22/2021    Hyperlipidemia LDL goal <70    Essential hypertension    T2DM on oral agents and insulin. HbA1c 7.4     A/C kidney disease. ATN due to postoperative arrest. Dialysis begun 6/3/2021    Gout    Former smokeless tobacco use    S/P CABG x 3 on 5/28/21    Perioperative cardiac arrest with ventricular fibrillation (CMS/[facility])    Postop encephalopathy post code. Combination of anoxic insult and azotemia      History of Present Illness   Augusto Lorenzana Jr. is a 76 y.o. male with PMH significant for CAD s/p cardiac stents 2012, DM2, who presents to the ED with complaint of SOB. He states that over the past couple of weeks he has been feeling more tired, blood pressure poorly controlled, elevated up to 180s, noted having lower extremity edema, which is new for him as well as ALFREDO. Past few nights, he woke up feeling short of breath, no associated chest pain palpitations or headache, has been compliant with his medications including aspirin and Plavix.  Denies any exertional chest pain.  Tonight, he woke around 0300 feeling acutely more SOB and with tremors. EMS was called and report concern for abnormal EKG. He was given ASA and NTG en route.    Upon arrival to the ED, he was noted to have oxygen saturation around 90%. He was placed on 2L nc with good recovery and is now back on room air. He was noted to have elevated troponin of 1.89 and elevated proBNP of 4175. He also has concern for MICHAEL and leukocytosis. CTA reports negative  for PE, but is concerning for prominent interstitial markings in lower lung fields bilaterally with trace pleural effusion and subtle groundglass opacities. He will be admitted to Hospital Medicine for further evaluation.       Hospital Course  Patient is a 76 y.o. male with a history of CAD s/p stent in 2012, Type 2 Diabetes Mellitus and HTN who was [facility] 5/[facility]/2021 due to shortness of breath. On 5/24, patient underwent a cardiac catheterization by Dr. Greene and was found to have severe three vessel CAD (LAD, LCx, RCA) and CT surgery was consulted. Due to recent plavix use, a P2Y12 was ordered and patient planned for CABG procedure later in the week once P2Y12 normalized. Patient was kept in-patient until surgery date on 5/28. During time o fadmission and time of surgery, patient had no acute events of note. Coronary artery bypass procedure was performed by Dr. Jacek Miller on 5/28.  The procedure went well, and patient was placed in the ICU intubated in stable condition.  Cardiology consulted to follow patient throughout the course of his hospital stay.  A few hours after surgery, patient coded twice.  Patient notes up to pacemaker, patient kept sedated throughout the night.     5/29, patient remains on vent and sedated with propofol.  Inpatient care or stimulation causes patient to vagal down and/or bite on tube.  Unable to wean propofol overnight.  Plan to wean off sedation and extubate today.  Elevation in creatinine noted, will continue to monitor.  5/30, patient extubated yesterday.  Remained stable on this day.  Some confusion noted.  Chest tubes and pacing wires remain in place.  Nephrology consulted due to increasing creatinine levels.  Later in the evening on this day, patient was noted to have bleeding and oozing from pleural tubes.  Protamine, feiba, platelets, and fresh frozen plasma were administered.  5/31, patient remained stable.  Chest tubes remain in place.  Nephrology  managing MICHAEL on CKD.  Patient remained in ICU under close observation.  SLP following due to dysphagia.  POD 4, patient remained stable on this day.  Awake and talkative, though drowsy.  Chest tubes and pacing wires discontinued.  NG tube placed for feeds due to failed swallow eval.  6/1, patient doing well on this day.  Creatinine noted to continuing to increase.  Nephrology following.  Baseline with pathology evaluated patient and deemed that an honey thick liquids safe.  Aggressively encourage use of incentive spirometry while staying with us.  Cardiology managing hypertensive agents.  Noted increasing confusion.  6/2, no change in status.  Remained stable.  Noted increasing confusion. Discussed with Dr. Loaiza regards to dialysis due to increasing BUN and creatinine, increasing encephalopathy.  We will start dialysis at this time to bring the BUN down.  May need a few dialysis.  6/3, intermittent episodes of confusion still present.  Tolerated dialysis.  SLP continue to evaluate patient.  Routine fees study.  Later in the day, patient became much more confused and agitated.  Required Haldol administration.  6/4, patient remained stable on this day.  Continue to undergo hemodialysis.  No other events of note.  There are some erythema noted around EVH incision site.  This will continue to be monitored.  6/5, patient doing well on this day.  Less confusion noted by nursing staff.  6/6, more confusion noted on this day.  Wound culture of EVH incision site obtained.  Patient scratched EVH incision site.    6/7  Continued supportive care. Patient participated with PT/OT and continued to progress.   6/8  Patient tolerated hemodialysis. Intermittent confusion  6/9  Patient tolerated hemodialysis. Encephalopathic  6/10  Patient met criteria for transfer to telemetry  6/11  Patient tolerated hemodialysis  6/12-14  Continued supportive care. Patient participated with PT/OT and continued to progress.   6/15  Patient lost  NG by sneezing.   6/16-6/17  Continued supportive care. Patient participated with PT/OT and continued to progress.   6/18  Patient met criteria for discharge and was discharged to rehab. Patient confusion improving.       Procedures Performed  Procedure(s):  MEDIAN STERNOTOMY CORONARY ARTERY BYPASS X 3 UTILIZING THE LEFT INTERNAL MAMMARY ARTERY GRAFT, EVH OF THE GREATER RIGHT SAPHENOUS VEIN, AND PILO PER ANESTHESIA       Consults:   Consults     Date and Time Order Name Status Description    6/8/2021  9:20 AM Inpatient Gastroenterology Consult Completed     5/30/2021  9:04 AM Inpatient Nephrology Consult Completed     5/28/2021 12:18 PM Inpatient Consult to Cardiology Completed     5/24/2021 10:59 AM Inpatient Cardiothoracic Surgery Consult Completed     5/22/2021  2:25 PM Inpatient Cardiology Consult Completed     5/22/2021  9:02 AM Inpatient Cardiology Consult Completed             Discharge Medications     Discharge Medications      New Medications      Instructions Start Date   amLODIPine 10 MG tablet  Commonly known as: NORVASC   10 mg, Oral, Every 24 Hours Scheduled      aspirin 325 MG tablet  Replaces: aspirin 81 MG EC tablet   325 mg, Oral, Daily   Start Date: June 19, 2021     atorvastatin 40 MG tablet  Commonly known as: LIPITOR   40 mg, Oral, Nightly      carvedilol 25 MG tablet  Commonly known as: COREG   25 mg, Oral, 2 Times Daily With Meals      guaiFENesin-dextromethorphan 100-10 MG/5ML syrup  Commonly known as: ROBITUSSIN DM   5 mL, Oral, Every 4 Hours PRN      isosorbide dinitrate 20 MG tablet  Commonly known as: ISORDIL   20 mg, Oral, 3 Times Daily      QUEtiapine 25 MG tablet  Commonly known as: SEROquel   12.5 mg, Oral, Every 12 Hours Scheduled         Continue These Medications      Instructions Start Date   allopurinol 300 MG tablet  Commonly known as: ZYLOPRIM   300 mg, Oral, Daily      gabapentin 300 MG capsule  Commonly known as: NEURONTIN   600 mg, Oral, Nightly      glipizide 5 MG  tablet  Commonly known as: GLUCOTROL   5 mg, Oral, 2 Times Daily Before Meals      insulin glargine 100 UNIT/ML injection  Commonly known as: LANTUS, SEMGLEE   30 Units, Subcutaneous, Daily         Stop These Medications    amLODIPine-benazepril 10-20 MG per capsule  Commonly known as: LOTREL     aspirin 81 MG EC tablet  Replaced by: aspirin 325 MG tablet     cloNIDine 0.1 MG tablet  Commonly known as: CATAPRES     metoprolol succinate XL 50 MG 24 hr tablet  Commonly known as: TOPROL-XL            Discharge Diet:   Diet Instructions     FEES  6/1/2021  Reason for Referral 6/11/2021    Patient was referred for a FEES to assess the efficiency of his/her swallow function, rule out aspiration and make recommendations regarding safe dietary consistencies, effective compensatory strategies, and safe eating environment.         Recommendations/Treatment  SLP Swallowing Diagnosis: moderate, pharyngeal dysphagia  Functional Impact: risk of aspiration/pneumonia  Rehab Potential/Prognosis, Swallowing: good, to achieve stated therapy goals  Swallow Criteria for Skilled Therapeutic Interventions Met: demonstrates skilled criteria  Therapy Frequency (Swallow): 5 days per week  Predicted Duration Therapy Intervention (Days): until discharge  SLP Diet Recommendation: puree with some mashed, honey thick liquids  Recommended Diagnostics: FEES  Recommended Precautions and Strategies: upright posture during/after eating, small bites of food and sips of liquid, no straw, general aspiration precautions  SLP Rec. for Method of Medication Administration: meds whole, with pudding or applesauce, as tolerated  Monitor for Signs of Aspiration: yes, notify SLP if any concerns  Anticipated Discharge Disposition (SLP): unknown, anticipate therapy at next level of care    Instrumental Set-up  Risks/Benefits Reviewed: risks/benefits explained, patient, agreed to eval  Nasal Entry: left:  Anatomic Considerations: erythema, vocal folds  Utensils  Used: Spoon, Cup, Straw  Consistencies Trialed: thin liquids, nectar-thick liquids, honey-thick liquids, pudding/puree, regular textures    Oral Preparation/ Oral Phase  Oral Phase: other (see comments) (u/a to masticate solid)    Pharyngeal Phase  Initiation of Pharyngeal Swallow: bolus in valleculae  Pharyngeal Phase: impaired pharyngeal phase of swallowing  Aspiration During the Swallow: thin liquids, nectar-thick liquids, secondary to delayed swallow initiation or mistiming, secondary to reduced laryngeal elevation, secondary to reduced vestibular closure  Response to Aspiration: no response, silent aspiration  Rosenbek's Scale: thin:, nectar:, 8-->Level 8  Residue: all consistencies tested, diffuse within pharynx, secondary to reduced base of tongue retraction, secondary to reduced posterior pharyngeal wall stripping, secondary to reduced laryngeal elevation, secondary to reduced hyolaryngeal excursion, other (see comments) (mild-mod)  Response to Residue: other (see comments) (reduced w/ spontaneous subsequent swallows)  Attempted Compensatory Maneuvers: bolus size, bolus presentation style  Response to Attempted Compensatory Maneuvers: did not prevent penetration, did not prevent aspiration  FEES Summary: Moderate pharyngeal dysphagia. U/a to masticate regular solid 2' edentulous. Aspiration during the swallow w/ thin & nectar-thick liquids. Aspiration was silent. Penetration during w/ honey-thick via straw - eliminated w/ cup. Mild-moderate diffuse pharyngeal residue w/ all consistencies - reduced w/ spontaneous subsequent swallows. Rec: dysphagia level III (puree/some mashed), honey-thick liquids. No straws. Meds whole w/ pudding/puree. SLP will continue to follow for dysphagia tx.           FEES Reason for Referral  Patient was referred for a FEES to assess the efficiency of his/her swallow function, rule out aspiration and make recommendations regarding safe dietary consistencies, effective compensatory  strategies, and safe eating environment.         Recommendations/Treatment  SLP Swallowing Diagnosis: severe, pharyngeal dysphagia  Functional Impact: risk of aspiration/pneumonia  Rehab Potential/Prognosis, Swallowing: good, to achieve stated therapy goals  Swallow Criteria for Skilled Therapeutic Interventions Met: demonstrates skilled criteria  Therapy Frequency (Swallow): 5 days per week  Predicted Duration Therapy Intervention (Days): until discharge  SLP Diet Recommendation: NPO, temporary alternate methods of nutrition/hydration, other (see comments) (Ice chips x4/hr w/SPV, p oral care. REC: downsize to keofeed)  Recommended Precautions and Strategies: general aspiration precautions  SLP Rec. for Method of Medication Administration: meds via alternate route  Monitor for Signs of Aspiration: yes, notify SLP if any concerns  Anticipated Discharge Disposition (SLP): unknown, anticipate therapy at next level of care    Instrumental Set-up  Risks/Benefits Reviewed: risks/benefits explained, patient, agreed to eval  Nasal Entry: left:  Anatomic Considerations: edema, vocal folds, false vocal folds, arytenoids, anatomic deviation observed (see comments)  Comment: Small round protrusion noted on the R ventricular fold, anteriorly. Present on prior exam as well. Does not inferfere w/ closure. Diffuse pharyngeal edema, greater on the L side in line with large-bore NG tube.    Utensils Used: Spoon, Cup  Consistencies Trialed: pudding/puree, thin liquids    Oral Preparation/ Oral Phase  Oral Phase: WFL    Pharyngeal Phase  Initiation of Pharyngeal Swallow: bolus in valleculae  Pharyngeal Phase: impaired pharyngeal phase of swallowing  Aspiration During the Swallow: thin liquids, pudding/puree, secondary to reduced laryngeal elevation, secondary to reduced vestibular closure, secondary to reduced laryngeal (VF) closure  Response to Aspiration: inconsistent response, other (see comments) (cough w/ aspiration of thin, no  response w/ pudding asp)  Rosenbek's Scale: thin:, 7-->Level 7, pudding/puree:, 8-->Level 8  Residue: all consistencies tested, diffuse within pharynx, secondary to reduced base of tongue retraction, secondary to reduced posterior pharyngeal wall stripping, secondary to reduced laryngeal elevation, secondary to reduced hyolaryngeal excursion  Response to Residue: unable to clear residue, with spontaneous subsequent swallow, with cued swallow  Attempted Compensatory Maneuvers: chin tuck, head turn to left, multiple swallows, effortful (hard swallow), breath hold, throat clear after swallow, combination of maneuvers (see comments)  Response to Attempted Compensatory Maneuvers: did not prevent aspiration, did not reduce residue  FEES Summary: FEES completed this am. Severe pharyngeal dysphagia. Pt continues w/ diffuse pharyngeal edema, greater on the L side in line w/ large bore NG. Aspiration occurred during the swallow with both thin liquids and pudding. Pt exhibited cough response to aspiration of thin liquids, but silent response to aspiration of pudding. Pt unable to clear aspirated material with cued cough. Chin tuck, head turn to L, and breath hold ineffective to prevent aspiration. Moderate diffuse residue w/all trials - pt unable to fully clear w/ effortful or multiple swallows. Cannot safely recommend PO diet at this time. Continue NPO - discussed w/ RN the benefit of downsizing from large-bore NG to keofeed to reduce pt's edema and pain, and maximize swallow rehabilitation. Continue ice chips x4/hr w/SPV and after oral care is completed.           MBS/VFSS6/15/21   Reason for Referral  Patient was referred for a MBS to assess the efficiency of his/her swallow function, rule out aspiration and make recommendations regarding safe dietary consistencies, effective compensatory strategies, and safe eating environment.             Recommendations/Treatment  SLP Swallowing Diagnosis: mild-moderate, oral dysphagia,  moderate, pharyngeal dysphagia  Functional Impact: risk of aspiration/pneumonia, risk of malnutrition, risk of dehydration  Rehab Potential/Prognosis, Swallowing: good, to achieve stated therapy goals  Swallow Criteria for Skilled Therapeutic Interventions Met: demonstrates skilled criteria  Therapy Frequency (Swallow): 5 days per week  Predicted Duration Therapy Intervention (Days): until discharge  SLP Diet Recommendation: mechanical soft with no mixed consistencies, honey thick liquids, other (see comments) (liquids via teaspoon only)  Recommended Precautions and Strategies: upright posture during/after eating, small bites of food and sips of liquid, no straw, liquid via spoon only, general aspiration precautions, 1:1 supervision  SLP Rec. for Method of Medication Administration: meds whole, meds crushed, with pudding or applesauce, as tolerated  Monitor for Signs of Aspiration: yes, notify SLP if any concerns  Anticipated Discharge Disposition (SLP): unknown, anticipate therapy at next level of care    Instrumental Set-up  Utensils Used: spoon, cup, straw  Consistencies Trialed: thin liquids, nectar/syrup-thick liquids, honey-thick liquids, pudding thick, regular textures    Oral Preparation/ Oral Phase  Oral Prep Phase: impaired oral phase of swallowing  Oral Transit Phase: impaired  Oral Residue: impaired  Oral Preparatory Phase: prolonged manipulation  Prolonged Manipulation: regular textures, secondary to reduced lingual strength  Impaired Oral Transit Phase: piecemeal oral transit, premature spillage of liquids into pharynx  Piecemeal Oral Transit: thin liquids, nectar-thick liquids, secondary to reduced lingual control  Premature Spillage of Liquids into Pharynx: thin liquids, nectar-thick liquids, secondary to reduced lingual control  Impaired Oral Residue: diffuse residue throughout oral cavity  Diffuse Residue throughout Oral Cavity: all consistencies tested, secondary to reduced lingual  strength  Response to Oral Residue: cleared residue, with spontaneous subsequent swallow    Pharyngeal Phase  Initiation of Pharyngeal Swallow: bolus in pyriform sinuses  Pharyngeal Phase: impaired pharyngeal phase of swallowing  Penetration Before the Swallow: thin liquids, nectar-thick liquids, secondary to reduced back of tongue control, secondary to delayed swallow initiation or mistiming  Penetration During the Swallow: thin liquids, nectar-thick liquids, honey-thick liquids, secondary to delayed swallow initiation or mistiming, secondary to reduced laryngeal elevation, secondary to reduced vestibular closure, other (see comments) (honey-thick via cup only)  Aspiration After the Swallow: thin liquids, nectar-thick liquids, secondary to residue, in laryngeal vestibule  Response to Penetration: no response  Response to Aspiration: inconsistent response, throat clear, no response, silent aspiration, other (see comments) (inconsistent throat clear thins, silent nectar)  Pharyngeal Residue: all consistencies tested, base of tongue, valleculae, pyriform sinuses, laryngeal vestibule, secondary to reduced base of tongue retraction, secondary to reduced laryngeal elevation, secondary to reduced hyolaryngeal excursion, other (see comments) (mild )  Response to Residue: unable to clear residue  Attempted Compensatory Maneuvers: bolus size, bolus presentation style, additional subsequent swallow, throat clear after swallow, other (see comments) (cognition impacting carryover)  Response to Attempted Compensatory Maneuvers: prevented penetration, prevented aspiration  Pharyngeal Phase, Comment: Moderate pharyngeal dysphagia. Penetration before/during the swallow w/ thin & nectar-thick liquids (sometimes on second swallow w/ piecemeal oral transit) 2' pre-spill, delayed initiation, and reduced vestibular closure. Penetration did not clear from laryngeal vestibule & pt eventually aspirated thin/nectar-thick vestibular residue  upon subsequent swallows. Penetration during the swallow w/ honey-thick liquids via cup that did not clear from vestibule posing aspiration risk. Pt did not sense to clear, eventual aspiration inevitable. No penetration/aspiration w/ honey-thick liquids via tsp only, pudding, or solids. Mild diffuse pharyngeal residue w/ all consistencies 2' reduced BOT retraction, decr'd elevation, and reduced excursion. Cognition impacting carryover of further compensations, so DNT at this time. Pt may resume modified PO diet, but given that diet is very restricted, pt may need additional alternate nutrition per MD discretion. SLP will continue to address dysphagia w/ pharyngeal strengthening during treatment.     Cervical Esophageal Phase  Esophageal Phase: see radiology report for further details                         Activity at Discharge:   Activity Instructions     Bathing Restrictions      Type of Restriction: Bathing    Bathing Restrictions: No Tub Bath    Driving Restrictions      Type of Restriction: Driving    Driving Restrictions: No Driving While Taking Narcotics    Lifting Restrictions      Type of Restriction: Lifting    Lifting Restrictions: Lifting Restriction (Indicate Limit)    Weight Limit (Pounds): 10    Length of Lifting Restriction: Until after follow-up appointment        Do not drive while taking narcotics    Follow-up Appointments  Future Appointments   Date Time Provider Department Center   6/25/2021  1:45 PM Nic Herring APRN [facility]          Mireille Cedillo PA-C  06/18/21  12:44 EDT

## 2021-06-18 NOTE — PROGRESS NOTES
[facility] Medicine Services  PROGRESS NOTE    Patient Name: Augusto Lorenzana Jr.  : 1945  MRN: [id number]    Date of Admission: 2021  Primary Care Physician: Rob Polanco MD    Subjective   Subjective     CC:  Follow up for med mgmt    HPI:  No acute events overnight. Still having cough, cough medicine helps. Denies dyspnea, fever, or chills.    ROS:  CV- No chest pain, palpitations  GI- No N/V/D, abd pain     Objective   Objective     Vital Signs:   Temp:  [97.1 °F (36.2 °C)-97.6 °F (36.4 °C)] 97.1 °F (36.2 °C)  Heart Rate:  [68-94] 83  Resp:  [18] 18  BP: (120-170)/(83-94) 170/93        Physical Exam:  Constitutional: No acute distress, awake, alert  HENT: NCAT, mucous membranes moist  Respiratory: Clear to auscultation bilaterally, respiratory effort normal on room air  Cardiovascular: RRR, no murmurs  Gastrointestinal: Positive bowel sounds, soft, nontender, nondistended  Psychiatric: Appropriate affect, cooperative  Neurologic: Oriented x 3, Cranial Nerves grossly intact to confrontation, speech clear  Skin: sternal incision is healing well     Results Reviewed:  Results from last 7 days   Lab Units 21  0518 21  0401   WBC 10*3/mm3 8.24 9.85   HEMOGLOBIN g/dL 8.8* 8.4*   HEMATOCRIT % 27.8* 26.3*   PLATELETS 10*3/mm3 291 260     Results from last 7 days   Lab Units 21  0559 21  0518 21  0401   SODIUM mmol/L 138 137 135*   POTASSIUM mmol/L 3.8 3.9 3.4*   CHLORIDE mmol/L 105 101 100   CO2 mmol/L 21.0* 26.0 26.0   BUN mg/dL 68* 65* 67*   CREATININE mg/dL 1.69* 1.62* 1.69*   GLUCOSE mg/dL 127* 188* 116*   CALCIUM mg/dL 8.9 9.3 9.0     Estimated Creatinine Clearance: 38.9 mL/min (A) (by C-G formula based on SCr of 1.69 mg/dL (H)).    Microbiology Results Abnormal     Procedure Component Value - Date/Time    COVID PRE-OP / PRE-PROCEDURE SCREENING ORDER (NO ISOLATION) - Swab, Nasopharynx [[id number]]  (Normal) Collected: 21 1457    Lab  Status: Final result Specimen: Swab from Nasopharynx Updated: 06/17/21 1644    Narrative:      The following orders were created for panel order COVID PRE-OP / PRE-PROCEDURE SCREENING ORDER (NO ISOLATION) - Swab, Nasopharynx.  Procedure                               Abnormality         Status                     ---------                               -----------         ------                     COVID-19, ABBOTT IN-HOUS...[[id number]]  Normal              Final result                 Please view results for these tests on the individual orders.    COVID-19, ABBOTT IN-HOUSE,NASAL Swab (NO TRANSPORT MEDIA) 2 HR TAT - Swab, Nasopharynx [[id number]]  (Normal) Collected: 06/17/21 1457    Lab Status: Final result Specimen: Swab from Nasopharynx Updated: 06/17/21 1644     COVID19 Presumptive Negative    Narrative:      Fact sheet for providers: https://www.fda.gov/media/462285/download     Fact sheet for patients: https://www.fda.gov/media/342338/download    Test performed by PCR.  If inconsistent with clinical signs and symptoms patient should be tested with different authorized molecular test.    Blood Culture - Blood, Arm, Right [[id number]] Collected: 06/05/21 1412    Lab Status: Final result Specimen: Blood from Arm, Right Updated: 06/10/21 1431     Blood Culture No growth at 5 days    Blood Culture - Blood, Cannula [[id number]] Collected: 06/05/21 1201    Lab Status: Final result Specimen: Blood from Cannula Updated: 06/10/21 1245     Blood Culture No growth at 5 days    Eosinophil Smear - Urine, Urine, Clean Catch [[id number]]  (Normal) Collected: 05/30/21 1416    Lab Status: Final result Specimen: Urine, Clean Catch Updated: 05/30/21 1614     Eosinophil Smear 0 % EOS/100 Cells     Narrative:      No eosinophil seen    Eosinophil Smear - Urine, Urine, Clean Catch [[id number]]  (Normal) Collected: 05/23/21 1838    Lab Status: Final result Specimen: Urine, Clean Catch Updated: 05/24/21 0459     Eosinophil Smear 0 %  EOS/100 Cells     Narrative:      No eosinophil seen    COVID PRE-OP / PRE-PROCEDURE SCREENING ORDER (NO ISOLATION) - Swab, Nasopharynx [[id number]]  (Normal) Collected: 05/22/21 0626    Lab Status: Final result Specimen: Swab from Nasopharynx Updated: 05/22/21 0702    Narrative:      The following orders were created for panel order COVID PRE-OP / PRE-PROCEDURE SCREENING ORDER (NO ISOLATION) - Swab, Nasopharynx.  Procedure                               Abnormality         Status                     ---------                               -----------         ------                     COVID-19 and FLU A/B PCR...[[id number]]  Normal              Final result                 Please view results for these tests on the individual orders.    COVID-19 and FLU A/B PCR - Swab, Nasopharynx [[id number]]  (Normal) Collected: 05/22/21 0626    Lab Status: Final result Specimen: Swab from Nasopharynx Updated: 05/22/21 0702     COVID19 Not Detected     Influenza A PCR Not Detected     Influenza B PCR Not Detected          Imaging Results (Last 24 Hours)     ** No results found for the last 24 hours. **          Results for orders placed during the hospital encounter of 05/22/21    Adult Transthoracic Echo Complete w/ Color, Spectral and Contrast if necessary per protocol    Interpretation Summary  · Left ventricular systolic function is normal. Estimated left ventricular EF = 60%.  · Left ventricular diastolic function is consistent with (grade I) impaired relaxation.  · Mild aortic valve stenosis is present.  · Estimated right ventricular systolic pressure from tricuspid regurgitation is normal (<35 mmHg).      I have reviewed the medications:  Scheduled Meds:amLODIPine, 10 mg, Nasogastric, Q24H  aspirin, 325 mg, Oral, Daily  atorvastatin, 40 mg, Nasogastric, Nightly  carvedilol, 25 mg, Nasogastric, BID With Meals  gabapentin, 300 mg, Nasogastric, Nightly  insulin lispro, 0-9 Units, Subcutaneous, TID AC  isosorbide dinitrate, 20 mg,  Nasogastric, TID - Nitrates  melatonin, 5 mg, Nasogastric, Nightly  multivitamin with minerals, 1 tablet, Oral, Daily  pantoprazole, 40 mg, Intravenous, BID  pharmacy consult - MTM, , Does not apply, Daily  QUEtiapine, 12.5 mg, Oral, Q12H  senna-docusate sodium, 2 tablet, Oral, BID  sodium chloride, 10 mL, Intravenous, Q12H  thiamine, 100 mg, Nasogastric, Daily      Continuous Infusions:   PRN Meds:.•  acetaminophen **OR** acetaminophen **OR** acetaminophen  •  [DISCONTINUED] senna-docusate sodium **AND** polyethylene glycol **AND** [DISCONTINUED] bisacodyl **AND** bisacodyl  •  dextrose  •  guaiFENesin-dextromethorphan  •  haloperidol lactate  •  heparin (porcine)  •  ipratropium-albuterol  •  magnesium sulfate **OR** magnesium sulfate in D5W 1g/100mL (PREMIX) **OR** magnesium sulfate  •  naloxone  •  ondansetron    Assessment/Plan   Assessment & Plan     Active Hospital Problems    Diagnosis  POA   • **NSTEMI 5/22/2021 [I21.4]  Yes   • Postop encephalopathy post code. Combination of anoxic insult and azotemia [G93.40]  No   • Perioperative cardiac arrest with ventricular fibrillation (CMS/[facility]) [I46.9, I49.01]  Yes   • S/P CABG x 3 on 5/28/21 [Z95.1]  Not Applicable   • Gout [M10.9]  Yes   • Former smokeless tobacco use [Z87.891]  Not Applicable   • A/C kidney disease. ATN due to postoperative arrest. Dialysis begun 6/3/2021 [N18.9]  Yes   • T2DM on oral agents and insulin. HbA1c 7.4  [E11.9, Z79.4]  Not Applicable   • Hyperlipidemia LDL goal <70 [E78.5]  Yes   • Essential hypertension [I10]  Yes      Resolved Hospital Problems    Diagnosis Date Resolved POA   • Postoperative hypoxemia [R09.02, Z98.890] 06/04/2021 No   • Leukocytosis [D72.829] 05/22/2021 Yes   • Acute CHF (CMS/HCC) [I50.9] 05/22/2021 Yes   • MICHAEL (acute kidney injury) (CMS/HCC) [N17.9] 05/27/2021 Yes   • Non-STEMI (non-ST elevated myocardial infarction) (CMS/HCC) [I21.4] 05/22/2021 Yes   • Non-STEMI (non-ST elevated myocardial infarction) (CMS/[facility])  [I21.4] 05/24/2021 Yes        Brief Hospital Course to date:  Augusto Lorenzana Jr. is a 76 y.o. male with a PMH of CAD, T2DM, HTN, CKD, and smokeless tobacco use who was admitted on 5/22 for NSTEMI. Had CABG on 5/28 complicated by post op v fib and asystole with resuscitation on day of surgery. Required 3u blood, 4u FFp, and 1u platelets for bleeding. Has renal failure needing intermittent HD. Renal function recovered. Stable for transfer out of ICU on 6/12.     T2DM  Continue home oral medications and insulin    HTN continue changes made my cardiology    Encephalopathy- continue seroquel 12.5 mg po nightly     MICHAEL on CKD - f/u with nephrology     CAD  S/p CABG mgmt per CT sugergy     Ok for discharge per primary team.  Follow up with PCP 2 weeks after discharge from rehab    CODE STATUS:   Code Status and Medical Interventions:   Ordered at: 05/22/21 0639     Code Status:    CPR     Medical Interventions (Level of Support Prior to Arrest):    Full       Edith Fontanez MD  06/18/21

## 2021-06-18 NOTE — PROGRESS NOTES
"   LOS: 27 days    Patient Care Team:  Rob Polanco MD as PCP - General (Internal Medicine)    Reason For Visit:  F/U MICHAEL ON CKD  Subjective   No new complaints no added distress no new events.  Review of Systems:   No new complaints.  Objective     amLODIPine, 10 mg, Nasogastric, Q24H  aspirin, 325 mg, Oral, Daily  atorvastatin, 40 mg, Nasogastric, Nightly  carvedilol, 25 mg, Nasogastric, BID With Meals  gabapentin, 300 mg, Nasogastric, Nightly  insulin lispro, 0-9 Units, Subcutaneous, TID AC  isosorbide dinitrate, 20 mg, Nasogastric, TID - Nitrates  melatonin, 5 mg, Nasogastric, Nightly  multivitamin with minerals, 1 tablet, Oral, Daily  pantoprazole, 40 mg, Intravenous, BID  pharmacy consult - MTM, , Does not apply, Daily  QUEtiapine, 12.5 mg, Oral, Q12H  senna-docusate sodium, 2 tablet, Oral, BID  sodium chloride, 10 mL, Intravenous, Q12H  thiamine, 100 mg, Nasogastric, Daily             Vital Signs:  Blood pressure 170/93, pulse 73, temperature 97.1 °F (36.2 °C), temperature source Oral, resp. rate 18, height 170 cm (66.93\"), weight 80.3 kg (177 lb 1.6 oz), SpO2 97 %.    Flowsheet Rows      First Filed Value   Admission Height  170.2 cm (67\") Documented at 05/22/2021 0433   Admission Weight  88.5 kg (195 lb) Documented at 05/22/2021 0433          06/17 0701 - 06/18 0700  In: 1080 [P.O.:1080]  Out: 900 [Urine:900]    Physical Exam:    General Appearance: Awake, alert, oriented x4 comfortable in chair eating  Eyes: PER, conjunctivae and sclerae normal, no icterus  Lungs: respirations regular and unlabored, no crepitus, clear to auscultation  Heart/CV: regular rhythm & normal rate, no murmur, no gallop, no rub.  Abdomen: not distended, soft, non-tender, no masses,  bowel sounds present.  Extremities trace pretibial right lower extremity edema, no cyanosis  Skin: No rash, Warm and dry    Radiology:    Labs:  Results from last 7 days   Lab Units 06/14/21  0518 06/12/21  0401   WBC 10*3/mm3 8.24 9.85 "   HEMOGLOBIN g/dL 8.8* 8.4*   HEMATOCRIT % 27.8* 26.3*   PLATELETS 10*3/mm3 291 260     Results from last 7 days   Lab Units 06/16/21  0559 06/15/21  0426 06/14/21  0518 06/12/21  0401   SODIUM mmol/L 138  --  137 135*   POTASSIUM mmol/L 3.8  --  3.9 3.4*   CHLORIDE mmol/L 105  --  101 100   CO2 mmol/L 21.0*  --  26.0 26.0   BUN mg/dL 68*  --  65* 67*   CREATININE mg/dL 1.69*  --  1.62* 1.69*   CALCIUM mg/dL 8.9  --  9.3 9.0   PHOSPHORUS mg/dL 4.3  --  3.9 3.6   MAGNESIUM mg/dL  --  2.0 1.8 1.9   ALBUMIN g/dL 2.60*  --  3.10* 3.00*     Results from last 7 days   Lab Units 06/16/21  0559   GLUCOSE mg/dL 127*                       Estimated Creatinine Clearance: 38.9 mL/min (A) (by C-G formula based on SCr of 1.69 mg/dL (H)).      Assessment       NSTEMI 5/22/2021    Hyperlipidemia LDL goal <70    Essential hypertension    T2DM on oral agents and insulin. HbA1c 7.4     A/C kidney disease. ATN due to postoperative arrest. 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 Combination of anoxic insult and azotemia    1.  MICHAEL: Patient is off dialysis now renal function stable.  2.  Anemia: Monitor closely outpatient.  3.  S/p CABG x3 on 5/28/2021.  Plan:  Okay to discharge from [facility], make arrangement for outpatient follow-up in [facility]'s office.  #[phone]       Jovanny Chambers MD  06/18/21  12:36 EDT

## 2021-06-19 ENCOUNTER — APPOINTMENT (OUTPATIENT)
Dept: GENERAL RADIOLOGY | Facility: HOSPITAL | Age: 76
End: 2021-06-19

## 2021-06-19 LAB
ALBUMIN SERPL-MCNC: 2.5 G/DL (ref 3.5–5.2)
ALBUMIN/GLOB SERPL: 0.8 G/DL
ALP SERPL-CCNC: 143 U/L (ref 39–117)
ALT SERPL W P-5'-P-CCNC: 21 U/L (ref 1–41)
ANION GAP SERPL CALCULATED.3IONS-SCNC: 9.1 MMOL/L (ref 5–15)
AST SERPL-CCNC: 21 U/L (ref 1–40)
BASOPHILS # BLD AUTO: 0.03 10*3/MM3 (ref 0–0.2)
BASOPHILS NFR BLD AUTO: 0.4 % (ref 0–1.5)
BILIRUB SERPL-MCNC: 0.3 MG/DL (ref 0–1.2)
BUN SERPL-MCNC: 32 MG/DL (ref 8–23)
BUN/CREAT SERPL: 20.1 (ref 7–25)
CALCIUM SPEC-SCNC: 8.6 MG/DL (ref 8.6–10.5)
CHLORIDE SERPL-SCNC: 111 MMOL/L (ref 98–107)
CO2 SERPL-SCNC: 22.9 MMOL/L (ref 22–29)
CREAT SERPL-MCNC: 1.59 MG/DL (ref 0.76–1.27)
DEPRECATED RDW RBC AUTO: 57.5 FL (ref 37–54)
EOSINOPHIL # BLD AUTO: 0.21 10*3/MM3 (ref 0–0.4)
EOSINOPHIL NFR BLD AUTO: 3.1 % (ref 0.3–6.2)
ERYTHROCYTE [DISTWIDTH] IN BLOOD BY AUTOMATED COUNT: 16.8 % (ref 12.3–15.4)
GFR SERPL CREATININE-BSD FRML MDRD: 43 ML/MIN/1.73
GLOBULIN UR ELPH-MCNC: 3.2 GM/DL
GLUCOSE BLDC GLUCOMTR-MCNC: 100 MG/DL (ref 70–130)
GLUCOSE BLDC GLUCOMTR-MCNC: 109 MG/DL (ref 70–130)
GLUCOSE BLDC GLUCOMTR-MCNC: 166 MG/DL (ref 70–130)
GLUCOSE BLDC GLUCOMTR-MCNC: 37 MG/DL (ref 70–130)
GLUCOSE BLDC GLUCOMTR-MCNC: 92 MG/DL (ref 70–130)
GLUCOSE BLDC GLUCOMTR-MCNC: 98 MG/DL (ref 70–130)
GLUCOSE SERPL-MCNC: 81 MG/DL (ref 65–99)
HCT VFR BLD AUTO: 27.6 % (ref 37.5–51)
HGB BLD-MCNC: 8.7 G/DL (ref 13–17.7)
IMM GRANULOCYTES # BLD AUTO: 0.03 10*3/MM3 (ref 0–0.05)
IMM GRANULOCYTES NFR BLD AUTO: 0.4 % (ref 0–0.5)
LYMPHOCYTES # BLD AUTO: 1.26 10*3/MM3 (ref 0.7–3.1)
LYMPHOCYTES NFR BLD AUTO: 18.5 % (ref 19.6–45.3)
MAGNESIUM SERPL-MCNC: 1.7 MG/DL (ref 1.6–2.4)
MCH RBC QN AUTO: 30 PG (ref 26.6–33)
MCHC RBC AUTO-ENTMCNC: 31.5 G/DL (ref 31.5–35.7)
MCV RBC AUTO: 95.2 FL (ref 79–97)
MONOCYTES # BLD AUTO: 0.55 10*3/MM3 (ref 0.1–0.9)
MONOCYTES NFR BLD AUTO: 8.1 % (ref 5–12)
NEUTROPHILS NFR BLD AUTO: 4.72 10*3/MM3 (ref 1.7–7)
NEUTROPHILS NFR BLD AUTO: 69.5 % (ref 42.7–76)
NRBC BLD AUTO-RTO: 0 /100 WBC (ref 0–0.2)
PLATELET # BLD AUTO: 256 10*3/MM3 (ref 140–450)
PMV BLD AUTO: 9.4 FL (ref 6–12)
POTASSIUM SERPL-SCNC: 4.1 MMOL/L (ref 3.5–5.2)
PROT SERPL-MCNC: 5.7 G/DL (ref 6–8.5)
RBC # BLD AUTO: 2.9 10*6/MM3 (ref 4.14–5.8)
SODIUM SERPL-SCNC: 143 MMOL/L (ref 136–145)
WBC # BLD AUTO: 6.8 10*3/MM3 (ref 3.4–10.8)

## 2021-06-19 PROCEDURE — 80053 COMPREHEN METABOLIC PANEL: CPT | Performed by: FAMILY MEDICINE

## 2021-06-19 PROCEDURE — 74230 X-RAY XM SWLNG FUNCJ C+: CPT

## 2021-06-19 PROCEDURE — 97166 OT EVAL MOD COMPLEX 45 MIN: CPT | Performed by: OCCUPATIONAL THERAPIST

## 2021-06-19 PROCEDURE — 97530 THERAPEUTIC ACTIVITIES: CPT

## 2021-06-19 PROCEDURE — 97110 THERAPEUTIC EXERCISES: CPT | Performed by: OCCUPATIONAL THERAPIST

## 2021-06-19 PROCEDURE — 97110 THERAPEUTIC EXERCISES: CPT

## 2021-06-19 PROCEDURE — 99232 SBSQ HOSP IP/OBS MODERATE 35: CPT | Performed by: FAMILY MEDICINE

## 2021-06-19 PROCEDURE — 74230 X-RAY XM SWLNG FUNCJ C+: CPT | Performed by: RADIOLOGY

## 2021-06-19 PROCEDURE — 25010000002 HEPARIN (PORCINE) PER 1000 UNITS: Performed by: FAMILY MEDICINE

## 2021-06-19 PROCEDURE — 85025 COMPLETE CBC W/AUTO DIFF WBC: CPT | Performed by: FAMILY MEDICINE

## 2021-06-19 PROCEDURE — 97535 SELF CARE MNGMENT TRAINING: CPT | Performed by: OCCUPATIONAL THERAPIST

## 2021-06-19 PROCEDURE — 97116 GAIT TRAINING THERAPY: CPT

## 2021-06-19 PROCEDURE — 97162 PT EVAL MOD COMPLEX 30 MIN: CPT

## 2021-06-19 PROCEDURE — 82962 GLUCOSE BLOOD TEST: CPT

## 2021-06-19 PROCEDURE — 83735 ASSAY OF MAGNESIUM: CPT | Performed by: FAMILY MEDICINE

## 2021-06-19 PROCEDURE — 63710000001 INSULIN DETEMIR PER 5 UNITS: Performed by: FAMILY MEDICINE

## 2021-06-19 PROCEDURE — 25010000002 GLUCAGON (HUMAN RECOMBINANT) 1 MG RECONSTITUTED SOLUTION

## 2021-06-19 PROCEDURE — 92611 MOTION FLUOROSCOPY/SWALLOW: CPT

## 2021-06-19 RX ORDER — NICOTINE POLACRILEX 4 MG
15 LOZENGE BUCCAL
Status: DISCONTINUED | OUTPATIENT
Start: 2021-06-19 | End: 2021-06-25 | Stop reason: HOSPADM

## 2021-06-19 RX ORDER — DEXTROSE MONOHYDRATE 25 G/50ML
25 INJECTION, SOLUTION INTRAVENOUS
Status: DISCONTINUED | OUTPATIENT
Start: 2021-06-19 | End: 2021-06-25 | Stop reason: HOSPADM

## 2021-06-19 RX ADMIN — QUETIAPINE FUMARATE 12.5 MG: 25 TABLET, FILM COATED ORAL at 09:34

## 2021-06-19 RX ADMIN — HEPARIN SODIUM 5000 UNITS: 5000 INJECTION INTRAVENOUS; SUBCUTANEOUS at 20:42

## 2021-06-19 RX ADMIN — ISOSORBIDE DINITRATE 20 MG: 20 TABLET ORAL at 12:27

## 2021-06-19 RX ADMIN — AMLODIPINE BESYLATE 10 MG: 10 TABLET ORAL at 09:34

## 2021-06-19 RX ADMIN — INSULIN DETEMIR 30 UNITS: 100 INJECTION, SOLUTION SUBCUTANEOUS at 20:56

## 2021-06-19 RX ADMIN — CARVEDILOL 25 MG: 25 TABLET, FILM COATED ORAL at 17:25

## 2021-06-19 RX ADMIN — HEPARIN SODIUM 5000 UNITS: 5000 INJECTION INTRAVENOUS; SUBCUTANEOUS at 09:34

## 2021-06-19 RX ADMIN — ISOSORBIDE DINITRATE 20 MG: 20 TABLET ORAL at 09:34

## 2021-06-19 RX ADMIN — GLIPIZIDE 5 MG: 5 TABLET ORAL at 16:41

## 2021-06-19 RX ADMIN — ATORVASTATIN CALCIUM 40 MG: 40 TABLET, FILM COATED ORAL at 20:40

## 2021-06-19 RX ADMIN — GUAIFENESIN AND DEXTROMETHORPHAN 5 ML: 100; 10 SYRUP ORAL at 20:40

## 2021-06-19 RX ADMIN — GABAPENTIN 600 MG: 300 CAPSULE ORAL at 20:41

## 2021-06-19 RX ADMIN — ASPIRIN 325 MG: 325 TABLET, COATED ORAL at 09:34

## 2021-06-19 RX ADMIN — ISOSORBIDE DINITRATE 20 MG: 20 TABLET ORAL at 17:25

## 2021-06-19 RX ADMIN — GLIPIZIDE 5 MG: 5 TABLET ORAL at 09:34

## 2021-06-19 RX ADMIN — QUETIAPINE FUMARATE 12.5 MG: 25 TABLET, FILM COATED ORAL at 20:40

## 2021-06-19 RX ADMIN — GLUCAGON HYDROCHLORIDE 1 MG: 1 INJECTION, POWDER, FOR SOLUTION INTRAMUSCULAR; INTRAVENOUS; SUBCUTANEOUS at 05:48

## 2021-06-19 RX ADMIN — ALLOPURINOL 300 MG: 300 TABLET ORAL at 09:34

## 2021-06-19 RX ADMIN — CARVEDILOL 25 MG: 25 TABLET, FILM COATED ORAL at 09:34

## 2021-06-19 NOTE — PROGRESS NOTES
Inpatient Rehabilitation Functional Measures Assessment    Functional Measures  GEMA Eating:  GEMA Grooming:  GEMA Bathing:  GEMA Upper Body Dressing:  GEMA Lower Body Dressing:  GEMA Toileting:    GEMA Bladder Management  Level of Assistance:  Frequency/Number of Accidents this Shift:    GEMA Bowel Management  Level of Assistance:  Frequency/Number of Accidents this Shift:    GEMA Bed/Chair/Wheelchair Transfer:  Bed/chair/wheelchair Transfer Score = 5.  Patient is supervision/set-up for transferring to and from the  bed/chair/wheelchair, requiring: Stand by assistance. Patient requires the  following assistive device(s): Walker. Seating system of wheelchair.  GEMA Toilet Transfer:  Toilet Transfer Score = 5.  Patient is supervision/set-up  for transferring to and from the toilet/commode, requiring: Stand by assistance.  Patient requires the following assistive device(s): Walker. Grab bars.  GEMA Tub/Shower Transfer:    Previously Documented Mode of Locomotion at Discharge:  GEMA Expected Mode of Locomotion at Discharge: The expected mode of most  frequently used locomotion, at discharge, is expected to be both walking and  wheelchair.  GEMA Walk/Wheelchair:  WHEELCHAIR OBSERVATION   Wheelchair did not occur.    WALK OBSERVATION   Walk Distance Scale = 3.  Distance walked is greater than 150 feet. Walk Score  = 4.  Patient performs 75% or more of effort and requires minimal assistance.  Incidental help/contact guard/steadying was provided. Patient walked a distance  of  350 feet. Patient requires the following assistive device(s): Rolling  walker. Pt demonstrates forward flexed posture and form gets poorer the more  fatigued the patient is. Pt require VC for proper posture.  GEMA Stairs:  Stairs did not occur.    GEMA Comprehension:  GEMA Expression:  GEMA Social Interaction:  GEMA Problem Solving:  GEMA Memory:    Therapy Mode Minutes  Occupational Therapy:  Physical Therapy: Individual: 90 minutes.  Speech Language  Pathology:    Discharge Functional Goals:  Walk: 5  Stairs: 5    Signed by: Yamel Mckeon PT

## 2021-06-19 NOTE — THERAPY EVALUATION
Inpatient Rehabilitation - Physical Therapy Initial Evaluation   [facility]     Patient Name: Augusto Lorenzana Jr.  : 1945  MRN: [id number]  Today's Date: 2021           PT Assessment (last 12 hours)      PT Evaluation and Treatment    No documentation.       Physical Therapy Education                 Title: PT OT SLP Therapies (Done)     Topic: Physical Therapy (Done)     Point: Mobility training (Done)     Learning Progress Summary           Patient Acceptance, E, VU by  at 2021 1225                   Point: Home exercise program (Done)     Learning Progress Summary           Patient Acceptance, E, VU by  at 2021 1225                   Point: Body mechanics (Done)     Learning Progress Summary           Patient Acceptance, E, VU by  at 2021 1225                   Point: Precautions (Done)     Learning Progress Summary           Patient Acceptance, E, VU by  at 2021 1225                               User Key     Initials Effective Dates Name Provider Type Discipline     21 -  Yamel Mckeon PT Physical Therapist PT              PT Recommendation and Plan  Planned Therapy Interventions (PT): balance training, gait training, patient/family education, postural re-education, stair training, strengthening, transfer training          Time Calculation:   PT Charges     Row Name 21 1217 21 1148          Time Calculation    PT Received On  21  -ARTURO  --     PT Goal Re-Cert Due Date  21  -ARTURO  --        Time Calculation- PT    Total Timed Code Minutes- PT  90 minute(s)  -  --        Timed Charges    31209 - Gait Training Minutes   --  25  -KH        Total Minutes    Timed Charges Total Minutes  --  25  -KH      Total Minutes  --  25  -KH       User Key  (r) = Recorded By, (t) = Taken By, (c) = Cosigned By    Initials Name Provider Type    Yamel Fuller PT Physical Therapist        Therapy Charges for Today     Code Description Service Date Service  Provider Modifiers Qty    24706728713 HC PT EVAL MOD COMPLEXITY 1 6/19/2021 Yamel Mckeon, PT GP 1    96398578160 HC PT THER PROC EA 15 MIN 6/19/2021 Yamel Mckeon, PT GP 2    49035024602 HC GAIT TRAINING EA 15 MIN 6/19/2021 Yamel Mckeon, PT GP 2    76143029097 HC PT THERAPEUTIC ACT EA 15 MIN 6/19/2021 Yamel Mckeon, PT GP 1               Yamel Mckeon, PT  6/19/2021

## 2021-06-19 NOTE — PLAN OF CARE
DYSPHAGIA THERAPY PLAN OF CARE:    Augusto Lorenzana Jr. will benefit from formal dysphagia therapy x3-5 days per week at 15-45 minutes sessions, as functionally tolerated, for 7-14 Days, to address:    Long Term Goal:  Pt will accept least restrictive diet tolerance w/o overt s/s aspiration.     Short Term Goals:    1. Pt will perform OROM/DANA exercises x3 sets x10 reps w/ min cues.    2. Pt will perform resistive breathing exercises x3 sets x5 reps w/resistance of 2-6  To improve respiratory support and control.     3. Pt will perform laryngeal adduction/elevation exercises x3 sets x10 reps w/ min cues.     4. Pt will perform CTAR/Shaker exercises sustained x3 sets x5 reps for 30+ seconds over 3 consecutive sessions.     5. Pt will perform CTAR/Shaker exercises repetitive x3 sets x12 reps w/ mod cues.     6. Pt will perform compensatory techniques during meals w/ min cues.    7. Pt will participate in instrumental re-evaluation of swallowing fnx in 7-10 days, pending progress towards this poc.    Thank you-  Jody Lynn M.S., CFY-SLP        Goal Outcome Evaluation:

## 2021-06-19 NOTE — PROGRESS NOTES
Inpatient Rehabilitation Functional Measures Assessment and Plan of Care    Plan of Care  Self Care    [OT] Bathing(Active)  Current Status(06/19/2021): CGA  Weekly Goal(06/29/2021): SBA  Discharge Goal: Supervision    Functional Measures  GEMA Eating:  GEMA Grooming:  GEMA Bathing:  GEMA Upper Body Dressing:  GEMA Lower Body Dressing:  GEMA Toileting:    GEMA Bladder Management  Level of Assistance:  Frequency/Number of Accidents this Shift:    GEMA Bowel Management  Level of Assistance:  Frequency/Number of Accidents this Shift:    GEMA Bed/Chair/Wheelchair Transfer:  GEMA Toilet Transfer:  GEMA Tub/Shower Transfer:    Previously Documented Mode of Locomotion at Discharge:  [facility] Expected Mode of Locomotion at Discharge:  GEMA Walk/Wheelchair:  GEMA Stairs:    GEMA Comprehension:  GEMA Expression:  [facility] Social Interaction:  [facility] Problem Solving:  GEMA Memory:    Therapy Mode Minutes  Occupational Therapy: Individual: 90 minutes.  Physical Therapy:  Speech Language Pathology:    Discharge Functional Goals:    Signed by: Marcela Kearney, Occupational Therapist

## 2021-06-19 NOTE — SIGNIFICANT NOTE
06/18/21 1956   Living Environment   Lives With spouse   Name(s) of Who Lives With Patient Hedy Lorenzana   Current Living Arrangements home/apartment/condo   Primary Care Provided by self   Provides Primary Care For no one   Family Caregiver if Needed spouse   Family Caregiver Names Hedy Lorenzana   Quality of Family Relationships involved;supportive;helpful   Able to Return to Prior Arrangements yes   Living Arrangement Comments Spoke to pt who states being  to spouse Hedy Lorenzana.  She works for [employer], but is off for the summer.  They live in a 1-story house with 5 ALLA in the front and 5 ALLA in the back with handrails both sides at each entrance.  They have 2 adult children:  son Austin Lorenzana lives beside pt in [place], [place] and daughter Melvi Cyr who lives in [place], [place].  He has not had home health or outpatient therapy in the past.  He has a can that he paid out-of-pocket for, but very rarely uses it.  He receives [facility].  He has [employer] Medicare and [employer] insurance.  He has a prescription plan with [employer].  He uses [facility] in [place].  He does not have an advanced directive, living will, or POA.  Prior to hospitalization, he was independent with ADLs, mobility, and driving.  Spouse does the shopping.  Spouse will provide transportation home at discharge.  Pt plans to return home with spouse at discharge.   Transition Planning   Patient/Family Anticipates Transition to home with family   Patient/Family Anticipated Services at Transition home health care;outpatient care   Transportation Anticipated family or friend will provide   Discharge Needs Assessment (IRF)   Concerns to be Addressed discharge planning   Equipment Currently Used at Home cane, straight  (does not use cane very much)   Anticipated Changes Related to Illness inability to care for self   Discharge Facility/Level of Care Needs home with home health;outpatient therapy

## 2021-06-19 NOTE — THERAPY TREATMENT NOTE
Inpatient Rehabilitation - Occupational Therapy Initial Evaluation and Treatment Note     [facility]     Patient Name: Augusto Lorenzana Jr.  : 1945  MRN: [id number]    Today's Date: 2021                 Admit Date: 2021       No diagnosis found.    Patient Active Problem List   Diagnosis   • NSTEMI 2021   • Hyperlipidemia LDL goal <70   • Essential hypertension   • T2DM on oral agents and insulin. HbA1c 7.4    • Severe 3 vessel CAD with preserved LV function (CMS/[facility])   • A/C kidney disease. ATN due to postoperative arrest. 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 Combination of anoxic insult and azotemia   • Debility       Past Medical History:   Diagnosis Date   • CAD (coronary artery disease)    • CKD (chronic kidney disease) stage 3, GFR 30-59 ml/min (CMS/[facility])    • Diabetes mellitus (CMS/[facility])    • Hyperlipidemia    • Hypertension    • NSTEMI (non-ST elevated myocardial infarction) (CMS/[facility])        Past Surgical History:   Procedure Laterality Date   • CARDIAC CATHETERIZATION N/A 2021    Procedure: Left Heart Cath;  Surgeon: Blade Greene IV, MD;  Location:  [facility]ATH [facility]VASIVE [facility];  Service: Cardiovascular;  Laterality: N/A;   • CARDIAC SURGERY      2 stents placed .   • CORONARY ARTERY BYPASS GRAFT N/A 2021    Procedure: MEDIAN STERNOTOMY CORONARY ARTERY BYPASS X 3 UTILIZING THE LEFT INTERNAL MAMMARY ARTERY GRAFT, EVH OF THE GREATER RIGHT SAPHENOUS VEIN, AND PILO PER ANESTHESIA;  Surgeon: Jacek Miller MD;  Location: [facility] OR;  Service: Cardiothoracic;  Laterality: N/A;                IRF OT ASSESSMENT FLOWSHEET (last 12 hours)      IRF OT Evaluation and Treatment     Row Name 21 1000          OT Time and Intention    Document Type  initial evaluation  -AS     Mode of Treatment  occupational  therapy  -AS     Patient Effort  good  -AS     [facility] Name 06/19/21 1000          Relationship/Environment    Lives With  spouse  -AS     Name(s) of Who Lives With Patient  Hedy Lorenzana  -AS     Family Caregiver if Needed  spouse  -AS     Row Name 06/19/21 1000          General Information    Patient Profile Reviewed  yes  -AS     Existing Precautions/Restrictions  cardiac;fall;sternal  -AS     [facility] Name 06/19/21 1000          Vision Assessment/Intervention    Visual Impairment/Limitations  WFL;corrective lenses for reading  -AS     Row Name 06/19/21 1000          Cognition/Psychosocial    Orientation Status (Cognition)  oriented x 3  -AS     Row Name 06/19/21 1000          Pain Scale: Numbers Pre/Post-Treatment    Pretreatment Pain Rating  0/10 - no pain  -AS     Posttreatment Pain Rating  0/10 - no pain  -AS     Row Name 06/19/21 1000          Pain Scale: FACES Pre/Post-Treatment    Pain: FACES Scale, Pretreatment  0-->no hurt  -AS     Posttreatment Pain Rating  0-->no hurt  -AS     Row Name 06/19/21 1000          Range of Motion Comprehensive    General Range of Motion  bilateral upper extremity ROM WFL  -AS     Row Name 06/19/21 1000          Strength Comprehensive (MMT)    General Manual Muscle Testing (MMT) Assessment  upper extremity strength deficits identified  -AS     Comment, General Manual Muscle Testing (MMT) Assessment  4/5  -AS     Row Name 06/19/21 1000          Bed Mobility    Bed Mobility  --  -AS     Rolling Left [place] (Bed Mobility)  --  -AS     Rolling Right [place] (Bed Mobility)  --  -AS     Scooting/Bridging [place] (Bed Mobility)  --  -AS     Supine-Sit [place] (Bed Mobility)  --  -AS     Sit-Supine [place] (Bed Mobility)  --  -AS     Sidelying-Sit [place] (Bed Mobility)  --  -AS     Sit-Sidelying [place] (Bed Mobility)  --  -AS     Bed Mobility, Safety Issues  --  -AS     Assistive Device (Bed Mobility)  --  -AS     [facility] Name 06/19/21 1000          Functional  Mobility    Functional Mobility- Ind. Level  contact guard assist  -AS     Functional Mobility- Device  rolling walker  -AS     Row Name 06/19/21 1000          Transfer Assessment/Treatment    Transfers  sit-stand transfer  -AS     Row Name 06/19/21 1000          Transfers    Bed-Chair [place] (Transfers)  contact guard  -AS     Assistive Device (Bed-Chair Transfers)  walker, front-wheeled  -AS     Sit-Stand [place] (Transfers)  standby assist  -AS     Row Name 06/19/21 1000          Sit-Stand Transfer    Assistive Device (Sit-Stand Transfers)  walker, front-wheeled  -AS     Row Name 06/19/21 1000          Gait/Stairs (Locomotion)    [place] Level (Gait)  --  -AS     Assistive Device (Gait)  --  -AS     Deviations/Abnormal Patterns (Gait)  --  -AS     Bilateral Gait Deviations  --  -AS     Row Name 06/19/21 1000          Safety Issues, Functional Mobility    Impairments Affecting Function (Mobility)  endurance/activity tolerance;postural/trunk control;strength  -AS     Row Name 06/19/21 1000          Motor Skills    Motor Skills  functional endurance;therapeutic exercise;motor control/coordination interventions  -AS     Motor Control/Coordination Interventions  fine motor manipulation/dexterity activities;therapeutic exercise/ROM  -AS     Therapeutic Exercise  shoulder  -AS     Row Name 06/19/21 1000          Shoulder (Therapeutic Exercise)    Shoulder (Therapeutic Exercise)  AROM (active range of motion)  -AS     Row Name 06/19/21 1000          Elbow/Forearm (Therapeutic Exercise)    Elbow/Forearm (Therapeutic Exercise)  AROM (active range of motion)  -AS     Elbow/Forearm AROM (Therapeutic Exercise)  extension;supination;flexion;bilateral  -AS     Row Name 06/19/21 1000          Wrist (Therapeutic Exercise)    Wrist (Therapeutic Exercise)  AROM (active range of motion)  -AS     Wrist AROM (Therapeutic Exercise)  bilateral;flexion;extension  -AS     Row Name 06/19/21 1000          Hand (Therapeutic  Exercise)    Hand (Therapeutic Exercise)  AROM (active range of motion)  -AS     Hand AROM/AAROM (Therapeutic Exercise)  bilateral  -AS     Row Name 06/19/21 1000          Bathing    [place] Level (Bathing)  contact guard assist  -AS     Position (Bathing)  edge of bed sitting  -AS     Row Name 06/19/21 1000          Upper Body Dressing    [place] Level (Upper Body Dressing)  supervision;set up assistance  -AS     Position (Upper Body Dressing)  edge of bed sitting  -AS     Row Name 06/19/21 1000          Lower Body Dressing    [place] Level (Lower Body Dressing)  contact guard assist  -AS     Position (Lower Body Dressing)  edge of bed sitting  -AS     Row Name 06/19/21 1000          Grooming    [place] Level (Grooming)  supervision  -AS     Position (Grooming)  supported sitting  -AS     Row Name 06/19/21 1000          Toileting    [place] Level (Toileting)  contact guard assist  -AS     Assistive Device Use (Toileting)  grab bar/safety frame  -AS     Row Name 06/19/21 1000          Self-Feeding    [place] Level (Self-Feeding)  independent  -AS     Row Name 06/19/21 1000          IRF OT Goals    Bathing Goal Selection (OT-IRF)  bathing, OT goal 1;bathing, OT goal 2  -AS     LB Dressing Goal Selection (OT-IRF)  LB dressing, OT goal 1;LB dressing, OT goal 2  -AS     Toileting Goal Selection (OT-IRF)  toileting, OT goal 1;toileting, OT goal 2  -AS     Row Name 06/19/21 1000          Bathing Goal 1 (OT-IRF)    [place] Level (Bathing Goal 1, OT-IRF)  standby assist  -AS     Row Name 06/19/21 1000          Bathing Goal 2 (OT-IRF)    [place] Level (Bathing Goal 2, OT-IRF)  supervision required  -AS     Row Name 06/19/21 1000          LB Dressing Goal 1 (OT-IRF)    [place] (LB Dressing Goal 1, OT-IRF)  set-up required;standby assist  -AS     Row Name 06/19/21 1000          LB Dressing Goal 2 (OT-IRF)    [place] (LB Dressing Goal 2, OT-IRF)  supervision required  -AS      Row Name 06/19/21 1000          Toileting Goal 1 (OT-IRF)    [place] Level (Toileting Goal 1, OT-IRF)  set-up required;standby assist  -AS     Row Name 06/19/21 1000          Toileting Goal 2 (OT-IRF)    [place] Level (Toileting Goal 2, OT-IRF)  supervision required  -AS     Row Name 06/19/21 1000          Therapy Assessment/Plan (OT)    Patient's Goals For Discharge  return home;take care of myself at home  -AS     Row Name 06/19/21 1000          Therapy Assessment/Plan (OT)    OT Diagnosis  debility  -AS     Rehab Potential/Prognosis (OT)  good, to achieve stated therapy goals  -AS     Frequency of Treatment (OT)  5 times per week  -AS     Estimated Duration of Therapy (OT)  1 week  -AS     Problem List (OT)  strength  -AS     Activity Limitations Related to Problem List (OT)  BADLs not performed adequately or safely;unable to transfer safely  -AS     Planned Therapy Interventions (OT)  activity tolerance training;BADL retraining;manual therapy/joint mobilization;patient/caregiver education/training;ROM/therapeutic exercise;strengthening exercise;transfer/mobility retraining  -AS     Row Name 06/19/21 1000          Therapy Plan Review/Discharge Plan (OT)    Therapy Plan Review (OT)  patient  -AS     Anticipated Equipment Needs At Discharge (OT)  commode, 3-in-1  -AS     Expected Discharge Disposition (OT)  home with caregiver  -AS       User Key  (r) = Recorded By, (t) = Taken By, (c) = Cosigned By    Initials Name Effective Dates    AS Marcela Kearney, OT 06/16/21 -            Occupational Therapy Education                 Title: PT OT SLP Therapies (Done)     Topic: Occupational Therapy (Done)     Point: ADL training (Done)     Description:   Instruct learner(s) on proper safety adaptation and remediation techniques during self care or transfers.   Instruct in proper use of assistive devices.              Learning Progress Summary           Patient Acceptance, E,D, NR,VU,DU by AS at 6/19/2021 6966                    Point: Home exercise program (Done)     Description:   Instruct learner(s) on appropriate technique for monitoring, assisting and/or progressing therapeutic exercises/activities.              Learning Progress Summary           Patient Acceptance, E,D, NR,VU,DU by AS at 6/19/2021 1107                   Point: Precautions (Done)     Description:   Instruct learner(s) on prescribed precautions during self-care and functional transfers.              Learning Progress Summary           Patient Acceptance, E,D, NR,VU,DU by AS at 6/19/2021 1107                   Point: Body mechanics (Done)     Description:   Instruct learner(s) on proper positioning and spine alignment during self-care, functional mobility activities and/or exercises.              Learning Progress Summary           Patient Acceptance, E,D, NR,VU,DU by AS at 6/19/2021 1107                               User Key     Initials Effective Dates Name Provider Type Discipline    AS 06/16/21 -  Marcela Kearney, OT Occupational Therapist OT                    OT Recommendation and Plan    Planned Therapy Interventions (OT): activity tolerance training, BADL retraining, manual therapy/joint mobilization, patient/caregiver education/training, ROM/therapeutic exercise, strengthening exercise, transfer/mobility retraining                    Time Calculation:     Time Calculation- OT     Row Name 06/19/21 1108             Time Calculation- OT    OT Start Time  1000  -AS      OT Stop Time  1130  -AS      OT Time Calculation (min)  90 min  -AS      OT Non-Billable Time (min)  25 min  -AS        User Key  (r) = Recorded By, (t) = Taken By, (c) = Cosigned By    Initials Name Provider Type    AS Marcela Kearney, OT Occupational Therapist        Therapy Charges for Today     Code Description Service Date Service Provider Modifiers Qty    75436945464  OT EVAL MOD COMPLEXITY 3 6/19/2021 Marcela Kearney, OT GO 1    [id number]  OT THER PROC EA 15 MIN 6/19/2021  Marcela Kearney, OT GO 2    [id number] HC OT SELF CARE/MGMT/TRAIN EA 15 MIN 6/19/2021 Marcela Kearney, OT GO 1                   Marcela Kearney OT  6/19/2021

## 2021-06-19 NOTE — PLAN OF CARE
Goal Outcome Evaluation:  Plan of Care Reviewed With: patient        Progress: no change  Outcome Summary: NEW ADMIT

## 2021-06-19 NOTE — PROGRESS NOTES
Inpatient Rehabilitation Functional Measures Assessment and Plan of Care    Plan of Care  Swallow Function    [ST] Swallowing(Active)  Current Status(06/19/2021): MS, Ground, Honey  Weekly Goal(06/21/2021): Resistive breather  Discharge Goal: Least restrictive po diet    Functional Measures  GEMA Eating:  GEMA Grooming:  GEMA Bathing:  GEMA Upper Body Dressing:  GEMA Lower Body Dressing:  GEMA Toileting:    GEMA Bladder Management  Level of Assistance:  Frequency/Number of Accidents this Shift:    GEMA Bowel Management  Level of Assistance:  Frequency/Number of Accidents this Shift:    GEMA Bed/Chair/Wheelchair Transfer:  GEMA Toilet Transfer:  GEMA Tub/Shower Transfer:    Previously Documented Mode of Locomotion at Discharge:  [facility] Expected Mode of Locomotion at Discharge:  [facility] Walk/Wheelchair:  [facility] Stairs:    [facility] Comprehension:  [facility] Expression:  [facility] Social Interaction:  [facility] Problem Solving:  [facility] Memory:    Therapy Mode Minutes  Occupational Therapy:  Physical Therapy:  Speech Language Pathology: Individual: 47 minutes.    Discharge Functional Goals:    Signed by: MIGUEL Boone

## 2021-06-19 NOTE — PLAN OF CARE
Problem: Swallowing Impairment  Goal: Optimal Oral Motor and Swallow Ability  Outcome: Ongoing, Progressing   Goal Outcome Evaluation:

## 2021-06-19 NOTE — PLAN OF CARE
Goal Outcome Evaluation:  Plan of Care Reviewed With: patient        Progress: improving  Outcome Summary: PROGRESSING MEDICALLY AND PHYSICALLY WITH THERAPIES. CONTINUE POC

## 2021-06-19 NOTE — MBS/VFSS/FEES
Inpatient Rehabilitation - Speech Language Pathology   Swallow Initial Evaluation  [facility]   MODIFIED BARIUM SWALLOW STUDY     Patient Name: Augusto Lorenzana Jr.  : 1945  MRN: [id number]  Today's Date: 2021             Admit Date: 2021      Augusto Lorenzana Jr.  presents to the radiology suite this am from 101/3S to participate in an instrumental MBS to assess safety/efficacy of swallowing fnx, determine safest/least restrictive diet.  He is admitted on 21 w/ debility.  He has a medical history significant for CAD s/p stenting in , CKD stage 3, DM, HLD, HTN, and NSTEMI.  He had cardiac catheterization on 21 and underwent CABG  x3 on 21 w/ cardiac arrest x2 postoperative.  Pt required NG tube during admission.     Pt is s/p participation in MBSS on 21 w/ penetration and aspiration of nectar thick and thin liquids w/ recommendations for a modified po diet of mechanical soft solids, ground meats, honey thick liquids.      Social History     Socioeconomic History   • Marital status:      Spouse name: Not on file   • Number of children: Not on file   • Years of education: Not on file   • Highest education level: Not on file   Tobacco Use   • Smoking status: Never Smoker   • Smokeless tobacco: Current User   Substance and Sexual Activity   • Alcohol use: Not Currently        EXAMINATION: XR CHEST 1 VW- 06/15/2021     INDICATION: post op      COMPARISON: Chest x-ray 2021     FINDINGS: Cardiac size enlarged status post median sternotomy and  CABG  without overt edema. No pneumothorax or pleural effusion.        IMPRESSION:  Cardiac size enlarged without overt edema or pleural  effusion of decompensation.     D:  06/15/2021  E:  06/15/2021     This report was finalized on 6/15/2021 5:20 PM by Dr. Shreyas Ames.    Diet Orders (active) (From admission, onward)     Start     Ordered    21 1733  Diet Soft Texture; Ground; Honey Thick; Cardiac, Consistent Carbohydrate  Diet  Effective Now     Comments: Mechanical soft//honey thickened liquids---cannot drink out of straw/cup--must be given liquids by spoon.    06/18/21 1741                Pt is observed on room air w/o complications.     Risks and benefits of the procedure are explained w/ verbalizing understanding/agreement to participate.     Pt is positioned upright and centered in a soft strap supportive chair to accept multiple po presentations of solid cracker, puree, honey thick, nectar thick, and thin liquids via spoon, cup and straw, along w/ whole placebo pill in puree. Pt is able to self feed and does so across this evaluation.     All views are from the lateral plane.     Facial/oral structures are symmetrical upon observation w/o lingual deviation upon protrusion. Oral mucosa are moist, pink and clean. Secretions are clear, thin and well controlled. OROM/DANA is wfl to imitate oral postures. Gag is not assessed. Volitional cough is adequate in intensity, clear in quality, nonproductive. Vocal quality is adequate in intensity, clear in quality w/ intelligible speech. Pt is a/a and cooperative to particpate. Pt is oriented to person, place, and time, follows simple directives, and participates in simple conversational exchanges.     Upon po presentations, adequate bolus anticipation w/ good labial seal for bolus clearance via spoon bowl, cup rim stability and suction via straw.  Bolus formation, manipulation, and control are generally weak and prolonged w/ mixed phasic-rotary mastication pattern. Moderately prolonged mastication of solid cracker only.  A-p transit is noted w/ piecemeal oral transit w/ mild oral residue remaining, clearing w/ subsequent spontaneous swallows. Tongue base retraction and linguavelar seal are generally weak w/ premature spillage to the valleculae w/ all consistencies and to the pyriform sinuses w/ nectar thick and thin liquids. No laryngeal penetration or aspiration is evidenced before the swallow.      Pharyngeal swallow is delayed w/ weak hyolaryngeal elevation and delayed epiglottic inversion. Laryngeal penetration of nectar thick and thin liquids w/ aspiration after.  Spontaneous repeated cough is sufficient to greatly diminish.  Pharyngeal contraction is generally weak w/o significant residue.  Isolated event of penetration w/ aspiration after the swallow of honey thick liquids via cup.  Unable to reproduce w/ repeat trials. No other laryngeal penetration or aspiration evidenced during or after the swallow.     Full esophageal sweep reveals no mucosal abnormalities. Motility is wfl w/o retrograde flow noted.     Visit Dx:   No diagnosis found.  Patient Active Problem List   Diagnosis   • NSTEMI 5/22/2021   • Hyperlipidemia LDL goal <70   • Essential hypertension   • T2DM on oral agents and insulin. HbA1c 7.4    • Severe 3 vessel CAD with preserved LV function (CMS/HCC)   • A/C kidney disease. ATN due to postoperative arrest. Dialysis begun 6/3/2021   • Gout   • Former smokeless tobacco use   • S/P CABG x 3 on 5/28/21   • Perioperative cardiac arrest with ventricular fibrillation (CMS/HCC)   • Postop encephalopathy post code. Combination of anoxic insult and azotemia   • Debility     Past Medical History:   Diagnosis Date   • CAD (coronary artery disease)    • CKD (chronic kidney disease) stage 3, GFR 30-59 ml/min (CMS/HCC)    • Diabetes mellitus (CMS/HCC)    • Hyperlipidemia    • Hypertension    • NSTEMI (non-ST elevated myocardial infarction) (CMS/HCC)      Past Surgical History:   Procedure Laterality Date   • CARDIAC CATHETERIZATION N/A 5/24/2021    Procedure: Left Heart Cath;  Surgeon: Blade Greene IV, MD;  Location: [facility] CATH INVASIVE LOCATION;  Service: Cardiovascular;  Laterality: N/A;   • CARDIAC SURGERY      2 stents placed 2012.   • CORONARY ARTERY BYPASS GRAFT N/A 5/28/2021    Procedure: MEDIAN STERNOTOMY CORONARY ARTERY BYPASS X 3 UTILIZING THE LEFT INTERNAL MAMMARY ARTERY GRAFT,  EVH OF THE GREATER RIGHT SAPHENOUS VEIN, AND PILO PER ANESTHESIA;  Surgeon: Jacek Miller MD;  Location: [facility];  Service: Cardiothoracic;  Laterality: N/A;       EDUCATION  The patient has been educated in the following areas:   Dysphagia (Swallowing Impairment) Oral Care/Hydration Modified Diet Instruction.     IMPRESSION:  Pt presents w/ moderate oropharyngeal dysphagia w/ piecemeal oral transit and weak hyolaryngeal elevation and delayed epiglottic inversion w/ laryngeal penetration and aspiration of nectar thick and thin liquids evidenced across this evaluation. Pt is felt to most benefit from a continued modified po diet of mechanical soft solids, ground meats, honey thick liquids w/ medications whole in puree/thins.  Pt is felt to benefit from Free Water Protocol to promote hydration/modified diet acceptance and from formal dysphagia tx.    SLP Recommendation and Plan       Recommendations:   1. Mechanical soft solids, ground meats, HONEY thick liquids  2. Free Water Protocol  3. Meds whole in puree/HONEY  4. [place]el precautions.   5. Oral care protocol.   6. Universal aspiration precautions.     SLP to f/u for formal dysphagia tx.     D/w pt results and recommendations w/ verbal understanding and agreement.     D/w RN results and recommendations w/ verbal understanding and agreement.     Thank you for allowing me to participate in the care of your patient-  Jody Lynn M.S., CFY-SLP       Patient was not wearing a face mask during this therapy encounter. Therapist used appropriate personal protective equipment including mask, eye protection and gloves.  Mask used was standard procedure mask. Appropriate PPE was worn during the entire therapy session. The patient coughed during this evaluation. Therapist was within 6 feet for 15 minutes or more during the evaluation. Hand hygiene was completed before and after therapy session. Patient is not in enhanced droplet precautions.               Time Calculation:    Time Calculation- SLP     Row Name 06/19/21 1425             Time Calculation- SLP    SLP Start Time  1235  -      SLP Stop Time  1322  -      SLP Time Calculation (min)  47 min  -      SLP Received On  06/19/21  -      SLP - Next Appointment  06/21/21  -        User Key  (r) = Recorded By, (t) = Taken By, (c) = Cosigned By    Initials Name Provider Type    Jody Abdi MS, CFY-SLP Speech and Language Pathologist          Therapy Charges for Today     Code Description Service Date Service Provider Modifiers Qty    [id number] HC ST MOTION FLUORO EVAL SWALLOW 3 6/19/2021 Jody Lynn MS, CFY-SLP GN 1               Jody Lynn MS, KRISTEN-SLP  6/19/2021

## 2021-06-19 NOTE — NURSING NOTE
PATIENT SWEATY AND LETHARGIC; ACCU CHECK 37; CALLED LAB TO GET STAT CONFIRMATION; PRN GLUCAGEN 1MG SUBQ GIVEN LEFT LOWER QUAD; WILL MONITOR

## 2021-06-19 NOTE — PROGRESS NOTES
Patient Assessment Instrument  Quality Indicators - Admission    Section B. Hearing, Speech Vision      Section C. Cognitive Patterns      Section [id number]. Prior Functioning      Section AJ8812. Prior Device Use      Section FN2598. Self Care Performance     Eating: Patient completed the activities by him/herself with no assistance from  a helper.   Oral Hygiene: [place] sets up or cleans up; patient completes activity. [place]  assists only prior to or following the activity.   Toileting Hygiene: [place] provides verbal cues and/or touching/steadying and/or  contact guard assistance as patient completes activity.   Shower/Bathe Self: [place] provides verbal cues and/or touching/steadying and/or  contact guard assistance as patient completes activity.   Upper Body Dressing: [place] provides verbal cues and/or touching/steadying  and/or contact guard assistance as patient completes activity.   Lower Body Dressing: [place] provides verbal cues and/or touching/steadying  and/or contact guard assistance as patient completes activity.   Putting On/Taking Off Footwear: [place] provides verbal cues and/or  touching/steadying and/or contact guard assistance as patient completes  activity.    Section [id number]. Self Care Discharge Goals     Eating: Patient completed the activities by him/herself with no assistance from  a helper.   Oral Hygiene: Patient completed the activities by him/herself with no  assistance from a helper.   Toileting Hygiene: [place] sets up or cleans up; patient completes activity.  [place] assists only prior to or following the activity.   Shower/Bathe Self: [place] sets up or cleans up; patient completes activity.  [place] assists only prior to or following the activity.   Upper Body Dressing: [place] sets up or cleans up; patient completes activity.  [place] assists only prior to or following the activity.   Lower Body Dressing: [place] provides verbal cues or touching/steadying  assistance as patient completes  activity.   Putting On/Taking Off Footwear: [place] provides verbal cues or  touching/steadying assistance as patient completes activity.    Section FD1004. Mobility Performance      Section XV5455. Mobility Discharge Goals      Section H. Bladder and Bowel      Section I. Active Diagnosis      Section J. Health Conditions      Section K. Swallowing/Nutritional Status      Section M. Skin Conditions      Section N. Medication      Section O. Special Treatments, Procedures, and Programs      OPTIONAL BRANCH FOR TRACKING FALLS  Fall(s) During Shift:    Signed by: Marcela Kearney, Occupational Therapist

## 2021-06-19 NOTE — PROGRESS NOTES
Rehabilitation Nursing  Inpatient Rehabilitation Plan of Care Note    Plan of Care  Copy from [facility]    Pain Management (Active)  Current Status (6/18/2021 4:09:00 PM): at risk for pain  Weekly Goal: no pain this week  Discharge Goal: no pain    Safety    Potential for Injury (Active)  Current Status (6/18/2021 4:09:00 PM): at risk for injury  Weekly Goal: no injuries this week  Discharge Goal: no injury    Signed by: Renata Henderson Nurse

## 2021-06-19 NOTE — PROGRESS NOTES
Patient Assessment Instrument  Quality Indicators - Admission    Section B. Hearing, Speech Vision      Section C. Cognitive Patterns      Section WS3899. Prior Functioning      Section PM7525. Prior Device Use      Section BE1394. Self Care Performance      Section IC0207. Self Care Discharge Goals      Section EM1330. Mobility Performance     Roll Left and Right: Not attempted due to medical or safety concerns.   Sit to Lying: Not attempted due to medical or safety concerns.   Lying to Sitting on Side of Bed: [place] provides verbal cues and/or  touching/steadying and/or contact guard assistance as patient completes  activity. Assistance may be provided throughout the activity or intermittently.   Sit to Stand: [place] provides verbal cues and/or touching/steadying and/or  contact guard assistance as patient completes activity. Assistance may be  provided throughout the activity or intermittently.   Chair/Bed to Chair Transfer: [place] provides verbal cues and/or  touching/steadying and/or contact guard assistance as patient completes  activity. Assistance may be provided throughout the activity or intermittently.   Toilet Transfer [place] provides verbal cues and/or touching/steadying and/or  contact guard assistance as patient completes activity. Assistance may be  provided throughout the activity or intermittently.   Car Transfer: Not attempted due to medical or safety concerns.   Walk 10 Feet:   [place] provides verbal cues and/or touching/steadying and/or  contact guard assistance as patient completes activity. Assistance may be  provided throughout the activity or intermittently.  Walk 50 Feet with 2 Turns:   [place] provides verbal cues and/or  touching/steadying and/or contact guard assistance as patient completes  activity. Assistance may be provided throughout the activity or intermittently.  Walk 150 Feet:   [place] provides verbal cues and/or touching/steadying and/or  contact guard assistance as patient  completes activity. Assistance may be  provided throughout the activity or intermittently.  Walking 10 Feet on Uneven Surfaces:   Not attempted due to medical or safety  concerns.  1 Step Over Curb or Up/Down Stair:   [place] provides verbal cues and/or  touching/steadying and/or contact guard assistance as patient completes  activity. Assistance may be provided throughout the activity or intermittently.  4 Steps Up and Down, With/Without Rail:   Not attempted due to medical or safety  concerns.  Picking up an Object:   Not attempted due to medical or safety concerns.  Uses Wheelchair/Scooter: No    Section XL7539. Mobility Discharge Goals     Sit to Stand: [place] provides verbal cues or touching/steadying assistance as  patient completes activity.   Walk Discharge Goals:   Walk 150 Feet: [place] provides verbal cues or touching/steadying assistance as  patient completes activity.     Walk Discharge Goals:   4 Steps Up and Down, With/Without Rail: [place] provides verbal cues or  touching/steadying assistance as patient completes activity.    Section H. Bladder and Bowel      Section I. Active Diagnosis      Section J. Health Conditions      Section K. Swallowing/Nutritional Status      Section M. Skin Conditions      Section N. Medication      Section O. Special Treatments, Procedures, and Programs      OPTIONAL BRANCH FOR TRACKING FALLS  Fall(s) During Shift:    Signed by: Yamel Mckeon, PT

## 2021-06-20 LAB
GLUCOSE BLDC GLUCOMTR-MCNC: 108 MG/DL (ref 70–130)
GLUCOSE BLDC GLUCOMTR-MCNC: 118 MG/DL (ref 70–130)
GLUCOSE BLDC GLUCOMTR-MCNC: 167 MG/DL (ref 70–130)
GLUCOSE BLDC GLUCOMTR-MCNC: 57 MG/DL (ref 70–130)
GLUCOSE BLDC GLUCOMTR-MCNC: 98 MG/DL (ref 70–130)

## 2021-06-20 PROCEDURE — 82962 GLUCOSE BLOOD TEST: CPT

## 2021-06-20 PROCEDURE — 63710000001 INSULIN DETEMIR PER 5 UNITS: Performed by: FAMILY MEDICINE

## 2021-06-20 PROCEDURE — 99232 SBSQ HOSP IP/OBS MODERATE 35: CPT | Performed by: FAMILY MEDICINE

## 2021-06-20 PROCEDURE — 25010000002 HEPARIN (PORCINE) PER 1000 UNITS: Performed by: FAMILY MEDICINE

## 2021-06-20 PROCEDURE — 25010000002 GLUCAGON (HUMAN RECOMBINANT) 1 MG RECONSTITUTED SOLUTION: Performed by: FAMILY MEDICINE

## 2021-06-20 RX ADMIN — HEPARIN SODIUM 5000 UNITS: 5000 INJECTION INTRAVENOUS; SUBCUTANEOUS at 20:44

## 2021-06-20 RX ADMIN — ISOSORBIDE DINITRATE 20 MG: 20 TABLET ORAL at 18:10

## 2021-06-20 RX ADMIN — CARVEDILOL 25 MG: 25 TABLET, FILM COATED ORAL at 08:28

## 2021-06-20 RX ADMIN — INSULIN DETEMIR 20 UNITS: 100 INJECTION, SOLUTION SUBCUTANEOUS at 21:28

## 2021-06-20 RX ADMIN — ISOSORBIDE DINITRATE 20 MG: 20 TABLET ORAL at 08:28

## 2021-06-20 RX ADMIN — GLUCAGON HYDROCHLORIDE 1 MG: 1 INJECTION, POWDER, FOR SOLUTION INTRAMUSCULAR; INTRAVENOUS; SUBCUTANEOUS at 06:12

## 2021-06-20 RX ADMIN — ISOSORBIDE DINITRATE 20 MG: 20 TABLET ORAL at 12:26

## 2021-06-20 RX ADMIN — AMLODIPINE BESYLATE 10 MG: 10 TABLET ORAL at 08:29

## 2021-06-20 RX ADMIN — HEPARIN SODIUM 5000 UNITS: 5000 INJECTION INTRAVENOUS; SUBCUTANEOUS at 08:28

## 2021-06-20 RX ADMIN — GLIPIZIDE 5 MG: 5 TABLET ORAL at 08:28

## 2021-06-20 RX ADMIN — GLIPIZIDE 5 MG: 5 TABLET ORAL at 18:10

## 2021-06-20 RX ADMIN — ASPIRIN 325 MG: 325 TABLET, COATED ORAL at 08:29

## 2021-06-20 RX ADMIN — ATORVASTATIN CALCIUM 40 MG: 40 TABLET, FILM COATED ORAL at 20:44

## 2021-06-20 RX ADMIN — GABAPENTIN 600 MG: 300 CAPSULE ORAL at 20:44

## 2021-06-20 RX ADMIN — CARVEDILOL 25 MG: 25 TABLET, FILM COATED ORAL at 18:10

## 2021-06-20 RX ADMIN — QUETIAPINE FUMARATE 12.5 MG: 25 TABLET, FILM COATED ORAL at 08:28

## 2021-06-20 RX ADMIN — QUETIAPINE FUMARATE 12.5 MG: 25 TABLET, FILM COATED ORAL at 20:44

## 2021-06-20 RX ADMIN — ALLOPURINOL 300 MG: 300 TABLET ORAL at 08:29

## 2021-06-20 NOTE — PROGRESS NOTES
Rehabilitation Nursing  Inpatient Rehabilitation Plan of Care Note    Plan of Care  Copy from [facility]    Pain Management (Active)  Current Status (6/18/2021 4:09:00 PM): at risk for pain  Weekly Goal: no pain this week  Discharge Goal: no pain    Safety    Potential for Injury (Active)  Current Status (6/18/2021 4:09:00 PM): at risk for injury  Weekly Goal: no injuries this week  Discharge Goal: no injury    Signed by: Kalie Pulido, Nurse

## 2021-06-20 NOTE — PROGRESS NOTES
[facility] REHAB PROGRESS NOTE     Patient Identification:  Name:  Augusto Lorenzana Jr.  Age:  76 y.o.  Sex:  male  :  1945  MRN:  [id number]  Visit Number:  [id number]  ROOM: [facility]     Primary Care Provider:  Rob Polanco MD    Length of stay in inpatient status:  2    Subjective     Chief Compliant:  No chief complaint on file.      History of Presenting Illness: 76-year-old gentleman who was recently hospitalized with CAD and did require CABG x3 vessels.  She did have an episode of V. fib x2 episodes requiring ACLS protocol.  Did have acute kidney injury and required hemodialysis shortly.  Patient doing much better at this time although he does have some difficulties with swallowing.  States his therapy went well overall yesterday.  No complaints    Objective     Current Hospital Meds:allopurinol, 300 mg, Oral, Daily  amLODIPine, 10 mg, Oral, Q24H  aspirin, 325 mg, Oral, Daily  atorvastatin, 40 mg, Oral, Nightly  carvedilol, 25 mg, Oral, BID With Meals  gabapentin, 600 mg, Oral, Nightly  glipizide, 5 mg, Oral, BID AC  heparin (porcine), 5,000 Units, Subcutaneous, Q12H  insulin detemir, 20 Units, Subcutaneous, Nightly  isosorbide dinitrate, 20 mg, Oral, TID - Nitrates  QUEtiapine, 12.5 mg, Oral, Q12H       ----------------------------------------------------------------------------------------------------------------------  Vital Signs:  Temp:  [98.1 °F (36.7 °C)] 98.1 °F (36.7 °C)  Heart Rate:  [72-89] 89  Resp:  [20] 20  BP: (149-154)/(77-78) 149/78  SpO2:  [94 %] 94 %  on   ;   Device (Oxygen Therapy): room air  Body mass index is 27.78 kg/m².    Wt Readings from Last 3 Encounters:   21 80.3 kg (177 lb)   21 80.3 kg (177 lb 1.6 oz)     Intake & Output (last 3 days)       701 -  07 -  07 -  07 -  0700    P.O.  240 840     Total Intake(mL/kg)  240 (3) 840 (10.5)     Net  +240 +840             Urine  Unmeasured Occurrence  2 x 5 x         Diet Soft Texture; Ground; Honey Thick; Cardiac, Consistent Carbohydrate  ----------------------------------------------------------------------------------------------------------------------  Physical exam:  Constitutional:   No acute distress  HEENT: Normocephalic atraumatic  Neck:    Supple  Cardiovascular: Regular rate and rhythm  Pulmonary/Chest: Clear to auscultation  Abdominal: Positive bowel sounds soft.   Musculoskeletal: No arthropathy  Neurological: No focal deficits  Skin: Incision sites on the right leg and anterior chest appear to be healing well  Peripheral vascular:  Genitourinary:  ----------------------------------------------------------------------------------------------------------------------    Last echocardiogram:  Results for orders placed during the hospital encounter of 05/22/21    Adult Transthoracic Echo Complete w/ Color, Spectral and Contrast if necessary per protocol    Interpretation Summary  · Left ventricular systolic function is normal. Estimated left ventricular EF = 60%.  · Left ventricular diastolic function is consistent with (grade I) impaired relaxation.  · Mild aortic valve stenosis is present.  · Estimated right ventricular systolic pressure from tricuspid regurgitation is normal (<35 mmHg).    ----------------------------------------------------------------------------------------------------------------------  Results from last 7 days   Lab Units 06/19/21 0330 06/14/21  0518   WBC 10*3/mm3 6.80 8.24   HEMOGLOBIN g/dL 8.7* 8.8*   HEMATOCRIT % 27.6* 27.8*   MCV fL 95.2 94.6   MCHC g/dL 31.5 31.7   PLATELETS 10*3/mm3 256 291         Results from last 7 days   Lab Units 06/19/21  0331 06/16/21  0559 06/15/21  0426 06/14/21  0518   SODIUM mmol/L 143 138  --  137   POTASSIUM mmol/L 4.1 3.8  --  3.9   MAGNESIUM mg/dL 1.7  --  2.0 1.8   CHLORIDE mmol/L 111* 105  --  101   CO2 mmol/L 22.9 21.0*  --  26.0   BUN mg/dL 32* 68*  --  65*    CREATININE mg/dL 1.59* 1.69*  --  1.62*   EGFR IF NONAFRICN AM mL/min/1.73 43* 40*  --  42*   CALCIUM mg/dL 8.6 8.9  --  9.3   PHOSPHORUS mg/dL  --  4.3  --  3.9   GLUCOSE mg/dL 81 127*  --  188*   ALBUMIN g/dL 2.50* 2.60*  --  3.10*   BILIRUBIN mg/dL 0.3  --   --   --    ALK PHOS U/L 143*  --   --   --    AST (SGOT) U/L 21  --   --   --    ALT (SGPT) U/L 21  --   --   --    Estimated Creatinine Clearance: 41.3 mL/min (A) (by C-G formula based on SCr of 1.59 mg/dL (H)).  No results found for: AMMONIA              Glucose   Date/Time Value Ref Range Status   06/20/2021 0633 118 70 - 130 mg/dL Final   06/20/2021 0607 57 (L) 70 - 130 mg/dL Final   06/19/2021 2335 98 70 - 130 mg/dL Final   06/19/2021 2008 166 (H) 70 - 130 mg/dL Final   06/19/2021 1603 92 70 - 130 mg/dL Final   06/19/2021 1103 100 70 - 130 mg/dL Final   06/19/2021 0606 109 70 - 130 mg/dL Final   06/19/2021 0539 37 (C) 70 - 130 mg/dL Final     Lab Results   Component Value Date    TSH 4.030 06/03/2021     No results found for: PREGTESTUR, PREGSERUM, HCG, HCGQUANT  Pain Management Panel     Pain Management Panel Latest Ref Rng & Units 5/30/2021 5/28/2021    CREATININE UR mg/dL 136.1 -    AMPHETAMINES SCREEN, URINE Negative - Negative    BARBITURATES SCREEN Negative - Negative    BENZODIAZEPINE SCREEN, URINE Negative - Negative    BUPRENORPHINEUR Negative - Negative    COCAINE SCREEN, URINE Negative - Negative    METHADONE SCREEN, URINE Negative - Negative    METHAMPHETAMINEUR Negative - Negative        Brief Urine Lab Results  (Last result in the past 365 days)      Color   Clarity   Blood   Leuk Est   Nitrite   Protein   CREAT   Urine HCG        05/30/21 1416             136.1           No results found for: BLOODCX      No results found for: URINECX  No results found for: WOUNDCX  No results found for: STOOLCX        I have personally looked at the labs and they are summarized  above.  ----------------------------------------------------------------------------------------------------------------------  Detailed radiology reports for the last 24 hours:    Imaging Results (Last 24 Hours)     Procedure Component Value Units Date/Time    FL Video Swallow Single Contrast [[id number]] Collected: 06/19/21 1326     Updated: 06/19/21 1335    Narrative:      FL VIDEO SWALLOW SINGLE-CONTRAST-     CLINICAL INDICATION: Aspiration r/o        TECHNIQUE:   Speech therapy service was present. The patient was examined in the  sitting lateral position and was given several consistencies of barium.     FINDINGS: Penetration and aspiration of nectar and thin liquids.  Isolated event of aspiration after the swallow with honey-thickened  liquids.     FLUOROSCOPY TIME: 1.9 minutes     Images: Cine loop was acquired       Impression:      Aspiration with nectar and thin liquids as well as a single  event of aspiration with honey-thickened liquids.     For additional information please see the report provided by the speech  therapy service     This report was finalized on 6/19/2021 1:33 PM by Dr. Lucho Toledo MD.           Final impressions for the last 30 days of radiology reports:    FL Video Swallow With Speech Single Contrast    Result Date: 6/15/2021  Fluoroscopy provided for a modified barium swallow. Please see speech therapy report for full details and recommendations.    This report was finalized on 6/15/2021 4:42 PM by Dr. Sherron Gandara MD.      XR Chest 1 View    Result Date: 6/15/2021  Cardiac size enlarged without overt edema or pleural effusion of decompensation.  D:  06/15/2021 E:  06/15/2021  This report was finalized on 6/15/2021 5:20 PM by Dr. Shreyas Ames.      XR Chest 1 View    Result Date: 6/13/2021  Right IJ line terminates in the SVC, grossly unchanged. Unchanged aeration with prominent bilateral interstitial opacities. No effusion or pneumothorax. Unchanged degree of cardiac  enlargement. Remaining support hardware also unchanged.  This report was finalized on 6/13/2021 6:14 PM by Aiden De Anda.      XR Chest 1 View    Result Date: 6/8/2021  No interval change.  D:  06/08/2021 E:  06/08/2021  This report was finalized on 6/8/2021 12:52 PM by Dr. Shreyas Ames.      XR Chest 1 View    Result Date: 6/7/2021  Some improvement seen in aeration of the left lung with minimal residual markings seen throughout the right perihilar region. Small bilateral pleural effusions with low lung volumes bilaterally.  DICTATED:   06/06/2021 EDITED/ls :   06/06/2021  This report was finalized on 6/7/2021 1:06 PM by Dr. Sherron Gandara MD.      XR Chest 1 View    Result Date: 6/5/2021  Slight worsening of the markings in the lung fields in the interval. Lung volumes remain low. Deep line catheter on the right with tip in the SVC.  DICTATED:   06/05/2021 EDITED/ls :   06/05/2021  This report was finalized on 6/5/2021 3:51 PM by Dr. Sherron Gandara MD.      XR Chest 1 View    Result Date: 6/3/2021  Right IJ catheter terminates in the SVC. Remaining support hardware projects unchanged. Mild interval improvement in small bilateral pleural effusions. Persistent scattered airspace opacities. No pneumothorax.  This report was finalized on 6/3/2021 1:19 PM by Aiden De Anda.      XR Chest 1 View    Result Date: 6/2/2021  Mildly increased pulmonary vascular congestion. Stable left basilar atelectasis and effusion.  D:  06/02/2021 E:  06/02/2021     This report was finalized on 6/2/2021 10:41 PM by Dr. Jerod Bonilla MD.      XR Chest 1 View    Result Date: 6/1/2021  Chest tubes or pleural drains removed in the interim without pneumothorax overall stable appearance with right internal jugular central venous catheter remaining in place.  D:  06/01/2021 E:  06/01/2021  This report was finalized on 6/1/2021 7:03 PM by Dr. Shreyas Ames.      XR Chest 1 View    Result Date: 6/1/2021  No interval change.  D:   06/01/2021 E:  06/01/2021  This report was finalized on 6/1/2021 12:09 PM by Dr. Shreyas Ames.      XR Chest 1 View    Result Date: 6/1/2021  No interval change with persistent cardiomegaly and central vascular congestion.  DICTATED:   05/31/2021 EDITED/ls :   05/31/2021  This report was finalized on 6/1/2021 12:08 PM by Dr. Shreyas Ames.      XR Chest 1 View    Result Date: 5/30/2021  Stable exam. Low lung volumes with atelectasis.  This report was finalized on 5/30/2021 9:51 AM by Eliezer Yan.      XR Chest 1 View    Result Date: 5/29/2021  Interval extubation with slight improvement in aeration of both lungs. No pneumothorax is seen. Signer Name: August Engel MD  Signed: 5/29/2021 11:10 PM  Workstation Name: [facility]  [facility]    XR Chest 1 View    Result Date: 5/29/2021  Endotracheal tube terminates just above the jonas. Slight retraction may be warranted.  Low lung volumes with airspace opacities bilaterally which may be related to crowded pulmonary markings or potential multifocal infection.  This report was finalized on 5/29/2021 8:57 AM by Eliezer Yan.      XR Chest 1 View    Result Date: 5/28/2021  1. No acute interval change from same day chest x-ray. Support hardware remains in appropriate position. Right internal jugular central venous catheter appears to have been advanced with tip terminating above the SVC.  This report was finalized on 5/28/2021 6:58 PM by Eliezer Yan.      XR Chest 1 View    Result Date: 5/28/2021  Endotracheal tube within 1 cm of the jonas with bilateral chest tubes in place and no definite pneumothorax. Increased markings seen throughout the lung fields with small bilateral pleural effusions.  D:  05/28/2021 E:  05/28/2021  This report was finalized on 5/28/2021 5:15 PM by Dr. Sherron Gandara MD.      XR Chest 1 View    Result Date: 5/27/2021  Borderline heart shadow enlargement and mild pulmonary venous hypertension. No new chest disease.   D:   05/27/2021 E:  05/27/2021  This report was finalized on 5/27/2021 10:34 PM by Dr. Jerod Bonilla MD.      XR Chest 1 View    Result Date: 5/22/2021  No acute cardiopulmonary findings. Signer Name: August Pineda MD  Signed: 5/22/2021 5:46 AM  Workstation Name: [facility]  [facility]    US Renal Bilateral    Result Date: 5/22/2021  No obstructing uropathy evident as there is no hydronephrosis with bilateral simple appearing renal cortical cysts. No calculi.  DICTATED:   05/22/2021 EDITED/ls :   05/22/2021    This report was finalized on 5/22/2021 6:51 PM by Dr. Shreyas Ames.      CT Angiogram Chest    Result Date: 5/22/2021  Prominent interstitial markings are noted in the lower lung field bilaterally with a trace volume of pleural fluid and subtle groundglass densities. Correlate with COVID-19 test status. No evidence of pulmonary embolism. Signer Name: August Pineda MD  Signed: 5/22/2021 5:56 AM  [facility]  [facility]    XR Abdomen KUB    Result Date: 6/12/2021  Feeding tube is coiled in the proximal stomach.  This report was finalized on 6/12/2021 10:14 AM by Aiden De Anda.      XR Abdomen KUB    Result Date: 6/11/2021  Nasogastric tube with tip in the stomach. Feeding tube identified with tip in the fourth portion of the duodenum.  D:  06/11/2021 E:  06/11/2021  This report was finalized on 6/11/2021 4:33 PM by Dr. Sherron Gandara MD.      XR Abdomen KUB    Result Date: 6/9/2021  A nasogastric tube terminates in the stomach. Bowel gas pattern is nonobstructive. There is mild fecal loading of the colon. No overt pneumoperitoneum.  This report was finalized on 6/9/2021 8:13 AM by Aiden De Anda.      FL Video Swallow Single Contrast    Result Date: 6/19/2021  Aspiration with nectar and thin liquids as well as a single event of aspiration with honey-thickened liquids.  For additional information please see the report provided by the speech  therapy service  This report was finalized on 6/19/2021 1:33 PM by Dr. Lucho Toledo MD.      I have personally looked at the radiology images and read the final radiology report.    Assessment & Plan    Debility--patient evaluated by speech therapy yesterday and the recommendation is for mechanical soft solids, ground meats, and honey thickened liquids.  We will continue follow-up with speech therapy during his stay.  Patient requires contact-guard for help with transfers; she required contact-guard assist for bathing yesterday.  Supervision for upper body dressing; contact-guard assist for lower body dressing; supervision for grooming grooming; contact-guard assistance for toileting.    Status post CABG x3/CAD--continue aspirin, statin therapy, Coreg and Isordil.  Patient asymptomatic    Hypertension--reasonably well-controlled.  Continue Norvasc and Coreg.    Gout--allopurinol    Acute kidney injury--has been stable.    Diabetes mellitus--patient did have hypoglycemic episode this morning.  Will decrease Levemir to 20 units nightly tonight      VTE Prophylaxis:   Mechanical Order History:     None      Pharmalogical Order History:      Ordered     Dose Route Frequency Stop    06/18/21 2621  heparin (porcine) 5000 UNIT/ML injection 5,000 Units      5,000 Units SC Every 12 Hours Scheduled --                    Primitivo Stevens MD  [facility]  06/20/21  08:55 EDT

## 2021-06-20 NOTE — PROGRESS NOTES
Physical Medicine and Rehabilitation  Inpatient Rehabilitation Interdisciplinary Plan of Care    Demographics            Age: 76Y            Gender: Male    Admission Date: 6/18/2021 4:09:00 PM  Rehabilitation Diagnosis:  Debility secondary to NSTEMI, post-op encephalopathy,  perioperative cardiac arrest with ventricular fibrillation, s/p CABG x 3    Plan of Care  Anticipated Discharge Date/Estimated Length of Stay: 7-10 days  Anticipated Discharge Destination: Community discharge with assistance  Discharge Plan : Pt plans to return home with spouse at discharge.  Medical Necessity Expected Level Rationale: Good  Intensity and Duration: an average of 3 hours/5 days per week  Medical Supervision and 24 Hour Rehab Nursing: x  Physical Therapy: x  PT Intensity/Duration: 1-1.5 hrs/day, 5-6 days/week  Occupational Therapy: x  OT Intensity/Duration: 1-1.5 hrs/day, 5-6 days/week  Speech and Language Therapy: x  SLP Intensity/Duration: 0.5-1 hr/day, 3-5 days/week  Social Work: x  Therapeutic Recreation: x  Updated (if changes indicated)  No changes to plan.    Based on the patient's medical and functional status, their prognosis and  expected level of functional improvement is: Good    Interdisciplinary Problem/Goals/Status  Copy from POCMobility    [PT] Stairs (Active)  Current Status (6/19/2021 12:00:00 AM): not attempted  Weekly Goal: CGA  Discharge Goal: Supervision    [PT] Walk (Active)  Current Status (6/19/2021 12:00:00 AM): CGA  Weekly Goal: Supervision  Discharge Goal: Supervision    Self Care    [OT] Bathing (Active)  Current Status (6/19/2021 11:00:00 AM): CGA  Weekly Goal: SBA  Discharge Goal: Supervision    Swallow Function    [ST] Swallowing (Active)  Current Status (6/19/2021 12:00:00 AM): MS, Ground, Honey  Weekly Goal: Resistive breather  Discharge Goal: Least restrictive po diet    Pain    [RN] Pain Management (Active)  Current Status (6/18/2021 4:09:00 PM): at risk for pain  Weekly Goal: no pain this  week  Discharge Goal: no pain    Safety    [RN] Potential for Injury (Active)  Current Status (6/18/2021 4:09:00 PM): at risk for injury  Weekly Goal: no injuries this week  Discharge Goal: no injury    Medical Problems    Comments:    Signed by: Primitivo Stevens, Physician

## 2021-06-21 LAB
ANION GAP SERPL CALCULATED.3IONS-SCNC: 11.1 MMOL/L (ref 5–15)
BASOPHILS # BLD AUTO: 0.04 10*3/MM3 (ref 0–0.2)
BASOPHILS NFR BLD AUTO: 0.6 % (ref 0–1.5)
BUN SERPL-MCNC: 31 MG/DL (ref 8–23)
BUN/CREAT SERPL: 18.6 (ref 7–25)
CALCIUM SPEC-SCNC: 8.3 MG/DL (ref 8.6–10.5)
CHLORIDE SERPL-SCNC: 109 MMOL/L (ref 98–107)
CO2 SERPL-SCNC: 20.9 MMOL/L (ref 22–29)
CREAT SERPL-MCNC: 1.67 MG/DL (ref 0.76–1.27)
DEPRECATED RDW RBC AUTO: 57.3 FL (ref 37–54)
EOSINOPHIL # BLD AUTO: 0.2 10*3/MM3 (ref 0–0.4)
EOSINOPHIL NFR BLD AUTO: 2.8 % (ref 0.3–6.2)
ERYTHROCYTE [DISTWIDTH] IN BLOOD BY AUTOMATED COUNT: 16.7 % (ref 12.3–15.4)
GFR SERPL CREATININE-BSD FRML MDRD: 40 ML/MIN/1.73
GLUCOSE BLDC GLUCOMTR-MCNC: 102 MG/DL (ref 70–130)
GLUCOSE BLDC GLUCOMTR-MCNC: 122 MG/DL (ref 70–130)
GLUCOSE BLDC GLUCOMTR-MCNC: 123 MG/DL (ref 70–130)
GLUCOSE BLDC GLUCOMTR-MCNC: 127 MG/DL (ref 70–130)
GLUCOSE BLDC GLUCOMTR-MCNC: 64 MG/DL (ref 70–130)
GLUCOSE SERPL-MCNC: 98 MG/DL (ref 65–99)
HCT VFR BLD AUTO: 30.3 % (ref 37.5–51)
HGB BLD-MCNC: 9.6 G/DL (ref 13–17.7)
IMM GRANULOCYTES # BLD AUTO: 0.02 10*3/MM3 (ref 0–0.05)
IMM GRANULOCYTES NFR BLD AUTO: 0.3 % (ref 0–0.5)
LYMPHOCYTES # BLD AUTO: 1.49 10*3/MM3 (ref 0.7–3.1)
LYMPHOCYTES NFR BLD AUTO: 21.2 % (ref 19.6–45.3)
MCH RBC QN AUTO: 30 PG (ref 26.6–33)
MCHC RBC AUTO-ENTMCNC: 31.7 G/DL (ref 31.5–35.7)
MCV RBC AUTO: 94.7 FL (ref 79–97)
MONOCYTES # BLD AUTO: 0.57 10*3/MM3 (ref 0.1–0.9)
MONOCYTES NFR BLD AUTO: 8.1 % (ref 5–12)
NEUTROPHILS NFR BLD AUTO: 4.72 10*3/MM3 (ref 1.7–7)
NEUTROPHILS NFR BLD AUTO: 67 % (ref 42.7–76)
NRBC BLD AUTO-RTO: 0 /100 WBC (ref 0–0.2)
PLATELET # BLD AUTO: 257 10*3/MM3 (ref 140–450)
PMV BLD AUTO: 9.4 FL (ref 6–12)
POTASSIUM SERPL-SCNC: 4.2 MMOL/L (ref 3.5–5.2)
RBC # BLD AUTO: 3.2 10*6/MM3 (ref 4.14–5.8)
SODIUM SERPL-SCNC: 141 MMOL/L (ref 136–145)
WBC # BLD AUTO: 7.04 10*3/MM3 (ref 3.4–10.8)

## 2021-06-21 PROCEDURE — 25010000002 HEPARIN (PORCINE) PER 1000 UNITS: Performed by: FAMILY MEDICINE

## 2021-06-21 PROCEDURE — 82962 GLUCOSE BLOOD TEST: CPT

## 2021-06-21 PROCEDURE — 85025 COMPLETE CBC W/AUTO DIFF WBC: CPT | Performed by: FAMILY MEDICINE

## 2021-06-21 PROCEDURE — 97530 THERAPEUTIC ACTIVITIES: CPT

## 2021-06-21 PROCEDURE — 80048 BASIC METABOLIC PNL TOTAL CA: CPT | Performed by: FAMILY MEDICINE

## 2021-06-21 PROCEDURE — 97110 THERAPEUTIC EXERCISES: CPT

## 2021-06-21 PROCEDURE — 97116 GAIT TRAINING THERAPY: CPT

## 2021-06-21 PROCEDURE — 92526 ORAL FUNCTION THERAPY: CPT

## 2021-06-21 PROCEDURE — 97535 SELF CARE MNGMENT TRAINING: CPT

## 2021-06-21 RX ADMIN — HEPARIN SODIUM 5000 UNITS: 5000 INJECTION INTRAVENOUS; SUBCUTANEOUS at 08:13

## 2021-06-21 RX ADMIN — QUETIAPINE FUMARATE 12.5 MG: 25 TABLET, FILM COATED ORAL at 08:13

## 2021-06-21 RX ADMIN — AMLODIPINE BESYLATE 10 MG: 10 TABLET ORAL at 08:13

## 2021-06-21 RX ADMIN — CARVEDILOL 25 MG: 25 TABLET, FILM COATED ORAL at 17:49

## 2021-06-21 RX ADMIN — CARVEDILOL 25 MG: 25 TABLET, FILM COATED ORAL at 09:39

## 2021-06-21 RX ADMIN — GLIPIZIDE 5 MG: 5 TABLET ORAL at 17:49

## 2021-06-21 RX ADMIN — ASPIRIN 325 MG: 325 TABLET, COATED ORAL at 08:13

## 2021-06-21 RX ADMIN — ISOSORBIDE DINITRATE 20 MG: 20 TABLET ORAL at 13:29

## 2021-06-21 RX ADMIN — HEPARIN SODIUM 5000 UNITS: 5000 INJECTION INTRAVENOUS; SUBCUTANEOUS at 20:49

## 2021-06-21 RX ADMIN — ALLOPURINOL 300 MG: 300 TABLET ORAL at 08:13

## 2021-06-21 RX ADMIN — GABAPENTIN 600 MG: 300 CAPSULE ORAL at 20:49

## 2021-06-21 RX ADMIN — GUAIFENESIN AND DEXTROMETHORPHAN 5 ML: 100; 10 SYRUP ORAL at 20:51

## 2021-06-21 RX ADMIN — GLIPIZIDE 5 MG: 5 TABLET ORAL at 08:13

## 2021-06-21 RX ADMIN — QUETIAPINE FUMARATE 12.5 MG: 25 TABLET, FILM COATED ORAL at 20:49

## 2021-06-21 RX ADMIN — ISOSORBIDE DINITRATE 20 MG: 20 TABLET ORAL at 08:12

## 2021-06-21 RX ADMIN — ISOSORBIDE DINITRATE 20 MG: 20 TABLET ORAL at 17:48

## 2021-06-21 RX ADMIN — ATORVASTATIN CALCIUM 40 MG: 40 TABLET, FILM COATED ORAL at 20:49

## 2021-06-21 NOTE — PLAN OF CARE
Goal Outcome Evaluation:         Pt is progressing medically and physically with all therapies, continue with current plan of care.

## 2021-06-21 NOTE — PROGRESS NOTES
Inpatient Rehabilitation Functional Measures Assessment and Plan of Care    Plan of Care  Self Care    Performed Intervention(s)  ADL retraining, AE education, GMC/FMC theract, strengthening, fxal mobility    Functional Measures  GEMA Eating:  GEMA Grooming:  GEMA Bathing:  GEMA Upper Body Dressing:  GEMA Lower Body Dressing:  GEMA Toileting:    GEMA Bladder Management  Level of Assistance:  Frequency/Number of Accidents this Shift:    GEMA Bowel Management  Level of Assistance:  Frequency/Number of Accidents this Shift:    GEMA Bed/Chair/Wheelchair Transfer:  GEMA Toilet Transfer:  GEMA Tub/Shower Transfer:    Previously Documented Mode of Locomotion at Discharge:  [facility] Expected Mode of Locomotion at Discharge:  GEMA Walk/Wheelchair:  GEMA Stairs:    GEMA Comprehension:  GEMA Expression:  GEMA Social Interaction:  GEMA Problem Solving:  GEMA Memory:    Therapy Mode Minutes  Occupational Therapy: Individual: 85 minutes.  Physical Therapy:  Speech Language Pathology:    Discharge Functional Goals:    Signed by: Ellen Hays Occupational Therapist

## 2021-06-21 NOTE — PROGRESS NOTES
PPS CMG Coordinator  Inpatient Rehabilitation Admission    Ethnic Group: White.  Marital Status:  Marital Status: .    IRF Admission Date:  06/18/2021  Admission Class: Initial Rehab.  Admit From:  [facility]    Pre-Hospital Living: Home. Pre-Hospital Living  With: (2) Family/Relatives.    Payment Sources: Primary: Medicare Fee for Service  Secondary: Not Listed.  Impairment Group: 09 Cardiac  Date of Onset of Impairment: 05/22/2021    Etiologic Diagnosis Code(s):  Rank Code      Description  1    I21.4     Non-ST elevation (NSTEMI) myocardial infarction    Comorbidities:  Rank Code      Description    1    I13.0     Hypertensive heart and chronic kidney disease                 with heart failure and stage 1 through stage 4                 chronic kidney disease, or unspecified chronic                 kidney disease  2    N17.9     Acute kidney failure, unspecified  3    I50.9     Heart failure, unspecified  4    I25.10    Atherosclerotic heart disease of native                 coronary artery without angina pectoris  5    E11.22    Type 2 diabetes mellitus with diabetic chronic                 kidney disease  6    E78.5     Hyperlipidemia, unspecified  7    I35.0     Nonrheumatic aortic (valve) stenosis  8    M10.9     Gout, unspecified  9    E11.649   Type 2 diabetes mellitus with hypoglycemia                 without coma  10   R13.10    Dysphagia, unspecified  11   Z95.1     Presence of aortocoronary bypass graft  12   Z86.74    Personal history of sudden cardiac arrest  13   Z79.4     Long term (current) use of insulin    Height on Admission: 66 inches.  Weight on Admission: 177 pounds.    Are there any arthritis conditions recorded for Impairment Group, Etiologic  Diagnosis, or Comorbid Conditions that meet all of the regulatory requirements  for IRF classification (in 42 .29(b)(2)(x), (xi), and xii))?  No    GEMA Bladder Accidents:  0 - Accidents.  Bladder Score = 6. Patient has  not had an accident, but uses a  device/medication. urinal  GEMA Bowel Accident: 0 -Accidents.  Bowel Score = 7. Patient has no accidents.    Signed by: Layla Hernandez Nurse

## 2021-06-21 NOTE — THERAPY TREATMENT NOTE
Inpatient Rehabilitation - Occupational Therapy Treatment Note     [place]     Patient Name: Augusto Lorenzana Jr.  : 1945  MRN: [id number]    Today's Date: 2021                 Admit Date: 2021       No diagnosis found.    Patient Active Problem List   Diagnosis   • NSTEMI 2021   • Hyperlipidemia LDL goal <70   • Essential hypertension   • T2DM on oral agents and insulin. HbA1c 7.4    • Severe 3 vessel CAD with preserved LV function (CMS/[facility])   • A/C kidney disease. ATN due to postoperative arrest. 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 Combination of anoxic insult and azotemia   • Debility       Past Medical History:   Diagnosis Date   • CAD (coronary artery disease)    • CKD (chronic kidney disease) stage 3, GFR 30-59 ml/min (CMS/[facility])    • Diabetes mellitus (CMS/[facility])    • Hyperlipidemia    • Hypertension    • NSTEMI (non-ST elevated myocardial infarction) (CMS/[facility])        Past Surgical History:   Procedure Laterality Date   • CARDIAC CATHETERIZATION N/A 2021    Procedure: Left Heart Cath;  Surgeon: Blade Greene IV, MD;  Location:  [facility];  Service: Cardiovascular;  Laterality: N/A;   • CARDIAC SURGERY      2 stents placed .   • CORONARY ARTERY BYPASS GRAFT N/A 2021    Procedure: MEDIAN STERNOTOMY CORONARY ARTERY BYPASS X 3 UTILIZING THE LEFT INTERNAL MAMMARY ARTERY GRAFT, EVH OF THE GREATER RIGHT SAPHENOUS VEIN, AND PILO PER ANESTHESIA;  Surgeon: Jacek Miller MD;  Location: [facility] OR;  Service: Cardiothoracic;  Laterality: N/A;                IRF OT ASSESSMENT FLOWSHEET (last 12 hours)      IRF OT Evaluation and Treatment     Row Name 21 1447          OT Time and Intention    Document Type  daily treatment  -CJ     Mode of Treatment  occupational therapy  -CJ     Row Name  06/21/21 1447          General Information    Patient/Family/Caregiver Comments/Observations  agreeable to therapy  -     Existing Precautions/Restrictions  fall;cardiac;sternal;swallow precautions  -     Row Name 06/21/21 1447          Cognition/Psychosocial    Follows Commands (Cognition)  WFL;verbal cues/prompting required  -     Row Name 06/21/21 1447          Motor Skills    Motor Skills  functional endurance  -     Motor Control/Coordination Interventions  therapeutic exercise/ROM;gross motor coordination activities BUE ther ex/act, bilat coord ex, hand exerciser  -     Therapeutic Exercise  -- dowel wrist rolls X 2, 0 lbs  -     Row Name 06/21/21 1447          Upper Body Dressing    [place] Level (Upper Body Dressing)  set up assistance;supervision  -     Row Name 06/21/21 1447          Grooming    [place] Level (Grooming)  set up  -     Row Name 06/21/21 1447          Positioning and Restraints    Post Treatment Position  wheelchair  -     In Wheelchair  with PT;sitting;call light within reach;encouraged to call for assist;notified nsg PT- post first tx;  room - second tx  -       User Key  (r) = Recorded By, (t) = Taken By, (c) = Cosigned By    Initials Name Effective Dates     Ellen Hays, OT 06/16/21 -            Occupational Therapy Education                 Title: PT OT SLP Therapies (Done)     Topic: Occupational Therapy (Done)     Point: ADL training (Done)     Description:   Instruct learner(s) on proper safety adaptation and remediation techniques during self care or transfers.   Instruct in proper use of assistive devices.              Learning Progress Summary           Patient Acceptance, E,D, VU,NR by  at 6/21/2021 1457    Acceptance, E,D, NR,VU,DU by AS at 6/19/2021 1107                   Point: Home exercise program (Done)     Description:   Instruct learner(s) on appropriate technique for monitoring, assisting and/or progressing therapeutic  exercises/activities.              Learning Progress Summary           Patient Acceptance, E,D, NR,VU,DU by AS at 6/19/2021 1107                   Point: Precautions (Done)     Description:   Instruct learner(s) on prescribed precautions during self-care and functional transfers.              Learning Progress Summary           Patient Acceptance, E,D, VU,NR by  at 6/21/2021 1457    Acceptance, E,D, NR,VU,DU by AS at 6/19/2021 1107                   Point: Body mechanics (Done)     Description:   Instruct learner(s) on proper positioning and spine alignment during self-care, functional mobility activities and/or exercises.              Learning Progress Summary           Patient Acceptance, E,D, NR,VU,DU by AS at 6/19/2021 1107                               User Key     Initials Effective Dates Name Provider Type [facility] 06/16/21 -  Ellen Hays, OT Occupational Therapist OT    AS 06/16/21 -  Marcela Kearney, OT Occupational Therapist OT                    OT Recommendation and Plan                         Time Calculation:     Time Calculation- OT     Row Name 06/21/21 1457 06/21/21 0920          Time Calculation- OT    OT Start Time  1100  -  0920  -     OT Stop Time  1145  -  1000  -     OT Time Calculation (min)  45 min  -  40 min  -     Total Timed Code Minutes- OT  45 minute(s)  -CJ  40 minute(s)  -       User Key  (r) = Recorded By, (t) = Taken By, (c) = Cosigned By    Initials Name Provider Type     Ellen Hays, OT Occupational Therapist        Therapy Charges for Today     Code Description Service Date Service Provider Modifiers Qty    01441295554 HC OT SELF CARE/MGMT/TRAIN EA 15 MIN 6/21/2021 Ellen Hays OT GO 2    12626990075 HC OT THERAPEUTIC ACT EA 15 MIN 6/21/2021 Ellen Hays OT GO 2    96708925735 HC OT THER PROC EA 15 MIN 6/21/2021 Ellen Hays OT GO 2                   Ellen Hays OT  6/21/2021

## 2021-06-21 NOTE — PROGRESS NOTES
Inpatient Rehabilitation Functional Measures Assessment and Plan of Care    Plan of Care  Swallow Function    [ST] Swallowing(Active)  Current Status(06/22/2021): MS, Ground, Honey  Weekly Goal(06/28/2021): Resistive breather  Discharge Goal: Least restrictive po diet    Functional Measures  GEMA Eating:  GEMA Grooming:  GEMA Bathing:  GEMA Upper Body Dressing:  GEMA Lower Body Dressing:  GEMA Toileting:    GEMA Bladder Management  Level of Assistance:  Frequency/Number of Accidents this Shift:    GEMA Bowel Management  Level of Assistance:  Frequency/Number of Accidents this Shift:    GEMA Bed/Chair/Wheelchair Transfer:  GEMA Toilet Transfer:  GEMA Tub/Shower Transfer:    Previously Documented Mode of Locomotion at Discharge:  [facility] Expected Mode of Locomotion at Discharge:  [facility] Walk/Wheelchair:  [facility] Stairs:    [facility] Comprehension:  [facility] Expression:  [facility] Social Interaction:  [facility] Problem Solving:  [facility] Memory:    Therapy Mode Minutes  Occupational Therapy:  Physical Therapy:  Speech Language Pathology: Individual: 43 minutes.    Discharge Functional Goals:    Signed by: MIGUEL Boone

## 2021-06-21 NOTE — THERAPY TREATMENT NOTE
Inpatient Rehabilitation - Physical Therapy Treatment Note        [facility]     Patient Name: Augusto Lorenzana Jr.  : 1945  MRN: [id number]    Today's Date: 2021                    Admit Date: 2021      Visit Dx: No diagnosis found.    Patient Active Problem List   Diagnosis   • NSTEMI 2021   • Hyperlipidemia LDL goal <70   • Essential hypertension   • T2DM on oral agents and insulin. HbA1c 7.4    • Severe 3 vessel CAD with preserved LV function (CMS/HCC)   • A/C kidney disease. ATN due to postoperative arrest. 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 Combination of anoxic insult and azotemia   • Debility       Past Medical History:   Diagnosis Date   • CAD (coronary artery disease)    • CKD (chronic kidney disease) stage 3, GFR 30-59 ml/min (CMS/[facility])    • Diabetes mellitus (CMS/[facility])    • Hyperlipidemia    • Hypertension    • NSTEMI (non-ST elevated myocardial infarction) (CMS/[facility])        Past Surgical History:   Procedure Laterality Date   • CARDIAC CATHETERIZATION N/A 2021    Procedure: Left Heart Cath;  Surgeon: Blade Greene IV, MD;  Location:  [facility];  Service: Cardiovascular;  Laterality: N/A;   • CARDIAC SURGERY      2 stents placed .   • CORONARY ARTERY BYPASS GRAFT N/A 2021    Procedure: MEDIAN STERNOTOMY CORONARY ARTERY BYPASS X 3 UTILIZING THE LEFT INTERNAL MAMMARY ARTERY GRAFT, EVH OF THE GREATER RIGHT SAPHENOUS VEIN, AND PILO PER ANESTHESIA;  Surgeon: Jacek Miller MD;  Location: [facility] OR;  Service: Cardiothoracic;  Laterality: N/A;          PT ASSESSMENT (last 12 hours)      IRF PT Evaluation and Treatment     Row Name 21 1153          PT Time and Intention    Document Type  daily treatment  -AG     Mode of Treatment  physical therapy  -AG     Row Name 21 1158           Cognition/Psychosocial    Affect/Mental Status (Cognitive)  WNL  -AG     Orientation Status (Cognition)  oriented x 3  -AG     Follows Commands (Cognition)  WFL;verbal cues/prompting required;visual cue  -AG     Personal Safety Interventions  fall prevention program maintained;gait belt;nonskid shoes/slippers when out of bed;supervised activity  -AG     Row Name 06/21/21 1153          Pain Assessment    Additional Documentation  Pain Scale: FACES Pre/Post-Treatment (Group)  -     Row Name 06/21/21 1153          Pain Scale: FACES Pre/Post-Treatment    Pain: FACES Scale, Pretreatment  0-->no hurt  -AG     Posttreatment Pain Rating  0-->no hurt  -AG     Row Name 06/21/21 1153          Bed Mobility    Comment (Bed Mobility)  not observed  -     Row Name 06/21/21 1153          Transfer Assessment/Treatment    Transfers  sit-stand transfer;stand-sit transfer;stand pivot/stand step transfer;toilet transfer  -     Row Name 06/21/21 1153          Transfers    Sit-Stand [place] (Transfers)  standby assist;verbal cues;nonverbal cues (demo/gesture);contact guard  -AG     Stand-Sit [place] (Transfers)  standby assist;verbal cues;nonverbal cues (demo/gesture);contact guard  -AG     Assistive Device (Toilet Transfer)  commode;grab bars/safety frame  -     Row Name 06/21/21 1153          Sit-Stand Transfer    Assistive Device (Sit-Stand Transfers)  wheelchair  -     Row Name 06/21/21 1153          Stand-Sit Transfer    Assistive Device (Stand-Sit Transfers)  wheelchair  -     Row Name 06/21/21 1153          Stand Pivot/Stand Step Transfer    Stand Pivot/Stand Step [place] (Transfers)  standby assist;verbal cues;nonverbal cues (demo/gesture);contact guard  -AG     Assistive Device (Stand Pivot Stand Step Transfer)  wheelchair  -AG     Row Name 06/21/21 1153          Toilet Transfer    Type (Toilet Transfer)  stand pivot/stand step  -     Row Name 06/21/21 1153          Gait/Stairs (Locomotion)     Gait/Stairs Locomotion  gait/ambulation independence;gait/ambulation assistive device;distance ambulated;gait pattern;gait deviations  -AG     [place] Level (Gait)  verbal cues;nonverbal cues (demo/gesture);contact guard  -AG     Assistive Device (Gait)  cane, straight  -AG     Distance in Feet (Gait)  150 ft x 2 in first session; 300 ft in second session  -AG     Pattern (Gait)  step-through  -AG     Deviations/Abnormal Patterns (Gait)  base of support, narrow;stride length decreased  -AG     Bilateral Gait Deviations  forward flexed posture  -AG     Row Name 06/21/21 1153          Safety Issues, Functional Mobility    Impairments Affecting Function (Mobility)  balance;endurance/activity tolerance;strength  -AG     Cognitive Impairments, Mobility Safety/Performance  insight into deficits/self-awareness  -     Row Name 06/21/21 1153          Balance    Balance Assessment  sitting static balance;sitting dynamic balance;sit to stand dynamic balance;standing static balance;standing dynamic balance  -AG     Static Sitting Balance  WNL  -AG     Static Standing Balance  mild impairment;unsupported;standing  -AG     Dynamic Standing Balance  mild impairment;unsupported;standing  -AG     Balance Interventions  standing;static;dynamic  -AG     Comment, Balance  performed 360 degree turns w/o support; sidestepping; LE ther ex on foam per flow sheet; head turns with ambulation.  Pt. experienced 1 episode of posterior LOB with direct change during ambulation.   -     Row Name 06/21/21 1153          Motor Skills    Motor Skills  functional endurance;neuro-muscular function;posture;therapeutic exercise  -     Therapeutic Exercise  hip;knee;ankle  -     Row Name 06/21/21 1153          Hip (Therapeutic Exercise)    Hip Strengthening (Therapeutic Exercise)  bilateral;flexion;extension;aBduction;aDduction;marching while seated;sitting;standing;2 lb free weight;resistance band;red  -     Row Name 06/21/21 1153           Knee (Therapeutic Exercise)    Knee Strengthening (Therapeutic Exercise)  bilateral;flexion;extension;marching while seated;marching while standing;hamstring curls;sitting;standing;2 lb free weight;resistance band;red  -AG     Row Name 06/21/21 1153          Ankle (Therapeutic Exercise)    Ankle Strengthening (Therapeutic Exercise)  bilateral;dorsiflexion;plantarflexion;standing  -AG     Row Name 06/21/21 1153          Positioning and Restraints    Pre-Treatment Position  bathroom  -AG     Post Treatment Position  wheelchair  -AG     In Wheelchair  sitting;with OT  -AG     Row Name 06/21/21 1153          Daily Progress Summary (PT)    Functional Goal Overall Progress (PT)  progressing toward functional goals as expected  -AG     Daily Progress Summary (PT)  treatment emphasis on standing static and dynamic balance, LE ther ex for strengthening, gait safey and endurance; initiated gait training w/ straight cane with LOB observed, requiring min A to correct.   -AG     Impairments Still Limiting Function (PT)  strength decreased;impaired balance;impaired functional activity tolerance  -AG     Recommendations (PT)  continue POC  -AG       User Key  (r) = Recorded By, (t) = Taken By, (c) = Cosigned By    Initials Name Provider Type    AG Marcela Hanson, PT Physical Therapist        Wound 05/24/21 0430 Right anterior fifth toe Other (comment) (Active)   Dressing Appearance open to air 06/21/21 1001   Closure None 06/20/21 1910   Base dry 06/21/21 1001   Periwound dry 06/21/21 1001   Periwound Temperature warm 06/21/21 1001   Drainage Amount none 06/20/21 1910   Dressing Care open to air 06/21/21 1001       Wound 05/28/21 midline sternal Incision (Active)   Dressing Appearance open to air 06/21/21 1001   Closure Approximated;Liquid skin adhesive;Open to air 06/20/21 1910   Base dry;clean 06/21/21 1001   Periwound Temperature warm 06/21/21 1001   Periwound Skin Turgor soft 06/21/21 1001   Dressing Care open to air  06/21/21 1001       Wound 05/28/21 Right proximal leg Incision (Active)   Dressing Appearance open to air 06/20/21 1910   Closure Approximated;Liquid skin adhesive;Open to air 06/20/21 1910   Base dry;pink;red 06/21/21 1001   Periwound edematous 06/21/21 1001   Periwound Temperature warm 06/21/21 1001   Periwound Skin Turgor soft 06/21/21 1001   Drainage Amount none 06/21/21 1001   Dressing Care open to air 06/21/21 1001       Wound 06/02/21 2000 Left distal hand Blisters (Active)   Dressing Appearance open to air 06/21/21 1001   Closure Open to air 06/20/21 1910   Base dry 06/21/21 1001   Drainage Amount none 06/20/21 1910   Dressing Care open to air 06/21/21 1001       Wound 06/03/21 2000 chest Puncture (Active)   Dressing Appearance open to air 06/21/21 1001   Closure Open to air 06/20/21 1910   Base dry 06/21/21 1001   Drainage Amount none 06/21/21 1001   Dressing Care open to air 06/21/21 1001     Physical Therapy Education                 Title: PT OT SLP Therapies (Done)     Topic: Physical Therapy (Done)     Point: Mobility training (Done)     Learning Progress Summary           Patient Acceptance, E,TB,D, VU,NR by  at 6/21/2021 1152    Acceptance, E, VU by  at 6/19/2021 1225                   Point: Home exercise program (Done)     Learning Progress Summary           Patient Acceptance, E,TB,D, VU,NR by  at 6/21/2021 1152    Acceptance, E, VU by  at 6/19/2021 1225                   Point: Body mechanics (Done)     Learning Progress Summary           Patient Acceptance, E,TB,D, VU,NR by  at 6/21/2021 1152    Acceptance, E, VU by  at 6/19/2021 1225                   Point: Precautions (Done)     Learning Progress Summary           Patient Acceptance, E,TB,D, VU,NR by  at 6/21/2021 1152    Acceptance, E, VU by  at 6/19/2021 1225                               User Key     Initials Effective Dates Name Provider Type Discipline     06/16/21 -  Marcela Hanson, PT Physical Therapist PT      05/20/21 -  Yamel Mckeon, PT Physical Therapist PT                PT Recommendation and Plan          Daily Progress Summary (PT)  Functional Goal Overall Progress (PT): progressing toward functional goals as expected  Daily Progress Summary (PT): treatment emphasis on standing static and dynamic balance, LE ther ex for strengthening, gait safey and endurance; initiated gait training w/ straight cane with LOB observed, requiring min A to correct.   Impairments Still Limiting Function (PT): strength decreased, impaired balance, impaired functional activity tolerance  Recommendations (PT): continue POC               Time Calculation:     PT Charges     Row Name 06/21/21 1152 06/21/21 1151          Time Calculation    Start Time  1015  -AG  0830  -AG     Stop Time  1100  -AG  0915  -AG     Time Calculation (min)  45 min  -AG  45 min  -AG     PT Received On  --  06/21/21  -     PT - Next Appointment  --  06/22/21  -     PT Goal Re-Cert Due Date  --  06/26/21  -       User Key  (r) = Recorded By, (t) = Taken By, (c) = Cosigned By    Initials Name Provider Type     Marcela Hanson, PT Physical Therapist          Therapy Charges for Today     Code Description Service Date Service Provider Modifiers Qty    15498543740 HC GAIT TRAINING EA 15 MIN 6/21/2021 Marcela Hanson, PT GP 3    23975873280 HC PT THER PROC EA 15 MIN 6/21/2021 Marcela Hanson, PT GP 3                   Marcela Hanson, PT  6/21/2021

## 2021-06-21 NOTE — PROGRESS NOTES
Occupational Therapy:    Physical Therapy: Individual: 90 minutes.    Speech Language Pathology:    Signed by: Marcela Hanson, Physical Therapist

## 2021-06-21 NOTE — PROGRESS NOTES
"Patient Assessment Instrument  Quality Indicators - Admission    Section B. Hearing, Speech Vision      Section C. Cognitive Patterns  Brief Interview for Mental Status (BIMS) was conducted.  Repetition of Three Words: Three words  Able to report correct year: Correct  Able to report correct month: Accurate within 5 days  Able to report correct day of the week: Correct  Able to recall \"sock\": Yes, no cue required  Able to recall \"blue\": Yes, no cue required  Able to recall \"bed\": Yes, no cue required    BIMS SUMMARY SCORE: 15 Cognitively intact Patient was able to complete the Brief  Interview for Mental Status    Section [id number]. Prior Functioning    Self Care: Patient completed the activities by him/herself, with or without an  assistive device, with no assistance from a helper.  Indoor Mobility: Patient completed the activities by him/herself, with or  without an assistive device, with no assistance from a helper.  Stairs: Patient completed the activities by him/herself, with or without an  assistive device, with no assistance from a helper.  Functional Cognition: Patient completed the activities by him/herself, with or  without an assistive device, with no assistance from a helper.    Section CI8062. Prior Device Use      Section FE5921. Self Care Performance      Section [id number]. Self Care Discharge Goals      Section [id number]. Mobility Performance      Section TO7484. Mobility Discharge Goals      Section H. Bladder and Bowel  Bladder Continence: Always continent (no documented incontinence).  Bowel Continence: Always continent (no documented incontinence).    Section I. Active Diagnosis  Comorbidities and Co-existing Conditions:   Diabetes Mellitus (DM) - e.g.,  diabetic retinopathy, nephropathy, and neuropathy).    Section J. Health Conditions  Patient has not had any falls in the past year. Patient has had major surgery  during the 100 days prior to admission.    Section K. Swallowing/Nutritional " Status  Modiified food consistency/supervision (patient requires modified food or liquid  consistency and/or needs supervision during eating for safety).    Section M. Skin Conditions  Unhealed Pressure Ulcer/Injuries at Stage 1 or Higher on Admission:  No.    Section N. Medication    Potential Clinically Significant Medication Issues: No issues found during  review    Section O. Special Treatments, Procedures, and Programs  Patient did not receive total parenteral nutrition treatment at the time of  admission.    OPTIONAL BRANCH FOR TRACKING FALLS  Fall(s) During Shift: No falls.    Signed by: Nurse Mauricio

## 2021-06-21 NOTE — PROGRESS NOTES
[facility] CMG Coordinator  Inpatient Rehabilitation Admission    Ethnic Group: White.  Marital Status:  Marital Status: .    IRF Admission Date:  06/18/2021  Admission Class: Initial Rehab.  Admit From:  [facility]    Pre-Hospital Living: Home. Pre-Hospital Living  With: (2) Family/Relatives.    Payment Sources: Primary: Medicare Fee for Service  Secondary: Not Listed.  Impairment Group: 09 Cardiac  Date of Onset of Impairment: 05/22/2021    Etiologic Diagnosis Code(s):  Rank Code      Description  1    I21.4     Non-ST elevation (NSTEMI) myocardial infarction    Comorbidities:      Height on Admission: 67 inches.  Weight on Admission: 177 pounds.    Are there any arthritis conditions recorded for Impairment Group, Etiologic  Diagnosis, or Comorbid Conditions that meet all of the regulatory requirements  for IRF classification (in 42 .29(b)(2)(x), (xi), and xii))?    GEMA Bladder Accidents:  0 - Accidents.  Bladder Score = 5.  One (1) bladder accident.  GEMA Bowel Accident: 0 -Accidents.  Bowel Score = 7. Patient has no accidents.    Signed by: Salina Perkins, Supervisor

## 2021-06-21 NOTE — PROGRESS NOTES
Rehabilitation Nursing  Inpatient Rehabilitation Plan of Care Note    Plan of Care  Copy from [facility]    Pain Management (Active)  Current Status (6/18/2021 4:09:00 PM): at risk for pain  Weekly Goal: no pain this week  Discharge Goal: no pain    Safety    Potential for Injury (Active)  Current Status (6/18/2021 4:09:00 PM): at risk for injury  Weekly Goal: no injuries this week  Discharge Goal: no injury    Signed by: Yvette Jane Nurse

## 2021-06-21 NOTE — THERAPY TREATMENT NOTE
Inpatient Rehabilitation - Speech Language Pathology   Swallow Treatment Note [facility]     Patient Name: Augusto Lorenzana Jr.  : 1945  MRN: [id number]  Today's Date: 2021             Admit Date: 2021    DYSPHAGIA THERAPY PLAN OF CARE:     Augusto Lorenzana Jr. was seen this pm for formal dysphagia tx.  He is sleeping upon entry but awakens easily to verbal stimuli.  He is pleasant and cooperative to participate in all therapy tasks.     Pt accepts thin water trials via cup on this date.  Pt is noted w/ delayed cough response w/ multiple coughs and/or throat clears.       Long Term Goal:  Pt will accept least restrictive diet tolerance w/o overt s/s aspiration.      Short Term Goals:     1. Pt will perform OROM/DANA exercises x3 sets x10 reps w/ min cues.  * Not addressed on this date.      2. Pt will perform resistive breathing exercises x3 sets x5 reps w/resistance of 2-6  To improve respiratory support and control.   * x3 sets x10 reps w/ resistance of 2/2 inhale/exhale w/ min cues.      3. Pt will perform laryngeal adduction/elevation exercises x3 sets x10 reps w/ min cues.   * x2 sets x5 reps w/ avg duration of 11.85 seconds w/ min-mod cues.     4. Pt will perform CTAR/Shaker exercises sustained x3 sets x5 reps for 30+ seconds over 3 consecutive sessions.   * Not addressed on this date.      5. Pt will perform CTAR/Shaker exercises repetitive x3 sets x12 reps w/ mod cues.   * CTAR exercises x2 sets x20 reps w/ min cues.      6. Pt will perform compensatory techniques during meals w/ min cues.     7. Pt will participate in instrumental re-evaluation of swallowing fnx in 7-10 days, pending progress towards this poc.       Thank you-  Jody Lynn M.S., CFY-SLP    Visit Dx:   No diagnosis found.  Patient Active Problem List   Diagnosis   • NSTEMI 2021   • Hyperlipidemia LDL goal <70   • Essential hypertension   • T2DM on oral agents and insulin. HbA1c 7.4    • Severe 3 vessel CAD with preserved LV  function (CMS/[facility])   • A/C kidney disease. ATN due to postoperative arrest. 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 Combination of anoxic insult and azotemia   • Debility     Past Medical History:   Diagnosis Date   • CAD (coronary artery disease)    • CKD (chronic kidney disease) stage 3, GFR 30-59 ml/min (CMS/[facility])    • Diabetes mellitus (CMS/[facility])    • Hyperlipidemia    • Hypertension    • NSTEMI (non-ST elevated myocardial infarction) (CMS/[facility])      Past Surgical History:   Procedure Laterality Date   • CARDIAC CATHETERIZATION N/A 5/24/2021    Procedure: Left Heart Cath;  Surgeon: Blade Greene IV, MD;  Location: [facility] CATH INVASIVE LOCATION;  Service: Cardiovascular;  Laterality: N/A;   • CARDIAC SURGERY      2 stents placed 2012.   • CORONARY ARTERY BYPASS GRAFT N/A 5/28/2021    Procedure: MEDIAN STERNOTOMY CORONARY ARTERY BYPASS X 3 UTILIZING THE LEFT INTERNAL MAMMARY ARTERY GRAFT, EVH OF THE GREATER RIGHT SAPHENOUS VEIN, AND PILO PER ANESTHESIA;  Surgeon: Jacek Miller MD;  Location: [facility] OR;  Service: Cardiothoracic;  Laterality: N/A;       EDUCATION  The patient has been educated in the following areas:   Dysphagia (Swallowing Impairment) Oral Care/Hydration Modified Diet Instruction.    SLP Recommendation and Plan         Continue per POC.           Time Calculation:   Time Calculation- SLP     Row Name 06/21/21 1518             Time Calculation- SLP    SLP Start Time  1430  -JR      SLP Stop Time  1513  -      SLP Time Calculation (min)  43 min  -      SLP - Next Appointment  06/22/21  -        User Key  (r) = Recorded By, (t) = Taken By, (c) = Cosigned By    Initials Name Provider Type    Jody Abdi, MS, CFY-SLP Speech and Language Pathologist          Therapy Charges for Today     Code Description Service Date  Service Provider Modifiers Qty    [id number] HC ST TREATMENT SWALLOW 3 6/21/2021 Jody Lynn, MS, CFY-SLP GN 1               Jody Lynn MS, CFY-SLP  6/21/2021

## 2021-06-21 NOTE — PROGRESS NOTES
PROGRESS NOTE         Patient Identification:  Name:  Augusto Lorenzana Jr.  Age:  76 y.o.  Sex:  male  :  1945  MRN:  [id number]  Visit Number:  [id number]  Primary Care Provider:  Rob Polanco MD         LOS: 3 days       ----------------------------------------------------------------------------------------------------------------------  Subjective       Chief Complaints:    Debility    Interval History:    76-year-old gentleman who was recently hospitalized with CAD and did require CABG x3 vessels.  he did have V. fib x2 episodes requiring ACLS protocol.  Did have acute kidney injury and required hemodialysis shortly.    Mr. Lorenzana is awake and alert, sitting up in his bed eating breakfast.  Afebrile.  Denies diarrhea.  Vital signs are stable, on room air.  Creatinine increased slightly at 1.67, stable from 1.59.  Overnight the patient experienced hypoglycemia of 64, with prior episodes of hypoglycemia of 57, and 37.  Levemir has been decreased to 15 units subcu nightly, we will continue to monitor and adjust as appropriate.    Patient participated in speech, physical, and occupational therapy evaluations on : Plan therapy includes balance training, gait training, patient/family education, postural reeducation, stair training, strengthening, transfer training; performs bed to chair transfers with contact-guard, assisted with front wheeled walker; sit to stand transfers with standby assist; perform shoulder, elbow/forearm, wrist, hand therapeutic exercise;Requires contact-guard assistance with bathing; supervision for upper body dressing; contact-guard assistance for lower body dressing; supervision for grooming; contact-guard assistance for toileting; independent for self-feeding.      Review of Systems:    Constitutional: no fever, chills and night sweats.  Generalized fatigue.  Eyes: no eye drainage, itching or redness.  HEENT: no mouth sores, dysphagia or nose bleed.  Respiratory:  no for shortness of breath, cough or production of sputum.  Cardiovascular: no chest pain, no palpitations, no orthopnea.  Gastrointestinal: no nausea, vomiting or diarrhea. No abdominal pain, hematemesis or rectal bleeding.  Genitourinary: no dysuria or polyuria.  Hematologic/lymphatic: no lymph node abnormalities, no easy bruising or easy bleeding.  Musculoskeletal: no muscle or joint pain.  Skin: No rash and no itching.  Neurological: no loss of consciousness, no seizure, no headache.  Behavioral/Psych: no depression or suicidal ideation.  Endocrine: no hot flashes.  Immunologic: negative.    ----------------------------------------------------------------------------------------------------------------------      Objective       Current Hospital Meds:  allopurinol, 300 mg, Oral, Daily  amLODIPine, 10 mg, Oral, Q24H  aspirin, 325 mg, Oral, Daily  atorvastatin, 40 mg, Oral, Nightly  carvedilol, 25 mg, Oral, BID With Meals  gabapentin, 600 mg, Oral, Nightly  glipizide, 5 mg, Oral, BID AC  heparin (porcine), 5,000 Units, Subcutaneous, Q12H  insulin detemir, 15 Units, Subcutaneous, Nightly  isosorbide dinitrate, 20 mg, Oral, TID - Nitrates  QUEtiapine, 12.5 mg, Oral, Q12H         ----------------------------------------------------------------------------------------------------------------------    Vital Signs:  Temp:  [98 °F (36.7 °C)] 98 °F (36.7 °C)  Heart Rate:  [75-87] 85  Resp:  [20] 20  BP: (129-155)/(70-83) 155/83  No data found.  SpO2 Percentage    06/19/21 1903 06/20/21 0828 06/20/21 1901   SpO2: 94% 95% 97%     SpO2:  [97 %] 97 %  on   ;   Device (Oxygen Therapy): room air    Body mass index is 27.78 kg/m².  Wt Readings from Last 3 Encounters:   06/18/21 80.3 kg (177 lb)   06/18/21 80.3 kg (177 lb 1.6 oz)        Intake/Output Summary (Last 24 hours) at 6/21/2021 0958  Last data filed at 6/21/2021 0900  Gross per 24 hour   Intake 1560 ml   Output --   Net 1560 ml     Diet Soft Texture; Ground; Honey Thick;  Cardiac, Consistent Carbohydrate  ----------------------------------------------------------------------------------------------------------------------      Physical Exam:    General Appearance: alert, pleasant, appears stated age, interactive and  Cooperative.  Sitting up in bed eating breakfast, without any issues or complaints.    Head: normocephalic, without obvious abnormality and atraumatic    Eyes: lids and lashes normal, conjunctivae and sclerae normal, no icterus, no  pallor, corneas clear and PERRLA    Ears: ears appear intact with no abnormalities noted    Nose: nares normal, septum midline, mucosa normal and no drainage     Throat: no oral lesions, no thrush, oral mucosa moist and oopharynx normal    Lungs: clear to auscultation, respirations regular, respirations even and  respirations unlabored. No accessory muscle use.     Heart:: regular rhythm & normal rate, normal S1, S2, no murmur, no gallop, no  rub and no click.  Chest wall with no abnormalities observed. PMI nondisplaced    Abdomen: normal bowel sounds in all quadrants, no masses, no hepatomegaly,  no splenomegaly, soft non-tender, no guarding and no rebound tenderness    Extremities: no edema, no cyanosis, no redness, no tenderness, no clubbing    Musculoskeletal: joints with no effusion nor erythema nor warmth.  Pedal pulses palpable and equal bilaterally    Skin: no bleeding, bruising or rash and no lesions noted.  Incision sites to right leg and anterior chest appear to be healing well.    Lymph Nodes: no palpable adenopathy    Neurologic:    Mental Status: Patient is awake alert and oriented x3.  Appropriate.   Sensory: Sensory overall seems to be intact in BLE and BUE.   Motor strength: Generalized weakness        ----------------------------------------------------------------------------------------------------------------------            Results from last 7 days   Lab Units 06/21/21  0120 06/19/21  0330   WBC 10*3/mm3 7.04 6.80    HEMOGLOBIN g/dL 9.6* 8.7*   HEMATOCRIT % 30.3* 27.6*   MCV fL 94.7 95.2   MCHC g/dL 31.7 31.5   PLATELETS 10*3/mm3 257 256     Results from last 7 days   Lab Units 06/21/21  0120 06/19/21  0331 06/16/21  0559 06/15/21  0426   SODIUM mmol/L 141 143 138  --    POTASSIUM mmol/L 4.2 4.1 3.8  --    MAGNESIUM mg/dL  --  1.7  --  2.0   CHLORIDE mmol/L 109* 111* 105  --    CO2 mmol/L 20.9* 22.9 21.0*  --    BUN mg/dL 31* 32* 68*  --    CREATININE mg/dL 1.67* 1.59* 1.69*  --    EGFR IF NONAFRICN AM mL/min/1.73 40* 43* 40*  --    CALCIUM mg/dL 8.3* 8.6 8.9  --    GLUCOSE mg/dL 98 81 127*  --    ALBUMIN g/dL  --  2.50* 2.60*  --    BILIRUBIN mg/dL  --  0.3  --   --    ALK PHOS U/L  --  143*  --   --    AST (SGOT) U/L  --  21  --   --    ALT (SGPT) U/L  --  21  --   --    Estimated Creatinine Clearance: 39.4 mL/min (A) (by C-G formula based on SCr of 1.67 mg/dL (H)).  No results found for: AMMONIA    Glucose   Date/Time Value Ref Range Status   06/21/2021 0637 102 70 - 130 mg/dL Final   06/21/2021 0607 64 (L) 70 - 130 mg/dL Final   06/20/2021 2008 167 (H) 70 - 130 mg/dL Final   06/20/2021 1648 98 70 - 130 mg/dL Final   06/20/2021 1121 108 70 - 130 mg/dL Final   06/20/2021 0633 118 70 - 130 mg/dL Final   06/20/2021 0607 57 (L) 70 - 130 mg/dL Final   06/19/2021 2335 98 70 - 130 mg/dL Final     Lab Results   Component Value Date    HGBA1C 7.40 (H) 05/22/2021     Lab Results   Component Value Date    TSH 4.030 06/03/2021       No results found for: BLOODCX  No results found for: URINECX  No results found for: WOUNDCX  No results found for: STOOLCX  No results found for: RESPCX  Pain Management Panel       Pain Management Panel Latest Ref Rng & Units 5/30/2021 5/28/2021    CREATININE UR mg/dL 136.1 -    AMPHETAMINES SCREEN, URINE Negative - Negative    BARBITURATES SCREEN Negative - Negative    BENZODIAZEPINE SCREEN, URINE Negative - Negative    BUPRENORPHINEUR Negative - Negative    COCAINE SCREEN, URINE Negative - Negative     METHADONE SCREEN, URINE Negative - Negative    METHAMPHETAMINEUR Negative - Negative              ----------------------------------------------------------------------------------------------------------------------  Imaging Results (Last 24 Hours)       ** No results found for the last 24 hours. **            ----------------------------------------------------------------------------------------------------------------------    Assessment/Plan       Assessment/Plan     ASSESSMENT:    Debility  Status post CABG x3/CAD  Hypertension  Gout  Hypertension  MICHAEL  Diabetes mellitus    PLAN:    Mr. Lorenzana is awake and alert, sitting up in his bed eating breakfast.  Afebrile.  Denies diarrhea.  Vital signs are stable, on room air.  Creatinine increased slightly at 1.67, stable from 1.59.  Overnight the patient experienced hypoglycemia of 64, with prior episodes of hypoglycemia of 57, and 37.  Levemir has been decreased to 15 units subcu nightly, we will continue to monitor and adjust as appropriate.    Debility-patient participated in speech, physical, and occupational therapy evaluations on 6/19: Plan therapy includes balance training, gait training, patient/family education, postural reeducation, stair training, strengthening, transfer training; performs bed to chair transfers with contact-guard, assisted with front wheeled walker; sit to stand transfers with standby assist; perform shoulder, elbow/forearm, wrist, hand therapeutic exercise;Requires contact-guard assistance with bathing; supervision for upper body dressing; contact-guard assistance for lower body dressing; supervision for grooming; contact-guard assistance for toileting; independent for self-feeding.     Status post CABG x3/CAD--continue aspirin, statin therapy, Coreg and Isordil.  Patient asymptomatic     Hypertension-- Continue Norvasc and Coreg.     Gout--allopurinol     Acute kidney injury--creatinine stable at 1.67.     Diabetes mellitus--episode of  hypoglycemia at 64.  Levemir decreased to 15 units subcu nightly.  We will continue to monitor and adjust as appropriate.      Code Status:   Code Status and Medical Interventions:   Ordered at: 06/18/21 1741     Level Of Support Discussed With:    Patient     Code Status:    CPR     Medical Interventions (Level of Support Prior to Arrest):    Full     Scribed for Maldonado Fried MD by CHARLES Alejandra. 6/21/2021  09:58 EDT       CHARLES Alejandra  06/21/21  09:58 EDT    Physician Attestation:    The documentation recorded by the scribe accurately reflects the service I personally performed and the decisions made by me.    Maldonado Fried MD  Infectious Diseases  06/22/21  05:56 EDT

## 2021-06-22 LAB
GLUCOSE BLDC GLUCOMTR-MCNC: 192 MG/DL (ref 70–130)
GLUCOSE BLDC GLUCOMTR-MCNC: 201 MG/DL (ref 70–130)
GLUCOSE BLDC GLUCOMTR-MCNC: 216 MG/DL (ref 70–130)
GLUCOSE BLDC GLUCOMTR-MCNC: 243 MG/DL (ref 70–130)

## 2021-06-22 PROCEDURE — 92526 ORAL FUNCTION THERAPY: CPT

## 2021-06-22 PROCEDURE — 97110 THERAPEUTIC EXERCISES: CPT

## 2021-06-22 PROCEDURE — 97116 GAIT TRAINING THERAPY: CPT

## 2021-06-22 PROCEDURE — 97535 SELF CARE MNGMENT TRAINING: CPT

## 2021-06-22 PROCEDURE — 97112 NEUROMUSCULAR REEDUCATION: CPT

## 2021-06-22 PROCEDURE — 97530 THERAPEUTIC ACTIVITIES: CPT | Performed by: OCCUPATIONAL THERAPIST

## 2021-06-22 PROCEDURE — 25010000002 HEPARIN (PORCINE) PER 1000 UNITS: Performed by: FAMILY MEDICINE

## 2021-06-22 PROCEDURE — 97110 THERAPEUTIC EXERCISES: CPT | Performed by: OCCUPATIONAL THERAPIST

## 2021-06-22 PROCEDURE — 63710000001 INSULIN DETEMIR PER 5 UNITS: Performed by: INTERNAL MEDICINE

## 2021-06-22 PROCEDURE — 82962 GLUCOSE BLOOD TEST: CPT

## 2021-06-22 RX ADMIN — GUAIFENESIN AND DEXTROMETHORPHAN 5 ML: 100; 10 SYRUP ORAL at 17:57

## 2021-06-22 RX ADMIN — AMLODIPINE BESYLATE 10 MG: 10 TABLET ORAL at 08:22

## 2021-06-22 RX ADMIN — ISOSORBIDE DINITRATE 20 MG: 20 TABLET ORAL at 12:15

## 2021-06-22 RX ADMIN — ASPIRIN 325 MG: 325 TABLET, COATED ORAL at 08:23

## 2021-06-22 RX ADMIN — CARVEDILOL 25 MG: 25 TABLET, FILM COATED ORAL at 17:40

## 2021-06-22 RX ADMIN — ATORVASTATIN CALCIUM 40 MG: 40 TABLET, FILM COATED ORAL at 21:16

## 2021-06-22 RX ADMIN — QUETIAPINE FUMARATE 12.5 MG: 25 TABLET, FILM COATED ORAL at 21:16

## 2021-06-22 RX ADMIN — GLIPIZIDE 5 MG: 5 TABLET ORAL at 08:23

## 2021-06-22 RX ADMIN — ALLOPURINOL 300 MG: 300 TABLET ORAL at 08:22

## 2021-06-22 RX ADMIN — GABAPENTIN 600 MG: 300 CAPSULE ORAL at 21:16

## 2021-06-22 RX ADMIN — ISOSORBIDE DINITRATE 20 MG: 20 TABLET ORAL at 17:40

## 2021-06-22 RX ADMIN — HEPARIN SODIUM 5000 UNITS: 5000 INJECTION INTRAVENOUS; SUBCUTANEOUS at 21:16

## 2021-06-22 RX ADMIN — GLIPIZIDE 5 MG: 5 TABLET ORAL at 17:40

## 2021-06-22 RX ADMIN — HEPARIN SODIUM 5000 UNITS: 5000 INJECTION INTRAVENOUS; SUBCUTANEOUS at 08:22

## 2021-06-22 RX ADMIN — CARVEDILOL 25 MG: 25 TABLET, FILM COATED ORAL at 08:23

## 2021-06-22 RX ADMIN — ISOSORBIDE DINITRATE 20 MG: 20 TABLET ORAL at 08:23

## 2021-06-22 RX ADMIN — INSULIN DETEMIR 10 UNITS: 100 INJECTION, SOLUTION SUBCUTANEOUS at 21:16

## 2021-06-22 RX ADMIN — QUETIAPINE FUMARATE 12.5 MG: 25 TABLET, FILM COATED ORAL at 08:22

## 2021-06-22 NOTE — THERAPY TREATMENT NOTE
Inpatient Rehabilitation - Occupational Therapy Treatment Note     [facility]     Patient Name: Augusto Lorenzana Jr.  : 1945  MRN: [id number]    Today's Date: 2021                 Admit Date: 2021       No diagnosis found.    Patient Active Problem List   Diagnosis   • NSTEMI 2021   • Hyperlipidemia LDL goal <70   • Essential hypertension   • T2DM on oral agents and insulin. HbA1c 7.4    • Severe 3 vessel CAD with preserved LV function (CMS/[facility])   • A/C kidney disease. ATN due to postoperative arrest. 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 Combination of anoxic insult and azotemia   • Debility       Past Medical History:   Diagnosis Date   • CAD (coronary artery disease)    • CKD (chronic kidney disease) stage 3, GFR 30-59 ml/min (CMS/[facility])    • Diabetes mellitus (CMS/[facility])    • Hyperlipidemia    • Hypertension    • NSTEMI (non-ST elevated myocardial infarction) (CMS/[facility])        Past Surgical History:   Procedure Laterality Date   • CARDIAC CATHETERIZATION N/A 2021    Procedure: Left Heart Cath;  Surgeon: Blade Greene IV, MD;  Location:  [facility];  Service: Cardiovascular;  Laterality: N/A;   • CARDIAC SURGERY      2 stents placed .   • CORONARY ARTERY BYPASS GRAFT N/A 2021    Procedure: MEDIAN STERNOTOMY CORONARY ARTERY BYPASS X 3 UTILIZING THE LEFT INTERNAL MAMMARY ARTERY GRAFT, EVH OF THE GREATER RIGHT SAPHENOUS VEIN, AND PILO PER ANESTHESIA;  Surgeon: Jacek Miller MD;  Location: [facility] OR;  Service: Cardiothoracic;  Laterality: N/A;                IRF OT ASSESSMENT FLOWSHEET (last 12 hours)      IRF OT Evaluation and Treatment     Row Name 21 1500 21 1004       OT Time and Intention    Document Type  daily treatment  -AH  daily treatment  -JW    Mode of Treatment   individual therapy;occupational therapy  -  occupational therapy;individual therapy  -    Patient Effort  good  -  good  -    Row Name 06/22/21 1500 06/22/21 1004       General Information    Patient/Family/Caregiver Comments/Observations  patient agreeable to therapy with no complaints  -  --    General Observations of Patient  --  Pt seen for 45 min this am. Tx included groom/hyg tr and UB dress tr.  He also completed light UE grasp and release and fine motor coordination challenges to improve UE fuction, fine motor skills and activity tolerance.  Pt tolerated tx well with no complaints.                                                         -    Existing Precautions/Restrictions  fall  -  fall;swallow precautions  -    Row Name 06/22/21 1500          Motor Skills    Motor Skills  coordination;functional endurance;therapeutic exercise  -     Therapeutic Exercise  -- BUE ROM, functional endurance, gmc  -       User Key  (r) = Recorded By, (t) = Taken By, (c) = Cosigned By    Initials Name Effective Dates     Layla Engle, OT 06/16/21 -      Ish Gardner, OTR 06/16/21 -            Occupational Therapy Education                 Title: PT OT SLP Therapies (Done)     Topic: Occupational Therapy (Done)     Point: ADL training (Done)     Description:   Instruct learner(s) on proper safety adaptation and remediation techniques during self care or transfers.   Instruct in proper use of assistive devices.              Learning Progress Summary           Patient Eager, E, VU,DU,NR by FARHEEN at 6/22/2021 1007    Acceptance, E,D, VU,NR by JOHN at 6/21/2021 1457    Acceptance, E,D, NR,VU,DU by AS at 6/19/2021 1107                   Point: Home exercise program (Done)     Description:   Instruct learner(s) on appropriate technique for monitoring, assisting and/or progressing therapeutic exercises/activities.              Learning Progress Summary           Patient Eager, E, VU,DU,NR by FARHEEN at 6/22/2021  1007    Acceptance, E,D, NR,VU,DU by AS at 6/19/2021 1107                   Point: Precautions (Done)     Description:   Instruct learner(s) on prescribed precautions during self-care and functional transfers.              Learning Progress Summary           Patient Eager, E, VU,DU,NR by  at 6/22/2021 1007    Acceptance, E,D, VU,NR by  at 6/21/2021 1457    Acceptance, E,D, NR,VU,DU by AS at 6/19/2021 1107                   Point: Body mechanics (Done)     Description:   Instruct learner(s) on proper positioning and spine alignment during self-care, functional mobility activities and/or exercises.              Learning Progress Summary           Patient Eager, E, VU,DU,NR by FARHEEN at 6/22/2021 1007    Acceptance, E,D, NR,VU,DU by AS at 6/19/2021 1107                               User Key     Initials Effective Dates Name Provider Type [facility] 06/16/21 -  Ellen Hays, OT Occupational Therapist OT    AS 06/16/21 -  Marcela Kearney, OT Occupational Therapist OT     06/16/21 -  Ish Gardner OTR Occupational Therapist OT                    OT Recommendation and Plan                         Time Calculation:     Time Calculation- OT     Row Name 06/22/21 1558 06/22/21 1007          Time Calculation-     OT Start Time  1045  -  0915  -     OT Stop Time  1130  -  1000  -     OT Time Calculation (min)  45 min  -  45 min  -     Total Timed Code Minutes- OT  --  45 minute(s)  -       User Key  (r) = Recorded By, (t) = Taken By, (c) = Cosigned By    Initials Name Provider Type     Layla Engle OT Occupational Therapist    Ish Johnston OTR Occupational Therapist        Therapy Charges for Today     Code Description Service Date Service Provider Modifiers Qty    99492690871 HC OT THERAPEUTIC ACT EA 15 MIN 6/22/2021 Layla Engle OT GO 2    81578076062 HC OT THER PROC EA 15 MIN 6/22/2021 Layla Engle OT GO 1                   Lalya Engle OT  6/22/2021

## 2021-06-22 NOTE — PROGRESS NOTES
Occupational Therapy: Individual: 45 minutes.    Physical Therapy:    Speech Language Pathology:    Signed by: Melissa Engle, Occupational Therapist

## 2021-06-22 NOTE — PROGRESS NOTES
Occupational Therapy:    Physical Therapy:    Speech Language Pathology: Individual: 45 minutes.    Signed by: MIGUEL Boone

## 2021-06-22 NOTE — PROGRESS NOTES
Occupational Therapy: Individual: 45 minutes.    Physical Therapy:    Speech Language Pathology:    Signed by: Ish Gardner, Therapy Coordinator

## 2021-06-22 NOTE — PROGRESS NOTES
PROGRESS NOTE         Patient Identification:  Name:  Augusto Lorenzana Jr.  Age:  76 y.o.  Sex:  male  :  1945  MRN:  [id number]  Visit Number:  [id number]  Primary Care Provider:  Rob Polanco MD         LOS: 4 days       ----------------------------------------------------------------------------------------------------------------------  Subjective       Chief Complaints:    Debility    Interval History:    76-year-old gentleman who was recently hospitalized with CAD and did require CABG x3 vessels.  he did have V. fib x2 episodes requiring ACLS protocol.  Did have acute kidney injury and required hemodialysis shortly.    More awake and alert this morning, denies any complaints or issues.  Vital signs stable on room air.  Blood glucose was 127 last night, Levemir dose was held.  Blood glucose this morning was 192, Levemir has been further decreased to 10 units subcu nightly.  Continue glipizide as ordered.  BMP ordered in the a.m.    Patient participated in speech, physical, and occupational therapy evaluations on : Patient performs transfer activities with standby assist to contact guard assist with verbal/nonverbal cues; patient utilizes wheelchair for sit to stand, stand to sit transfers; requires standby assist to contact-guard with verbal/nonverbal cues and wheelchair for stand pivot/stand step transfer; ambulated 150 feet x 2 and 300 feet with a straight cane and contact-guard assistance with verbal/nonverbal cues; performed hip, knee, ankle therapeutic exercise; required set up assistance for upper body dressing; set up assistance for grooming; participated and functional endurance therapeutic exercise/ROM; gross motor coordination activities.    Review of Systems:    Constitutional: no fever, chills and night sweats.  Generalized fatigue.  Eyes: no eye drainage, itching or redness.  HEENT: no mouth sores, dysphagia or nose bleed.  Respiratory: no for shortness of breath, cough  or production of sputum.  Cardiovascular: no chest pain, no palpitations, no orthopnea.  Gastrointestinal: no nausea, vomiting or diarrhea. No abdominal pain, hematemesis or rectal bleeding.  Genitourinary: no dysuria or polyuria.  Hematologic/lymphatic: no lymph node abnormalities, no easy bruising or easy bleeding.  Musculoskeletal: no muscle or joint pain.  Skin: No rash and no itching.  Neurological: no loss of consciousness, no seizure, no headache.  Behavioral/Psych: no depression or suicidal ideation.  Endocrine: no hot flashes.  Immunologic: negative.    ----------------------------------------------------------------------------------------------------------------------      Objective       \[facility] [place] [facility]\"" Meds:  allopurinol, 300 mg, Oral, Daily  amLODIPine, 10 mg, Oral, Q24H  aspirin, 325 mg, Oral, Daily  atorvastatin, 40 mg, Oral, Nightly  carvedilol, 25 mg, Oral, BID With Meals  gabapentin, 600 mg, Oral, Nightly  glipizide, 5 mg, Oral, BID AC  heparin (porcine), 5,000 Units, Subcutaneous, Q12H  insulin detemir, 15 Units, Subcutaneous, Nightly  isosorbide dinitrate, 20 mg, Oral, TID - Nitrates  QUEtiapine, 12.5 mg, Oral, Q12H         ----------------------------------------------------------------------------------------------------------------------    Vital Signs:  Temp:  [98.6 °F (37 °C)] 98.6 °F (37 °C)  Heart Rate:  [75-84] 84  BP: (122-145)/(69-79) 145/79  No data found.  SpO2 Percentage    06/20/21 1901 06/21/21 0812 06/21/21 1900   SpO2: 97% 95% 97%     SpO2:  [97 %] 97 %  on   ;   Device (Oxygen Therapy): room air    Body mass index is 27.78 kg/m².  Wt Readings from Last 3 Encounters:   06/18/21 80.3 kg (177 lb)   06/18/21 80.3 kg (177 lb 1.6 oz)        Intake/Output Summary (Last 24 hours) at 6/22/2021 0904  Last data filed at 6/22/2021 0451  Gross per 24 hour   Intake 1440 ml   Output 400 ml   Net 1040 ml     Diet Soft Texture; Ground; Honey Thick; Cardiac, Consistent  Carbohydrate  ----------------------------------------------------------------------------------------------------------------------      Physical Exam:    General Appearance: More awake and alert this morning, denies any complaints or issues.    Head: normocephalic, without obvious abnormality and atraumatic    Eyes: lids and lashes normal, conjunctivae and sclerae normal, no icterus, no  pallor, corneas clear and PERRLA    Ears: ears appear intact with no abnormalities noted    Nose: nares normal, septum midline, mucosa normal and no drainage     Throat: no oral lesions, no thrush, oral mucosa moist and oopharynx normal    Lungs: clear to auscultation, respirations regular, respirations even and  respirations unlabored. No accessory muscle use.     Heart:: regular rhythm & normal rate, normal S1, S2, no murmur, no gallop, no  rub and no click.  Chest wall with no abnormalities observed. PMI nondisplaced    Abdomen: normal bowel sounds in all quadrants, no masses, no hepatomegaly,  no splenomegaly, soft non-tender, no guarding and no rebound tenderness    Extremities: no edema, no cyanosis, no redness, no tenderness, no clubbing    Musculoskeletal: joints with no effusion nor erythema nor warmth.  Pedal pulses palpable and equal bilaterally    Skin: no bleeding, bruising or rash and no lesions noted.  Incision sites to right leg and anterior chest appear to be healing well.    Lymph Nodes: no palpable adenopathy    Neurologic:    Mental Status: Patient is awake alert and oriented x3.  Appropriate.   Sensory: Sensory overall seems to be intact in BLE and BUE.   Motor strength: Generalized weakness        ----------------------------------------------------------------------------------------------------------------------            Results from last 7 days   Lab Units 06/21/21  0120 06/19/21  0330   WBC 10*3/mm3 7.04 6.80   HEMOGLOBIN g/dL 9.6* 8.7*   HEMATOCRIT % 30.3* 27.6*   MCV fL 94.7 95.2   MCHC g/dL 31.7 31.5    PLATELETS 10*3/mm3 257 256     Results from last 7 days   Lab Units 06/21/21  0120 06/19/21  0331 06/16/21  0559   SODIUM mmol/L 141 143 138   POTASSIUM mmol/L 4.2 4.1 3.8   MAGNESIUM mg/dL  --  1.7  --    CHLORIDE mmol/L 109* 111* 105   CO2 mmol/L 20.9* 22.9 21.0*   BUN mg/dL 31* 32* 68*   CREATININE mg/dL 1.67* 1.59* 1.69*   EGFR IF NONAFRICN AM mL/min/1.73 40* 43* 40*   CALCIUM mg/dL 8.3* 8.6 8.9   GLUCOSE mg/dL 98 81 127*   ALBUMIN g/dL  --  2.50* 2.60*   BILIRUBIN mg/dL  --  0.3  --    ALK PHOS U/L  --  143*  --    AST (SGOT) U/L  --  21  --    ALT (SGPT) U/L  --  21  --    Estimated Creatinine Clearance: 39.4 mL/min (A) (by C-G formula based on SCr of 1.67 mg/dL (H)).  No results found for: AMMONIA    Glucose   Date/Time Value Ref Range Status   06/22/2021 0619 192 (H) 70 - 130 mg/dL Final   06/21/2021 1951 122 70 - 130 mg/dL Final   06/21/2021 1642 127 70 - 130 mg/dL Final   06/21/2021 1127 123 70 - 130 mg/dL Final   06/21/2021 0637 102 70 - 130 mg/dL Final   06/21/2021 0607 64 (L) 70 - 130 mg/dL Final   06/20/2021 2008 167 (H) 70 - 130 mg/dL Final   06/20/2021 1648 98 70 - 130 mg/dL Final     Lab Results   Component Value Date    HGBA1C 7.40 (H) 05/22/2021     Lab Results   Component Value Date    TSH 4.030 06/03/2021       No results found for: BLOODCX  No results found for: URINECX  No results found for: WOUNDCX  No results found for: STOOLCX  No results found for: RESPCX  Pain Management Panel       Pain Management Panel Latest Ref Rng & Units 5/30/2021 5/28/2021    CREATININE UR mg/dL 136.1 -    AMPHETAMINES SCREEN, URINE Negative - Negative    BARBITURATES SCREEN Negative - Negative    BENZODIAZEPINE SCREEN, URINE Negative - Negative    BUPRENORPHINEUR Negative - Negative    COCAINE SCREEN, URINE Negative - Negative    METHADONE SCREEN, URINE Negative - Negative    METHAMPHETAMINEUR Negative - Negative               ----------------------------------------------------------------------------------------------------------------------  Imaging Results (Last 24 Hours)       ** No results found for the last 24 hours. **            ----------------------------------------------------------------------------------------------------------------------    Assessment/Plan       Assessment/Plan     ASSESSMENT:    Debility  Status post CABG x3/CAD  Hypertension  Gout  Hypertension  MICHAEL  Diabetes mellitus    PLAN:    More awake and alert this morning, denies any complaints or issues.  Vital signs stable on room air.  Blood glucose was 127 last night, Levemir dose was held.  Blood glucose this morning was 192, Levemir has been further decreased to 10 units subcu nightly.  Continue glipizide as ordered.  BMP ordered in the a.m.    Debility-patient participated in speech, physical, and occupational therapy evaluations on 6/21: Patient performs transfer activities with standby assist to contact guard assist with verbal/nonverbal cues; patient utilizes wheelchair for sit to stand, stand to sit transfers; requires standby assist to contact-guard with verbal/nonverbal cues and wheelchair for stand pivot/stand step transfer; ambulated 150 feet x 2 and 300 feet with a straight cane and contact-guard assistance with verbal/nonverbal cues; performed hip, knee, ankle therapeutic exercise; required set up assistance for upper body dressing; set up assistance for grooming; participated and functional endurance therapeutic exercise/ROM; gross motor coordination activities.     Status post CABG x3/CAD--continue aspirin, statin therapy, Coreg and Isordil.  Patient asymptomatic     Hypertension-- Continue Norvasc and Coreg.     Gout--allopurinol     Acute kidney injury--creatinine stable      Diabetes mellitus--Levemir held overnight, a.m. blood glucose 192.  Levemir further decreased to 10 units nightly.  Continue glipizide.    Code Status:   Code  Status and Medical Interventions:   Ordered at: 06/18/21 1741     Level Of Support Discussed With:    Patient     Code Status:    CPR     Medical Interventions (Level of Support Prior to Arrest):    Full     Scribed for Maldonado Fried MD by CHARLES Alejandra. 6/22/2021  09:04 EDT       CHARLES Alejandra  06/22/21  09:04 EDT    Physician Attestation:    The documentation recorded by the scribe accurately reflects the service I personally performed and the decisions made by me.    Maldonado Fried MD  Infectious Diseases  06/22/21  09:04 EDT

## 2021-06-22 NOTE — SIGNIFICANT NOTE
06/22/21 1424   Plan   Plan Team conference was held this morning and discharge date is planned for 6-25-21.  Pt plans to return home with spouse at discharge.  It is recommended for pt to go home with home exercise programs for PT and OT.  He may need outpatient SLP for continued dysphagia therapy as pt is on honey thick liquids.  SLP will re-evaluate his swallowing on Thursday and make recommendations for SLP then.  It was recommended for him to have a BSC, but pt declined.  Spoke to pt about discharge date and how he's progressing in therapy and he is agreeable.  Attempted to contact spouse without success, and could not leave a voicemail.  Spoke to daughter Melvi about discharge date and discharge plans and she will contact her mother.  Spouse will be visiting pt this afternoon at 4:00pm.  Will continue to follow for d/c planning needs.

## 2021-06-22 NOTE — THERAPY TREATMENT NOTE
"Inpatient Rehabilitation - Speech Language Pathology   Swallow Treatment Note [facility]     Patient Name: Augusto Lorenzana Jr.  : 1945  MRN: [id number]  Today's Date: 2021             Admit Date: 2021    DYSPHAGIA THERAPY PLAN OF CARE:     Augusto Lorenzana Jr. was seen this am in the speech therapy office of inpatient physical rehabilitation for formal dysphagia tx.  He is pleasant and cooperative to participate in all therapy tasks.     Pt is noted w/ cough across session on this date.  Cough increases w/ resistive breathing tasks and laryngeal adduction/elevation tasks.  Pt states when he breathes deeply he has \"a coughing spell\".      Pt accepts HONEY thick liquids across therapy session on this date.       Long Term Goal:  Pt will accept least restrictive diet tolerance w/o overt s/s aspiration.      Short Term Goals:     1. Pt will perform OROM/DANA exercises x3 sets x10 reps w/ min cues.  * Not addressed on this date.      2. Pt will perform resistive breathing exercises x3 sets x5 reps w/resistance of 2-6  To improve respiratory support and control.   * x1 sets x7 reps w/ resistance of 2/2 inhale/exhale w/ min cues.    x1 set x 3 reps w/ resistance of 2/2 inhale/exhale w/ min cues.  Pt noted w/ cough series requiring cessation of repetitions.      3. Pt will perform laryngeal adduction/elevation exercises x3 sets x10 reps w/ min cues.   * x1 set x5 reps w/ avg duration of 13.26 seconds w/ min-mod cues. Pt noted w/ cough series requiring cessation of sets.      4. Pt will perform CTAR/Shaker exercises sustained x3 sets x5 reps for 30+ seconds over 3 consecutive sessions.   * x1 set x2 reps for 30 seconds.    x1 set x1 rep for 1 minute.      5. Pt will perform CTAR/Shaker exercises repetitive x3 sets x12 reps w/ mod cues.   * CTAR exercises x2 sets x20 reps w/ min cues.      6. Pt will perform compensatory techniques during meals w/ min cues.     7. Pt will participate in instrumental re-evaluation " of swallowing fnx in 7-10 days, pending progress towards this poc.       Thank you-  Jody Lynn M.S., CFY-SLP    Visit Dx:   No diagnosis found.  Patient Active Problem List   Diagnosis   • NSTEMI 5/22/2021   • Hyperlipidemia LDL goal <70   • Essential hypertension   • T2DM on oral agents and insulin. HbA1c 7.4    • Severe 3 vessel CAD with preserved LV function (CMS/HCC)   • A/C kidney disease. ATN due to postoperative arrest. Dialysis begun 6/3/2021   • Gout   • Former smokeless tobacco use   • S/P CABG x 3 on 5/28/21   • Perioperative cardiac arrest with ventricular fibrillation (CMS/HCC)   • Postop encephalopathy post code. Combination of anoxic insult and azotemia   • Debility     Past Medical History:   Diagnosis Date   • CAD (coronary artery disease)    • CKD (chronic kidney disease) stage 3, GFR 30-59 ml/min (CMS/HCC)    • Diabetes mellitus (CMS/HCC)    • Hyperlipidemia    • Hypertension    • NSTEMI (non-ST elevated myocardial infarction) (CMS/[facility])      Past Surgical History:   Procedure Laterality Date   • CARDIAC CATHETERIZATION N/A 5/24/2021    Procedure: Left Heart Cath;  Surgeon: Blade Greene IV, MD;  Location:  [facility];  Service: Cardiovascular;  Laterality: N/A;   • CARDIAC SURGERY      2 stents placed 2012.   • CORONARY ARTERY BYPASS GRAFT N/A 5/28/2021    Procedure: MEDIAN STERNOTOMY CORONARY ARTERY BYPASS X 3 UTILIZING THE LEFT INTERNAL MAMMARY ARTERY GRAFT, EVH OF THE GREATER RIGHT SAPHENOUS VEIN, AND PILO PER ANESTHESIA;  Surgeon: Jacek Miller MD;  Location: [facility] OR;  Service: Cardiothoracic;  Laterality: N/A;       EDUCATION  The patient has been educated in the following areas:   Dysphagia (Swallowing Impairment) Oral Care/Hydration Modified Diet Instruction.    SLP Recommendation and Plan         Continue per POC.           Time Calculation:   Time Calculation- SLP     Row Name 06/22/21 1402             Time Calculation- SLP    SLP Start Time  1005   -      SLP Stop Time  1050  -      SLP Time Calculation (min)  45 min  -      SLP - Next Appointment  06/23/21  -        User Key  (r) = Recorded By, (t) = Taken By, (c) = Cosigned By    Initials Name Provider Type    Jody Abdi MS, CFY-SLP Speech and Language Pathologist          Therapy Charges for Today     Code Description Service Date Service Provider Modifiers Qty    92999704181 HC ST TREATMENT SWALLOW 3 6/21/2021 Jody Lynn MS, CFY-SLP GN 1    41595607686 HC ST TREATMENT SWALLOW 3 6/22/2021 Jody Lynn MS, CFY-SLP GN 1               Jody Lynn MS, KRISTEN-SLP  6/22/2021

## 2021-06-22 NOTE — THERAPY TREATMENT NOTE
Inpatient Rehabilitation - Occupational Therapy Treatment Note     [facility]     Patient Name: Augusto Lorenzana Jr.  : 1945  MRN: [id number]    Today's Date: 2021                 Admit Date: 2021       No diagnosis found.    Patient Active Problem List   Diagnosis   • NSTEMI 2021   • Hyperlipidemia LDL goal <70   • Essential hypertension   • T2DM on oral agents and insulin. HbA1c 7.4    • Severe 3 vessel CAD with preserved LV function (CMS/[facility])   • A/C kidney disease. ATN due to postoperative arrest. 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 Combination of anoxic insult and azotemia   • Debility       Past Medical History:   Diagnosis Date   • CAD (coronary artery disease)    • CKD (chronic kidney disease) stage 3, GFR 30-59 ml/min (CMS/[facility])    • Diabetes mellitus (CMS/[facility])    • Hyperlipidemia    • Hypertension    • NSTEMI (non-ST elevated myocardial infarction) (CMS/[facility])        Past Surgical History:   Procedure Laterality Date   • CARDIAC CATHETERIZATION N/A 2021    Procedure: Left Heart Cath;  Surgeon: Blade Greene IV, MD;  Location:  [facility];  Service: Cardiovascular;  Laterality: N/A;   • CARDIAC SURGERY      2 stents placed .   • CORONARY ARTERY BYPASS GRAFT N/A 2021    Procedure: MEDIAN STERNOTOMY CORONARY ARTERY BYPASS X 3 UTILIZING THE LEFT INTERNAL MAMMARY ARTERY GRAFT, EVH OF THE GREATER RIGHT SAPHENOUS VEIN, AND PILO PER ANESTHESIA;  Surgeon: Jacek Miller MD;  Location: [facility] OR;  Service: Cardiothoracic;  Laterality: N/A;                IRF OT ASSESSMENT FLOWSHEET (last 12 hours)      IRF OT Evaluation and Treatment     Row Name 21 1004          OT Time and Intention    Document Type  daily treatment  -JW     Mode of Treatment  occupational therapy;individual therapy   -     Patient Effort  good  -FARHEEN     Row Name 06/22/21 1004          General Information    General Observations of Patient  Pt seen for 45 min this am. Tx included groom/hyg tr and UB dress tr.  He also completed light UE grasp and release and fine motor coordination challenges to improve UE fuction, fine motor skills and activity tolerance.  Pt tolerated tx well with no complaints.                                                         -     Existing Precautions/Restrictions  fall;swallow precautions  -       User Key  (r) = Recorded By, (t) = Taken By, (c) = Cosigned By    Initials Name Effective Dates    Ish Johnston, OTR 06/16/21 -            Occupational Therapy Education                 Title: PT OT SLP Therapies (Done)     Topic: Occupational Therapy (Done)     Point: ADL training (Done)     Description:   Instruct learner(s) on proper safety adaptation and remediation techniques during self care or transfers.   Instruct in proper use of assistive devices.              Learning Progress Summary           Patient Eager, E, VU,DU,NR by FARHEEN at 6/22/2021 1007    Acceptance, E,D, VU,NR by JOHN at 6/21/2021 1457    Acceptance, E,D, NR,VU,DU by AS at 6/19/2021 1107                   Point: Home exercise program (Done)     Description:   Instruct learner(s) on appropriate technique for monitoring, assisting and/or progressing therapeutic exercises/activities.              Learning Progress Summary           Patient Eager, E, VU,DU,NR by FARHEEN at 6/22/2021 1007    Acceptance, E,D, NR,VU,DU by AS at 6/19/2021 1107                   Point: Precautions (Done)     Description:   Instruct learner(s) on prescribed precautions during self-care and functional transfers.              Learning Progress Summary           Patient Eager, E, VU,DU,NR by FARHEEN at 6/22/2021 1007    Acceptance, E,D, VU,NR by JOHN at 6/21/2021 1457    Acceptance, E,D, NR,VU,DU by AS at 6/19/2021 1107                   Point: Body mechanics (Done)      Description:   Instruct learner(s) on proper positioning and spine alignment during self-care, functional mobility activities and/or exercises.              Learning Progress Summary           Patient Eager, JOSE CRUZ, CARIDAD,CIARRA,NR by  at 6/22/2021 1007    Acceptance, JOSE CRUZ,LANA, BIRDIE,CARIDAD,CIARRA by AS at 6/19/2021 1107                               User Key     Initials Effective Dates Name Provider Type Discipline     06/16/21 -  Ellen Hays, OT Occupational Therapist OT    AS 06/16/21 -  Marcela Kearney, OT Occupational Therapist OT     06/16/21 -  Ish Gardner OTR Occupational Therapist OT                    OT Recommendation and Plan                         Time Calculation:     Time Calculation- OT     Row Name 06/22/21 1007             Time Calculation-     OT Start Time  0915  -      OT Stop Time  1000  -      OT Time Calculation (min)  45 min  -      Total Timed Code Minutes- OT  45 minute(s)  -        User Key  (r) = Recorded By, (t) = Taken By, (c) = Cosigned By    Initials Name Provider Type     Ish Gardner OTR Occupational Therapist        Therapy Charges for Today     Code Description Service Date Service Provider Modifiers Qty    63226298343 HC OT SELF CARE/MGMT/TRAIN EA 15 MIN 6/22/2021 Ish Gardner OTR GO 2    05806117151 HC OT NEUROMUSC RE EDUCATION EA 15 MIN 6/22/2021 Ish Gardner OTR GO 1    01218786980 HC OT THER SUPP EA 15 MIN 6/22/2021 Ish Gardner OTR GO 1                   NATALIIA Titus  6/22/2021   16

## 2021-06-22 NOTE — THERAPY TREATMENT NOTE
Inpatient Rehabilitation - Physical Therapy Treatment Note        [place]     Patient Name: Augusto Lorenzana Jr.  : 1945  MRN: [id number]    Today's Date: 2021                    Admit Date: 2021      Visit Dx: No diagnosis found.    Patient Active Problem List   Diagnosis   • NSTEMI 2021   • Hyperlipidemia LDL goal <70   • Essential hypertension   • T2DM on oral agents and insulin. HbA1c 7.4    • Severe 3 vessel CAD with preserved LV function (CMS/formerly [facility])   • A/C kidney disease. ATN due to postoperative arrest. 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 Combination of anoxic insult and azotemia   • Debility       Past Medical History:   Diagnosis Date   • CAD (coronary artery disease)    • CKD (chronic kidney disease) stage 3, GFR 30-59 ml/min (CMS/formerly [facility])    • Diabetes mellitus (CMS/formerly [facility])    • Hyperlipidemia    • Hypertension    • NSTEMI (non-ST elevated myocardial infarction) (CMS/formerly [facility])        Past Surgical History:   Procedure Laterality Date   • CARDIAC CATHETERIZATION N/A 2021    Procedure: Left Heart Cath;  Surgeon: Blade Greene IV, MD;  Location:  [facility];  Service: Cardiovascular;  Laterality: N/A;   • CARDIAC SURGERY      2 stents placed .   • CORONARY ARTERY BYPASS GRAFT N/A 2021    Procedure: MEDIAN STERNOTOMY CORONARY ARTERY BYPASS X 3 UTILIZING THE LEFT INTERNAL MAMMARY ARTERY GRAFT, EVH OF THE GREATER RIGHT SAPHENOUS VEIN, AND PILO PER ANESTHESIA;  Surgeon: Jacek Miller MD;  Location: [facility] OR;  Service: Cardiothoracic;  Laterality: N/A;          PT ASSESSMENT (last 12 hours)      IR[facility] PT Evaluation and Treatment     Row Name 21 1604          PT Time and Intention    Document Type  daily treatment  -AG     Mode of Treatment  physical therapy  -AG      Patient/Family/Caregiver Comments/Observations  pt. supine bed; alert and cooperative for participation.   -AG     [facility] Name 06/22/21 1604          General Information    Existing Precautions/Restrictions  cardiac;fall;sternal  -AG     [facility] Name 06/22/21 1604          Cognition/Psychosocial    Affect/Mental Status (Cognitive)  WNL  -AG     Orientation Status (Cognition)  oriented x 3  -AG     Follows Commands (Cognition)  WFL;verbal cues/prompting required  -AG     Personal Safety Interventions  fall prevention program maintained;gait belt;nonskid shoes/slippers when out of bed;supervised activity  -AG     [facility] Name 06/22/21 1604          Pain Assessment    Additional Documentation  Pain Scale: Numbers Pre/Post-Treatment (Group)  -AG     Row Name 06/22/21 1604          Pain Scale: Numbers Pre/Post-Treatment    Pretreatment Pain Rating  0/10 - no pain  -AG     Posttreatment Pain Rating  0/10 - no pain  -AG     Row Name 06/22/21 1604          Pain Scale: FACES Pre/Post-Treatment    Pain: FACES Scale, Pretreatment  0-->no hurt  -AG     Posttreatment Pain Rating  0-->no hurt  -AG     Row Name 06/22/21 1604          Mobility    Extremity Weight-bearing Status  left lower extremity;right lower extremity  -AG     Left Lower Extremity (Weight-bearing Status)  full weight-bearing (FWB)  -AG     Right Lower Extremity (Weight-bearing Status)  full weight-bearing (FWB)  -AG     Row Name 06/22/21 1604          Bed Mobility    Scooting/Bridging [place] (Bed Mobility)  standby assist  -AG     Supine-Sit [place] (Bed Mobility)  standby assist  -AG     Bed Mobility, Safety Issues  decreased use of arms for pushing/pulling;decreased use of legs for bridging/pushing  -AG     Row Name 06/22/21 1604          Transfer Assessment/Treatment    Transfers  bed-chair transfer;chair-bed transfer;sit-stand transfer;stand-sit transfer;stand pivot/stand step transfer  -AG     Row Name 06/22/21 1604          Transfers    Bed-Chair  [place] (Transfers)  standby assist;verbal cues;contact guard  -AG     Chair-Bed [place] (Transfers)  standby assist;verbal cues;contact guard  -AG     Assistive Device (Bed-Chair Transfers)  wheelchair  -AG     Sit-Stand [place] (Transfers)  standby assist;verbal cues;contact guard  -AG     Stand-Sit [place] (Transfers)  standby assist;verbal cues;contact guard  -AG     Row Name 06/22/21 1604          Chair-Bed Transfer    Assistive Device (Chair-Bed Transfers)  wheelchair  -AG     Row Name 06/22/21 1604          Sit-Stand Transfer    Assistive Device (Sit-Stand Transfers)  cane, straight  -AG     Row Name 06/22/21 1604          Stand-Sit Transfer    Assistive Device (Stand-Sit Transfers)  cane, straight  -AG     Row Name 06/22/21 1604          Stand Pivot/Stand Step Transfer    Stand Pivot/Stand Step [place] (Transfers)  standby assist;verbal cues;contact guard  -AG     Assistive Device (Stand Pivot Stand Step Transfer)  cane, straight  -AG     Row Name 06/22/21 1604          Gait/Stairs (Locomotion)    Gait/Stairs Locomotion  gait/ambulation independence;gait/ambulation assistive device;distance ambulated;gait pattern;gait deviations  -AG     [place] Level (Gait)  verbal cues;nonverbal cues (demo/gesture);contact guard  -AG     Assistive Device (Gait)  cane, straight  -AG     Distance in Feet (Gait)  300 ft in AM, PM  -AG     Pattern (Gait)  step-through  -AG     Deviations/Abnormal Patterns (Gait)  base of support, narrow;ch[place] decreased;stride length decreased;weight shifting decreased  -AG     Row Name 06/22/21 1604          Safety Issues, Functional Mobility    Impairments Affecting Function (Mobility)  balance;endurance/activity tolerance;strength  -AG     Row Name 06/22/21 1604          Balance    Balance Assessment  sitting static balance;sitting dynamic balance;sit to stand dynamic balance;standing static balance  -AG     Static Sitting Balance  WNL  -AG     Static Standing  Balance  mild impairment;supported;standing  -AG     Dynamic Standing Balance  mild impairment;supported;standing  -AG     Comment, Balance  performed static and dynamic standing activities per flow sheet.  Unsteadiness is observed with more challenging activities.   -AG     Row Name 06/22/21 1604          Motor Skills    Motor Skills  coordination;functional endurance;neuro-muscular function;therapeutic exercise  -AG     Therapeutic Exercise  hip;knee;ankle  -AG     Row Name 06/22/21 1604          Hip (Therapeutic Exercise)    Hip Strengthening (Therapeutic Exercise)  bilateral;flexion;extension;sitting;standing;2 lb free weight;resistance band;red  -     Row Name 06/22/21 1604          Knee (Therapeutic Exercise)    Knee Strengthening (Therapeutic Exercise)  bilateral;flexion;extension;marching while seated;marching while standing;LAQ (long arc quad);hamstring curls;sitting;standing;2 lb free weight;resistance band;red  -[facility] Name 06/22/21 1604          Ankle (Therapeutic Exercise)    Ankle Strengthening (Therapeutic Exercise)  bilateral;dorsiflexion;plantarflexion;sitting;standing;2 lb free weight;resistance band;red  [facility]     Row Name 06/22/21 1604          Positioning and Restraints    Pre-Treatment Position  in bed  -AG     Post Treatment Position  bed  -AG     In Bed  supine;call light within reach;encouraged to call for assist;side rails up x3  -     Row Name 06/22/21 1604          Daily Progress Summary (PT)    Functional Goal Overall Progress (PT)  progressing toward functional goals as expected  -     Daily Progress Summary (PT)  continuing to address balance and gait safety with least restrictive assistance device.   -AG     Impairments Still Limiting Function (PT)  strength decreased;impaired balance;coordination impaired;impaired functional activity tolerance  -     Recommendations (PT)  continue POC  -AG     Row Name 06/22/21 1604          Therapy Plan Review/Discharge Plan (PT)     Therapy Plan Review (PT)  evaluation/treatment results reviewed;care plan/treatment goals reviewed;risks/benefits reviewed;current/potential barriers reviewed;participants voiced agreement with care plan;participants included;patient  -AG     Anticipated Equipment Needs at Discharge (PT Eval)  -- TBD; will likely need either RW or SC  -AG     Expected Discharge Disposition (PT Eval)  home with outpatient services  -AG       User Key  (r) = Recorded By, (t) = Taken By, (c) = Cosigned By    Initials Name Provider Type    AG Marcela Hanson, PT Physical Therapist        Wound 05/24/21 0430 Right anterior fifth toe Other (comment) (Active)   Dressing Appearance open to air 06/22/21 1036   Closure None 06/21/21 1910   Base dry 06/22/21 1036   Periwound dry 06/22/21 1036   [place]iw[place] Temperature warm 06/22/21 1036   Drainage Amount none 06/21/21 1910   Dressing Care open to air 06/22/21 1036       Wound 05/28/21 midline sternal Incision (Active)   Dressing Appearance open to air 06/22/21 1036   Closure Approximated;Liquid skin adhesive;Open to air 06/21/21 1910   Base dry;clean 06/22/21 1036   Periwound Temperature warm 06/22/21 1036   Periwound Skin Turgor soft 06/22/21 1036   Dressing Care open to air 06/22/21 1036       Wound 05/28/21 Right proximal leg Incision (Active)   Dressing Appearance open to air 06/22/21 1036   Closure Approximated;Liquid skin adhesive;Open to air 06/21/21 1910   Base dry;pink;red 06/22/21 1036   Periwound edematous 06/22/21 1036   Periwound Temperature warm 06/22/21 1036   Periwound Skin Turgor soft 06/22/21 1036   Drainage Amount none 06/22/21 1036   Dressing Care open to air 06/22/21 1036       Wound 06/02/21 2000 Left distal hand Blisters (Active)   Closure Open to air 06/21/21 1910   Base dry 06/22/21 1036   Drainage Amount none 06/21/21 1910   Dressing Care open to air 06/22/21 1036       Wound 06/03/21 2000 chest Puncture (Active)   Closure Open to air 06/22/21 1036   Base dry 06/22/21  1036   Drainage Amount none 06/22/21 1036   Dressing Care open to air 06/22/21 1036     Physical Therapy Education                 Title: PT OT SLP Therapies (Done)     Topic: Physical Therapy (Done)     Point: Mobility training (Done)     Learning Progress Summary           Patient Acceptance, E,D,TB, VU,NR by  at 6/22/2021 1603    Acceptance, E,TB,D, VU,NR by  at 6/21/2021 1152    Acceptance, E, VU by  at 6/19/2021 1225                   Point: Home exercise program (Done)     Learning Progress Summary           Patient Acceptance, E,D,TB, VU,NR by  at 6/22/2021 1603    Acceptance, E,TB,D, VU,NR by  at 6/21/2021 1152    Acceptance, E, VU by  at 6/19/2021 1225                   Point: Body mechanics (Done)     Learning Progress Summary           Patient Acceptance, E,D,TB, VU,NR by  at 6/22/2021 1603    Acceptance, E,TB,D, VU,NR by  at 6/21/2021 1152    Acceptance, E, VU by  at 6/19/2021 1225                   Point: Precautions (Done)     Learning Progress Summary           Patient Acceptance, E,D,TB, VU,NR by  at 6/22/2021 1603    Acceptance, E,TB,D, VU,NR by  at 6/21/2021 1152    Acceptance, E, VU by  at 6/19/2021 1225                               User Key     Initials Effective Dates Name Provider Type Discipline     06/16/21 -  Marcela Hanson, PT Physical Therapist PT     05/20/21 -  Yamel Mckeon, PT Physical Therapist PT                PT Recommendation and Plan       Anticipated Equipment Needs at Discharge (PT Eval):  (TBD; will likely need either RW or SC)  Daily Progress Summary (PT)  Functional Goal Overall Progress (PT): progressing toward functional goals as expected  Daily Progress Summary (PT): continuing to address balance and gait safety with least restrictive assistance device.   Impairments Still Limiting Function (PT): strength decreased, impaired balance, coordination impaired, impaired functional activity tolerance  Recommendations (PT): continue  POC               Time Calculation:     PT Charges     Row Name 06/22/21 1604             Time Calculation    Start Time  1250  -AG      Stop Time  1335  -AG      Time Calculation (min)  45 min  -AG        User Key  (r) = Recorded By, (t) = Taken By, (c) = Cosigned By    Initials Name Provider Type    Marcela Tellez, PT Physical Therapist          Therapy Charges for Today     Code Description Service Date Service Provider Modifiers Qty    95174439028 HC GAIT TRAINING EA 15 MIN 6/21/2021 Marcela Hanson, PT GP 3    86150226535 HC PT THER PROC EA 15 MIN 6/21/2021 Marcela Hanson, PT GP 3    40099227025 HC GAIT TRAINING EA 15 MIN 6/22/2021 Marcela Hanson, PT GP 2    96444882770 HC PT THER PROC EA 15 MIN 6/22/2021 Marcela Hanson, PT GP 4                   Marcela Hanson, PT  6/22/2021

## 2021-06-23 LAB
ANION GAP SERPL CALCULATED.3IONS-SCNC: 11 MMOL/L (ref 5–15)
BUN SERPL-MCNC: 32 MG/DL (ref 8–23)
BUN/CREAT SERPL: 17.4 (ref 7–25)
CALCIUM SPEC-SCNC: 8.5 MG/DL (ref 8.6–10.5)
CHLORIDE SERPL-SCNC: 109 MMOL/L (ref 98–107)
CO2 SERPL-SCNC: 21 MMOL/L (ref 22–29)
CREAT SERPL-MCNC: 1.84 MG/DL (ref 0.76–1.27)
GFR SERPL CREATININE-BSD FRML MDRD: 36 ML/MIN/1.73
GLUCOSE BLDC GLUCOMTR-MCNC: 100 MG/DL (ref 70–130)
GLUCOSE BLDC GLUCOMTR-MCNC: 182 MG/DL (ref 70–130)
GLUCOSE BLDC GLUCOMTR-MCNC: 208 MG/DL (ref 70–130)
GLUCOSE BLDC GLUCOMTR-MCNC: 223 MG/DL (ref 70–130)
GLUCOSE SERPL-MCNC: 176 MG/DL (ref 65–99)
POTASSIUM SERPL-SCNC: 4.2 MMOL/L (ref 3.5–5.2)
SODIUM SERPL-SCNC: 141 MMOL/L (ref 136–145)

## 2021-06-23 PROCEDURE — 97110 THERAPEUTIC EXERCISES: CPT

## 2021-06-23 PROCEDURE — 97530 THERAPEUTIC ACTIVITIES: CPT

## 2021-06-23 PROCEDURE — 97116 GAIT TRAINING THERAPY: CPT

## 2021-06-23 PROCEDURE — 80048 BASIC METABOLIC PNL TOTAL CA: CPT | Performed by: INTERNAL MEDICINE

## 2021-06-23 PROCEDURE — 82962 GLUCOSE BLOOD TEST: CPT

## 2021-06-23 PROCEDURE — 92526 ORAL FUNCTION THERAPY: CPT

## 2021-06-23 PROCEDURE — 25010000002 HEPARIN (PORCINE) PER 1000 UNITS: Performed by: FAMILY MEDICINE

## 2021-06-23 PROCEDURE — 97535 SELF CARE MNGMENT TRAINING: CPT

## 2021-06-23 PROCEDURE — 92610 EVALUATE SWALLOWING FUNCTION: CPT

## 2021-06-23 RX ADMIN — HEPARIN SODIUM 5000 UNITS: 5000 INJECTION INTRAVENOUS; SUBCUTANEOUS at 08:37

## 2021-06-23 RX ADMIN — ISOSORBIDE DINITRATE 20 MG: 20 TABLET ORAL at 17:30

## 2021-06-23 RX ADMIN — CARVEDILOL 25 MG: 25 TABLET, FILM COATED ORAL at 17:30

## 2021-06-23 RX ADMIN — ISOSORBIDE DINITRATE 20 MG: 20 TABLET ORAL at 08:37

## 2021-06-23 RX ADMIN — ATORVASTATIN CALCIUM 40 MG: 40 TABLET, FILM COATED ORAL at 21:09

## 2021-06-23 RX ADMIN — CARVEDILOL 25 MG: 25 TABLET, FILM COATED ORAL at 08:37

## 2021-06-23 RX ADMIN — GLIPIZIDE 5 MG: 5 TABLET ORAL at 17:30

## 2021-06-23 RX ADMIN — HEPARIN SODIUM 5000 UNITS: 5000 INJECTION INTRAVENOUS; SUBCUTANEOUS at 21:09

## 2021-06-23 RX ADMIN — AMLODIPINE BESYLATE 10 MG: 10 TABLET ORAL at 08:36

## 2021-06-23 RX ADMIN — QUETIAPINE FUMARATE 12.5 MG: 25 TABLET, FILM COATED ORAL at 21:09

## 2021-06-23 RX ADMIN — GABAPENTIN 600 MG: 300 CAPSULE ORAL at 21:09

## 2021-06-23 RX ADMIN — ASPIRIN 325 MG: 325 TABLET, COATED ORAL at 08:37

## 2021-06-23 RX ADMIN — ALLOPURINOL 300 MG: 300 TABLET ORAL at 08:36

## 2021-06-23 RX ADMIN — GLIPIZIDE 5 MG: 5 TABLET ORAL at 08:37

## 2021-06-23 RX ADMIN — GUAIFENESIN AND DEXTROMETHORPHAN 5 ML: 100; 10 SYRUP ORAL at 21:10

## 2021-06-23 RX ADMIN — QUETIAPINE FUMARATE 12.5 MG: 25 TABLET, FILM COATED ORAL at 08:37

## 2021-06-23 RX ADMIN — ISOSORBIDE DINITRATE 20 MG: 20 TABLET ORAL at 14:21

## 2021-06-23 NOTE — THERAPY TREATMENT NOTE
"Inpatient Rehabilitation - Speech Language Pathology   Swallow Treatment Note [facility]     Patient Name: Augusto Lorenzana Jr.  : 1945  MRN: [id number]  Today's Date: 2021             Admit Date: 2021    DYSPHAGIA THERAPY PLAN OF CARE:     Augusto Lorenzana Jr. was seen this am in the speech therapy office of inpatient physical rehabilitation for formal dysphagia tx.  He is pleasant and cooperative to participate in all therapy tasks.     Pt is noted w/ cough across session on this date.  Cough increases w/ resistive breathing tasks and laryngeal adduction/elevation tasks.  Pt states when he breathes deeply he has \"a coughing spell\".      Pt accepts HONEY thick liquids across therapy session on this date.       Long Term Goal:  Pt will accept least restrictive diet tolerance w/o overt s/s aspiration.      Short Term Goals:     1. Pt will perform OROM/DANA exercises x3 sets x10 reps w/ min cues.  * Not addressed on this date.      2. Pt will perform resistive breathing exercises x3 sets x5 reps w/resistance of 2-6  To improve respiratory support and control.   * x2 sets x6 reps w/ resistance of 2/2 inhale/exhale w/ min cues.    x1 set x 7 reps w/ resistance of 2/2 inhale/exhale w/ min cues.  Pt noted w/ cough series requiring cessation of repetitions.  Cough response is improved when pt states \"I go slow\".       3. Pt will perform laryngeal adduction/elevation exercises x3 sets x10 reps w/ min cues.   * x1 set x5 reps w/ avg duration of 8.93 seconds w/ min-mod cues. Pt noted w/ cough series requiring cessation of sets.      4. Pt will perform CTAR/Shaker exercises sustained x3 sets x5 reps for 30+ seconds over 3 consecutive sessions.   * Not addressed on this date.      5. Pt will perform CTAR/Shaker exercises repetitive x3 sets x12 reps w/ mod cues.   * Shaker exercises x2 sets x10 reps.       6. Pt will perform compensatory techniques during meals w/ min cues.     7. Pt will participate in instrumental " re-evaluation of swallowing fnx in 7-10 days, pending progress towards this poc.       Thank you-  Jody Lynn M.S., CFY-SLP    Visit Dx:   No diagnosis found.  Patient Active Problem List   Diagnosis   • NSTEMI 5/22/2021   • Hyperlipidemia LDL goal <70   • Essential hypertension   • T2DM on oral agents and insulin. HbA1c 7.4    • Severe 3 vessel CAD with preserved LV function (CMS/HCC)   • A/C kidney disease. ATN due to postoperative arrest. Dialysis begun 6/3/2021   • Gout   • Former smokeless tobacco use   • S/P CABG x 3 on 5/28/21   • Perioperative cardiac arrest with ventricular fibrillation (CMS/HCC)   • Postop encephalopathy post code. Combination of anoxic insult and azotemia   • Debility     Past Medical History:   Diagnosis Date   • CAD (coronary artery disease)    • CKD (chronic kidney disease) stage 3, GFR 30-59 ml/min (CMS/HCC)    • Diabetes mellitus (CMS/HCC)    • Hyperlipidemia    • Hypertension    • NSTEMI (non-ST elevated myocardial infarction) (CMS/[facility])      Past Surgical History:   Procedure Laterality Date   • CARDIAC CATHETERIZATION N/A 5/24/2021    Procedure: Left Heart Cath;  Surgeon: Blade Greene IV, MD;  Location:  [facility];  Service: Cardiovascular;  Laterality: N/A;   • CARDIAC SURGERY      2 stents placed 2012.   • CORONARY ARTERY BYPASS GRAFT N/A 5/28/2021    Procedure: MEDIAN STERNOTOMY CORONARY ARTERY BYPASS X 3 UTILIZING THE LEFT INTERNAL MAMMARY ARTERY GRAFT, EVH OF THE GREATER RIGHT SAPHENOUS VEIN, AND PILO PER ANESTHESIA;  Surgeon: Jacek Miller MD;  Location: [facility] OR;  Service: Cardiothoracic;  Laterality: N/A;       EDUCATION  The patient has been educated in the following areas:   Dysphagia (Swallowing Impairment) Oral Care/Hydration Modified Diet Instruction.    SLP Recommendation and Plan         Continue per POC.           Time Calculation:   Time Calculation- SLP     Row Name 06/23/21 1301             Time Calculation- SLP    SLP Start  Time  1000  -      SLP Stop Time  1047  -      SLP Time Calculation (min)  47 min  -      SLP - Next Appointment  06/24/21  -        User Key  (r) = Recorded By, (t) = Taken By, (c) = Cosigned By    Initials Name Provider Type    Jody Abdi MS, CFY-SLP Speech and Language Pathologist          Therapy Charges for Today     Code Description Service Date Service Provider Modifiers Qty    41888554905 HC ST TREATMENT SWALLOW 3 6/22/2021 Jody Lynn MS, CFY-SLP GN 1    [id number]  ST EVAL ORAL PHARYNG SWALLOW 4 6/23/2021 Jody Lynn MS, CFY-SLP GN 1               Jody Lynn MS, KRISTEN-SLP  6/23/2021

## 2021-06-23 NOTE — PROGRESS NOTES
Occupational Therapy:    Physical Therapy:    Speech Language Pathology: Individual: 47 minutes.    Signed by: MIGUEL Boone

## 2021-06-23 NOTE — PROGRESS NOTES
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 and stage 1 through stage 4 chronic kidney disease, or unspecified  chronic kidney disease  N17.9 Acute kidney failure, unspecified  I50.9 Heart failure, unspecified  I25.10 Atherosclerotic heart disease of native coronary artery without angina  pectoris  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  Medical Necessity Expected Level Rationale: Good  Intensity and Duration: an average of 3 hours/5 days per week  Medical Supervision and 24 Hour Rehab Nursing: x  Physical Therapy: x  PT Intensity/Duration: 1-1.5 hrs/day, 5-6 days/week  Occupational Therapy: x  OT Intensity/Duration: 1-1.5 hrs/day, 5-6 days/week  Speech and Language Therapy: x  SLP Intensity/Duration: 0.5-1 hr/day, 3-5 days/week  Social Work: x  Therapeutic Recreation: x  Updated (if  changes indicated)    Anticipated Discharge Date/Estimated Length of Stay:   6-25-21      Discharge Plan of Care: Outpatient Services Speech Language Pathology dysphagia  2 times per week for 4 weeks Commodes: Bedside commode    Based on the patient's medical and functional status, their prognosis and  expected level of functional improvement is: Good      Interdisciplinary Problem/Goals/Status  Mobility    [PT] Walk(Active)  Current Status(06/19/2021): CGA  Weekly Goal(06/26/2021): Supervision  Discharge Goal: Supervision    [PT] Stairs(Active)  Current Status(06/19/2021): not attempted  Weekly Goal(06/26/2021): CGA  Discharge Goal: Supervision        Pain    [RN] Pain Management(Active)  Current Status(06/18/2021): at risk for pain  Weekly Goal(07/09/2021): no pain this week  Discharge Goal: no pain        Safety    [RN] Potential for Injury(Active)  Current Status(06/18/2021): at risk for injury  Weekly Goal(07/09/2021): no injuries this week  Discharge Goal: no injury        Self Care    [OT] Bathing(Active)  Current Status(06/19/2021): CGA  Weekly Goal(06/29/2021): SBA  Discharge Goal: Supervision        Swallow Function    [ST] Swallowing(Active)  Current Status(06/22/2021): MS, Ground, Honey  Weekly Goal(06/28/2021): Resistive breather  Discharge Goal: Least restrictive po diet    Comments: Pt plans to return home with spouse at discharge.    Signed by: Salina Perkins, Supervisor    Physician CoSigned By: Maldonado Fried 06/23/2021 08:26:52

## 2021-06-23 NOTE — PROGRESS NOTES
Occupational Therapy: Individual: 85 minutes.    Physical Therapy:    Speech Language Pathology:    Signed by: Ellen Hays, Occupational Therapist

## 2021-06-23 NOTE — PROGRESS NOTES
Rehabilitation Nursing  Inpatient Rehabilitation Plan of Care Note    Plan of Care  Copy from [facility]    Pain Management (Active)  Current Status (6/18/2021 4:09:00 PM): at risk for pain  Weekly Goal: no pain this week  Discharge Goal: no pain    Safety    Potential for Injury (Active)  Current Status (6/18/2021 4:09:00 PM): at risk for injury  Weekly Goal: no injuries this week  Discharge Goal: no injury    Signed by: Shahla Engle, Nurse

## 2021-06-23 NOTE — THERAPY TREATMENT NOTE
Inpatient Rehabilitation - Occupational Therapy Treatment Note     [facility]     Patient Name: Augusto Lorenzana Jr.  : 1945  MRN: [id number]    Today's Date: 2021                 Admit Date: 2021       No diagnosis found.    Patient Active Problem List   Diagnosis   • NSTEMI 2021   • Hyperlipidemia LDL goal <70   • Essential hypertension   • T2DM on oral agents and insulin. HbA1c 7.4    • Severe 3 vessel CAD with preserved LV function (CMS/[facility])   • A/C kidney disease. ATN due to postoperative arrest. 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 Combination of anoxic insult and azotemia   • Debility       Past Medical History:   Diagnosis Date   • CAD (coronary artery disease)    • CKD (chronic kidney disease) stage 3, GFR 30-59 ml/min (CMS/[facility])    • Diabetes mellitus (CMS/[facility])    • Hyperlipidemia    • Hypertension    • NSTEMI (non-ST elevated myocardial infarction) (CMS/[facility])        Past Surgical History:   Procedure Laterality Date   • CARDIAC CATHETERIZATION N/A 2021    Procedure: Left Heart Cath;  Surgeon: Blade Greene IV, MD;  Location:  [facility];  Service: Cardiovascular;  Laterality: N/A;   • CARDIAC SURGERY      2 stents placed .   • CORONARY ARTERY BYPASS GRAFT N/A 2021    Procedure: MEDIAN STERNOTOMY CORONARY ARTERY BYPASS X 3 UTILIZING THE LEFT INTERNAL MAMMARY ARTERY GRAFT, EVH OF THE GREATER RIGHT SAPHENOUS VEIN, AND PILO PER ANESTHESIA;  Surgeon: Jacek Miller MD;  Location: [facility];  Service: Cardiothoracic;  Laterality: N/A;                IRF OT ASSESSMENT FLOWSHEET (last 12 hours)      IRF OT Evaluation and Treatment     Row Name 21 1225          OT Time and Intention    Document Type  daily treatment  -CJ     Mode of Treatment  occupational therapy  -CJ     Row Name  06/23/21 1225          General Information    Patient/Family/Caregiver Comments/Observations  agreeable to therapy  -     Existing Precautions/Restrictions  fall;cardiac;sternal;swallow precautions  -     Row Name 06/23/21 1225          Cognition/Psychosocial    Follows Commands (Cognition)  WFL;verbal cues/prompting required  -     Row Name 06/23/21 1225          Transfers    Bed-Chair Vernon (Transfers)  standby assist;contact guard;verbal cues  -     Chair-Bed [place] (Transfers)  standby assist;contact guard;verbal cues  -     Assistive Device (Bed-Chair Transfers)  wheelchair  -     Row Name 06/23/21 1225          Chair-Bed Transfer    Assistive Device (Chair-Bed Transfers)  wheelchair  -     Row Name 06/23/21 1225          Motor Skills    Motor Skills  functional endurance  -     Motor Control/Coordination Interventions  therapeutic exercise/ROM BUE ther ex/act, bilat coord ex, hand exerciser  -     Therapeutic Exercise  -- dowel wrist rollls X 2, 0 lbs  -     Row Name 06/23/21 1225          Lower Body Dressing    Vernon Level (Lower Body Dressing)  contact guard assist;verbal cues  -     Row Name 06/23/21 1225          Grooming    Vernon Level (Grooming)  set up  -     Row Name 06/23/21 1225          Positioning and Restraints    Post Treatment Position  bed  -     In Bed  call light within reach;encouraged to call for assist;notified nsg;heels elevated post both sessions  -       User Key  (r) = Recorded By, (t) = Taken By, (c) = Cosigned By    Initials Name Effective Dates     Ellen Hays, OT 06/16/21 -            Occupational Therapy Education                 Title: PT OT SLP Therapies (Done)     Topic: Occupational Therapy (Done)     Point: ADL training (Done)     Description:   Instruct learner(s) on proper safety adaptation and remediation techniques during self care or transfers.   Instruct in proper use of assistive devices.               Learning Progress Summary           Patient Acceptance, E,D, VU,NR by  at 6/23/2021 1224    Eager, E, VU,DU,NR by JW at 6/22/2021 1007    Acceptance, E,D, VU,NR by  at 6/21/2021 1457    Acceptance, E,D, NR,VU,DU by AS at 6/19/2021 1107                   Point: Home exercise program (Done)     Description:   Instruct learner(s) on appropriate technique for monitoring, assisting and/or progressing therapeutic exercises/activities.              Learning Progress Summary           Patient Eager, E, VU,DU,NR by JW at 6/22/2021 1007    Acceptance, E,D, NR,VU,DU by AS at 6/19/2021 1107                   Point: Precautions (Done)     Description:   Instruct learner(s) on prescribed precautions during self-care and functional transfers.              Learning Progress Summary           Patient Acceptance, E,D, VU,NR by  at 6/23/2021 1224    Eager, E, VU,DU,NR by  at 6/22/2021 1007    Acceptance, E,D, VU,NR by  at 6/21/2021 1457    Acceptance, E,D, NR,VU,DU by AS at 6/19/2021 1107                   Point: Body mechanics (Done)     Description:   Instruct learner(s) on proper positioning and spine alignment during self-care, functional mobility activities and/or exercises.              Learning Progress Summary           Patient Eager, E, VU,DU,NR by  at 6/22/2021 1007    Acceptance, E,D, NR,VU,DU by AS at 6/19/2021 1107                               User Key     Initials Effective Dates Name Provider Type Discipline     06/16/21 -  Ellen Hays, OT Occupational Therapist OT    AS 06/16/21 -  Marcela Kearney, OT Occupational Therapist OT     06/16/21 -  Ish Gardner OTR Occupational Therapist OT                    OT Recommendation and Plan                         Time Calculation:     Time Calculation- OT     Row Name 06/23/21 1440 06/23/21 0800          Time Calculation- OT    OT Start Time  1235  -  0800  -     OT Stop Time  1245  -  0915  -     OT Time Calculation (min)  10 min  -  75 min  -      Total Timed Code Minutes- OT  10 minute(s)  -CJ  75 minute(s)  -       User Key  (r) = Recorded By, (t) = Taken By, (c) = Cosigned By    Initials Name Provider Type    Ellen Nichols OT Occupational Therapist        Therapy Charges for Today     Code Description Service Date Service Provider Modifiers Qty    13704326609  OT SELF CARE/MGMT/TRAIN EA 15 MIN 6/23/2021 Ellen Hays OT GO 2    [id number]  OT THERAPEUTIC ACT EA 15 MIN 6/23/2021 Ellen Hays OT GO 2    [id number]  OT THER PROC EA 15 MIN 6/23/2021 Ellen Hays OT GO 2                   Ellen Hays OT  6/23/2021

## 2021-06-23 NOTE — PROGRESS NOTES
Occupational Therapy:    Physical Therapy: Individual: 100 minutes.    Speech Language Pathology:    Signed by: Emilee Bowles PTA

## 2021-06-23 NOTE — PROGRESS NOTES
PROGRESS NOTE         Patient Identification:  Name:  Augusto Lorenzana Jr.  Age:  76 y.o.  Sex:  male  :  1945  MRN:  [id number]  Visit Number:  [id number]  Primary Care Provider:  Rob Polanco MD         LOS: 5 days       ----------------------------------------------------------------------------------------------------------------------  Subjective       Chief Complaints:    Debility    Interval History:    76-year-old gentleman who was recently hospitalized with CAD and did require CABG x3 vessels.  he did have V. fib x2 episodes requiring ACLS protocol.  Did have acute kidney injury and required hemodialysis shortly.    Awake alert this morning, denies any complaints or issues.  Vital signs stable on room air.  Blood glucose is well controlled to slightly low.  Some values in the 90s.  Levemir discontinued for now.  Creatinine has worsened to 1.84, nephrology has been consulted for MICHAEL on CKD.    Participated in physical, occupational, speech therapy on : Performs bed mobility with standby assist; performs transfer activities with contact-guard assist and verbal cues; utilizes wheelchair and straight cane for chair to bed, sit to stand, stand to sit transfer; ambulated 300 feet with straight cane and contact-guard assist with verbal/nonverbal cues; performed hip, ankle, knee therapeutic exercises; worked with Occupational Therapy for 45 minutes on grooming and hygiene activities as well as fine motor coordination challenges to improve upper extremity function, fine motor skills and activity tolerance.    Review of Systems:    Constitutional: no fever, chills and night sweats.  Generalized fatigue.  Eyes: no eye drainage, itching or redness.  HEENT: no mouth sores, dysphagia or nose bleed.  Respiratory: no for shortness of breath, cough or production of sputum.  Cardiovascular: no chest pain, no palpitations, no orthopnea.  Gastrointestinal: no nausea, vomiting or diarrhea. No  abdominal pain, hematemesis or rectal bleeding.  Genitourinary: no dysuria or polyuria.  Hematologic/lymphatic: no lymph node abnormalities, no easy bruising or easy bleeding.  Musculoskeletal: no muscle or joint pain.  Skin: No rash and no itching.  Neurological: no loss of consciousness, no seizure, no headache.  Behavioral/Psych: no depression or suicidal ideation.  Endocrine: no hot flashes.  Immunologic: negative.    ----------------------------------------------------------------------------------------------------------------------      Objective       Current [facility] Meds:  allopurinol, 300 mg, Oral, Daily  amLODIPine, 10 mg, Oral, Q24H  aspirin, 325 mg, Oral, Daily  atorvastatin, 40 mg, Oral, Nightly  carvedilol, 25 mg, Oral, BID With Meals  gabapentin, 600 mg, Oral, Nightly  glipizide, 5 mg, Oral, BID AC  heparin (porcine), 5,000 Units, Subcutaneous, Q12H  isosorbide dinitrate, 20 mg, Oral, TID - Nitrates  QUEtiapine, 12.5 mg, Oral, Q12H         ----------------------------------------------------------------------------------------------------------------------    Vital Signs:  Temp:  [98.2 °F (36.8 °C)-98.3 °F (36.8 °C)] 98.3 °F (36.8 °C)  Heart Rate:  [85-93] 88  Resp:  [16-18] 16  BP: (122-148)/(69-83) 136/70  Mean Arterial Pressure (Non-Invasive) for the past 24 hrs (Last 3 readings):   Noninvasive MAP (mmHg)   06/23/21 0836 113     SpO2 Percentage    06/22/21 0822 06/22/21 1900 06/23/21 0836   SpO2: 92% 97% 96%     SpO2:  [96 %-97 %] 96 %  on   ;   Device (Oxygen Therapy): room air    Body mass index is 27.78 kg/m².  Wt Readings from Last 3 Encounters:   06/18/21 80.3 kg (177 lb)   06/18/21 80.3 kg (177 lb 1.6 oz)        Intake/Output Summary (Last 24 hours) at 6/23/2021 1244  Last data filed at 6/23/2021 0900  Gross per 24 hour   Intake 1130 ml   Output --   Net 1130 ml     Diet Soft Texture; Ground; Honey Thick; Cardiac, Consistent  Carbohydrate  ----------------------------------------------------------------------------------------------------------------------      Physical Exam:    General Appearance: Awake and alert, denies any complaints or issues.    Head: normocephalic, without obvious abnormality and atraumatic    Eyes: lids and lashes normal, conjunctivae and sclerae normal, no icterus, no  pallor, corneas clear and PERRLA    Ears: ears appear intact with no abnormalities noted    Nose: nares normal, septum midline, mucosa normal and no drainage     Throat: no oral lesions, no thrush, oral mucosa moist and oopharynx normal    Lungs: clear to auscultation, respirations regular, respirations even and  respirations unlabored. No accessory muscle use.     Heart:: regular rhythm & normal rate, normal S1, S2, no murmur, no gallop, no  rub and no click.  Chest wall with no abnormalities observed. PMI nondisplaced    Abdomen: normal bowel sounds in all quadrants, no masses, no hepatomegaly,  no splenomegaly, soft non-tender, no guarding and no rebound tenderness    Extremities: no edema, no cyanosis, no redness, no tenderness, no clubbing    Musculoskeletal: joints with no effusion nor erythema nor warmth.  Pedal pulses palpable and equal bilaterally    Skin: no bleeding, bruising or rash and no lesions noted.  Incision sites to right leg and anterior chest appear to be healing well.    Lymph Nodes: no palpable adenopathy    Neurologic:    Mental Status: Patient is awake alert and oriented x3.  Appropriate.   Sensory: Sensory overall seems to be intact in BLE and BUE.   Motor strength: Generalized weakness        ----------------------------------------------------------------------------------------------------------------------            Results from last 7 days   Lab Units 06/21/21  0120 06/19/21  0330   WBC 10*3/mm3 7.04 6.80   HEMOGLOBIN g/dL 9.6* 8.7*   HEMATOCRIT % 30.3* 27.6*   MCV fL 94.7 95.2   MCHC g/dL 31.7 31.5   PLATELETS  10*3/mm3 257 256     Results from last 7 days   Lab Units 06/23/21  0116 06/21/21  0120 06/19/21  0331   SODIUM mmol/L 141 141 143   POTASSIUM mmol/L 4.2 4.2 4.1   MAGNESIUM mg/dL  --   --  1.7   CHLORIDE mmol/L 109* 109* 111*   CO2 mmol/L 21.0* 20.9* 22.9   BUN mg/dL 32* 31* 32*   CREATININE mg/dL 1.84* 1.67* 1.59*   EGFR IF NONAFRICN AM mL/min/1.73 36* 40* 43*   CALCIUM mg/dL 8.5* 8.3* 8.6   GLUCOSE mg/dL 176* 98 81   ALBUMIN g/dL  --   --  2.50*   BILIRUBIN mg/dL  --   --  0.3   ALK PHOS U/L  --   --  143*   AST (SGOT) U/L  --   --  21   ALT (SGPT) U/L  --   --  21   Estimated Creatinine Clearance: 35.7 mL/min (A) (by C-G formula based on SCr of 1.84 mg/dL (H)).  No results found for: AMMONIA    Glucose   Date/Time Value Ref Range Status   06/23/2021 1122 182 (H) 70 - 130 mg/dL Final   06/23/2021 0611 100 70 - 130 mg/dL Final   06/22/2021 2020 216 (H) 70 - 130 mg/dL Final   06/22/2021 1615 201 (H) 70 - 130 mg/dL Final   06/22/2021 1124 243 (H) 70 - 130 mg/dL Final   06/22/2021 0619 192 (H) 70 - 130 mg/dL Final   06/21/2021 1951 122 70 - 130 mg/dL Final   06/21/2021 1642 127 70 - 130 mg/dL Final     Lab Results   Component Value Date    HGBA1C 7.40 (H) 05/22/2021     Lab Results   Component Value Date    TSH 4.030 06/03/2021       No results found for: BLOODCX  No results found for: URINECX  No results found for: WOUNDCX  No results found for: STOOLCX  No results found for: RESPCX  Pain Management Panel       Pain Management Panel Latest Ref Rng & Units 5/30/2021 5/28/2021    CREATININE UR mg/dL 136.1 -    AMPHETAMINES SCREEN, URINE Negative - Negative    BARBITURATES SCREEN Negative - Negative    BENZODIAZEPINE SCREEN, URINE Negative - Negative    BUPRENORPHINEUR Negative - Negative    COCAINE SCREEN, URINE Negative - Negative    METHADONE SCREEN, URINE Negative - Negative    METHAMPHETAMINEUR Negative - Negative               ----------------------------------------------------------------------------------------------------------------------  Imaging Results (Last 24 Hours)       ** No results found for the last 24 hours. **            ----------------------------------------------------------------------------------------------------------------------    Assessment/Plan       Assessment/Plan     ASSESSMENT:    Debility  Status post CABG x3/CAD  Hypertension  Gout  Hypertension  MICHAEL  Diabetes mellitus    PLAN:    Awake alert this morning, denies any complaints or issues.  Vital signs stable on room air.  Blood glucose is well controlled to slightly low.  Some values in the 90s.  Levemir discontinued for now.  Creatinine has worsened to 1.84, nephrology has been consulted for MICHAEL on CKD.    Participated in physical, occupational, speech therapy on 6/22: Performs bed mobility with standby assist; performs transfer activities with contact-guard assist and verbal cues; utilizes wheelchair and straight cane for chair to bed, sit to stand, stand to sit transfer; ambulated 300 feet with straight cane and contact-guard assist with verbal/nonverbal cues; performed hip, ankle, knee therapeutic exercises; worked with Occupational Therapy for 45 minutes on grooming and hygiene activities as well as fine motor coordination challenges to improve upper extremity function, fine motor skills and activity tolerance.     Status post CABG x3/CAD--continue aspirin, statin therapy, Coreg and Isordil.  Patient asymptomatic     Hypertension-- Continue Norvasc and Coreg.     Gout--allopurinol     Acute kidney injury--creatinine worsened to 1.84, nephrology consulted to manage MICHAEL on CKD.     Diabetes mellitus--glucose fairly well controlled, Levemir discontinued for now    Code Status:   Code Status and Medical Interventions:   Ordered at: 06/18/21 5213     Level Of Support Discussed With:    Patient     Code Status:    CPR     Medical Interventions  (Level of Support Prior to Arrest):    Full     Scribed for Maldonado Fried MD by CHARLES Alejandra. 6/23/2021  12:44 EDT       CHARLES Alejandra  06/23/21  12:44 EDT    Physician Attestation:    The documentation recorded by the scribe accurately reflects the service I personally performed and the decisions made by me.    Maldonado Fried MD  Infectious Diseases  06/23/21  12:44 EDT

## 2021-06-23 NOTE — THERAPY TREATMENT NOTE
Inpatient Rehabilitation - Physical Therapy Treatment Note        [place]     Patient Name: Augusto Lorenzana Jr.  : 1945  MRN: [id number]    Today's Date: 2021                    Admit Date: 2021      Visit Dx: No diagnosis found.    Patient Active Problem List   Diagnosis   • NSTEMI 2021   • Hyperlipidemia LDL goal <70   • Essential hypertension   • T2DM on oral agents and insulin. HbA1c 7.4    • Severe 3 vessel CAD with preserved LV function (CMS/[facility])   • A/C kidney disease. ATN due to postoperative arrest. 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 Combination of anoxic insult and azotemia   • Debility       Past Medical History:   Diagnosis Date   • CAD (coronary artery disease)    • CKD (chronic kidney disease) stage 3, GFR 30-59 ml/min (CMS/[facility])    • Diabetes mellitus (CMS/[facility])    • Hyperlipidemia    • Hypertension    • NSTEMI (non-ST elevated myocardial infarction) (CMS/[facility])        Past Surgical History:   Procedure Laterality Date   • CARDIAC CATHETERIZATION N/A 2021    Procedure: Left Heart Cath;  Surgeon: Blade Greene IV, MD;  Location:  [facility];  Service: Cardiovascular;  Laterality: N/A;   • CARDIAC SURGERY      2 stents placed .   • CORONARY ARTERY BYPASS GRAFT N/A 2021    Procedure: MEDIAN STERNOTOMY CORONARY ARTERY BYPASS X 3 UTILIZING THE LEFT INTERNAL MAMMARY ARTERY GRAFT, EVH OF THE GREATER RIGHT SAPHENOUS VEIN, AND PILO PER ANESTHESIA;  Surgeon: Jacek Miller MD;  Location: [facility] OR;  Service: Cardiothoracic;  Laterality: N/A;          PT ASSESSMENT (last 12 hours)      IRF PT Evaluation and Treatment     Row Name 21 1536          PT Time and Intention    Document Type  daily treatment BID treatment session  -LL     Mode of Treatment  physical  therapy;individual therapy  -LL     Patient/Family/Caregiver Comments/Observations  Patient agreeable to BID PT session  -LL     [facility] Name 06/23/21 1536          General Information    Existing Precautions/Restrictions  cardiac;fall;sternal  -     Row Name 06/23/21 1536          Cognition/Psychosocial    Affect/Mental Status (Cognitive)  WNL  -LL     Orientation Status (Cognition)  oriented x 4  -LL     Follows Commands (Cognition)  WFL;verbal cues/prompting required  -LL     Personal Safety Interventions  gait belt;nonskid shoes/slippers when out of bed  -     Row Name 06/23/21 1536          Pain Scale: Numbers Pre/Post-Treatment    Pretreatment Pain Rating  0/10 - no pain  -LL     Posttreatment Pain Rating  0/10 - no pain  -[facility] Name 06/23/21 1536          Pain Scale: FACES Pre/Post-Treatment    Pain: FACES Scale, Pretreatment  0-->no hurt  -LL     Posttreatment Pain Rating  0-->no hurt  -LL     [facility] Name 06/23/21 1536          Mobility    Extremity Weight-bearing Status  left lower extremity;right lower extremity  -LL     Left Lower Extremity (Weight-bearing Status)  full weight-bearing (FWB)  -LL     Right Lower Extremity (Weight-bearing Status)  full weight-bearing (FWB)  -[facility] Name 06/23/21 1536          Bed Mobility    Scooting/Bridging [place] (Bed Mobility)  modified independence  -     Supine-Sit [place] (Bed Mobility)  modified independence  -     Bed Mobility, Safety Issues  decreased use of arms for pushing/pulling;decreased use of legs for bridging/pushing  -[facility] Name 06/23/21 1536          Transfer Assessment/Treatment    Transfers  bed-chair transfer;chair-bed transfer;sit-stand transfer;stand-sit transfer;stand pivot/stand step transfer;car transfer  -[facility] Name 06/23/21 1536          Transfers    Bed-Chair [place] (Transfers)  standby assist;verbal cues;contact guard  -     Chair-Bed [place] (Transfers)  standby assist;verbal cues;contact guard   -LL     Assistive Device (Bed-Chair Transfers)  wheelchair  -LL     Sit-Stand [place] (Transfers)  standby assist;verbal cues;contact guard  -LL     Stand-Sit [place] (Transfers)  standby assist;verbal cues;contact guard  -LL     Row Name 06/23/21 1536          Chair-Bed Transfer    Assistive Device (Chair-Bed Transfers)  wheelchair  -LL     Row Name 06/23/21 1536          Sit-Stand Transfer    Assistive Device (Sit-Stand Transfers)  cane, straight  -LL     Row Name 06/23/21 1536          Stand-Sit Transfer    Assistive Device (Stand-Sit Transfers)  cane, straight  -LL     Row Name 06/23/21 1536          Stand Pivot/Stand Step Transfer    Stand Pivot/Stand Step [place] (Transfers)  standby assist;verbal cues;contact guard  -LL     Assistive Device (Stand Pivot Stand Step Transfer)  cane, straight  -LL     Row Name 06/23/21 1536          Car Transfer    Type (Car Transfer)  sit-stand;stand-sit  -LL     [place] Level (Car Transfer)  supervision;verbal cues  -LL     Row Name 06/23/21 1536          Gait/Stairs (Locomotion)    Gait/Stairs Locomotion  gait/ambulation independence;gait/ambulation assistive device;distance ambulated;gait pattern;gait deviations  -LL     [place] Level (Gait)  verbal cues;nonverbal cues (demo/gesture);contact guard  -LL     Assistive Device (Gait)  cane, straight  -LL     Distance in Feet (Gait)  520' in AM; 320' outisde in PM  -LL     Pattern (Gait)  step-through  -LL     Deviations/Abnormal Patterns (Gait)  base of support, narrow;chinyere decreased;stride length decreased;weight shifting decreased  -LL     Comment (Gait/Stairs)  Occasional LOB on uneven surface  -LL     Row Name 06/23/21 1536          Safety Issues, Functional Mobility    Impairments Affecting Function (Mobility)  balance;endurance/activity tolerance;strength  -LL     Row Name 06/23/21 1536          Balance    Comment, Balance  Standing bean bag toss & standing pickup w/ reacher; side stepping;  weaving in/out of cones  -LL     Row Name 06/23/21 1536          Positioning and Restraints    Pre-Treatment Position  in bed in AM & PM  -LL     Post Treatment Position  bed  -LL     In Bed  sitting EOB;call light within reach;encouraged to call for assist in AM  -LL     In Wheelchair  sitting;notified nsg in common area in PM  -LL     Row Name 06/23/21 1536          Daily Progress Summary (PT)    Impairments Still Limiting Function (PT)  strength decreased;impaired balance;coordination impaired;impaired functional activity tolerance  -LL     Row Name 06/23/21 1536          Therapy Plan Review/Discharge Plan (PT)    Anticipated Equipment Needs at Discharge (PT Eval)  -- TBD; will likely need either RW or SC  -LL     Expected Discharge Disposition (PT Eval)  home with outpatient services  -LL       User Key  (r) = Recorded By, (t) = Taken By, (c) = Cosigned By    Initials Name Provider Type    LL Emilee Bowles PTA Physical Therapy Assistant        Wound 05/24/21 0430 Right anterior fifth toe Other (comment) (Active)   Dressing Appearance open to air 06/23/21 0945   Closure None 06/22/21 1910   Base dry 06/23/21 0945   Periwound dry 06/23/21 0945   Periwound Temperature warm 06/23/21 0945   Drainage Amount none 06/23/21 0945   Dressing Care open to air 06/23/21 0945       Wound 05/28/21 midline sternal Incision (Active)   Dressing Appearance open to air 06/23/21 0945   Closure Approximated;Liquid skin adhesive;Open to air 06/22/21 1910   Base dry;clean 06/23/21 0945   Periwound Temperature warm 06/23/21 0945   Periwound Skin Turgor soft 06/23/21 0945   Drainage Amount none 06/23/21 0945   Dressing Care open to air 06/23/21 0945       Wound 05/28/21 Right proximal leg Incision (Active)   Dressing Appearance open to air 06/23/21 0945   Closure Approximated;Liquid skin adhesive;Open to air 06/22/21 1910   Base dry;pink;red 06/23/21 0945   Periwound edematous 06/23/21 0945   Periwound Temperature warm 06/23/21 0945    Periwound Skin Turgor soft 06/23/21 0945   Drainage Amount none 06/23/21 0945   Dressing Care open to air 06/23/21 0945       Wound 06/02/21 2000 Left distal hand Blisters (Active)   Dressing Appearance open to air 06/23/21 0945   Closure Open to air 06/22/21 1910   Base dry 06/23/21 0945   Drainage Amount none 06/23/21 0945   Dressing Care open to air 06/23/21 0945       Wound 06/03/21 2000 chest Puncture (Active)   Dressing Appearance open to air 06/23/21 0945   Closure Open to air 06/23/21 0945   Base dry 06/23/21 0945   Drainage Amount none 06/23/21 0945   Dressing Care open to air 06/23/21 0945     Physical Therapy Education                 Title: PT OT SLP Therapies (Done)     Topic: Physical Therapy (Done)     Point: Mobility training (Done)     Learning Progress Summary           Patient Acceptance, E,D, VU by LL at 6/23/2021 1542    Acceptance, E,D,TB, VU,NR by AG at 6/22/2021 1603    Acceptance, E,TB,D, VU,NR by AG at 6/21/2021 1152    Acceptance, E, VU by  at 6/19/2021 1225                   Point: Home exercise program (Done)     Learning Progress Summary           Patient Acceptance, E,D, VU by LL at 6/23/2021 1542    Acceptance, E,D,TB, VU,NR by AG at 6/22/2021 1603    Acceptance, E,TB,D, VU,NR by AG at 6/21/2021 1152    Acceptance, E, VU by KH at 6/19/2021 1225                   Point: Body mechanics (Done)     Learning Progress Summary           Patient Acceptance, E,D, VU by LL at 6/23/2021 1542    Acceptance, E,D,TB, VU,NR by AG at 6/22/2021 1603    Acceptance, E,TB,D, VU,NR by AG at 6/21/2021 1152    Acceptance, E, VU by KH at 6/19/2021 1225                   Point: Precautions (Done)     Learning Progress Summary           Patient Acceptance, E,D, VU by LL at 6/23/2021 1542    Acceptance, E,D,TB, VU,NR by AG at 6/22/2021 1603    Acceptance, E,TB,D, VU,NR by CANDACE at 6/21/2021 1152    Acceptance, E, VU by ARTURO at 6/19/2021 1225                               User Key     Initials Effective Dates  Name Provider Type Discipline    AG 06/16/21 -  Marcela Hanson, PT Physical Therapist PT    LL 05/02/16 -  Emilee Bowles PTA Physical Therapy Assistant PT     05/20/21 -  Yamel Mckeon, [place] Physical Therapist PT                PT Recommendation and Plan       Anticipated Equipment Needs at Discharge (PT Eval):  (TBD; will likely need either RW or SC)  Daily Progress Summary (PT)  Impairments Still Limiting Function (PT): strength decreased, impaired balance, coordination impaired, impaired functional activity tolerance               Time Calculation:     PT Charges     Row Name 06/23/21 1543 06/23/21 1542          Time Calculation    Start Time  1330  -LL  1045  -LL     Stop Time  1425  -LL  1130  -LL     Time Calculation (min)  55 min  -LL  45 min  -LL     PT Received On  --  06/23/21  -LL        Time Calculation- PT    Total Timed Code Minutes- PT  55 minute(s)  -LL  45 minute(s)  -LL       User Key  (r) = Recorded By, (t) = Taken By, (c) = Cosigned By    Initials Name Provider Type     Emilee Bowles PTA Physical Therapy Assistant          Therapy Charges for Today     Code Description Service Date Service Provider Modifiers Qty    18784664400 HC GAIT TRAINING EA 15 MIN 6/23/2021 Emilee Bowles PTA GP 3    64320889659 HC PT THERAPEUTIC ACT EA 15 MIN 6/23/2021 Emilee Bowles PTA GP 2    04135596509 HC PT THER PROC EA 15 MIN 6/23/2021 Emilee Bowles PTA GP 2                   Emilee. BLANCA Bowles  6/23/2021

## 2021-06-24 ENCOUNTER — APPOINTMENT (OUTPATIENT)
Dept: GENERAL RADIOLOGY | Facility: HOSPITAL | Age: 76
End: 2021-06-24

## 2021-06-24 LAB
ALBUMIN SERPL-MCNC: 2.85 G/DL (ref 3.5–5.2)
ALBUMIN/GLOB SERPL: 0.8 G/DL
ALP SERPL-CCNC: 182 U/L (ref 39–117)
ALT SERPL W P-5'-P-CCNC: 40 U/L (ref 1–41)
ANION GAP SERPL CALCULATED.3IONS-SCNC: 11.1 MMOL/L (ref 5–15)
AST SERPL-CCNC: 31 U/L (ref 1–40)
BACTERIA UR QL AUTO: ABNORMAL /HPF
BILIRUB SERPL-MCNC: 0.2 MG/DL (ref 0–1.2)
BILIRUB UR QL STRIP: NEGATIVE
BUN SERPL-MCNC: 29 MG/DL (ref 8–23)
BUN/CREAT SERPL: 17.8 (ref 7–25)
CALCIUM SPEC-SCNC: 8.9 MG/DL (ref 8.6–10.5)
CHLORIDE SERPL-SCNC: 106 MMOL/L (ref 98–107)
CLARITY UR: CLEAR
CO2 SERPL-SCNC: 22.9 MMOL/L (ref 22–29)
COLOR UR: YELLOW
CREAT SERPL-MCNC: 1.63 MG/DL (ref 0.76–1.27)
GFR SERPL CREATININE-BSD FRML MDRD: 42 ML/MIN/1.73
GLOBULIN UR ELPH-MCNC: 3.6 GM/DL
GLUCOSE BLDC GLUCOMTR-MCNC: 150 MG/DL (ref 70–130)
GLUCOSE BLDC GLUCOMTR-MCNC: 176 MG/DL (ref 70–130)
GLUCOSE BLDC GLUCOMTR-MCNC: 235 MG/DL (ref 70–130)
GLUCOSE BLDC GLUCOMTR-MCNC: 244 MG/DL (ref 70–130)
GLUCOSE SERPL-MCNC: 153 MG/DL (ref 65–99)
GLUCOSE UR STRIP-MCNC: ABNORMAL MG/DL
HGB UR QL STRIP.AUTO: NEGATIVE
HYALINE CASTS UR QL AUTO: ABNORMAL /LPF
KETONES UR QL STRIP: NEGATIVE
LEUKOCYTE ESTERASE UR QL STRIP.AUTO: NEGATIVE
NITRITE UR QL STRIP: NEGATIVE
NT-PROBNP SERPL-MCNC: 5857 PG/ML (ref 0–900)
PH UR STRIP.AUTO: 7 [PH] (ref 5–8)
POTASSIUM SERPL-SCNC: 4.8 MMOL/L (ref 3.5–5.2)
PROT SERPL-MCNC: 6.4 G/DL (ref 6–8.5)
PROT UR QL STRIP: ABNORMAL
RBC # UR: ABNORMAL /HPF
REF LAB TEST METHOD: ABNORMAL
SODIUM SERPL-SCNC: 140 MMOL/L (ref 136–145)
SP GR UR STRIP: 1.02 (ref 1–1.03)
SQUAMOUS #/AREA URNS HPF: ABNORMAL /HPF
UROBILINOGEN UR QL STRIP: ABNORMAL
WBC UR QL AUTO: ABNORMAL /HPF

## 2021-06-24 PROCEDURE — 97112 NEUROMUSCULAR REEDUCATION: CPT

## 2021-06-24 PROCEDURE — 83880 ASSAY OF NATRIURETIC PEPTIDE: CPT | Performed by: INTERNAL MEDICINE

## 2021-06-24 PROCEDURE — 80053 COMPREHEN METABOLIC PANEL: CPT | Performed by: INTERNAL MEDICINE

## 2021-06-24 PROCEDURE — 81001 URINALYSIS AUTO W/SCOPE: CPT | Performed by: INTERNAL MEDICINE

## 2021-06-24 PROCEDURE — 74230 X-RAY XM SWLNG FUNCJ C+: CPT | Performed by: RADIOLOGY

## 2021-06-24 PROCEDURE — 97110 THERAPEUTIC EXERCISES: CPT

## 2021-06-24 PROCEDURE — 97116 GAIT TRAINING THERAPY: CPT

## 2021-06-24 PROCEDURE — 71045 X-RAY EXAM CHEST 1 VIEW: CPT | Performed by: RADIOLOGY

## 2021-06-24 PROCEDURE — 97535 SELF CARE MNGMENT TRAINING: CPT

## 2021-06-24 PROCEDURE — 92611 MOTION FLUOROSCOPY/SWALLOW: CPT

## 2021-06-24 PROCEDURE — 74230 X-RAY XM SWLNG FUNCJ C+: CPT

## 2021-06-24 PROCEDURE — 97530 THERAPEUTIC ACTIVITIES: CPT

## 2021-06-24 PROCEDURE — 82962 GLUCOSE BLOOD TEST: CPT

## 2021-06-24 PROCEDURE — 71045 X-RAY EXAM CHEST 1 VIEW: CPT

## 2021-06-24 PROCEDURE — 25010000002 HEPARIN (PORCINE) PER 1000 UNITS: Performed by: FAMILY MEDICINE

## 2021-06-24 RX ORDER — SODIUM CHLORIDE 450 MG/100ML
100 INJECTION, SOLUTION INTRAVENOUS CONTINUOUS
Status: DISCONTINUED | OUTPATIENT
Start: 2021-06-24 | End: 2021-06-24

## 2021-06-24 RX ORDER — BISACODYL 5 MG/1
10 TABLET, DELAYED RELEASE ORAL DAILY PRN
Status: DISCONTINUED | OUTPATIENT
Start: 2021-06-24 | End: 2021-06-25 | Stop reason: HOSPADM

## 2021-06-24 RX ADMIN — ISOSORBIDE DINITRATE 20 MG: 20 TABLET ORAL at 17:30

## 2021-06-24 RX ADMIN — CARVEDILOL 25 MG: 25 TABLET, FILM COATED ORAL at 17:30

## 2021-06-24 RX ADMIN — GLIPIZIDE 5 MG: 5 TABLET ORAL at 09:06

## 2021-06-24 RX ADMIN — HEPARIN SODIUM 5000 UNITS: 5000 INJECTION INTRAVENOUS; SUBCUTANEOUS at 09:06

## 2021-06-24 RX ADMIN — SODIUM BICARBONATE 75 MEQ: 84 INJECTION, SOLUTION INTRAVENOUS at 11:58

## 2021-06-24 RX ADMIN — AMLODIPINE BESYLATE 10 MG: 10 TABLET ORAL at 09:06

## 2021-06-24 RX ADMIN — QUETIAPINE FUMARATE 12.5 MG: 25 TABLET, FILM COATED ORAL at 20:38

## 2021-06-24 RX ADMIN — QUETIAPINE FUMARATE 12.5 MG: 25 TABLET, FILM COATED ORAL at 09:06

## 2021-06-24 RX ADMIN — BISACODYL 10 MG: 5 TABLET, COATED ORAL at 20:38

## 2021-06-24 RX ADMIN — CARVEDILOL 25 MG: 25 TABLET, FILM COATED ORAL at 09:06

## 2021-06-24 RX ADMIN — GABAPENTIN 600 MG: 300 CAPSULE ORAL at 20:39

## 2021-06-24 RX ADMIN — ALLOPURINOL 300 MG: 300 TABLET ORAL at 09:07

## 2021-06-24 RX ADMIN — ASPIRIN 325 MG: 325 TABLET, COATED ORAL at 09:06

## 2021-06-24 RX ADMIN — HEPARIN SODIUM 5000 UNITS: 5000 INJECTION INTRAVENOUS; SUBCUTANEOUS at 20:40

## 2021-06-24 RX ADMIN — ISOSORBIDE DINITRATE 20 MG: 20 TABLET ORAL at 09:06

## 2021-06-24 RX ADMIN — ATORVASTATIN CALCIUM 40 MG: 40 TABLET, FILM COATED ORAL at 20:38

## 2021-06-24 RX ADMIN — GLIPIZIDE 5 MG: 5 TABLET ORAL at 17:30

## 2021-06-24 RX ADMIN — ISOSORBIDE DINITRATE 20 MG: 20 TABLET ORAL at 13:14

## 2021-06-24 NOTE — PLAN OF CARE
Goal Outcome Evaluation:           Progress: improving  Outcome Summary: Patient progressing with current therapies. Continue POC

## 2021-06-24 NOTE — SIGNIFICANT NOTE
06/24/21 6383   Plan   Plan Faxed facesheet, outpatient SLP recommendations, H&P, MD notes, and SLP notes to [facility] [phone].

## 2021-06-24 NOTE — SIGNIFICANT NOTE
06/24/21 9874   Plan   Plan Spoke to Jody Lynn, SLP who did FEES to check pt's swallowing today.  He has been upgraded to nectar thick liquids with chin tuck.  He will need outpatient SLP for dysphagia 2 times per week for 4 weeks.  Pt and spouse preferred [facility] in [place], but they only offer PT.  Called [facility] Outpatient dept and left message to return my call to schedule outpatient SLP.   Provided Post Acute Provider List? Yes   Post Acute Provider List Outpatient Therapy   Delivered To Patient;Support Person   Support Person Hedy Lorenzana   Method of Delivery In person   Patient/Family in Agreement with Plan yes   Final Discharge Disposition Code 01 - home or self-care

## 2021-06-24 NOTE — SIGNIFICANT NOTE
06/24/21 0987   Plan   Plan Spoke to Jody Lynn, SLP who did FEES to check pt's swallowing today.  He has been upgraded to nectar thick liquids with chin tuck.  He will need outpatient SLP for dysphagia 2 times per week.  Pt and spouse preferred [facility] in [place], but they only offer PT.  Called [facility] Outpatient dept and left message to return my call so schedule outpatient SLP.   Provided Post Acute Provider List? Yes   Post Acute Provider List Outpatient Therapy   Delivered To Patient;Support Person   Support Person Hedy Lorenzana   Method of Delivery In person   Patient/Family in Agreement with Plan yes   Final Discharge Disposition Code 01 - home or self-care

## 2021-06-24 NOTE — PROGRESS NOTES
PROGRESS NOTE         Patient Identification:  Name:  Augusto Lorenzana Jr.  Age:  76 y.o.  Sex:  male  :  1945  MRN:  [id number]  Visit Number:  [id number]  Primary Care Provider:  Rob Polanco MD         LOS: 6 days       ----------------------------------------------------------------------------------------------------------------------  Subjective       Chief Complaints:    Debility    Interval History:    76-year-old gentleman who was recently hospitalized with CAD and did require CABG x3 vessels.  he did have V. fib x2 episodes requiring ACLS protocol.  Did have acute kidney injury and required hemodialysis shortly.    Awake and alert, denies any complaints or issues today.  Vital signs stable.  No new labs today.  Nephrology following for MICHAEL, we will follow their recommendations recommendations when available.    Participated physical and Occupational Therapy on : Performs bed mobility with modified independence, demonstrates decreased use of arms for pushing/pulling; decreased use of legs for bridging/pushing; performs transfer activities with contact-guard assist and verbal cues; utilizes wheelchair/straight cane for chair to bed, sit to stand, stand to sit transfer; ambulated 520 feet and 320 feet with straight cane and contact-guard assistance with verbal/nonverbal cues; requires contact-guard assist with verbal cues for lower body dressing; set up assistance for grooming.    Review of Systems:    Constitutional: no fever, chills and night sweats.  Generalized fatigue.  Eyes: no eye drainage, itching or redness.  HEENT: no mouth sores, dysphagia or nose bleed.  Respiratory: no for shortness of breath, cough or production of sputum.  Cardiovascular: no chest pain, no palpitations, no orthopnea.  Gastrointestinal: no nausea, vomiting or diarrhea. No abdominal pain, hematemesis or rectal bleeding.  Genitourinary: no dysuria or polyuria.  Hematologic/lymphatic: no lymph node  abnormalities, no easy bruising or easy bleeding.  Musculoskeletal: no muscle or joint pain.  Skin: No rash and no itching.  Neurological: no loss of consciousness, no seizure, no headache.  Behavioral/Psych: no depression or suicidal ideation.  Endocrine: no hot flashes.  Immunologic: negative.    ----------------------------------------------------------------------------------------------------------------------      Objective       [facility] Meds:  allopurinol, 300 mg, Oral, Daily  amLODIPine, 10 mg, Oral, Q24H  aspirin, 325 mg, Oral, Daily  atorvastatin, 40 mg, Oral, Nightly  carvedilol, 25 mg, Oral, BID With Meals  gabapentin, 600 mg, Oral, Nightly  glipizide, 5 mg, Oral, BID AC  heparin (porcine), 5,000 Units, Subcutaneous, Q12H  isosorbide dinitrate, 20 mg, Oral, TID - Nitrates  QUEtiapine, 12.5 mg, Oral, Q12H      sodium bicarbonate drip (greater than 75 mEq/bag), 75 mEq      ----------------------------------------------------------------------------------------------------------------------    Vital Signs:  Temp:  [97.8 °F (36.6 °C)-98.1 °F (36.7 °C)] 97.8 °F (36.6 °C)  Heart Rate:  [83-90] 90  Resp:  [16-18] 16  BP: (137-145)/(72-82) 145/82  No data found.  SpO2 Percentage    06/23/21 0836 06/23/21 1900 06/24/21 0815   SpO2: 96% 96% 98%     SpO2:  [96 %-98 %] 98 %  on   ;   Device (Oxygen Therapy): room air    Body mass index is 27.78 kg/m².  Wt Readings from Last 3 Encounters:   06/18/21 80.3 kg (177 lb)   06/18/21 80.3 kg (177 lb 1.6 oz)        Intake/Output Summary (Last 24 hours) at 6/24/2021 0957  Last data filed at 6/24/2021 0800  Gross per 24 hour   Intake 1240 ml   Output --   Net 1240 ml     Diet Soft Texture; Ground; Honey Thick; Cardiac, Consistent Carbohydrate  ----------------------------------------------------------------------------------------------------------------------      Physical Exam:    General Appearance: pleasant. Denies any complaints.     Head: normocephalic, without  obvious abnormality and atraumatic    Eyes: lids and lashes normal, conjunctivae and sclerae normal, no icterus, no  pallor, corneas clear and PERRLA    Ears: ears appear intact with no abnormalities noted    Nose: nares normal, septum midline, mucosa normal and no drainage     Throat: no oral lesions, no thrush, oral mucosa moist and oopharynx normal    Lungs: clear to auscultation, respirations regular, respirations even and  respirations unlabored. No accessory muscle use.     Heart:: regular rhythm & normal rate, normal S1, S2, no murmur, no gallop, no  rub and no click.  Chest wall with no abnormalities observed. PMI nondisplaced    Abdomen: normal bowel sounds in all quadrants, no masses, no hepatomegaly,  no splenomegaly, soft non-tender, no guarding and no rebound tenderness    Extremities: no edema, no cyanosis, no redness, no tenderness, no clubbing    Musculoskeletal: joints with no effusion nor erythema nor warmth.  Pedal pulses palpable and equal bilaterally    Skin: no bleeding, bruising or rash and no lesions noted.  Incision sites to right leg and anterior chest appear to be healing well.    Lymph Nodes: no palpable adenopathy    Neurologic:    Mental Status: Patient is awake alert and oriented x3.  Appropriate.   Sensory: Sensory overall seems to be intact in BLE and BUE.   Motor strength: Generalized weakness        ----------------------------------------------------------------------------------------------------------------------            Results from last 7 days   Lab Units 06/21/21  0120 06/19/21  0330   WBC 10*3/mm3 7.04 6.80   HEMOGLOBIN g/dL 9.6* 8.7*   HEMATOCRIT % 30.3* 27.6*   MCV fL 94.7 95.2   MCHC g/dL 31.7 31.5   PLATELETS 10*3/mm3 257 256     Results from last 7 days   Lab Units 06/23/21  0116 06/21/21  0120 06/19/21  0331   SODIUM mmol/L 141 141 143   POTASSIUM mmol/L 4.2 4.2 4.1   MAGNESIUM mg/dL  --   --  1.7   CHLORIDE mmol/L 109* 109* 111*   CO2 mmol/L 21.0* 20.9* 22.9   BUN  mg/dL 32* 31* 32*   CREATININE mg/dL 1.84* 1.67* 1.59*   EGFR IF NONAFRICN AM mL/min/1.73 36* 40* 43*   CALCIUM mg/dL 8.5* 8.3* 8.6   GLUCOSE mg/dL 176* 98 81   ALBUMIN g/dL  --   --  2.50*   BILIRUBIN mg/dL  --   --  0.3   ALK PHOS U/L  --   --  143*   AST (SGOT) U/L  --   --  21   ALT (SGPT) U/L  --   --  21   Estimated Creatinine Clearance: 35.7 mL/min (A) (by C-G formula based on SCr of 1.84 mg/dL (H)).  No results found for: AMMONIA    Glucose   Date/Time Value Ref Range Status   06/24/2021 0608 150 (H) 70 - 130 mg/dL Final   06/23/2021 1959 223 (H) 70 - 130 mg/dL Final   06/23/2021 1613 208 (H) 70 - 130 mg/dL Final   06/23/2021 1122 182 (H) 70 - 130 mg/dL Final   06/23/2021 0611 100 70 - 130 mg/dL Final   06/22/2021 2020 216 (H) 70 - 130 mg/dL Final   06/22/2021 1615 201 (H) 70 - 130 mg/dL Final   06/22/2021 1124 243 (H) 70 - 130 mg/dL Final     Lab Results   Component Value Date    HGBA1C 7.40 (H) 05/22/2021     Lab Results   Component Value Date    TSH 4.030 06/03/2021       No results found for: BLOODCX  No results found for: URINECX  No results found for: WOUNDCX  No results found for: STOOLCX  No results found for: RESPCX  Pain Management Panel       Pain Management Panel Latest Ref Rng & Units 5/30/2021 5/28/2021    CREATININE UR mg/dL 136.1 -    AMPHETAMINES SCREEN, URINE Negative - Negative    BARBITURATES SCREEN Negative - Negative    BENZODIAZEPINE SCREEN, URINE Negative - Negative    BUPRENORPHINEUR Negative - Negative    COCAINE SCREEN, URINE Negative - Negative    METHADONE SCREEN, URINE Negative - Negative    METHAMPHETAMINEUR Negative - Negative              ----------------------------------------------------------------------------------------------------------------------  Imaging Results (Last 24 Hours)       Procedure Component Value Units Date/Time    XR Chest 1 View [[id number]] Resulted: 06/24/21 0950     Updated: 06/24/21 0950    FL Video Swallow Single Contrast [[id number]] Resulted:  06/24/21 0928     Updated: 06/24/21 0945            ----------------------------------------------------------------------------------------------------------------------    Assessment/Plan       Assessment/Plan     ASSESSMENT:    Debility  Status post CABG x3/CAD  Hypertension  Gout  Hypertension  MICHAEL  Diabetes mellitus    PLAN:    Awake and alert, denies any complaints or issues today.  Vital signs stable.  No new labs today.  Nephrology following for MICHAEL, we will follow their recommendations recommendations when available.    Participated physical and Occupational Therapy on 6/23: Performs bed mobility with modified independence, demonstrates decreased use of arms for pushing/pulling; decreased use of legs for bridging/pushing; performs transfer activities with contact-guard assist and verbal cues; utilizes wheelchair/straight cane for chair to bed, sit to stand, stand to sit transfer; ambulated 520 feet and 320 feet with straight cane and contact-guard assistance with verbal/nonverbal cues; requires contact-guard assist with verbal cues for lower body dressing; set up assistance for grooming.     Status post CABG x3/CAD--continue aspirin, statin therapy, Coreg and Isordil.  Patient asymptomatic     Hypertension-- Continue Norvasc and Coreg.     Gout--allopurinol     Acute kidney injury--nephrology following, we appreciate their input.     Diabetes mellitus--glucose fairly well controlled, Levemir discontinued for now    Code Status:   Code Status and Medical Interventions:   Ordered at: 06/18/21 9171     Level Of Support Discussed With:    Patient     Code Status:    CPR     Medical Interventions (Level of Support Prior to Arrest):    Full     Scribed for Maldonado Fried MD by CHARLES Alejandra. 6/24/2021  09:57 EDT       CHARLES Alejandra  06/24/21  09:57 EDT    Physician Attestation:    The documentation recorded by the scribe accurately reflects the service I personally performed and the decisions made  by me.    Maldonado Fried MD  Infectious Diseases  06/24/21  09:57 EDT

## 2021-06-24 NOTE — THERAPY TREATMENT NOTE
Inpatient Rehabilitation - Physical Therapy Treatment Note        [facility]     Patient Name: Augusto Lorenzana Jr.  : 1945  MRN: [id number]    Today's Date: 2021                    Admit Date: 2021      Visit Dx: No diagnosis found.    Patient Active Problem List   Diagnosis   • NSTEMI 2021   • Hyperlipidemia LDL goal <70   • Essential hypertension   • T2DM on oral agents and insulin. HbA1c 7.4    • Severe 3 vessel CAD with preserved LV function (CMS/[facility])   • A/C kidney disease. ATN due to postoperative arrest. 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 Combination of anoxic insult and azotemia   • Debility       Past Medical History:   Diagnosis Date   • CAD (coronary artery disease)    • CKD (chronic kidney disease) stage 3, GFR 30-59 ml/min (CMS/[facility])    • Diabetes mellitus (CMS/[facility])    • Hyperlipidemia    • Hypertension    • NSTEMI (non-ST elevated myocardial infarction) (CMS/[facility])        Past Surgical History:   Procedure Laterality Date   • CARDIAC CATHETERIZATION N/A 2021    Procedure: Left Heart Cath;  Surgeon: Blade Greene IV, MD;  Location:  [facility];  Service: Cardiovascular;  Laterality: N/A;   • CARDIAC SURGERY      2 stents placed .   • CORONARY ARTERY BYPASS GRAFT N/A 2021    Procedure: MEDIAN STERNOTOMY CORONARY ARTERY BYPASS X 3 UTILIZING THE LEFT INTERNAL MAMMARY ARTERY GRAFT, EVH OF THE GREATER RIGHT SAPHENOUS VEIN, AND PILO PER ANESTHESIA;  Surgeon: Jacek Miller MD;  Location: [facility] OR;  Service: Cardiothoracic;  Laterality: N/A;          PT ASSESSMENT (last 12 hours)      IRF PT Evaluation and Treatment     Row Name 21 1521          PT Time and Intention    Document Type  daily treatment  -AG     Mode of Treatment  physical therapy  -AG      Patient/Family/Caregiver Comments/Observations  pt. supine in bed prior to both sessions.  Alert and cooperative for participation.   -     Row Name 06/24/21 1521          General Information    Existing Precautions/Restrictions  cardiac;fall;sternal  -AG     [facility] Name 06/24/21 1521          Coping Strategies    Trust Relationship/Rapport  care explained;choices provided;thoughts/feelings acknowledged  -[facility] Name 06/24/21 1521          Cognition/Psychosocial    Affect/Mental Status (Cognitive)  WNL  -AG     Orientation Status (Cognition)  oriented x 3  -AG     Follows Commands (Cognition)  WFL;verbal cues/prompting required  -AG     Personal Safety Interventions  fall prevention program maintained;gait belt;nonskid shoes/slippers when out of bed;supervised activity  -[facility] Name 06/24/21 1521          Pain Assessment    Additional Documentation  Pain Scale: FACES Pre/Post-Treatment (Group)  -[facility] Name 06/24/21 1521          Pain Scale: FACES Pre/Post-Treatment    Pain: FACES Scale, Pretreatment  0-->no hurt  -AG     Posttreatment Pain Rating  0-->no hurt  -AG     Row Name 06/24/21 1521          Mobility    Left Lower Extremity (Weight-bearing Status)  full weight-bearing (FWB)  -AG     Right Lower Extremity (Weight-bearing Status)  full weight-bearing (FWB)  -[facility] Name 06/24/21 1521          Bed Mobility    Bed Mobility  bed mobility (all) activities  -AG     All Activities, [place] (Bed Mobility)  modified independence  -[facility] Name 06/24/21 1521          Transfers    Bed-Chair [place] (Transfers)  supervision  -AG     Chair-Bed [place] (Transfers)  supervision  -AG     Assistive Device (Bed-Chair Transfers)  wheelchair  -AG     Sit-Stand [place] (Transfers)  supervision  -AG     Stand-Sit [place] (Transfers)  supervision  -AG     [facility] Name 06/24/21 1521          Chair-Bed Transfer    Assistive Device (Chair-Bed Transfers)  wheelchair  -[facility] Name 06/24/21  1521          Sit-Stand Transfer    Assistive Device (Sit-Stand Transfers)  wheelchair  -AG     Row Name 06/24/21 1521          Stand-Sit Transfer    Assistive Device (Stand-Sit Transfers)  wheelchair  -AG     Row Name 06/24/21 1521          Stand Pivot/Stand Step Transfer    Stand Pivot/Stand Step [place] (Transfers)  supervision  -AG     Assistive Device (Stand Pivot Stand Step Transfer)  wheelchair  -AG     Row Name 06/24/21 1521          Car Transfer    Type (Car Transfer)  stand pivot/stand step  -AG     [place] Level (Car Transfer)  standby assist  -AG     Assistive Device (Car Transfer)  wheelchair  -AG     Row Name 06/24/21 1521          Gait/Stairs (Locomotion)    Gait/Stairs Locomotion  gait/ambulation independence;gait/ambulation assistive device;distance ambulated;gait pattern;gait deviations  -AG     [place] Level (Gait)  standby assist  -AG     Assistive Device (Gait)  cane, straight in PM, used support of IV pole R UE, no asst device  -AG     Distance in Feet (Gait)  320 ft x 2  -AG     Pattern (Gait)  step-through  -AG     Deviations/Abnormal Patterns (Gait)  stride length decreased  -AG     Negotiation (Stairs)  stairs independence;handrail location;number of steps;ascending technique;descending technique  -AG     [place] Level (Stairs)  stand by assist;verbal cues  -AG     Assistive Device (Stairs)  -- none  -AG     Handrail Location (Stairs)  both sides  -AG     Number of Steps (Stairs)  10  -AG     Ascending Technique (Stairs)  step-over-step  -AG     Descending Technique (Stairs)  step-over-step  -AG     Row Name 06/24/21 1521          Safety Issues, Functional Mobility    Impairments Affecting Function (Mobility)  balance;endurance/activity tolerance;strength  -AG     Row Name 06/24/21 1521          Balance    Balance Assessment  sitting static balance;sitting dynamic balance;sit to stand dynamic balance;standing static balance;standing dynamic balance  -AG     Static  Sitting Balance  WNL  -AG     Static Standing Balance  mild impairment;supported;standing  -AG     Dynamic Standing Balance  WFL;supported;standing  -AG     Comment, Balance  performed static and dynamic standing activities per flow sheet  -AG     Row Name 06/24/21 1521          Motor Skills    Motor Skills  functional endurance;therapeutic exercise  -AG     Therapeutic Exercise  hip;knee;ankle  -AG     Row Name 06/24/21 1521          Hip (Therapeutic Exercise)    Hip Strengthening (Therapeutic Exercise)  bilateral;flexion;extension;aBduction;aDduction;marching while seated;sitting;2 lb free weight;resistance band;red  -AG     Row Name 06/24/21 1521          Knee (Therapeutic Exercise)    Knee Strengthening (Therapeutic Exercise)  bilateral;flexion;extension;marching while seated;LAQ (long arc quad);hamstring curls;sitting;2 lb free weight;resistance band;red  -     Row Name 06/24/21 1521          Ankle (Therapeutic Exercise)    Ankle Strengthening (Therapeutic Exercise)  bilateral;dorsiflexion;plantarflexion;sitting  -AG     Row Name 06/24/21 1521          Positioning and Restraints    Pre-Treatment Position  in bed  -AG     In Bed  supine;call light within reach;encouraged to call for assist;side rails up x3 following PM session  -AG     In Wheelchair  sitting;call light within reach;encouraged to call for assist following AM session  -AG     Row Name 06/24/21 1521          Daily Progress Summary (PT)    Functional Goal Overall Progress (PT)  progressing toward functional goals as expected  -AG     Impairments Still Limiting Function (PT)  strength decreased;impaired balance  -AG     Recommendations (PT)  continue POC; planned d/c home with family for tomorrow 6/25.   -AG       User Key  (r) = Recorded By, (t) = Taken By, (c) = Cosigned By    Initials Name Provider Type    AG Marcela Hanson, PT Physical Therapist        Wound 05/24/21 0160 Right anterior fifth toe Other (comment) (Active)   Dressing Appearance  open to air 06/23/21 1910   Closure None 06/23/21 1910   Base dry 06/23/21 1910   Drainage Amount none 06/23/21 1910       Wound 05/28/21 midline sternal Incision (Active)   Dressing Appearance open to air 06/24/21 0745   Closure Approximated;Liquid skin adhesive;Open to air 06/24/21 0745   Base dry;clean 06/24/21 0745   Drainage Amount none 06/23/21 1910       Wound 05/28/21 Right proximal leg Incision (Active)   Dressing Appearance open to air 06/24/21 0745   Closure Approximated;Liquid skin adhesive;Open to air 06/24/21 0745   Base dry;pink;red 06/24/21 0745   Periwound edematous 06/24/21 0745   Drainage Amount none 06/23/21 1910   Dressing Care open to air 06/24/21 0745       Wound 06/02/21 2000 Left distal hand Blisters (Active)   Dressing Appearance open to air 06/24/21 0745   Closure Open to air 06/23/21 1910   Base dry 06/24/21 0745   Drainage Amount none 06/23/21 1910   Dressing Care open to air 06/24/21 0745       Wound 06/03/21 2000 chest Puncture (Active)   Dressing Appearance open to air 06/24/21 0745   Closure Open to air 06/23/21 1910   Base dry 06/24/21 0745   Drainage Amount none 06/23/21 1910   Dressing Care open to air 06/24/21 0745     Physical Therapy Education                 Title: PT OT SLP Therapies (Done)     Topic: Physical Therapy (Done)     Point: Mobility training (Done)     Learning Progress Summary           Patient Acceptance, E,D, VU,NR by AG at 6/24/2021 1519    Acceptance, E,D, VU by  at 6/23/2021 1542    Acceptance, E,D,TB, VU,NR by AG at 6/22/2021 1603    Acceptance, E,TB,D, VU,NR by AG at 6/21/2021 1152    Acceptance, E, VU by  at 6/19/2021 1225                   Point: Home exercise program (Done)     Learning Progress Summary           Patient Acceptance, E,D, VU,NR by AG at 6/24/2021 1519    Acceptance, E,D, VU by LL at 6/23/2021 1542    Acceptance, E,D,TB, VU,NR by AG at 6/22/2021 1603    Acceptance, E,TB,D, VU,NR by AG at 6/21/2021 1152    Acceptance, E, VU by  at  6/19/2021 1225                   Point: Body mechanics (Done)     Learning Progress Summary           Patient Acceptance, E,D, VU,NR by AG at 6/24/2021 1519    Acceptance, E,D, VU by LL at 6/23/2021 1542    Acceptance, E,D,TB, VU,NR by AG at 6/22/2021 1603    Acceptance, E,TB,D, VU,NR by AG at 6/21/2021 1152    Acceptance, E, VU by  at 6/19/2021 1225                   Point: Precautions (Done)     Learning Progress Summary           Patient Acceptance, E,D, VU,NR by AG at 6/24/2021 1519    Acceptance, E,D, VU by LL at 6/23/2021 1542    Acceptance, E,D,TB, VU,NR by AG at 6/22/2021 1603    Acceptance, E,TB,D, VU,NR by AG at 6/21/2021 1152    Acceptance, E, VU by  at 6/19/2021 1225                               User Key     Initials Effective Dates Name Provider Type Discipline     06/16/21 -  Marcela Hanson, PT Physical Therapist PT     05/02/16 -  Emilee Bowles, PTA Physical Therapy Assistant PT     05/20/21 -  Yamel Mckeon, PT Physical Therapist PT                PT Recommendation and Plan       Anticipated Equipment Needs at Discharge (PT Eval):  (TBD; will likely need either RW or SC)  Daily Progress Summary (PT)  Functional Goal Overall Progress (PT): progressing toward functional goals as expected  Daily Progress Summary (PT): continuing to address balance and gait safety with least restrictive assistance device.   Impairments Still Limiting Function (PT): strength decreased, impaired balance  Recommendations (PT): continue POC; planned d/c home with family for tomorrow 6/25.                Time Calculation:     PT Charges     Row Name 06/24/21 1521 06/24/21 1520          Time Calculation    Start Time  1340  -AG  1045  -AG     Stop Time  1425  -AG  1130  -AG     Time Calculation (min)  45 min  -AG  45 min  -AG     PT Received On  --  06/24/21  -     PT - Next Appointment  --  06/25/21  -       User Key  (r) = Recorded By, (t) = Taken By, (c) = Cosigned By    Initials Name Provider Type    AG  Marcela Hanson, PT Physical Therapist          Therapy Charges for Today     Code Description Service Date Service Provider Modifiers Qty    21100430929 HC GAIT TRAINING EA 15 MIN 6/24/2021 Marcela Hanson, PT GP 2    51939467633 HC PT NEUROMUSC RE EDUCATION EA 15 MIN 6/24/2021 Marcela Hanson, PT GP 2    00825631711 HC PT THER PROC EA 15 MIN 6/24/2021 Marcela Hanson, PT GP 2                   Marcela Hanson, PT  6/24/2021

## 2021-06-24 NOTE — THERAPY TREATMENT NOTE
Inpatient Rehabilitation - Occupational Therapy Treatment Note     [facility]     Patient Name: Augusto Lorenzana Jr.  : 1945  MRN: [id number]    Today's Date: 2021                 Admit Date: 2021       No diagnosis found.    Patient Active Problem List   Diagnosis   • NSTEMI 2021   • Hyperlipidemia LDL goal <70   • Essential hypertension   • T2DM on oral agents and insulin. HbA1c 7.4    • Severe 3 vessel CAD with preserved LV function (CMS/[facility])   • A/C kidney disease. ATN due to postoperative arrest. 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 Combination of anoxic insult and azotemia   • Debility       Past Medical History:   Diagnosis Date   • CAD (coronary artery disease)    • CKD (chronic kidney disease) stage 3, GFR 30-59 ml/min (CMS/[facility])    • Diabetes mellitus (CMS/[facility])    • Hyperlipidemia    • Hypertension    • NSTEMI (non-ST elevated myocardial infarction) (CMS/[facility])        Past Surgical History:   Procedure Laterality Date   • CARDIAC CATHETERIZATION N/A 2021    Procedure: Left Heart Cath;  Surgeon: Blade Greene IV, MD;  Location:  [facility];  Service: Cardiovascular;  Laterality: N/A;   • CARDIAC SURGERY      2 stents placed .   • CORONARY ARTERY BYPASS GRAFT N/A 2021    Procedure: MEDIAN STERNOTOMY CORONARY ARTERY BYPASS X 3 UTILIZING THE LEFT INTERNAL MAMMARY ARTERY GRAFT, EVH OF THE GREATER RIGHT SAPHENOUS VEIN, AND PILO PER ANESTHESIA;  Surgeon: Jacek Milelr MD;  Location: [facility] OR;  Service: Cardiothoracic;  Laterality: N/A;                IRF OT ASSESSMENT FLOWSHEET (last 12 hours)      IRF OT Evaluation and Treatment     Row Name 21 1210          OT Time and Intention    Document Type  daily treatment  -CJ     Mode of Treatment  occupational therapy  -CJ     Row Name  06/24/21 1210          General Information    Patient/Family/Caregiver Comments/Observations  agreeable to therapy  -     Existing Precautions/Restrictions  fall;cardiac;sternal, swallow precautions  -     Row Name 06/24/21 1210          Cognition/Psychosocial    Follows Commands (Cognition)  WFL;verbal cues/prompting required  -     Row Name 06/24/21 1210          Transfers    Bed-Chair [place] (Transfers)  standby assist;verbal cues  -     Chair-Bed [place] (Transfers)  standby assist;verbal cues  -     Assistive Device (Bed-Chair Transfers)  wheelchair  -     [place] Level (Toilet Transfer)  standby assist;verbal cues  -     Assistive Device (Toilet Transfer)  grab bars/safety frame  -     Row Name 06/24/21 1210          Chair-Bed Transfer    Assistive Device (Chair-Bed Transfers)  wheelchair  -     Row Name 06/24/21 1210          Motor Skills    Motor Skills  functional endurance  -     Motor Control/Coordination Interventions  therapeutic exercise/ROM BUE ther ex/act, AROM, bilat coord ex, hand exerciser  -     Row Name 06/24/21 1210          Bathing    [place] Level (Bathing)  standby assist;verbal cues  -     Row Name 06/24/21 1210          Upper Body Dressing    [place] Level (Upper Body Dressing)  set up assistance  -     Row Name 06/24/21 1210          Lower Body Dressing    [place] Level (Lower Body Dressing)  standby assist;verbal cues  -     Row Name 06/24/21 1210          Grooming    [place] Level (Grooming)  set up  -     Row Name 06/24/21 1210          Toileting    [place] Level (Toileting)  supervision;verbal cues  -     Assistive Device Use (Toileting)  grab bar/safety frame  -     Row Name 06/24/21 1210          Positioning and Restraints    Post Treatment Position  bed  -CJ     In Bed  call light within reach;encouraged to call for assist;notified nsg post pm tx  -CJ     In Wheelchair  with SLP am tx  -CJ       User Key   (r) = Recorded By, (t) = Taken By, (c) = Cosigned By    Initials Name Effective Dates     Ellen Hays OT 06/16/21 -            Occupational Therapy Education                 Title: PT OT SLP Therapies (Done)     Topic: Occupational Therapy (Done)     Point: ADL training (Done)     Description:   Instruct learner(s) on proper safety adaptation and remediation techniques during self care or transfers.   Instruct in proper use of assistive devices.              Learning Progress Summary           Patient Acceptance, E,D, VU,NR by  at 6/24/2021 1431    Acceptance, E,D, VU,NR by  at 6/23/2021 1224    Eager, E, VU,DU,NR by  at 6/22/2021 1007    Acceptance, E,D, VU,NR by  at 6/21/2021 1457    Acceptance, E,D, NR,VU,DU by AS at 6/19/2021 1107                   Point: Home exercise program (Done)     Description:   Instruct learner(s) on appropriate technique for monitoring, assisting and/or progressing therapeutic exercises/activities.              Learning Progress Summary           Patient Eager, E, VU,DU,NR by FARHEEN at 6/22/2021 1007    Acceptance, E,D, NR,VU,DU by AS at 6/19/2021 1107                   Point: Precautions (Done)     Description:   Instruct learner(s) on prescribed precautions during self-care and functional transfers.              Learning Progress Summary           Patient Acceptance, E,D, VU,NR by  at 6/24/2021 1431    Acceptance, E,D, VU,NR by  at 6/23/2021 1224    Eager, E, VU,DU,NR by  at 6/22/2021 1007    Acceptance, E,D, VU,NR by  at 6/21/2021 1457    Acceptance, E,D, NR,VU,DU by AS at 6/19/2021 1107                   Point: Body mechanics (Done)     Description:   Instruct learner(s) on proper positioning and spine alignment during self-care, functional mobility activities and/or exercises.              Learning Progress Summary           Patient Eager, E, VU,DU,NR by FARHEEN at 6/22/2021 1007    Acceptance, E,D, NR,VU,DU by AS at 6/19/2021 1107                               User  Key     Initials Effective Dates Name Provider Type Discipline     06/16/21 -  Ellen Hays, OT Occupational Therapist OT    AS 06/16/21 -  Marcela Kearney, OT Occupational Therapist OT    JW 06/16/21 -  Ish Gardner OTR Occupational Therapist OT                    OT Recommendation and Plan                         Time Calculation:     Time Calculation- OT     Row Name 06/24/21 1431 06/24/21 0805          Time Calculation- OT    OT Start Time  1225  -  0805  -     OT Stop Time  1240  -  0915  -     OT Time Calculation (min)  15 min  -  70 min  -     Total Timed Code Minutes- OT  15 minute(s)  -  70 minute(s)  -       User Key  (r) = Recorded By, (t) = Taken By, (c) = Cosigned By    Initials Name Provider Type     Ellen Hays, OT Occupational Therapist        Therapy Charges for Today     Code Description Service Date Service Provider Modifiers Qty    13042871815 HC OT SELF CARE/MGMT/TRAIN EA 15 MIN 6/23/2021 Ellen Hays OT GO 2    91833581678 HC OT THERAPEUTIC ACT EA 15 MIN 6/23/2021 Ellen Hays OT GO 2    69956922730 HC OT THER PROC EA 15 MIN 6/23/2021 Ellen Hays, OT GO 2    68560937247 HC OT SELF CARE/MGMT/TRAIN EA 15 MIN 6/24/2021 Ellen Hays, OT GO 3    95874622470 HC OT THER PROC EA 15 MIN 6/24/2021 Ellen Hays, OT GO 2    98020211122 HC OT THERAPEUTIC ACT EA 15 MIN 6/24/2021 Ellen Hays OT GO 1                   Ellen Hays OT  6/24/2021

## 2021-06-24 NOTE — MBS/VFSS/FEES
Inpatient Rehabilitation - Speech Language Pathology   Swallow Re-Evaluation  [place]   MODIFIED BARIUM SWALLOW STUDY     Patient Name: Augusto Lorenzana Jr.  : 1945  MRN: [id number]  Today's Date: 2021             Admit Date: 2021      Augusto Lorenzana Jr.  presents to the radiology suite this am from 101/3S to participate in an instrumental MBS to assess safety/efficacy of swallowing fnx, determine safest/least restrictive diet.  He is admitted on 21 w/ debility.  He has a medical history significant for CAD s/p stenting in , CKD stage 3, DM, HLD, HTN, and NSTEMI.  He had cardiac catheterization on 21 and underwent CABG  x3 on 21 w/ cardiac arrest x2 postoperative.  Pt required NG tube during admission.     Pt is s/p participation in MBSS on 21 w/ penetration and aspiration of nectar thick and thin liquids.  Pt was recommended to continue a modified po diet of mechanical soft solids, ground meats, and honey thick liquids.        Social History     Socioeconomic History   • Marital status:      Spouse name: Not on file   • Number of children: Not on file   • Years of education: Not on file   • Highest education level: Not on file   Tobacco Use   • Smoking status: Never Smoker   • Smokeless tobacco: Current User   Substance and Sexual Activity   • Alcohol use: Not Currently        EXAM:    XR Chest, 1 View     EXAM DATE:    2021 9:50 AM     CLINICAL HISTORY:    mary     TECHNIQUE:    Frontal view of the chest.     COMPARISON:    No relevant prior studies available.     FINDINGS:    Lungs:  Unremarkable.  No consolidation.    Pleural space:  Unremarkable.  No pneumothorax.    Heart:  CABG changes.    Mediastinum:  Unremarkable.    Bones/joints:  Unremarkable.     IMPRESSION:    Changes of prior CABG. No acute findings identified.     This report was finalized on 2021 10:46 AM by Dr. Burton Plasencia MD.    Diet Orders (active) (From admission, onward)     Start      Ordered    06/24/21 1200  DIET MESSAGE Please send buttermilk on all trays.  Daily With Breakfast, Lunch & Dinner     Comments: Please send buttermilk on all trays.    06/24/21 1009    06/24/21 0958  Diet Soft Texture; Chopped; Nectar / Syrup Thick; Cardiac, Consistent Carbohydrate  Diet Effective Now     Comments: Chin tuck w/ all thickened liquids.    06/24/21 0958                Pt is observed on room air w/o complications.     Risks and benefits of the procedure are explained w/ verbalizing understanding/agreement to participate.     Pt is positioned upright and centered in a soft strap supportive chair to accept multiple po presentations of solid cracker, puree, honey thick, nectar thick, and thin liquids via spoon, cup and straw, along w/ whole placebo pill in puree. Pt is able to self feed and does so across this evaluation.     All views are from the lateral plane.     Facial/oral structures are symmetrical upon observation w/o lingual deviation upon protrusion. Oral mucosa are moist, pink and clean. Secretions are clear, thin and well controlled. OROM/DANA is mildly weak to imitate oral postures. Gag is not assessed. Volitional cough is adequate in intensity, clear in quality, nonproductive. Vocal quality is adequate in intensity, clear in quality w/ intelligible speech. Pt is a/a and cooperative to particpate.  Pt is oriented to person, place, and time, follows simple directives, and participates in simple conversational exchanges.     Upon po presentations, adequate bolus anticipation w/ good labial seal for bolus clearance via spoon bowl, cup rim stability and suction via straw.  Bolus formation, manipulation, and control are wfl w/ rotary mastication pattern. A-p transit is timely w/o oral residue. Tongue base retraction and linguavelar seal are mildly weak w/ premature spillage to the valleculae. No laryngeal penetration or aspiration is evidenced before the swallow.     Pharyngeal swallow is mildly  delayed w/ mildly weak hyolaryngeal elevation and epiglottic inversion. Penetration during the swallow noted w/ thin liquids w/ all presentations and concern for aspiration after the swallow of residue remaining on posterior epiglottis.  Penetration during the swallow noted w/ repeat nectar liquids via cup and straw w/ concern for aspiration after the swallow of residue remaining on posterior epiglottis.  Pharyngeal contraction is mildly weak w/ mild residue remaining on posterior epiglottis.  Compensatory strategy of chin tuck w/ nectar thick liquids results in transient penetration that clears completely w/o residue remaining on posterior epiglottis.  No laryngeal penetration or aspiration evidenced during or after the swallow.      Full esophageal sweep reveals no mucosal abnormalities. Motility is wfl w/o retrograde flow noted.         Visit Dx:   No diagnosis found.  Patient Active Problem List   Diagnosis   • NSTEMI 5/22/2021   • Hyperlipidemia LDL goal <70   • Essential hypertension   • T2DM on oral agents and insulin. HbA1c 7.4    • Severe 3 vessel CAD with preserved LV function (CMS/HCC)   • A/C kidney disease. ATN due to postoperative arrest. Dialysis begun 6/3/2021   • Gout   • Former smokeless tobacco use   • S/P CABG x 3 on 5/28/21   • Perioperative cardiac arrest with ventricular fibrillation (CMS/HCC)   • Postop encephalopathy post code. Combination of anoxic insult and azotemia   • Debility     Past Medical History:   Diagnosis Date   • CAD (coronary artery disease)    • CKD (chronic kidney disease) stage 3, GFR 30-59 ml/min (CMS/HCC)    • Diabetes mellitus (CMS/HCC)    • Hyperlipidemia    • Hypertension    • NSTEMI (non-ST elevated myocardial infarction) (CMS/HCC)      Past Surgical History:   Procedure Laterality Date   • CARDIAC CATHETERIZATION N/A 5/24/2021    Procedure: Left Heart Cath;  Surgeon: Blade Greene IV, MD;  Location: [facility] CATH INVASIVE LOCATION;  Service:  Cardiovascular;  Laterality: N/A;   • CARDIAC SURGERY      2 stents placed 2012.   • CORONARY ARTERY BYPASS GRAFT N/A 5/28/2021    Procedure: MEDIAN STERNOTOMY CORONARY ARTERY BYPASS X 3 UTILIZING THE LEFT INTERNAL MAMMARY ARTERY GRAFT, EVH OF THE GREATER RIGHT SAPHENOUS VEIN, AND PILO PER ANESTHESIA;  Surgeon: Jacek Miller MD;  Location: [facility];  Service: Cardiothoracic;  Laterality: N/A;       EDUCATION  The patient has been educated in the following areas:   Dysphagia (Swallowing Impairment) Oral Care/Hydration Modified Diet Instruction.     IMPRESSION:  Pt presents w/ mild oropharyngeal dysphagia w/ fatigue effects noted w/ repeated po trials.  Pt is felt to most benefit from a continued modified po diet of mechanical soft solids, chopped meats, NECTAR thick liquids w/ chin tuck compensatory strategy.  Pt is felt to benefit from continued dysphagia tx.     SLP Recommendation and Plan       Recommendations:   1. Mechanical soft solids, chopped meats, NECTAR liquids  2. Meds whole in puree/NECTAR.   3. Chin tuck w/ all liquids.  4. Water protocol.  5. Jewel precautions.   6. Oral care protocol.   7. Universal aspiration precautions.     SLP to continue to f/u for continued formal dysphagia tx.     D/w pt results and recommendations w/ verbal understanding and agreement.     D/w RN results and recommendations w/ verbal understanding and agreement.     Thank you for allowing me to participate in the care of your patient-  Jody Lynn M.S., CFY-SLP                  Time Calculation:   Time Calculation- SLP     Row Name 06/24/21 1244             Time Calculation- SLP    SLP Start Time  0915  -      SLP Stop Time  0955  -      SLP Time Calculation (min)  40 min  -      SLP - Next Appointment  06/25/21  -        User Key  (r) = Recorded By, (t) = Taken By, (c) = Cosigned By    Initials Name Provider Type    Jody Abdi, MS, CFY-SLP Speech and Language Pathologist          Therapy Charges for Today      Code Description Service Date Service Provider Modifiers Qty    [id number]  ST TREATMENT SWALLOW 3 6/23/2021 Jody Lynn MS, CFY-SLP GN 1    [id number]  ST MOTION FLUORO EVAL SWALLOW 3 6/24/2021 Jody Lynn MS, CFY-SLP GN 1               Jody Lynn MS, CFY-SLP  6/24/2021

## 2021-06-24 NOTE — CONSULTS
Nephrology Consult Note    Referring Provider: Dr Fried  Reason for Consultation: MICHAEL    Subjective       History of present illness:  Augusto Lorenzana Jr. is a 76 y.o. male who has been admitted to Rehab. Pt is known to have CAD and recently found to have  three-vessel disease s/p CABG on May 28 and had CABG x3, post surgery patient had cardiac arrest x2 and did require multiple units of packed red blood cells fresh frozen plasma and platelets. Pt developed MICHAEL and required dialysis but was improved and was off of dialysis before transfer to Rehab. On admission the Cr was 1.6 that has increased to 1.8.   Pt denied any chest pain or shortness of breath.    Pt also denied any history of Chronic NSAIDS use. Patient denies hematuria, dysuria, difficulty passing urine. No prior history of renal stones. No family history of renal disease    History  Past Medical History:   Diagnosis Date   • CAD (coronary artery disease)    • CKD (chronic kidney disease) stage 3, GFR 30-59 ml/min (CMS/[facility])    • Diabetes mellitus (CMS/[facility])    • Hyperlipidemia    • Hypertension    • NSTEMI (non-ST elevated myocardial infarction) (CMS/[facility])    ,   Past Surgical History:   Procedure Laterality Date   • CARDIAC CATHETERIZATION N/A 5/24/2021    Procedure: Left Heart Cath;  Surgeon: Blade Greene IV, MD;  Location:  [email or web address] CATH INVASIVE LOCATION;  Service: Cardiovascular;  Laterality: N/A;   • CARDIAC SURGERY      2 stents placed 2012.   • CORONARY ARTERY BYPASS GRAFT N/A 5/28/2021    Procedure: MEDIAN STERNOTOMY CORONARY ARTERY BYPASS X 3 UTILIZING THE LEFT INTERNAL MAMMARY ARTERY GRAFT, EVH OF THE GREATER RIGHT SAPHENOUS VEIN, AND PILO PER ANESTHESIA;  Surgeon: Jacek Miller MD;  Location: [facility] OR;  Service: Cardiothoracic;  Laterality: N/A;   ,   Family History   Family history unknown: Yes   ,   Social History     Tobacco Use   • Smoking status: Never Smoker   • Smokeless tobacco: Current User   Substance Use Topics   •  Alcohol use: Not Currently   • Drug use: Not on file   ,   Medications Prior to Admission   Medication Sig Dispense Refill Last Dose   • allopurinol (ZYLOPRIM) 300 MG tablet Take 300 mg by mouth Daily.   Unknown at Unknown time   • amLODIPine-benazepril (LOTREL) 10-20 MG per capsule Take 1 capsule by mouth Daily.   Unknown at Unknown time   • aspirin 81 MG EC tablet Take 81 mg by mouth Daily.   Unknown at Unknown time   • cloNIDine (CATAPRES) 0.1 MG tablet Take 0.1 mg by mouth Daily.   Unknown at Unknown time   • clopidogrel (PLAVIX) 75 MG tablet Take 75 mg by mouth Daily.   Unknown at Unknown time   • gabapentin (NEURONTIN) 300 MG capsule Take 600 mg by mouth 3 (Three) Times a Day.   Unknown at Unknown time   • glipizide (GLUCOTROL) 5 MG tablet Take 5 mg by mouth 2 (Two) Times a Day Before Meals.   Unknown at Unknown time   • insulin glargine (LANTUS, SEMGLEE) 100 UNIT/ML injection Inject 30 Units under the skin into the appropriate area as directed Every Night.   Unknown at Unknown time   • simvastatin (ZOCOR) 20 MG tablet Take 20 mg by mouth Every Night.   Unknown at Unknown time   , Scheduled Meds:  allopurinol, 300 mg, Oral, Daily  amLODIPine, 10 mg, Oral, Q24H  aspirin, 325 mg, Oral, Daily  atorvastatin, 40 mg, Oral, Nightly  carvedilol, 25 mg, Oral, BID With Meals  gabapentin, 600 mg, Oral, Nightly  glipizide, 5 mg, Oral, BID AC  heparin (porcine), 5,000 Units, Subcutaneous, Q12H  isosorbide dinitrate, 20 mg, Oral, TID - Nitrates  QUEtiapine, 12.5 mg, Oral, Q12H    , Continuous Infusions:   , PRN Meds:  dextrose  •  dextrose  •  glucagon (human recombinant)  •  guaiFENesin-dextromethorphan and Allergies:  Patient has no known allergies.    Review of Systems  More than 10 point review of systems was done. Pertinent items are noted in HPI, all other systems reviewed and negative    Objective     Vital Signs  Temp:  [98.1 °F (36.7 °C)-98.3 °F (36.8 °C)] 98.1 °F (36.7 °C)  Heart Rate:  [83-88] 85  Resp:  [16-18]  18  BP: (136-142)/(70-79) 142/79    No intake/output data recorded.  I/O last 3 completed shifts:  In: 1290 [P.O.:1290]  Out: -     Physical Examination:  General Appearance: not in acute distress  Head: Normocephalic, without obvious abnormality and atraumatic  Throat: Oral mucosa moist  Neck: No adenopathy, suppple, no carotid bruit and no JVD  Lungs: Clear to auscultation, respirations regular and unlabored  Heart: Regular rhythm & normal rate, normal S1, S2, no murmur, no gallop, no rub   Abdomen: Normal bowel sounds, no masses and soft non-tender  Extremities: Moves extremities well, no redness and no edema  Pulses: Palpable and equal bilaterally  Neurologic: Orientated to person, place, time, grossly no focal deficitis    Laboratory Data :      WBC No results found for: WBC   HGB No results found for: HGB   HCT No results found for: HCT   Platlets No results found for: LABPLAT   MCV No results found for: MCV       Sodium Sodium   Date Value Ref Range Status   06/23/2021 141 136 - 145 mmol/L Final      Potassium Potassium   Date Value Ref Range Status   06/23/2021 4.2 3.5 - 5.2 mmol/L Final      Chloride Chloride   Date Value Ref Range Status   06/23/2021 109 (H) 98 - 107 mmol/L Final      CO2 CO2   Date Value Ref Range Status   06/23/2021 21.0 (L) 22.0 - 29.0 mmol/L Final      BUN BUN   Date Value Ref Range Status   06/23/2021 32 (H) 8 - 23 mg/dL Final      Creatinine Creatinine   Date Value Ref Range Status   06/23/2021 1.84 (H) 0.76 - 1.27 mg/dL Final      Calcium Calcium   Date Value Ref Range Status   06/23/2021 8.5 (L) 8.6 - 10.5 mg/dL Final      PO4 No results found for: CAPO4   Albumin No results found for: ALBUMIN   Magnesium No results found for: MG   Uric Acid No results found for: URICACID     Radiology results :     Imaging Results (Last 72 Hours)     ** No results found for the last 72 hours. **            Medications:      allopurinol, 300 mg, Oral, Daily  amLODIPine, 10 mg, Oral, Q24H  aspirin,  325 mg, Oral, Daily  atorvastatin, 40 mg, Oral, Nightly  carvedilol, 25 mg, Oral, BID With Meals  gabapentin, 600 mg, Oral, Nightly  glipizide, 5 mg, Oral, BID AC  heparin (porcine), 5,000 Units, Subcutaneous, Q12H  isosorbide dinitrate, 20 mg, Oral, TID - Nitrates  QUEtiapine, 12.5 mg, Oral, Q12H           Assessment/Plan       Debility      1. MICHAEL on CKD  2. CAD s/p CABG X3  3. Non gap metabolic acidosis likely due to MICHAEL  4. Essential hypertension, stable  5. DM-II, stable    MICHAEL is likley due to pre renal azotemia with poor fluid intake  Will get CXR, Probnp as pt has recently history of dialysis due to fluid overload  UA with microscopy and 1/2NS with 75meq of sodium bicarb at 100ml/h    Thanks Dr Fried for the consult. Nephrology will follow the patient.   I discussed the patient's findings and my recommendations with patient and nursing staff    Alessandra Farias MD  06/24/21  08:20 EDT

## 2021-06-24 NOTE — SIGNIFICANT NOTE
06/24/21 4809   Plan   Plan Spoke to Jody Lynn, SLP who did FEES to check pt's swallowing today.  He has been upgraded to nectar thick liquids with chin tuck.  He will need outpatient SLP for dysphagia.  Pt and spouse preferred [facility] in [place], but they only offer PT.  Called [facility] dept and left message to return my call so schedule outpatient SLP.   Provided Post Acute Provider List? Yes   Post Acute Provider List Outpatient Therapy   Delivered To Patient;Support Person   Support Person Hedy Lorenzana   Method of Delivery In person   Patient/Family in Agreement with Plan yes   Final Discharge Disposition Code 01 - home or self-care

## 2021-06-24 NOTE — PROGRESS NOTES
Rehabilitation Nursing  Inpatient Rehabilitation Plan of Care Note    Plan of Care  Pain    Pain Management (Active)  Current Status (6/18/2021 4:09:00 PM): at risk for pain  Weekly Goal: no pain this week  Discharge Goal: no pain    Safety    Potential for Injury (Active)  Current Status (6/18/2021 4:09:00 PM): at risk for injury  Weekly Goal: no injuries this week  Discharge Goal: no injury    Signed by: Janel Grant RN

## 2021-06-24 NOTE — SIGNIFICANT NOTE
06/24/21 5814   Plan   Plan Spoke to Jody Lynn, SLP who did FEES to check pt's swallowing today.  He has been upgraded to nectar thick liquids with chin tuck.  He will need outpatient SLP for dysphagia 2 times per week for 4 weeks.  Pt and spouse preferred [facility] in [place], but they only offer PT.  Called [facility] dept and left message to return my call so schedule outpatient SLP.   Provided Post Acute Provider List? Yes   Post Acute Provider List Outpatient Therapy   Delivered To Patient;Support Person   Support Person Hedy Lorenzana   Method of Delivery In person   Patient/Family in Agreement with Plan yes   Final Discharge Disposition Code 01 - home or self-care

## 2021-06-24 NOTE — SIGNIFICANT NOTE
06/24/21 6563   Plan   Plan Spoke to Ellen at [facility] [phone] about new SLP referral.  They received the fax and will be reviewing it and will call with an appointment tomorrow.  Spoke to pt and spouse about SLP services at [facility] and they are agreeable.

## 2021-06-25 VITALS
HEART RATE: 88 BPM | BODY MASS INDEX: 27.78 KG/M2 | SYSTOLIC BLOOD PRESSURE: 149 MMHG | RESPIRATION RATE: 16 BRPM | OXYGEN SATURATION: 97 % | DIASTOLIC BLOOD PRESSURE: 87 MMHG | WEIGHT: 177 LBS | TEMPERATURE: 98.1 F | HEIGHT: 67 IN

## 2021-06-25 LAB
ALBUMIN SERPL-MCNC: 2.56 G/DL (ref 3.5–5.2)
ALBUMIN/GLOB SERPL: 0.8 G/DL
ALP SERPL-CCNC: 171 U/L (ref 39–117)
ALT SERPL W P-5'-P-CCNC: 37 U/L (ref 1–41)
ANION GAP SERPL CALCULATED.3IONS-SCNC: 10.2 MMOL/L (ref 5–15)
AST SERPL-CCNC: 34 U/L (ref 1–40)
BILIRUB SERPL-MCNC: 0.2 MG/DL (ref 0–1.2)
BUN SERPL-MCNC: 27 MG/DL (ref 8–23)
BUN/CREAT SERPL: 17 (ref 7–25)
CALCIUM SPEC-SCNC: 8.5 MG/DL (ref 8.6–10.5)
CHLORIDE SERPL-SCNC: 108 MMOL/L (ref 98–107)
CO2 SERPL-SCNC: 22.8 MMOL/L (ref 22–29)
CREAT SERPL-MCNC: 1.59 MG/DL (ref 0.76–1.27)
GFR SERPL CREATININE-BSD FRML MDRD: 43 ML/MIN/1.73
GLOBULIN UR ELPH-MCNC: 3.2 GM/DL
GLUCOSE BLDC GLUCOMTR-MCNC: 139 MG/DL (ref 70–130)
GLUCOSE BLDC GLUCOMTR-MCNC: 220 MG/DL (ref 70–130)
GLUCOSE SERPL-MCNC: 139 MG/DL (ref 65–99)
POTASSIUM SERPL-SCNC: 4.3 MMOL/L (ref 3.5–5.2)
PROT SERPL-MCNC: 5.8 G/DL (ref 6–8.5)
SODIUM SERPL-SCNC: 141 MMOL/L (ref 136–145)

## 2021-06-25 PROCEDURE — 97110 THERAPEUTIC EXERCISES: CPT

## 2021-06-25 PROCEDURE — 80053 COMPREHEN METABOLIC PANEL: CPT | Performed by: INTERNAL MEDICINE

## 2021-06-25 PROCEDURE — 97535 SELF CARE MNGMENT TRAINING: CPT

## 2021-06-25 PROCEDURE — 82962 GLUCOSE BLOOD TEST: CPT

## 2021-06-25 PROCEDURE — 25010000002 HEPARIN (PORCINE) PER 1000 UNITS: Performed by: FAMILY MEDICINE

## 2021-06-25 PROCEDURE — 97530 THERAPEUTIC ACTIVITIES: CPT

## 2021-06-25 RX ORDER — INSULIN GLARGINE 100 [IU]/ML
5 INJECTION, SOLUTION SUBCUTANEOUS NIGHTLY
Qty: 10 ML | Refills: 0 | Status: SHIPPED | OUTPATIENT
Start: 2021-06-25

## 2021-06-25 RX ORDER — ATORVASTATIN CALCIUM 40 MG/1
40 TABLET, FILM COATED ORAL NIGHTLY
Qty: 30 TABLET | Refills: 0 | Status: SHIPPED | OUTPATIENT
Start: 2021-06-25

## 2021-06-25 RX ORDER — CARVEDILOL 25 MG/1
25 TABLET ORAL 2 TIMES DAILY WITH MEALS
Qty: 60 TABLET | Refills: 0 | Status: SHIPPED | OUTPATIENT
Start: 2021-06-25

## 2021-06-25 RX ORDER — AMLODIPINE BESYLATE 10 MG/1
10 TABLET ORAL
Qty: 30 TABLET | Refills: 0 | Status: SHIPPED | OUTPATIENT
Start: 2021-06-25

## 2021-06-25 RX ORDER — ISOSORBIDE DINITRATE 20 MG/1
20 TABLET ORAL
Qty: 90 TABLET | Refills: 0 | Status: SHIPPED | OUTPATIENT
Start: 2021-06-25

## 2021-06-25 RX ORDER — INSULIN GLARGINE 100 [IU]/ML
10 INJECTION, SOLUTION SUBCUTANEOUS NIGHTLY
Qty: 10 ML | Refills: 0 | Status: SHIPPED | OUTPATIENT
Start: 2021-06-25 | End: 2021-06-25 | Stop reason: SDUPTHER

## 2021-06-25 RX ORDER — LANCETS
EACH MISCELLANEOUS
Qty: 102 EACH | Refills: 0 | Status: SHIPPED | OUTPATIENT
Start: 2021-06-25

## 2021-06-25 RX ORDER — GABAPENTIN 300 MG/1
600 CAPSULE ORAL NIGHTLY
Qty: 10 CAPSULE | Refills: 0 | Status: SHIPPED | OUTPATIENT
Start: 2021-06-25

## 2021-06-25 RX ORDER — QUETIAPINE FUMARATE 25 MG/1
12.5 TABLET, FILM COATED ORAL EVERY 12 HOURS SCHEDULED
Qty: 20 TABLET | Refills: 0 | Status: SHIPPED | OUTPATIENT
Start: 2021-06-25

## 2021-06-25 RX ADMIN — GLIPIZIDE 5 MG: 5 TABLET ORAL at 08:52

## 2021-06-25 RX ADMIN — AMLODIPINE BESYLATE 10 MG: 10 TABLET ORAL at 08:53

## 2021-06-25 RX ADMIN — ASPIRIN 325 MG: 325 TABLET, COATED ORAL at 08:53

## 2021-06-25 RX ADMIN — ALLOPURINOL 300 MG: 300 TABLET ORAL at 08:53

## 2021-06-25 RX ADMIN — ISOSORBIDE DINITRATE 20 MG: 20 TABLET ORAL at 08:53

## 2021-06-25 RX ADMIN — QUETIAPINE FUMARATE 12.5 MG: 25 TABLET, FILM COATED ORAL at 08:53

## 2021-06-25 RX ADMIN — CARVEDILOL 25 MG: 25 TABLET, FILM COATED ORAL at 08:53

## 2021-06-25 RX ADMIN — HEPARIN SODIUM 5000 UNITS: 5000 INJECTION INTRAVENOUS; SUBCUTANEOUS at 08:53

## 2021-06-25 NOTE — PROGRESS NOTES
Patient Assessment Instrument  Quality Indicators - Discharge    Section GG. Self-Care Performance      Section GG. Mobility Performance      Section J. Health Conditions Discharge      Section M. Skin Conditions Discharge      . Current Number of Unhealed Pressure Ulcers      Section N. Medication    Medication Intervention: N/A - There were no potential clinically significant  medication issues identified since admission or patient is not taking any  medications.    Signed by: Salina Perkins, Supervisor

## 2021-06-25 NOTE — PROGRESS NOTES
Occupational Therapy: Individual: 15 minutes.    Physical Therapy:    Speech Language Pathology:    Signed by: Ellen Hays, Occupational Therapist

## 2021-06-25 NOTE — PROGRESS NOTES
Occupational Therapy:    Physical Therapy:    Speech Language Pathology: Individual: 40 minutes.    Signed by: Salina Perkins, Supervisor

## 2021-06-25 NOTE — SIGNIFICANT NOTE
06/25/21 1402   PT Discharge Summary   Reason for Discharge Discharge from facility   Outcomes Achieved Able to achieve all goals within established timeline   Discharge Destination Home with assist

## 2021-06-25 NOTE — THERAPY DISCHARGE NOTE
Inpatient Rehabilitation - IRF Occupational Therapy   /Discharge   [place]     Patient Name: Augusto Lorenzana Jr.  : 1945  MRN: [id number]  Today's Date: 2021               Admit Date: 2021       ICD-10-CM ICD-9-CM   1. Debility  R53.81 799.3   2. Postop encephalopathy post code. Combination of anoxic insult and azotemia  G93.40 348.30   3. Chronic kidney disease, unspecified CKD stage  N18.9 585.9   4. Severe 3 vessel CAD with preserved LV function (CMS/[facility])  I25.110 414.01     411.1   5. Perioperative cardiac arrest with ventricular fibrillation (CMS/[facility])  I46.9 427.5    I49.01 427.41   6. NSTEMI 2021  I21.4 410.70   7. Hyperlipidemia LDL goal <70  E78.5 272.4   8. Essential hypertension  I10 401.9   9. Type 2 diabetes mellitus with other specified complication, with long-term current use of insulin (CMS/[facility])  E11.69 250.80    Z79.4 V58.67   10. Former smokeless tobacco use  Z87.891 V15.82   11. 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 HbA1c 7.4    • Severe 3 vessel CAD with preserved LV function (CMS/[facility])   • A/C kidney disease. ATN due to postoperative arrest. Dialysis begun 6/3/2021   • Gout   • Former smokeless tobacco use   • S/P CABG x 3 on 21   • Perioperative cardiac arrest with ventricular fibrillation (CMS/[facility])   • Postop encephalopathy post code. Combination of anoxic insult and azotemia   • Debility     Past Medical History:   Diagnosis Date   • CAD (coronary artery disease)    • CKD (chronic kidney disease) stage 3, GFR 30-59 ml/min (CMS/[facility])    • Diabetes mellitus (CMS/[facility])    • Hyperlipidemia    • Hypertension    • NSTEMI (non-ST elevated myocardial infarction) (CMS/[facility])      Past Surgical History:   Procedure Laterality Date   • CARDIAC CATHETERIZATION N/A 2021    Procedure: Left Heart Cath;  Surgeon: Jc  Blade BURROUGHS IV, MD;  Location:  [facility] CATH INVASIVE LOCATION;  Service: Cardiovascular;  Laterality: N/A;   • CARDIAC SURGERY      2 stents placed 2012.   • CORONARY ARTERY BYPASS GRAFT N/A 5/28/2021    Procedure: MEDIAN STERNOTOMY CORONARY ARTERY BYPASS X 3 UTILIZING THE LEFT INTERNAL MAMMARY ARTERY GRAFT, EVH OF THE GREATER RIGHT SAPHENOUS VEIN, AND PILO PER ANESTHESIA;  Surgeon: Jacek Miller MD;  Location: [facility] OR;  Service: Cardiothoracic;  Laterality: N/A;          IRF OT ASSESSMENT FLOWSHEET (last 12 hours)      IRF OT Evaluation and Treatment     Row Name 06/25/21 1220          OT Time and Intention    Document Type  discharge evaluation  -     Mode of Treatment  occupational therapy  -     Row Name 06/25/21 1220          General Information    Existing Precautions/Restrictions  cardiac;fall;sternal;swallow precautions  -     Row Name 06/25/21 1220          Cognition/Psychosocial    Follows Commands (Cognition)  WFL;verbal cues/prompting required  -     Personal Safety Interventions  gait belt;nonskid shoes/slippers when out of bed;supervised activity  -     Row Name 06/25/21 1220          Range of Motion (ROM)    Range of Motion  bilateral upper extremities;ROM is WFL  -     Row Name 06/25/21 1220          Strength Comprehensive (MMT)    Comment, General Manual Muscle Testing (MMT) Assessment  BUE - deferred  -     Row Name 06/25/21 1220          Transfers    [place] Level (Toilet Transfer)  standby assist;verbal cues  -     Assistive Device (Toilet Transfer)  grab bars/safety frame  -     Row Name 06/25/21 1220          Bathing    Comment (Bathing)  SBA  -     Row Name 06/25/21 1220          Upper Body Dressing    Comment (Upper Body Dressing)  Set up  -     Row Name 06/25/21 1220          Lower Body Dressing    Comment (Lower Body Dressing)  SBA  -     Row Name 06/25/21 1220          Grooming    Comment (Grooming)  Set up  -     Row Name 06/25/21 1220           Toileting    Assistive Device Use (Toileting)  grab bar/safety frame  -     Comment (Toileting)  SBA  -     Row Name 06/25/21 1220          Self-Feeding    [place] Level (Self-Feeding)  modified independence  -     Row Name 06/25/21 1220          Bathing Goal 1 (OT-IRF)    Progress/Outcomes (Bathing Goal 1, OT-IRF)  goal met  -     Row Name 06/25/21 1220          Bathing Goal 2 (OT-IRF)    Progress/Outcomes (Bathing Goal 2, OT-IRF)  goal met  -     Row Name 06/25/21 1220          LB Dressing Goal 1 (OT-IRF)    Progress/Outcomes (LB Dressing Goal 1, OT-IRF)  goal met  -     Row Name 06/25/21 1220          LB Dressing Goal 2 (OT-IRF)    Progress/Outcomes (LB Dressing Goal 2, OT-IRF)  goal met  -     Row Name 06/25/21 1220          Toileting Goal 1 (OT-IRF)    Progress/Outcomes (Toileting Goal 1, OT-IRF)  goal met  -     Row Name 06/25/21 1220          Toileting Goal 2 (OT-IRF)    Progress/Outcomes (Toileting Goal 2, OT-IRF)  goal met  -     Row Name 06/25/21 1220          Positioning and Restraints    Post Treatment Position  bed  -     In Bed  sitting EOB;call light within reach;encouraged to call for assist;with family/caregiver  -     Row Name 06/25/21 1220          Discharge Summary (OT)    Additional Documentation  Discharge Summary (OT) (Group)  -     Row Name 06/25/21 1220          Discharge Summary (OT)    Discharge Summary Statement (OT)  Education completed with pt and spouse re:  ADL status, safety, home program, DME, precautions, supervision, and D/C concerns.  Verbalized understanding.  -       User Key  (r) = Recorded By, (t) = Taken By, (c) = Cosigned By    Initials Name Effective Dates    CJ Ellen Hays, OT 06/16/21 -         Wound 05/24/21 0430 Right anterior fifth toe Other (comment) (Active)   Dressing Appearance open to air 06/25/21 0752   Closure None 06/24/21 1910   Base dry 06/25/21 0752   Drainage Amount none 06/24/21 1910   Care, Wound other (see comments)  06/24/21 1910   Dressing Care open to air 06/24/21 1910       Wound 05/28/21 midline sternal Incision (Active)   Dressing Appearance open to air 06/25/21 0752   Closure Liquid skin adhesive;Approximated 06/25/21 0752   Base clean;dry;pink 06/25/21 0752   Periwound other (see comments) 06/24/21 1910   Drainage Amount none 06/24/21 1910   Care, Wound other (see comments) 06/24/21 1910   Dressing Care open to air 06/24/21 1910       Wound 05/28/21 Right proximal leg Incision (Active)   Dressing Appearance open to air 06/25/21 0752   Closure Approximated;Liquid skin adhesive 06/25/21 0752   Base clean;dry;pink;red 06/25/21 0752   Periwound edematous 06/24/21 1910   Drainage Amount none 06/24/21 1910   Care, Wound other (see comments) 06/24/21 1910   Dressing Care open to air 06/24/21 1910       Wound 06/02/21 2000 Left distal hand Blisters (Active)   Dressing Appearance open to air 06/25/21 0752   Closure Open to air 06/24/21 1910   Base dry 06/24/21 1910   Drainage Amount none 06/24/21 1910   Care, Wound other (see comments) 06/24/21 1910   Dressing Care open to air 06/24/21 1910       Wound 06/03/21 2000 chest Puncture (Active)   Dressing Appearance open to air 06/25/21 0752   Closure Open to air 06/24/21 1910   Base dry 06/24/21 1910   Drainage Amount none 06/24/21 1910   Care, Wound other (see comments) 06/24/21 1910   Dressing Care open to air 06/24/21 1910       Occupational Therapy Education                 Title: PT OT SLP Therapies (Resolved)     Topic: Occupational Therapy (Resolved)     Point: ADL training (Resolved)     Description:   Instruct learner(s) on proper safety adaptation and remediation techniques during self care or transfers.   Instruct in proper use of assistive devices.              Learning Progress Summary           Patient Acceptance, E,D,H, VU by CJ at 6/25/2021 1222    Acceptance, E,D, VU,NR by CJ at 6/24/2021 1431    Acceptance, E,D, VU,NR by CJ at 6/23/2021 1224    JOSE CRUZ Martins, CARIDAD,CIARRA,NR by  FARHEEN at 6/22/2021 1007    Acceptance, E,D, VU,NR by  at 6/21/2021 1457    Acceptance, E,D, NR,VU,DU by AS at 6/19/2021 1107   Significant Other Acceptance, E,D,H, VU by  at 6/25/2021 1222                   Point: Home exercise program (Resolved)     Description:   Instruct learner(s) on appropriate technique for monitoring, assisting and/or progressing therapeutic exercises/activities.              Learning Progress Summary           Patient Acceptance, E,D,H, VU by  at 6/25/2021 1222    Eager, E, VU,DU,NR by FARHEEN at 6/22/2021 1007    Acceptance, E,D, NR,VU,DU by AS at 6/19/2021 1107   Significant Other Acceptance, E,D,H, VU by  at 6/25/2021 1222                   Point: Precautions (Resolved)     Description:   Instruct learner(s) on prescribed precautions during self-care and functional transfers.              Learning Progress Summary           Patient Acceptance, E,D,H, VU by  at 6/25/2021 1222    Acceptance, E,D, VU,NR by  at 6/24/2021 1431    Acceptance, E,D, VU,NR by  at 6/23/2021 1224    Eager, E, VU,DU,NR by FARHEEN at 6/22/2021 1007    Acceptance, E,D, VU,NR by  at 6/21/2021 1457    Acceptance, E,D, NR,VU,DU by AS at 6/19/2021 1107   Significant Other Acceptance, E,D,H, VU by  at 6/25/2021 1222                   Point: Body mechanics (Resolved)     Description:   Instruct learner(s) on proper positioning and spine alignment during self-care, functional mobility activities and/or exercises.              Learning Progress Summary           Patient Eager, E, VU,DU,NR by FARHEEN at 6/22/2021 1007    Acceptance, E,D, NR,VU,DU by AS at 6/19/2021 1107                               User Key     Initials Effective Dates Name Provider Type Discipline     06/16/21 -  Ellen Hays, OT Occupational Therapist OT    AS 06/16/21 -  Marcela Kearney, OT Occupational Therapist OT     06/16/21 -  Way, Ish R, OTR Occupational Therapist OT                OT Recommendation and Plan  Anticipated Discharge Disposition  (OT): home with 24/7 care  Planned Therapy Interventions (OT): activity tolerance training, BADL retraining, manual therapy/joint mobilization, patient/caregiver education/training, ROM/therapeutic exercise, strengthening exercise, transfer/mobility retraining          OT IRF GOALS     Row Name 06/25/21 1220 06/19/21 1000          Bathing Goal 1 (OT-IRF)    [place] Level (Bathing Goal 1, OT-IRF)  --  standby assist  -AS     Progress/Outcomes (Bathing Goal 1, OT-IRF)  goal met  -  --        Bathing Goal 2 (OT-IRF)    [place] Level (Bathing Goal 2, OT-IRF)  --  supervision required  -AS     Progress/Outcomes (Bathing Goal 2, OT-IRF)  goal met  -  --        LB Dressing Goal 1 (OT-IRF)    [place] (LB Dressing Goal 1, OT-IRF)  --  set-up required;standby assist  -AS     Progress/Outcomes (LB Dressing Goal 1, OT-IRF)  goal met  -  --        LB Dressing Goal 2 (OT-IRF)    [place] (LB Dressing Goal 2, OT-IRF)  --  supervision required  -AS     Progress/Outcomes (LB Dressing Goal 2, OT-IRF)  goal met  -  --        Toileting Goal 1 (OT-IRF)    [place] Level (Toileting Goal 1, OT-IRF)  --  set-up required;standby assist  -AS     Progress/Outcomes (Toileting Goal 1, OT-IRF)  goal met  -  --        Toileting Goal 2 (OT-IRF)    [place] Level (Toileting Goal 2, OT-IRF)  --  supervision required  -AS     Progress/Outcomes (Toileting Goal 2, OT-IRF)  goal met  -  --       User Key  (r) = Recorded By, (t) = Taken By, (c) = Cosigned By    Initials Name Provider Type    Ellen Nichols, OT Occupational Therapist    AS Marcela Kearney, OT Occupational Therapist              Time Calculation:   Time Calculation- OT     Row Name 06/25/21 1234             Time Calculation- OT    Total Timed Code Minutes- OT  15 minute(s)  -        User Key  (r) = Recorded By, (t) = Taken By, (c) = Cosigned By    Initials Name Provider Type    Ellen Nichols, OT Occupational Therapist          Therapy  Charges for Today     Code Description Service Date Service Provider Modifiers Qty    98276267552 HC OT SELF CARE/MGMT/TRAIN EA 15 MIN 6/24/2021 Ellen Hays OT GO 3    82577484346 HC OT THER PROC EA 15 MIN 6/24/2021 Ellen Hays OT GO 2    77440642397 HC OT THERAPEUTIC ACT EA 15 MIN 6/24/2021 Ellen Hays OT GO 1    76200399538 HC OT SELF CARE/MGMT/TRAIN EA 15 MIN 6/25/2021 Ellen Hays OT GO 1               OT Discharge Summary  Anticipated Discharge Disposition (OT): home with 24/7 care  Reason for Discharge: Discharge from facility  Discharge Destination: Home with assist    Ellen Hays OT  6/25/2021

## 2021-06-25 NOTE — THERAPY DISCHARGE NOTE
Inpatient Rehabilitation - Physical Therapy Treatment Note/Discharge   [place]     Patient Name: Augusto Lorenzana Jr.  : 1945  MRN: [id number]  Today's Date: 2021                Admit Date: 2021    Visit Dx:    ICD-10-CM ICD-9-CM   1. Debility  R53.81 799.3   2. Postop encephalopathy post code. Combination of anoxic insult and azotemia  G93.40 348.30   3. Chronic kidney disease, unspecified CKD stage  N18.9 585.9   4. Severe 3 vessel CAD with preserved LV function (CMS/[facility])  I25.110 414.01     411.1   5. Perioperative cardiac arrest with ventricular fibrillation (CMS/[facility])  I46.9 427.5    I49.01 427.41   6. NSTEMI 2021  I21.4 410.70   7. Hyperlipidemia LDL goal <70  E78.5 272.4   8. Essential hypertension  I10 401.9   9. Type 2 diabetes mellitus with other specified complication, with long-term current use of insulin (CMS/[facility])  E11.69 250.80    Z79.4 V58.67   10. Former smokeless tobacco use  Z87.891 V15.82   11. 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 HbA1c 7.4    • Severe 3 vessel CAD with preserved LV function (CMS/[facility])   • A/C kidney disease. ATN due to postoperative arrest. 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 Combination of anoxic insult and azotemia   • Debility     Past Medical History:   Diagnosis Date   • CAD (coronary artery disease)    • CKD (chronic kidney disease) stage 3, GFR 30-59 ml/min (CMS/[facility])    • Diabetes mellitus (CMS/[facility])    • Hyperlipidemia    • Hypertension    • NSTEMI (non-ST elevated myocardial infarction) (CMS/[facility])      Past Surgical History:   Procedure Laterality Date   • CARDIAC CATHETERIZATION N/A 2021    Procedure: Left Heart Cath;   Surgeon: Blade Greene IV, MD;  Location: [facility] CATH INVASIVE LOCATION;  Service: Cardiovascular;  Laterality: N/A;   • CARDIAC SURGERY      2 stents placed 2012.   • CORONARY ARTERY BYPASS GRAFT N/A 5/28/2021    Procedure: MEDIAN STERNOTOMY CORONARY ARTERY BYPASS X 3 UTILIZING THE LEFT INTERNAL MAMMARY ARTERY GRAFT, EVH OF THE GREATER RIGHT SAPHENOUS VEIN, AND PILO PER ANESTHESIA;  Surgeon: Jacek Miller MD;  Location: [facility] OR;  Service: Cardiothoracic;  Laterality: N/A;          PT ASSESSMENT (last 12 hours)      IRF PT Evaluation and Treatment     Row Name 06/25/21 1358          PT Time and Intention    Document Type  -- Discharge treatment  -LL     Mode of Treatment  physical therapy  -LL     Patient/Family/Caregiver Comments/Observations  Patient discharged from inpatient physical rehab this date to home with assistance of spouse and with HEP for PT.  Education completed with patient & spouse regarding home safety, patient & caregiver safety, proper use of gait belt, HEP, current level of assistance required for functional mobility.  Written materials issued & no questions/concerns voiced.  -LL     Row Name 06/25/21 1358          General Information    Existing Precautions/Restrictions  cardiac;fall;sternal  -LL     Row Name 06/25/21 1358          Cognition/Psychosocial    Affect/Mental Status (Cognitive)  WNL  -LL     Orientation Status (Cognition)  oriented x 4  -LL     Follows Commands (Cognition)  WFL;verbal cues/prompting required  -LL     Personal Safety Interventions  gait belt;nonskid shoes/slippers when out of bed;supervised activity  -LL     Row Name 06/25/21 1358          Pain Scale: FACES Pre/Post-Treatment    Pain: FACES Scale, Pretreatment  0-->no hurt  -LL     Posttreatment Pain Rating  0-->no hurt  -LL     Row Name 06/25/21 1358          Mobility    Left Lower Extremity (Weight-bearing Status)  full weight-bearing (FWB)  -LL     Right Lower Extremity (Weight-bearing Status)   full weight-bearing (FWB)  -LL     Row Name 06/25/21 1358          Bed Mobility    Bed Mobility  bed mobility (all) activities  -LL     All Activities, [place] (Bed Mobility)  modified independence  -LL     Row Name 06/25/21 1358          Transfers    Bed-Chair [place] (Transfers)  supervision  -LL     Chair-Bed [place] (Transfers)  supervision  -LL     Assistive Device (Bed-Chair Transfers)  wheelchair  -LL     Sit-Stand [place] (Transfers)  supervision  -LL     Stand-Sit [place] (Transfers)  supervision  -LL     Row Name 06/25/21 1358          Chair-Bed Transfer    Assistive Device (Chair-Bed Transfers)  wheelchair  -LL     Row Name 06/25/21 1358          Sit-Stand Transfer    Assistive Device (Sit-Stand Transfers)  wheelchair  -LL     Row Name 06/25/21 1358          Stand-Sit Transfer    Assistive Device (Stand-Sit Transfers)  wheelchair  -LL     Row Name 06/25/21 1358          Stand Pivot/Stand Step Transfer    Stand Pivot/Stand Step [place] (Transfers)  supervision  -LL     Assistive Device (Stand Pivot Stand Step Transfer)  wheelchair  -LL     Row Name 06/25/21 1358          Car Transfer    Type (Car Transfer)  stand pivot/stand step  -LL     [place] Level (Car Transfer)  standby assist  -LL     Assistive Device (Car Transfer)  wheelchair  -LL     Row Name 06/25/21 1358          Safety Issues, Functional Mobility    Impairments Affecting Function (Mobility)  balance;endurance/activity tolerance;strength  -LL     Row Name 06/25/21 1358          Motor Skills    Therapeutic Exercise  -- HEP reviewed; BTB issued  -LL     Row Name 06/25/21 1358          Gait/Walking Locomotion Goal 1 (PT-IRF)    Progress/Outcomes (Gait/Walking Locomotion Goal 1, PT-IRF)  goal met  -LL     Row Name 06/25/21 1358          Stairs Goal 1 (PT-IRF)    Progress/Outcomes (Stairs Goal 1, PT-IRF)  goal met  -LL     Row Name 06/25/21 1358          Daily Progress Summary (PT)    Impairments Still Limiting  Function (PT)  strength decreased;impaired balance  -LL       User Key  (r) = Recorded By, (t) = Taken By, (c) = Cosigned By    Initials Name Provider Type    Emilee Guy PTA Physical Therapy Assistant          Physical Therapy Education                 Title: PT OT SLP Therapies (Resolved)     Topic: Physical Therapy (Resolved)     Point: Mobility training (Resolved)     Learning Progress Summary           Patient Acceptance, E,D,H, VU by LL at 6/25/2021 1401    Acceptance, E,D, VU,NR by AG at 6/24/2021 1519    Acceptance, E,D, VU by LL at 6/23/2021 1542    Acceptance, E,D,TB, VU,NR by AG at 6/22/2021 1603    Acceptance, E,TB,D, VU,NR by AG at 6/21/2021 1152    Acceptance, E, VU by  at 6/19/2021 1225   Significant Other Acceptance, E,D,H, VU by LL at 6/25/2021 1401                   Point: Home exercise program (Resolved)     Learning Progress Summary           Patient Acceptance, E,D,H, VU by LL at 6/25/2021 1401    Acceptance, E,D, VU,NR by AG at 6/24/2021 1519    Acceptance, E,D, VU by LL at 6/23/2021 1542    Acceptance, E,D,TB, VU,NR by AG at 6/22/2021 1603    Acceptance, E,TB,D, VU,NR by AG at 6/21/2021 1152    Acceptance, E, VU by  at 6/19/2021 1225   Significant Other Acceptance, E,D,H, VU by LL at 6/25/2021 1401                   Point: Body mechanics (Resolved)     Learning Progress Summary           Patient Acceptance, E,D,H, VU by LL at 6/25/2021 1401    Acceptance, E,D, VU,NR by AG at 6/24/2021 1519    Acceptance, E,D, VU by LL at 6/23/2021 1542    Acceptance, E,D,TB, VU,NR by AG at 6/22/2021 1603    Acceptance, E,TB,D, VU,NR by AG at 6/21/2021 1152    Acceptance, E, VU by  at 6/19/2021 1225   Significant Other Acceptance, E,D,H, VU by LL at 6/25/2021 1401                   Point: Precautions (Resolved)     Learning Progress Summary           Patient Acceptance, E,D,H, VU by LL at 6/25/2021 1401    Acceptance, E,D, VU,NR by AG at 6/24/2021 1519    Acceptance, E,D, VU by LL at 6/23/2021  1542    Acceptance, E,D,TB, VU,NR by  at 6/22/2021 1603    Acceptance, E,TB,D, VU,NR by  at 6/21/2021 1152    Acceptance, E, VU by  at 6/19/2021 1225   Significant Other Acceptance, E,D,H, VU by  at 6/25/2021 1401                               User Key     Initials Effective Dates Name Provider Type Discipline     06/16/21 -  Marcela Hanson, PT Physical Therapist PT     05/02/16 -  Emilee Bowles PTA Physical Therapy Assistant PT     05/20/21 -  Yamel Mckeon, LCAYTON Physical Therapist PT                PT Recommendation and Plan        Daily Progress Summary (PT)  Impairments Still Limiting Function (PT): strength decreased, impaired balance         Time Calculation:   PT Charges     Row Name 06/25/21 1402             Time Calculation- PT    Total Timed Code Minutes- PT  25 minute(s)  -        User Key  (r) = Recorded By, (t) = Taken By, (c) = Cosigned By    Initials Name Provider Type     Emilee Bowles PTA Physical Therapy Assistant          Therapy Charges for Today     Code Description Service Date Service Provider Modifiers Qty    50257042865 HC PT THERAPEUTIC ACT EA 15 MIN 6/25/2021 Emilee Bowles PTA GP 1    24824866532 HC PT THER PROC EA 15 MIN 6/25/2021 Emilee Bowles PTA GP 1               PT Discharge Summary  Reason for Discharge: Discharge from facility  Outcomes Achieved: Able to achieve all goals within established timeline  Discharge Destination: Home with assist    Renate Bowles PTA  6/25/2021

## 2021-06-25 NOTE — PROGRESS NOTES
Patient Assessment Instrument  Quality Indicators - Discharge    Section GG. Self-Care Performance      Section GG. Mobility Performance      Section J. Health Conditions Discharge  Fall(s) Since Admission:  No    Section M. Skin Conditions Discharge  Unhealed Pressure Ulcer(s)/Injurie(s) at Stage 1 or Higher:  No    . Current Number of Unhealed Pressure Ulcers      Section N. Medication    Signed by: Yaima Samayoa, Nurse

## 2021-06-25 NOTE — PROGRESS NOTES
Nephrology Progress Note      Subjective     Patient feels better, no chest pain or shortness of breath.     Objective       Vital signs :     Temp:  [97.8 °F (36.6 °C)-98.3 °F (36.8 °C)] 98.3 °F (36.8 °C)  Heart Rate:  [90-96] 96  Resp:  [16-18] 18  BP: (126-145)/(73-82) 145/73      Intake/Output Summary (Last 24 hours) at 6/25/2021 0720  Last data filed at 6/25/2021 0500  Gross per 24 hour   Intake 2280 ml   Output --   Net 2280 ml       Physical Exam:    General Appearance : alert, pleasant, appears stated age, cooperative and oriented  Lungs : clear to auscultation, respirations regular  Heart :  regular rhythm & normal rate, normal S1, S2 and no murmur, no rub  Abdomen : normal bowel sounds, no masses, no hepatomegaly, no splenomegaly, soft non-tender and no guarding  Extremities : moves extremities well, no edema, no cyanosis and no redness  Neurologic :   orientated to person, place, time and situation, Grossly no focal deficits  Acess :       Laboratory Data :     Albumin Albumin   Date Value Ref Range Status   06/25/2021 2.56 (L) 3.50 - 5.20 g/dL Final   06/24/2021 2.85 (L) 3.50 - 5.20 g/dL Final      Magnesium No results found for: MG       PTH               No results found for: PTH    CBC and coagulation:  Results from last 7 days   Lab Units 06/21/21  0120 06/19/21  0330   WBC 10*3/mm3 7.04 6.80   HEMOGLOBIN g/dL 9.6* 8.7*   HEMATOCRIT % 30.3* 27.6*   MCV fL 94.7 95.2   MCHC g/dL 31.7 31.5   PLATELETS 10*3/mm3 257 256     Acid/base balance:      Renal and electrolytes:  Results from last 7 days   Lab Units 06/25/21  0216 06/24/21  1428 06/23/21  0116 06/21/21  0120 06/19/21  0331   SODIUM mmol/L 141 140 141 141 143   POTASSIUM mmol/L 4.3 4.8 4.2 4.2 4.1   MAGNESIUM mg/dL  --   --   --   --  1.7   CHLORIDE mmol/L 108* 106 109* 109* 111*   CO2 mmol/L 22.8 22.9 21.0* 20.9* 22.9   BUN mg/dL 27* 29* 32* 31* 32*   CREATININE mg/dL 1.59* 1.63* 1.84* 1.67* 1.59*   EGFR IF NONAFRICN AM mL/min/1.73 43* 42* 36*  40* 43*   CALCIUM mg/dL 8.5* 8.9 8.5* 8.3* 8.6     Estimated Creatinine Clearance: 41.3 mL/min (A) (by C-G formula based on SCr of 1.59 mg/dL (H)).    Liver and pancreatic function:  Results from last 7 days   Lab Units 06/25/21  0216 06/24/21  1428 06/19/21  0331   ALBUMIN g/dL 2.56* 2.85* 2.50*   BILIRUBIN mg/dL 0.2 0.2 0.3   ALK PHOS U/L 171* 182* 143*   AST (SGOT) U/L 34 31 21   ALT (SGPT) U/L 37 40 21         Cardiac:  Results from last 7 days   Lab Units 06/24/21  1428   PROBNP pg/mL 5,857.0*     Liver and pancreatic function:  Results from last 7 days   Lab Units 06/25/21  0216 06/24/21  1428 06/19/21  0331   ALBUMIN g/dL 2.56* 2.85* 2.50*   BILIRUBIN mg/dL 0.2 0.2 0.3   ALK PHOS U/L 171* 182* 143*   AST (SGOT) U/L 34 31 21   ALT (SGPT) U/L 37 40 21       Medications :     allopurinol, 300 mg, Oral, Daily  amLODIPine, 10 mg, Oral, Q24H  aspirin, 325 mg, Oral, Daily  atorvastatin, 40 mg, Oral, Nightly  carvedilol, 25 mg, Oral, BID With Meals  gabapentin, 600 mg, Oral, Nightly  glipizide, 5 mg, Oral, BID AC  heparin (porcine), 5,000 Units, Subcutaneous, Q12H  isosorbide dinitrate, 20 mg, Oral, TID - Nitrates  QUEtiapine, 12.5 mg, Oral, Q12H      sodium bicarbonate drip (greater than 75 mEq/bag), 75 mEq, Last Rate: Stopped (06/24/21 2325)          Assessment/Plan     1. MICHAEL on CKD  2. CAD s/p CABG X3  3. Non gap metabolic acidosis likely due to MICHAEL  4. Essential hypertension, stable  5. DM-II, stable    Cr improved to 1.5, likely at new baseline after ATN requiring dialysis at [place].   Educated and counseled the patient, to improve hydration.   Follow up in clinic in 4 wks      Alessandra Farias MD  06/25/21  07:20 EDT

## 2021-06-25 NOTE — PROGRESS NOTES
Patient Assessment Instrument  Quality Indicators - Discharge    Section GG. Self-Care Performance      Section GG. Mobility Performance     Roll Left and Right: Patient completed the activities by him/herself with no  assistance from a helper.   Sit to Lying: Patient completed the activities by him/herself with no  assistance from a helper.   Lying to Sitting on Side of Bed: Patient completed the activities by  him/herself with no assistance from a helper.   Sit to Stand: [place] provides verbal cues and/or touching/steadying and/or  contact guard assistance as patient completes activity. Assistance may be  provided throughout the activity or intermittently.   Chair/Bed to Chair Transfer: [place] provides verbal cues and/or  touching/steadying and/or contact guard assistance as patient completes  activity. Assistance may be provided throughout the activity or intermittently.   Toilet Transfer [place] provides verbal cues and/or touching/steadying and/or  contact guard assistance as patient completes activity. Assistance may be  provided throughout the activity or intermittently.   Car Transfer: [place] provides verbal cues and/or touching/steadying and/or  contact guard assistance as patient completes activity. Assistance may be  provided throughout the activity or intermittently.   Walk 10 Feet:   [place] provides verbal cues and/or touching/steadying and/or  contact guard assistance as patient completes activity. Assistance may be  provided throughout the activity or intermittently.  Walk 50 Feet with 2 Turns:   [place] provides verbal cues and/or  touching/steadying and/or contact guard assistance as patient completes  activity. Assistance may be provided throughout the activity or intermittently.  Walk 150 Feet:   [place] provides verbal cues and/or touching/steadying and/or  contact guard assistance as patient completes activity. Assistance may be  provided throughout the activity or intermittently.  Walking 10 Feet  on Uneven Surfaces:   [place] provides verbal cues and/or  touching/steadying and/or contact guard assistance as patient completes  activity. Assistance may be provided throughout the activity or intermittently.  1 Step Over Curb or Up/Down Stair:   [place] provides verbal cues and/or  touching/steadying and/or contact guard assistance as patient completes  activity. Assistance may be provided throughout the activity or intermittently.  4 Steps Up and Down, With/Without Rail:   [place] provides verbal cues and/or  touching/steadying and/or contact guard assistance as patient completes  activity. Assistance may be provided throughout the activity or intermittently.  12 Steps Up and Down, With/Without Rail:   [place] provides verbal cues and/or  touching/steadying and/or contact guard assistance as patient completes  activity. Assistance may be provided throughout the activity or intermittently.  Picking up an Object:   [place] does less than half the effort. [place] lifts,  holds or supports trunk or limbs but provides less than half the effort. Uses  Wheelchair and/or Scooter: No    Section J. Health Conditions Discharge      Section M. Skin Conditions Discharge      . Current Number of Unhealed Pressure Ulcers      Section N. Medication    Signed by: Emilee Bowles PTA

## 2021-06-25 NOTE — PROGRESS NOTES
Patient Assessment Instrument  Quality Indicators - Discharge    Section GG. Self-Care Performance     Eating: Patient completed the activities by him/herself with no assistance from  a helper.   Oral Hygiene: [place] sets up or cleans up; patient completes activity. [place]  assists only prior to or following the activity.   Toileting Hygiene: : [place] provides verbal cues and/or touching/steadying  and/or contact guard assistance as patient completes activity.   Shower/Bathe Self: [place] provides verbal cues and/or touching/steadying and/or  contact guard assistance as patient completes activity.   Upper Body Dressing: [place] sets up or cleans up; patient completes activity.  [place] assists only prior to or following the activity.   Lower Body Dressing: [place] provides verbal cues and/or touching/steadying  and/or contact guard assistance as patient completes activity.   Putting On/Taking Off Footwear: [place] provides verbal cues and/or  touching/steadying and/or contact guard assistance as patient completes  activity.    Section GG. Mobility Performance      Section J. Health Conditions Discharge      Section M. Skin Conditions Discharge      . Current Number of Unhealed Pressure Ulcers      Section N. Medication    Signed by: Ellen Hays, Occupational Therapist

## 2021-06-25 NOTE — DISCHARGE SUMMARY
DISCHARGE SUMMARY        Patient Identification:  Name:  Augusto Lorenzana Jr.  Age:  76 y.o.  Sex:  male  :  1945  MRN:  [id number]  Visit Number:  [id number]    Date of Admission: 2021  Date of Discharge:  2021      PCP: Rob Polanco MD    Discharging Provider: CHARLES Alejandra      Discharge Diagnoses     Debility  Status post CABG x3/CAD  Hypertension  Gout  Hypertension  MICHAEL  Diabetes mellitus    Consults     Consults       Date and Time Order Name Status Description    2021 10:07 AM Inpatient Nephrology Consult Completed     2021  9:20 AM Inpatient Gastroenterology Consult Completed     2021  9:04 AM Inpatient Nephrology Consult Completed     2021 12:18 PM Inpatient Consult to Cardiology Completed     2021 10:59 AM Inpatient Cardiothoracic Surgery Consult Completed     2021  2:25 PM Inpatient Cardiology Consult Completed     2021  9:02 AM Inpatient Cardiology Consult Completed             History of Presenting Illness     76-year-old gentleman with a history of coronary disease requiring stents in , Beatties mellitus, and gout.  Patient apparently had been short of breath with poor blood pressure control and feeling tired and had lower extremity edema which was new.  Patient became worse and was more short of breath and was given aspirin nitroglycerin in route to the hospital.  He was placed on O2 and did have an elevated troponin of 1.89 white count of 17,000 BUN of 436 and creatinine of 1.9.  Patient was diagnosed with non-STEMI and was started hip on a heparin drip and given Lasix for CHF.  Patient had cardiac catheterization on May 24 and was found to have three-vessel disease with a normal ventricle.  Patient was scheduled for CABG on May 28 and had CABG x3 with greater saphenous vein on the right harvested.  In the postoperative course patient had cardiac arrest x2 and did require multiple units of packed red blood cells fresh frozen  plasma and platelets.  Patient's renal function continued to worsen and he he did did necessitate him having intermittent hemodialysis.  Fortunately, the acute kidney injury did improve and he is no longer requiring dialysis.  Patient did have some encephalopathy and an effusion in the postoperative course.  Patient also had difficulty with swallowing and did require NG tube during the admission patient still has some difficulty but the NG tube is out and patient is on restricted diet at this time.  Patient states he has little memory of what happened while he was in the hospital.        Patient continued to progress medically.  Physical therapy and Occupational therapy evaluations were completed with recommended acute inpatient rehabilitation referral for continued functional mobility intervention and reeducation.  Acute rehab referral ordered for continued medical monitoring and management post prolonged hospitalization, continued respiratory status monitoring, lab monitoring, pain mgt needs, bowel/bladder care with new medication education, skin monitoring and breakdown prevention along with ongoing medical comorbidities that require ongoing care.         Hospital Course     Patient denies any complaints or issues, vital signs are stable.  The patient had a urinalysis today, denies any symptoms, denies dysuria, polyuria, back, or flank pain.  Patient is planning discharge home with his spouse today.  Creatinine stable at 1.59.  He will follow up with Dr. Farias in his [place] office.    Participating physical and Occupational Therapy on 6/24: Perform bed mobility with modified independence; perform transfer activities with supervision; utilized wheelchair for chair to bed, sit to stand, stand to sit transfer; ambulated 320 feet x 2 with straight cane; climb 10 stairs with standby assist; performed hip, knee, ankle therapeutic exercises; required standby assistance with verbal cues for bathing; set up  assistance for upper body dressing; standby assistance with verbal cues for lower body dressing; set up assistance for grooming; supervision with verbal cues for toileting.     Status post CABG x3/CAD--continue aspirin, statin therapy, Coreg and Isordil.  Patient asymptomatic     Hypertension-- Continue Norvasc and Coreg.     Gout--allopurinol     Acute kidney injury--nephrology following, we appreciate their input.     Diabetes mellitus--glucose fairly well controlled, Levemir discontinued       Discharge Vitals/Physical Examination     Vital Signs:  Temp:  [98.1 °F (36.7 °C)-98.3 °F (36.8 °C)] 98.1 °F (36.7 °C)  Heart Rate:  [88-96] 88  Resp:  [16-18] 16  BP: (126-149)/(73-87) 149/87  No data found.  SpO2 Percentage    06/24/21 0815 06/24/21 1900 06/25/21 0751   SpO2: 98% 98% 97%     SpO2:  [97 %-98 %] 97 %  on   ;   Device (Oxygen Therapy): room air    Body mass index is 27.78 kg/m².  Wt Readings from Last 3 Encounters:   06/18/21 80.3 kg (177 lb)   06/18/21 80.3 kg (177 lb 1.6 oz)         Physical Exam:    General Appearance:  Alert and oriented, pleasant.  Denies any complaints or issues.      Head: normocephalic, without obvious abnormality and atraumatic     Eyes: lids and lashes normal, conjunctivae and sclerae normal, no icterus, no  pallor, corneas clear and PERRLA     Ears: ears appear intact with no abnormalities noted     Nose: nares normal, septum midline, mucosa normal and no drainage                Throat: no oral lesions, no thrush, oral mucosa moist and oopharynx normal     Lungs: clear to auscultation, respirations regular, respirations even and  respirations unlabored. No accessory muscle use.      Heart:: regular rhythm & normal rate, normal S1, S2, no murmur, no gallop, no  rub and no click.  Chest wall with no abnormalities observed. PMI nondisplaced     Abdomen: normal bowel sounds in all quadrants, no masses, no hepatomegaly,  no splenomegaly, soft non-tender, no guarding and no rebound  tenderness     Extremities: no edema, no cyanosis, no redness, no tenderness, no clubbing     Musculoskeletal: joints with no effusion nor erythema nor warmth.  Pedal pulses palpable and equal bilaterally     Skin: no bleeding, bruising or rash and no lesions noted.  Incision sites to right leg and anterior chest appear to be healing well.     Lymph Nodes: no palpable adenopathy     Neurologic:               Mental Status: Patient is awake alert and oriented x3.  Appropriate.              Sensory: Sensory overall seems to be intact in BLE and BUE.              Motor strength: Generalized weakness      Pertinent Laboratory/Radiology Results     Pertinent Laboratory Results:        Results from last 7 days   Lab Units 06/24/21  1428   PROBNP pg/mL 5,857.0*         Results from last 7 days   Lab Units 06/21/21  0120 06/19/21  0330   WBC 10*3/mm3 7.04 6.80   HEMOGLOBIN g/dL 9.6* 8.7*   HEMATOCRIT % 30.3* 27.6*   MCV fL 94.7 95.2   MCHC g/dL 31.7 31.5   PLATELETS 10*3/mm3 257 256     Results from last 7 days   Lab Units 06/25/21  0216 06/24/21  1428 06/23/21  0116 06/19/21  0331   SODIUM mmol/L 141 140 141 143   POTASSIUM mmol/L 4.3 4.8 4.2 4.1   MAGNESIUM mg/dL  --   --   --  1.7   CHLORIDE mmol/L 108* 106 109* 111*   CO2 mmol/L 22.8 22.9 21.0* 22.9   BUN mg/dL 27* 29* 32* 32*   CREATININE mg/dL 1.59* 1.63* 1.84* 1.59*   EGFR IF NONAFRICN AM mL/min/1.73 43* 42* 36* 43*   CALCIUM mg/dL 8.5* 8.9 8.5* 8.6   GLUCOSE mg/dL 139* 153* 176* 81   ALBUMIN g/dL 2.56* 2.85*  --  2.50*   BILIRUBIN mg/dL 0.2 0.2  --  0.3   ALK PHOS U/L 171* 182*  --  143*   AST (SGOT) U/L 34 31  --  21   ALT (SGPT) U/L 37 40  --  21   Estimated Creatinine Clearance: 41.3 mL/min (A) (by C-G formula based on SCr of 1.59 mg/dL (H)).  No results found for: AMMONIA    Glucose   Date/Time Value Ref Range Status   06/25/2021 0622 139 (H) 70 - 130 mg/dL Final   06/24/2021 2023 235 (H) 70 - 130 mg/dL Final   06/24/2021 1557 176 (H) 70 - 130 mg/dL Final    06/24/2021 1112 244 (H) 70 - 130 mg/dL Final   06/24/2021 0608 150 (H) 70 - 130 mg/dL Final   06/23/2021 1959 223 (H) 70 - 130 mg/dL Final   06/23/2021 1613 208 (H) 70 - 130 mg/dL Final   06/23/2021 1122 182 (H) 70 - 130 mg/dL Final     Lab Results   Component Value Date    HGBA1C 7.40 (H) 05/22/2021     Lab Results   Component Value Date    TSH 4.030 06/03/2021       No results found for: BLOODCX  No results found for: URINECX  No results found for: WOUNDCX  No results found for: STOOLCX  No results found for: RESPCX  Pain Management Panel       Pain Management Panel Latest Ref Rng & Units 5/30/2021 5/28/2021    CREATININE UR mg/dL 136.1 -    AMPHETAMINES SCREEN, URINE Negative - Negative    BARBITURATES SCREEN Negative - Negative    BENZODIAZEPINE SCREEN, URINE Negative - Negative    BUPRENORPHINEUR Negative - Negative    COCAINE SCREEN, URINE Negative - Negative    METHADONE SCREEN, URINE Negative - Negative    METHAMPHETAMINEUR Negative - Negative            Pertinent Radiology Results:    Imaging Results (All)       Procedure Component Value Units Date/Time    XR Chest 1 View [[id number]] Collected: 06/24/21 1045     Updated: 06/24/21 1048    Narrative:      EXAM:    XR Chest, 1 View     EXAM DATE:    6/24/2021 9:50 AM     CLINICAL HISTORY:    mary     TECHNIQUE:    Frontal view of the chest.     COMPARISON:    No relevant prior studies available.     FINDINGS:    Lungs:  Unremarkable.  No consolidation.    Pleural space:  Unremarkable.  No pneumothorax.    Heart:  CABG changes.    Mediastinum:  Unremarkable.    Bones/joints:  Unremarkable.       Impression:        Changes of prior CABG. No acute findings identified.     This report was finalized on 6/24/2021 10:46 AM by Dr. Burton Plasencia MD.       FL Video Swallow Single Contrast [[id number]] Collected: 06/24/21 1006     Updated: 06/24/21 1009    Narrative:      EXAMINATION: FL VIDEO SWALLOW SINGLE-CONTRAST-      CLINICAL INDICATION:     Aspiration r/o      TECHNIQUE: Modified barium swallow was performed utilizing video  fluoroscopy in conjunction with the Speech Pathology team. The patient  ingested multiple barium media including thin barium, nectar, and honey  liquid as well as barium pudding and a barium pudding coated cracker.      FLUOROSCOPY TIME: 1.4 minutes     COMPARISON: 06/19/2021      FINDINGS:      Penetration and aspiration with thin liquids. Aspiration also noted with  remaining residue after the swallow with repeated trials of nectar  liquids and with cup and straw presentation.          Impression:      1.  Penetration with aspiration of thin liquids.  2. Please see dysphasia notes for further details.     This report was finalized on 6/24/2021 10:07 AM by Dr. Burton Plasencia MD.       FL Video Swallow Single Contrast [[id number]] Collected: 06/19/21 1326     Updated: 06/19/21 1335    Narrative:      FL VIDEO SWALLOW SINGLE-CONTRAST-     CLINICAL INDICATION: Aspiration r/o        TECHNIQUE:   Speech therapy service was present. The patient was examined in the  sitting lateral position and was given several consistencies of barium.     FINDINGS: Penetration and aspiration of nectar and thin liquids.  Isolated event of aspiration after the swallow with honey-thickened  liquids.     FLUOROSCOPY TIME: 1.9 minutes     Images: Cine loop was acquired       Impression:      Aspiration with nectar and thin liquids as well as a single  event of aspiration with honey-thickened liquids.     For additional information please see the report provided by the speech  therapy service     This report was finalized on 6/19/2021 1:33 PM by Dr. Lucho Toledo MD.               Test Results Pending at Discharge:         Discharge Disposition/Discharge Medications/Discharge Appointments     Discharge Disposition:     Home or Self Care    Code Status While Inpatient:    Code Status and Medical Interventions:   Ordered at: 06/18/21 8953     Level Of Support Discussed With:     Patient     Code Status:    CPR     Medical Interventions (Level of Support Prior to Arrest):    Full       Discharge Medications:       Discharge Medications        New Medications        Instructions Start Date   accu-chek multiclix lancets   Check three times daily before meals      amLODIPine 10 MG tablet  Commonly known as: NORVASC  Notes to patient: Next dose due 6/26/2021   10 mg, Oral, Every 24 Hours Scheduled      atorvastatin 40 MG tablet  Commonly known as: LIPITOR  Notes to patient: Next dose due 6/25/2021 at bedtime    40 mg, Oral, Nightly      carvedilol 25 MG tablet  Commonly known as: COREG  Notes to patient: Next dose due 6/25/2021 at 5:00 pm   25 mg, Oral, 2 Times Daily With Meals      isosorbide dinitrate 20 MG tablet  Commonly known as: ISORDIL  Notes to patient: Next dose due 6/25/2021 at 1:00 pm   20 mg, Oral, 3 Times Daily (Nitrates)      QUEtiapine 25 MG tablet  Commonly known as: SEROquel  Notes to patient: Next dose due 6/25/2021 at bedtime    12.5 mg, Oral, Every 12 Hours Scheduled             Changes to Medications        Instructions Start Date   gabapentin 300 MG capsule  Commonly known as: NEURONTIN  What changed: when to take this  Notes to patient: Next dose due 6/25/2021 at bedtime    600 mg, Oral, Nightly      insulin glargine 100 UNIT/ML injection  Commonly known as: LANTUS, SEMGLEE  What changed: how much to take  Notes to patient: Next dose due at 6/25/2021   5 Units, Subcutaneous, Nightly             Continue These Medications        Instructions Start Date   allopurinol 300 MG tablet  Commonly known as: ZYLOPRIM  Notes to patient: Next dose due 6/26/2021   300 mg, Oral, Daily      aspirin 81 MG EC tablet  Notes to patient: Next dose due 6/26/2021   81 mg, Oral, Daily      glipizide 5 MG tablet  Commonly known as: GLUCOTROL  Notes to patient: Next dose due 6/25/2021 at 5:00 pm   5 mg, Oral, 2 Times Daily Before Meals             Stop These Medications      amLODIPine-benazepril  10-20 MG per capsule  Commonly known as: LOTREL     cloNIDine 0.1 MG tablet  Commonly known as: CATAPRES     clopidogrel 75 MG tablet  Commonly known as: PLAVIX     simvastatin 20 MG tablet  Commonly known as: ZOCOR              Discharge Appointments:    Your Scheduled Appointments      Jun 25, 2021  1:45 PM  [employer] Video Visit with CHARLES Bob  [facility] CARDIOLOGY ([place]) [address]  [facility] [address] [phone]   Please review the Appointment Instructions website the day before your appointment.  Ensure in [employer] that you click Appointments and not Urgent Care Video Visits to access your scheduled video visit.  Please sign in to [employer] 15 minutes early for your appointment to complete the eCheck in process prior to your appointment.  The video portion of your visit will take place in the application Zoom.  Please download Zoom before your visit per the Appointment Instructions. Please note you do NOT need a Zoom account.  To test your hardware, please click here to open [email or web address]'s Test Meeting website, and follow the instructions to verify that your camera/microphone work as expected.    Where to access your scheduled video visit:         Jul 08, 2021 10:15 AM  Post-Op with Jacek Miller MD  [facility] CARDIOTHORACIC SURGERY ([place]) [address]  [facility] [address] [phone]   -Bring photo ID, insurance card, and list of medications to appointment  -If testing was completed outside of [facility] then patient must bring images on a disc  -Copay will be collected at time of appointment  -Established patients should arrive 15 minutes prior to appointment       Jul 27, 2021 10:40 AM  Hospital Follow Up with CHARLES Maradiaga  [facility] CARDIOLOGY ([place]) [address]   [address]  [facility] [address] [phone]   -Bring photo ID, insurance card, and list of  medications to appointment  -If testing was completed outside of [facility] then patient must bring images on a disc  -Copay will be collected at time of appointment  -Established patients should arrive 15 minutes prior to appointment              Additional Instructions for the Follow-ups that You Need to Schedule       Ambulatory Referral to Speech Therapy   As directed            Follow-up Information       Rob Polanco MD. Go on 7/1/2021.    Specialty: Internal Medicine  Why: 10:00 AM  Contact information:  [address]  [address]  [facility] [address] [phone]               [facility]. Call in 3 day(s).    Specialty: [facility]  Why: They will call you by Monday with your appointment for Outpatient Speech Therapy.  Contact information:  [address]  [facility] [address] [phone]             Alessandra Farias MD. Go on 7/14/2021.    Specialty: Nephrology  Why: at 11:15 AM  Contact information:  [address] [facility] [address] [phone]                     Additional Instructions for the Follow-ups that You Need to Schedule       Ambulatory Referral to Speech Therapy   As directed              Condition at Discharge:    Stable, much improved with no issues today    Discharge Plan Of Care:  Spoke to Jody Lynn, SLP who did FEES to check pt's swallowing today.  He has been upgraded to nectar thick liquids with chin tuck.  He will need outpatient SLP for dysphagia 2 times per week for 4 weeks.  Pt and spouse preferred [facility] in [place], but they only offer PT.  Called [facility] dept and left message to return my call to schedule outpatient SLP.    Spoke to Ellen at [facility] [phone] about new SLP referral.  They received the fax and will be reviewing it and will call with an appointment tomorrow.  Spoke to pt and spouse about SLP services at [facility] and they are agreeable.        Please note that this discharge summary  required more than 30 minutes to complete.      Scribed for Maldonado Fried MD by CHARLES Alejandra. 6/25/2021  10:03 EDT       CHARLES Alejandra  06/25/21  10:03 EDT     Physician Attestation:    The documentation recorded by the scribe accurately reflects the service I personally performed and the decisions made by me.    Maldonado Fried MD  Infectious Diseases  06/26/21  08:58 EDT

## 2021-06-25 NOTE — SIGNIFICANT NOTE
06/25/21 0902   Plan   Plan Faxed ambulatory referral for outpatient speech therapy to [facility] at [phone].

## 2021-06-25 NOTE — THERAPY TREATMENT NOTE
Inpatient Rehabilitation - Speech Language Pathology   Swallow Treatment Note [facility]     Patient Name: Augusto Lorenzana Jr.  : 1945  MRN: [id number]  Today's Date: 2021             Admit Date: 2021    DYSPHAGIA THERAPY PLAN OF CARE:     Augusto Lorenzana Jr. was seen this am to provide pt education related to current modified po diet of mechanical soft solids, chopped meats, and NECTAR thick liquids.  Pt is planned for d/c this pm.     He is s/p participation in MBSS on 21 w/ upgraded po diet of mechanical soft solids, chopped meats, and nectar thick liquids.  He has participated in formal dysphagia tx across this admission.      He is felt to most benefit from formal dysphagia tx following discharge.  Pt is given Easy Mix Simply-Thick Starter Kit for Nectar thick liquids at discharge. Pt is educated on usage, how to obtain additional thickener, and Dickey Free Water Protocol.  Written education provided.     Current dysphagia tx goals listed below.     Long Term Goal:  Pt will accept least restrictive diet tolerance w/o overt s/s aspiration.      Short Term Goals:     1. Pt will perform OROM/DANA exercises x3 sets x10 reps w/ min cues.     2. Pt will perform resistive breathing exercises x3 sets x5 reps w/resistance of 2-6  To improve respiratory support and control.      3. Pt will perform laryngeal adduction/elevation exercises x3 sets x10 reps w/ min cues.      4. Pt will perform CTAR/Shaker exercises sustained x3 sets x5 reps for 30+ seconds over 3 consecutive sessions.      5. Pt will perform CTAR/Shaker exercises repetitive x3 sets x12 reps w/ mod cues.       6. Pt will perform compensatory techniques during meals w/ min cues.     7. Pt will participate in instrumental re-evaluation of swallowing fnx in 7-10 days, pending progress towards this poc.       Thank you-  Jody Lynn M.S., CFY-SLP    Visit Dx:     ICD-10-CM ICD-9-CM   1. Debility  R53.81 799.3   2. Postop encephalopathy post  code. Combination of anoxic insult and azotemia  G93.40 348.30   3. Chronic kidney disease, unspecified CKD stage  N18.9 585.9   4. Severe 3 vessel CAD with preserved LV function (CMS/[facility])  I25.110 414.01     411.1   5. Perioperative cardiac arrest with ventricular fibrillation (CMS/[facility])  I46.9 427.5    I49.01 427.41   6. NSTEMI 5/22/2021  I21.4 410.70   7. Hyperlipidemia LDL goal <70  E78.5 272.4   8. Essential hypertension  I10 401.9   9. Type 2 diabetes mellitus with other specified complication, with long-term current use of insulin (CMS/[facility])  E11.69 250.80    Z79.4 V58.67   10. Former smokeless tobacco use  Z87.891 V15.82   11. S/P CABG x 3 on 5/28/21  Z95.1 V45.81     Patient Active Problem List   Diagnosis   • NSTEMI 5/22/2021   • Hyperlipidemia LDL goal <70   • Essential hypertension   • T2DM on oral agents and insulin. HbA1c 7.4    • Severe 3 vessel CAD with preserved LV function (CMS/[facility])   • A/C kidney disease. ATN due to postoperative arrest. Dialysis begun 6/3/2021   • Gout   • Former smokeless tobacco use   • S/P CABG x 3 on 5/28/21   • Perioperative cardiac arrest with ventricular fibrillation (CMS/[facility])   • Postop encephalopathy post code. Combination of anoxic insult and azotemia   • Debility     Past Medical History:   Diagnosis Date   • CAD (coronary artery disease)    • CKD (chronic kidney disease) stage 3, GFR 30-59 ml/min (CMS/[facility])    • Diabetes mellitus (CMS/[facility])    • Hyperlipidemia    • Hypertension    • NSTEMI (non-ST elevated myocardial infarction) (CMS/[facility])      Past Surgical History:   Procedure Laterality Date   • CARDIAC CATHETERIZATION N/A 5/24/2021    Procedure: Left Heart Cath;  Surgeon: Blade Greene IV, MD;  Location: [facility] CATH INVASIVE LOCATION;  Service: Cardiovascular;  Laterality: N/A;   • CARDIAC SURGERY      2 stents placed 2012.   • CORONARY ARTERY BYPASS GRAFT N/A 5/28/2021    Procedure: MEDIAN STERNOTOMY CORONARY ARTERY BYPASS X 3 UTILIZING THE LEFT INTERNAL  MAMMARY ARTERY GRAFT, EVH OF THE GREATER RIGHT SAPHENOUS VEIN, AND PILO PER ANESTHESIA;  Surgeon: Jacek Miller MD;  Location: [facility];  Service: Cardiothoracic;  Laterality: N/A;       EDUCATION  The patient has been educated in the following areas:   Dysphagia (Swallowing Impairment) Oral Care/Hydration Modified Diet Instruction.    SLP Recommendation and Plan         Continue per POC.           Time Calculation:       Therapy Charges for Today     Code Description Service Date Service Provider Modifiers Qty    [id number] HC ST MOTION FLUORO EVAL SWALLOW 3 6/24/2021 Jody Lynn MS, CFY-SLP GN 1               Jody Lynn MS, CFY-SLP  6/25/2021

## 2021-06-25 NOTE — SIGNIFICANT NOTE
06/25/21 1058   Plan   Plan Faxed ambulatory referral for outpatient speech and discharge summary to [facility] [phone].   Final Discharge Disposition Code 01 - home or self-care   Final Note Pt is being discharged home with spouse today.  Spouse will provide transportation home at discharge.

## 2021-06-25 NOTE — PROGRESS NOTES
Occupational Therapy:    Physical Therapy: Individual: 25 minutes.    Speech Language Pathology:    Signed by: Emilee Bowles PTA

## 2021-06-29 NOTE — PROGRESS NOTES
PPS CMG Coordinator  Inpatient Rehabilitation Discharge    Mode of Locomotion: Walking.    Discharge Against Medical Advice:  No.  Discharge Information  Patient Discharged Alive:  Yes  Discharge Destination/Living Setting: Home.  At discharge, the patient was discharged to live (with) (02)  Family / Relatives    Diagnosis for Interruption/Death:    Impairment Group: 09 Cardiac    Comorbidities: Rank Code      Description      1    I13.0     Hypertensive heart and chronic kidney disease                 with heart failure and stage 1 through stage 4                 chronic kidney disease, or unspecified chronic                 kidney disease  2    N17.9     Acute kidney failure, unspecified  3    I50.9     Heart failure, unspecified  4    I25.10    Atherosclerotic heart disease of native                 coronary artery without angina pectoris  5    E11.22    Type 2 diabetes mellitus with diabetic chronic                 kidney disease  6    E78.5     Hyperlipidemia, unspecified  7    I35.0     Nonrheumatic aortic (valve) stenosis  8    M10.9     Gout, unspecified  9    E11.649   Type 2 diabetes mellitus with hypoglycemia                 without coma  10   R13.10    Dysphagia, unspecified  11   Z95.1     Presence of aortocoronary bypass graft  12   Z86.74    Personal history of sudden cardiac arrest  13   Z79.4     Long term (current) use of insulin    Complications:      GEMA Bladder Accidents: 0  - Accidents.  Bladder Score = 5.  One (1) bladder accident.  GEMA Bowel Accident: 0  - Accidents.  Bowel Score = 7. Patient has no accidents.    Signed by: Nurse Mauricio

## 2021-07-06 ENCOUNTER — TELEPHONE (OUTPATIENT)
Dept: CARDIAC SURGERY | Facility: CLINIC | Age: 76
End: 2021-07-06

## 2021-07-06 NOTE — TELEPHONE ENCOUNTER
Patient is a P/O CABG from 5/28 with Dr. Miller.      Patients wife called the office today stating his leg incision has began draining a clear liquid , that started about an hour before calling. He denies any redness, warmth or pain with the area.     Informed the patient to keep dry and clean with a dry guaze on the area. He does have a f/u with Dr. Miller on 7/8/21 and he will evaluate it then. Notified them to call the office if anything changes or becomes worse.

## 2021-07-08 ENCOUNTER — OFFICE VISIT (OUTPATIENT)
Dept: CARDIAC SURGERY | Facility: CLINIC | Age: 76
End: 2021-07-08

## 2021-07-08 VITALS
BODY MASS INDEX: 28.72 KG/M2 | TEMPERATURE: 98.4 F | OXYGEN SATURATION: 99 % | HEIGHT: 67 IN | DIASTOLIC BLOOD PRESSURE: 80 MMHG | WEIGHT: 183 LBS | HEART RATE: 83 BPM | SYSTOLIC BLOOD PRESSURE: 128 MMHG

## 2021-07-08 DIAGNOSIS — Z95.1 S/P CABG X 3: Primary | ICD-10-CM

## 2021-07-08 PROCEDURE — 99024 POSTOP FOLLOW-UP VISIT: CPT | Performed by: STUDENT IN AN ORGANIZED HEALTH CARE EDUCATION/TRAINING PROGRAM

## 2021-07-08 RX ORDER — FUROSEMIDE 40 MG/1
40 TABLET ORAL 2 TIMES DAILY
Qty: 28 TABLET | Refills: 0 | Status: SHIPPED | OUTPATIENT
Start: 2021-07-08 | End: 2021-07-27

## 2021-07-08 NOTE — PROGRESS NOTES
Date: 2021   Patient Name: Augusto Lorenzana Jr.  Address: [address] [address]  : 1945   Phone #: [unfilled]   MRN: [id number]     Referring Physician: No ref. provider found    Chief Complaint:    Chief Complaint   Patient presents with   • Coronary Artery Disease     Hospital follow s/p CABG on 21       History of Present Illness: Augusto Lorenzana Jr. is a 76 y.o. male who is here today for follow up for coronaries x3.  He had a brief bradycardic arrest on the night of surgery.  He is complicated in the hospital by encephalopathy.  He has done remarkably well in the interim.  He is back to his normal self and says he feels better than he did before surgery.  He is overall doing well although does have bilateral pitting edema to the knees.  His right below the knee saphenous vein harvest site has opened superficially but the deep tissues are intact.  There is drainage, although it is from the edematous legs.  I redressed the wound.    Review of Systems:   Review of Systems   Constitutional: Negative for chills, fever, malaise/fatigue, night sweats and weight loss.   HENT: Negative for hearing loss, odynophagia and sore throat.    Cardiovascular: Positive for leg swelling. Negative for chest pain, dyspnea on exertion, orthopnea and palpitations.   Respiratory: Negative for cough and hemoptysis.    Endocrine: Negative for cold intolerance, heat intolerance, polydipsia, polyphagia and polyuria.   Hematologic/Lymphatic: Does not bruise/bleed easily.   Skin: Negative for itching and rash.   Musculoskeletal: Negative for joint pain, joint swelling and myalgias.   Gastrointestinal: Negative for abdominal pain, constipation, diarrhea, hematemesis, hematochezia, melena, nausea and vomiting.   Genitourinary: Negative for dysuria, frequency and hematuria.   Neurological: Negative for focal weakness, headaches, numbness and seizures.   Psychiatric/Behavioral: Negative for suicidal ideas.   All other  "systems reviewed and are negative.       I have reviewed and the following portions of the patient's history were updated as appropriate: past family history, past medical history, past social history, past surgical history and problem list.    Medications:     Current Outpatient Medications:   •  allopurinol (ZYLOPRIM) 300 MG tablet, Take 300 mg by mouth Daily., Disp: , Rfl:   •  amLODIPine (NORVASC) 10 MG tablet, Take 1 tablet by mouth Daily., Disp: 30 tablet, Rfl: 0  •  aspirin 81 MG EC tablet, Take 81 mg by mouth Daily., Disp: , Rfl:   •  atorvastatin (LIPITOR) 40 MG tablet, Take 1 tablet by mouth Every Night., Disp: 30 tablet, Rfl: 0  •  carvedilol (COREG) 25 MG tablet, Take 1 tablet by mouth 2 (Two) Times a Day With Meals., Disp: 60 tablet, Rfl: 0  •  gabapentin (NEURONTIN) 300 MG capsule, Take 2 capsules by mouth Every Night., Disp: 10 capsule, Rfl: 0  •  glipizide (GLUCOTROL) 5 MG tablet, Take 5 mg by mouth 2 (Two) Times a Day Before Meals., Disp: , Rfl:   •  insulin glargine (LANTUS, SEMGLEE) 100 UNIT/ML injection, Inject 5 Units under the skin into the appropriate area as directed Every Night., Disp: 10 mL, Rfl: 0  •  isosorbide dinitrate (ISORDIL) 20 MG tablet, Take 1 tablet by mouth 3 (Three) Times a Day., Disp: 90 tablet, Rfl: 0  •  Lancets (accu-chek multiclix) lancets, Check three times daily before meals, Disp: 102 each, Rfl: 0  •  QUEtiapine (SEROquel) 25 MG tablet, Take 0.5 tablets by mouth Every 12 (Twelve) Hours., Disp: 20 tablet, Rfl: 0  •  furosemide (Lasix) 40 MG tablet, Take 1 tablet by mouth 2 (Two) Times a Day for 14 days., Disp: 28 tablet, Rfl: 0    Allergies:   No Known Allergies    Physical Exam:  Vital Signs:   Vitals:    07/08/21 1024   BP: 128/80   BP Location: Left arm   Patient Position: Sitting   Pulse: 83   Temp: 98.4 °F (36.9 °C)   SpO2: 99%   Weight: 83 kg (183 lb)   Height: 170.2 cm (67\")     Body mass index is 28.66 kg/m².     Physical Exam  Vitals and nursing note reviewed. "   Constitutional:       Appearance: He is well-developed. He is not toxic-appearing.   HENT:      Head: Normocephalic.   Eyes:      Conjunctiva/sclera: Conjunctivae normal.      Pupils: Pupils are equal, round, and reactive to light.   Neck:      Vascular: No carotid bruit or JVD.   Cardiovascular:      Rate and Rhythm: Normal rate and regular rhythm.      Pulses:           Carotid pulses are 2+ on the right side and 2+ on the left side.       Radial pulses are 2+ on the right side and 2+ on the left side.        Femoral pulses are 2+ on the right side and 2+ on the left side.       Popliteal pulses are 2+ on the right side and 2+ on the left side.        Dorsalis pedis pulses are 2+ on the right side and 2+ on the left side.        Posterior tibial pulses are 2+ on the right side and 2+ on the left side.      Heart sounds: Normal heart sounds. No murmur heard.   No systolic murmur is present.   No diastolic murmur is present.   No friction rub. No gallop.    Pulmonary:      Effort: Pulmonary effort is normal. No respiratory distress.      Breath sounds: Normal breath sounds. No wheezing, rhonchi or rales.   Chest:      Chest wall: No tenderness.   Abdominal:      General: Bowel sounds are normal. There is no distension.      Palpations: Abdomen is soft. There is no mass.      Tenderness: There is no abdominal tenderness. There is no guarding.   Musculoskeletal:         General: Normal range of motion.      Cervical back: Normal range of motion.      Right lower le+ Pitting Edema present.      Left lower le+ Pitting Edema present.   Lymphadenopathy:      Cervical: No cervical adenopathy.   Skin:     General: Skin is warm and dry.      Capillary Refill: Capillary refill takes less than 2 seconds.      Findings: No rash.          Neurological:      Mental Status: He is alert and oriented to person, place, and time.      Cranial Nerves: No cranial nerve deficit.   Psychiatric:         Speech: Speech normal.          Behavior: Behavior normal.         Thought Content: Thought content normal.         Judgment: Judgment normal.        Wound: Sternum stable, wound as described in subjective    Results:   I have reviewed all pertinent radiographic studies, laboratory results and/or pathology as available.   No radiology results for the last 7 days     He had an x-ray done in [place] on July 2 that was clear    Assessment/Plan:   Diagnoses and all orders for this visit:    1. S/P CABG x 3 on 5/28/21 (Primary)    Other orders  -     furosemide (Lasix) 40 MG tablet; Take 1 tablet by mouth 2 (Two) Times a Day for 14 days.  Dispense: 28 tablet; Refill: 0    CABG x3-she is doing well and is having a satisfactory recovery.  We discussed his restrictions going forward as he is now at the 6-week pierre.  I will prescribe him Lasix.  He can send us pictures of his saphenous site as of right now it is clean.  I paint with Betadine and redressed it.  If there are issues will call in antibiotics.  To save a 3 Hour trip I told him just to send a picture.  He can follow-up with me as needed overall I think he is doing nearly miraculously well given his arrest and encephalopathy.    Follow Up:   Return if symptoms worsen or fail to improve.    The ROS, past medical history, surgical history, family history, social history and vitals were reviewed by myself and corrected as needed.      Jacek Miller MD  [facility] Cardiothoracic Surgery   Electronically signed by Jacek Miller MD, 07/08/21, 10:26 AM EDT.

## 2021-07-21 NOTE — PROGRESS NOTES
"[facility]      Identification: Augusto Lorenzana Jr. is a 76 y.o. male who resides in [place], [place]    Reason for visit:  · Coronary artery disease  · Hypertension  · Hyperlipidemia    Subjective      Augusto Lorenzana Jr. presents to [facility] for followup.    History of Present Illness    Patient is a 76-year-old gentleman who returns today for follow-up after recent hospitalization.  Patient presented with fatigue, shortness of breath and ruled in for NSTEMI.  He underwent cardiac catheterization which showed severe multivessel CAD.  He was referred for CABG and underwent the procedure with Dr. Miller on 5/28/2021 receiving a month LAD, SVG to the first OM and SVG to the RPDA 2.  The patient followed up with Dr. Miller earlier this month.  He was struggling with lower extremity edema and his saphenous vein graft harvest site had some oozing.  This was dressed by CT surgery.  He was placed on Lasix and referred to nephrology.  He was evaluated by nephrology and his Lasix was changed to 0.5 mg of Bumex to take daily until swelling resolved.  He said his swelling did improved and he stopped taking it 4 days ago but the swelling is back.  He does not see nephrology again for 3 months.  Right lower leg dressing to the SVG harvest site is intact and dry.  He said it is no longer oozing.  His creatinine level at baseline is around 1.5-1.65.  He denies any chest pain/pressure/tightness or heaviness or increased shortness of breath.  Overall he is feeling well.    Objective     /72   Pulse 92   Ht 170.2 cm (67.01\")   SpO2 97%   BMI 28.66 kg/m²       Constitutional:       Appearance: Healthy appearance. Well-developed.   Eyes:      General: Lids are normal. No scleral icterus.     Conjunctiva/sclera: Conjunctivae normal.   HENT:      Head: Normocephalic and atraumatic.   Neck:      Thyroid: No thyromegaly.      Vascular: No carotid bruit or JVD.   Pulmonary:      Effort: Pulmonary " effort is normal.      Breath sounds: Normal breath sounds. No wheezing. No rhonchi. No rales.   Cardiovascular:      Normal rate. Regular rhythm.      Murmurs: There is no murmur.      No gallop. No rub.   Pulses:     Intact distal pulses.   Edema:     Pretibial: bilateral 1+ pitting edema of the pretibial area.     Ankle: bilateral 1+ pitting edema of the ankle.     Feet: bilateral 1+ pitting edema of the feet.  Abdominal:      General: There is no distension.      Palpations: Abdomen is soft. There is no abdominal mass.   Musculoskeletal:      Cervical back: Normal range of motion. Skin:     General: Skin is warm and dry.      Findings: No rash.   Neurological:      General: No focal deficit present.      Mental Status: Alert and oriented to person, place, and time.      Gait: Gait is intact.   Psychiatric:         Attention and Perception: Attention normal.         Mood and Affect: Mood normal.         Behavior: Behavior normal.         Result Review :    Lab Results   Component Value Date    GLUCOSE 139 (H) 06/25/2021    BUN 27 (H) 06/25/2021    CREATININE 1.59 (H) 06/25/2021    EGFRIFNONA 43 (L) 06/25/2021    BCR 17.0 06/25/2021    K 4.3 06/25/2021    CO2 22.8 06/25/2021    CALCIUM 8.5 (L) 06/25/2021    ALBUMIN 2.56 (L) 06/25/2021    AST 34 06/25/2021    ALT 37 06/25/2021     Lab Results   Component Value Date    WBC 7.04 06/21/2021    HGB 9.6 (L) 06/21/2021    HCT 30.3 (L) 06/21/2021    MCV 94.7 06/21/2021     06/21/2021     Lab Results   Component Value Date    CHOL 60 06/07/2021    TRIG 51 06/07/2021    HDL 34 (L) 05/22/2021    LDL 88 05/22/2021     Lab Results   Component Value Date    HGBA1C 7.40 (H) 05/22/2021             Assessment     Problem List Items Addressed This Visit        Cardiac and Vasculature    Essential hypertension (Chronic)    Current Assessment & Plan     · Continue amlodipine 10 mg daily  · Continue Coreg 25 mg twice daily  · Continue clonidine 0.1 mg daily  · Continue  isosorbide dinitrate 20 mg daily         Relevant Medications    bumetanide (BUMEX) 0.5 MG tablet    cloNIDine (CATAPRES) 0.1 MG tablet    Coronary artery disease involving native coronary artery of native heart with unstable angina pectoris (CMS/HCC) - Primary    Overview     · Cardiac cath for NSTEMI (5/24/2021): Multivessel CAD.  Referred for CABG  · CABG with Dr. Miller (5/28/2021):LIMA to LAD.  SVG to first OM.  SVG to PDA2. Greater saphenous vein to first obtuse marginal           Current Assessment & Plan     · Arrange to start cardiac rehab  · Continue aspirin 81 mg daily  · Continue Coreg 25 mg twice daily         Relevant Orders    Basic Metabolic Panel    Hyperlipidemia LDL goal <70    Current Assessment & Plan     · Continue Lipitor 40 mg daily            Symptoms and Signs    Lower extremity edema    Current Assessment & Plan     · Continue Bumex 0.5 mg daily  · BMP in 1 week             Patient is doing well from a cardiovascular standpoint post CABG.  I have instructed him to continue taking his Bumex daily as he still has significant 1+ pitting edema.  I will give him an order to have a BMP in 1 week.  We will arrange for him to start cardiac rehab at [facility], [place].  He will follow-up in 6 months or sooner if needed.    Plan   • Start cardiac rehab  • Bumex 0.5 mg daily  • BMP in 1 week  • Follow-up with nephrology and CT surgery      Follow-up   Return in about 6 months (around 1/27/2022), or if symptoms worsen or fail to improve, for Follow-up with Dr. Greene next visit.        Iraida Huston, CHARLES  7/27/2021

## 2021-07-27 ENCOUNTER — TELEPHONE (OUTPATIENT)
Dept: CARDIOLOGY | Facility: CLINIC | Age: 76
End: 2021-07-27

## 2021-07-27 ENCOUNTER — OFFICE VISIT (OUTPATIENT)
Dept: CARDIOLOGY | Facility: CLINIC | Age: 76
End: 2021-07-27

## 2021-07-27 VITALS
DIASTOLIC BLOOD PRESSURE: 72 MMHG | HEART RATE: 92 BPM | HEIGHT: 67 IN | OXYGEN SATURATION: 97 % | SYSTOLIC BLOOD PRESSURE: 150 MMHG | BODY MASS INDEX: 28.66 KG/M2

## 2021-07-27 DIAGNOSIS — I25.110 CORONARY ARTERY DISEASE INVOLVING NATIVE CORONARY ARTERY OF NATIVE HEART WITH UNSTABLE ANGINA PECTORIS (HCC): Primary | ICD-10-CM

## 2021-07-27 DIAGNOSIS — R60.0 LOWER EXTREMITY EDEMA: ICD-10-CM

## 2021-07-27 DIAGNOSIS — I10 ESSENTIAL HYPERTENSION: Chronic | ICD-10-CM

## 2021-07-27 DIAGNOSIS — E78.5 HYPERLIPIDEMIA LDL GOAL <70: ICD-10-CM

## 2021-07-27 PROCEDURE — 99214 OFFICE O/P EST MOD 30 MIN: CPT | Performed by: NURSE PRACTITIONER

## 2021-07-27 RX ORDER — BUMETANIDE 0.5 MG/1
TABLET ORAL
COMMUNITY
Start: 2021-07-14

## 2021-07-27 RX ORDER — CLONIDINE HYDROCHLORIDE 0.1 MG/1
TABLET ORAL
COMMUNITY
Start: 2021-07-15

## 2021-07-27 NOTE — ASSESSMENT & PLAN NOTE
· Continue amlodipine 10 mg daily  · Continue Coreg 25 mg twice daily  · Continue clonidine 0.1 mg daily  · Continue isosorbide dinitrate 20 mg daily

## 2021-07-27 NOTE — TELEPHONE ENCOUNTER
Left message for the patient to let me know where he wanted his cardiac rehab referral sent to. I spoke with cardiac rehab here and they advised the closest to the patient would be [place] or [place]. Will await return call.

## 2021-07-27 NOTE — ASSESSMENT & PLAN NOTE
· Arrange to start cardiac rehab  · Continue aspirin 81 mg daily  · Continue Coreg 25 mg twice daily

## 2021-07-30 ENCOUNTER — DOCUMENTATION (OUTPATIENT)
Dept: CARDIAC REHAB | Facility: HOSPITAL | Age: 76
End: 2021-07-30

## 2021-07-30 NOTE — PROGRESS NOTES
Pt. Referred for Phase II Cardiac Rehab. Carin Huber RN called Cardiac Rehab staff requesting referral for Phase II Cardiac Rehab be sent to [facility]. Referral faxed.

## 2021-08-11 ENCOUNTER — TELEPHONE (OUTPATIENT)
Dept: CARDIOLOGY | Facility: CLINIC | Age: 76
End: 2021-08-11

## 2022-12-07 ENCOUNTER — TRANSCRIBE ORDERS (OUTPATIENT)
Dept: ADMINISTRATIVE | Facility: HOSPITAL | Age: 77
End: 2022-12-07

## 2022-12-07 ENCOUNTER — LAB (OUTPATIENT)
Dept: LAB | Facility: HOSPITAL | Age: 77
End: 2022-12-07

## 2022-12-07 DIAGNOSIS — N17.9 ACUTE RENAL FAILURE, UNSPECIFIED ACUTE RENAL FAILURE TYPE: ICD-10-CM

## 2022-12-07 DIAGNOSIS — N17.9 ACUTE RENAL FAILURE, UNSPECIFIED ACUTE RENAL FAILURE TYPE: Primary | ICD-10-CM

## 2022-12-07 LAB
ALBUMIN SERPL-MCNC: 3.65 G/DL (ref 3.5–5.2)
ALBUMIN/GLOB SERPL: 1 G/DL
ALP SERPL-CCNC: 148 U/L (ref 39–117)
ALT SERPL W P-5'-P-CCNC: 17 U/L (ref 1–41)
ANION GAP SERPL CALCULATED.3IONS-SCNC: 14.8 MMOL/L (ref 5–15)
AST SERPL-CCNC: 24 U/L (ref 1–40)
BACTERIA UR QL AUTO: ABNORMAL /HPF
BASOPHILS # BLD AUTO: 0.01 10*3/MM3 (ref 0–0.2)
BASOPHILS NFR BLD AUTO: 0.2 % (ref 0–1.5)
BILIRUB SERPL-MCNC: 0.4 MG/DL (ref 0–1.2)
BILIRUB UR QL STRIP: NEGATIVE
BUN SERPL-MCNC: 54 MG/DL (ref 8–23)
BUN/CREAT SERPL: 12.1 (ref 7–25)
CALCIUM SPEC-SCNC: 9.6 MG/DL (ref 8.6–10.5)
CHLORIDE SERPL-SCNC: 107 MMOL/L (ref 98–107)
CLARITY UR: CLEAR
CO2 SERPL-SCNC: 18.2 MMOL/L (ref 22–29)
COARSE GRAN CASTS URNS QL MICRO: ABNORMAL /LPF
COLOR UR: YELLOW
CREAT SERPL-MCNC: 4.47 MG/DL (ref 0.76–1.27)
DEPRECATED RDW RBC AUTO: 50 FL (ref 37–54)
EGFRCR SERPLBLD CKD-EPI 2021: 13 ML/MIN/1.73
EOSINOPHIL # BLD AUTO: 0.04 10*3/MM3 (ref 0–0.4)
EOSINOPHIL NFR BLD AUTO: 0.6 % (ref 0.3–6.2)
ERYTHROCYTE [DISTWIDTH] IN BLOOD BY AUTOMATED COUNT: 14.3 % (ref 12.3–15.4)
FERRITIN SERPL-MCNC: 155.6 NG/ML (ref 30–400)
GLOBULIN UR ELPH-MCNC: 3.6 GM/DL
GLUCOSE SERPL-MCNC: 153 MG/DL (ref 65–99)
GLUCOSE UR STRIP-MCNC: ABNORMAL MG/DL
HCT VFR BLD AUTO: 30.5 % (ref 37.5–51)
HGB BLD-MCNC: 10.2 G/DL (ref 13–17.7)
HGB UR QL STRIP.AUTO: ABNORMAL
HYALINE CASTS UR QL AUTO: ABNORMAL /LPF
IMM GRANULOCYTES # BLD AUTO: 0.02 10*3/MM3 (ref 0–0.05)
IMM GRANULOCYTES NFR BLD AUTO: 0.3 % (ref 0–0.5)
IRON 24H UR-MRATE: 26 MCG/DL (ref 59–158)
IRON SATN MFR SERPL: 8 % (ref 20–50)
KETONES UR QL STRIP: NEGATIVE
LEUKOCYTE ESTERASE UR QL STRIP.AUTO: NEGATIVE
LYMPHOCYTES # BLD AUTO: 0.91 10*3/MM3 (ref 0.7–3.1)
LYMPHOCYTES NFR BLD AUTO: 13.7 % (ref 19.6–45.3)
MCH RBC QN AUTO: 32.5 PG (ref 26.6–33)
MCHC RBC AUTO-ENTMCNC: 33.4 G/DL (ref 31.5–35.7)
MCV RBC AUTO: 97.1 FL (ref 79–97)
MONOCYTES # BLD AUTO: 0.5 10*3/MM3 (ref 0.1–0.9)
MONOCYTES NFR BLD AUTO: 7.5 % (ref 5–12)
NEUTROPHILS NFR BLD AUTO: 5.15 10*3/MM3 (ref 1.7–7)
NEUTROPHILS NFR BLD AUTO: 77.7 % (ref 42.7–76)
NITRITE UR QL STRIP: NEGATIVE
NRBC BLD AUTO-RTO: 0 /100 WBC (ref 0–0.2)
PH UR STRIP.AUTO: 6 [PH] (ref 5–8)
PLATELET # BLD AUTO: 224 10*3/MM3 (ref 140–450)
PMV BLD AUTO: 8.9 FL (ref 6–12)
POTASSIUM SERPL-SCNC: 4.7 MMOL/L (ref 3.5–5.2)
PROT SERPL-MCNC: 7.2 G/DL (ref 6–8.5)
PROT UR QL STRIP: ABNORMAL
RBC # BLD AUTO: 3.14 10*6/MM3 (ref 4.14–5.8)
RBC # UR STRIP: ABNORMAL /HPF
REF LAB TEST METHOD: ABNORMAL
SODIUM SERPL-SCNC: 140 MMOL/L (ref 136–145)
SP GR UR STRIP: 1.02 (ref 1–1.03)
SQUAMOUS #/AREA URNS HPF: ABNORMAL /HPF
TIBC SERPL-MCNC: 338 MCG/DL (ref 298–536)
TRANSFERRIN SERPL-MCNC: 227 MG/DL (ref 200–360)
UROBILINOGEN UR QL STRIP: ABNORMAL
WBC # UR STRIP: ABNORMAL /HPF
WBC NRBC COR # BLD: 6.63 10*3/MM3 (ref 3.4–10.8)

## 2022-12-07 PROCEDURE — 83540 ASSAY OF IRON: CPT

## 2022-12-07 PROCEDURE — 84156 ASSAY OF PROTEIN URINE: CPT

## 2022-12-07 PROCEDURE — 36415 COLL VENOUS BLD VENIPUNCTURE: CPT

## 2022-12-07 PROCEDURE — 85025 COMPLETE CBC W/AUTO DIFF WBC: CPT

## 2022-12-07 PROCEDURE — 82570 ASSAY OF URINE CREATININE: CPT

## 2022-12-07 PROCEDURE — 80053 COMPREHEN METABOLIC PANEL: CPT

## 2022-12-07 PROCEDURE — 84466 ASSAY OF TRANSFERRIN: CPT

## 2022-12-07 PROCEDURE — 82728 ASSAY OF FERRITIN: CPT

## 2022-12-07 PROCEDURE — 81001 URINALYSIS AUTO W/SCOPE: CPT

## 2022-12-08 LAB
CREAT UR-MCNC: 80.1 MG/DL
PROT ?TM UR-MCNC: 965 MG/DL
PROT/CREAT UR: ABNORMAL MG/G CREA (ref 0–200)

## 2023-03-17 NOTE — PLAN OF CARE
Problem: Adult Inpatient Plan of Care  Goal: Plan of Care Review  Outcome: Ongoing, Progressing  Flowsheets (Taken 6/4/2021 1400)  Plan of Care Reviewed With: patient   Goal Outcome Evaluation:  Plan of Care Reviewed With: patient      SLP re-evaluation completed. Will continue to address dysphagia in tx. Please see note for further details and recommendations.     Price (Do Not Change): 0.00 Detail Level: Simple Instructions: This plan will send the code FBSE to the PM system.  DO NOT or CHANGE the price.

## 2023-04-07 ENCOUNTER — HOSPITAL ENCOUNTER (INPATIENT)
Facility: HOSPITAL | Age: 78
LOS: 1 days | Discharge: ANOTHER HOSPITAL | DRG: 308 | End: 2023-04-08
Attending: FAMILY MEDICINE | Admitting: FAMILY MEDICINE
Payer: MEDICARE

## 2023-04-07 PROBLEM — I44.2 COMPLETE HEART BLOCK: Status: ACTIVE | Noted: 2023-04-07

## 2023-04-07 LAB
GLUCOSE BLDC GLUCOMTR-MCNC: 134 MG/DL (ref 70–130)
GLUCOSE BLDC GLUCOMTR-MCNC: 156 MG/DL (ref 70–130)

## 2023-04-07 PROCEDURE — 25010000002 HEPARIN (PORCINE) PER 1000 UNITS: Performed by: FAMILY MEDICINE

## 2023-04-07 PROCEDURE — 82962 GLUCOSE BLOOD TEST: CPT

## 2023-04-07 PROCEDURE — 99223 1ST HOSP IP/OBS HIGH 75: CPT | Performed by: FAMILY MEDICINE

## 2023-04-07 RX ORDER — POLYETHYLENE GLYCOL 3350 17 G/17G
17 POWDER, FOR SOLUTION ORAL DAILY
Status: DISCONTINUED | OUTPATIENT
Start: 2023-04-08 | End: 2023-04-08

## 2023-04-07 RX ORDER — CHOLECALCIFEROL (VITAMIN D3) 125 MCG
5 CAPSULE ORAL NIGHTLY
Status: DISCONTINUED | OUTPATIENT
Start: 2023-04-07 | End: 2023-04-08

## 2023-04-07 RX ORDER — INSULIN LISPRO 100 [IU]/ML
2-7 INJECTION, SOLUTION INTRAVENOUS; SUBCUTANEOUS
Status: DISCONTINUED | OUTPATIENT
Start: 2023-04-07 | End: 2023-04-08

## 2023-04-07 RX ORDER — ASPIRIN 81 MG/1
81 TABLET ORAL DAILY
Status: DISCONTINUED | OUTPATIENT
Start: 2023-04-08 | End: 2023-04-08

## 2023-04-07 RX ORDER — BUMETANIDE 1 MG/1
2 TABLET ORAL
Status: DISCONTINUED | OUTPATIENT
Start: 2023-04-07 | End: 2023-04-08 | Stop reason: HOSPADM

## 2023-04-07 RX ORDER — ACETAMINOPHEN 500 MG
500 TABLET ORAL EVERY 6 HOURS PRN
Status: DISCONTINUED | OUTPATIENT
Start: 2023-04-07 | End: 2023-04-08

## 2023-04-07 RX ORDER — ECHINACEA PURPUREA EXTRACT 125 MG
1 TABLET ORAL AS NEEDED
Status: DISCONTINUED | OUTPATIENT
Start: 2023-04-07 | End: 2023-04-08

## 2023-04-07 RX ORDER — ATORVASTATIN CALCIUM 40 MG/1
40 TABLET, FILM COATED ORAL NIGHTLY
Status: DISCONTINUED | OUTPATIENT
Start: 2023-04-07 | End: 2023-04-08

## 2023-04-07 RX ORDER — CARVEDILOL 25 MG/1
25 TABLET ORAL 2 TIMES DAILY WITH MEALS
Status: DISCONTINUED | OUTPATIENT
Start: 2023-04-08 | End: 2023-04-08

## 2023-04-07 RX ORDER — HEPARIN SODIUM 5000 [USP'U]/ML
5000 INJECTION, SOLUTION INTRAVENOUS; SUBCUTANEOUS EVERY 12 HOURS SCHEDULED
Status: DISCONTINUED | OUTPATIENT
Start: 2023-04-07 | End: 2023-04-08

## 2023-04-07 RX ORDER — AMIODARONE HYDROCHLORIDE 200 MG/1
200 TABLET ORAL DAILY
Status: ON HOLD | COMMUNITY

## 2023-04-07 RX ORDER — ASPIRIN 81 MG/1
81 TABLET ORAL DAILY
Status: ON HOLD | COMMUNITY

## 2023-04-07 RX ORDER — AMOXICILLIN 250 MG
2 CAPSULE ORAL NIGHTLY
Status: DISCONTINUED | OUTPATIENT
Start: 2023-04-07 | End: 2023-04-08

## 2023-04-07 RX ORDER — AMLODIPINE BESYLATE 10 MG/1
10 TABLET ORAL
Status: DISCONTINUED | OUTPATIENT
Start: 2023-04-07 | End: 2023-04-08

## 2023-04-07 RX ORDER — NICOTINE POLACRILEX 4 MG
15 LOZENGE BUCCAL
Status: DISCONTINUED | OUTPATIENT
Start: 2023-04-07 | End: 2023-04-08

## 2023-04-07 RX ORDER — GABAPENTIN 300 MG/1
300 CAPSULE ORAL DAILY
Status: ON HOLD | COMMUNITY

## 2023-04-07 RX ORDER — ALLOPURINOL 100 MG/1
50 TABLET ORAL 2 TIMES WEEKLY
Status: DISCONTINUED | OUTPATIENT
Start: 2023-04-10 | End: 2023-04-08

## 2023-04-07 RX ORDER — LOSARTAN POTASSIUM 50 MG/1
100 TABLET ORAL
Status: DISCONTINUED | OUTPATIENT
Start: 2023-04-08 | End: 2023-04-08

## 2023-04-07 RX ORDER — METHOCARBAMOL 500 MG/1
500 TABLET, FILM COATED ORAL EVERY 12 HOURS PRN
Status: DISCONTINUED | OUTPATIENT
Start: 2023-04-07 | End: 2023-04-08

## 2023-04-07 RX ORDER — MULTIPLE VITAMINS W/ MINERALS TAB 9MG-400MCG
1 TAB ORAL DAILY
Status: ON HOLD | COMMUNITY

## 2023-04-07 RX ORDER — DEXTROSE MONOHYDRATE 25 G/50ML
25 INJECTION, SOLUTION INTRAVENOUS
Status: DISCONTINUED | OUTPATIENT
Start: 2023-04-07 | End: 2023-04-08

## 2023-04-07 RX ORDER — HYDRALAZINE HYDROCHLORIDE 50 MG/1
50 TABLET, FILM COATED ORAL EVERY 8 HOURS SCHEDULED
Status: DISCONTINUED | OUTPATIENT
Start: 2023-04-07 | End: 2023-04-08

## 2023-04-07 RX ORDER — ALBUTEROL SULFATE 90 UG/1
2 AEROSOL, METERED RESPIRATORY (INHALATION) 2 TIMES DAILY PRN
Status: ON HOLD | COMMUNITY

## 2023-04-07 RX ORDER — LIDOCAINE 50 MG/G
1 PATCH TOPICAL
Status: DISCONTINUED | OUTPATIENT
Start: 2023-04-08 | End: 2023-04-08

## 2023-04-07 RX ORDER — TORSEMIDE 20 MG/1
40 TABLET ORAL DAILY
Status: ON HOLD | COMMUNITY

## 2023-04-07 RX ORDER — CETIRIZINE HYDROCHLORIDE 10 MG/1
10 TABLET ORAL DAILY
Status: DISCONTINUED | OUTPATIENT
Start: 2023-04-07 | End: 2023-04-08

## 2023-04-07 RX ORDER — INSULIN GLARGINE 100 [IU]/ML
30 INJECTION, SOLUTION SUBCUTANEOUS DAILY
Status: ON HOLD | COMMUNITY

## 2023-04-07 RX ORDER — DOCUSATE SODIUM 100 MG/1
100 CAPSULE, LIQUID FILLED ORAL 2 TIMES DAILY
Status: DISCONTINUED | OUTPATIENT
Start: 2023-04-07 | End: 2023-04-08

## 2023-04-07 RX ORDER — HYDRALAZINE HYDROCHLORIDE 100 MG/1
100 TABLET, FILM COATED ORAL 3 TIMES DAILY
Status: ON HOLD | COMMUNITY

## 2023-04-07 RX ORDER — CLONIDINE HYDROCHLORIDE 0.2 MG/1
0.2 TABLET ORAL 3 TIMES DAILY
Status: ON HOLD | COMMUNITY

## 2023-04-07 RX ORDER — CARVEDILOL 6.25 MG/1
6.25 TABLET ORAL 2 TIMES DAILY WITH MEALS
Status: ON HOLD | COMMUNITY

## 2023-04-07 RX ORDER — DIPHENOXYLATE HYDROCHLORIDE AND ATROPINE SULFATE 2.5; .025 MG/1; MG/1
1 TABLET ORAL DAILY
Status: DISCONTINUED | OUTPATIENT
Start: 2023-04-08 | End: 2023-04-08

## 2023-04-07 RX ORDER — ATORVASTATIN CALCIUM 40 MG/1
40 TABLET, FILM COATED ORAL DAILY
Status: ON HOLD | COMMUNITY

## 2023-04-07 RX ADMIN — AMLODIPINE BESYLATE 10 MG: 10 TABLET ORAL at 18:24

## 2023-04-07 RX ADMIN — ATORVASTATIN CALCIUM 40 MG: 40 TABLET ORAL at 20:48

## 2023-04-07 RX ADMIN — HEPARIN SODIUM 5000 UNITS: 5000 INJECTION INTRAVENOUS; SUBCUTANEOUS at 20:52

## 2023-04-07 RX ADMIN — CETIRIZINE HYDROCHLORIDE 10 MG: 10 TABLET, FILM COATED ORAL at 18:24

## 2023-04-07 RX ADMIN — BUMETANIDE 2 MG: 1 TABLET ORAL at 18:24

## 2023-04-07 RX ADMIN — HYDRALAZINE HYDROCHLORIDE 50 MG: 50 TABLET, FILM COATED ORAL at 21:18

## 2023-04-07 RX ADMIN — Medication 5 MG: at 20:52

## 2023-04-07 RX ADMIN — METHOCARBAMOL 500 MG: 500 TABLET, FILM COATED ORAL at 20:49

## 2023-04-07 RX ADMIN — SALINE NASAL SPRAY 1 SPRAY: 1.5 SOLUTION NASAL at 23:36

## 2023-04-07 NOTE — PROGRESS NOTES
Rehabilitation Nursing  Inpatient Rehabilitation Plan of Care Note    Plan of Care  Pain    Pain Management (Active)  Current Status (4/7/2023 4:45:00 PM): at risk for pain  Weekly Goal: pain at an acceptable level  Discharge Goal: Pain managed    Safety    Potential for Injury (Active)  Current Status (4/7/2023 4:45:00 PM): At risk for falls  Weekly Goal: no falls  Discharge Goal: no falls    Signed by: Matt Crowley RN

## 2023-04-07 NOTE — PROGRESS NOTES
"Patient Assessment Instrument  Quality Indicators - Admission FY 2023    Section A. Ethnicity/ Race/Language  Ethnicity:  Not of , /a, Yi Origin  Race:  White  Preferred Language:  English  Requests  to Communicate:   No    Section A. Transportation      Section B. Hearing and Vision  Expression of Ideas and Wants: Expresses complex messages without difficulty and  with speech that is clear and easy to understand.  Understanding Verbal and Non-Verbal Content: Understands: Clear comprehension  without cues or repetitions.  Ability to Hear:  Adequate - no difficulty in normal conversation, social  interaction, listening to TV  Ability to See in Adequate Light:  Adequate - sees fine detail, such as regular  print in newspapers/books    Section B. Health Literacy  Frequency of Needing Assistance Reading:  Sometimes      Section C. Cognitive Patterns  Brief Interview for Mental Status (BIMS) was conducted.  Repetition of Three Words: Three words  Able to report correct year: Correct  Able to report correct month: Accurate within 5 days  Able to report correct day of the week: Correct  Able to recall \"sock\": Yes, no cue required  Able to recall \"blue\": Yes, no cue required  Able to recall \"bed\": Yes, after cuing    BIMS SUMMARY SCORE: 14 Cognitively intact Patient was able to complete the Brief  Interview for Mental Status    Section C. Signs and Symptoms of Delirium (from CAM)  Evidence of Acute Change in Mental Status:   No  Inattention: Behavior not present  Thinking Disorganized or Incoherent:   Behavior not present  Altered Level of Consciousness:   Behavior not present    Section D. Mood  Presence of little interest or pleasure in doing things:   No  Frequency of having little interest or pleasure in doing things:   Never or 1  day  Presence of feeling down, depressed, or hopeless:   No  Frequency of feeling down, depressed, or hopeless:   Never or 1 day   Interview Ended. Above " responses do not meet criteria to continue.  Total Severity Score:   00    Section D. Social Isolation  Frequecy of Feeling Lonely or Isolated:  Never    Section FS0275. Prior Functioning      Section CD8433. Prior Device Use      Section NV0265. Self Care Performance      Section DL0599. Self Care Discharge Goals      Section HU8782. Mobility Performance      Section FK9841. Mobility Discharge Goals      Section H. Bladder and Bowel      Section I. Active Diagnosis      Section J. Health Conditions      Section J. Health Conditions (Pain)  Pain Effect on Sleep:   Rarely or not at all  Pain Interference with Therapy Activities:   Rarely or not at all  Pain Interference with Day-to-Day Activities:   Rarely or not at all    Section K. Swallowing/Nutritional Status    Nutritional Approaches on Admission:    Section M. Skin Conditions  Unhealed Pressure Ulcer/Injuries at Stage 1 or Higher on Admission:  No.    Section N. Medication    Potential Clinically Significant Medication Issues: No issues found during  review      Section O. Special Treatments, Procedures, and Programs  Dialysis: Peritoneal dialysis    Signed by: Matt Crowley RN

## 2023-04-07 NOTE — PLAN OF CARE
Problem: Rehabilitation (IRF) Plan of Care  Goal: Plan of Care Review  Outcome: Ongoing, Progressing  Flowsheets (Taken 4/7/2023 1739)  Progress: no change  Plan of Care Reviewed With: patient  Outcome Evaluation:   Call light and items within reach   no s/s of distress noted   patient resting in bed comfortably   will continue to monitor. Continue current plan of care.  Goal: Patient-Specific Goal (Individualized)  Outcome: Ongoing, Progressing  Goal: Absence of New-Onset Illness or Injury  Outcome: Ongoing, Progressing  Intervention: Prevent Fall and Fall Injury  Recent Flowsheet Documentation  Taken 4/7/2023 1645 by Umu Torres, RN  Safety Promotion/Fall Prevention: safety round/check completed  Intervention: Prevent VTE (Venous Thromboembolism)  Recent Flowsheet Documentation  Taken 4/7/2023 1645 by Umu Torres RN  VTE Prevention/Management: (Heparin) other (see comments)  Goal: Optimal Comfort and Wellbeing  Outcome: Ongoing, Progressing  Goal: Home and Community Transition Plan Established  Outcome: Ongoing, Progressing  Intervention: Support Transition to Safe and Accessible Home Environment  Recent Flowsheet Documentation  Taken 4/7/2023 1719 by Umu Torres RN  Transportation Anticipated: family or friend will provide  Patient/Family Anticipated Services at Transition:   Patient/Family Anticipates Transition to: home with family  Goal: Plan of Care Review  Outcome: Ongoing, Progressing  Flowsheets (Taken 4/7/2023 1739)  Progress: no change  Plan of Care Reviewed With: patient  Outcome Evaluation:   Call light and items within reach   no s/s of distress noted   patient resting in bed comfortably   will continue to monitor. Continue current plan of care.  Goal: Patient-Specific Goal (Individualized)  Outcome: Ongoing, Progressing  Goal: Absence of New-Onset Illness or Injury  Outcome: Ongoing, Progressing  Intervention: Prevent Fall and Fall Injury  Recent Flowsheet  Documentation  Taken 4/7/2023 1645 by Umu Torres RN  Safety Promotion/Fall Prevention: safety round/check completed  Intervention: Prevent VTE (Venous Thromboembolism)  Recent Flowsheet Documentation  Taken 4/7/2023 1645 by Umu Torres RN  VTE Prevention/Management: (Heparin) other (see comments)  Goal: Optimal Comfort and Wellbeing  Outcome: Ongoing, Progressing  Goal: Home and Community Transition Plan Established  Outcome: Ongoing, Progressing  Intervention: Support Transition to Safe and Accessible Home Environment  Recent Flowsheet Documentation  Taken 4/7/2023 1719 by Umu Torres RN  Transportation Anticipated: family or friend will provide  Patient/Family Anticipated Services at Transition:   Patient/Family Anticipates Transition to: home with family     Problem: Fall Injury Risk  Goal: Absence of Fall and Fall-Related Injury  Outcome: Ongoing, Progressing  Intervention: Promote Injury-Free Environment  Recent Flowsheet Documentation  Taken 4/7/2023 1645 by Umu Torres RN  Safety Promotion/Fall Prevention: safety round/check completed     Problem: Skin Injury Risk Increased  Goal: Skin Health and Integrity  Outcome: Ongoing, Progressing     Problem: Mobility Impairment  Goal: Optimal Mobility Assumption and Safety  Outcome: Ongoing, Progressing   Goal Outcome Evaluation:  Plan of Care Reviewed With: patient        Progress: no change  Outcome Evaluation: Call light and items within reach; no s/s of distress noted; patient resting in bed comfortably; will continue to monitor. Continue current plan of care.

## 2023-04-08 ENCOUNTER — APPOINTMENT (OUTPATIENT)
Dept: CT IMAGING | Facility: HOSPITAL | Age: 78
DRG: 308 | End: 2023-04-08
Payer: MEDICARE

## 2023-04-08 ENCOUNTER — APPOINTMENT (OUTPATIENT)
Dept: GENERAL RADIOLOGY | Facility: HOSPITAL | Age: 78
End: 2023-04-08
Payer: MEDICARE

## 2023-04-08 ENCOUNTER — ANESTHESIA (OUTPATIENT)
Dept: PERIOP | Facility: HOSPITAL | Age: 78
DRG: 308 | End: 2023-04-08
Payer: MEDICARE

## 2023-04-08 ENCOUNTER — HOSPITAL ENCOUNTER (INPATIENT)
Facility: HOSPITAL | Age: 78
LOS: 10 days | End: 2023-04-18
Attending: INTERNAL MEDICINE | Admitting: INTERNAL MEDICINE
Payer: MEDICARE

## 2023-04-08 ENCOUNTER — ANESTHESIA EVENT (OUTPATIENT)
Dept: PERIOP | Facility: HOSPITAL | Age: 78
DRG: 308 | End: 2023-04-08
Payer: MEDICARE

## 2023-04-08 VITALS
SYSTOLIC BLOOD PRESSURE: 102 MMHG | TEMPERATURE: 98.2 F | WEIGHT: 170.2 LBS | HEART RATE: 99 BPM | RESPIRATION RATE: 20 BRPM | HEIGHT: 67 IN | BODY MASS INDEX: 26.71 KG/M2 | OXYGEN SATURATION: 97 % | DIASTOLIC BLOOD PRESSURE: 67 MMHG

## 2023-04-08 DIAGNOSIS — I21.4 NSTEMI (NON-ST ELEVATED MYOCARDIAL INFARCTION): ICD-10-CM

## 2023-04-08 DIAGNOSIS — K92.2 GASTROINTESTINAL HEMORRHAGE, UNSPECIFIED GASTROINTESTINAL HEMORRHAGE TYPE: Primary | ICD-10-CM

## 2023-04-08 DIAGNOSIS — N18.9 CHRONIC KIDNEY DISEASE, UNSPECIFIED CKD STAGE: ICD-10-CM

## 2023-04-08 LAB
ABO GROUP BLD: NORMAL
ABO GROUP BLD: NORMAL
ANION GAP SERPL CALCULATED.3IONS-SCNC: 14 MMOL/L (ref 5–15)
BASOPHILS # BLD AUTO: 0.04 10*3/MM3 (ref 0–0.2)
BASOPHILS NFR BLD AUTO: 0.3 % (ref 0–1.5)
BLD GP AB SCN SERPL QL: NEGATIVE
BUN SERPL-MCNC: 103 MG/DL (ref 8–23)
BUN/CREAT SERPL: 24.5 (ref 7–25)
CALCIUM SPEC-SCNC: 9 MG/DL (ref 8.6–10.5)
CHLORIDE SERPL-SCNC: 112 MMOL/L (ref 98–107)
CO2 SERPL-SCNC: 18 MMOL/L (ref 22–29)
CREAT SERPL-MCNC: 4.21 MG/DL (ref 0.76–1.27)
DEPRECATED RDW RBC AUTO: 67.3 FL (ref 37–54)
EGFRCR SERPLBLD CKD-EPI 2021: 13.9 ML/MIN/1.73
EOSINOPHIL # BLD AUTO: 0.52 10*3/MM3 (ref 0–0.4)
EOSINOPHIL NFR BLD AUTO: 4.4 % (ref 0.3–6.2)
ERYTHROCYTE [DISTWIDTH] IN BLOOD BY AUTOMATED COUNT: 19.4 % (ref 12.3–15.4)
FERRITIN SERPL-MCNC: 1173 NG/ML (ref 30–400)
GLUCOSE BLDC GLUCOMTR-MCNC: 135 MG/DL (ref 70–130)
GLUCOSE BLDC GLUCOMTR-MCNC: 148 MG/DL (ref 70–130)
GLUCOSE BLDC GLUCOMTR-MCNC: 157 MG/DL (ref 70–130)
GLUCOSE BLDC GLUCOMTR-MCNC: 168 MG/DL (ref 70–130)
GLUCOSE BLDC GLUCOMTR-MCNC: 200 MG/DL (ref 70–130)
GLUCOSE SERPL-MCNC: 117 MG/DL (ref 65–99)
HCT VFR BLD AUTO: 17.2 % (ref 37.5–51)
HCT VFR BLD AUTO: 21.9 % (ref 37.5–51)
HCT VFR BLD AUTO: 25.3 % (ref 37.5–51)
HGB BLD-MCNC: 5.5 G/DL (ref 13–17.7)
HGB BLD-MCNC: 6.7 G/DL (ref 13–17.7)
HGB BLD-MCNC: 8 G/DL (ref 13–17.7)
IMM GRANULOCYTES # BLD AUTO: 0.17 10*3/MM3 (ref 0–0.05)
IMM GRANULOCYTES NFR BLD AUTO: 1.4 % (ref 0–0.5)
INR PPP: 1.38 (ref 0.9–1.1)
IRON 24H UR-MRATE: 61 MCG/DL (ref 59–158)
IRON SATN MFR SERPL: 26 % (ref 20–50)
LYMPHOCYTES # BLD AUTO: 1.19 10*3/MM3 (ref 0.7–3.1)
LYMPHOCYTES NFR BLD AUTO: 10.1 % (ref 19.6–45.3)
MAGNESIUM SERPL-MCNC: 1.9 MG/DL (ref 1.6–2.4)
MCH RBC QN AUTO: 31.3 PG (ref 26.6–33)
MCHC RBC AUTO-ENTMCNC: 31.6 G/DL (ref 31.5–35.7)
MCV RBC AUTO: 98.8 FL (ref 79–97)
MONOCYTES # BLD AUTO: 0.58 10*3/MM3 (ref 0.1–0.9)
MONOCYTES NFR BLD AUTO: 4.9 % (ref 5–12)
NEUTROPHILS NFR BLD AUTO: 78.9 % (ref 42.7–76)
NEUTROPHILS NFR BLD AUTO: 9.27 10*3/MM3 (ref 1.7–7)
NRBC BLD AUTO-RTO: 0.3 /100 WBC (ref 0–0.2)
PLATELET # BLD AUTO: 284 10*3/MM3 (ref 140–450)
PMV BLD AUTO: 11.1 FL (ref 6–12)
POTASSIUM SERPL-SCNC: 4.2 MMOL/L (ref 3.5–5.2)
PROTHROMBIN TIME: 17.3 SECONDS (ref 12.1–14.7)
RBC # BLD AUTO: 2.56 10*6/MM3 (ref 4.14–5.8)
RH BLD: POSITIVE
RH BLD: POSITIVE
SODIUM SERPL-SCNC: 144 MMOL/L (ref 136–145)
T&S EXPIRATION DATE: NORMAL
TIBC SERPL-MCNC: 238 MCG/DL (ref 298–536)
TRANSFERRIN SERPL-MCNC: 160 MG/DL (ref 200–360)
WBC NRBC COR # BLD: 11.77 10*3/MM3 (ref 3.4–10.8)

## 2023-04-08 PROCEDURE — 99223 1ST HOSP IP/OBS HIGH 75: CPT | Performed by: INTERNAL MEDICINE

## 2023-04-08 PROCEDURE — 83735 ASSAY OF MAGNESIUM: CPT | Performed by: FAMILY MEDICINE

## 2023-04-08 PROCEDURE — 86900 BLOOD TYPING SEROLOGIC ABO: CPT | Performed by: INTERNAL MEDICINE

## 2023-04-08 PROCEDURE — 99221 1ST HOSP IP/OBS SF/LOW 40: CPT | Performed by: SURGERY

## 2023-04-08 PROCEDURE — 25010000002 PHENYLEPHRINE 10 MG/ML SOLUTION: Performed by: NURSE ANESTHETIST, CERTIFIED REGISTERED

## 2023-04-08 PROCEDURE — 85018 HEMOGLOBIN: CPT | Performed by: FAMILY MEDICINE

## 2023-04-08 PROCEDURE — 71045 X-RAY EXAM CHEST 1 VIEW: CPT

## 2023-04-08 PROCEDURE — 99239 HOSP IP/OBS DSCHRG MGMT >30: CPT | Performed by: FAMILY MEDICINE

## 2023-04-08 PROCEDURE — 86900 BLOOD TYPING SEROLOGIC ABO: CPT

## 2023-04-08 PROCEDURE — 86923 COMPATIBILITY TEST ELECTRIC: CPT

## 2023-04-08 PROCEDURE — 85014 HEMATOCRIT: CPT | Performed by: FAMILY MEDICINE

## 2023-04-08 PROCEDURE — 25010000002 DIPHENHYDRAMINE PER 50 MG: Performed by: INTERNAL MEDICINE

## 2023-04-08 PROCEDURE — 85014 HEMATOCRIT: CPT | Performed by: INTERNAL MEDICINE

## 2023-04-08 PROCEDURE — P9016 RBC LEUKOCYTES REDUCED: HCPCS

## 2023-04-08 PROCEDURE — 36430 TRANSFUSION BLD/BLD COMPNT: CPT

## 2023-04-08 PROCEDURE — 80048 BASIC METABOLIC PNL TOTAL CA: CPT | Performed by: FAMILY MEDICINE

## 2023-04-08 PROCEDURE — 3E0G8GC INTRODUCTION OF OTHER THERAPEUTIC SUBSTANCE INTO UPPER GI, VIA NATURAL OR ARTIFICIAL OPENING ENDOSCOPIC: ICD-10-PCS | Performed by: SURGERY

## 2023-04-08 PROCEDURE — 86901 BLOOD TYPING SEROLOGIC RH(D): CPT | Performed by: INTERNAL MEDICINE

## 2023-04-08 PROCEDURE — 84466 ASSAY OF TRANSFERRIN: CPT | Performed by: INTERNAL MEDICINE

## 2023-04-08 PROCEDURE — 02HV33Z INSERTION OF INFUSION DEVICE INTO SUPERIOR VENA CAVA, PERCUTANEOUS APPROACH: ICD-10-PCS | Performed by: SURGERY

## 2023-04-08 PROCEDURE — 83540 ASSAY OF IRON: CPT | Performed by: INTERNAL MEDICINE

## 2023-04-08 PROCEDURE — 25010000002 EPINEPHRINE 1 MG/10ML SOLUTION PREFILLED SYRINGE: Performed by: NURSE ANESTHETIST, CERTIFIED REGISTERED

## 2023-04-08 PROCEDURE — C1751 CATH, INF, PER/CENT/MIDLINE: HCPCS | Performed by: SURGERY

## 2023-04-08 PROCEDURE — 97163 PT EVAL HIGH COMPLEX 45 MIN: CPT

## 2023-04-08 PROCEDURE — 82728 ASSAY OF FERRITIN: CPT | Performed by: INTERNAL MEDICINE

## 2023-04-08 PROCEDURE — 63710000001 ONDANSETRON PER 8 MG: Performed by: FAMILY MEDICINE

## 2023-04-08 PROCEDURE — 25010000002 EPINEPHRINE 1 MG/10ML SOLUTION PREFILLED SYRINGE: Performed by: SURGERY

## 2023-04-08 PROCEDURE — 25010000002 ZIPRASIDONE MESYLATE PER 10 MG: Performed by: INTERNAL MEDICINE

## 2023-04-08 PROCEDURE — 43255 EGD CONTROL BLEEDING ANY: CPT | Performed by: SURGERY

## 2023-04-08 PROCEDURE — 86850 RBC ANTIBODY SCREEN: CPT | Performed by: INTERNAL MEDICINE

## 2023-04-08 PROCEDURE — 97166 OT EVAL MOD COMPLEX 45 MIN: CPT

## 2023-04-08 PROCEDURE — 25010000002 HEPARIN (PORCINE) PER 1000 UNITS: Performed by: FAMILY MEDICINE

## 2023-04-08 PROCEDURE — 82962 GLUCOSE BLOOD TEST: CPT

## 2023-04-08 PROCEDURE — 63710000001 INSULIN LISPRO (HUMAN) PER 5 UNITS: Performed by: FAMILY MEDICINE

## 2023-04-08 PROCEDURE — 85018 HEMOGLOBIN: CPT | Performed by: INTERNAL MEDICINE

## 2023-04-08 PROCEDURE — 85025 COMPLETE CBC W/AUTO DIFF WBC: CPT | Performed by: FAMILY MEDICINE

## 2023-04-08 PROCEDURE — 86901 BLOOD TYPING SEROLOGIC RH(D): CPT

## 2023-04-08 PROCEDURE — 25010000002 LORAZEPAM PER 2 MG: Performed by: INTERNAL MEDICINE

## 2023-04-08 PROCEDURE — 25010000002 PROPOFOL 10 MG/ML EMULSION: Performed by: NURSE ANESTHETIST, CERTIFIED REGISTERED

## 2023-04-08 PROCEDURE — 85610 PROTHROMBIN TIME: CPT | Performed by: INTERNAL MEDICINE

## 2023-04-08 PROCEDURE — 36556 INSERT NON-TUNNEL CV CATH: CPT | Performed by: SURGERY

## 2023-04-08 RX ORDER — ONDANSETRON 4 MG/1
4 TABLET, FILM COATED ORAL EVERY 6 HOURS PRN
Status: DISCONTINUED | OUTPATIENT
Start: 2023-04-08 | End: 2023-04-08

## 2023-04-08 RX ORDER — FENTANYL CITRATE 50 UG/ML
50 INJECTION, SOLUTION INTRAMUSCULAR; INTRAVENOUS
Status: DISCONTINUED | OUTPATIENT
Start: 2023-04-08 | End: 2023-04-08 | Stop reason: HOSPADM

## 2023-04-08 RX ORDER — DIPHENOXYLATE HYDROCHLORIDE AND ATROPINE SULFATE 2.5; .025 MG/1; MG/1
1 TABLET ORAL DAILY
Status: CANCELLED | OUTPATIENT
Start: 2023-04-09

## 2023-04-08 RX ORDER — EPINEPHRINE 0.1 MG/ML
SYRINGE (ML) INJECTION AS NEEDED
Status: DISCONTINUED | OUTPATIENT
Start: 2023-04-08 | End: 2023-04-08 | Stop reason: HOSPADM

## 2023-04-08 RX ORDER — DIPHENHYDRAMINE HYDROCHLORIDE 50 MG/ML
25 INJECTION INTRAMUSCULAR; INTRAVENOUS ONCE
Status: COMPLETED | OUTPATIENT
Start: 2023-04-08 | End: 2023-04-08

## 2023-04-08 RX ORDER — LOSARTAN POTASSIUM 50 MG/1
100 TABLET ORAL
Status: DISCONTINUED | OUTPATIENT
Start: 2023-04-09 | End: 2023-04-11

## 2023-04-08 RX ORDER — HYDRALAZINE HYDROCHLORIDE 50 MG/1
50 TABLET, FILM COATED ORAL EVERY 8 HOURS SCHEDULED
Status: DISCONTINUED | OUTPATIENT
Start: 2023-04-08 | End: 2023-04-08

## 2023-04-08 RX ORDER — LORAZEPAM 2 MG/ML
1 INJECTION INTRAMUSCULAR ONCE
Status: COMPLETED | OUTPATIENT
Start: 2023-04-08 | End: 2023-04-08

## 2023-04-08 RX ORDER — SODIUM CHLORIDE 9 MG/ML
125 INJECTION, SOLUTION INTRAVENOUS CONTINUOUS
Status: DISCONTINUED | OUTPATIENT
Start: 2023-04-08 | End: 2023-04-09

## 2023-04-08 RX ORDER — PROPOFOL 10 MG/ML
VIAL (ML) INTRAVENOUS AS NEEDED
Status: DISCONTINUED | OUTPATIENT
Start: 2023-04-08 | End: 2023-04-08 | Stop reason: SURG

## 2023-04-08 RX ORDER — DEXTROSE MONOHYDRATE 25 G/50ML
25 INJECTION, SOLUTION INTRAVENOUS
Status: CANCELLED | OUTPATIENT
Start: 2023-04-08

## 2023-04-08 RX ORDER — SODIUM CHLORIDE 0.9 % (FLUSH) 0.9 %
10 SYRINGE (ML) INJECTION AS NEEDED
Status: DISCONTINUED | OUTPATIENT
Start: 2023-04-08 | End: 2023-04-08 | Stop reason: HOSPADM

## 2023-04-08 RX ORDER — DIPHENOXYLATE HYDROCHLORIDE AND ATROPINE SULFATE 2.5; .025 MG/1; MG/1
1 TABLET ORAL DAILY
Status: DISCONTINUED | OUTPATIENT
Start: 2023-04-09 | End: 2023-04-18 | Stop reason: HOSPADM

## 2023-04-08 RX ORDER — EPINEPHRINE 0.1 MG/ML
SYRINGE (ML) INJECTION AS NEEDED
Status: DISCONTINUED | OUTPATIENT
Start: 2023-04-08 | End: 2023-04-08 | Stop reason: SURG

## 2023-04-08 RX ORDER — ONDANSETRON 4 MG/1
4 TABLET, FILM COATED ORAL EVERY 6 HOURS PRN
Status: CANCELLED | OUTPATIENT
Start: 2023-04-08

## 2023-04-08 RX ORDER — AMOXICILLIN 250 MG
2 CAPSULE ORAL NIGHTLY
Status: DISCONTINUED | OUTPATIENT
Start: 2023-04-08 | End: 2023-04-18 | Stop reason: HOSPADM

## 2023-04-08 RX ORDER — CARVEDILOL 25 MG/1
25 TABLET ORAL 2 TIMES DAILY WITH MEALS
Status: CANCELLED | OUTPATIENT
Start: 2023-04-08

## 2023-04-08 RX ORDER — ACETAMINOPHEN 500 MG
500 TABLET ORAL EVERY 6 HOURS PRN
Status: CANCELLED | OUTPATIENT
Start: 2023-04-08

## 2023-04-08 RX ORDER — ALLOPURINOL 100 MG/1
50 TABLET ORAL 2 TIMES WEEKLY
Status: CANCELLED | OUTPATIENT
Start: 2023-04-10

## 2023-04-08 RX ORDER — SODIUM CHLORIDE 9 MG/ML
40 INJECTION, SOLUTION INTRAVENOUS AS NEEDED
Status: DISCONTINUED | OUTPATIENT
Start: 2023-04-08 | End: 2023-04-08 | Stop reason: HOSPADM

## 2023-04-08 RX ORDER — INSULIN LISPRO 100 [IU]/ML
2-7 INJECTION, SOLUTION INTRAVENOUS; SUBCUTANEOUS
Status: CANCELLED | OUTPATIENT
Start: 2023-04-08

## 2023-04-08 RX ORDER — DOCUSATE SODIUM 100 MG/1
100 CAPSULE, LIQUID FILLED ORAL 2 TIMES DAILY
Status: DISCONTINUED | OUTPATIENT
Start: 2023-04-08 | End: 2023-04-08

## 2023-04-08 RX ORDER — ACETAMINOPHEN 500 MG
500 TABLET ORAL EVERY 6 HOURS PRN
Status: DISCONTINUED | OUTPATIENT
Start: 2023-04-08 | End: 2023-04-18 | Stop reason: HOSPADM

## 2023-04-08 RX ORDER — LOSARTAN POTASSIUM 50 MG/1
100 TABLET ORAL
Status: CANCELLED | OUTPATIENT
Start: 2023-04-09

## 2023-04-08 RX ORDER — CETIRIZINE HYDROCHLORIDE 10 MG/1
10 TABLET ORAL DAILY
Status: DISCONTINUED | OUTPATIENT
Start: 2023-04-09 | End: 2023-04-10

## 2023-04-08 RX ORDER — NICOTINE POLACRILEX 4 MG
15 LOZENGE BUCCAL
Status: DISCONTINUED | OUTPATIENT
Start: 2023-04-08 | End: 2023-04-18 | Stop reason: HOSPADM

## 2023-04-08 RX ORDER — ONDANSETRON 2 MG/ML
4 INJECTION INTRAMUSCULAR; INTRAVENOUS AS NEEDED
Status: DISCONTINUED | OUTPATIENT
Start: 2023-04-08 | End: 2023-04-08 | Stop reason: HOSPADM

## 2023-04-08 RX ORDER — ECHINACEA PURPUREA EXTRACT 125 MG
1 TABLET ORAL AS NEEDED
Status: DISCONTINUED | OUTPATIENT
Start: 2023-04-08 | End: 2023-04-18 | Stop reason: HOSPADM

## 2023-04-08 RX ORDER — INSULIN LISPRO 100 [IU]/ML
2-7 INJECTION, SOLUTION INTRAVENOUS; SUBCUTANEOUS
Status: DISCONTINUED | OUTPATIENT
Start: 2023-04-08 | End: 2023-04-18 | Stop reason: HOSPADM

## 2023-04-08 RX ORDER — HYDRALAZINE HYDROCHLORIDE 50 MG/1
50 TABLET, FILM COATED ORAL EVERY 8 HOURS SCHEDULED
Status: CANCELLED | OUTPATIENT
Start: 2023-04-08

## 2023-04-08 RX ORDER — METHOCARBAMOL 500 MG/1
500 TABLET, FILM COATED ORAL EVERY 12 HOURS PRN
Status: CANCELLED | OUTPATIENT
Start: 2023-04-08

## 2023-04-08 RX ORDER — CETIRIZINE HYDROCHLORIDE 10 MG/1
10 TABLET ORAL DAILY
Status: CANCELLED | OUTPATIENT
Start: 2023-04-09

## 2023-04-08 RX ORDER — METHOCARBAMOL 500 MG/1
500 TABLET, FILM COATED ORAL EVERY 12 HOURS PRN
Status: DISCONTINUED | OUTPATIENT
Start: 2023-04-08 | End: 2023-04-18 | Stop reason: HOSPADM

## 2023-04-08 RX ORDER — NICOTINE POLACRILEX 4 MG
15 LOZENGE BUCCAL
Status: CANCELLED | OUTPATIENT
Start: 2023-04-08

## 2023-04-08 RX ORDER — LIDOCAINE 50 MG/G
1 PATCH TOPICAL
Status: DISCONTINUED | OUTPATIENT
Start: 2023-04-09 | End: 2023-04-11 | Stop reason: RX

## 2023-04-08 RX ORDER — PHENYLEPHRINE HYDROCHLORIDE 10 MG/ML
INJECTION INTRAVENOUS AS NEEDED
Status: DISCONTINUED | OUTPATIENT
Start: 2023-04-08 | End: 2023-04-08 | Stop reason: SURG

## 2023-04-08 RX ORDER — SODIUM CHLORIDE, SODIUM LACTATE, POTASSIUM CHLORIDE, CALCIUM CHLORIDE 600; 310; 30; 20 MG/100ML; MG/100ML; MG/100ML; MG/100ML
INJECTION, SOLUTION INTRAVENOUS CONTINUOUS PRN
Status: DISCONTINUED | OUTPATIENT
Start: 2023-04-08 | End: 2023-04-08 | Stop reason: SURG

## 2023-04-08 RX ORDER — SODIUM CHLORIDE 0.9 % (FLUSH) 0.9 %
10 SYRINGE (ML) INJECTION EVERY 12 HOURS SCHEDULED
Status: DISCONTINUED | OUTPATIENT
Start: 2023-04-08 | End: 2023-04-08 | Stop reason: HOSPADM

## 2023-04-08 RX ORDER — GENTAMICIN SULFATE 1 MG/G
1 OINTMENT TOPICAL DAILY
Status: DISCONTINUED | OUTPATIENT
Start: 2023-04-08 | End: 2023-04-18 | Stop reason: HOSPADM

## 2023-04-08 RX ORDER — ALLOPURINOL 100 MG/1
50 TABLET ORAL 2 TIMES WEEKLY
Status: DISCONTINUED | OUTPATIENT
Start: 2023-04-10 | End: 2023-04-18 | Stop reason: HOSPADM

## 2023-04-08 RX ORDER — POLYETHYLENE GLYCOL 3350 17 G/17G
17 POWDER, FOR SOLUTION ORAL DAILY
Status: CANCELLED | OUTPATIENT
Start: 2023-04-09

## 2023-04-08 RX ORDER — POLYETHYLENE GLYCOL 3350 17 G/17G
17 POWDER, FOR SOLUTION ORAL DAILY
Status: DISCONTINUED | OUTPATIENT
Start: 2023-04-09 | End: 2023-04-09

## 2023-04-08 RX ORDER — ONDANSETRON 4 MG/1
4 TABLET, FILM COATED ORAL EVERY 6 HOURS PRN
Status: DISCONTINUED | OUTPATIENT
Start: 2023-04-08 | End: 2023-04-17

## 2023-04-08 RX ORDER — LIDOCAINE 50 MG/G
1 PATCH TOPICAL
Status: CANCELLED | OUTPATIENT
Start: 2023-04-09

## 2023-04-08 RX ORDER — DOCUSATE SODIUM 100 MG/1
100 CAPSULE, LIQUID FILLED ORAL 2 TIMES DAILY
Status: CANCELLED | OUTPATIENT
Start: 2023-04-08

## 2023-04-08 RX ORDER — AMLODIPINE BESYLATE 10 MG/1
10 TABLET ORAL
Status: CANCELLED | OUTPATIENT
Start: 2023-04-09

## 2023-04-08 RX ORDER — ECHINACEA PURPUREA EXTRACT 125 MG
1 TABLET ORAL AS NEEDED
Status: CANCELLED | OUTPATIENT
Start: 2023-04-08

## 2023-04-08 RX ORDER — ATORVASTATIN CALCIUM 40 MG/1
40 TABLET, FILM COATED ORAL NIGHTLY
Status: DISCONTINUED | OUTPATIENT
Start: 2023-04-08 | End: 2023-04-18 | Stop reason: HOSPADM

## 2023-04-08 RX ORDER — CARVEDILOL 25 MG/1
25 TABLET ORAL 2 TIMES DAILY WITH MEALS
Status: DISCONTINUED | OUTPATIENT
Start: 2023-04-08 | End: 2023-04-18 | Stop reason: HOSPADM

## 2023-04-08 RX ORDER — IPRATROPIUM BROMIDE AND ALBUTEROL SULFATE 2.5; .5 MG/3ML; MG/3ML
3 SOLUTION RESPIRATORY (INHALATION) ONCE AS NEEDED
Status: DISCONTINUED | OUTPATIENT
Start: 2023-04-08 | End: 2023-04-08 | Stop reason: HOSPADM

## 2023-04-08 RX ORDER — ATORVASTATIN CALCIUM 40 MG/1
40 TABLET, FILM COATED ORAL NIGHTLY
Status: CANCELLED | OUTPATIENT
Start: 2023-04-08

## 2023-04-08 RX ORDER — DEXTROSE MONOHYDRATE 25 G/50ML
25 INJECTION, SOLUTION INTRAVENOUS
Status: DISCONTINUED | OUTPATIENT
Start: 2023-04-08 | End: 2023-04-18 | Stop reason: HOSPADM

## 2023-04-08 RX ORDER — AMOXICILLIN 250 MG
2 CAPSULE ORAL NIGHTLY
Status: CANCELLED | OUTPATIENT
Start: 2023-04-08

## 2023-04-08 RX ORDER — CHOLECALCIFEROL (VITAMIN D3) 125 MCG
5 CAPSULE ORAL NIGHTLY
Status: DISCONTINUED | OUTPATIENT
Start: 2023-04-08 | End: 2023-04-08

## 2023-04-08 RX ORDER — SODIUM CHLORIDE, SODIUM LACTATE, POTASSIUM CHLORIDE, CALCIUM CHLORIDE 600; 310; 30; 20 MG/100ML; MG/100ML; MG/100ML; MG/100ML
100 INJECTION, SOLUTION INTRAVENOUS ONCE AS NEEDED
Status: DISCONTINUED | OUTPATIENT
Start: 2023-04-08 | End: 2023-04-08 | Stop reason: HOSPADM

## 2023-04-08 RX ORDER — AMLODIPINE BESYLATE 10 MG/1
10 TABLET ORAL
Status: DISCONTINUED | OUTPATIENT
Start: 2023-04-09 | End: 2023-04-11

## 2023-04-08 RX ORDER — CHOLECALCIFEROL (VITAMIN D3) 125 MCG
5 CAPSULE ORAL NIGHTLY
Status: CANCELLED | OUTPATIENT
Start: 2023-04-08

## 2023-04-08 RX ADMIN — Medication 1 TABLET: at 08:01

## 2023-04-08 RX ADMIN — INSULIN LISPRO 2 UNITS: 100 INJECTION, SOLUTION INTRAVENOUS; SUBCUTANEOUS at 07:59

## 2023-04-08 RX ADMIN — SODIUM CHLORIDE 125 ML/HR: 9 INJECTION, SOLUTION INTRAVENOUS at 17:13

## 2023-04-08 RX ADMIN — PHENYLEPHRINE HYDROCHLORIDE 200 MCG: 10 INJECTION INTRAVENOUS at 15:56

## 2023-04-08 RX ADMIN — EPINEPHRINE 100 MCG: 0.1 INJECTION, SOLUTION ENDOTRACHEAL; INTRACARDIAC; INTRAVENOUS at 16:00

## 2023-04-08 RX ADMIN — PROPOFOL 30 MG: 10 INJECTION, EMULSION INTRAVENOUS at 15:40

## 2023-04-08 RX ADMIN — PANTOPRAZOLE SODIUM 8 MG/HR: 40 INJECTION, POWDER, FOR SOLUTION INTRAVENOUS at 23:58

## 2023-04-08 RX ADMIN — POLYETHYLENE GLYCOL 3350 17 G: 17 POWDER, FOR SOLUTION ORAL at 08:01

## 2023-04-08 RX ADMIN — CARVEDILOL 25 MG: 25 TABLET, FILM COATED ORAL at 08:00

## 2023-04-08 RX ADMIN — ONDANSETRON HYDROCHLORIDE 4 MG: 4 TABLET, FILM COATED ORAL at 11:04

## 2023-04-08 RX ADMIN — PHENYLEPHRINE HYDROCHLORIDE 300 MCG: 10 INJECTION INTRAVENOUS at 15:53

## 2023-04-08 RX ADMIN — PROPOFOL 70 MG: 10 INJECTION, EMULSION INTRAVENOUS at 15:35

## 2023-04-08 RX ADMIN — ASPIRIN 81 MG: 81 TABLET, COATED ORAL at 08:02

## 2023-04-08 RX ADMIN — EPINEPHRINE 100 MCG: 0.1 INJECTION, SOLUTION ENDOTRACHEAL; INTRACARDIAC; INTRAVENOUS at 16:16

## 2023-04-08 RX ADMIN — HYDRALAZINE HYDROCHLORIDE 50 MG: 50 TABLET, FILM COATED ORAL at 06:03

## 2023-04-08 RX ADMIN — PHENYLEPHRINE HYDROCHLORIDE 300 MCG: 10 INJECTION INTRAVENOUS at 15:50

## 2023-04-08 RX ADMIN — BUMETANIDE 2 MG: 1 TABLET ORAL at 08:01

## 2023-04-08 RX ADMIN — LORAZEPAM 1 MG: 2 INJECTION INTRAMUSCULAR; INTRAVENOUS at 22:21

## 2023-04-08 RX ADMIN — HEPARIN SODIUM 5000 UNITS: 5000 INJECTION INTRAVENOUS; SUBCUTANEOUS at 08:00

## 2023-04-08 RX ADMIN — CETIRIZINE HYDROCHLORIDE 10 MG: 10 TABLET, FILM COATED ORAL at 08:01

## 2023-04-08 RX ADMIN — DOCUSATE SODIUM 100 MG: 100 CAPSULE ORAL at 08:01

## 2023-04-08 RX ADMIN — PANTOPRAZOLE SODIUM 8 MG/HR: 40 INJECTION, POWDER, FOR SOLUTION INTRAVENOUS at 13:46

## 2023-04-08 RX ADMIN — SODIUM CHLORIDE, POTASSIUM CHLORIDE, SODIUM LACTATE AND CALCIUM CHLORIDE: 600; 310; 30; 20 INJECTION, SOLUTION INTRAVENOUS at 15:28

## 2023-04-08 RX ADMIN — ZIPRASIDONE MESYLATE 20 MG: 20 INJECTION, POWDER, LYOPHILIZED, FOR SOLUTION INTRAMUSCULAR at 20:43

## 2023-04-08 RX ADMIN — AMLODIPINE BESYLATE 10 MG: 10 TABLET ORAL at 08:01

## 2023-04-08 RX ADMIN — DIPHENHYDRAMINE HYDROCHLORIDE 25 MG: 50 INJECTION, SOLUTION INTRAMUSCULAR; INTRAVENOUS at 22:20

## 2023-04-08 RX ADMIN — PROPOFOL 100 MCG/KG/MIN: 10 INJECTION, EMULSION INTRAVENOUS at 15:35

## 2023-04-08 RX ADMIN — GENTAMICIN SULFATE 1 APPLICATION: 1 OINTMENT TOPICAL at 20:16

## 2023-04-08 RX ADMIN — PHENYLEPHRINE HYDROCHLORIDE 100 MCG: 10 INJECTION INTRAVENOUS at 15:44

## 2023-04-08 RX ADMIN — LOSARTAN POTASSIUM 100 MG: 50 TABLET, FILM COATED ORAL at 08:01

## 2023-04-08 RX ADMIN — PHENYLEPHRINE HYDROCHLORIDE 100 MCG: 10 INJECTION INTRAVENOUS at 15:59

## 2023-04-08 NOTE — H&P
[facility]    Patient Identification:  Name:  Augusto Lorenzana Jr.  Age:  78 y.o.  Sex:  male  :  1945  MRN:  [id number]   Visit Number:  [id number]  Room number:  104/2S  Primary Care Physician:  Rob Polanco MD     Subjective     2023   Chief complaint:  No chief complaint on file.      History of presenting illness:  78 y.o. male  This pt is seen today for post admission physician evaluation to the inpatient rehabilitation facility.  Patient is a 78-year-old gentleman who has been transferred to our service from the [facility].  Patient is history of CAD with history of CABG x3, chronic kidney disease stage V, CHF, diabetes mellitus, hypertension, gout, dyslipidemia, atrial fibrillation, history of fall with subdural hematoma, obstructive sleep apnea, valvular heart disease.  Patient had presented to  on  for placement of peritoneal dialysis catheter.  Patient did during his PD catheter placement had experienced complete heart block.  Patient apparently was hemodynamically stable and converted back to sinus rhythm after sedation was discontinued.  Patient has past history of atrial fibrillation and had been on amiodarone.  Patient states that for few months he has had intermittent episodes of dizziness and lightheadedness.  Patient subsequently did have pacemaker placement placed on .  Patient did have some complications during his hospital stay including acute respiratory failure with hypoxemia thought to be secondary to volume overload.  Patient did require 2 to 3 L of O2 per nasal cannula patient was found to have EF of 45% with inferior and inferior septal wall hypokinesis.  Patient was diuresed aggressively with IV Bumex.  And patient has improved a great deal during that time.  Patient is generally weak at this time and is thought to be a good candidate for inpatient physical rehab.      Patient continued to  progress medically.  Physical therapy and Occupational therapy evaluations were completed with recommended acute inpatient rehabilitation referral for continued functional mobility intervention and reeducation.  Acute rehab referral ordered for continued medical monitoring and management post prolonged hospitalization, continued respiratory status monitoring, lab monitoring, pain mgt needs, bowel/bladder care with new medication education, skin monitoring and breakdown prevention along with ongoing medical comorbidities that require ongoing care.    The preadmission mini-FIM score as assessed by the referring facility as follows: Eating 6; grooming 4; bathing 2; dressing upper body 2; dressing lower body to; toileting 2; transfers to bed chair and wheelchair 3; transfers to toilet to; locomotion 3; bladder management 2; bowel management 2; comprehension 7; expression 7; substractions 7; problem solving 7; memory 7.  Estimated length of stay 10 to 14 days.  Patient states that he was ambulating at home with a cane prior to PD placement    ---------------------------------------------------------------------------------------------------------------------   Review of Systems:  General: Generalized weakness  HEENT: Denies headaches denies vision changes at this time.  Patient does have past history of subdural hematoma  Heart: Status post pacemaker placement.  History of CABG 2 years ago.  No chest pain at this time  Lungs: Negative  Abdomen: History of PD placement.  Otherwise negative  : Negative, chronic kidney disease stage V.  Denies prostate issues  Musculoskeletal: Negative  Neuro: Some mild sensory changes in the lower extremities  Skin: Negative  ---------------------------------------------------------------------------------------------------------------------   Past Medical History:   Diagnosis Date   • CAD (coronary artery disease)    • CKD (chronic kidney disease) stage 3, GFR 30-59 ml/min    • Diabetes  mellitus    • Hyperlipidemia    • Hypertension    • NSTEMI (non-ST elevated myocardial infarction)      Past Surgical History:   Procedure Laterality Date   • CARDIAC CATHETERIZATION N/A 5/24/2021    Procedure: Left Heart Cath;  Surgeon: Blade Greene IV, MD;  Location:  [facility];  Service: Cardiovascular;  Laterality: N/A;   • CARDIAC SURGERY      2 stents placed 2012.   • CORONARY ARTERY BYPASS GRAFT N/A 5/28/2021    Procedure: MEDIAN STERNOTOMY CORONARY ARTERY BYPASS X 3 UTILIZING THE LEFT INTERNAL MAMMARY ARTERY GRAFT, EVH OF THE GREATER RIGHT SAPHENOUS VEIN, AND PILO PER ANESTHESIA;  Surgeon: Jacek Miller MD;  Location:  [facility];  Service: Cardiothoracic;  Laterality: N/A;     Family History   Problem Relation Age of Onset   • Heart disease Mother    • Diabetes type I Father      Social History     Socioeconomic History   • Marital status:    Tobacco Use   • Smoking status: Never   • Smokeless tobacco: Current     Types: Chew   Vaping Use   • Vaping Use: Never used   Substance and Sexual Activity   • Alcohol use: Never   • Drug use: Never   • Sexual activity: Defer     ---------------------------------------------------------------------------------------------------------------------   Allergies:  Patient has no known allergies.  ---------------------------------------------------------------------------------------------------------------------   Medications below are reported home medications pulling from within the system; at this time, these medications have not been reconciled unless otherwise specified and are in the verification process for further verifcation as current home medications.    Prior to Admission Medications     Prescriptions Last Dose Informant Patient Reported? Taking?    albuterol sulfate  (90 Base) MCG/ACT inhaler Past Month Pharmacy, Spouse/Significant Other Yes Yes    Inhale 2 puffs 2 (Two) Times a Day As Needed for Wheezing.     allopurinol (ZYLOPRIM) 300 MG tablet Past Month Pharmacy, Spouse/Significant Other Yes Yes    Take 1 tablet by mouth Daily.    amiodarone (PACERONE) 200 MG tablet Past Month Pharmacy, Spouse/Significant Other Yes Yes    Take 1 tablet by mouth Daily.    amLODIPine (NORVASC) 10 MG tablet Past Month Pharmacy, Spouse/Significant Other No Yes    Take 1 tablet by mouth Daily.    aspirin 81 MG EC tablet Past Month Spouse/Significant Other Yes Yes    Take 1 tablet by mouth Daily.    atorvastatin (LIPITOR) 40 MG tablet Past Month Pharmacy, Spouse/Significant Other Yes Yes    Take 1 tablet by mouth Daily.    carvedilol (COREG) 6.25 MG tablet Not started yet Pharmacy, Spouse/Significant Other Yes No    Take 1 tablet by mouth 2 (Two) Times a Day With Meals.    cloNIDine (CATAPRES) 0.2 MG tablet Past Month Pharmacy, Spouse/Significant Other Yes Yes    Take 1 tablet by mouth 3 (Three) Times a Day.    gabapentin (NEURONTIN) 300 MG capsule Past Month Pharmacy, Spouse/Significant Other Yes Yes    Take 1 capsule by mouth Daily.    glipizide (GLUCOTROL) 5 MG tablet Past Month Pharmacy, Spouse/Significant Other Yes Yes    Take 1 tablet by mouth 2 (Two) Times a Day Before Meals.    hydrALAZINE (APRESOLINE) 100 MG tablet Past Month Pharmacy, Spouse/Significant Other Yes Yes    Take 1 tablet by mouth 3 (Three) Times a Day.    Insulin Glargine (Lantus SoloStar) 100 UNIT/ML injection pen Past Month Pharmacy, Spouse/Significant Other Yes Yes    Inject 30 Units under the skin into the appropriate area as directed Daily.    multivitamin with minerals tablet tablet Past Month Spouse/Significant Other Yes Yes    Take 1 tablet by mouth Daily.    torsemide (DEMADEX) 20 MG tablet Past Month Pharmacy, Spouse/Significant Other Yes Yes    Take 2 tablets by mouth Daily.        Objective     Vital Signs:  Temp:  [98.2 °F (36.8 °C)-98.6 °F (37 °C)] 98.6 °F (37 °C)  Heart Rate:  [88-98] 98  Resp:  [16-18] 16  BP: (130-169)/(69-86) 130/69    No data  found.  SpO2:  [92 %-97 %] 97 %  on   ;   Device (Oxygen Therapy): room air  Body mass index is 26.66 kg/m².    Wt Readings from Last 3 Encounters:   04/07/23 77.2 kg (170 lb 3.2 oz)   07/08/21 83 kg (183 lb)   06/18/21 80.3 kg (177 lb)      ---------------------------------------------------------------------------------------------------------------------     Physical exam:  Constitutional:   Elderly gentleman no acute distress  HEENT: Normocephalic atraumatic  Neck: Supple   Cardiovascular: Regular rate and rhythm  Pulmonary/Chest: Clear to auscultation  Abdominal: Positive bowel sounds soft.   Musculoskeletal: No arthropathy  Neurological: Patient slightly slowed mentation.  And seems to be slightly forgetful.  Answers all questions appropriately otherwise.  No focal deficits noted  Skin: No rash  Peripheral vascular: No edema noted  Genitourinary::  ---------------------------------------------------------------------------------------------------------------------      No orders to display           Last echocardiogram:  Results for orders placed during the hospital encounter of 05/22/21    Adult Transthoracic Echo Complete w/ Color, Spectral and Contrast if necessary per protocol    Interpretation Summary  · Left ventricular systolic function is normal. Estimated left ventricular EF = 60%.  · Left ventricular diastolic function is consistent with (grade I) impaired relaxation.  · Mild aortic valve stenosis is present.  · Estimated right ventricular systolic pressure from tricuspid regurgitation is normal (<35 mmHg).    --------------------------------------------------------------------------------------------------------------------  Labs:  Results from last 7 days   Lab Units 04/08/23  0029 04/06/23  0329 04/05/23  0507   WBC 10*3/mm3 11.77* 11.07* 10.72*   HEMOGLOBIN g/dL 8.0* 8.1* 6.8*   HEMATOCRIT % 25.3* 24.7* 20.6*   MCV fL 98.8* 94 96   MCHC g/dL 31.6 32.8 33.0   PLATELETS 10*3/mm3 284 226 199          Results from last 7 days   Lab Units 04/08/23  0029 04/03/23  0444   SODIUM mmol/L 144  --    POTASSIUM mmol/L 4.2  --    MAGNESIUM mg/dL 1.9 2.3   CHLORIDE mmol/L 112*  --    CO2 mmol/L 18.0*  --    BUN mg/dL 103*  --    CREATININE mg/dL 4.21*  --    CALCIUM mg/dL 9.0  --    GLUCOSE mg/dL 117*  --    Estimated Creatinine Clearance: 16.3 mL/min (A) (by C-G formula based on SCr of 4.21 mg/dL (H)).  No results found for: AMMONIA          Glucose   Date/Time Value Ref Range Status   04/08/2023 0611 157 (H) 70 - 130 mg/dL Final     Comment:     Meter: [id number] : [id number] HERMES SMITH   04/07/2023 1936 156 (H) 70 - 130 mg/dL Final     Comment:     Meter: [id number] : [id number] HERMES SARAH   04/07/2023 1817 134 (H) 70 - 130 mg/dL Final     Comment:     Meter: [id number] : [id number] Alistairenrique Kirkyla   04/07/2023 1247 169 (H) 74 - 99 mg/dL Final     Comment:     Accuracy of a glucose result obtained from a capillary whole blood specimen relies upon adequate, non-compromised capillary blood flow.  If the capillary glucose result is not consistent with the patient's clinical signs and symptoms, glucose testing should be repeated with either an arterial or venous sample on the glucometer or sent to the main labortory for testing.   04/07/2023 0842 105 (H) 74 - 99 mg/dL Final     Comment:     Accuracy of a glucose result obtained from a capillary whole blood specimen relies upon adequate, non-compromised capillary blood flow.  If the capillary glucose result is not consistent with the patient's clinical signs and symptoms, glucose testing should be repeated with either an arterial or venous sample on the glucometer or sent to the main labortory for testing.   04/06/2023 2021 144 (H) 74 - 99 mg/dL Final     Comment:     Accuracy of a glucose result obtained from a capillary whole blood specimen relies upon adequate, non-compromised capillary blood flow.  If the capillary glucose result is not consistent with  the patient's clinical signs and symptoms, glucose testing should be repeated with either an arterial or venous sample on the glucometer or sent to the main labortory for testing.   04/06/2023 1841 119 (H) 74 - 99 mg/dL Final     Comment:     Accuracy of a glucose result obtained from a capillary whole blood specimen relies upon adequate, non-compromised capillary blood flow.  If the capillary glucose result is not consistent with the patient's clinical signs and symptoms, glucose testing should be repeated with either an arterial or venous sample on the glucometer or sent to the main labortory for testing.   04/06/2023 1349 90 74 - 99 mg/dL Final     Comment:     Accuracy of a glucose result obtained from a capillary whole blood specimen relies upon adequate, non-compromised capillary blood flow.  If the capillary glucose result is not consistent with the patient's clinical signs and symptoms, glucose testing should be repeated with either an arterial or venous sample on the glucometer or sent to the main labortory for testing.   04/06/2023 0937 82 74 - 99 mg/dL Final     Comment:     Accuracy of a glucose result obtained from a capillary whole blood specimen relies upon adequate, non-compromised capillary blood flow.  If the capillary glucose result is not consistent with the patient's clinical signs and symptoms, glucose testing should be repeated with either an arterial or venous sample on the glucometer or sent to the main labortory for testing.   04/05/2023 2100 107 (H) 74 - 99 mg/dL Final     Comment:     Accuracy of a glucose result obtained from a capillary whole blood specimen relies upon adequate, non-compromised capillary blood flow.  If the capillary glucose result is not consistent with the patient's clinical signs and symptoms, glucose testing should be repeated with either an arterial or venous sample on the glucometer or sent to the main labortory for testing.   04/05/2023 1722 139 (H) 74 - 99 mg/dL  Final     Comment:     Accuracy of a glucose result obtained from a capillary whole blood specimen relies upon adequate, non-compromised capillary blood flow.  If the capillary glucose result is not consistent with the patient's clinical signs and symptoms, glucose testing should be repeated with either an arterial or venous sample on the glucometer or sent to the main labortory for testing.     Lab Results   Component Value Date    TSH 4.030 06/03/2021    FREET4 1.1 03/31/2023     No results found for: PREGTESTUR, PREGSERUM, HCG, HCGQUANT  Pain Management Panel     Pain Management Panel Latest Ref Rng & Units 12/7/2022 5/30/2021    CREATININE UR mg/dL 80.1 136.1    AMPHETAMINES SCREEN, URINE Negative - -    BARBITURATES SCREEN Negative - -    BENZODIAZEPINE SCREEN, URINE Negative - -    BUPRENORPHINEUR Negative - -    COCAINE SCREEN, URINE Negative - -    METHADONE SCREEN, URINE Negative - -    METHAMPHETAMINEUR Negative - -        Brief Urine Lab Results  (Last result in the past 365 days)      Color   Clarity   Blood   Leuk Est   Nitrite   Protein   CREAT   Urine HCG        12/07/22 1710             80.1         12/07/22 1710 Yellow   Clear   Small (1+)   Negative   Negative   >=300 mg/dL (3+)               No results found for: BLOODCX  No results found for: URINECX  No results found for: WOUNDCX  No results found for: STOOLCX    Last Urine Toxicity     LAST URINE TOXICITY RESULTS Latest Ref Rng & Units 12/7/2022 5/30/2021    CREATININE UR mg/dL 80.1 136.1    AMPHETAMINES SCREEN, URINE Negative - -    BARBITURATES SCREEN Negative - -    BENZODIAZEPINE SCREEN, URINE Negative - -    BUPRENORPHINEUR Negative - -    COCAINE SCREEN, URINE Negative - -    METHADONE SCREEN, URINE Negative - -    METHAMPHETAMINEUR Negative - -          I have personally looked at the labs and they are summarized  above.  ----------------------------------------------------------------------------------------------------------------------  Detailed radiology reports for the last 24 hours:    Imaging Results (Last 24 Hours)     ** No results found for the last 24 hours. **        Final impressions for the last 30 days of radiology reports:    CT Abdomen Pelvis Without Contrast    Result Date: 4/5/2023  No retroperitoneal hematoma. Hypoattenuating intraventricular blood pool may be seen in the setting of anemia (interventricular septum sign). Very subtle changes of the liver underlying chronic parenchymal liver disease and borderline splenomegaly or may represent secondary changes due to chronic congestive adenopathy. No definite portosystemic collaterals. Subperitoneal edema in the right upper quadrant and upper abdomen may be secondary to congestion, particularly in the setting of heart disease (favored). Alternatively, this may be evidence of early portal venous hypertension. Mild underlying inflammatory cyst process such as duodenitis or mild acute pancreatitis is also differential consideration. Multiple prominent but nonenlarged tonya hepatic lymph nodes may be reactive. Bilateral small volume pleural effusions, right greater than left. Multifocal lung base airspace disease, correlating to findings from most recent chest radiograph on 4/3/2023. CRITICAL RESULT:   No. COMMUNICATION: Per this written report. Approved by Madeline Salomon MD on 4/5/2023 9:31 AM By electronically signing this report, I, the attending physician, attest that I have personally reviewed the images/data for the above examination(s) and agree with the final edited report. Dictated by Madeline Salomon MD on 4/5/2023 9:31 AM Signed by Amberly Abarca MD on 4/5/2023 12:56 PM    XR Chest 1 View    Result Date: 4/3/2023  Stable exam CRITICAL RESULT:   No. COMMUNICATION: Per this written report. Dictated by Lele Chong MD on 4/3/2023 10:49 AM Signed by Lele  AIDA Chong MD on 4/3/2023 10:49 AM    XR Chest 1 View    Result Date: 4/2/2023  Stable airspace disease. CRITICAL RESULT:   No. COMMUNICATION: Per this written report. Dictated by Lele Chong MD on 4/2/2023 11:07 AM Signed by Lele Chong MD on 4/2/2023 11:08 AM    XR Chest 1 View    Result Date: 4/1/2023  Increased multifocal bilateral airspace opacities, right greater than left. CRITICAL RESULT:   No. COMMUNICATION: Per this written report. Dictated by Lele Chong MD on 4/1/2023 10:22 AM Signed by Lele Chong MD on 4/1/2023 10:23 AM    XR Chest 1 View    Result Date: 3/31/2023  New univentricular pacer with leads in expected position. No pneumothorax. CRITICAL RESULT:   No. COMMUNICATION: Per this written report. By electronically signing this report, I, the attending physician, attest that I have personally reviewed the images/data for the above examination(s) and agree with the final edited report. Dictated by Nancy Moody MD on 3/31/2023 12:29 PM Signed by Jacek Hu MD on 3/31/2023 1:30 PM    XR Chest 1 View    Result Date: 3/30/2023  Bilateral airspace disease could represent edema. CRITICAL RESULT:   No. COMMUNICATION: Per this written report. Dictated by Don Maria MD on 3/30/2023 10:20 AM Signed by Don Maria MD on 3/30/2023 10:21 AM    XR chest AP portable    Result Date: 3/24/2023  There has been no significant interval change  .  Continued follow up recommended . Images reviewed, interpreted, and dictated by Dr. SHI Keating. Transcribed by Mj Moore PA-C.    XR chest AP portable    Result Date: 3/23/2023  Worsening pulmonary opacities consistent with edema or pneumonia. Images reviewed, interpreted, and dictated by Dr. Oneil Gauthier. Transcribed by Ron Rodriguez PA-C    XR chest AP portable    Result Date: 3/21/2023  There has been no significant interval change  . Continued follow-up recommended. Images reviewed, interpreted, and dictated by Dr. SHI Tomas  Rishabh. Transcribed by JOSE DANIEL Motta (R).    Ultrasound Kidneys Bilateral    Result Date: 3/20/2023  Bilateral renal cysts. Otherwise unremarkable. Images reviewed, interpreted, and dictated by Dr. Oneil Gauthier. Transcribed by Georgia Tracey PA-C.    XR chest AP portable    Result Date: 3/20/2023  Pulmonary vascular congestion with diffuse pulmonary opacities, favor edema over pneumonia. Age-indeterminate left clavicular fracture. Images reviewed, interpreted, and dictated by Dr. Oneil Gauthier. Transcribed by Ron Rodriguez PA-C    I have personally looked at the radiology images and read the final radiology report.    Assessment & Plan    Status post pacemaker placement for complete heart block--patient seems to be doing reasonably well at this time.  Patient will require physical therapy 90 minutes/day 5 to 6 days/week proper therapeutic exercise, range of motion, endurance, gait training, safety, stair navigation and strengthening.  We will require occupational therapy 90 minutes/day 5 to 6 days/week for up with dressing, ADLs, feeding, home skills, safety and toileting techniques.  Anticipate patient being able to safely perform activities of daily living using adaptive equipment for improved functional mobility.  Independent and safe bowel bladder function.  Able to navigate home and community territory safe without injury or falls.  Anticipate patient going home with assistance [place] require home health with PT and nursing services.  May require bedside commode, wheelchair and walker at discharge.    CHF with reduced EF had recent echocardiogram showed EF of 45%.  Patient getting IV Bumex 2 mg twice daily at this time.  Patient appears to be compensated at this time.  Patient is on angiotensin receptor blocker.    Chronic kidney disease stage V--patient did have peritoneal dialysis catheter placed.  At this time does not acutely need dialysis.  Will obtain consultation with nephrology.  Continue  diuresis    CAD with history of CABG--continue Coreg 25 mg twice daily Lipitor 40 mg daily and angiotensin receptor blocker.    History of gout continue allopurinol 50 mg twice a week    Hypertension hydralazine 50 mg 3 times daily, Coreg 25 mg twice daily, losartan 100 mg daily, Norvasc 10 mg daily.    History of subdural hematoma in the past    History of atrial fibrillation--currently in a sinus rhythm.    Anemia--appears to be stable at this time.  Patient did have low hemoglobin last week that looks to of necessitated blood transfusion.  Hemoglobin is 8 at this time will monitor closely        VTE Prophylaxis:   Mechanical Order History:     None      Pharmalogical Order History:      Ordered     Dose Route Frequency Stop    04/07/23 4617  heparin (porcine) 5000 UNIT/ML injection 5,000 Units         5,000 Units SC Every 12 Hours Scheduled --                Falls Risk Assessment high risk secondary to generalized weakness        Primitivo Stevens MD  [facility]  04/08/23  07:32 EDT

## 2023-04-08 NOTE — PROGRESS NOTES
"Patient Assessment Instrument  Quality Indicators - Discharge FY 2023    Section A. Transportation      Section A. Medication List  Subsequent Provider:  02 - [facility]  Medication List to Subsequent Provider at Discharge:  Yes - Current reconciled  medication list provided to the subsequent provider  Route(s) of Medication List Transmission to Subsequent Provider:  Paper-based  (e.g., fax, copies, printouts)  Medication List to Patient:  Not applicable.  Patient discharged to a subsequent  provider as defined in 44D    Section B. Health Literacy  Frequency of Needing Assistance Reading:  Often    Section C. BIMS  Brief Interview for Mental Status (BIMS) was conducted.  Repetition of Three Words: Three words  Able to report correct year: Correct  Able to report correct month: Missed by more than 1 month or no answer  Able to report correct day of the week: Incorrect or no answer  Able to recall \"sock\": Yes, after cuing  Able to recall \"blue\": Yes, after cuing  Able to recall \"bed\": Yes, after cuing    BIMS SUMMARY SCORE: 9 Moderately impaired    Section C. Signs and Symptoms of Delirium (from CAM)  Acute Change in Mental Status:   No  Inattention:   Behavior not present  Disorganized Thinking:   Behavior not present  Altered Level of Consciousness:   Behavior not present    Section D. Mood  Presence of little interest or pleasure in doing things:   No  Frequency of having little interest or pleasure in doing things:   Never or 1  day  Presence of feeling down, depressed, or hopeless:   No  Frequency of feeling down, depressed, or hopeless:   Never or 1 day   Interview Ended. Above responses do not meet criteria to continue  Total Severity Score:   00    Section D. Social Isolation  Frequency of Feeling Lonely or Isolated:  Never    Section GG. Self-Care Performance      Section GG. Mobility Performance      Section J. Health Conditions Discharge      Section J. Health Conditions (Pain)  Pain Effect " on Sleep:   Occasionally  Pain Interference with Therapy Activities:   Occasionally  Pain Interference with Day-to-Day Activities:   Occasionally    Section K. Swallowing/Nutritional Status  Nutritional Approaches Past 7 Days:  Nutritional Approaches at Discharge:    Section M. Skin Conditions Discharge  Unhealed Pressure Ulcer(s)/Injurie(s) at Stage 1 or Higher:  No    . Current Number of Unhealed Pressure Ulcers      Section N. Medication    Medication Intervention: Not applicable - There were no potential clinically  significant medication issues identified since admission or patient is not  taking any medications.      Section O. Special Treatments, Procedures, and Programs  None    Signed by: Umu Torres RN

## 2023-04-08 NOTE — PROGRESS NOTES
Patient Assessment Instrument  Quality Indicators - Admission    Section B. Hearing, Speech Vision      Section C. Cognitive Patterns      Section [id number]. Prior Functioning      Section [id number]. Prior Device Use      Section NS6964. Self Care Performance      Section XL8939. Self Care Discharge Goals      Section [id number]. Mobility Performance     Roll Left and Right: [place] does less than half the effort. [place] lifts, holds  or supports trunk or limbs but provides less than half the effort.   Sit to Lying: [place] does less than half the effort. [place] lifts, holds or  supports trunk or limbs but provides less than half the effort.   Lying to Sitting on Side of Bed: [place] does less than half the effort. [place]  lifts, holds or supports trunk or limbs but provides less than half the effort.   Sit to Stand: [place] does less than half the effort. [place] lifts, holds or  supports trunk or limbs but provides less than half the effort.   Chair/Bed to Chair Transfer: [place] does less than half the effort. [place]  lifts, holds or supports trunk or limbs but provides less than half the effort.   Toilet Transfer Not attempted due to medical or safety concerns.   Car Transfer: Not attempted due to medical or safety concerns.   Walk 10 Feet:   [place] does all of the effort. Patient does none of the effort  to complete the activity. Or, the assistance of 2 or more helpers is required  for the patient to complete the activity.  Walk 50 Feet with 2 Turns:   Not attempted due to medical or safety concerns.  Walk 150 Feet:   Not attempted due to medical or safety concerns.  Walking 10 Feet on Uneven Surfaces:   Not attempted due to medical or safety  concerns.  1 Step Over Curb or Up/Down Stair:   Not attempted due to medical or safety  concerns.  Picking up an Object:   Not attempted due to medical or safety concerns.  Uses Wheelchair/Scooter: No    Section [id number]. Mobility Discharge Goals     Roll Left and Right: [place] sets up or  cleans up; patient completes activity.  [place] assists only prior to or following the activity.   Sit to Lying: [place] sets up or cleans up; patient completes activity. [place]  assists only prior to or following the activity.   Lying to Sitting on Side of Bed: [place] sets up or cleans up; patient completes  activity. [place] assists only prior to or following the activity.   Sit to Stand: [place] sets up or cleans up; patient completes activity. [place]  assists only prior to or following the activity.   Chair/Bed to Chair Transfer: [place] sets up or cleans up; patient completes  activity. [place] assists only prior to or following the activity.   Toilet Transfer: [place] sets up or cleans up; patient completes activity.  [place] assists only prior to or following the activity.   Car Transfer: [place] sets up or cleans up; patient completes activity. [place]  assists only prior to or following the activity.   Walk Discharge Goals:   Walk 150 Feet: [place] sets up or cleans up; patient completes activity. [place]  assists only prior to or following the activity.    Section H. Bladder and Bowel      Section I. Active Diagnosis      Section J. Health Conditions      Section K. Swallowing/Nutritional Status      Section M. Skin Conditions      Section N. Medication      Section O. Special Treatments, Procedures, and Programs      OPTIONAL BRANCH FOR TRACKING FALLS  Fall(s) During Shift:    Signed by: Omaira Cedillo PT

## 2023-04-08 NOTE — PROGRESS NOTES
Inpatient Rehabilitation Functional Measures Assessment and Plan of Care    Plan of Care  Mobility    [PT] Walk(Active)  Current Status(04/08/2023): Min A 4 ft RW  Weekly Goal(04/21/2023):  w/ AAD  Discharge Goal:  w/ AAD    [PT] Stairs(Active)  Current Status(04/08/2023): not attempted  Weekly Goal(04/21/2023): CGA  Discharge Goal: CGA    [PT] Bed/Chair/Wheelchair(Active)  Current Status(04/08/2023): Min A RW  Weekly Goal(04/21/2023): CGA w/ AAD  Discharge Goal: MI w/ AAD    [PT] Bed Mobility(Active)  Current Status(04/08/2023): Min A  Weekly Goal(04/21/2023): CGA  Discharge Goal: MI    Functional Measures  GEMA Eating:  GEMA Grooming:  GEMA Bathing:  GEMA Upper Body Dressing:  GEMA Lower Body Dressing:  GEMA Toileting:    GEMA Bladder Management  Level of Assistance:  Frequency/Number of Accidents this Shift:    GEMA Bowel Management  Level of Assistance:  Frequency/Number of Accidents this Shift:    GEMA Bed/Chair/Wheelchair Transfer:  Bed/chair/wheelchair Transfer Score = 4.  Patient performs 75% or more of effort and minimal assistance (little/incidental  help/lifting of one limb/steadying) for transferring to and from the  bed/chair/wheelchair, requiring: Patient requires the following assistive  device(s): Walker.  GEMA Toilet Transfer:  GEMA Tub/Shower Transfer:    Previously Documented Mode of Locomotion at Discharge:  GEMA Expected Mode of Locomotion at Discharge:  GEMA Walk/Wheelchair:  WHEELCHAIR OBSERVATION   Wheelchair did not occur.    WALK OBSERVATION   Walk Distance Scale = 1.  Distance walked is less than 50 feet. Walk Score = 1.   Incidental assistance with lifting, contact guard or steadying was provided.  Patient performs 75% or more of effort and requires minimal contact assistance  for walking. Patient walked a distance of 4 feet. Patient requires the following  assistive device(s): Rolling walker.  GEMA Stairs:  Stairs did not occur.    GEMA Comprehension:  GEMA Expression:  GEMA Social  Interaction:  GEMA Problem Solving:  GEMA Memory:    Therapy Mode Minutes  Occupational Therapy:  Physical Therapy: Individual: 25 minutes.  Speech Language Pathology:    Discharge Functional Goals:  Bed, Chair, Wheelchair Transfers: 6  Mode of Locomotion: W  Walk: 6  Stairs: 5    Signed by: Omaira Cedillo PT

## 2023-04-08 NOTE — PLAN OF CARE
Problem: Rehabilitation (IRF) Plan of Care  Goal: Plan of Care Review  Outcome: Ongoing, Progressing  Flowsheets  Taken 4/8/2023 0936  Progress: improving  Plan of Care Reviewed With: patient  Taken 4/7/2023 5513  Outcome Evaluation:   Call light and items within reach   no s/s of distress noted   patient resting in bed comfortably   will continue to monitor. Continue current plan of care.  Goal: Patient-Specific Goal (Individualized)  Outcome: Ongoing, Progressing  Goal: Absence of New-Onset Illness or Injury  Outcome: Ongoing, Progressing  Intervention: Prevent Fall and Fall Injury  Recent Flowsheet Documentation  Taken 4/8/2023 0929 by Umu Torres RN  Safety Promotion/Fall Prevention: safety round/check completed  Taken 4/8/2023 0800 by Umu Torres RN  Safety Promotion/Fall Prevention:   safety round/check completed   nonskid shoes/slippers when out of bed   assistive device/personal items within reach  Intervention: Prevent Infection  Recent Flowsheet Documentation  Taken 4/8/2023 0929 by Umu Torres RN  Infection Prevention:   hand hygiene promoted   rest/sleep promoted  Taken 4/8/2023 0800 by Umu Torres RN  Infection Prevention:   hand hygiene promoted   rest/sleep promoted  Intervention: Prevent VTE (Venous Thromboembolism)  Recent Flowsheet Documentation  Taken 4/8/2023 0929 by Umu Torres RN  VTE Prevention/Management: (Heparin) other (see comments)  Goal: Optimal Comfort and Wellbeing  Outcome: Ongoing, Progressing  Goal: Home and Community Transition Plan Established  Outcome: Ongoing, Progressing  Goal: Plan of Care Review  Outcome: Ongoing, Progressing  Flowsheets (Taken 4/8/2023 0936)  Progress: improving  Plan of Care Reviewed With: patient  Goal: Patient-Specific Goal (Individualized)  Outcome: Ongoing, Progressing  Goal: Absence of New-Onset Illness or Injury  Outcome: Ongoing, Progressing  Intervention: Prevent Fall and Fall Injury  Recent Flowsheet  Documentation  Taken 4/8/2023 0929 by Umu Torres RN  Safety Promotion/Fall Prevention: safety round/check completed  Taken 4/8/2023 0800 by Umu Torres RN  Safety Promotion/Fall Prevention:   safety round/check completed   nonskid shoes/slippers when out of bed   assistive device/personal items within reach  Intervention: Prevent Infection  Recent Flowsheet Documentation  Taken 4/8/2023 0929 by Umu Torres RN  Infection Prevention:   hand hygiene promoted   rest/sleep promoted  Taken 4/8/2023 0800 by Umu Torres RN  Infection Prevention:   hand hygiene promoted   rest/sleep promoted  Intervention: Prevent VTE (Venous Thromboembolism)  Recent Flowsheet Documentation  Taken 4/8/2023 0929 by Umu Torres RN  VTE Prevention/Management: (Heparin) other (see comments)  Goal: Optimal Comfort and Wellbeing  Outcome: Ongoing, Progressing  Goal: Home and Community Transition Plan Established  Outcome: Ongoing, Progressing     Problem: Fall Injury Risk  Goal: Absence of Fall and Fall-Related Injury  Outcome: Ongoing, Progressing  Intervention: Identify and Manage Contributors  Recent Flowsheet Documentation  Taken 4/8/2023 0929 by Umu Torres RN  Medication Review/Management: medications reviewed  Taken 4/8/2023 0800 by Umu Torres RN  Medication Review/Management: medications reviewed  Intervention: Promote Injury-Free Environment  Recent Flowsheet Documentation  Taken 4/8/2023 0929 by Umu Torres RN  Safety Promotion/Fall Prevention: safety round/check completed  Taken 4/8/2023 0800 by Umu Torres RN  Safety Promotion/Fall Prevention:   safety round/check completed   nonskid shoes/slippers when out of bed   assistive device/personal items within reach     Problem: Skin Injury Risk Increased  Goal: Skin Health and Integrity  Outcome: Ongoing, Progressing  Intervention: Promote and Optimize Oral Intake  Recent Flowsheet Documentation  Taken 4/8/2023 0929 by Umu Torres  Jesus RN  Oral Nutrition Promotion: physical activity promoted  Intervention: Optimize Skin Protection  Recent Flowsheet Documentation  Taken 4/8/2023 0929 by Umu Torres RN  Pressure Reduction Techniques:   weight shift assistance provided   pressure points protected   positioned off wounds   heels elevated off bed   frequent weight shift encouraged  Pressure Reduction Devices:   pressure-redistributing mattress utilized   positioning supports utilized   heel offloading device utilized  Skin Protection:   tubing/devices free from skin contact   adhesive use limited   incontinence pads utilized     Problem: Mobility Impairment  Goal: Optimal Mobility [place] and Safety  Outcome: Ongoing, Progressing     Problem: Asthma Comorbidity  Goal: Maintenance of Asthma Control  Outcome: Ongoing, Progressing  Intervention: Maintain Asthma Symptom Control  Recent Flowsheet Documentation  Taken 4/8/2023 0929 by Umu Torres RN  Medication Review/Management: medications reviewed  Taken 4/8/2023 0800 by Umu Torres RN  Medication Review/Management: medications reviewed     Problem: Behavioral Health Comorbidity  Goal: Maintenance of Behavioral Health Symptom Control  Outcome: Ongoing, Progressing  Intervention: Maintain Behavioral Health Symptom Control  Recent Flowsheet Documentation  Taken 4/8/2023 0929 by Umu Torres RN  Medication Review/Management: medications reviewed  Taken 4/8/2023 0800 by Umu Torres RN  Medication Review/Management: medications reviewed     Problem: COPD (Chronic Obstructive Pulmonary Disease) Comorbidity  Goal: Maintenance of COPD Symptom Control  Outcome: Ongoing, Progressing  Intervention: Maintain COPD-Symptom Control  Recent Flowsheet Documentation  Taken 4/8/2023 0929 by Umu Torres RN  Medication Review/Management: medications reviewed  Taken 4/8/2023 0800 by Umu Torres RN  Medication Review/Management: medications reviewed     Problem: Diabetes  Comorbidity  Goal: Blood Glucose Level Within Targeted Range  Outcome: Ongoing, Progressing  Intervention: Monitor and Manage Glycemia  Recent Flowsheet Documentation  Taken 4/8/2023 0929 by Umu Torres RN  Glycemic Management:   blood glucose monitored   oral hydration promoted     Problem: Heart Failure Comorbidity  Goal: Maintenance of Heart Failure Symptom Control  Outcome: Ongoing, Progressing  Intervention: Maintain Heart Failure-Management  Recent Flowsheet Documentation  Taken 4/8/2023 0929 by Umu Torres RN  Medication Review/Management: medications reviewed  Taken 4/8/2023 0800 by Umu Torres RN  Medication Review/Management: medications reviewed     Problem: Hypertension Comorbidity  Goal: Blood Pressure in Desired Range  Outcome: Ongoing, Progressing  Intervention: Maintain Blood Pressure Management  Recent Flowsheet Documentation  Taken 4/8/2023 0929 by Umu Torres RN  Medication Review/Management: medications reviewed  Taken 4/8/2023 0800 by Umu Torres RN  Medication Review/Management: medications reviewed     Problem: Obstructive Sleep Apnea Risk or Actual Comorbidity Management  Goal: Unobstructed Breathing During Sleep  Outcome: Ongoing, Progressing     Problem: Osteoarthritis Comorbidity  Goal: Maintenance of Osteoarthritis Symptom Control  Outcome: Ongoing, Progressing  Intervention: Maintain Osteoarthritis Symptom Control  Recent Flowsheet Documentation  Taken 4/8/2023 0929 by Umu Torres RN  Medication Review/Management: medications reviewed  Taken 4/8/2023 0800 by Umu Torres RN  Medication Review/Management: medications reviewed     Problem: Pain Chronic (Persistent) (Comorbidity Management)  Goal: Acceptable Pain Control and Functional Ability  Outcome: Ongoing, Progressing  Intervention: Manage Persistent Pain  Recent Flowsheet Documentation  Taken 4/8/2023 0929 by Umu Torres RN  Sleep/Rest Enhancement: regular sleep/rest pattern  promoted  Medication Review/Management: medications reviewed  Taken 4/8/2023 0800 by Umu Torres RN  Medication Review/Management: medications reviewed  Intervention: Develop Pain Management Plan  Recent Flowsheet Documentation  Taken 4/8/2023 0929 by Umu Torres RN  Pain Management Interventions: see MAR  Intervention: Optimize Psychosocial Wellbeing  Recent Flowsheet Documentation  Taken 4/8/2023 0929 by Umu Torres RN  Diversional Activities: television     Problem: Seizure Disorder Comorbidity  Goal: Maintenance of Seizure Control  Outcome: Ongoing, Progressing   Goal Outcome Evaluation:  Plan of Care Reviewed With: patient        Progress: improving  Outcome Evaluation: Call light and items within reach; no s/s of distress noted; patient resting in bed comfortably; will continue to monitor. Continue current plan of care.

## 2023-04-08 NOTE — DISCHARGE SUMMARY
[facility] HOSPITALISTS DISCHARGE SUMMARY    Patient Identification:  Name:  Augusto Lorenzana Jr.  Age:  78 y.o.  Sex:  male  :  1945  MRN:  [id number]  Visit Number:  [id number]    Date of Admission: 2023  Date of Discharge:  2023     PCP: Rob Polanco MD    DISCHARGE DIAGNOSIS  Suspect upper GI hemorrhage  Status post pacemaker placement for complete heart block  CHF reduced EF, recent EF of 45%--on report from [facility]  Chronic kidney disease stage V  Status post peritoneal dialysis catheter placement  CAD with history of CABG x3 2 years ago  History of gout  Hypertension  History of subdural hematoma secondary to trauma  History of atrial fibrillation--currently in sinus rhythm and has recently been taken off of amiodarone and is not anticoagulated    Anemia--requiring blood transfusion at this time      CONSULTS       PROCEDURES PERFORMED      HOSPITAL COURSE  Patient is a 78 y.o. male presented to [facility] acute inpatient rehab in transfer from [facility].  Patient is a 78-year-old gentleman with a history of CAD with history of CABG x3, chronic kidney disease stage V, CHF, diabetes mellitus, hypertension, gout, dyslipidemia, atrial fibrillation, history of fall with subdural hematoma, obstructive sleep apnea, valvular heart disease.  Patient had been admitted to [facility] on  for placement of peritoneal dialysis catheter.  During placement of the catheter patient did experience complete heart block and decision was made to proceed with pacemaker placement.  Patient had had past history of atrial fibrillation and been on amiodarone which was discontinued.  Patient was not on anticoagulation.  Apparently patient did require blood transfusions while at [facility] but that at that time they did not appreciate any obvious acute bleeding.  Patient was treated for acute respiratory failure thought to be  secondary to volume overload during his stay but he did respond well to IV Bumex.  Patient's echo at  showed EF of 45% with inferior and inferior septal wall hypokinesis.  Patient was thought to be a good candidate and his hemoglobin 2 days ago was 8.  Patient was taken in transfer yesterday without difficulty    Acute GI bleed--patient did not have any issues yesterday and hemoglobin at little after midnight was 8.1.  Late this morning patient has developed episode of hematemesis and has had 2 melanotic stools.  We did recheck hemoglobin which is 6.7.  We are in the process of arranging for blood transfusion.  Patient being started on IV Protonix drip.  Patient made NPO.  Aspirin, subcu heparin for VT prophylaxis discontinued and patient placed on scuds.  I have discussed the case with Dr. Tang who is graciously agreed to accept patient in transfer and patient will be evaluated by general surgery as well.  Patient complaining of abdominal pain but abdomen is soft on palpation at this time    Status post pacemaker placement secondary to complete heart block--patient appears to be in sinus rhythm at this time and is not having any difficulty in this regard.    Status post peritoneal dialysis catheter placement--again this was recently placed but has not been used at this point.      Chronic kidney disease stage V I had consulted the nephrology team for help with follow-up.  Patient had responded to Bumex.  Patient will likely require further nephrology follow-up and possible institution of peritoneal dialysis at some point    CHF reduced EF--had recent echocardiogram at the [facility] and in report had EF of 45%.  Patient was sent desponded to Bumex.  I have held Bumex since the beginning of GI bleeding.    History of coronary disease with history of CABG x3--patient denies chest pain at this time.  We will hold aspirin during acute bleeding.    Diabetes mellitus sliding scale  coverage    Hypertension controlled at this time.    History of atrial fibrillation patient did have amiodarone recently discontinued.  Patient is not on anticoagulation at this time.  Currently with a heart rate in the 90s.    History of subdural hematoma after fall    History of obstructive sleep apnea    Debility after recent pacemaker placement.          VITAL SIGNS:  Temp:  [98.2 °F (36.8 °C)-98.6 °F (37 °C)] 98.2 °F (36.8 °C)  Heart Rate:  [] 99  Resp:  [16-20] 20  BP: ()/(56-86) 102/67  SpO2:  [92 %-97 %] 97 %  on   ;   Device (Oxygen Therapy): room air    Body mass index is 26.66 kg/m².  Wt Readings from Last 3 Encounters:   04/07/23 77.2 kg (170 lb 3.2 oz)   07/08/21 83 kg (183 lb)   06/18/21 80.3 kg (177 lb)       PHYSICAL EXAM:  Constitutional: 78-year-old gentleman who appears chronically ill but in no acute distress  HEENT: Normocephalic atraumatic  Neck:   Supple  Cardiovascular: Regular rate and rhythm  Pulmonary/Chest: Clear to auscultation  Abdominal: Positive bowel sounds soft patient complains of some generalized tenderness but no rebound no guarding noted.   Musculoskeletal: No arthropathy  Neurological: No focal deficits.  Patient does seem to have poor memory  Skin: No rash  Peripheral vascular: No edema  Genitourinary::    DISCHARGE DISPOSITION   Stable    DISCHARGE MEDICATIONS:     Discharge Medications      ASK your doctor about these medications      Instructions Start Date   albuterol sulfate  (90 Base) MCG/ACT inhaler  Commonly known as: PROVENTIL HFA;VENTOLIN HFA;PROAIR HFA   2 puffs, Inhalation, 2 Times Daily PRN      allopurinol 300 MG tablet  Commonly known as: ZYLOPRIM   300 mg, Oral, Daily      amiodarone 200 MG tablet  Commonly known as: PACERONE   200 mg, Oral, Daily      amLODIPine 10 MG tablet  Commonly known as: NORVASC   10 mg, Oral, Every 24 Hours Scheduled      aspirin 81 MG EC tablet   81 mg, Oral, Daily      atorvastatin 40 MG tablet  Commonly known as:  LIPITOR   40 mg, Oral, Daily      carvedilol 6.25 MG tablet  Commonly known as: COREG   6.25 mg, Oral, 2 Times Daily With Meals      cloNIDine 0.2 MG tablet  Commonly known as: CATAPRES  Ask about: Which instructions should I use?   0.2 mg, Oral, 3 Times Daily      gabapentin 300 MG capsule  Commonly known as: NEURONTIN  Ask about: Which instructions should I use?   300 mg, Oral, Daily      glipizide 5 MG tablet  Commonly known as: GLUCOTROL   5 mg, Oral, 2 Times Daily Before Meals      hydrALAZINE 100 MG tablet  Commonly known as: APRESOLINE   100 mg, Oral, 3 Times Daily      Lantus SoloStar 100 UNIT/ML injection pen  Generic drug: Insulin Glargine  Ask about: Which instructions should I use?   30 Units, Subcutaneous, Daily      multivitamin with minerals tablet tablet   1 tablet, Oral, Daily      torsemide 20 MG tablet  Commonly known as: DEMADEX   40 mg, Oral, Daily              Your medication list      ASK your doctor about these medications      Instructions Last Dose Given Next Dose Due   albuterol sulfate  (90 Base) MCG/ACT inhaler  Commonly known as: PROVENTIL HFA;VENTOLIN HFA;PROAIR HFA      Inhale 2 puffs 2 (Two) Times a Day As Needed for Wheezing.       allopurinol 300 MG tablet  Commonly known as: ZYLOPRIM      Take 1 tablet by mouth Daily.       amiodarone 200 MG tablet  Commonly known as: PACERONE      Take 1 tablet by mouth Daily.       amLODIPine 10 MG tablet  Commonly known as: NORVASC      Take 1 tablet by mouth Daily.       aspirin 81 MG EC tablet      Take 1 tablet by mouth Daily.       atorvastatin 40 MG tablet  Commonly known as: LIPITOR      Take 1 tablet by mouth Daily.       carvedilol 6.25 MG tablet  Commonly known as: COREG      Take 1 tablet by mouth 2 (Two) Times a Day With Meals.       cloNIDine 0.2 MG tablet  Commonly known as: CATAPRES  Ask about: Which instructions should I use?      Take 1 tablet by mouth 3 (Three) Times a Day.       gabapentin 300 MG capsule  Commonly known  as: NEURONTIN  Ask about: Which instructions should I use?      Take 1 capsule by mouth Daily.       glipizide 5 MG tablet  Commonly known as: GLUCOTROL      Take 1 tablet by mouth 2 (Two) Times a Day Before Meals.       hydrALAZINE 100 MG tablet  Commonly known as: APRESOLINE      Take 1 tablet by mouth 3 (Three) Times a Day.       Lantus SoloStar 100 UNIT/ML injection pen  Generic drug: Insulin Glargine  Ask about: Which instructions should I use?      Inject 30 Units under the skin into the appropriate area as directed Daily.       multivitamin with minerals tablet tablet      Take 1 tablet by mouth Daily.       torsemide 20 MG tablet  Commonly known as: DEMADEX      Take 2 tablets by mouth Daily.                No future appointments.   Follow-up Information     Rob oPlanco MD .    Specialty: Internal Medicine  Contact information:  [address]  [address] [address] [phone]                          TEST  RESULTS PENDING AT DISCHARGE       CODE STATUS  Code Status and Medical Interventions:   Ordered at: 04/07/23 1069     Level Of Support Discussed With:    Patient     Code Status (Patient has no pulse and is not breathing):    CPR (Attempt to Resuscitate)     Medical Interventions (Patient has pulse or is breathing):    Full Support     Release to patient:    Routine Release       Primitivo Stevens MD  [facility]  04/08/23  12:00 EDT    Please note that this discharge summary required more than 30 minutes to complete.

## 2023-04-08 NOTE — THERAPY EVALUATION
Inpatient Rehabilitation - Occupational Therapy Initial Evaluation     [facility]     Patient Name: Augusto Lorenzana Jr.  : 1945  MRN: [id number]    Today's Date: 2023                 Admit Date: 2023       No diagnosis found.    Patient Active Problem List   Diagnosis   • NSTEMI 2021   • Hyperlipidemia LDL goal <70   • Essential hypertension   • T2DM on oral agents and insulin. HbA1c 7.4    • Coronary artery disease involving native coronary artery of native heart with unstable angina pectoris   • A/C kidney disease. ATN due to postoperative arrest. 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 Combination of anoxic insult and azotemia   • Debility   • Lower extremity edema   • Complete heart block       Past Medical History:   Diagnosis Date   • CAD (coronary artery disease)    • CKD (chronic kidney disease) stage 3, GFR 30-59 ml/min    • Diabetes mellitus    • Hyperlipidemia    • Hypertension    • NSTEMI (non-ST elevated myocardial infarction)        Past Surgical History:   Procedure Laterality Date   • CARDIAC CATHETERIZATION N/A 2021    Procedure: Left Heart Cath;  Surgeon: Blade Greene IV, MD;  Location: [facility] CATH INVASIVE LOCATION;  Service: Cardiovascular;  Laterality: N/A;   • CARDIAC SURGERY      2 stents placed .   • CORONARY ARTERY BYPASS GRAFT N/A 2021    Procedure: MEDIAN STERNOTOMY CORONARY ARTERY BYPASS X 3 UTILIZING THE LEFT INTERNAL MAMMARY ARTERY GRAFT, EVH OF THE GREATER RIGHT SAPHENOUS VEIN, AND PILO PER ANESTHESIA;  Surgeon: Jacek Miller MD;  Location: [facility] OR;  Service: Cardiothoracic;  Laterality: N/A;             IRF OT ASSESSMENT FLOWSHEET (last 12 hours)     IRF OT Evaluation and Treatment     Row Name 23 [id number]          OT Time and Intention    Mode of Treatment occupational  therapy  -KR     Patient Effort adequate  -SIMON     Row Name 04/08/23 0930          General Information    General Observations of Patient alert/cooperative  -SIMON     Row Name 04/08/23 0930          Living Environment    Current Living Arrangements home  -KR     People in Home spouse  -SIMON     Row Name 04/08/23 0930          Cognition/Psychosocial    Affect/Mental Status (Cognition) WFL  -KR     Orientation Status (Cognition) oriented x 3  -KR     Follows Commands (Cognition) WFL  -     Row Name 04/08/23 0930          Range of Motion Comprehensive    Comment, General Range of Motion BUE hands/elbows WFL, R shoulder WFL, L shoulder 3-/5 due to reported recent pacemaker placement  -SIMON     Row Name 04/08/23 0930          Strength Comprehensive (MMT)    Comment, General Manual Muscle Testing (MMT) Assessment BUE hands/elbows 3/5, R shoulder 3/5, L shoulder deferred  -SIMON     Row Name 04/08/23 0930          Basic Activities of Daily Living (BADLs)    Basic Activities of Daily Living bathing;upper body dressing;lower body dressing;grooming;toileting;self-feeding  -     Row Name 04/08/23 0930          Bathing    [place] Level (Bathing) bathing skills;moderate assist (50% patient effort)  -SIMON     Row Name 04/08/23 0930          Upper Body Dressing    [place] Level (Upper Body Dressing) upper body dressing skills;moderate assist (50-74% patient effort)  -     Row Name 04/08/23 0930          Lower Body Dressing    [place] Level (Lower Body Dressing) moderate assist (50% patient effort)  -SIMON     Row Name 04/08/23 0930          Grooming    [place] Level (Grooming) grooming skills;minimum assist (75% patient effort)  -SIMON     Row Name 04/08/23 0930          Toileting    [place] Level (Toileting) toileting skills;moderate assist (50% patient effort)  -     Row Name 04/08/23 0930          Self-Feeding    [place] Level (Self-Feeding) feeding skills;set up  -     Row Name 04/08/23 0930          Bed  Mobility    Bed Mobility rolling left;supine-sit  -KR     Rolling Left [place] (Bed Mobility) minimum assist (75% patient effort)  -KR     Supine-Sit [place] (Bed Mobility) minimum assist (75% patient effort)  -KR     Row Name 04/08/23 0930          Transfer Assessment/Treatment    Transfers sit-stand transfer;bed-chair transfer  -KR     Row Name 04/08/23 0930          Bed-Chair Transfer    Bed-Chair [place] (Transfers) minimum assist (75% patient effort);moderate assist (50% patient effort)  -KR     Row Name 04/08/23 0930          Sit-Stand Transfer    Sit-Stand [place] (Transfers) minimum assist (75% patient effort);moderate assist (50% patient effort)  -KR     Row Name 04/08/23 0930          Motor Skills    Motor Skills coordination;functional endurance  -KR     Coordination WFL;bilateral;upper extremity  -KR     Functional Endurance fair-  -KR     Row Name 04/08/23 0930          Therapy Assessment/Plan (OT)    Patient's Goals For Discharge take care of myself at home  -KR     Row Name 04/08/23 0930          Therapy Assessment/Plan (OT)    Planned Therapy Interventions (OT) activity tolerance training;adaptive equipment training;BADL retraining;strengthening exercise;transfer/mobility retraining  -KR     Comment, Therapy Assessment/Plan (OT) Pt tolerated evaluation adequately with ability to perform fxl mobility/components of ROM for self care activity. Pt motivated to improve fxl status and return to PLOF, prior to most recent hospitalization. OT will follow for general strength/endurance training to enhance performance of self care.  -SIMON     Row Name 04/08/23 0930          Therapy Plan Review/Discharge Plan (OT)    Anticipated Discharge Disposition (OT) home with assist  -SIMON     Row Name 04/08/23 0930          IRF OT Goals    Transfer Goal Selection (OT-IRF) transfers, OT goal 1;transfers, OT goal 2  -KR     LB Dressing Goal Selection (OT-IRF) LB dressing, OT goal 1;LB dressing, OT goal 2   -KR     Strength Goal Selection (OT-IRF) strength, OT goal 1 (free text);strength, OT goal 2 (free text)  -KR     Endurance Goal Selection (OT) endurance, OT goal 1;endurance, OT goal 2  -KR     Row Name 04/08/23 0930          Transfer Goal 1 (OT-IRF)    Activity/Assistive Device (Transfer Goal 1, OT-IRF) toilet  -KR     [place] Level (Transfer Goal 1, OT-IRF) minimum assist (75% or more patient effort)  -KR     Time Frame (Transfer Goal 1, OT-IRF) short-term goal (STG);1 week  -KR     Row Name 04/08/23 0930          Transfer Goal 2 (OT-IRF)    Activity/Assistive Device (Transfer Goal 2, OT-IRF) toilet  -KR     [place] Level (Transfer Goal 2, OT-IRF) set-up required  -KR     Time Frame (Transfer Goal 2, OT-IRF) long-term goal (LTG);2 weeks  -KR     Row Name 04/08/23 0930          LB Dressing Goal 1 (OT-IRF)    Activity/Device (LB Dressing Goal 1, OT-IRF) lower body dressing  -KR     [place] (LB Dressing Goal 1, OT-IRF) minimum assist (75% or more patient effort)  -KR     Time Frame (LB Dressing Goal 1, OT-IRF) short-term goal (STG);1 week  -KR     Row Name 04/08/23 0930          LB Dressing Goal 2 (OT-IRF)    Activity/Device (LB Dressing Goal 2, OT-IRF) lower body dressing  -KR     [place] (LB Dressing Goal 2, OT-IRF) set-up required  -KR     Time Frame (LB Dressing Goal 2, OT-IRF) long-term goal (LTG);2 weeks  -KR     Row Name 04/08/23 0930          Strength Goal 1 (OT-IRF)    Strength Goal 1 (OT-IRF) BUE increase to 3+ to enhance mobility/self care performance  -KR     Time Frame (Strength Goal 1, OT-IRF) short-term goal (STG);1 week  -KR     Row Name 04/08/23 0930          Strength Goal 2 (OT-IRF)    Strength Goal 2 (OT-IRF) BUE 4+/5 to enhance self care/mobility/performance  -KR     Time Frame (Strength Goal 2, OT-IRF) long-term goal (LTG);2 weeks  -KR     Row Name 04/08/23 0930           Endurance Goal 1 (OT)    Endurance Goal 1 (OT) Increase to enhance ADL performance  -KR     Activity  Level (Endurance Goal 1, OT) endurance 2 fair  -KR     Time Frame (Endurance Goal 1, OT) short term goal (STG);1 week  -KR     Row Name 04/08/23 0930          Endurance Goal 2 (OT)    Endurance Goal 2 (OT) Increase to enhance ADL performance  -KR     Activity Level (Endurance Goal 2, OT) endurance 2 good  -KR     Time Frame (Endurance Goal 2, OT) 2 weeks  -KR           User Key  (r) = Recorded By, (t) = Taken By, (c) = Cosigned By    Initials Name Effective Dates    Burton Nick OT 06/16/21 -                          OT Recommendation and Plan    Planned Therapy Interventions (OT): activity tolerance training, adaptive equipment training, BADL retraining, strengthening exercise, transfer/mobility retraining                    Time Calculation:      Time Calculation- OT     Row Name 04/08/23 1137             Time Calculation- OT    OT Start Time 0830  -KR      OT Stop Time 0915  -KR      OT Time Calculation (min) 45 min  -KR      Total Timed Code Minutes- OT 45 minute(s)  -KR            User Key  (r) = Recorded By, (t) = Taken By, (c) = Cosigned By    Initials Name Provider Type    Burton Nick OT Occupational Therapist              Therapy Charges for Today     Code Description Service Date Service Provider Modifiers Qty    [id number] HC OT EVAL MOD COMPLEXITY 3 4/8/2023 Burton Wilson OT GO 1                   Burton iWlson OT  4/8/2023

## 2023-04-08 NOTE — PLAN OF CARE
Goal Outcome Evaluation:           Progress: no change  Outcome Evaluation: Pt alert but confused this shift. Hemoglobin low, see blood flowsheet for interventions. P remains on NS infusion this shift. Pt received central line placement and EGD. Pt denies any abdominal pain but again he remains confused. MD aware.

## 2023-04-08 NOTE — OP NOTE
Central line insertion jugular    Surgeon:  Sabine Hudson M.D., AMAURI Lorenzana Tunde  4/8/2023    Pre and Post Procedure Diagnosis: GI bleed    Anesthesia: 1% lidocaine    Procedure:  After obtaining informed consent patient was placed in the Trendelenburg position.  Ultrasound was utilized to confirm the patency of the jugular vein.  With use of the Seldinger technique the right internal jugular vein was cannulated.  Guidewire was passed without resistance.  The tract was dilated and the triple-lumen catheter was passed to 20 cm and sutured in place. Good blood return was obtained from all 3 ports.  Dressing was placed.    CXR result: pending    Blood administered:  none    Complications:  none      Sabine Hudson MD     Date: 4/8/2023  Time: 16:38 EDT

## 2023-04-08 NOTE — CONSULTS
Consulting physician:  Dr. Hudson    Referring physician: Dr. Tang    Date of consultation: 04/08/23     Chief complaint GI bleed    Subjective     Patient is a 78 y.o. male who was transferred from [facility] [place] inpatient rehab with a chief complaint of new onset hematemesis and melena.  Patient has a past medical history remarkable for CAD status post CABG, CKD stage V, type 2 diabetes mellitus, hyperlipidemia, paroxysmal A-fib, SEBASTIAN and valvular heart disease. Patient had just arrived to [facility] inpatient rehab in his usual state of health when he began to have sudden onset hematemesis and melena.  CBC on admission demonstrated hemoglobin of 8.  Repeat H&H performed 11 hours later after episode of hematemesis and melena had dropped to 6.7.  With this in mind, request was made to admit patient to our inpatient hospitalist services for further treatment and evaluation of suspected upper GI bleed.      Review of Systems  Patient is confused and is not even oriented to person      History  Past Medical History:   Diagnosis Date   • CAD (coronary artery disease)    • CKD (chronic kidney disease) stage 3, GFR 30-59 ml/min    • Diabetes mellitus    • Hyperlipidemia    • Hypertension    • NSTEMI (non-ST elevated myocardial infarction)      Past Surgical History:   Procedure Laterality Date   • CARDIAC CATHETERIZATION N/A 5/24/2021    Procedure: Left Heart Cath;  Surgeon: Blade Greene IV, MD;  Location:  [facility]IVE [facility];  Service: Cardiovascular;  Laterality: N/A;   • CARDIAC SURGERY      2 stents placed 2012.   • CORONARY ARTERY BYPASS GRAFT N/A 5/28/2021    Procedure: MEDIAN STERNOTOMY CORONARY ARTERY BYPASS X 3 UTILIZING THE LEFT INTERNAL MAMMARY ARTERY GRAFT, EVH OF THE GREATER RIGHT SAPHENOUS VEIN, AND PILO PER ANESTHESIA;  Surgeon: Jacek Miller MD;  Location:  [facility] OR;  Service: Cardiothoracic;  Laterality: N/A;     Family History   Problem Relation Age of Onset   • Heart  disease Mother    • Diabetes type I Father      Social History     Tobacco Use   • Smoking status: Never   • Smokeless tobacco: Current     Types: Chew   Vaping Use   • Vaping Use: Never used   Substance Use Topics   • Alcohol use: Never   • Drug use: Never     Medications Prior to Admission   Medication Sig Dispense Refill Last Dose   • albuterol sulfate  (90 Base) MCG/ACT inhaler Inhale 2 puffs 2 (Two) Times a Day As Needed for Wheezing.   Unknown   • allopurinol (ZYLOPRIM) 300 MG tablet Take 1 tablet by mouth Daily.   Unknown   • amiodarone (PACERONE) 200 MG tablet Take 1 tablet by mouth Daily.   Unknown   • amLODIPine (NORVASC) 10 MG tablet Take 1 tablet by mouth Daily. 30 tablet 0 Unknown   • aspirin 81 MG EC tablet Take 1 tablet by mouth Daily.   Unknown   • atorvastatin (LIPITOR) 40 MG tablet Take 1 tablet by mouth Daily.   Unknown   • carvedilol (COREG) 6.25 MG tablet Take 1 tablet by mouth 2 (Two) Times a Day With Meals.   Unknown   • cloNIDine (CATAPRES) 0.2 MG tablet Take 1 tablet by mouth 3 (Three) Times a Day.   Unknown   • gabapentin (NEURONTIN) 300 MG capsule Take 1 capsule by mouth Daily.   Unknown   • glipizide (GLUCOTROL) 5 MG tablet Take 1 tablet by mouth 2 (Two) Times a Day Before Meals.   Unknown   • hydrALAZINE (APRESOLINE) 100 MG tablet Take 1 tablet by mouth 3 (Three) Times a Day.   Unknown   • Insulin Glargine (Lantus SoloStar) 100 UNIT/ML injection pen Inject 30 Units under the skin into the appropriate area as directed Daily.   Unknown   • multivitamin with minerals tablet tablet Take 1 tablet by mouth Daily.   Unknown   • torsemide (DEMADEX) 20 MG tablet Take 2 tablets by mouth Daily.   Unknown     Allergies:  Patient has no known allergies.    Objective     Vital Signs  Temp:  [98.2 °F (36.8 °C)-98.6 °F (37 °C)] 98.2 °F (36.8 °C)  Heart Rate:  [] 100  Resp:  [16-20] 20  BP: ()/(56-86) 121/73    Physical Exam:  General:  This is a WD WN male in no acute distress  Vital  signs: Stable, afebrile  HEENT exam:  WNL. Sclerae are anicteric.  EOMI  Neck:  Supple, FROM.  No JVD.  Trachea midline  Lungs:  Respiratory effort normal. Auscultation: Clear, without wheezes, rhonchi, rales  Heart:  Regular rate and rhythm, without murmur, gallop, rub.  No pedal edema  Left chest: Recent pacemaker  Abdomen: Abdomen is soft, nontender.  No palpable mass.  No rebound or guarding  Musculoskeletal: Unable to assess  Psych: Oriented x0  Skin:  Warm with good turgor.  Without rash or lesion  Extremities:  Examination of the extremities revealed no cyanosis, clubbing or edema.    Results Review:       Results from last 7 days   Lab Units 04/08/23  1109 04/08/23  0029 04/06/23  0329 04/05/23  0507   WBC 10*3/mm3  --  11.77* 11.07* 10.72*   HEMOGLOBIN g/dL 6.7* 8.0* 8.1* 6.8*   HEMATOCRIT % 21.9* 25.3* 24.7* 20.6*   PLATELETS 10*3/mm3  --  284 226 199         Results from last 7 days   Lab Units 04/08/23  0029 04/03/23  0444   SODIUM mmol/L 144  --    POTASSIUM mmol/L 4.2  --    MAGNESIUM mg/dL 1.9 2.3   CHLORIDE mmol/L 112*  --    CO2 mmol/L 18.0*  --    BUN mg/dL 103*  --    CREATININE mg/dL 4.21*  --    CALCIUM mg/dL 9.0  --    GLUCOSE mg/dL 117*  --    Estimated Creatinine Clearance: 15.3 mL/min (A) (by C-G formula based on SCr of 4.21 mg/dL (H)).  No results found for: AMMONIA      No results found for: BLOODCX  No results found for: URINECX  No results found for: WOUNDCX  No results found for: STOOLCX    Imaging:  Imaging Results (Last 24 Hours)     ** No results found for the last 24 hours. **            Impression:  Patient Active Problem List   Diagnosis Code   • NSTEMI 5/22/2021 I21.4   • Hyperlipidemia LDL goal <70 E78.5   • Essential hypertension I10   • T2DM on oral agents and insulin. HbA1c 7.4  E11.9, Z79.4   • Coronary artery disease involving native coronary artery of native heart with unstable angina pectoris I25.110   • A/C kidney disease. ATN due to postoperative arrest. Dialysis begun  6/3/2021 N18.9   • Gout M10.9   • Former smokeless tobacco use Z87.891   • S/P CABG x 3 on 5/28/21 Z95.1   • Perioperative cardiac arrest with ventricular fibrillation (CMS/HCC) I46.9, I49.01   • Postop encephalopathy post code. Combination of anoxic insult and azotemia G93.40   • Debility R53.81   • Lower extremity edema R60.0   • Complete heart block I44.2   • GI bleed K92.2       Plan:  Proceed with EGD  Patient also has poor venous access and will require transfusion.  We will proceed with placement of central line      Discussion:      Sabine Hudson MD  04/08/23  14:05 EDT    Time: Time spent:    Please note that portions of this note were completed with a voice recognition program.

## 2023-04-08 NOTE — THERAPY EVALUATION
Inpatient Rehabilitation - Physical Therapy Initial Evaluation        [facility]     Patient Name: Augusto Lorenzana Jr.  : 1945  MRN: [id number]    Today's Date: 2023                    Admit Date: 2023      Visit Dx:   No diagnosis found.    Patient Active Problem List   Diagnosis   • NSTEMI 2021   • Hyperlipidemia LDL goal <70   • Essential hypertension   • T2DM on oral agents and insulin. HbA1c 7.4    • Coronary artery disease involving native coronary artery of native heart with unstable angina pectoris   • A/C kidney disease. ATN due to postoperative arrest. 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 Combination of anoxic insult and azotemia   • Debility   • Lower extremity edema   • Complete heart block       Past Medical History:   Diagnosis Date   • CAD (coronary artery disease)    • CKD (chronic kidney disease) stage 3, GFR 30-59 ml/min    • Diabetes mellitus    • Hyperlipidemia    • Hypertension    • NSTEMI (non-ST elevated myocardial infarction)        Past Surgical History:   Procedure Laterality Date   • CARDIAC CATHETERIZATION N/A 2021    Procedure: Left Heart Cath;  Surgeon: Blade Greene IV, MD;  Location:  [facility] CATH INVASIVE LOCATION;  Service: Cardiovascular;  Laterality: N/A;   • CARDIAC SURGERY      2 stents placed .   • CORONARY ARTERY BYPASS GRAFT N/A 2021    Procedure: MEDIAN STERNOTOMY CORONARY ARTERY BYPASS X 3 UTILIZING THE LEFT INTERNAL MAMMARY ARTERY GRAFT, EVH OF THE GREATER RIGHT SAPHENOUS VEIN, AND PILO PER ANESTHESIA;  Surgeon: Jacek Miller MD;  Location: [facility] OR;  Service: Cardiothoracic;  Laterality: N/A;       PT ASSESSMENT (last 12 hours)     IRF PT Evaluation and Treatment     Row Name 23 1125          PT Time and Intention    Document Type initial evaluation  -CT      Mode of Treatment physical therapy  -CT     Patient/Family/Caregiver Comments/Observations Pt only able to tolerate 25 minutes of evaluation. Pt c/o dizziness upon standing and BP found to bed 80/54. RN notifed and pt put back to bed. See nursing notes.  -CT     Total Minutes, Physical Therapy 25  -CT     Row Name 04/08/23 1125          General Information    Patient Profile Reviewed yes  -CT     Existing Precautions/Restrictions fall  -CT     Comment, General Information Pt reports 3 falls in the last 3 months  -CT     Row Name 04/08/23 1125          Previous Level of Function/Home Environm    Bed Mobility, Premorbid Functional Level independent  -CT     Transfers, Premorbid Functional Level independent  -CT     Household Ambulation, Premorbid Functional Level independent  -CT     Stairs, Premorbid Functional Level partial assistance  -CT     Community Ambulation, Premorbid Functional Level independent  -CT     Row Name 04/08/23 1125          Living Environment    Current Living Arrangements home  -CT     People in Home spouse  -CT     Row Name 04/08/23 1125          Home Use of Assistive/Adaptive Equipment    Equipment Currently Used at Home cane, straight;rollator;shower chair  -CT     Row Name 04/08/23 1125          Cognition/Psychosocial    Affect/Mental Status (Cognition) WFL  -CT     Orientation Status (Cognition) oriented to;person;place  -CT     Follows Commands (Cognition) follows one-step commands  -CT     Personal Safety Interventions fall prevention program maintained;gait belt;nonskid shoes/slippers when out of bed;muscle strengthening facilitated;supervised activity  -CT     Cognitive Function WFL  -CT     Row Name 04/08/23 1125          Range of Motion Comprehensive    Comment, General Range of Motion BLE grossly WFL  -CT     Row Name 04/08/23 1125          Strength Comprehensive (MMT)    Comment, General Manual Muscle Testing (MMT) Assessment BLE grossly 3+/5  -CT     Row Name 04/08/23 1125           Bed Mobility    Bed Mobility bed mobility (all) activities  -CT     All Activities, [place] (Bed Mobility) minimum assist (75% patient effort)  -CT     Bed Mobility, Safety Issues decreased use of arms for pushing/pulling;decreased use of legs for bridging/pushing  -CT     Assistive Device (Bed Mobility) bed rails  -CT     Row Name 04/08/23 1125          Transfer Assessment/Treatment    Transfers bed-chair transfer;chair-bed transfer;sit-stand transfer;stand-sit transfer  -CT     Row Name 04/08/23 1125          Bed-Chair Transfer    Bed-Chair [place] (Transfers) minimum assist (75% patient effort)  -CT     Assistive Device (Bed-Chair Transfers) walker, front-wheeled  -CT     Row Name 04/08/23 1125          Chair-Bed Transfer    Chair-Bed [place] (Transfers) minimum assist (75% patient effort)  -CT     Assistive Device (Chair-Bed Transfers) walker, front-wheeled  -CT     Row Name 04/08/23 1125          Sit-Stand Transfer    Sit-Stand [place] (Transfers) minimum assist (75% patient effort)  -CT     Assistive Device (Sit-Stand Transfers) walker, front-wheeled  -CT     Row Name 04/08/23 1125          Stand-Sit Transfer    Stand-Sit [place] (Transfers) minimum assist (75% patient effort)  -CT     Assistive Device (Stand-Sit Transfers) walker, front-wheeled  -CT     Row Name 04/08/23 1125          Gait/Stairs (Locomotion)    [place] Level (Gait) minimum assist (75% patient effort)  -CT     Assistive Device (Gait) walker, front-wheeled  -CT     Distance in Feet (Gait) 4 ft during transfer back to bed  -CT     Pattern (Gait) swing-through  -CT     Deviations/Abnormal Patterns (Gait) gait speed decreased;weight shifting decreased  -CT     Row Name 04/08/23 1125          Balance    Balance Assessment sitting static balance;sitting dynamic balance;standing static balance;standing dynamic balance  -CT     Static Sitting Balance set-up  -CT     Position, Sitting Balance sitting in chair  -CT      Static Standing Balance minimal assist  -CT     Position/Device Used, Standing Balance walker, front-wheeled  -CT     Row Name 04/08/23 1125          Coping Strategies    Trust Relationship/Rapport care explained  -CT     Row Name 04/08/23 1125          Positioning and Restraints    Pre-Treatment Position sitting in chair/recliner  -CT     Post Treatment Position bed  -CT     In Bed supine;call light within reach;encouraged to call for assist;exit alarm on;with nsg;side rails up x3  -CT     Row Name 04/08/23 1125          Therapy Assessment/Plan (PT)    Patient's Goals For Discharge return home;take care of myself at home  -CT     Row Name 04/08/23 1125          Therapy Assessment/Plan (PT)    Functional Level at Time of Evaluation (PT) Min A  -CT     PT Diagnosis (PT) decreased functional mobility  -CT     Rehab Potential/Prognosis (PT) good, to achieve stated therapy goals  -CT     Frequency of Treatment (PT) 5 times per week;90 minutes per session  -CT     Estimated Duration of Therapy (PT) 2 weeks  -CT     Problem List (PT) problems related to;balance;strength  -CT     Row Name 04/08/23 1125          Therapy Plan Review/Discharge Plan (PT)    Therapy Plan Review (PT) evaluation/treatment results reviewed;care plan/treatment goals reviewed;risks/benefits reviewed;current/potential barriers reviewed;participants voiced agreement with care plan;participants included;patient  -CT     Anticipated Equipment Needs at Discharge (PT Eval) front wheeled walker  -CT     Anticipated Discharge Disposition (PT) home with assist  -CT     Row Name 04/08/23 1125          IRF PT Goals    Bed Mobility Goal Selection (PT-IRF) bed mobility, PT goal 1;bed mobility, PT goal 2  -CT     Transfer Goal Selection (PT-IRF) transfers, PT goal 1;transfers, PT goal 2  -CT     Gait (Walking Locomotion) Goal Selection (PT-IRF) gait, PT goal 1;gait, PT goal 2  -CT     Row Name 04/08/23 1125          Bed Mobility Goal 1 (PT-IRF)     Activity/Assistive Device (Bed Mobility Goal 1, PT-IRF) bed mobility activities, all  -CT     [place] Level (Bed Mobility Goal 1, PT-IRF) contact guard required  -CT     Time Frame (Bed Mobility Goal 1, PT-IRF) 1 week  -CT     Row Name 04/08/23 1125          Bed Mobility Goal 2 (PT-IRF)    Activity/Assistive Device (Bed Mobility Goal 2, PT-IRF) bed mobility activities, all  -CT     [place] Level (Bed Mobility Goal 2, PT-IRF) modified independence  -CT     Time Frame (Bed Mobility Goal 2, PT-IRF) by discharge  -CT     Row Name 04/08/23 1125          Transfer Goal 1 (PT-IRF)    Activity/Assistive Device (Transfer Goal 1, PT-IRF) sit-to-stand/stand-to-sit;bed-to-chair/chair-to-bed  -CT     [place] Level (Transfer Goal 1, PT-IRF) contact guard required  -CT     Time Frame (Transfer Goal 1, PT-IRF) 1 week  -CT     Row Name 04/08/23 1125          Transfer Goal 2 (PT-IRF)    Activity/Assistive Device (Transfer Goal 2, PT-IRF) sit-to-stand/stand-to-sit;bed-to-chair/chair-to-bed  -CT     [place] Level (Transfer Goal 2, PT-IRF) modified independence  -CT     Time Frame (Transfer Goal 2, PT-IRF) by discharge  -CT     Row Name 04/08/23 1125          Gait/Walking Locomotion Goal 1 (PT-IRF)    Activity/Assistive Device (Gait/Walking Locomotion Goal 1, PT-IRF) gait (walking locomotion);assistive device use  -CT     Gait/Walking Locomotion Distance Goal 1 (PT-IRF) 150  -CT     [place] Level (Gait/Walking Locomotion Goal 1, PT-IRF) contact guard required  -CT     Time Frame (Gait/Walking Locomotion Goal 1, PT-IRF) by discharge  -CT     Row Name 04/08/23 1125          Gait/Walking Locomotion Goal 2 (PT-IRF)    Activity/Assistive Device (Gait/Walking Locomotion Goal 2, PT-IRF) gait (walking locomotion);assistive device use  -CT     Gait/Walking Locomotion Distance Goal 2 (PT-IRF) 300  -CT     [place] Level (Gait/Walking Locomotion Goal 2, PT-IRF) standby assist  -CT     Time Frame (Gait/Walking Locomotion  Goal 2, PT-IRF) by discharge  -CT           User Key  (r) = Recorded By, (t) = Taken By, (c) = Cosigned By    Initials Name Provider Type    CT Omaira Cedillo PT Physical Therapist                     PT Recommendation and Plan    Frequency of Treatment (PT): 5 times per week, 90 minutes per session  Anticipated Equipment Needs at Discharge (PT Eval): front wheeled walker                  Time Calculation:      PT Charges     Row Name 04/08/23 1146             Time Calculation    Start Time 0955  -CT      Stop Time 1020  -CT      Time Calculation (min) 25 min  -CT      PT Received On 04/08/23  -CT      PT Goal Re-Cert Due Date 04/14/23  -CT            User Key  (r) = Recorded By, (t) = Taken By, (c) = Cosigned By    Initials Name Provider Type    CT Omaira Cedillo PT Physical Therapist                Therapy Charges for Today     Code Description Service Date Service Provider Modifiers Qty    53637283510 HC PT EVAL HIGH COMPLEXITY 2 4/8/2023 Omaira Cedillo, [place] GP 1                   Omaira Cedillo PT  4/8/2023

## 2023-04-08 NOTE — ANESTHESIA PREPROCEDURE EVALUATION
Anesthesia Evaluation     Patient summary reviewed and Nursing notes reviewed   no history of anesthetic complications:  NPO Solid Status: > 8 hours  NPO Liquid Status: > 8 hours           Airway   Mallampati: I  TM distance: >3 FB  Neck ROM: full  No difficulty expected  Dental    (+) edentulous, upper dentures and lower dentures    Pulmonary - negative pulmonary ROS    breath sounds clear to auscultation  Cardiovascular   Exercise tolerance: poor (<4 METS)    ECG reviewed  Rhythm: regular  Rate: normal    (+) pacemaker (placed one week ago) interrogated <1 month ago, hypertension, valvular problems/murmurs AS, past MI , CAD, CABG >6 Months, dysrhythmias Paroxysmal Atrial Fib, angina, hyperlipidemia,       Neuro/Psych- negative ROS  GI/Hepatic/Renal/Endo    (+) obesity,  GI bleeding active bleeding, renal disease dialysis, diabetes mellitus using insulin,     Musculoskeletal (-) negative ROS    Abdominal   (+) obese,     Abdomen: soft.   Substance History - negative use     OB/GYN          Other - negative ROS                       Anesthesia Plan    ASA 4 - emergent     general     intravenous induction     Anesthetic plan, risks, benefits, and alternatives have been provided, discussed and informed consent has been obtained with: patient.    Use of blood products discussed with consented to blood products.   Plan discussed with CRNA.

## 2023-04-08 NOTE — PLAN OF CARE
Goal Outcome Evaluation:               New admit to physical rehab unit. PT and OT to evaluate. No acute distress noted at this time. Continue current plan of care.

## 2023-04-08 NOTE — CONSULTS
Nephrology Consultation Note    Referring Provider: Dr. Tang/Dr. Guerin  Reason for Consultation: CKD stage V    Chief complaint hematemesis    Subjective .     History of present illness: Patient with CKD stage V had a peritoneal catheter placed about 10 days ago Thursday.  Developed a 6-second pause after surgery and required a pacemaker.  These events transpired at the [facility].  Dialysis was not initiated as a rapid start.  Patient was in congestive heart failure and was brought to euvolemic state with the help of diuretics and then he was transferred to rehab at [facility] yesterday.  The patient's hemoglobin yesterday was 8 mg/dL and his weight 170 pounds.  He normally weighs about 180 pounds at home but does have a little bit of edema at that weight.  This morning the patient was nauseated and did experience hematemesis.    His hemoglobin has dropped to 6.7 g/dL.  The patient is a bit slow although he knows me and cannot participate in review of systems.  Denies any chest pain or shortness of breath.  Patient's blood pressure is on the lower side.  He has no IV access.  Dr. Tang is at the bedside.  Central line is being planned as is emergent endoscopy.  He is on IV Protonix.    Hemodynamic data reviewed       All other systems were reviewed and negative.    History  Past Medical History:   Diagnosis Date   • CAD (coronary artery disease)    • CKD (chronic kidney disease) stage 3, GFR 30-59 ml/min    • Diabetes mellitus    • Hyperlipidemia    • Hypertension    • NSTEMI (non-ST elevated myocardial infarction)    ,   Past Surgical History:   Procedure Laterality Date   • CARDIAC CATHETERIZATION N/A 5/24/2021    Procedure: Left Heart Cath;  Surgeon: Blade Greene IV, MD;  Location: [facility] CATH INVASIVE LOCATION;  Service: Cardiovascular;  Laterality: N/A;   • CARDIAC SURGERY      2 stents placed 2012.   • CORONARY  ARTERY BYPASS GRAFT N/A 5/28/2021    Procedure: MEDIAN STERNOTOMY CORONARY ARTERY BYPASS X 3 UTILIZING THE LEFT INTERNAL MAMMARY ARTERY GRAFT, EVH OF THE GREATER RIGHT SAPHENOUS VEIN, AND PILO PER ANESTHESIA;  Surgeon: Jacek Miller MD;  Location: [facility];  Service: Cardiothoracic;  Laterality: N/A;   ,   Family History   Problem Relation Age of Onset   • Heart disease Mother    • Diabetes type I Father    ,   Social History     Tobacco Use   • Smoking status: Never   • Smokeless tobacco: Current     Types: Chew   Vaping Use   • Vaping Use: Never used   Substance Use Topics   • Alcohol use: Never   • Drug use: Never    and Allergies:  Patient has no known allergies.      Vital Signs   Temp:  [98.2 °F (36.8 °C)-98.6 °F (37 °C)] 98.2 °F (36.8 °C)  Heart Rate:  [] 100  Resp:  [16-20] 20  BP: ()/(56-86) 121/73    Physical Exam:     General Appearance:   Drowsy, cooperative, in no acute distress, oriented very well to person   Head:    Normocephalic, without obvious abnormality   Eyes:            Conjunctivae and sclerae normal, no icterus, + pallor, pupils CCERLA   Ears:    Ears appear intact    Throat:  Dry mucosa   Neck:    no JVD       Lungs:     Clear to auscultation,respirations regular, even and                  unlabored    Heart:    Regular rhythm and normal rate, normal S1 and S2, soft systolic murmur 3 x 6 and side apex.    Chest Wall:     Pacemaker left infraclavicular   Abdomen:     Normal bowel sounds, no tenderness.  PD catheter noted   Rectal:     Deferred   Extremities:  No edema   Pulses:   Pulses palpable            Neurologic:   Cr Ns grossly intact, moves all extremities.     Results Review:   I reviewed the patient's new clinical results.      Lab Results (last 24 hours)     ** No results found for the last 24 hours. **          Imaging Results (Last 24 Hours)     ** No results found for the last 24 hours. **                    Assessment and Plan:    1.  CKD stage V  2.  Upper GI  bleed with blood loss anemia  3.  Heart failure with reduced ejection fraction LVEF 45%  4.  History of CABG  5.  Diabetes with nephropathy poor control  6.  Hypertension  7.  Dehydration with hypotension  8.  Pacemaker      Set parameters on blood pressure medicines.  Give 2 L of IV fluids.  Transfuse.  Care of the PD catheter exit site.  Hold dialysis.  Discussed with Dr. Tang.              Josse Hernandez MD  04/08/23  14:09 EDT

## 2023-04-08 NOTE — ANESTHESIA POSTPROCEDURE EVALUATION
Patient: Augusto Lorenzana Jr.    Procedure Summary     Date: 04/08/23 Room / Location:  [facility]  /  [facility]    Anesthesia Start: 1528 Anesthesia Stop: 1626    Procedure: ESOPHAGOGASTRODUODENOSCOPY with CENTERAL LINE PLACEMENT (Esophagus) Diagnosis:       Gastrointestinal hemorrhage, unspecified gastrointestinal hemorrhage type      (Gastrointestinal hemorrhage, unspecified gastrointestinal hemorrhage type [K92.2])    Surgeons: Sabine Hudson MD Provider: Diego Perez MD    Anesthesia Type: general ASA Status: 4 - Emergent          Anesthesia Type: general    Vitals  Vitals Value Taken Time   BP 86/57 04/08/23 1626   Temp 97.4 °F (36.3 °C) 04/08/23 1626   Pulse 91 04/08/23 1626   Resp 25 04/08/23 1626   SpO2 100 % 04/08/23 1626           Post Anesthesia Care and Evaluation    Patient location during evaluation: PACU  Patient participation: complete - patient participated  Level of consciousness: responsive to verbal stimuli and confused  Pain score: 0  Pain management: adequate    Airway patency: patent  Anesthetic complications: No anesthetic complications  PONV Status: none  Cardiovascular status: hemodynamically stable  Respiratory status: nasal cannula  Hydration status: acceptable    Comments: Was confused preoperatively

## 2023-04-08 NOTE — PROGRESS NOTES
Rehabilitation Nursing  Inpatient Rehabilitation Plan of Care Note    Plan of Care  Pain    Pain Management (Active)  Current Status (4/7/2023 4:45:00 PM): at risk for pain  Weekly Goal: pain at an acceptable level  Discharge Goal: Pain managed    Safety    Potential for Injury (Active)  Current Status (4/7/2023 4:45:00 PM): At risk for falls  Weekly Goal: no falls  Discharge Goal: no falls    Signed by: Umu Torres RN

## 2023-04-08 NOTE — H&P
[facility] HOSPITALIST HISTORY AND PHYSICAL    Patient Identification:  Name:  Augusto Lorenzana Jr.  Age:  78 y.o.  Sex:  male  :  1945  MRN:  [id number]   Admit Date: 2023   Visit Number:  [id number]  Primary Care Physician:  Rob Polanco MD     Chief complaint: Hematemesis    History of presenting illness: Mr. Lorenzana is a 78 y.o. male who will be directly admitted to [facility] from [facility] with a chief complaint of new onset hematemesis and melena.  Patient has a past medical history remarkable for CAD status post CABG, CKD stage V, type 2 diabetes mellitus, hyperlipidemia, paroxysmal A-fib, SEBASTIAN and valvular heart disease.  Patient's history of present illness is slightly complicated.  Patient did present to the [facility] on  for placement of a peritoneal dialysis catheter.  During that hospital stay, patient had an episode of a 6-second pause during recovery from having peritoneal dialysis catheter placed.  As such, patient was evaluated by cardiology services who did recommend permanent pacemaker placement.  Patient did have permanent pacemaker placed during that hospital stay on 3/31/2023.  Patient also had some complications during that hospital stay including acute hypoxic respiratory failure secondary to volume overload and patient did respond well to IV Bumex at that time.  Patient has not had his peritoneal dialysis catheter used since it has been placed.  After a fairly lengthy hospitalization, patient was then admitted to [facility] inpatient physical rehab.  Patient had just arrived to [facility] inpatient rehab in his usual state of health when he began to have sudden onset hematemesis and melena.  CBC on admission demonstrated hemoglobin of 8.  Repeat H&H performed 11 hours later after episode of hematemesis and melena had dropped to 6.7.  With this in mind, request was made to admit patient to  Attempted to contact patient and was unsuccessful, message left on voicemail to return call. Would like to verify her medications.    our inpatient hospitalist services for further treatment and evaluation of suspected upper GI bleed.  -------------------------------------------------------------------------------------------------------------------  Past Medical History:   Diagnosis Date   • CAD (coronary artery disease)    • CKD (chronic kidney disease) stage 3, GFR 30-59 ml/min    • Diabetes mellitus    • Hyperlipidemia    • Hypertension    • NSTEMI (non-ST elevated myocardial infarction)      Past Surgical History:   Procedure Laterality Date   • CARDIAC CATHETERIZATION N/A 5/24/2021    Procedure: Left Heart Cath;  Surgeon: Blade Greene IV, MD;  Location:  [facility];  Service: Cardiovascular;  Laterality: N/A;   • CARDIAC SURGERY      2 stents placed 2012.   • CORONARY ARTERY BYPASS GRAFT N/A 5/28/2021    Procedure: MEDIAN STERNOTOMY CORONARY ARTERY BYPASS X 3 UTILIZING THE LEFT INTERNAL MAMMARY ARTERY GRAFT, EVH OF THE GREATER RIGHT SAPHENOUS VEIN, AND PILO PER ANESTHESIA;  Surgeon: Jacek Miller MD;  Location: [facility] OR;  Service: Cardiothoracic;  Laterality: N/A;     Family History   Problem Relation Age of Onset   • Heart disease Mother    • Diabetes type I Father      Social History     Socioeconomic History   • Marital status:    Tobacco Use   • Smoking status: Never   • Smokeless tobacco: Current     Types: Chew   Vaping Use   • Vaping Use: Never used   Substance and Sexual Activity   • Alcohol use: Never   • Drug use: Never   • Sexual activity: Defer     ---------------------------------------------------------------------------------------------------------------------   Allergies:  Patient has no known allergies.  ---------------------------------------------------------------------------------------------------------------------   Prior to Admission Medications     Prescriptions Last Dose Informant Patient Reported? Taking?    albuterol sulfate  (90 Base) MCG/ACT inhaler Unknown  Pharmacy, Spouse/Significant Other Yes No    Inhale 2 puffs 2 (Two) Times a Day As Needed for Wheezing.    allopurinol (ZYLOPRIM) 300 MG tablet Unknown Pharmacy, Spouse/Significant Other Yes No    Take 1 tablet by mouth Daily.    amiodarone (PACERONE) 200 MG tablet Unknown Pharmacy, Spouse/Significant Other Yes No    Take 1 tablet by mouth Daily.    amLODIPine (NORVASC) 10 MG tablet Unknown Pharmacy, Spouse/Significant Other No No    Take 1 tablet by mouth Daily.    aspirin 81 MG EC tablet Unknown Spouse/Significant Other Yes No    Take 1 tablet by mouth Daily.    atorvastatin (LIPITOR) 40 MG tablet Unknown Pharmacy, Spouse/Significant Other Yes No    Take 1 tablet by mouth Daily.    carvedilol (COREG) 6.25 MG tablet Unknown Pharmacy, Spouse/Significant Other Yes No    Take 1 tablet by mouth 2 (Two) Times a Day With Meals.    cloNIDine (CATAPRES) 0.2 MG tablet Unknown Pharmacy, Spouse/Significant Other Yes No    Take 1 tablet by mouth 3 (Three) Times a Day.    gabapentin (NEURONTIN) 300 MG capsule Unknown Pharmacy, Spouse/Significant Other Yes No    Take 1 capsule by mouth Daily.    glipizide (GLUCOTROL) 5 MG tablet Unknown Pharmacy, Spouse/Significant Other Yes No    Take 1 tablet by mouth 2 (Two) Times a Day Before Meals.    hydrALAZINE (APRESOLINE) 100 MG tablet Unknown Pharmacy, Spouse/Significant Other Yes No    Take 1 tablet by mouth 3 (Three) Times a Day.    Insulin Glargine (Lantus SoloStar) 100 UNIT/ML injection pen Unknown Pharmacy, Spouse/Significant Other Yes No    Inject 30 Units under the skin into the appropriate area as directed Daily.    multivitamin with minerals tablet tablet Unknown Spouse/Significant Other Yes No    Take 1 tablet by mouth Daily.    torsemide (DEMADEX) 20 MG tablet Unknown Pharmacy, Spouse/Significant Other Yes No    Take 2 tablets by mouth Daily.        Hospital Scheduled Meds:  [START ON 4/10/2023] allopurinol, 50 mg, Oral, Once per day on Mon Thu  [START ON 4/9/2023]  amLODIPine, 10 mg, Oral, Q24H  atorvastatin, 40 mg, Oral, Nightly  carvedilol, 25 mg, Oral, BID With Meals  [START ON 4/9/2023] cetirizine, 10 mg, Oral, Daily  docusate sodium, 100 mg, Oral, BID  hydrALAZINE, 50 mg, Oral, Q8H  insulin lispro, 2-7 Units, Subcutaneous, TID With Meals  [START ON 4/9/2023] lidocaine, 1 patch, Transdermal, Q24H  [START ON 4/9/2023] losartan, 100 mg, Oral, Q24H  melatonin, 5 mg, Oral, Nightly  [START ON 4/9/2023] multivitamin, 1 tablet, Oral, Daily  [START ON 4/9/2023] polyethylene glycol, 17 g, Oral, Daily  senna-docusate sodium, 2 tablet, Oral, Nightly      pantoprazole, 8 mg/hr      ---------------------------------------------------------------------------------------------------------------------   Review of Systems   On review of systems the patient denies the following unless noted above:     Constitutional:  Fevers, chills, weight change, fatigue     Eyes: Vision changes, headache, double vision, loss of vision     ENT: Runny nose, nose bleeds, ringing in ears, pain with swallowing, sore throat     Cardiovascular: Chest pains, palpitations, PND, orthopnea     Respiratory: Cough, wheezing, SOA, hemoptysis     GI:  Abdominal pain, diarrhea, constipation, change in stool caliber,    Rectal bleeding, vomiting or nausea     : Difficulty voiding, dysuria, hematuria     Musculoskeletal: Changes of any chronic joint pain, swelling     Skin: Rash, itching, easy bruisability     Neurological: Unilateral weakness, new onset numbness, speech difficulties     Psychiatric: Sadness, tearfulness, feelings of hopelessness, racing thoughts     Endocrine:  Heat or cold intolerance, mood swings, polydipsia, polyphagia,    recent hypoglycemia  --------------------------------------------------------------------------------------------------------------------  Vital Signs:  Temp:  [98.2 °F (36.8 °C)-98.6 °F (37 °C)] 98.2 °F (36.8 °C)  Heart Rate:  [] 100  Resp:  [16-20] 20  BP: ()/(56-86)  121/73  There were no vitals filed for this visit.  There is no height or weight on file to calculate BMI.  ---------------------------------------------------------------------------------------------------------------------   Physical exam:  Constitutional: Well-nourished aucasian male in no apparent distress.     HENT:  Head:  Normocephalic and atraumatic.  Mouth:  Moist mucous membranes.    Eyes:  Conjunctivae and EOM are normal.  Pupils are equal, round, and reactive to light.  No scleral icterus.    Neck:  Neck supple. No thyromegaly.  No JVD present.    Cardiovascular:  Regular rate and rhythm with no murmurs, rubs, clicks or gallops appreciated.  Pulmonary/Chest:  Clear to auscultation bilaterally with no crackles, wheezes or rhonchi appreciated.  Abdominal:  Soft. Nontender. Nondistended, large 4 x 4 dressing over left lower abdominal quadrant where peritoneal dialysis catheter is covered.  Bowel sounds are normal in all four quadrants. No organomegally appreciated.   Musculoskeletal:  No edema, no tenderness, and no deformity.  No red or swollen joints anywhere.    Neurological:  Alert, Cranial nerves II-XII intact with no focal defecits.  No facial droop.  No slurred speech.   Skin:  Warm and dry to palpation with no rashes or lesions appreciated.  Peripheral vascular:  2+ radial and pedal pulses in bilateral upper and lower extremities.  Psychiatric:  Alert and oriented x3, demonstrates appropriate judgement and insight.  ----------------------------------------------------------------------------------  ---------------------------------------------------------------------------------------------------------------------   Results from last 7 days   Lab Units 04/08/23  1109 04/08/23  0029 04/06/23  0329 04/05/23  0507   WBC 10*3/mm3  --  11.77* 11.07* 10.72*   HEMOGLOBIN g/dL 6.7* 8.0* 8.1* 6.8*   HEMATOCRIT % 21.9* 25.3* 24.7* 20.6*   MCV fL  --  98.8* 94 96   MCHC g/dL  --  31.6 32.8 33.0   PLATELETS  10*3/mm3  --  284 226 199         Results from last 7 days   Lab Units 04/08/23  0029 04/03/23  0444   SODIUM mmol/L 144  --    POTASSIUM mmol/L 4.2  --    MAGNESIUM mg/dL 1.9 2.3   CHLORIDE mmol/L 112*  --    CO2 mmol/L 18.0*  --    BUN mg/dL 103*  --    CREATININE mg/dL 4.21*  --    CALCIUM mg/dL 9.0  --    GLUCOSE mg/dL 117*  --    Estimated Creatinine Clearance: 16.3 mL/min (A) (by C-G formula based on SCr of 4.21 mg/dL (H)).  No results found for: AMMONIA          Lab Results   Component Value Date    HGBA1C 6.5 (H) 04/04/2022     Lab Results   Component Value Date    TSH 4.030 06/03/2021    FREET4 1.1 03/31/2023     No results found for: PREGTESTUR, PREGSERUM, HCG, HCGQUANT  Pain Management Panel     Pain Management Panel Latest Ref Rng & Units 12/7/2022 5/30/2021    CREATININE UR mg/dL 80.1 136.1    AMPHETAMINES SCREEN, URINE Negative - -    BARBITURATES SCREEN Negative - -    BENZODIAZEPINE SCREEN, URINE Negative - -    BUPRENORPHINEUR Negative - -    COCAINE SCREEN, URINE Negative - -    METHADONE SCREEN, URINE Negative - -    METHAMPHETAMINEUR Negative - -        No results found for: BLOODCX  No results found for: URINECX  No results found for: WOUNDCX  No results found for: STOOLCX      ---------------------------------------------------------------------------------------------------------------------  Imaging Results (Last 7 Days)     ** No results found for the last 168 hours. **          I have personally reviewed the radiology images and read the final radiology report.  ---------------------------------------------------------------------------------------------------------------------  Assessment and Plan:    1.  Suspected upper GI bleed -patient will be admitted to progressive care unit for further treatment and evaluation.  Will place patient on Protonix drip and plan for EGD today.    2.  Acute blood loss anemia -we will transfuse 1 unit PRBC and repeat H&H 1 hour thereafter.  We will target  hemoglobin of 7.    3.  Chronic HFrEF -no evidence of exacerbation at this time, in fact patient looks slightly volume depleted.  We will continue to monitor closely.    4.  CKD stage V -nephrology has been consulted, will continue to hold on peritoneal dialysis at this time as long as patient's electrolytes appear stable.    5.  Paroxysmal A-fib -patient is currently in normal sinus rhythm.  We will continue to monitor closely, patient has been taken off of amiodarone and anticoagulation has been discontinued in the setting of acute GI bleed.      Meek Tang,   04/08/23  13:23 EDT

## 2023-04-08 NOTE — PROGRESS NOTES
Patient Assessment Instrument  Quality Indicators - Admission    Section B. Hearing, Speech Vision      Section C. Cognitive Patterns      Section [id number]. Prior Functioning      Section EN8051. Prior Device Use      Section [id number]. Self Care Performance      Section [id number]. Self Care Discharge Goals      Section [id number]. Mobility Performance     Roll Left and Right: [place] does less than half the effort. [place] lifts, holds  or supports trunk or limbs but provides less than half the effort.   Sit to Lying: [place] provides verbal cues and/or touching/steadying and/or  contact guard assistance as patient completes activity. Assistance may be  provided throughout the activity or intermittently.   Lying to Sitting on Side of Bed: [place] does less than half the effort. [place]  lifts, holds or supports trunk or limbs but provides less than half the effort.   Sit to Stand: [place] does less than half the effort. [place] lifts, holds or  supports trunk or limbs but provides less than half the effort.   Chair/Bed to Chair Transfer: [place] does less than half the effort. [place]  lifts, holds or supports trunk or limbs but provides less than half the effort.   Toilet Transfer [place] does more than half the effort. [place] lifts or holds  trunk or limbs and provides more than half the effort.   Car Transfer: Not attempted due to medical or safety concerns.   Walk 10 Feet:   [place] does more than half the effort. [place] lifts or holds  trunk or limbs and provides more than half the effort.  Walk 50 Feet with 2 Turns:   [place] does more than half the effort. [place] lifts  or holds trunk or limbs and provides more than half the effort.  Walk 150 Feet:   Not attempted due to medical or safety concerns.  Walking 10 Feet on Uneven Surfaces:   Not attempted due to medical or safety  concerns.  1 Step Over Curb or Up/Down Stair:   Not attempted due to medical or safety  concerns.  Picking up an Object:   Not attempted due to medical or  safety concerns.  Uses Wheelchair/Scooter: No    Section [id number]. Mobility Discharge Goals     Roll Left and Right: [place] sets up or cleans up; patient completes activity.  [place] assists only prior to or following the activity.   Sit to Lying: [place] sets up or cleans up; patient completes activity. [place]  assists only prior to or following the activity.   Lying to Sitting on Side of Bed: [place] sets up or cleans up; patient completes  activity. [place] assists only prior to or following the activity.   Sit to Stand: [place] sets up or cleans up; patient completes activity. [place]  assists only prior to or following the activity.   Chair/Bed to Chair Transfer: [place] sets up or cleans up; patient completes  activity. [place] assists only prior to or following the activity.   Toilet Transfer: [place] sets up or cleans up; patient completes activity.  [place] assists only prior to or following the activity.   Car Transfer: [place] sets up or cleans up; patient completes activity. [place]  assists only prior to or following the activity.   Walk Discharge Goals:   Walk 150 Feet: [place] sets up or cleans up; patient completes activity. [place]  assists only prior to or following the activity.    Section H. Bladder and Bowel      Section I. Active Diagnosis      Section J. Health Conditions      Section K. Swallowing/Nutritional Status      Section M. Skin Conditions      Section N. Medication      Section O. Special Treatments, Procedures, and Programs      OPTIONAL BRANCH FOR TRACKING FALLS  Fall(s) During Shift:    Signed by: Omaira Cedillo PT

## 2023-04-09 ENCOUNTER — APPOINTMENT (OUTPATIENT)
Dept: CT IMAGING | Facility: HOSPITAL | Age: 78
End: 2023-04-09
Payer: MEDICARE

## 2023-04-09 LAB
ANION GAP SERPL CALCULATED.3IONS-SCNC: 16.4 MMOL/L (ref 5–15)
BH BB BLOOD EXPIRATION DATE: NORMAL
BH BB BLOOD EXPIRATION DATE: NORMAL
BH BB BLOOD TYPE BARCODE: 5100
BH BB BLOOD TYPE BARCODE: 5100
BH BB DISPENSE STATUS: NORMAL
BH BB DISPENSE STATUS: NORMAL
BH BB PRODUCT CODE: NORMAL
BH BB PRODUCT CODE: NORMAL
BH BB UNIT NUMBER: NORMAL
BH BB UNIT NUMBER: NORMAL
BUN SERPL-MCNC: 143 MG/DL (ref 8–23)
BUN/CREAT SERPL: 33.7 (ref 7–25)
CALCIUM SPEC-SCNC: 8.4 MG/DL (ref 8.6–10.5)
CHLORIDE SERPL-SCNC: 118 MMOL/L (ref 98–107)
CO2 SERPL-SCNC: 14.6 MMOL/L (ref 22–29)
CREAT SERPL-MCNC: 4.24 MG/DL (ref 0.76–1.27)
CROSSMATCH INTERPRETATION: NORMAL
CROSSMATCH INTERPRETATION: NORMAL
EGFRCR SERPLBLD CKD-EPI 2021: 13.8 ML/MIN/1.73
GLUCOSE BLDC GLUCOMTR-MCNC: 135 MG/DL (ref 70–130)
GLUCOSE BLDC GLUCOMTR-MCNC: 149 MG/DL (ref 70–130)
GLUCOSE BLDC GLUCOMTR-MCNC: 161 MG/DL (ref 70–130)
GLUCOSE SERPL-MCNC: 130 MG/DL (ref 65–99)
HAV IGM SERPL QL IA: NORMAL
HBV CORE IGM SERPL QL IA: NORMAL
HBV SURFACE AG SERPL QL IA: NORMAL
HCT VFR BLD AUTO: 21.8 % (ref 37.5–51)
HCT VFR BLD AUTO: 22.6 % (ref 37.5–51)
HCT VFR BLD AUTO: 23.9 % (ref 37.5–51)
HCT VFR BLD AUTO: 24 % (ref 37.5–51)
HCT VFR BLD AUTO: 25.7 % (ref 37.5–51)
HCV AB SER DONR QL: NORMAL
HGB BLD-MCNC: 7.1 G/DL (ref 13–17.7)
HGB BLD-MCNC: 7.2 G/DL (ref 13–17.7)
HGB BLD-MCNC: 7.8 G/DL (ref 13–17.7)
HGB BLD-MCNC: 7.9 G/DL (ref 13–17.7)
HGB BLD-MCNC: 8 G/DL (ref 13–17.7)
POTASSIUM SERPL-SCNC: 4.3 MMOL/L (ref 3.5–5.2)
SODIUM SERPL-SCNC: 149 MMOL/L (ref 136–145)
UNIT  ABO: NORMAL
UNIT  ABO: NORMAL
UNIT  RH: NORMAL
UNIT  RH: NORMAL

## 2023-04-09 PROCEDURE — 63710000001 INSULIN LISPRO (HUMAN) PER 5 UNITS: Performed by: SURGERY

## 2023-04-09 PROCEDURE — 99231 SBSQ HOSP IP/OBS SF/LOW 25: CPT | Performed by: SURGERY

## 2023-04-09 PROCEDURE — 80074 ACUTE HEPATITIS PANEL: CPT | Performed by: INTERNAL MEDICINE

## 2023-04-09 PROCEDURE — 25010000002 LORAZEPAM PER 2 MG: Performed by: INTERNAL MEDICINE

## 2023-04-09 PROCEDURE — 85018 HEMOGLOBIN: CPT | Performed by: SURGERY

## 2023-04-09 PROCEDURE — 25010000002 DIPHENHYDRAMINE PER 50 MG: Performed by: INTERNAL MEDICINE

## 2023-04-09 PROCEDURE — 85018 HEMOGLOBIN: CPT | Performed by: INTERNAL MEDICINE

## 2023-04-09 PROCEDURE — 82962 GLUCOSE BLOOD TEST: CPT

## 2023-04-09 PROCEDURE — 80048 BASIC METABOLIC PNL TOTAL CA: CPT | Performed by: INTERNAL MEDICINE

## 2023-04-09 PROCEDURE — 85014 HEMATOCRIT: CPT | Performed by: INTERNAL MEDICINE

## 2023-04-09 PROCEDURE — 99233 SBSQ HOSP IP/OBS HIGH 50: CPT | Performed by: INTERNAL MEDICINE

## 2023-04-09 PROCEDURE — 74176 CT ABD & PELVIS W/O CONTRAST: CPT

## 2023-04-09 PROCEDURE — 85014 HEMATOCRIT: CPT | Performed by: SURGERY

## 2023-04-09 PROCEDURE — 3E1M39Z IRRIGATION OF PERITONEAL CAVITY USING DIALYSATE, PERCUTANEOUS APPROACH: ICD-10-PCS | Performed by: INTERNAL MEDICINE

## 2023-04-09 RX ORDER — LORAZEPAM 2 MG/ML
1 INJECTION INTRAMUSCULAR ONCE
Status: COMPLETED | OUTPATIENT
Start: 2023-04-09 | End: 2023-04-09

## 2023-04-09 RX ORDER — LACTULOSE 10 G/15ML
30 SOLUTION ORAL ONCE
Status: COMPLETED | OUTPATIENT
Start: 2023-04-09 | End: 2023-04-09

## 2023-04-09 RX ORDER — DIPHENHYDRAMINE HYDROCHLORIDE 50 MG/ML
25 INJECTION INTRAMUSCULAR; INTRAVENOUS ONCE
Status: COMPLETED | OUTPATIENT
Start: 2023-04-09 | End: 2023-04-09

## 2023-04-09 RX ORDER — SODIUM CHLORIDE 450 MG/100ML
50 INJECTION, SOLUTION INTRAVENOUS CONTINUOUS
Status: DISCONTINUED | OUTPATIENT
Start: 2023-04-09 | End: 2023-04-10

## 2023-04-09 RX ADMIN — SODIUM CHLORIDE 50 ML/HR: 4.5 INJECTION, SOLUTION INTRAVENOUS at 11:59

## 2023-04-09 RX ADMIN — LACTULOSE 30 G: 20 SOLUTION ORAL at 11:59

## 2023-04-09 RX ADMIN — LORAZEPAM 1 MG: 2 INJECTION INTRAMUSCULAR; INTRAVENOUS at 10:58

## 2023-04-09 RX ADMIN — PANTOPRAZOLE SODIUM 8 MG/HR: 40 INJECTION, POWDER, FOR SOLUTION INTRAVENOUS at 20:58

## 2023-04-09 RX ADMIN — DOCUSATE SODIUM 50 MG AND SENNOSIDES 8.6 MG 2 TABLET: 8.6; 5 TABLET, FILM COATED ORAL at 20:40

## 2023-04-09 RX ADMIN — ATORVASTATIN CALCIUM 40 MG: 40 TABLET, FILM COATED ORAL at 20:40

## 2023-04-09 RX ADMIN — DIPHENHYDRAMINE HYDROCHLORIDE 25 MG: 50 INJECTION, SOLUTION INTRAMUSCULAR; INTRAVENOUS at 10:58

## 2023-04-09 RX ADMIN — PANTOPRAZOLE SODIUM 8 MG/HR: 40 INJECTION, POWDER, FOR SOLUTION INTRAVENOUS at 09:51

## 2023-04-09 RX ADMIN — PANTOPRAZOLE SODIUM 8 MG/HR: 40 INJECTION, POWDER, FOR SOLUTION INTRAVENOUS at 04:24

## 2023-04-09 RX ADMIN — SODIUM CHLORIDE 125 ML/HR: 9 INJECTION, SOLUTION INTRAVENOUS at 04:24

## 2023-04-09 RX ADMIN — PANTOPRAZOLE SODIUM 8 MG/HR: 40 INJECTION, POWDER, FOR SOLUTION INTRAVENOUS at 15:57

## 2023-04-09 RX ADMIN — INSULIN LISPRO 2 UNITS: 100 INJECTION, SOLUTION INTRAVENOUS; SUBCUTANEOUS at 17:49

## 2023-04-09 RX ADMIN — GENTAMICIN SULFATE 1 APPLICATION: 1 OINTMENT TOPICAL at 09:51

## 2023-04-09 NOTE — PLAN OF CARE
Goal Outcome Evaluation:           Progress: no change  Outcome Evaluation: Pt remains alert but confused this shift. Pt does have episodes of agitation as well. Pt has been on room air and tolerating well but continues to pull at O2 sensor and cardiac monitoring. Pt receiving peritoneal dialysis at this time. Pt had LARGE BM which was dark in color after receiving lactulose ordered by MD. Pt hemoglobins continuing to be trended. Pt has been restless the majority of the shift but otherwise has only mentioned being hungry.

## 2023-04-09 NOTE — PROGRESS NOTES
Psychiatric HOSPITALIST PROGRESS NOTE     Patient Identification:  Name:  Augusto Lorenzana Jr.  Age:  78 y.o.  Sex:  male  :  1945  MRN:  [id number]  Visit Number:  [id number]  Primary Care Provider:  Rob Polanco MD    Length of stay:  1    Chief complaint: Confusion    Subjective:    Patient seen and examined at bedside with patient's wife at bedside.  Per nursing staff, patient did get quite confused overnight requiring Ativan and Benadryl.  Patient is lethargic this morning but will follow commands.  Did discuss with patient's wife at bedside while patient's hemoglobin did improve after transfusion, repeat hemoglobin this morning had trended down to 7.2 and patient may require additional blood transfusion.  Otherwise, no new events overnight.  ----------------------------------------------------------------------------------------------------------------------  Current [facility] Meds:  [START ON 4/10/2023] allopurinol, 50 mg, Oral, Once per day on   amLODIPine, 10 mg, Oral, Q24H  atorvastatin, 40 mg, Oral, Nightly  carvedilol, 25 mg, Oral, BID With Meals  cetirizine, 10 mg, Oral, Daily  gentamicin, 1 application, Topical, Daily  insulin lispro, 2-7 Units, Subcutaneous, TID With Meals  lactulose, 30 g, Oral, Once  lidocaine, 1 patch, Transdermal, Q24H  losartan, 100 mg, Oral, Q24H  multivitamin, 1 tablet, Oral, Daily  senna-docusate sodium, 2 tablet, Oral, Nightly      pantoprazole, 8 mg/hr, Last Rate: 8 mg/hr (23 0951)  sodium chloride, 50 mL/hr      ----------------------------------------------------------------------------------------------------------------------  Vital Signs:  Temp:  [97.3 °F (36.3 °C)-99.4 °F (37.4 °C)] 97.7 °F (36.5 °C)  Heart Rate:  [] 89  Resp:  [9-25] 17  BP: ()/(50-98) 170/90      23  1300 23  0500   Weight: 82.7 kg (182 lb 4.8 oz) 76.3 kg (168 lb 3.4 oz)     Body mass index is 26.35 kg/m².    Intake/Output Summary (Last  24 hours) at 4/9/2023 1159  Last data filed at 4/9/2023 0900  Gross per 24 hour   Intake 1253.39 ml   Output 700 ml   Net 553.39 ml     NPO Diet NPO Type: Strict NPO, Sips with Meds  ----------------------------------------------------------------------------------------------------------------------  Physical exam:  Constitutional: Elderly appearing  male in no apparent distress, slightly lethargic and confused  HENT:  Head:  Normocephalic and atraumatic.  Mouth:  Moist mucous membranes.    Eyes:  Conjunctivae and EOM are normal.  Pupils are equal, round, and reactive to light.  No scleral icterus.    Neck:  Neck supple. No thyromegaly.  No JVD present.    Cardiovascular:  Regular rate and rhythm with no murmurs, rubs, clicks or gallops appreciated.  Pulmonary/Chest:  Clear to auscultation bilaterally with no crackles, wheezes or rhonchi appreciated.  Abdominal:  Soft. Nontender. Nondistended  Bowel sounds are normal in all four quadrants. No organomegally appreciated.   Musculoskeletal:  No edema, no tenderness, and no deformity.  No red or swollen joints anywhere.    Neurological:  Alert, Cranial nerves II-XII intact with no focal deficits.  No facial droop.  No slurred speech.   Skin:  Warm and dry to palpation with no rashes or lesions appreciated.  Peripheral vascular:  2+ radial and pedal pulses in bilateral upper and lower extremities.  Psychiatric:  Alert but lethargic and confused, does not demonstrate appropriate judgment or insight  ----------------------------------------------------------------------------------------------------------------------  Tele:    ----------------------------------------------------------------------------------------------------------------------      Results from last 7 days   Lab Units 04/09/23  0633 04/09/23  0322 04/09/23  0141 04/08/23  1647 04/08/23  1109 04/08/23  0029 04/06/23  0329 04/05/23  0507   WBC 10*3/mm3  --   --   --   --   --  11.77* 11.07* 10.72*    HEMOGLOBIN g/dL 7.2* 7.9* 7.8* 5.5*   < > 8.0* 8.1* 6.8*   HEMATOCRIT % 22.6* 23.9* 24.0* 17.2*   < > 25.3* 24.7* 20.6*   MCV fL  --   --   --   --   --  98.8* 94 96   MCHC g/dL  --   --   --   --   --  31.6 32.8 33.0   PLATELETS 10*3/mm3  --   --   --   --   --  284 226 199   INR   --   --   --  1.38*  --   --   --   --     < > = values in this interval not displayed.         Results from last 7 days   Lab Units 04/09/23  0141 04/08/23  0029 04/03/23  0444   SODIUM mmol/L 149* 144  --    POTASSIUM mmol/L 4.3 4.2  --    MAGNESIUM mg/dL  --  1.9 2.3   CHLORIDE mmol/L 118* 112*  --    CO2 mmol/L 14.6* 18.0*  --    BUN mg/dL 143* 103*  --    CREATININE mg/dL 4.24* 4.21*  --    CALCIUM mg/dL 8.4* 9.0  --    GLUCOSE mg/dL 130* 117*  --    Estimated Creatinine Clearance: 16 mL/min (A) (by C-G formula based on SCr of 4.24 mg/dL (H)).    No results found for: AMMONIA      No results found for: BLOODCX  No results found for: URINECX  No results found for: WOUNDCX  No results found for: STOOLCX    I have personally looked at the labs and they are summarized above.  ----------------------------------------------------------------------------------------------------------------------  Imaging Results (Last 24 Hours)     Procedure Component Value Units Date/Time    CT Abdomen Pelvis Without Contrast [[id number]] Collected: 04/09/23 0434     Updated: 04/09/23 0436    Narrative:      CT Abdomen Pelvis WO    INDICATION:   Abdominal pain with hematemesis    TECHNIQUE:   CT of the abdomen and pelvis without IV contrast. Coronal and sagittal reconstructions were obtained.  Radiation dose reduction techniques included automated exposure control or exposure modulation based on body size. Radiation audit for CT and nuclear  cardiology exams in the last 12 months: 0.     COMPARISON:   None available.    FINDINGS:    The patient's arms lie within the gantry. This generates artifact which obscures detail of the upper abdomen. Findings  could be potentially missed as a result of this. Allowing for this:    Small bilateral pleural effusions. Edema noted throughout the partially visualized lower lungs. 14 mm nodule in the right lower lobe of the lungs is inadequately assessed by this exam. No destructive osseous lesion. Dextroscoliosis of the thoracolumbar  spine..    Evaluation of the solid viscera is compromised by lack of IV contrast. Allowing for this:    Normal noncontrast appearance of the liver, pancreas, spleen and both adrenal glands. Cholelithiasis without evidence of acute cholecystitis.    The kidneys are symmetric in size. Evaluation for solid renal lesion is largely precluded by lack of IV contrast. No hydronephrosis. No renal or ureteral calculus.        No evidence of bowel obstruction. No localizing perienteric inflammatory change. Peritoneal catheter at the level the right lower quadrant. Trace free fluid in the abdomen/pelvis likely relates to the presence of the catheter.    Normal caliber of the abdominal aorta. No lymphadenopathy within the abdomen or pelvis by size criteria.      Impression:        1.  Cholelithiasis without evidence of acute cholecystitis..  2.  Small bilateral pleural effusions with edema throughout both lower lungs (partially visualized).  3.  14 mm pulmonary nodule in the right lower lobe of the lungs, inadequately assessed by this exam. Recommend follow-up CT of the chest with contrast for further assessment.        Signer Name: MADHAVI AVITIA MD   Signed: 4/9/2023 4:34 AM   Workstation Name: [id number]    [facility]    XR Chest AP [[id number]] Collected: 04/08/23 1711     Updated: 04/08/23 1714    Narrative:      CHEST X-RAY, 4/8/2023      HISTORY:    Central line placement.      TECHNIQUE:  AP portable chest x-ray.    COMPARISON:  *  Chest x-ray, 6/24/2021.    FINDINGS:  Right IJ central line tip in good position in the lower SVC. No visible pneumothorax.    Cardiomegaly with mild  diffuse pulmonary interstitial edema suggesting mild vascular congestion or volume overload, new since the prior study. Median sternotomy. Dual-chamber cardiac pacer/AICD.      Impression:      1. No pneumothorax following placement of well-positioned right IJ central line.  2. Vascular congestion with mild interstitial edema.    Signer Name: Nico Aaron MD   Signed: 4/8/2023 5:11 PM   Workstation Name: [email or web address]    [facility]        ----------------------------------------------------------------------------------------------------------------------  Assessment and Plan:    1.  Upper GI bleed -patient did undergo EGD yesterday which demonstrated rena blood in the prepyloric area.  Patient did receive epinephrine injection and GI bleeding appears to have slowed.  We will monitor closely with repeat H&H every 6 hours and transfuse for hemoglobin less than 7.  We will transition Protonix drip to Protonix 40 mg IV twice daily.  Appreciate general surgery recommendations.     2.  Acute blood loss anemia -patient did require a total of 2 units PRBC transfusion yesterday.  We will continue to monitor hemoglobin and hematocrit, transfuse for hemoglobin less than 7.      3.  Chronic HFrEF -no evidence of exacerbation at this time, We will continue to monitor closely.     4.  CKD stage V -nephrology has been consulted, will continue to hold on peritoneal dialysis at this time as long as patient's electrolytes appear stable.     5.  Paroxysmal A-fib -patient is currently in normal sinus rhythm.  We will continue to monitor closely, patient has been taken off of amiodarone and anticoagulation has been discontinued in the setting of acute GI bleed    Disposition will likely require several more days hospitalization    Meek Tang DO   04/09/23   11:59 EDT

## 2023-04-09 NOTE — PLAN OF CARE
Problem: Adult Inpatient Plan of Care  Goal: Plan of Care Review  Outcome: Ongoing, Progressing  Flowsheets (Taken 4/9/2023 0430)  Plan of Care Reviewed With: patient  Outcome Evaluation: Pt alert, refusing to answer orientation questions. VSS during shift. Pt on RA, tolerating well. Pt very agitated at beginning of shift, MD made aware, see MAR for interventions. Pt is more compliant at this time. recieved 2 units of PRBC's, hgb now 7.8. Pt now resting in bed at this time, will continue to follow plan of care 7p-7a.  Goal: Patient-Specific Goal (Individualized)  Outcome: Ongoing, Progressing  Goal: Absence of Hospital-Acquired Illness or Injury  Outcome: Ongoing, Progressing  Intervention: Identify and Manage Fall Risk  Recent Flowsheet Documentation  Taken 4/9/2023 0300 by Leticia Alicea RN  Safety Promotion/Fall Prevention:   safety round/check completed   activity supervised   assistive device/personal items within reach   clutter free environment maintained   fall prevention program maintained  Taken 4/9/2023 0100 by Leticia Alicea RN  Safety Promotion/Fall Prevention:   safety round/check completed   assistive device/personal items within reach   activity supervised   clutter free environment maintained   fall prevention program maintained  Taken 4/8/2023 2300 by Leticia Alicea RN  Safety Promotion/Fall Prevention:   safety round/check completed   activity supervised   assistive device/personal items within reach   clutter free environment maintained   fall prevention program maintained  Taken 4/8/2023 2100 by Leticia Alicea RN  Safety Promotion/Fall Prevention:   safety round/check completed   activity supervised   clutter free environment maintained   assistive device/personal items within reach   fall prevention program maintained  Taken 4/8/2023 1900 by Leticia Alicea, RN  Safety Promotion/Fall Prevention:   safety round/check completed   activity supervised   assistive device/personal items within reach   clutter  free environment maintained   fall prevention program maintained  Intervention: Prevent Skin Injury  Recent Flowsheet Documentation  Taken 4/9/2023 0230 by Leticia Alicea RN  Skin Protection:   adhesive use limited   tubing/devices free from skin contact   transparent dressing maintained   skin-to-device areas padded   skin-to-skin areas padded  Taken 4/8/2023 2000 by Leticia Alicea RN  Skin Protection:   tubing/devices free from skin contact   transparent dressing maintained   adhesive use limited  Intervention: Prevent and Manage VTE (Venous Thromboembolism) Risk  Recent Flowsheet Documentation  Taken 4/9/2023 0230 by Leticia Alicea RN  VTE Prevention/Management:   bilateral   sequential compression devices on  Range of Motion: active ROM (range of motion) encouraged  Taken 4/8/2023 2000 by Leticia Alicea RN  VTE Prevention/Management:   bilateral   sequential compression devices on  Range of Motion: active ROM (range of motion) encouraged  Intervention: Prevent Infection  Recent Flowsheet Documentation  Taken 4/9/2023 0300 by Leticia Alicea RN  Infection Prevention:   rest/sleep promoted   visitors restricted/screened   single patient room provided  Taken 4/9/2023 0100 by Leticia Alicea RN  Infection Prevention:   visitors restricted/screened   single patient room provided   rest/sleep promoted  Taken 4/8/2023 2300 by Leticia Alicea RN  Infection Prevention:   visitors restricted/screened   single patient room provided   rest/sleep promoted  Taken 4/8/2023 2100 by Leticia Alicea RN  Infection Prevention:   visitors restricted/screened   single patient room provided   rest/sleep promoted  Taken 4/8/2023 1900 by Leticia Alicea RN  Infection Prevention:   single patient room provided   rest/sleep promoted   visitors restricted/screened  Goal: Optimal Comfort and Wellbeing  Outcome: Ongoing, Progressing  Intervention: Provide Person-Centered Care  Recent Flowsheet Documentation  Taken 4/9/2023 0230 by Leticia Alicea RN  Trust  Relationship/Rapport:   care explained   choices provided   thoughts/feelings acknowledged  Taken 4/8/2023 2000 by Leticia Alicea RN  Trust Relationship/Rapport:   choices provided   care explained   thoughts/feelings acknowledged  Goal: Readiness for Transition of Care  Outcome: Ongoing, Progressing     Problem: Adjustment to Illness (Gastrointestinal Bleeding)  Goal: Optimal Coping with Acute Illness  Outcome: Ongoing, Progressing  Intervention: Optimize Psychosocial Response  Recent Flowsheet Documentation  Taken 4/8/2023 2000 by Leticia Alicea RN  Supportive Measures: relaxation techniques promoted     Problem: Bleeding (Gastrointestinal Bleeding)  Goal: Hemostasis  Outcome: Ongoing, Progressing  Intervention: Manage Gastrointestinal Bleeding  Recent Flowsheet Documentation  Taken 4/8/2023 2000 by Leticia Alicea RN  Environmental Support: rest periods encouraged     Problem: Skin Injury Risk Increased  Goal: Skin Health and Integrity  Outcome: Ongoing, Progressing  Intervention: Optimize Skin Protection  Recent Flowsheet Documentation  Taken 4/9/2023 0230 by Leticia Alicea RN  Pressure Reduction Techniques:   weight shift assistance provided   heels elevated off bed   frequent weight shift encouraged  Pressure Reduction Devices: pressure-redistributing mattress utilized  Skin Protection:   adhesive use limited   tubing/devices free from skin contact   transparent dressing maintained   skin-to-device areas padded   skin-to-skin areas padded  Taken 4/8/2023 2000 by Leticia Alicea RN  Pressure Reduction Techniques:   weight shift assistance provided   frequent weight shift encouraged  Pressure Reduction Devices: pressure-redistributing mattress utilized  Skin Protection:   tubing/devices free from skin contact   transparent dressing maintained   adhesive use limited     Problem: Fall Injury Risk  Goal: Absence of Fall and Fall-Related Injury  Outcome: Ongoing, Progressing  Intervention: Promote Injury-Free  Environment  Recent Flowsheet Documentation  Taken 4/9/2023 0300 by Leticia Alicea RN  Safety Promotion/Fall Prevention:   safety round/check completed   activity supervised   assistive device/personal items within reach   clutter free environment maintained   fall prevention program maintained  Taken 4/9/2023 0100 by Leticia Alicea RN  Safety Promotion/Fall Prevention:   safety round/check completed   assistive device/personal items within reach   activity supervised   clutter free environment maintained   fall prevention program maintained  Taken 4/8/2023 2300 by Leticia Alicea RN  Safety Promotion/Fall Prevention:   safety round/check completed   activity supervised   assistive device/personal items within reach   clutter free environment maintained   fall prevention program maintained  Taken 4/8/2023 2100 by Leticia Alicea RN  Safety Promotion/Fall Prevention:   safety round/check completed   activity supervised   clutter free environment maintained   assistive device/personal items within reach   fall prevention program maintained  Taken 4/8/2023 1900 by Leticia Alicea RN  Safety Promotion/Fall Prevention:   safety round/check completed   activity supervised   assistive device/personal items within reach   clutter free environment maintained   fall prevention program maintained   Goal Outcome Evaluation:  Plan of Care Reviewed With: patient           Outcome Evaluation: Pt alert, refusing to answer orientation questions. VSS during shift. Pt on RA, tolerating well. Pt very agitated at beginning of shift, MD made aware, see MAR for interventions. Pt is more compliant at this time. recieved 2 units of PRBC's, hgb now 7.8. Pt now resting in bed at this time, will continue to follow plan of care 7p-7a.

## 2023-04-09 NOTE — PROGRESS NOTES
Nephrology Progress Note    Interval History:     Patient Complaints: Patient is confused.  Probably suffering an acute psychosis as he keeps talking about dogs.  Does not know that he is in the hospital and thinks he is at his mother's home.  Is NPO.  Blood count is drifted down.        Vital Signs  Temp:  [97.3 °F (36.3 °C)-99.4 °F (37.4 °C)] 97.7 °F (36.5 °C)  Heart Rate:  [] 89  Resp:  [9-25] 17  BP: ()/(50-98) 170/90    Physical Exam:    General:           Agitated      HEENT:  Pale               Neck:  No JVD       Lungs:    Clear   Heart:   Regular,  no rub       Abdomen:   Normal bowel sounds, soft non-tender, non-distended, no guarding       Extremities:  No edema       Skin: No petechiae       Neurologic: moves all extremities.        Results Review:    I reviewed the patient's new clinical results.    Lab Results (last 24 hours)     Procedure Component Value Units Date/Time    POC Glucose Once [[id number]]  (Abnormal) Collected: 04/09/23 1153    Specimen: Blood Updated: 04/09/23 1200     Glucose 149 mg/dL      Comment: Meter: [id number] : [id number] GAVIOTA PARRA       Hemoglobin & Hematocrit, Blood [[id number]]  (Abnormal) Collected: 04/09/23 0633    Specimen: Blood Updated: 04/09/23 0714     Hemoglobin 7.2 g/dL      Hematocrit 22.6 %     POC Glucose Once [[id number]]  (Abnormal) Collected: 04/09/23 0613    Specimen: Blood Updated: 04/09/23 0619     Glucose 135 mg/dL      Comment: Meter: [id number] : [id number] LAMIN HENRIQUEZ       Hemoglobin & Hematocrit, Blood [[id number]]  (Abnormal) Collected: 04/09/23 0322    Specimen: Blood Updated: 04/09/23 0435     Hemoglobin 7.9 g/dL      Hematocrit 23.9 %     Hemoglobin & Hematocrit, Blood [[id number]]  (Abnormal) Collected: 04/09/23 0141    Specimen: Blood Updated: 04/09/23 0241     Hemoglobin 7.8 g/dL      Comment: Post Transfusion Specimen            Hematocrit 24.0 %     Hepatitis  Panel, Acute [[id number]]  (Normal) Collected: 04/09/23 0141    Specimen: Blood Updated: 04/09/23 0223     Hepatitis B Surface Ag Non-Reactive     Hep A IgM Non-Reactive     Hep B C IgM Non-Reactive     Hepatitis C Ab Non-Reactive    Narrative:      Results may be falsely decreased if patient taking Biotin.     Basic Metabolic Panel [[id number]]  (Abnormal) Collected: 04/09/23 0141    Specimen: Blood Updated: 04/09/23 0220     Glucose 130 mg/dL       mg/dL      Creatinine 4.24 mg/dL      Sodium 149 mmol/L      Potassium 4.3 mmol/L      Chloride 118 mmol/L      CO2 14.6 mmol/L      Calcium 8.4 mg/dL      BUN/Creatinine Ratio 33.7     Anion Gap 16.4 mmol/L      eGFR 13.8 mL/min/1.73      Comment: <15 Indicative of kidney failure       Narrative:      GFR Normal >60  Chronic Kidney Disease <60  Kidney Failure <15    The GFR formula is only valid for adults with stable renal function between ages 18 and 70.    Protime-INR [[id number]]  (Abnormal) Collected: 04/08/23 1647    Specimen: Blood Updated: 04/08/23 1732     Protime 17.3 Seconds      INR 1.38    Narrative:      Suggested INR therapeutic range for stable oral anticoagulant therapy:    Low Intensity therapy:   1.5-2.0  Moderate Intensity therapy:   2.0-3.0  High Intensity therapy:   2.5-4.0    Hemoglobin & Hematocrit, Blood [[id number]]  (Abnormal) Collected: 04/08/23 1647    Specimen: Blood Updated: 04/08/23 1730     Hemoglobin 5.5 g/dL      Hematocrit 17.2 %     POC Glucose Once [[id number]]  (Abnormal) Collected: 04/08/23 1639    Specimen: Blood Updated: 04/08/23 1651     Glucose 200 mg/dL      Comment: Meter: [id number] : [id number] [employer]       POC Glucose Once [[id number]]  (Abnormal) Collected: 04/08/23 1454    Specimen: Blood Updated: 04/08/23 1503     Glucose 168 mg/dL      Comment: Meter: [id number] : [id number] BRYAN ABRIL             Imaging Results (Last 24 Hours)     Procedure Component Value Units Date/Time    CT Abdomen Pelvis  Without Contrast [[id number]] Collected: 04/09/23 0434     Updated: 04/09/23 0436    Narrative:      CT Abdomen Pelvis WO    INDICATION:   Abdominal pain with hematemesis    TECHNIQUE:   CT of the abdomen and pelvis without IV contrast. Coronal and sagittal reconstructions were obtained.  Radiation dose reduction techniques included automated exposure control or exposure modulation based on body size. Radiation audit for CT and nuclear  cardiology exams in the last 12 months: 0.     COMPARISON:   None available.    FINDINGS:    The patient's arms lie within the gantry. This generates artifact which obscures detail of the upper abdomen. Findings could be potentially missed as a result of this. Allowing for this:    Small bilateral pleural effusions. Edema noted throughout the partially visualized lower lungs. 14 mm nodule in the right lower lobe of the lungs is inadequately assessed by this exam. No destructive osseous lesion. Dextroscoliosis of the thoracolumbar  spine..    Evaluation of the solid viscera is compromised by lack of IV contrast. Allowing for this:    Normal noncontrast appearance of the liver, pancreas, spleen and both adrenal glands. Cholelithiasis without evidence of acute cholecystitis.    The kidneys are symmetric in size. Evaluation for solid renal lesion is largely precluded by lack of IV contrast. No hydronephrosis. No renal or ureteral calculus.        No evidence of bowel obstruction. No localizing perienteric inflammatory change. Peritoneal catheter at the level the right lower quadrant. Trace free fluid in the abdomen/pelvis likely relates to the presence of the catheter.    Normal caliber of the abdominal aorta. No lymphadenopathy within the abdomen or pelvis by size criteria.      Impression:        1.  Cholelithiasis without evidence of acute cholecystitis..  2.  Small bilateral pleural effusions with edema throughout both lower lungs (partially visualized).  3.  14 mm pulmonary nodule in  the right lower lobe of the lungs, inadequately assessed by this exam. Recommend follow-up CT of the chest with contrast for further assessment.        Signer Name: MADHAVI AVITIA MD   Signed: 4/9/2023 4:34 AM   Workstation Name: [facility]    [facility]    XR Chest AP [[id number]] Collected: 04/08/23 1711     Updated: 04/08/23 1714    Narrative:      CHEST X-RAY, 4/8/2023      HISTORY:    Central line placement.      TECHNIQUE:  AP portable chest x-ray.    COMPARISON:  *  Chest x-ray, 6/24/2021.    FINDINGS:  Right IJ central line tip in good position in the lower SVC. No visible pneumothorax.    Cardiomegaly with mild diffuse pulmonary interstitial edema suggesting mild vascular congestion or volume overload, new since the prior study. Median sternotomy. Dual-chamber cardiac pacer/AICD.      Impression:      1. No pneumothorax following placement of well-positioned right IJ central line.  2. Vascular congestion with mild interstitial edema.    Signer Name: Nico Aaron MD   Signed: 4/8/2023 5:11 PM   [email or web address]    [facility]          Assessment and Plan:    1.  CKD stage V  2.  Upper GI bleed with blood loss anemia  3.  Heart failure with reduced ejection fraction LVEF 45%  4.  History of CABG  5.  Diabetes with nephropathy poor control  6.  Hypertension  7.  Dehydration with hypotension  8.  Pacemaker  9.  Confusion and acute psychosis    We will stop the normal saline.  Will leave him on half NS maintenance as long as he is n.p.o. his blood pressure is no longer low.  Noted endoscopy findings.  No pathology defined.  Probably still bleeding.  BUN has shot up.  Will give lactulose to try and eliminate blood from the GI tract.  Will consider low volume dialysis.  May help with his confusion and acute psychosis.    Josse Hernandez MD  04/09/23  12:36 EDT

## 2023-04-09 NOTE — NURSING NOTE
Pt confused at this time, pulled dressing off of PD catheter. PD catheter bleeding around the site very scant amount. Notified ADEN valderrama, ADEN who states to redress the line and to restart dialysis treatment. Pt vital signs stable at this time, pt showing no signs of distress.

## 2023-04-10 ENCOUNTER — ANESTHESIA EVENT (OUTPATIENT)
Dept: PERIOP | Facility: HOSPITAL | Age: 78
DRG: 308 | End: 2023-04-10
Payer: MEDICARE

## 2023-04-10 ENCOUNTER — ANESTHESIA (OUTPATIENT)
Dept: PERIOP | Facility: HOSPITAL | Age: 78
DRG: 308 | End: 2023-04-10
Payer: MEDICARE

## 2023-04-10 PROBLEM — I48.19 ATRIAL FIBRILLATION, PERSISTENT: Status: ACTIVE | Noted: 2023-04-10

## 2023-04-10 PROBLEM — I44.2 THIRD DEGREE ATRIOVENTRICULAR BLOCK: Status: ACTIVE | Noted: 2023-04-10

## 2023-04-10 PROBLEM — K25.4 GASTROINTESTINAL HEMORRHAGE ASSOCIATED WITH GASTRIC ULCER: Status: ACTIVE | Noted: 2023-04-10

## 2023-04-10 LAB
ANION GAP SERPL CALCULATED.3IONS-SCNC: 14.2 MMOL/L (ref 5–15)
BH BB BLOOD EXPIRATION DATE: NORMAL
BH BB BLOOD TYPE BARCODE: 5100
BH BB DISPENSE STATUS: NORMAL
BH BB PRODUCT CODE: NORMAL
BH BB UNIT NUMBER: NORMAL
BUN SERPL-MCNC: 126 MG/DL (ref 8–23)
BUN/CREAT SERPL: 29.4 (ref 7–25)
CALCIUM SPEC-SCNC: 8.2 MG/DL (ref 8.6–10.5)
CHLORIDE SERPL-SCNC: 122 MMOL/L (ref 98–107)
CO2 SERPL-SCNC: 14.8 MMOL/L (ref 22–29)
CREAT SERPL-MCNC: 4.29 MG/DL (ref 0.76–1.27)
CROSSMATCH INTERPRETATION: NORMAL
DEPRECATED RDW RBC AUTO: 55.3 FL (ref 37–54)
DEPRECATED RDW RBC AUTO: 64.1 FL (ref 37–54)
EGFRCR SERPLBLD CKD-EPI 2021: 13.6 ML/MIN/1.73
ERYTHROCYTE [DISTWIDTH] IN BLOOD BY AUTOMATED COUNT: 17.4 % (ref 12.3–15.4)
ERYTHROCYTE [DISTWIDTH] IN BLOOD BY AUTOMATED COUNT: 18.6 % (ref 12.3–15.4)
GLUCOSE BLDC GLUCOMTR-MCNC: 102 MG/DL (ref 70–130)
GLUCOSE BLDC GLUCOMTR-MCNC: 115 MG/DL (ref 70–130)
GLUCOSE BLDC GLUCOMTR-MCNC: 184 MG/DL (ref 70–130)
GLUCOSE SERPL-MCNC: 85 MG/DL (ref 65–99)
HCT VFR BLD AUTO: 20.6 % (ref 37.5–51)
HCT VFR BLD AUTO: 23 % (ref 37.5–51)
HCT VFR BLD AUTO: 24 % (ref 37.5–51)
HCT VFR BLD AUTO: 25.9 % (ref 37.5–51)
HGB BLD-MCNC: 6.5 G/DL (ref 13–17.7)
HGB BLD-MCNC: 7.5 G/DL (ref 13–17.7)
HGB BLD-MCNC: 7.9 G/DL (ref 13–17.7)
HGB BLD-MCNC: 7.9 G/DL (ref 13–17.7)
MCH RBC QN AUTO: 31.6 PG (ref 26.6–33)
MCH RBC QN AUTO: 32.2 PG (ref 26.6–33)
MCHC RBC AUTO-ENTMCNC: 31.6 G/DL (ref 31.5–35.7)
MCHC RBC AUTO-ENTMCNC: 32.9 G/DL (ref 31.5–35.7)
MCV RBC AUTO: 102 FL (ref 79–97)
MCV RBC AUTO: 96 FL (ref 79–97)
PLATELET # BLD AUTO: 181 10*3/MM3 (ref 140–450)
PLATELET # BLD AUTO: 193 10*3/MM3 (ref 140–450)
PMV BLD AUTO: 10.2 FL (ref 6–12)
PMV BLD AUTO: 10.2 FL (ref 6–12)
POTASSIUM SERPL-SCNC: 3.9 MMOL/L (ref 3.5–5.2)
RBC # BLD AUTO: 2.02 10*6/MM3 (ref 4.14–5.8)
RBC # BLD AUTO: 2.5 10*6/MM3 (ref 4.14–5.8)
SODIUM SERPL-SCNC: 151 MMOL/L (ref 136–145)
UNIT  ABO: NORMAL
UNIT  RH: NORMAL
WBC NRBC COR # BLD: 13.24 10*3/MM3 (ref 3.4–10.8)
WBC NRBC COR # BLD: 15.02 10*3/MM3 (ref 3.4–10.8)

## 2023-04-10 PROCEDURE — 85027 COMPLETE CBC AUTOMATED: CPT | Performed by: INTERNAL MEDICINE

## 2023-04-10 PROCEDURE — 25010000002 PROPOFOL 10 MG/ML EMULSION: Performed by: NURSE ANESTHETIST, CERTIFIED REGISTERED

## 2023-04-10 PROCEDURE — 82962 GLUCOSE BLOOD TEST: CPT

## 2023-04-10 PROCEDURE — 85018 HEMOGLOBIN: CPT | Performed by: SURGERY

## 2023-04-10 PROCEDURE — 97163 PT EVAL HIGH COMPLEX 45 MIN: CPT

## 2023-04-10 PROCEDURE — 36430 TRANSFUSION BLD/BLD COMPNT: CPT

## 2023-04-10 PROCEDURE — 25010000002 PROPOFOL 200 MG/20ML EMULSION: Performed by: NURSE ANESTHETIST, CERTIFIED REGISTERED

## 2023-04-10 PROCEDURE — 86900 BLOOD TYPING SEROLOGIC ABO: CPT

## 2023-04-10 PROCEDURE — 25010000002 HEPARIN (PORCINE) PER 1000 UNITS: Performed by: INTERNAL MEDICINE

## 2023-04-10 PROCEDURE — 0DJ08ZZ INSPECTION OF UPPER INTESTINAL TRACT, VIA NATURAL OR ARTIFICIAL OPENING ENDOSCOPIC: ICD-10-PCS | Performed by: SURGERY

## 2023-04-10 PROCEDURE — 80048 BASIC METABOLIC PNL TOTAL CA: CPT | Performed by: INTERNAL MEDICINE

## 2023-04-10 PROCEDURE — P9016 RBC LEUKOCYTES REDUCED: HCPCS

## 2023-04-10 PROCEDURE — 25010000002 HALOPERIDOL LACTATE PER 5 MG: Performed by: INTERNAL MEDICINE

## 2023-04-10 PROCEDURE — 85014 HEMATOCRIT: CPT | Performed by: STUDENT IN AN ORGANIZED HEALTH CARE EDUCATION/TRAINING PROGRAM

## 2023-04-10 PROCEDURE — 25010000002 ZIPRASIDONE MESYLATE PER 10 MG: Performed by: STUDENT IN AN ORGANIZED HEALTH CARE EDUCATION/TRAINING PROGRAM

## 2023-04-10 PROCEDURE — 97167 OT EVAL HIGH COMPLEX 60 MIN: CPT

## 2023-04-10 PROCEDURE — 85014 HEMATOCRIT: CPT | Performed by: SURGERY

## 2023-04-10 PROCEDURE — 85018 HEMOGLOBIN: CPT | Performed by: STUDENT IN AN ORGANIZED HEALTH CARE EDUCATION/TRAINING PROGRAM

## 2023-04-10 PROCEDURE — 99232 SBSQ HOSP IP/OBS MODERATE 35: CPT | Performed by: INTERNAL MEDICINE

## 2023-04-10 PROCEDURE — 0 LIDOCAINE 1 % SOLUTION: Performed by: STUDENT IN AN ORGANIZED HEALTH CARE EDUCATION/TRAINING PROGRAM

## 2023-04-10 RX ORDER — IPRATROPIUM BROMIDE AND ALBUTEROL SULFATE 2.5; .5 MG/3ML; MG/3ML
3 SOLUTION RESPIRATORY (INHALATION) ONCE AS NEEDED
Status: DISCONTINUED | OUTPATIENT
Start: 2023-04-10 | End: 2023-04-10 | Stop reason: HOSPADM

## 2023-04-10 RX ORDER — SODIUM CHLORIDE 0.9 % (FLUSH) 0.9 %
10 SYRINGE (ML) INJECTION EVERY 12 HOURS SCHEDULED
Status: DISCONTINUED | OUTPATIENT
Start: 2023-04-10 | End: 2023-04-18 | Stop reason: HOSPADM

## 2023-04-10 RX ORDER — SODIUM CHLORIDE 0.9 % (FLUSH) 0.9 %
10 SYRINGE (ML) INJECTION EVERY 12 HOURS SCHEDULED
Status: DISCONTINUED | OUTPATIENT
Start: 2023-04-10 | End: 2023-04-10 | Stop reason: HOSPADM

## 2023-04-10 RX ORDER — CETIRIZINE HYDROCHLORIDE 10 MG/1
5 TABLET ORAL DAILY
Status: DISCONTINUED | OUTPATIENT
Start: 2023-04-10 | End: 2023-04-15

## 2023-04-10 RX ORDER — SODIUM CHLORIDE 0.9 % (FLUSH) 0.9 %
10 SYRINGE (ML) INJECTION AS NEEDED
Status: DISCONTINUED | OUTPATIENT
Start: 2023-04-10 | End: 2023-04-10 | Stop reason: HOSPADM

## 2023-04-10 RX ORDER — ONDANSETRON 2 MG/ML
4 INJECTION INTRAMUSCULAR; INTRAVENOUS AS NEEDED
Status: DISCONTINUED | OUTPATIENT
Start: 2023-04-10 | End: 2023-04-10 | Stop reason: HOSPADM

## 2023-04-10 RX ORDER — DEXTROSE MONOHYDRATE AND SODIUM CHLORIDE 5; .225 G/100ML; G/100ML
125 INJECTION, SOLUTION INTRAVENOUS CONTINUOUS
Status: DISPENSED | OUTPATIENT
Start: 2023-04-10 | End: 2023-04-10

## 2023-04-10 RX ORDER — FENTANYL CITRATE 50 UG/ML
50 INJECTION, SOLUTION INTRAMUSCULAR; INTRAVENOUS
Status: DISCONTINUED | OUTPATIENT
Start: 2023-04-10 | End: 2023-04-10 | Stop reason: HOSPADM

## 2023-04-10 RX ORDER — PROPOFOL 10 MG/ML
INJECTION, EMULSION INTRAVENOUS AS NEEDED
Status: DISCONTINUED | OUTPATIENT
Start: 2023-04-10 | End: 2023-04-10 | Stop reason: SURG

## 2023-04-10 RX ORDER — ZIPRASIDONE MESYLATE 20 MG/ML
INJECTION, POWDER, LYOPHILIZED, FOR SOLUTION INTRAMUSCULAR
Status: DISCONTINUED
Start: 2023-04-10 | End: 2023-04-10

## 2023-04-10 RX ORDER — DEXTROSE MONOHYDRATE 50 MG/ML
50 INJECTION, SOLUTION INTRAVENOUS CONTINUOUS
Status: DISCONTINUED | OUTPATIENT
Start: 2023-04-10 | End: 2023-04-10

## 2023-04-10 RX ORDER — OXYCODONE HYDROCHLORIDE AND ACETAMINOPHEN 5; 325 MG/1; MG/1
1 TABLET ORAL ONCE AS NEEDED
Status: DISCONTINUED | OUTPATIENT
Start: 2023-04-10 | End: 2023-04-10 | Stop reason: HOSPADM

## 2023-04-10 RX ORDER — SODIUM CHLORIDE 9 MG/ML
40 INJECTION, SOLUTION INTRAVENOUS AS NEEDED
Status: DISCONTINUED | OUTPATIENT
Start: 2023-04-10 | End: 2023-04-18 | Stop reason: HOSPADM

## 2023-04-10 RX ORDER — OLANZAPINE 5 MG/1
5 TABLET, ORALLY DISINTEGRATING ORAL NIGHTLY PRN
Status: DISCONTINUED | OUTPATIENT
Start: 2023-04-10 | End: 2023-04-10

## 2023-04-10 RX ORDER — SODIUM CHLORIDE 9 MG/ML
40 INJECTION, SOLUTION INTRAVENOUS AS NEEDED
Status: DISCONTINUED | OUTPATIENT
Start: 2023-04-10 | End: 2023-04-10 | Stop reason: HOSPADM

## 2023-04-10 RX ORDER — HALOPERIDOL 5 MG/ML
2 INJECTION INTRAMUSCULAR ONCE
Status: DISCONTINUED | OUTPATIENT
Start: 2023-04-10 | End: 2023-04-11

## 2023-04-10 RX ORDER — OLANZAPINE 5 MG/1
5 TABLET, ORALLY DISINTEGRATING ORAL 2 TIMES DAILY
Status: DISCONTINUED | OUTPATIENT
Start: 2023-04-10 | End: 2023-04-12

## 2023-04-10 RX ORDER — SODIUM CHLORIDE 0.9 % (FLUSH) 0.9 %
20 SYRINGE (ML) INJECTION AS NEEDED
Status: DISCONTINUED | OUTPATIENT
Start: 2023-04-10 | End: 2023-04-18 | Stop reason: HOSPADM

## 2023-04-10 RX ORDER — HEPARIN SODIUM 1000 [USP'U]/ML
2000 INJECTION, SOLUTION INTRAVENOUS; SUBCUTANEOUS ONCE
Status: COMPLETED | OUTPATIENT
Start: 2023-04-10 | End: 2023-04-10

## 2023-04-10 RX ORDER — HALOPERIDOL 5 MG/ML
2 INJECTION INTRAMUSCULAR ONCE
Status: COMPLETED | OUTPATIENT
Start: 2023-04-10 | End: 2023-04-10

## 2023-04-10 RX ORDER — SODIUM CHLORIDE, SODIUM LACTATE, POTASSIUM CHLORIDE, CALCIUM CHLORIDE 600; 310; 30; 20 MG/100ML; MG/100ML; MG/100ML; MG/100ML
100 INJECTION, SOLUTION INTRAVENOUS ONCE AS NEEDED
Status: DISCONTINUED | OUTPATIENT
Start: 2023-04-10 | End: 2023-04-10 | Stop reason: HOSPADM

## 2023-04-10 RX ORDER — LIDOCAINE HYDROCHLORIDE 10 MG/ML
10 INJECTION, SOLUTION INFILTRATION; PERINEURAL ONCE
Status: COMPLETED | OUTPATIENT
Start: 2023-04-10 | End: 2023-04-10

## 2023-04-10 RX ORDER — SODIUM CHLORIDE 0.9 % (FLUSH) 0.9 %
10 SYRINGE (ML) INJECTION AS NEEDED
Status: DISCONTINUED | OUTPATIENT
Start: 2023-04-10 | End: 2023-04-18 | Stop reason: HOSPADM

## 2023-04-10 RX ORDER — DEXTROSE MONOHYDRATE AND SODIUM CHLORIDE 5; .225 G/100ML; G/100ML
125 INJECTION, SOLUTION INTRAVENOUS CONTINUOUS
Status: DISCONTINUED | OUTPATIENT
Start: 2023-04-10 | End: 2023-04-10

## 2023-04-10 RX ORDER — SODIUM CHLORIDE, SODIUM LACTATE, POTASSIUM CHLORIDE, CALCIUM CHLORIDE 600; 310; 30; 20 MG/100ML; MG/100ML; MG/100ML; MG/100ML
INJECTION, SOLUTION INTRAVENOUS CONTINUOUS PRN
Status: DISCONTINUED | OUTPATIENT
Start: 2023-04-10 | End: 2023-04-10 | Stop reason: SURG

## 2023-04-10 RX ORDER — SODIUM CHLORIDE 9 MG/ML
9 INJECTION, SOLUTION INTRAVENOUS CONTINUOUS PRN
Status: DISCONTINUED | OUTPATIENT
Start: 2023-04-10 | End: 2023-04-18 | Stop reason: HOSPADM

## 2023-04-10 RX ADMIN — GENTAMICIN SULFATE 1 APPLICATION: 1 OINTMENT TOPICAL at 09:10

## 2023-04-10 RX ADMIN — Medication 10 ML: at 09:11

## 2023-04-10 RX ADMIN — DEXTROSE AND SODIUM CHLORIDE 125 ML/HR: 5; 200 INJECTION, SOLUTION INTRAVENOUS at 11:35

## 2023-04-10 RX ADMIN — PROPOFOL 50 MG: 10 INJECTION, EMULSION INTRAVENOUS at 12:58

## 2023-04-10 RX ADMIN — Medication 10 ML: at 21:08

## 2023-04-10 RX ADMIN — LIDOCAINE HYDROCHLORIDE 10 ML: 10 INJECTION, SOLUTION INFILTRATION; PERINEURAL at 05:18

## 2023-04-10 RX ADMIN — PANTOPRAZOLE SODIUM 8 MG/HR: 40 INJECTION, POWDER, FOR SOLUTION INTRAVENOUS at 05:18

## 2023-04-10 RX ADMIN — Medication 10 ML: at 09:10

## 2023-04-10 RX ADMIN — ATORVASTATIN CALCIUM 40 MG: 40 TABLET, FILM COATED ORAL at 22:10

## 2023-04-10 RX ADMIN — SODIUM CHLORIDE, POTASSIUM CHLORIDE, SODIUM LACTATE AND CALCIUM CHLORIDE: 600; 310; 30; 20 INJECTION, SOLUTION INTRAVENOUS at 12:55

## 2023-04-10 RX ADMIN — PANTOPRAZOLE SODIUM 8 MG/HR: 40 INJECTION, POWDER, FOR SOLUTION INTRAVENOUS at 21:15

## 2023-04-10 RX ADMIN — ZIPRASIDONE MESYLATE 20 MG: 20 INJECTION, POWDER, LYOPHILIZED, FOR SOLUTION INTRAMUSCULAR at 05:12

## 2023-04-10 RX ADMIN — SODIUM CHLORIDE 50 ML/HR: 4.5 INJECTION, SOLUTION INTRAVENOUS at 05:18

## 2023-04-10 RX ADMIN — PANTOPRAZOLE SODIUM 8 MG/HR: 40 INJECTION, POWDER, FOR SOLUTION INTRAVENOUS at 10:35

## 2023-04-10 RX ADMIN — HEPARIN SODIUM 2000 UNITS: 1000 INJECTION INTRAVENOUS; SUBCUTANEOUS at 19:58

## 2023-04-10 RX ADMIN — HALOPERIDOL LACTATE 2 MG: 5 INJECTION, SOLUTION INTRAMUSCULAR at 15:37

## 2023-04-10 RX ADMIN — PROPOFOL 140 MCG/KG/MIN: 10 INJECTION, EMULSION INTRAVENOUS at 12:58

## 2023-04-10 RX ADMIN — DEXTROSE MONOHYDRATE 50 ML/HR: 50 INJECTION, SOLUTION INTRAVENOUS at 09:06

## 2023-04-10 RX ADMIN — DOCUSATE SODIUM 50 MG AND SENNOSIDES 8.6 MG 2 TABLET: 8.6; 5 TABLET, FILM COATED ORAL at 22:10

## 2023-04-10 RX ADMIN — OLANZAPINE 5 MG: 5 TABLET, ORALLY DISINTEGRATING ORAL at 21:07

## 2023-04-10 NOTE — PROGRESS NOTES
[facility] Medicine Services  PROGRESS NOTE     Patient Identification:  Name:  Augusto Lorenzana Jr.  Age:  78 y.o.  Sex:  male  :  1945  MRN:  [id number]  Visit Number:  [id number]  Primary Care Provider:  Rob Polanco MD    Length of stay:  2    ----------------------------------------------------------------------------------------------------------------------  Subjective     Chief Complaint:  Follow up for UGIB    Subjective:  Today, the patient reports doing okay. Has been getting confused at night pulling on PD catheter. Has a sitter for safety. Spoke with wife this morning, she denies patient having h/o dementia and issues with behavior, confusion, agitation, etc.   Per nursing he had recurrent melena last night. hgb dropped to 6.5. received 1 unit PRBC. Discussed with Dr. Hudson and planning on repeat EGD today. This was discussed with his wife this morning.     ----------------------------------------------------------------------------------------------------------------------  Objective     Current Hospital Meds:  allopurinol, 50 mg, Oral, Once per day on   amLODIPine, 10 mg, Oral, Q24H  atorvastatin, 40 mg, Oral, Nightly  carvedilol, 25 mg, Oral, BID With Meals  cetirizine, 10 mg, Oral, Daily  gentamicin, 1 application, Topical, Daily  insulin lispro, 2-7 Units, Subcutaneous, TID With Meals  lidocaine, 1 patch, Transdermal, Q24H  losartan, 100 mg, Oral, Q24H  multivitamin, 1 tablet, Oral, Daily  senna-docusate sodium, 2 tablet, Oral, Nightly  sodium chloride, 10 mL, Intravenous, Q12H  sodium chloride, 10 mL, Intravenous, Q12H  sodium chloride, 10 mL, Intravenous, Q12H      dextrose 5 % and sodium chloride 0.225 %, 125 mL/hr   Followed by  dextrose 5 % and sodium chloride 0.225 %, 125 mL/hr  pantoprazole, 8 mg/hr, Last Rate: 8 mg/hr (04/10/23  1035)      ----------------------------------------------------------------------------------------------------------------------  Vital Signs:  Temp:  [97.4 °F (36.3 °C)-98.6 °F (37 °C)] 98.1 °F (36.7 °C)  Heart Rate:  [] 83  Resp:  [13-22] 19  BP: (129-175)/(66-96) 151/84  Mean Arterial Pressure (Non-Invasive) for the past 24 hrs (Last 3 readings):   Noninvasive MAP (mmHg)   04/10/23 1000 116   04/10/23 0930 118   04/10/23 0900 112     SpO2 Percentage    04/10/23 0900 04/10/23 0930 04/10/23 1000   SpO2: 99% 98% 100%     SpO2:  [98 %-100 %] 100 %  on   ;   Device (Oxygen Therapy): room air    Body mass index is 26.24 kg/m².  Wt Readings from Last 3 Encounters:   04/10/23 76 kg (167 lb 8.8 oz)   04/07/23 77.2 kg (170 lb 3.2 oz)   07/08/21 83 kg (183 lb)        Intake/Output Summary (Last 24 hours) at 4/10/2023 1038  Last data filed at 4/10/2023 0930  Gross per 24 hour   Intake 2312.9 ml   Output 2520 ml   Net -207.1 ml     NPO Diet NPO Type: Strict NPO, Sips with Meds  ----------------------------------------------------------------------------------------------------------------------  Physical exam:   GEN: pale, NAD  CV: RRR, loud 4/6 systolic Murmur all valve area  PULM: CTA, no wheeze or crackles, on room air  GI: +bs, soft, PD catheter in plane, LUQ incision intact and covered with stristrips (recent PD catheter placement)  NEURO: alert, oriented to self but seems confused     --------------------------------------------------------------------------------------------------------            Results from last 7 days   Lab Units 04/10/23  0541 04/10/23  0004 04/09/23  1849 04/09/23  0141 04/08/23  1647 04/08/23  1109 04/08/23  0029 04/06/23  0329   WBC 10*3/mm3  --  15.02*  --   --   --   --  11.77* 11.07*   HEMOGLOBIN g/dL 7.5* 6.5* 7.1*   < > 5.5*   < > 8.0* 8.1*   HEMATOCRIT % 23.0* 20.6* 21.8*   < > 17.2*   < > 25.3* 24.7*   MCV fL  --  102.0*  --   --   --   --  98.8* 94   MCHC g/dL  --  31.6  --   --    --   --  31.6 32.8   PLATELETS 10*3/mm3  --  193  --   --   --   --  284 226   INR   --   --   --   --  1.38*  --   --   --     < > = values in this interval not displayed.     Results from last 7 days   Lab Units 04/10/23  0004 04/09/23  0141 04/08/23  0029   SODIUM mmol/L 151* 149* 144   POTASSIUM mmol/L 3.9 4.3 4.2   MAGNESIUM mg/dL  --   --  1.9   CHLORIDE mmol/L 122* 118* 112*   CO2 mmol/L 14.8* 14.6* 18.0*   BUN mg/dL 126* 143* 103*   CREATININE mg/dL 4.29* 4.24* 4.21*   CALCIUM mg/dL 8.2* 8.4* 9.0   GLUCOSE mg/dL 85 130* 117*   Estimated Creatinine Clearance: 15.7 mL/min (A) (by C-G formula based on SCr of 4.29 mg/dL (H)).  No results found for: AMMONIA    Glucose   Date/Time Value Ref Range Status   04/10/2023 0603 102 70 - 130 mg/dL Final     Comment:     Meter: [id number] : [id number] LAMIN SEPULVEDAE   04/09/2023 1652 161 (H) 70 - 130 mg/dL Final     Comment:     Meter: [id number] : [id number] GAVIOTA PARRA   04/09/2023 1153 149 (H) 70 - 130 mg/dL Final     Comment:     Meter: [id number] : [id number] GAVIOTA PARRA   04/09/2023 0613 135 (H) 70 - 130 mg/dL Final     Comment:     Meter: [id number] : [id number] LAMIN SEPULVEDAE   04/08/2023 1639 200 (H) 70 - 130 mg/dL Final     Comment:     Meter: [id number] : [id number] BRYAN ABRIL   04/08/2023 1454 168 (H) 70 - 130 mg/dL Final     Comment:     Meter: [id number] : [id number] BRYAN ABRIL   04/08/2023 1204 148 (H) 70 - 130 mg/dL Final     Comment:     Meter: [id number] : [id number] REBECCA TOMPKINS   04/08/2023 1021 135 (H) 70 - 130 mg/dL Final     Comment:     Meter: [id number] : [id number] Lamine Armendariza   04/07/2023 1247 169 (H) 74 - 99 mg/dL Final     Comment:     Accuracy of a glucose result obtained from a capillary whole blood specimen relies upon adequate, non-compromised capillary blood flow.  If the capillary glucose result is not consistent with the patient's clinical signs and symptoms, glucose testing should be repeated with  either an arterial or venous sample on the glucometer or sent to the main labortory for testing.     Lab Results   Component Value Date    HGBA1C 6.5 (H) 04/04/2022       ----------------------------------------------------------------------------------------------------------------------  Assessment/Plan     · UGIB- recent EGD showed PUD with bleed, treated with epinephrine injection. Seems he had recurrent melena last night needing another unit of PRBC (total 3 units this admission). Planning on repeat EGD with Dr. Hudson today. Cont trend H&H.   · Leukocytosis- likely related to UGIB. No other clear source of infection. Will repeat WBC today.   · Delirium- confusion at night. Will try to avoid benadryl and Benzos in this age population as can worsen delirium. Low dose Zyprexa ordered PRN nightly. Can also be given IV if needed.   · Acute blood loss anemia- due to UGIB. PRBC/H&H as above.   · Acute hypernatremia- noted yesterday at 149, 151 today. May be contributing to confusion. Has been on 1/2 NS IVF. Changed to D5w. nephrology is following.     CHRONIC MEDICAL PROBLEMS:   · CAD- holding ASA due to UGIB  · CKD V/ESRD on PD- PD catheter recently placed. Nephrology following  · HTN- BP controlled on Losartan, Norvasc and coreg  · HLD- statin  · DM2- A1C 6.5%. SSI only given NPO. BG in acceptable range.   · SEBASTIAN  · Recent third degree HB s/p PPM 3/31/23  · Persistent a.fib  · Gout- allopurinol. Dose decreased to 100 mg/day per renal function.   · Constipation- cont doc/senna nightly    --------------------------------------------------  DVT Prophylaxis: SCD     Disposition: pending hospital course. Will need to return to rehab once medically ready  --------------------------------------------------      Katayoun Behbahani, MD  Hospitalist Service -- [facility]     04/10/23  10:38 EDT

## 2023-04-10 NOTE — PLAN OF CARE
Goal Outcome Evaluation:  Plan of Care Reviewed With: patient        Progress: no change  Outcome Evaluation: VSS. Paced on the monitor. RA. Repeat EGD today. No s/s bleeding. Patient agressive and combative this afternoon. IM haldol given x1 was effective. IV fluids and protonix gtt continued. Wife updated. No other complaints. Will continue to monitor.

## 2023-04-10 NOTE — THERAPY EVALUATION
Acute Care - Physical Therapy Initial Evaluation   [facility]     Patient Name: Augusto Lorenzana Jr.  : 1945  MRN: [id number]  Today's Date: 4/10/2023      Visit Dx:     ICD-10-CM ICD-9-CM   1. Gastrointestinal hemorrhage, unspecified gastrointestinal hemorrhage type  K92.2 578.9     Patient Active Problem List   Diagnosis   • NSTEMI 2021   • Hyperlipidemia LDL goal <70   • Essential hypertension   • T2DM on oral agents and insulin. HbA1c 7.4    • Coronary artery disease involving native coronary artery of native heart with unstable angina pectoris   • A/C kidney disease. ATN due to postoperative arrest. 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 Combination of anoxic insult and azotemia   • Debility   • Lower extremity edema   • Complete heart block   • GI bleed     Past Medical History:   Diagnosis Date   • CAD (coronary artery disease)    • CKD (chronic kidney disease) stage 3, GFR 30-59 ml/min    • Diabetes mellitus    • Hyperlipidemia    • Hypertension    • NSTEMI (non-ST elevated myocardial infarction)      Past Surgical History:   Procedure Laterality Date   • CARDIAC CATHETERIZATION N/A 2021    Procedure: Left Heart Cath;  Surgeon: Blade Greene IV, MD;  Location:  [facility];  Service: Cardiovascular;  Laterality: N/A;   • CARDIAC SURGERY      2 stents placed .   • CORONARY ARTERY BYPASS GRAFT N/A 2021    Procedure: MEDIAN STERNOTOMY CORONARY ARTERY BYPASS X 3 UTILIZING THE LEFT INTERNAL MAMMARY ARTERY GRAFT, EVH OF THE GREATER RIGHT SAPHENOUS VEIN, AND PLIO PER ANESTHESIA;  Surgeon: Jacek Miller MD;  Location:  [facility];  Service: Cardiothoracic;  Laterality: N/A;     PT Assessment (last 12 hours)     PT Evaluation and Treatment     Row Name 04/10/23 0860          Physical Therapy Time and  Intention    Subjective Information no complaints  -CS     Document Type evaluation  -CS     Mode of Treatment physical therapy  -CS     Patient Effort adequate  -CS     Comment Pt seen bedside this AM.  Pt recently admitted to IRF unit and found to have GI bleed.  -CS     Row Name 04/10/23 0830          General Information    Patient Profile Reviewed yes  -CS     Existing Precautions/Restrictions fall  -CS     Row Name 04/10/23 0830          Home Use of Assistive/Adaptive Equipment    Equipment Currently Used at Home cane, straight;rollator;shower chair  -CS     Row Name 04/10/23 0830          Pain    Additional Documentation --  no c/o  -CS     Row Name 04/10/23 0830          Cognition    Affect/Mental Status (Cognition) WFL  -CS     Orientation Status (Cognition) oriented to;person;place  -CS     Follows Commands (Cognition) follows one-step commands  -CS     Cognitive Function WFL  -CS     Row Name 04/10/23 0830          Range of Motion (ROM)    Range of Motion --  (B)LE WFL  -     Row Name 04/10/23 0830          Strength Comprehensive (MMT)    Comment, General Manual Muscle Testing (MMT) Assessment (B)LE grossly 3+/5  -CS     Row Name 04/10/23 0830          Bed Mobility    Bed Mobility bed mobility (all) activities  -CS     All Activities, [place] (Bed Mobility) minimum assist (75% patient effort)  -CS     Rolling Left [place] (Bed Mobility) minimum assist (75% patient effort)  -CS     Supine-Sit [place] (Bed Mobility) minimum assist (75% patient effort)  -CS     Bed Mobility, Safety Issues decreased use of arms for pushing/pulling;decreased use of legs for bridging/pushing  -CS     Assistive Device (Bed Mobility) bed rails  -CS     Row Name 04/10/23 0830          Transfers    Transfers bed-chair transfer;chair-bed transfer;sit-stand transfer;stand-sit transfer  -     Row Name 04/10/23 0830          Bed-Chair Transfer    Bed-Chair [place] (Transfers) minimum assist (75% patient effort)   -CS     Assistive Device (Bed-Chair Transfers) walker, front-wheeled  -CS     Row Name 04/10/23 0830          Chair-Bed Transfer    Chair-Bed [place] (Transfers) minimum assist (75% patient effort)  -CS     Assistive Device (Chair-Bed Transfers) walker, front-wheeled  -CS     Row Name 04/10/23 0830          Sit-Stand Transfer    Sit-Stand [place] (Transfers) minimum assist (75% patient effort)  -CS     Assistive Device (Sit-Stand Transfers) walker, front-wheeled  -CS     Row Name 04/10/23 0830          Stand-Sit Transfer    Stand-Sit [place] (Transfers) minimum assist (75% patient effort)  -CS     Assistive Device (Stand-Sit Transfers) walker, front-wheeled  -CS     Row Name 04/10/23 0830          Gait/Stairs (Locomotion)    [place] Level (Gait) minimum assist (75% patient effort)  -CS     Assistive Device (Gait) walker, front-wheeled  -CS     Pattern (Gait) swing-through  -CS     Deviations/Abnormal Patterns (Gait) gait speed decreased;weight shifting decreased  -CS     Comment, (Gait/Stairs) Deferred this day  -CS     Row Name 04/10/23 0830          Respiratory WDL    Respiratory WDL X;breath sounds  -CS     Rhythm/Pattern, Respiratory unlabored;pattern regular;depth regular;no shortness of breath reported  -CS     Expansion/Accessory Muscles/Retractions no use of accessory muscles  -CS     Nailbeds no discoloration  -CS     Mucous Membranes moist;intact;pink  -CS     Cough Frequency infrequent  -CS     Cough Type nonproductive  -CS     Row Name 04/10/23 0830          Breath Sounds    Breath Sounds All Fields  -CS     All Lung Fields Breath Sounds Anterior:;diminished  -CS     CARLOS ENRIQUE Breath Sounds Anterior:;wheezes, inspiratory  -CS     LLL Breath Sounds Anterior:;diminished;wheezes, expiratory  -CS     RUL Breath Sounds Anterior:;wheezes, expiratory  -CS     RML Breath Sounds Anterior:;diminished;wheezes, expiratory  -CS     RLL Breath Sounds Anterior:;diminished;wheezes, expiratory  -CS     Row  Name 04/10/23 0830          Skin WDL    Skin WDL X;all  -CS     Skin Color/Characteristics redness blanchable;maroon/purple  gen bruising noted  -CS     Skin Temperature warm  -CS     Skin Moisture dry  -CS     Skin Elasticity slow return to original state  -CS     Skin Integrity bruised (ecchymotic);incision;scab;wound  -CS     Row Name 04/10/23 0830          Coping    Observed Emotional State cooperative  -CS     Verbalized Emotional State other (see comments)  pt confused at this time  -CS     Family/Support Persons spouse  -CS     Involvement in Care not present at bedside  -CS     Row Name 04/10/23 0830          Plan of Care Review    Plan of Care Reviewed With patient  -CS     Progress no change  -CS     Outcome Evaluation Pt presents w/ decreased bed mobility, transfers, and gait.  Pt would benefit from skilled PT to maximize functional potential.  Anticipate d/c back to IRF once medically stable.  -CS     Row Name 04/10/23 0830          Positioning and Restraints    Pre-Treatment Position in bed  -CS     Post Treatment Position bed  -CS     In Bed supine;call light within reach  -CS     Row Name 04/10/23 0830          Therapy Assessment/Plan (PT)    Functional Level at Time of Evaluation (PT) min(A)  -CS     PT Diagnosis (PT) decreased functional mobility  -CS     Rehab Potential (PT) fair, will monitor progress closely  -CS     Criteria for Skilled Interventions Met (PT) yes;meets criteria;skilled treatment is necessary  -CS     Therapy Frequency (PT) 2 times/wk  2-5x/week  -CS     Predicted Duration of Therapy Intervention (PT) while in house  -CS     Problem List (PT) problems related to;balance;strength  -CS     Activity Limitations Related to Problem List (PT) unable to ambulate safely;unable to transfer safely  -CS     Comment, Therapy Assessment/Plan (PT) Pt presents w/ decreased bed mobility, transfers, and gait.  Pt would benefit from skilled PT to maximize functional potential.  Anticipate d/c back  to IRF once medically stable.  -CS     Row Name 04/10/23 0830          PT Evaluation Complexity    History, PT Evaluation Complexity 3 or more personal factors and/or comorbidities  -CS     Examination of Body Systems (PT Eval Complexity) total of 4 or more elements  -CS     Clinical Presentation (PT Evaluation Complexity) unstable  -CS     Clinical Decision Making (PT Evaluation Complexity) high complexity  -CS     Overall Complexity (PT Evaluation Complexity) high complexity  -CS     Row Name 04/10/23 0830          Therapy Plan Review/Discharge Plan (PT)    Therapy Plan Review (PT) evaluation/treatment results reviewed;care plan/treatment goals reviewed;risks/benefits reviewed;current/potential barriers reviewed;participants voiced agreement with care plan;participants included;patient  -CS     Row Name 04/10/23 0830          Physical Therapy Goals    Bed Mobility Goal Selection (PT) bed mobility, PT goal 1  -CS     Transfer Goal Selection (PT) transfer, PT goal 1  -CS     Gait Training Goal Selection (PT) gait training, PT goal 1  -CS     Row Name 04/10/23 0830          Bed Mobility Goal 1 (PT)    Activity/Assistive Device (Bed Mobility Goal 1, PT) bed mobility activities, all  -CS     [place] Level/Cues Needed (Bed Mobility Goal 1, PT) standby assist  -CS     Time Frame (Bed Mobility Goal 1, PT) long term goal (LTG);by discharge  -CS     Row Name 04/10/23 0830          Transfer Goal 1 (PT)    Activity/Assistive Device (Transfer Goal 1, PT) transfers, all  -CS     [place] Level/Cues Needed (Transfer Goal 1, PT) standby assist  -CS     Time Frame (Transfer Goal 1, PT) long term goal (LTG);by discharge  -CS     Row Name 04/10/23 0830          Gait Training Goal 1 (PT)    Activity/Assistive Device (Gait Training Goal 1, PT) gait (walking locomotion);assistive device use  -CS     [place] Level (Gait Training Goal 1, PT) contact guard required  -CS     Distance (Gait Training Goal 1, PT) 50'  -CS      Time Frame (Gait Training Goal 1, PT) long term goal (LTG);by discharge  -           User Key  (r) = Recorded By, (t) = Taken By, (c) = Cosigned By    Initials Name Provider Type    CS Livan Alejandra, [place] Physical Therapist                Physical Therapy Education     Title: PT OT SLP Therapies (Done)     Topic: Physical Therapy (Done)     Point: Mobility training (Done)     Learning Progress Summary           Patient Acceptance, E,TB, VU by  at 4/10/2023 0926                   Point: Home exercise program (Done)     Learning Progress Summary           Patient Acceptance, E,TB, VU by  at 4/10/2023 0926                   Point: Body mechanics (Done)     Learning Progress Summary           Patient Acceptance, E,TB, VU by  at 4/10/2023 0926                   Point: Precautions (Done)     Learning Progress Summary           Patient Acceptance, E,TB, VU by  at 4/10/2023 0926                               User Key     Initials Effective Dates Name Provider Type Discipline     05/24/22 -  Livan Alejandra, [place] Physical Therapist PT              PT Recommendation and Plan  Anticipated Discharge Disposition (PT): inpatient rehabilitation facility  Planned Therapy Interventions (PT): balance training, bed mobility training, gait training, home exercise program, neuromuscular re-education, patient/family education, strengthening, transfer training  Therapy Frequency (PT): 2 times/wk (2-5x/week)  Plan of Care Reviewed With: patient  Progress: no change  Outcome Evaluation: Pt presents w/ decreased bed mobility, transfers, and gait.  Pt would benefit from skilled PT to maximize functional potential.  Anticipate d/c back to IRF once medically stable.       Time Calculation:    PT Charges     Row Name 04/10/23 0926             Time Calculation    Start Time 0830  -      PT Received On 04/10/23  -      PT Goal Re-Cert Due Date 04/24/23  -            User Key  (r) = Recorded By, (t) = Taken By, (c) = Cosigned By     Initials Name Provider Type     Livan Alejandra, PT Physical Therapist              Therapy Charges for Today     Code Description Service Date Service Provider Modifiers Qty    46621648550 HC PT EVAL HIGH COMPLEXITY 4 4/10/2023 Livan Alejandra, PT GP 1          PT G-Codes  AM-PAC 6 Clicks Score (PT): 14    Livan Alejandra, PT  4/10/2023

## 2023-04-10 NOTE — PROGRESS NOTES
Nephrology Progress Note    Interval History:     Patient Complaints: Confusion is better.  Transfusion dependent.        Vital Signs  Temp:  [97.4 °F (36.3 °C)-98.6 °F (37 °C)] 98.1 °F (36.7 °C)  Heart Rate:  [] 84  Resp:  [13-22] 19  BP: (129-175)/(66-96) 161/77    Physical Exam:    General:           No distress      HEENT:  P[place]or               Neck:  No JVD       Lungs:    Clear   Heart:   Irregular,  no rub       Abdomen:   Normal bowel sounds, soft non-tender, non-distended, no guarding       Extremities:  No edema       Skin: No petechiae       Neurologic:  Oriented to place and person and moves all extremities.        Results Review:    I reviewed the patient's new clinical results.    Lab Results (last 24 hours)     Procedure Component Value Units Date/Time    POC Glucose Once [[id number]]  (Normal) Collected: 04/10/23 0603    Specimen: Blood Updated: 04/10/23 0624     Glucose 102 mg/dL      Comment: Meter: [id number] : [id number] LAMIN HENRIQUEZ       Hemoglobin & Hematocrit, Blood [[id number]]  (Abnormal) Collected: 04/10/23 0541    Specimen: Blood Updated: 04/10/23 0558     Hemoglobin 7.5 g/dL      Hematocrit 23.0 %     Basic Metabolic Panel [[id number]]  (Abnormal) Collected: 04/10/23 0004    Specimen: Blood Updated: 04/10/23 0119     Glucose 85 mg/dL       mg/dL      Creatinine 4.29 mg/dL      Sodium 151 mmol/L      Potassium 3.9 mmol/L      Chloride 122 mmol/L      CO2 14.8 mmol/L      Calcium 8.2 mg/dL      BUN/Creatinine Ratio 29.4     Anion Gap 14.2 mmol/L      eGFR 13.6 mL/min/1.73      Comment: <15 Indicative of kidney failure       Narrative:      GFR Normal >60  Chronic Kidney Disease <60  Kidney Failure <15    The GFR formula is only valid for adults with stable renal function between ages 18 and 70.    CBC (No Diff) [[id number]]  (Abnormal) Collected: 04/10/23 0004    Specimen: Blood Updated: 04/10/23 0056     WBC 15.02  10*3/mm3      RBC 2.02 10*6/mm3      Hemoglobin 6.5 g/dL      Hematocrit 20.6 %      .0 fL      MCH 32.2 pg      MCHC 31.6 g/dL      RDW 18.6 %      RDW-SD 64.1 fl      MPV 10.2 fL      Platelets 193 10*3/mm3     Hemoglobin & Hematocrit, Blood [[id number]]  (Abnormal) Collected: 04/09/23 1849    Specimen: Blood Updated: 04/09/23 1855     Hemoglobin 7.1 g/dL      Hematocrit 21.8 %     POC Glucose Once [[id number]]  (Abnormal) Collected: 04/09/23 1652    Specimen: Blood Updated: 04/09/23 1705     Glucose 161 mg/dL      Comment: Meter: [id number] : [id number] GAVIOTA PARRA       Hemoglobin & Hematocrit, Blood [[id number]]  (Abnormal) Collected: 04/09/23 1225    Specimen: Blood Updated: 04/09/23 1304     Hemoglobin 8.0 g/dL      Hematocrit 25.7 %     POC Glucose Once [[id number]]  (Abnormal) Collected: 04/09/23 1153    Specimen: Blood Updated: 04/09/23 1200     Glucose 149 mg/dL      Comment: Meter: [id number] : [id number] GAVIOTA PARRA             Imaging Results (Last 24 Hours)     ** No results found for the last 24 hours. **          Assessment and Plan:    1.  CKD stage V  2.  Upper GI bleed with blood loss anemia  3.  Heart failure with reduced ejection fraction LVEF 45%  4.  History of CABG  5.  Diabetes with nephropathy poor control  6.  Hypertension  7.  Dehydration with hypotension  8.  Pacemaker  9.  Confusion and acute psychosis  10.  Hypernatremia    We will change IV fluid to D5 and a fourth and increase replacement.  Will do dialysis gently.  Continues to need blood transfusion suggesting ongoing GI blood loss..    Josse Hernandez MD  04/10/23  10:00 EDT

## 2023-04-10 NOTE — PLAN OF CARE
Goal Outcome Evaluation:  Plan of Care Reviewed With: patient        Progress: no change  Outcome Evaluation: Pt presents w/ decreased bed mobility, transfers, and gait.  Pt would benefit from skilled PT to maximize functional potential.  Anticipate d/c back to IRF once medically stable.

## 2023-04-10 NOTE — PLAN OF CARE
Problem: Adult Inpatient Plan of Care  Goal: Plan of Care Review  Outcome: Ongoing, Progressing  Flowsheets (Taken 4/10/2023 0451)  Plan of Care Reviewed With: patient  Outcome Evaluation: Pt alert, remains confused. Pt had a couple of episodes of agitation during shift. VSS. Pt on RA, tolerating well. Peritoneal dialysis completed during shift. HGB came back critical, MD made aware, 1 unit of PRBC infused. Pt has had dark bowel movements during shift. Pt expresses that he is very egar to eat. Pt now resting in bed at this time. No request. Will continue to follow plan of care 7p-7a.  Goal: Patient-Specific Goal (Individualized)  Outcome: Ongoing, Progressing  Goal: Absence of Hospital-Acquired Illness or Injury  Outcome: Ongoing, Progressing  Intervention: Identify and Manage Fall Risk  Recent Flowsheet Documentation  Taken 4/10/2023 0100 by Leticia Alicea RN  Safety Promotion/Fall Prevention:   safety round/check completed   activity supervised   assistive device/personal items within reach   clutter free environment maintained   fall prevention program maintained  Taken 4/9/2023 2300 by Leticia Alicea RN  Safety Promotion/Fall Prevention:   safety round/check completed   activity supervised   assistive device/personal items within reach   clutter free environment maintained   fall prevention program maintained  Taken 4/9/2023 2100 by Leticia Alicea RN  Safety Promotion/Fall Prevention:   safety round/check completed   activity supervised   assistive device/personal items within reach   clutter free environment maintained   fall prevention program maintained  Taken 4/9/2023 1900 by Leticia Alicea RN  Safety Promotion/Fall Prevention:   safety round/check completed   activity supervised   assistive device/personal items within reach   clutter free environment maintained   fall prevention program maintained  Intervention: Prevent Skin Injury  Recent Flowsheet Documentation  Taken 4/10/2023 0200 by Leticia Alicea RN  Skin  INTERVAL HPI/OVERNIGHT EVENTS: discussed colonoscopy and EUS findings with pt and daughter who understands English via telephone,     MEDICATIONS  (STANDING):  aspirin  chewable 81 milliGRAM(s) Oral daily  atorvastatin 40 milliGRAM(s) Oral at bedtime  carvedilol 12.5 milliGRAM(s) Oral every 12 hours  dextrose 5%. 1000 milliLiter(s) (50 mL/Hr) IV Continuous <Continuous>  dextrose 50% Injectable 12.5 Gram(s) IV Push once  dextrose 50% Injectable 25 Gram(s) IV Push once  dextrose 50% Injectable 25 Gram(s) IV Push once  hydrocortisone 2.5% Rectal Cream 1 Application(s) Rectal daily  insulin lispro (HumaLOG) corrective regimen sliding scale   SubCutaneous three times a day before meals  insulin lispro (HumaLOG) corrective regimen sliding scale   SubCutaneous at bedtime  lisinopril 5 milliGRAM(s) Oral daily  pantoprazole  Injectable 40 milliGRAM(s) IV Push daily  sodium chloride 0.9%. 1000 milliLiter(s) (75 mL/Hr) IV Continuous <Continuous>    MEDICATIONS  (PRN):  dextrose Gel 1 Dose(s) Oral once PRN Blood Glucose LESS THAN 70 milliGRAM(s)/deciliter  glucagon  Injectable 1 milliGRAM(s) IntraMuscular once PRN Glucose LESS THAN 70 milligrams/deciliter  morphine  - Injectable 2 milliGRAM(s) IV Push every 6 hours PRN Moderate Pain (4 - 6)      Allergies    No Known Allergies    Intolerances            PHYSICAL EXAM:   Vital Signs:  Vital Signs Last 24 Hrs  T(C): 37.2 (13 Apr 2018 05:08), Max: 37.6 (12 Apr 2018 13:41)  T(F): 98.9 (13 Apr 2018 05:08), Max: 99.6 (12 Apr 2018 13:41)  HR: 68 (13 Apr 2018 08:57) (68 - 87)  BP: 132/63 (13 Apr 2018 08:57) (96/51 - 151/76)  BP(mean): --  RR: 18 (13 Apr 2018 05:08) (18 - 18)  SpO2: 94% (13 Apr 2018 05:08) (85% - 99%)  Daily     Daily     GENERAL:  no distress  HEENT:  NC/AT,  anicteric  CHEST:   no increased effort, breath sounds clear  HEART:  Regular rhythm  ABDOMEN:  Soft,mild lower tenderness, non-distended, normoactive bowel sounds,  no masses ,no hepato-splenomegaly, no signs of chronic liver disease  EXTEREMITIES:  no cyanosis      LABS:                        8.7    8.65  )-----------( 244      ( 12 Apr 2018 09:30 )             26.6     04-13    136  |  101  |  6<L>  ----------------------------<  92  3.5   |  23  |  0.58    Ca    8.1<L>      13 Apr 2018 07:32            RADIOLOGY & ADDITIONAL TESTS: Protection:   adhesive use limited   transparent dressing maintained   tubing/devices free from skin contact  Taken 4/9/2023 2030 by Leticia Alicea RN  Skin Protection:   adhesive use limited   tubing/devices free from skin contact   skin-to-skin areas padded   transparent dressing maintained  Intervention: Prevent and Manage VTE (Venous Thromboembolism) Risk  Recent Flowsheet Documentation  Taken 4/10/2023 0200 by Leticia Alicea RN  VTE Prevention/Management:   bilateral   sequential compression devices on  Range of Motion: active ROM (range of motion) encouraged  Taken 4/9/2023 2030 by Leticia Alicea RN  Activity Management: bedrest  VTE Prevention/Management:   bilateral   sequential compression devices on  Range of Motion: active ROM (range of motion) encouraged  Intervention: Prevent Infection  Recent Flowsheet Documentation  Taken 4/10/2023 0100 by Leticia Alicea RN  Infection Prevention:   visitors restricted/screened   single patient room provided   rest/sleep promoted  Taken 4/9/2023 2300 by Leticia Alicea RN  Infection Prevention:   visitors restricted/screened   single patient room provided   rest/sleep promoted  Taken 4/9/2023 2100 by Leticia Alicea RN  Infection Prevention:   visitors restricted/screened   rest/sleep promoted   single patient room provided  Taken 4/9/2023 1900 by Leticia Alicea RN  Infection Prevention:   visitors restricted/screened   single patient room provided   rest/sleep promoted  Goal: Optimal Comfort and Wellbeing  Outcome: Ongoing, Progressing  Intervention: Provide Person-Centered Care  Recent Flowsheet Documentation  Taken 4/10/2023 0200 by Leticia Alicea RN  Trust Relationship/Rapport:   care explained   thoughts/feelings acknowledged  Taken 4/9/2023 2030 by Leticia Alicea RN  Trust Relationship/Rapport:   care explained   choices provided   thoughts/feelings acknowledged  Goal: Readiness for Transition of Care  Outcome: Ongoing, Progressing     Problem: Adjustment to Illness  (Gastrointestinal Bleeding)  Goal: Optimal Coping with Acute Illness  Outcome: Ongoing, Progressing  Intervention: Optimize Psychosocial Response  Recent Flowsheet Documentation  Taken 4/10/2023 0200 by Leticia Alicea RN  Supportive Measures: active listening utilized  Taken 4/9/2023 2030 by Leticia Alicea RN  Supportive Measures: active listening utilized     Problem: Bleeding (Gastrointestinal Bleeding)  Goal: Hemostasis  Outcome: Ongoing, Progressing  Intervention: Manage Gastrointestinal Bleeding  Recent Flowsheet Documentation  Taken 4/10/2023 0200 by Leticia Alicea RN  Environmental Support: rest periods encouraged  Taken 4/9/2023 2030 by Leticia Alicea RN  Environmental Support: calm environment promoted     Problem: Skin Injury Risk Increased  Goal: Skin Health and Integrity  Outcome: Ongoing, Progressing  Intervention: Optimize Skin Protection  Recent Flowsheet Documentation  Taken 4/10/2023 0200 by Leticia Alicea RN  Pressure Reduction Techniques: weight shift assistance provided  Pressure Reduction Devices: pressure-redistributing mattress utilized  Skin Protection:   adhesive use limited   transparent dressing maintained   tubing/devices free from skin contact  Taken 4/9/2023 2030 by Leticia Alicea RN  Pressure Reduction Techniques: weight shift assistance provided  Pressure Reduction Devices: pressure-redistributing mattress utilized  Skin Protection:   adhesive use limited   tubing/devices free from skin contact   skin-to-skin areas padded   transparent dressing maintained     Problem: Fall Injury Risk  Goal: Absence of Fall and Fall-Related Injury  Outcome: Ongoing, Progressing  Intervention: Promote Injury-Free Environment  Recent Flowsheet Documentation  Taken 4/10/2023 0100 by Leticia Alicea RN  Safety Promotion/Fall Prevention:   safety round/check completed   activity supervised   assistive device/personal items within reach   clutter free environment maintained   fall prevention program maintained  Taken  4/9/2023 2300 by Leticia Alicea RN  Safety Promotion/Fall Prevention:   safety round/check completed   activity supervised   assistive device/personal items within reach   clutter free environment maintained   fall prevention program maintained  Taken 4/9/2023 2100 by Leticia Alicea RN  Safety Promotion/Fall Prevention:   safety round/check completed   activity supervised   assistive device/personal items within reach   clutter free environment maintained   fall prevention program maintained  Taken 4/9/2023 1900 by Leticia Alicea RN  Safety Promotion/Fall Prevention:   safety round/check completed   activity supervised   assistive device/personal items within reach   clutter free environment maintained   fall prevention program maintained   Goal Outcome Evaluation:  Plan of Care Reviewed With: patient           Outcome Evaluation: Pt alert, remains confused. Pt had a couple of episodes of agitation during shift. VSS. Pt on RA, tolerating well. Peritoneal dialysis completed during shift. HGB came back critical, MD made aware, 1 unit of PRBC infused. Pt has had dark bowel movements during shift. Pt expresses that he is very egar to eat. Pt now resting in bed at this time. No request. Will continue to follow plan of care 7p-7a.

## 2023-04-10 NOTE — PROGRESS NOTES
Patient Assessment Instrument  Quality Indicators - Admission FY 2023    Section A. Ethnicity/ Race/Language  Ethnicity:  Race:  Preferred Language:    Section A. Transportation      Section B. Hearing and Vision        Section B. Health Literacy        Section C. Cognitive Patterns      Section C. Signs and Symptoms of Delirium (from CAM)      Section D. Mood      Section D. Social Isolation      Section [id number]. Prior Functioning      Section [id number]. Prior Device Use      Section [id number]. Self Care Performance     Eating: [place] sets up or cleans up; patient completes activity. [place] assists  only prior to or following the activity.   Oral Hygiene: [place] sets up or cleans up; patient completes activity. [place]  assists only prior to or following the activity.   Toileting Hygiene: [place] provides verbal cues and/or touching/steadying and/or  contact guard assistance as patient completes activity.   Shower/Bathe Self: [place] does less than half the effort. [place] lifts, holds  or supports trunk or limbs but provides less than half the effort.   Toileting Hygiene: [place] does less than half the effort. [place] lifts, holds  or supports trunk or limbs but provides less than half the effort.   Upper Body Dressing: [place] provides verbal cues and/or touching/steadying  and/or contact guard assistance as patient completes activity.   Lower Body Dressing: [place] does less than half the effort. [place] lifts, holds  or supports trunk or limbs but provides less than half the effort.   Putting On/Taking Off Footwear: [place] does less than half the effort. [place]  lifts, holds or supports trunk or limbs but provides less than half the effort.    Section [id number]. Self Care Discharge Goals     Eating: Patient completed the activities by themself with no assistance from a  helper.   Oral Hygiene: Patient completed the activities by themself with no assistance  from a helper.   Toileting Hygiene: [place] provides verbal cues or  touching/steadying assistance  as patient completes activity.   Shower/Bathe Self: [place] provides verbal cues or touching/steadying assistance  as patient completes activity.   Shower/Bathe Self: [place] provides verbal cues or touching/steadying assistance  as patient completes activity.   Upper Body Dressing: Patient completed the activities by themself with no  assistance from a helper.   Lower Body Dressing: [place] provides verbal cues or touching/steadying  assistance as patient completes activity.   Putting On/Taking Off Footwear: [place] provides verbal cues or  touching/steadying assistance as patient completes activity.    Section [id number]. Mobility Performance      Section [id number]. Mobility Discharge Goals      Section H. Bladder and Bowel      Section I. Active Diagnosis      Section J. Health Conditions      Section J. Health Conditions (Pain)      Section K. Swallowing/Nutritional Status    Nutritional Approaches on Admission:    Section M. Skin Conditions      Section N. Medication        Section O. Special Treatments, Procedures, and Programs    Signed by: Burton Wilson, Occupational Therapist

## 2023-04-10 NOTE — THERAPY EVALUATION
Acute Care - Occupational Therapy Initial Evaluation   [place]     Patient Name: Augusto Lorenzana Jr.  : 1945  MRN: [id number]  Today's Date: 4/10/2023             Admit Date: 2023       ICD-10-CM ICD-9-CM   1. Gastrointestinal hemorrhage, unspecified gastrointestinal hemorrhage type  K92.2 578.9     Patient Active Problem List   Diagnosis   • NSTEMI 2021   • Hyperlipidemia LDL goal <70   • Essential hypertension   • T2DM on oral agents and insulin. HbA1c 7.4    • Coronary artery disease involving native coronary artery of native heart with unstable angina pectoris   • A/C kidney disease. ATN due to postoperative arrest. 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 Combination of anoxic insult and azotemia   • Debility   • Lower extremity edema   • Complete heart block   • GI bleed   • Third degree atrioventricular block   • Atrial fibrillation, persistent   • Gastrointestinal hemorrhage associated with gastric ulcer     Past Medical History:   Diagnosis Date   • CAD (coronary artery disease)    • CKD (chronic kidney disease) stage 3, GFR 30-59 ml/min    • Diabetes mellitus    • Hyperlipidemia    • Hypertension    • NSTEMI (non-ST elevated myocardial infarction)      Past Surgical History:   Procedure Laterality Date   • CARDIAC CATHETERIZATION N/A 2021    Procedure: Left Heart Cath;  Surgeon: Blade Greene IV, MD;  Location: [facility] CATH INVASIVE LOCATION;  Service: Cardiovascular;  Laterality: N/A;   • CARDIAC SURGERY      2 stents placed .   • CORONARY ARTERY BYPASS GRAFT N/A 2021    Procedure: MEDIAN STERNOTOMY CORONARY ARTERY BYPASS X 3 UTILIZING THE LEFT INTERNAL MAMMARY ARTERY GRAFT, EVH OF THE GREATER RIGHT SAPHENOUS VEIN, AND PILO PER ANESTHESIA;  Surgeon: Jacek Miller MD;  Location: [facility] OR;  Service:  Cardiothoracic;  Laterality: N/A;         OT ASSESSMENT FLOWSHEET (last 12 hours)     OT Evaluation and Treatment     Row Name 04/10/23 1522                   OT Time and Intention    Document Type evaluation  -KR        Mode of Treatment occupational therapy  -KR        Patient Effort adequate  -KR        Symptoms Noted During/After Treatment fatigue  -KR           Living Environment    Current Living Arrangements home  -KR        People in Home spouse  -KR           Cognition    Affect/Mental Status (Cognition) flat/blunted affect;WFL  -KR        Orientation Status (Cognition) oriented to;person;place;situation  -KR        Follows Commands (Cognition) WFL  -KR           Range of Motion Comprehensive    Comment, General Range of Motion BUE 3/5  -KR           Strength Comprehensive (MMT)    Comment, General Manual Muscle Testing (MMT) Assessment BUE 2+/5  -KR           Activities of Daily Living    BADL Assessment/Intervention bathing;upper body dressing;lower body dressing;grooming;toileting  -KR           Bathing Assessment/Intervention    [place] Level (Bathing) bathing skills;maximum assist (25% patient effort)  -KR           Upper Body Dressing Assessment/Training    [place] Level (Upper Body Dressing) upper body dressing skills;maximum assist (25% patient effort)  -KR           Lower Body Dressing Assessment/Training    [place] Level (Lower Body Dressing) lower body dressing skills;maximum assist (25% patient effort)  -KR           Grooming Assessment/Training    [place] Level (Grooming) grooming skills;moderate assist (50% patient effort)  -KR           Toileting Assessment/Training    [place] Level (Toileting) toileting skills;dependent (less than 25% patient effort)  -KR           Motor Skills    Functional Endurance poor  -KR           Plan of Care Review    Plan of Care Reviewed With patient  -KR           Therapy Assessment/Plan (OT)    Planned Therapy Interventions (OT) activity  tolerance training;BADL retraining;strengthening exercise  -KR        Comment, Therapy Assessment/Plan (OT) Pt motivated to continue rehabilitation as tolerated to increase safety/independence in home environment  -KR           Therapy Plan Review/Discharge Plan (OT)    Anticipated Discharge Disposition (OT) inpatient rehabilitation facility  -KR           OT Goals    Strength Goal Selection (OT) strength, OT goal 1  -KR           Strength Goal 1 (OT)    Strength Goal 1 (OT) BUE increase x 1 to enhance self care/mobility skills  -KR        Time Frame (Strength Goal 1, OT) by discharge  -KR            Endurance Goal 1 (OT)    Endurance Goal 1 (OT) Increase to enhance ADL performance  -KR              User Key  (r) = Recorded By, (t) = Taken By, (c) = Cosigned By    Initials Name Effective Dates    KR Burton Wilson OT 06/16/21 -                        OT Recommendation and Plan  Planned Therapy Interventions (OT): activity tolerance training, BADL retraining, strengthening exercise  Plan of Care Review  Plan of Care Reviewed With: patient  Plan of Care Reviewed With: patient        Time Calculation:     Therapy Charges for Today     Code Description Service Date Service Provider Modifiers Qty    [id number]  OT EVAL HIGH COMPLEXITY 4 4/10/2023 Burton Wilson OT GO 1               Burton Wilson OT  4/10/2023

## 2023-04-10 NOTE — CASE MANAGEMENT/SOCIAL WORK
Discharge Planning Assessment   [place]     Patient Name: Augusto Lorenzana Jr.  MRN: [id number]  Today's Date: 4/10/2023    Admit Date: 4/8/2023    Plan: Pt admitted on 4/9/23. SS received physician consult for discharge planning. SS spoke with pt spouse Hedy who states pt lives at [address] [place] [address]. Pt lives with spouse Hedy. Pt does not utilize HH services at this time. Pt PCP Collin Santos. Pt utilizes (r) walker, oxgyen @ 2 liters, concentrator via [facility] for DME needs. Pt  does not have POA/living will. Pt spouse states she would like for pt to return back to Inpatient rehab before returning home. SS to follow and assist as needed with discharge planning.      Discharge Needs Assessment     Row Name 04/10/23 1436       Living Environment    People in Home spouse    Name(s) of People in Home Hedy - spouse    Current Living Arrangements home    Primary Care Provided by self    Provides Primary Care For no one    Family Caregiver if Needed spouse;child(carroll), adult    Family Caregiver Names Hedy Lorenzana - spouse    Quality of Family Relationships involved;supportive;helpful    Able to Return to Prior Arrangements --  spouse would like for pt to return to [facility].       Transition Planning    Patient/Family Anticipates Transition to inpatient rehabilitation facility       Discharge Needs Assessment    Equipment Currently Used at Home walker, rolling;oxygen    Concerns to be Addressed discharge planning               Discharge Plan     Row Name 04/10/23 1435       Plan    Plan Pt admitted on 4/9/23. SS received physician consult for discharge planning. SS spoke with pt spouse Hedy who states pt lives at [address] [place] [address]. Pt lives with spouse Hedy. Pt does not utilize HH services at this time. Pt PCP Collin Satnos. Pt utilizes (r) walker, oxgyen @ 2 liters, concentrator via [facility] for DME needs. Pt  does not have POA/living will. Pt spouse states she would like for pt to  return back to Inpatient rehab before returning home. SS to follow and assist as needed with discharge planning.              Expected Discharge Date and Time     Expected Discharge Date Expected Discharge Time    Apr 11, 2023          Demographic Summary     Row Name 04/10/23 1011       General Information    Admission Type inpatient    Arrived From --  from [facility]    Referral Source physician    Reason for Consult discharge planning    Preferred Language English                MANISHA Caldera

## 2023-04-10 NOTE — PROGRESS NOTES
Pt is confused and agitated. Not redirectable by nursing. Attempting to get out of bed, pulling lines and tubes. 2 mg IV haldol given (attemptd IM but unsafe given degree of agitation is he was touched by nursing). Will start low dose Zyprexa 5 mg BID. This is likely delirium given conversation with wife this morning.

## 2023-04-10 NOTE — ANESTHESIA PREPROCEDURE EVALUATION
Anesthesia Evaluation     Patient summary reviewed and Nursing notes reviewed   no history of anesthetic complications:  NPO Solid Status: > 8 hours  NPO Liquid Status: > 8 hours           Airway   Mallampati: II  TM distance: >3 FB  Neck ROM: full  No difficulty expected  Dental    (+) edentulous    Pulmonary     breath sounds clear to auscultation  (+) a smoker Former, shortness of breath, decreased breath sounds,   Cardiovascular   Exercise tolerance: poor (<4 METS)    ECG reviewed  Patient on routine beta blocker and Beta blocker given within 24 hours of surgery  Rhythm: regular  Rate: normal    (+) pacemaker (placed one week ago) interrogated <1 month ago, hypertension, valvular problems/murmurs AS, past MI (NSTEMI 5/22/2021)  >12 months, CAD, CABG (5/28/2021) >6 Months, dysrhythmias Paroxysmal Atrial Fib, angina, CHF Systolic <55%, ALFREDO, hyperlipidemia,       Neuro/Psych- negative ROS  GI/Hepatic/Renal/Endo    (+) obesity,  GI bleeding upper active bleeding, renal disease CRI, diabetes mellitus type 2 poorly controlled using insulin,     Musculoskeletal     Abdominal    Substance History - negative use     OB/GYN negative ob/gyn ROS         Other   arthritis (Hx Gout), blood dyscrasia anemia,     ROS/Med Hx Other: Echo 3/31/2023:  •  Left Ventricle: The left ventricular systolic function is mildly   reduced. The LVEF is visually estimated at 40 - 45%. The left ventricular   filling pressure is elevated.   •  Right Ventricle: The right ventricle is moderately dilated. The right   ventricular systolic function is reduced. The estimated global right   ventricular systolic function based upon the TDI maximal systolic velocity   is reduced (<9.5 cm/s).   •  Aortic Valve: The peak gradient is 32 mmHg. The mean gradient is 18   mmHg. The estimated aortic valve area by the continuity equation is 1.1   cm2.                     Anesthesia Plan    ASA 4     general   total IV anesthesia  intravenous induction      Anesthetic plan, risks, benefits, and alternatives have been provided, discussed and informed consent has been obtained with: patient.  Pre-procedure education provided  Use of blood products discussed with consented to blood products.   Plan discussed with CRNA.

## 2023-04-10 NOTE — ANESTHESIA POSTPROCEDURE EVALUATION
Patient: Augusto Lorenzana Jr.    Procedure Summary     Date: 04/10/23 Room / Location: [facility] OR  /  [facility] OR    Anesthesia Start: 1255 Anesthesia Stop: 1304    Procedure: ESOPHAGOGASTRODUODENOSCOPY (Esophagus) Diagnosis:       Gastrointestinal hemorrhage, unspecified gastrointestinal hemorrhage type      (Gastrointestinal hemorrhage, unspecified gastrointestinal hemorrhage type [K92.2])    Surgeons: Sabine Hudson MD Provider: Aime Junior DO    Anesthesia Type: general ASA Status: 4          Anesthesia Type: general    Vitals  Vitals Value Taken Time   /67 04/10/23 1335   Temp 97.6 °F (36.4 °C) 04/10/23 1305   Pulse 94 04/10/23 1335   Resp 17 04/10/23 1335   SpO2 100 % 04/10/23 1335           Post Anesthesia Care and Evaluation    Patient location during evaluation: PHASE II  Patient participation: complete - patient participated  Level of consciousness: awake and alert  Pain score: 0  Pain management: adequate    Airway patency: patent  Anesthetic complications: No anesthetic complications  PONV Status: controlled  Cardiovascular status: acceptable  Respiratory status: acceptable and nasal cannula  Hydration status: euvolemic  No anesthesia care post op

## 2023-04-11 ENCOUNTER — APPOINTMENT (OUTPATIENT)
Dept: GENERAL RADIOLOGY | Facility: HOSPITAL | Age: 78
End: 2023-04-11
Payer: MEDICARE

## 2023-04-11 LAB
ANION GAP SERPL CALCULATED.3IONS-SCNC: 12.2 MMOL/L (ref 5–15)
BUN SERPL-MCNC: 86 MG/DL (ref 8–23)
BUN/CREAT SERPL: 22.8 (ref 7–25)
CALCIUM SPEC-SCNC: 8.3 MG/DL (ref 8.6–10.5)
CHLORIDE SERPL-SCNC: 117 MMOL/L (ref 98–107)
CO2 SERPL-SCNC: 16.8 MMOL/L (ref 22–29)
CREAT SERPL-MCNC: 3.78 MG/DL (ref 0.76–1.27)
EGFRCR SERPLBLD CKD-EPI 2021: 15.8 ML/MIN/1.73
GLUCOSE BLDC GLUCOMTR-MCNC: 109 MG/DL (ref 70–130)
GLUCOSE BLDC GLUCOMTR-MCNC: 119 MG/DL (ref 70–130)
GLUCOSE BLDC GLUCOMTR-MCNC: 161 MG/DL (ref 70–130)
GLUCOSE BLDC GLUCOMTR-MCNC: 170 MG/DL (ref 70–130)
GLUCOSE SERPL-MCNC: 145 MG/DL (ref 65–99)
HCT VFR BLD AUTO: 23.4 % (ref 37.5–51)
HCT VFR BLD AUTO: 23.6 % (ref 37.5–51)
HGB BLD-MCNC: 7.7 G/DL (ref 13–17.7)
HGB BLD-MCNC: 7.7 G/DL (ref 13–17.7)
POTASSIUM SERPL-SCNC: 3.6 MMOL/L (ref 3.5–5.2)
SODIUM SERPL-SCNC: 146 MMOL/L (ref 136–145)

## 2023-04-11 PROCEDURE — 63710000001 ONDANSETRON PER 8 MG: Performed by: SURGERY

## 2023-04-11 PROCEDURE — 25010000002 ZIPRASIDONE MESYLATE PER 10 MG: Performed by: INTERNAL MEDICINE

## 2023-04-11 PROCEDURE — 80048 BASIC METABOLIC PNL TOTAL CA: CPT | Performed by: INTERNAL MEDICINE

## 2023-04-11 PROCEDURE — 63710000001 INSULIN LISPRO (HUMAN) PER 5 UNITS: Performed by: SURGERY

## 2023-04-11 PROCEDURE — 82962 GLUCOSE BLOOD TEST: CPT

## 2023-04-11 PROCEDURE — 85018 HEMOGLOBIN: CPT | Performed by: SURGERY

## 2023-04-11 PROCEDURE — 85014 HEMATOCRIT: CPT | Performed by: SURGERY

## 2023-04-11 PROCEDURE — 63710000001 INSULIN LISPRO (HUMAN) PER 5 UNITS: Performed by: INTERNAL MEDICINE

## 2023-04-11 PROCEDURE — 92610 EVALUATE SWALLOWING FUNCTION: CPT

## 2023-04-11 PROCEDURE — 25010000002 ZIPRASIDONE MESYLATE PER 10 MG

## 2023-04-11 PROCEDURE — 99232 SBSQ HOSP IP/OBS MODERATE 35: CPT | Performed by: INTERNAL MEDICINE

## 2023-04-11 PROCEDURE — 25010000002 HEPARIN (PORCINE) PER 1000 UNITS: Performed by: INTERNAL MEDICINE

## 2023-04-11 PROCEDURE — 74018 RADEX ABDOMEN 1 VIEW: CPT

## 2023-04-11 RX ORDER — WATER 1000 ML/1000ML
INJECTION, SOLUTION INTRAVENOUS
Status: DISCONTINUED
Start: 2023-04-11 | End: 2023-04-18 | Stop reason: HOSPADM

## 2023-04-11 RX ORDER — LIDOCAINE 4 G/G
1 PATCH TOPICAL DAILY
Status: DISCONTINUED | OUTPATIENT
Start: 2023-04-11 | End: 2023-04-18 | Stop reason: HOSPADM

## 2023-04-11 RX ORDER — HEPARIN SODIUM 1000 [USP'U]/ML
6000 INJECTION, SOLUTION INTRAVENOUS; SUBCUTANEOUS ONCE
Status: COMPLETED | OUTPATIENT
Start: 2023-04-11 | End: 2023-04-11

## 2023-04-11 RX ORDER — DEXTROSE MONOHYDRATE 50 MG/ML
100 INJECTION, SOLUTION INTRAVENOUS CONTINUOUS
Status: ACTIVE | OUTPATIENT
Start: 2023-04-11 | End: 2023-04-11

## 2023-04-11 RX ORDER — ZIPRASIDONE MESYLATE 20 MG/ML
INJECTION, POWDER, LYOPHILIZED, FOR SOLUTION INTRAMUSCULAR
Status: DISCONTINUED
Start: 2023-04-11 | End: 2023-04-12

## 2023-04-11 RX ORDER — AMLODIPINE BESYLATE 10 MG/1
10 TABLET ORAL
Status: DISCONTINUED | OUTPATIENT
Start: 2023-04-12 | End: 2023-04-18 | Stop reason: HOSPADM

## 2023-04-11 RX ADMIN — PANTOPRAZOLE SODIUM 8 MG/HR: 40 INJECTION, POWDER, FOR SOLUTION INTRAVENOUS at 15:31

## 2023-04-11 RX ADMIN — INSULIN LISPRO 2 UNITS: 100 INJECTION, SOLUTION INTRAVENOUS; SUBCUTANEOUS at 11:36

## 2023-04-11 RX ADMIN — OLANZAPINE 5 MG: 5 TABLET, ORALLY DISINTEGRATING ORAL at 09:12

## 2023-04-11 RX ADMIN — CETIRIZINE HYDROCHLORIDE 5 MG: 10 TABLET, FILM COATED ORAL at 09:10

## 2023-04-11 RX ADMIN — OLANZAPINE 5 MG: 5 TABLET, ORALLY DISINTEGRATING ORAL at 19:58

## 2023-04-11 RX ADMIN — ZIPRASIDONE MESYLATE 10 MG: 20 INJECTION, POWDER, LYOPHILIZED, FOR SOLUTION INTRAMUSCULAR at 22:53

## 2023-04-11 RX ADMIN — Medication 10 ML: at 19:59

## 2023-04-11 RX ADMIN — DEXTROSE MONOHYDRATE 100 ML/HR: 50 INJECTION, SOLUTION INTRAVENOUS at 09:11

## 2023-04-11 RX ADMIN — ATORVASTATIN CALCIUM 40 MG: 40 TABLET, FILM COATED ORAL at 19:57

## 2023-04-11 RX ADMIN — METHOCARBAMOL 500 MG: 500 TABLET, FILM COATED ORAL at 02:26

## 2023-04-11 RX ADMIN — INSULIN LISPRO 2 UNITS: 100 INJECTION, SOLUTION INTRAVENOUS; SUBCUTANEOUS at 17:08

## 2023-04-11 RX ADMIN — DEXTROSE MONOHYDRATE 100 ML/HR: 50 INJECTION, SOLUTION INTRAVENOUS at 15:31

## 2023-04-11 RX ADMIN — PANTOPRAZOLE SODIUM 8 MG/HR: 40 INJECTION, POWDER, FOR SOLUTION INTRAVENOUS at 02:26

## 2023-04-11 RX ADMIN — ONDANSETRON HYDROCHLORIDE 4 MG: 4 TABLET, FILM COATED ORAL at 13:09

## 2023-04-11 RX ADMIN — GENTAMICIN SULFATE 1 APPLICATION: 1 OINTMENT TOPICAL at 09:11

## 2023-04-11 RX ADMIN — CARVEDILOL 25 MG: 25 TABLET, FILM COATED ORAL at 09:10

## 2023-04-11 RX ADMIN — Medication 10 ML: at 09:17

## 2023-04-11 RX ADMIN — Medication 1 TABLET: at 09:10

## 2023-04-11 RX ADMIN — PANTOPRAZOLE SODIUM 8 MG/HR: 40 INJECTION, POWDER, FOR SOLUTION INTRAVENOUS at 09:12

## 2023-04-11 RX ADMIN — HEPARIN SODIUM 6000 UNITS: 1000 INJECTION INTRAVENOUS; SUBCUTANEOUS at 15:39

## 2023-04-11 RX ADMIN — ZIPRASIDONE MESYLATE 10 MG: 20 INJECTION, POWDER, LYOPHILIZED, FOR SOLUTION INTRAMUSCULAR at 08:01

## 2023-04-11 RX ADMIN — Medication 10 ML: at 09:16

## 2023-04-11 NOTE — PLAN OF CARE
Problem: Adult Inpatient Plan of Care  Goal: Plan of Care Review  Outcome: Ongoing, Progressing  Flowsheets (Taken 4/11/2023 0205)  Outcome Evaluation: Alert and confused. VSS. RA. Pt has been somewhat restless but not combative this shift. Protonix gtt continues to infuse. Sitter at bedside. Call light and bedside table within reach. Bed locked and low with alarm activated. Will monitor for changes.  Goal: Patient-Specific Goal (Individualized)  Outcome: Ongoing, Progressing  Goal: Absence of Hospital-Acquired Illness or Injury  Outcome: Ongoing, Progressing  Intervention: Identify and Manage Fall Risk  Recent Flowsheet Documentation  Taken 4/11/2023 0100 by Miryam Hernandez RN  Safety Promotion/Fall Prevention: safety round/check completed  Taken 4/10/2023 2300 by Miryam Hernandez RN  Safety Promotion/Fall Prevention: safety round/check completed  Taken 4/10/2023 2100 by Miryam Hernandez RN  Safety Promotion/Fall Prevention: safety round/check completed  Taken 4/10/2023 1900 by Miryam Hernandez RN  Safety Promotion/Fall Prevention: safety round/check completed  Intervention: Prevent Skin Injury  Recent Flowsheet Documentation  Taken 4/10/2023 2000 by Miryam Hernandez RN  Skin Protection:   adhesive use limited   transparent dressing maintained   tubing/devices free from skin contact  Intervention: Prevent and Manage VTE (Venous Thromboembolism) Risk  Recent Flowsheet Documentation  Taken 4/10/2023 2000 by Miryam Hernandez RN  VTE Prevention/Management:   bilateral   sequential compression devices on  Range of Motion: active ROM (range of motion) encouraged  Goal: Optimal Comfort and Wellbeing  Outcome: Ongoing, Progressing  Intervention: Provide Person-Centered Care  Recent Flowsheet Documentation  Taken 4/10/2023 2000 by Miryam Hernandez RN  Trust Relationship/Rapport:   care explained   reassurance provided  Goal: Readiness for Transition of Care  Outcome: Ongoing, Progressing   Goal  Outcome Evaluation:              Outcome Evaluation: Alert and confused. VSS. RA. Pt has been somewhat restless but not combative this shift. Protonix gtt continues to infuse. Sitter at bedside. Call light and bedside table within reach. Bed locked and low with alarm activated. Will monitor for changes.

## 2023-04-11 NOTE — PLAN OF CARE
Goal Outcome Evaluation:   Sitter remains at bedside. Pt agitated refuses po medication pulling at peritoneal lines and abdominal binder. Peritoneal dialysis in progress.Will continue to monitor and follow plan of care.

## 2023-04-11 NOTE — PROGRESS NOTES
Patient Assessment Instrument  Quality Indicators - Discharge FY 2023    Section A. Transportation  Issues Due to Lack of Transportation:  No    Section A. Medication List        Section B. Health Literacy      Section C. BIMS      Section C. Signs and Symptoms of Delirium (from CAM)      Section D. Mood      Section D. Social Isolation      Section GG. Self-Care Performance      Section GG. Mobility Performance      Section J. Health Conditions Discharge  Fall(s) Since Admission:  No    Section J. Health Conditions (Pain)      Section K. Swallowing/Nutritional Status  Nutritional Approaches Past 7 Days:  Therapeutic diet (e.g., low salt, diabetic,  low cholesterol)  Nutritional Approaches at Discharge:  Therapeutic diet (e.g., low salt,  diabetic, low cholesterol)    Section M. Skin Conditions Discharge      . Current Number of Unhealed Pressure Ulcers      Section N. Medication    Medication Intervention: Not applicable - There were no potential clinically  significant medication issues identified since admission or patient is not  taking any medications.  Patient is taking medications in the following pharmacological classification:  E. Anticoagulant An indication is noted for all medications in the Anticoagulant  drug class. I. Antiplatelet An indication is noted for all medications in the  Antiplatelet drug class. J. Hypoglycemic (including insulin) An indication is  noted for all medications in the Hypoglycemic drug class.    Section O. Special Treatments, Procedures, and Programs  None    Signed by: Salina Perkins, Supervisor

## 2023-04-11 NOTE — PROGRESS NOTES
Patient Assessment Instrument  Quality Indicators - Discharge FY 2023    Section A. Transportation      Section A. Medication List        Section B. Health Literacy      Section C. BIMS      Section C. Signs and Symptoms of Delirium (from CAM)      Section D. Mood      Section D. Social Isolation      Section GG. Self-Care Performance      Section GG. Mobility Performance      Section J. Health Conditions Discharge      Section J. Health Conditions (Pain)      Section K. Swallowing/Nutritional Status  Nutritional Approaches Past 7 Days:  Nutritional Approaches at Discharge:    Section M. Skin Conditions Discharge      . Current Number of Unhealed Pressure Ulcers      Section N. Medication        Section O. Special Treatments, Procedures, and Programs  None    Signed by: Salina Perkins, Supervisor

## 2023-04-11 NOTE — NURSING NOTE
Attempted to call Hedy Lorenzana and inform her off the patients transfer to [address] [place] [address]. She did not answer at this time.

## 2023-04-11 NOTE — PROGRESS NOTES
Patient Assessment Instrument  Quality Indicators - Admission FY 2023    Section A. Ethnicity/ Race/Language  Ethnicity:  Race:  Preferred Language:    Section A. Transportation  Issues Due to Lack of Transportation:   No    Section B. Hearing and Vision        Section B. Health Literacy        Section C. Cognitive Patterns      Section C. Signs and Symptoms of Delirium (from CAM)      Section D. Mood      Section D. Social Isolation      Section [id number]. Prior Functioning    Self Care: Patient completed all the activities by themself, with or without an  assistive device, with no assistance from a helper.  Indoor Mobility: Patient completed the activities by themself, with or without  an assistive device, with no assistance from a helper.  Stairs: Patient needed partial assistance from another person to complete  activities.  Functional Cognition: Patient completed the activities by themself, with or  without an assistive device, with no assistance from a helper.    Section [id number]. Prior Device Use  Walker    Section [id number]. Self Care Performance      Section VH1650. Self Care Discharge Goals      Section [id number]. Mobility Performance     Roll Left and Right: [place] does less than half the effort. [place] lifts, holds  or supports trunk or limbs but provides less than half the effort.   Sit to Lying: [place] does less than half the effort. [place] lifts, holds or  supports trunk or limbs but provides less than half the effort.   Lying to Sitting on Side of Bed: [place] does less than half the effort. [place]  lifts, holds or supports trunk or limbs but provides less than half the effort.   Sit to Stand: [place] does less than half the effort. [place] lifts, holds or  supports trunk or limbs but provides less than half the effort.   Chair/Bed to Chair Transfer: [place] does less than half the effort. [place]  lifts, holds or supports trunk or limbs but provides less than half the effort.   Toilet Transfer Not attempted due to  medical or safety concerns.   Car Transfer: Not attempted due to medical or safety concerns.   Walk 10 Feet:   [place] does all of the effort. Patient does none of the effort  to complete the activity. Or, the assistance of 2 or more helpers is required  for the patient to complete the activity.  Walk 50 Feet with 2 Turns:   Not attempted due to medical or safety concerns.  Walk 150 Feet:   Not attempted due to medical or safety concerns.  Walking 10 Feet on Uneven Surfaces:   Not attempted due to medical or safety  concerns.  1 Step Over Curb or Up/Down Stair:   Not attempted due to medical or safety  concerns.  Picking up an Object:   Not attempted due to medical or safety concerns.  Uses Wheelchair/Scooter: No    Section [id number]. Mobility Discharge Goals     Roll Left and Right: [place] sets up or cleans up; patient completes activity.  [place] assists only prior to or following the activity.   Sit to Lying: [place] sets up or cleans up; patient completes activity. [place]  assists only prior to or following the activity.   Lying to Sitting on Side of Bed: [place] sets up or cleans up; patient completes  activity. [place] assists only prior to or following the activity.   Sit to Stand: [place] sets up or cleans up; patient completes activity. [place]  assists only prior to or following the activity.   Chair/Bed to Chair Transfer: [place] sets up or cleans up; patient completes  activity. [place] assists only prior to or following the activity.   Toilet Transfer: [place] sets up or cleans up; patient completes activity.  [place] assists only prior to or following the activity.   Car Transfer: [place] sets up or cleans up; patient completes activity. [place]  assists only prior to or following the activity.   Walk Discharge Goals:   Walk 150 Feet: [place] sets up or cleans up; patient completes activity. [place]  assists only prior to or following the activity.    Section H. Bladder and Bowel  Bladder Continence: Always continent  (no documented incontinence).  Bowel Continence: Frequently incontinent (2 or more episodes of bowel  incontinence, but at least one continent bowel movement).    Section I. Active Diagnosis  Comorbidities and Co-existing Conditions:   Diabetes Mellitus (DM) - e.g.,  diabetic retinopathy, nephropathy, and neuropathy).    Section J. Health Conditions  Patient has had two or more falls, or a fall with injury, in the past year.  Patient has had major surgery during the 100 days prior to admission.    Section J. Health Conditions (Pain)      Section K. Swallowing/Nutritional Status  Regular food (solids and liquids swallowed safely without supervision or  modified food or liquid consistency).  Nutritional Approaches on Admission:  Nutritional Approaches on Admission:  Therapeutic diet (e.g., low salt, diabetic, low cholesterol)    Section M. Skin Conditions      Section N. Medication    Potential Clinically Significant Medication Issues: No issues found during  review  Patient is taking medications in the following pharmacological classification:  E. Anticoagulant An indication is noted for all medications in the Anticoagulant  drug class. I. Antiplatelet An indication is noted for all medications in the  Antiplatelet drug class. J. Hypoglycemic (including insulin) An indication is  noted for all medications in the Hypoglycemic drug class.  Potential Clinically Significant Medication Issues: No issues found during  review    Section O. Special Treatments, Procedures, and Programs    Signed by: Salina Perkins, Supervisor

## 2023-04-11 NOTE — SIGNIFICANT NOTE
04/11/23 1424   OTHER   Discipline physical therapy assistant   Rehab Time/Intention   Session Not Performed patient/family declined, not feeling well  (Pt was throwing up prior to therapist entering his room.  Pt and family declined PT at this time.  Will f/u at a later date.)

## 2023-04-11 NOTE — PROGRESS NOTES
[facility] Medicine Services  PROGRESS NOTE     Patient Identification:  Name:  Augusto Lorenzana Jr.  Age:  78 y.o.  Sex:  male  :  1945  MRN:  [id number]  Visit Number:  [id number]  Primary Care Provider:  Rob Polanco MD    Length of stay:  3    ----------------------------------------------------------------------------------------------------------------------  Subjective     Chief Complaint:  Follow up for UGIB complicated by hospital delirium    Subjective:  Today, the patient is agitated this morning.  Pulling at lines and tubes in try to get out of bed.  Extremely weak and unsafe, unable to stand up and ambulate.  Received Zyprexa last night and was less agitated.  However became agitated before dose this morning.  Received a dose of Geodon IM 10 mg with improvement of agitation.  Wife at bedside.  I provided updates and concerns with agitation.     ----------------------------------------------------------------------------------------------------------------------  Objective     Current Hospital Meds:  allopurinol, 50 mg, Oral, Once per day on   atorvastatin, 40 mg, Oral, Nightly  carvedilol, 25 mg, Oral, BID With Meals  cetirizine, 5 mg, Oral, Daily  gentamicin, 1 application, Topical, Daily  insulin lispro, 2-7 Units, Subcutaneous, TID With Meals  Lidocaine, 1 patch, Apply externally, Daily  multivitamin, 1 tablet, Oral, Daily  OLANZapine zydis, 5 mg, Oral, BID  senna-docusate sodium, 2 tablet, Oral, Nightly  sodium chloride, 10 mL, Intravenous, Q12H  sodium chloride, 10 mL, Intravenous, Q12H  sodium chloride, 10 mL, Intravenous, Q12H  sterile water (preservative free), , ,   ziprasidone, , ,       dextrose, 100 mL/hr, Last Rate: 100 mL/hr (23)  pantoprazole, 8 mg/hr, Last Rate: 8 mg/hr (23)  sodium chloride, 9 mL/hr, Last Rate: Stopped (04/10/23  2200)      ----------------------------------------------------------------------------------------------------------------------  Vital Signs:  Temp:  [97.2 °F (36.2 °C)-98.8 °F (37.1 °C)] 97.9 °F (36.6 °C)  Heart Rate:  [] 86  Resp:  [10-23] 19  BP: (105-181)/() 181/95  Mean Arterial Pressure (Non-Invasive) for the past 24 hrs (Last 3 readings):   Noninvasive MAP (mmHg)   04/11/23 0900 131   04/11/23 0545 102   04/11/23 0506 89     SpO2 Percentage    04/11/23 0829 04/11/23 0900 04/11/23 1000   SpO2: 99% 98% 92%     SpO2:  [92 %-100 %] 92 %  on  Flow (L/min):  [2-3] 2;   Device (Oxygen Therapy): room air    Body mass index is 28.11 kg/m².  Wt Readings from Last 3 Encounters:   04/11/23 81.4 kg (179 lb 8 oz)   04/07/23 77.2 kg (170 lb 3.2 oz)   07/08/21 83 kg (183 lb)        Intake/Output Summary (Last 24 hours) at 4/11/2023 1057  Last data filed at 4/11/2023 1006  Gross per 24 hour   Intake 2091.59 ml   Output 1505 ml   Net 586.59 ml     Diet: Liquid Diets; Clear Liquid; Texture: Regular Texture (IDDSI 7); Fluid Consistency: Thin (IDDSI 0)  ----------------------------------------------------------------------------------------------------------------------  Physical exam:   GEN: NAD, pale  CV: RRR, systolic murmur unchanged  PULM: Mostly clear to auscultation however poor effort due to AMS  GI: Abdominal binder in place to protect PD catheter.  Positive bowel sounds soft and nontender  SKIN: Pale, no rashes  PSYCH: Awake not oriented at all.  Confused  ----------------------------------------------------------------------------------------------------------------------      ----------------------------------------------------------------------------------------------------------------------            Results from last 7 days   Lab Units 04/11/23  0532 04/11/23  0009 04/10/23  1723 04/10/23  1119 04/10/23  0541 04/10/23  0004 04/09/23  0141 04/08/23  1647 04/08/23  1109 04/08/23  0029   WBC 10*3/mm3   --   --   --  13.24*  --  15.02*  --   --   --  11.77*   HEMOGLOBIN g/dL 7.7* 7.7* 7.9* 7.9*   < > 6.5*   < > 5.5*   < > 8.0*   HEMATOCRIT % 23.4* 23.6* 25.9* 24.0*   < > 20.6*   < > 17.2*   < > 25.3*   MCV fL  --   --   --  96.0  --  102.0*  --   --   --  98.8*   MCHC g/dL  --   --   --  32.9  --  31.6  --   --   --  31.6   PLATELETS 10*3/mm3  --   --   --  181  --  193  --   --   --  284   INR   --   --   --   --   --   --   --  1.38*  --   --     < > = values in this interval not displayed.     Results from last 7 days   Lab Units 04/11/23  0009 04/10/23  0004 04/09/23  0141 04/08/23  0029   SODIUM mmol/L 146* 151* 149* 144   POTASSIUM mmol/L 3.6 3.9 4.3 4.2   MAGNESIUM mg/dL  --   --   --  1.9   CHLORIDE mmol/L 117* 122* 118* 112*   CO2 mmol/L 16.8* 14.8* 14.6* 18.0*   BUN mg/dL 86* 126* 143* 103*   CREATININE mg/dL 3.78* 4.29* 4.24* 4.21*   CALCIUM mg/dL 8.3* 8.2* 8.4* 9.0   GLUCOSE mg/dL 145* 85 130* 117*   Estimated Creatinine Clearance: 17 mL/min (A) (by C-G formula based on SCr of 3.78 mg/dL (H)).  No results found for: AMMONIA    Glucose   Date/Time Value Ref Range Status   04/11/2023 0549 109 70 - 130 mg/dL Final     Comment:     Meter: [id number] : [id number] Britney Eduardo   04/10/2023 1704 184 (H) 70 - 130 mg/dL Final     Comment:     Meter: [id number] : [id number] RHYS MARTINEZ   04/10/2023 1134 115 70 - 130 mg/dL Final     Comment:     Meter: [id number] : [id number] ANH DILBECK   04/10/2023 0603 102 70 - 130 mg/dL Final     Comment:     Meter: [id number] : [id number] LAMIN HENRIQUEZ   04/09/2023 1652 161 (H) 70 - 130 mg/dL Final     Comment:     Meter: [id number] : [id number] GAVIOTA PARRA   04/09/2023 1153 149 (H) 70 - 130 mg/dL Final     Comment:     Meter: [id number] : [id number] MEEK PRICILLA M   04/09/2023 0613 135 (H) 70 - 130 mg/dL Final     Comment:     Meter: [id number] : [id number] LAMIN HENRIQUEZ   04/08/2023 1639 200 (H) 70 - 130 mg/dL Final     Comment:     Meter: [id number]  : [id number] BRYAN SAMUELS     Lab Results   Component Value Date    HGBA1C 6.5 (H) 04/04/2022       ----------------------------------------------------------------------------------------------------------------------  Assessment/Plan     • UGIB-large peptic ulcer noted on initial EGD and treated with epinephrine injection.  Had recurrent melena 2 nights ago with drop of hemoglobin to 6.5 again requiring third unit of packed RBC.  Repeat EGD yesterday showed no evidence of active bleeding with persistent large ulcer.  Melena seems to have resolved and no recurrence is noted since yesterday.  Continue IV PPI for now.  Hemoglobin appears to be stable since yesterday therefore will decrease frequency to twice daily checks and if remains stable will change to as needed.  • Leukocytosis- likely related to UGIB.    • Delirium-continues to remain confused and agitated intermittently.  Zyprexa 5 mg twice daily ordered yesterday.  Prior to a.m. dose today became more agitated and needing IM Geodon as above.  Will titrate dose of Zyprexa as needed.   • Acute blood loss anemia- due to UGIB. PRBC/H&H as above.   • Acute hypernatremia-some improvement with D5 water.  Will give additional liter today.  On clear liquid diet but has poor p.o. intake due to delirium.     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 Nephrology following  • HTN- BP controlled on Losartan, Norvasc and coreg  • HLD- statin  • DM2- A1C 6.5%. SSI only given NPO. BG in acceptable range.   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 Dose decreased to 100 mg/day per renal function.   • Constipation- cont doc/senna nightly    --------------------------------------------------  DVT Prophylaxis: SCD due to GI bleed     Disposition: We will need to return to rehab.  However hospital course complicated by delirium.  Once agitation has improved we  will consult PT/OT.  Likely can transfer out of PCU to tele.  Due to agitation we will continue to need sitter for now. Cont CVC given difficult PIV. Will d/c when medically no longer needed.   --------------------------------------------------      Katayoun Behbahani, MD  Hospitalist Service -- [facility]     04/11/23  10:57 EDT

## 2023-04-11 NOTE — THERAPY EVALUATION
Acute Care - Speech Language Pathology   Swallow Initial Evaluation [facility]   CLINICAL DYSPHAGIA ASSESSMENT     Patient Name: Augusto Lorenzana Jr.  : 1945  MRN: [id number]  Today's Date: 2023             Admit Date: 2023    Augusto Lorenzana Jr.  is seen at bedside this PM on 3S to assess safety/efficacy of swallowing fnx, determine safest/least restrictive diet tolerance. His sitter was present for this assessment 2/2 ongoing ams and agitation.     Mr. Lorenzana was directly admitted to [facility] from [facility] with a chief complaint of new onset hematemesis and melena on 23.     PMH significant for CAD status post CABG, CKD stage V, type 2 diabetes mellitus, hyperlipidemia, paroxysmal A-fib, SEBASTIAN and valvular heart disease. He presented to [facility] 3/30/23 for placement of a peritoneal dialysis catheter. He experienced a 6-second pause during recovery and was evaluated by cardiology, whom recommended a pacemaker placement. He underwent pacemaker placement 3/31/23. His hospitalization was complicated by ARF 2/2 volume overload. After a lengthy stay at [facility], he was discharged to [facility] for debility recovery. In [facility], he began to experience a sudden onset of hematemesis and melena. He was transferred to [facility] acute for further evaluation and management for concerns of possible upper GI bleed.     He is referred to rule out dysphagia today 2/2 ams. He is currently on a clear liquids po diet.     Social History     Socioeconomic History   • Marital status:    Tobacco Use   • Smoking status: Never   • Smokeless tobacco: Current     Types: Chew   Vaping Use   • Vaping Use: Never used   Substance and Sexual Activity   • Alcohol use: Never   • Drug use: Never   • Sexual activity: Defer      Imaging:  CHEST X-RAY, 2023       HISTORY:    Central line placement.       TECHNIQUE:  AP portable chest x-ray.     COMPARISON:  *  Chest x-ray, 2021.     FINDINGS:  Right  IJ central line tip in good position in the lower SVC. No visible pneumothorax.     Cardiomegaly with mild diffuse pulmonary interstitial edema suggesting mild vascular congestion or volume overload, new since the prior study. Median sternotomy. Dual-chamber cardiac pacer/AICD.     IMPRESSION:  1. No pneumothorax following placement of well-positioned right IJ central line.  2. Vascular congestion with mild interstitial edema.     Signer Name: Nico Aaron MD   Signed: 4/8/2023 5:11 PM   Workstation Name: [id number]    [facility]  Diet Orders (active) (From admission, onward)     Start     Ordered    04/10/23 1356  Diet: Liquid Diets; Clear Liquid; Texture: Regular Texture (IDDSI 7); Fluid Consistency: Thin (IDDSI 0)  Diet Effective Now         04/10/23 1355              Mr. Lorenzana is positioned upright and centered in bed to accept multiple po presentations of ice chips and thin water via teaspoon, cup, and straw. He does not attempt to self provide. Further po consistencies deferred per clear liquids 2/2 GI status.      Facial/oral structures are symmetrical upon observation. Lingual protrusion reveals no deviation. Oral mucosa are moist, pink, and clean. Secretions are clear, thin, and controlled. OROM/DANA is generally weak to imitate oral postures. Gag is not assessed. Volitional cough is intact w/ adequate  intensity, congestive in quality, non-productive. Voice is adequate in intensity, clear in quality w/ intelligible speech. He is a/a and interactive, mildly agitated in general with SLP and sitter, present at bedside. He does calm with verbal coaxing and redirection to participate in this assessment. Sitter does report that he did accept thin liquids for lunch w/o overt s/s aspiration, however, she further reports that he did experience nausea with emesis event post po intake.     Upon po presentations, adequate bolus anticipation and acceptance w/ good labial seal for bolus  clearance via spoon bowl, cup rim stability and suction via straw. Bolus formation, manipulation and control are generally weak. No antieror loss appreciated. No oral holding evidenced. No overt s/s aspiration before the swallow.      Pharyngeal swallow is timely w/ adequate hyolaryngeal elevation per palpation. No overt s/s aspiration evidenced across this evaluation. No silent aspiration suspected. He denies odynophagia. He is noted with limited acceptance of po trials, approximately 3 oz only.    Visit Dx:     ICD-10-CM ICD-9-CM   1. Gastrointestinal hemorrhage, unspecified gastrointestinal hemorrhage type  K92.2 578.9     Patient Active Problem List   Diagnosis   • NSTEMI 5/22/2021   • Hyperlipidemia LDL goal <70   • Essential hypertension   • T2DM on oral agents and insulin. HbA1c 7.4    • Coronary artery disease involving native coronary artery of native heart with unstable angina pectoris   • A/C kidney disease. ATN due to postoperative arrest. Dialysis begun 6/3/2021   • Gout   • Former smokeless tobacco use   • S/P CABG x 3 on 5/28/21   • Perioperative cardiac arrest with ventricular fibrillation (CMS/HCC)   • Postop encephalopathy post code. Combination of anoxic insult and azotemia   • Debility   • Lower extremity edema   • Complete heart block   • GI bleed   • Third degree atrioventricular block   • Atrial fibrillation, persistent   • Gastrointestinal hemorrhage associated with gastric ulcer     Past Medical History:   Diagnosis Date   • CAD (coronary artery disease)    • CKD (chronic kidney disease) stage 3, GFR 30-59 ml/min    • Diabetes mellitus    • Hyperlipidemia    • Hypertension    • NSTEMI (non-ST elevated myocardial infarction)      Past Surgical History:   Procedure Laterality Date   • CARDIAC CATHETERIZATION N/A 5/24/2021    Procedure: Left Heart Cath;  Surgeon: Blade Greene IV, MD;  Location: [facility] CATH INVASIVE LOCATION;  Service: Cardiovascular;  Laterality: N/A;   • CARDIAC  SURGERY      2 stents placed 2012.   • CORONARY ARTERY BYPASS GRAFT N/A 5/28/2021    Procedure: MEDIAN STERNOTOMY CORONARY ARTERY BYPASS X 3 UTILIZING THE LEFT INTERNAL MAMMARY ARTERY GRAFT, EVH OF THE GREATER RIGHT SAPHENOUS VEIN, AND PILO PER ANESTHESIA;  Surgeon: Jacek Miller MD;  Location:  [facility];  Service: Cardiothoracic;  Laterality: N/A;   • ENDOSCOPY N/A 4/8/2023    Procedure: ESOPHAGOGASTRODUODENOSCOPY with CENTERAL LINE PLACEMENT;  Surgeon: Sabine Hudson MD;  Location:  [facility];  Service: General;  Laterality: N/A;   • ENDOSCOPY N/A 4/10/2023    Procedure: ESOPHAGOGASTRODUODENOSCOPY;  Surgeon: Sabine Hudson MD;  Location:  [facility];  Service: General;  Laterality: N/A;     EDUCATION  The patient has been educated in the following areas:   Dysphagia (Swallowing Impairment) Oral Care/Hydration Modified Diet Instruction.     Impression: Per this limited assessment, Mr. Lorenzana presents with wfl oropharyngeal swallow to accept thin liquids via teaspoon, cup, and straw. No overt s/s aspiration. no silent aspiration suspected.     He is felt to most benefit from continued modified po diet of thin liquids only, per MD per GI status. Upgrade, as tolerated from a GI standpoint and cleared per MD, to soft to chew textures, thin liquids. Medications whole in puree, single pill presentations only, please.     SLP Recommendation and Plan    1. Continue modified po diet of thin liquids only, per MD per GI status. Upgrade, as tolerated from a GI standpoint and cleared per MD, to soft to chew textures, thin liquids.   2. Medications whole in puree/thins. Single pill presentations only, please.   3. Upright and centered for all po intake with 1:1 assistance.   4. NANI precautions.  5. Oral care protocol.  6. If emesis persists with po intake, please defer po intake pending further MD evaluation.     D/w Mr. Lorenzana results and recommendations w/ verbal agreement.    D/w nursing staff results and  recommendations w/ verbal agreement.    Thank you for allowing me to participate in the care of your patient-  Salina Walton M.S., CCC/SLP                                                                                               Time Calculation:       Therapy Charges for Today     Code Description Service Date Service Provider Modifiers Qty    [id number] HC ST EVAL ORAL PHARYNG SWALLOW 2 4/11/2023 Salina Walton, MS CCC-SLP GN 1               Salina Walton, MS CCC-SLP  4/11/2023

## 2023-04-11 NOTE — PROGRESS NOTES
Nephrology Progress Note      Subjective     Patient has some lower abd discomfort, no chest pain or shortness of breath.     Objective       Vital signs :     Temp:  [97.2 °F (36.2 °C)-98.8 °F (37.1 °C)] 98.2 °F (36.8 °C)  Heart Rate:  [] 81  Resp:  [13-23] 15  BP: (105-172)/() 154/85    Intake/Output                       04/09/23 0701 - 04/10/23 0700 04/10/23 0701 - 04/11/23 0700     5690-5312 5731-7073 Total 5690-0059 0264-3700 Total                 Intake    P.O.  0  -- 0  0  450 450    I.V.  2012.9  -- 2012.9  1069.6  572 1641.6    Blood  --  300 300  --  -- --    Volume (Transfuse RBC Infuse Each Unit Over: 3.5H) -- 300 300 -- -- --    Total Intake 2012.9 300 2312.9 1069.6 1022 2091.6       Output    Urine  1400  1750 3150  200  1700 1900    Other  --  -430 -430  --  -470 -470    Ultrafiltration (mL) -- -430 -430 -- -470 -470    Total Output 1400 1320 2720 200 1230 1430           Physical Exam:    General Appearance : not in acute distress  Lungs : clear to auscultation, respirations regular  Heart :  regular rhythm & normal rate, normal S1, S2 and no murmur, no rub  Abdomen : soft, non distended, suprapubic tenderness   Extremities : no edema  Neurologic :   orientated to person, place, time and situation, Grossly no focal deficits        Laboratory Data :     Albumin No results found for: ALBUMIN   Magnesium No results found for: MG       PTH               No results found for: PTH    CBC and coagulation:  Results from last 7 days   Lab Units 04/11/23  0532 04/11/23  0009 04/10/23  1723 04/10/23  1119 04/10/23  0541 04/10/23  0004 04/09/23  0141 04/08/23  1647 04/08/23  1109 04/08/23  0029   WBC 10*3/mm3  --   --   --  13.24*  --  15.02*  --   --   --  11.77*   HEMOGLOBIN g/dL 7.7* 7.7* 7.9* 7.9*   < > 6.5*   < > 5.5*   < > 8.0*   HEMATOCRIT % 23.4* 23.6* 25.9* 24.0*   < > 20.6*   < > 17.2*   < > 25.3*   MCV fL  --   --   --  96.0  --  102.0*  --   --   --  98.8*   MCHC g/dL  --   --   --  32.9   --  31.6  --   --   --  31.6   PLATELETS 10*3/mm3  --   --   --  181  --  193  --   --   --  284   INR   --   --   --   --   --   --   --  1.38*  --   --     < > = values in this interval not displayed.     Acid/base balance:      Renal and electrolytes:    Results from last 7 days   Lab Units 04/11/23  0009 04/10/23  0004 04/09/23  0141 04/08/23  0029   SODIUM mmol/L 146* 151* 149* 144   POTASSIUM mmol/L 3.6 3.9 4.3 4.2   MAGNESIUM mg/dL  --   --   --  1.9   CHLORIDE mmol/L 117* 122* 118* 112*   CO2 mmol/L 16.8* 14.8* 14.6* 18.0*   BUN mg/dL 86* 126* 143* 103*   CREATININE mg/dL 3.78* 4.29* 4.24* 4.21*   CALCIUM mg/dL 8.3* 8.2* 8.4* 9.0     Estimated Creatinine Clearance: 17 mL/min (A) (by C-G formula based on SCr of 3.78 mg/dL (H)).  @GFRCG:3@   Liver and pancreatic function:        Invalid input(s): PROT      Cardiac:      Liver and pancreatic function:        Invalid input(s): PROT    Medications :     allopurinol, 50 mg, Oral, Once per day on Mon Thu  atorvastatin, 40 mg, Oral, Nightly  carvedilol, 25 mg, Oral, BID With Meals  cetirizine, 5 mg, Oral, Daily  gentamicin, 1 application, Topical, Daily  haloperidol lactate, 2 mg, Intravenous, Once  insulin lispro, 2-7 Units, Subcutaneous, TID With Meals  lidocaine, 1 patch, Transdermal, Q24H  multivitamin, 1 tablet, Oral, Daily  OLANZapine zydis, 5 mg, Oral, BID  senna-docusate sodium, 2 tablet, Oral, Nightly  sodium chloride, 10 mL, Intravenous, Q12H  sodium chloride, 10 mL, Intravenous, Q12H  sodium chloride, 10 mL, Intravenous, Q12H      dextrose, 100 mL/hr  pantoprazole, 8 mg/hr, Last Rate: 8 mg/hr (04/11/23 0226)  sodium chloride, 9 mL/hr, Last Rate: Stopped (04/10/23 2200)          Assessment & Plan     1.  CKD stage V initiated on PDx during this admission   2.  Upper GI bleed with blood loss anemia  3.  Heart failure with reduced ejection fraction LVEF 45%  4.  History of CABG  5.  Diabetes with nephropathy poor control  6.  Hypertension  7.  Dehydration  with hypotension  8.  Pacemaker  9.  Confusion and acute psychosis  10.  Hypernatremia     Will do bladder scan to r/o urinary retension, and continue PD tonight as well.   Hypernatremia is improving, DC D5W      Alessandra Farias MD  04/11/23  07:43 EDT

## 2023-04-12 LAB
ABO GROUP BLD: NORMAL
BLD GP AB SCN SERPL QL: NEGATIVE
DEPRECATED RDW RBC AUTO: 59.4 FL (ref 37–54)
ERYTHROCYTE [DISTWIDTH] IN BLOOD BY AUTOMATED COUNT: 18.6 % (ref 12.3–15.4)
GLUCOSE BLDC GLUCOMTR-MCNC: 110 MG/DL (ref 70–130)
GLUCOSE BLDC GLUCOMTR-MCNC: 176 MG/DL (ref 70–130)
GLUCOSE BLDC GLUCOMTR-MCNC: 208 MG/DL (ref 70–130)
GLUCOSE BLDC GLUCOMTR-MCNC: 216 MG/DL (ref 70–130)
HCT VFR BLD AUTO: 24.8 % (ref 37.5–51)
HCT VFR BLD AUTO: 27.9 % (ref 37.5–51)
HGB BLD-MCNC: 8 G/DL (ref 13–17.7)
HGB BLD-MCNC: 8.6 G/DL (ref 13–17.7)
HGB BLD-MCNC: 8.9 G/DL (ref 13–17.7)
MCH RBC QN AUTO: 31.6 PG (ref 26.6–33)
MCHC RBC AUTO-ENTMCNC: 31.9 G/DL (ref 31.5–35.7)
MCV RBC AUTO: 98.9 FL (ref 79–97)
PLATELET # BLD AUTO: 230 10*3/MM3 (ref 140–450)
PMV BLD AUTO: 10.3 FL (ref 6–12)
RBC # BLD AUTO: 2.82 10*6/MM3 (ref 4.14–5.8)
RH BLD: POSITIVE
SODIUM SERPL-SCNC: 145 MMOL/L (ref 136–145)
T&S EXPIRATION DATE: NORMAL
WBC NRBC COR # BLD: 12.02 10*3/MM3 (ref 3.4–10.8)

## 2023-04-12 PROCEDURE — 63710000001 INSULIN LISPRO (HUMAN) PER 5 UNITS: Performed by: INTERNAL MEDICINE

## 2023-04-12 PROCEDURE — 86901 BLOOD TYPING SEROLOGIC RH(D): CPT | Performed by: INTERNAL MEDICINE

## 2023-04-12 PROCEDURE — 99232 SBSQ HOSP IP/OBS MODERATE 35: CPT | Performed by: INTERNAL MEDICINE

## 2023-04-12 PROCEDURE — 97530 THERAPEUTIC ACTIVITIES: CPT

## 2023-04-12 PROCEDURE — 85018 HEMOGLOBIN: CPT | Performed by: INTERNAL MEDICINE

## 2023-04-12 PROCEDURE — 85014 HEMATOCRIT: CPT | Performed by: INTERNAL MEDICINE

## 2023-04-12 PROCEDURE — 82962 GLUCOSE BLOOD TEST: CPT

## 2023-04-12 PROCEDURE — 86900 BLOOD TYPING SEROLOGIC ABO: CPT | Performed by: INTERNAL MEDICINE

## 2023-04-12 PROCEDURE — 86850 RBC ANTIBODY SCREEN: CPT | Performed by: INTERNAL MEDICINE

## 2023-04-12 PROCEDURE — 84295 ASSAY OF SERUM SODIUM: CPT | Performed by: INTERNAL MEDICINE

## 2023-04-12 PROCEDURE — 25010000002 HALOPERIDOL LACTATE PER 5 MG

## 2023-04-12 PROCEDURE — 85027 COMPLETE CBC AUTOMATED: CPT | Performed by: INTERNAL MEDICINE

## 2023-04-12 RX ORDER — PANTOPRAZOLE SODIUM 40 MG/10ML
40 INJECTION, POWDER, LYOPHILIZED, FOR SOLUTION INTRAVENOUS
Status: DISCONTINUED | OUTPATIENT
Start: 2023-04-12 | End: 2023-04-14

## 2023-04-12 RX ORDER — HALOPERIDOL 5 MG/ML
2 INJECTION INTRAMUSCULAR ONCE
Status: COMPLETED | OUTPATIENT
Start: 2023-04-12 | End: 2023-04-12

## 2023-04-12 RX ORDER — HALOPERIDOL 5 MG/ML
2 INJECTION INTRAMUSCULAR EVERY 6 HOURS PRN
Status: DISCONTINUED | OUTPATIENT
Start: 2023-04-12 | End: 2023-04-16

## 2023-04-12 RX ORDER — CASTOR OIL AND BALSAM, PERU 788; 87 MG/G; MG/G
1 OINTMENT TOPICAL EVERY 12 HOURS SCHEDULED
Status: DISCONTINUED | OUTPATIENT
Start: 2023-04-12 | End: 2023-04-18 | Stop reason: HOSPADM

## 2023-04-12 RX ORDER — OLANZAPINE 10 MG/1
10 TABLET, ORALLY DISINTEGRATING ORAL 2 TIMES DAILY
Status: DISCONTINUED | OUTPATIENT
Start: 2023-04-12 | End: 2023-04-12

## 2023-04-12 RX ORDER — OLANZAPINE 5 MG/1
5 TABLET, ORALLY DISINTEGRATING ORAL DAILY
Status: DISCONTINUED | OUTPATIENT
Start: 2023-04-13 | End: 2023-04-18 | Stop reason: HOSPADM

## 2023-04-12 RX ADMIN — CASTOR OIL AND BALSAM, PERU 1 APPLICATION: 788; 87 OINTMENT TOPICAL at 20:30

## 2023-04-12 RX ADMIN — GENTAMICIN SULFATE 1 APPLICATION: 1 OINTMENT TOPICAL at 10:50

## 2023-04-12 RX ADMIN — Medication 10 ML: at 08:33

## 2023-04-12 RX ADMIN — CARVEDILOL 25 MG: 25 TABLET, FILM COATED ORAL at 08:33

## 2023-04-12 RX ADMIN — DOCUSATE SODIUM 50 MG AND SENNOSIDES 8.6 MG 2 TABLET: 8.6; 5 TABLET, FILM COATED ORAL at 20:30

## 2023-04-12 RX ADMIN — INSULIN LISPRO 3 UNITS: 100 INJECTION, SOLUTION INTRAVENOUS; SUBCUTANEOUS at 11:18

## 2023-04-12 RX ADMIN — AMLODIPINE BESYLATE 10 MG: 10 TABLET ORAL at 08:33

## 2023-04-12 RX ADMIN — ATORVASTATIN CALCIUM 40 MG: 40 TABLET, FILM COATED ORAL at 20:30

## 2023-04-12 RX ADMIN — HALOPERIDOL LACTATE 2 MG: 5 INJECTION, SOLUTION INTRAMUSCULAR at 03:29

## 2023-04-12 RX ADMIN — PANTOPRAZOLE SODIUM 40 MG: 40 INJECTION, POWDER, FOR SOLUTION INTRAVENOUS at 08:49

## 2023-04-12 RX ADMIN — CETIRIZINE HYDROCHLORIDE 5 MG: 10 TABLET, FILM COATED ORAL at 08:33

## 2023-04-12 RX ADMIN — Medication 1 TABLET: at 08:33

## 2023-04-12 RX ADMIN — PANTOPRAZOLE SODIUM 40 MG: 40 INJECTION, POWDER, FOR SOLUTION INTRAVENOUS at 18:39

## 2023-04-12 RX ADMIN — INSULIN LISPRO 2 UNITS: 100 INJECTION, SOLUTION INTRAVENOUS; SUBCUTANEOUS at 18:39

## 2023-04-12 RX ADMIN — OLANZAPINE 10 MG: 10 TABLET, ORALLY DISINTEGRATING ORAL at 08:49

## 2023-04-12 NOTE — THERAPY TREATMENT NOTE
Acute Care - Physical Therapy Treatment Note   [place]     Patient Name: Augusto Lorenzana Jr.  : 1945  MRN: [id number]  Today's Date: 2023      Visit Dx:     ICD-10-CM ICD-9-CM   1. Gastrointestinal hemorrhage, unspecified gastrointestinal hemorrhage type  K92.2 578.9     Patient Active Problem List   Diagnosis   • NSTEMI 2021   • Hyperlipidemia LDL goal <70   • Essential hypertension   • T2DM on oral agents and insulin. HbA1c 7.4    • Coronary artery disease involving native coronary artery of native heart with unstable angina pectoris   • A/C kidney disease. ATN due to postoperative arrest. 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 Combination of anoxic insult and azotemia   • Debility   • Lower extremity edema   • Complete heart block   • GI bleed   • Third degree atrioventricular block   • Atrial fibrillation, persistent   • Gastrointestinal hemorrhage associated with gastric ulcer     Past Medical History:   Diagnosis Date   • CAD (coronary artery disease)    • CKD (chronic kidney disease) stage 3, GFR 30-59 ml/min    • Diabetes mellitus    • Hyperlipidemia    • Hypertension    • NSTEMI (non-ST elevated myocardial infarction)      Past Surgical History:   Procedure Laterality Date   • CARDIAC CATHETERIZATION N/A 2021    Procedure: Left Heart Cath;  Surgeon: Blade Greene IV, MD;  Location:  [facility] CATH INVASIVE LOCATION;  Service: Cardiovascular;  Laterality: N/A;   • CARDIAC SURGERY      2 stents placed .   • CORONARY ARTERY BYPASS GRAFT N/A 2021    Procedure: MEDIAN STERNOTOMY CORONARY ARTERY BYPASS X 3 UTILIZING THE LEFT INTERNAL MAMMARY ARTERY GRAFT, EVH OF THE GREATER RIGHT SAPHENOUS VEIN, AND PILO PER ANESTHESIA;  Surgeon: Jacek Miller MD;  Location:  [facility];  Service: Cardiothoracic;  Laterality:  N/A;   • ENDOSCOPY N/A 4/8/2023    Procedure: ESOPHAGOGASTRODUODENOSCOPY with CENTERAL LINE PLACEMENT;  Surgeon: Sabine Hudson MD;  Location:  [facility];  Service: General;  Laterality: N/A;   • ENDOSCOPY N/A 4/10/2023    Procedure: ESOPHAGOGASTRODUODENOSCOPY;  Surgeon: Sabine Hudson MD;  Location:  [facility];  Service: General;  Laterality: N/A;     PT Assessment (last 12 hours)     PT Evaluation and Treatment     Row Name 04/12/23 1030          Physical Therapy Time and Intention    Document Type therapy note (daily note)  -     Mode of Treatment physical therapy  -     Patient Effort good  -     Comment Pt seen for treatment this date, working on ankle pumps at bedside, multiple STS with grossly minAx1-2 using RW.  -     Row Name 04/12/23 1030          Bed Mobility    Bed Mobility sit-supine  -     Sit-Supine Joseph (Bed Mobility) moderate assist (50% patient effort);maximum assist (25% patient effort);2 person assist  -[facility] Name 04/12/23 1030          Transfers    Transfers sit-stand transfer;stand-sit transfer  -     Comment, (Transfers) On last trial of STS, mod/maxA given d/t pt fatigue and RN changing abdominal binder, pt did well to support himself however demonstrating posterior leaning  -[facility] Name 04/12/23 1030          Sit-Stand Transfer    Sit-Stand Joseph (Transfers) contact guard;minimum assist (75% patient effort);1 person assist;2 person assist  -     Assistive Device (Sit-Stand Transfers) walker, front-wheeled  -     Row Name 04/12/23 1030          Stand-Sit Transfer    Stand-Sit Joseph (Transfers) contact guard;minimum assist (75% patient effort);1 person assist;2 person assist  -     Assistive Device (Stand-Sit Transfers) walker, front-wheeled  -     Row Name 04/12/23 1030          Gait/Stairs (Locomotion)    Comment, (Gait/Stairs) Pt able to take lateral steps to scoot up towards HOB grossly CGA and VC's benefit  -     Row Name 04/12/23  1030          Motor Skills    Therapeutic Exercise ankle  ankle pumps/DF sitting EOB (grossly 30+)  -     Row Name 04/12/23 1030          Positioning and Restraints    Pre-Treatment Position in bed  -     Post Treatment Position bed  -     In Bed supine;with nsg  -           User Key  (r) = Recorded By, (t) = Taken By, (c) = Cosigned By    Initials Name Provider Type    Yamel Fuller, PT Physical Therapist                Physical Therapy Education     Title: PT OT SLP Therapies (Done)     Topic: Physical Therapy (Done)     Point: Mobility training (Done)     Learning Progress Summary           Patient Acceptance, E,TB, VU by  at 4/10/2023 0926                   Point: Home exercise program (Done)     Learning Progress Summary           Patient Acceptance, E,TB, VU by  at 4/10/2023 0926                   Point: Body mechanics (Done)     Learning Progress Summary           Patient Acceptance, E,TB, VU by  at 4/10/2023 0926                   Point: Precautions (Done)     Learning Progress Summary           Patient Acceptance, E,TB, VU by  at 4/10/2023 0926                               User Key     Initials Effective Dates Name Provider Type Discipline     05/24/22 -  Livan Alejandra, PT Physical Therapist PT              PT Recommendation and Plan             Time Calculation:    PT Charges     Row Name 04/12/23 1052             Time Calculation    Start Time 1030  -      Stop Time 1045  -      Time Calculation (min) 15 min  -      PT Received On 04/12/23  -            User Key  (r) = Recorded By, (t) = Taken By, (c) = Cosigned By    Initials Name Provider Type     Yamel Mckeon, PT Physical Therapist              Therapy Charges for Today     Code Description Service Date Service Provider Modifiers Qty    [id number]  PT THERAPEUTIC ACT EA 15 MIN 4/12/2023 Yamel Mckeon, PT GP 1          PT G-Codes  AM-PAC 6 Clicks Score (PT): 12    Yamel Mckeon PT  4/12/2023

## 2023-04-12 NOTE — PLAN OF CARE
Goal Outcome Evaluation:  Plan of Care Reviewed With: patient        Progress: improving  Outcome Evaluation: Patient seen for OT treatment. Improved alertness on this date and able to follow directions with cognitive strategies to participate in therapy. Fair endurance/activity tolerance. Continue plan of care.

## 2023-04-12 NOTE — PROGRESS NOTES
PPS CMG Coordinator  Inpatient Rehabilitation Admission    Ethnic Group:  Marital Status:    [facility] Admission Date:  04/07/2023  Admission Class:  Admit From:    Pre-Hospital Living:    Payment Sources:  Impairment Group:  Date of Onset of Impairment:    Etiologic Diagnosis Code(s):  Rank Code      Description  1    I44.2     Atrioventricular block, complete    Comorbidities:  Rank Code      Description    1    N18.6     End stage renal disease  2    I13.2     Hypertensive heart and chronic kidney disease                 with heart failure and with stage 5 chronic                 kidney disease, or end stage renal disease  3    D62       Acute posthemorrhagic anemia  4    E11.22    Type 2 diabetes mellitus with diabetic chronic                 kidney disease  5    Z99.2     Dependence on renal dialysis  6    I50.22    Chronic systolic (congestive) heart failure  7    Z95.1     Presence of aortocoronary bypass graft  8    G47.33    Obstructive sleep apnea (adult) (pediatric)  9    Z95.0     Presence of cardiac pacemaker  10   M10.9     Gout, unspecified  11   E78.5     Hyperlipidemia, unspecified  12   R53.1     Weakness  13   I25.2     Old myocardial infarction  14   Z82.49    Family history of ischemic heart disease and                 other diseases of the circulatory system  15   Z83.3     Family history of diabetes mellitus  16   Z79.84    Long term (current) use of oral hypoglycemic                 drugs  17   Z79.4     Long term (current) use of insulin  18   I25.10    Atherosclerotic heart disease of native                 coronary artery without angina pectoris  19   Z79.899   Other long term (current) drug therapy  20   K59.00    Constipation, unspecified  21   Z79.82    Long term (current) use of aspirin    Height on Admission:  Weight on Admission:    Are there any arthritis conditions recorded for Impairment Group, Etiologic  Diagnosis, or Comorbid Conditions that meet all of the regulatory  requirements  for IRF classification (in 42 .29(b)(2)(x), (xi), and xii))?  No    GEMA Bladder Accidents:   - Accidents.    GEMA Bowel Accident:  -Accidents.    Signed by: Nurse Mauricio

## 2023-04-12 NOTE — THERAPY TREATMENT NOTE
Patient Name: Augusto Lorenzana Jr.  : 1945    MRN: [id number]                              Today's Date: 2023       Admit Date: 2023    Visit Dx:     ICD-10-CM ICD-9-CM   1. Gastrointestinal hemorrhage, unspecified gastrointestinal hemorrhage type  K92.2 578.9     Patient Active Problem List   Diagnosis   • NSTEMI 2021   • Hyperlipidemia LDL goal <70   • Essential hypertension   • T2DM on oral agents and insulin. HbA1c 7.4    • Coronary artery disease involving native coronary artery of native heart with unstable angina pectoris   • A/C kidney disease. ATN due to postoperative arrest. 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 Combination of anoxic insult and azotemia   • Debility   • Lower extremity edema   • Complete heart block   • GI bleed   • Third degree atrioventricular block   • Atrial fibrillation, persistent   • Gastrointestinal hemorrhage associated with gastric ulcer     Past Medical History:   Diagnosis Date   • CAD (coronary artery disease)    • CKD (chronic kidney disease) stage 3, GFR 30-59 ml/min    • Diabetes mellitus    • Hyperlipidemia    • Hypertension    • NSTEMI (non-ST elevated myocardial infarction)      Past Surgical History:   Procedure Laterality Date   • CARDIAC CATHETERIZATION N/A 2021    Procedure: Left Heart Cath;  Surgeon: Blade Greene IV, MD;  Location:  [facility];  Service: Cardiovascular;  Laterality: N/A;   • CARDIAC SURGERY      2 stents placed .   • CORONARY ARTERY BYPASS GRAFT N/A 2021    Procedure: MEDIAN STERNOTOMY CORONARY ARTERY BYPASS X 3 UTILIZING THE LEFT INTERNAL MAMMARY ARTERY GRAFT, EVH OF THE GREATER RIGHT SAPHENOUS VEIN, AND PILO PER ANESTHESIA;  Surgeon: Jacek Miller MD;  Location:  [facility];  Service: Cardiothoracic;  Laterality: N/A;    • ENDOSCOPY N/A 4/8/2023    Procedure: ESOPHAGOGASTRODUODENOSCOPY with CENTERAL LINE PLACEMENT;  Surgeon: Sabine Hudson MD;  Location:  [facility];  Service: General;  Laterality: N/A;   • ENDOSCOPY N/A 4/10/2023    Procedure: ESOPHAGOGASTRODUODENOSCOPY;  Surgeon: Sabine Hudson MD;  Location:  [facility];  Service: General;  Laterality: N/A;      General Information     Row Name 04/12/23 1045          OT Time and Intention    Document Type therapy note (daily note)  -     Mode of Treatment individual therapy;occupational therapy  -     Row Name 04/12/23 1045          General Information    Patient Profile Reviewed yes  -     Existing Precautions/Restrictions fall  -     Row Name 04/12/23 1045          Cognition    Orientation Status (Cognition) oriented to;person;place;situation  -           User Key  (r) = Recorded By, (t) = Taken By, (c) = Cosigned By    Initials Name Provider Type    Ruby Raya OT Occupational Therapist                 Mobility/ADL's     Row Name 04/12/23 1045          Bed Mobility    Bed Mobility supine-sit  -     Supine-Sit Ritchie (Bed Mobility) minimum assist (75% patient effort)  -     Assistive Device (Bed Mobility) bed rails;head of bed elevated  -           User Key  (r) = Recorded By, (t) = Taken By, (c) = Cosigned By    Initials Name Provider Type    Ruby Raya OT Occupational Therapist               Obj/Interventions     Row Name 04/12/23 1045          Motor Skills    Motor Skills functional endurance  -     Functional Endurance fair  -     Therapeutic Exercise --  AAROM through functional movement patterns while seated at edge of bed  -     Row Name 04/12/23 1045          Balance    Balance Assessment sitting static balance  -     Static Sitting Balance standby assist  -           User Key  (r) = Recorded By, (t) = Taken By, (c) = Cosigned By    Initials Name Provider Type    Ruby Raya OT Occupational Therapist                Goals/Plan    No documentation.                Clinical Impression     Row Name 04/12/23 1046          Pain Assessment    Pretreatment Pain Rating 0/10 - no pain  -KP     Posttreatment Pain Rating 0/10 - no pain  -KP     Row Name 04/12/23 1046          Plan of Care Review    Plan of Care Reviewed With patient  -KP     Progress improving  -     Outcome Evaluation Patient seen for OT treatment. Improved alertness on this date and able to follow directions with cognitive strategies to participate in therapy. Fair endurance/activity tolerance. Continue plan of care.  -KP     Row Name 04/12/23 1046          Positioning and Restraints    Pre-Treatment Position in bed  -KP     Post Treatment Position bed  -KP     In Bed with PT  -KP           User Key  (r) = Recorded By, (t) = Taken By, (c) = Cosigned By    Initials Name Provider Type    Ruby Raya OT Occupational Therapist               Outcome Measures     Row Name 04/12/23 0705          How much help from another person do you currently need...    Turning from your back to your side while in flat bed without using bedrails? 2  -MW     Moving from lying on back to sitting on the side of a flat bed without bedrails? 2  -MW     Moving to and from a bed to a chair (including a wheelchair)? 2  -MW     Standing up from a chair using your arms (e.g., wheelchair, bedside chair)? 2  -MW     Climbing 3-5 steps with a railing? 2  -MW     To walk in hospital room? 2  -MW     AM-PAC 6 Clicks Score (PT) 12  -MW     Highest level of mobility 4 --> Transferred to chair/commode  -MW           User Key  (r) = Recorded By, (t) = Taken By, (c) = Cosigned By    Initials Name Provider Type    Ernestina Armstrong, RN Registered Nurse                  OT Recommendation and Plan     Plan of Care Review  Plan of Care Reviewed With: patient  Progress: improving  Outcome Evaluation: Patient seen for OT treatment. Improved alertness on this date and able to follow directions with  cognitive strategies to participate in therapy. Fair endurance/activity tolerance. Continue plan of care.     Time Calculation:    Time Calculation- OT     Row Name 04/12/23 1047             Time Calculation- OT    OT Start Time 1030  -      OT Received On 04/12/23  -            User Key  (r) = Recorded By, (t) = Taken By, (c) = Cosigned By    Initials Name Provider Type     Ruby Tello OT Occupational Therapist              Therapy Charges for Today     Code Description Service Date Service Provider Modifiers Qty    [id number]  OT THERAPEUTIC ACT EA 15 MIN 4/12/2023 Ruby Tello OT GO 1               Ruby Tello OT  4/12/2023

## 2023-04-12 NOTE — PROGRESS NOTES
Nephrology Progress Note      Subjective     Pt is not fully oriented, tried to to communicate about PDx cath malfunctioning, he apparently said to go ahead and proceed, denied any chest pain or shortness of breath.     Objective       Vital signs :     Temp:  [97.9 °F (36.6 °C)-98.2 °F (36.8 °C)] 98.2 °F (36.8 °C)  Heart Rate:  [73-86] 79  Resp:  [10-23] 18  BP: (158-181)/(53-95) 160/53    Intake/Output                       04/10/23 0701 - 04/11/23 0700 04/11/23 0701 - 04/12/23 0700     0327-8031 4074-5328 Total 2989-0292 3846-2286 Total                 Intake    P.O.  0  450 450  0  -- 0    I.V.  1069.6  572 1641.6  --  -- --    Total Intake 1069.6 1022 2091.6 0 -- 0       Output    Urine  200  1700 1900  925  600 1525    Other  --  -470 -470  --  -- --    Ultrafiltration (mL) -- -470 -470 -- -- --    Total Output 200 1230 1430            Physical Exam:    General Appearance : not in acute distress  Lungs : clear to auscultation, respirations regular  Heart :  regular rhythm & normal rate, normal S1, S2 and no murmur, no rub  Abdomen : soft, non distended, suprapubic tenderness   Extremities : no edema  Neurologic :   orientated to person, place, time and situation, Grossly no focal deficits        Laboratory Data :     Albumin No results found for: ALBUMIN   Magnesium No results found for: MG       PTH               No results found for: PTH    CBC and coagulation:  Results from last 7 days   Lab Units 04/12/23  0053 04/11/23  0532 04/11/23  0009 04/10/23  1723 04/10/23  1119 04/10/23  0541 04/10/23  0004 04/09/23  0141 04/08/23  1647 04/08/23  1109 04/08/23  0029   WBC 10*3/mm3  --   --   --   --  13.24*  --  15.02*  --   --   --  11.77*   HEMOGLOBIN g/dL 8.0* 7.7* 7.7*   < > 7.9*   < > 6.5*   < > 5.5*   < > 8.0*   HEMATOCRIT % 24.8* 23.4* 23.6*   < > 24.0*   < > 20.6*   < > 17.2*   < > 25.3*   MCV fL  --   --   --   --  96.0  --  102.0*  --   --   --  98.8*   MCHC g/dL  --   --   --   --  32.9  --   31.6  --   --   --  31.6   PLATELETS 10*3/mm3  --   --   --   --  181  --  193  --   --   --  284   INR   --   --   --   --   --   --   --   --  1.38*  --   --     < > = values in this interval not displayed.     Acid/base balance:      Renal and electrolytes:    Results from last 7 days   Lab Units 04/12/23  0053 04/11/23  0009 04/10/23  0004 04/09/23  0141 04/08/23  0029   SODIUM mmol/L 145 146* 151* 149* 144   POTASSIUM mmol/L  --  3.6 3.9 4.3 4.2   MAGNESIUM mg/dL  --   --   --   --  1.9   CHLORIDE mmol/L  --  117* 122* 118* 112*   CO2 mmol/L  --  16.8* 14.8* 14.6* 18.0*   BUN mg/dL  --  86* 126* 143* 103*   CREATININE mg/dL  --  3.78* 4.29* 4.24* 4.21*   CALCIUM mg/dL  --  8.3* 8.2* 8.4* 9.0     Estimated Creatinine Clearance: 17.9 mL/min (A) (by C-G formula based on SCr of 3.78 mg/dL (H)).  @GFRCG:3@   Liver and pancreatic function:        Invalid input(s): PROT      Cardiac:      Liver and pancreatic function:        Invalid input(s): PROT    Medications :     allopurinol, 50 mg, Oral, Once per day on Mon Thu  amLODIPine, 10 mg, Oral, Q24H  atorvastatin, 40 mg, Oral, Nightly  carvedilol, 25 mg, Oral, BID With Meals  cetirizine, 5 mg, Oral, Daily  gentamicin, 1 application, Topical, Daily  insulin lispro, 2-7 Units, Subcutaneous, TID With Meals  Lidocaine, 1 patch, Apply externally, Daily  multivitamin, 1 tablet, Oral, Daily  OLANZapine zydis, 5 mg, Oral, BID  senna-docusate sodium, 2 tablet, Oral, Nightly  sodium chloride, 10 mL, Intravenous, Q12H  sodium chloride, 10 mL, Intravenous, Q12H  sodium chloride, 10 mL, Intravenous, Q12H  sterile water (preservative free), , ,   ziprasidone, , ,       pantoprazole, 8 mg/hr, Last Rate: 8 mg/hr (04/11/23 1531)  sodium chloride, 9 mL/hr, Last Rate: Stopped (04/10/23 2200)          Assessment & Plan     1.  CKD stage V initiated on PDx during this admission   2.  Upper GI bleed with blood loss anemia  3.  Heart failure with reduced ejection fraction LVEF 45%  4.   History of CABG  5.  Diabetes with nephropathy poor control  6.  Hypertension  7.  Dehydration with hypotension  8.  Pacemaker  9.  Confusion and acute psychosis  10.  Hypernatremia     PDx cath coil is not in true pelvis and likely underlying reason of malfunctioning and will be needing repositioning.   Discussed in detail with patient wife on phone about HDx in interim, she was agreeable to proceed, plan to transition back to PDx as outpatient.  for HDx chair placement.   HDx tomorrow, no emergent indication for dialysis today      Alessandra Farias MD  04/12/23  07:28 EDT

## 2023-04-12 NOTE — PROGRESS NOTES
[facility] Medicine Services  PROGRESS NOTE     Patient Identification:  Name:  Augusto Lorenzana Jr.  Age:  78 y.o.  Sex:  male  :  1945  MRN:  [id number]  Visit Number:  [id number]  Primary Care Provider:  Rob Polanco MD    Length of stay:  4    ----------------------------------------------------------------------------------------------------------------------  Subjective     Chief Complaint:  Follow up for upper GI bleed complicated by hospital delirium    Subjective:  Today, the patient seems to be less agitated and more cooperative but still confused.  Was very combative overnight and required IM Geodon.  Patient has been refusing to take any oral meds but attempted to give oral meds with peanut butter today which he gladly took.  Therefore diet was advanced to full liquid to accommodate that.  Bowel movement was liquid and black this morning with some normal brown color feces mixed.  Stat CBC was obtained which showed stable hemoglobin.  Therefore likely was residual melanotic stool from prior bleed.    ----------------------------------------------------------------------------------------------------------------------  Objective     Current Hospital Meds:  allopurinol, 50 mg, Oral, Once per day on   amLODIPine, 10 mg, Oral, Q24H  atorvastatin, 40 mg, Oral, Nightly  carvedilol, 25 mg, Oral, BID With Meals  cetirizine, 5 mg, Oral, Daily  gentamicin, 1 application, Topical, Daily  insulin lispro, 2-7 Units, Subcutaneous, TID With Meals  Lidocaine, 1 patch, Apply externally, Daily  multivitamin, 1 tablet, Oral, Daily  OLANZapine zydis, 10 mg, Oral, BID  pantoprazole, 40 mg, Intravenous, BID AC  senna-docusate sodium, 2 tablet, Oral, Nightly  sodium chloride, 10 mL, Intravenous, Q12H  sodium chloride, 10 mL, Intravenous, Q12H  sodium chloride, 10 mL, Intravenous, Q12H  sterile water (preservative free), , ,   ziprasidone, , ,       sodium  chloride, 9 mL/hr, Last Rate: Stopped (04/10/23 2200)      ----------------------------------------------------------------------------------------------------------------------  Vital Signs:  Temp:  [98.2 °F (36.8 °C)-99 °F (37.2 °C)] 98.4 °F (36.9 °C)  Heart Rate:  [69-91] 69  Resp:  [18] 18  BP: (135-171)/(53-86) 135/63  Mean Arterial Pressure (Non-Invasive) for the past 24 hrs (Last 3 readings):   Noninvasive MAP (mmHg)   04/12/23 1119 97   04/12/23 0833 108   04/12/23 0508 118     SpO2 Percentage    04/12/23 0200 04/12/23 0833 04/12/23 1119   SpO2: Comment: pt combative Comment: pt refusing 95%     SpO2:  [95 %-98 %] 95 %  on   ;   Device (Oxygen Therapy): room air    Body mass index is 26.23 kg/m².  Wt Readings from Last 3 Encounters:   04/12/23 76 kg (167 lb 8 oz)   04/07/23 77.2 kg (170 lb 3.2 oz)   07/08/21 83 kg (183 lb)        Intake/Output Summary (Last 24 hours) at 4/12/2023 1317  Last data filed at 4/12/2023 0800  Gross per 24 hour   Intake 30 ml   Output 1600 ml   Net -1570 ml     Diet: Liquid Diets; Full Liquid; Texture: Regular Texture (IDDSI 7); Fluid Consistency: Thin (IDDSI 0)  ----------------------------------------------------------------------------------------------------------------------  Physical exam:   GEN: NAD, pale  CV: RRR, difficult to hear systolic murmur today as patient continues to talk during exam  PULM: CTA, no wheeze or crackles  GI: Abdomen is soft, some tenderness in epigastric area persists  NEURO: Speech is normal, no facial droop  SKIN: Pale, some scratch marks but otherwise no new rashes  PSYCH: Awake and oriented to self.  Confused, picking as wires and tubes.  --------------------------------------------------------------------------------------------------------            Results from last 7 days   Lab Units 04/12/23  0931 04/12/23  0053 04/11/23  0532 04/10/23  1723 04/10/23  1119 04/10/23  0541 04/10/23  0004 04/09/23  0141 04/08/23  1647   WBC 10*3/mm3 12.02*  --    --   --  13.24*  --  15.02*  --   --    HEMOGLOBIN g/dL 8.9* 8.0* 7.7*   < > 7.9*   < > 6.5*   < > 5.5*   HEMATOCRIT % 27.9* 24.8* 23.4*   < > 24.0*   < > 20.6*   < > 17.2*   MCV fL 98.9*  --   --   --  96.0  --  102.0*  --   --    MCHC g/dL 31.9  --   --   --  32.9  --  31.6  --   --    PLATELETS 10*3/mm3 230  --   --   --  181  --  193  --   --    INR   --   --   --   --   --   --   --   --  1.38*    < > = values in this interval not displayed.     Results from last 7 days   Lab Units 04/12/23  0053 04/11/23  0009 04/10/23  0004 04/09/23  0141 04/08/23  0029   SODIUM mmol/L 145 146* 151* 149* 144   POTASSIUM mmol/L  --  3.6 3.9 4.3 4.2   MAGNESIUM mg/dL  --   --   --   --  1.9   CHLORIDE mmol/L  --  117* 122* 118* 112*   CO2 mmol/L  --  16.8* 14.8* 14.6* 18.0*   BUN mg/dL  --  86* 126* 143* 103*   CREATININE mg/dL  --  3.78* 4.29* 4.24* 4.21*   CALCIUM mg/dL  --  8.3* 8.2* 8.4* 9.0   GLUCOSE mg/dL  --  145* 85 130* 117*   Estimated Creatinine Clearance: 17.9 mL/min (A) (by C-G formula based on SCr of 3.78 mg/dL (H)).  No results found for: AMMONIA    Glucose   Date/Time Value Ref Range Status   04/12/2023 1048 208 (H) 70 - 130 mg/dL Final     Comment:     Meter: [id number] : [id number] ERLIN Knome   04/12/2023 0724 110 70 - 130 mg/dL Final     Comment:     Meter: [id number] : [id number] Proposify   04/11/2023 1911 119 70 - 130 mg/dL Final     Comment:     RN Notified Meter: [id number] : [id number] BRYAN AVITIA   04/11/2023 1639 170 (H) 70 - 130 mg/dL Final     Comment:     Meter: [id number] : [id number] ULYSSES ROBERTS   04/11/2023 1128 161 (H) 70 - 130 mg/dL Final     Comment:     Meter: [id number] : [id number] FRED BYRNE   04/11/2023 0549 109 70 - 130 mg/dL Final     Comment:     Meter: [id number] : [id number] Britney Clark   04/10/2023 1704 184 (H) 70 - 130 mg/dL Final     Comment:     Meter: [id number] : [id number] RHYS MARTINEZ   04/10/2023 1134 115 70 - 130 mg/dL Final     Comment:      Meter: [id number] : [id number] ANH JAEGER     Lab Results   Component Value Date    HGBA1C 6.5 (H) 04/04/2022     Lab Results   Component Value Date    TSH 4.030 06/03/2021    FREET4 1.1 03/31/2023       ----------------------------------------------------------------------------------------------------------------------  Imaging Results (Last 24 Hours)     Procedure Component Value Units Date/Time    XR Abdomen KUB [[id number]] Collected: 04/11/23 2016     Updated: 04/11/23 2018    Narrative:      CR Abdomen 1 Vw    INDICATION:   Check placement of dialysis catheter    COMPARISON:   CT abdomen and pelvis 4/9/2023    FINDINGS:  AP radiograph(s) of the abdomen. Parts of the abdomen are excluded from field-of-view due to patient large body habitus.    Nonspecific nonobstructive bowel gas pattern. Dialysis catheter tip projects over the right pelvis. Dextroscoliosis of the lumbar spine.      Impression:      Dialysis catheter tip projects over the right pelvis.    Signer Name: Cy Spears MD   Signed: 4/11/2023 8:16 PM   Workstation Name: [id number]    [facility]          ----------------------------------------------------------------------------------------------------------------------  Assessment/Plan     • UGIB-large peptic ulcer noted on initial EGD and treated with epinephrine injection.  Had recurrent melena 3 nights ago with drop of hemoglobin to 6.5 again requiring third unit of packed RBC.  Repeat EGD 4/10 showed no evidence of active bleeding.    Had another dark bowel movement today however hemoglobin seems to be stable therefore likely residual melena from prior episodes.  Changed PPI drip to IV PPI twice daily.  If able to show taking p.o. medication consistently can transition to oral regimen.    • Leukocytosis- likely related to UGIB.    Improving  • Delirium-remains confused and agitated.  Seems he is more agitated when having a bowel movement as he wants to walk to  the bathroom but has been really weak and when he is hungry as he is asking for a bologna sandwich but has been on liquid diet.  If hemoglobin remains stable likely can start on a soft ground solid diet.  Changed to full liquid today as above.  For agitation and delirium started him on Zyprexa 5 mg twice daily couple of days ago however intermittently was not taking the dose and when took it did not seem to be very effective needing intermittent Haldol and Geodon.  Therefore dose was increased to 10 mg today.  Given he is taking medication this morning I am hoping that his mood will stabilize better and delirium will resolve.  If Zyprexa is ineffective can transition to Seroquel if needed.  • Acute blood loss anemia- due to UGIB.  Hemoglobin seems to be stable around 8.    • Acute hypernatremia-normalized with D5 water.    Likely can continue IV fluid given poor p.o. intake and delirium.     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 Nephrology following.  Seems that PD catheter malfunctioned last night and could not receive effective dialysis.  Further management per nephrology.  • HTN- BP controlled on Losartan, Norvasc and coreg  • HLD- statin  • DM2- A1C 6.5%. SSI only given NPO. BG in acceptable range.   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 Dose decreased to 100 mg/day per renal function.   • Constipation- cont doc/senna nightly    --------------------------------------------------  DVT Prophylaxis: SCD due to GI bleed     Disposition: Can return to rehab once medically stable.  Was able to obtain peripheral IVs therefore will discontinue central venous catheter if H&H remains stable x 1 more day given dark BM today.  --------------------------------------------------      Katayoun Behbahani, MD  Hospitalist Service -- [facility]     04/12/23  13:17 EDT

## 2023-04-12 NOTE — NURSING NOTE
Patient with DTPI to the distal penis.  Wound presents as a blood-filled blister.  No drainage.  Will treat with venelex and leave open to air BID.    Patient with mild excoriation to his left thigh.  Likely from scratching.  Will leave open to air.    PI prevention orders initiated.       04/12/23 1702   Wound 04/12/23 1700 distal penis Pressure Injury   Placement Date/Time: 04/12/23 1700   Present on Hospital Admission: No  Orientation: distal  Location: penis  Primary Wound Type: Pressure Injury   Pressure Injury Stage DTPI   Dressing Appearance open to air   Closure None   Base purple;non-blanchable;other (see comments)  (blood-filled blister)   Periwound intact   Periwound Temperature warm   Edges rolled/closed   Wound Length (cm) 1 cm   Wound Width (cm) 1 cm   Wound Surface Area (cm^2) 1 cm^2   Drainage Amount none

## 2023-04-12 NOTE — PROGRESS NOTES
[facility] Coordinator  Inpatient Rehabilitation Discharge    Mode of Locomotion: Wheelchair.    Discharge Against Medical Advice:  No.  Discharge Information  Patient Discharged Alive:  Yes  Discharge Destination/Living Setting: Short-term [facility]  Diagnosis for Interruption/Death:    Impairment Group: 09 Cardiac    Comorbidities: Rank Code      Description      1    N18.6     End stage renal disease  2    I13.2     Hypertensive heart and chronic kidney disease                 with heart failure and with stage 5 chronic                 kidney disease, or end stage renal disease  3    D62       Acute posthemorrhagic anemia  4    E11.22    Type 2 diabetes mellitus with diabetic chronic                 kidney disease  5    Z99.2     Dependence on renal dialysis  6    I50.22    Chronic systolic (congestive) heart failure  7    Z95.1     Presence of aortocoronary bypass graft  8    G47.33    Obstructive sleep apnea (adult) (pediatric)  9    Z95.0     Presence of cardiac pacemaker  10   M10.9     Gout, unspecified  11   E78.5     Hyperlipidemia, unspecified  12   R53.1     Weakness  13   I25.2     Old myocardial infarction  14   Z82.49    Family history of ischemic heart disease and                 other diseases of the circulatory system  15   Z83.3     Family history of diabetes mellitus  16   Z79.84    Long term (current) use of oral hypoglycemic                 drugs  17   Z79.4     Long term (current) use of insulin  18   I25.10    Atherosclerotic heart disease of native                 coronary artery without angina pectoris  19   Z79.899   Other long term (current) drug therapy  20   K59.00    Constipation, unspecified  21   Z79.82    Long term (current) use of aspirin    Complications:      GEMA Bladder Accidents:   - Accidents.    GEMA Bowel Accident:   - Accidents.    Signed by: Nurse Mauircio

## 2023-04-12 NOTE — PLAN OF CARE
Goal Outcome Evaluation:      Patient has not rested well tonight. He has been confused, combative, and attempted to get out of bed multiple times. Trudy SOTO has been notified throughout the night, see orders. Sitter has remained at bedside. He has no complaints of chest pain or shortness of breath. Vital signs have been stable. No indicators of acute distress noted.

## 2023-04-12 NOTE — PROGRESS NOTES
PPS CMG Coordinator  Inpatient Rehabilitation Admission    Ethnic Group: White.  Marital Status:  Marital Status: .    IRF Admission Date:  04/07/2023  Admission Class: Initial Rehab.  Admit From:  [facility]    Pre-Hospital Living: Home. Pre-Hospital Living  With: (2) Family/Relatives.    Payment Sources: Primary: Medicare Fee for Service  Secondary: Not Listed.  Impairment Group: 09 Cardiac  Date of Onset of Impairment: 03/30/2023    Etiologic Diagnosis Code(s):  Rank Code      Description  1    I44.2     Atrioventricular block, complete    Comorbidities:      Height on Admission: 67 inches.  Weight on Admission: 180 pounds.    Are there any arthritis conditions recorded for Impairment Group, Etiologic  Diagnosis, or Comorbid Conditions that meet all of the regulatory requirements  for IRF classification (in 42 .29(b)(2)(x), (xi), and xii))?    GEMA Bladder Accidents:  0 - Accidents.  Bladder Score = 6. Patient has not had an accident, but uses a  device/medication. urinal  GEMA Bowel Accident: 0 -Accidents.  Bowel Score = 4.  Two (2) bowel accidents.    Signed by: Salina Perkins, Supervisor

## 2023-04-12 NOTE — PROGRESS NOTES
PPS CMG Coordinator  Inpatient Rehabilitation Admission    Ethnic Group:  Marital Status:    IRF Admission Date:  04/07/2023  Admission Class:  Admit From:    Pre-Hospital Living:    Payment Sources:  Impairment Group:  Date of Onset of Impairment:    Etiologic Diagnosis Code(s):      Comorbidities:  Rank Code      Description    1    N18.6     End stage renal disease  2    I13.2     Hypertensive heart and chronic kidney disease                 with heart failure and with stage 5 chronic                 kidney disease, or end stage renal disease  3    D62       Acute posthemorrhagic anemia  4    E11.22    Type 2 diabetes mellitus with diabetic chronic                 kidney disease  5    Z99.2     Dependence on renal dialysis  6    I50.22    Chronic systolic (congestive) heart failure  7    Z95.1     Presence of aortocoronary bypass graft  8    G47.33    Obstructive sleep apnea (adult) (pediatric)  9    Z95.0     Presence of cardiac pacemaker  10   M10.9     Gout, unspecified  11   E78.5     Hyperlipidemia, unspecified  12   R53.1     Weakness  13   I25.2     Old myocardial infarction  14   Z82.49    Family history of ischemic heart disease and                 other diseases of the circulatory system  15   Z83.3     Family history of diabetes mellitus  16   Z79.84    Long term (current) use of oral hypoglycemic                 drugs  17   Z79.4     Long term (current) use of insulin  18   I25.10    Atherosclerotic heart disease of native                 coronary artery without angina pectoris  19   Z79.899   Other long term (current) drug therapy  20   K59.00    Constipation, unspecified  21   Z79.82    Long term (current) use of aspirin    Height on Admission:  Weight on Admission:    Are there any arthritis conditions recorded for Impairment Group, Etiologic  Diagnosis, or Comorbid Conditions that meet all of the regulatory requirements  for IRF classification (in 42 .29(b)(2)(x), (xi), and xii))?   No    GEMA Bladder Accidents:   - Accidents.    GEMA Bowel Accident:  -Accidents.    Signed by: Layla Hernandez Nurse

## 2023-04-12 NOTE — PLAN OF CARE
Goal Outcome Evaluation:  Plan of Care Reviewed With: patient        Progress: improving   Patient has been confused but not combative this shift and easily reoriented. Sitter and family have been at bedside. Patient has been uncooperative at times and attempted to get out of bed. MD aware. Patient stools have been watery, tarry, and black. MD aware. No s/s of acute distress noted at this time. Will continue with plan of care.

## 2023-04-12 NOTE — CASE MANAGEMENT/SOCIAL WORK
Discharge Planning [facility]     Patient Name: Augusto Lorenzana Jr.  MRN: [id number]  Today's Date: 4/12/2023    Admit Date: 4/8/2023    Plan: Pt admitted on 4/9/23.  SS spoke with pt spouse Hedy on this date. Pt lives at [address] [place] [address]. Pt lives with spouse Hedy. Pt does not utilize HH services at this time. Pt PCP Collin Santos. Pt utilizes (r) walker, oxgyen @ 2 liters, concentrator via [facility] for DME needs. Pt  does not have POA/living will. Pt spouse states she would like for pt to return back to Inpatient rehab before returning home. SS to follow.   Discharge Plan     Row Name 04/12/23 1234       Plan    Plan Pt admitted on 4/9/23.  SS spoke with pt spouse Hedy on this date. Pt lives at [address] [place] [address]. Pt lives with spouse Hedy. Pt does not utilize HH services at this time. Pt PCP Collin Santos. Pt utilizes (r) walker, oxgyen @ 2 liters, concentrator via [facility] for DME needs. Pt  does not have POA/living will. Pt spouse states she would like for pt to return back to Inpatient rehab before returning home. SS to follow.                MANISHA Roe

## 2023-04-12 NOTE — PROGRESS NOTES
Occupational Therapy: Individual: 45 minutes.    Physical Therapy:    Speech Language Pathology:    Signed by: Salina Perkins, Supervisor

## 2023-04-13 ENCOUNTER — APPOINTMENT (OUTPATIENT)
Dept: GENERAL RADIOLOGY | Facility: HOSPITAL | Age: 78
End: 2023-04-13
Payer: MEDICARE

## 2023-04-13 ENCOUNTER — ANESTHESIA EVENT (OUTPATIENT)
Dept: PERIOP | Facility: HOSPITAL | Age: 78
End: 2023-04-13
Payer: MEDICARE

## 2023-04-13 ENCOUNTER — ANESTHESIA (OUTPATIENT)
Dept: PERIOP | Facility: HOSPITAL | Age: 78
End: 2023-04-13
Payer: MEDICARE

## 2023-04-13 LAB
ANION GAP SERPL CALCULATED.3IONS-SCNC: 13.1 MMOL/L (ref 5–15)
BUN SERPL-MCNC: 65 MG/DL (ref 8–23)
BUN/CREAT SERPL: 17 (ref 7–25)
CALCIUM SPEC-SCNC: 8.5 MG/DL (ref 8.6–10.5)
CHLORIDE SERPL-SCNC: 117 MMOL/L (ref 98–107)
CO2 SERPL-SCNC: 16.9 MMOL/L (ref 22–29)
CREAT SERPL-MCNC: 3.82 MG/DL (ref 0.76–1.27)
DEPRECATED RDW RBC AUTO: 62.5 FL (ref 37–54)
DEPRECATED RDW RBC AUTO: 62.5 FL (ref 37–54)
EGFRCR SERPLBLD CKD-EPI 2021: 15.6 ML/MIN/1.73
ERYTHROCYTE [DISTWIDTH] IN BLOOD BY AUTOMATED COUNT: 18.6 % (ref 12.3–15.4)
ERYTHROCYTE [DISTWIDTH] IN BLOOD BY AUTOMATED COUNT: 18.7 % (ref 12.3–15.4)
GLUCOSE BLDC GLUCOMTR-MCNC: 139 MG/DL (ref 70–130)
GLUCOSE BLDC GLUCOMTR-MCNC: 151 MG/DL (ref 70–130)
GLUCOSE BLDC GLUCOMTR-MCNC: 183 MG/DL (ref 70–130)
GLUCOSE BLDC GLUCOMTR-MCNC: 99 MG/DL (ref 70–130)
GLUCOSE SERPL-MCNC: 165 MG/DL (ref 65–99)
HCT VFR BLD AUTO: 24.5 % (ref 37.5–51)
HCT VFR BLD AUTO: 26.5 % (ref 37.5–51)
HGB BLD-MCNC: 7.9 G/DL (ref 13–17.7)
HGB BLD-MCNC: 8.3 G/DL (ref 13–17.7)
MCH RBC QN AUTO: 31.3 PG (ref 26.6–33)
MCH RBC QN AUTO: 31.9 PG (ref 26.6–33)
MCHC RBC AUTO-ENTMCNC: 31.3 G/DL (ref 31.5–35.7)
MCHC RBC AUTO-ENTMCNC: 32.2 G/DL (ref 31.5–35.7)
MCV RBC AUTO: 100 FL (ref 79–97)
MCV RBC AUTO: 98.8 FL (ref 79–97)
PLATELET # BLD AUTO: 209 10*3/MM3 (ref 140–450)
PLATELET # BLD AUTO: 233 10*3/MM3 (ref 140–450)
PMV BLD AUTO: 10.1 FL (ref 6–12)
PMV BLD AUTO: 10.4 FL (ref 6–12)
POTASSIUM SERPL-SCNC: 3.6 MMOL/L (ref 3.5–5.2)
RBC # BLD AUTO: 2.48 10*6/MM3 (ref 4.14–5.8)
RBC # BLD AUTO: 2.65 10*6/MM3 (ref 4.14–5.8)
SODIUM SERPL-SCNC: 147 MMOL/L (ref 136–145)
WBC NRBC COR # BLD: 13.14 10*3/MM3 (ref 3.4–10.8)
WBC NRBC COR # BLD: 15.63 10*3/MM3 (ref 3.4–10.8)

## 2023-04-13 PROCEDURE — 02PYX3Z REMOVAL OF INFUSION DEVICE FROM GREAT VESSEL, EXTERNAL APPROACH: ICD-10-PCS | Performed by: SURGERY

## 2023-04-13 PROCEDURE — 25010000002 PROPOFOL 10 MG/ML EMULSION: Performed by: NURSE ANESTHETIST, CERTIFIED REGISTERED

## 2023-04-13 PROCEDURE — 25010000002 HEPARIN LOCK FLUSH PER 10 UNITS: Performed by: SURGERY

## 2023-04-13 PROCEDURE — 71045 X-RAY EXAM CHEST 1 VIEW: CPT

## 2023-04-13 PROCEDURE — 99231 SBSQ HOSP IP/OBS SF/LOW 25: CPT | Performed by: SURGERY

## 2023-04-13 PROCEDURE — 76000 FLUOROSCOPY <1 HR PHYS/QHP: CPT | Performed by: RADIOLOGY

## 2023-04-13 PROCEDURE — C1750 CATH, HEMODIALYSIS,LONG-TERM: HCPCS | Performed by: SURGERY

## 2023-04-13 PROCEDURE — 85027 COMPLETE CBC AUTOMATED: CPT | Performed by: INTERNAL MEDICINE

## 2023-04-13 PROCEDURE — 99232 SBSQ HOSP IP/OBS MODERATE 35: CPT | Performed by: STUDENT IN AN ORGANIZED HEALTH CARE EDUCATION/TRAINING PROGRAM

## 2023-04-13 PROCEDURE — 80048 BASIC METABOLIC PNL TOTAL CA: CPT | Performed by: INTERNAL MEDICINE

## 2023-04-13 PROCEDURE — 36558 INSERT TUNNELED CV CATH: CPT | Performed by: SURGERY

## 2023-04-13 PROCEDURE — 25010000002 HALOPERIDOL LACTATE PER 5 MG: Performed by: INTERNAL MEDICINE

## 2023-04-13 PROCEDURE — 63710000001 INSULIN LISPRO (HUMAN) PER 5 UNITS: Performed by: SURGERY

## 2023-04-13 PROCEDURE — 71045 X-RAY EXAM CHEST 1 VIEW: CPT | Performed by: RADIOLOGY

## 2023-04-13 PROCEDURE — 85027 COMPLETE CBC AUTOMATED: CPT | Performed by: STUDENT IN AN ORGANIZED HEALTH CARE EDUCATION/TRAINING PROGRAM

## 2023-04-13 PROCEDURE — 0JH63XZ INSERTION OF TUNNELED VASCULAR ACCESS DEVICE INTO CHEST SUBCUTANEOUS TISSUE AND FASCIA, PERCUTANEOUS APPROACH: ICD-10-PCS | Performed by: SURGERY

## 2023-04-13 PROCEDURE — 25010000002 CEFAZOLIN PER 500 MG: Performed by: NURSE ANESTHETIST, CERTIFIED REGISTERED

## 2023-04-13 PROCEDURE — C1751 CATH, INF, PER/CENT/MIDLINE: HCPCS | Performed by: SURGERY

## 2023-04-13 PROCEDURE — 36556 INSERT NON-TUNNEL CV CATH: CPT | Performed by: SURGERY

## 2023-04-13 PROCEDURE — 5A1D70Z PERFORMANCE OF URINARY FILTRATION, INTERMITTENT, LESS THAN 6 HOURS PER DAY: ICD-10-PCS | Performed by: INTERNAL MEDICINE

## 2023-04-13 PROCEDURE — 02H633Z INSERTION OF INFUSION DEVICE INTO RIGHT ATRIUM, PERCUTANEOUS APPROACH: ICD-10-PCS | Performed by: SURGERY

## 2023-04-13 PROCEDURE — 77001 FLUOROGUIDE FOR VEIN DEVICE: CPT | Performed by: SURGERY

## 2023-04-13 PROCEDURE — 82962 GLUCOSE BLOOD TEST: CPT

## 2023-04-13 PROCEDURE — 76000 FLUOROSCOPY <1 HR PHYS/QHP: CPT

## 2023-04-13 RX ORDER — SODIUM CHLORIDE, SODIUM LACTATE, POTASSIUM CHLORIDE, CALCIUM CHLORIDE 600; 310; 30; 20 MG/100ML; MG/100ML; MG/100ML; MG/100ML
100 INJECTION, SOLUTION INTRAVENOUS ONCE AS NEEDED
Status: DISCONTINUED | OUTPATIENT
Start: 2023-04-13 | End: 2023-04-13 | Stop reason: HOSPADM

## 2023-04-13 RX ORDER — CEFAZOLIN SODIUM 1 G/3ML
INJECTION, POWDER, FOR SOLUTION INTRAMUSCULAR; INTRAVENOUS AS NEEDED
Status: DISCONTINUED | OUTPATIENT
Start: 2023-04-13 | End: 2023-04-13 | Stop reason: SURG

## 2023-04-13 RX ORDER — PHENYLEPHRINE HCL IN 0.9% NACL 1 MG/10 ML
SYRINGE (ML) INTRAVENOUS AS NEEDED
Status: DISCONTINUED | OUTPATIENT
Start: 2023-04-13 | End: 2023-04-13 | Stop reason: SURG

## 2023-04-13 RX ORDER — IPRATROPIUM BROMIDE AND ALBUTEROL SULFATE 2.5; .5 MG/3ML; MG/3ML
3 SOLUTION RESPIRATORY (INHALATION) ONCE AS NEEDED
Status: DISCONTINUED | OUTPATIENT
Start: 2023-04-13 | End: 2023-04-13 | Stop reason: HOSPADM

## 2023-04-13 RX ORDER — ONDANSETRON 2 MG/ML
4 INJECTION INTRAMUSCULAR; INTRAVENOUS AS NEEDED
Status: DISCONTINUED | OUTPATIENT
Start: 2023-04-13 | End: 2023-04-13 | Stop reason: HOSPADM

## 2023-04-13 RX ORDER — LIDOCAINE HYDROCHLORIDE 10 MG/ML
INJECTION, SOLUTION EPIDURAL; INFILTRATION; INTRACAUDAL; PERINEURAL AS NEEDED
Status: DISCONTINUED | OUTPATIENT
Start: 2023-04-13 | End: 2023-04-13 | Stop reason: HOSPADM

## 2023-04-13 RX ORDER — SODIUM CHLORIDE 9 MG/ML
INJECTION, SOLUTION INTRAVENOUS AS NEEDED
Status: DISCONTINUED | OUTPATIENT
Start: 2023-04-13 | End: 2023-04-13 | Stop reason: HOSPADM

## 2023-04-13 RX ORDER — SODIUM CHLORIDE 0.9 % (FLUSH) 0.9 %
10 SYRINGE (ML) INJECTION AS NEEDED
Status: DISCONTINUED | OUTPATIENT
Start: 2023-04-13 | End: 2023-04-13 | Stop reason: HOSPADM

## 2023-04-13 RX ORDER — HEPARIN SODIUM (PORCINE) LOCK FLUSH IV SOLN 100 UNIT/ML 100 UNIT/ML
SOLUTION INTRAVENOUS AS NEEDED
Status: DISCONTINUED | OUTPATIENT
Start: 2023-04-13 | End: 2023-04-13 | Stop reason: HOSPADM

## 2023-04-13 RX ORDER — FENTANYL CITRATE 50 UG/ML
50 INJECTION, SOLUTION INTRAMUSCULAR; INTRAVENOUS
Status: DISCONTINUED | OUTPATIENT
Start: 2023-04-13 | End: 2023-04-13 | Stop reason: HOSPADM

## 2023-04-13 RX ORDER — PROPOFOL 10 MG/ML
VIAL (ML) INTRAVENOUS AS NEEDED
Status: DISCONTINUED | OUTPATIENT
Start: 2023-04-13 | End: 2023-04-13 | Stop reason: SURG

## 2023-04-13 RX ORDER — SODIUM CHLORIDE 0.9 % (FLUSH) 0.9 %
10 SYRINGE (ML) INJECTION EVERY 12 HOURS SCHEDULED
Status: DISCONTINUED | OUTPATIENT
Start: 2023-04-13 | End: 2023-04-13 | Stop reason: HOSPADM

## 2023-04-13 RX ORDER — MIDAZOLAM HYDROCHLORIDE 1 MG/ML
0.5 INJECTION INTRAMUSCULAR; INTRAVENOUS
Status: DISCONTINUED | OUTPATIENT
Start: 2023-04-13 | End: 2023-04-13 | Stop reason: HOSPADM

## 2023-04-13 RX ORDER — SODIUM CHLORIDE, SODIUM LACTATE, POTASSIUM CHLORIDE, CALCIUM CHLORIDE 600; 310; 30; 20 MG/100ML; MG/100ML; MG/100ML; MG/100ML
125 INJECTION, SOLUTION INTRAVENOUS ONCE
Status: DISCONTINUED | OUTPATIENT
Start: 2023-04-13 | End: 2023-04-13 | Stop reason: HOSPADM

## 2023-04-13 RX ORDER — SODIUM CHLORIDE 9 MG/ML
40 INJECTION, SOLUTION INTRAVENOUS AS NEEDED
Status: DISCONTINUED | OUTPATIENT
Start: 2023-04-13 | End: 2023-04-13 | Stop reason: HOSPADM

## 2023-04-13 RX ORDER — OXYCODONE HYDROCHLORIDE AND ACETAMINOPHEN 5; 325 MG/1; MG/1
1 TABLET ORAL ONCE AS NEEDED
Status: DISCONTINUED | OUTPATIENT
Start: 2023-04-13 | End: 2023-04-13 | Stop reason: HOSPADM

## 2023-04-13 RX ADMIN — Medication 10 ML: at 10:50

## 2023-04-13 RX ADMIN — ACETAMINOPHEN 500 MG: 500 TABLET ORAL at 19:19

## 2023-04-13 RX ADMIN — Medication 1 TABLET: at 10:44

## 2023-04-13 RX ADMIN — CASTOR OIL AND BALSAM, PERU 1 APPLICATION: 788; 87 OINTMENT TOPICAL at 10:45

## 2023-04-13 RX ADMIN — Medication 10 ML: at 10:40

## 2023-04-13 RX ADMIN — AMLODIPINE BESYLATE 10 MG: 10 TABLET ORAL at 10:43

## 2023-04-13 RX ADMIN — OLANZAPINE 5 MG: 5 TABLET, ORALLY DISINTEGRATING ORAL at 10:43

## 2023-04-13 RX ADMIN — CARVEDILOL 25 MG: 25 TABLET, FILM COATED ORAL at 10:43

## 2023-04-13 RX ADMIN — ATORVASTATIN CALCIUM 40 MG: 40 TABLET, FILM COATED ORAL at 20:38

## 2023-04-13 RX ADMIN — HALOPERIDOL LACTATE 2 MG: 5 INJECTION, SOLUTION INTRAMUSCULAR at 02:14

## 2023-04-13 RX ADMIN — Medication 10 ML: at 20:39

## 2023-04-13 RX ADMIN — PROPOFOL 50 MG: 10 INJECTION, EMULSION INTRAVENOUS at 08:17

## 2023-04-13 RX ADMIN — INSULIN LISPRO 2 UNITS: 100 INJECTION, SOLUTION INTRAVENOUS; SUBCUTANEOUS at 13:29

## 2023-04-13 RX ADMIN — CASTOR OIL AND BALSAM, PERU 1 APPLICATION: 788; 87 OINTMENT TOPICAL at 20:38

## 2023-04-13 RX ADMIN — PANTOPRAZOLE SODIUM 40 MG: 40 INJECTION, POWDER, FOR SOLUTION INTRAVENOUS at 17:29

## 2023-04-13 RX ADMIN — GENTAMICIN SULFATE 1 APPLICATION: 1 OINTMENT TOPICAL at 10:45

## 2023-04-13 RX ADMIN — ALLOPURINOL 50 MG: 100 TABLET ORAL at 10:44

## 2023-04-13 RX ADMIN — CETIRIZINE HYDROCHLORIDE 5 MG: 10 TABLET, FILM COATED ORAL at 10:43

## 2023-04-13 RX ADMIN — CEFAZOLIN 2 G: 1 INJECTION, POWDER, FOR SOLUTION INTRAVENOUS at 08:31

## 2023-04-13 RX ADMIN — DOCUSATE SODIUM 50 MG AND SENNOSIDES 8.6 MG 2 TABLET: 8.6; 5 TABLET, FILM COATED ORAL at 20:38

## 2023-04-13 RX ADMIN — Medication 100 MCG: at 08:53

## 2023-04-13 RX ADMIN — ACETAMINOPHEN 500 MG: 500 TABLET ORAL at 10:57

## 2023-04-13 NOTE — PROGRESS NOTES
Nephrology Progress Note      Subjective     No chest pain or shortness of breath.     Objective       Vital signs :     Temp:  [97.5 °F (36.4 °C)-98.4 °F (36.9 °C)] 97.6 °F (36.4 °C)  Heart Rate:  [69-90] 73  Resp:  [15-20] 15  BP: ()/(58-96) 141/83    Intake/Output                             04/11/23 0701 - 04/12/23 0700 04/12/23 0701 - 04/13/23 0700 04/13/23 0701 - 04/14/23 0700     [id number] 4240-7567 Total 1852-0052 9401-7012 Total 9914-7498 8093-7375 Total                    Intake    P.O.  0  -- 0  390  -- 390  0  -- 0    Total Intake 0 -- 0 390 -- 390 0 -- 0       Output    Urine  925  600 1525  900  -- 900  --  -- --    Total Output  900 -- 900 -- -- --           Physical Exam:    General Appearance : not in acute distress  Lungs : clear to auscultation, respirations regular  Heart :  regular rhythm & normal rate, normal S1, S2 and no murmur, no rub  Abdomen : soft, non distended, suprapubic tenderness   Extremities : no edema  Neurologic :   orientated to person, place, time and situation, Grossly no focal deficits        Laboratory Data :     Albumin No results found for: ALBUMIN   Magnesium No results found for: MG       PTH               No results found for: PTH    CBC and coagulation:  Results from last 7 days   Lab Units 04/13/23  0210 04/12/23  1730 04/12/23  0931 04/12/23  0053 04/10/23  1723 04/10/23  1119 04/09/23  0141 04/08/23  1647   WBC 10*3/mm3 15.63*  --  12.02*  --   --  13.24*   < >  --    HEMOGLOBIN g/dL 8.3* 8.6* 8.9* 8.0*   < > 7.9*   < > 5.5*   HEMATOCRIT % 26.5*  --  27.9* 24.8*   < > 24.0*   < > 17.2*   MCV fL 100.0*  --  98.9*  --   --  96.0   < >  --    MCHC g/dL 31.3*  --  31.9  --   --  32.9   < >  --    PLATELETS 10*3/mm3 233  --  230  --   --  181   < >  --    INR   --   --   --   --   --   --   --  1.38*    < > = values in this interval not displayed.     Acid/base balance:      Renal and electrolytes:    Results from last 7 days   Lab Units 04/13/23  5893  04/12/23  0053 04/11/23  0009 04/10/23  0004 04/09/23  0141 04/08/23  0029   SODIUM mmol/L 147* 145 146* 151* 149* 144   POTASSIUM mmol/L 3.6  --  3.6 3.9 4.3 4.2   MAGNESIUM mg/dL  --   --   --   --   --  1.9   CHLORIDE mmol/L 117*  --  117* 122* 118* 112*   CO2 mmol/L 16.9*  --  16.8* 14.8* 14.6* 18.0*   BUN mg/dL 65*  --  86* 126* 143* 103*   CREATININE mg/dL 3.82*  --  3.78* 4.29* 4.24* 4.21*   CALCIUM mg/dL 8.5*  --  8.3* 8.2* 8.4* 9.0     Estimated Creatinine Clearance: 17.6 mL/min (A) (by C-G formula based on SCr of 3.82 mg/dL (H)).  @GFRCG:3@   Liver and pancreatic function:        Invalid input(s): PROT      Cardiac:      Liver and pancreatic function:        Invalid input(s): PROT    Medications :     [MAR Hold] allopurinol, 50 mg, Oral, Once per day on Mon Thu  amLODIPine, 10 mg, Oral, Q24H  [MAR Hold] atorvastatin, 40 mg, Oral, Nightly  carvedilol, 25 mg, Oral, BID With Meals  [MAR Hold] castor oil-balsam peru, 1 application, Topical, Q12H  [MAR Hold] cetirizine, 5 mg, Oral, Daily  [MAR Hold] gentamicin, 1 application, Topical, Daily  [MAR Hold] insulin lispro, 2-7 Units, Subcutaneous, TID With Meals  [MAR Hold] Lidocaine, 1 patch, Apply externally, Daily  [MAR Hold] multivitamin, 1 tablet, Oral, Daily  [MAR Hold] OLANZapine zydis, 5 mg, Oral, Daily  [MAR Hold] pantoprazole, 40 mg, Intravenous, BID AC  [MAR Hold] senna-docusate sodium, 2 tablet, Oral, Nightly  [MAR Hold] sodium chloride, 10 mL, Intravenous, Q12H  [MAR Hold] sodium chloride, 10 mL, Intravenous, Q12H  [MAR Hold] sodium chloride, 10 mL, Intravenous, Q12H  sterile water (preservative free), , ,       sodium chloride, 9 mL/hr, Last Rate: 30 mL/hr (04/13/23 9226)          Assessment & Plan     1.  CKD stage V initiated on PDx during this admission   2.  Upper GI bleed with blood loss anemia  3.  Heart failure with reduced ejection fraction LVEF 45%  4.  History of CABG  5.  Diabetes with nephropathy poor control  6.  Hypertension  7.   Dehydration with hypotension  8.  Pacemaker  9.  Confusion and acute psychosis  10.  Hypernatremia     Tunneled dialysis cath to be placed today, and hemodialysis for 3 hours with 2L UF if tolerates.   Outpatient dialysis chair set for discharge planning.       Alessandra Farias MD  04/13/23  09:15 EDT

## 2023-04-13 NOTE — CONSULTS
[facility]   Consult Note    Patient Name: Augusto Lorenzana Jr.  : 1945  MRN: [id number]  Primary Care Physician:  Rob Polanco MD  Referring Physician: No Known Provider  Date of admission: 2023    Consults  Subjective   Subjective     Reason for Consult/ Chief Complaint: Renal failure    History of Present Illness  Augusto Lorenzana Jr. is a 78 y.o. male with renal failure and need for long-term hemodialysis access.  He has a right internal jugular vein central line in place.  Tunneled dialysis catheter has been requested.    Review of Systems   Constitutional: Negative for activity change, appetite change, chills and fever.   HENT: Negative for sore throat and trouble swallowing.    Eyes: Negative for visual disturbance.   Respiratory: Negative for cough and shortness of breath.    Cardiovascular: Negative for chest pain and palpitations.   Gastrointestinal: Negative for abdominal distention, abdominal pain, blood in stool, constipation, diarrhea, nausea and vomiting.   Endocrine: Negative for cold intolerance and heat intolerance.   Genitourinary: Negative for dysuria.   Musculoskeletal: Negative for joint swelling.   Skin: Negative for color change, rash and wound.   Allergic/Immunologic: Negative for immunocompromised state.   Neurological: Negative for dizziness, seizures, weakness and headaches.   Hematological: Negative for adenopathy. Does not bruise/bleed easily.   Psychiatric/Behavioral: Negative for agitation and confusion.        Personal History     Past Medical History:   Diagnosis Date   • CAD (coronary artery disease)    • CKD (chronic kidney disease) stage 3, GFR 30-59 ml/min    • Diabetes mellitus    • Hyperlipidemia    • Hypertension    • NSTEMI (non-ST elevated myocardial infarction)        Past Surgical History:   Procedure Laterality Date   • CARDIAC CATHETERIZATION N/A 2021    Procedure: Left Heart Cath;  Surgeon: Blade Greene IV, MD;  Location: [facility]  CATH INVASIVE LOCATION;  Service: Cardiovascular;  Laterality: N/A;   • CARDIAC SURGERY      2 stents placed 2012.   • CORONARY ARTERY BYPASS GRAFT N/A 05/28/2021    Procedure: MEDIAN STERNOTOMY CORONARY ARTERY BYPASS X 3 UTILIZING THE LEFT INTERNAL MAMMARY ARTERY GRAFT, EVH OF THE GREATER RIGHT SAPHENOUS VEIN, AND PILO PER ANESTHESIA;  Surgeon: Jacek Miller MD;  Location:  [facility];  Service: Cardiothoracic;  Laterality: N/A;   • ENDOSCOPY N/A 04/08/2023    Procedure: ESOPHAGOGASTRODUODENOSCOPY with CENTERAL LINE PLACEMENT;  Surgeon: Sabine Hudson MD;  Location:  [facility];  Service: General;  Laterality: N/A;   • ENDOSCOPY N/A 04/10/2023    Procedure: ESOPHAGOGASTRODUODENOSCOPY;  Surgeon: Sabine Hudson MD;  Location:  [facility];  Service: General;  Laterality: N/A;   • PACEMAKER IMPLANTATION         Family History: family history includes Diabetes type I in his father; Heart disease in his mother. Otherwise pertinent FHx was reviewed and not pertinent to current issue.    Social History:  reports that he has never smoked. His smokeless tobacco use includes chew. He reports that he does not drink alcohol and does not use drugs.    Home Medications:   Insulin Glargine, albuterol sulfate HFA, allopurinol, amLODIPine, amiodarone, aspirin, atorvastatin, carvedilol, cloNIDine, gabapentin, glipizide, hydrALAZINE, multivitamin with minerals, and torsemide    Allergies:  No Known Allergies    Objective    Objective     Vitals:  Temp:  [97.5 °F (36.4 °C)-99 °F (37.2 °C)] 98.4 °F (36.9 °C)  Heart Rate:  [69-91] 87  Resp:  [16-20] 20  BP: ()/(58-96) 162/96    Physical Exam  Constitutional:       Appearance: He is well-developed.   HENT:      Head: Normocephalic and atraumatic.   Eyes:      Conjunctiva/sclera: Conjunctivae normal.      Pupils: Pupils are equal, round, and reactive to light.   Neck:      Thyroid: No thyromegaly.      Vascular: No JVD.      Trachea: No tracheal deviation.   Cardiovascular:       Rate and Rhythm: Normal rate and regular rhythm.      Heart sounds: No murmur heard.    No friction rub. No gallop.   Pulmonary:      Effort: Pulmonary effort is normal.      Breath sounds: Normal breath sounds.   Abdominal:      General: There is no distension.      Palpations: Abdomen is soft. There is no hepatomegaly or splenomegaly.      Tenderness: There is no abdominal tenderness.      Hernia: No hernia is present.   Musculoskeletal:         General: No deformity. Normal range of motion.      Cervical back: Neck supple.   Skin:     General: Skin is warm and dry.   Neurological:      Mental Status: He is alert and oriented to person, place, and time.         Result Review    Result Review:  I have personally reviewed the results from the time of this admission to 4/13/2023 07:54 EDT and agree with these findings:  [x]  Laboratory list / accordion  []  Microbiology  [x]  Radiology  []  EKG/Telemetry   []  Cardiology/Vascular   []  Pathology  []  Old records  []  Other:        Assessment & Plan   Assessment / Plan     Brief Patient Summary:  Augusto Lorenzana Jr. is a 78 y.o. male who has renal failure with need for long-term hemodialysis access.    Active Hospital Problems:  Active Hospital Problems    Diagnosis    • **Gastrointestinal hemorrhage associated with gastric ulcer    • Third degree atrioventricular block    • Atrial fibrillation, persistent    • GI bleed    • A/C kidney disease. ATN due to postoperative arrest. Dialysis begun 6/3/2021      Plan:   OR today for tunneled hemodialysis catheter placement.    Torey Easley MD

## 2023-04-13 NOTE — PLAN OF CARE
Patient resting int he bed throughout the night. Sitter at bedside. No s/s of distress noted. No complaints. X1 dose of haldol given after labs were drawn pt did have an episode of combativeness but soon after IM injection he stopped. Will continue to monitor and follow care plan.

## 2023-04-13 NOTE — DISCHARGE PLACEMENT REQUEST
"Augusto Lorenzana Jr. (78 y.o. Male)     Date of Birth   1945    Social Security Number       Address   [address]  [facility] [address]    Home Phone   [phone]    MRN   [id number]       Jain   Sikhism    Marital Status                               Admission Date   4/8/23    Admission Type   Urgent    Admitting Provider   Meek Tang DO    Attending Provider   Noah Lester MD    Department, Room/Bed   [address], 3306/1S       Discharge Date       Discharge Disposition       Discharge Destination                               Attending Provider: Noah Lester MD    Allergies: No Known Allergies    Isolation: None   Infection: None   Code Status: CPR    Ht: 170.2 cm (67\")   Wt: 75.8 kg (167 lb)    Admission Cmt: None   Principal Problem: Gastrointestinal hemorrhage associated with gastric ulcer [K25.4]                 Active Insurance as of 4/8/2023     Primary Coverage     Payor Plan Insurance Group Employer/Plan Group    MEDICARE RAILROAD MEDICARE      Payor Plan Address Payor Plan Phone Number Payor Plan Fax Number Effective Dates    [address] [phone]  2/1/2012 - None Entered    [facility] [id number]       Subscriber Name Subscriber Birth Date Member ID       AUGUSTO LORENZANA JR. 1945 [id number]           Secondary Coverage     Payor Plan Insurance Group Employer/Plan Group    HUMANA HUMANA [facility]              [id number]     Payor Plan Address Payor Plan Phone Number Payor Plan Fax Number Effective Dates    [address] [address]   2/1/2012 - None Entered    [facility] [id number]       Subscriber Name Subscriber Birth Date Member ID       AUGUSTO LORENZANA JR. 1945 [id number]                 Emergency Contacts      (Rel.) Home Phone Work Phone Mobile Phone    Hedy Lorenzana (Spouse) [phone] -- [phone]    KlarissaCuateMelvi (Daughter) -- -- [phone]    MILTON LORENZANA (Son) -- -- [phone]            Emergency Contact Information  "    Name Relation Home Work Mobile    Hedy Lorenzana Spouse [phone]    Melvi Cyr Daughter   [phone]    MILTON LORENZANA Son   [phone]          Insurance Information                MEDICARE/RAILROAD MEDICARE Phone: [phone]    Subscriber: Augusto Lorenzana Jr. Subscriber#: [id number]    Group#: -- Precert#: --        HUMANA/HUMANA [facility] SUP              Phone: --    Subscriber: Augusto Lorenzana Jr. Subscriber#: [id number]    Group#: [id number] Precert#: --          Problem List           Codes Noted - Resolved       Hospital    Third degree atrioventricular block ICD-10-CM: I44.2  ICD-9-CM: 426.0 4/10/2023 - Present    Atrial fibrillation, persistent ICD-10-CM: I48.19  ICD-9-CM: 427.31 4/10/2023 - Present    * (Principal) Gastrointestinal hemorrhage associated with gastric ulcer ICD-10-CM: K25.4  ICD-9-CM: 531.40 4/10/2023 - Present    GI bleed ICD-10-CM: K92.2  ICD-9-CM: 578.9 4/8/2023 - Present    A/C kidney disease. ATN due to postoperative arrest. Dialysis begun 6/3/2021 ICD-10-CM: N18.9  ICD-9-CM: 585.9 5/27/2021 - Present       Non-Hospital    Complete heart block ICD-10-CM: I44.2  ICD-9-CM: 426.0 4/7/2023 - Present    Lower extremity edema ICD-10-CM: R60.0  ICD-9-CM: 782.3 7/27/2021 - Present    Debility ICD-10-CM: R53.81  ICD-9-CM: 799.3 6/18/2021 - Present    Postop encephalopathy post code. Combination of anoxic insult and azotemia ICD-10-CM: G93.40  ICD-9-CM: 348.30 6/6/2021 - Present    Perioperative cardiac arrest with ventricular fibrillation (CMS/HCC) ICD-10-CM: I46.9, I49.01  ICD-9-CM: 427.5, 427.41 6/4/2021 - Present    S/P CABG x 3 on 5/28/21 ICD-10-CM: Z95.1  ICD-9-CM: V45.81 5/31/2021 - Present    Gout (Chronic) ICD-10-CM: M10.9  ICD-9-CM: 274.9 5/28/2021 - Present    Former smokeless tobacco use (Chronic) ICD-10-CM: Z87.891  ICD-9-CM: V15.82 5/28/2021 - Present    T2DM on oral agents and insulin. HbA1c 7.4  (Chronic) ICD-10-CM: E11.9, Z79.4  ICD-9-CM: 250.00, V58.67  2021 - Present    NSTEMI 2021 ICD-10-CM: I21.4  ICD-9-CM: 410.70 2021 - Present    Hyperlipidemia LDL goal <70 ICD-10-CM: E78.5  ICD-9-CM: 272.4 2021 - Present    Essential hypertension (Chronic) ICD-10-CM: I10  ICD-9-CM: 401.9 2021 - Present    Coronary artery disease involving native coronary artery of native heart with unstable angina pectoris ICD-10-CM: I25.110  ICD-9-CM: 414.01, 411.1 2021 - Present        History & Physical      Meek Tang DO at 23 1322              [facility] HOSPITALIST HISTORY AND PHYSICAL    Patient Identification:  Name:  Augusto Lorenzana Jr.  Age:  78 y.o.  Sex:  male  :  1945  MRN:  [id number]   Admit Date: 2023   Visit Number:  [id number]  Primary Care Physician:  Rob Polanco MD     Chief complaint: Hematemesis    History of presenting illness: Mr. Lorenzana is a 78 y.o. male who will be directly admitted to [facility] from [facility] with a chief complaint of new onset hematemesis and melena.  Patient has a past medical history remarkable for CAD status post CABG, CKD stage V, type 2 diabetes mellitus, hyperlipidemia, paroxysmal A-fib, SEBASTIAN and valvular heart disease.  Patient's history of present illness is slightly complicated.  Patient did present to the [facility] on  for placement of a peritoneal dialysis catheter.  During that hospital stay, patient had an episode of a 6-second pause during recovery from having peritoneal dialysis catheter placed.  As such, patient was evaluated by cardiology services who did recommend permanent pacemaker placement.  Patient did have permanent pacemaker placed during that hospital stay on 3/31/2023.  Patient also had some complications during that hospital stay including acute hypoxic respiratory failure secondary to volume overload and patient did respond well to IV Bumex at that time.  Patient has not had his  peritoneal dialysis catheter used since it has been placed.  After a fairly lengthy hospitalization, patient was then admitted to [facility] inpatient physical rehab.  Patient had just arrived to [facility] inpatient rehab in his usual state of health when he began to have sudden onset hematemesis and melena.  CBC on admission demonstrated hemoglobin of 8.  Repeat H&H performed 11 hours later after episode of hematemesis and melena had dropped to 6.7.  With this in mind, request was made to admit patient to our inpatient hospitalist services for further treatment and evaluation of suspected upper GI bleed.  -------------------------------------------------------------------------------------------------------------------  Past Medical History:   Diagnosis Date   • CAD (coronary artery disease)    • CKD (chronic kidney disease) stage 3, GFR 30-59 ml/min    • Diabetes mellitus    • Hyperlipidemia    • Hypertension    • NSTEMI (non-ST elevated myocardial infarction)      Past Surgical History:   Procedure Laterality Date   • CARDIAC CATHETERIZATION N/A 5/24/2021    Procedure: Left Heart Cath;  Surgeon: Blade Greene IV, MD;  Location:  [facility];  Service: Cardiovascular;  Laterality: N/A;   • CARDIAC SURGERY      2 stents placed 2012.   • CORONARY ARTERY BYPASS GRAFT N/A 5/28/2021    Procedure: MEDIAN STERNOTOMY CORONARY ARTERY BYPASS X 3 UTILIZING THE LEFT INTERNAL MAMMARY ARTERY GRAFT, EVH OF THE GREATER RIGHT SAPHENOUS VEIN, AND PILO PER ANESTHESIA;  Surgeon: Jacek Miller MD;  Location: [facility] OR;  Service: Cardiothoracic;  Laterality: N/A;     Family History   Problem Relation Age of Onset   • Heart disease Mother    • Diabetes type I Father      Social History     Socioeconomic History   • Marital status:    Tobacco Use   • Smoking status: Never   • Smokeless tobacco: Current     Types: Chew   Vaping Use   • Vaping Use: Never used   Substance and Sexual  Activity   • Alcohol use: Never   • Drug use: Never   • Sexual activity: Defer     ---------------------------------------------------------------------------------------------------------------------   Allergies:  Patient has no known allergies.  ---------------------------------------------------------------------------------------------------------------------   Prior to Admission Medications     Prescriptions Last Dose Informant Patient Reported? Taking?    albuterol sulfate  (90 Base) MCG/ACT inhaler Unknown Pharmacy, Spouse/Significant Other Yes No    Inhale 2 puffs 2 (Two) Times a Day As Needed for Wheezing.    allopurinol (ZYLOPRIM) 300 MG tablet Unknown Pharmacy, Spouse/Significant Other Yes No    Take 1 tablet by mouth Daily.    amiodarone (PACERONE) 200 MG tablet Unknown Pharmacy, Spouse/Significant Other Yes No    Take 1 tablet by mouth Daily.    amLODIPine (NORVASC) 10 MG tablet Unknown Pharmacy, Spouse/Significant Other No No    Take 1 tablet by mouth Daily.    aspirin 81 MG EC tablet Unknown Spouse/Significant Other Yes No    Take 1 tablet by mouth Daily.    atorvastatin (LIPITOR) 40 MG tablet Unknown Pharmacy, Spouse/Significant Other Yes No    Take 1 tablet by mouth Daily.    carvedilol (COREG) 6.25 MG tablet Unknown Pharmacy, Spouse/Significant Other Yes No    Take 1 tablet by mouth 2 (Two) Times a Day With Meals.    cloNIDine (CATAPRES) 0.2 MG tablet Unknown Pharmacy, Spouse/Significant Other Yes No    Take 1 tablet by mouth 3 (Three) Times a Day.    gabapentin (NEURONTIN) 300 MG capsule Unknown Pharmacy, Spouse/Significant Other Yes No    Take 1 capsule by mouth Daily.    glipizide (GLUCOTROL) 5 MG tablet Unknown Pharmacy, Spouse/Significant Other Yes No    Take 1 tablet by mouth 2 (Two) Times a Day Before Meals.    hydrALAZINE (APRESOLINE) 100 MG tablet Unknown Pharmacy, Spouse/Significant Other Yes No    Take 1 tablet by mouth 3 (Three) Times a Day.    Insulin Glargine (Lantus SoloStar)  100 UNIT/ML injection pen Unknown Pharmacy, Spouse/Significant Other Yes No    Inject 30 Units under the skin into the appropriate area as directed Daily.    multivitamin with minerals tablet tablet Unknown Spouse/Significant Other Yes No    Take 1 tablet by mouth Daily.    torsemide (DEMADEX) 20 MG tablet Unknown Pharmacy, Spouse/Significant Other Yes No    Take 2 tablets by mouth Daily.        Hospital Scheduled Meds:  [START ON 4/10/2023] allopurinol, 50 mg, Oral, Once per day on Mon Thu  [START ON 4/9/2023] amLODIPine, 10 mg, Oral, Q24H  atorvastatin, 40 mg, Oral, Nightly  carvedilol, 25 mg, Oral, BID With Meals  [START ON 4/9/2023] cetirizine, 10 mg, Oral, Daily  docusate sodium, 100 mg, Oral, BID  hydrALAZINE, 50 mg, Oral, Q8H  insulin lispro, 2-7 Units, Subcutaneous, TID With Meals  [START ON 4/9/2023] lidocaine, 1 patch, Transdermal, Q24H  [START ON 4/9/2023] losartan, 100 mg, Oral, Q24H  melatonin, 5 mg, Oral, Nightly  [START ON 4/9/2023] multivitamin, 1 tablet, Oral, Daily  [START ON 4/9/2023] polyethylene glycol, 17 g, Oral, Daily  senna-docusate sodium, 2 tablet, Oral, Nightly      pantoprazole, 8 mg/hr      ---------------------------------------------------------------------------------------------------------------------   Review of Systems   On review of systems the patient denies the following unless noted above:     Constitutional:  Fevers, chills, weight change, fatigue     Eyes: Vision changes, headache, double vision, loss of vision     ENT: Runny nose, nose bleeds, ringing in ears, pain with swallowing, sore throat     Cardiovascular: Chest pains, palpitations, PND, orthopnea     Respiratory: Cough, wheezing, SOA, hemoptysis     GI:  Abdominal pain, diarrhea, constipation, change in stool caliber,    Rectal bleeding, vomiting or nausea     : Difficulty voiding, dysuria, hematuria     Musculoskeletal: Changes of any chronic joint pain, swelling     Skin: Rash, itching, easy  bruisability     Neurological: Unilateral weakness, new onset numbness, speech difficulties     Psychiatric: Sadness, tearfulness, feelings of hopelessness, racing thoughts     Endocrine:  Heat or cold intolerance, mood swings, polydipsia, polyphagia,    recent hypoglycemia  --------------------------------------------------------------------------------------------------------------------  Vital Signs:  Temp:  [98.2 °F (36.8 °C)-98.6 °F (37 °C)] 98.2 °F (36.8 °C)  Heart Rate:  [] 100  Resp:  [16-20] 20  BP: ()/(56-86) 121/73  There were no vitals filed for this visit.  There is no height or weight on file to calculate BMI.  ---------------------------------------------------------------------------------------------------------------------   Physical exam:  Constitutional: Well-nourished aucasian male in no apparent distress.     HENT:  Head:  Normocephalic and atraumatic.  Mouth:  Moist mucous membranes.    Eyes:  Conjunctivae and EOM are normal.  Pupils are equal, round, and reactive to light.  No scleral icterus.    Neck:  Neck supple. No thyromegaly.  No JVD present.    Cardiovascular:  Regular rate and rhythm with no murmurs, rubs, clicks or gallops appreciated.  Pulmonary/Chest:  Clear to auscultation bilaterally with no crackles, wheezes or rhonchi appreciated.  Abdominal:  Soft. Nontender. Nondistended, large 4 x 4 dressing over left lower abdominal quadrant where peritoneal dialysis catheter is covered.  Bowel sounds are normal in all four quadrants. No organomegally appreciated.   Musculoskeletal:  No edema, no tenderness, and no deformity.  No red or swollen joints anywhere.    Neurological:  Alert, Cranial nerves II-XII intact with no focal defecits.  No facial droop.  No slurred speech.   Skin:  Warm and dry to palpation with no rashes or lesions appreciated.  Peripheral vascular:  2+ radial and pedal pulses in bilateral upper and lower extremities.  Psychiatric:  Alert and oriented x3,  demonstrates appropriate judgement and insight.  ----------------------------------------------------------------------------------  ---------------------------------------------------------------------------------------------------------------------   Results from last 7 days   Lab Units 04/08/23  1109 04/08/23  0029 04/06/23  0329 04/05/23  0507   WBC 10*3/mm3  --  11.77* 11.07* 10.72*   HEMOGLOBIN g/dL 6.7* 8.0* 8.1* 6.8*   HEMATOCRIT % 21.9* 25.3* 24.7* 20.6*   MCV fL  --  98.8* 94 96   MCHC g/dL  --  31.6 32.8 33.0   PLATELETS 10*3/mm3  --  284 226 199         Results from last 7 days   Lab Units 04/08/23  0029 04/03/23  0444   SODIUM mmol/L 144  --    POTASSIUM mmol/L 4.2  --    MAGNESIUM mg/dL 1.9 2.3   CHLORIDE mmol/L 112*  --    CO2 mmol/L 18.0*  --    BUN mg/dL 103*  --    CREATININE mg/dL 4.21*  --    CALCIUM mg/dL 9.0  --    GLUCOSE mg/dL 117*  --    Estimated Creatinine Clearance: 16.3 mL/min (A) (by C-G formula based on SCr of 4.21 mg/dL (H)).  No results found for: AMMONIA          Lab Results   Component Value Date    HGBA1C 6.5 (H) 04/04/2022     Lab Results   Component Value Date    TSH 4.030 06/03/2021    FREET4 1.1 03/31/2023     No results found for: PREGTESTUR, PREGSERUM, HCG, HCGQUANT  Pain Management Panel     Pain Management Panel Latest Ref Rng & Units 12/7/2022 5/30/2021    CREATININE UR mg/dL 80.1 136.1    AMPHETAMINES SCREEN, URINE Negative - -    BARBITURATES SCREEN Negative - -    BENZODIAZEPINE SCREEN, URINE Negative - -    BUPRENORPHINEUR Negative - -    COCAINE SCREEN, URINE Negative - -    METHADONE SCREEN, URINE Negative - -    METHAMPHETAMINEUR Negative - -        No results found for: BLOODCX  No results found for: URINECX  No results found for: WOUNDCX  No results found for: STOOLCX      ---------------------------------------------------------------------------------------------------------------------  Imaging Results (Last 7 Days)     ** No results found for the last 168  hours. **          I have personally reviewed the radiology images and read the final radiology report.  ---------------------------------------------------------------------------------------------------------------------  Assessment and Plan:    1.  Suspected upper GI bleed -patient will be admitted to progressive care unit for further treatment and evaluation.  Will place patient on Protonix drip and plan for EGD today.    2.  Acute blood loss anemia -we will transfuse 1 unit PRBC and repeat H&H 1 hour thereafter.  We will target hemoglobin of 7.    3.  Chronic HFrEF -no evidence of exacerbation at this time, in fact patient looks slightly volume depleted.  We will continue to monitor closely.    4.  CKD stage V -nephrology has been consulted, will continue to hold on peritoneal dialysis at this time as long as patient's electrolytes appear stable.    5.  Paroxysmal A-fib -patient is currently in normal sinus rhythm.  We will continue to monitor closely, patient has been taken off of amiodarone and anticoagulation has been discontinued in the setting of acute GI bleed.      Meek Tang DO  04/08/23  13:23 EDT    Electronically signed by Meek Tang DO at 04/08/23 1349       Vital Signs (last day)     Date/Time Temp Temp src Pulse Resp BP Patient Position SpO2    04/13/23 1135 98 (36.7) Axillary 74 20 153/69 Lying 96    04/13/23 1105 98 (36.7) Axillary 70 20 137/61 Lying 97    04/13/23 1035 97.8 (36.6) Axillary 76 18 149/65 Lying 95    04/13/23 1020 97.8 (36.6) Axillary 74 18 157/67 Lying 97    04/13/23 1005 97.7 (36.5) Axillary 75 18 154/65 Lying 96    04/13/23 0950 97.7 (36.5) Axillary 77 18 162/94 Lying 95    04/13/23 0934 98 (36.7) Tympanic 81 20 138/80 Lying 97    04/13/23 0929 -- -- 80 20 141/89 Lying 95    04/13/23 0924 -- -- 81 18 138/77 Lying 96    04/13/23 0919 -- -- 83 20 150/85 Lying 95    04/13/23 0914 -- -- 81 18 131/95 Lying 96    04/13/23 0909 -- -- 83 16 150/90 Lying 98     04/13/23 0904 97.6 (36.4) Tympanic 73 15 141/83 Lying 100    04/13/23 0741 98.4 (36.9) Oral 87 20 162/96 Lying 99    04/13/23 0612 97.5 (36.4) Oral 83 16 160/68 Lying 95    04/13/23 0200 97.7 (36.5) Axillary 82 16 125/66 Lying 93    04/12/23 2200 97.9 (36.6) Axillary 80 16 110/62 Sitting 93    04/12/23 1839 97.8 (36.6) Axillary 75 16 92/58 Sitting --    04/12/23 1830 -- -- 90 18 -- -- 92    04/12/23 1333 98 (36.7) Axillary 70 18 118/58 Lying 96    04/12/23 1119 98.4 (36.9) Axillary 69 -- 135/63 Lying 95    04/12/23 0833 99 (37.2) Axillary 91 18 171/86 Lying --    04/12/23 0508 -- -- -- -- 160/53 Lying --          Lines, Drains & Airways     Active LDAs     Name Placement date Placement time Site Days    CVC Triple Lumen 04/13/23 Tunneled Left Internal jugular 04/13/23  0834  Internal jugular  less than 1    Peripheral IV 04/12/23 1012 Left Antecubital 04/12/23  1012  Antecubital  1    Peritoneal Dialysis Catheter Left lower abdomen --  --  Left lower abdomen  --    External Urinary Catheter --  --  --  --    Hemodialysis Cath Double 04/13/23  0850  Internal Jugular  less than 1                  Current Facility-Administered Medications   Medication Dose Route Frequency Provider Last Rate Last Admin   • acetaminophen (TYLENOL) tablet 500 mg  500 mg Oral Q6H PRN Torey Easley MD   500 mg at 04/13/23 1057   • allopurinol (ZYLOPRIM) tablet 50 mg  50 mg Oral Once per day on Mon Thu Torey Easley MD   50 mg at 04/13/23 1044   • amLODIPine (NORVASC) tablet 10 mg  10 mg Oral Q24H Torey Easley MD   10 mg at 04/13/23 1043   • atorvastatin (LIPITOR) tablet 40 mg  40 mg Oral Nightly Torey Easley MD   40 mg at 04/12/23 2030   • carvedilol (COREG) tablet 25 mg  25 mg Oral BID With Meals Torey Easley MD   25 mg at 04/13/23 1043   • castor oil-balsam peru (VENELEX) ointment 1 application  1 application Topical Q12H Torey Easley MD   1 application at 04/13/23 1045   • cetirizine  (zyrTEC) tablet 5 mg  5 mg Oral Daily Torey Easley MD   5 mg at 04/13/23 1043   • dextrose (D50W) (25 g/50 mL) IV injection 25 g  25 g Intravenous Q15 Min PRN Torey Easley MD       • dextrose (GLUTOSE) oral gel 15 g  15 g Oral Q15 Min PRN Torey Easley MD       • gentamicin (GARAMYCIN) 0.1 % ointment 1 application  1 application Topical Daily Torey Easley MD   1 application at 04/13/23 1045   • glucagon (human recombinant) (GLUCAGEN DIAGNOSTIC) injection 1 mg  1 mg Intramuscular Q15 Min PRN Torey Easley MD       • haloperidol lactate (HALDOL) injection 2 mg  2 mg Intramuscular Q6H PRN Torey Easley MD   2 mg at 04/13/23 0214   • Insulin Lispro (humaLOG) injection 2-7 Units  2-7 Units Subcutaneous TID With Meals Torey Easley MD   2 Units at 04/12/23 1839   • Lidocaine 4 % patch 1 patch  1 patch Apply externally Daily Torey Easley MD   1 patch at 04/13/23 1045   • methocarbamol (ROBAXIN) tablet 500 mg  500 mg Oral Q12H PRN Torey Easley MD   500 mg at 04/11/23 0226   • multivitamin (THERAGRAN) tablet 1 tablet  1 tablet Oral Daily Torey Easley MD   1 tablet at 04/13/23 1044   • OLANZapine zydis (zyPREXA) disintegrating tablet 5 mg  5 mg Oral Daily Torey Easley MD   5 mg at 04/13/23 1043   • ondansetron (ZOFRAN) tablet 4 mg  4 mg Oral Q6H PRN Torey Easley MD   4 mg at 04/11/23 1309   • pantoprazole (PROTONIX) injection 40 mg  40 mg Intravenous BID AC Torey Easley MD   40 mg at 04/12/23 1839   • sennosides-docusate (PERICOLACE) 8.6-50 MG per tablet 2 tablet  2 tablet Oral Nightly Torey Easley MD   2 tablet at 04/12/23 2030   • sodium chloride 0.9 % flush 10 mL  10 mL Intravenous Q12H Torey Easley MD   10 mL at 04/13/23 1050   • sodium chloride 0.9 % flush 10 mL  10 mL Intravenous Q12H Torey Easley MD   10 mL at 04/13/23 1040   • sodium chloride 0.9 % flush 10 mL  10 mL Intravenous  Q12H Torey Easley MD   10 mL at 04/13/23 1050   • sodium chloride 0.9 % flush 10 mL  10 mL Intravenous PRN Torey Easley MD       • sodium chloride 0.9 % flush 20 mL  20 mL Intravenous PRN Torey Easley MD       • sodium chloride 0.9 % infusion 40 mL  40 mL Intravenous PRN Torey Easley MD       • sodium chloride 0.9 % infusion  9 mL/hr Intravenous Continuous PRN Torey Easley MD 30 mL/hr at 04/13/23 0817 Restarted at 04/13/23 0817   • sodium chloride nasal spray 1 spray  1 spray Each Nare PRN Torey Easley MD       • sterile water (preservative free) injection  - ADS Override Pull                Lab Results (last 24 hours)     Procedure Component Value Units Date/Time    CBC (No Diff) [[id number]]  (Abnormal) Collected: 04/13/23 1241    Specimen: Blood Updated: 04/13/23 1248     WBC 13.14 10*3/mm3      RBC 2.48 10*6/mm3      Hemoglobin 7.9 g/dL      Hematocrit 24.5 %      MCV 98.8 fL      MCH 31.9 pg      MCHC 32.2 g/dL      RDW 18.6 %      RDW-SD 62.5 fl      MPV 10.1 fL      Platelets 209 10*3/mm3     POC Glucose Once [[id number]]  (Abnormal) Collected: 04/13/23 0925    Specimen: Blood Updated: 04/13/23 0931     Glucose 139 mg/dL      Comment: Meter: [id number] : [id number] Eddy Car       POC Glucose Once [[id number]]  (Abnormal) Collected: 04/13/23 0728    Specimen: Blood Updated: 04/13/23 0735     Glucose 151 mg/dL      Comment: Meter: [id number] : [id number] DAREN CHAPMAN       Basic Metabolic Panel [[id number]]  (Abnormal) Collected: 04/13/23 0210    Specimen: Blood Updated: 04/13/23 0313     Glucose 165 mg/dL      BUN 65 mg/dL      Creatinine 3.82 mg/dL      Sodium 147 mmol/L      Potassium 3.6 mmol/L      Chloride 117 mmol/L      CO2 16.9 mmol/L      Calcium 8.5 mg/dL      BUN/Creatinine Ratio 17.0     Anion Gap 13.1 mmol/L      eGFR 15.6 mL/min/1.73     Narrative:      GFR Normal >60  Chronic Kidney Disease <60  Kidney Failure <15    The GFR formula  is only valid for adults with stable renal function between ages 18 and 70.    CBC (No Diff) [[id number]]  (Abnormal) Collected: 04/13/23 0210    Specimen: Blood Updated: 04/13/23 0247     WBC 15.63 10*3/mm3      RBC 2.65 10*6/mm3      Hemoglobin 8.3 g/dL      Hematocrit 26.5 %      .0 fL      MCH 31.3 pg      MCHC 31.3 g/dL      RDW 18.7 %      RDW-SD 62.5 fl      MPV 10.4 fL      Platelets 233 10*3/mm3     POC Glucose Once [[id number]]  (Abnormal) Collected: 04/12/23 1945    Specimen: Blood Updated: 04/12/23 2001     Glucose 216 mg/dL      Comment: Meter: [id number] : [id number] ANGELA COLLETTE       Hemoglobin [[id number]]  (Abnormal) Collected: 04/12/23 1730    Specimen: Blood Updated: 04/12/23 1821     Hemoglobin 8.6 g/dL     POC Glucose Once [[id number]]  (Abnormal) Collected: 04/12/23 1753    Specimen: Blood Updated: 04/12/23 1800     Glucose 176 mg/dL      Comment: Meter: [id number] : [id number] LENIN BERNAL           Orders (last 24 hrs)      Start     Ordered    04/13/23 1251  Hemodialysis Inpatient  Once         04/13/23 1251    04/13/23 1211  CBC (No Diff)  STAT         04/13/23 1210    04/13/23 1023  Diet: Liquid Diets, Diabetic Diets; Full Liquid; Consistent Carbohydrate; Texture: Regular Texture (IDDSI 7); Fluid Consistency: Thin (IDDSI 0)  Diet Effective Now         04/13/23 1022    04/13/23 0932  POC Glucose Once  PROCEDURE ONCE         04/13/23 0925    04/13/23 0908  Oxygen Therapy- Nasal Cannula; Titrate for SPO2: equal to or greater than, 96%, per policy  Continuous,   Status:  Canceled         04/13/23 0907    04/13/23 0908  Pulse Oximetry, Continuous  Continuous,   Status:  Canceled         04/13/23 0907    04/13/23 0908  POC Glucose STAT  STAT,   Status:  Canceled        Comments: Notify Anesthesia if blood sugar is less than 80 mg/dL or greater than 180mg/dL      04/13/23 0907    04/13/23 0908  Vital signs every 5 minutes for 15 minutes, every 15 minutes thereafter.  Once,   Status:   Canceled         04/13/23 0907    04/13/23 0908  Call Anesthesiologist for additional IV Fluid bolus for Hypotension/Tachycardia  Until Discontinued,   Status:  Canceled         04/13/23 0907    04/13/23 0908  Notify Anesthesia of Any Acute Changes in Patient Condition  Until Discontinued,   Status:  Canceled         04/13/23 0907    04/13/23 0908  Notify Anesthesia for Unrelieved Pain  Until Discontinued,   Status:  Canceled         04/13/23 0907    04/13/23 0908  Once DC criteria to floor met, follow surgeon's orders.  Until Discontinued,   Status:  Canceled         04/13/23 0907    04/13/23 0908  Discharge patient from PACU when discharge criteria is met.  Until Discontinued,   Status:  Canceled         04/13/23 0907    04/13/23 0907  lactated ringers infusion  Once As Needed,   Status:  Discontinued         04/13/23 0907 04/13/23 0907  oxyCODONE-acetaminophen (PERCOCET) 5-325 MG per tablet 1 tablet  Once As Needed,   Status:  Discontinued         04/13/23 0907 04/13/23 0907  fentaNYL citrate (PF) (SUBLIMAZE) injection 50 mcg  Every 5 Minutes PRN,   Status:  Discontinued         04/13/23 0907 04/13/23 0907  ondansetron (ZOFRAN) injection 4 mg  As Needed,   Status:  Discontinued         04/13/23 0907    04/13/23 0907  ipratropium-albuterol (DUO-NEB) nebulizer solution 3 mL  Once As Needed,   Status:  Discontinued         04/13/23 0907    04/13/23 0900  OLANZapine zydis (zyPREXA) disintegrating tablet 5 mg  Daily         04/12/23 1523    04/13/23 0900  sodium chloride 0.9 % flush 10 mL  Every 12 Hours Scheduled,   Status:  Discontinued         04/13/23 0736    04/13/23 0843  heparin injection  As Needed,   Status:  Discontinued         04/13/23 0843    04/13/23 0843  sodium chloride 0.9 % solution  As Needed,   Status:  Discontinued         04/13/23 0844    04/13/23 0842  lidocaine PF 1% (XYLOCAINE) injection  As Needed,   Status:  Discontinued         04/13/23 0843    04/13/23 0801  XR Chest AP  1 Time  Imaging         04/13/23 0800    04/13/23 0800  FL Surgery Fluoro  1 Time Imaging         04/13/23 0759    04/13/23 0738  lactated ringers infusion  Once,   Status:  Discontinued         04/13/23 0736    04/13/23 0737  Follow Anesthesia Guidelines / Protocol  Once,   Status:  Canceled         04/13/23 0736    04/13/23 0737  Verify / Perform Chlorhexidine Skin Prep  Once,   Status:  Canceled        Comments: Chlorhexidine Skin Wipes and Instructions For All Patients Having A Procedure Requiring an Outward Incision if Not Allergic.  If Allergic, Give Antibacterial Skin Wipes and Instructions.  Do Not Use For Facial Cases or on Any Mucus Membranes.    04/13/23 0736    04/13/23 0737  Verify / Perform Chlorhexidine Skin Prep if Indicated (If Not Already Completed)  Once,   Status:  Canceled         04/13/23 0736    04/13/23 0737  Oxygen Therapy- Nasal Cannula; Titrate for SPO2: equal to or greater than, 90%  Continuous,   Status:  Canceled         04/13/23 0736    04/13/23 0737  POC Glucose Once  Once        Comments: For all diabetic patients and all patients who are to be admitted. Notify Anesthesiologist for blood sugar > 180.      04/13/23 0736    04/13/23 0737  Insert Peripheral IV  Once,   Status:  Canceled         04/13/23 0736    04/13/23 0737  Saline Lock & Maintain IV Access  Continuous,   Status:  Canceled         04/13/23 0736    04/13/23 0736  Vital Signs - Per Anesthesia Protocol  As Needed,   Status:  Canceled       04/13/23 0736    04/13/23 0736  sodium chloride 0.9 % flush 10 mL  As Needed,   Status:  Discontinued         04/13/23 0736    04/13/23 0736  sodium chloride 0.9 % infusion 40 mL  As Needed,   Status:  Discontinued         04/13/23 0736    04/13/23 0736  midazolam (VERSED) injection 0.5 mg  Every 10 Minutes PRN,   Status:  Discontinued         04/13/23 0736    04/13/23 0736  POC Glucose Once  PROCEDURE ONCE         04/13/23 0728    04/13/23 0600  CBC (No Diff)  Morning Draw         04/12/23  1329    04/13/23 0600  Basic Metabolic Panel  Morning Draw,   Status:  Canceled         04/12/23 1329    04/13/23 0400  Basic Metabolic Panel  Morning Draw         04/12/23 1846    04/13/23 0001  NPO Diet NPO Type: Strict NPO  Diet Effective Midnight,   Status:  Canceled         04/12/23 1631    04/12/23 2100  castor oil-balsam peru (VENELEX) ointment 1 application  Every 12 Hours Scheduled         04/12/23 1705    04/12/23 2002  POC Glucose Once  PROCEDURE ONCE         04/12/23 1945    04/12/23 2000  Wound Care  Every 12 Hours        Comments: No cover dressing.    04/12/23 1706    04/12/23 1801  POC Glucose Once  PROCEDURE ONCE         04/12/23 1753    04/12/23 1800  Hemoglobin  Once         04/12/23 1329    04/12/23 1800  Dietary Nutrition Supplements Boost Plus (Ensure Enlive, Ensure Plus)  Daily With Breakfast, Lunch & Dinner       04/12/23 1533    04/12/23 1737  Case Management  Consult  Once        Provider:  (Not yet assigned)    04/12/23 1737    04/12/23 1707  Elevate Heels Off of Bed  Until Discontinued         04/12/23 1706    04/12/23 1707  Turn Patient  Now Then Every 2 Hours         04/12/23 1706    04/12/23 1707  Use Repositioning Wedge to Position Patient  Continuous         04/12/23 1706    04/12/23 1707  Do NOT Rub or Massage Any Bony Prominence  Continuous         04/12/23 1706    04/12/23 1631  Case Request  Once         04/12/23 1631    04/12/23 1631  Follow Anesthesia Guidelines / Protocol  Continuous         04/12/23 1631    04/12/23 1631  Perform Chlorhexidine Skin Prep Night Before and Morning of Procedure  Until Discontinued        Comments: Chlorhexidine Skin Prep and Instructions For All Patients Having A Procedure Requiring an Outward Incision if Not Allergic. If Allergic, Give Antibacterial Skin Wipes and Instructions. Do Not Use For Facial Cases or on Any Mucus Membranes.    04/12/23 1631    04/12/23 1327  Inpatient General Surgery Consult  Once        Specialty:   General Surgery  Provider:  Torey Easley MD    04/12/23 1327    04/12/23 1200  Dietary Nutrition Supplements Boost Breeze (Ensure Clear)  Daily With Breakfast, Lunch & Dinner,   Status:  Canceled       04/12/23 0828    04/12/23 0900  amLODIPine (NORVASC) tablet 10 mg  Every 24 Hours Scheduled         04/11/23 1841    04/12/23 0900  OLANZapine zydis (ZyPREXA) disintegrating tablet 10 mg  2 Times Daily,   Status:  Discontinued         04/12/23 0739    04/12/23 0900  pantoprazole (PROTONIX) injection 40 mg  2 Times Daily Before Meals         04/12/23 0741    04/12/23 0739  haloperidol lactate (HALDOL) injection 2 mg  Every 6 Hours PRN         04/12/23 0739    04/11/23 1700  Hemoglobin & Hematocrit, Blood  2 Times Daily,   Status:  Canceled       04/11/23 0709    04/11/23 1130  Lidocaine 4 % patch 1 patch  Daily        Note to Pharmacy: Apply patch for only 12 hours and then remove.  Apply to skin on affected area.    04/11/23 1038    04/11/23 0755  ziprasidone (GEODON) 20 MG injection  - ADS Override Pull  Status:  Discontinued        Note to Pharmacy: Created by cabinet override    04/11/23 0755    04/10/23 1300  cetirizine (zyrTEC) tablet 5 mg  Daily         04/10/23 1141    04/10/23 1220  sodium chloride 0.9 % infusion  Continuous PRN         04/10/23 1220    04/10/23 0930  sodium chloride 0.9 % flush 10 mL  Every 12 Hours Scheduled         04/10/23 0844    04/10/23 0930  sodium chloride 0.9 % flush 10 mL  Every 12 Hours Scheduled         04/10/23 0844    04/10/23 0930  sodium chloride 0.9 % flush 10 mL  Every 12 Hours Scheduled         04/10/23 0844    04/10/23 0900  allopurinol (ZYLOPRIM) tablet 50 mg  Once per day on Mon Thu         04/08/23 1218    04/10/23 0844  sodium chloride 0.9 % flush 10 mL  As Needed         04/10/23 0844    04/10/23 0844  sodium chloride 0.9 % flush 20 mL  As Needed         04/10/23 0844    04/10/23 0844  sodium chloride 0.9 % infusion 40 mL  As Needed         04/10/23 0844     04/09/23 0900  multivitamin (THERAGRAN) tablet 1 tablet  Daily         04/08/23 1218    04/08/23 2100  atorvastatin (LIPITOR) tablet 40 mg  Nightly         04/08/23 1218    04/08/23 2100  sennosides-docusate (PERICOLACE) 8.6-50 MG per tablet 2 tablet  Nightly         04/08/23 1218    04/08/23 2000  gentamicin (GARAMYCIN) 0.1 % ointment 1 application  Daily         04/08/23 1824 04/08/23 1825  Change Dressing  Daily      Comments: PD cath dressing: clean around site with chloraprep, gentamicin cream, island dressing    *Wear masks while changing dressing*    04/08/23 1824 04/08/23 1800  carvedilol (COREG) tablet 25 mg  2 Times Daily With Meals         04/08/23 1218    04/08/23 1800  Insulin Lispro (humaLOG) injection 2-7 Units  3 Times Daily With Meals         04/08/23 1218    04/08/23 1716  Ambulate Patient  Every Shift       04/08/23 1218    04/08/23 1700  POC Glucose TID AC  3 Times Daily Before Meals       04/08/23 1218    04/08/23 1217  acetaminophen (TYLENOL) tablet 500 mg  Every 6 Hours PRN         04/08/23 1218    04/08/23 1217  dextrose (D50W) (25 g/50 mL) IV injection 25 g  Every 15 Minutes PRN         04/08/23 1218    04/08/23 1217  dextrose (GLUTOSE) oral gel 15 g  Every 15 Minutes PRN         04/08/23 1218    04/08/23 1217  glucagon (human recombinant) (GLUCAGEN DIAGNOSTIC) injection 1 mg  Every 15 Minutes PRN         04/08/23 1218    04/08/23 1217  methocarbamol (ROBAXIN) tablet 500 mg  Every 12 Hours PRN         04/08/23 1218    04/08/23 1217  ondansetron (ZOFRAN) tablet 4 mg  Every 6 Hours PRN         04/08/23 1218    04/08/23 1217  sodium chloride nasal spray 1 spray  As Needed         04/08/23 1218    Unscheduled  Follow Hypoglycemia Standing Orders For Blood Glucose <70 & Notify Provider of Treatment  As Needed      Comments: Follow Hypoglycemia Orders As Outlined in Process Instructions (Open Order Report to View Full Instructions)  Notify Provider Any Time Hypoglycemia Treatment is  Administered    04/08/23 1218    Unscheduled  Change Dressing to IV Site As Needed When Damp, Loose or Soiled  As Needed       04/10/23 0844    Unscheduled  Apply Moisture Barrier After Any Incontinence  As Needed       04/12/23 1706    --  UPPER GI ENDOSCOPY         04/08/23 1454    --  UPPER GI ENDOSCOPY         04/10/23 1201    --  SCANNED - TELEMETRY           04/08/23 0000    --  SCANNED - TELEMETRY           04/08/23 0000    --  SCANNED - TELEMETRY           04/08/23 0000    --  SCANNED - TELEMETRY           04/08/23 0000    --  SCANNED - TELEMETRY           04/08/23 0000    --  SCANNED - TELEMETRY           04/08/23 0000    --  SCANNED - TELEMETRY           04/08/23 0000    --  SCANNED - TELEMETRY           04/08/23 0000    --  SCANNED - TELEMETRY           04/08/23 0000    --  SCANNED - TELEMETRY           04/08/23 0000    --  SCANNED - TELEMETRY           04/08/23 0000    --  SCANNED - TELEMETRY           04/08/23 0000    --  SCANNED - TELEMETRY           04/08/23 0000    --  SCANNED - TELEMETRY           04/08/23 0000    --  SCANNED - TELEMETRY           04/08/23 0000    --  SCANNED - TELEMETRY           04/08/23 0000                   Operative/Procedure Notes (last 24 hours)      Torey Easley MD at 04/13/23 0826        HEMODIALYSIS CATHETER INSERTION, CENTRAL VENOUS LINE INSERTION  Procedure Note    Augusto Lorenzana Jr.  4/13/2023    Pre-op Diagnosis:   Gastrointestinal hemorrhage, unspecified gastrointestinal hemorrhage type [K92.2]  Chronic kidney disease, unspecified CKD stage [N18.9]    Post-op Diagnosis:     Post-Op Diagnosis Codes:     * Gastrointestinal hemorrhage, unspecified gastrointestinal hemorrhage type [K92.2]     * Chronic kidney disease, unspecified CKD stage [N18.9]    Procedure(s):  CENTRAL LINE REMOVAL   TUNNELEDHEMODIALYSIS CATHETER INSERTION WITH ULTRASOUND GUIDED VENOUS ACCESS AND FLUOROSCOPY  CENTRAL VENOUS LINE INSERTION WITH ULTRASOUND GUIDED VENOUS  ACCESS    Surgeon(s):  Torey Easley MD    Anesthesia: Choice    Staff:   Circulator: Charles Quezada RN  Radiology Technologist: Eliezer Gamboa RT  Scrub Person: Addis Cr  Assistant: Thom Conde    Findings: Successful removal of right internal jugular vein central line with replacement of the left internal jugular vein successful right internal jugular vein 23 cm tunneled hemodialysis catheter placement           Operative Procedure: The patient was taken operating suite and placed upon the operating table.  After induction of LMA anesthesia the right neck and chest were prepped and draped in usual sterile fashion along with the left neck and chest.  Attention began with placement of left internal jugular vein central line as there was no room for tunneled dialysis catheter on the left due to lack of an area to tunnel due to a recent pacemaker placement.  After injection of local anesthetic under ultrasound venous access was achieved in the left internal jugular vein.  A guidewire was placed in the left internal jugular vein without resistance and fluoroscopy confirmed line in appropriate position.  A stab incision was made at the guidewire entry site via Seldinger technique the tract was serially dilated and 7 [place] triple-lumen central line was placed into the left internal jugular vein over the guidewire.  The guidewire was removed and the catheter was assessed and all ports withdrew venous blood and flushed with saline.  The catheter secured with suture to the skin and a Biopatch and occlusive dressing were applied.  Patient tolerated this portion of the procedure well.  Attention was turned to tunneled hemodialysis catheter placement.  At the site on the right neck the previous central line has been removed.  Just inferior to this after injection of local anesthetic under ultrasound visualization an 18-gauge access needle was inserted into the right internal jugular vein  and venous blood was aspirated.  Guidewire was placed without resistance into the right internal jugular vein.  Fluoroscopy confirmed the guidewire in appropriate position.  A stab incision was made at the guidewire entry site and the tract was serially dilated under fluoroscopy with a dilator introducer sheath being placed into the right internal jugular vein.  The guidewire was removed and a 23 cm tunneled hemodialysis catheter was inserted into the right internal jugular vein.  The breakaway sheath was removed.  Under fluoroscopic visualization the tips were placed in appropriate position at the right atrium and an exit site on the left chest was identified.  An stab incision at this area was made and a subcutaneous tunnel was created with a tunneling device which was then dilated.  In a retrograde fashion the catheter tubing was pulled through the subcutaneous tunnel through the exit site with the cuff 2 cm above the exit site.  The catheter was then assembled and assessed and withdrew venous blood and flushed with heparin easily.  Caps were applied and the exit site was dressed with a Biopatch and occlusive dressing after the catheter was secured to the skin with Prolene suture.  The right neck access site was closed with deep dermal Vicryl suture and skin affix.  Patient was awakened from anesthesia and taken to recovery where stat chest x-ray was ordered confirming placement.    Estimated Blood Loss: 10 mL    Specimens:   None           Drains: None    Grafts/Implants: Left internal jugular vein 23 cm tunneled hemodialysis catheter, right internal jugular vein 20 cm triple-lumen central line    Complications: None      Torey Easley MD     Date: 4/13/2023  Time: 08:55 EDT      Electronically signed by Torey Easley MD at 04/13/23 0859          Physician Progress Notes (last 24 hours)      Alessandra Farias MD at 04/13/23 0915          Nephrology Progress Note      Subjective     No chest pain or  shortness of breath.     Objective       Vital signs :     Temp:  [97.5 °F (36.4 °C)-98.4 °F (36.9 °C)] 97.6 °F (36.4 °C)  Heart Rate:  [69-90] 73  Resp:  [15-20] 15  BP: ()/(58-96) 141/83    Intake/Output                             04/11/23 0701 - 04/12/23 0700 04/12/23 0701 - 04/13/23 0700 04/13/23 0701 - 04/14/23 0700     5941-2656 8984-5209 Total 4345-1253 5244-3863 Total 5558-4545 2871-2777 Total                    Intake    P.O.  0  -- 0  390  -- 390  0  -- 0    Total Intake 0 -- 0 390 -- 390 0 -- 0       Output    Urine  925  600 1525  900  -- 900  --  -- --    Total Output  900 -- 900 -- -- --           Physical Exam:    General Appearance : not in acute distress  Lungs : clear to auscultation, respirations regular  Heart :  regular rhythm & normal rate, normal S1, S2 and no murmur, no rub  Abdomen : soft, non distended, suprapubic tenderness   Extremities : no edema  Neurologic :   orientated to person, place, time and situation, Grossly no focal deficits        Laboratory Data :     Albumin No results found for: ALBUMIN   Magnesium No results found for: MG       PTH               No results found for: PTH    CBC and coagulation:  Results from last 7 days   Lab Units 04/13/23  0210 04/12/23  1730 04/12/23  0931 04/12/23  0053 04/10/23  1723 04/10/23  1119 04/09/23  0141 04/08/23  1647   WBC 10*3/mm3 15.63*  --  12.02*  --   --  13.24*   < >  --    HEMOGLOBIN g/dL 8.3* 8.6* 8.9* 8.0*   < > 7.9*   < > 5.5*   HEMATOCRIT % 26.5*  --  27.9* 24.8*   < > 24.0*   < > 17.2*   MCV fL 100.0*  --  98.9*  --   --  96.0   < >  --    MCHC g/dL 31.3*  --  31.9  --   --  32.9   < >  --    PLATELETS 10*3/mm3 233  --  230  --   --  181   < >  --    INR   --   --   --   --   --   --   --  1.38*    < > = values in this interval not displayed.     Acid/base balance:      Renal and electrolytes:    Results from last 7 days   Lab Units 04/13/23  0210 04/12/23  0053 04/11/23  0009 04/10/23  0004 04/09/23  0141  04/08/23  0029   SODIUM mmol/L 147* 145 146* 151* 149* 144   POTASSIUM mmol/L 3.6  --  3.6 3.9 4.3 4.2   MAGNESIUM mg/dL  --   --   --   --   --  1.9   CHLORIDE mmol/L 117*  --  117* 122* 118* 112*   CO2 mmol/L 16.9*  --  16.8* 14.8* 14.6* 18.0*   BUN mg/dL 65*  --  86* 126* 143* 103*   CREATININE mg/dL 3.82*  --  3.78* 4.29* 4.24* 4.21*   CALCIUM mg/dL 8.5*  --  8.3* 8.2* 8.4* 9.0     Estimated Creatinine Clearance: 17.6 mL/min (A) (by C-G formula based on SCr of 3.82 mg/dL (H)).  @GFRCG:3@   Liver and pancreatic function:        Invalid input(s): PROT      Cardiac:      Liver and pancreatic function:        Invalid input(s): PROT    Medications :     [MAR Hold] allopurinol, 50 mg, Oral, Once per day on Mon Thu  amLODIPine, 10 mg, Oral, Q24H  [MAR Hold] atorvastatin, 40 mg, Oral, Nightly  carvedilol, 25 mg, Oral, BID With Meals  [MAR Hold] castor oil-balsam peru, 1 application, Topical, Q12H  [MAR Hold] cetirizine, 5 mg, Oral, Daily  [MAR Hold] gentamicin, 1 application, Topical, Daily  [MAR Hold] insulin lispro, 2-7 Units, Subcutaneous, TID With Meals  [MAR Hold] Lidocaine, 1 patch, Apply externally, Daily  [MAR Hold] multivitamin, 1 tablet, Oral, Daily  [MAR Hold] OLANZapine zydis, 5 mg, Oral, Daily  [MAR Hold] pantoprazole, 40 mg, Intravenous, BID AC  [MAR Hold] senna-docusate sodium, 2 tablet, Oral, Nightly  [MAR Hold] sodium chloride, 10 mL, Intravenous, Q12H  [MAR Hold] sodium chloride, 10 mL, Intravenous, Q12H  [MAR Hold] sodium chloride, 10 mL, Intravenous, Q12H  sterile water (preservative free), , ,       sodium chloride, 9 mL/hr, Last Rate: 30 mL/hr (04/13/23 0892)          Assessment & Plan     1.  CKD stage V initiated on PDx during this admission   2.  Upper GI bleed with blood loss anemia  3.  Heart failure with reduced ejection fraction LVEF 45%  4.  History of CABG  5.  Diabetes with nephropathy poor control  6.  Hypertension  7.  Dehydration with hypotension  8.  Pacemaker  9.  Confusion and acute  psychosis  10.  Hypernatremia     Tunneled dialysis cath to be placed today, and hemodialysis for 3 hours with 2L UF if tolerates.   Outpatient dialysis chair set for discharge planning.       Alessandra Farias MD  23  09:15 EDT      Electronically signed by Alessandra Farias MD at 23 0919     Behbahani, Katayoun, MD at 23 1317                                    [facility] Medicine Services  PROGRESS NOTE     Patient Identification:  Name:  Augusto Lorenzana Jr.  Age:  78 y.o.  Sex:  male  :  1945  MRN:  [id number]  Visit Number:  [id number]  Primary Care Provider:  Rob Polanco MD    Length of stay:  4    ----------------------------------------------------------------------------------------------------------------------  Subjective     Chief Complaint:  Follow up for upper GI bleed complicated by hospital delirium    Subjective:  Today, the patient seems to be less agitated and more cooperative but still confused.  Was very combative overnight and required IM Geodon.  Patient has been refusing to take any oral meds but attempted to give oral meds with peanut butter today which he gladly took.  Therefore diet was advanced to full liquid to accommodate that.  Bowel movement was liquid and black this morning with some normal brown color feces mixed.  Stat CBC was obtained which showed stable hemoglobin.  Therefore likely was residual melanotic stool from prior bleed.    ----------------------------------------------------------------------------------------------------------------------  Objective     Current Hospital Meds:  allopurinol, 50 mg, Oral, Once per day on   amLODIPine, 10 mg, Oral, Q24H  atorvastatin, 40 mg, Oral, Nightly  carvedilol, 25 mg, Oral, BID With Meals  cetirizine, 5 mg, Oral, Daily  gentamicin, 1 application, Topical, Daily  insulin lispro, 2-7 Units, Subcutaneous, TID With Meals  Lidocaine, 1 patch, Apply externally, Daily  multivitamin, 1  tablet, Oral, Daily  OLANZapine zydis, 10 mg, Oral, BID  pantoprazole, 40 mg, Intravenous, BID AC  senna-docusate sodium, 2 tablet, Oral, Nightly  sodium chloride, 10 mL, Intravenous, Q12H  sodium chloride, 10 mL, Intravenous, Q12H  sodium chloride, 10 mL, Intravenous, Q12H  sterile water (preservative free), , ,   ziprasidone, , ,       sodium chloride, 9 mL/hr, Last Rate: Stopped (04/10/23 2200)      ----------------------------------------------------------------------------------------------------------------------  Vital Signs:  Temp:  [98.2 °F (36.8 °C)-99 °F (37.2 °C)] 98.4 °F (36.9 °C)  Heart Rate:  [69-91] 69  Resp:  [18] 18  BP: (135-171)/(53-86) 135/63  Mean Arterial Pressure (Non-Invasive) for the past 24 hrs (Last 3 readings):   Noninvasive MAP (mmHg)   04/12/23 1119 97   04/12/23 0833 108   04/12/23 0508 118     SpO2 Percentage    04/12/23 0200 04/12/23 0833 04/12/23 1119   SpO2: Comment: pt combative Comment: pt refusing 95%     SpO2:  [95 %-98 %] 95 %  on   ;   Device (Oxygen Therapy): room air    Body mass index is 26.23 kg/m².  Wt Readings from Last 3 Encounters:   04/12/23 76 kg (167 lb 8 oz)   04/07/23 77.2 kg (170 lb 3.2 oz)   07/08/21 83 kg (183 lb)        Intake/Output Summary (Last 24 hours) at 4/12/2023 1317  Last data filed at 4/12/2023 0800  Gross per 24 hour   Intake 30 ml   Output 1600 ml   Net -1570 ml     Diet: Liquid Diets; Full Liquid; Texture: Regular Texture (IDDSI 7); Fluid Consistency: Thin (IDDSI 0)  ----------------------------------------------------------------------------------------------------------------------  Physical exam:   GEN: NAD, pale  CV: RRR, difficult to hear systolic murmur today as patient continues to talk during exam  PULM: CTA, no wheeze or crackles  GI: Abdomen is soft, some tenderness in epigastric area persists  NEURO: Speech is normal, no facial droop  SKIN: Pale, some scratch marks but otherwise no new rashes  PSYCH: Awake and oriented to self.   Confused, picking as wires and tubes.  --------------------------------------------------------------------------------------------------------            Results from last 7 days   Lab Units 04/12/23 0931 04/12/23 0053 04/11/23  0532 04/10/23  1723 04/10/23  1119 04/10/23  0541 04/10/23  0004 04/09/23  0141 04/08/23  1647   WBC 10*3/mm3 12.02*  --   --   --  13.24*  --  15.02*  --   --    HEMOGLOBIN g/dL 8.9* 8.0* 7.7*   < > 7.9*   < > 6.5*   < > 5.5*   HEMATOCRIT % 27.9* 24.8* 23.4*   < > 24.0*   < > 20.6*   < > 17.2*   MCV fL 98.9*  --   --   --  96.0  --  102.0*  --   --    MCHC g/dL 31.9  --   --   --  32.9  --  31.6  --   --    PLATELETS 10*3/mm3 230  --   --   --  181  --  193  --   --    INR   --   --   --   --   --   --   --   --  1.38*    < > = values in this interval not displayed.     Results from last 7 days   Lab Units 04/12/23 0053 04/11/23  0009 04/10/23  0004 04/09/23  0141 04/08/23  0029   SODIUM mmol/L 145 146* 151* 149* 144   POTASSIUM mmol/L  --  3.6 3.9 4.3 4.2   MAGNESIUM mg/dL  --   --   --   --  1.9   CHLORIDE mmol/L  --  117* 122* 118* 112*   CO2 mmol/L  --  16.8* 14.8* 14.6* 18.0*   BUN mg/dL  --  86* 126* 143* 103*   CREATININE mg/dL  --  3.78* 4.29* 4.24* 4.21*   CALCIUM mg/dL  --  8.3* 8.2* 8.4* 9.0   GLUCOSE mg/dL  --  145* 85 130* 117*   Estimated Creatinine Clearance: 17.9 mL/min (A) (by C-G formula based on SCr of 3.78 mg/dL (H)).  No results found for: AMMONIA    Glucose   Date/Time Value Ref Range Status   04/12/2023 1048 208 (H) 70 - 130 mg/dL Final     Comment:     Meter: [id number] : [id number] ERLIN ESQUIVEL   04/12/2023 0724 110 70 - 130 mg/dL Final     Comment:     Meter: [id number] : [id number] ERLIN ESQUIVEL   04/11/2023 1911 119 70 - 130 mg/dL Final     Comment:     RN Notified Meter: [id number] : [id number] BRYAN AVITIA   04/11/2023 1639 170 (H) 70 - 130 mg/dL Final     Comment:     Meter: [id number] : [id number] ULYSSES ROBERTS   04/11/2023 1128 161 (H) 70 - 130  mg/dL Final     Comment:     Meter: [id number] : [id number] FRED BYRNE   04/11/2023 0549 109 70 - 130 mg/dL Final     Comment:     Meter: [id number] : [id number] Britney Clark   04/10/2023 1704 184 (H) 70 - 130 mg/dL Final     Comment:     Meter: [id number] : [id number] RHYS MARTINEZ   04/10/2023 1134 115 70 - 130 mg/dL Final     Comment:     Meter: [id number] : [id number] ANH JAEGER     Lab Results   Component Value Date    HGBA1C 6.5 (H) 04/04/2022     Lab Results   Component Value Date    TSH 4.030 06/03/2021    FREET4 1.1 03/31/2023       ----------------------------------------------------------------------------------------------------------------------  Imaging Results (Last 24 Hours)     Procedure Component Value Units Date/Time    XR Abdomen KUB [[id number]] Collected: 04/11/23 2016     Updated: 04/11/23 2018    Narrative:      CR Abdomen 1 Vw    INDICATION:   Check placement of dialysis catheter    COMPARISON:   CT abdomen and pelvis 4/9/2023    FINDINGS:  AP radiograph(s) of the abdomen. Parts of the abdomen are excluded from field-of-view due to patient large body habitus.    Nonspecific nonobstructive bowel gas pattern. Dialysis catheter tip projects over the right pelvis. Dextroscoliosis of the lumbar spine.      Impression:      Dialysis catheter tip projects over the right pelvis.    Signer Name: Cy Spears MD   Signed: 4/11/2023 8:16 PM   Workstation Name: [id number]    [facility]          ----------------------------------------------------------------------------------------------------------------------  Assessment/Plan     • UGIB-large peptic ulcer noted on initial EGD and treated with epinephrine injection.  Had recurrent melena 3 nights ago with drop of hemoglobin to 6.5 again requiring third unit of packed RBC.  Repeat EGD 4/10 showed no evidence of active bleeding.    Had another dark bowel movement today however hemoglobin seems to be stable  therefore likely residual melena from prior episodes.  Changed PPI drip to IV PPI twice daily.  If able to show taking p.o. medication consistently can transition to oral regimen.    • Leukocytosis- likely related to UGIB.    Improving  • Delirium-remains confused and agitated.  Seems he is more agitated when having a bowel movement as he wants to walk to the bathroom but has been really weak and when he is hungry as he is asking for a bologna sandwich but has been on liquid diet.  If hemoglobin remains stable likely can start on a soft ground solid diet.  Changed to full liquid today as above.  For agitation and delirium started him on Zyprexa 5 mg twice daily couple of days ago however intermittently was not taking the dose and when took it did not seem to be very effective needing intermittent Haldol and Geodon.  Therefore dose was increased to 10 mg today.  Given he is taking medication this morning I am hoping that his mood will stabilize better and delirium will resolve.  If Zyprexa is ineffective can transition to Seroquel if needed.  • Acute blood loss anemia- due to UGIB.  Hemoglobin seems to be stable around 8.    • Acute hypernatremia-normalized with D5 water.    Likely can continue IV fluid given poor p.o. intake and delirium.     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 Nephrology following.  Seems that PD catheter malfunctioned last night and could not receive effective dialysis.  Further management per nephrology.  • HTN- BP controlled on Losartan, Norvasc and coreg  • HLD- statin  • DM2- A1C 6.5%. SSI only given NPO. BG in acceptable range.   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 Dose decreased to 100 mg/day per renal function.   • Constipation- cont doc/senna nightly    --------------------------------------------------  DVT Prophylaxis: SCD due to GI bleed     Disposition: Can  return to rehab once medically stable.  Was able to obtain peripheral IVs therefore will discontinue central venous catheter if H&H remains stable x 1 more day given dark BM today.  --------------------------------------------------      Katayoun Behbahani, MD  Hospitalist Service -- [facility]     23  13:17 EDT      Electronically signed by Behbahani, Katayoun, MD at 23 1328          Consult Notes (last 24 hours)      Torey Easley MD at 23 0754          [facility]   Consult Note    Patient Name: Augusto Lorenzana Jr.  : 1945  MRN: [id number]  Primary Care Physician:  Rob Polanco MD  Referring Physician: No Known Provider  Date of admission: 2023    Consults  Subjective   Subjective     Reason for Consult/ Chief Complaint: Renal failure    History of Present Illness  Augusto Lorenzana Jr. is a 78 y.o. male with renal failure and need for long-term hemodialysis access.  He has a right internal jugular vein central line in place.  Tunneled dialysis catheter has been requested.    Review of Systems   Constitutional: Negative for activity change, appetite change, chills and fever.   HENT: Negative for sore throat and trouble swallowing.    Eyes: Negative for visual disturbance.   Respiratory: Negative for cough and shortness of breath.    Cardiovascular: Negative for chest pain and palpitations.   Gastrointestinal: Negative for abdominal distention, abdominal pain, blood in stool, constipation, diarrhea, nausea and vomiting.   Endocrine: Negative for cold intolerance and heat intolerance.   Genitourinary: Negative for dysuria.   Musculoskeletal: Negative for joint swelling.   Skin: Negative for color change, rash and wound.   Allergic/Immunologic: Negative for immunocompromised state.   Neurological: Negative for dizziness, seizures, weakness and headaches.   Hematological: Negative for adenopathy. Does not bruise/bleed easily.   Psychiatric/Behavioral: Negative for  agitation and confusion.        Personal History     Past Medical History:   Diagnosis Date   • CAD (coronary artery disease)    • CKD (chronic kidney disease) stage 3, GFR 30-59 ml/min    • Diabetes mellitus    • Hyperlipidemia    • Hypertension    • NSTEMI (non-ST elevated myocardial infarction)        Past Surgical History:   Procedure Laterality Date   • CARDIAC CATHETERIZATION N/A 05/24/2021    Procedure: Left Heart Cath;  Surgeon: Blade Greene IV, MD;  Location:  [facility];  Service: Cardiovascular;  Laterality: N/A;   • CARDIAC SURGERY      2 stents placed 2012.   • CORONARY ARTERY BYPASS GRAFT N/A 05/28/2021    Procedure: MEDIAN STERNOTOMY CORONARY ARTERY BYPASS X 3 UTILIZING THE LEFT INTERNAL MAMMARY ARTERY GRAFT, EVH OF THE GREATER RIGHT SAPHENOUS VEIN, AND PILO PER ANESTHESIA;  Surgeon: Jacek Miller MD;  Location:  [facility];  Service: Cardiothoracic;  Laterality: N/A;   • ENDOSCOPY N/A 04/08/2023    Procedure: ESOPHAGOGASTRODUODENOSCOPY with CENTERAL LINE PLACEMENT;  Surgeon: Sabine Hudson MD;  Location:  [facility];  Service: General;  Laterality: N/A;   • ENDOSCOPY N/A 04/10/2023    Procedure: ESOPHAGOGASTRODUODENOSCOPY;  Surgeon: Sabine Hudson MD;  Location:  [facility];  Service: General;  Laterality: N/A;   • PACEMAKER IMPLANTATION         Family History: family history includes Diabetes type I in his father; Heart disease in his mother. Otherwise pertinent FHx was reviewed and not pertinent to current issue.    Social History:  reports that he has never smoked. His smokeless tobacco use includes chew. He reports that he does not drink alcohol and does not use drugs.    Home Medications:   Insulin Glargine, albuterol sulfate HFA, allopurinol, amLODIPine, amiodarone, aspirin, atorvastatin, carvedilol, cloNIDine, gabapentin, glipizide, hydrALAZINE, multivitamin with minerals, and torsemide    Allergies:  No Known Allergies    Objective    Objective      Vitals:  Temp:  [97.5 °F (36.4 °C)-99 °F (37.2 °C)] 98.4 °F (36.9 °C)  Heart Rate:  [69-91] 87  Resp:  [16-20] 20  BP: ()/(58-96) 162/96    Physical Exam  Constitutional:       Appearance: He is well-developed.   HENT:      Head: Normocephalic and atraumatic.   Eyes:      Conjunctiva/sclera: Conjunctivae normal.      Pupils: Pupils are equal, round, and reactive to light.   Neck:      Thyroid: No thyromegaly.      Vascular: No JVD.      Trachea: No tracheal deviation.   Cardiovascular:      Rate and Rhythm: Normal rate and regular rhythm.      Heart sounds: No murmur heard.    No friction rub. No gallop.   Pulmonary:      Effort: Pulmonary effort is normal.      Breath sounds: Normal breath sounds.   Abdominal:      General: There is no distension.      Palpations: Abdomen is soft. There is no hepatomegaly or splenomegaly.      Tenderness: There is no abdominal tenderness.      Hernia: No hernia is present.   Musculoskeletal:         General: No deformity. Normal range of motion.      Cervical back: Neck supple.   Skin:     General: Skin is warm and dry.   Neurological:      Mental Status: He is alert and oriented to person, place, and time.         Result Review    Result Review:  I have personally reviewed the results from the time of this admission to 4/13/2023 07:54 EDT and agree with these findings:  [x]  Laboratory list / accordion  []  Microbiology  [x]  Radiology  []  EKG/Telemetry   []  Cardiology/Vascular   []  Pathology  []  Old records  []  Other:        Assessment & Plan   Assessment / Plan     Brief Patient Summary:  Augusto Lorenzana Jr. is a 78 y.o. male who has renal failure with need for long-term hemodialysis access.    Active Hospital Problems:  Active Hospital Problems    Diagnosis    • **Gastrointestinal hemorrhage associated with gastric ulcer    • Third degree atrioventricular block    • Atrial fibrillation, persistent    • GI bleed    • A/C kidney disease. ATN due to postoperative  arrest. Dialysis begun 6/3/2021      Plan:   OR today for tunneled hemodialysis catheter placement.    Torey Easley MD    Electronically signed by Torey Easley MD at 23 [phone]          Physical Therapy Notes (most recent note)      Yamel Mckeon, PT at 23 1053  Version 1 of 1         Acute Care - Physical Therapy Treatment Note   [place]     Patient Name: Augusto Lorenzana Jr.  : 1945  MRN: [id number]  Today's Date: 2023      Visit Dx:     ICD-10-CM ICD-9-CM   1. Gastrointestinal hemorrhage, unspecified gastrointestinal hemorrhage type  K92.2 578.9     Patient Active Problem List   Diagnosis   • NSTEMI 2021   • Hyperlipidemia LDL goal <70   • Essential hypertension   • T2DM on oral agents and insulin. HbA1c 7.4    • Coronary artery disease involving native coronary artery of native heart with unstable angina pectoris   • A/C kidney disease. ATN due to postoperative arrest. 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 Combination of anoxic insult and azotemia   • Debility   • Lower extremity edema   • Complete heart block   • GI bleed   • Third degree atrioventricular block   • Atrial fibrillation, persistent   • Gastrointestinal hemorrhage associated with gastric ulcer     Past Medical History:   Diagnosis Date   • CAD (coronary artery disease)    • CKD (chronic kidney disease) stage 3, GFR 30-59 ml/min    • Diabetes mellitus    • Hyperlipidemia    • Hypertension    • NSTEMI (non-ST elevated myocardial infarction)      Past Surgical History:   Procedure Laterality Date   • CARDIAC CATHETERIZATION N/A 2021    Procedure: Left Heart Cath;  Surgeon: Blade Greene IV, MD;  Location:  [facility];  Service: Cardiovascular;  Laterality: N/A;   • CARDIAC SURGERY      2 stents placed .    • CORONARY ARTERY BYPASS GRAFT N/A 5/28/2021    Procedure: MEDIAN STERNOTOMY CORONARY ARTERY BYPASS X 3 UTILIZING THE LEFT INTERNAL MAMMARY ARTERY GRAFT, EVH OF THE GREATER RIGHT SAPHENOUS VEIN, AND PILO PER ANESTHESIA;  Surgeon: Jacek Miller MD;  Location:  [facility];  Service: Cardiothoracic;  Laterality: N/A;   • ENDOSCOPY N/A 4/8/2023    Procedure: ESOPHAGOGASTRODUODENOSCOPY with CENTERAL LINE PLACEMENT;  Surgeon: Sabine Hudson MD;  Location:  [facility];  Service: General;  Laterality: N/A;   • ENDOSCOPY N/A 4/10/2023    Procedure: ESOPHAGOGASTRODUODENOSCOPY;  Surgeon: Sabine Hudson MD;  Location:  [facility];  Service: General;  Laterality: N/A;     PT Assessment (last 12 hours)     PT Evaluation and Treatment     Row Name 04/12/23 1030          Physical Therapy Time and Intention    Document Type therapy note (daily note)  -     Mode of Treatment physical therapy  -     Patient Effort good  -     Comment Pt seen for treatment this date, working on ankle pumps at bedside, multiple STS with grossly minAx1-2 using RW.  -     Row Name 04/12/23 1030          Bed Mobility    Bed Mobility sit-supine  -     Sit-Supine [place] (Bed Mobility) moderate assist (50% patient effort);maximum assist (25% patient effort);2 person assist  -[facility] Name 04/12/23 1030          Transfers    Transfers sit-stand transfer;stand-sit transfer  -     Comment, (Transfers) On last trial of STS, mod/maxA given d/t pt fatigue and RN changing abdominal binder, pt did well to support himself however demonstrating posterior leaning  -     Row Name 04/12/23 1030          Sit-Stand Transfer    Sit-Stand [place] (Transfers) contact guard;minimum assist (75% patient effort);1 person assist;2 person assist  -     Assistive Device (Sit-Stand Transfers) walker, front-wheeled  -     Row Name 04/12/23 1030          Stand-Sit Transfer    Stand-Sit [place] (Transfers) contact guard;minimum assist (75%  patient effort);1 person assist;2 person assist  -     Assistive Device (Stand-Sit Transfers) walker, front-wheeled  -     Row Name 04/12/23 1030          Gait/Stairs (Locomotion)    Comment, (Gait/Stairs) Pt able to take lateral steps to scoot up towards HOB grossly CGA and VC's benefit  -     Row Name 04/12/23 1030          Motor Skills    Therapeutic Exercise ankle  ankle pumps/DF sitting EOB (grossly 30+)  -     Row Name 04/12/23 1030          Positioning and Restraints    Pre-Treatment Position in bed  -     Post Treatment Position bed  -     In Bed supine;with nsg  -           User Key  (r) = Recorded By, (t) = Taken By, (c) = Cosigned By    Initials Name Provider Type    Yamel Fuller PT Physical Therapist                Physical Therapy Education     Title: PT OT SLP Therapies (Done)     Topic: Physical Therapy (Done)     Point: Mobility training (Done)     Learning Progress Summary           Patient Acceptance, E,TB, VU by  at 4/10/2023 0926                   Point: Home exercise program (Done)     Learning Progress Summary           Patient Acceptance, E,TB, VU by  at 4/10/2023 0926                   Point: Body mechanics (Done)     Learning Progress Summary           Patient Acceptance, E,TB, VU by  at 4/10/2023 0926                   Point: Precautions (Done)     Learning Progress Summary           Patient Acceptance, E,TB, VU by  at 4/10/2023 0926                               User Key     Initials Effective Dates Name Provider Type Discipline     05/24/22 -  Livan Alejanrda, PT Physical Therapist PT              PT Recommendation and Plan             Time Calculation:    PT Charges     Row Name 04/12/23 1052             Time Calculation    Start Time 1030  -      Stop Time 1045  -      Time Calculation (min) 15 min  -KH      PT Received On 04/12/23  -            User Key  (r) = Recorded By, (t) = Taken By, (c) = Cosigned By    Initials Name Provider Type    ARTURO Mckeon  Yamel, PT Physical Therapist              Therapy Charges for Today     Code Description Service Date Service Provider Modifiers Qty    53044650583 HC PT THERAPEUTIC ACT EA 15 MIN 2023 Yamel Mckeon, PT GP 1          PT G-Codes  AM-PAC 6 Clicks Score (PT): 12    Yamel Mckeon PT  2023      Electronically signed by Yamel Mckeon, PT at 23 1053          Occupational Therapy Notes (most recent note)      Ruby Tello, OT at 23 1047          Patient Name: Augusto Lorenzana Jr.  : 1945    MRN: [id number]                              Today's Date: 2023       Admit Date: 2023    Visit Dx:     ICD-10-CM ICD-9-CM   1. Gastrointestinal hemorrhage, unspecified gastrointestinal hemorrhage type  K92.2 578.9     Patient Active Problem List   Diagnosis   • NSTEMI 2021   • Hyperlipidemia LDL goal <70   • Essential hypertension   • T2DM on oral agents and insulin. HbA1c 7.4    • Coronary artery disease involving native coronary artery of native heart with unstable angina pectoris   • A/C kidney disease. ATN due to postoperative arrest. 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 Combination of anoxic insult and azotemia   • Debility   • Lower extremity edema   • Complete heart block   • GI bleed   • Third degree atrioventricular block   • Atrial fibrillation, persistent   • Gastrointestinal hemorrhage associated with gastric ulcer     Past Medical History:   Diagnosis Date   • CAD (coronary artery disease)    • CKD (chronic kidney disease) stage 3, GFR 30-59 ml/min    • Diabetes mellitus    • Hyperlipidemia    • Hypertension    • NSTEMI (non-ST elevated myocardial infarction)      Past Surgical History:   Procedure Laterality Date   • CARDIAC CATHETERIZATION N/A 2021    Procedure: Left Heart Cath;  Surgeon: Jc  Blade BURROUGHS IV, MD;  Location:  [facility];  Service: Cardiovascular;  Laterality: N/A;   • CARDIAC SURGERY      2 stents placed 2012.   • CORONARY ARTERY BYPASS GRAFT N/A 5/28/2021    Procedure: MEDIAN STERNOTOMY CORONARY ARTERY BYPASS X 3 UTILIZING THE LEFT INTERNAL MAMMARY ARTERY GRAFT, EVH OF THE GREATER RIGHT SAPHENOUS VEIN, AND PILO PER ANESTHESIA;  Surgeon: Jacek Miller MD;  Location:  [facility];  Service: Cardiothoracic;  Laterality: N/A;   • ENDOSCOPY N/A 4/8/2023    Procedure: ESOPHAGOGASTRODUODENOSCOPY with CENTERAL LINE PLACEMENT;  Surgeon: Sabine Hudson MD;  Location:  [facility];  Service: General;  Laterality: N/A;   • ENDOSCOPY N/A 4/10/2023    Procedure: ESOPHAGOGASTRODUODENOSCOPY;  Surgeon: Sabine Hudson MD;  Location:  [facility];  Service: General;  Laterality: N/A;      General Information     Row Name 04/12/23 1045          OT Time and Intention    Document Type therapy note (daily note)  -     Mode of Treatment individual therapy;occupational therapy  -     Row Name 04/12/23 1045          General Information    Patient Profile Reviewed yes  -     Existing Precautions/Restrictions fall  -     Row Name 04/12/23 1045          Cognition    Orientation Status (Cognition) oriented to;person;place;situation  -           User Key  (r) = Recorded By, (t) = Taken By, (c) = Cosigned By    Initials Name Provider Type    Ruby Raya OT Occupational Therapist                 Mobility/ADL's     Row Name 04/12/23 1045          Bed Mobility    Bed Mobility supine-sit  -     Supine-Sit [place] (Bed Mobility) minimum assist (75% patient effort)  -     Assistive Device (Bed Mobility) bed rails;head of bed elevated  -           User Key  (r) = Recorded By, (t) = Taken By, (c) = Cosigned By    Initials Name Provider Type    Ruby Raya OT Occupational Therapist               Obj/Interventions     Row Name 04/12/23 1044          Motor Skills    Motor  Skills functional endurance  -KP     Functional Endurance fair  -     Therapeutic Exercise --  AAROM through functional movement patterns while seated at edge of bed  -     Row Name 04/12/23 1045          Balance    Balance Assessment sitting static balance  -     Static Sitting Balance standby assist  -           User Key  (r) = Recorded By, (t) = Taken By, (c) = Cosigned By    Initials Name Provider Type    Ruby Raya OT Occupational Therapist               Goals/Plan    No documentation.                Clinical Impression     Row Name 04/12/23 1046          Pain Assessment    Pretreatment Pain Rating 0/10 - no pain  -KP     Posttreatment Pain Rating 0/10 - no pain  -KP     Row Name 04/12/23 1046          Plan of Care Review    Plan of Care Reviewed With patient  -     Progress improving  -     Outcome Evaluation Patient seen for OT treatment. Improved alertness on this date and able to follow directions with cognitive strategies to participate in therapy. Fair endurance/activity tolerance. Continue plan of care.  -     Row Name 04/12/23 1046          Positioning and Restraints    Pre-Treatment Position in bed  -KP     Post Treatment Position bed  -     In Bed with PT  -KP           User Key  (r) = Recorded By, (t) = Taken By, (c) = Cosigned By    Initials Name Provider Type    Ruby Raya OT Occupational Therapist               Outcome Measures     Row Name 04/12/23 0705          How much help from another person do you currently need...    Turning from your back to your side while in flat bed without using bedrails? 2  -MW     Moving from lying on back to sitting on the side of a flat bed without bedrails? 2  -MW     Moving to and from a bed to a chair (including a wheelchair)? 2  -MW     Standing up from a chair using your arms (e.g., wheelchair, bedside chair)? 2  -MW     Climbing 3-5 steps with a railing? 2  -MW     To walk in hospital room? 2  -MW     AM-PAC 6 Clicks Score (PT)  12  -MW     Highest level of mobility 4 --> Transferred to chair/commode  -MW           User Key  (r) = Recorded By, (t) = Taken By, (c) = Cosigned By    Initials Name Provider Type    Ernestina Armstrong, RN Registered Nurse                  OT Recommendation and Plan     Plan of Care Review  Plan of Care Reviewed With: patient  Progress: improving  Outcome Evaluation: Patient seen for OT treatment. Improved alertness on this date and able to follow directions with cognitive strategies to participate in therapy. Fair endurance/activity tolerance. Continue plan of care.     Time Calculation:    Time Calculation- OT     Row Name 23 1047             Time Calculation- OT    OT Start Time 1030  -KP      OT Received On 23  -            User Key  (r) = Recorded By, (t) = Taken By, (c) = Cosigned By    Initials Name Provider Type    Ruby Raya OT Occupational Therapist              Therapy Charges for Today     Code Description Service Date Service Provider Modifiers Qty    [id number] HC OT THERAPEUTIC ACT EA 15 MIN 2023 Ruby Tello OT GO 1               Ruby Tello OT  2023    Electronically signed by Ruyb Tello OT at 23 1048          Speech Language Pathology Notes (most recent note)      Salina Walton, MS CCC-SLP at 23 1600          Acute Care - Speech Language Pathology   Swallow Initial Evaluation [facility]   CLINICAL DYSPHAGIA ASSESSMENT     Patient Name: Augusto Lorenzana Jr.  : 1945  MRN: [id number]  Today's Date: 2023             Admit Date: 2023    Augusto Lorenzana Jr.  is seen at bedside this PM on 3S to assess safety/efficacy of swallowing fnx, determine safest/least restrictive diet tolerance. His sitter was present for this assessment 2/2 ongoing ams and agitation.     Mr. Lorenzana was directly admitted to [facility] from [facility] with a chief complaint of new onset hematemesis and melena on 23.      PMH significant for CAD status post CABG, CKD stage V, type 2 diabetes mellitus, hyperlipidemia, paroxysmal A-fib, SEBASTIAN and valvular heart disease. He presented to [facility] 3/30/23 for placement of a peritoneal dialysis catheter. He experienced a 6-second pause during recovery and was evaluated by cardiology, whom recommended a pacemaker placement. He underwent pacemaker placement 3/31/23. His hospitalization was complicated by ARF 2/2 volume overload. After a lengthy stay at [facility], he was discharged to [facility] for debility recovery. In [facility], he began to experience a sudden onset of hematemesis and melena. He was transferred to [facility] for further evaluation and management for concerns of possible upper GI bleed.     He is referred to rule out dysphagia today 2/2 ams. He is currently on a clear liquids po diet.     Social History     Socioeconomic History   • Marital status:    Tobacco Use   • Smoking status: Never   • Smokeless tobacco: Current     Types: Chew   Vaping Use   • Vaping Use: Never used   Substance and Sexual Activity   • Alcohol use: Never   • Drug use: Never   • Sexual activity: Defer      Imaging:  CHEST X-RAY, 4/8/2023       HISTORY:    Central line placement.       TECHNIQUE:  AP portable chest x-ray.     COMPARISON:  *  Chest x-ray, 6/24/2021.     FINDINGS:  Right IJ central line tip in good position in the lower SVC. No visible pneumothorax.     Cardiomegaly with mild diffuse pulmonary interstitial edema suggesting mild vascular congestion or volume overload, new since the prior study. Median sternotomy. Dual-chamber cardiac pacer/AICD.     IMPRESSION:  1. No pneumothorax following placement of well-positioned right IJ central line.  2. Vascular congestion with mild interstitial edema.     Signer Name: Nico Aaron MD   Signed: 4/8/2023 5:11 PM   Workstation Name: [id number]    [facility]  Diet Orders (active) (From admission, onward)     Start     Ordered     04/10/23 1356  Diet: Liquid Diets; Clear Liquid; Texture: Regular Texture (IDDSI 7); Fluid Consistency: Thin (IDDSI 0)  Diet Effective Now         04/10/23 1355              Mr. Lorenzana is positioned upright and centered in bed to accept multiple po presentations of ice chips and thin water via teaspoon, cup, and straw. He does not attempt to self provide. Further po consistencies deferred per clear liquids 2/2 GI status.      Facial/oral structures are symmetrical upon observation. Lingual protrusion reveals no deviation. Oral mucosa are moist, pink, and clean. Secretions are clear, thin, and controlled. OROM/DANA is generally weak to imitate oral postures. Gag is not assessed. Volitional cough is intact w/ adequate  intensity, congestive in quality, non-productive. Voice is adequate in intensity, clear in quality w/ intelligible speech. He is a/a and interactive, mildly agitated in general with SLP and sitter, present at bedside. He does calm with verbal coaxing and redirection to participate in this assessment. Sitter does report that he did accept thin liquids for lunch w/o overt s/s aspiration, however, she further reports that he did experience nausea with emesis event post po intake.     Upon po presentations, adequate bolus anticipation and acceptance w/ good labial seal for bolus clearance via spoon bowl, cup rim stability and suction via straw. Bolus formation, manipulation and control are generally weak. No antieror loss appreciated. No oral holding evidenced. No overt s/s aspiration before the swallow.      Pharyngeal swallow is timely w/ adequate hyolaryngeal elevation per palpation. No overt s/s aspiration evidenced across this evaluation. No silent aspiration suspected. He denies odynophagia. He is noted with limited acceptance of po trials, approximately 3 oz only.    Visit Dx:     ICD-10-CM ICD-9-CM   1. Gastrointestinal hemorrhage, unspecified gastrointestinal hemorrhage type  K92.2 578.9      Patient Active Problem List   Diagnosis   • NSTEMI 5/22/2021   • Hyperlipidemia LDL goal <70   • Essential hypertension   • T2DM on oral agents and insulin. HbA1c 7.4    • Coronary artery disease involving native coronary artery of native heart with unstable angina pectoris   • A/C kidney disease. ATN due to postoperative arrest. Dialysis begun 6/3/2021   • Gout   • Former smokeless tobacco use   • S/P CABG x 3 on 5/28/21   • Perioperative cardiac arrest with ventricular fibrillation (CMS/HCC)   • Postop encephalopathy post code. Combination of anoxic insult and azotemia   • Debility   • Lower extremity edema   • Complete heart block   • GI bleed   • Third degree atrioventricular block   • Atrial fibrillation, persistent   • Gastrointestinal hemorrhage associated with gastric ulcer     Past Medical History:   Diagnosis Date   • CAD (coronary artery disease)    • CKD (chronic kidney disease) stage 3, GFR 30-59 ml/min    • Diabetes mellitus    • Hyperlipidemia    • Hypertension    • NSTEMI (non-ST elevated myocardial infarction)      Past Surgical History:   Procedure Laterality Date   • CARDIAC CATHETERIZATION N/A 5/24/2021    Procedure: Left Heart Cath;  Surgeon: Blade Greene IV, MD;  Location:  [facility];  Service: Cardiovascular;  Laterality: N/A;   • CARDIAC SURGERY      2 stents placed 2012.   • CORONARY ARTERY BYPASS GRAFT N/A 5/28/2021    Procedure: MEDIAN STERNOTOMY CORONARY ARTERY BYPASS X 3 UTILIZING THE LEFT INTERNAL MAMMARY ARTERY GRAFT, EVH OF THE GREATER RIGHT SAPHENOUS VEIN, AND PILO PER ANESTHESIA;  Surgeon: Jacek Miller MD;  Location:  [facility];  Service: Cardiothoracic;  Laterality: N/A;   • ENDOSCOPY N/A 4/8/2023    Procedure: ESOPHAGOGASTRODUODENOSCOPY with CENTERAL LINE PLACEMENT;  Surgeon: Sabine Hudson MD;  Location:  [facility];  Service: General;  Laterality: N/A;   • ENDOSCOPY N/A 4/10/2023    Procedure: ESOPHAGOGASTRODUODENOSCOPY;  Surgeon:  Sabine Hudson MD;  Location: [facility];  Service: General;  Laterality: N/A;     EDUCATION  The patient has been educated in the following areas:   Dysphagia (Swallowing Impairment) Oral Care/Hydration Modified Diet Instruction.     Impression: Per this limited assessment, Mr. Lorenzana presents with wfl oropharyngeal swallow to accept thin liquids via teaspoon, cup, and straw. No overt s/s aspiration. no silent aspiration suspected.     He is felt to most benefit from continued modified po diet of thin liquids only, per MD per GI status. Upgrade, as tolerated from a GI standpoint and cleared per MD, to soft to chew textures, thin liquids. Medications whole in puree, single pill presentations only, please.     SLP Recommendation and Plan    1. Continue modified po diet of thin liquids only, per MD per GI status. Upgrade, as tolerated from a GI standpoint and cleared per MD, to soft to chew textures, thin liquids.   2. Medications whole in puree/thins. Single pill presentations only, please.   3. Upright and centered for all po intake with 1:1 assistance.   4. NANI precautions.  5. Oral care protocol.  6. If emesis persists with po intake, please defer po intake pending further MD evaluation.     D/w Mr. Lorenzana results and recommendations w/ verbal agreement.    D/w nursing staff results and recommendations w/ verbal agreement.    Thank you for allowing me to participate in the care of your patient-  Salina Walton M.S., CCC/SLP                                                                                               Time Calculation:       Therapy Charges for Today     Code Description Service Date Service Provider Modifiers Qty    [id number] HC ST EVAL ORAL PHARYNG SWALLOW 2 4/11/2023 Salina Walton, MS CCC-SLP GN 1               Salina Walton MS CCC-SLP  4/11/2023    Electronically signed by Salina Walton, MS CCC-SLP at 04/11/23 1620       ADL Documentation (last day)      Date/Time Transferring Toileting Bathing Dressing Eating Communication Swallowing    04/13/23 0715 3 - assistive equipment and person 3 - assistive equipment and person 2 - assistive person 2 - assistive person 0 - independent 0 - understands/communicates without difficulty 0 - swallows foods/liquids without difficulty    04/12/23 2010 3 - assistive equipment and person 3 - assistive equipment and person 2 - assistive person 2 - assistive person 0 - independent 0 - understands/communicates without difficulty 0 - swallows foods/liquids without difficulty    04/12/23 0705 3 - assistive equipment and person 3 - assistive equipment and person 2 - assistive person 2 - assistive person 0 - independent 0 - understands/communicates without difficulty 0 - swallows foods/liquids without difficulty

## 2023-04-13 NOTE — ANESTHESIA PREPROCEDURE EVALUATION
Anesthesia Evaluation     Patient summary reviewed and Nursing notes reviewed   no history of anesthetic complications:  NPO Solid Status: > 8 hours  NPO Liquid Status: > 8 hours           Airway   Mallampati: II  TM distance: >3 FB  Neck ROM: full  No difficulty expected  Dental    (+) edentulous    Pulmonary     breath sounds clear to auscultation  (+) a smoker Former, shortness of breath, decreased breath sounds,   Cardiovascular   Exercise tolerance: poor (<4 METS)    ECG reviewed  Patient on routine beta blocker and Beta blocker given within 24 hours of surgery  Rhythm: regular  Rate: normal    (+) pacemaker (placed one week ago) interrogated <1 month ago, hypertension, valvular problems/murmurs AS, past MI (NSTEMI 5/22/2021)  >12 months, CAD, CABG >6 Months, dysrhythmias Paroxysmal Atrial Fib, angina, CHF Systolic <55%, ALFREDO, hyperlipidemia,       Neuro/Psych- negative ROS  GI/Hepatic/Renal/Endo    (+) obesity,  PUD, GI bleeding upper active bleeding, renal disease CRI, diabetes mellitus type 2 poorly controlled using insulin,     Musculoskeletal     Abdominal    Substance History - negative use     OB/GYN negative ob/gyn ROS         Other   arthritis (Hx Gout), blood dyscrasia anemia,     ROS/Med Hx Other: Echo 3/31/2023:  •  Left Ventricle: The left ventricular systolic function is mildly   reduced. The LVEF is visually estimated at 40 - 45%. The left ventricular   filling pressure is elevated.   •  Right Ventricle: The right ventricle is moderately dilated. The right   ventricular systolic function is reduced. The estimated global right   ventricular systolic function based upon the TDI maximal systolic velocity   is reduced (<9.5 cm/s).   •  Aortic Valve: The peak gradient is 32 mmHg. The mean gradient is 18   mmHg. The estimated aortic valve area by the continuity equation is 1.1   cm2.                       Anesthesia Plan    ASA 4     general   total IV anesthesia  intravenous induction     Anesthetic  plan, risks, benefits, and alternatives have been provided, discussed and informed consent has been obtained with: patient.  Pre-procedure education provided  Use of blood products discussed with consented to blood products.   Plan discussed with CRNA.

## 2023-04-13 NOTE — ANESTHESIA POSTPROCEDURE EVALUATION
Patient: Augusto Lorenzana Jr.    Procedure Summary     Date: 04/13/23 Room / Location:  [facility] 02 /  [facility]    Anesthesia Start: 0757 Anesthesia Stop: 0903    Procedures:       HEMODIALYSIS CATHETER INSERTION      CENTRAL VENOUS LINE INSERTION (Left) Diagnosis:       Gastrointestinal hemorrhage, unspecified gastrointestinal hemorrhage type      Chronic kidney disease, unspecified CKD stage      (Gastrointestinal hemorrhage, unspecified gastrointestinal hemorrhage type [K92.2])      (Chronic kidney disease, unspecified CKD stage [N18.9])    Surgeons: Torey Easley MD Provider: Aime Chairez MD    Anesthesia Type: general ASA Status: 4          Anesthesia Type: general    Vitals  Vitals Value Taken Time   /80 04/13/23 0934   Temp 98 °F (36.7 °C) 04/13/23 0934   Pulse 81 04/13/23 0934   Resp 20 04/13/23 0934   SpO2 97 % 04/13/23 0934           Post Anesthesia Care and Evaluation    Patient location during evaluation: PHASE II  Patient participation: complete - patient participated  Level of consciousness: awake and alert  Pain score: 1  Pain management: adequate    Airway patency: patent  Anesthetic complications: No anesthetic complications  PONV Status: controlled  Cardiovascular status: acceptable  Respiratory status: acceptable and room air  Hydration status: euvolemic  No anesthesia care post op

## 2023-04-13 NOTE — PROGRESS NOTES
[facility] HOSPITALIST PROGRESS NOTE     Patient Identification:  Name:  Augusto Lorenzana Jr.  Age:  78 y.o.  Sex:  male  :  1945  MRN:  [id number]  Visit Number:  [id number]  ROOM: [address] [place] [address]     Primary Care Provider:  Rob Polanco MD     Date of Admission: 2023    Length of stay in inpatient status:  5    Subjective     Chief Compliant:  Acute blood loss anemia    Patient seen following TDC line placement this am. Unfortunately had significant bleeding around both TDC line placement insertion site as well as LIJ cvc placement site that became hemostatic after application or surgicell and hemostatic dressing. Repeat H&H mildly declined but not actionable. Patient drowsy after procedure and remains confused.        Objective     Current Hospital Meds:  allopurinol, 50 mg, Oral, Once per day on u  amLODIPine, 10 mg, Oral, Q24H  atorvastatin, 40 mg, Oral, Nightly  carvedilol, 25 mg, Oral, BID With Meals  castor oil-balsam peru, 1 application, Topical, Q12H  cetirizine, 5 mg, Oral, Daily  gentamicin, 1 application, Topical, Daily  insulin lispro, 2-7 Units, Subcutaneous, TID With Meals  Lidocaine, 1 patch, Apply externally, Daily  multivitamin, 1 tablet, Oral, Daily  OLANZapine zydis, 5 mg, Oral, Daily  pantoprazole, 40 mg, Intravenous, BID AC  senna-docusate sodium, 2 tablet, Oral, Nightly  sodium chloride, 10 mL, Intravenous, Q12H  sodium chloride, 10 mL, Intravenous, Q12H  sodium chloride, 10 mL, Intravenous, Q12H  sterile water (preservative free), , ,     sodium chloride, 9 mL/hr, Last Rate: 30 mL/hr (23 0817)      Current Antimicrobial Therapy:  Anti-Infectives (From admission, onward)    None        Current Diuretic Therapy:  Diuretics (From admission, onward)    None        ----------------------------------------------------------------------------------------------------------------------  Vital Signs:  Temp:  [97.5 °F (36.4 °C)-98.4 °F (36.9 °C)] 97.9 °F (36.6  °C)  Heart Rate:  [70-90] 76  Resp:  [15-20] 18  BP: ()/(58-96) 147/73  SpO2:  [92 %-100 %] 96 %  on   ;   Device (Oxygen Therapy): room air  Body mass index is 26.16 kg/m².    Wt Readings from Last 3 Encounters:   04/13/23 75.8 kg (167 lb)   04/07/23 77.2 kg (170 lb 3.2 oz)   07/08/21 83 kg (183 lb)     Intake & Output (last 3 days)       04/10 0701 04/11 0700 04/11 0701 04/12 0700 04/12 0701 04/13 0700 04/13 0701 04/14 0700    P.O. 450 0 390 0    I.V. (mL/kg) 1641.6 (20.2)       Blood        Total Intake(mL/kg) 2091.6 (25.7) 0 (0) 390 (5.2) 0 (0)    Urine (mL/kg/hr) 1900 (1) 1525 (0.8) 900 (0.5) 350 (0.5)    Other -470       Stool  0 0     Total Output 1430 1525 900 350    Net +661.6 -1525 -510 -350            Urine Unmeasured Occurrence 1 x 1 x 3 x     Stool Unmeasured Occurrence  1 x 3 x         Diet: Liquid Diets, Diabetic Diets; Full Liquid; Consistent Carbohydrate; Texture: Regular Texture (IDDSI 7); Fluid Consistency: Thin (IDDSI 0)  ----------------------------------------------------------------------------------------------------------------------  Physical Exam  Vitals and nursing note reviewed.   Constitutional:       General: He is not in acute distress.     Appearance: He is ill-appearing.   HENT:      Mouth/Throat:      Mouth: Mucous membranes are dry.      Pharynx: Oropharynx is clear.   Eyes:      General: No scleral icterus.     Extraocular Movements: Extraocular movements intact.   Cardiovascular:      Rate and Rhythm: Normal rate.      Pulses: Normal pulses.   Pulmonary:      Effort: Pulmonary effort is normal. No respiratory distress.   Abdominal:      Palpations: Abdomen is soft.      Tenderness: There is no abdominal tenderness.   Musculoskeletal:      Right lower leg: No edema.      Left lower leg: No edema.   Skin:     Comments: LIJ cvc w/bloody dressing surrounding insertion site, no active oozing    Right thorax w/TDC line insertion, overlying tegaderm with only minimal dried  blood surrounding area   Neurological:      Mental Status: Mental status is at baseline. He is disoriented.   Psychiatric:      Comments: Pleasantly confused       ----------------------------------------------------------------------------------------------------------------------    ----------------------------------------------------------------------------------------------------------------------  LABS:    CBC and coagulation:  Results from last 7 days   Lab Units 04/13/23  1241 04/13/23  0210 04/12/23  1730 04/12/23  0931 04/12/23  0053 04/11/23  0532 04/11/23  0009 04/10/23  1723 04/10/23  1119 04/10/23  0541 04/10/23  0004 04/09/23  0141 04/08/23  1647 04/08/23  1109 04/08/23  0029   WBC 10*3/mm3 13.14* 15.63*  --  12.02*  --   --   --   --  13.24*  --  15.02*  --   --   --  11.77*   HEMOGLOBIN g/dL 7.9* 8.3* 8.6* 8.9* 8.0* 7.7* 7.7* 7.9* 7.9*   < > 6.5*   < > 5.5*   < > 8.0*   HEMATOCRIT % 24.5* 26.5*  --  27.9* 24.8* 23.4* 23.6* 25.9* 24.0*   < > 20.6*   < > 17.2*   < > 25.3*   MCV fL 98.8* 100.0*  --  98.9*  --   --   --   --  96.0  --  102.0*  --   --   --  98.8*   MCHC g/dL 32.2 31.3*  --  31.9  --   --   --   --  32.9  --  31.6  --   --   --  31.6   PLATELETS 10*3/mm3 209 233  --  230  --   --   --   --  181  --  193  --   --   --  284   INR   --   --   --   --   --   --   --   --   --   --   --   --  1.38*  --   --     < > = values in this interval not displayed.     Acid/base balance:      Renal and electrolytes:  Results from last 7 days   Lab Units 04/13/23  0210 04/12/23  0053 04/11/23  0009 04/10/23  0004 04/09/23  0141 04/08/23  0029   SODIUM mmol/L 147* 145 146* 151* 149* 144   POTASSIUM mmol/L 3.6  --  3.6 3.9 4.3 4.2   MAGNESIUM mg/dL  --   --   --   --   --  1.9   CHLORIDE mmol/L 117*  --  117* 122* 118* 112*   CO2 mmol/L 16.9*  --  16.8* 14.8* 14.6* 18.0*   BUN mg/dL 65*  --  86* 126* 143* 103*   CREATININE mg/dL 3.82*  --  3.78* 4.29* 4.24* 4.21*   CALCIUM mg/dL 8.5*  --  8.3* 8.2* 8.4*  9.0   GLUCOSE mg/dL 165*  --  145* 85 130* 117*     Estimated Creatinine Clearance: 17.6 mL/min (A) (by C-G formula based on SCr of 3.82 mg/dL (H)).    Liver and pancreatic function:        Invalid input(s): PROT  Endocrine function:  Lab Results   Component Value Date    HGBA1C 6.5 (H) 04/04/2022     Point of care bedside glucose levels:  Results from last 7 days   Lab Units 04/13/23  1317 04/13/23  0925 04/13/23  0728 04/12/23  1945 04/12/23  1753 04/12/23  1048 04/12/23  0724 04/11/23  1911 04/11/23  1639 04/11/23  1128 04/11/23  0549 04/10/23  1704 04/10/23  1134 04/10/23  0603   GLUCOSE mg/dL 183* 139* 151* 216* 176* 208* 110 119 170* 161* 109 184* 115 102     Glucose levels from the [facility]:  Results from last 7 days   Lab Units 04/13/23  0210 04/11/23  0009 04/10/23  0004 04/09/23  0141 04/08/23  0029   GLUCOSE mg/dL 165* 145* 85 130* 117*     Lab Results   Component Value Date    TSH 4.030 06/03/2021    FREET4 1.1 03/31/2023     Cardiac:        Cultures:  Lab Results   Component Value Date    COLORU Yellow 12/07/2022    CLARITYU Clear 12/07/2022    PHUR 6.0 12/07/2022    PROTEINUR 965.0 12/07/2022    GLUCOSEU 100 mg/dL (Trace) (A) 12/07/2022    KETONESU Negative 12/07/2022    BLOODU Small (1+) (A) 12/07/2022    NITRITEU Negative 12/07/2022    LEUKOCYTESUR Negative 12/07/2022    BILIRUBINUR Negative 12/07/2022    UROBILINOGEN 0.2 E.U./dL 12/07/2022    RBCUA 6-12 (A) 12/07/2022    WBCUA 3-5 (A) 12/07/2022    BACTERIA Trace (A) 12/07/2022     Microbiology Results (last 10 days)     Procedure Component Value - Date/Time    COVID-19,[facility] IN-HOUSE CEPHEID/JOSÉ NP SWAB IN TRANSPORT MEDIA 8-12 HR TAT - , [[id number]]  (Normal) Collected: 04/07/23 1033    Lab Status: Final result Specimen: Swab from Nasopharynx Updated: 04/07/23 1646     SARS-CoV-2, ALTHEA Not Detected    Narrative:      This assay is for in vitro diagnostic use under FDA emergency use authorization only. Negative results do not preclude infection with the  SARS CoV-2 virus and should not be the sole basis of a patient treatment/management or public health decision. Follow up testing should be performed according to the current CDC recommendations.  This test was performed on the Xpert Xpress SARS CoV-2 test, a PCR-based method.  Negative results should be considered presumptive and do not preclude current or future infection obtained through community transmission or other exposures. Negative results must be considered in the context of an individual's recent exposures, history, presence of clinical signs and symptoms consistent with COVID-19.          No results found for: PREGTESTUR, PREGSERUM, HCG, HCGQUANT  Pain Management Panel         Latest Ref Rng & Units 12/7/2022 5/30/2021   Pain Management Panel   Creatinine, Urine mg/dL 80.1   136.1           Multiple values from one day are sorted in reverse-chronological order             I have personally looked at the labs and they are summarized above.  ----------------------------------------------------------------------------------------------------------------------  Detailed radiology reports for the last 24 hours:    Imaging Results (Last 24 Hours)     Procedure Component Value Units Date/Time    FL Surgery Fluoro [[id number]] Collected: 04/13/23 0935     Updated: 04/13/23 0938    Narrative:      EXAM:    FL Fluoroscopy < 1 Hour     EXAM DATE:    4/13/2023 8:26 AM     CLINICAL HISTORY:    port; K92.2-Gastrointestinal hemorrhage, unspecified; I21.4-Non-ST  elevation (NSTEMI) myocardial infarction; N18.9-Chronic kidney disease,  unspecified     TECHNIQUE:    Fluoroscopic images performed in multiple projections.  Fluoroscopic  guidance was provided by a physician. Fluoroscopy exposure time of  approximately 1 minute or less.     COMPARISON:    No relevant prior studies available.     FINDINGS:    OTHER FINDINGS:  Right upper chest region of interest.       Impression:        As above.     This report was finalized  on 4/13/2023 9:35 AM by Dr. Live Samayoa MD.       XR Chest AP [[id number]] Collected: 04/13/23 0930     Updated: 04/13/23 0932    Narrative:      EXAM:    XR Chest, 1 View     EXAM DATE:    4/13/2023 9:12 AM     CLINICAL HISTORY:    post op; K92.2-Gastrointestinal hemorrhage, unspecified; I21.4-Non-ST  elevation (NSTEMI) myocardial infarction; N18.9-Chronic kidney disease,  unspecified     TECHNIQUE:    Frontal view of the chest.     COMPARISON:    No relevant prior studies available.     FINDINGS:    LUNGS:  Slightly improved aeration of lungs.    PLEURAL SPACE:  Unremarkable.  No pneumothorax.    HEART:  Coronary artery bypass graft (CABG).  Heart size is stable.    MEDIASTINUM:  Unremarkable.    BONES/JOINTS:  Median sternotomy.    TUBES, LINES AND DEVICES:  Right-sided dialysis catheter with tip in  SVC.  Left internal jugular central venous catheter tip in the superior  vena cava.  Automatic implantable cardioverter defibrillator (AICD).       Impression:        Slightly improved aeration of lungs.     This report was finalized on 4/13/2023 9:30 AM by Dr. Live Samayoa MD.             Assessment & Plan      #Acute blood loss anemia w/UGIB  #PUD s/p EGD w/epinephrine injection  -Continuing BID PPI injection with transition to PO PPI BID if no melena overnight, MS must also improve and demonstrate consistent PO intake of medications    #Leukocytosis  -Improving, likely from UGIB    #ESRD previously on PD s/p TDC placement 4/13  -Underwent placement of TDC 4/13 with minor bleeding periprocedurally  -First HD session 4/13, will monitor inpatient to see how its tolerated and repeat H&H in am given noted bleeding around insertion sites before return to rehab  -Nephrology following    #Hypernatremia  -Previously given D5w but can correct moving forward w/HD. Currently near normal range    -- Chronic --    CAD- holding ASA due to UGIB  HTN- BP controlled on Losartan, Norvasc and coreg  HLD- statin  DM2- A1C 6.5%. SSI  only given NPO. BG in acceptable range.   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 Dose decreased to 100 mg/day per renal function.   Constipation- cont doc/senna nightly        VTE Prophylaxis:   Mechanical Order History:      Ordered        04/08/23 1218  Maintain Sequential Compression Device  Continuous                    Pharmalogical Order History:      Ordered     Dose Route Frequency Stop    04/11/23 1429  heparin (porcine) injection 6,000 Units         6,000 Units IK Once 04/11/23 1539    04/1945  heparin (porcine) injection 2,000 Units         2,000 Units IV Once 04/10/23 1958                Code Status and Medical Interventions:   Ordered at: 04/08/23 1218     Level Of Support Discussed With:    Patient     Code Status (Patient has no pulse and is not breathing):    CPR (Attempt to Resuscitate)     Medical Interventions (Patient has pulse or is breathing):    Full Support     Release to patient:    Routine Release         Disposition: D/c back to inpatient rehab pending H&H stability and HD tolerance    I have reviewed any copied/forwarded text or data, verified its accuracy, and updated as necessary above.    oNah Lester MD  [facility] Hospitalist  04/13/23  15:58 EDT

## 2023-04-13 NOTE — SIGNIFICANT NOTE
04/13/23 1553   Rehab Time/Intention   Session Not Performed patient unavailable for treatment   Recommendation   PT - Next Appointment   (PT visited room of pt where he was gone for procedure/test/etc)

## 2023-04-13 NOTE — OP NOTE
HEMODIALYSIS CATHETER INSERTION, CENTRAL VENOUS LINE INSERTION  Procedure Note    Augusto Lorenzana JrTunde  4/13/2023    Pre-op Diagnosis:   Gastrointestinal hemorrhage, unspecified gastrointestinal hemorrhage type [K92.2]  Chronic kidney disease, unspecified CKD stage [N18.9]    Post-op Diagnosis:     Post-Op Diagnosis Codes:     * Gastrointestinal hemorrhage, unspecified gastrointestinal hemorrhage type [K92.2]     * Chronic kidney disease, unspecified CKD stage [N18.9]    Procedure(s):  CENTRAL LINE REMOVAL   TUNNELEDHEMODIALYSIS CATHETER INSERTION WITH ULTRASOUND GUIDED VENOUS ACCESS AND FLUOROSCOPY  CENTRAL VENOUS LINE INSERTION WITH ULTRASOUND GUIDED VENOUS ACCESS    Surgeon(s):  Torey Easley MD    Anesthesia: Choice    Staff:   Circulator: Charles Quezada RN  Radiology Technologist: Eliezer Gamboa RT  Scrub Person: Addis Cr  Assistant: Thom Conde    Findings: Successful removal of right internal jugular vein central line with replacement of the left internal jugular vein successful right internal jugular vein 23 cm tunneled hemodialysis catheter placement           Operative Procedure: The patient was taken operating suite and placed upon the operating table.  After induction of LMA anesthesia the right neck and chest were prepped and draped in usual sterile fashion along with the left neck and chest.  Attention began with placement of left internal jugular vein central line as there was no room for tunneled dialysis catheter on the left due to lack of an area to tunnel due to a recent pacemaker placement.  After injection of local anesthetic under ultrasound venous access was achieved in the left internal jugular vein.  A guidewire was placed in the left internal jugular vein without resistance and fluoroscopy confirmed line in appropriate position.  A stab incision was made at the guidewire entry site via Seldinger technique the tract was serially dilated and 7 [place]  triple-lumen central line was placed into the left internal jugular vein over the guidewire.  The guidewire was removed and the catheter was assessed and all ports withdrew venous blood and flushed with saline.  The catheter secured with suture to the skin and a Biopatch and occlusive dressing were applied.  Patient tolerated this portion of the procedure well.  Attention was turned to tunneled hemodialysis catheter placement.  At the site on the right neck the previous central line has been removed.  Just inferior to this after injection of local anesthetic under ultrasound visualization an 18-gauge access needle was inserted into the right internal jugular vein and venous blood was aspirated.  Guidewire was placed without resistance into the right internal jugular vein.  Fluoroscopy confirmed the guidewire in appropriate position.  A stab incision was made at the guidewire entry site and the tract was serially dilated under fluoroscopy with a dilator introducer sheath being placed into the right internal jugular vein.  The guidewire was removed and a 23 cm tunneled hemodialysis catheter was inserted into the right internal jugular vein.  The breakaway sheath was removed.  Under fluoroscopic visualization the tips were placed in appropriate position at the right atrium and an exit site on the left chest was identified.  An stab incision at this area was made and a subcutaneous tunnel was created with a tunneling device which was then dilated.  In a retrograde fashion the catheter tubing was pulled through the subcutaneous tunnel through the exit site with the cuff 2 cm above the exit site.  The catheter was then assembled and assessed and withdrew venous blood and flushed with heparin easily.  Caps were applied and the exit site was dressed with a Biopatch and occlusive dressing after the catheter was secured to the skin with Prolene suture.  The right neck access site was closed with deep dermal Vicryl suture and  skin affix.  Patient was awakened from anesthesia and taken to recovery where stat chest x-ray was ordered confirming placement.    Estimated Blood Loss: 10 mL    Specimens:   None           Drains: None    Grafts/Implants: Left internal jugular vein 23 cm tunneled hemodialysis catheter, right internal jugular vein 20 cm triple-lumen central line    Complications: None      Torey Easley MD     Date: 4/13/2023  Time: 08:55 EDT

## 2023-04-13 NOTE — PLAN OF CARE
Goal Outcome Evaluation:  Plan of Care Reviewed With: patient        Progress: improving   Patient has been resting this shift. Patient had hemodialysis port placed this shift and is currently receiving dialysis. Patients hemodialysis site and central line site continue to bleed- MD Tayler and MD Kaushal aware. See charting. Dressings changed. Patient reeducated on importance of not touching dressings or pulling at lines. No current s/s of acute distress noted. Will continue with plan of care.

## 2023-04-13 NOTE — ANESTHESIA PROCEDURE NOTES
Airway  Urgency: elective    Date/Time: 4/13/2023 8:18 AM  Airway not difficult    General Information and Staff    Patient location during procedure: OR  CRNA/CAA: Priscilla Estrada CRNA    Indications and Patient Condition  Indications for airway management: airway protection    Preoxygenated: yes  Mask difficulty assessment: 0 - not attempted    Final Airway Details  Final airway type: supraglottic airway      Successful airway: unique  Size 4     Number of attempts at approach: 1  Assessment: lips, teeth, and gum same as pre-op

## 2023-04-13 NOTE — CASE MANAGEMENT/SOCIAL WORK
Discharge Planning Assessment  [facility]     Patient Name: Augusto Lorenzana Jr.  MRN: [id number]  Today's Date: 4/13/2023    Admit Date: 4/8/2023    Plan: SS spoke with pt's spouse Hedy on this date.  Pt transferred to [facility] inpt rehab from [facility].  Pt was initiated on Peritoneal Dialysis at [facility].  SS received consult per Physician to begin arrangements for outpt hemodialysis chair.  Pt's spouse requested [facility].  SS faxed pt information to [facility] at [phone] and spoke with Rina at [facility] at [phone].  SS spoke in detail with pt's spouse on this date regarding discharge disposition.  Pt's spouse requests pt return to inpt rehab prior to discharge home.  SS will follow and assist with discharge disposition.     Discharge Plan     Row Name 04/13/23 1457       Plan    Plan SS spoke with pt's spouse Hedy on this date.  Pt transferred to [facility] inpt rehab from [facility].  Pt was initiated on Peritoneal Dialysis at [facility].  SS received consult per Physician to begin arrangements for outpt hemodialysis chair.  Pt's spouse requested [facility].  SS faxed pt information to [facility] at [phone] and spoke with Rina at [facility] at [phone].  SS spoke in detail with pt's spouse on this date regarding discharge disposition.  Pt's spouse requests pt return to inpt rehab prior to discharge home.  SS will follow and assist with discharge disposition.              Kriss Rivera, BSW

## 2023-04-14 LAB
ALBUMIN SERPL-MCNC: 2.5 G/DL (ref 3.5–5.2)
ANION GAP SERPL CALCULATED.3IONS-SCNC: 9.1 MMOL/L (ref 5–15)
BUN SERPL-MCNC: 28 MG/DL (ref 8–23)
BUN/CREAT SERPL: 10.6 (ref 7–25)
CALCIUM SPEC-SCNC: 7.6 MG/DL (ref 8.6–10.5)
CHLORIDE SERPL-SCNC: 103 MMOL/L (ref 98–107)
CO2 SERPL-SCNC: 26.9 MMOL/L (ref 22–29)
CREAT SERPL-MCNC: 2.63 MG/DL (ref 0.76–1.27)
EGFRCR SERPLBLD CKD-EPI 2021: 24.4 ML/MIN/1.73
GLUCOSE BLDC GLUCOMTR-MCNC: 113 MG/DL (ref 70–130)
GLUCOSE BLDC GLUCOMTR-MCNC: 153 MG/DL (ref 70–130)
GLUCOSE BLDC GLUCOMTR-MCNC: 190 MG/DL (ref 70–130)
GLUCOSE BLDC GLUCOMTR-MCNC: 203 MG/DL (ref 70–130)
GLUCOSE SERPL-MCNC: 120 MG/DL (ref 65–99)
HCT VFR BLD AUTO: 23.9 % (ref 37.5–51)
HGB BLD-MCNC: 7.7 G/DL (ref 13–17.7)
INR PPP: 1.15 (ref 0.9–1.1)
PHOSPHATE SERPL-MCNC: 2.4 MG/DL (ref 2.5–4.5)
POTASSIUM SERPL-SCNC: 3.1 MMOL/L (ref 3.5–5.2)
PROTHROMBIN TIME: 15.3 SECONDS (ref 12.1–14.7)
SODIUM SERPL-SCNC: 139 MMOL/L (ref 136–145)

## 2023-04-14 PROCEDURE — 25010000002 HALOPERIDOL LACTATE PER 5 MG: Performed by: SURGERY

## 2023-04-14 PROCEDURE — 80069 RENAL FUNCTION PANEL: CPT | Performed by: STUDENT IN AN ORGANIZED HEALTH CARE EDUCATION/TRAINING PROGRAM

## 2023-04-14 PROCEDURE — 85610 PROTHROMBIN TIME: CPT | Performed by: STUDENT IN AN ORGANIZED HEALTH CARE EDUCATION/TRAINING PROGRAM

## 2023-04-14 PROCEDURE — 63710000001 INSULIN LISPRO (HUMAN) PER 5 UNITS: Performed by: SURGERY

## 2023-04-14 PROCEDURE — 97535 SELF CARE MNGMENT TRAINING: CPT

## 2023-04-14 PROCEDURE — 97530 THERAPEUTIC ACTIVITIES: CPT

## 2023-04-14 PROCEDURE — 85014 HEMATOCRIT: CPT | Performed by: STUDENT IN AN ORGANIZED HEALTH CARE EDUCATION/TRAINING PROGRAM

## 2023-04-14 PROCEDURE — 85018 HEMOGLOBIN: CPT | Performed by: STUDENT IN AN ORGANIZED HEALTH CARE EDUCATION/TRAINING PROGRAM

## 2023-04-14 PROCEDURE — 82962 GLUCOSE BLOOD TEST: CPT

## 2023-04-14 PROCEDURE — 99232 SBSQ HOSP IP/OBS MODERATE 35: CPT | Performed by: STUDENT IN AN ORGANIZED HEALTH CARE EDUCATION/TRAINING PROGRAM

## 2023-04-14 PROCEDURE — 97110 THERAPEUTIC EXERCISES: CPT

## 2023-04-14 RX ORDER — PANTOPRAZOLE SODIUM 40 MG/1
40 TABLET, DELAYED RELEASE ORAL
Status: DISCONTINUED | OUTPATIENT
Start: 2023-04-14 | End: 2023-04-18 | Stop reason: HOSPADM

## 2023-04-14 RX ORDER — POTASSIUM CHLORIDE 20 MEQ/1
30 TABLET, EXTENDED RELEASE ORAL ONCE
Status: COMPLETED | OUTPATIENT
Start: 2023-04-14 | End: 2023-04-14

## 2023-04-14 RX ADMIN — CARVEDILOL 25 MG: 25 TABLET, FILM COATED ORAL at 17:03

## 2023-04-14 RX ADMIN — POTASSIUM CHLORIDE 30 MEQ: 20 TABLET, EXTENDED RELEASE ORAL at 11:11

## 2023-04-14 RX ADMIN — POTASSIUM CHLORIDE 30 MEQ: 1500 TABLET, EXTENDED RELEASE ORAL at 04:47

## 2023-04-14 RX ADMIN — PANTOPRAZOLE SODIUM 40 MG: 40 TABLET, DELAYED RELEASE ORAL at 17:03

## 2023-04-14 RX ADMIN — PANTOPRAZOLE SODIUM 40 MG: 40 INJECTION, POWDER, FOR SOLUTION INTRAVENOUS at 06:31

## 2023-04-14 RX ADMIN — AMLODIPINE BESYLATE 10 MG: 10 TABLET ORAL at 08:44

## 2023-04-14 RX ADMIN — CASTOR OIL AND BALSAM, PERU 1 APPLICATION: 788; 87 OINTMENT TOPICAL at 08:45

## 2023-04-14 RX ADMIN — ACETAMINOPHEN 500 MG: 500 TABLET ORAL at 14:19

## 2023-04-14 RX ADMIN — GENTAMICIN SULFATE 1 APPLICATION: 1 OINTMENT TOPICAL at 08:45

## 2023-04-14 RX ADMIN — OLANZAPINE 5 MG: 5 TABLET, ORALLY DISINTEGRATING ORAL at 08:45

## 2023-04-14 RX ADMIN — CARVEDILOL 25 MG: 25 TABLET, FILM COATED ORAL at 08:45

## 2023-04-14 RX ADMIN — Medication 10 ML: at 21:13

## 2023-04-14 RX ADMIN — INSULIN LISPRO 3 UNITS: 100 INJECTION, SOLUTION INTRAVENOUS; SUBCUTANEOUS at 11:11

## 2023-04-14 RX ADMIN — ACETAMINOPHEN 500 MG: 500 TABLET ORAL at 06:31

## 2023-04-14 RX ADMIN — ATORVASTATIN CALCIUM 40 MG: 40 TABLET, FILM COATED ORAL at 21:13

## 2023-04-14 RX ADMIN — CETIRIZINE HYDROCHLORIDE 5 MG: 10 TABLET, FILM COATED ORAL at 08:44

## 2023-04-14 RX ADMIN — Medication 10 ML: at 21:14

## 2023-04-14 RX ADMIN — LIDOCAINE 1 PATCH: 4 PATCH TOPICAL at 08:45

## 2023-04-14 RX ADMIN — DOCUSATE SODIUM 50 MG AND SENNOSIDES 8.6 MG 2 TABLET: 8.6; 5 TABLET, FILM COATED ORAL at 21:13

## 2023-04-14 RX ADMIN — HALOPERIDOL LACTATE 2 MG: 5 INJECTION, SOLUTION INTRAMUSCULAR at 00:02

## 2023-04-14 RX ADMIN — Medication 10 ML: at 08:45

## 2023-04-14 RX ADMIN — CASTOR OIL AND BALSAM, PERU 1 APPLICATION: 788; 87 OINTMENT TOPICAL at 21:14

## 2023-04-14 RX ADMIN — Medication 1 TABLET: at 08:44

## 2023-04-14 RX ADMIN — INSULIN LISPRO 2 UNITS: 100 INJECTION, SOLUTION INTRAVENOUS; SUBCUTANEOUS at 08:57

## 2023-04-14 NOTE — PLAN OF CARE
Goal Outcome Evaluation:   Pt resting in bed with sitter remaining at bedside. No signs or symptoms of distress noted. No complaints at this time. Will continue with plan of care.

## 2023-04-14 NOTE — CASE MANAGEMENT/SOCIAL WORK
Discharge Planning Assessment  [facility]     Patient Name: Augusto Lorenzana Jr.  MRN: [id number]  Today's Date: 4/14/2023    Admit Date: 4/8/2023    Plan: SS began referral to [facility] on 4/13 for outpt hemodialysis.  Pt's spouse requests pt return to [facility] inpt rehab when medically stable if possible.  SS will follow up am Monday.     Discharge Plan     Row Name 04/14/23 1623       Plan    Plan SS began referral to [facility] on 4/13 for outpt hemodialysis.  Pt's spouse requests pt return to [facility] inpt rehab when medically stable if possible.  SS will follow up am Monday.            MANISHA Elder

## 2023-04-14 NOTE — PROGRESS NOTES
[facility] HOSPITALIST PROGRESS NOTE     Patient Identification:  Name:  Augusto Lorenzana Jr.  Age:  78 y.o.  Sex:  male  :  1945  MRN:  [id number]  Visit Number:  [id number]  ROOM: [address] [place] [address]     Primary Care Provider:  Rob Polanco MD     Date of Admission: 2023    Length of stay in inpatient status:  6    Subjective     Chief Compliant:  Acute blood loss anemia    Overnight patient had some confusion and agitation requiring one time haldol dose with improvement and sedation after. This am he is accompanied at bedside by his wife and is much more alert, oriented and dangling at bedside without difficulties. Ambulating to commode with one person assist. No further bleeding noted from TDC or left IJ cvc.         Objective     Current Hospital Meds:  allopurinol, 50 mg, Oral, Once per day on Mon Thu  amLODIPine, 10 mg, Oral, Q24H  atorvastatin, 40 mg, Oral, Nightly  carvedilol, 25 mg, Oral, BID With Meals  castor oil-balsam peru, 1 application, Topical, Q12H  cetirizine, 5 mg, Oral, Daily  gentamicin, 1 application, Topical, Daily  insulin lispro, 2-7 Units, Subcutaneous, TID With Meals  Lidocaine, 1 patch, Apply externally, Daily  multivitamin, 1 tablet, Oral, Daily  OLANZapine zydis, 5 mg, Oral, Daily  pantoprazole, 40 mg, Intravenous, BID AC  senna-docusate sodium, 2 tablet, Oral, Nightly  sodium chloride, 10 mL, Intravenous, Q12H  sodium chloride, 10 mL, Intravenous, Q12H  sodium chloride, 10 mL, Intravenous, Q12H  sterile water (preservative free), , ,     sodium chloride, 9 mL/hr, Last Rate: 30 mL/hr (23 0817)      Current Antimicrobial Therapy:  Anti-Infectives (From admission, onward)    None        Current Diuretic Therapy:  Diuretics (From admission, onward)    None        ----------------------------------------------------------------------------------------------------------------------  Vital Signs:  Temp:  [97.7 °F (36.5 °C)-99 °F (37.2 °C)] 98 °F (36.7 °C)  Heart  Rate:  [61-78] 78  Resp:  [16-18] 18  BP: ()/(58-76) 94/58  SpO2:  [95 %-97 %] 95 %  on   ;   Device (Oxygen Therapy): room air  Body mass index is 26.16 kg/m².    Wt Readings from Last 3 Encounters:   04/13/23 75.8 kg (167 lb)   04/07/23 77.2 kg (170 lb 3.2 oz)   07/08/21 83 kg (183 lb)     Intake & Output (last 3 days)       04/11 0701 04/12 0700 04/12 0701 04/13 0700 04/13 0701 04/14 0700 04/14 0701  04/15 0700    P.O. 0 390 0 220    I.V. (mL/kg)        Other   600     Total Intake(mL/kg) 0 (0) 390 (5.2) 600 (7.9) 220 (2.9)    Urine (mL/kg/hr) 1525 (0.8) 900 (0.5) 350 (0.2)     Other        Stool 0 0 0     Total Output 1525 900 350     Net -1525 -510 +250 +220            Urine Unmeasured Occurrence 1 x 3 x  1 x    Stool Unmeasured Occurrence 1 x 3 x 3 x 1 x        Diet: Liquid Diets, Diabetic Diets; Full Liquid; Consistent Carbohydrate; Texture: Regular Texture (IDDSI 7); Fluid Consistency: Thin (IDDSI 0)  ----------------------------------------------------------------------------------------------------------------------  Physical Exam  Vitals and nursing note reviewed.   Constitutional:       General: He is not in acute distress.     Appearance: He is not ill-appearing.   HENT:      Mouth/Throat:      Mouth: Mucous membranes are dry.      Pharynx: Oropharynx is clear.   Eyes:      General: No scleral icterus.     Extraocular Movements: Extraocular movements intact.   Cardiovascular:      Rate and Rhythm: Normal rate.      Pulses: Normal pulses.   Pulmonary:      Effort: Pulmonary effort is normal. No respiratory distress.   Abdominal:      Palpations: Abdomen is soft.      Tenderness: There is no abdominal tenderness.   Musculoskeletal:      Right lower leg: No edema.      Left lower leg: No edema.   Skin:     Comments: LIJ cvc w/bloody dressing surrounding insertion site, no active oozing    Right thorax w/TDC line insertion, overlying tegaderm with only minimal dried blood surrounding area    Neurological:      Mental Status: He is oriented to person, place, and time. Mental status is at baseline.   Psychiatric:      Comments: Conversational and alert. Redirectable       ----------------------------------------------------------------------------------------------------------------------    ----------------------------------------------------------------------------------------------------------------------  LABS:    CBC and coagulation:  Results from last 7 days   Lab Units 04/14/23  0108 04/13/23  1241 04/13/23  0210 04/12/23  1730 04/12/23  0931 04/12/23  0053 04/11/23  0532 04/11/23  0009 04/10/23  1723 04/10/23  1119 04/10/23  0541 04/10/23  0004 04/09/23  0141 04/08/23  1647 04/08/23  1109 04/08/23  0029   WBC 10*3/mm3  --  13.14* 15.63*  --  12.02*  --   --   --   --  13.24*  --  15.02*  --   --   --  11.77*   HEMOGLOBIN g/dL 7.7* 7.9* 8.3* 8.6* 8.9* 8.0* 7.7* 7.7*   < > 7.9*   < > 6.5*   < > 5.5*   < > 8.0*   HEMATOCRIT % 23.9* 24.5* 26.5*  --  27.9* 24.8* 23.4* 23.6*   < > 24.0*   < > 20.6*   < > 17.2*   < > 25.3*   MCV fL  --  98.8* 100.0*  --  98.9*  --   --   --   --  96.0  --  102.0*  --   --   --  98.8*   MCHC g/dL  --  32.2 31.3*  --  31.9  --   --   --   --  32.9  --  31.6  --   --   --  31.6   PLATELETS 10*3/mm3  --  209 233  --  230  --   --   --   --  181  --  193  --   --   --  284   INR   --   --   --   --   --   --   --   --   --   --   --   --   --  1.38*  --   --     < > = values in this interval not displayed.     Acid/base balance:      Renal and electrolytes:  Results from last 7 days   Lab Units 04/14/23  0108 04/13/23  0210 04/12/23  0053 04/11/23  0009 04/10/23  0004 04/09/23  0141 04/08/23  0029   SODIUM mmol/L 139 147* 145 146* 151* 149* 144   POTASSIUM mmol/L 3.1* 3.6  --  3.6 3.9 4.3 4.2   MAGNESIUM mg/dL  --   --   --   --   --   --  1.9   CHLORIDE mmol/L 103 117*  --  117* 122* 118* 112*   CO2 mmol/L 26.9 16.9*  --  16.8* 14.8* 14.6* 18.0*   BUN mg/dL 28* 65*  --   86* 126* 143* 103*   CREATININE mg/dL 2.63* 3.82*  --  3.78* 4.29* 4.24* 4.21*   CALCIUM mg/dL 7.6* 8.5*  --  8.3* 8.2* 8.4* 9.0   PHOSPHORUS mg/dL 2.4*  --   --   --   --   --   --    GLUCOSE mg/dL 120* 165*  --  145* 85 130* 117*     Estimated Creatinine Clearance: 25.6 mL/min (A) (by C-G formula based on SCr of 2.63 mg/dL (H)).    Liver and pancreatic function:  Results from last 7 days   Lab Units 04/14/23  0108   ALBUMIN g/dL 2.5*     Endocrine function:  Lab Results   Component Value Date    HGBA1C 6.5 (H) 04/04/2022     Point of care bedside glucose levels:  Results from last 7 days   Lab Units 04/14/23  1057 04/14/23  0637 04/13/23  1637 04/13/23  1317 04/13/23  0925 04/13/23  0728 04/12/23  1945 04/12/23  1753 04/12/23  1048 04/12/23  0724 04/11/23  1911 04/11/23  1639 04/11/23  1128 04/11/23  0549   GLUCOSE mg/dL 203* 153* 99 183* 139* 151* 216* 176* 208* 110 119 170* 161* 109     Glucose levels from the [facility]:  Results from last 7 days   Lab Units 04/14/23  0108 04/13/23  0210 04/11/23  0009 04/10/23  0004 04/09/23  0141 04/08/23  0029   GLUCOSE mg/dL 120* 165* 145* 85 130* 117*     Lab Results   Component Value Date    TSH 4.030 06/03/2021    FREET4 1.1 03/31/2023     Cardiac:        Cultures:  Lab Results   Component Value Date    COLORU Yellow 12/07/2022    CLARITYU Clear 12/07/2022    PHUR 6.0 12/07/2022    PROTEINUR 965.0 12/07/2022    GLUCOSEU 100 mg/dL (Trace) (A) 12/07/2022    KETONESU Negative 12/07/2022    BLOODU Small (1+) (A) 12/07/2022    NITRITEU Negative 12/07/2022    LEUKOCYTESUR Negative 12/07/2022    BILIRUBINUR Negative 12/07/2022    UROBILINOGEN 0.2 E.U./dL 12/07/2022    RBCUA 6-12 (A) 12/07/2022    WBCUA 3-5 (A) 12/07/2022    BACTERIA Trace (A) 12/07/2022     Microbiology Results (last 10 days)     Procedure Component Value - Date/Time    COVID-19,[facility] IN-HOUSE CEPHEID/JOSÉ NP SWAB IN TRANSPORT MEDIA 8-12 HR TAT - , [[id number]]  (Normal) Collected: 04/07/23 1033    Lab Status: Final  result Specimen: Swab from Nasopharynx Updated: 04/07/23 1646     SARS-CoV-2, ALTHEA Not Detected    Narrative:      This assay is for in vitro diagnostic use under FDA emergency use authorization only. Negative results do not preclude infection with the SARS CoV-2 virus and should not be the sole basis of a patient treatment/management or public health decision. Follow up testing should be performed according to the current CDC recommendations.  This test was performed on the Xpert Xpress SARS CoV-2 test, a PCR-based method.  Negative results should be considered presumptive and do not preclude current or future infection obtained through community transmission or other exposures. Negative results must be considered in the context of an individual's recent exposures, history, presence of clinical signs and symptoms consistent with COVID-19.          No results found for: PREGTESTUR, PREGSERUM, HCG, HCGQUANT  Pain Management Panel         Latest Ref Rng & Units 12/7/2022 5/30/2021   Pain Management Panel   Creatinine, Urine mg/dL 80.1   136.1           Multiple values from one day are sorted in reverse-chronological order             I have personally looked at the labs and they are summarized above.  ----------------------------------------------------------------------------------------------------------------------  Detailed radiology reports for the last 24 hours:    Imaging Results (Last 24 Hours)     ** No results found for the last 24 hours. **          Assessment & Plan      #Acute blood loss anemia w/UGIB  #PUD s/p EGD w/epinephrine injection  -s/p 48hrs BID PPI IV after period on PPI gtt. Transitioned to PO PPI BID 4/14 to be continued x6 weeks-o recurrent melena noted.  -Repeat hemoglobin stable, minimally reduced after TDC placement  -INR pending, oozing from catheter sights likely from uremia induced platelet dysfunction. If INR <2 ok to pull LIJ cvc    #Leukocytosis  -Improving, likely from UGIB    #ESRD  previously on PD s/p TDC placement 4/13  -Underwent placement of TDC 4/13 with minor bleeding periprocedurally  -First HD session 4/13, will monitor inpatient to see how its tolerated and repeat H&H in am given noted bleeding around insertion sites before return to rehab  -Nephrology following, planning to cont TTS HD and with PDx redo outpatient  -Will need outpatient HD chair at discharge    #Hypernatremia  -Previously given D5w but can correct moving forward w/HD  -Now wnl    #Critial illness associated myopathy  -Patient had hospitalization initially at  3/30 for PD placement complicated by new heart block requiring placement of PM, transferred to [facility] for inaptient rehab and developed acute GI bleed shortly after transfer requiring EGD and TDC placement for nonfunctional PD  -Given above periods of significant illness and immobility, now below baseline activity level and functional status    -- Chronic --    CAD s/p CABG- holding ASA due to UGIB, resume as outpatient if no recurrence after 6 weeks PPI. Consider resuming if Hgb sustains >8mg/dl on f/u  HTN- BP controlled on Losartan, Norvasc and coreg  HLD- statin  DM2- A1C 6.5%. 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 Dose decreased to 100 mg/day per renal function.   Constipation- cont doc/senna nightly        VTE Prophylaxis:   Mechanical Order History:      Ordered        04/08/23 1218  Maintain Sequential Compression Device  Continuous                    Pharmalogical Order History:      Ordered     Dose Route Frequency Stop    04/11/23 1429  heparin (porcine) injection 6,000 Units         6,000 Units IK Once 04/11/23 1539    04/1945  heparin (porcine) injection 2,000 Units         2,000 Units IV Once 04/10/23 1958                Code Status and Medical Interventions:   Ordered at: 04/08/23 1218     Level Of Support Discussed With:    Patient     Code Status  (Patient has no pulse and is not breathing):    CPR (Attempt to Resuscitate)     Medical Interventions (Patient has pulse or is breathing):    Full Support     Release to patient:    Routine Release         Disposition: Once CVC removed and confirmed hemostasis, can begin referral to rehab    I have reviewed any copied/forwarded text or data, verified its accuracy, and updated as necessary above.    Noah Lester MD  [facility]  04/14/23  12:40 EDT

## 2023-04-14 NOTE — THERAPY TREATMENT NOTE
Acute Care - Physical Therapy Treatment Note   [facility]     Patient Name: Augusto Lorenzana Jr.  : 1945  MRN: [id number]  Today's Date: 2023      Visit Dx:     ICD-10-CM ICD-9-CM   1. Gastrointestinal hemorrhage, unspecified gastrointestinal hemorrhage type  K92.2 578.9   2. NSTEMI 2021  I21.4 410.70   3. Chronic kidney disease, unspecified CKD stage  N18.9 585.9     Patient Active Problem List   Diagnosis   • NSTEMI 2021   • Hyperlipidemia LDL goal <70   • Essential hypertension   • T2DM on oral agents and insulin. HbA1c 7.4    • Coronary artery disease involving native coronary artery of native heart with unstable angina pectoris   • A/C kidney disease. ATN due to postoperative arrest. 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 Combination of anoxic insult and azotemia   • Debility   • Lower extremity edema   • Complete heart block   • GI bleed   • Third degree atrioventricular block   • Atrial fibrillation, persistent   • Gastrointestinal hemorrhage associated with gastric ulcer     Past Medical History:   Diagnosis Date   • CAD (coronary artery disease)    • CKD (chronic kidney disease) stage 3, GFR 30-59 ml/min    • Diabetes mellitus    • Hyperlipidemia    • Hypertension    • NSTEMI (non-ST elevated myocardial infarction)      Past Surgical History:   Procedure Laterality Date   • CARDIAC CATHETERIZATION N/A 2021    Procedure: Left Heart Cath;  Surgeon: Blade Greene IV, MD;  Location: Located within [facility] INVASIVE LOCATION;  Service: Cardiovascular;  Laterality: N/A;   • CARDIAC SURGERY      2 stents placed .   • CORONARY ARTERY BYPASS GRAFT N/A 2021    Procedure: MEDIAN STERNOTOMY CORONARY ARTERY BYPASS X 3 UTILIZING THE LEFT INTERNAL MAMMARY ARTERY GRAFT, EVH OF THE GREATER RIGHT SAPHENOUS VEIN, AND PILO PER  ANESTHESIA;  Surgeon: Jacek Miller MD;  Location:  [facility];  Service: Cardiothoracic;  Laterality: N/A;   • ENDOSCOPY N/A 04/08/2023    Procedure: ESOPHAGOGASTRODUODENOSCOPY with CENTERAL LINE PLACEMENT;  Surgeon: Sabine Hudson MD;  Location:  [facility];  Service: General;  Laterality: N/A;   • ENDOSCOPY N/A 04/10/2023    Procedure: ESOPHAGOGASTRODUODENOSCOPY;  Surgeon: Sabine Hudson MD;  Location:  [facility];  Service: General;  Laterality: N/A;   • PACEMAKER IMPLANTATION       PT Assessment (last 12 hours)     PT Evaluation and Treatment     Row Name 04/14/23 0912          Physical Therapy Time and Intention    Document Type therapy note (daily note)  -     Mode of Treatment physical therapy  -     Patient Effort good  -     Comment Pt seen for treatment this date with encouragement and cooperation with PT. Pt worked on LE TE and ambulation, standing  -     Row Name 04/14/23 0912          Bed Mobility    Bed Mobility supine-sit  -     Supine-Sit [place] (Bed Mobility) moderate assist (50% patient effort);minimum assist (75% patient effort)  -     Comment, (Bed Mobility) set up assist given and max/dep initially to get LE's towards EOB, once pt participated grossly MI using BR.  -     Row Name 04/14/23 0912          Transfers    Transfers sit-stand transfer;stand-sit transfer  -     Comment, (Transfers) vc for pushing hand  -     Row Name 04/14/23 0912          Sit-Stand Transfer    Sit-Stand [place] (Transfers) contact guard;minimum assist (75% patient effort);1 person assist;2 person assist  -     Assistive Device (Sit-Stand Transfers) walker, front-wheeled  -     Row Name 04/14/23 0912          Stand-Sit Transfer    Stand-Sit [place] (Transfers) contact guard;minimum assist (75% patient effort);1 person assist;2 person assist  -     Assistive Device (Stand-Sit Transfers) walker, front-wheeled  -     Row Name 04/14/23 0912          Gait/Stairs (Locomotion)     Gait/Stairs Locomotion gait/ambulation assistive device  -     Edgar Level (Gait) contact guard;minimum assist (75% patient effort)  -     Row Name 04/14/23 0912          Motor Skills    Therapeutic Exercise knee;hip  marches sitting EOB 3-4x10 grossly, S/LAQ grossly 2x10  -     Row Name             Wound 04/12/23 1700 distal penis Pressure Injury    Wound - Properties Group Placement Date: 04/12/23 -TW Placement Time: 1700  -TW Present on Hospital Admission: N  -TW Orientation: distal  -TW Location: penis  -TW Primary Wound Type: Pressure inj  -TW    Retired Wound - Properties Group Placement Date: 04/12/23 -TW Placement Time: 1700  -TW Present on Hospital Admission: N  -TW Orientation: distal  -TW Location: penis  -TW Primary Wound Type: Pressure inj  -TW    Retired Wound - Properties Group Date first assessed: 04/12/23 -TW Time first assessed: 1700  -TW Present on Hospital Admission: N  -TW Location: penis  -TW Primary Wound Type: Pressure inj  -TW    Row Name 04/14/23 0912          Plan of Care Review    Outcome Evaluation Pt seemingly more alert and did well with PT this date. some c/o of fatigue/weakness  -     Row Name 04/14/23 0912          Positioning and Restraints    Pre-Treatment Position in bed  -     Post Treatment Position bed  -KH     In Bed sitting EOB;with family/caregiver  -           User Key  (r) = Recorded By, (t) = Taken By, (c) = Cosigned By    Initials Name Provider Type     Kaia Turner, RN Registered Nurse    Yamel Fuller, PT Physical Therapist                Physical Therapy Education     Title: PT OT SLP Therapies (Done)     Topic: Physical Therapy (Done)     Point: Mobility training (Done)     Learning Progress Summary           Patient Acceptance, E, VU by SC at 4/14/2023 0112    Acceptance, E,TB, VU by  at 4/10/2023 0926                   Point: Home exercise program (Done)     Learning Progress Summary           Patient Acceptance, E, VU by SC  at 4/14/2023 0112    Acceptance, E,TB, VU by  at 4/10/2023 0926                   Point: Body mechanics (Done)     Learning Progress Summary           Patient Acceptance, E, VU by SC at 4/14/2023 0112    Acceptance, E,TB, VU by  at 4/10/2023 0926                   Point: Precautions (Done)     Learning Progress Summary           Patient Acceptance, E, VU by SC at 4/14/2023 0112    Acceptance, E,TB, VU by  at 4/10/2023 0926                               User Key     Initials Effective Dates Name Provider Type Discipline     05/24/22 -  Livan Alejandra, PT Physical Therapist PT    SC 09/22/22 -  Kalie Arnold RN Registered Nurse Nurse              PT Recommendation and Plan     Outcome Evaluation: Pt seemingly more alert and did well with PT this date. some c/o of fatigue/weakness       Time Calculation:    PT Charges     Row Name 04/14/23 1051             Time Calculation    Start Time 0910  -      PT Received On 04/14/23  -         Time Calculation- PT    Total Timed Code Minutes- PT 23 minute(s)  -            User Key  (r) = Recorded By, (t) = Taken By, (c) = Cosigned By    Initials Name Provider Type    Yamel Fuller, PT Physical Therapist              Therapy Charges for Today     Code Description Service Date Service Provider Modifiers Qty    69866018592 HC PT THER PROC EA 15 MIN 4/14/2023 Yamel Mckeon, PT GP 1    08854619006 HC PT THERAPEUTIC ACT EA 15 MIN 4/14/2023 Yamel Mckeon, PT GP 1          PT G-Codes  AM-PAC 6 Clicks Score (PT): 12    Yamel Mckeon PT  4/14/2023

## 2023-04-14 NOTE — THERAPY TREATMENT NOTE
Acute Care - Occupational Therapy Treatment Note   [facility]     Patient Name: Augusto Lorenzana Jr.  : 1945  MRN: [id number]  Today's Date: 2023             Admit Date: 2023       ICD-10-CM ICD-9-CM   1. Gastrointestinal hemorrhage, unspecified gastrointestinal hemorrhage type  K92.2 578.9   2. NSTEMI 2021  I21.4 410.70   3. Chronic kidney disease, unspecified CKD stage  N18.9 585.9     Patient Active Problem List   Diagnosis   • NSTEMI 2021   • Hyperlipidemia LDL goal <70   • Essential hypertension   • T2DM on oral agents and insulin. HbA1c 7.4    • Coronary artery disease involving native coronary artery of native heart with unstable angina pectoris   • A/C kidney disease. ATN due to postoperative arrest. 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 Combination of anoxic insult and azotemia   • Debility   • Lower extremity edema   • Complete heart block   • GI bleed   • Third degree atrioventricular block   • Atrial fibrillation, persistent   • Gastrointestinal hemorrhage associated with gastric ulcer     Past Medical History:   Diagnosis Date   • CAD (coronary artery disease)    • CKD (chronic kidney disease) stage 3, GFR 30-59 ml/min    • Diabetes mellitus    • Hyperlipidemia    • Hypertension    • NSTEMI (non-ST elevated myocardial infarction)      Past Surgical History:   Procedure Laterality Date   • CARDIAC CATHETERIZATION N/A 2021    Procedure: Left Heart Cath;  Surgeon: Blade Greene IV, MD;  Location: [facility] CATH INVASIVE LOCATION;  Service: Cardiovascular;  Laterality: N/A;   • CARDIAC SURGERY      2 stents placed .   • CORONARY ARTERY BYPASS GRAFT N/A 2021    Procedure: MEDIAN STERNOTOMY CORONARY ARTERY BYPASS X 3 UTILIZING THE LEFT INTERNAL MAMMARY ARTERY GRAFT, EVH OF THE GREATER RIGHT SAPHENOUS  VEIN, AND PILO PER ANESTHESIA;  Surgeon: Jacek Miller MD;  Location:  [facility];  Service: Cardiothoracic;  Laterality: N/A;   • ENDOSCOPY N/A 04/08/2023    Procedure: ESOPHAGOGASTRODUODENOSCOPY with CENTERAL LINE PLACEMENT;  Surgeon: Sabine Hudson MD;  Location:  [facility];  Service: General;  Laterality: N/A;   • ENDOSCOPY N/A 04/10/2023    Procedure: ESOPHAGOGASTRODUODENOSCOPY;  Surgeon: Sabine Hudson MD;  Location:  [facility];  Service: General;  Laterality: N/A;   • PACEMAKER IMPLANTATION           OT ASSESSMENT FLOWSHEET (last 12 hours)     OT Evaluation and Treatment     Row Name 04/14/23 1114                   OT Time and Intention    Document Type therapy note (daily note)  -KR        Mode of Treatment occupational therapy  -KR        Patient Effort adequate  -KR           General Information    General Observations of Patient Alert/cooperative, mild confusion  -KR           Living Environment    Current Living Arrangements home  -KR        People in Home spouse  -KR           Cognition    Affect/Mental Status (Cognition) WFL  mild confusion  -KR        Orientation Status (Cognition) oriented to;person  -KR        Follows Commands (Cognition) WFL  -KR           Range of Motion Comprehensive    Comment, General Range of Motion WFL  -KR           Motor Skills    Therapeutic Exercise shoulder;elbow/forearm  -KR           Shoulder (Therapeutic Exercise)    Shoulder (Therapeutic Exercise) AROM (active range of motion)  -KR        Shoulder AROM (Therapeutic Exercise) bilateral;flexion;extension;sitting  supported  -KR           Elbow/Forearm (Therapeutic Exercise)    Elbow/Forearm (Therapeutic Exercise) AROM (active range of motion)  -KR        Elbow/Forearm AROM (Therapeutic Exercise) bilateral;flexion;extension;sitting  supported sitting  -KR           Wound 04/12/23 1700 distal penis Pressure Injury    Wound - Properties Group Placement Date: 04/12/23  -TW Placement Time: 1700 -TW Present on  Hospital Admission: N  -TW Orientation: distal  -TW Location: penis  -TW Primary Wound Type: Pressure inj  -TW    Retired Wound - Properties Group Placement Date: 04/12/23  -TW Placement Time: 1700  -TW Present on Hospital Admission: N  -TW Orientation: distal  -TW Location: penis  -TW Primary Wound Type: Pressure inj  -TW    Retired Wound - Properties Group Date first assessed: 04/12/23  -TW Time first assessed: 1700  -TW Present on Hospital Admission: N  -TW Location: penis  -TW Primary Wound Type: Pressure inj  -TW       Coping    Family/Support Persons spouse  wife present on this date. very supportive/motivated for pt to return to PLOF  -KR           Plan of Care Review    Plan of Care Reviewed With patient;spouse  -KR        Progress improving  -KR           Therapy Assessment/Plan (OT)    Rehab Potential (OT) good, to achieve stated therapy goals  -KR            Endurance Goal 1 (OT)    Progress/Outcome (Endurance Goal 1, OT) continuing progress toward goal  -KR           Endurance Goal 2 (OT)    Progress/Outcome (Endurance Goal 2, OT) continuing progress toward goal  -KR              User Key  (r) = Recorded By, (t) = Taken By, (c) = Cosigned By    Initials Name Effective Dates    TW Kaia Turner RN 06/16/21 -     Burton Nick OT 06/16/21 -                        OT Recommendation and Plan  Planned Therapy Interventions (OT): activity tolerance training, BADL retraining, strengthening exercise  Plan of Care Review  Plan of Care Reviewed With: patient, spouse  Progress: improving  Plan of Care Reviewed With: patient, spouse        Time Calculation:     Therapy Charges for Today     Code Description Service Date Service Provider Modifiers Qty    [id number]  OT THERAPEUTIC ACT EA 15 MIN 4/14/2023 Burton Wilson OT GO 1    [id number]  OT SELF CARE/MGMT/TRAIN EA 15 MIN 4/14/2023 Burton Wilson OT GO 1               Burton Wilson OT  4/14/2023

## 2023-04-14 NOTE — PLAN OF CARE
Patient resting in the bed throughout the night. Sitter at bedside. Patient got agitated and combative around time to change him, he had X1 Haldol (see MAR) New drain/line sites clean dry and intact, bleeding controlled at this time. Will continue to monitor and follow care plan.

## 2023-04-14 NOTE — PROGRESS NOTES
Nephrology Progress Note      Subjective     No chest pain or shortness of breath.     Objective       Vital signs :     Temp:  [97.7 °F (36.5 °C)-99 °F (37.2 °C)] 99 °F (37.2 °C)  Heart Rate:  [61-76] 61  Resp:  [16-20] 16  BP: (118-157)/(61-76) 126/62    Intake/Output                             04/12/23 0701 - 04/13/23 0700 04/13/23 0701 - 04/14/23 0700 04/14/23 0701 - 04/15/23 0700     4879-4085 0430-6951 Total 2720-2345 6379-2900 Total 9567-1002 0896-2625 Total                    Intake    P.O.  390  -- 390  0  -- 0  220  -- 220    Other  --  -- --  600  -- 600  --  -- --    Other -- -- -- 600 -- 600 -- -- --    Total Intake 390 -- 390 600 -- 600 220 -- 220       Output    Urine  900  -- 900  350  -- 350  --  -- --    Total Output 900 -- 900 350 -- 350 -- -- --           Physical Exam:    General Appearance : not in acute distress  Lungs : clear to auscultation, respirations regular  Heart :  regular rhythm & normal rate, normal S1, S2 and no murmur, no rub  Abdomen : soft, non distended, suprapubic tenderness   Extremities : no edema  Neurologic :   orientated to person, place, time and situation, Grossly no focal deficits        Laboratory Data :     Albumin Albumin   Date Value Ref Range Status   04/14/2023 2.5 (L) 3.5 - 5.2 g/dL Final      Magnesium No results found for: MG       PTH               No results found for: PTH    CBC and coagulation:  Results from last 7 days   Lab Units 04/14/23  0108 04/13/23  1241 04/13/23  0210 04/12/23  1730 04/12/23  0931 04/09/23  0141 04/08/23  1647   WBC 10*3/mm3  --  13.14* 15.63*  --  12.02*   < >  --    HEMOGLOBIN g/dL 7.7* 7.9* 8.3*   < > 8.9*   < > 5.5*   HEMATOCRIT % 23.9* 24.5* 26.5*  --  27.9*   < > 17.2*   MCV fL  --  98.8* 100.0*  --  98.9*   < >  --    MCHC g/dL  --  32.2 31.3*  --  31.9   < >  --    PLATELETS 10*3/mm3  --  209 233  --  230   < >  --    INR   --   --   --   --   --   --  1.38*    < > = values in this interval not displayed.     Acid/base  balance:      Renal and electrolytes:    Results from last 7 days   Lab Units 04/14/23  0108 04/13/23  0210 04/12/23  0053 04/11/23  0009 04/10/23  0004 04/09/23  0141 04/08/23  0029   SODIUM mmol/L 139 147* 145 146* 151* 149* 144   POTASSIUM mmol/L 3.1* 3.6  --  3.6 3.9 4.3 4.2   MAGNESIUM mg/dL  --   --   --   --   --   --  1.9   CHLORIDE mmol/L 103 117*  --  117* 122* 118* 112*   CO2 mmol/L 26.9 16.9*  --  16.8* 14.8* 14.6* 18.0*   BUN mg/dL 28* 65*  --  86* 126* 143* 103*   CREATININE mg/dL 2.63* 3.82*  --  3.78* 4.29* 4.24* 4.21*   CALCIUM mg/dL 7.6* 8.5*  --  8.3* 8.2* 8.4* 9.0   PHOSPHORUS mg/dL 2.4*  --   --   --   --   --   --      Estimated Creatinine Clearance: 25.6 mL/min (A) (by C-G formula based on SCr of 2.63 mg/dL (H)).  @GFRCG:3@   Liver and pancreatic function:  Results from last 7 days   Lab Units 04/14/23  0108   ALBUMIN g/dL 2.5*         Cardiac:      Liver and pancreatic function:  Results from last 7 days   Lab Units 04/14/23 0108   ALBUMIN g/dL 2.5*       Medications :     allopurinol, 50 mg, Oral, Once per day on Mon Thu  amLODIPine, 10 mg, Oral, Q24H  atorvastatin, 40 mg, Oral, Nightly  carvedilol, 25 mg, Oral, BID With Meals  castor oil-balsam peru, 1 application, Topical, Q12H  cetirizine, 5 mg, Oral, Daily  gentamicin, 1 application, Topical, Daily  insulin lispro, 2-7 Units, Subcutaneous, TID With Meals  Lidocaine, 1 patch, Apply externally, Daily  multivitamin, 1 tablet, Oral, Daily  OLANZapine zydis, 5 mg, Oral, Daily  pantoprazole, 40 mg, Intravenous, BID AC  senna-docusate sodium, 2 tablet, Oral, Nightly  sodium chloride, 10 mL, Intravenous, Q12H  sodium chloride, 10 mL, Intravenous, Q12H  sodium chloride, 10 mL, Intravenous, Q12H  sterile water (preservative free), , ,       sodium chloride, 9 mL/hr, Last Rate: 30 mL/hr (04/13/23 7855)          Assessment & Plan     1.  CKD stage V initiated on PDx during this admission   2.  Upper GI bleed with blood loss anemia  3.  Heart  failure with reduced ejection fraction LVEF 45%  4.  History of CABG  5.  Diabetes with nephropathy poor control  6.  Hypertension  7.  Dehydration with hypotension  8.  Pacemaker  9.  Confusion and acute psychosis  10.  Hypokalemia     Pt had dialysis done yesterday, will continue on IHDx TTS and follow up out patient dialysis set up and to be discharged from [facility] when it is arranged.   Redo of PDx cath outpatient.   KDUR 30meq once    Alessandra Farias MD  04/14/23  09:53 EDT

## 2023-04-15 LAB
ANION GAP SERPL CALCULATED.3IONS-SCNC: 11.1 MMOL/L (ref 5–15)
BUN SERPL-MCNC: 34 MG/DL (ref 8–23)
BUN/CREAT SERPL: 9.9 (ref 7–25)
CALCIUM SPEC-SCNC: 7.6 MG/DL (ref 8.6–10.5)
CHLORIDE SERPL-SCNC: 105 MMOL/L (ref 98–107)
CO2 SERPL-SCNC: 23.9 MMOL/L (ref 22–29)
CREAT SERPL-MCNC: 3.45 MG/DL (ref 0.76–1.27)
DEPRECATED RDW RBC AUTO: 61.9 FL (ref 37–54)
EGFRCR SERPLBLD CKD-EPI 2021: 17.6 ML/MIN/1.73
ERYTHROCYTE [DISTWIDTH] IN BLOOD BY AUTOMATED COUNT: 17.8 % (ref 12.3–15.4)
GLUCOSE BLDC GLUCOMTR-MCNC: 132 MG/DL (ref 70–130)
GLUCOSE BLDC GLUCOMTR-MCNC: 212 MG/DL (ref 70–130)
GLUCOSE BLDC GLUCOMTR-MCNC: 99 MG/DL (ref 70–130)
GLUCOSE SERPL-MCNC: 121 MG/DL (ref 65–99)
HCT VFR BLD AUTO: 23.5 % (ref 37.5–51)
HGB BLD-MCNC: 7.6 G/DL (ref 13–17.7)
MCH RBC QN AUTO: 31.7 PG (ref 26.6–33)
MCHC RBC AUTO-ENTMCNC: 32.3 G/DL (ref 31.5–35.7)
MCV RBC AUTO: 97.9 FL (ref 79–97)
PLATELET # BLD AUTO: 209 10*3/MM3 (ref 140–450)
PMV BLD AUTO: 10.3 FL (ref 6–12)
POTASSIUM SERPL-SCNC: 3.8 MMOL/L (ref 3.5–5.2)
QT INTERVAL: 446 MS
QTC INTERVAL: 563 MS
RBC # BLD AUTO: 2.4 10*6/MM3 (ref 4.14–5.8)
SODIUM SERPL-SCNC: 140 MMOL/L (ref 136–145)
WBC NRBC COR # BLD: 11.57 10*3/MM3 (ref 3.4–10.8)

## 2023-04-15 PROCEDURE — 93010 ELECTROCARDIOGRAM REPORT: CPT | Performed by: SPECIALIST

## 2023-04-15 PROCEDURE — 99232 SBSQ HOSP IP/OBS MODERATE 35: CPT | Performed by: STUDENT IN AN ORGANIZED HEALTH CARE EDUCATION/TRAINING PROGRAM

## 2023-04-15 PROCEDURE — 85027 COMPLETE CBC AUTOMATED: CPT | Performed by: STUDENT IN AN ORGANIZED HEALTH CARE EDUCATION/TRAINING PROGRAM

## 2023-04-15 PROCEDURE — 93005 ELECTROCARDIOGRAM TRACING: CPT

## 2023-04-15 PROCEDURE — 80048 BASIC METABOLIC PNL TOTAL CA: CPT | Performed by: STUDENT IN AN ORGANIZED HEALTH CARE EDUCATION/TRAINING PROGRAM

## 2023-04-15 PROCEDURE — 25010000002 LORAZEPAM PER 2 MG: Performed by: HOSPITALIST

## 2023-04-15 PROCEDURE — 25010000002 HALOPERIDOL LACTATE PER 5 MG: Performed by: SURGERY

## 2023-04-15 PROCEDURE — 63710000001 INSULIN LISPRO (HUMAN) PER 5 UNITS: Performed by: SURGERY

## 2023-04-15 PROCEDURE — 82962 GLUCOSE BLOOD TEST: CPT

## 2023-04-15 PROCEDURE — 93005 ELECTROCARDIOGRAM TRACING: CPT | Performed by: STUDENT IN AN ORGANIZED HEALTH CARE EDUCATION/TRAINING PROGRAM

## 2023-04-15 RX ORDER — OLANZAPINE 5 MG/1
5 TABLET ORAL ONCE
Status: DISCONTINUED | OUTPATIENT
Start: 2023-04-15 | End: 2023-04-18 | Stop reason: HOSPADM

## 2023-04-15 RX ORDER — LORAZEPAM 2 MG/ML
0.5 INJECTION INTRAMUSCULAR ONCE
Status: COMPLETED | OUTPATIENT
Start: 2023-04-16 | End: 2023-04-15

## 2023-04-15 RX ADMIN — Medication 10 ML: at 20:50

## 2023-04-15 RX ADMIN — AMLODIPINE BESYLATE 10 MG: 10 TABLET ORAL at 08:03

## 2023-04-15 RX ADMIN — GENTAMICIN SULFATE 1 APPLICATION: 1 OINTMENT TOPICAL at 08:03

## 2023-04-15 RX ADMIN — INSULIN LISPRO 3 UNITS: 100 INJECTION, SOLUTION INTRAVENOUS; SUBCUTANEOUS at 12:52

## 2023-04-15 RX ADMIN — OLANZAPINE 5 MG: 5 TABLET, ORALLY DISINTEGRATING ORAL at 08:02

## 2023-04-15 RX ADMIN — Medication 10 ML: at 08:05

## 2023-04-15 RX ADMIN — CARVEDILOL 25 MG: 25 TABLET, FILM COATED ORAL at 08:03

## 2023-04-15 RX ADMIN — CARVEDILOL 25 MG: 25 TABLET, FILM COATED ORAL at 17:25

## 2023-04-15 RX ADMIN — CASTOR OIL AND BALSAM, PERU 1 APPLICATION: 788; 87 OINTMENT TOPICAL at 20:50

## 2023-04-15 RX ADMIN — LORAZEPAM 0.5 MG: 2 INJECTION INTRAMUSCULAR; INTRAVENOUS at 23:10

## 2023-04-15 RX ADMIN — HALOPERIDOL LACTATE 2 MG: 5 INJECTION, SOLUTION INTRAMUSCULAR at 19:42

## 2023-04-15 RX ADMIN — Medication 1 TABLET: at 08:03

## 2023-04-15 RX ADMIN — PANTOPRAZOLE SODIUM 40 MG: 40 TABLET, DELAYED RELEASE ORAL at 17:26

## 2023-04-15 RX ADMIN — Medication 10 ML: at 08:04

## 2023-04-15 RX ADMIN — CASTOR OIL AND BALSAM, PERU 1 APPLICATION: 788; 87 OINTMENT TOPICAL at 08:03

## 2023-04-15 RX ADMIN — CETIRIZINE HYDROCHLORIDE 5 MG: 10 TABLET, FILM COATED ORAL at 08:03

## 2023-04-15 RX ADMIN — LIDOCAINE 1 PATCH: 4 PATCH TOPICAL at 08:03

## 2023-04-15 RX ADMIN — PANTOPRAZOLE SODIUM 40 MG: 40 TABLET, DELAYED RELEASE ORAL at 08:03

## 2023-04-15 NOTE — PLAN OF CARE
Goal Outcome Evaluation:   Pt resting in bed with family at bedside. No signs or symptoms of distress noted. No complaints at this time. Will continue with plan of care.

## 2023-04-15 NOTE — PROGRESS NOTES
[facility] HOSPITALIST PROGRESS NOTE     Patient Identification:  Name:  Augusto Lorenzana Jr.  Age:  78 y.o.  Sex:  male  :  1945  MRN:  [id number]  Visit Number:  [id number]  ROOM: [address] [place] [address]     Primary Care Provider:  Rob Polanco MD     Date of Admission: 2023    Length of stay in inpatient status:  7    Subjective     Chief Compliant:  Acute blood loss anemia    LIJ CVC removed yesterday without significant bleeding. Overnight remained calm and oriented, sitter order discontinued this morning. Patient seen while in dialysis tolerating session without reported complications.        Objective     Current Hospital Meds:  allopurinol, 50 mg, Oral, Once per day on Mon Thu  amLODIPine, 10 mg, Oral, Q24H  atorvastatin, 40 mg, Oral, Nightly  carvedilol, 25 mg, Oral, BID With Meals  castor oil-balsam peru, 1 application, Topical, Q12H  cetirizine, 5 mg, Oral, Daily  gentamicin, 1 application, Topical, Daily  insulin lispro, 2-7 Units, Subcutaneous, TID With Meals  Lidocaine, 1 patch, Apply externally, Daily  multivitamin, 1 tablet, Oral, Daily  OLANZapine zydis, 5 mg, Oral, Daily  pantoprazole, 40 mg, Oral, BID AC  senna-docusate sodium, 2 tablet, Oral, Nightly  sodium chloride, 10 mL, Intravenous, Q12H  sodium chloride, 10 mL, Intravenous, Q12H  sodium chloride, 10 mL, Intravenous, Q12H  sterile water (preservative free), , ,     sodium chloride, 9 mL/hr, Last Rate: 30 mL/hr (23 08)      Current Antimicrobial Therapy:  Anti-Infectives (From admission, onward)    None        Current Diuretic Therapy:  Diuretics (From admission, onward)    None        ----------------------------------------------------------------------------------------------------------------------  Vital Signs:  Temp:  [98 °F (36.7 °C)-99.1 °F (37.3 °C)] 98.2 °F (36.8 °C)  Heart Rate:  [73-80] 80  Resp:  [18] 18  BP: (107-151)/(64-74) 130/74  SpO2:  [91 %-97 %] 95 %  on   ;   Device (Oxygen Therapy): room  air  Body mass index is 25.7 kg/m².    Wt Readings from Last 3 Encounters:   04/15/23 74.4 kg (164 lb 1.6 oz)   04/07/23 77.2 kg (170 lb 3.2 oz)   07/08/21 83 kg (183 lb)     Intake & Output (last 3 days)       04/12 0701  04/13 0700 04/13 0701  04/14 0700 04/14 0701  04/15 0700 04/15 0701  04/16 0700    P.O. 390 0 1260     Other  600      Total Intake(mL/kg) 390 (5.2) 600 (7.9) 1260 (16.9)     Urine (mL/kg/hr) 900 (0.5) 350 (0.2) 600 (0.3)     Stool 0 0 0     Total Output 900 350 600     Net -510 +250 +660             Urine Unmeasured Occurrence 3 x  2 x     Stool Unmeasured Occurrence 3 x 3 x 2 x         Diet: Liquid Diets, Diabetic Diets; Full Liquid; Consistent Carbohydrate; Texture: Regular Texture (IDDSI 7); Fluid Consistency: Thin (IDDSI 0)  ----------------------------------------------------------------------------------------------------------------------  Physical Exam  Vitals and nursing note reviewed.   Constitutional:       General: He is not in acute distress.     Appearance: He is not ill-appearing.   HENT:      Mouth/Throat:      Mouth: Mucous membranes are dry.      Pharynx: Oropharynx is clear.   Eyes:      General: No scleral icterus.     Extraocular Movements: Extraocular movements intact.   Cardiovascular:      Rate and Rhythm: Normal rate.      Pulses: Normal pulses.   Pulmonary:      Effort: Pulmonary effort is normal. No respiratory distress.   Abdominal:      Palpations: Abdomen is soft.      Tenderness: There is no abdominal tenderness.   Musculoskeletal:      Right lower leg: No edema.      Left lower leg: No edema.   Skin:     Comments: Right thorax w/TDC line insertion, overlying tegaderm with only minimal dried blood surrounding area    LIJ CVC site with clean dry dressing overlying   Neurological:      Mental Status: He is oriented to person, place, and time. Mental status is at baseline.   Psychiatric:      Comments: Conversational and alert. Redirectable        ----------------------------------------------------------------------------------------------------------------------    ----------------------------------------------------------------------------------------------------------------------  LABS:    CBC and coagulation:  Results from last 7 days   Lab Units 04/15/23  0018 04/14/23  1356 04/14/23  0108 04/13/23  1241 04/13/23  0210 04/12/23  1730 04/12/23  0931 04/12/23  0053 04/11/23  0532 04/10/23  1723 04/10/23  1119 04/10/23  0541 04/10/23  0004 04/09/23  0141 04/08/23  1647   WBC 10*3/mm3 11.57*  --   --  13.14* 15.63*  --  12.02*  --   --   --  13.24*  --  15.02*  --   --    HEMOGLOBIN g/dL 7.6*  --  7.7* 7.9* 8.3* 8.6* 8.9* 8.0* 7.7*   < > 7.9*   < > 6.5*   < > 5.5*   HEMATOCRIT % 23.5*  --  23.9* 24.5* 26.5*  --  27.9* 24.8* 23.4*   < > 24.0*   < > 20.6*   < > 17.2*   MCV fL 97.9*  --   --  98.8* 100.0*  --  98.9*  --   --   --  96.0  --  102.0*  --   --    MCHC g/dL 32.3  --   --  32.2 31.3*  --  31.9  --   --   --  32.9  --  31.6  --   --    PLATELETS 10*3/mm3 209  --   --  209 233  --  230  --   --   --  181  --  193  --   --    INR   --  1.15*  --   --   --   --   --   --   --   --   --   --   --   --  1.38*    < > = values in this interval not displayed.     Acid/base balance:      Renal and electrolytes:  Results from last 7 days   Lab Units 04/15/23  0018 04/14/23  0108 04/13/23  0210 04/12/23  0053 04/11/23  0009 04/10/23  0004 04/09/23  0141   SODIUM mmol/L 140 139 147* 145 146* 151* 149*   POTASSIUM mmol/L 3.8 3.1* 3.6  --  3.6 3.9 4.3   CHLORIDE mmol/L 105 103 117*  --  117* 122* 118*   CO2 mmol/L 23.9 26.9 16.9*  --  16.8* 14.8* 14.6*   BUN mg/dL 34* 28* 65*  --  86* 126* 143*   CREATININE mg/dL 3.45* 2.63* 3.82*  --  3.78* 4.29* 4.24*   CALCIUM mg/dL 7.6* 7.6* 8.5*  --  8.3* 8.2* 8.4*   PHOSPHORUS mg/dL  --  2.4*  --   --   --   --   --    GLUCOSE mg/dL 121* 120* 165*  --  145* 85 130*     Estimated Creatinine Clearance: 19.2 mL/min (A)  (by C-G formula based on SCr of 3.45 mg/dL (H)).    Liver and pancreatic function:  Results from last 7 days   Lab Units 04/14/23  0108   ALBUMIN g/dL 2.5*     Endocrine function:  Lab Results   Component Value Date    HGBA1C 6.5 (H) 04/04/2022     Point of care bedside glucose levels:  Results from last 7 days   Lab Units 04/15/23  0641 04/14/23  1806 04/14/23  1618 04/14/23  1057 04/14/23  0637 04/13/23  1637 04/13/23  1317 04/13/23  0925 04/13/23  0728 04/12/23  1945 04/12/23  1753 04/12/23  1048 04/12/23  0724 04/11/23  1911   GLUCOSE mg/dL 132* 190* 113 203* 153* 99 183* 139* 151* 216* 176* 208* 110 119     Glucose levels from the CMP:  Results from last 7 days   Lab Units 04/15/23  0018 04/14/23  0108 04/13/23  0210 04/11/23  0009 04/10/23  0004 04/09/23  0141   GLUCOSE mg/dL 121* 120* 165* 145* 85 130*     Lab Results   Component Value Date    TSH 4.030 06/03/2021    FREET4 1.1 03/31/2023     Cardiac:        Cultures:  Lab Results   Component Value Date    COLORU Yellow 12/07/2022    CLARITYU Clear 12/07/2022    PHUR 6.0 12/07/2022    PROTEINUR 965.0 12/07/2022    GLUCOSEU 100 mg/dL (Trace) (A) 12/07/2022    KETONESU Negative 12/07/2022    BLOODU Small (1+) (A) 12/07/2022    NITRITEU Negative 12/07/2022    LEUKOCYTESUR Negative 12/07/2022    BILIRUBINUR Negative 12/07/2022    UROBILINOGEN 0.2 E.U./dL 12/07/2022    RBCUA 6-12 (A) 12/07/2022    WBCUA 3-5 (A) 12/07/2022    BACTERIA Trace (A) 12/07/2022     Microbiology Results (last 10 days)     Procedure Component Value - Date/Time    COVID-19, HANNAH IN-HOUSE CEPHEID/JOSÉ NP SWAB IN TRANSPORT MEDIA 8-12 HR TAT - , [[id number]]  (Normal) Collected: 04/07/23 1033    Lab Status: Final result Specimen: Swab from Nasopharynx Updated: 04/07/23 1646     SARS-CoV-2, ALTHEA Not Detected    Narrative:      This assay is for in vitro diagnostic use under FDA emergency use authorization only. Negative results do not preclude infection with the SARS CoV-2 virus and should not  be the sole basis of a patient treatment/management or public health decision. Follow up testing should be performed according to the current CDC recommendations.  This test was performed on the Xpert Xpress SARS CoV-2 test, a PCR-based method.  Negative results should be considered presumptive and do not preclude current or future infection obtained through community transmission or other exposures. Negative results must be considered in the context of an individual's recent exposures, history, presence of clinical signs and symptoms consistent with COVID-19.          No results found for: PREGTESTUR, PREGSERUM, HCG, HCGQUANT  Pain Management Panel         Latest Ref Rng & Units 12/7/2022 5/30/2021   Pain Management Panel   Creatinine, Urine mg/dL 80.1   136.1           Multiple values from one day are sorted in reverse-chronological order             I have personally looked at the labs and they are summarized above.  ----------------------------------------------------------------------------------------------------------------------  Detailed radiology reports for the last 24 hours:    Imaging Results (Last 24 Hours)     ** No results found for the last 24 hours. **          Assessment & Plan      #Acute blood loss anemia w/UGIB  #PUD s/p EGD w/epinephrine injection  -s/p 48hrs BID PPI IV after period on PPI gtt. Transitioned to PO PPI BID 4/14 to be continued x6 weeks. No recurrent melena noted.  -Repeat hemoglobin stable, minimally reduced after TDC placement  -CVC removed 4/15. With correction of uremia, platelet dysfunction improving without recurrent evidence of acute blood loss    #Leukocytosis  -Improving, likely from UGIB    #ESRD previously on PD s/p TDC placement 4/13  -Underwent placement of TDC 4/13 with minor bleeding periprocedurally  -First HD session 4/13, tolerating well with rapid improvement of uremia  -Nephrology following, planning to cont TTS HD and with PDx redo outpatient  -Will need  outpatient HD chair at discharge    #Hypernatremia  -Previously given D5w but can correct moving forward w/HD  -Now wnl    #Critial illness associated myopathy  -Patient had hospitalization initially at  3/30 for PD placement complicated by new heart block requiring placement of PM, [facility] and developed acute GI bleed shortly after transfer requiring EGD and TDC placement for nonfunctional PD  -Given above periods of significant illness and immobility, now below baseline activity level and functional status    -- Chronic --    CAD s/p CABG- holding ASA due to UGIB, resume as outpatient if no recurrence after 6 weeks PPI. Consider resuming if Hgb sustains >8mg/dl on f/u  HTN- BP controlled on Losartan, Norvasc and coreg  HLD- statin  DM2- A1C 6.5%. 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 Dose decreased to 100 mg/day per renal function.   Constipation- cont doc/senna nightly        VTE Prophylaxis:   Mechanical Order History:      Ordered        04/08/23 1218  Maintain Sequential Compression Device  Continuous                    Pharmalogical Order History:      Ordered     Dose Route Frequency Stop    04/11/23 1429  heparin (porcine) injection 6,000 Units         6,000 Units IK Once 04/11/23 1539    04/1945  heparin (porcine) injection 2,000 Units         2,000 Units IV Once 04/10/23 1958                Code Status and Medical Interventions:   Ordered at: 04/08/23 1218     Level Of Support Discussed With:    Patient     Code Status (Patient has no pulse and is not breathing):    CPR (Attempt to Resuscitate)     Medical Interventions (Patient has pulse or is breathing):    Full Support     Release to patient:    Routine Release         Disposition: Inpatient rehab referral placed    I have reviewed any copied/forwarded text or data, verified its accuracy, and updated as necessary  above.    Noah Lester MD  [facility]  04/15/23  11:05 EDT

## 2023-04-15 NOTE — PLAN OF CARE
Goal Outcome Evaluation:  Plan of Care Reviewed With: patient           Outcome Evaluation: Pt is resting in bed awake. Pt has been pleaseant this shift. No s/s of acute distress noted. No complaints verbalized at this time.

## 2023-04-15 NOTE — NURSING NOTE
Received report from Jayce SAMANIEGO. Taking over patient care at this time. Agree with previous assessment.

## 2023-04-15 NOTE — PROGRESS NOTES
Nephrology Progress Note      Subjective     Seen on dialysis doing well tolerating well    Objective       Vital signs :     Temp:  [98 °F (36.7 °C)-99.1 °F (37.3 °C)] 98.2 °F (36.8 °C)  Heart Rate:  [73-80] 80  Resp:  [18] 18  BP: (107-151)/(64-74) 130/74    Intake/Output                             04/13/23 0701 - 04/14/23 0700 04/14/23 0701 - 04/15/23 0700 04/15/23 0701 - 04/16/23 0700     5921-7767 1826-6234 Total 7937-3611 6210-5533 Total 7680-6880 4506-0792 Total                    Intake    P.O.  0  -- 0  900  360 1260  --  -- --    Other  600  -- 600  --  -- --  --  -- --    Other 600 -- 600 -- -- -- -- -- --    Total Intake 600 -- 600  -- -- --       Output    Urine  350  -- 350  300  300 600  --  -- --    Other  --  -- --  --  -- --  1500  -- 1500    Ultrafiltration (mL) -- -- -- -- -- -- 1500 -- 1500    Total Output 350 -- 350 300  -- 1500           Physical Exam:    General Appearance : not in acute distress  Lungs : clear to auscultation, respirations regular  Heart :  regular rhythm & normal rate, normal S1, S2 and no murmur, no rub  Abdomen : soft, non distended, suprapubic tenderness   Extremities : no edema  Neurologic :   orientated to person, place, time and situation, Grossly no focal deficits        Laboratory Data :     Albumin Albumin   Date Value Ref Range Status   04/14/2023 2.5 (L) 3.5 - 5.2 g/dL Final      Magnesium No results found for: MG       PTH               No results found for: PTH    CBC and coagulation:  Results from last 7 days   Lab Units 04/15/23  0018 04/14/23  1356 04/14/23  0108 04/13/23  1241 04/13/23  0210 04/09/23  0141 04/08/23  1647   WBC 10*3/mm3 11.57*  --   --  13.14* 15.63*   < >  --    HEMOGLOBIN g/dL 7.6*  --  7.7* 7.9* 8.3*   < > 5.5*   HEMATOCRIT % 23.5*  --  23.9* 24.5* 26.5*   < > 17.2*   MCV fL 97.9*  --   --  98.8* 100.0*   < >  --    MCHC g/dL 32.3  --   --  32.2 31.3*   < >  --    PLATELETS 10*3/mm3 209  --   --  209 233   < >  --     INR   --  1.15*  --   --   --   --  1.38*    < > = values in this interval not displayed.     Acid/base balance:      Renal and electrolytes:    Results from last 7 days   Lab Units 04/15/23  0018 04/14/23  0108 04/13/23  0210 04/12/23  0053 04/11/23  0009 04/10/23  0004   SODIUM mmol/L 140 139 147* 145 146* 151*   POTASSIUM mmol/L 3.8 3.1* 3.6  --  3.6 3.9   CHLORIDE mmol/L 105 103 117*  --  117* 122*   CO2 mmol/L 23.9 26.9 16.9*  --  16.8* 14.8*   BUN mg/dL 34* 28* 65*  --  86* 126*   CREATININE mg/dL 3.45* 2.63* 3.82*  --  3.78* 4.29*   CALCIUM mg/dL 7.6* 7.6* 8.5*  --  8.3* 8.2*   PHOSPHORUS mg/dL  --  2.4*  --   --   --   --      Estimated Creatinine Clearance: 19.2 mL/min (A) (by C-G formula based on SCr of 3.45 mg/dL (H)).  @GFRCG:3@   Liver and pancreatic function:  Results from last 7 days   Lab Units 04/14/23  0108   ALBUMIN g/dL 2.5*         Cardiac:      Liver and pancreatic function:  Results from last 7 days   Lab Units 04/14/23  0108   ALBUMIN g/dL 2.5*       Medications :     allopurinol, 50 mg, Oral, Once per day on Mon Thu  amLODIPine, 10 mg, Oral, Q24H  atorvastatin, 40 mg, Oral, Nightly  carvedilol, 25 mg, Oral, BID With Meals  castor oil-balsam peru, 1 application, Topical, Q12H  cetirizine, 5 mg, Oral, Daily  gentamicin, 1 application, Topical, Daily  insulin lispro, 2-7 Units, Subcutaneous, TID With Meals  Lidocaine, 1 patch, Apply externally, Daily  multivitamin, 1 tablet, Oral, Daily  OLANZapine zydis, 5 mg, Oral, Daily  pantoprazole, 40 mg, Oral, BID AC  senna-docusate sodium, 2 tablet, Oral, Nightly  sodium chloride, 10 mL, Intravenous, Q12H  sodium chloride, 10 mL, Intravenous, Q12H  sodium chloride, 10 mL, Intravenous, Q12H  sterile water (preservative free), , ,       sodium chloride, 9 mL/hr, Last Rate: 30 mL/hr (04/13/23 0817)          Assessment & Plan     1.  CKD stage V initiated on PDx during this admission   2.  Upper GI bleed with blood loss anemia  3.  Heart failure with  reduced ejection fraction LVEF 45%  4.  History of CABG  5.  Diabetes with nephropathy poor control  6.  Hypertension  7.  Dehydration with hypotension  8.  Pacemaker  9.  Confusion and acute psychosis  10.  Hypokalemia     Patient metabolic encephalopathy has resolved significantly he is fully conscious oriented now.  Evaluated on dialysis tolerated very well, continue on intermittent dialysis Tuesdays Thursdays and Saturdays.  Awaiting outpatient dialysis chair   Redo of PDx cath outpatient.   KDUR 30meq once    Alessandra Farias MD  04/15/23  11:59 EDT

## 2023-04-16 LAB
GLUCOSE BLDC GLUCOMTR-MCNC: 130 MG/DL (ref 70–130)
GLUCOSE BLDC GLUCOMTR-MCNC: 187 MG/DL (ref 70–130)
GLUCOSE BLDC GLUCOMTR-MCNC: 196 MG/DL (ref 70–130)
GLUCOSE BLDC GLUCOMTR-MCNC: 249 MG/DL (ref 70–130)
QT INTERVAL: 446 MS
QTC INTERVAL: 563 MS

## 2023-04-16 PROCEDURE — 63710000001 INSULIN LISPRO (HUMAN) PER 5 UNITS: Performed by: SURGERY

## 2023-04-16 PROCEDURE — 82962 GLUCOSE BLOOD TEST: CPT

## 2023-04-16 PROCEDURE — 99232 SBSQ HOSP IP/OBS MODERATE 35: CPT | Performed by: STUDENT IN AN ORGANIZED HEALTH CARE EDUCATION/TRAINING PROGRAM

## 2023-04-16 RX ORDER — CHOLECALCIFEROL (VITAMIN D3) 125 MCG
10 CAPSULE ORAL NIGHTLY
Status: DISCONTINUED | OUTPATIENT
Start: 2023-04-16 | End: 2023-04-18 | Stop reason: HOSPADM

## 2023-04-16 RX ADMIN — INSULIN LISPRO 2 UNITS: 100 INJECTION, SOLUTION INTRAVENOUS; SUBCUTANEOUS at 17:18

## 2023-04-16 RX ADMIN — INSULIN LISPRO 3 UNITS: 100 INJECTION, SOLUTION INTRAVENOUS; SUBCUTANEOUS at 11:56

## 2023-04-16 RX ADMIN — CASTOR OIL AND BALSAM, PERU 1 APPLICATION: 788; 87 OINTMENT TOPICAL at 20:13

## 2023-04-16 RX ADMIN — PANTOPRAZOLE SODIUM 40 MG: 40 TABLET, DELAYED RELEASE ORAL at 09:13

## 2023-04-16 RX ADMIN — ATORVASTATIN CALCIUM 40 MG: 40 TABLET, FILM COATED ORAL at 20:13

## 2023-04-16 RX ADMIN — Medication 1 TABLET: at 09:14

## 2023-04-16 RX ADMIN — Medication 10 ML: at 09:15

## 2023-04-16 RX ADMIN — LIDOCAINE 1 PATCH: 4 PATCH TOPICAL at 09:15

## 2023-04-16 RX ADMIN — OLANZAPINE 5 MG: 5 TABLET, ORALLY DISINTEGRATING ORAL at 09:14

## 2023-04-16 RX ADMIN — CARVEDILOL 25 MG: 25 TABLET, FILM COATED ORAL at 09:13

## 2023-04-16 RX ADMIN — PANTOPRAZOLE SODIUM 40 MG: 40 TABLET, DELAYED RELEASE ORAL at 17:19

## 2023-04-16 RX ADMIN — CASTOR OIL AND BALSAM, PERU 1 APPLICATION: 788; 87 OINTMENT TOPICAL at 09:15

## 2023-04-16 RX ADMIN — Medication 10 MG: at 20:13

## 2023-04-16 RX ADMIN — CARVEDILOL 25 MG: 25 TABLET, FILM COATED ORAL at 17:22

## 2023-04-16 RX ADMIN — AMLODIPINE BESYLATE 10 MG: 10 TABLET ORAL at 09:14

## 2023-04-16 RX ADMIN — GENTAMICIN SULFATE 1 APPLICATION: 1 OINTMENT TOPICAL at 09:15

## 2023-04-16 RX ADMIN — DOCUSATE SODIUM 50 MG AND SENNOSIDES 8.6 MG 2 TABLET: 8.6; 5 TABLET, FILM COATED ORAL at 20:13

## 2023-04-16 NOTE — PROGRESS NOTES
Nephrology Progress Note      Subjective     Patient is sitting on chair in lobby, no chest pain shortness of breath.  Wants to go home as soon as possible    Objective       Vital signs :     Temp:  [97.7 °F (36.5 °C)] 97.7 °F (36.5 °C)  Heart Rate:  [75-88] 87  Resp:  [18] 18  BP: (128-156)/(62-85) 156/85    Intake/Output                       04/14/23 0701 - 04/15/23 0700 04/15/23 0701 - 04/16/23 0700     [id number] 8503-6221 Total 2339-6156 0725-8360 Total                 Intake    P.O.  900  360 1260  --  -- --    Total Intake  -- -- --       Output    Urine  300  300 600  --  -- --    Other  --  -- --  1500  -- 1500    Ultrafiltration (mL) -- -- -- 1500 -- 1500    Total Output 300  -- 1500           Physical Exam:    General Appearance : not in acute distress  Lungs : clear to auscultation, respirations regular  Heart :  regular rhythm & normal rate, normal S1, S2 and no murmur, no rub  Abdomen : soft, non distended, suprapubic tenderness   Extremities : no edema  Neurologic :   orientated to person, place, time and situation, Grossly no focal deficits        Laboratory Data :     Albumin Albumin   Date Value Ref Range Status   04/14/2023 2.5 (L) 3.5 - 5.2 g/dL Final      Magnesium No results found for: MG       PTH               No results found for: PTH    CBC and coagulation:  Results from last 7 days   Lab Units 04/15/23  0018 04/14/23  1356 04/14/23  0108 04/13/23  1241 04/13/23  0210   WBC 10*3/mm3 11.57*  --   --  13.14* 15.63*   HEMOGLOBIN g/dL 7.6*  --  7.7* 7.9* 8.3*   HEMATOCRIT % 23.5*  --  23.9* 24.5* 26.5*   MCV fL 97.9*  --   --  98.8* 100.0*   MCHC g/dL 32.3  --   --  32.2 31.3*   PLATELETS 10*3/mm3 209  --   --  209 233   INR   --  1.15*  --   --   --      Acid/base balance:      Renal and electrolytes:    Results from last 7 days   Lab Units 04/15/23  0018 04/14/23  0108 04/13/23  0210 04/12/23  0053 04/11/23  0009 04/10/23  0004   SODIUM mmol/L 140 139 147* 145 146*  151*   POTASSIUM mmol/L 3.8 3.1* 3.6  --  3.6 3.9   CHLORIDE mmol/L 105 103 117*  --  117* 122*   CO2 mmol/L 23.9 26.9 16.9*  --  16.8* 14.8*   BUN mg/dL 34* 28* 65*  --  86* 126*   CREATININE mg/dL 3.45* 2.63* 3.82*  --  3.78* 4.29*   CALCIUM mg/dL 7.6* 7.6* 8.5*  --  8.3* 8.2*   PHOSPHORUS mg/dL  --  2.4*  --   --   --   --      Estimated Creatinine Clearance: 18.5 mL/min (A) (by C-G formula based on SCr of 3.45 mg/dL (H)).  @GFRCG:3@   Liver and pancreatic function:  Results from last 7 days   Lab Units 04/14/23  0108   ALBUMIN g/dL 2.5*         Cardiac:      Liver and pancreatic function:  Results from last 7 days   Lab Units 04/14/23  0108   ALBUMIN g/dL 2.5*       Medications :     allopurinol, 50 mg, Oral, Once per day on Mon Thu  amLODIPine, 10 mg, Oral, Q24H  atorvastatin, 40 mg, Oral, Nightly  carvedilol, 25 mg, Oral, BID With Meals  castor oil-balsam peru, 1 application, Topical, Q12H  gentamicin, 1 application, Topical, Daily  insulin lispro, 2-7 Units, Subcutaneous, TID With Meals  Lidocaine, 1 patch, Apply externally, Daily  multivitamin, 1 tablet, Oral, Daily  OLANZapine, 5 mg, Oral, Once  OLANZapine zydis, 5 mg, Oral, Daily  pantoprazole, 40 mg, Oral, BID AC  senna-docusate sodium, 2 tablet, Oral, Nightly  sodium chloride, 10 mL, Intravenous, Q12H  sodium chloride, 10 mL, Intravenous, Q12H  sodium chloride, 10 mL, Intravenous, Q12H  sterile water (preservative free), , ,       sodium chloride, 9 mL/hr, Last Rate: 30 mL/hr (04/13/23 0817)          Assessment & Plan     1.  CKD stage V initiated on PDx during this admission   2.  Upper GI bleed with blood loss anemia  3.  Heart failure with reduced ejection fraction LVEF 45%  4.  History of CABG  5.  Diabetes with nephropathy poor control  6.  Hypertension  7.  Dehydration with hypotension  8.  Pacemaker  9.  Confusion and acute psychosis  10.  Hypokalemia     Continue on intermittent dialysis Tuesdays Thursdays and Saturdays, yesterday dialysis was  uneventful.  Patient should be able to go home from nephrology standpoint once outpatient dialysis chair has been arranged  Educated and counseled the patient  Redo of PDx cath outpatient.   PD catheter flush every Monday      Alessandra Farias MD  04/16/23  11:42 EDT

## 2023-04-16 NOTE — PLAN OF CARE
Goal Outcome Evaluation:  Pt has been very combative and aggressive toward staff this shift. See orders. Pt is now resting in bed. No acute signs of distress. Will continue to follow plan of care.

## 2023-04-16 NOTE — PLAN OF CARE
Goal Outcome Evaluation:  Plan of Care Reviewed With: patient      Patient spent most of the day in recliner at nurse's station, tolerated well. No c/o CP or acute distress ntd this shift. Continues with order for sitter for patient safety.

## 2023-04-16 NOTE — NURSING NOTE
Pt wife called for update on current condition. Wife states daughter will be on morning of 4/16 to be with him. No further questions from wife at this time.

## 2023-04-16 NOTE — PROGRESS NOTES
[facility] HOSPITALIST PROGRESS NOTE     Patient Identification:  Name:  Augusto Lorenzana Jr.  Age:  78 y.o.  Sex:  male  :  1945  MRN:  [id number]  Visit Number:  [id number]  ROOM: [address] [place] [address]     Primary Care Provider:  Rob Polanco MD     Date of Admission: 2023    Length of stay in inpatient status:  8    Subjective     Chief Compliant:  Acute blood loss anemia    Patient clinically doing well without recurrent melena and oozing from HD cath and removed LIJ CVC resolved however continues having significant confusion/hyperactivtiy at night time and this morning is more confused after being AOx3 yesterday. Overnight required prn ativan to allow for patient safety.        Objective     Current Hospital Meds:  allopurinol, 50 mg, Oral, Once per day on Mon Thu  amLODIPine, 10 mg, Oral, Q24H  atorvastatin, 40 mg, Oral, Nightly  carvedilol, 25 mg, Oral, BID With Meals  castor oil-balsam peru, 1 application, Topical, Q12H  gentamicin, 1 application, Topical, Daily  insulin lispro, 2-7 Units, Subcutaneous, TID With Meals  Lidocaine, 1 patch, Apply externally, Daily  multivitamin, 1 tablet, Oral, Daily  OLANZapine, 5 mg, Oral, Once  OLANZapine zydis, 5 mg, Oral, Daily  pantoprazole, 40 mg, Oral, BID AC  senna-docusate sodium, 2 tablet, Oral, Nightly  sodium chloride, 10 mL, Intravenous, Q12H  sodium chloride, 10 mL, Intravenous, Q12H  sodium chloride, 10 mL, Intravenous, Q12H  sterile water (preservative free), , ,     sodium chloride, 9 mL/hr, Last Rate: 30 mL/hr (23 0817)      Current Antimicrobial Therapy:  Anti-Infectives (From admission, onward)    None        Current Diuretic Therapy:  Diuretics (From admission, onward)    None        ----------------------------------------------------------------------------------------------------------------------  Vital Signs:  Temp:  [97.7 °F (36.5 °C)] 97.7 °F (36.5 °C)  Heart Rate:  [75-88] 87  Resp:  [18] 18  BP: (128-156)/(62-85) 156/85      on   ;   Device (Oxygen Therapy): room air  Body mass index is 24.78 kg/m².    Wt Readings from Last 3 Encounters:   04/16/23 71.8 kg (158 lb 3.2 oz)   04/07/23 77.2 kg (170 lb 3.2 oz)   07/08/21 83 kg (183 lb)     Intake & Output (last 3 days)       04/13 0701 04/14 0700 04/14 0701  04/15 0700 04/15 0701 04/16 0700 04/16 0701  04/17 0700    P.O. 0 1260      Other 600       Total Intake(mL/kg) 600 (7.9) 1260 (16.9)      Urine (mL/kg/hr) 350 (0.2) 600 (0.3)      Other   1500     Stool 0 0      Total Output      Net +250 +660 -1500             Urine Unmeasured Occurrence  2 x      Stool Unmeasured Occurrence 3 x 2 x          Diet: Liquid Diets, Diabetic Diets; Full Liquid; Consistent Carbohydrate; Texture: Regular Texture (IDDSI 7); Fluid Consistency: Thin (IDDSI 0)  ----------------------------------------------------------------------------------------------------------------------  Physical Exam  Vitals and nursing note reviewed.   Constitutional:       General: He is not in acute distress.     Appearance: He is not ill-appearing.   HENT:      Mouth/Throat:      Mouth: Mucous membranes are dry.      Pharynx: Oropharynx is clear.   Eyes:      General: No scleral icterus.     Extraocular Movements: Extraocular movements intact.   Cardiovascular:      Rate and Rhythm: Normal rate.      Pulses: Normal pulses.   Pulmonary:      Effort: Pulmonary effort is normal. No respiratory distress.   Abdominal:      Palpations: Abdomen is soft.      Tenderness: There is no abdominal tenderness.   Musculoskeletal:      Right lower leg: No edema.      Left lower leg: No edema.   Skin:     Comments: Right thorax w/TDC line insertion, overlying tegaderm with only minimal dried blood surrounding area    LIJ CVC removed without bleed   Neurological:      Mental Status: He is oriented to person, place, and time. Mental status is at baseline.   Psychiatric:      Comments: Conversational this am and answering questions  appropriately, knows he is in [facility] but otherwise very confused       ----------------------------------------------------------------------------------------------------------------------    ----------------------------------------------------------------------------------------------------------------------  LABS:    CBC and coagulation:  Results from last 7 days   Lab Units 04/15/23  0018 04/14/23  1356 04/14/23  0108 04/13/23  1241 04/13/23  0210 04/12/23  1730 04/12/23  0931 04/12/23  0053 04/11/23  0532 04/10/23  1723 04/10/23  1119 04/10/23  0541 04/10/23  0004   WBC 10*3/mm3 11.57*  --   --  13.14* 15.63*  --  12.02*  --   --   --  13.24*  --  15.02*   HEMOGLOBIN g/dL 7.6*  --  7.7* 7.9* 8.3* 8.6* 8.9* 8.0* 7.7*   < > 7.9*   < > 6.5*   HEMATOCRIT % 23.5*  --  23.9* 24.5* 26.5*  --  27.9* 24.8* 23.4*   < > 24.0*   < > 20.6*   MCV fL 97.9*  --   --  98.8* 100.0*  --  98.9*  --   --   --  96.0  --  102.0*   MCHC g/dL 32.3  --   --  32.2 31.3*  --  31.9  --   --   --  32.9  --  31.6   PLATELETS 10*3/mm3 209  --   --  209 233  --  230  --   --   --  181  --  193   INR   --  1.15*  --   --   --   --   --   --   --   --   --   --   --     < > = values in this interval not displayed.     Acid/base balance:      Renal and electrolytes:  Results from last 7 days   Lab Units 04/15/23  0018 04/14/23  0108 04/13/23  0210 04/12/23  0053 04/11/23  0009 04/10/23  0004   SODIUM mmol/L 140 139 147* 145 146* 151*   POTASSIUM mmol/L 3.8 3.1* 3.6  --  3.6 3.9   CHLORIDE mmol/L 105 103 117*  --  117* 122*   CO2 mmol/L 23.9 26.9 16.9*  --  16.8* 14.8*   BUN mg/dL 34* 28* 65*  --  86* 126*   CREATININE mg/dL 3.45* 2.63* 3.82*  --  3.78* 4.29*   CALCIUM mg/dL 7.6* 7.6* 8.5*  --  8.3* 8.2*   PHOSPHORUS mg/dL  --  2.4*  --   --   --   --    GLUCOSE mg/dL 121* 120* 165*  --  145* 85     Estimated Creatinine Clearance: 18.5 mL/min (A) (by C-G formula based on SCr of 3.45 mg/dL (H)).    Liver and pancreatic function:  Results  from last 7 days   Lab Units 04/14/23  0108   ALBUMIN g/dL 2.5*     Endocrine function:  Lab Results   Component Value Date    HGBA1C 6.5 (H) 04/04/2022     Point of care bedside glucose levels:  Results from last 7 days   Lab Units 04/16/23  1114 04/16/23  0742 04/15/23  1654 04/15/23  1223 04/15/23  0641 04/14/23  1806 04/14/23  1618 04/14/23  1057 04/14/23  0637 04/13/23  1637 04/13/23  1317 04/13/23  0925 04/13/23  0728 04/12/23  1945   GLUCOSE mg/dL 249* 130 99 212* 132* 190* 113 203* 153* 99 183* 139* 151* 216*     Glucose levels from the [facility]:  Results from last 7 days   Lab Units 04/15/23  0018 04/14/23  0108 04/13/23  0210 04/11/23  0009 04/10/23  0004   GLUCOSE mg/dL 121* 120* 165* 145* 85     Lab Results   Component Value Date    TSH 4.030 06/03/2021    FREET4 1.1 03/31/2023     Cardiac:        Cultures:  Lab Results   Component Value Date    COLORU Yellow 12/07/2022    CLARITYU Clear 12/07/2022    PHUR 6.0 12/07/2022    PROTEINUR 965.0 12/07/2022    GLUCOSEU 100 mg/dL (Trace) (A) 12/07/2022    KETONESU Negative 12/07/2022    BLOODU Small (1+) (A) 12/07/2022    NITRITEU Negative 12/07/2022    LEUKOCYTESUR Negative 12/07/2022    BILIRUBINUR Negative 12/07/2022    UROBILINOGEN 0.2 E.U./dL 12/07/2022    RBCUA 6-12 (A) 12/07/2022    WBCUA 3-5 (A) 12/07/2022    BACTERIA Trace (A) 12/07/2022     Microbiology Results (last 10 days)     Procedure Component Value - Date/Time    COVID-19,[facility] IN-HOUSE CEPHEID/JOSÉ NP SWAB IN TRANSPORT MEDIA 8-12 HR TAT - , [[id number]]  (Normal) Collected: 04/07/23 1033    Lab Status: Final result Specimen: Swab from Nasopharynx Updated: 04/07/23 1646     SARS-CoV-2, ALTHEA Not Detected    Narrative:      This assay is for in vitro diagnostic use under FDA emergency use authorization only. Negative results do not preclude infection with the SARS CoV-2 virus and should not be the sole basis of a patient treatment/management or public health decision. Follow up testing should be  performed according to the current CDC recommendations.  This test was performed on the Xpert Xpress SARS CoV-2 test, a PCR-based method.  Negative results should be considered presumptive and do not preclude current or future infection obtained through community transmission or other exposures. Negative results must be considered in the context of an individual's recent exposures, history, presence of clinical signs and symptoms consistent with COVID-19.          No results found for: PREGTESTUR, PREGSERUM, HCG, HCGQUANT  Pain Management Panel         Latest Ref Rng & Units 12/7/2022 5/30/2021   Pain Management Panel   Creatinine, Urine mg/dL 80.1   136.1           Multiple values from one day are sorted in reverse-chronological order             I have personally looked at the labs and they are summarized above.  ----------------------------------------------------------------------------------------------------------------------  Detailed radiology reports for the last 24 hours:    Imaging Results (Last 24 Hours)     ** No results found for the last 24 hours. **          Assessment & Plan      #Acute blood loss anemia w/UGIB  #PUD s/p EGD w/epinephrine injection  -s/p 48hrs BID PPI IV after period on PPI gtt. Transitioned to PO PPI BID 4/14 to be continued x6 weeks. No recurrent melena noted.  -Repeat hemoglobin stable, minimally reduced after TDC placement  -CVC removed 4/15. With correction of uremia, platelet dysfunction improving without recurrent evidence of acute blood loss    #Leukocytosis  -Improving, likely from UGIB    #ESRD previously on PD s/p TDC placement 4/13  -Underwent placement of TDC 4/13 with minor bleeding periprocedurally  -First HD session 4/13, tolerating well with rapid improvement of uremia  -Nephrology following, planning to cont TTS HD and with PDx redo outpatient  -Will need outpatient HD chair at discharge    #Hypernatremia  -Previously given D5w but can correct moving forward  w/HD  -Now wnl    #Critial illness associated myopathy  -Patient had hospitalization initially at  3/30 for PD placement complicated by new heart block requiring placement of PM, [facility] and developed acute GI bleed shortly after transfer requiring EGD and TDC placement for nonfunctional PD  -Given above periods of significant illness and immobility, now below baseline activity level and functional status  -Acute inpatient rehab consult placed    #Hospital acquired delirium  -Delirium precautions in place  -Started qhs melatonin but unfortunately no effective pharmacologic options for delirium  -Given qtc prolongation, avoid further antipsychotics. Prn ativan if patient agitated to point of being unsafe  -Bedside sitter as needed    -- Chronic --    CAD s/p CABG- holding ASA due to UGIB, resume as outpatient if no recurrence after 6 weeks PPI. Consider resuming if Hgb sustains >8mg/dl on f/u  HTN- BP controlled on Losartan, Norvasc and coreg  HLD- statin  DM2- A1C 6.5%. 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 Dose decreased to 100 mg/day per renal function.   Constipation- cont doc/senna nightly        VTE Prophylaxis:   Mechanical Order History:      Ordered        04/08/23 1218  Maintain Sequential Compression Device  Continuous                    Pharmalogical Order History:      Ordered     Dose Route Frequency Stop    04/11/23 1429  heparin (porcine) injection 6,000 Units         6,000 Units IK Once 04/11/23 1539    04/1945  heparin (porcine) injection 2,000 Units         2,000 Units IV Once 04/10/23 1958                Code Status and Medical Interventions:   Ordered at: 04/08/23 1218     Level Of Support Discussed With:    Patient     Code Status (Patient has no pulse and is not breathing):    CPR (Attempt to Resuscitate)     Medical Interventions (Patient has pulse or is  breathing):    Full Support     Release to patient:    Routine Release         Disposition: Inpatient rehab referral placed    I have reviewed any copied/forwarded text or data, verified its accuracy, and updated as necessary above.    Noah Lester MD  [facility]  04/16/23  13:40 EDT

## 2023-04-17 LAB
GLUCOSE BLDC GLUCOMTR-MCNC: 100 MG/DL (ref 70–130)
GLUCOSE BLDC GLUCOMTR-MCNC: 170 MG/DL (ref 70–130)
GLUCOSE BLDC GLUCOMTR-MCNC: 193 MG/DL (ref 70–130)
GLUCOSE BLDC GLUCOMTR-MCNC: 254 MG/DL (ref 70–130)

## 2023-04-17 PROCEDURE — 25010000002 EPOETIN ALFA-EPBX 20000 UNIT/ML SOLUTION: Performed by: INTERNAL MEDICINE

## 2023-04-17 PROCEDURE — 97530 THERAPEUTIC ACTIVITIES: CPT

## 2023-04-17 PROCEDURE — 63710000001 INSULIN LISPRO (HUMAN) PER 5 UNITS: Performed by: SURGERY

## 2023-04-17 PROCEDURE — 97110 THERAPEUTIC EXERCISES: CPT

## 2023-04-17 PROCEDURE — 99231 SBSQ HOSP IP/OBS SF/LOW 25: CPT | Performed by: INTERNAL MEDICINE

## 2023-04-17 PROCEDURE — 63710000001 INSULIN GLARGINE PER 5 UNITS: Performed by: INTERNAL MEDICINE

## 2023-04-17 PROCEDURE — 63710000001 ONDANSETRON PER 8 MG: Performed by: SURGERY

## 2023-04-17 PROCEDURE — 82962 GLUCOSE BLOOD TEST: CPT

## 2023-04-17 RX ORDER — ONDANSETRON 2 MG/ML
4 INJECTION INTRAMUSCULAR; INTRAVENOUS EVERY 6 HOURS PRN
Status: DISCONTINUED | OUTPATIENT
Start: 2023-04-17 | End: 2023-04-18 | Stop reason: HOSPADM

## 2023-04-17 RX ADMIN — INSULIN GLARGINE 15 UNITS: 100 INJECTION, SOLUTION SUBCUTANEOUS at 20:48

## 2023-04-17 RX ADMIN — CARVEDILOL 25 MG: 25 TABLET, FILM COATED ORAL at 08:00

## 2023-04-17 RX ADMIN — PANTOPRAZOLE SODIUM 40 MG: 40 TABLET, DELAYED RELEASE ORAL at 08:00

## 2023-04-17 RX ADMIN — CARVEDILOL 25 MG: 25 TABLET, FILM COATED ORAL at 17:29

## 2023-04-17 RX ADMIN — PANTOPRAZOLE SODIUM 40 MG: 40 TABLET, DELAYED RELEASE ORAL at 16:31

## 2023-04-17 RX ADMIN — OLANZAPINE 5 MG: 5 TABLET, ORALLY DISINTEGRATING ORAL at 08:00

## 2023-04-17 RX ADMIN — AMLODIPINE BESYLATE 10 MG: 10 TABLET ORAL at 08:00

## 2023-04-17 RX ADMIN — Medication 10 ML: at 08:01

## 2023-04-17 RX ADMIN — CASTOR OIL AND BALSAM, PERU 1 APPLICATION: 788; 87 OINTMENT TOPICAL at 08:02

## 2023-04-17 RX ADMIN — CASTOR OIL AND BALSAM, PERU 1 APPLICATION: 788; 87 OINTMENT TOPICAL at 20:47

## 2023-04-17 RX ADMIN — Medication 10 ML: at 20:47

## 2023-04-17 RX ADMIN — INSULIN LISPRO 4 UNITS: 100 INJECTION, SOLUTION INTRAVENOUS; SUBCUTANEOUS at 11:35

## 2023-04-17 RX ADMIN — ATORVASTATIN CALCIUM 40 MG: 40 TABLET, FILM COATED ORAL at 20:47

## 2023-04-17 RX ADMIN — GENTAMICIN SULFATE 1 APPLICATION: 1 OINTMENT TOPICAL at 08:02

## 2023-04-17 RX ADMIN — ALLOPURINOL 50 MG: 100 TABLET ORAL at 08:00

## 2023-04-17 RX ADMIN — ONDANSETRON HYDROCHLORIDE 4 MG: 4 TABLET, FILM COATED ORAL at 12:23

## 2023-04-17 RX ADMIN — EPOETIN ALFA-EPBX 10000 UNITS: 20000 INJECTION, SOLUTION INTRAVENOUS; SUBCUTANEOUS at 12:19

## 2023-04-17 RX ADMIN — LIDOCAINE 1 PATCH: 4 PATCH TOPICAL at 08:01

## 2023-04-17 RX ADMIN — DOCUSATE SODIUM 50 MG AND SENNOSIDES 8.6 MG 2 TABLET: 8.6; 5 TABLET, FILM COATED ORAL at 20:47

## 2023-04-17 RX ADMIN — INSULIN LISPRO 2 UNITS: 100 INJECTION, SOLUTION INTRAVENOUS; SUBCUTANEOUS at 08:08

## 2023-04-17 RX ADMIN — Medication 1 TABLET: at 08:00

## 2023-04-17 RX ADMIN — Medication 10 MG: at 20:47

## 2023-04-17 NOTE — NURSING NOTE
Patient has been resting this shift. No s/s of acute distress noted. Sitter at bedside. Will continue with plan of care.

## 2023-04-17 NOTE — THERAPY TREATMENT NOTE
Acute Care - Physical Therapy Treatment Note   [facility]     Patient Name: Augusto Lorenzana Jr.  : 1945  MRN: [id number]  Today's Date: 2023      Visit Dx:     ICD-10-CM ICD-9-CM   1. Gastrointestinal hemorrhage, unspecified gastrointestinal hemorrhage type  K92.2 578.9   2. NSTEMI 2021  I21.4 410.70   3. Chronic kidney disease, unspecified CKD stage  N18.9 585.9     Patient Active Problem List   Diagnosis   • NSTEMI 2021   • Hyperlipidemia LDL goal <70   • Essential hypertension   • T2DM on oral agents and insulin. HbA1c 7.4    • Coronary artery disease involving native coronary artery of native heart with unstable angina pectoris   • A/C kidney disease. ATN due to postoperative arrest. 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 Combination of anoxic insult and azotemia   • Debility   • Lower extremity edema   • Complete heart block   • GI bleed   • Third degree atrioventricular block   • Atrial fibrillation, persistent   • Gastrointestinal hemorrhage associated with gastric ulcer     Past Medical History:   Diagnosis Date   • CAD (coronary artery disease)    • CKD (chronic kidney disease) stage 3, GFR 30-59 ml/min    • Diabetes mellitus    • Hyperlipidemia    • Hypertension    • NSTEMI (non-ST elevated myocardial infarction)      Past Surgical History:   Procedure Laterality Date   • CARDIAC CATHETERIZATION N/A 2021    Procedure: Left Heart Cath;  Surgeon: Blade Greene IV, MD;  Location: [facility] INVASIVE LOCATION;  Service: Cardiovascular;  Laterality: N/A;   • CARDIAC SURGERY      2 stents placed .   • CORONARY ARTERY BYPASS GRAFT N/A 2021    Procedure: MEDIAN STERNOTOMY CORONARY ARTERY BYPASS X 3 UTILIZING THE LEFT INTERNAL MAMMARY ARTERY GRAFT, EVH OF THE GREATER RIGHT SAPHENOUS VEIN, AND PILO PER  ANESTHESIA;  Surgeon: Jacek Miller MD;  Location:  [facility];  Service: Cardiothoracic;  Laterality: N/A;   • ENDOSCOPY N/A 04/08/2023    Procedure: ESOPHAGOGASTRODUODENOSCOPY with CENTERAL LINE PLACEMENT;  Surgeon: Sabine Hudson MD;  Location:  [facility];  Service: General;  Laterality: N/A;   • ENDOSCOPY N/A 04/10/2023    Procedure: ESOPHAGOGASTRODUODENOSCOPY;  Surgeon: Sabine Hudson MD;  Location:  [facility];  Service: General;  Laterality: N/A;   • PACEMAKER IMPLANTATION       PT Assessment (last 12 hours)     PT Evaluation and Treatment     Row Name 04/17/23 0933          Physical Therapy Time and Intention    Document Type therapy note (daily note)  -     Mode of Treatment physical therapy  -     Patient Effort good  -     Comment Pt seen for treatment this date, pleasant and cooperative with therapy. Pt worked on ambulation with RW, ambulated grossly 85' in room with PT this date. Pt tolerated treatment well.  -     Row Name 04/17/23 0933          Bed Mobility    Bed Mobility supine-sit  -     Supine-Sit [place] (Bed Mobility) modified independence;independent  -     Row Name 04/17/23 0933          Transfers    Transfers sit-stand transfer;stand-sit transfer  -     Row Name 04/17/23 0933          Bed-Chair Transfer    Bed-Chair [place] (Transfers) --  -     Row Name 04/17/23 0933          Sit-Stand Transfer    Sit-Stand [place] (Transfers) contact guard  -     Assistive Device (Sit-Stand Transfers) walker, front-wheeled  -     Row Name 04/17/23 0933          Stand-Sit Transfer    Stand-Sit [place] (Transfers) verbal cues;standby assist;contact guard  -     Assistive Device (Stand-Sit Transfers) walker, front-wheeled  -     Row Name 04/17/23 0933          Gait/Stairs (Locomotion)    Comment, (Gait/Stairs) Pt ambulated grossly 85' in room with RW, SBA/CGA  -     Row Name 04/17/23 0933          Balance    Comment, Balance Pt slightly drowsy this AM,  recommended CGA with turns d/t slight unsteadiness  -     Row Name 04/17/23 0933          Motor Skills    Therapeutic Exercise hip;knee  Pt worked on BL marches grossly 2-3x15, S/LAQ grossly 30 reps total  -     Row Name             Wound 04/12/23 1700 distal penis Pressure Injury    Wound - Properties Group Placement Date: 04/12/23 -TW Placement Time: 1700  -TW Present on Hospital Admission: N  -TW Orientation: distal  -TW Location: penis  -TW Primary Wound Type: Pressure inj  -TW    Retired Wound - Properties Group Placement Date: 04/12/23 -TW Placement Time: 1700  -TW Present on Hospital Admission: N  -TW Orientation: distal  -TW Location: penis  -TW Primary Wound Type: Pressure inj  -TW    Retired Wound - Properties Group Date first assessed: 04/12/23 -TW Time first assessed: 1700  -TW Present on Hospital Admission: N  -TW Location: penis  -TW Primary Wound Type: Pressure inj  -TW    Row Name 04/17/23 0933          Positioning and Restraints    Pre-Treatment Position in bed  -     Post Treatment Position chair  -     In Chair reclined  with sitter  -           User Key  (r) = Recorded By, (t) = Taken By, (c) = Cosigned By    Initials Name Provider Type     Kaia Turner, RN Registered Nurse    Yamel Fuller, [place] Physical Therapist                Physical Therapy Education     Title: PT OT SLP Therapies (Done)     Topic: Physical Therapy (Done)     Point: Mobility training (Done)     Learning Progress Summary           Patient Acceptance, E, VU by SC at 4/14/2023 0112    Acceptance, E,TB, VU by  at 4/10/2023 0926                   Point: Home exercise program (Done)     Learning Progress Summary           Patient Acceptance, E, VU by SC at 4/14/2023 0112    Acceptance, E,TB, VU by  at 4/10/2023 0926                   Point: Body mechanics (Done)     Learning Progress Summary           Patient Acceptance, E, VU by SC at 4/14/2023 0112    Acceptance, E,TB, VU by  at 4/10/2023 0926                    Point: Precautions (Done)     Learning Progress Summary           Patient Acceptance, E, VU by SC at 4/14/2023 0112    Acceptance, E,TB, VU by  at 4/10/2023 0926                               User Key     Initials Effective Dates Name Provider Type Discipline     05/24/22 -  Livan Alejandra, PT Physical Therapist PT    SC 09/22/22 -  Kalie Arnold RN Registered Nurse Nurse              PT Recommendation and Plan     Outcome Evaluation: Pt seemingly more alert and did well with PT this date. some c/o of fatigue/weakness       Time Calculation:    PT Charges     Row Name 04/17/23 1004             Time Calculation    Start Time 0933  -      PT Received On 04/17/23  -         Time Calculation- PT    Total Timed Code Minutes- PT 23 minute(s)  -            User Key  (r) = Recorded By, (t) = Taken By, (c) = Cosigned By    Initials Name Provider Type    Yamel Fuller, PT Physical Therapist              Therapy Charges for Today     Code Description Service Date Service Provider Modifiers Qty    60965243219 HC PT THER PROC EA 15 MIN 4/17/2023 Yamel Mckeon, PT GP 1    89616991818 HC PT THERAPEUTIC ACT EA 15 MIN 4/17/2023 Yamel Mckeon, PT GP 1          PT G-Codes  AM-PAC 6 Clicks Score (PT): 14    Yamel Mckeon PT  4/17/2023

## 2023-04-17 NOTE — THERAPY TREATMENT NOTE
Acute Care - Occupational Therapy Treatment Note   [facility]     Patient Name: Augusto Lorenzana Jr.  : 1945  MRN: [id number]  Today's Date: 2023             Admit Date: 2023       ICD-10-CM ICD-9-CM   1. Gastrointestinal hemorrhage, unspecified gastrointestinal hemorrhage type  K92.2 578.9   2. NSTEMI 2021  I21.4 410.70   3. Chronic kidney disease, unspecified CKD stage  N18.9 585.9     Patient Active Problem List   Diagnosis   • NSTEMI 2021   • Hyperlipidemia LDL goal <70   • Essential hypertension   • T2DM on oral agents and insulin. HbA1c 7.4    • Coronary artery disease involving native coronary artery of native heart with unstable angina pectoris   • A/C kidney disease. ATN due to postoperative arrest. 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 Combination of anoxic insult and azotemia   • Debility   • Lower extremity edema   • Complete heart block   • GI bleed   • Third degree atrioventricular block   • Atrial fibrillation, persistent   • Gastrointestinal hemorrhage associated with gastric ulcer     Past Medical History:   Diagnosis Date   • CAD (coronary artery disease)    • CKD (chronic kidney disease) stage 3, GFR 30-59 ml/min    • Diabetes mellitus    • Hyperlipidemia    • Hypertension    • NSTEMI (non-ST elevated myocardial infarction)      Past Surgical History:   Procedure Laterality Date   • CARDIAC CATHETERIZATION N/A 2021    Procedure: Left Heart Cath;  Surgeon: Blade Greene IV, MD;  Location: [facility] CATH INVASIVE [facility];  Service: Cardiovascular;  Laterality: N/A;   • CARDIAC SURGERY      2 stents placed .   • CORONARY ARTERY BYPASS GRAFT N/A 2021    Procedure: MEDIAN STERNOTOMY CORONARY ARTERY BYPASS X 3 UTILIZING THE LEFT INTERNAL MAMMARY ARTERY GRAFT, EVH OF THE GREATER RIGHT SAPHENOUS  VEIN, AND PILO PER ANESTHESIA;  Surgeon: Jacek Miller MD;  Location:  [facility];  Service: Cardiothoracic;  Laterality: N/A;   • ENDOSCOPY N/A 04/08/2023    Procedure: ESOPHAGOGASTRODUODENOSCOPY with CENTERAL LINE PLACEMENT;  Surgeon: Sabine Hudson MD;  Location:  [facility];  Service: General;  Laterality: N/A;   • ENDOSCOPY N/A 04/10/2023    Procedure: ESOPHAGOGASTRODUODENOSCOPY;  Surgeon: Sabine Hudson MD;  Location:  [facility];  Service: General;  Laterality: N/A;   • PACEMAKER IMPLANTATION           OT ASSESSMENT FLOWSHEET (last 12 hours)     OT Evaluation and Treatment     Row Name 04/17/23 1058                   OT Time and Intention    Document Type therapy note (daily note)  -KR        Mode of Treatment occupational therapy  -KR        Patient Effort adequate  -KR           General Information    General Observations of Patient alert/cooperative  -KR           Living Environment    Current Living Arrangements home  -KR        People in Home spouse  -KR           Cognition    Affect/Mental Status (Cognition) WFL  -KR        Orientation Status (Cognition) oriented x 3  -KR        Follows Commands (Cognition) WFL  -KR           Range of Motion Comprehensive    Comment, General Range of Motion BUE WFL  -KR           Shoulder (Therapeutic Exercise)    Shoulder AROM (Therapeutic Exercise) bilateral;flexion;extension;sitting  -KR           Elbow/Forearm (Therapeutic Exercise)    Elbow/Forearm AROM (Therapeutic Exercise) bilateral;flexion;extension;sitting  -KR           Wound 04/12/23 1700 distal penis Pressure Injury    Wound - Properties Group Placement Date: 04/12/23 -TW Placement Time: 1700  -TW Present on Hospital Admission: N  -TW Orientation: distal  -TW Location: penis  -TW Primary Wound Type: Pressure inj  -TW    Retired Wound - Properties Group Placement Date: 04/12/23 -TW Placement Time: 1700  -TW Present on Hospital Admission: N  -TW Orientation: distal  -TW Location: penis  -TW Primary  Wound Type: Pressure inj  -TW    Retired Wound - Properties Group Date first assessed: 04/12/23  -TW Time first assessed: 1700  -TW Present on Hospital Admission: N  -TW Location: penis  -TW Primary Wound Type: Pressure inj  -TW       Plan of Care Review    Plan of Care Reviewed With patient  -KR        Progress improving  -KR           Strength Goal 1 (OT)    Progress/Outcome (Strength Goal 1, OT) continuing progress toward goal  -KR            Endurance Goal 1 (OT)    Progress/Outcome (Endurance Goal 1, OT) continuing progress toward goal  -KR              User Key  (r) = Recorded By, (t) = Taken By, (c) = Cosigned By    Initials Name Effective Dates    TW Kaia Turner RN 06/16/21 -     Burton Nick OT 06/16/21 -                        OT Recommendation and Plan  Planned Therapy Interventions (OT): activity tolerance training, BADL retraining, strengthening exercise  Plan of Care Review  Plan of Care Reviewed With: patient  Progress: improving  Plan of Care Reviewed With: patient        Time Calculation:     Therapy Charges for Today     Code Description Service Date Service Provider Modifiers Qty    16056742128  OT THERAPEUTIC ACT EA 15 MIN 4/17/2023 Burton Wilson OT GO 2               Burton Wilson OT  4/17/2023

## 2023-04-17 NOTE — CASE MANAGEMENT/SOCIAL WORK
Discharge Planning Assessment   [facility]     Patient Name: Augusto Lorenzana Jr.  MRN: [id number]  Today's Date: 4/17/2023    Admit Date: 4/8/2023    Plan: SS noted inpt rehab consult and notified rehab at ext [phone].  SS will follow.     Discharge Plan     Row Name 04/17/23 0916       Plan    Plan SS noted inpt rehab consult and notified rehab at ext [phone].  SS will follow.                MARIA LUZ ElderW

## 2023-04-17 NOTE — PROGRESS NOTES
Nephrology Progress Note      Subjective     No chest pain, shortness of breath.  Feeling better    Objective       Vital signs :     Temp:  [97.7 °F (36.5 °C)-98.3 °F (36.8 °C)] 98 °F (36.7 °C)  Heart Rate:  [70-91] 75  Resp:  [16-18] 16  BP: (124-138)/(65-72) 127/71    Intake/Output                             04/15/23 0701 - 04/16/23 0700 04/16/23 0701 - 04/17/23 0700 04/17/23 0701 - 04/18/23 0700     0285-6020 4545-7953 Total 9622-6810 2542-9451 Total 5047-6954 0012-4383 Total                    Intake    P.O.  --  -- --  770  240 1010  240  -- 240    Total Intake -- -- --  240 -- 240       Output    Other  1500  -- 1500  --  -- --  --  -- --    Ultrafiltration (mL) 1500 -- 1500 -- -- -- -- -- --    Total Output 1500 -- 1500 -- -- -- -- -- --           Physical Exam:    General Appearance : not in acute distress  Lungs : clear to auscultation, respirations regular  Heart :  regular rhythm & normal rate, normal S1, S2 and no murmur, no rub  Abdomen : soft, non distended, suprapubic tenderness   Extremities : no edema  Neurologic :   orientated to person, place, time and situation, Grossly no focal deficits        Laboratory Data :     Albumin No results found for: ALBUMIN   Magnesium No results found for: MG       PTH               No results found for: PTH    CBC and coagulation:  Results from last 7 days   Lab Units 04/15/23  0018 04/14/23  1356 04/14/23  0108 04/13/23  1241 04/13/23  0210   WBC 10*3/mm3 11.57*  --   --  13.14* 15.63*   HEMOGLOBIN g/dL 7.6*  --  7.7* 7.9* 8.3*   HEMATOCRIT % 23.5*  --  23.9* 24.5* 26.5*   MCV fL 97.9*  --   --  98.8* 100.0*   MCHC g/dL 32.3  --   --  32.2 31.3*   PLATELETS 10*3/mm3 209  --   --  209 233   INR   --  1.15*  --   --   --      Acid/base balance:      Renal and electrolytes:    Results from last 7 days   Lab Units 04/15/23  0018 04/14/23  0108 04/13/23  0210 04/12/23  0053 04/11/23  0009   SODIUM mmol/L 140 139 147* 145 146*   POTASSIUM mmol/L 3.8 3.1*  3.6  --  3.6   CHLORIDE mmol/L 105 103 117*  --  117*   CO2 mmol/L 23.9 26.9 16.9*  --  16.8*   BUN mg/dL 34* 28* 65*  --  86*   CREATININE mg/dL 3.45* 2.63* 3.82*  --  3.78*   CALCIUM mg/dL 7.6* 7.6* 8.5*  --  8.3*   PHOSPHORUS mg/dL  --  2.4*  --   --   --      Estimated Creatinine Clearance: 18.7 mL/min (A) (by C-G formula based on SCr of 3.45 mg/dL (H)).  @GFRCG:3@   Liver and pancreatic function:  Results from last 7 days   Lab Units 04/14/23  0108   ALBUMIN g/dL 2.5*         Cardiac:      Liver and pancreatic function:  Results from last 7 days   Lab Units 04/14/23  0108   ALBUMIN g/dL 2.5*       Medications :     allopurinol, 50 mg, Oral, Once per day on Mon Thu  amLODIPine, 10 mg, Oral, Q24H  atorvastatin, 40 mg, Oral, Nightly  carvedilol, 25 mg, Oral, BID With Meals  castor oil-balsam peru, 1 application, Topical, Q12H  gentamicin, 1 application, Topical, Daily  insulin lispro, 2-7 Units, Subcutaneous, TID With Meals  Lidocaine, 1 patch, Apply externally, Daily  melatonin, 10 mg, Oral, Nightly  multivitamin, 1 tablet, Oral, Daily  OLANZapine, 5 mg, Oral, Once  OLANZapine zydis, 5 mg, Oral, Daily  pantoprazole, 40 mg, Oral, BID AC  senna-docusate sodium, 2 tablet, Oral, Nightly  sodium chloride, 10 mL, Intravenous, Q12H  sodium chloride, 10 mL, Intravenous, Q12H  sodium chloride, 10 mL, Intravenous, Q12H  sterile water (preservative free), , ,       sodium chloride, 9 mL/hr, Last Rate: 30 mL/hr (04/13/23 0817)          Assessment & Plan     1.  CKD stage V initiated on PDx during this admission   2.  Upper GI bleed with blood loss anemia  3.  Heart failure with reduced ejection fraction LVEF 45%  4.  History of CABG  5.  Diabetes with nephropathy poor control  6.  Hypertension  7.  Dehydration with hypotension  8.  Pacemaker  9.  Confusion and acute psychosis  10.  Hypokalemia     Grossly stable from renal standpoint, continue on intermittent hemodialysis as scheduled TTS  Patient should be able to go home  from nephrology standpoint once outpatient dialysis chair has been arranged  Educated and counseled the patient  Anemia, start on erythropoietin  Redo of PDx cath outpatient.   PD catheter flush every Monday      Alessandra Farias MD  04/17/23  08:17 EDT

## 2023-04-17 NOTE — PROGRESS NOTES
[facility] HOSPITALIST PROGRESS NOTE     Patient Identification:  Name:  Augusto Lorenzana Jr.  Age:  78 y.o.  Sex:  male  :  1945  MRN:  [id number]  Visit Number:  [id number]  ROOM: [address] [place] [address]     Primary Care Provider:  Rob Polanco MD    Length of stay in inpatient status:  9    Subjective     Chief Compliant:  No chief complaint on file.      History of Presenting Illness:    Patient in bedside chair. He appeared comfortable. He was pleasant and declined any new complaints. Sitter bedside also denied any agitation or noncompliance. Patient agreeable for rehab consult.       ROS:  Otherwise 10 point ROS negative other than documented above in HPI.     Objective     Current Hospital Meds:allopurinol, 50 mg, Oral, Once per day on u  amLODIPine, 10 mg, Oral, Q24H  atorvastatin, 40 mg, Oral, Nightly  carvedilol, 25 mg, Oral, BID With Meals  castor oil-balsam peru, 1 application, Topical, Q12H  epoetin nirmala/nirmala-epbx, 10,000 Units, Subcutaneous, Once  gentamicin, 1 application, Topical, Daily  insulin lispro, 2-7 Units, Subcutaneous, TID With Meals  Lidocaine, 1 patch, Apply externally, Daily  melatonin, 10 mg, Oral, Nightly  multivitamin, 1 tablet, Oral, Daily  OLANZapine, 5 mg, Oral, Once  OLANZapine zydis, 5 mg, Oral, Daily  pantoprazole, 40 mg, Oral, BID AC  senna-docusate sodium, 2 tablet, Oral, Nightly  sodium chloride, 10 mL, Intravenous, Q12H  sodium chloride, 10 mL, Intravenous, Q12H  sodium chloride, 10 mL, Intravenous, Q12H  sterile water (preservative free), , ,     sodium chloride, 9 mL/hr, Last Rate: 30 mL/hr (23 08)        Current Antimicrobial Therapy:  Anti-Infectives (From admission, onward)    None        Current Diuretic Therapy:  Diuretics (From admission, onward)    None        ----------------------------------------------------------------------------------------------------------------------  Vital Signs:  Temp:  [97.7 °F (36.5 °C)-98.3 °F (36.8 °C)] 98 °F  (36.7 °C)  Heart Rate:  [70-91] 75  Resp:  [16-18] 16  BP: (124-138)/(65-72) 127/71  SpO2:  [94 %-95 %] 94 %  on   ;   Device (Oxygen Therapy): room air  Body mass index is 25.06 kg/m².    Wt Readings from Last 3 Encounters:   04/17/23 72.6 kg (160 lb)   04/07/23 77.2 kg (170 lb 3.2 oz)   07/08/21 83 kg (183 lb)     Intake & Output (last 3 days)       04/14 0701  04/15 0700 04/15 0701  04/16 0700 04/16 0701  04/17 0700 04/17 0701  04/18 0700    P.O. 1260  1010 840    Other        Total Intake(mL/kg) 1260 (16.9)  1010 (13.9) 840 (11.6)    Urine (mL/kg/hr) 600 (0.3)   300 (0.9)    Other  1500      Stool 0       Total Output 600 1500  300    Net +660 -1500 +1010 +540            Urine Unmeasured Occurrence 2 x  1 x     Stool Unmeasured Occurrence 2 x           Diet: Liquid Diets, Diabetic Diets; Full Liquid; Consistent Carbohydrate; Texture: Regular Texture (IDDSI 7); Fluid Consistency: Thin (IDDSI 0)  ----------------------------------------------------------------------------------------------------------------------  Physical exam:  Constitutional:  Well-developed and well-nourished.  No respiratory distress.      HENT:  Head:  Normocephalic and atraumatic.  Mouth:  Moist mucous membranes.    Eyes:  Conjunctivae and EOM are normal. No scleral icterus.    Neck:  Neck supple.  No JVD present.    Cardiovascular:  Normal rate, regular rhythm and normal heart sounds with no murmur.  Pulmonary/Chest:  No respiratory distress, no wheezes, no crackles, with normal breath sounds and good air movement.  Abdominal:  Soft.  Bowel sounds are normal.  No distension and no tenderness.   Musculoskeletal:  No edema, no tenderness, and no deformity.  No red or swollen joints anywhere.    Neurological:  Alert and oriented to person, place, and time.  No cranial nerve deficit.  No tongue deviation.  No facial droop.  No slurred speech.   Skin:  Skin is warm and dry. No rash noted. No pallor.   Peripheral vascular:  Pulses in all 4  extremities with no clubbing, no cyanosis, no edema.  ----------------------------------------------------------------------------------------------------------------------  Tele:    ----------------------------------------------------------------------------------------------------------------------  Results from last 7 days   Lab Units 04/15/23  0018 04/14/23  1356 04/14/23  0108 04/13/23  1241 04/13/23  0210   WBC 10*3/mm3 11.57*  --   --  13.14* 15.63*   HEMOGLOBIN g/dL 7.6*  --  7.7* 7.9* 8.3*   HEMATOCRIT % 23.5*  --  23.9* 24.5* 26.5*   MCV fL 97.9*  --   --  98.8* 100.0*   MCHC g/dL 32.3  --   --  32.2 31.3*   PLATELETS 10*3/mm3 209  --   --  209 233   INR   --  1.15*  --   --   --          Results from last 7 days   Lab Units 04/15/23  0018 04/14/23  0108 04/13/23  0210   SODIUM mmol/L 140 139 147*   POTASSIUM mmol/L 3.8 3.1* 3.6   CHLORIDE mmol/L 105 103 117*   CO2 mmol/L 23.9 26.9 16.9*   BUN mg/dL 34* 28* 65*   CREATININE mg/dL 3.45* 2.63* 3.82*   CALCIUM mg/dL 7.6* 7.6* 8.5*   PHOSPHORUS mg/dL  --  2.4*  --    GLUCOSE mg/dL 121* 120* 165*   ALBUMIN g/dL  --  2.5*  --    Estimated Creatinine Clearance: 18.7 mL/min (A) (by C-G formula based on SCr of 3.45 mg/dL (H)).  No results found for: AMMONIA              Glucose   Date/Time Value Ref Range Status   04/17/2023 1045 254 (H) 70 - 130 mg/dL Final     Comment:     Meter: [id number] : [id number] DIEUDONNE Edinburgh   04/17/2023 0807 170 (H) 70 - 130 mg/dL Final     Comment:     Meter: [id number] : [id number] DIEUDONNE CARLA   04/16/2023 1811 187 (H) 70 - 130 mg/dL Final     Comment:     RN Notified Meter: [id number] : [id number] WADE MOYER   04/16/2023 1636 196 (H) 70 - 130 mg/dL Final     Comment:     Meter: [id number] : [id number] Sammy Khoury   04/16/2023 1114 249 (H) 70 - 130 mg/dL Final     Comment:     Meter: [id number] : [id number] Sammy Khoury   04/16/2023 0742 130 70 - 130 mg/dL Final     Comment:     Meter: [id number] :  [id number] RUCHI DODSON   04/15/2023 1654 99 70 - 130 mg/dL Final     Comment:     Meter: [id number] : [id number] DEBORAH WORRELL   04/15/2023 1223 212 (H) 70 - 130 mg/dL Final     Comment:     Meter: [id number] : [id number] TAYLOR MORALES     Lab Results   Component Value Date    TSH 4.030 06/03/2021    FREET4 1.1 03/31/2023     No results found for: PREGTESTUR, PREGSERUM, HCG, HCGQUANT  Pain Management Panel         Latest Ref Rng & Units 12/7/2022 5/30/2021   Pain Management Panel   Creatinine, Urine mg/dL 80.1   136.1           Multiple values from one day are sorted in reverse-chronological order           Brief Urine Lab Results  (Last result in the past 365 days)      Color   Clarity   Blood   Leuk Est   Nitrite   Protein   CREAT   Urine HCG        12/07/22 1710             80.1         12/07/22 1710 Yellow   Clear   Small (1+)   Negative   Negative   >=300 mg/dL (3+)               No results found for: BLOODCX  No results found for: URINECX  No results found for: WOUNDCX  No results found for: STOOLCX  No results found for: RESPCX  No results found for: AFBCX        I have personally looked at the labs and they are summarized above.  ----------------------------------------------------------------------------------------------------------------------  Detailed radiology reports for the last 24 hours:    Imaging Results (Last 24 Hours)     ** No results found for the last 24 hours. **        Assessment & Plan    #Acute blood loss anemia w/UGIB  #PUD s/p EGD w/epinephrine injection  -s/p 48hrs BID PPI IV after period on PPI gtt. Transitioned to PO PPI BID 4/14 to be continued x6 weeks. No recurrent melena noted.  -Repeat hemoglobin stable, minimally reduced after TDC placement  -CVC removed 4/15. With correction of uremia, platelet dysfunction improving without recurrent evidence of acute blood loss     #Leukocytosis  -Improving, likely from UGIB     #ESRD previously on PD s/p TDC placement 4/13  -Underwent  placement of TDC 4/13 with minor bleeding periprocedurally  -First HD session 4/13, tolerating well with rapid improvement of uremia  -Nephrology following, planning to cont TTS HD and with PDx redo outpatient  -Will need outpatient HD chair at discharge     #Hypernatremia  -Previously given D5w but can correct moving forward w/HD  -Now wnl     #Critial illness associated myopathy  -Patient had hospitalization initially at  3/30 for PD placement complicated by new heart block requiring placement of PM, transferred to [facility] for inaptient rehab and developed acute GI bleed shortly after transfer requiring EGD and TDC placement for nonfunctional PD  -Given above periods of significant illness and immobility, now below baseline activity level and functional status  -Acute inpatient rehab consult placed     #Hospital acquired delirium  -Delirium precautions in place  -Started qhs melatonin but unfortunately no effective pharmacologic options for delirium  -Given qtc prolongation, avoid further antipsychotics. Prn ativan if patient agitated to point of being unsafe.   -Bedside sitter as needed  -Improved     -- Chronic --     CAD s/p CABG- holding ASA due to UGIB, resume as outpatient if no recurrence after 6 weeks PPI. Consider resuming if Hgb sustains >8mg/dl on f/u  HTN- BP controlled on Losartan, Norvasc and coreg  HLD- statin  DM2- A1C 6.5%. SSI, resume lantus at 1/2 home dose given improvement in PO intake.   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 Dose decreased to 100 mg/day per renal function.   Constipation- cont doc/senna nightly    F: PO  E: Replace as needed  N: Regular, CC    DVT ppx: SCDs    Code status: Full     Dispo: Pending inpatient rehab vs. SNF      VTE Prophylaxis:   Mechanical Order History:      Ordered        04/08/23 1218  Maintain Sequential Compression Device  Continuous                    Pharmalogical Order History:      Ordered     Dose  Route Frequency Stop    04/11/23 1429  heparin (porcine) injection 6,000 Units         6,000 Units IK Once 04/11/23 1539    04/1945  heparin (porcine) injection 2,000 Units         2,000 Units IV Once 04/10/23 1958                  Gregorio Pinedo MD  [facility]  04/17/23  11:24 EDT

## 2023-04-17 NOTE — CASE MANAGEMENT/SOCIAL WORK
Discharge Planning Assessment  [facility]     Patient Name: Augusto Lorenzana Jr.  MRN: [id number]  Today's Date: 4/17/2023    Admit Date: 4/8/2023    Plan: SS noted inpt rehab has denied pt and signed off.  SS spoke with pt and pt's spouse at bedside.  Pt spouse requests SS send pt's referral to [facility] in [place].  SS left message for [facility] admissions coordinator to return call at [phone]. SS will follow.     Discharge Plan     Row Name 04/17/23 1235       Plan    Plan SS noted inpt rehab has denied pt and signed off.  SS spoke with pt and pt's spouse at bedside.  Pt spouse requests SS send pt's referral to [facility] in [place].  SS left message for [facility] admissions coordinator to return call at [phone]. SS will follow.              MANISHA Elder

## 2023-04-17 NOTE — PLAN OF CARE
Goal Outcome Evaluation:  Plan of Care Reviewed With: patient      Pt resting in bed, VSS, SPO2 stable on RA. No c/o CP or acute distress ntd this shift.  Will continue to follow plan of care.

## 2023-04-18 ENCOUNTER — HOSPITAL ENCOUNTER (INPATIENT)
Facility: HOSPITAL | Age: 78
DRG: 91 | End: 2023-04-18
Attending: FAMILY MEDICINE | Admitting: FAMILY MEDICINE
Payer: MEDICARE

## 2023-04-18 VITALS
RESPIRATION RATE: 18 BRPM | SYSTOLIC BLOOD PRESSURE: 149 MMHG | WEIGHT: 160.7 LBS | HEIGHT: 67 IN | DIASTOLIC BLOOD PRESSURE: 83 MMHG | TEMPERATURE: 97.6 F | OXYGEN SATURATION: 98 % | BODY MASS INDEX: 25.22 KG/M2 | HEART RATE: 77 BPM

## 2023-04-18 DIAGNOSIS — G72.81 CRITICAL ILLNESS MYOPATHY: Primary | ICD-10-CM

## 2023-04-18 LAB
ANION GAP SERPL CALCULATED.3IONS-SCNC: 12.3 MMOL/L (ref 5–15)
BASOPHILS # BLD AUTO: 0.06 10*3/MM3 (ref 0–0.2)
BASOPHILS NFR BLD AUTO: 0.7 % (ref 0–1.5)
BUN SERPL-MCNC: 28 MG/DL (ref 8–23)
BUN/CREAT SERPL: 7.6 (ref 7–25)
CALCIUM SPEC-SCNC: 8.6 MG/DL (ref 8.6–10.5)
CHLORIDE SERPL-SCNC: 103 MMOL/L (ref 98–107)
CO2 SERPL-SCNC: 20.7 MMOL/L (ref 22–29)
CREAT SERPL-MCNC: 3.69 MG/DL (ref 0.76–1.27)
DEPRECATED RDW RBC AUTO: 62.4 FL (ref 37–54)
EGFRCR SERPLBLD CKD-EPI 2021: 16.3 ML/MIN/1.73
EOSINOPHIL # BLD AUTO: 0.39 10*3/MM3 (ref 0–0.4)
EOSINOPHIL NFR BLD AUTO: 4.6 % (ref 0.3–6.2)
ERYTHROCYTE [DISTWIDTH] IN BLOOD BY AUTOMATED COUNT: 17.2 % (ref 12.3–15.4)
GLUCOSE BLDC GLUCOMTR-MCNC: 152 MG/DL (ref 70–130)
GLUCOSE BLDC GLUCOMTR-MCNC: 158 MG/DL (ref 70–130)
GLUCOSE BLDC GLUCOMTR-MCNC: 217 MG/DL (ref 70–130)
GLUCOSE BLDC GLUCOMTR-MCNC: 245 MG/DL (ref 70–130)
GLUCOSE SERPL-MCNC: 111 MG/DL (ref 65–99)
HCT VFR BLD AUTO: 28.3 % (ref 37.5–51)
HGB BLD-MCNC: 8.8 G/DL (ref 13–17.7)
IMM GRANULOCYTES # BLD AUTO: 0.1 10*3/MM3 (ref 0–0.05)
IMM GRANULOCYTES NFR BLD AUTO: 1.2 % (ref 0–0.5)
LYMPHOCYTES # BLD AUTO: 1.42 10*3/MM3 (ref 0.7–3.1)
LYMPHOCYTES NFR BLD AUTO: 16.8 % (ref 19.6–45.3)
MAGNESIUM SERPL-MCNC: 2.1 MG/DL (ref 1.6–2.4)
MCH RBC QN AUTO: 31.4 PG (ref 26.6–33)
MCHC RBC AUTO-ENTMCNC: 31.1 G/DL (ref 31.5–35.7)
MCV RBC AUTO: 101.1 FL (ref 79–97)
MONOCYTES # BLD AUTO: 0.62 10*3/MM3 (ref 0.1–0.9)
MONOCYTES NFR BLD AUTO: 7.4 % (ref 5–12)
NEUTROPHILS NFR BLD AUTO: 5.84 10*3/MM3 (ref 1.7–7)
NEUTROPHILS NFR BLD AUTO: 69.3 % (ref 42.7–76)
NRBC BLD AUTO-RTO: 0 /100 WBC (ref 0–0.2)
PLATELET # BLD AUTO: 261 10*3/MM3 (ref 140–450)
PMV BLD AUTO: 10 FL (ref 6–12)
POTASSIUM SERPL-SCNC: 4.3 MMOL/L (ref 3.5–5.2)
RBC # BLD AUTO: 2.8 10*6/MM3 (ref 4.14–5.8)
SARS-COV-2 AG RESP QL IA.RAPID: NORMAL
SODIUM SERPL-SCNC: 136 MMOL/L (ref 136–145)
WBC NRBC COR # BLD: 8.43 10*3/MM3 (ref 3.4–10.8)

## 2023-04-18 PROCEDURE — 82962 GLUCOSE BLOOD TEST: CPT

## 2023-04-18 PROCEDURE — 63710000001 INSULIN GLARGINE PER 5 UNITS: Performed by: FAMILY MEDICINE

## 2023-04-18 PROCEDURE — 87426 SARSCOV CORONAVIRUS AG IA: CPT | Performed by: INTERNAL MEDICINE

## 2023-04-18 PROCEDURE — 83735 ASSAY OF MAGNESIUM: CPT | Performed by: INTERNAL MEDICINE

## 2023-04-18 PROCEDURE — 97535 SELF CARE MNGMENT TRAINING: CPT

## 2023-04-18 PROCEDURE — 97110 THERAPEUTIC EXERCISES: CPT

## 2023-04-18 PROCEDURE — 99223 1ST HOSP IP/OBS HIGH 75: CPT | Performed by: FAMILY MEDICINE

## 2023-04-18 PROCEDURE — 85025 COMPLETE CBC W/AUTO DIFF WBC: CPT | Performed by: INTERNAL MEDICINE

## 2023-04-18 PROCEDURE — 63710000001 INSULIN LISPRO (HUMAN) PER 5 UNITS: Performed by: SURGERY

## 2023-04-18 PROCEDURE — 63710000001 INSULIN LISPRO (HUMAN) PER 5 UNITS: Performed by: FAMILY MEDICINE

## 2023-04-18 PROCEDURE — 97530 THERAPEUTIC ACTIVITIES: CPT

## 2023-04-18 PROCEDURE — 99239 HOSP IP/OBS DSCHRG MGMT >30: CPT | Performed by: INTERNAL MEDICINE

## 2023-04-18 PROCEDURE — 80048 BASIC METABOLIC PNL TOTAL CA: CPT | Performed by: INTERNAL MEDICINE

## 2023-04-18 RX ORDER — METHOCARBAMOL 500 MG/1
500 TABLET, FILM COATED ORAL EVERY 12 HOURS PRN
Status: CANCELLED | OUTPATIENT
Start: 2023-04-18

## 2023-04-18 RX ORDER — ASPIRIN 81 MG/1
81 TABLET, CHEWABLE ORAL DAILY
Status: CANCELLED | OUTPATIENT
Start: 2023-04-18

## 2023-04-18 RX ORDER — CARVEDILOL 25 MG/1
25 TABLET ORAL 2 TIMES DAILY WITH MEALS
Status: CANCELLED | OUTPATIENT
Start: 2023-04-18

## 2023-04-18 RX ORDER — CHOLECALCIFEROL (VITAMIN D3) 125 MCG
10 CAPSULE ORAL NIGHTLY
Status: CANCELLED | OUTPATIENT
Start: 2023-04-18

## 2023-04-18 RX ORDER — DEXTROSE MONOHYDRATE 25 G/50ML
25 INJECTION, SOLUTION INTRAVENOUS
Status: DISCONTINUED | OUTPATIENT
Start: 2023-04-18 | End: 2023-04-26 | Stop reason: HOSPADM

## 2023-04-18 RX ORDER — ATORVASTATIN CALCIUM 40 MG/1
40 TABLET, FILM COATED ORAL NIGHTLY
Status: DISCONTINUED | OUTPATIENT
Start: 2023-04-18 | End: 2023-04-26 | Stop reason: HOSPADM

## 2023-04-18 RX ORDER — PANTOPRAZOLE SODIUM 40 MG/1
40 TABLET, DELAYED RELEASE ORAL
Status: DISCONTINUED | OUTPATIENT
Start: 2023-04-18 | End: 2023-04-26 | Stop reason: HOSPADM

## 2023-04-18 RX ORDER — DIPHENOXYLATE HYDROCHLORIDE AND ATROPINE SULFATE 2.5; .025 MG/1; MG/1
1 TABLET ORAL DAILY
Status: DISCONTINUED | OUTPATIENT
Start: 2023-04-19 | End: 2023-04-26 | Stop reason: HOSPADM

## 2023-04-18 RX ORDER — LIDOCAINE 4 G/G
1 PATCH TOPICAL DAILY
Status: CANCELLED | OUTPATIENT
Start: 2023-04-19

## 2023-04-18 RX ORDER — DEXTROSE MONOHYDRATE 25 G/50ML
25 INJECTION, SOLUTION INTRAVENOUS
Status: CANCELLED | OUTPATIENT
Start: 2023-04-18

## 2023-04-18 RX ORDER — ATENOLOL 25 MG/1
1 TABLET ORAL 2 TIMES DAILY
Status: ON HOLD | COMMUNITY
Start: 2023-03-15

## 2023-04-18 RX ORDER — SODIUM CHLORIDE 0.9 % (FLUSH) 0.9 %
10 SYRINGE (ML) INJECTION AS NEEDED
Status: CANCELLED | OUTPATIENT
Start: 2023-04-18

## 2023-04-18 RX ORDER — GLUCAGON 1 MG/ML
1 KIT INJECTION
Status: CANCELLED | OUTPATIENT
Start: 2023-04-18

## 2023-04-18 RX ORDER — GENTAMICIN SULFATE 1 MG/G
1 OINTMENT TOPICAL DAILY
Status: DISCONTINUED | OUTPATIENT
Start: 2023-04-19 | End: 2023-04-26 | Stop reason: HOSPADM

## 2023-04-18 RX ORDER — ECHINACEA PURPUREA EXTRACT 125 MG
1 TABLET ORAL AS NEEDED
Status: DISCONTINUED | OUTPATIENT
Start: 2023-04-18 | End: 2023-04-26 | Stop reason: HOSPADM

## 2023-04-18 RX ORDER — AMLODIPINE BESYLATE 5 MG/1
1 TABLET ORAL DAILY
Status: ON HOLD | COMMUNITY
Start: 2023-03-07

## 2023-04-18 RX ORDER — AMLODIPINE BESYLATE 10 MG/1
10 TABLET ORAL
Status: DISCONTINUED | OUTPATIENT
Start: 2023-04-19 | End: 2023-04-24

## 2023-04-18 RX ORDER — CARVEDILOL 25 MG/1
25 TABLET ORAL 2 TIMES DAILY WITH MEALS
Status: DISCONTINUED | OUTPATIENT
Start: 2023-04-18 | End: 2023-04-24

## 2023-04-18 RX ORDER — INSULIN LISPRO 100 [IU]/ML
2-7 INJECTION, SOLUTION INTRAVENOUS; SUBCUTANEOUS
Status: DISCONTINUED | OUTPATIENT
Start: 2023-04-18 | End: 2023-04-26 | Stop reason: HOSPADM

## 2023-04-18 RX ORDER — ACETAMINOPHEN 500 MG
500 TABLET ORAL EVERY 6 HOURS PRN
Status: DISCONTINUED | OUTPATIENT
Start: 2023-04-18 | End: 2023-04-26 | Stop reason: HOSPADM

## 2023-04-18 RX ORDER — ECHINACEA PURPUREA EXTRACT 125 MG
1 TABLET ORAL AS NEEDED
Status: CANCELLED | OUTPATIENT
Start: 2023-04-18

## 2023-04-18 RX ORDER — ACETAMINOPHEN 500 MG
500 TABLET ORAL EVERY 6 HOURS PRN
Status: CANCELLED | OUTPATIENT
Start: 2023-04-18

## 2023-04-18 RX ORDER — AMOXICILLIN 250 MG
2 CAPSULE ORAL NIGHTLY
Status: DISCONTINUED | OUTPATIENT
Start: 2023-04-18 | End: 2023-04-26 | Stop reason: HOSPADM

## 2023-04-18 RX ORDER — SODIUM CHLORIDE 0.9 % (FLUSH) 0.9 %
10 SYRINGE (ML) INJECTION EVERY 12 HOURS SCHEDULED
Status: CANCELLED | OUTPATIENT
Start: 2023-04-18

## 2023-04-18 RX ORDER — CASTOR OIL AND BALSAM, PERU 788; 87 MG/G; MG/G
1 OINTMENT TOPICAL EVERY 12 HOURS SCHEDULED
Status: CANCELLED | OUTPATIENT
Start: 2023-04-18

## 2023-04-18 RX ORDER — CHOLECALCIFEROL (VITAMIN D3) 125 MCG
10 CAPSULE ORAL NIGHTLY
Status: DISCONTINUED | OUTPATIENT
Start: 2023-04-18 | End: 2023-04-26 | Stop reason: HOSPADM

## 2023-04-18 RX ORDER — SODIUM CHLORIDE 0.9 % (FLUSH) 0.9 %
20 SYRINGE (ML) INJECTION AS NEEDED
Status: CANCELLED | OUTPATIENT
Start: 2023-04-18

## 2023-04-18 RX ORDER — SODIUM CHLORIDE 0.9 % (FLUSH) 0.9 %
10 SYRINGE (ML) INJECTION AS NEEDED
Status: DISCONTINUED | OUTPATIENT
Start: 2023-04-18 | End: 2023-04-26 | Stop reason: HOSPADM

## 2023-04-18 RX ORDER — METHOCARBAMOL 500 MG/1
500 TABLET, FILM COATED ORAL EVERY 12 HOURS PRN
Status: DISCONTINUED | OUTPATIENT
Start: 2023-04-18 | End: 2023-04-26 | Stop reason: HOSPADM

## 2023-04-18 RX ORDER — DIPHENOXYLATE HYDROCHLORIDE AND ATROPINE SULFATE 2.5; .025 MG/1; MG/1
1 TABLET ORAL DAILY
Status: CANCELLED | OUTPATIENT
Start: 2023-04-19

## 2023-04-18 RX ORDER — SODIUM CHLORIDE 0.9 % (FLUSH) 0.9 %
10 SYRINGE (ML) INJECTION EVERY 12 HOURS SCHEDULED
Status: DISCONTINUED | OUTPATIENT
Start: 2023-04-18 | End: 2023-04-26 | Stop reason: HOSPADM

## 2023-04-18 RX ORDER — ALLOPURINOL 100 MG/1
50 TABLET ORAL 2 TIMES WEEKLY
Status: DISCONTINUED | OUTPATIENT
Start: 2023-04-20 | End: 2023-04-26 | Stop reason: HOSPADM

## 2023-04-18 RX ORDER — CASTOR OIL AND BALSAM, PERU 788; 87 MG/G; MG/G
1 OINTMENT TOPICAL EVERY 12 HOURS SCHEDULED
Status: DISCONTINUED | OUTPATIENT
Start: 2023-04-18 | End: 2023-04-26 | Stop reason: HOSPADM

## 2023-04-18 RX ORDER — OLANZAPINE 5 MG/1
5 TABLET, ORALLY DISINTEGRATING ORAL DAILY
Status: DISCONTINUED | OUTPATIENT
Start: 2023-04-19 | End: 2023-04-26 | Stop reason: HOSPADM

## 2023-04-18 RX ORDER — SODIUM CHLORIDE 9 MG/ML
9 INJECTION, SOLUTION INTRAVENOUS CONTINUOUS PRN
Status: DISCONTINUED | OUTPATIENT
Start: 2023-04-18 | End: 2023-04-26 | Stop reason: HOSPADM

## 2023-04-18 RX ORDER — ONDANSETRON 2 MG/ML
4 INJECTION INTRAMUSCULAR; INTRAVENOUS EVERY 6 HOURS PRN
Status: DISCONTINUED | OUTPATIENT
Start: 2023-04-18 | End: 2023-04-26 | Stop reason: HOSPADM

## 2023-04-18 RX ORDER — LIDOCAINE 4 G/G
1 PATCH TOPICAL DAILY
Status: DISCONTINUED | OUTPATIENT
Start: 2023-04-19 | End: 2023-04-19

## 2023-04-18 RX ORDER — ASPIRIN 81 MG/1
81 TABLET, CHEWABLE ORAL DAILY
Status: DISCONTINUED | OUTPATIENT
Start: 2023-04-18 | End: 2023-04-26 | Stop reason: HOSPADM

## 2023-04-18 RX ORDER — NICOTINE POLACRILEX 4 MG
15 LOZENGE BUCCAL
Status: CANCELLED | OUTPATIENT
Start: 2023-04-18

## 2023-04-18 RX ORDER — SODIUM CHLORIDE 0.9 % (FLUSH) 0.9 %
20 SYRINGE (ML) INJECTION AS NEEDED
Status: DISCONTINUED | OUTPATIENT
Start: 2023-04-18 | End: 2023-04-26 | Stop reason: HOSPADM

## 2023-04-18 RX ORDER — ONDANSETRON 2 MG/ML
4 INJECTION INTRAMUSCULAR; INTRAVENOUS EVERY 6 HOURS PRN
Status: CANCELLED | OUTPATIENT
Start: 2023-04-18

## 2023-04-18 RX ORDER — OLANZAPINE 5 MG/1
5 TABLET, ORALLY DISINTEGRATING ORAL DAILY
Status: CANCELLED | OUTPATIENT
Start: 2023-04-19

## 2023-04-18 RX ORDER — FUROSEMIDE 40 MG/1
1 TABLET ORAL 2 TIMES DAILY
Status: ON HOLD | COMMUNITY
Start: 2023-02-11 | End: 2023-04-18

## 2023-04-18 RX ORDER — PANTOPRAZOLE SODIUM 40 MG/1
40 TABLET, DELAYED RELEASE ORAL
Status: CANCELLED | OUTPATIENT
Start: 2023-04-18

## 2023-04-18 RX ORDER — ALLOPURINOL 100 MG/1
50 TABLET ORAL 2 TIMES WEEKLY
Status: CANCELLED | OUTPATIENT
Start: 2023-04-20

## 2023-04-18 RX ORDER — SODIUM CHLORIDE 9 MG/ML
40 INJECTION, SOLUTION INTRAVENOUS AS NEEDED
Status: DISCONTINUED | OUTPATIENT
Start: 2023-04-18 | End: 2023-04-26 | Stop reason: HOSPADM

## 2023-04-18 RX ORDER — SODIUM CHLORIDE 9 MG/ML
9 INJECTION, SOLUTION INTRAVENOUS CONTINUOUS PRN
Status: CANCELLED | OUTPATIENT
Start: 2023-04-18

## 2023-04-18 RX ORDER — NICOTINE POLACRILEX 4 MG
15 LOZENGE BUCCAL
Status: DISCONTINUED | OUTPATIENT
Start: 2023-04-18 | End: 2023-04-26 | Stop reason: HOSPADM

## 2023-04-18 RX ORDER — INSULIN LISPRO 100 [IU]/ML
2-7 INJECTION, SOLUTION INTRAVENOUS; SUBCUTANEOUS
Status: CANCELLED | OUTPATIENT
Start: 2023-04-18

## 2023-04-18 RX ORDER — SODIUM CHLORIDE 9 MG/ML
40 INJECTION, SOLUTION INTRAVENOUS AS NEEDED
Status: CANCELLED | OUTPATIENT
Start: 2023-04-18

## 2023-04-18 RX ORDER — ATORVASTATIN CALCIUM 40 MG/1
40 TABLET, FILM COATED ORAL NIGHTLY
Status: CANCELLED | OUTPATIENT
Start: 2023-04-18

## 2023-04-18 RX ORDER — AMLODIPINE BESYLATE 10 MG/1
10 TABLET ORAL
Status: CANCELLED | OUTPATIENT
Start: 2023-04-19

## 2023-04-18 RX ORDER — GENTAMICIN SULFATE 1 MG/G
1 OINTMENT TOPICAL DAILY
Status: CANCELLED | OUTPATIENT
Start: 2023-04-19

## 2023-04-18 RX ORDER — AMOXICILLIN 250 MG
2 CAPSULE ORAL NIGHTLY
Status: CANCELLED | OUTPATIENT
Start: 2023-04-18

## 2023-04-18 RX ADMIN — Medication 10 ML: at 21:15

## 2023-04-18 RX ADMIN — ATORVASTATIN CALCIUM 40 MG: 40 TABLET, FILM COATED ORAL at 21:16

## 2023-04-18 RX ADMIN — ASPIRIN 81 MG: 81 TABLET, CHEWABLE ORAL at 18:18

## 2023-04-18 RX ADMIN — Medication 10 ML: at 08:48

## 2023-04-18 RX ADMIN — CASTOR OIL AND BALSAM, PERU 1 APPLICATION: 788; 87 OINTMENT TOPICAL at 21:16

## 2023-04-18 RX ADMIN — INSULIN LISPRO 2 UNITS: 100 INJECTION, SOLUTION INTRAVENOUS; SUBCUTANEOUS at 07:35

## 2023-04-18 RX ADMIN — INSULIN GLARGINE 15 UNITS: 100 INJECTION, SOLUTION SUBCUTANEOUS at 21:16

## 2023-04-18 RX ADMIN — OLANZAPINE 5 MG: 5 TABLET, ORALLY DISINTEGRATING ORAL at 08:47

## 2023-04-18 RX ADMIN — AMLODIPINE BESYLATE 10 MG: 10 TABLET ORAL at 08:47

## 2023-04-18 RX ADMIN — CARVEDILOL 25 MG: 25 TABLET, FILM COATED ORAL at 18:18

## 2023-04-18 RX ADMIN — CASTOR OIL AND BALSAM, PERU 1 APPLICATION: 788; 87 OINTMENT TOPICAL at 08:47

## 2023-04-18 RX ADMIN — DOCUSATE SODIUM 50 MG AND SENNOSIDES 8.6 MG 2 TABLET: 8.6; 5 TABLET, FILM COATED ORAL at 21:16

## 2023-04-18 RX ADMIN — INSULIN LISPRO 2 UNITS: 100 INJECTION, SOLUTION INTRAVENOUS; SUBCUTANEOUS at 12:04

## 2023-04-18 RX ADMIN — Medication 1 TABLET: at 08:47

## 2023-04-18 RX ADMIN — GENTAMICIN SULFATE 1 APPLICATION: 1 OINTMENT TOPICAL at 08:47

## 2023-04-18 RX ADMIN — PANTOPRAZOLE SODIUM 40 MG: 40 TABLET, DELAYED RELEASE ORAL at 07:35

## 2023-04-18 RX ADMIN — LIDOCAINE 1 PATCH: 4 PATCH TOPICAL at 08:47

## 2023-04-18 RX ADMIN — Medication 10 MG: at 21:16

## 2023-04-18 RX ADMIN — PANTOPRAZOLE SODIUM 40 MG: 40 TABLET, DELAYED RELEASE ORAL at 18:18

## 2023-04-18 RX ADMIN — INSULIN LISPRO 3 UNITS: 100 INJECTION, SOLUTION INTRAVENOUS; SUBCUTANEOUS at 18:18

## 2023-04-18 NOTE — DISCHARGE SUMMARY
[facility] HOSPITALISTS DISCHARGE SUMMARY    Patient Identification:  Name:  Augusto Lorenzana Jr.  Age:  78 y.o.  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 male presented on  to [facility] from inpatient rehab complaining of hematemesis.  Please see the admitting history and physical for further details.      Mr. Lorenzana is our 79 yo M with hx CAD status post CABG, CKD stage V, type 2 diabetes mellitus, hyperlipidemia, paroxysmal A-fib, SEBASTIAN and valvular heart disease who initially presented to inpatient rehab as a transfer from  after and admission there for placement of peritoneal dialysis catheter. That hospitalization was complicated by prolonged sinus pause requiring pacemaker placement. Patient had sudden onset hematemesis and melena in inpatient rehab with acute blood loss anemia. Patient transferred to our service for further evaluation. Required 2 units of pRBC at time of transfer. EGD showed peripyloric ulcer that required epinephrine injection. Patient had another bout of melena and drop in hemoglobin requiring another unit of blood.  Repeat EGD showed no active bleeding.  Since then his hemoglobin has been stable.   Hospital course has been  complicated by severe delirium and agitation. Improved with zyprexa, likely hospital delirium.Of note he had peritoneal dialysis catheter put in and out of the hospital just prior to admission to rehab this has stopped working properly.  Nephrology consulted surgery to place a tunneled dialysis catheter which has been used for dialysis. Nephrology recommending outpatient dialysis chair. TDC site with bleeding that resolved with improvement in uremia. Patient was on ASA for secondary prevention. Given hemoglobin stability will restart. Continue BID protonix Recommend repeat CBC in AM. Patient accepted back to inpatient rehab for continued therapy for critical illness myopathy.     VITAL SIGNS:  Temp:  [97.5 °F (36.4 °C)-98.2 °F (36.8 °C)] 97.6 °F (36.4 °C)  Heart Rate:  [63-81] 77  Resp:  [16-18] 18  BP: ()/(54-83) 149/83  SpO2:  [97 %-98 %] 98 %  on   ;   Device (Oxygen Therapy): room air    Body mass index is 25.17 kg/m².  Wt Readings from Last 3 Encounters:   04/18/23 72.9 kg (160 lb 11.2 oz)   04/07/23 77.2 kg (170 lb 3.2 oz)   07/08/21 83 kg (183 lb)       PHYSICAL EXAM:  Constitutional:  Well-developed and well-nourished.  No respiratory distress.      HENT:  Head:  Normocephalic and atraumatic.  Mouth:  Moist mucous membranes.    Eyes:  Conjunctivae and EOM are normal.  Pupils are equal, round, and reactive to light.  No scleral icterus.    Cardiovascular:  Normal rate, regular rhythm and normal heart sounds with no murmur.  Pulmonary/Chest:  No respiratory distress, no wheezes, no crackles, with normal breath sounds and good air movement.  Abdominal:  Soft.  Bowel sounds are normal.  No distension and no tenderness.   Musculoskeletal:  No edema, no tenderness, and no deformity.  No red or swollen joints anywhere.    Neurological:  Alert and oriented to person, place, and time.  No gross neurological deficit.   Skin:  Skin is warm and dry. No rash noted. No pallor.   Peripheral vascular:  Strong pulses  in all 4 extremities with no clubbing, no cyanosis, no edema.    DISCHARGE DISPOSITION   Stable    DISCHARGE MEDICATIONS:     Discharge Medications      ASK your doctor about these medications      Instructions Start Date   albuterol sulfate  (90 Base) MCG/ACT inhaler  Commonly known as: PROVENTIL HFA;VENTOLIN HFA;PROAIR HFA   2 puffs, Inhalation, 2 Times Daily PRN      allopurinol 300 MG tablet  Commonly known as: ZYLOPRIM   300 mg, Oral, Daily      amiodarone 200 MG tablet  Commonly known as: PACERONE   200 mg, Oral, Daily      amLODIPine 10 MG tablet  Commonly known as: NORVASC   10 mg, Oral, Every 24 Hours Scheduled      aspirin 81 MG EC tablet   81 mg, Oral, Daily      atorvastatin 40 MG tablet  Commonly known as: LIPITOR   40 mg, Oral, Daily      carvedilol 6.25 MG tablet  Commonly known as: COREG   6.25 mg, Oral, 2 Times Daily With Meals      cloNIDine 0.2 MG tablet  Commonly known as: CATAPRES   0.2 mg, Oral, 3 Times Daily      gabapentin 300 MG capsule  Commonly known as: NEURONTIN   300 mg, Oral, Daily      glipizide 5 MG tablet  Commonly known as: GLUCOTROL   5 mg, Oral, 2 Times Daily Before Meals      hydrALAZINE 100 MG tablet  Commonly known as: APRESOLINE   100 mg, Oral, 3 Times Daily      Lantus SoloStar 100 UNIT/ML injection pen  Generic drug: Insulin Glargine   30 Units, Subcutaneous, Daily      multivitamin with minerals tablet tablet   1 tablet, Oral, Daily      torsemide 20 MG tablet  Commonly known as: DEMADEX   40 mg, Oral, Daily             Diet Instructions    Resume diet as tolerated          Activity Instructions    Resume activity as tolerated           Contact information for follow-up providers     Rob Polanco MD .    Specialty: Internal Medicine  Contact information:  [address]  [address] [address]  [phone]                   Contact information for after-discharge care     Destination     [facility] .    Service: Inpatient  Rehabilitation  Contact information:  [address] [facility] [address] [phone]                              TEST  RESULTS PENDING AT DISCHARGE       CODE STATUS  Code Status and Medical Interventions:   Ordered at: 04/08/23 1218     Level Of Support Discussed With:    Patient     Code Status (Patient has no pulse and is not breathing):    CPR (Attempt to Resuscitate)     Medical Interventions (Patient has pulse or is breathing):    Full Support     Release to patient:    Routine Release       The ASCVD Risk score (Buddy DEVINE, et al., 2019) failed to calculate for the following reasons:    The patient has a prior MI or stroke diagnosis     Gregorio Pinedo MD  [facility] ERist  04/18/23  13:02 EDT    Please note that this discharge summary required more than 30 minutes to complete.

## 2023-04-18 NOTE — CASE MANAGEMENT/SOCIAL WORK
Discharge Planning Assessment  [facility]     Patient Name: Augusto Lorenzana Jr.  MRN: [id number]  Today's Date: 4/18/2023    Admit Date: 4/8/2023    Plan: Physician requested inpt rehab reassess pt for possible admit.  Inpt rehab agreeable to accepting pt on this date per Mj and will need a new negative covid test and discharge readmit orders.  Pt and spouse aware and agreeable.  SS notified [facility] per [facility] at [phone] and faxed pt latest information to [phone].  [facility] will need notification at discharge from inpt rehab and will assign chair time at that time.  SS notified Physician.     Discharge Plan     Row Name 04/18/23 1210       Plan    Plan Physician requested inpt rehab reassess pt for possible admit.  Inpt rehab agreeable to accepting pt on this date per Mj and will need a new negative covid test and discharge readmit orders.  Pt and spouse aware and agreeable.  SS notified [facility] per [facility] at [phone] and faxed pt latest information to [phone].  [facility] will need notification at discharge from inpt rehab and will assign chair time at that time.  SS notified Physician.                      MARIA LUZ ElderW

## 2023-04-18 NOTE — THERAPY TREATMENT NOTE
Acute Care - Physical Therapy Treatment Note   [facility]     Patient Name: Augusto Lorenzana Jr.  : 1945  MRN: [id number]  Today's Date: 2023      Visit Dx:     ICD-10-CM ICD-9-CM   1. Gastrointestinal hemorrhage, unspecified gastrointestinal hemorrhage type  K92.2 578.9   2. NSTEMI 2021  I21.4 410.70   3. Chronic kidney disease, unspecified CKD stage  N18.9 585.9     Patient Active Problem List   Diagnosis   • NSTEMI 2021   • Hyperlipidemia LDL goal <70   • Essential hypertension   • T2DM on oral agents and insulin. HbA1c 7.4    • Coronary artery disease involving native coronary artery of native heart with unstable angina pectoris   • A/C kidney disease. ATN due to postoperative arrest. 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 Combination of anoxic insult and azotemia   • Debility   • Lower extremity edema   • Complete heart block   • GI bleed   • Third degree atrioventricular block   • Atrial fibrillation, persistent   • Gastrointestinal hemorrhage associated with gastric ulcer     Past Medical History:   Diagnosis Date   • CAD (coronary artery disease)    • CKD (chronic kidney disease) stage 3, GFR 30-59 ml/min    • Diabetes mellitus    • Hyperlipidemia    • Hypertension    • NSTEMI (non-ST elevated myocardial infarction)      Past Surgical History:   Procedure Laterality Date   • CARDIAC CATHETERIZATION N/A 2021    Procedure: Left Heart Cath;  Surgeon: Blade Greene IV, MD;  Location:  [facility];  Service: Cardiovascular;  Laterality: N/A;   • CARDIAC SURGERY      2 stents placed .   • CENTRAL VENOUS LINE INSERTION Left 2023    Procedure: CENTRAL VENOUS LINE INSERTION;  Surgeon: Torey Easley MD;  Location: [facility] OR;  Service: General;  Laterality: Left;   • CORONARY ARTERY  BYPASS GRAFT N/A 05/28/2021    Procedure: MEDIAN STERNOTOMY CORONARY ARTERY BYPASS X 3 UTILIZING THE LEFT INTERNAL MAMMARY ARTERY GRAFT, EVH OF THE GREATER RIGHT SAPHENOUS VEIN, AND PILO PER ANESTHESIA;  Surgeon: Jacek Miller MD;  Location:  [facility] OR;  Service: Cardiothoracic;  Laterality: N/A;   • ENDOSCOPY N/A 04/08/2023    Procedure: ESOPHAGOGASTRODUODENOSCOPY with CENTERAL LINE PLACEMENT;  Surgeon: Sabine Hudson MD;  Location:  [facility] OR;  Service: General;  Laterality: N/A;   • ENDOSCOPY N/A 04/10/2023    Procedure: ESOPHAGOGASTRODUODENOSCOPY;  Surgeon: Sabine Hudson MD;  Location:  [facility] OR;  Service: General;  Laterality: N/A;   • INSERTION HEMODIALYSIS CATHETER N/A 4/13/2023    Procedure: HEMODIALYSIS CATHETER INSERTION;  Surgeon: Torey Easley MD;  Location:  [facility] OR;  Service: General;  Laterality: N/A;   • PACEMAKER IMPLANTATION       PT Assessment (last 12 hours)     PT Evaluation and Treatment     Row Name 04/18/23 1037          Physical Therapy Time and Intention    Document Type therapy note (daily note)  -     Mode of Treatment physical therapy  -     Patient Effort good  -     Comment Pt worked gloria mbulation in [facility] this date using RW. Pt worked on standing squats and standing with PF  -     Row Name 04/18/23 1037          Bed Mobility    Bed Mobility supine-sit;sit-supine  -     Supine-Sit [place] (Bed Mobility) independent  -     Sit-Supine [place] (Bed Mobility) independent  -     Row Name 04/18/23 1037          Transfers    Transfers sit-stand transfer;stand-sit transfer  -     Row Name 04/18/23 1037          Sit-Stand Transfer    Sit-Stand [place] (Transfers) standby assist;contact guard  -     Assistive Device (Sit-Stand Transfers) walker, front-wheeled  -     Row Name 04/18/23 1037          Stand-Sit Transfer    Stand-Sit [place] (Transfers) contact guard;standby assist  -     Assistive Device (Stand-Sit Transfers) walker,  front-wheeled  -     Row Name 04/18/23 1037          Gait/Stairs (Locomotion)    Comment, (Gait/Stairs) Pt ambulated grossly 120' total, pt stopped at FDC point prior to ambulating further.  -     Row Name 04/18/23 1037          Motor Skills    Therapeutic Exercise ankle;hip;knee  standing squats, heel raises in standing 3x10 grossly  -     Row Name             Wound 04/12/23 1700 distal penis Pressure Injury    Wound - Properties Group Placement Date: 04/12/23  -TW Placement Time: 1700  -TW Present on Hospital Admission: N  -TW Orientation: distal  -TW Location: penis  -TW Primary Wound Type: Pressure inj  -TW    Retired Wound - Properties Group Placement Date: 04/12/23  -TW Placement Time: 1700  -TW Present on Hospital Admission: N  -TW Orientation: distal  -TW Location: penis  -TW Primary Wound Type: Pressure inj  -TW    Retired Wound - Properties Group Date first assessed: 04/12/23  -TW Time first assessed: 1700  -TW Present on Hospital Admission: N  -TW Location: penis  -TW Primary Wound Type: Pressure inj  -TW    Row Name 04/18/23 1037          Positioning and Restraints    Pre-Treatment Position in bed  -     Post Treatment Position bed  -KH     In Bed supine  -           User Key  (r) = Recorded By, (t) = Taken By, (c) = Cosigned By    Initials Name Provider Type    Kaia Welsh, RN Registered Nurse    Yamel Fuller, PT Physical Therapist                Physical Therapy Education     Title: PT OT SLP Therapies (Done)     Topic: Physical Therapy (Done)     Point: Mobility training (Done)     Learning Progress Summary           Patient Acceptance, E, VU by SC at 4/14/2023 0112    Acceptance, E,TB, VU by  at 4/10/2023 0926                   Point: Home exercise program (Done)     Learning Progress Summary           Patient Acceptance, E, VU by SC at 4/14/2023 0112    Acceptance, E,TB, VU by  at 4/10/2023 0926                   Point: Body mechanics (Done)     Learning Progress  Summary           Patient Acceptance, E, VU by SC at 4/14/2023 0112    Acceptance, E,TB, VU by  at 4/10/2023 0926                   Point: Precautions (Done)     Learning Progress Summary           Patient Acceptance, E, VU by SC at 4/14/2023 0112    Acceptance, E,TB, VU by  at 4/10/2023 0926                               User Key     Initials Effective Dates Name Provider Type Discipline     05/24/22 -  Livan Alejandra, PT Physical Therapist PT    SC 09/22/22 -  Kalie Arnold RN Registered Nurse Nurse              PT Recommendation and Plan     Outcome Evaluation: Pt seemingly more alert and did well with PT this date. some c/o of fatigue/weakness       Time Calculation:    PT Charges     Row Name 04/18/23 1110             Time Calculation    Start Time 1037  -KH      PT Received On 04/18/23  -         Time Calculation- PT    Total Timed Code Minutes- PT 23 minute(s)  -            User Key  (r) = Recorded By, (t) = Taken By, (c) = Cosigned By    Initials Name Provider Type     Yamel Mckeno, PT Physical Therapist              Therapy Charges for Today     Code Description Service Date Service Provider Modifiers Qty    00193018576 HC PT THER PROC EA 15 MIN 4/17/2023 Yamel Mckeon, PT GP 1    67835910879 HC PT THERAPEUTIC ACT EA 15 MIN 4/17/2023 Yamel Mckeon, PT GP 1    95686903894 HC PT THER PROC EA 15 MIN 4/18/2023 Yamel Mckeon, PT GP 1    24435211089 HC PT THERAPEUTIC ACT EA 15 MIN 4/18/2023 Yamel Mckeon, PT GP 1          PT G-Codes  AM-PAC 6 Clicks Score (PT): 14    Yamel Mckeon, PT  4/18/2023

## 2023-04-18 NOTE — DISCHARGE PLACEMENT REQUEST
"Augusto Lorenzana  (78 y.o. Male)     Date of Birth   1945    Social Security Number       Address   [address]  [facility] [address]    Home Phone   [phone]    MRN   [id number]       Anabaptism   Jain    Marital Status                               Admission Date   4/8/23    Admission Type   Urgent    Admitting Provider   Meek Tang DO    Attending Provider   Gregorio Pinedo MD    Department, Room/Bed   [address], 3306/1S       Discharge Date       Discharge Disposition       Discharge Destination                               Attending Provider: Gregorio Pinedo MD    Allergies: No Known Allergies    Isolation: None   Infection: None   Code Status: CPR    Ht: 170.2 cm (67\")   Wt: 72.9 kg (160 lb 11.2 oz)    Admission Cmt: None   Principal Problem: Gastrointestinal hemorrhage associated with gastric ulcer [K25.4]                 Active Insurance as of 4/8/2023     Primary Coverage     Payor Plan Insurance Group Employer/Plan Group    MEDICARE RAILROAD MEDICARE      Payor Plan Address Payor Plan Phone Number Payor Plan Fax Number Effective Dates    [address] [phone]  2/1/2012 - None Entered    [facility] [id number]       Subscriber Name Subscriber Birth Date Member ID       AUGUSTO LORENZANA  1945 [id number]           Secondary Coverage     Payor Plan Insurance Group Employer/Plan Group    HUMANA HUMANA [facility]              [id number]     Payor Plan Address Payor Plan Phone Number Payor Plan Fax Number Effective Dates    [address] [address]   2/1/2012 - None Entered    [facility] [id number]       Subscriber Name Subscriber Birth Date Member ID       AUGUSTO LORENZANA  1945 [id number]                 Emergency Contacts      (Rel.) Home Phone Work Phone Mobile Phone    Hedy Lorenzana (Spouse) [phone] -- [phone]    Melvi Cyr (Daughter) -- -- [phone]    MILTON LORENZANA (Son) -- -- [phone]              Intake & Output (last " day)       04/17 0701  04/18 0700 04/18 0701  04/19 0700    P.O. 2220 180    Total Intake(mL/kg) 2220 (30.5) 180 (2.5)    Urine (mL/kg/hr) 1050 (0.6)     Total Output 1050     Net +1170 +180                 Operative/Procedure Notes (all)      Procedures signed by Sabine Hudson MD at 04/08/23 1646   Version 1 of 1     Procedure Orders    1. UPPER GI ENDOSCOPY [[id number]] ordered by Sabine Hudson MD at 04/08/23 1454           [Media Unavailable] Scan on 4/8/2023 1454 by Sabine Hudson MD: EGD          Electronically signed by Sabine Hudson MD at 04/08/23 1646     Sabine Hudson MD at 04/08/23 1539  Version 1 of 1       Central line insertion jugular    Surgeon:  AMAURI Castro M.D., Jr.  4/8/2023    Pre and Post Procedure Diagnosis: GI bleed    Anesthesia: 1% lidocaine    Procedure:  After obtaining informed consent patient was placed in the Trendelenburg position.  Ultrasound was utilized to confirm the patency of the jugular vein.  With use of the Seldinger technique the right internal jugular vein was cannulated.  Guidewire was passed without resistance.  The tract was dilated and the triple-lumen catheter was passed to 20 cm and sutured in place. Good blood return was obtained from all 3 ports.  Dressing was placed.    CXR result: pending    Blood administered:  none    Complications:  none      Sabine Hudson MD     Date: 4/8/2023  Time: 16:38 EDT        Electronically signed by Sabine Hudson MD at 04/08/23 1638     Procedures signed by Sabine Hudson MD at 04/10/23 1316   Version 1 of 1     Procedure Orders    1. UPPER GI ENDOSCOPY [[id number]] ordered by Sabine Hudson MD at 04/10/23 1201           [Media Unavailable] Scan on 4/10/2023 1201 by Sabine Hudson MD: EGD          Electronically signed by Sabine Hudson MD at 04/10/23 1316     Torey Easley MD at 04/13/23 0826  Version 1 of 1       HEMODIALYSIS CATHETER INSERTION,  CENTRAL VENOUS LINE INSERTION  Procedure Note    Augusto Lorenzana JrTunde  4/13/2023    Pre-op Diagnosis:   Gastrointestinal hemorrhage, unspecified gastrointestinal hemorrhage type [K92.2]  Chronic kidney disease, unspecified CKD stage [N18.9]    Post-op Diagnosis:     Post-Op Diagnosis Codes:     * Gastrointestinal hemorrhage, unspecified gastrointestinal hemorrhage type [K92.2]     * Chronic kidney disease, unspecified CKD stage [N18.9]    Procedure(s):  CENTRAL LINE REMOVAL   TUNNELEDHEMODIALYSIS CATHETER INSERTION WITH ULTRASOUND GUIDED VENOUS ACCESS AND FLUOROSCOPY  CENTRAL VENOUS LINE INSERTION WITH ULTRASOUND GUIDED VENOUS ACCESS    Surgeon(s):  Torey Easley MD    Anesthesia: Choice    Staff:   Circulator: Charles Quezada RN  Radiology Technologist: Eliezer Gamboa RT  Scrub Person: Addis Cr  Assistant: Thom Conde    Findings: Successful removal of right internal jugular vein central line with replacement of the left internal jugular vein successful right internal jugular vein 23 cm tunneled hemodialysis catheter placement           Operative Procedure: The patient was taken operating suite and placed upon the operating table.  After induction of LMA anesthesia the right neck and chest were prepped and draped in usual sterile fashion along with the left neck and chest.  Attention began with placement of left internal jugular vein central line as there was no room for tunneled dialysis catheter on the left due to lack of an area to tunnel due to a recent pacemaker placement.  After injection of local anesthetic under ultrasound venous access was achieved in the left internal jugular vein.  A guidewire was placed in the left internal jugular vein without resistance and fluoroscopy confirmed line in appropriate position.  A stab incision was made at the guidewire entry site via Seldinger technique the tract was serially dilated and 7 Macedonian triple-lumen central line was placed into  the left internal jugular vein over the guidewire.  The guidewire was removed and the catheter was assessed and all ports withdrew venous blood and flushed with saline.  The catheter secured with suture to the skin and a Biopatch and occlusive dressing were applied.  Patient tolerated this portion of the procedure well.  Attention was turned to tunneled hemodialysis catheter placement.  At the site on the right neck the previous central line has been removed.  Just inferior to this after injection of local anesthetic under ultrasound visualization an 18-gauge access needle was inserted into the right internal jugular vein and venous blood was aspirated.  Guidewire was placed without resistance into the right internal jugular vein.  Fluoroscopy confirmed the guidewire in appropriate position.  A stab incision was made at the guidewire entry site and the tract was serially dilated under fluoroscopy with a dilator introducer sheath being placed into the right internal jugular vein.  The guidewire was removed and a 23 cm tunneled hemodialysis catheter was inserted into the right internal jugular vein.  The breakaway sheath was removed.  Under fluoroscopic visualization the tips were placed in appropriate position at the right atrium and an exit site on the left chest was identified.  An stab incision at this area was made and a subcutaneous tunnel was created with a tunneling device which was then dilated.  In a retrograde fashion the catheter tubing was pulled through the subcutaneous tunnel through the exit site with the cuff 2 cm above the exit site.  The catheter was then assembled and assessed and withdrew venous blood and flushed with heparin easily.  Caps were applied and the exit site was dressed with a Biopatch and occlusive dressing after the catheter was secured to the skin with Prolene suture.  The right neck access site was closed with deep dermal Vicryl suture and skin affix.  Patient was awakened from  anesthesia and taken to recovery where stat chest x-ray was ordered confirming placement.    Estimated Blood Loss: 10 mL    Specimens:   None           Drains: None    Grafts/Implants: Left internal jugular vein 23 cm tunneled hemodialysis catheter, right internal jugular vein 20 cm triple-lumen central line    Complications: None      Torey Easley MD     Date: 4/13/2023  Time: 08:55 EDT      Electronically signed by Torey Easley MD at 04/13/23 0859          Physician Progress Notes (last 48 hours)      Alessandra Farias MD at 04/18/23 1045          Nephrology Progress Note      Subjective     No chest pain or shortness of breath.     Objective       Vital signs :     Temp:  [97.5 °F (36.4 °C)-98.2 °F (36.8 °C)] 97.6 °F (36.4 °C)  Heart Rate:  [63-81] 77  Resp:  [16-18] 18  BP: ()/(54-83) 149/83    Intake/Output                             04/16/23 0701 - 04/17/23 0700 04/17/23 0701 - 04/18/23 0700 04/18/23 0701 - 04/19/23 0700     0582-3928 5135-1844 Total 6610-6201 0051-8873 Total 2804-3378 0862-1477 Total                    Intake    P.O.  770  240 1010  2040  180 2220  60  -- 60    Total Intake  2040 180 2220 60 -- 60       Output    Urine  --  -- --  300  750 1050  --  -- --    Total Output -- -- --  -- -- --           Physical Exam:    General Appearance : not in acute distress  Lungs : clear to auscultation, respirations regular  Heart :  regular rhythm & normal rate, normal S1, S2 and no murmur, no rub  Abdomen : soft, non distended, suprapubic tenderness   Extremities : no edema  Neurologic :   orientated to person, place, time and situation, Grossly no focal deficits        Laboratory Data :     Albumin No results found for: ALBUMIN   Magnesium Magnesium   Date Value Ref Range Status   04/18/2023 2.1 1.6 - 2.4 mg/dL Final          PTH               No results found for: PTH    CBC and coagulation:  Results from last 7 days   Lab Units 04/18/23  0044 04/15/23  0018  04/14/23  1356 04/14/23  0108 04/13/23  1241   WBC 10*3/mm3 8.43 11.57*  --   --  13.14*   HEMOGLOBIN g/dL 8.8* 7.6*  --  7.7* 7.9*   HEMATOCRIT % 28.3* 23.5*  --  23.9* 24.5*   MCV fL 101.1* 97.9*  --   --  98.8*   MCHC g/dL 31.1* 32.3  --   --  32.2   PLATELETS 10*3/mm3 261 209  --   --  209   INR   --   --  1.15*  --   --      Acid/base balance:      Renal and electrolytes:    Results from last 7 days   Lab Units 04/18/23  0044 04/15/23  0018 04/14/23  0108 04/13/23  0210 04/13/23  0210 04/12/23  0053   SODIUM mmol/L 136 140 139  --  147* 145   POTASSIUM mmol/L 4.3 3.8 3.1*   < > 3.6  --    MAGNESIUM mg/dL 2.1  --   --   --   --   --    CHLORIDE mmol/L 103 105 103   < > 117*  --    CO2 mmol/L 20.7* 23.9 26.9   < > 16.9*  --    BUN mg/dL 28* 34* 28*   < > 65*  --    CREATININE mg/dL 3.69* 3.45* 2.63*  --  3.82*  --    CALCIUM mg/dL 8.6 7.6* 7.6*   < > 8.5*  --    PHOSPHORUS mg/dL  --   --  2.4*  --   --   --     < > = values in this interval not displayed.     Estimated Creatinine Clearance: 17.6 mL/min (A) (by C-G formula based on SCr of 3.69 mg/dL (H)).  @GFRCG:3@   Liver and pancreatic function:  Results from last 7 days   Lab Units 04/14/23 0108   ALBUMIN g/dL 2.5*         Cardiac:      Liver and pancreatic function:  Results from last 7 days   Lab Units 04/14/23 0108   ALBUMIN g/dL 2.5*       Medications :     allopurinol, 50 mg, Oral, Once per day on Mon Thu  amLODIPine, 10 mg, Oral, Q24H  atorvastatin, 40 mg, Oral, Nightly  carvedilol, 25 mg, Oral, BID With Meals  castor oil-balsam peru, 1 application, Topical, Q12H  gentamicin, 1 application, Topical, Daily  insulin glargine, 15 Units, Subcutaneous, Nightly  insulin lispro, 2-7 Units, Subcutaneous, TID With Meals  Lidocaine, 1 patch, Apply externally, Daily  melatonin, 10 mg, Oral, Nightly  multivitamin, 1 tablet, Oral, Daily  OLANZapine, 5 mg, Oral, Once  OLANZapine zydis, 5 mg, Oral, Daily  pantoprazole, 40 mg, Oral, BID AC  senna-docusate sodium, 2  tablet, Oral, Nightly  sodium chloride, 10 mL, Intravenous, Q12H  sodium chloride, 10 mL, Intravenous, Q12H  sodium chloride, 10 mL, Intravenous, Q12H  sterile water (preservative free), , ,       sodium chloride, 9 mL/hr, Last Rate: 30 mL/hr (23 0817)      Assessment & Plan     1.  CKD stage V initiated on PDx during this admission   2.  Upper GI bleed with blood loss anemia  3.  Heart failure with reduced ejection fraction LVEF 45%  4.  History of CABG  5.  Diabetes with nephropathy poor control  6.  Hypertension  7.  Dehydration with hypotension  8.  Pacemaker  9.  Confusion and acute psychosis  10.  Hypokalemia     Dialysis today, and planned UF 2L.   Patient should be able to go home from nephrology standpoint once outpatient dialysis chair has been arranged  Educated and counseled the patient  Anemia, continue on erythropoietin  Redo of PDx cath outpatient.   PD catheter flush every Monday      Alessandra Farias MD  23  10:45 EDT      Electronically signed by Alessandra Farias MD at 23 1046     Gregorio Pinedo MD at 23 1124              [facility] HOSPITALIST PROGRESS NOTE     Patient Identification:  Name:  Augusto Lorenzana Jr.  Age:  78 y.o.  Sex:  male  :  1945  MRN:  [id number]  Visit Number:  [id number]  ROOM: [address] [place] [address]     Primary Care Provider:  Rob Polanco MD    Length of stay in inpatient status:  9    Subjective     Chief Compliant:  No chief complaint on file.      History of Presenting Illness:    Patient in bedside chair. He appeared comfortable. He was pleasant and declined any new complaints. Sitter bedside also denied any agitation or noncompliance. Patient agreeable for rehab consult.       ROS:  Otherwise 10 point ROS negative other than documented above in HPI.     Objective     Current Hospital Meds:allopurinol, 50 mg, Oral, Once per day on Mon Thu  amLODIPine, 10 mg, Oral, Q24H  atorvastatin, 40 mg, Oral, Nightly  carvedilol, 25 mg, Oral, BID With  Meals  castor oil-balsam peru, 1 application, Topical, Q12H  epoetin nirmala/nirmala-epbx, 10,000 Units, Subcutaneous, Once  gentamicin, 1 application, Topical, Daily  insulin lispro, 2-7 Units, Subcutaneous, TID With Meals  Lidocaine, 1 patch, Apply externally, Daily  melatonin, 10 mg, Oral, Nightly  multivitamin, 1 tablet, Oral, Daily  OLANZapine, 5 mg, Oral, Once  OLANZapine zydis, 5 mg, Oral, Daily  pantoprazole, 40 mg, Oral, BID AC  senna-docusate sodium, 2 tablet, Oral, Nightly  sodium chloride, 10 mL, Intravenous, Q12H  sodium chloride, 10 mL, Intravenous, Q12H  sodium chloride, 10 mL, Intravenous, Q12H  sterile water (preservative free), , ,     sodium chloride, 9 mL/hr, Last Rate: 30 mL/hr (04/13/23 0817)        Current Antimicrobial Therapy:  Anti-Infectives (From admission, onward)    None        Current Diuretic Therapy:  Diuretics (From admission, onward)    None        ----------------------------------------------------------------------------------------------------------------------  Vital Signs:  Temp:  [97.7 °F (36.5 °C)-98.3 °F (36.8 °C)] 98 °F (36.7 °C)  Heart Rate:  [70-91] 75  Resp:  [16-18] 16  BP: (124-138)/(65-72) 127/71  SpO2:  [94 %-95 %] 94 %  on   ;   Device (Oxygen Therapy): room air  Body mass index is 25.06 kg/m².    Wt Readings from Last 3 Encounters:   04/17/23 72.6 kg (160 lb)   04/07/23 77.2 kg (170 lb 3.2 oz)   07/08/21 83 kg (183 lb)     Intake & Output (last 3 days)       04/14 0701  04/15 0700 04/15 0701  04/16 0700 04/16 0701  04/17 0700 04/17 0701  04/18 0700    P.O. 1260  1010 840    Other        Total Intake(mL/kg) 1260 (16.9)  1010 (13.9) 840 (11.6)    Urine (mL/kg/hr) 600 (0.3)   300 (0.9)    Other  1500      Stool 0       Total Output 600 1500  300    Net +660 -1500 +1010 +540            Urine Unmeasured Occurrence 2 x  1 x     Stool Unmeasured Occurrence 2 x           Diet: Liquid Diets, Diabetic Diets; Full Liquid; Consistent Carbohydrate; Texture: Regular Texture (IDDSI  7); Fluid Consistency: Thin (IDDSI 0)  ----------------------------------------------------------------------------------------------------------------------  Physical exam:  Constitutional:  Well-developed and well-nourished.  No respiratory distress.      HENT:  Head:  Normocephalic and atraumatic.  Mouth:  Moist mucous membranes.    Eyes:  Conjunctivae and EOM are normal. No scleral icterus.    Neck:  Neck supple.  No JVD present.    Cardiovascular:  Normal rate, regular rhythm and normal heart sounds with no murmur.  Pulmonary/Chest:  No respiratory distress, no wheezes, no crackles, with normal breath sounds and good air movement.  Abdominal:  Soft.  Bowel sounds are normal.  No distension and no tenderness.   Musculoskeletal:  No edema, no tenderness, and no deformity.  No red or swollen joints anywhere.    Neurological:  Alert and oriented to person, place, and time.  No cranial nerve deficit.  No tongue deviation.  No facial droop.  No slurred speech.   Skin:  Skin is warm and dry. No rash noted. No pallor.   Peripheral vascular:  Pulses in all 4 extremities with no clubbing, no cyanosis, no edema.  ----------------------------------------------------------------------------------------------------------------------  Tele:    ----------------------------------------------------------------------------------------------------------------------  Results from last 7 days   Lab Units 04/15/23  0018 04/14/23  1356 04/14/23  0108 04/13/23  1241 04/13/23  0210   WBC 10*3/mm3 11.57*  --   --  13.14* 15.63*   HEMOGLOBIN g/dL 7.6*  --  7.7* 7.9* 8.3*   HEMATOCRIT % 23.5*  --  23.9* 24.5* 26.5*   MCV fL 97.9*  --   --  98.8* 100.0*   MCHC g/dL 32.3  --   --  32.2 31.3*   PLATELETS 10*3/mm3 209  --   --  209 233   INR   --  1.15*  --   --   --          Results from last 7 days   Lab Units 04/15/23  0018 04/14/23 0108 04/13/23  0210   SODIUM mmol/L 140 139 147*   POTASSIUM mmol/L 3.8 3.1* 3.6   CHLORIDE mmol/L 105 103  117*   CO2 mmol/L 23.9 26.9 16.9*   BUN mg/dL 34* 28* 65*   CREATININE mg/dL 3.45* 2.63* 3.82*   CALCIUM mg/dL 7.6* 7.6* 8.5*   PHOSPHORUS mg/dL  --  2.4*  --    GLUCOSE mg/dL 121* 120* 165*   ALBUMIN g/dL  --  2.5*  --    Estimated Creatinine Clearance: 18.7 mL/min (A) (by C-G formula based on SCr of 3.45 mg/dL (H)).  No results found for: AMMONIA              Glucose   Date/Time Value Ref Range Status   04/17/2023 1045 254 (H) 70 - 130 mg/dL Final     Comment:     Meter: [id number] : [id number] IDEUDONNE AGUIRRE   04/17/2023 0807 170 (H) 70 - 130 mg/dL Final     Comment:     Meter: [id number] : [id number] DIEUDONNE CARLA   04/16/2023 1811 187 (H) 70 - 130 mg/dL Final     Comment:     RN Notified Meter: [id number] : [id number] WADE MOYER   04/16/2023 1636 196 (H) 70 - 130 mg/dL Final     Comment:     Meter: [id number] : [id number] Sammy Khoury   04/16/2023 1114 249 (H) 70 - 130 mg/dL Final     Comment:     Meter: [id number] : [id number] Sammy Iraida   04/16/2023 0742 130 70 - 130 mg/dL Final     Comment:     Meter: [id number] : [id number] RUCHI DODSON   04/15/2023 1654 99 70 - 130 mg/dL Final     Comment:     Meter: [id number] : [id number] DEBORAH WORRELL   04/15/2023 1223 212 (H) 70 - 130 mg/dL Final     Comment:     Meter: [id number] : [id number] TAYLOR MORALES     Lab Results   Component Value Date    TSH 4.030 06/03/2021    FREET4 1.1 03/31/2023     No results found for: PREGTESTUR, PREGSERUM, HCG, HCGQUANT  Pain Management Panel         Latest Ref Rng & Units 12/7/2022 5/30/2021   Pain Management Panel   Creatinine, Urine mg/dL 80.1   136.1           Multiple values from one day are sorted in reverse-chronological order           Brief Urine Lab Results  (Last result in the past 365 days)      Color   Clarity   Blood   Leuk Est   Nitrite   Protein   CREAT   Urine HCG        12/07/22 1710             80.1         12/07/22 1710 Yellow   Clear   Small (1+)   Negative   Negative    >=300 mg/dL (3+)               No results found for: BLOODCX  No results found for: URINECX  No results found for: WOUNDCX  No results found for: STOOLCX  No results found for: RESPCX  No results found for: AFBCX        I have personally looked at the labs and they are summarized above.  ----------------------------------------------------------------------------------------------------------------------  Detailed radiology reports for the last 24 hours:    Imaging Results (Last 24 Hours)     ** No results found for the last 24 hours. **        Assessment & Plan    #Acute blood loss anemia w/UGIB  #PUD s/p EGD w/epinephrine injection  -s/p 48hrs BID PPI IV after period on PPI gtt. Transitioned to PO PPI BID 4/14 to be continued x6 weeks. No recurrent melena noted.  -Repeat hemoglobin stable, minimally reduced after TDC placement  -CVC removed 4/15. With correction of uremia, platelet dysfunction improving without recurrent evidence of acute blood loss     #Leukocytosis  -Improving, likely from UGIB     #ESRD previously on PD s/p TDC placement 4/13  -Underwent placement of TDC 4/13 with minor bleeding periprocedurally  -First HD session 4/13, tolerating well with rapid improvement of uremia  -Nephrology following, planning to cont TTS HD and with PDx redo outpatient  -Will need outpatient HD chair at discharge     #Hypernatremia  -Previously given D5w but can correct moving forward w/HD  -Now wnl     #Critial illness associated myopathy  -Patient had hospitalization initially at  3/30 for PD placement complicated by new heart block requiring placement of PM, transferred to [facility] for inaptient rehab and developed acute GI bleed shortly after transfer requiring EGD and TDC placement for nonfunctional PD  -Given above periods of significant illness and immobility, now below baseline activity level and functional status  -Acute inpatient rehab consult placed     #Hospital acquired delirium  -Delirium precautions  in place  -Started qhs melatonin but unfortunately no effective pharmacologic options for delirium  -Given qtc prolongation, avoid further antipsychotics. Prn ativan if patient agitated to point of being unsafe.   -Bedside sitter as needed  -Improved     -- Chronic --     CAD s/p CABG- holding ASA due to UGIB, resume as outpatient if no recurrence after 6 weeks PPI. Consider resuming if Hgb sustains >8mg/dl on f/u  HTN- BP controlled on Losartan, Norvasc and coreg  HLD- statin  DM2- A1C 6.5%. SSI, resume lantus at 1/2 home dose given improvement in PO intake.   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 Dose decreased to 100 mg/day per renal function.   Constipation- cont doc/senna nightly    F: PO  E: Replace as needed  N: Regular, CC    DVT ppx: SCDs    Code status: Full     Dispo: Pending inpatient rehab vs. SNF      VTE Prophylaxis:   Mechanical Order History:      Ordered        04/08/23 1218  Maintain Sequential Compression Device  Continuous                    Pharmalogical Order History:      Ordered     Dose Route Frequency Stop    04/11/23 1429  heparin (porcine) injection 6,000 Units         6,000 Units IK Once 04/11/23 1539    04/1945  heparin (porcine) injection 2,000 Units         2,000 Units IV Once 04/10/23 1958                  Gregorio Pinedo MD  [facility]  04/17/23  11:24 EDT    Electronically signed by Gregorio Pinedo MD at 04/17/23 1142     Alessandra Farias MD at 04/17/23 0817          Nephrology Progress Note      Subjective     No chest pain, shortness of breath.  Feeling better    Objective       Vital signs :     Temp:  [97.7 °F (36.5 °C)-98.3 °F (36.8 °C)] 98 °F (36.7 °C)  Heart Rate:  [70-91] 75  Resp:  [16-18] 16  BP: (124-138)/(65-72) 127/71    Intake/Output                             04/15/23 0701 - 04/16/23 0700 04/16/23 0701 - 04/17/23 0700 04/17/23 0701 - 04/18/23 0700 0701-1900 1901-0700 Total 0701-1900 1901-0700  Total 9504-0725 8473-9968 Total                    Intake    P.O.  --  -- --  770  240 1010  240  -- 240    Total Intake -- -- --  240 -- 240       Output    Other  1500  -- 1500  --  -- --  --  -- --    Ultrafiltration (mL) 1500 -- 1500 -- -- -- -- -- --    Total Output 1500 -- 1500 -- -- -- -- -- --           Physical Exam:    General Appearance : not in acute distress  Lungs : clear to auscultation, respirations regular  Heart :  regular rhythm & normal rate, normal S1, S2 and no murmur, no rub  Abdomen : soft, non distended, suprapubic tenderness   Extremities : no edema  Neurologic :   orientated to person, place, time and situation, Grossly no focal deficits        Laboratory Data :     Albumin No results found for: ALBUMIN   Magnesium No results found for: MG       PTH               No results found for: PTH    CBC and coagulation:  Results from last 7 days   Lab Units 04/15/23  0018 04/14/23  1356 04/14/23  0108 04/13/23  1241 04/13/23  0210   WBC 10*3/mm3 11.57*  --   --  13.14* 15.63*   HEMOGLOBIN g/dL 7.6*  --  7.7* 7.9* 8.3*   HEMATOCRIT % 23.5*  --  23.9* 24.5* 26.5*   MCV fL 97.9*  --   --  98.8* 100.0*   MCHC g/dL 32.3  --   --  32.2 31.3*   PLATELETS 10*3/mm3 209  --   --  209 233   INR   --  1.15*  --   --   --      Acid/base balance:      Renal and electrolytes:    Results from last 7 days   Lab Units 04/15/23  0018 04/14/23  0108 04/13/23  0210 04/12/23  0053 04/11/23  0009   SODIUM mmol/L 140 139 147* 145 146*   POTASSIUM mmol/L 3.8 3.1* 3.6  --  3.6   CHLORIDE mmol/L 105 103 117*  --  117*   CO2 mmol/L 23.9 26.9 16.9*  --  16.8*   BUN mg/dL 34* 28* 65*  --  86*   CREATININE mg/dL 3.45* 2.63* 3.82*  --  3.78*   CALCIUM mg/dL 7.6* 7.6* 8.5*  --  8.3*   PHOSPHORUS mg/dL  --  2.4*  --   --   --      Estimated Creatinine Clearance: 18.7 mL/min (A) (by C-G formula based on SCr of 3.45 mg/dL (H)).  @GFRCG:3@   Liver and pancreatic function:  Results from last 7 days   Lab Units  04/14/23  0108   ALBUMIN g/dL 2.5*         Cardiac:      Liver and pancreatic function:  Results from last 7 days   Lab Units 04/14/23  0108   ALBUMIN g/dL 2.5*       Medications :     allopurinol, 50 mg, Oral, Once per day on Mon Thu  amLODIPine, 10 mg, Oral, Q24H  atorvastatin, 40 mg, Oral, Nightly  carvedilol, 25 mg, Oral, BID With Meals  castor oil-balsam peru, 1 application, Topical, Q12H  gentamicin, 1 application, Topical, Daily  insulin lispro, 2-7 Units, Subcutaneous, TID With Meals  Lidocaine, 1 patch, Apply externally, Daily  melatonin, 10 mg, Oral, Nightly  multivitamin, 1 tablet, Oral, Daily  OLANZapine, 5 mg, Oral, Once  OLANZapine zydis, 5 mg, Oral, Daily  pantoprazole, 40 mg, Oral, BID AC  senna-docusate sodium, 2 tablet, Oral, Nightly  sodium chloride, 10 mL, Intravenous, Q12H  sodium chloride, 10 mL, Intravenous, Q12H  sodium chloride, 10 mL, Intravenous, Q12H  sterile water (preservative free), , ,       sodium chloride, 9 mL/hr, Last Rate: 30 mL/hr (04/13/23 0817)          Assessment & Plan     1.  CKD stage V initiated on PDx during this admission   2.  Upper GI bleed with blood loss anemia  3.  Heart failure with reduced ejection fraction LVEF 45%  4.  History of CABG  5.  Diabetes with nephropathy poor control  6.  Hypertension  7.  Dehydration with hypotension  8.  Pacemaker  9.  Confusion and acute psychosis  10.  Hypokalemia     Grossly stable from renal standpoint, continue on intermittent hemodialysis as scheduled TTS  Patient should be able to go home from nephrology standpoint once outpatient dialysis chair has been arranged  Educated and counseled the patient  Anemia, start on erythropoietin  Redo of PDx cath outpatient.   PD catheter flush every Monday      Alessandra Farias MD  04/17/23  08:17 EDT      Electronically signed by Alessandra Farias MD at 04/17/23 0818     Noah Lester MD at 04/16/23 1340              [facility]     Patient  Identification:  Name:  Augusto Lorenzana Jr.  Age:  78 y.o.  Sex:  male  :  1945  MRN:  [id number]  Visit Number:  [id number]  ROOM: [address] [place] [address]     Primary Care Provider:  Rob Polanco MD     Date of Admission: 2023    Length of stay in inpatient status:  8    Subjective     Chief Compliant:  Acute blood loss anemia    Patient clinically doing well without recurrent melena and oozing from HD cath and removed LIJ CVC resolved however continues having significant confusion/hyperactivtiy at night time and this morning is more confused after being AOx3 yesterday. Overnight required prn ativan to allow for patient safety.        Objective     Current Hospital Meds:  allopurinol, 50 mg, Oral, Once per day on Mon Thu  amLODIPine, 10 mg, Oral, Q24H  atorvastatin, 40 mg, Oral, Nightly  carvedilol, 25 mg, Oral, BID With Meals  castor oil-balsam peru, 1 application, Topical, Q12H  gentamicin, 1 application, Topical, Daily  insulin lispro, 2-7 Units, Subcutaneous, TID With Meals  Lidocaine, 1 patch, Apply externally, Daily  multivitamin, 1 tablet, Oral, Daily  OLANZapine, 5 mg, Oral, Once  OLANZapine zydis, 5 mg, Oral, Daily  pantoprazole, 40 mg, Oral, BID AC  senna-docusate sodium, 2 tablet, Oral, Nightly  sodium chloride, 10 mL, Intravenous, Q12H  sodium chloride, 10 mL, Intravenous, Q12H  sodium chloride, 10 mL, Intravenous, Q12H  sterile water (preservative free), , ,     sodium chloride, 9 mL/hr, Last Rate: 30 mL/hr (23)      Current Antimicrobial Therapy:  Anti-Infectives (From admission, onward)    None        Current Diuretic Therapy:  Diuretics (From admission, onward)    None        ----------------------------------------------------------------------------------------------------------------------  Vital Signs:  Temp:  [97.7 °F (36.5 °C)] 97.7 °F (36.5 °C)  Heart Rate:  [75-88] 87  Resp:  [18] 18  BP: (128-156)/(62-85) 156/85     on   ;   Device (Oxygen Therapy): room air  Body mass index  is 24.78 kg/m².    Wt Readings from Last 3 Encounters:   04/16/23 71.8 kg (158 lb 3.2 oz)   04/07/23 77.2 kg (170 lb 3.2 oz)   07/08/21 83 kg (183 lb)     Intake & Output (last 3 days)       04/13 0701  04/14 0700 04/14 0701  04/15 0700 04/15 0701  04/16 0700 04/16 0701  04/17 0700    P.O. 0 1260      Other 600       Total Intake(mL/kg) 600 (7.9) 1260 (16.9)      Urine (mL/kg/hr) 350 (0.2) 600 (0.3)      Other   1500     Stool 0 0      Total Output      Net +250 +660 -1500             Urine Unmeasured Occurrence  2 x      Stool Unmeasured Occurrence 3 x 2 x          Diet: Liquid Diets, Diabetic Diets; Full Liquid; Consistent Carbohydrate; Texture: Regular Texture (IDDSI 7); Fluid Consistency: Thin (IDDSI 0)  ----------------------------------------------------------------------------------------------------------------------  Physical Exam  Vitals and nursing note reviewed.   Constitutional:       General: He is not in acute distress.     Appearance: He is not ill-appearing.   HENT:      Mouth/Throat:      Mouth: Mucous membranes are dry.      Pharynx: Oropharynx is clear.   Eyes:      General: No scleral icterus.     Extraocular Movements: Extraocular movements intact.   Cardiovascular:      Rate and Rhythm: Normal rate.      Pulses: Normal pulses.   Pulmonary:      Effort: Pulmonary effort is normal. No respiratory distress.   Abdominal:      Palpations: Abdomen is soft.      Tenderness: There is no abdominal tenderness.   Musculoskeletal:      Right lower leg: No edema.      Left lower leg: No edema.   Skin:     Comments: Right thorax w/TDC line insertion, overlying tegaderm with only minimal dried blood surrounding area    LIJ CVC removed without bleed   Neurological:      Mental Status: He is oriented to person, place, and time. Mental status is at baseline.   Psychiatric:      Comments: Conversational this am and answering questions appropriately, knows he is in [facility] but otherwise very  confused       ----------------------------------------------------------------------------------------------------------------------    ----------------------------------------------------------------------------------------------------------------------  LABS:    CBC and coagulation:  Results from last 7 days   Lab Units 04/15/23  0018 04/14/23  1356 04/14/23  0108 04/13/23  1241 04/13/23  0210 04/12/23  1730 04/12/23  0931 04/12/23  0053 04/11/23  0532 04/10/23  1723 04/10/23  1119 04/10/23  0541 04/10/23  0004   WBC 10*3/mm3 11.57*  --   --  13.14* 15.63*  --  12.02*  --   --   --  13.24*  --  15.02*   HEMOGLOBIN g/dL 7.6*  --  7.7* 7.9* 8.3* 8.6* 8.9* 8.0* 7.7*   < > 7.9*   < > 6.5*   HEMATOCRIT % 23.5*  --  23.9* 24.5* 26.5*  --  27.9* 24.8* 23.4*   < > 24.0*   < > 20.6*   MCV fL 97.9*  --   --  98.8* 100.0*  --  98.9*  --   --   --  96.0  --  102.0*   MCHC g/dL 32.3  --   --  32.2 31.3*  --  31.9  --   --   --  32.9  --  31.6   PLATELETS 10*3/mm3 209  --   --  209 233  --  230  --   --   --  181  --  193   INR   --  1.15*  --   --   --   --   --   --   --   --   --   --   --     < > = values in this interval not displayed.     Acid/base balance:      Renal and electrolytes:  Results from last 7 days   Lab Units 04/15/23  0018 04/14/23  0108 04/13/23  0210 04/12/23  0053 04/11/23  0009 04/10/23  0004   SODIUM mmol/L 140 139 147* 145 146* 151*   POTASSIUM mmol/L 3.8 3.1* 3.6  --  3.6 3.9   CHLORIDE mmol/L 105 103 117*  --  117* 122*   CO2 mmol/L 23.9 26.9 16.9*  --  16.8* 14.8*   BUN mg/dL 34* 28* 65*  --  86* 126*   CREATININE mg/dL 3.45* 2.63* 3.82*  --  3.78* 4.29*   CALCIUM mg/dL 7.6* 7.6* 8.5*  --  8.3* 8.2*   PHOSPHORUS mg/dL  --  2.4*  --   --   --   --    GLUCOSE mg/dL 121* 120* 165*  --  145* 85     Estimated Creatinine Clearance: 18.5 mL/min (A) (by C-G formula based on SCr of 3.45 mg/dL (H)).    Liver and pancreatic function:  Results from last 7 days   Lab Units 04/14/23  0108   ALBUMIN g/dL 2.5*      Endocrine function:  Lab Results   Component Value Date    HGBA1C 6.5 (H) 04/04/2022     Point of care bedside glucose levels:  Results from last 7 days   Lab Units 04/16/23  1114 04/16/23  0742 04/15/23  1654 04/15/23  1223 04/15/23  0641 04/14/23  1806 04/14/23  1618 04/14/23  1057 04/14/23  0637 04/13/23  1637 04/13/23  1317 04/13/23  0925 04/13/23  0728 04/12/23  1945   GLUCOSE mg/dL 249* 130 99 212* 132* 190* 113 203* 153* 99 183* 139* 151* 216*     Glucose levels from the [facility]:  Results from last 7 days   Lab Units 04/15/23  0018 04/14/23  0108 04/13/23  0210 04/11/23  0009 04/10/23  0004   GLUCOSE mg/dL 121* 120* 165* 145* 85     Lab Results   Component Value Date    TSH 4.030 06/03/2021    FREET4 1.1 03/31/2023     Cardiac:        Cultures:  Lab Results   Component Value Date    COLORU Yellow 12/07/2022    CLARITYU Clear 12/07/2022    PHUR 6.0 12/07/2022    PROTEINUR 965.0 12/07/2022    GLUCOSEU 100 mg/dL (Trace) (A) 12/07/2022    KETONESU Negative 12/07/2022    BLOODU Small (1+) (A) 12/07/2022    NITRITEU Negative 12/07/2022    LEUKOCYTESUR Negative 12/07/2022    BILIRUBINUR Negative 12/07/2022    UROBILINOGEN 0.2 E.U./dL 12/07/2022    RBCUA 6-12 (A) 12/07/2022    WBCUA 3-5 (A) 12/07/2022    BACTERIA Trace (A) 12/07/2022     Microbiology Results (last 10 days)     Procedure Component Value - Date/Time    COVID-19,[facility] IN-HOUSE CEPHEID/JOSÉ NP SWAB IN TRANSPORT MEDIA 8-12 HR TAT - , [[id number]]  (Normal) Collected: 04/07/23 1033    Lab Status: Final result Specimen: Swab from Nasopharynx Updated: 04/07/23 1646     SARS-CoV-2, ALTHEA Not Detected    Narrative:      This assay is for in vitro diagnostic use under FDA emergency use authorization only. Negative results do not preclude infection with the SARS CoV-2 virus and should not be the sole basis of a patient treatment/management or public health decision. Follow up testing should be performed according to the current CDC recommendations.  This test was  performed on the Xpert Xpress SARS CoV-2 test, a PCR-based method.  Negative results should be considered presumptive and do not preclude current or future infection obtained through community transmission or other exposures. Negative results must be considered in the context of an individual's recent exposures, history, presence of clinical signs and symptoms consistent with COVID-19.          No results found for: PREGTESTUR, PREGSERUM, HCG, HCGQUANT  Pain Management Panel         Latest Ref Rng & Units 12/7/2022 5/30/2021   Pain Management Panel   Creatinine, Urine mg/dL 80.1   136.1           Multiple values from one day are sorted in reverse-chronological order             I have personally looked at the labs and they are summarized above.  ----------------------------------------------------------------------------------------------------------------------  Detailed radiology reports for the last 24 hours:    Imaging Results (Last 24 Hours)     ** No results found for the last 24 hours. **          Assessment & Plan      #Acute blood loss anemia w/UGIB  #PUD s/p EGD w/epinephrine injection  -s/p 48hrs BID PPI IV after period on PPI gtt. Transitioned to PO PPI BID 4/14 to be continued x6 weeks. No recurrent melena noted.  -Repeat hemoglobin stable, minimally reduced after TDC placement  -CVC removed 4/15. With correction of uremia, platelet dysfunction improving without recurrent evidence of acute blood loss    #Leukocytosis  -Improving, likely from UGIB    #ESRD previously on PD s/p TDC placement 4/13  -Underwent placement of TDC 4/13 with minor bleeding periprocedurally  -First HD session 4/13, tolerating well with rapid improvement of uremia  -Nephrology following, planning to cont TTS HD and with PDx redo outpatient  -Will need outpatient HD chair at discharge    #Hypernatremia  -Previously given D5w but can correct moving forward w/HD  -Now wnl    #Critial illness associated myopathy  -Patient had  hospitalization initially at  3/30 for PD placement complicated by new heart block requiring placement of PM, [facility] and developed acute GI bleed shortly after transfer requiring EGD and TDC placement for nonfunctional PD  -Given above periods of significant illness and immobility, now below baseline activity level and functional status  -Acute inpatient rehab consult placed    #Hospital acquired delirium  -Delirium precautions in place  -Started qhs melatonin but unfortunately no effective pharmacologic options for delirium  -Given qtc prolongation, avoid further antipsychotics. Prn ativan if patient agitated to point of being unsafe  -Bedside sitter as needed    -- Chronic --    CAD s/p CABG- holding ASA due to UGIB, resume as outpatient if no recurrence after 6 weeks PPI. Consider resuming if Hgb sustains >8mg/dl on f/u  HTN- BP controlled on Losartan, Norvasc and coreg  HLD- statin  DM2- A1C 6.5%. 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 Dose decreased to 100 mg/day per renal function.   Constipation- cont doc/senna nightly        VTE Prophylaxis:   Mechanical Order History:      Ordered        04/08/23 1218  Maintain Sequential Compression Device  Continuous                    Pharmalogical Order History:      Ordered     Dose Route Frequency Stop    04/11/23 1429  heparin (porcine) injection 6,000 Units         6,000 Units IK Once 04/11/23 1539    04/1945  heparin (porcine) injection 2,000 Units         2,000 Units IV Once 04/10/23 1958                Code Status and Medical Interventions:   Ordered at: 04/08/23 1218     Level Of Support Discussed With:    Patient     Code Status (Patient has no pulse and is not breathing):    CPR (Attempt to Resuscitate)     Medical Interventions (Patient has pulse or is breathing):    Full Support     Release to patient:    Routine Release          Disposition: Inpatient rehab referral placed    I have reviewed any copied/forwarded text or data, verified its accuracy, and updated as necessary above.    Noah Lester MD  [facility] Hospitalist  23  13:40 EDT      Electronically signed by Noah Lester MD at 23 1348          Consult Notes (all)      Torey Easley MD at 23 0754          [facility]   Consult Note    Patient Name: Augusto Lorenzana Jr.  : 1945  MRN: [id number]  Primary Care Physician:  Rob Polanco MD  Referring Physician: No Known Provider  Date of admission: 2023    Consults  Subjective   Subjective     Reason for Consult/ Chief Complaint: Renal failure    History of Present Illness  Augusto Lorenzana Jr. is a 78 y.o. male with renal failure and need for long-term hemodialysis access.  He has a right internal jugular vein central line in place.  Tunneled dialysis catheter has been requested.    Review of Systems   Constitutional: Negative for activity change, appetite change, chills and fever.   HENT: Negative for sore throat and trouble swallowing.    Eyes: Negative for visual disturbance.   Respiratory: Negative for cough and shortness of breath.    Cardiovascular: Negative for chest pain and palpitations.   Gastrointestinal: Negative for abdominal distention, abdominal pain, blood in stool, constipation, diarrhea, nausea and vomiting.   Endocrine: Negative for cold intolerance and heat intolerance.   Genitourinary: Negative for dysuria.   Musculoskeletal: Negative for joint swelling.   Skin: Negative for color change, rash and wound.   Allergic/Immunologic: Negative for immunocompromised state.   Neurological: Negative for dizziness, seizures, weakness and headaches.   Hematological: Negative for adenopathy. Does not bruise/bleed easily.   Psychiatric/Behavioral: Negative for agitation and confusion.        Personal History     Past Medical History:   Diagnosis Date   • CAD  (coronary artery disease)    • CKD (chronic kidney disease) stage 3, GFR 30-59 ml/min    • Diabetes mellitus    • Hyperlipidemia    • Hypertension    • NSTEMI (non-ST elevated myocardial infarction)        Past Surgical History:   Procedure Laterality Date   • CARDIAC CATHETERIZATION N/A 05/24/2021    Procedure: Left Heart Cath;  Surgeon: Blade Greene IV, MD;  Location:  [facility];  Service: Cardiovascular;  Laterality: N/A;   • CARDIAC SURGERY      2 stents placed 2012.   • CORONARY ARTERY BYPASS GRAFT N/A 05/28/2021    Procedure: MEDIAN STERNOTOMY CORONARY ARTERY BYPASS X 3 UTILIZING THE LEFT INTERNAL MAMMARY ARTERY GRAFT, EVH OF THE GREATER RIGHT SAPHENOUS VEIN, AND PILO PER ANESTHESIA;  Surgeon: Jacek Miller MD;  Location:  [facility] OR;  Service: Cardiothoracic;  Laterality: N/A;   • ENDOSCOPY N/A 04/08/2023    Procedure: ESOPHAGOGASTRODUODENOSCOPY with CENTERAL LINE PLACEMENT;  Surgeon: Sabine Hudson MD;  Location:  [facility];  Service: General;  Laterality: N/A;   • ENDOSCOPY N/A 04/10/2023    Procedure: ESOPHAGOGASTRODUODENOSCOPY;  Surgeon: Sabine Hudson MD;  Location:  [facility] OR;  Service: General;  Laterality: N/A;   • PACEMAKER IMPLANTATION         Family History: family history includes Diabetes type I in his father; Heart disease in his mother. Otherwise pertinent FHx was reviewed and not pertinent to current issue.    Social History:  reports that he has never smoked. His smokeless tobacco use includes chew. He reports that he does not drink alcohol and does not use drugs.    Home Medications:   Insulin Glargine, albuterol sulfate HFA, allopurinol, amLODIPine, amiodarone, aspirin, atorvastatin, carvedilol, cloNIDine, gabapentin, glipizide, hydrALAZINE, multivitamin with minerals, and torsemide    Allergies:  No Known Allergies    Objective    Objective     Vitals:  Temp:  [97.5 °F (36.4 °C)-99 °F (37.2 °C)] 98.4 °F (36.9 °C)  Heart Rate:  [69-91] 87  Resp:   [16-20] 20  BP: ()/(58-96) 162/96    Physical Exam  Constitutional:       Appearance: He is well-developed.   HENT:      Head: Normocephalic and atraumatic.   Eyes:      Conjunctiva/sclera: Conjunctivae normal.      Pupils: Pupils are equal, round, and reactive to light.   Neck:      Thyroid: No thyromegaly.      Vascular: No JVD.      Trachea: No tracheal deviation.   Cardiovascular:      Rate and Rhythm: Normal rate and regular rhythm.      Heart sounds: No murmur heard.    No friction rub. No gallop.   Pulmonary:      Effort: Pulmonary effort is normal.      Breath sounds: Normal breath sounds.   Abdominal:      General: There is no distension.      Palpations: Abdomen is soft. There is no hepatomegaly or splenomegaly.      Tenderness: There is no abdominal tenderness.      Hernia: No hernia is present.   Musculoskeletal:         General: No deformity. Normal range of motion.      Cervical back: Neck supple.   Skin:     General: Skin is warm and dry.   Neurological:      Mental Status: He is alert and oriented to person, place, and time.         Result Review    Result Review:  I have personally reviewed the results from the time of this admission to 4/13/2023 07:54 EDT and agree with these findings:  [x]  Laboratory list / accordion  []  Microbiology  [x]  Radiology  []  EKG/Telemetry   []  Cardiology/Vascular   []  Pathology  []  Old records  []  Other:        Assessment & Plan   Assessment / Plan     Brief Patient Summary:  Augusto Lorenzana Jr. is a 78 y.o. male who has renal failure with need for long-term hemodialysis access.    Active Hospital Problems:  Active Hospital Problems    Diagnosis    • **Gastrointestinal hemorrhage associated with gastric ulcer    • Third degree atrioventricular block    • Atrial fibrillation, persistent    • GI bleed    • A/C kidney disease. ATN due to postoperative arrest. Dialysis begun 6/3/2021      Plan:   OR today for tunneled hemodialysis catheter placement.    Torey  Aristides Easley MD    Electronically signed by Torey Easley MD at 04/13/23 5247     Josse Hernandez MD at 04/08/23 1818      Consult Orders    1. Inpatient Nephrology Consult [[id number]] ordered by Meek Tang DO at 04/08/23 1154                                                                   Nephrology Consultation Note    Referring Provider: Dr. Tang/Dr. Guerin  Reason for Consultation: CKD stage V    Chief complaint hematemesis    Subjective .     History of present illness: Patient with CKD stage V had a peritoneal catheter placed about 10 days ago Thursday.  Developed a 6-second pause after surgery and required a pacemaker.  These events transpired at the [facility].  Dialysis was not initiated as a rapid start.  Patient was in congestive heart failure and was brought to euvolemic state with the help of diuretics and then he was transferred to rehab at [place] yesterday.  The patient's hemoglobin yesterday was 8 mg/dL and his weight 170 pounds.  He normally weighs about 180 pounds at home but does have a little bit of edema at that weight.  This morning the patient was nauseated and did experience hematemesis.    His hemoglobin has dropped to 6.7 g/dL.  The patient is a bit slow although he knows me and cannot participate in review of systems.  Denies any chest pain or shortness of breath.  Patient's blood pressure is on the lower side.  He has no IV access.  Dr. Tang is at the bedside.  Central line is being planned as is emergent endoscopy.  He is on IV Protonix.    Hemodynamic data reviewed       All other systems were reviewed and negative.    History  Past Medical History:   Diagnosis Date   • CAD (coronary artery disease)    • CKD (chronic kidney disease) stage 3, GFR 30-59 ml/min    • Diabetes mellitus    • Hyperlipidemia    • Hypertension    • NSTEMI (non-ST elevated myocardial infarction)    ,   Past Surgical History:   Procedure Laterality Date    • CARDIAC CATHETERIZATION N/A 5/24/2021    Procedure: Left Heart Cath;  Surgeon: Blade Greene IV, MD;  Location:  [facility];  Service: Cardiovascular;  Laterality: N/A;   • CARDIAC SURGERY      2 stents placed 2012.   • CORONARY ARTERY BYPASS GRAFT N/A 5/28/2021    Procedure: MEDIAN STERNOTOMY CORONARY ARTERY BYPASS X 3 UTILIZING THE LEFT INTERNAL MAMMARY ARTERY GRAFT, EVH OF THE GREATER RIGHT SAPHENOUS VEIN, AND PILO PER ANESTHESIA;  Surgeon: Jacek Miller MD;  Location:  [facility] OR;  Service: Cardiothoracic;  Laterality: N/A;   ,   Family History   Problem Relation Age of Onset   • Heart disease Mother    • Diabetes type I Father    ,   Social History     Tobacco Use   • Smoking status: Never   • Smokeless tobacco: Current     Types: Chew   Vaping Use   • Vaping Use: Never used   Substance Use Topics   • Alcohol use: Never   • Drug use: Never    and Allergies:  Patient has no known allergies.      Vital Signs   Temp:  [98.2 °F (36.8 °C)-98.6 °F (37 °C)] 98.2 °F (36.8 °C)  Heart Rate:  [] 100  Resp:  [16-20] 20  BP: ()/(56-86) 121/73    Physical Exam:     General Appearance:   Drowsy, cooperative, in no acute distress, oriented very well to person   Head:    Normocephalic, without obvious abnormality   Eyes:            Conjunctivae and sclerae normal, no icterus, + pallor, pupils CCERLA   Ears:    Ears appear intact    Throat:  Dry mucosa   Neck:    no JVD       Lungs:     Clear to auscultation,respirations regular, even and                  unlabored    Heart:    Regular rhythm and normal rate, normal S1 and S2, soft systolic murmur 3 x 6 and side apex.    Chest Wall:     Pacemaker left infraclavicular   Abdomen:     Normal bowel sounds, no tenderness.  PD catheter noted   Rectal:     Deferred   Extremities:  No edema   Pulses:   Pulses palpable            Neurologic:   Cr Ns grossly intact, moves all extremities.     Results Review:   I reviewed the patient's new  clinical results.      Lab Results (last 24 hours)     ** No results found for the last 24 hours. **          Imaging Results (Last 24 Hours)     ** No results found for the last 24 hours. **                    Assessment and Plan:    1.  CKD stage V  2.  Upper GI bleed with blood loss anemia  3.  Heart failure with reduced ejection fraction LVEF 45%  4.  History of CABG  5.  Diabetes with nephropathy poor control  6.  Hypertension  7.  Dehydration with hypotension  8.  Pacemaker      Set parameters on blood pressure medicines.  Give 2 L of IV fluids.  Transfuse.  Care of the PD catheter exit site.  Hold dialysis.  Discussed with Dr. Tang.              Josse Hernandez MD  04/08/23  14:09 EDT              Electronically signed by Josse Hernandez MD at 04/08/23 7822     Sabine Hudson MD at 04/08/23 1404      Consult Orders    1. Inpatient General Surgery Consult [[id number]] ordered by Meek Tang DO at 04/08/23 1217               Consulting physician:  Dr. Hudson    Referring physician: Dr. Tang    Date of consultation: 04/08/23     Chief complaint GI bleed    Subjective     Patient is a 78 y.o. male who was transferred from [facility] inpatient rehab with a chief complaint of new onset hematemesis and melena.  Patient has a past medical history remarkable for CAD status post CABG, CKD stage V, type 2 diabetes mellitus, hyperlipidemia, paroxysmal A-fib, SEBASTIAN and valvular heart disease. Patient had just arrived to [facility] inpatient rehab in his usual state of health when he began to have sudden onset hematemesis and melena.  CBC on admission demonstrated hemoglobin of 8.  Repeat H&H performed 11 hours later after episode of hematemesis and melena had dropped to 6.7.  With this in mind, request was made to admit patient to our inpatient hospitalist services for further treatment and evaluation of suspected upper GI bleed.      Review of Systems  Patient is confused and is not even  oriented to person      History  Past Medical History:   Diagnosis Date   • CAD (coronary artery disease)    • CKD (chronic kidney disease) stage 3, GFR 30-59 ml/min    • Diabetes mellitus    • Hyperlipidemia    • Hypertension    • NSTEMI (non-ST elevated myocardial infarction)      Past Surgical History:   Procedure Laterality Date   • CARDIAC CATHETERIZATION N/A 5/24/2021    Procedure: Left Heart Cath;  Surgeon: Blade Greene IV, MD;  Location:  [facility];  Service: Cardiovascular;  Laterality: N/A;   • CARDIAC SURGERY      2 stents placed 2012.   • CORONARY ARTERY BYPASS GRAFT N/A 5/28/2021    Procedure: MEDIAN STERNOTOMY CORONARY ARTERY BYPASS X 3 UTILIZING THE LEFT INTERNAL MAMMARY ARTERY GRAFT, EVH OF THE GREATER RIGHT SAPHENOUS VEIN, AND PILO PER ANESTHESIA;  Surgeon: Jacek Miller MD;  Location:  [facility] OR;  Service: Cardiothoracic;  Laterality: N/A;     Family History   Problem Relation Age of Onset   • Heart disease Mother    • Diabetes type I Father      Social History     Tobacco Use   • Smoking status: Never   • Smokeless tobacco: Current     Types: Chew   Vaping Use   • Vaping Use: Never used   Substance Use Topics   • Alcohol use: Never   • Drug use: Never     Medications Prior to Admission   Medication Sig Dispense Refill Last Dose   • albuterol sulfate  (90 Base) MCG/ACT inhaler Inhale 2 puffs 2 (Two) Times a Day As Needed for Wheezing.   Unknown   • allopurinol (ZYLOPRIM) 300 MG tablet Take 1 tablet by mouth Daily.   Unknown   • amiodarone (PACERONE) 200 MG tablet Take 1 tablet by mouth Daily.   Unknown   • amLODIPine (NORVASC) 10 MG tablet Take 1 tablet by mouth Daily. 30 tablet 0 Unknown   • aspirin 81 MG EC tablet Take 1 tablet by mouth Daily.   Unknown   • atorvastatin (LIPITOR) 40 MG tablet Take 1 tablet by mouth Daily.   Unknown   • carvedilol (COREG) 6.25 MG tablet Take 1 tablet by mouth 2 (Two) Times a Day With Meals.   Unknown   • cloNIDine  (CATAPRES) 0.2 MG tablet Take 1 tablet by mouth 3 (Three) Times a Day.   Unknown   • gabapentin (NEURONTIN) 300 MG capsule Take 1 capsule by mouth Daily.   Unknown   • glipizide (GLUCOTROL) 5 MG tablet Take 1 tablet by mouth 2 (Two) Times a Day Before Meals.   Unknown   • hydrALAZINE (APRESOLINE) 100 MG tablet Take 1 tablet by mouth 3 (Three) Times a Day.   Unknown   • Insulin Glargine (Lantus SoloStar) 100 UNIT/ML injection pen Inject 30 Units under the skin into the appropriate area as directed Daily.   Unknown   • multivitamin with minerals tablet tablet Take 1 tablet by mouth Daily.   Unknown   • torsemide (DEMADEX) 20 MG tablet Take 2 tablets by mouth Daily.   Unknown     Allergies:  Patient has no known allergies.    Objective     Vital Signs  Temp:  [98.2 °F (36.8 °C)-98.6 °F (37 °C)] 98.2 °F (36.8 °C)  Heart Rate:  [] 100  Resp:  [16-20] 20  BP: ()/(56-86) 121/73    Physical Exam:  General:  This is a WD WN male in no acute distress  Vital signs: Stable, afebrile  HEENT exam:  WNL. Sclerae are anicteric.  EOMI  Neck:  Supple, FROM.  No JVD.  Trachea midline  Lungs:  Respiratory effort normal. Auscultation: Clear, without wheezes, rhonchi, rales  Heart:  Regular rate and rhythm, without murmur, gallop, rub.  No pedal edema  Left chest: Recent pacemaker  Abdomen: Abdomen is soft, nontender.  No palpable mass.  No rebound or guarding  Musculoskeletal: Unable to assess  Psych: Oriented x0  Skin:  Warm with good turgor.  Without rash or lesion  Extremities:  Examination of the extremities revealed no cyanosis, clubbing or edema.    Results Review:       Results from last 7 days   Lab Units 04/08/23  1109 04/08/23  0029 04/06/23  0329 04/05/23  0507   WBC 10*3/mm3  --  11.77* 11.07* 10.72*   HEMOGLOBIN g/dL 6.7* 8.0* 8.1* 6.8*   HEMATOCRIT % 21.9* 25.3* 24.7* 20.6*   PLATELETS 10*3/mm3  --  284 226 199         Results from last 7 days   Lab Units 04/08/23  0029 04/03/23  0444   SODIUM mmol/L 144  --     POTASSIUM mmol/L 4.2  --    MAGNESIUM mg/dL 1.9 2.3   CHLORIDE mmol/L 112*  --    CO2 mmol/L 18.0*  --    BUN mg/dL 103*  --    CREATININE mg/dL 4.21*  --    CALCIUM mg/dL 9.0  --    GLUCOSE mg/dL 117*  --    Estimated Creatinine Clearance: 15.3 mL/min (A) (by C-G formula based on SCr of 4.21 mg/dL (H)).  No results found for: AMMONIA      No results found for: BLOODCX  No results found for: URINECX  No results found for: WOUNDCX  No results found for: STOOLCX    Imaging:  Imaging Results (Last 24 Hours)     ** No results found for the last 24 hours. **            Impression:  Patient Active Problem List   Diagnosis Code   • NSTEMI 5/22/2021 I21.4   • Hyperlipidemia LDL goal <70 E78.5   • Essential hypertension I10   • T2DM on oral agents and insulin. HbA1c 7.4  E11.9, Z79.4   • Coronary artery disease involving native coronary artery of native heart with unstable angina pectoris I25.110   • A/C kidney disease. ATN due to postoperative arrest. Dialysis begun 6/3/2021 N18.9   • Gout M10.9   • Former smokeless tobacco use Z87.891   • S/P CABG x 3 on 5/28/21 Z95.1   • Perioperative cardiac arrest with ventricular fibrillation (CMS/[facility]) I46.9, I49.01   • Postop encephalopathy post code. Combination of anoxic insult and azotemia G93.40   • Debility R53.81   • Lower extremity edema R60.0   • Complete heart block I44.2   • GI bleed K92.2       Plan:  Proceed with EGD  Patient also has poor venous access and will require transfusion.  We will proceed with placement of central line      Discussion:      Sabine Hudson MD  04/08/23  14:05 EDT    Time: Time spent:    Please note that portions of this note were completed with a voice recognition program.    Electronically signed by Sabine Hudson MD at 04/08/23 9200

## 2023-04-18 NOTE — PLAN OF CARE
Goal Outcome Evaluation:    Pt will be transferred to inpatient rehab once dialysis session complete.

## 2023-04-18 NOTE — DISCHARGE PLACEMENT REQUEST
"Augusto Lorenzana JrTunde (78 y.o. Male)     Date of Birth   1945    Social Security Number       Address   [address]  [facility] [address]    Home Phone   [phone]    MRN   [id number]       [employer]   [employer]    Marital Status                               Admission Date   4/8/23    Admission Type   Urgent    Admitting Provider   Meek Tang DO    Attending Provider   Gregorio Pinedo MD    Department, Room/Bed   [address], 3306/1S       Discharge Date       Discharge Disposition       Discharge Destination                               Attending Provider: Gregorio Pinedo MD    Allergies: No Known Allergies    Isolation: None   Infection: None   Code Status: CPR    Ht: 170.2 cm (67\")   Wt: 72.9 kg (160 lb 11.2 oz)    Admission Cmt: None   Principal Problem: Gastrointestinal hemorrhage associated with gastric ulcer [K25.4]                 Active Insurance as of 4/8/2023     Primary Coverage     Payor Plan Insurance Group Employer/Plan Group    MEDICARE RAILROAD MEDICARE      Payor Plan Address Payor Plan Phone Number Payor Plan Fax Number Effective Dates    [address] [phone]  2/1/2012 - None Entered    [place], [place] [facility] [address]       Subscriber Name Subscriber Birth Date Member ID       AUGUSTO LORENZANA JRTunde 1945 [id number]           Secondary Coverage     Payor Plan Insurance Group Employer/Plan Group    HUMANA HUMANA [facility]              [id number]     Payor Plan Address Payor Plan Phone Number Payor Plan Fax Number Effective Dates    [address] [address]   2/1/2012 - None Entered    [facility] [id number]       Subscriber Name Subscriber Birth Date Member ID       AUGUSTO LORENZANA  1945 [id number]                 Emergency Contacts      (Rel.) Home Phone Work Phone Mobile Phone    Hedy Lorenzana (Spouse) [phone] -- [phone]    Melvi Cyr (Daughter) -- -- [phone]    MILTON LORENZANA (Son) -- -- [phone]            Emergency Contact " Information     Name Relation Home Work Mobile    Hedy Lorenzana Spouse [phone]    Melvi Cyr Daughter   [phone]    MILTON LORENZANA Son   [phone]          Insurance Information                MEDICARE/RAILROAD MEDICARE Phone: [phone]    Subscriber: Augusto Lorenzana Jr. Subscriber#: [id number]    Group#: -- Precert#: --        HUMANA/HUMANA [facility] SU[facility]              Phone: --    Subscriber: Augusto Lorenzana Jr. Subscriber#: [id number]    Group#: [id number] Precert#: --          Problem List           Codes Noted - Resolved       Hospital    Third degree atrioventricular block ICD-10-CM: I44.2  ICD-9-CM: 426.0 4/10/2023 - Present    Atrial fibrillation, persistent ICD-10-CM: I48.19  ICD-9-CM: 427.31 4/10/2023 - Present    * (Principal) Gastrointestinal hemorrhage associated with gastric ulcer ICD-10-CM: K25.4  ICD-9-CM: 531.40 4/10/2023 - Present    GI bleed ICD-10-CM: K92.2  ICD-9-CM: 578.9 4/8/2023 - Present    A/C kidney disease. ATN due to postoperative arrest. Dialysis begun 6/3/2021 ICD-10-CM: N18.9  ICD-9-CM: 585.9 5/27/2021 - Present       Non-Hospital    Complete heart block ICD-10-CM: I44.2  ICD-9-CM: 426.0 4/7/2023 - Present    Lower extremity edema ICD-10-CM: R60.0  ICD-9-CM: 782.3 7/27/2021 - Present    Debility ICD-10-CM: R53.81  ICD-9-CM: 799.3 6/18/2021 - Present    Postop encephalopathy post code. Combination of anoxic insult and azotemia ICD-10-CM: G93.40  ICD-9-CM: 348.30 6/6/2021 - Present    Perioperative cardiac arrest with ventricular fibrillation (CMS/HCC) ICD-10-CM: I46.9, I49.01  ICD-9-CM: 427.5, 427.41 6/4/2021 - Present    S/P CABG x 3 on 5/28/21 ICD-10-CM: Z95.1  ICD-9-CM: V45.81 5/31/2021 - Present    Gout (Chronic) ICD-10-CM: M10.9  ICD-9-CM: 274.9 5/28/2021 - Present    Former smokeless tobacco use (Chronic) ICD-10-CM: Z87.891  ICD-9-CM: V15.82 5/28/2021 - Present    T2DM on oral agents and insulin. HbA1c 7.4  (Chronic) ICD-10-CM: E11.9, Z79.4  ICD-9-CM: 250.00,  V58.67 2021 - Present    NSTEMI 2021 ICD-10-CM: I21.4  ICD-9-CM: 410.70 2021 - Present    Hyperlipidemia LDL goal <70 ICD-10-CM: E78.5  ICD-9-CM: 272.4 2021 - Present    Essential hypertension (Chronic) ICD-10-CM: I10  ICD-9-CM: 401.9 2021 - Present    Coronary artery disease involving native coronary artery of native heart with unstable angina pectoris ICD-10-CM: I25.110  ICD-9-CM: 414.01, 411.1 2021 - Present        History & Physical      Meek Tang DO at 23 1322              [facility] HOSPITALIST HISTORY AND PHYSICAL    Patient Identification:  Name:  Augusto Lorenzana Jr.  Age:  78 y.o.  Sex:  male  :  1945  MRN:  [id number]   Admit Date: 2023   Visit Number:  [id number]  Primary Care Physician:  Rob Polanco MD     Chief complaint: Hematemesis    History of presenting illness: Mr. Lorenzana is a 78 y.o. male who will be directly admitted to [facility] with a chief complaint of new onset hematemesis and melena.  Patient has a past medical history remarkable for CAD status post CABG, CKD stage V, type 2 diabetes mellitus, hyperlipidemia, paroxysmal A-fib, SEBASTIAN and valvular heart disease.  Patient's history of present illness is slightly complicated.  Patient did present to the [facility] on  for placement of a peritoneal dialysis catheter.  During that hospital stay, patient had an episode of a 6-second pause during recovery from having peritoneal dialysis catheter placed.  As such, patient was evaluated by cardiology services who did recommend permanent pacemaker placement.  Patient did have permanent pacemaker placed during that hospital stay on 3/31/2023.  Patient also had some complications during that hospital stay including acute hypoxic respiratory failure secondary to volume overload and patient did respond well to IV Bumex at that time.  Patient has not had  his peritoneal dialysis catheter used since it has been placed.  After a fairly lengthy hospitalization, patient was then admitted to [facility] inpatient physical rehab.  Patient had just arrived to [facility] inpatient rehab in his usual state of health when he began to have sudden onset hematemesis and melena.  CBC on admission demonstrated hemoglobin of 8.  Repeat H&H performed 11 hours later after episode of hematemesis and melena had dropped to 6.7.  With this in mind, request was made to admit patient to our inpatient hospitalist services for further treatment and evaluation of suspected upper GI bleed.  -------------------------------------------------------------------------------------------------------------------  Past Medical History:   Diagnosis Date   • CAD (coronary artery disease)    • CKD (chronic kidney disease) stage 3, GFR 30-59 ml/min    • Diabetes mellitus    • Hyperlipidemia    • Hypertension    • NSTEMI (non-ST elevated myocardial infarction)      Past Surgical History:   Procedure Laterality Date   • CARDIAC CATHETERIZATION N/A 5/24/2021    Procedure: Left Heart Cath;  Surgeon: Blade Greene IV, MD;  Location:  [facility];  Service: Cardiovascular;  Laterality: N/A;   • CARDIAC SURGERY      2 stents placed 2012.   • CORONARY ARTERY BYPASS GRAFT N/A 5/28/2021    Procedure: MEDIAN STERNOTOMY CORONARY ARTERY BYPASS X 3 UTILIZING THE LEFT INTERNAL MAMMARY ARTERY GRAFT, EVH OF THE GREATER RIGHT SAPHENOUS VEIN, AND PILO PER ANESTHESIA;  Surgeon: Jacek Miller MD;  Location: [facility] OR;  Service: Cardiothoracic;  Laterality: N/A;     Family History   Problem Relation Age of Onset   • Heart disease Mother    • Diabetes type I Father      Social History     Socioeconomic History   • Marital status:    Tobacco Use   • Smoking status: Never   • Smokeless tobacco: Current     Types: Chew   Vaping Use   • Vaping Use: Never used   Substance and  Sexual Activity   • Alcohol use: Never   • Drug use: Never   • Sexual activity: Defer     ---------------------------------------------------------------------------------------------------------------------   Allergies:  Patient has no known allergies.  ---------------------------------------------------------------------------------------------------------------------   Prior to Admission Medications     Prescriptions Last Dose Informant Patient Reported? Taking?    albuterol sulfate  (90 Base) MCG/ACT inhaler Unknown Pharmacy, Spouse/Significant Other Yes No    Inhale 2 puffs 2 (Two) Times a Day As Needed for Wheezing.    allopurinol (ZYLOPRIM) 300 MG tablet Unknown Pharmacy, Spouse/Significant Other Yes No    Take 1 tablet by mouth Daily.    amiodarone (PACERONE) 200 MG tablet Unknown Pharmacy, Spouse/Significant Other Yes No    Take 1 tablet by mouth Daily.    amLODIPine (NORVASC) 10 MG tablet Unknown Pharmacy, Spouse/Significant Other No No    Take 1 tablet by mouth Daily.    aspirin 81 MG EC tablet Unknown Spouse/Significant Other Yes No    Take 1 tablet by mouth Daily.    atorvastatin (LIPITOR) 40 MG tablet Unknown Pharmacy, Spouse/Significant Other Yes No    Take 1 tablet by mouth Daily.    carvedilol (COREG) 6.25 MG tablet Unknown Pharmacy, Spouse/Significant Other Yes No    Take 1 tablet by mouth 2 (Two) Times a Day With Meals.    cloNIDine (CATAPRES) 0.2 MG tablet Unknown Pharmacy, Spouse/Significant Other Yes No    Take 1 tablet by mouth 3 (Three) Times a Day.    gabapentin (NEURONTIN) 300 MG capsule Unknown Pharmacy, Spouse/Significant Other Yes No    Take 1 capsule by mouth Daily.    glipizide (GLUCOTROL) 5 MG tablet Unknown Pharmacy, Spouse/Significant Other Yes No    Take 1 tablet by mouth 2 (Two) Times a Day Before Meals.    hydrALAZINE (APRESOLINE) 100 MG tablet Unknown Pharmacy, Spouse/Significant Other Yes No    Take 1 tablet by mouth 3 (Three) Times a Day.    Insulin Glargine (Lantus  SoloStar) 100 UNIT/ML injection pen Unknown Pharmacy, Spouse/Significant Other Yes No    Inject 30 Units under the skin into the appropriate area as directed Daily.    multivitamin with minerals tablet tablet Unknown Spouse/Significant Other Yes No    Take 1 tablet by mouth Daily.    torsemide (DEMADEX) 20 MG tablet Unknown Pharmacy, Spouse/Significant Other Yes No    Take 2 tablets by mouth Daily.        Hospital Scheduled Meds:  [START ON 4/10/2023] allopurinol, 50 mg, Oral, Once per day on Mon Thu  [START ON 4/9/2023] amLODIPine, 10 mg, Oral, Q24H  atorvastatin, 40 mg, Oral, Nightly  carvedilol, 25 mg, Oral, BID With Meals  [START ON 4/9/2023] cetirizine, 10 mg, Oral, Daily  docusate sodium, 100 mg, Oral, BID  hydrALAZINE, 50 mg, Oral, Q8H  insulin lispro, 2-7 Units, Subcutaneous, TID With Meals  [START ON 4/9/2023] lidocaine, 1 patch, Transdermal, Q24H  [START ON 4/9/2023] losartan, 100 mg, Oral, Q24H  melatonin, 5 mg, Oral, Nightly  [START ON 4/9/2023] multivitamin, 1 tablet, Oral, Daily  [START ON 4/9/2023] polyethylene glycol, 17 g, Oral, Daily  senna-docusate sodium, 2 tablet, Oral, Nightly      pantoprazole, 8 mg/hr      ---------------------------------------------------------------------------------------------------------------------   Review of Systems   On review of systems the patient denies the following unless noted above:     Constitutional:  Fevers, chills, weight change, fatigue     Eyes: Vision changes, headache, double vision, loss of vision     ENT: Runny nose, nose bleeds, ringing in ears, pain with swallowing, sore throat     Cardiovascular: Chest pains, palpitations, PND, orthopnea     Respiratory: Cough, wheezing, SOA, hemoptysis     GI:  Abdominal pain, diarrhea, constipation, change in stool caliber,    Rectal bleeding, vomiting or nausea     : Difficulty voiding, dysuria, hematuria     Musculoskeletal: Changes of any chronic joint pain, swelling     Skin: Rash, itching, easy  bruisability     Neurological: Unilateral weakness, new onset numbness, speech difficulties     Psychiatric: Sadness, tearfulness, feelings of hopelessness, racing thoughts     Endocrine:  Heat or cold intolerance, mood swings, polydipsia, polyphagia,    recent hypoglycemia  --------------------------------------------------------------------------------------------------------------------  Vital Signs:  Temp:  [98.2 °F (36.8 °C)-98.6 °F (37 °C)] 98.2 °F (36.8 °C)  Heart Rate:  [] 100  Resp:  [16-20] 20  BP: ()/(56-86) 121/73  There were no vitals filed for this visit.  There is no height or weight on file to calculate BMI.  ---------------------------------------------------------------------------------------------------------------------   Physical exam:  Constitutional: Well-nourished aucasian male in no apparent distress.     HENT:  Head:  Normocephalic and atraumatic.  Mouth:  Moist mucous membranes.    Eyes:  Conjunctivae and EOM are normal.  Pupils are equal, round, and reactive to light.  No scleral icterus.    Neck:  Neck supple. No thyromegaly.  No JVD present.    Cardiovascular:  Regular rate and rhythm with no murmurs, rubs, clicks or gallops appreciated.  Pulmonary/Chest:  Clear to auscultation bilaterally with no crackles, wheezes or rhonchi appreciated.  Abdominal:  Soft. Nontender. Nondistended, large 4 x 4 dressing over left lower abdominal quadrant where peritoneal dialysis catheter is covered.  Bowel sounds are normal in all four quadrants. No organomegally appreciated.   Musculoskeletal:  No edema, no tenderness, and no deformity.  No red or swollen joints anywhere.    Neurological:  Alert, Cranial nerves II-XII intact with no focal defecits.  No facial droop.  No slurred speech.   Skin:  Warm and dry to palpation with no rashes or lesions appreciated.  Peripheral vascular:  2+ radial and pedal pulses in bilateral upper and lower extremities.  Psychiatric:  Alert and oriented x3,  demonstrates appropriate judgement and insight.  ----------------------------------------------------------------------------------  ---------------------------------------------------------------------------------------------------------------------   Results from last 7 days   Lab Units 04/08/23  1109 04/08/23  0029 04/06/23  0329 04/05/23  0507   WBC 10*3/mm3  --  11.77* 11.07* 10.72*   HEMOGLOBIN g/dL 6.7* 8.0* 8.1* 6.8*   HEMATOCRIT % 21.9* 25.3* 24.7* 20.6*   MCV fL  --  98.8* 94 96   MCHC g/dL  --  31.6 32.8 33.0   PLATELETS 10*3/mm3  --  284 226 199         Results from last 7 days   Lab Units 04/08/23  0029 04/03/23  0444   SODIUM mmol/L 144  --    POTASSIUM mmol/L 4.2  --    MAGNESIUM mg/dL 1.9 2.3   CHLORIDE mmol/L 112*  --    CO2 mmol/L 18.0*  --    BUN mg/dL 103*  --    CREATININE mg/dL 4.21*  --    CALCIUM mg/dL 9.0  --    GLUCOSE mg/dL 117*  --    Estimated Creatinine Clearance: 16.3 mL/min (A) (by C-G formula based on SCr of 4.21 mg/dL (H)).  No results found for: AMMONIA          Lab Results   Component Value Date    HGBA1C 6.5 (H) 04/04/2022     Lab Results   Component Value Date    TSH 4.030 06/03/2021    FREET4 1.1 03/31/2023     No results found for: PREGTESTUR, PREGSERUM, HCG, HCGQUANT  Pain Management Panel     Pain Management Panel Latest Ref Rng & Units 12/7/2022 5/30/2021    CREATININE UR mg/dL 80.1 136.1    AMPHETAMINES SCREEN, URINE Negative - -    BARBITURATES SCREEN Negative - -    BENZODIAZEPINE SCREEN, URINE Negative - -    BUPRENORPHINEUR Negative - -    COCAINE SCREEN, URINE Negative - -    METHADONE SCREEN, URINE Negative - -    METHAMPHETAMINEUR Negative - -        No results found for: BLOODCX  No results found for: URINECX  No results found for: WOUNDCX  No results found for: STOOLCX      ---------------------------------------------------------------------------------------------------------------------  Imaging Results (Last 7 Days)     ** No results found for the last 168  hours. **          I have personally reviewed the radiology images and read the final radiology report.  ---------------------------------------------------------------------------------------------------------------------  Assessment and Plan:    1.  Suspected upper GI bleed -patient will be admitted to progressive care unit for further treatment and evaluation.  Will place patient on Protonix drip and plan for EGD today.    2.  Acute blood loss anemia -we will transfuse 1 unit PRBC and repeat H&H 1 hour thereafter.  We will target hemoglobin of 7.    3.  Chronic HFrEF -no evidence of exacerbation at this time, in fact patient looks slightly volume depleted.  We will continue to monitor closely.    4.  CKD stage V -nephrology has been consulted, will continue to hold on peritoneal dialysis at this time as long as patient's electrolytes appear stable.    5.  Paroxysmal A-fib -patient is currently in normal sinus rhythm.  We will continue to monitor closely, patient has been taken off of amiodarone and anticoagulation has been discontinued in the setting of acute GI bleed.      Meek Tang DO  04/08/23  13:23 EDT    Electronically signed by Meek Tang DO at 04/08/23 1349       Lines, Drains & Airways     Active LDAs     Name Placement date Placement time Site Days    Peripheral IV 04/17/23 0627 Distal;Left;Posterior Forearm 04/17/23  0627  Forearm  1    Peritoneal Dialysis Catheter Left lower abdomen --  --  Left lower abdomen  --    Hemodialysis Cath Double 04/13/23  0850  Internal Jugular  5                  Current Facility-Administered Medications   Medication Dose Route Frequency Provider Last Rate Last Admin   • acetaminophen (TYLENOL) tablet 500 mg  500 mg Oral Q6H PRN Torey Easley MD   500 mg at 04/14/23 1419   • allopurinol (ZYLOPRIM) tablet 50 mg  50 mg Oral Once per day on Mon Thu Torey Easley MD   50 mg at 04/17/23 0800   • amLODIPine (NORVASC) tablet 10 mg  10  mg Oral Q24H Torey Easley MD   10 mg at 04/18/23 0847   • atorvastatin (LIPITOR) tablet 40 mg  40 mg Oral Nightly Torey Easley MD   40 mg at 04/17/23 2047   • carvedilol (COREG) tablet 25 mg  25 mg Oral BID With Meals Torey Easley MD   25 mg at 04/17/23 1729   • castor oil-balsam peru (VENELEX) ointment 1 application  1 application Topical Q12H Torey Easley MD   1 application at 04/18/23 0847   • dextrose (D50W) (25 g/50 mL) IV injection 25 g  25 g Intravenous Q15 Min PRN Torey Easley MD       • dextrose (GLUTOSE) oral gel 15 g  15 g Oral Q15 Min PRN Torey Easley MD       • gentamicin (GARAMYCIN) 0.1 % ointment 1 application  1 application Topical Daily Torey Easley MD   1 application at 04/18/23 0847   • glucagon (human recombinant) (GLUCAGEN DIAGNOSTIC) injection 1 mg  1 mg Intramuscular Q15 Min PRN Torey Easley MD       • insulin glargine (LANTUS, SEMGLEE) injection 15 Units  15 Units Subcutaneous Nightly Gregorio Pinedo MD   15 Units at 04/17/23 2048   • Insulin Lispro (humaLOG) injection 2-7 Units  2-7 Units Subcutaneous TID With Meals Torey Easley MD   2 Units at 04/18/23 0735   • Lidocaine 4 % patch 1 patch  1 patch Apply externally Daily Torey Easley MD   1 patch at 04/18/23 0847   • melatonin tablet 10 mg  10 mg Oral Nightly Noah Lester MD   10 mg at 04/17/23 2047   • methocarbamol (ROBAXIN) tablet 500 mg  500 mg Oral Q12H PRN Torey Easley MD   500 mg at 04/11/23 0226   • multivitamin (THERAGRAN) tablet 1 tablet  1 tablet Oral Daily Torey Easley MD   1 tablet at 04/18/23 0847   • OLANZapine (zyPREXA) tablet 5 mg  5 mg Oral Once Noah Lester MD       • OLANZapine zydis (zyPREXA) disintegrating tablet 5 mg  5 mg Oral Daily Torey Easley MD   5 mg at 04/18/23 0847   • ondansetron (ZOFRAN) injection 4 mg  4 mg Intravenous Q6H PRN Gregorio Pinedo MD       • pantoprazole (PROTONIX) EC  tablet 40 mg  40 mg Oral BID Noah Fajardo MD   40 mg at 04/18/23 0735   • sennosides-docusate (PERICOLACE) 8.6-50 MG per tablet 2 tablet  2 tablet Oral Nightly Torey Easley MD   2 tablet at 04/17/23 2047   • sodium chloride 0.9 % flush 10 mL  10 mL Intravenous Q12H Torey Easley MD   10 mL at 04/15/23 2050   • sodium chloride 0.9 % flush 10 mL  10 mL Intravenous Q12H Torey Easley MD   10 mL at 04/15/23 2050   • sodium chloride 0.9 % flush 10 mL  10 mL Intravenous Q12H Torey Easley MD   10 mL at 04/18/23 0848   • sodium chloride 0.9 % flush 10 mL  10 mL Intravenous PRN Torey Easley MD       • sodium chloride 0.9 % flush 20 mL  20 mL Intravenous PRN Torey Easley MD       • sodium chloride 0.9 % infusion 40 mL  40 mL Intravenous PRN Torey Easley MD       • sodium chloride 0.9 % infusion  9 mL/hr Intravenous Continuous PRN Torey Easley MD 30 mL/hr at 04/13/23 0817 Restarted at 04/13/23 0817   • sodium chloride nasal spray 1 spray  1 spray Each Nare PRN Torey Easley MD       • sterile water (preservative free) injection  - ADS Override Pull                Lab Results (last 24 hours)     Procedure Component Value Units Date/Time    POC Glucose Once [[id number]]  (Abnormal) Collected: 04/18/23 0702    Specimen: Blood Updated: 04/18/23 0708     Glucose 152 mg/dL      Comment: Meter: [id number] : [id number] DAREN CHAPMAN       Magnesium [[id number]]  (Normal) Collected: 04/18/23 0044    Specimen: Blood Updated: 04/18/23 0222     Magnesium 2.1 mg/dL     Basic Metabolic Panel [[id number]]  (Abnormal) Collected: 04/18/23 0044    Specimen: Blood Updated: 04/18/23 0222     Glucose 111 mg/dL      BUN 28 mg/dL      Creatinine 3.69 mg/dL      Sodium 136 mmol/L      Potassium 4.3 mmol/L      Comment: Slight hemolysis detected by analyzer. Results may be affected.        Chloride 103 mmol/L      CO2 20.7 mmol/L      Calcium 8.6 mg/dL       BUN/Creatinine Ratio 7.6     Anion Gap 12.3 mmol/L      eGFR 16.3 mL/min/1.73     Narrative:      GFR Normal >60  Chronic Kidney Disease <60  Kidney Failure <15    The GFR formula is only valid for adults with stable renal function between ages 18 and 70.    CBC & Differential [[id number]]  (Abnormal) Collected: 04/18/23 0044    Specimen: Blood Updated: 04/18/23 0155    Narrative:      The following orders were created for panel order CBC & Differential.  Procedure                               Abnormality         Status                     ---------                               -----------         ------                     CBC Auto Differential[[id number]]        Abnormal            Final result                 Please view results for these tests on the individual orders.    CBC Auto Differential [[id number]]  (Abnormal) Collected: 04/18/23 0044    Specimen: Blood Updated: 04/18/23 0155     WBC 8.43 10*3/mm3      RBC 2.80 10*6/mm3      Hemoglobin 8.8 g/dL      Hematocrit 28.3 %      .1 fL      MCH 31.4 pg      MCHC 31.1 g/dL      RDW 17.2 %      RDW-SD 62.4 fl      MPV 10.0 fL      Platelets 261 10*3/mm3      Neutrophil % 69.3 %      Lymphocyte % 16.8 %      Monocyte % 7.4 %      Eosinophil % 4.6 %      Basophil % 0.7 %      Immature Grans % 1.2 %      Neutrophils, Absolute 5.84 10*3/mm3      Lymphocytes, Absolute 1.42 10*3/mm3      Monocytes, Absolute 0.62 10*3/mm3      Eosinophils, Absolute 0.39 10*3/mm3      Basophils, Absolute 0.06 10*3/mm3      Immature Grans, Absolute 0.10 10*3/mm3      nRBC 0.0 /100 WBC     POC Glucose Once [[id number]]  (Abnormal) Collected: 04/17/23 1817    Specimen: Blood Updated: 04/17/23 1823     Glucose 193 mg/dL      Comment: Meter: [id number] : [id number] [facility]       POC Glucose Once [[id number]]  (Normal) Collected: 04/17/23 1632    Specimen: Blood Updated: 04/17/23 1638     Glucose 100 mg/dL      Comment: Meter: [id number] : [id number] DIEUDONNE CARLA       POC  Glucose Once [[id number]]  (Abnormal) Collected: 04/17/23 1045    Specimen: Blood Updated: 04/17/23 1051     Glucose 254 mg/dL      Comment: Meter: [id number] : [id number] DIEUDONNE AGUIRRE           Orders (last 24 hrs)      Start     Ordered    04/18/23 0709  POC Glucose Once  PROCEDURE ONCE         04/18/23 0702    04/18/23 0600  CBC & Differential  Morning Draw         04/17/23 0737    04/18/23 0600  Basic Metabolic Panel  Morning Draw,   Status:  Canceled         04/17/23 0737    04/18/23 0600  Magnesium  Morning Draw         04/17/23 0737    04/18/23 0600  CBC Auto Differential  PROCEDURE ONCE         04/17/23 2203    04/18/23 0400  Basic Metabolic Panel  Morning Draw         04/17/23 1336    04/17/23 2100  insulin glargine (LANTUS, SEMGLEE) injection 15 Units  Nightly         04/17/23 1141    04/17/23 1824  POC Glucose Once  PROCEDURE ONCE         04/17/23 1817    04/17/23 1639  POC Glucose Once  PROCEDURE ONCE         04/17/23 1632    04/17/23 1225  ondansetron (ZOFRAN) injection 4 mg  Every 6 Hours PRN         04/17/23 1225    04/17/23 1052  POC Glucose Once  PROCEDURE ONCE         04/17/23 1045    04/17/23 1030  Epoetin Levon-epbx (RETACRIT) injection 10,000 Units  Once         04/17/23 0819    04/16/23 2100  melatonin tablet 10 mg  Nightly         04/16/23 1347    04/15/23 1830  OLANZapine (zyPREXA) tablet 5 mg  Once         04/15/23 1738    04/14/23 1730  pantoprazole (PROTONIX) EC tablet 40 mg  2 Times Daily Before Meals         04/14/23 1311    04/13/23 0900  OLANZapine zydis (zyPREXA) disintegrating tablet 5 mg  Daily         04/12/23 1523    04/12/23 2100  castor oil-balsam peru (VENELEX) ointment 1 application  Every 12 Hours Scheduled         04/12/23 1705    04/12/23 1800  Dietary Nutrition Supplements Boost Plus (Ensure Enlive, Ensure Plus)  Daily With Breakfast, Lunch & Dinner       04/12/23 1533    04/12/23 0900  amLODIPine (NORVASC) tablet 10 mg  Every 24 Hours Scheduled         04/11/23 5409     04/11/23 1130  Lidocaine 4 % patch 1 patch  Daily        Note to Pharmacy: Apply patch for only 12 hours and then remove.  Apply to skin on affected area.    04/11/23 1038    04/10/23 1220  sodium chloride 0.9 % infusion  Continuous PRN         04/10/23 1220    04/10/23 0930  sodium chloride 0.9 % flush 10 mL  Every 12 Hours Scheduled         04/10/23 0844    04/10/23 0930  sodium chloride 0.9 % flush 10 mL  Every 12 Hours Scheduled         04/10/23 0844    04/10/23 0930  sodium chloride 0.9 % flush 10 mL  Every 12 Hours Scheduled         04/10/23 0844    04/10/23 0900  allopurinol (ZYLOPRIM) tablet 50 mg  Once per day on Mon Thu 04/08/23 1218    04/10/23 0844  sodium chloride 0.9 % flush 10 mL  As Needed         04/10/23 0844    04/10/23 0844  sodium chloride 0.9 % flush 20 mL  As Needed         04/10/23 0844    04/10/23 0844  sodium chloride 0.9 % infusion 40 mL  As Needed         04/10/23 0844    04/09/23 0900  multivitamin (THERAGRAN) tablet 1 tablet  Daily         04/08/23 1218    04/08/23 2100  atorvastatin (LIPITOR) tablet 40 mg  Nightly         04/08/23 1218    04/08/23 2100  sennosides-docusate (PERICOLACE) 8.6-50 MG per tablet 2 tablet  Nightly         04/08/23 1218    04/08/23 2000  gentamicin (GARAMYCIN) 0.1 % ointment 1 application  Daily         04/08/23 1824 04/08/23 1825  Change Dressing  Daily      Comments: PD cath dressing: clean around site with chloraprep, gentamicin cream, island dressing    *Wear masks while changing dressing*    04/08/23 1824    04/08/23 1800  carvedilol (COREG) tablet 25 mg  2 Times Daily With Meals         04/08/23 1218    04/08/23 1800  Insulin Lispro (humaLOG) injection 2-7 Units  3 Times Daily With Meals         04/08/23 1218    04/08/23 1716  Ambulate Patient  Every Shift       04/08/23 1218    04/08/23 1700  POC Glucose TID AC  3 Times Daily Before Meals       04/08/23 1218    04/08/23 1217  acetaminophen (TYLENOL) tablet 500 mg  Every 6 Hours PRN          04/08/23 1218    04/08/23 1217  dextrose (D50W) (25 g/50 mL) IV injection 25 g  Every 15 Minutes PRN         04/08/23 1218    04/08/23 1217  dextrose (GLUTOSE) oral gel 15 g  Every 15 Minutes PRN         04/08/23 1218    04/08/23 1217  glucagon (human recombinant) (GLUCAGEN DIAGNOSTIC) injection 1 mg  Every 15 Minutes PRN         04/08/23 1218    04/08/23 1217  methocarbamol (ROBAXIN) tablet 500 mg  Every 12 Hours PRN         04/08/23 1218    04/08/23 1217  ondansetron (ZOFRAN) tablet 4 mg  Every 6 Hours PRN,   Status:  Discontinued         04/08/23 1218    04/08/23 1217  sodium chloride nasal spray 1 spray  As Needed         04/08/23 1218    Unscheduled  Follow Hypoglycemia Standing Orders For Blood Glucose <70 & Notify Provider of Treatment  As Needed      Comments: Follow Hypoglycemia Orders As Outlined in Process Instructions (Open Order Report to View Full Instructions)  Notify Provider Any Time Hypoglycemia Treatment is Administered    04/08/23 1218    Unscheduled  Change Dressing to IV Site As Needed When Damp, Loose or Soiled  As Needed       04/10/23 0844    Unscheduled  Apply Moisture Barrier After Any Incontinence  As Needed       04/12/23 1706    --  UPPER GI ENDOSCOPY         04/08/23 1454    --  UPPER GI ENDOSCOPY         04/10/23 1201    --  SCANNED - TELEMETRY           04/08/23 0000    --  SCANNED - TELEMETRY           04/08/23 0000    --  SCANNED - TELEMETRY           04/08/23 0000    --  SCANNED - TELEMETRY           04/08/23 0000    --  SCANNED - TELEMETRY           04/08/23 0000    --  SCANNED - TELEMETRY           04/08/23 0000    --  SCANNED - TELEMETRY           04/08/23 0000    --  SCANNED - TELEMETRY           04/08/23 0000    --  SCANNED - TELEMETRY           04/08/23 0000    --  SCANNED - TELEMETRY           04/08/23 0000    --  SCANNED - TELEMETRY           04/08/23 0000    --  SCANNED - TELEMETRY           04/08/23 0000    --  SCANNED - TELEMETRY           04/08/23 0000    --  SCANNED -  TELEMETRY           23 0000    --  SCANNED - TELEMETRY           23 0000    --  SCANNED - TELEMETRY           23 0000    --  SCANNED - TELEMETRY           23 0000    --  SCANNED - TELEMETRY           23 0000    --  SCANNED - TELEMETRY           23 0000    --  SCANNED - TELEMETRY           23 0000    --  SCANNED - TELEMETRY           23 0000                   Physician Progress Notes (last 24 hours)      Gregorio Pinedo MD at 23 1124              [facility]     Patient Identification:  Name:  Augusto Lorenzana Jr.  Age:  78 y.o.  Sex:  male  :  1945  MRN:  [id number]  Visit Number:  [id number]  ROOM: [address] [place] [address]     Primary Care Provider:  Rob Polanco MD    Length of stay in inpatient status:  9    Subjective     Chief Compliant:  No chief complaint on file.      History of Presenting Illness:    Patient in bedside chair. He appeared comfortable. He was pleasant and declined any new complaints. Sitter bedside also denied any agitation or noncompliance. Patient agreeable for rehab consult.       ROS:  Otherwise 10 point ROS negative other than documented above in HPI.     Objective     Current Hospital Meds:allopurinol, 50 mg, Oral, Once per day on Mon Thu  amLODIPine, 10 mg, Oral, Q24H  atorvastatin, 40 mg, Oral, Nightly  carvedilol, 25 mg, Oral, BID With Meals  castor oil-balsam peru, 1 application, Topical, Q12H  epoetin nirmala/nirmala-epbx, 10,000 Units, Subcutaneous, Once  gentamicin, 1 application, Topical, Daily  insulin lispro, 2-7 Units, Subcutaneous, TID With Meals  Lidocaine, 1 patch, Apply externally, Daily  melatonin, 10 mg, Oral, Nightly  multivitamin, 1 tablet, Oral, Daily  OLANZapine, 5 mg, Oral, Once  OLANZapine zydis, 5 mg, Oral, Daily  pantoprazole, 40 mg, Oral, BID AC  senna-docusate sodium, 2 tablet, Oral, Nightly  sodium chloride, 10 mL, Intravenous, Q12H  sodium chloride, 10 mL, Intravenous,  Q12H  sodium chloride, 10 mL, Intravenous, Q12H  sterile water (preservative free), , ,     sodium chloride, 9 mL/hr, Last Rate: 30 mL/hr (04/13/23 0817)        Current Antimicrobial Therapy:  Anti-Infectives (From admission, onward)    None        Current Diuretic Therapy:  Diuretics (From admission, onward)    None        ----------------------------------------------------------------------------------------------------------------------  Vital Signs:  Temp:  [97.7 °F (36.5 °C)-98.3 °F (36.8 °C)] 98 °F (36.7 °C)  Heart Rate:  [70-91] 75  Resp:  [16-18] 16  BP: (124-138)/(65-72) 127/71  SpO2:  [94 %-95 %] 94 %  on   ;   Device (Oxygen Therapy): room air  Body mass index is 25.06 kg/m².    Wt Readings from Last 3 Encounters:   04/17/23 72.6 kg (160 lb)   04/07/23 77.2 kg (170 lb 3.2 oz)   07/08/21 83 kg (183 lb)     Intake & Output (last 3 days)       04/14 0701  04/15 0700 04/15 0701  04/16 0700 04/16 0701  04/17 0700 04/17 0701  04/18 0700    P.O. 1260  1010 840    Other        Total Intake(mL/kg) 1260 (16.9)  1010 (13.9) 840 (11.6)    Urine (mL/kg/hr) 600 (0.3)   300 (0.9)    Other  1500      Stool 0       Total Output 600 1500  300    Net +660 -1500 +1010 +540            Urine Unmeasured Occurrence 2 x  1 x     Stool Unmeasured Occurrence 2 x           Diet: Liquid Diets, Diabetic Diets; Full Liquid; Consistent Carbohydrate; Texture: Regular Texture (IDDSI 7); Fluid Consistency: Thin (IDDSI 0)  ----------------------------------------------------------------------------------------------------------------------  Physical exam:  Constitutional:  Well-developed and well-nourished.  No respiratory distress.      HENT:  Head:  Normocephalic and atraumatic.  Mouth:  Moist mucous membranes.    Eyes:  Conjunctivae and EOM are normal. No scleral icterus.    Neck:  Neck supple.  No JVD present.    Cardiovascular:  Normal rate, regular rhythm and normal heart sounds with no murmur.  Pulmonary/Chest:  No respiratory  distress, no wheezes, no crackles, with normal breath sounds and good air movement.  Abdominal:  Soft.  Bowel sounds are normal.  No distension and no tenderness.   Musculoskeletal:  No edema, no tenderness, and no deformity.  No red or swollen joints anywhere.    Neurological:  Alert and oriented to person, place, and time.  No cranial nerve deficit.  No tongue deviation.  No facial droop.  No slurred speech.   Skin:  Skin is warm and dry. No rash noted. No pallor.   Peripheral vascular:  Pulses in all 4 extremities with no clubbing, no cyanosis, no edema.  ----------------------------------------------------------------------------------------------------------------------  Tele:    ----------------------------------------------------------------------------------------------------------------------  Results from last 7 days   Lab Units 04/15/23  0018 04/14/23  1356 04/14/23  0108 04/13/23  1241 04/13/23  0210   WBC 10*3/mm3 11.57*  --   --  13.14* 15.63*   HEMOGLOBIN g/dL 7.6*  --  7.7* 7.9* 8.3*   HEMATOCRIT % 23.5*  --  23.9* 24.5* 26.5*   MCV fL 97.9*  --   --  98.8* 100.0*   MCHC g/dL 32.3  --   --  32.2 31.3*   PLATELETS 10*3/mm3 209  --   --  209 233   INR   --  1.15*  --   --   --          Results from last 7 days   Lab Units 04/15/23  0018 04/14/23  0108 04/13/23  0210   SODIUM mmol/L 140 139 147*   POTASSIUM mmol/L 3.8 3.1* 3.6   CHLORIDE mmol/L 105 103 117*   CO2 mmol/L 23.9 26.9 16.9*   BUN mg/dL 34* 28* 65*   CREATININE mg/dL 3.45* 2.63* 3.82*   CALCIUM mg/dL 7.6* 7.6* 8.5*   PHOSPHORUS mg/dL  --  2.4*  --    GLUCOSE mg/dL 121* 120* 165*   ALBUMIN g/dL  --  2.5*  --    Estimated Creatinine Clearance: 18.7 mL/min (A) (by C-G formula based on SCr of 3.45 mg/dL (H)).  No results found for: AMMONIA              Glucose   Date/Time Value Ref Range Status   04/17/2023 1045 254 (H) 70 - 130 mg/dL Final     Comment:     Meter: [id number] : [id number] DIEUDONNE CARLA   04/17/2023 0807 170 (H) 70 - 130 mg/dL  Final     Comment:     Meter: [id number] : [id number] DIEUDONNE AGUIRRE   04/16/2023 1811 187 (H) 70 - 130 mg/dL Final     Comment:     RN Notified Meter: [id number] : [id number] WADE MOYER   04/16/2023 1636 196 (H) 70 - 130 mg/dL Final     Comment:     Meter: [id number] : [id number] Sammy Khoury   04/16/2023 1114 249 (H) 70 - 130 mg/dL Final     Comment:     Meter: [id number] : [id number] Sammy Riveroberly   04/16/2023 0742 130 70 - 130 mg/dL Final     Comment:     Meter: [id number] : [id number] RUCHI DODSON   04/15/2023 1654 99 70 - 130 mg/dL Final     Comment:     Meter: [id number] : [id number] DEBORAH WORRELL   04/15/2023 1223 212 (H) 70 - 130 mg/dL Final     Comment:     Meter: [id number] : [id number] TAYLOR MORALES     Lab Results   Component Value Date    TSH 4.030 06/03/2021    FREET4 1.1 03/31/2023     No results found for: PREGTESTUR, PREGSERUM, HCG, HCGQUANT  Pain Management Panel         Latest Ref Rng & Units 12/7/2022 5/30/2021   Pain Management Panel   Creatinine, Urine mg/dL 80.1   136.1           Multiple values from one day are sorted in reverse-chronological order           Brief Urine Lab Results  (Last result in the past 365 days)      Color   Clarity   Blood   Leuk Est   Nitrite   Protein   CREAT   Urine HCG        12/07/22 1710             80.1         12/07/22 1710 Yellow   Clear   Small (1+)   Negative   Negative   >=300 mg/dL (3+)               No results found for: BLOODCX  No results found for: URINECX  No results found for: WOUNDCX  No results found for: STOOLCX  No results found for: RESPCX  No results found for: AFBCX        I have personally looked at the labs and they are summarized above.  ----------------------------------------------------------------------------------------------------------------------  Detailed radiology reports for the last 24 hours:    Imaging Results (Last 24 Hours)     ** No results found for the last 24 hours. **        Assessment  & Plan    #Acute blood loss anemia w/UGIB  #PUD s/p EGD w/epinephrine injection  -s/p 48hrs BID PPI IV after period on PPI gtt. Transitioned to PO PPI BID 4/14 to be continued x6 weeks. No recurrent melena noted.  -Repeat hemoglobin stable, minimally reduced after TDC placement  -CVC removed 4/15. With correction of uremia, platelet dysfunction improving without recurrent evidence of acute blood loss     #Leukocytosis  -Improving, likely from UGIB     #ESRD previously on PD s/p TDC placement 4/13  -Underwent placement of TDC 4/13 with minor bleeding periprocedurally  -First HD session 4/13, tolerating well with rapid improvement of uremia  -Nephrology following, planning to cont TTS HD and with PDx redo outpatient  -Will need outpatient HD chair at discharge     #Hypernatremia  -Previously given D5w but can correct moving forward w/HD  -Now wnl     #Critial illness associated myopathy  -Patient had hospitalization initially at  3/30 for PD placement complicated by new heart block requiring placement of PM, [facility] and developed acute GI bleed shortly after transfer requiring EGD and TDC placement for nonfunctional PD  -Given above periods of significant illness and immobility, now below baseline activity level and functional status  -Acute inpatient rehab consult placed     #Hospital acquired delirium  -Delirium precautions in place  -Started qhs melatonin but unfortunately no effective pharmacologic options for delirium  -Given qtc prolongation, avoid further antipsychotics. Prn ativan if patient agitated to point of being unsafe.   -Bedside sitter as needed  -Improved     -- Chronic --     CAD s/p CABG- holding ASA due to UGIB, resume as outpatient if no recurrence after 6 weeks PPI. Consider resuming if Hgb sustains >8mg/dl on f/u  HTN- BP controlled on Losartan, Norvasc and coreg  HLD- statin  DM2- A1C 6.5%. SSI, resume lantus at 1/2 home dose given improvement in PO  intake.   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 Dose decreased to 100 mg/day per renal function.   Constipation- cont doc/senna nightly    F: PO  E: Replace as needed  N: Regular, CC    DVT ppx: SCDs    Code status: Full     Dispo: Pending inpatient rehab vs. SNF      VTE Prophylaxis:   Mechanical Order History:      Ordered        23 1218  Maintain Sequential Compression Device  Continuous                    Pharmalogical Order History:      Ordered     Dose Route Frequency Stop    23 1429  heparin (porcine) injection 6,000 Units         6,000 Units IK Once 23 1539    04/1945  heparin (porcine) injection 2,000 Units         2,000 Units IV Once 04/10/23 1958                  Gregorio Pinedo MD  [facility] Hospitalist  23  11:24 EDT    Electronically signed by Gregorio Pinedo MD at 23 1142       Consult Notes (last 24 hours)  Notes from 23 0908 through 23 0908   No notes of this type exist for this encounter.            Physical Therapy Notes (most recent note)      Yamel Mckeon, PT at 23 1004  Version 1 of 1         Acute Care - Physical Therapy Treatment Note  [facility]     Patient Name: Augusto Lorenzana Jr.  : 1945  MRN: [id number]  Today's Date: 2023      Visit Dx:     ICD-10-CM ICD-9-CM   1. Gastrointestinal hemorrhage, unspecified gastrointestinal hemorrhage type  K92.2 578.9   2. NSTEMI 2021  I21.4 410.70   3. Chronic kidney disease, unspecified CKD stage  N18.9 585.9     Patient Active Problem List   Diagnosis   • NSTEMI 2021   • Hyperlipidemia LDL goal <70   • Essential hypertension   • T2DM on oral agents and insulin. HbA1c 7.4    • Coronary artery disease involving native coronary artery of native heart with unstable angina pectoris   • A/C kidney disease. ATN due to postoperative arrest. Dialysis begun 6/3/2021   • Gout   • Former smokeless tobacco use   • S/P CABG x 3 on  5/28/21   • Perioperative cardiac arrest with ventricular fibrillation (CMS/HCC)   • Postop encephalopathy post code. Combination of anoxic insult and azotemia   • Debility   • Lower extremity edema   • Complete heart block   • GI bleed   • Third degree atrioventricular block   • Atrial fibrillation, persistent   • Gastrointestinal hemorrhage associated with gastric ulcer     Past Medical History:   Diagnosis Date   • CAD (coronary artery disease)    • CKD (chronic kidney disease) stage 3, GFR 30-59 ml/min    • Diabetes mellitus    • Hyperlipidemia    • Hypertension    • NSTEMI (non-ST elevated myocardial infarction)      Past Surgical History:   Procedure Laterality Date   • CARDIAC CATHETERIZATION N/A 05/24/2021    Procedure: Left Heart Cath;  Surgeon: Blade Greene IV, MD;  Location:  [facility];  Service: Cardiovascular;  Laterality: N/A;   • CARDIAC SURGERY      2 stents placed 2012.   • CORONARY ARTERY BYPASS GRAFT N/A 05/28/2021    Procedure: MEDIAN STERNOTOMY CORONARY ARTERY BYPASS X 3 UTILIZING THE LEFT INTERNAL MAMMARY ARTERY GRAFT, EVH OF THE GREATER RIGHT SAPHENOUS VEIN, AND PILO PER ANESTHESIA;  Surgeon: Jacek Miller MD;  Location:  [facility];  Service: Cardiothoracic;  Laterality: N/A;   • ENDOSCOPY N/A 04/08/2023    Procedure: ESOPHAGOGASTRODUODENOSCOPY with CENTERAL LINE PLACEMENT;  Surgeon: Sabine Hudson MD;  Location:  [facility];  Service: General;  Laterality: N/A;   • ENDOSCOPY N/A 04/10/2023    Procedure: ESOPHAGOGASTRODUODENOSCOPY;  Surgeon: Sabine Hudson MD;  Location:  [facility];  Service: General;  Laterality: N/A;   • PACEMAKER IMPLANTATION       PT Assessment (last 12 hours)     PT Evaluation and Treatment     Row Name 04/17/23 0933          Physical Therapy Time and Intention    Document Type therapy note (daily note)  -KH     Mode of Treatment physical therapy  -KH     Patient Effort good  -KH     Comment Pt seen for treatment this date, pleasant  and cooperative with therapy. Pt worked on ambulation with RW, ambulated grossly 85' in room with PT this date. Pt tolerated treatment well.  -     Row Name 04/17/23 0933          Bed Mobility    Bed Mobility supine-sit  -     Supine-Sit [place] (Bed Mobility) modified independence;independent  -     Row Name 04/17/23 0933          Transfers    Transfers sit-stand transfer;stand-sit transfer  -     Row Name 04/17/23 0933          Bed-Chair Transfer    Bed-Chair [place] (Transfers) --  -     Row Name 04/17/23 0933          Sit-Stand Transfer    Sit-Stand [place] (Transfers) contact guard  -     Assistive Device (Sit-Stand Transfers) walker, front-wheeled  -     Row Name 04/17/23 0933          Stand-Sit Transfer    Stand-Sit [place] (Transfers) verbal cues;standby assist;contact guard  -     Assistive Device (Stand-Sit Transfers) walker, front-wheeled  -     Row Name 04/17/23 0933          Gait/Stairs (Locomotion)    Comment, (Gait/Stairs) Pt ambulated grossly 85' in room with RW, SBA/CGA  -     Row Name 04/17/23 0933          Balance    Comment, Balance Pt slightly drowsy this AM, recommended CGA with turns d/t slight unsteadiness  -     Row Name 04/17/23 0933          Motor Skills    Therapeutic Exercise hip;knee  Pt worked on BL marches grossly 2-3x15, S/LAQ grossly 30 reps total  -     Row Name             Wound 04/12/23 1700 distal penis Pressure Injury    Wound - Properties Group Placement Date: 04/12/23 -TW Placement Time: 1700  -TW Present on Hospital Admission: N  -TW Orientation: distal  -TW Location: penis  -TW Primary Wound Type: Pressure inj  -TW    Retired Wound - Properties Group Placement Date: 04/12/23 -TW Placement Time: 1700  -TW Present on Hospital Admission: N  -TW Orientation: distal  -TW Location: penis  -TW Primary Wound Type: Pressure inj  -TW    Retired Wound - Properties Group Date first assessed: 04/12/23  -TW Time first assessed: 1700  -TW  Present on Hospital Admission: N  -TW Location: penis  -TW Primary Wound Type: Pressure inj  -TW    Row Name 04/17/23 0933          Positioning and Restraints    Pre-Treatment Position in bed  -     Post Treatment Position chair  -KH     In Chair reclined  with sitter  -           User Key  (r) = Recorded By, (t) = Taken By, (c) = Cosigned By    Initials Name Provider Type     Kaia Turner, RN Registered Nurse    Yamel Fuller, PT Physical Therapist                Physical Therapy Education     Title: PT OT SLP Therapies (Done)     Topic: Physical Therapy (Done)     Point: Mobility training (Done)     Learning Progress Summary           Patient Acceptance, E, VU by SC at 4/14/2023 0112    Acceptance, E,TB, VU by  at 4/10/2023 0926                   Point: Home exercise program (Done)     Learning Progress Summary           Patient Acceptance, E, VU by SC at 4/14/2023 0112    Acceptance, E,TB, VU by  at 4/10/2023 0926                   Point: Body mechanics (Done)     Learning Progress Summary           Patient Acceptance, E, VU by SC at 4/14/2023 0112    Acceptance, E,TB, VU by  at 4/10/2023 0926                   Point: Precautions (Done)     Learning Progress Summary           Patient Acceptance, E, VU by SC at 4/14/2023 0112    Acceptance, E,TB, VU by  at 4/10/2023 0926                               User Key     Initials Effective Dates Name Provider Type Discipline     05/24/22 -  Livan Alejandra, PT Physical Therapist PT    SC 09/22/22 -  Kalie Arnold, RN Registered Nurse Nurse              PT Recommendation and Plan     Outcome Evaluation: Pt seemingly more alert and did well with PT this date. some c/o of fatigue/weakness       Time Calculation:    PT Charges     Row Name 04/17/23 1004             Time Calculation    Start Time 0933  -KH      PT Received On 04/17/23  -         Time Calculation- PT    Total Timed Code Minutes- PT 23 minute(s)  -            User Key  (r) =  Recorded By, (t) = Taken By, (c) = Cosigned By    Initials Name Provider Type    Yamel Fuller, PT Physical Therapist              Therapy Charges for Today     Code Description Service Date Service Provider Modifiers Qty    50179976423 HC PT THER PROC EA 15 MIN 2023 Yamel Mckeon, PT GP 1    57033250197 HC PT THERAPEUTIC ACT EA 15 MIN 2023 Yamel Mckeon, PT GP 1          PT G-Codes  AM-PAC 6 Clicks Score (PT): 14    Yamel Mckeon PT  2023      Electronically signed by Yamel Mckeon PT at 23 1005          Occupational Therapy Notes (most recent note)      Burton Wilson, OT at 23 1105          Acute Care - Occupational Therapy Treatment Note   [facility]     Patient Name: Augusto Lorenzana Jr.  : 1945  MRN: [id number]  Today's Date: 2023             Admit Date: 2023       ICD-10-CM ICD-9-CM   1. Gastrointestinal hemorrhage, unspecified gastrointestinal hemorrhage type  K92.2 578.9   2. NSTEMI 2021  I21.4 410.70   3. Chronic kidney disease, unspecified CKD stage  N18.9 585.9     Patient Active Problem List   Diagnosis   • NSTEMI 2021   • Hyperlipidemia LDL goal <70   • Essential hypertension   • T2DM on oral agents and insulin. HbA1c 7.4    • Coronary artery disease involving native coronary artery of native heart with unstable angina pectoris   • A/C kidney disease. ATN due to postoperative arrest. 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 Combination of anoxic insult and azotemia   • Debility   • Lower extremity edema   • Complete heart block   • GI bleed   • Third degree atrioventricular block   • Atrial fibrillation, persistent   • Gastrointestinal hemorrhage associated with gastric ulcer     Past Medical History:   Diagnosis Date   • CAD (coronary artery disease)    • CKD (chronic kidney  disease) stage 3, GFR 30-59 ml/min    • Diabetes mellitus    • Hyperlipidemia    • Hypertension    • NSTEMI (non-ST elevated myocardial infarction)      Past Surgical History:   Procedure Laterality Date   • CARDIAC CATHETERIZATION N/A 05/24/2021    Procedure: Left Heart Cath;  Surgeon: Blade Greene IV, MD;  Location:  [facility];  Service: Cardiovascular;  Laterality: N/A;   • CARDIAC SURGERY      2 stents placed 2012.   • CORONARY ARTERY BYPASS GRAFT N/A 05/28/2021    Procedure: MEDIAN STERNOTOMY CORONARY ARTERY BYPASS X 3 UTILIZING THE LEFT INTERNAL MAMMARY ARTERY GRAFT, EVH OF THE GREATER RIGHT SAPHENOUS VEIN, AND PILO PER ANESTHESIA;  Surgeon: Jacek Miller MD;  Location:  [facility];  Service: Cardiothoracic;  Laterality: N/A;   • ENDOSCOPY N/A 04/08/2023    Procedure: ESOPHAGOGASTRODUODENOSCOPY with CENTERAL LINE PLACEMENT;  Surgeon: Sabine Hudson MD;  Location:  [facility];  Service: General;  Laterality: N/A;   • ENDOSCOPY N/A 04/10/2023    Procedure: ESOPHAGOGASTRODUODENOSCOPY;  Surgeon: Sabine Hudson MD;  [facility]:  [facility];  Service: General;  Laterality: N/A;   • PACEMAKER IMPLANTATION           OT ASSESSMENT FLOWSHEET (last 12 hours)     OT Evaluation and Treatment     Row Name 04/17/23 1058                   OT Time and Intention    Document Type therapy note (daily note)  -KR        Mode of Treatment occupational therapy  -KR        Patient Effort adequate  -KR           General Information    General Observations of Patient alert/cooperative  -KR           Living Environment    Current Living Arrangements home  -KR        People in Home spouse  -KR           Cognition    Affect/Mental Status (Cognition) WFL  -KR        Orientation Status (Cognition) oriented x 3  -KR        Follows Commands (Cognition) WFL  -KR           Range of Motion Comprehensive    Comment, General Range of Motion BUE WFL  -KR           Shoulder (Therapeutic Exercise)    Shoulder AROM  (Therapeutic Exercise) bilateral;flexion;extension;sitting  -KR           Elbow/Forearm (Therapeutic Exercise)    Elbow/Forearm AROM (Therapeutic Exercise) bilateral;flexion;extension;sitting  -KR           Wound 04/12/23 1700 distal penis Pressure Injury    Wound - Properties Group Placement Date: 04/12/23 -TW Placement Time: 1700 -TW Present on Hospital Admission: N  -TW Orientation: distal  -TW Location: penis  -TW Primary Wound Type: Pressure inj  -TW    Retired Wound - Properties Group Placement Date: 04/12/23 -TW Placement Time: 1700  -TW Present on Hospital Admission: N  -TW Orientation: distal  -TW Location: penis  -TW Primary Wound Type: Pressure inj  -TW    Retired Wound - Properties Group Date first assessed: 04/12/23 -TW Time first assessed: 1700 -TW Present on Hospital Admission: N  -TW Location: penis  -TW Primary Wound Type: Pressure inj  -TW       Plan of Care Review    Plan of Care Reviewed With patient  -KR        Progress improving  -KR           Strength Goal 1 (OT)    Progress/Outcome (Strength Goal 1, OT) continuing progress toward goal  -KR            Endurance Goal 1 (OT)    Progress/Outcome (Endurance Goal 1, OT) continuing progress toward goal  -KR              User Key  (r) = Recorded By, (t) = Taken By, (c) = Cosigned By    Initials Name Effective Dates    TW Kaia Turner RN 06/16/21 -     Burton Nick OT 06/16/21 -                        OT Recommendation and Plan  Planned Therapy Interventions (OT): activity tolerance training, BADL retraining, strengthening exercise  Plan of Care Review  Plan of Care Reviewed With: patient  Progress: improving  Plan of Care Reviewed With: patient        Time Calculation:     Therapy Charges for Today     Code Description Service Date Service Provider Modifiers Qty    65893229896  OT THERAPEUTIC ACT EA 15 MIN 4/17/2023 Burton Wilson OT GO 2               Burton Wilson OT  4/17/2023    Electronically signed by Burton Wilson OT at  23 1105          Speech Language Pathology Notes (most recent note)      Salina Walton, MS CCC-SLP at 23 1600          Acute Care - Speech Language Pathology   Swallow Initial Evaluation [facility]   CLINICAL DYSPHAGIA ASSESSMENT     Patient Name: Augusto Lorenzana Jr.  : 1945  MRN: [id number]  Today's Date: 2023             Admit Date: 2023    Augusto Lorenzana Jr.  is seen at bedside this PM on 3S to assess safety/efficacy of swallowing fnx, determine safest/least restrictive diet tolerance. His sitter was present for this assessment 2/2 ongoing ams and agitation.     Mr. Lorenzana was directly admitted to [facility] from [facility] with a chief complaint of new onset hematemesis and melena on 23.     PMH significant for CAD status post CABG, CKD stage V, type 2 diabetes mellitus, hyperlipidemia, paroxysmal A-fib, SEBASTIAN and valvular heart disease. He presented to [facility] 3/30/23 for placement of a peritoneal dialysis catheter. He experienced a 6-second pause during recovery and was evaluated by cardiology, whom recommended a pacemaker placement. He underwent pacemaker placement 3/31/23. His hospitalization was complicated by ARF 2/2 volume overload. After a lengthy stay at [facility], he was discharged to [facility] for debility recovery. In [facility], he began to experience a sudden onset of hematemesis and melena. He was transferred to [facility] acute for further evaluation and management for concerns of possible upper GI bleed.     He is referred to rule out dysphagia today 2/2 ams. He is currently on a clear liquids po diet.     Social History     Socioeconomic History   • Marital status:    Tobacco Use   • Smoking status: Never   • Smokeless tobacco: Current     Types: Chew   Vaping Use   • Vaping Use: Never used   Substance and Sexual Activity   • Alcohol use: Never   • Drug use: Never   • Sexual activity: Defer      Imaging:  CHEST X-RAY, 2023        HISTORY:    Central line placement.       TECHNIQUE:  AP portable chest x-ray.     COMPARISON:  *  Chest x-ray, 6/24/2021.     FINDINGS:  Right IJ central line tip in good position in the lower SVC. No visible pneumothorax.     Cardiomegaly with mild diffuse pulmonary interstitial edema suggesting mild vascular congestion or volume overload, new since the prior study. Median sternotomy. Dual-chamber cardiac pacer/AICD.     IMPRESSION:  1. No pneumothorax following placement of well-positioned right IJ central line.  2. Vascular congestion with mild interstitial edema.     Signer Name: Nico Aaron MD   Signed: 4/8/2023 5:11 PM   Workstation Name: [facility]    [facility]  Diet Orders (active) (From admission, onward)     Start     Ordered    04/10/23 1356  Diet: Liquid Diets; Clear Liquid; Texture: Regular Texture (IDDSI 7); Fluid Consistency: Thin (IDDSI 0)  Diet Effective Now         04/10/23 1355              Mr. Lorenzana is positioned upright and centered in bed to accept multiple po presentations of ice chips and thin water via teaspoon, cup, and straw. He does not attempt to self provide. Further po consistencies deferred per clear liquids 2/2 GI status.      Facial/oral structures are symmetrical upon observation. Lingual protrusion reveals no deviation. Oral mucosa are moist, pink, and clean. Secretions are clear, thin, and controlled. OROM/DANA is generally weak to imitate oral postures. Gag is not assessed. Volitional cough is intact w/ adequate  intensity, congestive in quality, non-productive. Voice is adequate in intensity, clear in quality w/ intelligible speech. He is a/a and interactive, mildly agitated in general with SLP and sitter, present at bedside. He does calm with verbal coaxing and redirection to participate in this assessment. Sitter does report that he did accept thin liquids for lunch w/o overt s/s aspiration, however, she further reports that he did  experience nausea with emesis event post po intake.     Upon po presentations, adequate bolus anticipation and acceptance w/ good labial seal for bolus clearance via spoon bowl, cup rim stability and suction via straw. Bolus formation, manipulation and control are generally weak. No antieror loss appreciated. No oral holding evidenced. No overt s/s aspiration before the swallow.      Pharyngeal swallow is timely w/ adequate hyolaryngeal elevation per palpation. No overt s/s aspiration evidenced across this evaluation. No silent aspiration suspected. He denies odynophagia. He is noted with limited acceptance of po trials, approximately 3 oz only.    Visit Dx:     ICD-10-CM ICD-9-CM   1. Gastrointestinal hemorrhage, unspecified gastrointestinal hemorrhage type  K92.2 578.9     Patient Active Problem List   Diagnosis   • NSTEMI 5/22/2021   • Hyperlipidemia LDL goal <70   • Essential hypertension   • T2DM on oral agents and insulin. HbA1c 7.4    • Coronary artery disease involving native coronary artery of native heart with unstable angina pectoris   • A/C kidney disease. ATN due to postoperative arrest. Dialysis begun 6/3/2021   • Gout   • Former smokeless tobacco use   • S/P CABG x 3 on 5/28/21   • Perioperative cardiac arrest with ventricular fibrillation (CMS/HCC)   • Postop encephalopathy post code. Combination of anoxic insult and azotemia   • Debility   • Lower extremity edema   • Complete heart block   • GI bleed   • Third degree atrioventricular block   • Atrial fibrillation, persistent   • Gastrointestinal hemorrhage associated with gastric ulcer     Past Medical History:   Diagnosis Date   • CAD (coronary artery disease)    • CKD (chronic kidney disease) stage 3, GFR 30-59 ml/min    • Diabetes mellitus    • Hyperlipidemia    • Hypertension    • NSTEMI (non-ST elevated myocardial infarction)      Past Surgical History:   Procedure Laterality Date   • CARDIAC CATHETERIZATION N/A 5/24/2021    Procedure: Left  Heart Cath;  Surgeon: Blade Greene IV, MD;  Location:  [facility];  Service: Cardiovascular;  Laterality: N/A;   • CARDIAC SURGERY      2 stents placed 2012.   • CORONARY ARTERY BYPASS GRAFT N/A 5/28/2021    Procedure: MEDIAN STERNOTOMY CORONARY ARTERY BYPASS X 3 UTILIZING THE LEFT INTERNAL MAMMARY ARTERY GRAFT, EVH OF THE GREATER RIGHT SAPHENOUS VEIN, AND PILO PER ANESTHESIA;  Surgeon: Jacek Miller MD;  Location:  [facility];  Service: Cardiothoracic;  Laterality: N/A;   • ENDOSCOPY N/A 4/8/2023    Procedure: ESOPHAGOGASTRODUODENOSCOPY with CENTERAL LINE PLACEMENT;  Surgeon: Sabine Hudson MD;  Location:  [facility];  Service: General;  Laterality: N/A;   • ENDOSCOPY N/A 4/10/2023    Procedure: ESOPHAGOGASTRODUODENOSCOPY;  Surgeon: Sabine Hudson MD;  Location:  [facility];  Service: General;  Laterality: N/A;     EDUCATION  The patient has been educated in the following areas:   Dysphagia (Swallowing Impairment) Oral Care/Hydration Modified Diet Instruction.     Impression: Per this limited assessment, Mr. Lorenzana presents with wfl oropharyngeal swallow to accept thin liquids via teaspoon, cup, and straw. No overt s/s aspiration. no silent aspiration suspected.     He is felt to most benefit from continued modified po diet of thin liquids only, per MD per GI status. Upgrade, as tolerated from a GI standpoint and cleared per MD, to soft to chew textures, thin liquids. Medications whole in puree, single pill presentations only, please.     SLP Recommendation and Plan    1. Continue modified po diet of thin liquids only, per MD per GI status. Upgrade, as tolerated from a GI standpoint and cleared per MD, to soft to chew textures, thin liquids.   2. Medications whole in puree/thins. Single pill presentations only, please.   3. Upright and centered for all po intake with 1:1 assistance.   4. NANI precautions.  5. Oral care protocol.  6. If emesis persists with po intake, please defer  po intake pending further MD evaluation.     D/w Mr. Lorenzana results and recommendations w/ verbal agreement.    D/w nursing staff results and recommendations w/ verbal agreement.    Thank you for allowing me to participate in the care of your patient-  Salina Walton M.S., CCC/SLP                                                                                               Time Calculation:       Therapy Charges for Today     Code Description Service Date Service Provider Modifiers Qty    27829917009 HC ST EVAL ORAL PHARYNG SWALLOW 2 4/11/2023 Salina Walton, MS CCC-SLP GN 1               Salina Walton, MS CCC-SLP  4/11/2023    Electronically signed by Salina Walton, MS CCC-SLP at 04/11/23 1620       ADL Documentation (last day)     Date/Time Transferring Toileting Bathing Dressing Eating Communication Swallowing    04/17/23 2047 3 - assistive equipment and person 3 - assistive equipment and person 2 - assistive person 2 - assistive person 0 - independent 0 - understands/communicates without difficulty 0 - swallows foods/liquids without difficulty    04/17/23 0800 3 - assistive equipment and person 3 - assistive equipment and person 2 - assistive person 2 - assistive person 0 - independent 0 - understands/communicates without difficulty 0 - swallows foods/liquids without difficulty

## 2023-04-18 NOTE — PROGRESS NOTES
Nephrology Progress Note      Subjective     No chest pain or shortness of breath.     Objective       Vital signs :     Temp:  [97.5 °F (36.4 °C)-98.2 °F (36.8 °C)] 97.6 °F (36.4 °C)  Heart Rate:  [63-81] 77  Resp:  [16-18] 18  BP: ()/(54-83) 149/83    Intake/Output                             04/16/23 0701 - 04/17/23 0700 04/17/23 0701 - 04/18/23 0700 04/18/23 0701 - 04/19/23 0700     6512-4621 1225-4282 Total 8176-0054 4534-1024 Total 9871-7366 6159-3972 Total                    Intake    P.O.  770  240 1010  2040  180 2220  60  -- 60    Total Intake  2040 180 2220 60 -- 60       Output    Urine  --  -- --  300  750 1050  --  -- --    Total Output -- -- --  -- -- --           Physical Exam:    General Appearance : not in acute distress  Lungs : clear to auscultation, respirations regular  Heart :  regular rhythm & normal rate, normal S1, S2 and no murmur, no rub  Abdomen : soft, non distended, suprapubic tenderness   Extremities : no edema  Neurologic :   orientated to person, place, time and situation, Grossly no focal deficits        Laboratory Data :     Albumin No results found for: ALBUMIN   Magnesium Magnesium   Date Value Ref Range Status   04/18/2023 2.1 1.6 - 2.4 mg/dL Final          PTH               No results found for: PTH    CBC and coagulation:  Results from last 7 days   Lab Units 04/18/23  0044 04/15/23  0018 04/14/23  1356 04/14/23  0108 04/13/23  1241   WBC 10*3/mm3 8.43 11.57*  --   --  13.14*   HEMOGLOBIN g/dL 8.8* 7.6*  --  7.7* 7.9*   HEMATOCRIT % 28.3* 23.5*  --  23.9* 24.5*   MCV fL 101.1* 97.9*  --   --  98.8*   MCHC g/dL 31.1* 32.3  --   --  32.2   PLATELETS 10*3/mm3 261 209  --   --  209   INR   --   --  1.15*  --   --      Acid/base balance:      Renal and electrolytes:    Results from last 7 days   Lab Units 04/18/23  0044 04/15/23  0018 04/14/23  0108 04/13/23  0210 04/13/23  0210 04/12/23  0053   SODIUM mmol/L 136 140 139  --  147* 145   POTASSIUM  mmol/L 4.3 3.8 3.1*   < > 3.6  --    MAGNESIUM mg/dL 2.1  --   --   --   --   --    CHLORIDE mmol/L 103 105 103   < > 117*  --    CO2 mmol/L 20.7* 23.9 26.9   < > 16.9*  --    BUN mg/dL 28* 34* 28*   < > 65*  --    CREATININE mg/dL 3.69* 3.45* 2.63*  --  3.82*  --    CALCIUM mg/dL 8.6 7.6* 7.6*   < > 8.5*  --    PHOSPHORUS mg/dL  --   --  2.4*  --   --   --     < > = values in this interval not displayed.     Estimated Creatinine Clearance: 17.6 mL/min (A) (by C-G formula based on SCr of 3.69 mg/dL (H)).  @GFRCG:3@   Liver and pancreatic function:  Results from last 7 days   Lab Units 04/14/23  0108   ALBUMIN g/dL 2.5*         Cardiac:      Liver and pancreatic function:  Results from last 7 days   Lab Units 04/14/23  0108   ALBUMIN g/dL 2.5*       Medications :     allopurinol, 50 mg, Oral, Once per day on Mon Thu  amLODIPine, 10 mg, Oral, Q24H  atorvastatin, 40 mg, Oral, Nightly  carvedilol, 25 mg, Oral, BID With Meals  castor oil-balsam peru, 1 application, Topical, Q12H  gentamicin, 1 application, Topical, Daily  insulin glargine, 15 Units, Subcutaneous, Nightly  insulin lispro, 2-7 Units, Subcutaneous, TID With Meals  Lidocaine, 1 patch, Apply externally, Daily  melatonin, 10 mg, Oral, Nightly  multivitamin, 1 tablet, Oral, Daily  OLANZapine, 5 mg, Oral, Once  OLANZapine zydis, 5 mg, Oral, Daily  pantoprazole, 40 mg, Oral, BID AC  senna-docusate sodium, 2 tablet, Oral, Nightly  sodium chloride, 10 mL, Intravenous, Q12H  sodium chloride, 10 mL, Intravenous, Q12H  sodium chloride, 10 mL, Intravenous, Q12H  sterile water (preservative free), , ,       sodium chloride, 9 mL/hr, Last Rate: 30 mL/hr (04/13/23 0817)      Assessment & Plan     1.  CKD stage V initiated on PDx during this admission   2.  Upper GI bleed with blood loss anemia  3.  Heart failure with reduced ejection fraction LVEF 45%  4.  History of CABG  5.  Diabetes with nephropathy poor control  6.  Hypertension  7.  Dehydration with  hypotension  8.  Pacemaker  9.  Confusion and acute psychosis  10.  Hypokalemia     Dialysis today, and planned UF 2L.   Patient should be able to go home from nephrology standpoint once outpatient dialysis chair has been arranged  Educated and counseled the patient  Anemia, continue on erythropoietin  Redo of PDx cath outpatient.   PD catheter flush every Monday      Alessandra Farias MD  04/18/23  10:45 EDT

## 2023-04-18 NOTE — THERAPY TREATMENT NOTE
Acute Care - Occupational Therapy Treatment Note   [place]     Patient Name: Augusto Lorenzana Jr.  : 1945  MRN: [id number]  Today's Date: 2023             Admit Date: 2023       ICD-10-CM ICD-9-CM   1. Gastrointestinal hemorrhage, unspecified gastrointestinal hemorrhage type  K92.2 578.9   2. NSTEMI 2021  I21.4 410.70   3. Chronic kidney disease, unspecified CKD stage  N18.9 585.9     Patient Active Problem List   Diagnosis   • NSTEMI 2021   • Hyperlipidemia LDL goal <70   • Essential hypertension   • T2DM on oral agents and insulin. HbA1c 7.4    • Coronary artery disease involving native coronary artery of native heart with unstable angina pectoris   • A/C kidney disease. ATN due to postoperative arrest. 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 Combination of anoxic insult and azotemia   • Debility   • Lower extremity edema   • Complete heart block   • GI bleed   • Third degree atrioventricular block   • Atrial fibrillation, persistent   • Gastrointestinal hemorrhage associated with gastric ulcer     Past Medical History:   Diagnosis Date   • CAD (coronary artery disease)    • CKD (chronic kidney disease) stage 3, GFR 30-59 ml/min    • Diabetes mellitus    • Hyperlipidemia    • Hypertension    • NSTEMI (non-ST elevated myocardial infarction)      Past Surgical History:   Procedure Laterality Date   • CARDIAC CATHETERIZATION N/A 2021    Procedure: Left Heart Cath;  Surgeon: Blade Greene IV, MD;  Location:  [facility];  Service: Cardiovascular;  Laterality: N/A;   • CARDIAC SURGERY      2 stents placed .   • CENTRAL VENOUS LINE INSERTION Left 2023    Procedure: CENTRAL VENOUS LINE INSERTION;  Surgeon: Torey Esaley MD;  [facility];  Service: General;  Laterality: Left;    • CORONARY ARTERY BYPASS GRAFT N/A 05/28/2021    Procedure: MEDIAN STERNOTOMY CORONARY ARTERY BYPASS X 3 UTILIZING THE LEFT INTERNAL MAMMARY ARTERY GRAFT, EVH OF THE GREATER RIGHT SAPHENOUS VEIN, AND PILO PER ANESTHESIA;  Surgeon: Jacek Miller MD;  Location:  [facility];  Service: Cardiothoracic;  Laterality: N/A;   • ENDOSCOPY N/A 04/08/2023    Procedure: ESOPHAGOGASTRODUODENOSCOPY with CENTERAL LINE PLACEMENT;  Surgeon: Sabine Hudson MD;  Location:  [facility];  Service: General;  Laterality: N/A;   • ENDOSCOPY N/A 04/10/2023    Procedure: ESOPHAGOGASTRODUODENOSCOPY;  Surgeon: Sabine Hudson MD;  Location:  [facility];  Service: General;  Laterality: N/A;   • INSERTION HEMODIALYSIS CATHETER N/A 4/13/2023    Procedure: HEMODIALYSIS CATHETER INSERTION;  Surgeon: Torey Easley MD;  Location:  [facility];  Service: General;  Laterality: N/A;   • PACEMAKER IMPLANTATION           OT ASSESSMENT FLOWSHEET (last 12 hours)     OT Evaluation and Treatment     Row Name 04/18/23 1135                   OT Time and Intention    Document Type therapy note (daily note)  -KR        Mode of Treatment occupational therapy  -KR        Patient Effort adequate  -KR           General Information    General Observations of Patient alert/cooperative  -KR           Cognition    Affect/Mental Status (Cognition) WFL  -KR        Orientation Status (Cognition) oriented x 3  -KR        Follows Commands (Cognition) WFL  -KR           Bed Mobility    Bed Mobility bed mobility (all) activities  -KR        All Activities, [place] (Bed Mobility) contact guard  -KR           Shoulder (Therapeutic Exercise)    Shoulder AROM (Therapeutic Exercise) bilateral;flexion;extension;sitting  -KR           Elbow/Forearm (Therapeutic Exercise)    Elbow/Forearm AROM (Therapeutic Exercise) bilateral;flexion;extension;sitting  -KR           Wound 04/12/23 1700 distal penis Pressure Injury    Wound - Properties Group Placement Date: 04/12/23  -TW  Placement Time: 1700  -TW Present on Hospital Admission: N  -TW Orientation: distal  -TW Location: penis  -TW Primary Wound Type: Pressure inj  -TW    Retired Wound - Properties Group Placement Date: 04/12/23  -TW Placement Time: 1700  -TW Present on Hospital Admission: N  -TW Orientation: distal  -TW Location: penis  -TW Primary Wound Type: Pressure inj  -TW    Retired Wound - Properties Group Date first assessed: 04/12/23 -TW Time first assessed: 1700  -TW Present on Hospital Admission: N  -TW Location: penis  -TW Primary Wound Type: Pressure inj  -TW       Strength Goal 1 (OT)    Progress/Outcome (Strength Goal 1, OT) continuing progress toward goal  -KR            Endurance Goal 1 (OT)    Progress/Outcome (Endurance Goal 1, OT) continuing progress toward goal  -KR           Endurance Goal 2 (OT)    Progress/Outcome (Endurance Goal 2, OT) continuing progress toward goal  -KR              User Key  (r) = Recorded By, (t) = Taken By, (c) = Cosigned By    Initials Name Effective Dates    Kaia Welsh RN 06/16/21 -     Burton Nick OT 06/16/21 -                        OT Recommendation and Plan  Planned Therapy Interventions (OT): activity tolerance training, BADL retraining, strengthening exercise  Plan of Care Review  Plan of Care Reviewed With: patient  Progress: improving  Plan of Care Reviewed With: patient        Time Calculation:     Therapy Charges for Today     Code Description Service Date Service Provider Modifiers Qty    27014620911 HC OT THERAPEUTIC ACT EA 15 MIN 4/17/2023 Burton Wilson OT GO 2    74317731048 HC OT THERAPEUTIC ACT EA 15 MIN 4/18/2023 Burton Wilson OT GO 1    80638738472 HC OT SELF CARE/MGMT/TRAIN EA 15 MIN 4/18/2023 Burton Wilson OT GO 1               Burton Wilson OT  4/18/2023

## 2023-04-18 NOTE — PLAN OF CARE
Goal Outcome Evaluation:  Plan of Care Reviewed With: patient        Progress: improving   Patient has been resting this shift with sitter @ bedside. No s/s of acute distress noted. Will continue with plan of care.

## 2023-04-18 NOTE — CASE MANAGEMENT/SOCIAL WORK
Discharge Planning Assessment  [facility]     Patient Name: Augusto Lorenzana Jr.  MRN: [id number]  Today's Date: 4/18/2023    Admit Date: 4/8/2023       Discharge Plan     Row Name 04/18/23 1303       Plan    Final Discharge Disposition Code 62 - inpatient rehab facility    Final Note Pt to be discharged to [facility] inpt rehab on this date.  Pt and spouse aware and agreeable.    Row Name 04/18/23 1210       Plan    Plan Physician requested inpt rehab reassess pt for possible admit.  Inpt rehab agreeable to accepting pt on this date per Mj and will need a new negative covid test and discharge readmit orders.  Pt and spouse aware and agreeable.  SS notified [facility] per Rina at [phone] and faxed pt latest information to [phone].  [facility] will need notification at discharge from inpt rehab and will assign chair time at that time.  SS notified Physician.              Continued Care and Services - Admitted Since 4/8/2023     Destination Coordination complete.    Service Provider Request Status Selected Services Address Phone Fax Patient Preferred    [facility]  Selected Inpatient Rehabilitation [address] [place], [place] [address] [address] [phone] --          Dialysis/Infusion     Service Provider Request Status Selected Services Address Phone Fax Patient Preferred    DIALYSIS SERVICES [facility] Pending - No Request Sent N/A [address] [facility] [address] [phone] -- --                    MANISHA Elder

## 2023-04-18 NOTE — NURSING NOTE
Patient seen in dialysis room. Pt is in no distress. No complaints at this time. Awaiting transport to inpatient rehab.

## 2023-04-18 NOTE — PLAN OF CARE
Goal Outcome Evaluation:  Plan of Care Reviewed With: patient        Progress: improving  Outcome Evaluation: patient admitted to rehab and will begin therapy in the morning. begin plan of care.

## 2023-04-19 LAB
ANION GAP SERPL CALCULATED.3IONS-SCNC: 6.9 MMOL/L (ref 5–15)
BASOPHILS # BLD AUTO: 0.05 10*3/MM3 (ref 0–0.2)
BASOPHILS NFR BLD AUTO: 0.6 % (ref 0–1.5)
BUN SERPL-MCNC: 15 MG/DL (ref 8–23)
BUN/CREAT SERPL: 6.3 (ref 7–25)
CALCIUM SPEC-SCNC: 8 MG/DL (ref 8.6–10.5)
CHLORIDE SERPL-SCNC: 95 MMOL/L (ref 98–107)
CO2 SERPL-SCNC: 28.1 MMOL/L (ref 22–29)
CREAT SERPL-MCNC: 2.39 MG/DL (ref 0.76–1.27)
DEPRECATED RDW RBC AUTO: 59.7 FL (ref 37–54)
EGFRCR SERPLBLD CKD-EPI 2021: 27.4 ML/MIN/1.73
EOSINOPHIL # BLD AUTO: 0.27 10*3/MM3 (ref 0–0.4)
EOSINOPHIL NFR BLD AUTO: 3.3 % (ref 0.3–6.2)
ERYTHROCYTE [DISTWIDTH] IN BLOOD BY AUTOMATED COUNT: 17.4 % (ref 12.3–15.4)
GLUCOSE BLDC GLUCOMTR-MCNC: 123 MG/DL (ref 70–130)
GLUCOSE BLDC GLUCOMTR-MCNC: 221 MG/DL (ref 70–130)
GLUCOSE BLDC GLUCOMTR-MCNC: 229 MG/DL (ref 70–130)
GLUCOSE BLDC GLUCOMTR-MCNC: 280 MG/DL (ref 70–130)
GLUCOSE SERPL-MCNC: 162 MG/DL (ref 65–99)
HCT VFR BLD AUTO: 25.3 % (ref 37.5–51)
HGB BLD-MCNC: 8.2 G/DL (ref 13–17.7)
IMM GRANULOCYTES # BLD AUTO: 0.06 10*3/MM3 (ref 0–0.05)
IMM GRANULOCYTES NFR BLD AUTO: 0.7 % (ref 0–0.5)
LYMPHOCYTES # BLD AUTO: 1.64 10*3/MM3 (ref 0.7–3.1)
LYMPHOCYTES NFR BLD AUTO: 20.2 % (ref 19.6–45.3)
MAGNESIUM SERPL-MCNC: 1.8 MG/DL (ref 1.6–2.4)
MCH RBC QN AUTO: 31.5 PG (ref 26.6–33)
MCHC RBC AUTO-ENTMCNC: 32.4 G/DL (ref 31.5–35.7)
MCV RBC AUTO: 97.3 FL (ref 79–97)
MONOCYTES # BLD AUTO: 0.76 10*3/MM3 (ref 0.1–0.9)
MONOCYTES NFR BLD AUTO: 9.4 % (ref 5–12)
NEUTROPHILS NFR BLD AUTO: 5.34 10*3/MM3 (ref 1.7–7)
NEUTROPHILS NFR BLD AUTO: 65.8 % (ref 42.7–76)
NRBC BLD AUTO-RTO: 0 /100 WBC (ref 0–0.2)
PLATELET # BLD AUTO: 223 10*3/MM3 (ref 140–450)
PMV BLD AUTO: 9.6 FL (ref 6–12)
POTASSIUM SERPL-SCNC: 4 MMOL/L (ref 3.5–5.2)
RBC # BLD AUTO: 2.6 10*6/MM3 (ref 4.14–5.8)
SODIUM SERPL-SCNC: 130 MMOL/L (ref 136–145)
WBC NRBC COR # BLD: 8.12 10*3/MM3 (ref 3.4–10.8)

## 2023-04-19 PROCEDURE — 63710000001 INSULIN LISPRO (HUMAN) PER 5 UNITS: Performed by: FAMILY MEDICINE

## 2023-04-19 PROCEDURE — 97530 THERAPEUTIC ACTIVITIES: CPT

## 2023-04-19 PROCEDURE — 97116 GAIT TRAINING THERAPY: CPT

## 2023-04-19 PROCEDURE — 97167 OT EVAL HIGH COMPLEX 60 MIN: CPT

## 2023-04-19 PROCEDURE — 63710000001 INSULIN GLARGINE PER 5 UNITS: Performed by: FAMILY MEDICINE

## 2023-04-19 PROCEDURE — 80048 BASIC METABOLIC PNL TOTAL CA: CPT | Performed by: FAMILY MEDICINE

## 2023-04-19 PROCEDURE — 85025 COMPLETE CBC W/AUTO DIFF WBC: CPT | Performed by: FAMILY MEDICINE

## 2023-04-19 PROCEDURE — 83735 ASSAY OF MAGNESIUM: CPT | Performed by: FAMILY MEDICINE

## 2023-04-19 PROCEDURE — 82962 GLUCOSE BLOOD TEST: CPT

## 2023-04-19 PROCEDURE — 97161 PT EVAL LOW COMPLEX 20 MIN: CPT

## 2023-04-19 PROCEDURE — 97535 SELF CARE MNGMENT TRAINING: CPT

## 2023-04-19 PROCEDURE — 97110 THERAPEUTIC EXERCISES: CPT

## 2023-04-19 RX ORDER — LIDOCAINE 50 MG/G
1 PATCH TOPICAL DAILY
Status: DISCONTINUED | OUTPATIENT
Start: 2023-04-20 | End: 2023-04-26 | Stop reason: HOSPADM

## 2023-04-19 RX ADMIN — ACETAMINOPHEN 500 MG: 500 TABLET ORAL at 20:56

## 2023-04-19 RX ADMIN — Medication 1 TABLET: at 08:07

## 2023-04-19 RX ADMIN — Medication 10 ML: at 21:18

## 2023-04-19 RX ADMIN — Medication 10 ML: at 08:06

## 2023-04-19 RX ADMIN — CASTOR OIL AND BALSAM, PERU 1 APPLICATION: 788; 87 OINTMENT TOPICAL at 20:56

## 2023-04-19 RX ADMIN — CASTOR OIL AND BALSAM, PERU 1 APPLICATION: 788; 87 OINTMENT TOPICAL at 08:06

## 2023-04-19 RX ADMIN — INSULIN LISPRO 4 UNITS: 100 INJECTION, SOLUTION INTRAVENOUS; SUBCUTANEOUS at 17:33

## 2023-04-19 RX ADMIN — PANTOPRAZOLE SODIUM 40 MG: 40 TABLET, DELAYED RELEASE ORAL at 17:33

## 2023-04-19 RX ADMIN — ASPIRIN 81 MG: 81 TABLET, CHEWABLE ORAL at 08:07

## 2023-04-19 RX ADMIN — OLANZAPINE 5 MG: 5 TABLET, ORALLY DISINTEGRATING ORAL at 06:33

## 2023-04-19 RX ADMIN — AMLODIPINE BESYLATE 10 MG: 10 TABLET ORAL at 08:07

## 2023-04-19 RX ADMIN — DOCUSATE SODIUM 50 MG AND SENNOSIDES 8.6 MG 2 TABLET: 8.6; 5 TABLET, FILM COATED ORAL at 20:56

## 2023-04-19 RX ADMIN — PANTOPRAZOLE SODIUM 40 MG: 40 TABLET, DELAYED RELEASE ORAL at 08:07

## 2023-04-19 RX ADMIN — Medication 10 MG: at 20:56

## 2023-04-19 RX ADMIN — INSULIN GLARGINE 15 UNITS: 100 INJECTION, SOLUTION SUBCUTANEOUS at 20:56

## 2023-04-19 RX ADMIN — GENTAMICIN SULFATE 1 APPLICATION: 1 OINTMENT TOPICAL at 08:06

## 2023-04-19 RX ADMIN — METHOCARBAMOL 500 MG: 500 TABLET, FILM COATED ORAL at 20:56

## 2023-04-19 RX ADMIN — INSULIN LISPRO 3 UNITS: 100 INJECTION, SOLUTION INTRAVENOUS; SUBCUTANEOUS at 11:46

## 2023-04-19 RX ADMIN — LIDOCAINE 1 PATCH: 4 PATCH TOPICAL at 08:07

## 2023-04-19 RX ADMIN — ATORVASTATIN CALCIUM 40 MG: 40 TABLET, FILM COATED ORAL at 20:56

## 2023-04-19 NOTE — THERAPY EVALUATION
Inpatient Rehabilitation - Occupational Therapy Initial Evaluation and Treatment Note     [facility]     Patient Name: Augusto Lorenzana Jr.  : 1945  MRN: [id number]    Today's Date: 2023                 Admit Date: 2023       No diagnosis found.    Patient Active Problem List   Diagnosis   • NSTEMI 2021   • Hyperlipidemia LDL goal <70   • Essential hypertension   • T2DM on oral agents and insulin. HbA1c 7.4    • Coronary artery disease involving native coronary artery of native heart with unstable angina pectoris   • A/C kidney disease. ATN due to postoperative arrest. 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 Combination of anoxic insult and azotemia   • Debility   • Lower extremity edema   • Complete heart block   • GI bleed   • Third degree atrioventricular block   • Atrial fibrillation, persistent   • Gastrointestinal hemorrhage associated with gastric ulcer   • Critical illness myopathy       Past Medical History:   Diagnosis Date   • CAD (coronary artery disease)    • CKD (chronic kidney disease) stage 3, GFR 30-59 ml/min    • Diabetes mellitus    • Hyperlipidemia    • Hypertension    • NSTEMI (non-ST elevated myocardial infarction)        Past Surgical History:   Procedure Laterality Date   • CARDIAC CATHETERIZATION N/A 2021    Procedure: Left Heart Cath;  Surgeon: Blade Greene IV, MD;  [facility];  Service: Cardiovascular;  Laterality: N/A;   • CARDIAC SURGERY      2 stents placed .   • CENTRAL VENOUS LINE INSERTION Left 2023    Procedure: CENTRAL VENOUS LINE INSERTION;  Surgeon: Torey Easley MD;  Location: [facility];  Service: General;  Laterality: Left;   • CORONARY ARTERY BYPASS GRAFT N/A 2021    Procedure: MEDIAN STERNOTOMY CORONARY ARTERY BYPASS X 3  UTILIZING THE LEFT INTERNAL MAMMARY ARTERY GRAFT, EVH OF THE GREATER RIGHT SAPHENOUS VEIN, AND PILO PER ANESTHESIA;  Surgeon: Jacek Miller MD;  Location:  [facility];  Service: Cardiothoracic;  Laterality: N/A;   • ENDOSCOPY N/A 04/08/2023    Procedure: ESOPHAGOGASTRODUODENOSCOPY with CENTERAL LINE PLACEMENT;  Surgeon: Sabine Hudson MD;  Location:  [facility];  Service: General;  Laterality: N/A;   • ENDOSCOPY N/A 04/10/2023    Procedure: ESOPHAGOGASTRODUODENOSCOPY;  Surgeon: Sabine Hudson MD;  Location:  [facility];  Service: General;  Laterality: N/A;   • INSERTION HEMODIALYSIS CATHETER N/A 4/13/2023    Procedure: HEMODIALYSIS CATHETER INSERTION;  Surgeon: Torey Easley MD;  Location:  [facility];  Service: General;  Laterality: N/A;   • PACEMAKER IMPLANTATION               IRF OT ASSESSMENT FLOWSHEET (last 12 hours)     IRF OT Evaluation and Treatment     Row Name 04/19/23 1109          OT Time and Intention    Document Type initial evaluation;daily treatment  -TM     Mode of Treatment occupational therapy  -TM     Patient Effort adequate  -TM     Symptoms Noted During/After Treatment none  -TM     Row Name 04/19/23 1109          General Information    Patient Profile Reviewed yes  -TM     General Observations of Patient Pt agreeable for therapy.  -TM     Existing Precautions/Restrictions fall;pacemaker  -TM     Row Name 04/19/23 1109          Previous Level of Function/Home Environm    BADLs, Premorbid Functional Level other (see comments)  pt reported his wife assisted at times  -TM     Row Name 04/19/23 1109          Living Environment    Current Living Arrangements home  -TM     Home Accessibility stairs to enter home  -TM     People in Home spouse  -TM     Row Name 04/19/23 1109          Home Use of Assistive/Adaptive Equipment    Equipment Currently Used at Home walker, rolling;cane, straight;shower chair;other (see comments)  per pt  -TM     Row Name 04/19/23 1109           "Cognition/Psychosocial    Affect/Mental Status (Cognition) confused  -     Orientation Status (Cognition) oriented to;person;situation;verbal cues/prompts needed for orientation  -TM     Follows Commands (Cognition) follows one-step commands;verbal cues/prompting required;repetition of directions required  -     Row Name 04/19/23 1109          Range of Motion Comprehensive    Comment, General Range of Motion RUE WFL; LUE shoulder 1/2, elbow/wrist/hand WFL  -[facility] Name 04/19/23 1109          Strength Comprehensive (MMT)    Comment, General Manual Muscle Testing (MMT) Assessment BUE not tested  -     Row Name 04/19/23 1109          Vision Assessment/Intervention    Visual Impairment/Limitations corrective lenses full-time  -[facility] Name 04/19/23 1109          Sensory Assessment (Somatosensory)    Sensory Assessment (Somatosensory) UE sensation intact  -     Sensory Subjective Reports numbness;other (see comments)  pt reported \"numbness\" in BUE hands at times for past several years  -     Row Name 04/19/23 1109          Bathing    Comment (Bathing) maxA  -[facility] Name 04/19/23 1109          Upper Body Dressing    [place] Level (Upper Body Dressing) moderate assist (50-74% patient effort);verbal cues  -TM     Position (Upper Body Dressing) supported sitting  -     Row Name 04/19/23 1109          Lower Body Dressing    [place] Level (Lower Body Dressing) maximum assist (25% patient effort);moderate assist (50% patient effort);verbal cues  -TM     Position (Lower Body Dressing) supported sitting  -     Row Name 04/19/23 1109          Grooming    [place] Level (Grooming) minimum assist (75% patient effort);verbal cues  -TM     Position (Grooming) supported sitting  -     Row Name 04/19/23 1109          Toileting    [place] Level (Toileting) maximum assist (25% patient effort);verbal cues  -TM     Assistive Device Use (Toileting) grab bar/safety frame;raised toilet seat  -  "    Position (Toileting) supported sitting  -     Row Name 04/19/23 1109          Self-Feeding    [place] Level (Self-Feeding) set up  -     Position (Self-Feeding) supported sitting  -     Row Name 04/19/23 1109          Transfer Assessment/Treatment    Transfers toilet transfer  -     Row Name 04/19/23 1109          Toilet Transfer    Type (Toilet Transfer) stand pivot/stand step  -     [place] Level (Toilet Transfer) minimum assist (75% patient effort);verbal cues  -     Assistive Device (Toilet Transfer) raised toilet seat;grab bars/safety frame;wheelchair  -     Row Name 04/19/23 1109          Motor Skills    Motor Skills coordination;functional endurance;motor control/coordination interventions  -     Motor Control/Coordination Interventions therapeutic exercise/ROM;fine motor manipulation/dexterity activities;gross motor coordination activities;other (see comments)  light table top BUE ther ex/act, GMC/FMC  -     Row Name 04/19/23 1109          Therapy Assessment/Plan (OT)    Patient's Goals For Discharge return home  -     Row Name 04/19/23 1109          Therapy Assessment/Plan (OT)    OT Diagnosis Critical Illness Myopathy (heart block, PD catheter placement, pacemaker placement, acute resp failure, GI bleed, ESRD)  -     Rehab Potential/Prognosis (OT) adequate, monitor progress closely  -     Frequency of Treatment (OT) 5 times per week  -     Estimated Duration of Therapy (OT) 2 weeks  -     Problem List (OT) other (see comments)  decreased ADLs, strength, fxal mobility, coordination  -     Planned Therapy Interventions (OT) activity tolerance training;adaptive equipment training;BADL retraining;IADL retraining;occupation/activity based interventions;patient/caregiver education/training;ROM/therapeutic exercise;strengthening exercise;transfer/mobility retraining  -     Row Name 04/19/23 1109          IRF OT Goals    UB Dressing Goal Selection (OT-IRF) UB dressing,  OT goal 1;UB dressing, OT goal 2  -TM     LB Dressing Goal Selection (OT-IRF) LB dressing, OT goal 1;LB dressing, OT goal 2  -TM     Toileting Goal Selection (OT-IRF) toileting, OT goal 1  -TM     Row Name 04/19/23 1109          UB Dressing Goal 1 (OT-IRF)    [place] (UB Dress Goal 1, OT-IRF) minimum assist (75% or more patient effort)  -TM     Time Frame (UB Dressing Goal 1, OT-IRF) short-term goal (STG)  -TM     Row Name 04/19/23 1109          UB Dressing Goal 2 (OT-IRF)    [place] (UB Dress Goal 2, OT-IRF) set-up required  -TM     Time Frame (UB Dressing Goal 2, OT-IRF) long-term goal (LTG);by discharge  -     Row Name 04/19/23 1109          LB Dressing Goal 1 (OT-IRF)    [place] (LB Dressing Goal 1, OT-IRF) moderate assist (50-74% patient effort)  -TM     Time Frame (LB Dressing Goal 1, OT-IRF) short-term goal (STG)  -TM     Row Name 04/19/23 1109          LB Dressing Goal 2 (OT-IRF)    [place] (LB Dressing Goal 2, OT-IRF) minimum assist (75% or more patient effort)  -TM     Time Frame (LB Dressing Goal 2, OT-IRF) long-term goal (LTG);by discharge  -     Row Name 04/19/23 1109          Toileting Goal 1 (OT-IRF)    [place] Level (Toileting Goal 1, OT-IRF) moderate assist (50-74% patient effort)  -TM     Time Frame (Toileting Goal 1, OT-IRF) long-term goal (LTG);by discharge  -           User Key  (r) = Recorded By, (t) = Taken By, (c) = Cosigned By    Initials Name Effective Dates    Linn Burnett, OT 06/16/21 -                  Occupational Therapy Education     Title: PT OT SLP Therapies (In Progress)     Topic: Occupational Therapy (Done)     Point: ADL training (Done)     Description:   Instruct learner(s) on proper safety adaptation and remediation techniques during self care or transfers.   Instruct in proper use of assistive devices.              Learning Progress Summary           Patient Acceptance, E,D, VU,NR by  at 4/19/2023 1109                   Point:  Precautions (Done)     Description:   Instruct learner(s) on prescribed precautions during self-care and functional transfers.              Learning Progress Summary           Patient Acceptance, E,D, VU,NR by  at 4/19/2023 1109                               User Key     Initials Effective Dates Name Provider Type Discipline     06/16/21 -  Linn Colon OT Occupational Therapist OT                    OT Recommendation and Plan    Planned Therapy Interventions (OT): activity tolerance training, adaptive equipment training, BADL retraining, IADL retraining, occupation/activity based interventions, patient/caregiver education/training, ROM/therapeutic exercise, strengthening exercise, transfer/mobility retraining                    Time Calculation:      Time Calculation- OT     Row Name 04/19/23 1526 04/19/23 1108          Time Calculation- OT    OT Start Time 1245  -TM 1045  -TM     OT Stop Time 1330  -TM 1130  -TM     OT Time Calculation (min) 45 min  -TM 45 min  -TM     Total Timed Code Minutes- OT 45 minute(s)  -TM 45 minute(s)  -TM     OT Non-Billable Time (min) -- 60 min  -TM           User Key  (r) = Recorded By, (t) = Taken By, (c) = Cosigned By    Initials Name Provider Type     Linn Colon OT Occupational Therapist              Therapy Charges for Today     Code Description Service Date Service Provider Modifiers Qty    51933237493 HC OT EVAL HIGH COMPLEXITY 3 4/19/2023 Linn Colon OT GO 1    58034930531 HC OT THERAPEUTIC ACT EA 15 MIN 4/19/2023 Linn Colon OT GO 2    94990445752 HC OT SELF CARE/MGMT/TRAIN EA 15 MIN 4/19/2023 Linn Colon OT GO 1                   Linn Colon OT  4/19/2023

## 2023-04-19 NOTE — NURSING NOTE
Patient with healing DTPI to distal penis.  Wound bed now maroon.  Will continue treatment with venelex and leave open to air.      PI prevention orders are in place.

## 2023-04-19 NOTE — PROGRESS NOTES
Inpatient Rehabilitation Functional Measures Assessment and Plan of Care    Plan of Care      Functional Measures  GEMA Eating:  GEMA Grooming:  GEMA Bathing:  GEMA Upper Body Dressing:  GEMA Lower Body Dressing:  GEMA Toileting:    GEMA Bladder Management  Level of Assistance:  Frequency/Number of Accidents this Shift:    GEMA Bowel Management  Level of Assistance:  Frequency/Number of Accidents this Shift:    GEMA Bed/Chair/Wheelchair Transfer:  Bed/chair/wheelchair Transfer Score = 4.  Patient performs 75% or more of effort and minimal assistance (little/incidental  help/lifting of one limb/steadying) for transferring to and from the  bed/chair/wheelchair, requiring: Contact guard. Patient requires the following  assistive device(s): Walker. Seating system of wheelchair.  GEMA Toilet Transfer:  GEMA Tub/Shower Transfer:    Previously Documented Mode of Locomotion at Discharge:  GEMA Expected Mode of Locomotion at Discharge: The expected mode of most  frequently used locomotion, at discharge, is expected to be walking.  GEMA Walk/Wheelchair:  WHEELCHAIR OBSERVATION   Wheelchair did not occur.    WALK OBSERVATION   Walk Distance Scale = 3.  Distance walked is greater than 150 feet. Walk Score  = 4.  Patient performs 75% or more of effort and requires minimal assistance.  Incidental help/contact guard/steadying was provided. Patient walked a distance  of  160 feet. Patient requires the following assistive device(s): Rolling  walker.  GEMA Stairs:  Stairs did not occur.    GEMA Comprehension:  GEMA Expression:  GEMA Social Interaction:  GEMA Problem Solving:  GEMA Memory:    Therapy Mode Minutes  Occupational Therapy:  Physical Therapy: Individual: 90 minutes.  Speech Language Pathology:    Discharge Functional Goals:  Bed, Chair, Wheelchair Transfers: 6  Walk: 5    Signed by: Jacqueline Porter Physical Therapist

## 2023-04-19 NOTE — PROGRESS NOTES
Patient Assessment Instrument  Quality Indicators - Admission FY 2023    Section A. Ethnicity/ Race/Language  Ethnicity:  Race:  Preferred Language:    Section A. Transportation  Patient unable to respond, information obtained via proxy or medical record  Issues Due to Lack of Transportation:   No    Section B. Hearing and Vision        Section B. Health Literacy        Section C. Cognitive Patterns      Section C. Signs and Symptoms of Delirium (from CAM)      Section D. Mood      Section D. Social Isolation      Section [id number]. Prior Functioning      Section DP0801. Prior Device Use  Walker    Section [id number]. Self Care Performance      Section SI0072. Self Care Discharge Goals      Section DU1104. Mobility Performance      Section NZ7359. Mobility Discharge Goals      Section H. Bladder and Bowel      Section I. Active Diagnosis      Section J. Health Conditions      Section J. Health Conditions (Pain)      Section K. Swallowing/Nutritional Status    Nutritional Approaches on Admission:    Section M. Skin Conditions      Section N. Medication        Section O. Special Treatments, Procedures, and Programs    Signed by: BROCK Ibarra

## 2023-04-19 NOTE — PROGRESS NOTES
Patient Assessment Instrument  Quality Indicators - Admission FY 2023    Section A. Ethnicity/ Race/Language  Ethnicity:  Race:  Preferred Language:    Section A. Transportation      Section B. Hearing and Vision        Section B. Health Literacy        Section C. Cognitive Patterns      Section C. Signs and Symptoms of Delirium (from CAM)      Section D. Mood      Section D. Social Isolation      Section FC4880. Prior Functioning      Section AB6908. Prior Device Use      Section US0120. Self Care Performance     Eating: [place] sets up or cleans up; patient completes activity. [place] assists  only prior to or following the activity.   Oral Hygiene: [place] does less than half the effort. [place] lifts, holds or  supports trunk or limbs but provides less than half the effort.   Toileting Hygiene: [place] does more than half the effort. [place] lifts or holds  trunk or limbs and provides more than half the effort.   Shower/Bathe Self: [place] does more than half the effort. [place] lifts or holds  trunk or limbs and provides more than half the effort.   Upper Body Dressing: [place] does less than half the effort. [place] lifts, holds  or supports trunk or limbs but provides less than half the effort.   Lower Body Dressing: [place] does more than half the effort. [place] lifts or  holds trunk or limbs and provides more than half the effort.   Putting On/Taking Off Footwear: [place] does more than half the effort. [place]  lifts or holds trunk or limbs and provides more than half the effort.    Section KN6100. Self Care Discharge Goals     Eating: Patient completed the activities by themself with no assistance from a  helper.   Oral Hygiene: [place] sets up or cleans up; patient completes activity. [place]  assists only prior to or following the activity.   Toileting Hygiene: [place] does less than half the effort. [place] lifts, holds  or supports trunk or limbs but provides less than half the effort.   Shower/Bathe Self: [place] does  less than half the effort. [place] lifts, holds  or supports trunk or limbs but provides less than half the effort.   Upper Body Dressing: [place] sets up or cleans up; patient completes activity.  [place] assists only prior to or following the activity.   Lower Body Dressing: [place] does less than half the effort. [place] lifts, holds  or supports trunk or limbs but provides less than half the effort.   Putting On/Taking Off Footwear: [place] does less than half the effort. [place]  lifts, holds or supports trunk or limbs but provides less than half the effort.    Section [id number]. Mobility Performance      Section QY3573. Mobility Discharge Goals      Section H. Bladder and Bowel      Section I. Active Diagnosis      Section J. Health Conditions      Section J. Health Conditions (Pain)      Section K. Swallowing/Nutritional Status    Nutritional Approaches on Admission:    Section M. Skin Conditions      Section N. Medication        Section O. Special Treatments, Procedures, and Programs    Signed by: Linn Colon OT

## 2023-04-19 NOTE — PROGRESS NOTES
Case Management  Inpatient Rehabilitation Plan of Care and Discharge Plan Note    Rehabilitation Diagnosis:  critical illness myopathy  Date of Onset:  3-30-23    Medical Summary:  critical illness myopathy, heart block, PD catheter placement,  pacemaker placement, acute respiratory failure, GI bleed, ESRD    Plan of Care      Expected Intensity:  Average of 3 hours of therapy 5 days/week.  Interdisciplinary Team:  Interdisciplinary Team: Medical Supervision and 24 Hour Rehabilitation Nursing.,  Physical Therapy:, Occupational Therapy:, Social Work, Therapeutic Recreation.  Physical Therapy Intensity/Duration: PT 1.5 hours per day/5 days per week  Occupational Therapy Intensity/Duration: OT 1.5 hours per day/5 days per week  Estimated Length of Stay/Anticipated Discharge Date: 14 days  Anticipated Discharge Destination:  Anticipated discharge destination from inpatient rehabilitation is community  discharge with assistance. Pt plans to return home with spouse providing  assistance with care.  Son will assist as needed after work.  Daughter lives in  another town but she is supportive too.      Based on the patient's medical and functional status, their prognosis and  expected level of functional improvement is:  fair-good    Signed by: BROCK Ibarra

## 2023-04-19 NOTE — PROGRESS NOTES
Inpatient Rehabilitation Functional Measures Assessment and Plan of Care    Plan of Care  Self Care    [OT] Dressing (Lower)(Active)  Current Status(04/19/2023): maxA/modA  Weekly Goal(04/25/2023): modA  Discharge Goal: [place]a    Functional Measures  GEMA Eating:  GEMA Grooming:  GEMA Bathing:  GEMA Upper Body Dressing:  GEMA Lower Body Dressing:  GEMA Toileting:    GEMA Bladder Management  Level of Assistance:  Frequency/Number of Accidents this Shift:    GEMA Bowel Management  Level of Assistance:  Frequency/Number of Accidents this Shift:    GEMA Bed/Chair/Wheelchair Transfer:  GEMA Toilet Transfer:  GEMA Tub/Shower Transfer:    Previously Documented Mode of Locomotion at Discharge:  [facility] Expected Mode of Locomotion at Discharge:  GEMA Walk/Wheelchair:  GEMA Stairs:    [facility] Comprehension:  GEMA Expression:  GEMA Social Interaction:  GEMA Problem Solving:  GEMA Memory:    Therapy Mode Minutes  Occupational Therapy: Individual: 90 minutes.  Physical Therapy:  Speech Language Pathology:    Discharge Functional Goals:    Signed by: Linn Colon OT

## 2023-04-19 NOTE — THERAPY EVALUATION
Inpatient Rehabilitation - Physical Therapy Initial Evaluation        [facility]     Patient Name: Augusto Lorenzana Jr.  : 1945  MRN: [id number]    Today's Date: 2023                    Admit Date: 2023      Visit Dx:   No diagnosis found.    Patient Active Problem List   Diagnosis   • NSTEMI 2021   • Hyperlipidemia LDL goal <70   • Essential hypertension   • T2DM on oral agents and insulin. HbA1c 7.4    • Coronary artery disease involving native coronary artery of native heart with unstable angina pectoris   • A/C kidney disease. ATN due to postoperative arrest. 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 Combination of anoxic insult and azotemia   • Debility   • Lower extremity edema   • Complete heart block   • GI bleed   • Third degree atrioventricular block   • Atrial fibrillation, persistent   • Gastrointestinal hemorrhage associated with gastric ulcer   • Critical illness myopathy       Past Medical History:   Diagnosis Date   • CAD (coronary artery disease)    • CKD (chronic kidney disease) stage 3, GFR 30-59 ml/min    • Diabetes mellitus    • Hyperlipidemia    • Hypertension    • NSTEMI (non-ST elevated myocardial infarction)        Past Surgical History:   Procedure Laterality Date   • CARDIAC CATHETERIZATION N/A 2021    Procedure: Left Heart Cath;  Surgeon: Blade Greene IV, MD;  Location: [facility] CATH INVASIVE LOCATION;  Service: Cardiovascular;  Laterality: N/A;   • CARDIAC SURGERY      2 stents placed .   • CENTRAL VENOUS LINE INSERTION Left 2023    Procedure: CENTRAL VENOUS LINE INSERTION;  Surgeon: Torey Easley MD;  Location: [facility];  Service: General;  Laterality: Left;   • CORONARY ARTERY BYPASS GRAFT N/A 2021    Procedure: MEDIAN STERNOTOMY CORONARY ARTERY BYPASS X 3 UTILIZING  THE LEFT INTERNAL MAMMARY ARTERY GRAFT, EVH OF THE GREATER RIGHT SAPHENOUS VEIN, AND PILO PER ANESTHESIA;  Surgeon: Jacek Miller MD;  Location:  [facility];  Service: Cardiothoracic;  Laterality: N/A;   • ENDOSCOPY N/A 04/08/2023    Procedure: ESOPHAGOGASTRODUODENOSCOPY with CENTERAL LINE PLACEMENT;  Surgeon: Sabine Hudson MD;  Location:  [facility];  Service: General;  Laterality: N/A;   • ENDOSCOPY N/A 04/10/2023    Procedure: ESOPHAGOGASTRODUODENOSCOPY;  Surgeon: Sabine Hudson MD;  Location:  [facility];  Service: General;  Laterality: N/A;   • INSERTION HEMODIALYSIS CATHETER N/A 4/13/2023    Procedure: HEMODIALYSIS CATHETER INSERTION;  Surgeon: Torey Easley MD;  Location:  [facility];  Service: General;  Laterality: N/A;   • PACEMAKER IMPLANTATION         PT ASSESSMENT (last 12 hours)     IRF PT Evaluation and Treatment     Row Name 04/19/23 1521          PT Time and Intention    Document Type initial evaluation;daily treatment  -LB     Mode of Treatment individual therapy;physical therapy  -LB     Row Name 04/19/23 1521          General Information    Existing Precautions/Restrictions fall  confusion  -LB     Row Name 04/19/23 1521          Pain Scale: FACES Pre/Post-Treatment    Pain: FACES Scale, Pretreatment 0-->no hurt  -LB     Posttreatment Pain Rating 0-->no hurt  -LB     Row Name 04/19/23 1521          Cognition/Psychosocial    Affect/Mental Status (Cognition) confused  -LB     Follows Commands (Cognition) verbal cues/prompting required;physical/tactile prompts required  -LB     Personal Safety Interventions gait belt;nonskid shoes/slippers when out of bed;supervised activity  -LB     Row Name 04/19/23 1521          Range of Motion (ROM)    Range of Motion bilateral lower extremities;ROM is WFL  -LB     Row Name 04/19/23 1521          Strength (Manual Muscle Testing)    Strength (Manual Muscle Testing) --  BLE 3-/5  -LB     Row Name 04/19/23 1521          Bed Mobility    Sit-Supine  [place] (Bed Mobility) contact guard;verbal cues;nonverbal cues (demo/gesture)  -LB     Assistive Device (Bed Mobility) bed rails  -LB     Row Name 04/19/23 1521          Chair-Bed Transfer    Chair-Bed [place] (Transfers) contact guard;verbal cues;nonverbal cues (demo/gesture)  -LB     Assistive Device (Chair-Bed Transfers) wheelchair  -LB     Row Name 04/19/23 1521          Sit-Stand Transfer    Sit-Stand [place] (Transfers) contact guard;verbal cues;nonverbal cues (demo/gesture)  -LB     Assistive Device (Sit-Stand Transfers) walker, front-wheeled;wheelchair  -LB     Row Name 04/19/23 1521          Stand-Sit Transfer    Stand-Sit [place] (Transfers) contact guard;verbal cues;nonverbal cues (demo/gesture)  -LB     Assistive Device (Stand-Sit Transfers) walker, front-wheeled;wheelchair  -LB     Row Name 04/19/23 1521          Gait/Stairs (Locomotion)    [place] Level (Gait) minimum assist (75% patient effort);verbal cues;nonverbal cues (demo/gesture)  -LB     Assistive Device (Gait) walker, front-wheeled  -LB     Distance in Feet (Gait) 160' x2, bid  -LB     Deviations/Abnormal Patterns (Gait) chinyere decreased;gait speed decreased;stride length decreased  -LB     Bilateral Gait Deviations forward flexed posture  he staggers and needs verbal and tactile cues for guidance, he also drifts to the right and needs repeated cues to avoid obstacles on the right.  -LB     Row Name 04/19/23 1521          Safety Issues, Functional Mobility    Safety Issues Affecting Function (Mobility) insight into deficits/self-awareness;safety precautions follow-through/compliance  confused, not oriented, <safety awareness, has bed and chair alarm, supervised at all times  -LB     Impairments Affecting Function (Mobility) balance;cognition;endurance/activity tolerance;strength  -LB     Row Name 04/19/23 1521          Motor Skills    Therapeutic Exercise --  sitting ex bid, standing ex in PB  -LB     Row Name  04/19/23 1521          Aerobic Exercise    Type (Aerobic Exercise) recumbent stationary bike  -LB     Time Performed (Aerobic Exercise) level 1.0, 5 min  -LB     Row Name 04/19/23 1521          Positioning and Restraints    Pre-Treatment Position sitting in chair/recliner  -LB     In Bed notified nsg;call light within reach;encouraged to call for assist;exit alarm on;side rails up x3  pm, he was drowsy, returned to bed to possibly nap  -LB     In Wheelchair with nsg  am, in [place] at nursing station  -LB     Row Name 04/19/23 1521          Therapy Assessment/Plan (PT)    Patient's Goals For Discharge return home  -LB     Row Name 04/19/23 1521          Therapy Assessment/Plan (PT)    PT Diagnosis (PT) critical illness myopathy  -LB     Rehab Potential/Prognosis (PT) adequate, monitor progress closely  -LB     Frequency of Treatment (PT) 5 times per week  -LB     Estimated Duration of Therapy (PT) 2 weeks  -LB     Problem List (PT) balance;cognition;mobility;strength  -LB     Activity Limitations Related to Problem List (PT) unable to ambulate safely;unable to transfer safely  -LB     Row Name 04/19/23 1521          Therapy Plan Review/Discharge Plan (PT)    Therapy Plan Review (PT) evaluation/treatment results reviewed;care plan/treatment goals reviewed;risks/benefits reviewed;participants voiced agreement with care plan  -LB     Anticipated Equipment Needs at Discharge (PT Eval) --  tbd  -LB     Anticipated Discharge Disposition (PT) home with assist;home with home health  -LB     Row Name 04/19/23 1521          Bed Mobility Goal 1 (PT-IRF)    Activity/Assistive Device (Bed Mobility Goal 1, PT-IRF) sit to supine/supine to sit  -LB     [place] Level (Bed Mobility Goal 1, PT-IRF) modified independence  -LB     Time Frame (Bed Mobility Goal 1, PT-IRF) by discharge  -LB     Row Name 04/19/23 1521          Transfer Goal 1 (PT-IRF)    Activity/Assistive Device (Transfer Goal 1, PT-IRF)  sit-to-stand/stand-to-sit;bed-to-chair/chair-to-bed  -LB     [place] Level (Transfer Goal 1, PT-IRF) modified independence  -LB     Time Frame (Transfer Goal 1, PT-IRF) by discharge  -LB     Row Name 04/19/23 1521          Gait/Walking Locomotion Goal 1 (PT-IRF)    Activity/Assistive Device (Gait/Walking Locomotion Goal 1, PT-IRF) walker, rolling  -LB     Gait/Walking Locomotion Distance Goal 1 (PT-IRF) 300'  -LB     [place] Level (Gait/Walking Locomotion Goal 1, PT-IRF) supervision required  -LB     Time Frame (Gait/Walking Locomotion Goal 1, PT-IRF) by discharge  -LB           User Key  (r) = Recorded By, (t) = Taken By, (c) = Cosigned By    Initials Name Provider Type    Jacqueline Ontiveros, PT Physical Therapist              Wound 04/12/23 1700 distal penis Pressure Injury (Active)   Wound Image   04/18/23 1800   Dressing Appearance open to air 04/19/23 0747   Closure Open to air 04/18/23 1910   Base non-blanchable;moist;red;scab 04/19/23 0747   Wound Length (cm) 2 cm 04/18/23 1800   Wound Width (cm) 0.2 cm 04/18/23 1800   Wound Surface Area (cm^2) 0.4 cm^2 04/18/23 1800   Drainage Amount none 04/18/23 1910   Care, Wound cleansed with;soap and water;barrier applied 04/19/23 0747   Dressing Care open to air 04/18/23 1910       Wound Left chest Incision (Active)   Dressing Appearance dry;intact 04/19/23 0747   Closure DAVION 04/19/23 0747   Base dressing in place, unable to visualize 04/19/23 0747     Physical Therapy Education     Title: PT OT SLP Therapies (Done)     Topic: Physical Therapy (Done)     Point: Mobility training (Done)     Learning Progress Summary           Patient Acceptance, E, VU,NR by LB at 4/19/2023 1559                   Point: Home exercise program (Done)     Learning Progress Summary           Patient Acceptance, E, VU,NR by LB at 4/19/2023 1559                   Point: Body mechanics (Done)     Learning Progress Summary           Patient Acceptance, E, VU,NR by LB at 4/19/2023  1559                   Point: Precautions (Done)     Learning Progress Summary           Patient Acceptance, E, VU,NR by  at 4/19/2023 1559                               User Key     Initials Effective Dates Name Provider Type Discipline     06/16/21 -  Jacqueline Porter, PT Physical Therapist PT                PT Recommendation and Plan    Planned Therapy Interventions (PT): balance training, bed mobility training, gait training, patient/family education, strengthening, transfer training  Frequency of Treatment (PT): 5 times per week  Anticipated Equipment Needs at Discharge (PT Eval):  (tbd)                  Time Calculation:      PT Charges     Row Name 04/19/23 1600 04/19/23 1559          Time Calculation    Start Time 1330  -LB 0915  -LB     Stop Time 1415  -LB 1000  -LB     Time Calculation (min) 45 min  -LB 45 min  -LB     PT Received On -- 04/19/23  -LB     PT Goal Re-Cert Due Date -- 04/26/23  -LB           User Key  (r) = Recorded By, (t) = Taken By, (c) = Cosigned By    Initials Name Provider Type    Jacqueline Ontiveros, PT Physical Therapist                Therapy Charges for Today     Code Description Service Date Service Provider Modifiers Qty    26577769080 HC PT EVAL LOW COMPLEXITY 1 4/19/2023 Jacqueline Porter, PT GP 1    48344096371 HC GAIT TRAINING EA 15 MIN 4/19/2023 Jacqueline Porter, PT GP 2    97685438318 HC PT THER PROC EA 15 MIN 4/19/2023 Jacqueline Porter, PT GP 2    98658027275 HC PT THERAPEUTIC ACT EA 15 MIN 4/19/2023 Jacqueline Porter, PT GP 1            PT G-Codes  AM-PAC 6 Clicks Score (PT): 19      Jacqueline Porter, PT  4/19/2023

## 2023-04-19 NOTE — PROGRESS NOTES
Nephrology Progress Note      Subjective     No chest pain or shortness of breath.     Objective       Vital signs :     Temp:  [97.6 °F (36.4 °C)-97.9 °F (36.6 °C)] 97.9 °F (36.6 °C)  Heart Rate:  [67-77] 67  Resp:  [16-18] 18  BP: (120-157)/(71-87) 120/71    Intake/Output                       04/18/23 0701 - 04/19/23 0700 04/19/23 0701 - 04/20/23 0700     [id number] 7167-2420 Total 2724-1312 6266-7056 Total                 Intake    P.O.  --  -- --  240  -- 240    Total Intake -- -- -- 240 -- 240       Output    Urine  --  375 375  100  -- 100    Total Output -- 375 375 100 -- 100           Physical Exam:    General Appearance : not in acute distress  Lungs : clear to auscultation, respirations regular  Heart :  regular rhythm & normal rate, normal S1, S2 and no murmur, no rub  Abdomen : soft, non distended, suprapubic tenderness   Extremities : no edema  Neurologic :   orientated to person, place, time and situation, Grossly no focal deficits        Laboratory Data :     Albumin No results found for: ALBUMIN   Magnesium Magnesium   Date Value Ref Range Status   04/19/2023 1.8 1.6 - 2.4 mg/dL Final   04/18/2023 2.1 1.6 - 2.4 mg/dL Final          PTH               No results found for: PTH    CBC and coagulation:  Results from last 7 days   Lab Units 04/19/23 0104 04/18/23  0044 04/15/23  0018 04/14/23  1356   WBC 10*3/mm3 8.12 8.43 11.57*  --    HEMOGLOBIN g/dL 8.2* 8.8* 7.6*  --    HEMATOCRIT % 25.3* 28.3* 23.5*  --    MCV fL 97.3* 101.1* 97.9*  --    MCHC g/dL 32.4 31.1* 32.3  --    PLATELETS 10*3/mm3 223 261 209  --    INR   --   --   --  1.15*     Acid/base balance:      Renal and electrolytes:    Results from last 7 days   Lab Units 04/19/23 0104 04/18/23  0044 04/15/23  0018 04/14/23  0108 04/13/23  0210   SODIUM mmol/L 130* 136 140 139 147*   POTASSIUM mmol/L 4.0 4.3 3.8 3.1* 3.6   MAGNESIUM mg/dL 1.8 2.1  --   --   --    CHLORIDE mmol/L 95* 103 105 103 117*   CO2 mmol/L 28.1 20.7* 23.9 26.9 16.9*   BUN  mg/dL 15 28* 34* 28* 65*   CREATININE mg/dL 2.39* 3.69* 3.45* 2.63* 3.82*   CALCIUM mg/dL 8.0* 8.6 7.6* 7.6* 8.5*   PHOSPHORUS mg/dL  --   --   --  2.4*  --      Estimated Creatinine Clearance: 27.4 mL/min (A) (by C-G formula based on SCr of 2.39 mg/dL (H)).  @GFRCG:3@   Liver and pancreatic function:  Results from last 7 days   Lab Units 04/14/23  0108   ALBUMIN g/dL 2.5*         Cardiac:      Liver and pancreatic function:  Results from last 7 days   Lab Units 04/14/23  0108   ALBUMIN g/dL 2.5*       Medications :     [START ON 4/20/2023] allopurinol, 50 mg, Oral, Once per day on Mon Thu  amLODIPine, 10 mg, Oral, Q24H  aspirin, 81 mg, Oral, Daily  atorvastatin, 40 mg, Oral, Nightly  carvedilol, 25 mg, Oral, BID With Meals  castor oil-balsam peru, 1 application, Topical, Q12H  gentamicin, 1 application, Topical, Daily  insulin glargine, 15 Units, Subcutaneous, Nightly  insulin lispro, 2-7 Units, Subcutaneous, TID With Meals  Lidocaine, 1 patch, Apply externally, Daily  melatonin, 10 mg, Oral, Nightly  multivitamin, 1 tablet, Oral, Daily  OLANZapine zydis, 5 mg, Oral, Daily  pantoprazole, 40 mg, Oral, BID AC  senna-docusate sodium, 2 tablet, Oral, Nightly  sodium chloride, 10 mL, Intravenous, Q12H  sodium chloride, 10 mL, Intravenous, Q12H  sodium chloride, 10 mL, Intravenous, Q12H      sodium chloride, 9 mL/hr      Assessment & Plan     1.  CKD stage V initiated on PDx during this admission   2.  Upper GI bleed with blood loss anemia  3.  Heart failure with reduced ejection fraction LVEF 45%  4.  History of CABG  5.  Diabetes with nephropathy poor control  6.  Hypertension  7.  Dehydration with hypotension  8.  Pacemaker  9.  Confusion and acute psychosis  10.  Hypnatremia     Pt had dialysis done yesterday, uneventful, will continue on IHDx TTS, mild hyponatremia, fluid restriction, educated and counseled  Anemia, continue on erythropoietin  Redo of PDx cath outpatient.   PD catheter flush every Monday      Alessandra  MD Dinora  04/19/23  09:45 EDT

## 2023-04-19 NOTE — H&P
[facility] HOSPITALIST HISTORY AND PHYSICAL    Patient Identification:  Name:  Augusto Lorenzana Jr.  Age:  78 y.o.  Sex:  male  :  1945  MRN:  [id number]   Visit Number:  [id number]  Room number:  107/1S  Primary Care Physician:  Rob Polanco MD     Subjective     2023   Chief complaint:  No chief complaint on file.      History of presenting illness:  78 y.o. male  This pt is seen today for post admission physician evaluation to the inpatient rehabilitation facility.  Patient 78-year-old gentleman with a history of coronary artery disease, chronic kidney disease, diabetes mellitus, hypertension, dyslipidemia.  [facility].  During his placement and did have complete heart block and patient did require pacemaker placement on .  Patient complications during his hospital stay including acute respiratory failure with hypoxemia and required O2 as was found to have CHF and was diuresed with IV Bumex.  Patient was eventually transferred to our service for rehab treatment.  During his initial stay he developed hematemesis and melena.  Patient was transferred to the medical floor for evaluation and treatment of GI bleeding.  Patient did require 3 units of packed red blood cells during his stay and was found to have a prepyloric ulceration and was treated with epinephrine injection.  Patient has had some intermittent issues with agitation delirium during his stay there.  Patient was diagnosed with critical illness myopathy afterwards.  Patient has done reasonably well otherwise and and is more oriented and was thought to be a good candidate for inpatient physical rehab and we agreed to take patient back in transfer for treatment of critical illness myopathy.      Patient continued to progress medically.  Physical therapy and Occupational therapy evaluations were completed with recommended acute  inpatient rehabilitation referral for continued functional mobility intervention and reeducation.  Acute rehab referral ordered for continued medical monitoring and management post prolonged hospitalization, continued respiratory status monitoring, lab monitoring, pain mgt needs, bowel/bladder care with new medication education, skin monitoring and breakdown prevention along with ongoing medical comorbidities that require ongoing care.    The preadmission mini-FIM score as assessed by the referring facility as follows: Eating 6; grooming 3; bathing 2; dressing upper body 2; dressing lower body 2; toileting 1; transfers to bed chair and wheelchair for; transfers to toilet for; locomotion 4; bladder management 3; bowel management 3; comprehension 5; expression 5; substractions 5; problem solving 5; memory 5.  Estimated length of stay 10 to 14 days.    ---------------------------------------------------------------------------------------------------------------------   Review of Systems:  General: No fever no chills  HEENT: No headaches no vision changes.  Heart: No chest pain.  Patient is status post pacemaker placement.  Patient also has a history of CABG and stents in the past.  Lungs: No cough no wheezing  Abdomen: Negative  : Negative, end-stage renal disease  Musculoskeletal: Negative  Neuro: Generalized weakness; intermittent confusion  Skin: Negative  ---------------------------------------------------------------------------------------------------------------------   Past Medical History:   Diagnosis Date   • CAD (coronary artery disease)    • CKD (chronic kidney disease) stage 3, GFR 30-59 ml/min    • Diabetes mellitus    • Hyperlipidemia    • Hypertension    • NSTEMI (non-ST elevated myocardial infarction)      Past Surgical History:   Procedure Laterality Date   • CARDIAC CATHETERIZATION N/A 05/24/2021    Procedure: Left Heart Cath;  Surgeon: Blade Greene IV, MD;  Location: [facility]  INVASIVE LOCATION;  Service: Cardiovascular;  Laterality: N/A;   • CARDIAC SURGERY      2 stents placed 2012.   • CENTRAL VENOUS LINE INSERTION Left 4/13/2023    Procedure: CENTRAL VENOUS LINE INSERTION;  Surgeon: Torey Easley MD;  Location:  [facility];  Service: General;  Laterality: Left;   • CORONARY ARTERY BYPASS GRAFT N/A 05/28/2021    Procedure: MEDIAN STERNOTOMY CORONARY ARTERY BYPASS X 3 UTILIZING THE LEFT INTERNAL MAMMARY ARTERY GRAFT, EVH OF THE GREATER RIGHT SAPHENOUS VEIN, AND PILO PER ANESTHESIA;  Surgeon: Jacek Miller MD;  Location:  [facility];  Service: Cardiothoracic;  Laterality: N/A;   • ENDOSCOPY N/A 04/08/2023    Procedure: ESOPHAGOGASTRODUODENOSCOPY with CENTERAL LINE PLACEMENT;  Surgeon: Sabine Hudson MD;  Location:  [facility];  Service: General;  Laterality: N/A;   • ENDOSCOPY N/A 04/10/2023    Procedure: ESOPHAGOGASTRODUODENOSCOPY;  Surgeon: Sabine Hudson MD;  Location:  [facility];  Service: General;  Laterality: N/A;   • INSERTION HEMODIALYSIS CATHETER N/A 4/13/2023    Procedure: HEMODIALYSIS CATHETER INSERTION;  Surgeon: Torey Easley MD;  Location:  [facility];  Service: General;  Laterality: N/A;   • PACEMAKER IMPLANTATION       Family History   Problem Relation Age of Onset   • Heart disease Mother    • Diabetes type I Father      Social History     Socioeconomic History   • Marital status:    Tobacco Use   • Smoking status: Never   • Smokeless tobacco: Current     Types: Chew   Vaping Use   • Vaping Use: Never used   Substance and Sexual Activity   • Alcohol use: Never   • Drug use: Never   • Sexual activity: Defer     ---------------------------------------------------------------------------------------------------------------------   Allergies:  Patient has no known allergies.  ---------------------------------------------------------------------------------------------------------------------   Medications below are reported home medications pulling  from within the system; at this time, these medications have not been reconciled unless otherwise specified and are in the verification process for further verifcation as current home medications.    Prior to Admission Medications     Prescriptions Last Dose Informant Patient Reported? Taking?    albuterol sulfate  (90 Base) MCG/ACT inhaler Unknown Pharmacy, Spouse/Significant Other Yes No    Inhale 2 puffs 2 (Two) Times a Day As Needed for Wheezing.    allopurinol (ZYLOPRIM) 300 MG tablet Unknown Pharmacy, Spouse/Significant Other Yes No    Take 1 tablet by mouth Daily.    amiodarone (PACERONE) 200 MG tablet Unknown Pharmacy, Spouse/Significant Other Yes No    Take 1 tablet by mouth Daily.    amLODIPine (NORVASC) 5 MG tablet Unknown  Yes No    Take 1 tablet by mouth Daily.    aspirin 81 MG EC tablet Unknown Spouse/Significant Other Yes No    Take 1 tablet by mouth Daily.    atenolol (TENORMIN) 25 MG tablet Unknown  Yes No    Take 1 tablet by mouth 2 (Two) Times a Day.    atorvastatin (LIPITOR) 40 MG tablet Unknown Pharmacy, Spouse/Significant Other Yes No    Take 1 tablet by mouth Daily.    carvedilol (COREG) 6.25 MG tablet Unknown Pharmacy, Spouse/Significant Other Yes No    Take 1 tablet by mouth 2 (Two) Times a Day With Meals.    cloNIDine (CATAPRES) 0.2 MG tablet Unknown Pharmacy, Spouse/Significant Other Yes No    Take 1 tablet by mouth 3 (Three) Times a Day.    gabapentin (NEURONTIN) 300 MG capsule Unknown Pharmacy, Spouse/Significant Other Yes No    Take 1 capsule by mouth Daily.    glipizide (GLUCOTROL) 5 MG tablet Unknown Pharmacy, Spouse/Significant Other Yes No    Take 1 tablet by mouth 2 (Two) Times a Day Before Meals.    hydrALAZINE (APRESOLINE) 100 MG tablet Unknown Pharmacy, Spouse/Significant Other Yes No    Take 1 tablet by mouth 3 (Three) Times a Day.    Insulin Glargine (Lantus SoloStar) 100 UNIT/ML injection pen Unknown Pharmacy, Spouse/Significant Other Yes No    Inject 30 Units under  the skin into the appropriate area as directed Daily.    multivitamin with minerals tablet tablet Unknown Spouse/Significant Other Yes No    Take 1 tablet by mouth Daily.    torsemide (DEMADEX) 20 MG tablet Unknown Pharmacy, Spouse/Significant Other Yes No    Take 2 tablets by mouth Daily.        Objective     Vital Signs:  Temp:  [97.6 °F (36.4 °C)-97.9 °F (36.6 °C)] 97.9 °F (36.6 °C)  Heart Rate:  [67-77] 67  Resp:  [16-18] 18  BP: (120-157)/(71-87) 120/71    No data found.  SpO2:  [98 %-100 %] 100 %  on   ;   Device (Oxygen Therapy): room air  Body mass index is 25.4 kg/m².    Wt Readings from Last 3 Encounters:   04/18/23 73.6 kg (162 lb 3.2 oz)   04/18/23 72.9 kg (160 lb 11.2 oz)   04/07/23 77.2 kg (170 lb 3.2 oz)      ---------------------------------------------------------------------------------------------------------------------     Physical exam:  Constitutional:   No acute distress  HEENT: Normocephalic atraumatic  Neck: Supple   Cardiovascular: Regular rate and rhythm  Pulmonary/Chest: Clear to auscultation  Abdominal: Positive bowel sounds soft.   Musculoskeletal: No arthropathy  Neurological: Patient is confused this morning.  No focal deficits; generalized weakness--3-4 out of 5 strength  Skin: No rash  Peripheral vascular: No edema  Genitourinary::  ---------------------------------------------------------------------------------------------------------------------  EKG: Paced rhythm from April 15, 2023  No orders to display           Last echocardiogram:  Results for orders placed during the hospital encounter of 05/22/21    Adult Transthoracic Echo Complete w/ Color, Spectral and Contrast if necessary per protocol    Interpretation Summary  · Left ventricular systolic function is normal. Estimated left ventricular EF = 60%.  · Left ventricular diastolic function is consistent with (grade I) impaired relaxation.  · Mild aortic valve stenosis is present.  · Estimated right ventricular systolic  pressure from tricuspid regurgitation is normal (<35 mmHg).    --------------------------------------------------------------------------------------------------------------------  Labs:  Results from last 7 days   Lab Units 04/19/23  0104 04/18/23  0044 04/15/23  0018 04/14/23  1356   WBC 10*3/mm3 8.12 8.43 11.57*  --    HEMOGLOBIN g/dL 8.2* 8.8* 7.6*  --    HEMATOCRIT % 25.3* 28.3* 23.5*  --    MCV fL 97.3* 101.1* 97.9*  --    MCHC g/dL 32.4 31.1* 32.3  --    PLATELETS 10*3/mm3 223 261 209  --    INR   --   --   --  1.15*         Results from last 7 days   Lab Units 04/19/23  0104 04/18/23  0044 04/15/23  0018 04/14/23  0108   SODIUM mmol/L 130* 136 140 139   POTASSIUM mmol/L 4.0 4.3 3.8 3.1*   MAGNESIUM mg/dL 1.8 2.1  --   --    CHLORIDE mmol/L 95* 103 105 103   CO2 mmol/L 28.1 20.7* 23.9 26.9   BUN mg/dL 15 28* 34* 28*   CREATININE mg/dL 2.39* 3.69* 3.45* 2.63*   CALCIUM mg/dL 8.0* 8.6 7.6* 7.6*   PHOSPHORUS mg/dL  --   --   --  2.4*   GLUCOSE mg/dL 162* 111* 121* 120*   ALBUMIN g/dL  --   --   --  2.5*   Estimated Creatinine Clearance: 27.4 mL/min (A) (by C-G formula based on SCr of 2.39 mg/dL (H)).  No results found for: AMMONIA          Glucose   Date/Time Value Ref Range Status   04/19/2023 0611 123 70 - 130 mg/dL Final     Comment:     Meter: [id number] : [id number] HUBERT HUNTLEY   04/18/2023 2058 217 (H) 70 - 130 mg/dL Final     Comment:     Meter: [id number] : [id number] HUBERT HUNTLEY   04/18/2023 1753 245 (H) 70 - 130 mg/dL Final     Comment:     Meter: [id number] : [id number] BRANDI GARDINER   04/18/2023 1015 158 (H) 70 - 130 mg/dL Final     Comment:     Meter: [id number] : [id number] DAREN CHAPMAN   04/18/2023 0702 152 (H) 70 - 130 mg/dL Final     Comment:     Meter: [id number] : [id number] DAREN CHAPMAN   04/17/2023 1817 193 (H) 70 - 130 mg/dL Final     Comment:     Meter: [id number] : [id number] Megan Waggoner   04/17/2023 1632 100 70 - 130 mg/dL Final     Comment:     Meter:  [id number] : [id number] DIEUDONNE AGUIRRE   04/17/2023 1045 254 (H) 70 - 130 mg/dL Final     Comment:     Meter: [id number] : [id number] DIEUDONNE AGUIRRE     Lab Results   Component Value Date    TSH 4.030 06/03/2021    FREET4 1.1 03/31/2023     No results found for: PREGTESTUR, PREGSERUM, HCG, HCGQUANT  Pain Management Panel         Latest Ref Rng & Units 12/7/2022 5/30/2021   Pain Management Panel   Creatinine, Urine mg/dL 80.1   136.1           Multiple values from one day are sorted in reverse-chronological order           Brief Urine Lab Results  (Last result in the past 365 days)      Color   Clarity   Blood   Leuk Est   Nitrite   Protein   CREAT   Urine HCG        12/07/22 1710             80.1         12/07/22 1710 Yellow   Clear   Small (1+)   Negative   Negative   >=300 mg/dL (3+)               No results found for: BLOODCX  No results found for: URINECX  No results found for: WOUNDCX  No results found for: STOOLCX    Last Urine Toxicity         Latest Ref Rng & Units 12/7/2022 5/30/2021   LAST URINE TOXICITY RESULTS   Creatinine, Urine mg/dL 80.1   136.1           Multiple values from one day are sorted in reverse-chronological order             I have personally looked at the labs and they are summarized above.  ----------------------------------------------------------------------------------------------------------------------  Detailed radiology reports for the last 24 hours:    Imaging Results (Last 24 Hours)     ** No results found for the last 24 hours. **        Final impressions for the last 30 days of radiology reports:    CT Abdomen Pelvis Without Contrast    Result Date: 4/9/2023  1.  Cholelithiasis without evidence of acute cholecystitis.. 2.  Small bilateral pleural effusions with edema throughout both lower lungs (partially visualized). 3.  14 mm pulmonary nodule in the right lower lobe of the lungs, inadequately assessed by this exam. Recommend follow-up CT of the chest with contrast for  further assessment. Signer Name: MADHAVI AVITIA MD  Signed: 4/9/2023 4:34 AM  Workstation Name: [facility]  [facility]    CT Abdomen Pelvis Without Contrast    Result Date: 4/5/2023  No retroperitoneal hematoma. Hypoattenuating intraventricular blood pool may be seen in the setting of anemia (interventricular septum sign). Very subtle changes of the liver underlying chronic parenchymal liver disease and borderline splenomegaly or may represent secondary changes due to chronic congestive adenopathy. No definite portosystemic collaterals. Subperitoneal edema in the right upper quadrant and upper abdomen may be secondary to congestion, particularly in the setting of heart disease (favored). Alternatively, this may be evidence of early portal venous hypertension. Mild underlying inflammatory cyst process such as duodenitis or mild acute pancreatitis is also differential consideration. Multiple prominent but nonenlarged tonya hepatic lymph nodes may be reactive. Bilateral small volume pleural effusions, right greater than left. Multifocal lung base airspace disease, correlating to findings from most recent chest radiograph on 4/3/2023. CRITICAL RESULT:   No. COMMUNICATION: Per this written report. Approved by Madeline Salomon MD on 4/5/2023 9:31 AM By electronically signing this report, I, the attending physician, attest that I have personally reviewed the images/data for the above examination(s) and agree with the final edited report. Dictated by Madeline Salomon MD on 4/5/2023 9:31 AM Signed by Amberly Abarca MD on 4/5/2023 12:56 PM    XR Chest 1 View    Result Date: 4/3/2023  Stable exam CRITICAL RESULT:   No. COMMUNICATION: Per this written report. Dictated by Lele Chong MD on 4/3/2023 10:49 AM Signed by Lele Chong MD on 4/3/2023 10:49 AM    XR Chest 1 View    Result Date: 4/2/2023  Stable airspace disease. CRITICAL RESULT:   No. COMMUNICATION: Per this written report. Dictated by Lele PARRA  MD Arlette on 4/2/2023 11:07 AM Signed by Lele Chong MD on 4/2/2023 11:08 AM    XR Chest 1 View    Result Date: 4/1/2023  Increased multifocal bilateral airspace opacities, right greater than left. CRITICAL RESULT:   No. COMMUNICATION: Per this written report. Dictated by Lele Chong MD on 4/1/2023 10:22 AM Signed by Lele Chong MD on 4/1/2023 10:23 AM    XR Chest 1 View    Result Date: 3/31/2023  New univentricular pacer with leads in expected position. No pneumothorax. CRITICAL RESULT:   No. COMMUNICATION: Per this written report. By electronically signing this report, I, the attending physician, attest that I have personally reviewed the images/data for the above examination(s) and agree with the final edited report. Dictated by Nancy Moody MD on 3/31/2023 12:29 PM Signed by Jacek Hu MD on 3/31/2023 1:30 PM    XR Chest 1 View    Result Date: 3/30/2023  Bilateral airspace disease could represent edema. CRITICAL RESULT:   No. COMMUNICATION: Per this written report. Dictated by Don Maria MD on 3/30/2023 10:20 AM Signed by Don Maria MD on 3/30/2023 10:21 AM    XR Abdomen KUB    Result Date: 4/11/2023  Dialysis catheter tip projects over the right pelvis. Signer Name: Cy Spears MD  Signed: 4/11/2023 8:16 PM  Workstation Name: [id number]  [facility]    XR Chest AP    Result Date: 4/13/2023    Slightly improved aeration of lungs.  This report was finalized on 4/13/2023 9:30 AM by Dr. Live Samayoa MD.      XR Chest AP    Result Date: 4/8/2023  1. No pneumothorax following placement of well-positioned right IJ central line. 2. Vascular congestion with mild interstitial edema. Signer Name: Nico Aaron MD  Signed: 4/8/2023 5:11 PM  Workstation Name: [id number]  [facility]    XR chest AP portable    Result Date: 3/24/2023  There has been no significant interval change  .  Continued follow up recommended . Images reviewed,  interpreted, and dictated by Dr. SHI Keating. Transcribed by Mj Moore PA-C.    XR chest AP portable    Result Date: 3/23/2023  Worsening pulmonary opacities consistent with edema or pneumonia. Images reviewed, interpreted, and dictated by Dr. Oneil Gauthier. Transcribed by Ron Rodriguez PA-C    XR chest AP portable    Result Date: 3/21/2023  There has been no significant interval change  . Continued follow-up recommended. Images reviewed, interpreted, and dictated by Dr. SHI Keating. Transcribed by JOSE DANIEL Motta (HAILEY).    Ultrasound Kidneys Bilateral    Result Date: 3/20/2023  Bilateral renal cysts. Otherwise unremarkable. Images reviewed, interpreted, and dictated by Dr. Oneil Gauthier. Transcribed by Georgia Tracey PA-C.    XR chest AP portable    Result Date: 3/20/2023  Pulmonary vascular congestion with diffuse pulmonary opacities, favor edema over pneumonia. Age-indeterminate left clavicular fracture. Images reviewed, interpreted, and dictated by Dr. Oneil Gauthier. Transcribed by Ron Rodriguez PA-C    FL Surgery Fluoro    Result Date: 4/13/2023    As above.  This report was finalized on 4/13/2023 9:35 AM by Dr. Live Samayoa MD.      I have personally looked at the radiology images and read the final radiology report.    Assessment & Plan    Critical illness myopathy patient require physical therapy 90 minutes/day 5 to 6 days/week with therapeutic exercise, range of motion, endurance, gait training, safety, stair navigation and strengthening.  Will require occupational therapy 90 minutes/day 5 to 6 days/week with dressing, ADLs, feeding, home skills, safety and toileting techniques.  Anticipate patient being able to safely perform activities of daily living using adaptive equipment for improved function mobility.  Independent and safe bowel bladder function.  Able to navigate home community territory safe without injury or falls.  Anticipate patient going home with assistance.    End-stage  renal disease patient is recently getting hemodialysis and does have a peritoneal dialysis catheter.  We will consult nephrology for help with management of dialysis issues.    Anemia secondary to likely recent acute blood loss from peptic ulcer disease.  Patient's hemoglobin is grossly stable at this time no evidence of any further bleeding noted.    CAD with history of CABG and stents--continue medical management.  Continue aspirin, Coreg, statin therapy    Diabetes mellitus continue sliding scale coverage.    Hypertension reasonably well-controlled continue Norvasc and Coreg    Dyslipidemia statin therapy.    Delirium with some sundowning--has intermittent confusion and does have periods of lucidity.  Likely secondary to recent change in venue yesterday from acute care to inpatient rehab.  Acute illness.  Possibly some underlying dementia.  Continue Zyprexa.      VTE Prophylaxis:   Mechanical Order History:      Ordered        04/18/23 1703  Maintain Sequential Compression Device  Continuous                    Pharmalogical Order History:     None          Falls Risk Assessment high risk for fall secondary to generalized weakness from critical illness myopathy        Primitivo Stevens MD  [facility] Hospitalist  04/19/23  07:50 EDT

## 2023-04-19 NOTE — PLAN OF CARE
Goal Outcome Evaluation:            New admit to be evaluated by therapy, continue with plan of care.

## 2023-04-19 NOTE — SIGNIFICANT NOTE
04/19/23 1020   Living Environment   People in Home spouse   Name(s) of People in Home Spouse Hedy Lorenzana   Current Living Arrangements home   Primary Care Provided by self   Provides Primary Care For no one   Family Caregiver if Needed spouse;child(carroll), adult   Family Caregiver Names Spouse Hedy Lorenzana and son Austin Lorenzana.  Daughter Melvi Cyr lives in [place] but she is supportive.   Quality of Family Relationships helpful;involved;supportive   Able to Return to Prior Arrangements yes   Living Arrangement Comments SS spoke to spouse Hedy Lorenzana [phone] and daughter Melvi Cyr [phone].  Pt lives at home with spouse Hedy.  Pt has two children:  Melvi Cyr ([place]) and Austin Lorenzana (lives behind pt).  Pt is retired from the [employer] and receives [employer] pension, [employer] Medicare and [employer].  PCP is Dr. Rob Polanco.  Pt uses [facility] in [place].  Pt does not have POA or advance directive.  Pt used rollator in the home and standard cane when outside his home for mobility prior to admission.  Pt was independent with ADL's and driving prior to hospital admission on 3-30-23.   Resource/Environmental Concerns   Resource/Environmental Concerns none   Transition Planning   Patient/Family Anticipates Transition to home with family   Patient/Family Anticipated Services at Transition   (To be determined closer to discharge date.)   Discharge Needs Assessment (IRF)   Current Outpatient/Agency/Support Group   (Pt did not receive home health or outpatient therapy prior to admission.)   Concerns to be Addressed adjustment to diagnosis/illness   Equipment Currently Used at Home rollator;commode;pulse ox;bp cuff;cane, straight;oxygen;shower chair  ([facility] O[facility] at 2L NC with portable O2 prior to hospital admission.)   Outpatient/Agency/Support Group Needs   (Pt may need outpatient hemodialysis if he is unable to use peritoneal dialysis catheter to  receive dialysis at home.  Spouse says they have not received training, equipment or supplies for peritoneal dialysis at home.)   Current Discharge Risk cognitively impaired;chronically ill   Discharge Coordination/Progress Pt plans to return home with spouse Hedy assisting with care at discharge.  Son Austin will assist with caregiving as needed after work.  Daughter Melvi lives in [place] and she is supportive.  Spouse is willing to transport pt to outpatient HD if needed at discharge.  Team conference will be held on 4-20-23.  SS will follow and assist with discharge planning.

## 2023-04-19 NOTE — SIGNIFICANT NOTE
04/19/23 1020   Living Environment   People in Home spouse   Name(s) of People in Home Spouse Hedy Lorenzana   Current Living Arrangements home   Primary Care Provided by self   Provides Primary Care For no one   Family Caregiver if Needed spouse;child(carroll), adult   Family Caregiver Names Spouse Hedy Lorenzana and son Austin Lorenzana.  Daughter Melvi Cyr lives in [place] but she is supportive.   Quality of Family Relationships helpful;involved;supportive   Able to Return to Prior Arrangements yes   Living Arrangement Comments Pt has been confused.  SS spoke to spouse Hedy Lorenzana [phone] and daughter Melvi Cyr [phone].  Pt lives at home with spouse Hedy.  Pt has two children:  Melvi Cyr ([place]) and Austin Lorenzana (lives behind pt).  Pt is retired from the [employer] and receives [employer] residential pension, [employer] Medicare and [employer].  PCP is Dr. Rob Polanco.  Pt uses [facility] in [place].  Pt does not have POA or advance directive.  Pt used rollator in the home and standard cane when outside his home for mobility prior to admission.  Pt was independent with ADL's and driving prior to hospital admission on 3-30-23.   Resource/Environmental Concerns   Resource/Environmental Concerns none   Transition Planning   Patient/Family Anticipates Transition to home with family   Patient/Family Anticipated Services at Transition   (To be determined closer to discharge date.)   Discharge Needs Assessment (IRF)   Current Outpatient/Agency/Support Group   (Pt did not receive home health or outpatient therapy prior to admission.)   Concerns to be Addressed adjustment to diagnosis/illness   Equipment Currently Used at Home rollator;commode;pulse ox;bp cuff;cane, straight;oxygen;shower chair  ([facility] set-up home O2 at 2L NC with portable O2 prior to hospital admission.)   Outpatient/Agency/Support Group Needs   (Pt may need outpatient hemodialysis if he is unable to use  peritoneal dialysis catheter to receive dialysis at home.  Spouse says they have not received training, equipment or supplies for peritoneal dialysis at home.)   Current Discharge Risk cognitively impaired;chronically ill   Discharge Coordination/Progress Pt plans to return home with spouse Hedy assisting with care at discharge.  Son Austin will assist with caregiving as needed after work.  Daughter Melvi lives in [place] and she is supportive.  Spouse is willing to transport pt to outpatient HD if needed at discharge.  Team conference will be held on 4-20-23.  SS will follow and assist with discharge planning.

## 2023-04-20 LAB
ANION GAP SERPL CALCULATED.3IONS-SCNC: 9.1 MMOL/L (ref 5–15)
BUN SERPL-MCNC: 26 MG/DL (ref 8–23)
BUN/CREAT SERPL: 7.8 (ref 7–25)
CALCIUM SPEC-SCNC: 8.4 MG/DL (ref 8.6–10.5)
CHLORIDE SERPL-SCNC: 99 MMOL/L (ref 98–107)
CO2 SERPL-SCNC: 27.9 MMOL/L (ref 22–29)
CREAT SERPL-MCNC: 3.35 MG/DL (ref 0.76–1.27)
EGFRCR SERPLBLD CKD-EPI 2021: 18.3 ML/MIN/1.73
GLUCOSE BLDC GLUCOMTR-MCNC: 116 MG/DL (ref 70–130)
GLUCOSE BLDC GLUCOMTR-MCNC: 207 MG/DL (ref 70–130)
GLUCOSE BLDC GLUCOMTR-MCNC: 95 MG/DL (ref 70–130)
GLUCOSE SERPL-MCNC: 162 MG/DL (ref 65–99)
POTASSIUM SERPL-SCNC: 4.1 MMOL/L (ref 3.5–5.2)
SODIUM SERPL-SCNC: 136 MMOL/L (ref 136–145)

## 2023-04-20 PROCEDURE — 97530 THERAPEUTIC ACTIVITIES: CPT

## 2023-04-20 PROCEDURE — 97110 THERAPEUTIC EXERCISES: CPT

## 2023-04-20 PROCEDURE — 82962 GLUCOSE BLOOD TEST: CPT

## 2023-04-20 PROCEDURE — 97535 SELF CARE MNGMENT TRAINING: CPT

## 2023-04-20 PROCEDURE — 80048 BASIC METABOLIC PNL TOTAL CA: CPT | Performed by: INTERNAL MEDICINE

## 2023-04-20 PROCEDURE — 63710000001 INSULIN LISPRO (HUMAN) PER 5 UNITS: Performed by: FAMILY MEDICINE

## 2023-04-20 PROCEDURE — 63710000001 INSULIN GLARGINE PER 5 UNITS: Performed by: FAMILY MEDICINE

## 2023-04-20 PROCEDURE — 97116 GAIT TRAINING THERAPY: CPT

## 2023-04-20 PROCEDURE — 99231 SBSQ HOSP IP/OBS SF/LOW 25: CPT | Performed by: FAMILY MEDICINE

## 2023-04-20 PROCEDURE — 97112 NEUROMUSCULAR REEDUCATION: CPT

## 2023-04-20 RX ADMIN — PANTOPRAZOLE SODIUM 40 MG: 40 TABLET, DELAYED RELEASE ORAL at 08:21

## 2023-04-20 RX ADMIN — ASPIRIN 81 MG: 81 TABLET, CHEWABLE ORAL at 08:21

## 2023-04-20 RX ADMIN — ALLOPURINOL 50 MG: 100 TABLET ORAL at 08:18

## 2023-04-20 RX ADMIN — Medication 1 TABLET: at 08:22

## 2023-04-20 RX ADMIN — CASTOR OIL AND BALSAM, PERU 1 APPLICATION: 788; 87 OINTMENT TOPICAL at 08:29

## 2023-04-20 RX ADMIN — INSULIN LISPRO 3 UNITS: 100 INJECTION, SOLUTION INTRAVENOUS; SUBCUTANEOUS at 12:03

## 2023-04-20 RX ADMIN — AMLODIPINE BESYLATE 10 MG: 10 TABLET ORAL at 08:22

## 2023-04-20 RX ADMIN — Medication 10 MG: at 20:57

## 2023-04-20 RX ADMIN — PANTOPRAZOLE SODIUM 40 MG: 40 TABLET, DELAYED RELEASE ORAL at 20:57

## 2023-04-20 RX ADMIN — Medication 10 ML: at 12:07

## 2023-04-20 RX ADMIN — OLANZAPINE 5 MG: 5 TABLET, ORALLY DISINTEGRATING ORAL at 08:21

## 2023-04-20 RX ADMIN — Medication 10 ML: at 09:00

## 2023-04-20 RX ADMIN — CASTOR OIL AND BALSAM, PERU 1 APPLICATION: 788; 87 OINTMENT TOPICAL at 20:59

## 2023-04-20 RX ADMIN — ACETAMINOPHEN 500 MG: 500 TABLET ORAL at 20:57

## 2023-04-20 RX ADMIN — ATORVASTATIN CALCIUM 40 MG: 40 TABLET, FILM COATED ORAL at 20:57

## 2023-04-20 RX ADMIN — LIDOCAINE 1 PATCH: 50 PATCH CUTANEOUS at 08:29

## 2023-04-20 RX ADMIN — INSULIN GLARGINE 15 UNITS: 100 INJECTION, SOLUTION SUBCUTANEOUS at 20:57

## 2023-04-20 RX ADMIN — DOCUSATE SODIUM 50 MG AND SENNOSIDES 8.6 MG 2 TABLET: 8.6; 5 TABLET, FILM COATED ORAL at 20:57

## 2023-04-20 RX ADMIN — Medication 10 ML: at 20:58

## 2023-04-20 NOTE — THERAPY TREATMENT NOTE
Inpatient Rehabilitation - Physical Therapy Treatment Note       [facility]     Patient Name: Augusto Lorenzana Jr.  : 1945  MRN: [id number]    Today's Date: 2023                    Admit Date: 2023      Visit Dx:   No diagnosis found.    Patient Active Problem List   Diagnosis   • NSTEMI 2021   • Hyperlipidemia LDL goal <70   • Essential hypertension   • T2DM on oral agents and insulin. HbA1c 7.4    • Coronary artery disease involving native coronary artery of native heart with unstable angina pectoris   • A/C kidney disease. ATN due to postoperative arrest. 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 Combination of anoxic insult and azotemia   • Debility   • Lower extremity edema   • Complete heart block   • GI bleed   • Third degree atrioventricular block   • Atrial fibrillation, persistent   • Gastrointestinal hemorrhage associated with gastric ulcer   • Critical illness myopathy       Past Medical History:   Diagnosis Date   • CAD (coronary artery disease)    • CKD (chronic kidney disease) stage 3, GFR 30-59 ml/min    • Diabetes mellitus    • Hyperlipidemia    • Hypertension    • NSTEMI (non-ST elevated myocardial infarction)        Past Surgical History:   Procedure Laterality Date   • CARDIAC CATHETERIZATION N/A 2021    Procedure: Left Heart Cath;  Surgeon: Blade Greene IV, MD;  Location: [facility] CATH INVASIVE LOCATION;  Service: Cardiovascular;  Laterality: N/A;   • CARDIAC SURGERY      2 stents placed .   • CENTRAL VENOUS LINE INSERTION Left 2023    Procedure: CENTRAL VENOUS LINE INSERTION;  Surgeon: Torey Easley MD;  Location: [facility];  Service: General;  Laterality: Left;   • CORONARY ARTERY BYPASS GRAFT N/A 2021    Procedure: MEDIAN STERNOTOMY CORONARY ARTERY BYPASS X 3 UTILIZING THE  LEFT INTERNAL MAMMARY ARTERY GRAFT, EVH OF THE GREATER RIGHT SAPHENOUS VEIN, AND PILO PER ANESTHESIA;  Surgeon: Jacek Miller MD;  Location:  [facility];  Service: Cardiothoracic;  Laterality: N/A;   • ENDOSCOPY N/A 04/08/2023    Procedure: ESOPHAGOGASTRODUODENOSCOPY with CENTERAL LINE PLACEMENT;  Surgeon: Sabine Hudson MD;  Location:  [facility];  Service: General;  Laterality: N/A;   • ENDOSCOPY N/A 04/10/2023    Procedure: ESOPHAGOGASTRODUODENOSCOPY;  Surgeon: Sabine Hudson MD;  Location:  [facility];  Service: General;  Laterality: N/A;   • INSERTION HEMODIALYSIS CATHETER N/A 4/13/2023    Procedure: HEMODIALYSIS CATHETER INSERTION;  Surgeon: Torey Easley MD;  Location:  [facility];  Service: General;  Laterality: N/A;   • PACEMAKER IMPLANTATION         PT ASSESSMENT (last 12 hours)     IRF PT Evaluation and Treatment     Row Name 04/20/23 1042          PT Time and Intention    Document Type daily treatment  -LB     Mode of Treatment individual therapy;physical therapy  -     Row Name 04/20/23 1042          General Information    Existing Precautions/Restrictions fall  confusion  -     Row Name 04/20/23 1042          Pain Scale: FACES Pre/Post-Treatment    Pain: FACES Scale, Pretreatment 0-->no hurt  -LB     Posttreatment Pain Rating 0-->no hurt  -LB     Row Name 04/20/23 1042          Cognition/Psychosocial    Affect/Mental Status (Cognition) confused  but more oriented than yesterday  -LB     Follows Commands (Cognition) verbal cues/prompting required;physical/tactile prompts required  -LB     Personal Safety Interventions gait belt;nonskid shoes/slippers when out of bed;supervised activity  -LB     Row Name 04/20/23 1042          Bed Mobility    Sit-Supine [place] (Bed Mobility) verbal cues;nonverbal cues (demo/gesture);standby assist  -LB     Assistive Device (Bed Mobility) bed rails  -     Row Name 04/20/23 1042          Bed-Chair Transfer    Bed-Chair [place] (Transfers)  standby assist;verbal cues;nonverbal cues (demo/gesture)  -LB     Assistive Device (Bed-Chair Transfers) wheelchair  -LB     Row Name 04/20/23 1042          Chair-Bed Transfer    Chair-Bed [place] (Transfers) standby assist;verbal cues;nonverbal cues (demo/gesture)  -LB     Assistive Device (Chair-Bed Transfers) wheelchair  -LB     Row Name 04/20/23 1042          Sit-Stand Transfer    Sit-Stand [place] (Transfers) verbal cues;nonverbal cues (demo/gesture);standby assist  -LB     Assistive Device (Sit-Stand Transfers) walker, front-wheeled;wheelchair  -LB     Row Name 04/20/23 1042          Stand-Sit Transfer    Stand-Sit [place] (Transfers) verbal cues;nonverbal cues (demo/gesture);standby assist  -LB     Assistive Device (Stand-Sit Transfers) walker, front-wheeled;wheelchair  -LB     Row Name 04/20/23 1042          Gait/Stairs (Locomotion)    [place] Level (Gait) minimum assist (75% patient effort);verbal cues;nonverbal cues (demo/gesture)  -LB     Assistive Device (Gait) walker, front-wheeled  -LB     Distance in Feet (Gait) 320'  -LB     Deviations/Abnormal Patterns (Gait) chinyere decreased;gait speed decreased;stride length decreased  -LB     Bilateral Gait Deviations forward flexed posture  he drifts to right side, frequent verbal cues to steer walker, mild staggering  -LB     Row Name 04/20/23 1042          Balance    Comment, Balance standing with RW SBA/CGA for bag toss 2x  -LB     Row Name 04/20/23 1042          Motor Skills    Therapeutic Exercise --  sitting and suine ex  -LB     Additional Documentation --  he was very drowsy today and needed rest breaks and increased cueing  -LB     Row Name 04/20/23 1042          Positioning and Restraints    Pre-Treatment Position sitting in chair/recliner  -LB     In Wheelchair with OT  -LB     Row Name 04/20/23 1042          Therapy Assessment/Plan (PT)    Patient's Goals For Discharge return home  -LB     Row Name 04/20/23 1042          Therapy  Assessment/Plan (PT)    Rehab Potential/Prognosis (PT) adequate, monitor progress closely  -LB     Frequency of Treatment (PT) 5 times per week  -LB     Estimated Duration of Therapy (PT) 2 weeks  -LB     Problem List (PT) balance;cognition;mobility;strength  -LB     Activity Limitations Related to Problem List (PT) unable to ambulate safely;unable to transfer safely  -LB     Row Name 04/20/23 1042          Daily Progress Summary (PT)    Recommendations (PT) continue PT  -LB     Row Name 04/20/23 1042          Therapy Plan Review/Discharge Plan (PT)    Anticipated Equipment Needs at Discharge (PT Eval) --  tbd  -LB     Anticipated Discharge Disposition (PT) home with assist;home with home health  -LB     Row Name 04/20/23 1042          Bed Mobility Goal 1 (PT-IRF)    Activity/Assistive Device (Bed Mobility Goal 1, PT-IRF) sit to supine/supine to sit  -LB     [place] (Bed Mobility Goal 1, PT-IRF) modified independence  -LB     Time Frame (Bed Mobility Goal 1, PT-IRF) by discharge  -LB     Row Name 04/20/23 1042          Transfer Goal 1 (PT-IRF)    Activity/Assistive Device (Transfer Goal 1, PT-IRF) sit-to-stand/stand-to-sit;bed-to-chair/chair-to-bed  -LB     [place] (Transfer Goal 1, PT-IRF) modified independence  -LB     Time Frame (Transfer Goal 1, PT-IRF) by discharge  -LB     Row Name 04/20/23 1042          Gait/Walking Locomotion Goal 1 (PT-IRF)    Activity/Assistive Device (Gait/Walking Locomotion Goal 1, PT-IRF) walker, rolling  -LB     Gait/Walking Locomotion Distance Goal 1 (PT-IRF) 300'  -LB     [place] (Gait/Walking Locomotion Goal 1, PT-IRF) supervision required  -LB     Time Frame (Gait/Walking Locomotion Goal 1, PT-IRF) by discharge  -LB           User Key  (r) = Recorded By, (t) = Taken By, (c) = Cosigned By    Initials Name Provider Type    Jacqueline Ontiveros, PT Physical Therapist              Wound 04/12/23 1700 distal penis Pressure Injury (Active)   Closure  Open to air 04/19/23 1905   Base non-blanchable;moist;scab;maroon/purple 04/19/23 1905   Drainage Amount none 04/19/23 1905   Care, Wound cleansed with;soap and water;barrier applied 04/19/23 1905       Wound Left chest Incision (Active)   Dressing Appearance dressing loose 04/19/23 1905   Closure Approximated;Adhesive closure strips 04/19/23 1905   Base dry 04/19/23 1905     Physical Therapy Education     Title: PT OT SLP Therapies (Done)     Topic: Physical Therapy (Done)     Point: Mobility training (Done)     Learning Progress Summary           Patient Acceptance, E, VU,NR by LB at 4/20/2023 1045    Acceptance, E, VU,NR by LB at 4/19/2023 1559                   Point: Home exercise program (Done)     Learning Progress Summary           Patient Acceptance, E, VU,NR by LB at 4/20/2023 1045    Acceptance, E, VU,NR by LB at 4/19/2023 1559                   Point: Body mechanics (Done)     Learning Progress Summary           Patient Acceptance, E, VU,NR by LB at 4/20/2023 1045    Acceptance, E, VU,NR by LB at 4/19/2023 1559                   Point: Precautions (Done)     Learning Progress Summary           Patient Acceptance, E, VU,NR by LB at 4/20/2023 1045    Acceptance, E, VU,NR by LB at 4/19/2023 1559                               User Key     Initials Effective Dates Name Provider Type Discipline     06/16/21 -  Jacqueline Porter, PT Physical Therapist PT                PT Recommendation and Plan    Planned Therapy Interventions (PT): balance training, bed mobility training, gait training, patient/family education, strengthening, transfer training  Frequency of Treatment (PT): 5 times per week  Anticipated Equipment Needs at Discharge (PT Eval):  (tbd)  Daily Progress Summary (PT)  Recommendations (PT): continue PT               Time Calculation:      PT Charges     Row Name 04/20/23 1046             Time Calculation    Start Time 0915  -LB      Stop Time 1045  -LB      Time Calculation (min) 90 min  -LB       PT Received On 04/20/23  -HERNAN            User Key  (r) = Recorded By, (t) = Taken By, (c) = Cosigned By    Initials Name Provider Type    Jacqueline Ontiveros, PT Physical Therapist                Therapy Charges for Today     Code Description Service Date Service Provider Modifiers Qty    16080127485 HC PT EVAL LOW COMPLEXITY 1 4/19/2023 Jacqueline Porter, PT GP 1    23460944130 HC GAIT TRAINING EA 15 MIN 4/19/2023 Jacqueline Porter, PT GP 2    09628262775 HC PT THER PROC EA 15 MIN 4/19/2023 Jacqueline Porter, PT GP 2    37948517660 HC PT THERAPEUTIC ACT EA 15 MIN 4/19/2023 Jacqueline Porter, PT GP 1    09565357950 HC GAIT TRAINING EA 15 MIN 4/20/2023 Jacqueline Porter, PT GP 1    74686589772 HC PT THER PROC EA 15 MIN 4/20/2023 Jacqueline Porter, PT GP 3    77221438829 HC PT NEUROMUSC RE EDUCATION EA 15 MIN 4/20/2023 Jacqueline Porter, PT GP 1    50046436398 HC PT THERAPEUTIC ACT EA 15 MIN 4/20/2023 Jacqueline Porter, PT GP 1            PT G-Codes  AM-PAC 6 Clicks Score (PT): 19      Jacqueline Porter, PT  4/20/2023

## 2023-04-20 NOTE — PROGRESS NOTES
Case Management  Inpatient Rehabilitation Team Conference    Conference Date/Time: 4/20/2023 6:01:42 AM    Team Conference Attendees:  MD Melvi Cramer SW Jessica Bill, RN,   ADEN Stanley, PT  Linn Colon OT    Demographics            Age: 78Y            Gender: Male    Admission Date: 4/18/2023 4:57:00 PM  Rehabilitation Diagnosis:  critical illness myopathy  Comorbidities:      Plan of Care  Anticipated Discharge Date/Estimated Length of Stay: 14 days  Anticipated Discharge Destination: Community discharge with assistance  Discharge Plan : Pt plans to return home with spouse providing assistance with  care.  Son will assist as needed after work.  Daughter lives in another town but  she is supportive too.  Medical Necessity Expected Level Rationale: fair-good  Intensity and Duration: an average of 3 hours/5 days per week  Medical Supervision and 24 Hour Rehab Nursing: x  Physical Therapy: x  PT Intensity/Duration: PT 1.5 hours per day/5 days per week  Occupational Therapy: x  OT Intensity/Duration: OT 1.5 hours per day/5 days per week  Social Work: x  Therapeutic Recreation: x  Updated (if changes indicated)    Anticipated Discharge Date/Estimated Length of Stay:   4-26-23      Discharge Plan of Care:    Based on the patient's medical and functional status, their prognosis and  expected level of functional improvement is: good      Interdisciplinary Problem/Goals/Status  Pain    [RN] Pain Management(Active)  Current Status(04/07/2023): at risk for pain  Weekly Goal(04/14/2023): pain at an acceptable level  Discharge Goal: Pain managed        Safety    [RN] Potential for Injury(Active)  Current Status(04/07/2023): At risk for falls  Weekly Goal(04/14/2023): no falls  Discharge Goal: no falls        Self Care    [OT] Dressing (Lower)(Active)  Current Status(04/19/2023): maxA/modA  Weekly Goal(04/25/2023): modA  Discharge Goal: Michaela        Mobility    [PT]  Walk(Active)  Current Status(04/19/2023): amb 160' RW min A  Weekly Goal(04/26/2023): amb 300' RW Sup  Discharge Goal: amb 300' RW Sup    [PT] Stairs(Active)  Current Status(04/08/2023):  Weekly Goal(04/21/2023):  Discharge Goal:    [PT] Bed/Chair/Wheelchair(Active)  Current Status(04/19/2023): CGA  Weekly Goal(04/26/2023): MI  Discharge Goal: MI    [PT] Bed Mobility(Active)  Current Status(04/08/2023):  Weekly Goal(04/21/2023):  Discharge Goal:        Swallow Function    [ST] Swallowing(Active)  Current Status(06/22/2021): MS, Ground, Honey  Weekly Goal(06/28/2021): Resistive breather  Discharge Goal: Least restrictive po diet    Comments: Pt plans to return home with spouse assisting with caregiving needs.  Son lives closeby and he can assist when he is not working.  Daughter lives out  of town and she is supportive too.    Signed by: BROCK Ibarra    Physician CoSigned By: Primitivo Stevens 04/20/2023 09:21:23

## 2023-04-20 NOTE — PROGRESS NOTES
Physical Medicine and Rehabilitation  Inpatient Rehabilitation Interdisciplinary Plan of Care    Demographics            Age: 78Y            Gender: Male    Admission Date: 4/18/2023 4:57:00 PM  Rehabilitation Diagnosis:  critical illness myopathy    Plan of Care  Anticipated Discharge Date/Estimated Length of Stay: 4-26-23  Anticipated Discharge Destination: Community discharge with assistance  Discharge Plan : Pt plans to return home with spouse providing assistance with  care.  Son will assist as needed after work.  Daughter lives in another town but  she is supportive too.  Medical Necessity Expected Level Rationale: good  Intensity and Duration: an average of 3 hours/5 days per week  Medical Supervision and 24 Hour Rehab Nursing: x  Physical Therapy: x  PT Intensity/Duration: PT 1.5 hours per day/5 days per week  Occupational Therapy: x  OT Intensity/Duration: OT 1.5 hours per day/5 days per week  Social Work: x  Therapeutic Recreation: x  Updated (if changes indicated)  No changes to plan.    Based on the patient's medical and functional status, their prognosis and  expected level of functional improvement is: good    Interdisciplinary Problem/Goals/Status  Copy from POCMobility    [PT] Bed Mobility (Active)  Current Status (4/8/2023 12:00:00 AM):  Weekly Goal:  Discharge Goal:    [PT] Bed/Chair/Wheelchair (Active)  Current Status (4/19/2023 12:00:00 AM): CGA  Weekly Goal: MI  Discharge Goal: MI    [PT] Stairs (Active)  Current Status (4/8/2023 12:00:00 AM):  Weekly Goal:  Discharge Goal:    [PT] Walk (Active)  Current Status (4/19/2023 12:00:00 AM): amb 160' RW min A  Weekly Goal: amb 300' RW Sup  Discharge Goal: amb 300' RW Sup    Self Care    [OT] Dressing (Lower) (Active)  Current Status (4/19/2023 10:45:00 AM): maxA/modA  Weekly Goal: modA  Discharge Goal: Michaela    Swallow Function    [ST] Swallowing (Active)  Current Status (6/22/2021 12:00:00 AM): MS, Ground, Honey  Weekly Goal: Resistive  breather  Discharge Goal: Least restrictive po diet    Pain    [RN] Pain Management (Active)  Current Status (4/7/2023 4:45:00 PM): at risk for pain  Weekly Goal: pain at an acceptable level  Discharge Goal: Pain managed    Safety    [RN] Potential for Injury (Active)  Current Status (4/7/2023 4:45:00 PM): At risk for falls  Weekly Goal: no falls  Discharge Goal: no falls    Medical Problems    Comments: Pt plans to return home with spouse assisting with caregiving needs.  Son lives closeby and he can assist when he is not working.  Daughter lives out  of town and she is supportive too.    Signed by: Primitivo Stevens, Physician

## 2023-04-20 NOTE — SIGNIFICANT NOTE
04/20/23 1215   Plan   Plan SS spoke to pt about plans for discharge on 4-26-23, how he is doing in therapy and possible need for outpatient HD.  Pt is agreeable to go to outpatient HD at [facility] if needed at discharge but requests to do peritoneal dialysis at home.  SS sent a chat message to Dr. Farias who says pt needs outpatient HD set-up.  Contacted [facility] [phone] per Keyla about pt needing outpatient HD, plans for discharge on 4-26-23 and spouse requesting Monday, Wednesday, Friday slot.  Keyla is familiar with pt and says to send pt's records and she will contact SS on Monday 4-24-23 with a chair time.   Patient/Family in Agreement with Plan yes

## 2023-04-20 NOTE — PROGRESS NOTES
Rehabilitation Nursing  Inpatient Rehabilitation Plan of Care Note    Plan of Care  Pain    Pain Management (Active)  Current Status (4/7/2023 4:45:00 PM): at risk for pain  Weekly Goal: pain at an acceptable level  Discharge Goal: Pain managed    Safety    Potential for Injury (Active)  Current Status (4/7/2023 4:45:00 PM): At risk for falls  Weekly Goal: no falls  Discharge Goal: no falls    Signed by: Salina Perkins, Supervisor

## 2023-04-20 NOTE — PROGRESS NOTES
PPS CMG Coordinator  Inpatient Rehabilitation Admission    Ethnic Group: White.  Marital Status:  Marital Status: .    IRF Admission Date:  04/18/2023  Admission Class: Initial Rehab.  Admit From:  [facility]    Pre-Hospital Living: Home. Pre-Hospital Living  With: (2) Family/Relatives.    Payment Sources: Primary: Medicare Fee for Service  Secondary: Not Listed.  Impairment Group: 03.8 Neuromuscular Disorders  Date of Onset of Impairment: 03/30/2023    Etiologic Diagnosis Code(s):  Rank Code      Description  1    G72.81    Critical illness myopathy    Comorbidities:      Height on Admission: 67 inches.  Weight on Admission: 162 pounds.    Are there any arthritis conditions recorded for Impairment Group, Etiologic  Diagnosis, or Comorbid Conditions that meet all of the regulatory requirements  for IRF classification (in 42 .29(b)(2)(x), (xi), and xii))?    GEMA Bladder Accidents:  0 - Accidents.  Bladder Score = 6. Patient has not had an accident, but uses a  device/medication. urinal  GEMA Bowel Accident: 0 -Accidents.  Bowel Score = 6. Patient has no accidents, but uses a device/medications.  medication    Signed by: Salina Perkins, Supervisor

## 2023-04-20 NOTE — NURSING NOTE
Was called to the patient in room 107B. Pt. Was in the bathroom when he had  a syncopal  episode. Pt. Ws placed in bed and heart monitor was placed on him. VS are noted. Dr. Stevens was notified. A EKG was ordered stat. A heplock was established. O2 is in use. Pt. Remembers what happened. No SOA or distress noted. Family will be notified of episode.

## 2023-04-20 NOTE — THERAPY TREATMENT NOTE
Inpatient Rehabilitation - Occupational Therapy Treatment Note    [facility]     Patient Name: Augusto Lorenzana Jr.  : 1945  MRN: [id number]    Today's Date: 2023                 Admit Date: 2023       No diagnosis found.    Patient Active Problem List   Diagnosis   • NSTEMI 2021   • Hyperlipidemia LDL goal <70   • Essential hypertension   • T2DM on oral agents and insulin. HbA1c 7.4    • Coronary artery disease involving native coronary artery of native heart with unstable angina pectoris   • A/C kidney disease. ATN due to postoperative arrest. 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 Combination of anoxic insult and azotemia   • Debility   • Lower extremity edema   • Complete heart block   • GI bleed   • Third degree atrioventricular block   • Atrial fibrillation, persistent   • Gastrointestinal hemorrhage associated with gastric ulcer   • Critical illness myopathy       Past Medical History:   Diagnosis Date   • CAD (coronary artery disease)    • CKD (chronic kidney disease) stage 3, GFR 30-59 ml/min    • Diabetes mellitus    • Hyperlipidemia    • Hypertension    • NSTEMI (non-ST elevated myocardial infarction)        Past Surgical History:   Procedure Laterality Date   • CARDIAC CATHETERIZATION N/A 2021    Procedure: Left Heart Cath;  Surgeon: Blade Greene IV, MD;  Location: [facility] CATH INVASIVE LOCATION;  Service: Cardiovascular;  Laterality: N/A;   • CARDIAC SURGERY      2 stents placed .   • CENTRAL VENOUS LINE INSERTION Left 2023    Procedure: CENTRAL VENOUS LINE INSERTION;  Surgeon: Torey Easley MD;  Location: [facility];  Service: General;  Laterality: Left;   • CORONARY ARTERY BYPASS GRAFT N/A 2021    Procedure: MEDIAN STERNOTOMY CORONARY ARTERY BYPASS X 3 UTILIZING THE LEFT INTERNAL  MAMMARY ARTERY GRAFT, EVH OF THE GREATER RIGHT SAPHENOUS VEIN, AND PILO PER ANESTHESIA;  Surgeon: Jacek Miller MD;  Location:  [facility];  Service: Cardiothoracic;  Laterality: N/A;   • ENDOSCOPY N/A 04/08/2023    Procedure: ESOPHAGOGASTRODUODENOSCOPY with CENTERAL LINE PLACEMENT;  Surgeon: Sabine Hudson MD;  Location:  [facility];  Service: General;  Laterality: N/A;   • ENDOSCOPY N/A 04/10/2023    Procedure: ESOPHAGOGASTRODUODENOSCOPY;  Surgeon: Sabine Hudson MD;  Location:  [facility];  Service: General;  Laterality: N/A;   • INSERTION HEMODIALYSIS CATHETER N/A 4/13/2023    Procedure: HEMODIALYSIS CATHETER INSERTION;  Surgeon: Torey Easley MD;  Location:  [facility];  Service: General;  Laterality: N/A;   • PACEMAKER IMPLANTATION               IRF OT ASSESSMENT FLOWSHEET (last 12 hours)     IRF OT Evaluation and Treatment     Row Name 04/20/23 0949          OT Time and Intention    Document Type daily treatment  -TM     Mode of Treatment occupational therapy  -TM     Patient Effort adequate  -TM     Symptoms Noted During/After Treatment none  -TM     Row Name 04/20/23 0949          General Information    General Observations of Patient Pt agreeable for therapy.  -TM     Existing Precautions/Restrictions fall;pacemaker  -TM     Row Name 04/20/23 0949          Cognition/Psychosocial    Affect/Mental Status (Cognition) confused;other (see comments)  however, pt is more oriented than on yesterday's date  -TM     Orientation Status (Cognition) oriented to;person;place;situation;verbal cues/prompts needed for orientation  -TM     Follows Commands (Cognition) follows one-step commands;verbal cues/prompting required;repetition of directions required  -TM     Row Name 04/20/23 0949          Upper Body Dressing    [place] Level (Upper Body Dressing) minimum assist (75% or more patient effort);verbal cues  -TM     Position (Upper Body Dressing) supported sitting  -TM     Row Name 04/20/23 0949           Lower Body Dressing    [place] Level (Lower Body Dressing) moderate assist (50% patient effort);minimum assist (75% patient effort);verbal cues  -TM     Position (Lower Body Dressing) supported sitting  -TM     Row Name 04/20/23 0949          Toileting    [place] Level (Toileting) minimum assist (75% patient effort);contact guard assist;verbal cues  -TM     Assistive Device Use (Toileting) grab bar/safety frame  -TM     Position (Toileting) supported sitting  -TM     Row Name 04/20/23 0949          Toilet Transfer    Type (Toilet Transfer) stand pivot/stand step  -TM     [place] Level (Toilet Transfer) contact guard;verbal cues  -TM     Assistive Device (Toilet Transfer) wheelchair;grab bars/safety frame  -TM     Row Name 04/20/23 0949          Motor Skills    Motor Skills coordination;functional endurance;motor control/coordination interventions  -     Motor Control/Coordination Interventions therapeutic exercise/ROM;gross motor coordination activities;fine motor manipulation/dexterity activities;other (see comments)  light table top BUE ther ex/act, GMC/FMC  -TM           User Key  (r) = Recorded By, (t) = Taken By, (c) = Cosigned By    Initials Name Effective Dates     Linn Colon, OT 06/16/21 -                  Occupational Therapy Education     Title: PT OT SLP Therapies (Done)     Topic: Occupational Therapy (Done)     Point: ADL training (Done)     Description:   Instruct learner(s) on proper safety adaptation and remediation techniques during self care or transfers.   Instruct in proper use of assistive devices.              Learning Progress Summary           Patient Acceptance, E,D, VU,NR by  at 4/20/2023 0948    Acceptance, E,D, VU,NR by  at 4/19/2023 1109                   Point: Precautions (Done)     Description:   Instruct learner(s) on prescribed precautions during self-care and functional transfers.              Learning Progress Summary           Patient Acceptance,  E,D, VU,NR by TM at 4/20/2023 0948    Acceptance, E,D, VU,NR by TM at 4/19/2023 1109                               User Key     Initials Effective Dates Name Provider Type Discipline     06/16/21 -  Linn Colon OT Occupational Therapist OT                    OT Recommendation and Plan    Planned Therapy Interventions (OT): activity tolerance training, adaptive equipment training, BADL retraining, IADL retraining, occupation/activity based interventions, patient/caregiver education/training, ROM/therapeutic exercise, strengthening exercise, transfer/mobility retraining                    Time Calculation:      Time Calculation- OT     Row Name 04/20/23 1404 04/20/23 0948          Time Calculation- OT    OT Start Time 1050  -TM 0805  -TM     OT Stop Time 1110  -TM 0915  -TM     OT Time Calculation (min) 20 min  -TM 70 min  -TM     Total Timed Code Minutes- OT 20 minute(s)  -TM 70 minute(s)  -TM     OT Non-Billable Time (min) -- 15 min  -TM           User Key  (r) = Recorded By, (t) = Taken By, (c) = Cosigned By    Initials Name Provider Type     Linn Colon OT Occupational Therapist              Therapy Charges for Today     Code Description Service Date Service Provider Modifiers Qty    26755974527 HC OT EVAL HIGH COMPLEXITY 3 4/19/2023 Linn Colon OT GO 1    97911082368 HC OT THERAPEUTIC ACT EA 15 MIN 4/19/2023 Linn Colon OT GO 2    50144253513 HC OT SELF CARE/MGMT/TRAIN EA 15 MIN 4/19/2023 Lnin Colon OT GO 1    95333902331 HC OT SELF CARE/MGMT/TRAIN EA 15 MIN 4/20/2023 Linn Colon OT GO 2    55661859134 HC OT THERAPEUTIC ACT EA 15 MIN 4/20/2023 Linn Colon OT GO 3    38098718747 HC OT THER PROC EA 15 MIN 4/20/2023 Linn Colon OT GO 1                   Linn Colon OT  4/20/2023

## 2023-04-20 NOTE — PROGRESS NOTES
Patient Assessment Instrument  Quality Indicators - Admission FY 2023    Section A. Ethnicity/ Race/Language  Ethnicity:  Race:  Preferred Language:    Section A. Transportation      Section B. Hearing and Vision        Section B. Health Literacy        Section C. Cognitive Patterns      Section C. Signs and Symptoms of Delirium (from CAM)      Section D. Mood      Section D. Social Isolation      Section [id number]. Prior Functioning    Self Care: Patient completed all the activities by themself, with or without an  assistive device, with no assistance from a helper.  Indoor Mobility: Patient completed the activities by themself, with or without  an assistive device, with no assistance from a helper.  Stairs: Patient completed the activities by themself, with or without an  assistive device, with no assistance from a helper.  Functional Cognition: Patient completed the activities by themself, with or  without an assistive device, with no assistance from a helper.    Section [id number]. Prior Device Use      Section [id number]. Self Care Performance      Section [id number]. Self Care Discharge Goals      Section [id number]. Mobility Performance      Section [id number]. Mobility Discharge Goals      Section H. Bladder and Bowel  Bladder Continence: Always continent (no documented incontinence).  Bowel Continence: Not rated (patient had an ostomy or did not have a bowel  movement for the entire 3 days).    Section I. Active Diagnosis  Comorbidities and Co-existing Conditions:   Diabetes Mellitus (DM) - e.g.,  diabetic retinopathy, nephropathy, and neuropathy).    Section J. Health Conditions  Patient has not had any falls in the past year. Patient has not had major  surgery during the 100 days prior to admission.    Section J. Health Conditions (Pain)      Section K. Swallowing/Nutritional Status  Modiified food consistency/supervision (patient requires modified food or liquid  consistency and/or needs supervision during eating for  safety).  Nutritional Approaches on Admission:  Nutritional Approaches on Admission:  Mechanically altered diet - require change in texture of food or liquids (e.g.,  pureed food, thickened liquids), Therapeutic diet (e.g., low salt, diabetic, low  cholesterol)    Section M. Skin Conditions      Section N. Medication    Potential Clinically Significant Medication Issues: No issues found during  review  Patient is taking medications in the following pharmacological classification:  A. Antipsychotic An indication is noted for all medications in the Antipsychotic  drug class. F. Antibiotic An indication is noted for all medications in the  Antibiotic drug class. I. Antiplatelet An indication is noted for all  medications in the Antiplatelet drug class. J. Hypoglycemic (including insulin)  An indication is NOT noted for all medications in the Hypoglycemic drug class.  Potential Clinically Significant Medication Issues: No issues found during  review    Section O. Special Treatments, Procedures, and Programs    Signed by: Salina Perkins, Supervisor

## 2023-04-20 NOTE — PROGRESS NOTES
Occupational Therapy:    Physical Therapy: Individual: 90 minutes.    Speech Language Pathology:    Signed by: Jacqueline Porter, Physical Therapist

## 2023-04-20 NOTE — PROGRESS NOTES
[facility]  PROGRESS NOTE     Patient Identification:  Name:  Augusto Lorenzana Jr.  Age:  78 y.o.  Sex:  male  :  1945  MRN:  [id number]  Visit Number:  [id number]  ROOM: [facility]     Primary Care Provider:  Rob Polanco MD    Length of stay in inpatient status:  2    Subjective     Chief Compliant:  No chief complaint on file.      History of Presenting Illness: 78-year-old gentleman who was admitted yesterday with critical illness myopathy, end-stage renal disease requiring hemodialysis, anemia, CAD with history of CABG and stents, diabetes mellitus, hypertension, dyslipidemia and recent intermittent delirium issues.  Patient seems much more alert and oriented this morning.  Has no complaints at this time    Objective     Current Hospital Meds:allopurinol, 50 mg, Oral, Once per day on Mon Thu  amLODIPine, 10 mg, Oral, Q24H  aspirin, 81 mg, Oral, Daily  atorvastatin, 40 mg, Oral, Nightly  carvedilol, 25 mg, Oral, BID With Meals  castor oil-balsam peru, 1 application, Topical, Q12H  gentamicin, 1 application, Topical, Daily  insulin glargine, 15 Units, Subcutaneous, Nightly  insulin lispro, 2-7 Units, Subcutaneous, TID With Meals  lidocaine, 1 patch, Transdermal, Daily  melatonin, 10 mg, Oral, Nightly  multivitamin, 1 tablet, Oral, Daily  OLANZapine zydis, 5 mg, Oral, Daily  pantoprazole, 40 mg, Oral, BID AC  senna-docusate sodium, 2 tablet, Oral, Nightly  sodium chloride, 10 mL, Intravenous, Q12H  sodium chloride, 10 mL, Intravenous, Q12H  sodium chloride, 10 mL, Intravenous, Q12H    sodium chloride, 9 mL/hr      ----------------------------------------------------------------------------------------------------------------------  Vital Signs:  Temp:  [97.9 °F (36.6 °C)-98.6 °F (37 °C)] 97.9 °F (36.6 °C)  Heart Rate:  [74-87] 87  Resp:  [18-20] 18  BP: (112-126)/(61-73) 112/61  SpO2:  [95 %-99 %] 99 %  on   ;   Device (Oxygen Therapy): room air  Body mass index is 25.4 kg/m².    Wt  Readings from Last 3 Encounters:   04/18/23 73.6 kg (162 lb 3.2 oz)   04/18/23 72.9 kg (160 lb 11.2 oz)   04/07/23 77.2 kg (170 lb 3.2 oz)     Intake & Output (last 3 days)       04/17 0701  04/18 0700 04/18 0701 04/19 0700 04/19 0701 04/20 0700 04/20 0701 04/21 0700    P.O.   960     Total Intake(mL/kg)   960 (13)     Urine (mL/kg/hr)  375 800 (0.5) 400 (1.4)    Total Output  375 800 400    Net  -375 +160 -400            Urine Unmeasured Occurrence   2 x         Diet: Diabetic Diets; Consistent Carbohydrate; Texture: Soft to Chew (NDD 3); Soft to Chew: Chopped Meat; Fluid Consistency: Thin (IDDSI 0)  ----------------------------------------------------------------------------------------------------------------------  Physical exam:  Constitutional:   No acute distress  HEENT: Normocephalic atraumatic  Neck: Supple   Cardiovascular: Regular rate and rhythm  Pulmonary/Chest: Clear to auscultation  Abdominal: Positive bowel sounds soft.   Musculoskeletal: No arthropathy  Neurological: Patient at times disoriented to situation and place but oriented to time and person and is able to speak intelligently back to nearly any other aspect of his life.  Skin: No rash  Peripheral vascular: No edema  Genitourinary:  ----------------------------------------------------------------------------------------------------------------------    Last echocardiogram:  Results for orders placed during the hospital encounter of 05/22/21    Adult Transthoracic Echo Complete w/ Color, Spectral and Contrast if necessary per protocol    Interpretation Summary  · Left ventricular systolic function is normal. Estimated left ventricular EF = 60%.  · Left ventricular diastolic function is consistent with (grade I) impaired relaxation.  · Mild aortic valve stenosis is present.  · Estimated right ventricular systolic pressure from tricuspid regurgitation is normal (<35  mmHg).    ----------------------------------------------------------------------------------------------------------------------  Results from last 7 days   Lab Units 04/19/23  0104 04/18/23  0044 04/15/23  0018 04/14/23  1356   WBC 10*3/mm3 8.12 8.43 11.57*  --    HEMOGLOBIN g/dL 8.2* 8.8* 7.6*  --    HEMATOCRIT % 25.3* 28.3* 23.5*  --    MCV fL 97.3* 101.1* 97.9*  --    MCHC g/dL 32.4 31.1* 32.3  --    PLATELETS 10*3/mm3 223 261 209  --    INR   --   --   --  1.15*         Results from last 7 days   Lab Units 04/20/23  0128 04/19/23  0104 04/18/23  0044 04/15/23  0018 04/14/23  0108   SODIUM mmol/L 136 130* 136   < > 139   POTASSIUM mmol/L 4.1 4.0 4.3   < > 3.1*   MAGNESIUM mg/dL  --  1.8 2.1  --   --    CHLORIDE mmol/L 99 95* 103   < > 103   CO2 mmol/L 27.9 28.1 20.7*   < > 26.9   BUN mg/dL 26* 15 28*   < > 28*   CREATININE mg/dL 3.35* 2.39* 3.69*   < > 2.63*   CALCIUM mg/dL 8.4* 8.0* 8.6   < > 7.6*   PHOSPHORUS mg/dL  --   --   --   --  2.4*   GLUCOSE mg/dL 162* 162* 111*   < > 120*   ALBUMIN g/dL  --   --   --   --  2.5*    < > = values in this interval not displayed.   Estimated Creatinine Clearance: 19.5 mL/min (A) (by C-G formula based on SCr of 3.35 mg/dL (H)).  No results found for: AMMONIA              Glucose   Date/Time Value Ref Range Status   04/20/2023 0623 95 70 - 130 mg/dL Final     Comment:     Meter: [id number] : [id number] [place] [facility]   04/19/2023 1932 221 (H) 70 - 130 mg/dL Final     Comment:     Meter: [id number] : [id number] HUBERT HUNTLEY   04/19/2023 1627 280 (H) 70 - 130 mg/dL Final     Comment:     Meter: [id number] : [id number] [facility]   04/19/2023 1129 229 (H) 70 - 130 mg/dL Final     Comment:     Meter: [id number] : [id number] [facility]   04/19/2023 0611 123 70 - 130 mg/dL Final     Comment:     Meter: [id number] : [id number] HUBERT HUNTLEY   04/18/2023 2058 217 (H) 70 - 130 mg/dL Final     Comment:     Meter: [id number] : [id number] HUBERT HUNTLEY    04/18/2023 1753 245 (H) 70 - 130 mg/dL Final     Comment:     Meter: [id number] : [id number] BRANDI GARDINER   04/18/2023 1015 158 (H) 70 - 130 mg/dL Final     Comment:     Meter: [id number] : [id number] DAREN CHAPMAN     Lab Results   Component Value Date    TSH 4.030 06/03/2021    FREET4 1.1 03/31/2023     No results found for: PREGTESTUR, PREGSERUM, HCG, HCGQUANT  Pain Management Panel         Latest Ref Rng & Units 12/7/2022 5/30/2021   Pain Management Panel   Creatinine, Urine mg/dL 80.1   136.1           Multiple values from one day are sorted in reverse-chronological order           Brief Urine Lab Results  (Last result in the past 365 days)      Color   Clarity   Blood   Leuk Est   Nitrite   Protein   CREAT   Urine HCG        12/07/22 1710             80.1         12/07/22 1710 Yellow   Clear   Small (1+)   Negative   Negative   >=300 mg/dL (3+)               No results found for: BLOODCX      No results found for: URINECX  No results found for: WOUNDCX  No results found for: STOOLCX        I have personally looked at the labs and they are summarized above.  ----------------------------------------------------------------------------------------------------------------------  Detailed radiology reports for the last 24 hours:    Imaging Results (Last 24 Hours)     ** No results found for the last 24 hours. **        Final impressions for the last 30 days of radiology reports:    CT Abdomen Pelvis Without Contrast    Result Date: 4/9/2023  1.  Cholelithiasis without evidence of acute cholecystitis.. 2.  Small bilateral pleural effusions with edema throughout both lower lungs (partially visualized). 3.  14 mm pulmonary nodule in the right lower lobe of the lungs, inadequately assessed by this exam. Recommend follow-up CT of the chest with contrast for further assessment. Signer Name: MADHAVI AVITIA MD  Signed: 4/9/2023 4:34 AM  Workstation Name: [email or web address]  [facility]  Abdomen Pelvis Without Contrast    Result Date: 4/5/2023  No retroperitoneal hematoma. Hypoattenuating intraventricular blood pool may be seen in the setting of anemia (interventricular septum sign). Very subtle changes of the liver underlying chronic parenchymal liver disease and borderline splenomegaly or may represent secondary changes due to chronic congestive adenopathy. No definite portosystemic collaterals. Subperitoneal edema in the right upper quadrant and upper abdomen may be secondary to congestion, particularly in the setting of heart disease (favored). Alternatively, this may be evidence of early portal venous hypertension. Mild underlying inflammatory cyst process such as duodenitis or mild acute pancreatitis is also differential consideration. Multiple prominent but nonenlarged tonya hepatic lymph nodes may be reactive. Bilateral small volume pleural effusions, right greater than left. Multifocal lung base airspace disease, correlating to findings from most recent chest radiograph on 4/3/2023. CRITICAL RESULT:   No. COMMUNICATION: Per this written report. Approved by Madeline Salomon MD on 4/5/2023 9:31 AM By electronically signing this report, I, the attending physician, attest that I have personally reviewed the images/data for the above examination(s) and agree with the final edited report. Dictated by Madeline Salomon MD on 4/5/2023 9:31 AM Signed by Amberly Abarca MD on 4/5/2023 12:56 PM    XR Chest 1 View    Result Date: 4/3/2023  Stable exam CRITICAL RESULT:   No. COMMUNICATION: Per this written report. Dictated by Lele Chong MD on 4/3/2023 10:49 AM Signed by Lele Chong MD on 4/3/2023 10:49 AM    XR Chest 1 View    Result Date: 4/2/2023  Stable airspace disease. CRITICAL RESULT:   No. COMMUNICATION: Per this written report. Dictated by Lele Chong MD on 4/2/2023 11:07 AM Signed by Lele Chong MD on 4/2/2023 11:08 AM    XR Chest 1 View    Result Date: 4/1/2023  Increased multifocal  bilateral airspace opacities, right greater than left. CRITICAL RESULT:   No. COMMUNICATION: Per this written report. Dictated by Lele Chong MD on 4/1/2023 10:22 AM Signed by Lele Chong MD on 4/1/2023 10:23 AM    XR Chest 1 View    Result Date: 3/31/2023  New univentricular pacer with leads in expected position. No pneumothorax. CRITICAL RESULT:   No. COMMUNICATION: Per this written report. By electronically signing this report, I, the attending physician, attest that I have personally reviewed the images/data for the above examination(s) and agree with the final edited report. Dictated by Nancy Moody MD on 3/31/2023 12:29 PM Signed by Jacek Hu MD on 3/31/2023 1:30 PM    XR Chest 1 View    Result Date: 3/30/2023  Bilateral airspace disease could represent edema. CRITICAL RESULT:   No. COMMUNICATION: Per this written report. Dictated by Don Maria MD on 3/30/2023 10:20 AM Signed by Don Maria MD on 3/30/2023 10:21 AM    XR Abdomen KUB    Result Date: 4/11/2023  Dialysis catheter tip projects over the right pelvis. Signer Name: Cy Spears MD  Signed: 4/11/2023 8:16 PM  Workstation Name: [id number]  [facility]    XR Chest AP    Result Date: 4/13/2023    Slightly improved aeration of lungs.  This report was finalized on 4/13/2023 9:30 AM by Dr. Live Samayoa MD.      XR Chest AP    Result Date: 4/8/2023  1. No pneumothorax following placement of well-positioned right IJ central line. 2. Vascular congestion with mild interstitial edema. Signer Name: Nico Aaron MD  Signed: 4/8/2023 5:11 PM  Workstation Name: [email or web address]  [facility]    XR chest AP portable    Result Date: 3/24/2023  There has been no significant interval change  .  Continued follow up recommended . Images reviewed, interpreted, and dictated by Dr. SHI Keating. Transcribed by Mj Moore PA-C.    XR chest AP portable    Result Date: 3/23/2023  Worsening  pulmonary opacities consistent with edema or pneumonia. Images reviewed, interpreted, and dictated by Dr. Oneil Gauthier. Transcribed by Ron Rodriguez PA-C    XR chest AP portable    Result Date: 3/21/2023  There has been no significant interval change  . Continued follow-up recommended. Images reviewed, interpreted, and dictated by Dr. SHI Keating. Transcribed by JOSE DANIEL Motta (R).    Ultrasound Kidneys Bilateral    Result Date: 3/20/2023  Bilateral renal cysts. Otherwise unremarkable. Images reviewed, interpreted, and dictated by Dr. Oneil Gauthier. Transcribed by Georgia Tracey PA-C.    FL Surgery Fluoro    Result Date: 4/13/2023    As above.  This report was finalized on 4/13/2023 9:35 AM by Dr. Live Samayoa MD.      I have personally looked at the radiology images and read the final radiology report.    Assessment & Plan    Critical illness myopathy--patient requiring contact-guard for transfers.  Minimum assist for ambulation using front wheel walker and was able to ambulate 160 feet x 2 twice yesterday.  Patient did require maximum assist for bathing; moderate assistance for upper body dressing; moderate to maximum assist for lower body dressing; minimum assist for grooming; maximum assist for toileting.    End-stage renal disease hemodialysis per nephrology recommendations.    Anemia has been stable    Peptic ulcer disease continue Protonix 40 mg twice daily    CAD with history of CABG and stents continue medical management    History of recent third-degree heart block requiring pacemaker placement    Diabetes mellitus sliding scale coverage    Hypertension controlled    Dyslipidemia continue statin therapy    Delirium--much improved today patient seems to be more lucid and much less confused.    VTE Prophylaxis:   Mechanical Order History:      Ordered        04/18/23 1703  Maintain Sequential Compression Device  Continuous                    Pharmalogical Order History:     None            Ray  ERIC Stevens MD  [facility]  04/20/23  10:56 EDT

## 2023-04-20 NOTE — PLAN OF CARE
Problem: Rehabilitation (IR[facility]) Plan of Care  Goal: Plan of Care Review  Outcome: Ongoing, Progressing  Flowsheets (Taken 4/20/2023 1236)  Progress: improving  Plan of Care Reviewed With: patient  Goal: Patient-Specific Goal (Individualized)  Outcome: Ongoing, Progressing  Goal: Absence of New-Onset Illness or Injury  Outcome: Ongoing, Progressing  Intervention: Prevent Fall and Fall Injury  Recent Flowsheet Documentation  Taken 4/20/2023 1402 by Micheal Quezada, RN  Safety Promotion/Fall Prevention: safety round/check completed  Taken 4/20/2023 1203 by Micheal Quezada, RN  Safety Promotion/Fall Prevention: safety round/check completed  Taken 4/20/2023 1002 by Micheal Quezada, RN  Safety Promotion/Fall Prevention: safety round/check completed  Taken 4/20/2023 0800 by Micheal Quezada RN  Safety Promotion/Fall Prevention:   safety round/check completed   nonskid shoes/slippers when out of bed   fall prevention program maintained   gait belt   clutter free environment maintained   assistive device/personal items within reach  Goal: Optimal Comfort and Wellbeing  Outcome: Ongoing, Progressing  Goal: Home and Community Transition Plan Established  Outcome: Ongoing, Progressing     Problem: Fall Injury Risk  Goal: Absence of Fall and Fall-Related Injury  Outcome: Ongoing, Progressing  Intervention: Identify and Manage Contributors  Recent Flowsheet Documentation  Taken 4/20/2023 0800 by Micheal Quezada RN  Medication Review/Management: medications reviewed  Intervention: Promote Injury-Free Environment  Recent Flowsheet Documentation  Taken 4/20/2023 1402 by Micheal Quezada, RN  Safety Promotion/Fall Prevention: safety round/check completed  Taken 4/20/2023 1203 by Micheal Quezada, RN  Safety Promotion/Fall Prevention: safety round/check completed  Taken 4/20/2023 1002 by Micheal Quezada, RN  Safety Promotion/Fall Prevention: safety round/check completed  Taken 4/20/2023 0800 by Micheal Quezada, RN  Safety  Promotion/Fall Prevention:   safety round/check completed   nonskid shoes/slippers when out of bed   fall prevention program maintained   gait belt   clutter free environment maintained   assistive device/personal items within reach     Problem: Skin Injury Risk Increased  Goal: Skin Health and Integrity  Outcome: Ongoing, Progressing  Intervention: Optimize Skin Protection  Recent Flowsheet Documentation  Taken 4/20/2023 0800 by Micheal Quezada, RN  Pressure Reduction Techniques: pressure points protected  Pressure Reduction Devices: pressure-redistributing mattress utilized  Skin Protection: incontinence pads utilized     Problem: Diabetes Comorbidity  Goal: Blood Glucose Level Within Targeted Range  Outcome: Ongoing, Progressing  Intervention: Monitor and Manage Glycemia  Recent Flowsheet Documentation  Taken 4/20/2023 0800 by Micheal Quezada, RN  Glycemic Management: blood glucose monitored     Problem: Hypertension Comorbidity  Goal: Blood Pressure in Desired Range  Outcome: Ongoing, Progressing  Intervention: Maintain Blood Pressure Management  Recent Flowsheet Documentation  Taken 4/20/2023 0800 by Micheal Quezada, RN  Medication Review/Management: medications reviewed   Goal Outcome Evaluation:  Plan of Care Reviewed With: patient        Progress: improving

## 2023-04-20 NOTE — PROGRESS NOTES
Nephrology Progress Note      Subjective     Patient feels much better, no chest pain shortness of breath.    Objective       Vital signs :     Temp:  [97.9 °F (36.6 °C)-98.6 °F (37 °C)] 97.9 °F (36.6 °C)  Heart Rate:  [74-87] 87  Resp:  [18-20] 18  BP: (112-126)/(61-73) 112/61    Intake/Output                       04/18/23 0701 - 04/19/23 0700 04/19/23 0701 - 04/20/23 0700     [id number] 5088-3647 Total 2535-0198 9163-3265 Total                 Intake    P.O.  --  -- --  720  240 960    Total Intake -- -- -- 720 240 960       Output    Urine  --  375 375  100  700 800    Total Output -- 375 375 100 700 800           Physical Exam:    General Appearance : not in acute distress  Lungs : clear to auscultation, respirations regular  Heart :  regular rhythm & normal rate, normal S1, S2 and no murmur, no rub  Abdomen : soft, non distended, suprapubic tenderness   Extremities : no edema  Neurologic :   orientated to person, place, time and situation, Grossly no focal deficits        Laboratory Data :     Albumin No results found for: ALBUMIN   Magnesium Magnesium   Date Value Ref Range Status   04/19/2023 1.8 1.6 - 2.4 mg/dL Final   04/18/2023 2.1 1.6 - 2.4 mg/dL Final          PTH               No results found for: PTH    CBC and coagulation:  Results from last 7 days   Lab Units 04/19/23  0104 04/18/23  0044 04/15/23  0018 04/14/23  1356   WBC 10*3/mm3 8.12 8.43 11.57*  --    HEMOGLOBIN g/dL 8.2* 8.8* 7.6*  --    HEMATOCRIT % 25.3* 28.3* 23.5*  --    MCV fL 97.3* 101.1* 97.9*  --    MCHC g/dL 32.4 31.1* 32.3  --    PLATELETS 10*3/mm3 223 261 209  --    INR   --   --   --  1.15*     Acid/base balance:      Renal and electrolytes:    Results from last 7 days   Lab Units 04/20/23  0128 04/19/23  0104 04/18/23  0044 04/15/23  0018 04/14/23  0108   SODIUM mmol/L 136 130* 136 140 139   POTASSIUM mmol/L 4.1 4.0 4.3 3.8 3.1*   MAGNESIUM mg/dL  --  1.8 2.1  --   --    CHLORIDE mmol/L 99 95* 103 105 103   CO2 mmol/L 27.9 28.1  20.7* 23.9 26.9   BUN mg/dL 26* 15 28* 34* 28*   CREATININE mg/dL 3.35* 2.39* 3.69* 3.45* 2.63*   CALCIUM mg/dL 8.4* 8.0* 8.6 7.6* 7.6*   PHOSPHORUS mg/dL  --   --   --   --  2.4*     Estimated Creatinine Clearance: 19.5 mL/min (A) (by C-G formula based on SCr of 3.35 mg/dL (H)).  @GFRCG:3@   Liver and pancreatic function:  Results from last 7 days   Lab Units 04/14/23  0108   ALBUMIN g/dL 2.5*         Cardiac:      Liver and pancreatic function:  Results from last 7 days   Lab Units 04/14/23  0108   ALBUMIN g/dL 2.5*       Medications :     allopurinol, 50 mg, Oral, Once per day on Mon Thu  amLODIPine, 10 mg, Oral, Q24H  aspirin, 81 mg, Oral, Daily  atorvastatin, 40 mg, Oral, Nightly  carvedilol, 25 mg, Oral, BID With Meals  castor oil-balsam peru, 1 application, Topical, Q12H  gentamicin, 1 application, Topical, Daily  insulin glargine, 15 Units, Subcutaneous, Nightly  insulin lispro, 2-7 Units, Subcutaneous, TID With Meals  lidocaine, 1 patch, Transdermal, Daily  melatonin, 10 mg, Oral, Nightly  multivitamin, 1 tablet, Oral, Daily  OLANZapine zydis, 5 mg, Oral, Daily  pantoprazole, 40 mg, Oral, BID AC  senna-docusate sodium, 2 tablet, Oral, Nightly  sodium chloride, 10 mL, Intravenous, Q12H  sodium chloride, 10 mL, Intravenous, Q12H  sodium chloride, 10 mL, Intravenous, Q12H      sodium chloride, 9 mL/hr      Assessment & Plan     1.  CKD stage V initiated on PDx during this admission   2.  Upper GI bleed with blood loss anemia  3.  Heart failure with reduced ejection fraction LVEF 45%  4.  History of CABG  5.  Diabetes with nephropathy poor control  6.  Hypertension  7.  Dehydration with hypotension  8.  Pacemaker  9.  Confusion and acute psychosis  10.  Hypnatremia  11.  Peritoneal dialysis catheter malfunctioning     Dialysis today with 2 L ultrafiltration.  Continue on intermittent dialysis Tuesdays Thursdays and Saturdays  We will use heparin to break fibrin clog in the catheter and will do catheter flush  every Monday  Anemia, continue on erythropoietin  Redo of PDx cath outpatient.       Alessandra Farias MD  04/20/23  08:44 EDT

## 2023-04-20 NOTE — PROGRESS NOTES
Occupational Therapy: Individual: 90 minutes.    Physical Therapy:    Speech Language Pathology:    Signed by: Linn Colon OT

## 2023-04-20 NOTE — SIGNIFICANT NOTE
04/20/23 1318   Plan   Plan Faxed face sheet, H&P, MD progress note, Nephrology progress note and hepatitis panel to [facility] [phone].

## 2023-04-20 NOTE — PROGRESS NOTES
"Patient Assessment Instrument  Quality Indicators - Admission FY 2023    Section A. Ethnicity/ Race/Language  Ethnicity:  Not of , /a, Malay Origin  Race:  White  Preferred Language:  english  Requests  to Communicate:   No    Section A. Transportation      Section B. Hearing and Vision  Expression of Ideas and Wants: Expresses complex messages without difficulty and  with speech that is clear and easy to understand.  Understanding Verbal and Non-Verbal Content: Understands: Clear comprehension  without cues or repetitions.  Ability to Hear:  Adequate - no difficulty in normal conversation, social  interaction, listening to TV  Ability to See in Adequate Light:  Adequate - sees fine detail, such as regular  print in newspapers/books    Section B. Health Literacy  Frequency of Needing Assistance Reading:  Sometimes      Section C. Cognitive Patterns  Brief Interview for Mental Status (BIMS) was conducted.  Repetition of Three Words: Three words  Able to report correct year: Correct  Able to report correct month: Missed by 6 days to 1 month  Able to report correct day of the week: Correct  Able to recall \"sock\": Yes, no cue required  Able to recall \"blue\": Yes, no cue required  Able to recall \"bed\": No, could not recall    BIMS SUMMARY SCORE: 12 Moderately impaired Patient was able to complete the  Brief Interview for Mental Status    Section C. Signs and Symptoms of Delirium (from CAM)  Evidence of Acute Change in Mental Status:   No  Inattention: Behavior not present  Thinking Disorganized or Incoherent:   Behavior not present  Altered Level of Consciousness:   Behavior not present    Section D. Mood  Presence of little interest or pleasure in doing things:   No  Frequency of having little interest or pleasure in doing things:   Never or 1  day  Presence of feeling down, depressed, or hopeless:   No  Frequency of feeling down, depressed, or hopeless:   Never or 1 day   Interview Ended. " no edema,  no murmurs,  regular rate and rhythm Above responses do not meet criteria to continue.  Total Severity Score:   00    Section D. Social Isolation  Frequecy of Feeling Lonely or Isolated:  Sometimes    Section [id number]. Prior Functioning      Section EA0996. Prior Device Use      Section TP0476. Self Care Performance      Section NY5990. Self Care Discharge Goals      Section GM8740. Mobility Performance      Section NT5923. Mobility Discharge Goals      Section H. Bladder and Bowel      Section I. Active Diagnosis      Section J. Health Conditions      Section J. Health Conditions (Pain)  Pain Effect on Sleep:   Rarely or not at all  Pain Interference with Therapy Activities:   Rarely or not at all  Pain Interference with Day-to-Day Activities:   Rarely or not at all    Section K. Swallowing/Nutritional Status    Nutritional Approaches on Admission:    Section M. Skin Conditions  Unhealed Pressure Ulcer/Injuries at Stage 1 or Higher on Admission:  Yes.  Number of Unhealed Stage 1 Pressure Injuries: 0  Number of Unhealed Stage 2: 0  Number of Unhealed Stage 3: 0  Number of Unhealed Stage 4: 0  Number of Unhealed Unstageable Due to Non-removable Dressing/Device: 0  Number of Unhealed Unstageable Due to Slough/Eschar: 0  Number of Unhealed Unstageable Pressure Injuries Presenting as Deep Tissue  Injury: 1    Section N. Medication    Potential Clinically Significant Medication Issues: No issues found during  review      Section O. Special Treatments, Procedures, and Programs  IV Access: Peripheral   Dialysis: Hemodialysis, Peritoneal dialysis    Signed by: Nurse Mauricio

## 2023-04-20 NOTE — SIGNIFICANT NOTE
04/20/23 8265   Plan   Plan Team conference held today.   Spoke to spouse [phone] about plans for pt to be discharged on 4-26-23 and how he is doing in therapy.  Discussed possible need for outpatient HD.  Spouse prefers [facility] on Monday, Wednesday and Friday.  Informed spouse dialysis clinic will determine what slot is available but this will be requested.  Pt will return home with spouse at discharge who will assist with caregiving needs.  Son lives behind pt and he will assist as needed when he is not working.  Spouse to come to rehab on 4-26-23 around 11:00 am for discharge and transport home.   Patient/Family in Agreement with Plan yes

## 2023-04-20 NOTE — PROGRESS NOTES
Rehabilitation Nursing  Inpatient Rehabilitation Plan of Care Note    Plan of Care  Copy from [facility]    Pain Management (Active)  Current Status (4/7/2023 4:45:00 PM): at risk for pain  Weekly Goal: pain at an acceptable level  Discharge Goal: Pain managed    Safety    Potential for Injury (Active)  Current Status (4/7/2023 4:45:00 PM): At risk for falls  Weekly Goal: no falls  Discharge Goal: no falls    Signed by: Micheal Quezada RN

## 2023-04-21 LAB
GLUCOSE BLDC GLUCOMTR-MCNC: 117 MG/DL (ref 70–130)
GLUCOSE BLDC GLUCOMTR-MCNC: 214 MG/DL (ref 70–130)
GLUCOSE BLDC GLUCOMTR-MCNC: 256 MG/DL (ref 70–130)
GLUCOSE BLDC GLUCOMTR-MCNC: 308 MG/DL (ref 70–130)

## 2023-04-21 PROCEDURE — 99231 SBSQ HOSP IP/OBS SF/LOW 25: CPT | Performed by: FAMILY MEDICINE

## 2023-04-21 PROCEDURE — 82962 GLUCOSE BLOOD TEST: CPT

## 2023-04-21 PROCEDURE — 97530 THERAPEUTIC ACTIVITIES: CPT

## 2023-04-21 PROCEDURE — 63710000001 INSULIN GLARGINE PER 5 UNITS: Performed by: FAMILY MEDICINE

## 2023-04-21 PROCEDURE — 97110 THERAPEUTIC EXERCISES: CPT

## 2023-04-21 PROCEDURE — 97530 THERAPEUTIC ACTIVITIES: CPT | Performed by: OCCUPATIONAL THERAPIST

## 2023-04-21 PROCEDURE — 97110 THERAPEUTIC EXERCISES: CPT | Performed by: OCCUPATIONAL THERAPIST

## 2023-04-21 PROCEDURE — 97112 NEUROMUSCULAR REEDUCATION: CPT

## 2023-04-21 PROCEDURE — 97535 SELF CARE MNGMENT TRAINING: CPT | Performed by: OCCUPATIONAL THERAPIST

## 2023-04-21 PROCEDURE — 63710000001 INSULIN LISPRO (HUMAN) PER 5 UNITS: Performed by: FAMILY MEDICINE

## 2023-04-21 PROCEDURE — 97116 GAIT TRAINING THERAPY: CPT

## 2023-04-21 RX ADMIN — PANTOPRAZOLE SODIUM 40 MG: 40 TABLET, DELAYED RELEASE ORAL at 17:41

## 2023-04-21 RX ADMIN — Medication 10 MG: at 21:08

## 2023-04-21 RX ADMIN — CASTOR OIL AND BALSAM, PERU 1 APPLICATION: 788; 87 OINTMENT TOPICAL at 08:44

## 2023-04-21 RX ADMIN — Medication 10 ML: at 21:09

## 2023-04-21 RX ADMIN — ACETAMINOPHEN 500 MG: 500 TABLET ORAL at 21:08

## 2023-04-21 RX ADMIN — Medication 10 ML: at 08:46

## 2023-04-21 RX ADMIN — AMLODIPINE BESYLATE 10 MG: 10 TABLET ORAL at 08:45

## 2023-04-21 RX ADMIN — INSULIN LISPRO 3 UNITS: 100 INJECTION, SOLUTION INTRAVENOUS; SUBCUTANEOUS at 11:52

## 2023-04-21 RX ADMIN — Medication 1 TABLET: at 08:45

## 2023-04-21 RX ADMIN — GENTAMICIN SULFATE 1 APPLICATION: 1 OINTMENT TOPICAL at 08:44

## 2023-04-21 RX ADMIN — INSULIN LISPRO 5 UNITS: 100 INJECTION, SOLUTION INTRAVENOUS; SUBCUTANEOUS at 17:40

## 2023-04-21 RX ADMIN — ASPIRIN 81 MG: 81 TABLET, CHEWABLE ORAL at 08:45

## 2023-04-21 RX ADMIN — Medication 10 ML: at 21:10

## 2023-04-21 RX ADMIN — LIDOCAINE 1 PATCH: 50 PATCH CUTANEOUS at 08:45

## 2023-04-21 RX ADMIN — PANTOPRAZOLE SODIUM 40 MG: 40 TABLET, DELAYED RELEASE ORAL at 06:32

## 2023-04-21 RX ADMIN — INSULIN GLARGINE 15 UNITS: 100 INJECTION, SOLUTION SUBCUTANEOUS at 21:08

## 2023-04-21 RX ADMIN — DOCUSATE SODIUM 50 MG AND SENNOSIDES 8.6 MG 2 TABLET: 8.6; 5 TABLET, FILM COATED ORAL at 21:08

## 2023-04-21 RX ADMIN — ATORVASTATIN CALCIUM 40 MG: 40 TABLET, FILM COATED ORAL at 21:08

## 2023-04-21 RX ADMIN — Medication 10 ML: at 08:47

## 2023-04-21 RX ADMIN — CASTOR OIL AND BALSAM, PERU 1 APPLICATION: 788; 87 OINTMENT TOPICAL at 21:09

## 2023-04-21 RX ADMIN — OLANZAPINE 5 MG: 5 TABLET, ORALLY DISINTEGRATING ORAL at 08:45

## 2023-04-21 NOTE — THERAPY TREATMENT NOTE
Inpatient Rehabilitation - Occupational Therapy Treatment Note    [facility]     Patient Name: Augusto Lorenzana Jr.  : 1945  MRN: [id number]    Today's Date: 2023                 Admit Date: 2023       No diagnosis found.    Patient Active Problem List   Diagnosis   • NSTEMI 2021   • Hyperlipidemia LDL goal <70   • Essential hypertension   • T2DM on oral agents and insulin. HbA1c 7.4    • Coronary artery disease involving native coronary artery of native heart with unstable angina pectoris   • A/C kidney disease. ATN due to postoperative arrest. 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 Combination of anoxic insult and azotemia   • Debility   • Lower extremity edema   • Complete heart block   • GI bleed   • Third degree atrioventricular block   • Atrial fibrillation, persistent   • Gastrointestinal hemorrhage associated with gastric ulcer   • Critical illness myopathy       Past Medical History:   Diagnosis Date   • CAD (coronary artery disease)    • CKD (chronic kidney disease) stage 3, GFR 30-59 ml/min    • Diabetes mellitus    • Hyperlipidemia    • Hypertension    • NSTEMI (non-ST elevated myocardial infarction)        Past Surgical History:   Procedure Laterality Date   • CARDIAC CATHETERIZATION N/A 2021    Procedure: Left Heart Cath;  Surgeon: Blade Greene IV, MD;  Location: [facility] CATH INVASIVE LOCATION;  Service: Cardiovascular;  Laterality: N/A;   • CARDIAC SURGERY      2 stents placed .   • CENTRAL VENOUS LINE INSERTION Left 2023    Procedure: CENTRAL VENOUS LINE INSERTION;  Surgeon: Torey Easley MD;  Location: [facility];  Service: General;  Laterality: Left;   • CORONARY ARTERY BYPASS GRAFT N/A 2021    Procedure: MEDIAN STERNOTOMY CORONARY ARTERY BYPASS X 3 UTILIZING THE LEFT INTERNAL  MAMMARY ARTERY GRAFT, EVH OF THE GREATER RIGHT SAPHENOUS VEIN, AND PILO PER ANESTHESIA;  Surgeon: Jacek Miller MD;  Location:  [facility];  Service: Cardiothoracic;  Laterality: N/A;   • ENDOSCOPY N/A 04/08/2023    Procedure: ESOPHAGOGASTRODUODENOSCOPY with CENTERAL LINE PLACEMENT;  Surgeon: Sabine Hudson MD;  Location:  [facility];  Service: General;  Laterality: N/A;   • ENDOSCOPY N/A 04/10/2023    Procedure: ESOPHAGOGASTRODUODENOSCOPY;  Surgeon: Sabine Hudson MD;  Location:  [facility];  Service: General;  Laterality: N/A;   • INSERTION HEMODIALYSIS CATHETER N/A 4/13/2023    Procedure: HEMODIALYSIS CATHETER INSERTION;  Surgeon: Torey Easley MD;  Location:  [facility];  Service: General;  Laterality: N/A;   • PACEMAKER IMPLANTATION               IRF OT ASSESSMENT FLOWSHEET (last 12 hours)     IRF OT Evaluation and Treatment     Row Name 04/21/23 1400          OT Time and Intention    Document Type daily treatment  -     Mode of Treatment individual therapy;occupational therapy  -     Patient Effort good  -     Row Name 04/21/23 1400          General Information    Patient/Family/Caregiver Comments/Observations patient agreeable to therapy. patient tolerated well with no complaints. patient seen up in chair in therapy gym.  -     Existing Precautions/Restrictions fall;pacemaker  -     Row Name 04/21/23 1400          Cognition/Psychosocial    Orientation Status (Cognition) oriented to;person;place;situation  -     Follows Commands (Cognition) follows one-step commands;verbal cues/prompting required  -     Comment, Cognition less confusion noted today  -     Row Name 04/21/23 1400          Bed-Chair Transfer    Bed-Chair [place] (Transfers) minimum assist (75% patient effort);contact guard;verbal cues  -     Assistive Device (Bed-Chair Transfers) wheelchair  -     Row Name 04/21/23 1400          Motor Skills    Motor Skills coordination;functional endurance  -      Therapeutic Exercise shoulder;elbow/forearm;wrist;hand  BUE ROM, gmc,fmc, strengthening, reaching  -     Row Name 04/21/23 1400          Positioning and Restraints    Post Treatment Position wheelchair  -     In Wheelchair sitting;with PT  -           User Key  (r) = Recorded By, (t) = Taken By, (c) = Cosigned By    Initials Name Effective Dates     Layla Engle,  06/16/21 -                  Occupational Therapy Education     Title: PT OT SLP Therapies (Done)     Topic: Occupational Therapy (Done)     Point: ADL training (Done)     Description:   Instruct learner(s) on proper safety adaptation and remediation techniques during self care or transfers.   Instruct in proper use of assistive devices.              Learning Progress Summary           Patient Acceptance, E,TB, VU by  at 4/20/2023 2223    Acceptance, E,D, VU,NR by  at 4/20/2023 0948    Acceptance, E,D, VU,NR by  at 4/19/2023 1109                   Point: Precautions (Done)     Description:   Instruct learner(s) on prescribed precautions during self-care and functional transfers.              Learning Progress Summary           Patient Acceptance, E,TB, VU by  at 4/20/2023 2223    Acceptance, E,D, VU,NR by  at 4/20/2023 0948    Acceptance, E,D, VU,NR by  at 4/19/2023 1109                               User Key     Initials Effective Dates Name Provider Type Discipline     06/16/21 -  Dariela Arteaga, RN Registered Nurse Nurse     06/16/21 -  Linn Colon, OT Occupational Therapist OT                    OT Recommendation and Plan                         Time Calculation:      Time Calculation- OT     Row Name 04/21/23 1456             Time Calculation- OT    OT Start Time 0745  -      OT Stop Time 0915  -      OT Time Calculation (min) 90 min  -            User Key  (r) = Recorded By, (t) = Taken By, (c) = Cosigned By    Initials Name Provider Type    Layla Bojorquez, OT Occupational Therapist               Therapy Charges for Today     Code Description Service Date Service Provider Modifiers Qty    92806718729 HC OT THERAPEUTIC ACT EA 15 MIN 4/21/2023 Layla Engle, OT GO 2    34024127201 HC OT SELF CARE/MGMT/TRAIN EA 15 MIN 4/21/2023 Layla Engle, OT GO 1    24284012014 HC OT THER PROC EA 15 MIN 4/21/2023 Layla Engle OT GO 3                   Layla Engle OT  4/21/2023

## 2023-04-21 NOTE — PROGRESS NOTES
Occupational Therapy: Individual: 90 minutes.    Physical Therapy:    Speech Language Pathology:    Signed by: Melissa Engle, Occupational Therapist

## 2023-04-21 NOTE — THERAPY TREATMENT NOTE
Inpatient Rehabilitation - Physical Therapy Treatment Note       [facility]     Patient Name: Augusto Lorenzana Jr.  : 1945  MRN: [id number]    Today's Date: 2023                    Admit Date: 2023      Visit Dx:   No diagnosis found.    Patient Active Problem List   Diagnosis   • NSTEMI 2021   • Hyperlipidemia LDL goal <70   • Essential hypertension   • T2DM on oral agents and insulin. HbA1c 7.4    • Coronary artery disease involving native coronary artery of native heart with unstable angina pectoris   • A/C kidney disease. ATN due to postoperative arrest. 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 Combination of anoxic insult and azotemia   • Debility   • Lower extremity edema   • Complete heart block   • GI bleed   • Third degree atrioventricular block   • Atrial fibrillation, persistent   • Gastrointestinal hemorrhage associated with gastric ulcer   • Critical illness myopathy       Past Medical History:   Diagnosis Date   • CAD (coronary artery disease)    • CKD (chronic kidney disease) stage 3, GFR 30-59 ml/min    • Diabetes mellitus    • Hyperlipidemia    • Hypertension    • NSTEMI (non-ST elevated myocardial infarction)        Past Surgical History:   Procedure Laterality Date   • CARDIAC CATHETERIZATION N/A 2021    Procedure: Left Heart Cath;  Surgeon: Blade Greene IV, MD;  Location: [facility] CATH INVASIVE LOCATION;  Service: Cardiovascular;  Laterality: N/A;   • CARDIAC SURGERY      2 stents placed .   • CENTRAL VENOUS LINE INSERTION Left 2023    Procedure: CENTRAL VENOUS LINE INSERTION;  Surgeon: Torey Easley MD;  Location: [facility];  Service: General;  Laterality: Left;   • CORONARY ARTERY BYPASS GRAFT N/A 2021    Procedure: MEDIAN STERNOTOMY CORONARY ARTERY BYPASS X 3 UTILIZING THE  LEFT INTERNAL MAMMARY ARTERY GRAFT, EVH OF THE GREATER RIGHT SAPHENOUS VEIN, AND PILO PER ANESTHESIA;  Surgeon: Jacek Miller MD;  Location:  [facility];  Service: Cardiothoracic;  Laterality: N/A;   • ENDOSCOPY N/A 04/08/2023    Procedure: ESOPHAGOGASTRODUODENOSCOPY with CENTERAL LINE PLACEMENT;  Surgeon: Sabine Hudson MD;  Location:  [facility];  Service: General;  Laterality: N/A;   • ENDOSCOPY N/A 04/10/2023    Procedure: ESOPHAGOGASTRODUODENOSCOPY;  Surgeon: Sabine Hudson MD;  Location:  [facility];  Service: General;  Laterality: N/A;   • INSERTION HEMODIALYSIS CATHETER N/A 4/13/2023    Procedure: HEMODIALYSIS CATHETER INSERTION;  Surgeon: Torey Easley MD;  Location:  [facility];  Service: General;  Laterality: N/A;   • PACEMAKER IMPLANTATION         PT ASSESSMENT (last 12 hours)     IRF PT Evaluation and Treatment     Row Name 04/21/23 1452          PT Time and Intention    Document Type daily treatment  -LB     Mode of Treatment individual therapy;physical therapy  -LB     Row Name 04/21/23 1452          General Information    Existing Precautions/Restrictions fall  confusion  -LB     Row Name 04/21/23 1452          Pain Scale: FACES Pre/Post-Treatment    Pain: FACES Scale, Pretreatment 0-->no hurt  -LB     Posttreatment Pain Rating 0-->no hurt  -LB     Row Name 04/21/23 1452          Cognition/Psychosocial    Affect/Mental Status (Cognition) confused  -LB     Follows Commands (Cognition) verbal cues/prompting required;physical/tactile prompts required  -LB     Personal Safety Interventions gait belt;nonskid shoes/slippers when out of bed;supervised activity  -LB     Row Name 04/21/23 1452          Bed-Chair Transfer    Bed-Chair [place] (Transfers) verbal cues;nonverbal cues (demo/gesture);contact guard  -LB     Assistive Device (Bed-Chair Transfers) wheelchair  -LB     Row Name 04/21/23 1452          Chair-Bed Transfer    Chair-Bed [place] (Transfers) verbal cues;nonverbal cues  (demo/gesture);contact guard  -LB     Assistive Device (Chair-Bed Transfers) wheelchair  -LB     Row Name 04/21/23 1452          Sit-Stand Transfer    Sit-Stand [place] (Transfers) verbal cues;nonverbal cues (demo/gesture);contact guard  -LB     Assistive Device (Sit-Stand Transfers) walker, front-wheeled;wheelchair  -LB     Row Name 04/21/23 1452          Stand-Sit Transfer    Stand-Sit [place] (Transfers) verbal cues;nonverbal cues (demo/gesture);contact guard  -LB     Assistive Device (Stand-Sit Transfers) walker, front-wheeled;wheelchair  -LB     Row Name 04/21/23 1452          Gait/Stairs (Locomotion)    [place] Level (Gait) verbal cues;nonverbal cues (demo/gesture);contact guard  -LB     Assistive Device (Gait) walker, front-wheeled  -LB     Distance in Feet (Gait) 320'  -LB     Deviations/Abnormal Patterns (Gait) chinyere decreased;gait speed decreased;stride length decreased  -LB     Bilateral Gait Deviations forward flexed posture  he drifts to right side, frequent verbal cues to steer walker  -LB     Row Name 04/21/23 1452          Balance    Comment, Balance standing in PB for bag toss at goal with BUE,  with reacher 1x  -LB     Row Name 04/21/23 1452          Motor Skills    Therapeutic Exercise --  sitting ex, standing ex in PB  -LB     Row Name 04/21/23 1452          Aerobic Exercise    Type (Aerobic Exercise) recumbent stationary bike  -LB     Time Performed (Aerobic Exercise) level 1.0, 10 min  -LB     Comment, Aerobic Exercise (Therapeutic Exercise) foot straps to hold feet on pedals  -LB     Row Name 04/21/23 1452          Positioning and Restraints    Pre-Treatment Position sitting in chair/recliner  -LB     In Bed call light within reach;encouraged to call for assist;exit alarm on;side rails up x3  -LB     Row Name 04/21/23 1452          Daily Progress Summary (PT)    Recommendations (PT) continue PT  -LB           User Key  (r) = Recorded By, (t) = Taken By, (c) = Cosigned  By    Initials Name Provider Type    Jacqueline Ontiveros, PT Physical Therapist              Wound 04/12/23 1700 distal penis Pressure Injury (Active)   Dressing Appearance open to air 04/21/23 0845   Closure Open to air 04/21/23 0845   Base red;pink 04/21/23 0845   Drainage Amount none 04/20/23 1901   Care, Wound barrier applied 04/21/23 0845   Dressing Care open to air 04/21/23 0845       Wound Left chest Incision (Active)   Dressing Appearance dry;intact 04/20/23 1901   Closure DAVION 04/21/23 0845   Base dressing in place, unable to visualize 04/21/23 0845   Care, Wound other (see comments) 04/21/23 0845     Physical Therapy Education     Title: PT OT SLP Therapies (Done)     Topic: Physical Therapy (Done)     Point: Mobility training (Done)     Learning Progress Summary           Patient Acceptance, E, VU,NR by LB at 4/21/2023 1458    Acceptance, E,TB, VU by DG at 4/20/2023 2223    Acceptance, E, VU,NR by LB at 4/20/2023 1045    Acceptance, E, VU,NR by LB at 4/19/2023 1559                   Point: Home exercise program (Done)     Learning Progress Summary           Patient Acceptance, E, VU,NR by LB at 4/21/2023 1458    Acceptance, E,TB, VU by DG at 4/20/2023 2223    Acceptance, E, VU,NR by LB at 4/20/2023 1045    Acceptance, E, VU,NR by LB at 4/19/2023 1559                   Point: Body mechanics (Done)     Learning Progress Summary           Patient Acceptance, E, VU,NR by LB at 4/21/2023 1458    Acceptance, E,TB, VU by DG at 4/20/2023 2223    Acceptance, E, VU,NR by LB at 4/20/2023 1045    Acceptance, E, VU,NR by LB at 4/19/2023 1559                   Point: Precautions (Done)     Learning Progress Summary           Patient Acceptance, E, VU,NR by LB at 4/21/2023 1458    Acceptance, E,TB, VU by DG at 4/20/2023 2223    Acceptance, E, VU,NR by LB at 4/20/2023 1045    Acceptance, E, VU,NR by LB at 4/19/2023 1559                               User Key     Initials Effective Dates Name Provider Type Discipline     KEHINDE 06/16/21 -  Dariela Arteaga RN Registered Nurse Nurse    LB 06/16/21 -  Jacqueline Porter, PT Physical Therapist PT                PT Recommendation and Plan    Planned Therapy Interventions (PT): balance training, bed mobility training, gait training, patient/family education, strengthening, transfer training  Frequency of Treatment (PT): 5 times per week  Anticipated Equipment Needs at Discharge (PT Eval):  (tbd)  Daily Progress Summary (PT)  Recommendations (PT): continue PT               Time Calculation:      PT Charges     Row Name 04/21/23 1459             Time Calculation    Start Time 0915  -LB      Stop Time 1045  -LB      Time Calculation (min) 90 min  -LB      PT Received On 04/21/23  -LB            User Key  (r) = Recorded By, (t) = Taken By, (c) = Cosigned By    Initials Name Provider Type    Jacqueline Ontiveros, PT Physical Therapist                Therapy Charges for Today     Code Description Service Date Service Provider Modifiers Qty    74936168495 HC GAIT TRAINING EA 15 MIN 4/20/2023 Jacqueline Porter, PT GP 1    81993380809 HC PT THER PROC EA 15 MIN 4/20/2023 Jacqueline Porter, PT GP 3    95641005765 HC PT NEUROMUSC RE EDUCATION EA 15 MIN 4/20/2023 Jacqueline Porter, PT GP 1    63449663140 HC PT THERAPEUTIC ACT EA 15 MIN 4/20/2023 Jacqueline Porter, PT GP 1    21149727925 HC GAIT TRAINING EA 15 MIN 4/21/2023 Jacqueline Porter, PT GP 1    60129321631 HC PT NEUROMUSC RE EDUCATION EA 15 MIN 4/21/2023 Jacqueline Porter, PT GP 1    18977884925 HC PT THER PROC EA 15 MIN 4/21/2023 Jacqueline Porter, PT GP 3    74070637837 HC PT THERAPEUTIC ACT EA 15 MIN 4/21/2023 Jacqueline Porter, PT GP 1            PT G-Codes  AM-PAC 6 Clicks Score (PT): 19      Jacqueline Porter, PT  4/21/2023

## 2023-04-21 NOTE — PLAN OF CARE
Problem: Rehabilitation (IRF) Plan of Care  Goal: Plan of Care Review  Outcome: Ongoing, Progressing  Flowsheets (Taken 4/21/2023 1000)  Progress: improving  Plan of Care Reviewed With: patient  Outcome Evaluation:   Patient progressing with therapies   no signs of symptoms of acute distress noted. Call light and items within reach. Continue current plan of care   monitor.  Goal: Patient-Specific Goal (Individualized)  Outcome: Ongoing, Progressing  Goal: Absence of New-Onset Illness or Injury  Outcome: Ongoing, Progressing  Intervention: Prevent Fall and Fall Injury  Recent Flowsheet Documentation  Taken 4/21/2023 0845 by Umu Torres RN  Safety Promotion/Fall Prevention: safety round/check completed  Taken 4/21/2023 0800 by Umu Torres RN  Safety Promotion/Fall Prevention:   safety round/check completed   assistive device/personal items within reach   nonskid shoes/slippers when out of bed  Intervention: Prevent Infection  Recent Flowsheet Documentation  Taken 4/21/2023 0800 by Umu Torres RN  Infection Prevention: hand hygiene promoted  Intervention: Prevent VTE (Venous Thromboembolism)  Recent Flowsheet Documentation  Taken 4/21/2023 0845 by Umu Torres RN  VTE Prevention/Management: sequential compression devices on  Goal: Optimal Comfort and Wellbeing  Outcome: Ongoing, Progressing  Goal: Home and Community Transition Plan Established  Outcome: Ongoing, Progressing     Problem: Fall Injury Risk  Goal: Absence of Fall and Fall-Related Injury  Outcome: Ongoing, Progressing  Intervention: Promote Injury-Free Environment  Recent Flowsheet Documentation  Taken 4/21/2023 0845 by Umu Torres RN  Safety Promotion/Fall Prevention: safety round/check completed  Taken 4/21/2023 0800 by Umu Torres RN  Safety Promotion/Fall Prevention:   safety round/check completed   assistive device/personal items within reach   nonskid shoes/slippers when out of bed     Problem: Skin Injury Risk  Increased  Goal: Skin Health and Integrity  Outcome: Ongoing, Progressing  Intervention: Promote and Optimize Oral Intake  Recent Flowsheet Documentation  Taken 4/21/2023 0845 by Umu Torres RN  Oral Nutrition Promotion: physical activity promoted  Intervention: Optimize Skin Protection  Recent Flowsheet Documentation  Taken 4/21/2023 0845 by Umu Torres RN  Pressure Reduction Techniques:   weight shift assistance provided   pressure points protected   heels elevated off bed   frequent weight shift encouraged   positioned off wounds  Pressure Reduction Devices:   pressure-redistributing mattress utilized   positioning supports utilized   heel offloading device utilized  Skin Protection:   adhesive use limited   incontinence pads utilized   transparent dressing maintained   protective footwear used     Problem: Diabetes Comorbidity  Goal: Blood Glucose Level Within Targeted Range  Outcome: Ongoing, Progressing  Intervention: Monitor and Manage Glycemia  Recent Flowsheet Documentation  Taken 4/21/2023 0845 by Umu Torres RN  Glycemic Management:   blood glucose monitored   oral hydration promoted     Problem: Hypertension Comorbidity  Goal: Blood Pressure in Desired Range  Outcome: Ongoing, Progressing   Goal Outcome Evaluation:  Plan of Care Reviewed With: patient        Progress: improving  Outcome Evaluation: Patient progressing with therapies; no signs of symptoms of acute distress noted. Call light and items within reach. Continue current plan of care; monitor.

## 2023-04-21 NOTE — PROGRESS NOTES
Rehabilitation Nursing  Inpatient Rehabilitation Plan of Care Note    Plan of Care  Copy from [facility]    Pain Management (Active)  Current Status (4/7/2023 4:45:00 PM): at risk for pain  Weekly Goal: pain at an acceptable level  Discharge Goal: Pain managed    Safety    Potential for Injury (Active)  Current Status (4/7/2023 4:45:00 PM): At risk for falls  Weekly Goal: no falls  Discharge Goal: no falls    Signed by: Dariela Arteaga, Nurse

## 2023-04-21 NOTE — PROGRESS NOTES
[facility]  PROGRESS NOTE     Patient Identification:  Name:  Augusto Lorenzana Jr.  Age:  78 y.o.  Sex:  male  :  1945  MRN:  [id number]  Visit Number:  [id number]  ROOM: [facility]     Primary Care Provider:  Rob Polanco MD    Length of stay in inpatient status:  3    Subjective     Chief Compliant:  No chief complaint on file.      History of Presenting Illness: 78-year-old gentleman with critical illness myopathy.  Patient states he is doing well with therapy at this time.  Has no new complaints at this time    Objective     Current Hospital Meds:allopurinol, 50 mg, Oral, Once per day on Mon Thu  amLODIPine, 10 mg, Oral, Q24H  aspirin, 81 mg, Oral, Daily  atorvastatin, 40 mg, Oral, Nightly  carvedilol, 25 mg, Oral, BID With Meals  castor oil-balsam peru, 1 application, Topical, Q12H  gentamicin, 1 application, Topical, Daily  insulin glargine, 15 Units, Subcutaneous, Nightly  insulin lispro, 2-7 Units, Subcutaneous, TID With Meals  lidocaine, 1 patch, Transdermal, Daily  melatonin, 10 mg, Oral, Nightly  multivitamin, 1 tablet, Oral, Daily  OLANZapine zydis, 5 mg, Oral, Daily  pantoprazole, 40 mg, Oral, BID AC  senna-docusate sodium, 2 tablet, Oral, Nightly  sodium chloride, 10 mL, Intravenous, Q12H  sodium chloride, 10 mL, Intravenous, Q12H  sodium chloride, 10 mL, Intravenous, Q12H    sodium chloride, 9 mL/hr      ----------------------------------------------------------------------------------------------------------------------  Vital Signs:  Temp:  [98.1 °F (36.7 °C)-99 °F (37.2 °C)] 98.1 °F (36.7 °C)  Heart Rate:  [83-90] 83  Resp:  [18-20] 18  BP: (104-129)/(69-76) 129/76  SpO2:  [98 %] 98 %  on   ;   Device (Oxygen Therapy): room air  Body mass index is 25.4 kg/m².    Wt Readings from Last 3 Encounters:   23 73.6 kg (162 lb 3.2 oz)   23 72.9 kg (160 lb 11.2 oz)   23 77.2 kg (170 lb 3.2 oz)     Intake & Output (last 3 days)        0701   0700   0701 04/20 0700 04/20 0701 04/21 0700 04/21 0701 04/22 0700    P.O.  960 600 240    Total Intake(mL/kg)  960 (13) 600 (8.2) 240 (3.3)    Urine (mL/kg/hr) 375 800 (0.5) 1150 (0.7) 300 (1.1)    Other   500     Total Output  300    Net -375 +160 -1050 -60            Urine Unmeasured Occurrence  2 x          Diet: Diabetic Diets; Consistent Carbohydrate; Texture: Soft to Chew (NDD 3); Soft to Chew: Chopped Meat; Fluid Consistency: Thin (IDDSI 0)  ----------------------------------------------------------------------------------------------------------------------  Physical exam:  Constitutional:   No acute distress  HEENT: Normocephalic atraumatic  Neck: Supple   Cardiovascular: Regular rate and rhythm  Pulmonary/Chest: Clear to auscultation  Abdominal: Positive bowel sounds soft.   Musculoskeletal: No arthropathy  Neurological: Intermittent confusion but at this time is alert and oriented x3  Skin: No rash  Peripheral vascular: No edema  Genitourinary:  ----------------------------------------------------------------------------------------------------------------------    Last echocardiogram:  Results for orders placed during the hospital encounter of 05/22/21    Adult Transthoracic Echo Complete w/ Color, Spectral and Contrast if necessary per protocol    Interpretation Summary  · Left ventricular systolic function is normal. Estimated left ventricular EF = 60%.  · Left ventricular diastolic function is consistent with (grade I) impaired relaxation.  · Mild aortic valve stenosis is present.  · Estimated right ventricular systolic pressure from tricuspid regurgitation is normal (<35 mmHg).    ----------------------------------------------------------------------------------------------------------------------  Results from last 7 days   Lab Units 04/19/23  0104 04/18/23  0044 04/15/23  0018 04/14/23  1356   WBC 10*3/mm3 8.12 8.43 11.57*  --    HEMOGLOBIN g/dL 8.2* 8.8* 7.6*  --    HEMATOCRIT % 25.3*  28.3* 23.5*  --    MCV fL 97.3* 101.1* 97.9*  --    MCHC g/dL 32.4 31.1* 32.3  --    PLATELETS 10*3/mm3 223 261 209  --    INR   --   --   --  1.15*         Results from last 7 days   Lab Units 04/20/23  0128 04/19/23  0104 04/18/23  0044   SODIUM mmol/L 136 130* 136   POTASSIUM mmol/L 4.1 4.0 4.3   MAGNESIUM mg/dL  --  1.8 2.1   CHLORIDE mmol/L 99 95* 103   CO2 mmol/L 27.9 28.1 20.7*   BUN mg/dL 26* 15 28*   CREATININE mg/dL 3.35* 2.39* 3.69*   CALCIUM mg/dL 8.4* 8.0* 8.6   GLUCOSE mg/dL 162* 162* 111*   Estimated Creatinine Clearance: 19.5 mL/min (A) (by C-G formula based on SCr of 3.35 mg/dL (H)).  No results found for: AMMONIA              Glucose   Date/Time Value Ref Range Status   04/21/2023 0618 117 70 - 130 mg/dL Final     Comment:     Meter: [id number] : [id number] Homar Rich   04/20/2023 1804 116 70 - 130 mg/dL Final     Comment:     Meter: [id number] : [id number] bill valencia   04/20/2023 1108 207 (H) 70 - 130 mg/dL Final     Comment:     Meter: [id number] : [id number] Vieyra Keyla   04/20/2023 0623 95 70 - 130 mg/dL Final     Comment:     Meter: [id number] : [id number] [place]   04/19/2023 1932 221 (H) 70 - 130 mg/dL Final     Comment:     Meter: [id number] : [id number] HUBERT HUTCHINSONY   04/19/2023 1627 280 (H) 70 - 130 mg/dL Final     Comment:     Meter: [id number] : [id number] [facility]   04/19/2023 1129 229 (H) 70 - 130 mg/dL Final     Comment:     Meter: [id number] : [id number] [facility]   04/19/2023 0611 123 70 - 130 mg/dL Final     Comment:     Meter: [id number] : [id number] HUBERT YUKO     Lab Results   Component Value Date    TSH 4.030 06/03/2021    FREET4 1.1 03/31/2023     No results found for: PREGTESTUR, PREGSERUM, HCG, HCGQUANT  Pain Management Panel         Latest Ref Rng & Units 12/7/2022 5/30/2021   Pain Management Panel   Creatinine, Urine mg/dL 80.1   136.1           Multiple values from one day are sorted in reverse-chronological  order           Brief Urine Lab Results  (Last result in the past 365 days)      Color   Clarity   Blood   Leuk Est   Nitrite   Protein   CREAT   Urine HCG        12/07/22 1710             80.1         12/07/22 1710 Yellow   Clear   Small (1+)   Negative   Negative   >=300 mg/dL (3+)               No results found for: BLOODCX      No results found for: URINECX  No results found for: WOUNDCX  No results found for: STOOLCX        I have personally looked at the labs and they are summarized above.  ----------------------------------------------------------------------------------------------------------------------  Detailed radiology reports for the last 24 hours:    Imaging Results (Last 24 Hours)     ** No results found for the last 24 hours. **        Final impressions for the last 30 days of radiology reports:    CT Abdomen Pelvis Without Contrast    Result Date: 4/9/2023  1.  Cholelithiasis without evidence of acute cholecystitis.. 2.  Small bilateral pleural effusions with edema throughout both lower lungs (partially visualized). 3.  14 mm pulmonary nodule in the right lower lobe of the lungs, inadequately assessed by this exam. Recommend follow-up CT of the chest with contrast for further assessment. Signer Name: MADHAVI AVITIA MD  Signed: 4/9/2023 4:34 AM  Workstation Name: [facility]  [facility]    CT Abdomen Pelvis Without Contrast    Result Date: 4/5/2023  No retroperitoneal hematoma. Hypoattenuating intraventricular blood pool may be seen in the setting of anemia (interventricular septum sign). Very subtle changes of the liver underlying chronic parenchymal liver disease and borderline splenomegaly or may represent secondary changes due to chronic congestive adenopathy. No definite portosystemic collaterals. Subperitoneal edema in the right upper quadrant and upper abdomen may be secondary to congestion, particularly in the setting of heart disease (favored). Alternatively, this may  be evidence of early portal venous hypertension. Mild underlying inflammatory cyst process such as duodenitis or mild acute pancreatitis is also differential consideration. Multiple prominent but nonenlarged tonya hepatic lymph nodes may be reactive. Bilateral small volume pleural effusions, right greater than left. Multifocal lung base airspace disease, correlating to findings from most recent chest radiograph on 4/3/2023. CRITICAL RESULT:   No. COMMUNICATION: Per this written report. Approved by Madeline Salomon MD on 4/5/2023 9:31 AM By electronically signing this report, I, the attending physician, attest that I have personally reviewed the images/data for the above examination(s) and agree with the final edited report. Dictated by Madeline Salomon MD on 4/5/2023 9:31 AM Signed by Amberly Abarca MD on 4/5/2023 12:56 PM    XR Chest 1 View    Result Date: 4/3/2023  Stable exam CRITICAL RESULT:   No. COMMUNICATION: Per this written report. Dictated by Lele Chong MD on 4/3/2023 10:49 AM Signed by Lele Chong MD on 4/3/2023 10:49 AM    XR Chest 1 View    Result Date: 4/2/2023  Stable airspace disease. CRITICAL RESULT:   No. COMMUNICATION: Per this written report. Dictated by Lele Chong MD on 4/2/2023 11:07 AM Signed by Lele Chong MD on 4/2/2023 11:08 AM    XR Chest 1 View    Result Date: 4/1/2023  Increased multifocal bilateral airspace opacities, right greater than left. CRITICAL RESULT:   No. COMMUNICATION: Per this written report. Dictated by Lele Chong MD on 4/1/2023 10:22 AM Signed by Lele Chong MD on 4/1/2023 10:23 AM    XR Chest 1 View    Result Date: 3/31/2023  New univentricular pacer with leads in expected position. No pneumothorax. CRITICAL RESULT:   No. COMMUNICATION: Per this written report. By electronically signing this report, I, the attending physician, attest that I have personally reviewed the images/data for the above examination(s) and agree with the final edited report. Dictated  by Nancy Moody MD on 3/31/2023 12:29 PM Signed by Jacek Hu MD on 3/31/2023 1:30 PM    XR Chest 1 View    Result Date: 3/30/2023  Bilateral airspace disease could represent edema. CRITICAL RESULT:   No. COMMUNICATION: Per this written report. Dictated by Don Maria MD on 3/30/2023 10:20 AM Signed by Don Maria MD on 3/30/2023 10:21 AM    XR Abdomen KUB    Result Date: 4/11/2023  Dialysis catheter tip projects over the right pelvis. Signer Name: Cy Spears MD  Signed: 4/11/2023 8:16 PM  Workstation Name: [id number]  [facility]    XR Chest AP    Result Date: 4/13/2023    Slightly improved aeration of lungs.  This report was finalized on 4/13/2023 9:30 AM by Dr. Live Samayoa MD.      XR Chest AP    Result Date: 4/8/2023  1. No pneumothorax following placement of well-positioned right IJ central line. 2. Vascular congestion with mild interstitial edema. Signer Name: Nico Aaron MD  Signed: 4/8/2023 5:11 PM  Workstation Name: [id number]  [facility]    XR chest AP portable    Result Date: 3/24/2023  There has been no significant interval change  .  Continued follow up recommended . Images reviewed, interpreted, and dictated by Dr. SHI Keating. Transcribed by Mj Moore PA-C.    XR chest AP portable    Result Date: 3/23/2023  Worsening pulmonary opacities consistent with edema or pneumonia. Images reviewed, interpreted, and dictated by Dr. Oneil Gauthier. Transcribed by Ron Rodriguez PA-C    [facility] Fluoro    Result Date: 4/13/2023    As above.  This report was finalized on 4/13/2023 9:35 AM by Dr. Live Samayoa MD.      I have personally looked at the radiology images and read the final radiology report.    Assessment & Plan    Critical illness myopathy--patient requiring minimum assist for upper body dressing; minimum to moderate assistance for lower body dressing; contact-guard to minimum assist for toileting.  Continues to  work on motor skills to improve ADLs and functional mobility.  Requires standby assistance for help with transfers; ambulated 320 feet with front wheel walker and minimum assist.    Peptic ulcer disease with fairly recent GI bleed.  Patient has had no further evidence of bleeding.  Patient still somewhat anemic.  Will advance to regular texture diet today.    End-stage renal disease continue dialysis per nephrology recommendations    Anemia stable.  Check hemoglobin in the a.m.    CAD with history of CABG and stents continue medical management    History of recent third-degree AV block with pacemaker placement    Diabetes mellitus fair control    Hypertension well-controlled    Dyslipidemia continue statin therapy    Delirium--improved at this time.  Has had some intermittent issues with confusion    VTE Prophylaxis:   Mechanical Order History:      Ordered        04/18/23 1703  Maintain Sequential Compression Device  Continuous                    Pharmalogical Order History:     None              Primitivo Stevens MD  [facility]  04/21/23  10:43 EDT

## 2023-04-21 NOTE — PLAN OF CARE
Goal Outcome Evaluation:            Resting in bed with no complaints at this time. Continue current plan of care.

## 2023-04-22 LAB
ANION GAP SERPL CALCULATED.3IONS-SCNC: 9.6 MMOL/L (ref 5–15)
BASOPHILS # BLD AUTO: 0.06 10*3/MM3 (ref 0–0.2)
BASOPHILS NFR BLD AUTO: 0.7 % (ref 0–1.5)
BUN SERPL-MCNC: 31 MG/DL (ref 8–23)
BUN/CREAT SERPL: 9.8 (ref 7–25)
CALCIUM SPEC-SCNC: 8.8 MG/DL (ref 8.6–10.5)
CHLORIDE SERPL-SCNC: 96 MMOL/L (ref 98–107)
CO2 SERPL-SCNC: 27.4 MMOL/L (ref 22–29)
CREAT SERPL-MCNC: 3.16 MG/DL (ref 0.76–1.27)
DEPRECATED RDW RBC AUTO: 61.5 FL (ref 37–54)
EGFRCR SERPLBLD CKD-EPI 2021: 19.6 ML/MIN/1.73
EOSINOPHIL # BLD AUTO: 0.25 10*3/MM3 (ref 0–0.4)
EOSINOPHIL NFR BLD AUTO: 3 % (ref 0.3–6.2)
ERYTHROCYTE [DISTWIDTH] IN BLOOD BY AUTOMATED COUNT: 17.8 % (ref 12.3–15.4)
GLUCOSE BLDC GLUCOMTR-MCNC: 108 MG/DL (ref 70–130)
GLUCOSE BLDC GLUCOMTR-MCNC: 175 MG/DL (ref 70–130)
GLUCOSE BLDC GLUCOMTR-MCNC: 177 MG/DL (ref 70–130)
GLUCOSE SERPL-MCNC: 219 MG/DL (ref 65–99)
HCT VFR BLD AUTO: 26.7 % (ref 37.5–51)
HGB BLD-MCNC: 8.5 G/DL (ref 13–17.7)
IMM GRANULOCYTES # BLD AUTO: 0.12 10*3/MM3 (ref 0–0.05)
IMM GRANULOCYTES NFR BLD AUTO: 1.4 % (ref 0–0.5)
LYMPHOCYTES # BLD AUTO: 1.64 10*3/MM3 (ref 0.7–3.1)
LYMPHOCYTES NFR BLD AUTO: 19.4 % (ref 19.6–45.3)
MCH RBC QN AUTO: 31.1 PG (ref 26.6–33)
MCHC RBC AUTO-ENTMCNC: 31.8 G/DL (ref 31.5–35.7)
MCV RBC AUTO: 97.8 FL (ref 79–97)
MONOCYTES # BLD AUTO: 0.9 10*3/MM3 (ref 0.1–0.9)
MONOCYTES NFR BLD AUTO: 10.6 % (ref 5–12)
NEUTROPHILS NFR BLD AUTO: 5.5 10*3/MM3 (ref 1.7–7)
NEUTROPHILS NFR BLD AUTO: 64.9 % (ref 42.7–76)
NRBC BLD AUTO-RTO: 0 /100 WBC (ref 0–0.2)
PLATELET # BLD AUTO: 226 10*3/MM3 (ref 140–450)
PMV BLD AUTO: 9.6 FL (ref 6–12)
POTASSIUM SERPL-SCNC: 3.8 MMOL/L (ref 3.5–5.2)
RBC # BLD AUTO: 2.73 10*6/MM3 (ref 4.14–5.8)
SODIUM SERPL-SCNC: 133 MMOL/L (ref 136–145)
WBC NRBC COR # BLD: 8.47 10*3/MM3 (ref 3.4–10.8)

## 2023-04-22 PROCEDURE — 97530 THERAPEUTIC ACTIVITIES: CPT

## 2023-04-22 PROCEDURE — 97140 MANUAL THERAPY 1/> REGIONS: CPT

## 2023-04-22 PROCEDURE — 85025 COMPLETE CBC W/AUTO DIFF WBC: CPT | Performed by: FAMILY MEDICINE

## 2023-04-22 PROCEDURE — 63710000001 INSULIN LISPRO (HUMAN) PER 5 UNITS: Performed by: FAMILY MEDICINE

## 2023-04-22 PROCEDURE — 97116 GAIT TRAINING THERAPY: CPT

## 2023-04-22 PROCEDURE — 63710000001 INSULIN GLARGINE PER 5 UNITS: Performed by: FAMILY MEDICINE

## 2023-04-22 PROCEDURE — 82962 GLUCOSE BLOOD TEST: CPT

## 2023-04-22 PROCEDURE — 97535 SELF CARE MNGMENT TRAINING: CPT

## 2023-04-22 PROCEDURE — 99231 SBSQ HOSP IP/OBS SF/LOW 25: CPT | Performed by: FAMILY MEDICINE

## 2023-04-22 PROCEDURE — 97110 THERAPEUTIC EXERCISES: CPT

## 2023-04-22 PROCEDURE — 80048 BASIC METABOLIC PNL TOTAL CA: CPT | Performed by: INTERNAL MEDICINE

## 2023-04-22 PROCEDURE — 97112 NEUROMUSCULAR REEDUCATION: CPT

## 2023-04-22 RX ADMIN — PANTOPRAZOLE SODIUM 40 MG: 40 TABLET, DELAYED RELEASE ORAL at 17:48

## 2023-04-22 RX ADMIN — CASTOR OIL AND BALSAM, PERU 1 APPLICATION: 788; 87 OINTMENT TOPICAL at 20:11

## 2023-04-22 RX ADMIN — Medication 10 ML: at 08:43

## 2023-04-22 RX ADMIN — OLANZAPINE 5 MG: 5 TABLET, ORALLY DISINTEGRATING ORAL at 08:44

## 2023-04-22 RX ADMIN — Medication 10 ML: at 20:11

## 2023-04-22 RX ADMIN — DOCUSATE SODIUM 50 MG AND SENNOSIDES 8.6 MG 2 TABLET: 8.6; 5 TABLET, FILM COATED ORAL at 20:09

## 2023-04-22 RX ADMIN — INSULIN GLARGINE 15 UNITS: 100 INJECTION, SOLUTION SUBCUTANEOUS at 20:07

## 2023-04-22 RX ADMIN — METHOCARBAMOL 500 MG: 500 TABLET, FILM COATED ORAL at 20:10

## 2023-04-22 RX ADMIN — Medication 10 ML: at 20:08

## 2023-04-22 RX ADMIN — ACETAMINOPHEN 500 MG: 500 TABLET ORAL at 17:50

## 2023-04-22 RX ADMIN — CARVEDILOL 25 MG: 25 TABLET, FILM COATED ORAL at 08:44

## 2023-04-22 RX ADMIN — ATORVASTATIN CALCIUM 40 MG: 40 TABLET, FILM COATED ORAL at 20:09

## 2023-04-22 RX ADMIN — LIDOCAINE 1 PATCH: 50 PATCH CUTANEOUS at 08:42

## 2023-04-22 RX ADMIN — INSULIN LISPRO 2 UNITS: 100 INJECTION, SOLUTION INTRAVENOUS; SUBCUTANEOUS at 08:43

## 2023-04-22 RX ADMIN — Medication 10 MG: at 20:07

## 2023-04-22 RX ADMIN — PANTOPRAZOLE SODIUM 40 MG: 40 TABLET, DELAYED RELEASE ORAL at 06:40

## 2023-04-22 RX ADMIN — AMLODIPINE BESYLATE 10 MG: 10 TABLET ORAL at 08:44

## 2023-04-22 RX ADMIN — Medication 1 TABLET: at 08:44

## 2023-04-22 RX ADMIN — ASPIRIN 81 MG: 81 TABLET, CHEWABLE ORAL at 08:45

## 2023-04-22 RX ADMIN — CASTOR OIL AND BALSAM, PERU 1 APPLICATION: 788; 87 OINTMENT TOPICAL at 08:41

## 2023-04-22 RX ADMIN — GENTAMICIN SULFATE 1 APPLICATION: 1 OINTMENT TOPICAL at 08:42

## 2023-04-22 RX ADMIN — INSULIN LISPRO 2 UNITS: 100 INJECTION, SOLUTION INTRAVENOUS; SUBCUTANEOUS at 12:11

## 2023-04-22 NOTE — PROGRESS NOTES
Occupational Therapy:    Physical Therapy: Individual: 90 minutes.    Speech Language Pathology:    Signed by: Emilee Bowles PTA

## 2023-04-22 NOTE — THERAPY TREATMENT NOTE
Inpatient Rehabilitation - Physical Therapy Treatment Note       [facility]     Patient Name: Augusto Lorenzana Jr.  : 1945  MRN: [id number]    Today's Date: 2023                    Admit Date: 2023      Visit Dx:   No diagnosis found.    Patient Active Problem List   Diagnosis   • NSTEMI 2021   • Hyperlipidemia LDL goal <70   • Essential hypertension   • T2DM on oral agents and insulin. HbA1c 7.4    • Coronary artery disease involving native coronary artery of native heart with unstable angina pectoris   • A/C kidney disease. ATN due to postoperative arrest. 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 Combination of anoxic insult and azotemia   • Debility   • Lower extremity edema   • Complete heart block   • GI bleed   • Third degree atrioventricular block   • Atrial fibrillation, persistent   • Gastrointestinal hemorrhage associated with gastric ulcer   • Critical illness myopathy       Past Medical History:   Diagnosis Date   • CAD (coronary artery disease)    • CKD (chronic kidney disease) stage 3, GFR 30-59 ml/min    • Diabetes mellitus    • Hyperlipidemia    • Hypertension    • NSTEMI (non-ST elevated myocardial infarction)        Past Surgical History:   Procedure Laterality Date   • CARDIAC CATHETERIZATION N/A 2021    Procedure: Left Heart Cath;  Surgeon: Blade Greene IV, MD;  [facility];  Service: Cardiovascular;  Laterality: N/A;   • CARDIAC SURGERY      2 stents placed .   • CENTRAL VENOUS LINE INSERTION Left 2023    Procedure: CENTRAL VENOUS LINE INSERTION;  Surgeon: Torey Easley MD;  Location: [facility];  Service: General;  Laterality: Left;   • CORONARY ARTERY BYPASS GRAFT N/A 2021    Procedure: MEDIAN STERNOTOMY CORONARY ARTERY BYPASS X 3 UTILIZING THE  LEFT INTERNAL MAMMARY ARTERY GRAFT, EVH OF THE GREATER RIGHT SAPHENOUS VEIN, AND PILO PER ANESTHESIA;  Surgeon: Jacek Miller MD;  Location:  [facility];  Service: Cardiothoracic;  Laterality: N/A;   • ENDOSCOPY N/A 04/08/2023    Procedure: ESOPHAGOGASTRODUODENOSCOPY with CENTERAL LINE PLACEMENT;  Surgeon: Sabine Hudson MD;  Location:  [facility];  Service: General;  Laterality: N/A;   • ENDOSCOPY N/A 04/10/2023    Procedure: ESOPHAGOGASTRODUODENOSCOPY;  Surgeon: Sabine Hudson MD;  Location:  [facility];  Service: General;  Laterality: N/A;   • INSERTION HEMODIALYSIS CATHETER N/A 4/13/2023    Procedure: HEMODIALYSIS CATHETER INSERTION;  Surgeon: Torey Easley MD;  Location:  [facility];  Service: General;  Laterality: N/A;   • PACEMAKER IMPLANTATION         PT ASSESSMENT (last 12 hours)     IRF PT Evaluation and Treatment     Row Name 04/22/23 1200          PT Time and Intention    Document Type daily treatment  -LL     Mode of Treatment individual therapy;physical therapy  -LL     Patient/Family/Caregiver Comments/Observations Patient had no new c/o this date and was agreeable to PT participation.  Patient exhibits excellent safety in regards to locking/unlocking WC breaks.  -LL     Row Name 04/22/23 1200          General Information    Existing Precautions/Restrictions fall  confusion  -LL     Row Name 04/22/23 1200          Pain Scale: FACES Pre/Post-Treatment    Pain: FACES Scale, Pretreatment 0-->no hurt  -LL     Posttreatment Pain Rating 0-->no hurt  -LL     Row Name 04/22/23 1200          Cognition/Psychosocial    Orientation Status (Cognition) oriented x 3  -LL     Follows Commands (Cognition) verbal cues/prompting required;physical/tactile prompts required  -LL     Personal Safety Interventions gait belt;nonskid shoes/slippers when out of bed;supervised activity  -LL     Row Name 04/22/23 1200          Bed Mobility    Supine-Sit [place] (Bed Mobility) supervision;standby assist  -LL      Sit-Supine [place] (Bed Mobility) supervision;standby assist  -LL     Row Name 04/22/23 1200          Transfer Assessment/Treatment    Transfers car transfer  -LL     Row Name 04/22/23 1200          Bed-Chair Transfer    Bed-Chair [place] (Transfers) verbal cues;nonverbal cues (demo/gesture);contact guard  -LL     Assistive Device (Bed-Chair Transfers) wheelchair  -LL     Row Name 04/22/23 1200          Chair-Bed Transfer    Chair-Bed [place] (Transfers) verbal cues;nonverbal cues (demo/gesture);contact guard  -LL     Assistive Device (Chair-Bed Transfers) wheelchair  -LL     Row Name 04/22/23 1200          Sit-Stand Transfer    Sit-Stand [place] (Transfers) verbal cues;nonverbal cues (demo/gesture);contact guard  -LL     Assistive Device (Sit-Stand Transfers) walker, front-wheeled;wheelchair  -LL     Row Name 04/22/23 1200          Stand-Sit Transfer    Stand-Sit [place] (Transfers) verbal cues;nonverbal cues (demo/gesture);contact guard  -LL     Assistive Device (Stand-Sit Transfers) walker, front-wheeled;wheelchair  -LL     Row Name 04/22/23 1200          Car Transfer    Type (Car Transfer) stand pivot/stand step  -LL     [place] Level (Car Transfer) contact guard;verbal cues;nonverbal cues (demo/gesture)  -LL     Assistive Device (Car Transfer) wheelchair  -LL     Row Name 04/22/23 1200          Gait/Stairs (Locomotion)    [place] Level (Gait) verbal cues;nonverbal cues (demo/gesture);contact guard  -LL     Assistive Device (Gait) --  Rollator  -LL     Distance in Feet (Gait) 320'  -LL     Deviations/Abnormal Patterns (Gait) gait speed decreased;stride length decreased  -LL     Bilateral Gait Deviations forward flexed posture  -LL     [place] Level (Stairs) contact guard;verbal cues;nonverbal cues (demo/gesture)  -LL     Handrail Location (Stairs) both sides  -LL     Number of Steps (Stairs) 10  -LL     Ascending Technique (Stairs) step-to-step  -LL     Descending  Technique (Stairs) step-to-step  -LL     Stairs, Safety Issues balance decreased during turns  -LL     Row Name 04/22/23 1200          Balance    Comment, Balance Standing balloon tap  -LL     Row Name 04/22/23 1200          Motor Skills    Therapeutic Exercise hip;knee;ankle  -LL     Row Name 04/22/23 1200          Hip (Therapeutic Exercise)    Hip (Therapeutic Exercise) strengthening exercise  -LL     Hip Strengthening (Therapeutic Exercise) bilateral;flexion;extension;aBduction;aDduction;marching while seated;marching while standing;mini squats;sitting;standing;resistance band;green  2# w/ sitting ex's  -LL     Row Name 04/22/23 1200          Knee (Therapeutic Exercise)    Knee (Therapeutic Exercise) strengthening exercise  -LL     Knee Strengthening (Therapeutic Exercise) bilateral;flexion;extension;marching while seated;marching while standing;LAQ (long arc quad);hamstring curls;sitting;standing;2 lb free weight;resistance band;green  2# w/ sitting ex's  -LL     Row Name 04/22/23 1200          Ankle (Therapeutic Exercise)    Ankle (Therapeutic Exercise) strengthening exercise  -LL     Ankle Strengthening (Therapeutic Exercise) bilateral;dorsiflexion;plantarflexion;sitting;standing  -LL           User Key  (r) = Recorded By, (t) = Taken By, (c) = Cosigned By    Initials Name Provider Type    Emilee Guy PTA Physical Therapist Assistant              Wound 04/12/23 1700 distal penis Pressure Injury (Active)   Dressing Appearance open to air 04/21/23 1902   Base red;pink 04/21/23 1902   Drainage Amount none 04/21/23 1902   Care, Wound barrier applied 04/21/23 1902   Dressing Care open to air 04/21/23 1902       Wound Left chest Incision (Active)   Dressing Appearance dry;intact 04/21/23 1902     Physical Therapy Education     Title: PT OT SLP Therapies (Done)     Topic: Physical Therapy (Done)     Point: Mobility training (Done)     Learning Progress Summary           Patient Acceptance, E,D, VU,NR by  at  4/22/2023 1208    Acceptance, E,TB, VU by DG at 4/22/2023 0018    Acceptance, E, VU,NR by LB at 4/21/2023 1458    Acceptance, E,TB, VU by DG at 4/20/2023 2223    Acceptance, E, VU,NR by LB at 4/20/2023 1045    Acceptance, E, VU,NR by LB at 4/19/2023 1559                   Point: Home exercise program (Done)     Learning Progress Summary           Patient Acceptance, E,D, VU,NR by LL at 4/22/2023 1208    Acceptance, E,TB, VU by DG at 4/22/2023 0018    Acceptance, E, VU,NR by LB at 4/21/2023 1458    Acceptance, E,TB, VU by DG at 4/20/2023 2223    Acceptance, E, VU,NR by LB at 4/20/2023 1045    Acceptance, E, VU,NR by LB at 4/19/2023 1559                   Point: Body mechanics (Done)     Learning Progress Summary           Patient Acceptance, E,D, VU,NR by LL at 4/22/2023 1208    Acceptance, E,TB, VU by DG at 4/22/2023 0018    Acceptance, E, VU,NR by LB at 4/21/2023 1458    Acceptance, E,TB, VU by DG at 4/20/2023 2223    Acceptance, E, VU,NR by LB at 4/20/2023 1045    Acceptance, E, VU,NR by LB at 4/19/2023 1559                   Point: Precautions (Done)     Learning Progress Summary           Patient Acceptance, E,D, VU,NR by LL at 4/22/2023 1208    Acceptance, E,TB, VU by DG at 4/22/2023 0018    Acceptance, E, VU,NR by LB at 4/21/2023 1458    Acceptance, E,TB, VU by DG at 4/20/2023 2223    Acceptance, E, VU,NR by LB at 4/20/2023 1045    Acceptance, E, VU,NR by LB at 4/19/2023 1559                               User Key     Initials Effective Dates Name Provider Type Discipline    DG 06/16/21 -  Dariela Arteaga RN Registered Nurse Nurse     06/16/21 -  Jacqueline Porter, PT Physical Therapist PT    LL 05/02/16 -  Emilee Bowles PTA Physical Therapist Assistant PT                PT Recommendation and Plan                          Time Calculation:      PT Charges     Row Name 04/22/23 1208             Time Calculation    Start Time 0945  -LL      Stop Time 1115  -LL      Time Calculation (min) 90 min  -LL       PT Received On 04/22/23  -LL         Time Calculation- PT    Total Timed Code Minutes- PT 90 minute(s)  -LL            User Key  (r) = Recorded By, (t) = Taken By, (c) = Cosigned By    Initials Name Provider Type    LL Emilee Bowles PTA Physical Therapist Assistant                Therapy Charges for Today     Code Description Service Date Service Provider Modifiers Qty    45938442840 HC GAIT TRAINING EA 15 MIN 4/22/2023 Emilee Bowles PTA GP, CQ 1    69288756383 HC PT NEUROMUSC RE EDUCATION EA 15 MIN 4/22/2023 Emilee Bowles PTA GP, CQ 1    79606857717 HC PT THERAPEUTIC ACT EA 15 MIN 4/22/2023 Emilee Bowles PTA GP, CQ 1    79248310762 HC PT THER PROC EA 15 MIN 4/22/2023 Emilee Bowles PTA GP, CQ 3            PT G-Codes  AM-PAC 6 Clicks Score (PT): 19      Emilee. BLANCA Bowles  4/22/2023

## 2023-04-22 NOTE — PROGRESS NOTES
Occupational Therapy: Individual: 90 minutes.    Physical Therapy:    Speech Language Pathology:    Signed by: Sabine Navarro, OT

## 2023-04-22 NOTE — THERAPY TREATMENT NOTE
Inpatient Rehabilitation - Occupational Therapy Treatment Note  [facility]     Patient Name: Augusto Lorenzana Jr.  : 1945  MRN: [id number]  Today's Date: 2023             Admit Date: 2023     No diagnosis found.  Patient Active Problem List   Diagnosis   • NSTEMI 2021   • Hyperlipidemia LDL goal <70   • Essential hypertension   • T2DM on oral agents and insulin. HbA1c 7.4    • Coronary artery disease involving native coronary artery of native heart with unstable angina pectoris   • A/C kidney disease. ATN due to postoperative arrest. 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 Combination of anoxic insult and azotemia   • Debility   • Lower extremity edema   • Complete heart block   • GI bleed   • Third degree atrioventricular block   • Atrial fibrillation, persistent   • Gastrointestinal hemorrhage associated with gastric ulcer   • Critical illness myopathy     Past Medical History:   Diagnosis Date   • CAD (coronary artery disease)    • CKD (chronic kidney disease) stage 3, GFR 30-59 ml/min    • Diabetes mellitus    • Hyperlipidemia    • Hypertension    • NSTEMI (non-ST elevated myocardial infarction)      Past Surgical History:   Procedure Laterality Date   • CARDIAC CATHETERIZATION N/A 2021    Procedure: Left Heart Cath;  Surgeon: Blade Greene IV, MD;  Location: [facility] CATH INVASIVE LOCATION;  Service: Cardiovascular;  Laterality: N/A;   • CARDIAC SURGERY      2 stents placed .   • CENTRAL VENOUS LINE INSERTION Left 2023    Procedure: CENTRAL VENOUS LINE INSERTION;  Surgeon: Torey Easley MD;  Location: [facility];  Service: General;  Laterality: Left;   • CORONARY ARTERY BYPASS GRAFT N/A 2021    Procedure: MEDIAN STERNOTOMY CORONARY ARTERY BYPASS X 3 UTILIZING THE LEFT INTERNAL MAMMARY ARTERY  GRAFT, EVH OF THE GREATER RIGHT SAPHENOUS VEIN, AND PILO PER ANESTHESIA;  Surgeon: Jacek Miller MD;  Location:  [facility];  Service: Cardiothoracic;  Laterality: N/A;   • ENDOSCOPY N/A 04/08/2023    Procedure: ESOPHAGOGASTRODUODENOSCOPY with CENTERAL LINE PLACEMENT;  Surgeon: Sabine Hudson MD;  Location:  [facility];  Service: General;  Laterality: N/A;   • ENDOSCOPY N/A 04/10/2023    Procedure: ESOPHAGOGASTRODUODENOSCOPY;  Surgeon: Sabine Hudson MD;  Location:  [facility];  Service: General;  Laterality: N/A;   • INSERTION HEMODIALYSIS CATHETER N/A 4/13/2023    Procedure: HEMODIALYSIS CATHETER INSERTION;  Surgeon: Torey Easley MD;  Location:  [facility];  Service: General;  Laterality: N/A;   • PACEMAKER IMPLANTATION           OT ASSESSMENT FLOWSHEET (last 12 hours)     OT Evaluation and Treatment    No documentation.                  Occupational Therapy Education     Title: PT OT SLP Therapies (In Progress)     Topic: Occupational Therapy (In Progress)     Point: ADL training (In Progress)     Description:   Instruct learner(s) on proper safety adaptation and remediation techniques during self care or transfers.   Instruct in proper use of assistive devices.              Learning Progress Summary           Patient Acceptance, D,E, NR by BC at 4/22/2023 1303    Acceptance, E,TB, VU by DG at 4/22/2023 0018    Acceptance, E,TB, VU by DG at 4/20/2023 2223    Acceptance, E,D, VU,NR by TM at 4/20/2023 0948    Acceptance, E,D, VU,NR by TM at 4/19/2023 1109                   Point: Precautions (Done)     Description:   Instruct learner(s) on prescribed precautions during self-care and functional transfers.              Learning Progress Summary           Patient Acceptance, E,TB, VU by DG at 4/22/2023 0018    Acceptance, E,TB, VU by DG at 4/20/2023 2223    Acceptance, E,D, VU,NR by TM at 4/20/2023 0948    Acceptance, E,D, VU,NR by TM at 4/19/2023 1109                               User Key     Initials  Effective Dates Name Provider Type Discipline    DG 06/16/21 -  Dariela Arteaga RN Registered Nurse Nurse     06/16/21 -  Linn Colon OT Occupational Therapist OT    BC 06/16/21 -  Sabine Navarro OT Occupational Therapist OT                  OT Recommendation and Plan              Time Calculation:    Time Calculation- OT     Row Name 04/22/23 1303             Time Calculation- OT    OT Start Time 0745  -BC      OT Stop Time 0915  -BC      OT Time Calculation (min) 90 min  -BC      OT Non-Billable Time (min) 20 min  -BC            User Key  (r) = Recorded By, (t) = Taken By, (c) = Cosigned By    Initials Name Provider Type    BC Sabine Navarro OT Occupational Therapist              Therapy Charges for Today     Code Description Service Date Service Provider Modifiers Qty    80671796351 HC OT SELF CARE/MGMT/TRAIN EA 15 MIN 4/22/2023 Sabine Navarro OT GO 1    10085870575 HC OT THERAPEUTIC ACT EA 15 MIN 4/22/2023 Sabine Navarro OT GO 4    37009672122 HC OT MANUAL THERAPY EA 15 MIN 4/22/2023 Sabine Navarro OT GO 1               Sabine Navarro OT  4/22/2023

## 2023-04-22 NOTE — PROGRESS NOTES
[facility]  PROGRESS NOTE     Patient Identification:  Name:  Augusto Lorenzana Jr.  Age:  78 y.o.  Sex:  male  :  1945  MRN:  [id number]  Visit Number:  [id number]  ROOM: [facility]     Primary Care Provider:  Rob Polanco MD    Length of stay in inpatient status:  4    Subjective     Chief Compliant:  No chief complaint on file.      History of Presenting Illness: 78-year-old gentleman with history of critical illness myopathy.  Patient does have history of end-stage renal disease, gout, dyslipidemia, diabetes mellitus.  Patient states he is doing reasonably well today has no new complaints    Objective     Current Hospital Meds:allopurinol, 50 mg, Oral, Once per day on Mon Thu  amLODIPine, 10 mg, Oral, Q24H  aspirin, 81 mg, Oral, Daily  atorvastatin, 40 mg, Oral, Nightly  carvedilol, 25 mg, Oral, BID With Meals  castor oil-balsam peru, 1 application, Topical, Q12H  gentamicin, 1 application, Topical, Daily  insulin glargine, 15 Units, Subcutaneous, Nightly  insulin lispro, 2-7 Units, Subcutaneous, TID With Meals  lidocaine, 1 patch, Transdermal, Daily  melatonin, 10 mg, Oral, Nightly  multivitamin, 1 tablet, Oral, Daily  OLANZapine zydis, 5 mg, Oral, Daily  pantoprazole, 40 mg, Oral, BID AC  senna-docusate sodium, 2 tablet, Oral, Nightly  sodium chloride, 10 mL, Intravenous, Q12H  sodium chloride, 10 mL, Intravenous, Q12H  sodium chloride, 10 mL, Intravenous, Q12H    sodium chloride, 9 mL/hr      ----------------------------------------------------------------------------------------------------------------------  Vital Signs:  Temp:  [97.8 °F (36.6 °C)] 97.8 °F (36.6 °C)  Heart Rate:  [76-89] 76  Resp:  [16] 16  BP: (112-132)/(66-68) 132/66  SpO2:  [100 %] 100 %  on   ;   Device (Oxygen Therapy): room air  Body mass index is 25.4 kg/m².    Wt Readings from Last 3 Encounters:   23 73.6 kg (162 lb 3.2 oz)   23 72.9 kg (160 lb 11.2 oz)   23 77.2 kg (170 lb 3.2 oz)     Intake  & Output (last 3 days)       04/19 0701  04/20 0700 04/20 0701 04/21 0700 04/21 0701 04/22 0700 04/22 0701 04/23 0700    P.O.  360    Total Intake(mL/kg) 960 (13) 600 (8.2) 1020 (13.9) 360 (4.9)    Urine (mL/kg/hr) 800 (0.5) 1150 (0.7) 1650 (0.9) 300 (1.4)    Other  500      Total Output 800 1650 1650 300    Net +160 -1050 -630 +60            Urine Unmeasured Occurrence 2 x           Diet: Diabetic Diets; Consistent Carbohydrate; Texture: Regular Texture (IDDSI 7); Fluid Consistency: Thin (IDDSI 0)  ----------------------------------------------------------------------------------------------------------------------  Physical exam:  Constitutional:   No acute distress  HEENT: Normocephalic atraumatic  Neck: Supple   Cardiovascular: Regular rate and rhythm  Pulmonary/Chest: Clear to auscultation  Abdominal: Positive bowel sounds soft.   Musculoskeletal: No arthropathy  Neurological: Some generalized weakness but no focal deficits  Skin: No rash  Peripheral vascular: No edema  Genitourinary:  ----------------------------------------------------------------------------------------------------------------------    Last echocardiogram:  Results for orders placed during the hospital encounter of 05/22/21    Adult Transthoracic Echo Complete w/ Color, Spectral and Contrast if necessary per protocol    Interpretation Summary  · Left ventricular systolic function is normal. Estimated left ventricular EF = 60%.  · Left ventricular diastolic function is consistent with (grade I) impaired relaxation.  · Mild aortic valve stenosis is present.  · Estimated right ventricular systolic pressure from tricuspid regurgitation is normal (<35 mmHg).    ----------------------------------------------------------------------------------------------------------------------  Results from last 7 days   Lab Units 04/22/23  0102 04/19/23  0104 04/18/23  0044   WBC 10*3/mm3 8.47 8.12 8.43   HEMOGLOBIN g/dL 8.5* 8.2* 8.8*    HEMATOCRIT % 26.7* 25.3* 28.3*   MCV fL 97.8* 97.3* 101.1*   MCHC g/dL 31.8 32.4 31.1*   PLATELETS 10*3/mm3 226 223 261         Results from last 7 days   Lab Units 04/22/23  0102 04/20/23  0128 04/19/23  0104 04/18/23  0044   SODIUM mmol/L 133* 136 130* 136   POTASSIUM mmol/L 3.8 4.1 4.0 4.3   MAGNESIUM mg/dL  --   --  1.8 2.1   CHLORIDE mmol/L 96* 99 95* 103   CO2 mmol/L 27.4 27.9 28.1 20.7*   BUN mg/dL 31* 26* 15 28*   CREATININE mg/dL 3.16* 3.35* 2.39* 3.69*   CALCIUM mg/dL 8.8 8.4* 8.0* 8.6   GLUCOSE mg/dL 219* 162* 162* 111*   Estimated Creatinine Clearance: 20.7 mL/min (A) (by C-G formula based on SCr of 3.16 mg/dL (H)).  No results found for: AMMONIA              Glucose   Date/Time Value Ref Range Status   04/22/2023 0640 177 (H) 70 - 130 mg/dL Final     Comment:     Meter: [id number] : [id number] Remberto Crystal   04/21/2023 1943 256 (H) 70 - 130 mg/dL Final     Comment:     Meter: [id number] : [id number] Remberto Crystal   04/21/2023 1607 308 (H) 70 - 130 mg/dL Final     Comment:     Meter: [id number] : [id number] bill annie   04/21/2023 1109 214 (H) 70 - 130 mg/dL Final     Comment:     Meter: [id number] : [id number] bill annie   04/21/2023 0618 117 70 - 130 mg/dL Final     Comment:     Meter: [id number] : [id number] Homar Rich   04/20/2023 1804 116 70 - 130 mg/dL Final     Comment:     Meter: [id number] : [id number] bill valencia   04/20/2023 1108 207 (H) 70 - 130 mg/dL Final     Comment:     Meter: [id number] : [id number] Jarrell Ramires   04/20/2023 0623 95 70 - 130 mg/dL Final     Comment:     Meter: [id number] : [id number] Maya Crystal     Lab Results   Component Value Date    TSH 4.030 06/03/2021    FREET4 1.1 03/31/2023     No results found for: PREGTESTUR, PREGSERUM, HCG, HCGQUANT  Pain Management Panel         Latest Ref Rng & Units 12/7/2022 5/30/2021   Pain Management Panel   Creatinine, Urine mg/dL 80.1   136.1           Multiple values from one day  are sorted in reverse-chronological order           Brief Urine Lab Results  (Last result in the past 365 days)      Color   Clarity   Blood   Leuk Est   Nitrite   Protein   CREAT   Urine HCG        12/07/22 1710             80.1         12/07/22 1710 Yellow   Clear   Small (1+)   Negative   Negative   >=300 mg/dL (3+)               No results found for: BLOODCX      No results found for: URINECX  No results found for: WOUNDCX  No results found for: STOOLCX        I have personally looked at the labs and they are summarized above.  ----------------------------------------------------------------------------------------------------------------------  Detailed radiology reports for the last 24 hours:    Imaging Results (Last 24 Hours)     ** No results found for the last 24 hours. **        Final impressions for the last 30 days of radiology reports:    CT Abdomen Pelvis Without Contrast    Result Date: 4/9/2023  1.  Cholelithiasis without evidence of acute cholecystitis.. 2.  Small bilateral pleural effusions with edema throughout both lower lungs (partially visualized). 3.  14 mm pulmonary nodule in the right lower lobe of the lungs, inadequately assessed by this exam. Recommend follow-up CT of the chest with contrast for further assessment. Signer Name: MADHAVI AVITIA MD  Signed: 4/9/2023 4:34 AM  Workstation Name: [facility]  [facility]    CT Abdomen Pelvis Without Contrast    Result Date: 4/5/2023  No retroperitoneal hematoma. Hypoattenuating intraventricular blood pool may be seen in the setting of anemia (interventricular septum sign). Very subtle changes of the liver underlying chronic parenchymal liver disease and borderline splenomegaly or may represent secondary changes due to chronic congestive adenopathy. No definite portosystemic collaterals. Subperitoneal edema in the right upper quadrant and upper abdomen may be secondary to congestion, particularly in the setting of heart  disease (favored). Alternatively, this may be evidence of early portal venous hypertension. Mild underlying inflammatory cyst process such as duodenitis or mild acute pancreatitis is also differential consideration. Multiple prominent but nonenlarged tonya hepatic lymph nodes may be reactive. Bilateral small volume pleural effusions, right greater than left. Multifocal lung base airspace disease, correlating to findings from most recent chest radiograph on 4/3/2023. CRITICAL RESULT:   No. COMMUNICATION: Per this written report. Approved by Madeline Salomon MD on 4/5/2023 9:31 AM By electronically signing this report, I, the attending physician, attest that I have personally reviewed the images/data for the above examination(s) and agree with the final edited report. Dictated by Madeline Salomon MD on 4/5/2023 9:31 AM Signed by Amberly Abarca MD on 4/5/2023 12:56 PM    XR Chest 1 View    Result Date: 4/3/2023  Stable exam CRITICAL RESULT:   No. COMMUNICATION: Per this written report. Dictated by Lele Chong MD on 4/3/2023 10:49 AM Signed by Lele Chong MD on 4/3/2023 10:49 AM    XR Chest 1 View    Result Date: 4/2/2023  Stable airspace disease. CRITICAL RESULT:   No. COMMUNICATION: Per this written report. Dictated by Lele Chong MD on 4/2/2023 11:07 AM Signed by Lele Chong MD on 4/2/2023 11:08 AM    XR Chest 1 View    Result Date: 4/1/2023  Increased multifocal bilateral airspace opacities, right greater than left. CRITICAL RESULT:   No. COMMUNICATION: Per this written report. Dictated by Lele Chong MD on 4/1/2023 10:22 AM Signed by Lele Chong MD on 4/1/2023 10:23 AM    XR Chest 1 View    Result Date: 3/31/2023  New univentricular pacer with leads in expected position. No pneumothorax. CRITICAL RESULT:   No. COMMUNICATION: Per this written report. By electronically signing this report, I, the attending physician, attest that I have personally reviewed the images/data for the above examination(s) and  agree with the final edited report. Dictated by Nancy Moody MD on 3/31/2023 12:29 PM Signed by Jacek Hu MD on 3/31/2023 1:30 PM    XR Chest 1 View    Result Date: 3/30/2023  Bilateral airspace disease could represent edema. CRITICAL RESULT:   No. COMMUNICATION: Per this written report. Dictated by Don Maria MD on 3/30/2023 10:20 AM Signed by Don Maria MD on 3/30/2023 10:21 AM    XR Abdomen KUB    Result Date: 4/11/2023  Dialysis catheter tip projects over the right pelvis. Signer Name: Cy Spears MD  Signed: 4/11/2023 8:16 PM  Workstation Name: [id number]  [facility]    XR Chest AP    Result Date: 4/13/2023    Slightly improved aeration of lungs.  This report was finalized on 4/13/2023 9:30 AM by Dr. Live Samayoa MD.      XR Chest AP    Result Date: 4/8/2023  1. No pneumothorax following placement of well-positioned right IJ central line. 2. Vascular congestion with mild interstitial edema. Signer Name: Nico Aaron MD  Signed: 4/8/2023 5:11 PM  Workstation Name: [email or web address]  [facility]    XR chest AP portable    Result Date: 3/24/2023  There has been no significant interval change  .  Continued follow up recommended . Images reviewed, interpreted, and dictated by Dr. SHI Keating. Transcribed by Mj Moore PA-C.    XR chest AP portable    Result Date: 3/23/2023  Worsening pulmonary opacities consistent with edema or pneumonia. Images reviewed, interpreted, and dictated by Dr. Oneil Gauthier. Transcribed by Ron Rodriguez PA-C    [facility] Surgery Fluoro    Result Date: 4/13/2023    As above.  This report was finalized on 4/13/2023 9:35 AM by Dr. Live Samayoa MD.      I have personally looked at the radiology images and read the final radiology report.    Assessment & Plan    Critical illness myopathy--contact-guard for bed to chair and chair to bed transfers.  Contact-guard for sit to stand stand to sit transfers.  Ambulated 320  feet with front wheel walker and contact-guard.  Continues to work on motor skills to improve ADLs and functional mobility.    End-stage renal disease--getting intermittent dialysis on Tuesdays Thursdays and Saturdays per nephrology recommendations.  Appreciate their input    Peptic ulcer disease--no evidence of any further bleeding.  Continue PPI.    Anemia stable    CAD with history of CABG and stents continue medical management    History of recent third-degree AV block with pacemaker placement    Diabetes mellitus controlled    Hypertension controlled    Dyslipidemia continue statin therapy    Delirium this is mainly cleared.  Does have some mild confusion at times.  May have some underlying dementia process as well.    VTE Prophylaxis:   Mechanical Order History:      Ordered        04/18/23 1703  Maintain Sequential Compression Device  Continuous                    Pharmalogical Order History:     None              Primitivo Stevens MD  [facility] Hospitalist  04/22/23  10:01 EDT

## 2023-04-23 LAB
GLUCOSE BLDC GLUCOMTR-MCNC: 137 MG/DL (ref 70–130)
GLUCOSE BLDC GLUCOMTR-MCNC: 244 MG/DL (ref 70–130)
GLUCOSE BLDC GLUCOMTR-MCNC: 263 MG/DL (ref 70–130)

## 2023-04-23 PROCEDURE — 82962 GLUCOSE BLOOD TEST: CPT

## 2023-04-23 PROCEDURE — 63710000001 INSULIN GLARGINE PER 5 UNITS: Performed by: FAMILY MEDICINE

## 2023-04-23 PROCEDURE — 25010000002 EPOETIN ALFA-EPBX 10000 UNIT/ML SOLUTION: Performed by: INTERNAL MEDICINE

## 2023-04-23 PROCEDURE — 63710000001 INSULIN LISPRO (HUMAN) PER 5 UNITS: Performed by: FAMILY MEDICINE

## 2023-04-23 RX ADMIN — EPOETIN ALFA-EPBX 10000 UNITS: 10000 INJECTION, SOLUTION INTRAVENOUS; SUBCUTANEOUS at 20:54

## 2023-04-23 RX ADMIN — OLANZAPINE 5 MG: 5 TABLET, ORALLY DISINTEGRATING ORAL at 10:18

## 2023-04-23 RX ADMIN — ACETAMINOPHEN 500 MG: 500 TABLET ORAL at 20:54

## 2023-04-23 RX ADMIN — CASTOR OIL AND BALSAM, PERU 1 APPLICATION: 788; 87 OINTMENT TOPICAL at 20:55

## 2023-04-23 RX ADMIN — Medication 10 ML: at 20:53

## 2023-04-23 RX ADMIN — Medication 10 ML: at 10:20

## 2023-04-23 RX ADMIN — INSULIN LISPRO 4 UNITS: 100 INJECTION, SOLUTION INTRAVENOUS; SUBCUTANEOUS at 17:16

## 2023-04-23 RX ADMIN — INSULIN GLARGINE 15 UNITS: 100 INJECTION, SOLUTION SUBCUTANEOUS at 20:54

## 2023-04-23 RX ADMIN — PANTOPRAZOLE SODIUM 40 MG: 40 TABLET, DELAYED RELEASE ORAL at 06:34

## 2023-04-23 RX ADMIN — ATORVASTATIN CALCIUM 40 MG: 40 TABLET, FILM COATED ORAL at 20:54

## 2023-04-23 RX ADMIN — GENTAMICIN SULFATE 1 APPLICATION: 1 OINTMENT TOPICAL at 10:18

## 2023-04-23 RX ADMIN — PANTOPRAZOLE SODIUM 40 MG: 40 TABLET, DELAYED RELEASE ORAL at 17:17

## 2023-04-23 RX ADMIN — METHOCARBAMOL 500 MG: 500 TABLET, FILM COATED ORAL at 20:54

## 2023-04-23 RX ADMIN — INSULIN LISPRO 3 UNITS: 100 INJECTION, SOLUTION INTRAVENOUS; SUBCUTANEOUS at 11:56

## 2023-04-23 RX ADMIN — Medication 10 MG: at 20:54

## 2023-04-23 RX ADMIN — CASTOR OIL AND BALSAM, PERU 1 APPLICATION: 788; 87 OINTMENT TOPICAL at 10:19

## 2023-04-23 RX ADMIN — ASPIRIN 81 MG: 81 TABLET, CHEWABLE ORAL at 10:17

## 2023-04-23 RX ADMIN — DOCUSATE SODIUM 50 MG AND SENNOSIDES 8.6 MG 2 TABLET: 8.6; 5 TABLET, FILM COATED ORAL at 20:54

## 2023-04-23 RX ADMIN — AMLODIPINE BESYLATE 10 MG: 10 TABLET ORAL at 10:17

## 2023-04-23 RX ADMIN — LIDOCAINE 1 PATCH: 50 PATCH CUTANEOUS at 10:18

## 2023-04-23 RX ADMIN — Medication 10 ML: at 10:21

## 2023-04-23 RX ADMIN — Medication 1 TABLET: at 10:18

## 2023-04-23 NOTE — PLAN OF CARE
Goal Outcome Evaluation:  Plan of Care Reviewed With: patient resting in bed, continues with complaints of back pain, states laid too long in HD. See MAR, repositioned for comfort. Continues to participate in therapy. Will continue to monitor.

## 2023-04-23 NOTE — PROGRESS NOTES
Nephrology Progress Note    Interval History:     Patient Complaints: Feeling so much better.  Awake and alert and no shortness of breath and no swelling and no confusion and social smile!  Ambulating with a walker      Vital Signs  Temp:  [98 °F (36.7 °C)-98.2 °F (36.8 °C)] 98 °F (36.7 °C)  Heart Rate:  [] 106  Resp:  [20] 20  BP: (101-127)/(64-71) 117/64    Physical Exam:    General:           No distress      HEENT:  Pallor               Neck:  No JVD       Lungs:    Clear   Heart:   Ocular,  no rub       Abdomen:   Normal bowel sounds, soft non-tender, non-distended, no guarding       Extremities:  No edema       Skin: No petechiae       Neurologic: Cranial nerves grossly intact, moves all extremities.        Results Review:    I reviewed the patient's new clinical results.    Lab Results (last 24 hours)     Procedure Component Value Units Date/Time    POC Glucose Once [[id number]]  (Abnormal) Collected: 04/23/23 1537    Specimen: Blood Updated: 04/23/23 1544     Glucose 263 mg/dL      Comment: Meter: [id number] : [id number] REBECCA TOMPKINS       POC Glucose Once [[id number]]  (Abnormal) Collected: 04/23/23 1052    Specimen: Blood Updated: 04/23/23 1058     Glucose 244 mg/dL      Comment: Meter: [id number] : [id number] TRISTEN CASTELLANOS       POC Glucose Once [[id number]]  (Abnormal) Collected: 04/23/23 0602    Specimen: Blood Updated: 04/23/23 0611     Glucose 137 mg/dL      Comment: Meter: [id number] : [id number] Homar Rich             Imaging Results (Last 24 Hours)     ** No results found for the last 24 hours. **          Assessment and Plan:    1.  End-stage renal disease on dialysis  2.  Cholelithiasis  3.  Heart failure with reduced ejection fraction  4.  Pacemaker implantation status  5.  History of CABG  6.  Diabetes poor control with nephropathy  7.  Hypertension  8.  Anemia of CKD    Continue hemodialysis 3 times a week.       Elective repositioning of PD catheter later.  Will discuss with surgeon whether we should do cholecystectomy at that point but for now we will attempt recuperate and get stronger.  Discussed this plan with the patient and he is agreeable but I will talk to the wife separately.    Start Retacrit weekly    Josse Hernandez MD  04/23/23  18:53 EDT

## 2023-04-23 NOTE — PROGRESS NOTES
Rehabilitation Nursing  Inpatient Rehabilitation Plan of Care Note    Plan of Care  Copy from POC    Pain    Pain Management (Active)  Current Status (4/7/2023 4:45:00 PM): at risk for pain  Weekly Goal: pain at an acceptable level  Discharge Goal: Pain managed    Safety    Potential for Injury (Active)  Current Status (4/7/2023 4:45:00 PM): At risk for falls  Weekly Goal: no falls  Discharge Goal: no falls    Signed by: Renata Henderson Nurse

## 2023-04-24 VITALS
HEIGHT: 67 IN | BODY MASS INDEX: 25.46 KG/M2 | RESPIRATION RATE: 18 BRPM | OXYGEN SATURATION: 97 % | WEIGHT: 162.2 LBS | HEART RATE: 108 BPM | TEMPERATURE: 98 F | SYSTOLIC BLOOD PRESSURE: 109 MMHG | DIASTOLIC BLOOD PRESSURE: 64 MMHG

## 2023-04-24 LAB
GLUCOSE BLDC GLUCOMTR-MCNC: 135 MG/DL (ref 70–130)
GLUCOSE BLDC GLUCOMTR-MCNC: 202 MG/DL (ref 70–130)
GLUCOSE BLDC GLUCOMTR-MCNC: 249 MG/DL (ref 70–130)

## 2023-04-24 PROCEDURE — 97530 THERAPEUTIC ACTIVITIES: CPT

## 2023-04-24 PROCEDURE — 63710000001 INSULIN GLARGINE PER 5 UNITS: Performed by: FAMILY MEDICINE

## 2023-04-24 PROCEDURE — 97535 SELF CARE MNGMENT TRAINING: CPT

## 2023-04-24 PROCEDURE — 97116 GAIT TRAINING THERAPY: CPT

## 2023-04-24 PROCEDURE — 63710000001 INSULIN LISPRO (HUMAN) PER 5 UNITS: Performed by: FAMILY MEDICINE

## 2023-04-24 PROCEDURE — 97110 THERAPEUTIC EXERCISES: CPT

## 2023-04-24 PROCEDURE — 99232 SBSQ HOSP IP/OBS MODERATE 35: CPT | Performed by: INTERNAL MEDICINE

## 2023-04-24 PROCEDURE — 97112 NEUROMUSCULAR REEDUCATION: CPT

## 2023-04-24 PROCEDURE — 82962 GLUCOSE BLOOD TEST: CPT

## 2023-04-24 RX ORDER — CARVEDILOL 6.25 MG/1
12.5 TABLET ORAL 2 TIMES DAILY WITH MEALS
Status: DISCONTINUED | OUTPATIENT
Start: 2023-04-24 | End: 2023-04-26 | Stop reason: HOSPADM

## 2023-04-24 RX ORDER — AMLODIPINE BESYLATE 5 MG/1
5 TABLET ORAL
Status: DISCONTINUED | OUTPATIENT
Start: 2023-04-24 | End: 2023-04-26 | Stop reason: HOSPADM

## 2023-04-24 RX ADMIN — DOCUSATE SODIUM 50 MG AND SENNOSIDES 8.6 MG 2 TABLET: 8.6; 5 TABLET, FILM COATED ORAL at 21:04

## 2023-04-24 RX ADMIN — GENTAMICIN SULFATE 1 APPLICATION: 1 OINTMENT TOPICAL at 08:20

## 2023-04-24 RX ADMIN — ATORVASTATIN CALCIUM 40 MG: 40 TABLET, FILM COATED ORAL at 21:04

## 2023-04-24 RX ADMIN — LIDOCAINE 1 PATCH: 50 PATCH CUTANEOUS at 08:20

## 2023-04-24 RX ADMIN — CARVEDILOL 12.5 MG: 6.25 TABLET, FILM COATED ORAL at 17:14

## 2023-04-24 RX ADMIN — CASTOR OIL AND BALSAM, PERU 1 APPLICATION: 788; 87 OINTMENT TOPICAL at 21:04

## 2023-04-24 RX ADMIN — METHOCARBAMOL 500 MG: 500 TABLET, FILM COATED ORAL at 21:04

## 2023-04-24 RX ADMIN — ALLOPURINOL 50 MG: 100 TABLET ORAL at 08:17

## 2023-04-24 RX ADMIN — Medication 10 ML: at 08:21

## 2023-04-24 RX ADMIN — Medication 10 MG: at 21:04

## 2023-04-24 RX ADMIN — INSULIN LISPRO 3 UNITS: 100 INJECTION, SOLUTION INTRAVENOUS; SUBCUTANEOUS at 17:15

## 2023-04-24 RX ADMIN — CASTOR OIL AND BALSAM, PERU 1 APPLICATION: 788; 87 OINTMENT TOPICAL at 08:20

## 2023-04-24 RX ADMIN — INSULIN GLARGINE 15 UNITS: 100 INJECTION, SOLUTION SUBCUTANEOUS at 21:06

## 2023-04-24 RX ADMIN — PANTOPRAZOLE SODIUM 40 MG: 40 TABLET, DELAYED RELEASE ORAL at 18:32

## 2023-04-24 RX ADMIN — Medication 10 ML: at 21:05

## 2023-04-24 RX ADMIN — OLANZAPINE 5 MG: 5 TABLET, ORALLY DISINTEGRATING ORAL at 10:07

## 2023-04-24 RX ADMIN — ACETAMINOPHEN 500 MG: 500 TABLET ORAL at 04:40

## 2023-04-24 RX ADMIN — ASPIRIN 81 MG: 81 TABLET, CHEWABLE ORAL at 08:17

## 2023-04-24 RX ADMIN — ACETAMINOPHEN 500 MG: 500 TABLET ORAL at 18:34

## 2023-04-24 RX ADMIN — INSULIN LISPRO 3 UNITS: 100 INJECTION, SOLUTION INTRAVENOUS; SUBCUTANEOUS at 12:37

## 2023-04-24 RX ADMIN — Medication 1 TABLET: at 08:17

## 2023-04-24 RX ADMIN — PANTOPRAZOLE SODIUM 40 MG: 40 TABLET, DELAYED RELEASE ORAL at 08:20

## 2023-04-24 NOTE — SIGNIFICANT NOTE
04/24/23 6693   Plan   Plan RN says dialysis nurse spoke to Dr. Farias and he is agreeable to pt receiving tomorrow 4-25-23 and starting outpatient HD on 4-28-23.

## 2023-04-24 NOTE — PLAN OF CARE
Goal Outcome Evaluation:  Plan of Care Reviewed With: patient        Progress: improving  Outcome Evaluation: PROGRESSING WITH CURRENT THERAPIES; CONTINUE PLAN OF CARE; BED ALARM NOTED; PATIENT EXCITED ABOUT IMPENDING DISCHARGE ON 4/26/23; MONITOR

## 2023-04-24 NOTE — THERAPY TREATMENT NOTE
Inpatient Rehabilitation - Physical Therapy Treatment Note       [facility]     Patient Name: Augusto Lorenzana Jr.  : 1945  MRN: [id number]    Today's Date: 2023                    Admit Date: 2023      Visit Dx:   No diagnosis found.    Patient Active Problem List   Diagnosis   • NSTEMI 2021   • Hyperlipidemia LDL goal <70   • Essential hypertension   • T2DM on oral agents and insulin. HbA1c 7.4    • Coronary artery disease involving native coronary artery of native heart with unstable angina pectoris   • A/C kidney disease. ATN due to postoperative arrest. 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 Combination of anoxic insult and azotemia   • Debility   • Lower extremity edema   • Complete heart block   • GI bleed   • Third degree atrioventricular block   • Atrial fibrillation, persistent   • Gastrointestinal hemorrhage associated with gastric ulcer   • Critical illness myopathy       Past Medical History:   Diagnosis Date   • CAD (coronary artery disease)    • CKD (chronic kidney disease) stage 3, GFR 30-59 ml/min    • Diabetes mellitus    • Hyperlipidemia    • Hypertension    • NSTEMI (non-ST elevated myocardial infarction)        Past Surgical History:   Procedure Laterality Date   • CARDIAC CATHETERIZATION N/A 2021    Procedure: Left Heart Cath;  Surgeon: Blade Greene IV, MD;  Location: [facility] CATH INVASIVE LOCATION;  Service: Cardiovascular;  Laterality: N/A;   • CARDIAC SURGERY      2 stents placed .   • CENTRAL VENOUS LINE INSERTION Left 2023    Procedure: CENTRAL VENOUS LINE INSERTION;  Surgeon: Torey Easley MD;  Location: [facility];  Service: General;  Laterality: Left;   • CORONARY ARTERY BYPASS GRAFT N/A 2021    Procedure: MEDIAN STERNOTOMY CORONARY ARTERY BYPASS X 3 UTILIZING THE  LEFT INTERNAL MAMMARY ARTERY GRAFT, EVH OF THE GREATER RIGHT SAPHENOUS VEIN, AND PILO PER ANESTHESIA;  Surgeon: Jacek Miller MD;  Location:  [facility];  Service: Cardiothoracic;  Laterality: N/A;   • ENDOSCOPY N/A 04/08/2023    Procedure: ESOPHAGOGASTRODUODENOSCOPY with CENTERAL LINE PLACEMENT;  Surgeon: Sabine Hudson MD;  Location:  [facility];  Service: General;  Laterality: N/A;   • ENDOSCOPY N/A 04/10/2023    Procedure: ESOPHAGOGASTRODUODENOSCOPY;  Surgeon: Sabine Hudson MD;  Location:  [facility];  Service: General;  Laterality: N/A;   • INSERTION HEMODIALYSIS CATHETER N/A 4/13/2023    Procedure: HEMODIALYSIS CATHETER INSERTION;  Surgeon: Torey Easley MD;  Location:  [facility];  Service: General;  Laterality: N/A;   • PACEMAKER IMPLANTATION         PT ASSESSMENT (last 12 hours)     IRF PT Evaluation and Treatment     Row Name 04/24/23 0954          PT Time and Intention    Document Type daily treatment  -LB     Mode of Treatment individual therapy;physical therapy  -LB     Row Name 04/24/23 0954          General Information    Existing Precautions/Restrictions fall  confusion  -LB     Row Name 04/24/23 0954          Pain Scale: FACES Pre/Post-Treatment    Pain: FACES Scale, Pretreatment 0-->no hurt  -LB     Posttreatment Pain Rating 0-->no hurt  -LB     Row Name 04/24/23 0954          Cognition/Psychosocial    Affect/Mental Status (Cognition) WFL  he is more oriented today  -LB     Follows Commands (Cognition) verbal cues/prompting required;physical/tactile prompts required  -LB     Personal Safety Interventions gait belt;nonskid shoes/slippers when out of bed;supervised activity  -LB     Row Name 04/24/23 0954          Bed Mobility    Supine-Sit [place] (Bed Mobility) standby assist  -LB     Assistive Device (Bed Mobility) bed rails  -LB     Row Name 04/24/23 0954          Bed-Chair Transfer    Bed-Chair [place] (Transfers) verbal cues;nonverbal cues (demo/gesture);standby assist   -LB     Assistive Device (Bed-Chair Transfers) wheelchair  -LB     Row Name 04/24/23 0954          Chair-Bed Transfer    Chair-Bed [place] (Transfers) verbal cues;nonverbal cues (demo/gesture);standby assist  -LB     Assistive Device (Chair-Bed Transfers) wheelchair  -LB     Row Name 04/24/23 0954          Sit-Stand Transfer    Sit-Stand [place] (Transfers) verbal cues;nonverbal cues (demo/gesture);standby assist  -LB     Assistive Device (Sit-Stand Transfers) walker, front-wheeled;wheelchair  -LB     Row Name 04/24/23 0954          Stand-Sit Transfer    Stand-Sit [place] (Transfers) verbal cues;nonverbal cues (demo/gesture);standby assist  -LB     Assistive Device (Stand-Sit Transfers) walker, front-wheeled;wheelchair  -LB     Row Name 04/24/23 0954          Gait/Stairs (Locomotion)    [place] Level (Gait) verbal cues;nonverbal cues (demo/gesture);standby assist  -LB     Assistive Device (Gait) walker, front-wheeled  -LB     Distance in Feet (Gait) 320'  -LB     Deviations/Abnormal Patterns (Gait) chinyere decreased;gait speed decreased;stride length decreased  -LB     Bilateral Gait Deviations forward flexed posture  less drifting to right or staggering  -LB     Row Name 04/24/23 0954          Balance    Comment, Balance standing in PB on foam for bag toss at goal B and  with reacher  -LB     Row Name 04/24/23 0954          Motor Skills    Therapeutic Exercise --  sitting ex with 2#, standing ex in PB  -LB     Row Name 04/24/23 0954          Aerobic Exercise    Type (Aerobic Exercise) recumbent stationary bike  -LB     Time Performed (Aerobic Exercise) level 1.0, 15 min  -LB     Row Name 04/24/23 0954          Positioning and Restraints    Pre-Treatment Position in bed  -LB     In Wheelchair exit alarm on;patient within staff view  in zarco at nursing station  -LB     Row Name 04/24/23 0954          Daily Progress Summary (PT)    Recommendations (PT) continue PT  -LB           User Key   (r) = Recorded By, (t) = Taken By, (c) = Cosigned By    Initials Name Provider Type    Jacqueline Ontiveros, PT Physical Therapist              Wound 04/12/23 1700 distal penis Pressure Injury (Active)   Pressure Injury Stage DTPI 04/23/23 1905   Dressing Appearance open to air 04/23/23 1905   Base pink 04/23/23 1905   Periwound Temperature warm 04/23/23 1103   Drainage Amount none 04/23/23 1905   Care, Wound barrier applied 04/23/23 1905   Dressing Care open to air 04/23/23 1103       Wound Left chest Incision (Active)   Dressing Appearance dry;intact 04/23/23 1905   Closure Approximated;Adhesive closure strips;Other (Comment) 04/23/23 1905   Base dressing in place, unable to visualize 04/23/23 1103   Drainage Amount none 04/23/23 1905     Physical Therapy Education     Title: PT OT SLP Therapies (In Progress)     Topic: Physical Therapy (Done)     Point: Mobility training (Done)     Learning Progress Summary           Patient Acceptance, E, VU,NR by LB at 4/24/2023 0958    Acceptance, E, NR by DL at 4/22/2023 2006    Acceptance, E,D, VU,NR by LL at 4/22/2023 1208    Acceptance, E,TB, VU by DG at 4/22/2023 0018    Acceptance, E, VU,NR by LB at 4/21/2023 1458    Acceptance, E,TB, VU by DG at 4/20/2023 2223    Acceptance, E, VU,NR by LB at 4/20/2023 1045    Acceptance, E, VU,NR by LB at 4/19/2023 1559                   Point: Home exercise program (Done)     Learning Progress Summary           Patient Acceptance, E, VU,NR by LB at 4/24/2023 0958    Acceptance, E, NR by DL at 4/22/2023 2006    Acceptance, E,D, VU,NR by LL at 4/22/2023 1208    Acceptance, E,TB, VU by DG at 4/22/2023 0018    Acceptance, E, VU,NR by LB at 4/21/2023 1458    Acceptance, E,TB, VU by DG at 4/20/2023 2223    Acceptance, E, VU,NR by LB at 4/20/2023 1045    Acceptance, E, VU,NR by LB at 4/19/2023 1559                   Point: Body mechanics (Done)     Learning Progress Summary           Patient Acceptance, E, VU,NR by LB at 4/24/2023 0913     Acceptance, E, NR by DL at 4/22/2023 2006    Acceptance, E,D, VU,NR by LL at 4/22/2023 1208    Acceptance, E,TB, VU by DG at 4/22/2023 0018    Acceptance, E, VU,NR by LB at 4/21/2023 1458    Acceptance, E,TB, VU by DG at 4/20/2023 2223    Acceptance, E, VU,NR by LB at 4/20/2023 1045    Acceptance, E, VU,NR by LB at 4/19/2023 1559                   Point: Precautions (Done)     Learning Progress Summary           Patient Acceptance, E, VU,NR by LB at 4/24/2023 0958    Acceptance, E, NR by DL at 4/22/2023 2006    Acceptance, E,D, VU,NR by LL at 4/22/2023 1208    Acceptance, E,TB, VU by DG at 4/22/2023 0018    Acceptance, E, VU,NR by LB at 4/21/2023 1458    Acceptance, E,TB, VU by DG at 4/20/2023 2223    Acceptance, E, VU,NR by LB at 4/20/2023 1045    Acceptance, E, VU,NR by LB at 4/19/2023 1559                               User Key     Initials Effective Dates Name Provider Type Discipline     06/16/21 -  Dariela Arteaga, RN Registered Nurse Nurse     06/16/21 -  Jacqueline Porter, PT Physical Therapist PT    LL 05/02/16 -  Emilee Bowles PTA Physical Therapist Assistant PT    DL 03/16/22 -  Misty Bowles, RN Registered Nurse Nurse                PT Recommendation and Plan    Planned Therapy Interventions (PT): balance training, bed mobility training, gait training, patient/family education, strengthening, transfer training  Frequency of Treatment (PT): 5 times per week  Anticipated Equipment Needs at Discharge (PT Eval):  (tbd)  Daily Progress Summary (PT)  Recommendations (PT): continue PT               Time Calculation:      PT Charges     Row Name 04/24/23 0958             Time Calculation    Start Time 0830  -LB      Stop Time 1000  -LB      Time Calculation (min) 90 min  -LB      PT Received On 04/24/23  -LB            User Key  (r) = Recorded By, (t) = Taken By, (c) = Cosigned By    Initials Name Provider Type    LB Jacqueline Porter, PT Physical Therapist                Therapy Charges for  Today     Code Description Service Date Service Provider Modifiers Qty    96808000741 HC GAIT TRAINING EA 15 MIN 4/24/2023 Jacqueline Porter, PT GP 1    04413428918 HC PT THER PROC EA 15 MIN 4/24/2023 Jacqueline Porter, PT GP 3    88426035646 HC PT NEUROMUSC RE EDUCATION EA 15 MIN 4/24/2023 Jacqueline Porter, PT GP 1    06088950619 HC PT THERAPEUTIC ACT EA 15 MIN 4/24/2023 Jacqueline Porter, PT GP 1            PT G-Codes  AM-PAC 6 Clicks Score (PT): 19      Jacqueline Porter, PT  4/24/2023

## 2023-04-24 NOTE — SIGNIFICANT NOTE
04/24/23 7862   Plan   Plan SS spoke to Keyla with [facility] [phone] who says pt has a slot on Monday, Wednesday and Friday.  Pt can start outpatient HD on Friday 4-28-23 arrive at 10:30 am to complete admission paperwork then start HD at 11:00 am.  Pt to arrive at 10:45 am starting 5-1-23.  SS reviewed this information with pt who is agreeable.  PT recommends outpatient PT 2 x wk x 4 wks and will practice with pt using a rollator to assess safety of this device or if he needs a rolling walker.  Pt says the rollator he has at home was his father's, therefore, insurance has not been billed for a walker.  PT will inform SS if pt needs to use rolling walker after they practice with rollator.  Pt is agreeable to outpatient PT and prefers [facility].  [facility] is preferred if rolling walker needs to be ordered.   Patient/Family in Agreement with Plan yes

## 2023-04-24 NOTE — PROGRESS NOTES
[facility] CMG Coordinator  Inpatient Rehabilitation Admission    Ethnic Group:  Marital Status:    [facility] Admission Date:  04/18/2023  Admission Class:  Admit From:    Pre-Hospital Living:    Payment Sources:  Impairment Group:  Date of Onset of Impairment:    Etiologic Diagnosis Code(s):  Rank Code      Description  1    G72.81    Critical illness myopathy    Comorbidities:  Rank Code      Description    1    J96.01    Acute respiratory failure with hypoxia  2    N18.6     End stage renal disease  3    I44.2     Atrioventricular block, complete  4    I13.2     Hypertensive heart and chronic kidney disease                 with heart failure and with stage 5 chronic                 kidney disease, or end stage renal disease  5    D62       Acute posthemorrhagic anemia  6    E87.1     Hypo-osmolality and hyponatremia  7    F05       Delirium due to known physiological condition  8    Z99.2     Dependence on renal dialysis  9    F23       Brief psychotic disorder  10   E11.22    Type 2 diabetes mellitus with diabetic chronic                 kidney disease  11   K92.0     Hematemesis  12   I25.10    Atherosclerotic heart disease of native                 coronary artery without angina pectoris  13   I25.2     Old myocardial infarction  14   I45.9     Conduction disorder, unspecified  15   Z60.4     Social exclusion and rejection  16   Z87.11    Personal history of peptic ulcer disease  17   Z91.81    History of falling  18   Z95.1     Presence of aortocoronary bypass graft  19   Z95.5     Presence of coronary angioplasty implant and                 graft  20   I95.9     Hypotension, unspecified  21   E78.5     Hyperlipidemia, unspecified  22   E86.0     Dehydration  23   Y81.2     Prosthetic and other implants, materials and                 accessory general- and plastic-surgery devices                 associated with adverse incidents  24   Z95.0     Presence of cardiac pacemaker  25   I50.20    Unspecified systolic  (congestive) heart failure  26   T85.611A  Breakdown (mechanical) of intraperitoneal                 dialysis catheter, initial encounter  27   K92.1     Melena    Height on Admission:  Weight on Admission:    Are there any arthritis conditions recorded for Impairment Group, Etiologic  Diagnosis, or Comorbid Conditions that meet all of the regulatory requirements  for IRF classification (in 42 .29(b)(2)(x), (xi), and xii))?  No    GEMA Bladder Accidents:   - Accidents.    GEMA Bowel Accident:  -Accidents.    Signed by: Nurse Mauricio

## 2023-04-24 NOTE — SIGNIFICANT NOTE
04/24/23 1501   Plan   Plan Spoke to spouse [phone] about outpatient HD slot at [facility] on Monday, Wednesday, Friday starting Friday 4-28-23 arrive at 10:30 am to complete paperwork, start HD at 11:00 am then arrive at 10:45 am starting 5-1-23.  Informed her about recommendation for pt to receive outpatient PT 2 x wk x 4 wks and pt's preference for [facility].  Spouse is agreeable to transport pt to outpatient PT on Tuesdays and Thursdays.  Contacted [facility] [phone] per preference per Skyla with discharge on 4-26-23 and need for outpatient PT 2 x wk x 4 wks evaluate and treat for strengthening, endurance, gait training, transfert training, balance, therapeutic exercise, and bed mobility.  Pt is scheduled for 5-9-23 at 2:00 pm for PT evaluation; arrive at 1:45 pm for paperwork.  Pt can be contacted if they have a cancellation for an appointment sooner that 5-9-23.  Faxed face sheet, H&P, PT note and MD progress note to [facility] [phone].  Ambulatory referral for outpatient PT to be faxed to [facility] at discharge.  Reviewed this information with spouse [phone].   Patient/Family in Agreement with Plan yes

## 2023-04-24 NOTE — PROGRESS NOTES
Rehabilitation Nursing  Inpatient Rehabilitation Plan of Care Note    Plan of Care  Copy from Musa    Pain Management (Active)  Current Status (4/7/2023 4:45:00 PM): at risk for pain  Weekly Goal: pain at an acceptable level  Discharge Goal: Pain managed    Safety    Potential for Injury (Active)  Current Status (4/7/2023 4:45:00 PM): At risk for falls  Weekly Goal: no falls  Discharge Goal: no falls    Signed by: Kalie Pulido, Nurse

## 2023-04-24 NOTE — DISCHARGE INSTR - OTHER ORDERS
Pt will receive outpatient hemodialysis at [facility] [phone] on Monday, Wednesday, Friday starting Friday 4-28-23 arrive at 10:30 am to complete admission paperwork then arrive at 10:45 am starting 5-1-23/start HD at 11:00 am.    Pt will receive outpatient PT 2 x week x 4 weeks at [facility] [phone].  Pt has an appointment on Tuesday 5-9-23 at 2:00 pm for PT evaluation; arrive at 1:45 pm for paperwork.

## 2023-04-24 NOTE — THERAPY TREATMENT NOTE
Inpatient Rehabilitation - Occupational Therapy Treatment Note    [facility]     Patient Name: Augusto Lorenzana Jr.  : 1945  MRN: [id number]    Today's Date: 2023                 Admit Date: 2023       No diagnosis found.    Patient Active Problem List   Diagnosis   • NSTEMI 2021   • Hyperlipidemia LDL goal <70   • Essential hypertension   • T2DM on oral agents and insulin. HbA1c 7.4    • Coronary artery disease involving native coronary artery of native heart with unstable angina pectoris   • A/C kidney disease. ATN due to postoperative arrest. 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 Combination of anoxic insult and azotemia   • Debility   • Lower extremity edema   • Complete heart block   • GI bleed   • Third degree atrioventricular block   • Atrial fibrillation, persistent   • Gastrointestinal hemorrhage associated with gastric ulcer   • Critical illness myopathy       Past Medical History:   Diagnosis Date   • CAD (coronary artery disease)    • CKD (chronic kidney disease) stage 3, GFR 30-59 ml/min    • Diabetes mellitus    • Hyperlipidemia    • Hypertension    • NSTEMI (non-ST elevated myocardial infarction)        Past Surgical History:   Procedure Laterality Date   • CARDIAC CATHETERIZATION N/A 2021    Procedure: Left Heart Cath;  Surgeon: Blade Greene IV, MD;  Location: [facility] CATH INVASIVE LOCATION;  Service: Cardiovascular;  Laterality: N/A;   • CARDIAC SURGERY      2 stents placed .   • CENTRAL VENOUS LINE INSERTION Left 2023    Procedure: CENTRAL VENOUS LINE INSERTION;  Surgeon: Torey Easley MD;  Location: [facility];  Service: General;  Laterality: Left;   • CORONARY ARTERY BYPASS GRAFT N/A 2021    Procedure: MEDIAN STERNOTOMY CORONARY ARTERY BYPASS X 3 UTILIZING THE LEFT INTERNAL  Patient states he cut left hand in web between thumb and 2nd digit with a clean knife. Pressure applied. Denies numbness and able to move all fingers.   Wound irrigated with  Normal saline -    MAMMARY ARTERY GRAFT, EVH OF THE GREATER RIGHT SAPHENOUS VEIN, AND PILO PER ANESTHESIA;  Surgeon: Jacek Miller MD;  Location:  [facility];  Service: Cardiothoracic;  Laterality: N/A;   • ENDOSCOPY N/A 04/08/2023    Procedure: ESOPHAGOGASTRODUODENOSCOPY with CENTERAL LINE PLACEMENT;  Surgeon: Sabine Hudson MD;  Location:  [facility];  Service: General;  Laterality: N/A;   • ENDOSCOPY N/A 04/10/2023    Procedure: ESOPHAGOGASTRODUODENOSCOPY;  Surgeon: Sabine Hudson MD;  Location:  [facility];  Service: General;  Laterality: N/A;   • INSERTION HEMODIALYSIS CATHETER N/A 4/13/2023    Procedure: HEMODIALYSIS CATHETER INSERTION;  Surgeon: Torey Easley MD;  Location:  [facility];  Service: General;  Laterality: N/A;   • PACEMAKER IMPLANTATION               IRF OT ASSESSMENT FLOWSHEET (last 12 hours)     IRF OT Evaluation and Treatment     Row Name 04/24/23 1545          OT Time and Intention    Document Type daily treatment  -TM     Mode of Treatment occupational therapy  -TM     Patient Effort good  -TM     Symptoms Noted During/After Treatment none  -TM     Row Name 04/24/23 1545          General Information    General Observations of Patient Pt agreeable for therapy.  -TM     Existing Precautions/Restrictions fall;pacemaker  -TM     Row Name 04/24/23 1545          Cognition/Psychosocial    Orientation Status (Cognition) oriented to;person;place;situation  -TM     Follows Commands (Cognition) follows one-step commands;verbal cues/prompting required  -TM     Row Name 04/24/23 1545          Upper Body Dressing    [place] Level (Upper Body Dressing) set up assistance  -TM     Position (Upper Body Dressing) supported sitting  -TM     Row Name 04/24/23 1545          Lower Body Dressing    [place] Level (Lower Body Dressing) contact guard assist;verbal cues  -TM     Position (Lower Body Dressing) supported sitting  -TM     Row Name 04/24/23 1545          Grooming    [place] Level (Grooming) set up   -TM     Position (Grooming) supported sitting  -TM     Row Name 04/24/23 1545          Toileting    [place] Level (Toileting) contact guard assist;supervision;verbal cues  -TM     Assistive Device Use (Toileting) grab bar/safety frame  -TM     Position (Toileting) supported sitting  -TM     Row Name 04/24/23 1545          Toilet Transfer    Type (Toilet Transfer) stand pivot/stand step  -TM     [place] Level (Toilet Transfer) supervision;contact guard;verbal cues  -     Assistive Device (Toilet Transfer) wheelchair;grab bars/safety frame  -TM     Row Name 04/24/23 1545          Motor Skills    Motor Skills coordination;functional endurance;motor control/coordination interventions  -     Motor Control/Coordination Interventions therapeutic exercise/ROM;gross motor coordination activities;fine motor manipulation/dexterity activities;other (see comments)  light table top BUE ther ex/act, GMC/FMC  -TM           User Key  (r) = Recorded By, (t) = Taken By, (c) = Cosigned By    Initials Name Effective Dates     Linn Colon, OT 06/16/21 -                  Occupational Therapy Education     Title: PT OT SLP Therapies (Done)     Topic: Occupational Therapy (Done)     Point: ADL training (Done)     Description:   Instruct learner(s) on proper safety adaptation and remediation techniques during self care or transfers.   Instruct in proper use of assistive devices.              Learning Progress Summary           Patient Acceptance, E,D, VU,NR by  at 4/24/2023 1544    Acceptance, D,E, NR by BC at 4/22/2023 1303    Acceptance, E,TB, VU by  at 4/22/2023 0018    Acceptance, E,TB, VU by  at 4/20/2023 2223    Acceptance, E,D, VU,NR by  at 4/20/2023 0948    Acceptance, E,D, VU,NR by  at 4/19/2023 1109                   Point: Precautions (Done)     Description:   Instruct learner(s) on prescribed precautions during self-care and functional transfers.              Learning Progress Summary            Patient Acceptance, E,D, VU,NR by TM at 4/24/2023 1544    Acceptance, E, NR by DL at 4/22/2023 2006    Acceptance, E,TB, VU by DG at 4/22/2023 0018    Acceptance, E,TB, VU by DG at 4/20/2023 2223    Acceptance, E,D, VU,NR by TM at 4/20/2023 0948    Acceptance, E,D, VU,NR by TM at 4/19/2023 1109                               User Key     Initials Effective Dates Name Provider Type Discipline     06/16/21 -  Dariela Arteaga, RN Registered Nurse Nurse    TM 06/16/21 -  Linn Colon OT Occupational Therapist OT    BC 06/16/21 -  Sabine Navarro OT Occupational Therapist OT    DL 03/16/22 -  Misty Bowles, RN Registered Nurse Nurse                    OT Recommendation and Plan    Planned Therapy Interventions (OT): activity tolerance training, adaptive equipment training, BADL retraining, IADL retraining, occupation/activity based interventions, patient/caregiver education/training, ROM/therapeutic exercise, strengthening exercise, transfer/mobility retraining                    Time Calculation:      Time Calculation- OT     Row Name 04/24/23 1556 04/24/23 1555          Time Calculation- OT    OT Start Time 1240  -TM 1100  -TM     OT Stop Time 1335  -TM 1145  -TM     OT Time Calculation (min) 55 min  -TM 45 min  -TM     Total Timed Code Minutes- OT 55 minute(s)  -TM 45 minute(s)  -TM           User Key  (r) = Recorded By, (t) = Taken By, (c) = Cosigned By    Initials Name Provider Type     Linn Colon OT Occupational Therapist              Therapy Charges for Today     Code Description Service Date Service Provider Modifiers Qty    36282260610 HC OT SELF CARE/MGMT/TRAIN EA 15 MIN 4/24/2023 Linn Colon OT GO 3    67524768650 HC OT THERAPEUTIC ACT EA 15 MIN 4/24/2023 Linn Colon OT GO 3    85001105801 HC OT THER PROC EA 15 MIN 4/24/2023 Linn Colon, OT GO 1                   Linn Colon OT  4/24/2023

## 2023-04-24 NOTE — PROGRESS NOTES
Nephrology Progress Note    Interval History:     No chest pain shortness of breath feeling okay and understand the plan for PD catheter      Vital Signs  Temp:  [98 °F (36.7 °C)] 98 °F (36.7 °C)  Heart Rate:  [] 84  Resp:  [20] 20  BP: (101-143)/(63-84) 103/63    Physical Exam:    General:           No distress      HEENT:  Pallor               Neck:  No JVD       Lungs:    Clear   Heart:   Ocular,  no rub       Abdomen:   Normal bowel sounds, soft non-tender, non-distended, no guarding       Extremities:  No edema       Skin: No petechiae       Neurologic: Cranial nerves grossly intact, moves all extremities.        Results Review:    I reviewed the patient's new clinical results.    Lab Results (last 24 hours)     Procedure Component Value Units Date/Time    POC Glucose Once [[id number]]  (Abnormal) Collected: 04/24/23 0600    Specimen: Blood Updated: 04/24/23 0606     Glucose 135 mg/dL      Comment: Meter: [id number] : [id number] HERMES SMITH       POC Glucose Once [[id number]]  (Abnormal) Collected: 04/23/23 1537    Specimen: Blood Updated: 04/23/23 1544     Glucose 263 mg/dL      Comment: Meter: [id number] : [id number] REEBCCATIANNA BENSONERD       POC Glucose Once [[id number]]  (Abnormal) Collected: 04/23/23 1052    Specimen: Blood Updated: 04/23/23 1058     Glucose 244 mg/dL      Comment: Meter: [id number] : [id number] TRISTEN CASTELLANOS             Imaging Results (Last 24 Hours)     ** No results found for the last 24 hours. **          Assessment and Plan:    1.  End-stage renal disease on dialysis  2.  Cholelithiasis  3.  Heart failure with reduced ejection fraction  4.  Pacemaker implantation status  5.  History of CABG  6.  Diabetes poor control with nephropathy  7.  Hypertension  8.  Anemia of CKD    Continue on intermittent dialysis as scheduled.  Educated and counseled the patient about repositioning of the catheter as an  outpatient.      Alessandra Farias MD  04/24/23  08:20 EDT

## 2023-04-24 NOTE — PROGRESS NOTES
Inpatient Rehabilitation Functional Measures Assessment and Plan of Care    Plan of Care  Self Care    [OT] Dressing (Lower)(Active)  Current Status(04/24/2023): CGA  Weekly Goal(05/02/2023): CGA/supervision  Discharge Goal: supervision    Performed Intervention(s)  ADL retraining/AE education, fxal mobility, BUE ther ex/act, GMC/FMC    Functional Measures  GEMA Eating:  GEMA Grooming:  GEMA Bathing:  GEMA Upper Body Dressing:  GEMA Lower Body Dressing:  GEMA Toileting:    GEMA Bladder Management  Level of Assistance:  Frequency/Number of Accidents this Shift:    GEMA Bowel Management  Level of Assistance:  Frequency/Number of Accidents this Shift:    GEMA Bed/Chair/Wheelchair Transfer:  GEMA Toilet Transfer:  GEMA Tub/Shower Transfer:    Previously Documented Mode of Locomotion at Discharge:  [facility] Expected Mode of Locomotion at Discharge:  GEMA Walk/Wheelchair:  GEMA Stairs:    GEMA Comprehension:  GEMA Expression:  GEMA Social Interaction:  GEMA Problem Solving:  GEMA Memory:    Therapy Mode Minutes  Occupational Therapy: Individual: 100 minutes.  Physical Therapy:  Speech Language Pathology:    Discharge Functional Goals:    Signed by: Linn Colon OT

## 2023-04-25 LAB
ANION GAP SERPL CALCULATED.3IONS-SCNC: 13 MMOL/L (ref 5–15)
BASOPHILS # BLD AUTO: 0.08 10*3/MM3 (ref 0–0.2)
BASOPHILS NFR BLD AUTO: 0.8 % (ref 0–1.5)
BUN SERPL-MCNC: 47 MG/DL (ref 8–23)
BUN/CREAT SERPL: 12.1 (ref 7–25)
CALCIUM SPEC-SCNC: 8.9 MG/DL (ref 8.6–10.5)
CHLORIDE SERPL-SCNC: 99 MMOL/L (ref 98–107)
CO2 SERPL-SCNC: 25 MMOL/L (ref 22–29)
CREAT SERPL-MCNC: 3.9 MG/DL (ref 0.76–1.27)
DEPRECATED RDW RBC AUTO: 63.4 FL (ref 37–54)
EGFRCR SERPLBLD CKD-EPI 2021: 15.2 ML/MIN/1.73
EOSINOPHIL # BLD AUTO: 0.33 10*3/MM3 (ref 0–0.4)
EOSINOPHIL NFR BLD AUTO: 3.4 % (ref 0.3–6.2)
ERYTHROCYTE [DISTWIDTH] IN BLOOD BY AUTOMATED COUNT: 18.5 % (ref 12.3–15.4)
GLUCOSE BLDC GLUCOMTR-MCNC: 130 MG/DL (ref 70–130)
GLUCOSE BLDC GLUCOMTR-MCNC: 163 MG/DL (ref 70–130)
GLUCOSE BLDC GLUCOMTR-MCNC: 245 MG/DL (ref 70–130)
GLUCOSE SERPL-MCNC: 133 MG/DL (ref 65–99)
HCT VFR BLD AUTO: 26.8 % (ref 37.5–51)
HGB BLD-MCNC: 8.7 G/DL (ref 13–17.7)
IMM GRANULOCYTES # BLD AUTO: 0.16 10*3/MM3 (ref 0–0.05)
IMM GRANULOCYTES NFR BLD AUTO: 1.7 % (ref 0–0.5)
LYMPHOCYTES # BLD AUTO: 1.82 10*3/MM3 (ref 0.7–3.1)
LYMPHOCYTES NFR BLD AUTO: 19 % (ref 19.6–45.3)
MCH RBC QN AUTO: 31.8 PG (ref 26.6–33)
MCHC RBC AUTO-ENTMCNC: 32.5 G/DL (ref 31.5–35.7)
MCV RBC AUTO: 97.8 FL (ref 79–97)
MONOCYTES # BLD AUTO: 1.04 10*3/MM3 (ref 0.1–0.9)
MONOCYTES NFR BLD AUTO: 10.9 % (ref 5–12)
NEUTROPHILS NFR BLD AUTO: 6.15 10*3/MM3 (ref 1.7–7)
NEUTROPHILS NFR BLD AUTO: 64.2 % (ref 42.7–76)
NRBC BLD AUTO-RTO: 0.2 /100 WBC (ref 0–0.2)
PLATELET # BLD AUTO: 160 10*3/MM3 (ref 140–450)
PMV BLD AUTO: 9.5 FL (ref 6–12)
POTASSIUM SERPL-SCNC: 3.7 MMOL/L (ref 3.5–5.2)
RBC # BLD AUTO: 2.74 10*6/MM3 (ref 4.14–5.8)
SODIUM SERPL-SCNC: 137 MMOL/L (ref 136–145)
WBC NRBC COR # BLD: 9.58 10*3/MM3 (ref 3.4–10.8)

## 2023-04-25 PROCEDURE — 63710000001 INSULIN GLARGINE PER 5 UNITS: Performed by: FAMILY MEDICINE

## 2023-04-25 PROCEDURE — 97116 GAIT TRAINING THERAPY: CPT

## 2023-04-25 PROCEDURE — 97530 THERAPEUTIC ACTIVITIES: CPT

## 2023-04-25 PROCEDURE — 97112 NEUROMUSCULAR REEDUCATION: CPT

## 2023-04-25 PROCEDURE — 99231 SBSQ HOSP IP/OBS SF/LOW 25: CPT | Performed by: INTERNAL MEDICINE

## 2023-04-25 PROCEDURE — 97110 THERAPEUTIC EXERCISES: CPT

## 2023-04-25 PROCEDURE — 63710000001 INSULIN LISPRO (HUMAN) PER 5 UNITS: Performed by: FAMILY MEDICINE

## 2023-04-25 PROCEDURE — 82962 GLUCOSE BLOOD TEST: CPT

## 2023-04-25 PROCEDURE — 97535 SELF CARE MNGMENT TRAINING: CPT

## 2023-04-25 PROCEDURE — 85025 COMPLETE CBC W/AUTO DIFF WBC: CPT | Performed by: INTERNAL MEDICINE

## 2023-04-25 PROCEDURE — 80048 BASIC METABOLIC PNL TOTAL CA: CPT | Performed by: INTERNAL MEDICINE

## 2023-04-25 RX ORDER — POLYETHYLENE GLYCOL 3350 17 G/17G
17 POWDER, FOR SOLUTION ORAL DAILY
Status: DISCONTINUED | OUTPATIENT
Start: 2023-04-25 | End: 2023-04-26 | Stop reason: HOSPADM

## 2023-04-25 RX ADMIN — OLANZAPINE 5 MG: 5 TABLET, ORALLY DISINTEGRATING ORAL at 08:56

## 2023-04-25 RX ADMIN — PANTOPRAZOLE SODIUM 40 MG: 40 TABLET, DELAYED RELEASE ORAL at 06:32

## 2023-04-25 RX ADMIN — ACETAMINOPHEN 500 MG: 500 TABLET ORAL at 05:10

## 2023-04-25 RX ADMIN — POLYETHYLENE GLYCOL (3350) 17 G: 17 POWDER, FOR SOLUTION ORAL at 09:00

## 2023-04-25 RX ADMIN — CARVEDILOL 12.5 MG: 6.25 TABLET, FILM COATED ORAL at 08:56

## 2023-04-25 RX ADMIN — Medication 1 TABLET: at 08:56

## 2023-04-25 RX ADMIN — ATORVASTATIN CALCIUM 40 MG: 40 TABLET, FILM COATED ORAL at 20:54

## 2023-04-25 RX ADMIN — Medication 10 MG: at 20:53

## 2023-04-25 RX ADMIN — ACETAMINOPHEN 500 MG: 500 TABLET ORAL at 20:53

## 2023-04-25 RX ADMIN — INSULIN LISPRO 3 UNITS: 100 INJECTION, SOLUTION INTRAVENOUS; SUBCUTANEOUS at 17:34

## 2023-04-25 RX ADMIN — CARVEDILOL 12.5 MG: 6.25 TABLET, FILM COATED ORAL at 17:34

## 2023-04-25 RX ADMIN — INSULIN LISPRO 2 UNITS: 100 INJECTION, SOLUTION INTRAVENOUS; SUBCUTANEOUS at 11:40

## 2023-04-25 RX ADMIN — INSULIN GLARGINE 15 UNITS: 100 INJECTION, SOLUTION SUBCUTANEOUS at 20:55

## 2023-04-25 RX ADMIN — AMLODIPINE BESYLATE 5 MG: 5 TABLET ORAL at 08:56

## 2023-04-25 RX ADMIN — LIDOCAINE 1 PATCH: 50 PATCH CUTANEOUS at 08:57

## 2023-04-25 RX ADMIN — PANTOPRAZOLE SODIUM 40 MG: 40 TABLET, DELAYED RELEASE ORAL at 17:34

## 2023-04-25 RX ADMIN — CASTOR OIL AND BALSAM, PERU 1 APPLICATION: 788; 87 OINTMENT TOPICAL at 08:57

## 2023-04-25 RX ADMIN — DOCUSATE SODIUM 50 MG AND SENNOSIDES 8.6 MG 2 TABLET: 8.6; 5 TABLET, FILM COATED ORAL at 20:54

## 2023-04-25 RX ADMIN — ASPIRIN 81 MG: 81 TABLET, CHEWABLE ORAL at 08:56

## 2023-04-25 RX ADMIN — Medication 10 ML: at 20:54

## 2023-04-25 RX ADMIN — GENTAMICIN SULFATE 1 APPLICATION: 1 OINTMENT TOPICAL at 08:57

## 2023-04-25 RX ADMIN — CASTOR OIL AND BALSAM, PERU 1 APPLICATION: 788; 87 OINTMENT TOPICAL at 20:54

## 2023-04-25 RX ADMIN — Medication 10 ML: at 09:05

## 2023-04-25 NOTE — PROGRESS NOTES
Case Management  Inpatient Rehabilitation Team Conference    Conference Date/Time: 4/25/2023 6:33:59 AM    Team Conference Attendees:  MD Melvi Lara SW Jessica Bill, RN,   ADEN Mckinley, PT  Linn Colon OT    Demographics            Age: 78Y            Gender: Male    Admission Date: 4/18/2023 4:57:00 PM  Rehabilitation Diagnosis:  critical illness myopathy  Comorbidities: J96.01 Acute respiratory failure with hypoxia  N18.6 End stage renal disease  I44.2 Atrioventricular block, complete  I13.2 Hypertensive heart and chronic kidney disease with heart failure and with  stage 5 chronic kidney disease, or end stage renal disease  D62 Acute posthemorrhagic anemia  E87.1 Hypo-osmolality and hyponatremia  F05 Delirium due to known physiological condition  Z99.2 Dependence on renal dialysis  F23 Brief psychotic disorder  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  K92.0 Hematemesis  I25.10 Atherosclerotic heart disease of native coronary artery without angina  pectoris  I25.2 Old myocardial infarction  I45.9 Conduction disorder, unspecified  Z60.4 Social exclusion and rejection  Z87.11 Personal history of peptic ulcer disease  Z91.81 History of falling  Z95.1 Presence of aortocoronary bypass graft  Z95.5 Presence of coronary angioplasty implant and graft  I95.9 Hypotension, unspecified  E78.5 Hyperlipidemia, unspecified  E86.0 Dehydration  Y81.2 Prosthetic and other implants, materials and accessory general- and  plastic-surgery devices associated with adverse incidents  Z95.0 Presence of cardiac pacemaker  I50.20 Unspecified systolic (congestive) heart failure  T85.611A Breakdown (mechanical) of intraperitoneal dialysis catheter, initial  encounter  K92.1 Melena      Plan of Care  Anticipated Discharge Date/Estimated Length of Stay: 4-26-23  Anticipated Discharge Destination: Community discharge with assistance  Discharge Plan : Pt  plans to return home with spouse providing assistance with  care.  Son will assist as needed after work.  Daughter lives in another town but  she is supportive too.  Medical Necessity Expected Level Rationale: good  Intensity and Duration: an average of 3 hours/5 days per week  Medical Supervision and 24 Hour Rehab Nursing: x  Physical Therapy: x  PT Intensity/Duration: PT 1.5 hours per day/5 days per week  Occupational Therapy: x  OT Intensity/Duration: OT 1.5 hours per day/5 days per week  Social Work: x  Therapeutic Recreation: x  Updated (if changes indicated)    Anticipated Discharge Date/Estimated Length of Stay:   4-26-23      Discharge Plan of Care: Outpatient Services Physical Therapy strengthening,  endurance, gait training, transfer training, balance, therapeutic exercise, bed  mobility 2 times per week for 4 weeks    Based on the patient's medical and functional status, their prognosis and  expected level of functional improvement is: good      Interdisciplinary Problem/Goals/Status  Pain    [RN] Pain Management(Active)  Current Status(04/07/2023): at risk for pain  Weekly Goal(04/26/2023): pain at an acceptable level  Discharge Goal: Pain managed        Safety    [RN] Potential for Injury(Active)  Current Status(04/07/2023): At risk for falls  Weekly Goal(04/26/2023): no falls  Discharge Goal: no falls        Self Care    [OT] Dressing (Lower)(Active)  Current Status(04/24/2023): CGA  Weekly Goal(05/02/2023): CGA/supervision  Discharge Goal: supervision        Mobility    [PT] Walk(Active)  Current Status(04/19/2023): amb 160' RW min A  Weekly Goal(04/26/2023): amb 300' RW Sup  Discharge Goal: amb 300' RW Sup    [PT] Stairs(Active)  Current Status(04/08/2023):  Weekly Goal(04/21/2023):  Discharge Goal:    [PT] Bed/Chair/Wheelchair(Active)  Current Status(04/19/2023): CGA  Weekly Goal(04/26/2023): MI  Discharge Goal: MI    [PT] Bed Mobility(Active)  Current Status(04/08/2023):  Weekly  Goal(04/21/2023):  Discharge Goal:        Swallow Function    [ST] Swallowing(Active)  Current Status(06/22/2021): MS, Ground, Honey  Weekly Goal(06/28/2021): Resistive breather  Discharge Goal: Least restrictive po diet    Comments: Pt plans to return home with spouse assisting with caregiving needs.  Son lives closeby and he can assist when he is not working.  Daughter lives out  of town and she is supportive too.    Signed by: BROCK Ibarra    Physician CoSigned By: Aroldo Malagon 04/25/2023 14:59:27

## 2023-04-25 NOTE — PROGRESS NOTES
Assisted By: Patient was seen while in physical therapy    CC: Follow-up on debility from critical illness myopathy    Interview History/HPI: Patient states he continues to talk he is getting stronger, looking forward to going home tomorrow, no new complaints, he feels like his thinking is now clear.          Current Hospital Meds:  allopurinol, 50 mg, Oral, Once per day on Mon Thu  amLODIPine, 5 mg, Oral, Q24H  aspirin, 81 mg, Oral, Daily  atorvastatin, 40 mg, Oral, Nightly  carvedilol, 12.5 mg, Oral, BID With Meals  castor oil-balsam peru, 1 application, Topical, Q12H  epoetin nirmala/nirmala-epbx, 10,000 Units, Subcutaneous, Weekly  gentamicin, 1 application, Topical, Daily  insulin glargine, 15 Units, Subcutaneous, Nightly  insulin lispro, 2-7 Units, Subcutaneous, TID With Meals  lidocaine, 1 patch, Transdermal, Daily  melatonin, 10 mg, Oral, Nightly  multivitamin, 1 tablet, Oral, Daily  OLANZapine zydis, 5 mg, Oral, Daily  pantoprazole, 40 mg, Oral, BID AC  polyethylene glycol, 17 g, Oral, Daily  senna-docusate sodium, 2 tablet, Oral, Nightly  sodium chloride, 10 mL, Intravenous, Q12H  sodium chloride, 10 mL, Intravenous, Q12H  sodium chloride, 10 mL, Intravenous, Q12H    sodium chloride, 9 mL/hr        Vitals:    04/25/23 0700   BP: 159/93   Pulse: 84   Resp: 16   Temp: 98.1 °F (36.7 °C)   SpO2: 99%         Intake/Output Summary (Last 24 hours) at 4/25/2023 1115  Last data filed at 4/25/2023 1100  Gross per 24 hour   Intake 780 ml   Output 1025 ml   Net -245 ml       EXAM: Strength is symmetric, lungs clear, heart regular rate and rhythm with 1-2/6 ISHMAEL, no edema      Diet: Diabetic Diets; Consistent Carbohydrate; Texture: Regular Texture (IDDSI 7); Fluid Consistency: Thin (IDDSI 0)        LABS:     Lab Results (last 48 hours)     Procedure Component Value Units Date/Time    POC Glucose Once [[id number]]  (Abnormal) Collected: 04/25/23 1057    Specimen: Blood Updated: 04/25/23 1103     Glucose 163 mg/dL      Comment:  Meter: [id number] : [id number] TRISTEN CASTELLANOS       POC Glucose Once [[id number]]  (Normal) Collected: 04/25/23 0558    Specimen: Blood Updated: 04/25/23 0609     Glucose 130 mg/dL      Comment: Meter: [id number] : [id number] Maya Witt       Basic Metabolic Panel [[id number]]  (Abnormal) Collected: 04/25/23 0141    Specimen: Blood Updated: 04/25/23 0241     Glucose 133 mg/dL      BUN 47 mg/dL      Creatinine 3.90 mg/dL      Sodium 137 mmol/L      Potassium 3.7 mmol/L      Chloride 99 mmol/L      CO2 25.0 mmol/L      Calcium 8.9 mg/dL      BUN/Creatinine Ratio 12.1     Anion Gap 13.0 mmol/L      eGFR 15.2 mL/min/1.73     Narrative:      GFR Normal >60  Chronic Kidney Disease <60  Kidney Failure <15    The GFR formula is only valid for adults with stable renal function between ages 18 and 70.    CBC & Differential [[id number]]  (Abnormal) Collected: 04/25/23 0141    Specimen: Blood Updated: 04/25/23 0222    Narrative:      The following orders were created for panel order CBC & Differential.  Procedure                               Abnormality         Status                     ---------                               -----------         ------                     CBC Auto Differential[[id number]]        Abnormal            Final result                 Please view results for these tests on the individual orders.    CBC Auto Differential [[id number]]  (Abnormal) Collected: 04/25/23 0141    Specimen: Blood Updated: 04/25/23 0222     WBC 9.58 10*3/mm3      RBC 2.74 10*6/mm3      Hemoglobin 8.7 g/dL      Hematocrit 26.8 %      MCV 97.8 fL      MCH 31.8 pg      MCHC 32.5 g/dL      RDW 18.5 %      RDW-SD 63.4 fl      MPV 9.5 fL      Platelets 160 10*3/mm3      Neutrophil % 64.2 %      Lymphocyte % 19.0 %      Monocyte % 10.9 %      Eosinophil % 3.4 %      Basophil % 0.8 %      Immature Grans % 1.7 %      Neutrophils, Absolute 6.15 10*3/mm3      Lymphocytes, Absolute 1.82 10*3/mm3      Monocytes, Absolute 1.04 10*3/mm3       Eosinophils, Absolute 0.33 10*3/mm3      Basophils, Absolute 0.08 10*3/mm3      Immature Grans, Absolute 0.16 10*3/mm3      nRBC 0.2 /100 WBC     POC Glucose Once [[id number]]  (Abnormal) Collected: 04/24/23 1604    Specimen: Blood Updated: 04/24/23 1610     Glucose 249 mg/dL      Comment: Meter: [id number] : [id number] RAMON JASMIN       POC Glucose Once [[id number]]  (Abnormal) Collected: 04/24/23 1052    Specimen: Blood Updated: 04/24/23 1058     Glucose 202 mg/dL      Comment: Meter: [id number] : [id number] RAMON JASMIN       POC Glucose Once [[id number]]  (Abnormal) Collected: 04/24/23 0600    Specimen: Blood Updated: 04/24/23 0606     Glucose 135 mg/dL      Comment: Meter: [id number] : [id number] HERMES SMITH       POC Glucose Once [[id number]]  (Abnormal) Collected: 04/23/23 1537    Specimen: Blood Updated: 04/23/23 1544     Glucose 263 mg/dL      Comment: Meter: [id number] : [id number] REBECCA TOMPKINS           Discussed case with       Radiology:    Imaging Results (Last 72 Hours)     ** No results found for the last 72 hours. **          Results for orders placed during the hospital encounter of 05/22/21    Adult Transthoracic Echo Complete w/ Color, Spectral and Contrast if necessary per protocol    Interpretation Summary  · Left ventricular systolic function is normal. Estimated left ventricular EF = 60%.  · Left ventricular diastolic function is consistent with (grade I) impaired relaxation.  · Mild aortic valve stenosis is present.  · Estimated right ventricular systolic pressure from tricuspid regurgitation is normal (<35 mmHg).      Assessment/Plan:   Debility related to critical illness myopathy.  Patient was discussed in case conference today and progressing very well with occupational physical therapy.  With PT he walked 320 feet with a rolling walker, I did discuss with physical therapist and he is also doing well with a rollator which he already has at home.   Patient is very comfortable walking the brakes and using this very carefully.  He is standby assist contact-guard with transfers.    ESRD, he will be following up at  as to be arranged by nephrology as an outpatient for his PD catheter that is not working but he is on hemodialysis, he will be getting Monday Wednesday Friday hemodialysis as an outpatient and we will dialyze him tomorrow prior to discharge    Coronary artery disease status post CABG in the past, continue aspirin Coreg and statin.    Encephalopathy/delirium, resolved    Anemia secondary to acute blood loss anemia from PUD.  Patient is on PPI on SCUDs for DVT prophylaxis, continue EPO, hemoglobin is stable    Diabetes, overall controlled on current insulin without hypoglycemia, continue to follow.      Aroldo Malagon MD

## 2023-04-25 NOTE — PROGRESS NOTES
Occupational Therapy: Individual: 100 minutes.    Physical Therapy:    Speech Language Pathology:    Signed by: Linn Colon OT

## 2023-04-25 NOTE — PLAN OF CARE
Goal Outcome Evaluation:  Plan of Care Reviewed With: patient        Progress: improving  Outcome Evaluation: BED ALARM NOTED; PROGRESSING WITH CURRENT THERAPIES; CONTINUE PLAN OF CARE; MONITOR

## 2023-04-25 NOTE — THERAPY TREATMENT NOTE
Inpatient Rehabilitation - Occupational Therapy Treatment Note    [facility]     Patient Name: Augusto Lorenzana Jr.  : 1945  MRN: [id number]    Today's Date: 2023                 Admit Date: 2023       No diagnosis found.    Patient Active Problem List   Diagnosis   • NSTEMI 2021   • Hyperlipidemia LDL goal <70   • Essential hypertension   • T2DM on oral agents and insulin. HbA1c 7.4    • Coronary artery disease involving native coronary artery of native heart with unstable angina pectoris   • A/C kidney disease. ATN due to postoperative arrest. 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 Combination of anoxic insult and azotemia   • Debility   • Lower extremity edema   • Complete heart block   • GI bleed   • Third degree atrioventricular block   • Atrial fibrillation, persistent   • Gastrointestinal hemorrhage associated with gastric ulcer   • Critical illness myopathy       Past Medical History:   Diagnosis Date   • CAD (coronary artery disease)    • CKD (chronic kidney disease) stage 3, GFR 30-59 ml/min    • Diabetes mellitus    • Hyperlipidemia    • Hypertension    • NSTEMI (non-ST elevated myocardial infarction)        Past Surgical History:   Procedure Laterality Date   • CARDIAC CATHETERIZATION N/A 2021    Procedure: Left Heart Cath;  Surgeon: Blade Greene IV, MD;  Location: [facility] CATH INVASIVE LOC[facility];  Service: Cardiovascular;  Laterality: N/A;   • CARDIAC SURGERY      2 stents placed .   • CENTRAL VENOUS LINE INSERTION Left 2023    Procedure: CENTRAL VENOUS LINE INSERTION;  Surgeon: Torey Easley MD;  Location: [facility];  Service: General;  Laterality: Left;   • CORONARY ARTERY BYPASS GRAFT N/A 2021    Procedure: MEDIAN STERNOTOMY CORONARY ARTERY BYPASS X 3 UTILIZING THE LEFT INTERNAL  MAMMARY ARTERY GRAFT, EVH OF THE GREATER RIGHT SAPHENOUS VEIN, AND PILO PER ANESTHESIA;  Surgeon: Jacek Miller MD;  Location:  [facility];  Service: Cardiothoracic;  Laterality: N/A;   • ENDOSCOPY N/A 04/08/2023    Procedure: ESOPHAGOGASTRODUODENOSCOPY with CENTERAL LINE PLACEMENT;  Surgeon: Sabine Hudson MD;  Location:  [facility];  Service: General;  Laterality: N/A;   • ENDOSCOPY N/A 04/10/2023    Procedure: ESOPHAGOGASTRODUODENOSCOPY;  Surgeon: Sabine Hudson MD;  Location:  [facility];  Service: General;  Laterality: N/A;   • INSERTION HEMODIALYSIS CATHETER N/A 4/13/2023    Procedure: HEMODIALYSIS CATHETER INSERTION;  Surgeon: Torey Easley MD;  Location:  [facility];  Service: General;  Laterality: N/A;   • PACEMAKER IMPLANTATION               IRF OT ASSESSMENT FLOWSHEET (last 12 hours)     IRF OT Evaluation and Treatment     Row Name 04/25/23 1235          OT Time and Intention    Document Type daily treatment  -TM     Mode of Treatment occupational therapy  -TM     Patient Effort good  -TM     Symptoms Noted During/After Treatment none  -TM     Row Name 04/25/23 1235          General Information    General Observations of Patient Pt agreeable for therapy.  -TM     Existing Precautions/Restrictions fall;pacemaker  -TM     Row Name 04/25/23 1236          Cognition/Psychosocial    Orientation Status (Cognition) oriented to;person;place;situation  -TM     Follows Commands (Cognition) follows one-step commands;verbal cues/prompting required  -TM     Row Name 04/25/23 1235          Bathing    Position (Bathing) supported sitting  -TM     Comment (Bathing) CGA  -TM     Row Name 04/25/23 1235          Upper Body Dressing    Position (Upper Body Dressing) supported sitting  -TM     Comment (Upper Body Dressing) setup  -TM     Row Name 04/25/23 1235          Lower Body Dressing    [place] Level (Lower Body Dressing) contact guard assist;verbal cues  -TM     Position (Lower Body Dressing) supported  sitting  -TM     Row Name 04/25/23 1235          Grooming    [place] Level (Grooming) set up  -TM     Position (Grooming) supported sitting  -TM     Row Name 04/25/23 1235          Toileting    [place] Level (Toileting) contact guard assist;verbal cues  -TM     Assistive Device Use (Toileting) grab bar/safety frame  -TM     Position (Toileting) supported sitting  -TM     Row Name 04/25/23 1235          Toilet Transfer    Type (Toilet Transfer) stand pivot/stand step  -TM     [place] Level (Toilet Transfer) supervision;contact guard;verbal cues  -TM     Assistive Device (Toilet Transfer) wheelchair;grab bars/safety frame  -TM     Row Name 04/25/23 1235          Motor Skills    Motor Skills coordination;functional endurance;motor control/coordination interventions  -     Motor Control/Coordination Interventions therapeutic exercise/ROM;gross motor coordination activities;fine motor manipulation/dexterity activities;other (see comments)  light table top BUE ther ex/act, GMC/FMC  -TM           User Key  (r) = Recorded By, (t) = Taken By, (c) = Cosigned By    Initials Name Effective Dates     Linn Colon, OT 06/16/21 -                  Occupational Therapy Education     Title: PT OT SLP Therapies (Done)     Topic: Occupational Therapy (Done)     Point: ADL training (Done)     Description:   Instruct learner(s) on proper safety adaptation and remediation techniques during self care or transfers.   Instruct in proper use of assistive devices.              Learning Progress Summary           Patient Acceptance, E,D, VU,NR by TM at 4/25/2023 1235    Acceptance, E,D, VU,NR by TM at 4/24/2023 1544    Acceptance, D,E, NR by BC at 4/22/2023 1303    Acceptance, E,TB, VU by  at 4/22/2023 0018    Acceptance, E,TB, VU by DG at 4/20/2023 2223    Acceptance, E,D, VU,NR by TM at 4/20/2023 0948    Acceptance, E,D, VU,NR by  at 4/19/2023 1109                   Point: Precautions (Done)     Description:    Instruct learner(s) on prescribed precautions during self-care and functional transfers.              Learning Progress Summary           Patient Acceptance, E,D, VU,NR by TM at 4/25/2023 1235    Acceptance, E,D, VU,NR by TM at 4/24/2023 1544    Acceptance, E, NR by DL at 4/22/2023 2006    Acceptance, E,TB, VU by DG at 4/22/2023 0018    Acceptance, E,TB, VU by DG at 4/20/2023 2223    Acceptance, E,D, VU,NR by TM at 4/20/2023 0948    Acceptance, E,D, VU,NR by TM at 4/19/2023 1109                               User Key     Initials Effective Dates Name Provider Type Discipline     06/16/21 -  Dariela Arteaga, RN Registered Nurse Nurse     06/16/21 -  Linn Colon OT Occupational Therapist OT    BC 06/16/21 -  Sabine Navarro OT Occupational Therapist OT    DL 03/16/22 -  Misty Bowles RN Registered Nurse Nurse                    OT Recommendation and Plan    Planned Therapy Interventions (OT): activity tolerance training, adaptive equipment training, BADL retraining, IADL retraining, occupation/activity based interventions, patient/caregiver education/training, ROM/therapeutic exercise, strengthening exercise, transfer/mobility retraining                    Time Calculation:      Time Calculation- OT     Row Name 04/25/23 1440 04/25/23 1235          Time Calculation- OT    OT Start Time 1330  -TM 0805  -TM     OT Stop Time 1400  -TM 0915  -TM     OT Time Calculation (min) 30 min  -TM 70 min  -TM     Total Timed Code Minutes- OT 30 minute(s)  -TM 70 minute(s)  -TM     OT Non-Billable Time (min) -- 15 min  -TM           User Key  (r) = Recorded By, (t) = Taken By, (c) = Cosigned By    Initials Name Provider Type     Linn Colon OT Occupational Therapist              Therapy Charges for Today     Code Description Service Date Service Provider Modifiers Qty    76289189101 HC OT SELF CARE/MGMT/TRAIN EA 15 MIN 4/24/2023 Linn Colon OT GO 3    32106358238 HC OT THERAPEUTIC ACT EA 15  MIN 4/24/2023 Linn Colon, OT GO 3    14975000077 HC OT THER PROC EA 15 MIN 4/24/2023 Linn Colon, OT GO 1    20479016465 HC OT SELF CARE/MGMT/TRAIN EA 15 MIN 4/25/2023 Linn Colon, OT GO 1    38995301553 HC OT THERAPEUTIC ACT EA 15 MIN 4/25/2023 Linn Colon, OT GO 3    55735229836 HC OT THER PROC EA 15 MIN 4/25/2023 Linn Colon, OT GO 1    42435162898 HC OT THER PROC EA 15 MIN 4/25/2023 Linn Colon, OT GO 1    16028066655 HC OT SELF CARE/MGMT/TRAIN EA 15 MIN 4/25/2023 Linn Colon, OT GO 1                   Linn Colon OT  4/25/2023

## 2023-04-25 NOTE — SIGNIFICANT NOTE
04/25/23 1020   Plan   Plan Team conference held today.  Pt is scheduled for discharge on 4-26-23.  Nephrologist has decided pt will receive HD on 4-26-23 in the am before going home instead of today.  Contacted spouse [phone] with this information and instructed her to come to rehab around 12:30 pm for discharge instructions and transportation home on 4-26-23.  Pt will receive outpatient PT 2 x wk x 4 wks and has an appointment at  [facility] on 5-9-23 at 2:00 pm for PT eval; arrive at 1:45 pm for paperwork.  PT will practice using rollator with pt today and inform SS if pt needs rolling walker.  Pt will receive outpatient HD at [facility] on Monday, Wednesday, and Friday arrive at 10:45 am/start at 11:00 am.  Pt will receive first outpatient HD on 4-28-23 and needs to arrive at 10:30 am for paperwork.  Spouse will transport pt to outpatient PT and hemodialysis.  Pt will return home with spouse assisting with careds.   Patient/Family in Agreement with Plan yes

## 2023-04-25 NOTE — PLAN OF CARE
Goal Outcome Evaluation:  Plan of Care Reviewed With: patient        Progress: improving  Outcome Evaluation: patient up with therapies this shift. will be discharged tmr but having HD prior to dc. Continue plan of care.

## 2023-04-25 NOTE — THERAPY TREATMENT NOTE
Inpatient Rehabilitation - Physical Therapy Treatment Note       [facility]     Patient Name: Augusto Lorenzana Jr.  : 1945  MRN: [id number]    Today's Date: 2023                    Admit Date: 2023      Visit Dx:   No diagnosis found.    Patient Active Problem List   Diagnosis   • NSTEMI 2021   • Hyperlipidemia LDL goal <70   • Essential hypertension   • T2DM on oral agents and insulin. HbA1c 7.4    • Coronary artery disease involving native coronary artery of native heart with unstable angina pectoris   • A/C kidney disease. ATN due to postoperative arrest. 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 Combination of anoxic insult and azotemia   • Debility   • Lower extremity edema   • Complete heart block   • GI bleed   • Third degree atrioventricular block   • Atrial fibrillation, persistent   • Gastrointestinal hemorrhage associated with gastric ulcer   • Critical illness myopathy       Past Medical History:   Diagnosis Date   • CAD (coronary artery disease)    • CKD (chronic kidney disease) stage 3, GFR 30-59 ml/min    • Diabetes mellitus    • Hyperlipidemia    • Hypertension    • NSTEMI (non-ST elevated myocardial infarction)        Past Surgical History:   Procedure Laterality Date   • CARDIAC CATHETERIZATION N/A 2021    Procedure: Left Heart Cath;  Surgeon: Blade Greene IV, MD;  Location: [facility] CATH INVASIVE LOCATION;  Service: Cardiovascular;  Laterality: N/A;   • CARDIAC SURGERY      2 stents placed .   • CENTRAL VENOUS LINE INSERTION Left 2023    Procedure: CENTRAL VENOUS LINE INSERTION;  Surgeon: Torey Easley MD;  Location: [facility];  Service: General;  Laterality: Left;   • CORONARY ARTERY BYPASS GRAFT N/A 2021    Procedure: MEDIAN STERNOTOMY CORONARY ARTERY BYPASS X 3 UTILIZING THE  LEFT INTERNAL MAMMARY ARTERY GRAFT, EVH OF THE GREATER RIGHT SAPHENOUS VEIN, AND PILO PER ANESTHESIA;  Surgeon: Jacek Miller MD;  Location:  [facility];  Service: Cardiothoracic;  Laterality: N/A;   • ENDOSCOPY N/A 04/08/2023    Procedure: ESOPHAGOGASTRODUODENOSCOPY with CENTERAL LINE PLACEMENT;  Surgeon: Sabine Hudson MD;  Location:  [facility];  Service: General;  Laterality: N/A;   • ENDOSCOPY N/A 04/10/2023    Procedure: ESOPHAGOGASTRODUODENOSCOPY;  Surgeon: Sabine Hudson MD;  Location:  [facility];  Service: General;  Laterality: N/A;   • INSERTION HEMODIALYSIS CATHETER N/A 4/13/2023    Procedure: HEMODIALYSIS CATHETER INSERTION;  Surgeon: Torey Easley MD;  Location:  [facility];  Service: General;  Laterality: N/A;   • PACEMAKER IMPLANTATION         PT ASSESSMENT (last 12 hours)     IRF PT Evaluation and Treatment     Row Name 04/25/23 1057          PT Time and Intention    Document Type daily treatment  -RG     Mode of Treatment individual therapy;physical therapy  -RG     Patient/Family/Caregiver Comments/Observations Pt practiced with rollator today and was deemed safe.  Pt locked brakes and was able to operate it safely.  -RG     Row Name 04/25/23 1057          General Information    Existing Precautions/Restrictions fall  confusion  -RG     Row Name 04/25/23 1057          Pain Scale: FACES Pre/Post-Treatment    Pain: FACES Scale, Pretreatment 0-->no hurt  -RG     Posttreatment Pain Rating 0-->no hurt  -RG     Row Name 04/25/23 1057          Cognition/Psychosocial    Affect/Mental Status (Cognition) WFL  he is more oriented today  -RG     Follows Commands (Cognition) verbal cues/prompting required;physical/tactile prompts required  -RG     Personal Safety Interventions gait belt;nonskid shoes/slippers when out of bed;fall prevention program maintained  -RG     Row Name 04/25/23 1057          Bed Mobility    Supine-Sit [place] (Bed Mobility) standby assist  -RG     Assistive Device (Bed  Mobility) bed rails  -RG     Row Name 04/25/23 1057          Bed-Chair Transfer    Bed-Chair [place] (Transfers) verbal cues;nonverbal cues (demo/gesture);standby assist  -RG     Assistive Device (Bed-Chair Transfers) wheelchair  -RG     Row Name 04/25/23 1057          Chair-Bed Transfer    Chair-Bed [place] (Transfers) verbal cues;nonverbal cues (demo/gesture);standby assist  -RG     Assistive Device (Chair-Bed Transfers) wheelchair  -RG     Row Name 04/25/23 1057          Sit-Stand Transfer    Sit-Stand [place] (Transfers) verbal cues;nonverbal cues (demo/gesture);standby assist  -RG     Assistive Device (Sit-Stand Transfers) walker, front-wheeled;wheelchair  -RG     Row Name 04/25/23 1057          Stand-Sit Transfer    Stand-Sit [place] (Transfers) verbal cues;nonverbal cues (demo/gesture);standby assist  -RG     Assistive Device (Stand-Sit Transfers) walker, front-wheeled;wheelchair  -RG     Row Name 04/25/23 1057          Gait/Stairs (Locomotion)    [place] Level (Gait) verbal cues;nonverbal cues (demo/gesture);standby assist  -RG     Assistive Device (Gait) other (see comments)  rollator  -RG     Distance in Feet (Gait) 320  -RG     Deviations/Abnormal Patterns (Gait) chinyere decreased;gait speed decreased;stride length decreased  -RG     Bilateral Gait Deviations forward flexed posture  less drifting to right or staggering  -RG     Comment, (Gait/Stairs) no rest breaks  -RG     Row Name 04/25/23 1057          Balance    Dynamic Standing Balance contact guard  -RG     Position/Device Used, Standing Balance --  none  -RG     Comment, Balance standing bean bag toss  -RG     Row Name 04/25/23 1057          Hip (Therapeutic Exercise)    Hip Strengthening (Therapeutic Exercise) bilateral;flexion;aBduction;aDduction;marching while seated;sitting;standing;marching while standing;2 lb free weight;resistance band;green;10 repetitions;2 sets  -RG     Row Name 04/25/23 1057          Knee  (Therapeutic Exercise)    Knee Strengthening (Therapeutic Exercise) bilateral;flexion;extension;marching while seated;marching while standing;LAQ (long arc quad);sitting;hamstring curls;standing;2 lb free weight;resistance band;green;10 repetitions;2 sets  -RG     Row Name 04/25/23 1057          Ankle (Therapeutic Exercise)    Ankle Strengthening (Therapeutic Exercise) bilateral;dorsiflexion;plantarflexion;sitting;standing;10 repetitions;2 sets  -RG     Row Name 04/25/23 1057          Aerobic Exercise    Type (Aerobic Exercise) recumbent stationary bike  -RG     Time Performed (Aerobic Exercise) 10  -RG     Comment, Aerobic Exercise (Therapeutic Exercise) L 1  -RG     Row Name 04/25/23 1057          Positioning and Restraints    Pre-Treatment Position sitting in chair/recliner  -RG     Post Treatment Position wheelchair  -RG     In Wheelchair notified nsg;sitting;exit alarm on;patient within staff view  -RG           User Key  (r) = Recorded By, (t) = Taken By, (c) = Cosigned By    Initials Name Provider Type    RG Chace Elizabeth PTA Physical Therapist Assistant              Wound 04/12/23 1700 distal penis Pressure Injury (Active)   Dressing Appearance open to air 04/25/23 0705   Closure Open to air 04/25/23 0705   Base pink 04/25/23 0705   Drainage Amount none 04/25/23 0705   Care, Wound barrier applied 04/25/23 0705   Dressing Care open to air 04/25/23 0705       Wound Left chest Incision (Active)   Dressing Appearance dry;intact 04/25/23 0705   Closure Approximated;Other (Comment) 04/24/23 1930   Base dressing in place, unable to visualize 04/25/23 0705     Physical Therapy Education     Title: PT OT SLP Therapies (Done)     Topic: Physical Therapy (Done)     Point: Mobility training (Done)     Learning Progress Summary           Patient Acceptance, E,D, VU,NR by RG at 4/25/2023 1102    Acceptance, E, VU,NR by LB at 4/24/2023 0958    Acceptance, E, NR by DL at 4/22/2023 2006    Acceptance, E,D, VU,NR by LL at  4/22/2023 1208    Acceptance, E,TB, VU by DG at 4/22/2023 0018    Acceptance, E, VU,NR by LB at 4/21/2023 1458    Acceptance, E,TB, VU by DG at 4/20/2023 2223    Acceptance, E, VU,NR by LB at 4/20/2023 1045    Acceptance, E, VU,NR by LB at 4/19/2023 1559                   Point: Home exercise program (Done)     Learning Progress Summary           Patient Acceptance, E,D, VU,NR by RG at 4/25/2023 1102    Acceptance, E, VU,NR by LB at 4/24/2023 0958    Acceptance, E, NR by DL at 4/22/2023 2006    Acceptance, E,D, VU,NR by LL at 4/22/2023 1208    Acceptance, E,TB, VU by DG at 4/22/2023 0018    Acceptance, E, VU,NR by LB at 4/21/2023 1458    Acceptance, E,TB, VU by DG at 4/20/2023 2223    Acceptance, E, VU,NR by LB at 4/20/2023 1045    Acceptance, E, VU,NR by LB at 4/19/2023 1559                   Point: Body mechanics (Done)     Learning Progress Summary           Patient Acceptance, E,D, VU,NR by RG at 4/25/2023 1102    Acceptance, E, VU,NR by LB at 4/24/2023 0958    Acceptance, E, NR by DL at 4/22/2023 2006    Acceptance, E,D, VU,NR by LL at 4/22/2023 1208    Acceptance, E,TB, VU by DG at 4/22/2023 0018    Acceptance, E, VU,NR by LB at 4/21/2023 1458    Acceptance, E,TB, VU by DG at 4/20/2023 2223    Acceptance, E, VU,NR by LB at 4/20/2023 1045    Acceptance, E, VU,NR by LB at 4/19/2023 1559                   Point: Precautions (Done)     Learning Progress Summary           Patient Acceptance, E,D, VU,NR by RG at 4/25/2023 1102    Acceptance, E, VU,NR by LB at 4/24/2023 0958    Acceptance, E, NR by DL at 4/22/2023 2006    Acceptance, E,D, VU,NR by LL at 4/22/2023 1208    Acceptance, E,TB, VU by KEHINDE at 4/22/2023 0018    Acceptance, E, VU,NR by LB at 4/21/2023 1458    Acceptance, E,TB, VU by KEHINDE at 4/20/2023 2223    Acceptance, E, VU,NR by LB at 4/20/2023 1045    Acceptance, E, VU,NR by LB at 4/19/2023 1559                               User Key     Initials Effective Dates Name Provider Type Discipline    KEHINDE 06/16/21 -   Dariela Arteaga, RN Registered Nurse Nurse    LB 06/16/21 -  Jacqueline Porter, PT Physical Therapist PT    LL 05/02/16 -  Emilee Bowles PTA Physical Therapist Assistant PT    RG 06/16/21 -  Chace Elizabeth PTA Physical Therapist Assistant PT    DL 03/16/22 -  Misty Bowles, RN Registered Nurse Nurse                PT Recommendation and Plan                          Time Calculation:      PT Charges     Row Name 04/25/23 1102             Time Calculation    Start Time 0915  -RG      Stop Time 1045  -RG      Time Calculation (min) 90 min  -RG      PT Received On 04/25/23  -RG         Time Calculation- PT    Total Timed Code Minutes- PT 90 minute(s)  -RG            User Key  (r) = Recorded By, (t) = Taken By, (c) = Cosigned By    Initials Name Provider Type    RG Chace Elizabeth PTA Physical Therapist Assistant                Therapy Charges for Today     Code Description Service Date Service Provider Modifiers Qty    01715035888 HC GAIT TRAINING EA 15 MIN 4/25/2023 Chace Elizabeth PTA GP, CQ 1    56712269431 HC PT THERAPEUTIC ACT EA 15 MIN 4/25/2023 Chace Elizabeth PTA GP, CQ 2    72751310516 HC PT THER PROC EA 15 MIN 4/25/2023 Chace Elizabeth PTA GP, CQ 2    14201741437 HC PT NEUROMUSC RE EDUCATION EA 15 MIN 4/25/2023 Chace Elizabeth PTA GP, CQ 1            PT G-Codes  AM-PAC 6 Clicks Score (PT): 19      Chace Elizabeth PTA  4/25/2023

## 2023-04-25 NOTE — SIGNIFICANT NOTE
04/25/23 1400   Plan   Plan Spoke to pt about plans for discharge on 4-26-23 after HD, outpatient PT at [facility] and outpatient HD at [facility].  PT says pt is safe to use rollator at home, therefore, does not need a rolling walker.   Patient/Family in Agreement with Plan yes

## 2023-04-25 NOTE — PROGRESS NOTES
Rehabilitation Nursing  Inpatient Rehabilitation Plan of Care Note    Plan of Care  Copy from [facility]    Pain Management (Active)  Current Status (4/7/2023 4:45:00 PM): at risk for pain  Weekly Goal: pain at an acceptable level  Discharge Goal: Pain managed    Safety    Potential for Injury (Active)  Current Status (4/7/2023 4:45:00 PM): At risk for falls  Weekly Goal: no falls  Discharge Goal: no falls    Signed by: Shahla Engle, Nurse

## 2023-04-25 NOTE — PROGRESS NOTES
Nephrology Progress Note    Interval History:     No chest pain or shortness of breath, doing well going home tomorrow      Vital Signs  Temp:  [98 °F (36.7 °C)-98.1 °F (36.7 °C)] 98.1 °F (36.7 °C)  Heart Rate:  [] 84  Resp:  [16-18] 16  BP: (109-159)/(64-93) 159/93    Physical Exam:    General:           No distress      HEENT:  Pallor               Neck:  No JVD       Lungs:    Clear   Heart:   Ocular,  no rub       Abdomen:   Normal bowel sounds, soft non-tender, non-distended, no guarding       Extremities:  No edema       Skin: No petechiae       Neurologic: Cranial nerves grossly intact, moves all extremities.        Results Review:    I reviewed the patient's new clinical results.    Lab Results (last 24 hours)     Procedure Component Value Units Date/Time    POC Glucose Once [[id number]]  (Normal) Collected: 04/25/23 0558    Specimen: Blood Updated: 04/25/23 0609     Glucose 130 mg/dL      Comment: Meter: [id number] : [id number] [employer]       Basic Metabolic Panel [[id number]]  (Abnormal) Collected: 04/25/23 0141    Specimen: Blood Updated: 04/25/23 0241     Glucose 133 mg/dL      BUN 47 mg/dL      Creatinine 3.90 mg/dL      Sodium 137 mmol/L      Potassium 3.7 mmol/L      Chloride 99 mmol/L      CO2 25.0 mmol/L      Calcium 8.9 mg/dL      BUN/Creatinine Ratio 12.1     Anion Gap 13.0 mmol/L      eGFR 15.2 mL/min/1.73     Narrative:      GFR Normal >60  Chronic Kidney Disease <60  Kidney Failure <15    The GFR formula is only valid for adults with stable renal function between ages 18 and 70.    CBC & Differential [[id number]]  (Abnormal) Collected: 04/25/23 0141    Specimen: Blood Updated: 04/25/23 0222    Narrative:      The following orders were created for panel order CBC & Differential.  Procedure                               Abnormality         Status                     ---------                               -----------          ------                     CBC Auto Differential[[id number]]        Abnormal            Final result                 Please view results for these tests on the individual orders.    CBC Auto Differential [[id number]]  (Abnormal) Collected: 04/25/23 0141    Specimen: Blood Updated: 04/25/23 0222     WBC 9.58 10*3/mm3      RBC 2.74 10*6/mm3      Hemoglobin 8.7 g/dL      Hematocrit 26.8 %      MCV 97.8 fL      MCH 31.8 pg      MCHC 32.5 g/dL      RDW 18.5 %      RDW-SD 63.4 fl      MPV 9.5 fL      Platelets 160 10*3/mm3      Neutrophil % 64.2 %      Lymphocyte % 19.0 %      Monocyte % 10.9 %      Eosinophil % 3.4 %      Basophil % 0.8 %      Immature Grans % 1.7 %      Neutrophils, Absolute 6.15 10*3/mm3      Lymphocytes, Absolute 1.82 10*3/mm3      Monocytes, Absolute 1.04 10*3/mm3      Eosinophils, Absolute 0.33 10*3/mm3      Basophils, Absolute 0.08 10*3/mm3      Immature Grans, Absolute 0.16 10*3/mm3      nRBC 0.2 /100 WBC     POC Glucose Once [[id number]]  (Abnormal) Collected: 04/24/23 1604    Specimen: Blood Updated: 04/24/23 1610     Glucose 249 mg/dL      Comment: Meter: [id number] : [id number] [employer]       POC Glucose Once [[id number]]  (Abnormal) Collected: 04/24/23 1052    Specimen: Blood Updated: 04/24/23 1058     Glucose 202 mg/dL      Comment: Meter: [id number] : [id number] RAMON JASMIN             Imaging Results (Last 24 Hours)     ** No results found for the last 24 hours. **          Assessment and Plan:    1.  End-stage renal disease on dialysis  2.  Cholelithiasis  3.  Heart failure with reduced ejection fraction  4.  Pacemaker implantation status  5.  History of CABG  6.  Diabetes poor control with nephropathy  7.  Hypertension  8.  Anemia of CKD    Hold dialysis today, reschedule tomorrow before discharge and then continue on MWF at [facility].   Educated and counseled the patient       Alessandra Farias MD  04/25/23  09:51 EDT

## 2023-04-25 NOTE — PROGRESS NOTES
Occupational Therapy:    Physical Therapy: Individual: 90 minutes.    Speech Language Pathology:    Signed by: Chace Elizabeth PTA

## 2023-04-26 VITALS
RESPIRATION RATE: 18 BRPM | DIASTOLIC BLOOD PRESSURE: 81 MMHG | HEIGHT: 67 IN | BODY MASS INDEX: 25.46 KG/M2 | OXYGEN SATURATION: 100 % | SYSTOLIC BLOOD PRESSURE: 156 MMHG | TEMPERATURE: 98.1 F | WEIGHT: 162.2 LBS | HEART RATE: 84 BPM

## 2023-04-26 LAB
ANION GAP SERPL CALCULATED.3IONS-SCNC: 14.2 MMOL/L (ref 5–15)
BUN SERPL-MCNC: 59 MG/DL (ref 8–23)
BUN/CREAT SERPL: 13 (ref 7–25)
CALCIUM SPEC-SCNC: 8.9 MG/DL (ref 8.6–10.5)
CHLORIDE SERPL-SCNC: 102 MMOL/L (ref 98–107)
CO2 SERPL-SCNC: 22.8 MMOL/L (ref 22–29)
CREAT SERPL-MCNC: 4.54 MG/DL (ref 0.76–1.27)
DEPRECATED RDW RBC AUTO: 66.4 FL (ref 37–54)
EGFRCR SERPLBLD CKD-EPI 2021: 12.7 ML/MIN/1.73
ERYTHROCYTE [DISTWIDTH] IN BLOOD BY AUTOMATED COUNT: 18.6 % (ref 12.3–15.4)
GLUCOSE BLDC GLUCOMTR-MCNC: 139 MG/DL (ref 70–130)
GLUCOSE BLDC GLUCOMTR-MCNC: 253 MG/DL (ref 70–130)
GLUCOSE SERPL-MCNC: 118 MG/DL (ref 65–99)
HCT VFR BLD AUTO: 27.7 % (ref 37.5–51)
HGB BLD-MCNC: 8.9 G/DL (ref 13–17.7)
MCH RBC QN AUTO: 32.2 PG (ref 26.6–33)
MCHC RBC AUTO-ENTMCNC: 32.1 G/DL (ref 31.5–35.7)
MCV RBC AUTO: 100.4 FL (ref 79–97)
PLATELET # BLD AUTO: 149 10*3/MM3 (ref 140–450)
PMV BLD AUTO: 9.3 FL (ref 6–12)
POTASSIUM SERPL-SCNC: 4 MMOL/L (ref 3.5–5.2)
QT INTERVAL: 454 MS
QTC INTERVAL: 543 MS
RBC # BLD AUTO: 2.76 10*6/MM3 (ref 4.14–5.8)
SODIUM SERPL-SCNC: 139 MMOL/L (ref 136–145)
WBC NRBC COR # BLD: 8.71 10*3/MM3 (ref 3.4–10.8)

## 2023-04-26 PROCEDURE — 63710000001 INSULIN LISPRO (HUMAN) PER 5 UNITS: Performed by: FAMILY MEDICINE

## 2023-04-26 PROCEDURE — 97535 SELF CARE MNGMENT TRAINING: CPT

## 2023-04-26 PROCEDURE — 99239 HOSP IP/OBS DSCHRG MGMT >30: CPT | Performed by: INTERNAL MEDICINE

## 2023-04-26 PROCEDURE — 80048 BASIC METABOLIC PNL TOTAL CA: CPT | Performed by: INTERNAL MEDICINE

## 2023-04-26 PROCEDURE — 85027 COMPLETE CBC AUTOMATED: CPT | Performed by: INTERNAL MEDICINE

## 2023-04-26 PROCEDURE — 82962 GLUCOSE BLOOD TEST: CPT

## 2023-04-26 PROCEDURE — 93005 ELECTROCARDIOGRAM TRACING: CPT | Performed by: INTERNAL MEDICINE

## 2023-04-26 PROCEDURE — 25010000002 HEPARIN (PORCINE) PER 1000 UNITS: Performed by: INTERNAL MEDICINE

## 2023-04-26 PROCEDURE — 97530 THERAPEUTIC ACTIVITIES: CPT

## 2023-04-26 RX ORDER — PANTOPRAZOLE SODIUM 40 MG/1
40 TABLET, DELAYED RELEASE ORAL
Qty: 60 TABLET | Refills: 0 | Status: SHIPPED | OUTPATIENT
Start: 2023-04-26

## 2023-04-26 RX ORDER — GENTAMICIN SULFATE 1 MG/G
1 OINTMENT TOPICAL DAILY
Qty: 15 G | Refills: 0 | Status: SHIPPED | OUTPATIENT
Start: 2023-04-26

## 2023-04-26 RX ORDER — CARVEDILOL 12.5 MG/1
12.5 TABLET ORAL 2 TIMES DAILY WITH MEALS
Qty: 60 TABLET | Refills: 0 | Status: SHIPPED | OUTPATIENT
Start: 2023-04-26

## 2023-04-26 RX ORDER — OLANZAPINE 5 MG/1
2.5 TABLET, ORALLY DISINTEGRATING ORAL DAILY
Qty: 3 TABLET | Refills: 0 | Status: SHIPPED | OUTPATIENT
Start: 2023-04-26 | End: 2023-05-01

## 2023-04-26 RX ORDER — ALLOPURINOL 100 MG/1
50 TABLET ORAL 2 TIMES WEEKLY
Qty: 4 TABLET | Refills: 0 | Status: SHIPPED | OUTPATIENT
Start: 2023-04-27

## 2023-04-26 RX ORDER — POLYETHYLENE GLYCOL 3350 17 G/17G
17 POWDER, FOR SOLUTION ORAL DAILY
Qty: 30 PACKET | Refills: 0 | Status: SHIPPED | OUTPATIENT
Start: 2023-04-26

## 2023-04-26 RX ORDER — INSULIN GLARGINE 100 [IU]/ML
20 INJECTION, SOLUTION SUBCUTANEOUS DAILY
Start: 2023-04-26

## 2023-04-26 RX ORDER — HEPARIN SODIUM 1000 [USP'U]/ML
2000 INJECTION, SOLUTION INTRAVENOUS; SUBCUTANEOUS ONCE
Status: COMPLETED | OUTPATIENT
Start: 2023-04-26 | End: 2023-04-26

## 2023-04-26 RX ADMIN — OLANZAPINE 5 MG: 5 TABLET, ORALLY DISINTEGRATING ORAL at 12:18

## 2023-04-26 RX ADMIN — METHOCARBAMOL 500 MG: 500 TABLET, FILM COATED ORAL at 05:25

## 2023-04-26 RX ADMIN — HEPARIN SODIUM 2000 UNITS: 1000 INJECTION INTRAVENOUS; SUBCUTANEOUS at 08:05

## 2023-04-26 RX ADMIN — Medication 1 TABLET: at 12:18

## 2023-04-26 RX ADMIN — PANTOPRAZOLE SODIUM 40 MG: 40 TABLET, DELAYED RELEASE ORAL at 06:32

## 2023-04-26 RX ADMIN — CASTOR OIL AND BALSAM, PERU 1 APPLICATION: 788; 87 OINTMENT TOPICAL at 12:19

## 2023-04-26 RX ADMIN — ASPIRIN 81 MG: 81 TABLET, CHEWABLE ORAL at 12:18

## 2023-04-26 RX ADMIN — GENTAMICIN SULFATE 1 APPLICATION: 1 OINTMENT TOPICAL at 12:19

## 2023-04-26 RX ADMIN — AMLODIPINE BESYLATE 5 MG: 5 TABLET ORAL at 12:18

## 2023-04-26 RX ADMIN — INSULIN LISPRO 4 UNITS: 100 INJECTION, SOLUTION INTRAVENOUS; SUBCUTANEOUS at 12:30

## 2023-04-26 RX ADMIN — ACETAMINOPHEN 500 MG: 500 TABLET ORAL at 05:25

## 2023-04-26 RX ADMIN — POLYETHYLENE GLYCOL (3350) 17 G: 17 POWDER, FOR SOLUTION ORAL at 12:19

## 2023-04-26 RX ADMIN — LIDOCAINE 1 PATCH: 50 PATCH CUTANEOUS at 12:20

## 2023-04-26 NOTE — SIGNIFICANT NOTE
04/26/23 1306   Plan   Plan Spoke to pt and spouse about discharge, outpatient PT appointment at  [facility] on 5-9-23 at 2:00 pm;arrive at 1:45 pm for paperwork, outpatient HD at [facility] on Monday, Wednesday and Friday arrive at 10:45 am/start HD at 11:00 am.  Pt will go to his first outpatient HD on 4-28-23 and arrive at 10:30 am to complete admission paperwork.  Spouse will transport pt to outpatient HD and outpatient PT.  Pt is returning home with spouse who will assist with caregiving needs.  Spouse will transport pt home today.   Patient/Family in Agreement with Plan yes   Final Discharge Disposition Code 01 - home or self-care

## 2023-04-26 NOTE — SIGNIFICANT NOTE
04/26/23 1501   PT Discharge Summary   Reason for Discharge Discharge from facility   Outcomes Achieved Patient able to partially acheive established goals   Discharge Destination Home with home health

## 2023-04-26 NOTE — THERAPY DISCHARGE NOTE
Inpatient Rehabilitation - IRF Occupational Therapy Treatment Note/Discharge  SAMUEL Massey     Patient Name: Augusto Lorenzana Jr.  : 1945  MRN: [id number]  Today's Date: 2023               Admit Date: 2023       ICD-10-CM ICD-9-CM   1. Critical illness myopathy  G72.81 359.81     Patient Active Problem List   Diagnosis   • NSTEMI 2021   • Hyperlipidemia LDL goal <70   • Essential hypertension   • T2DM on oral agents and insulin. HbA1c 7.4    • Coronary artery disease involving native coronary artery of native heart with unstable angina pectoris   • A/C kidney disease. ATN due to postoperative arrest. 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 Combination of anoxic insult and azotemia   • Debility   • Lower extremity edema   • Complete heart block   • GI bleed   • Third degree atrioventricular block   • Atrial fibrillation, persistent   • Gastrointestinal hemorrhage associated with gastric ulcer   • Critical illness myopathy     Past Medical History:   Diagnosis Date   • CAD (coronary artery disease)    • CKD (chronic kidney disease) stage 3, GFR 30-59 ml/min    • Diabetes mellitus    • Hyperlipidemia    • Hypertension    • NSTEMI (non-ST elevated myocardial infarction)      Past Surgical History:   Procedure Laterality Date   • CARDIAC CATHETERIZATION N/A 2021    Procedure: Left Heart Cath;  Surgeon: Blade Greene IV, MD;  Location:  [facility];  Service: Cardiovascular;  Laterality: N/A;   • CARDIAC SURGERY      2 stents placed .   • CENTRAL VENOUS LINE INSERTION Left 2023    Procedure: CENTRAL VENOUS LINE INSERTION;  Surgeon: Torey Easley MD;  Location:  [facility];  Service: General;  Laterality: Left;   • CORONARY ARTERY BYPASS GRAFT N/A 2021    Procedure: MEDIAN STERNOTOMY  CORONARY ARTERY BYPASS X 3 UTILIZING THE LEFT INTERNAL MAMMARY ARTERY GRAFT, EVH OF THE GREATER RIGHT SAPHENOUS VEIN, AND PILO PER ANESTHESIA;  Surgeon: Jacek Miller MD;  Location:  [facility];  Service: Cardiothoracic;  Laterality: N/A;   • ENDOSCOPY N/A 04/08/2023    Procedure: ESOPHAGOGASTRODUODENOSCOPY with CENTERAL LINE PLACEMENT;  Surgeon: Sabine Hudson MD;  Location:  [facility];  Service: General;  Laterality: N/A;   • ENDOSCOPY N/A 04/10/2023    Procedure: ESOPHAGOGASTRODUODENOSCOPY;  Surgeon: Sabine Hudson MD;  Location:  [facility];  Service: General;  Laterality: N/A;   • INSERTION HEMODIALYSIS CATHETER N/A 4/13/2023    Procedure: HEMODIALYSIS CATHETER INSERTION;  Surgeon: oTrey Easley MD;  Location:  [facility];  Service: General;  Laterality: N/A;   • PACEMAKER IMPLANTATION         IRF OT ASSESSMENT FLOWSHEET (last 12 hours)     IRF OT Evaluation and Treatment     Row Name 04/26/23 1504          OT Time and Intention    Document Type discharge evaluation  -TM     Mode of Treatment occupational therapy  -TM     Symptoms Noted During/After Treatment none  -TM     Row Name 04/26/23 1504          General Information    General Observations of Patient Pt discharged home with wife on this date. Pt's wife present for pt/family training/educ with pt/wife verb understanding of all ADL/transfer assistance levels.Recommended 24 hour assistance and supervision.  -TM     Existing Precautions/Restrictions fall;pacemaker  -TM     Row Name 04/26/23 1504          Cognition/Psychosocial    Orientation Status (Cognition) oriented to;person;place;situation  -TM     Follows Commands (Cognition) follows one-step commands;verbal cues/prompting required  -TM     Row Name 04/26/23 1504          Bathing    Position (Bathing) supported sitting  -TM     Comment (Bathing) CGA; recommended sponge bathing for safety with pt/wife verb understanding  -TM     Row Name 04/26/23 1504          Upper Body Dressing     [place] Level (Upper Body Dressing) set up assistance  -TM     Position (Upper Body Dressing) supported sitting  -TM     Row Name 04/26/23 1504          Lower Body Dressing    [place] Level (Lower Body Dressing) contact guard assist;verbal cues  -TM     Position (Lower Body Dressing) supported sitting  -TM     Row Name 04/26/23 1504          Grooming    [place] Level (Grooming) set up  -TM     Position (Grooming) supported sitting  -TM     Row Name 04/26/23 1504          Toileting    [place] Level (Toileting) contact guard assist;verbal cues  -TM     Assistive Device Use (Toileting) grab bar/safety frame  -TM     Position (Toileting) supported sitting  -TM     Row Name 04/26/23 1504          Self-Feeding    [place] Level (Self-Feeding) modified independence  -TM     Position (Self-Feeding) supported sitting  -TM     Row Name 04/26/23 1504          Toilet Transfer    Type (Toilet Transfer) stand pivot/stand step  -TM     [place] Level (Toilet Transfer) contact guard;supervision;verbal cues  -TM     Assistive Device (Toilet Transfer) wheelchair;grab bars/safety frame  -TM     Row Name 04/26/23 1504          UB Dressing Goal 1 (OT-IRF)    Progress/Outcomes (UB Dressing Goal 1, OT-IRF) goal met  -     Row Name 04/26/23 1504          UB Dressing Goal 2 (OT-IRF)    Progress/Outcomes (UB Dressing Goal 2, OT-IRF) goal met  -     Row Name 04/26/23 1504          LB Dressing Goal 1 (OT-IRF)    Progress/Outcomes (LB Dressing Goal 1, OT-IRF) goal met  -     Row Name 04/26/23 1504          LB Dressing Goal 2 (OT-IRF)    Progress/Outcomes (LB Dressing Goal 2, OT-IRF) goal met  -     Row Name 04/26/23 1504          Toileting Goal 1 (OT-IRF)    Progress/Outcomes (Toileting Goal 1, OT-IRF) goal met  -           User Key  (r) = Recorded By, (t) = Taken By, (c) = Cosigned By    Initials Name Effective Dates    TM Linn Colon, OT 06/16/21 -               Wound 04/12/23 1700 distal penis  Pressure Injury (Active)   Wound Image   04/26/23 1255   Pressure Injury Stage DTPI 04/26/23 0705   Dressing Appearance open to air 04/26/23 0705   Closure None 04/26/23 0705   Base pink 04/26/23 0705   Drainage Amount none 04/26/23 0705   Care, Wound barrier applied 04/26/23 0705   Dressing Care open to air 04/26/23 0705       Wound Left chest Incision (Active)   Dressing Appearance dry;intact 04/26/23 0705   Closure Approximated;Adhesive bandage 04/26/23 0705   Base dressing in place, unable to visualize 04/26/23 0705   Drainage Amount none 04/26/23 0705   Care, Wound other (see comments) 04/26/23 0705       Occupational Therapy Education     Title: PT OT SLP Therapies (Resolved)     Topic: Occupational Therapy (Resolved)     Point: ADL training (Resolved)     Description:   Instruct learner(s) on proper safety adaptation and remediation techniques during self care or transfers.   Instruct in proper use of assistive devices.              Learning Progress Summary           Patient Acceptance, E,D, VU,NR by TM at 4/26/2023 1503    Acceptance, E,D, VU,NR by TM at 4/25/2023 1235    Acceptance, E,D, VU,NR by TM at 4/24/2023 1544    Acceptance, D,E, NR by BC at 4/22/2023 1303    Acceptance, E,TB, VU by DG at 4/22/2023 0018    Acceptance, E,TB, VU by DG at 4/20/2023 2223    Acceptance, E,D, VU,NR by TM at 4/20/2023 0948    Acceptance, E,D, VU,NR by TM at 4/19/2023 1109   Significant Other Acceptance, E,D, VU,NR by TM at 4/26/2023 1503                   Point: Home exercise program (Resolved)     Description:   Instruct learner(s) on appropriate technique for monitoring, assisting and/or progressing therapeutic exercises/activities.              Learner Progress:  Not documented in this visit.          Point: Precautions (Resolved)     Description:   Instruct learner(s) on prescribed precautions during self-care and functional transfers.              Learning Progress Summary           Patient Acceptance, E,D, VU,NR by  TM at 4/26/2023 1503    Acceptance, E,D, VU,NR by TM at 4/25/2023 1235    Acceptance, E,D, VU,NR by TM at 4/24/2023 1544    Acceptance, E, NR by DL at 4/22/2023 2006    Acceptance, E,TB, VU by DG at 4/22/2023 0018    Acceptance, E,TB, VU by DG at 4/20/2023 2223    Acceptance, E,D, VU,NR by TM at 4/20/2023 0948    Acceptance, E,D, VU,NR by TM at 4/19/2023 1109   Significant Other Acceptance, E,D, VU,NR by TM at 4/26/2023 1503                   Point: Body mechanics (Resolved)     Description:   Instruct learner(s) on proper positioning and spine alignment during self-care, functional mobility activities and/or exercises.              Learner Progress:  Not documented in this visit.                      User Key     Initials Effective Dates Name Provider Type Discipline     06/16/21 -  Dariela Arteaga, RN Registered Nurse Nurse    TM 06/16/21 -  Linn Colon, ROLA Occupational Therapist OT    BC 06/16/21 -  Sabine Navarro OT Occupational Therapist OT    DL 03/16/22 -  Misty Bowles, RN Registered Nurse Nurse                OT Recommendation and Plan  Planned Therapy Interventions (OT): activity tolerance training, adaptive equipment training, BADL retraining, IADL retraining, occupation/activity based interventions, patient/caregiver education/training, ROM/therapeutic exercise, strengthening exercise, transfer/mobility retraining           OT IRF GOALS     Row Name 04/26/23 1504 04/19/23 1109          UB Dressing Goal 1 (OT-IRF)    [place] (UB Dress Goal 1, OT-IRF) -- minimum assist (75% or more patient effort)  -TM     Time Frame (UB Dressing Goal 1, OT-IRF) -- short-term goal (STG)  -TM     Progress/Outcomes (UB Dressing Goal 1, OT-IRF) goal met  -TM --        UB Dressing Goal 2 (OT-IRF)    [place] (UB Dress Goal 2, OT-IRF) -- set-up required  -TM     Time Frame (UB Dressing Goal 2, OT-IRF) -- long-term goal (LTG);by discharge  -TM     Progress/Outcomes (UB Dressing Goal 2, OT-IRF) goal met   -TM --        LB Dressing Goal 1 (OT-IRF)    [place] (LB Dressing Goal 1, OT-IRF) -- moderate assist (50-74% patient effort)  -TM     Time Frame (LB Dressing Goal 1, OT-IRF) -- short-term goal (STG)  -TM     Progress/Outcomes (LB Dressing Goal 1, OT-IRF) goal met  -TM --        LB Dressing Goal 2 (OT-IRF)    [place] (LB Dressing Goal 2, OT-IRF) -- minimum assist (75% or more patient effort)  -TM     Time Frame (LB Dressing Goal 2, OT-IRF) -- long-term goal (LTG);by discharge  -TM     Progress/Outcomes (LB Dressing Goal 2, OT-IRF) goal met  -TM --        Toileting Goal 1 (OT-IRF)    [place] Level (Toileting Goal 1, OT-IRF) -- moderate assist (50-74% patient effort)  -TM     Progress/Outcomes (Toileting Goal 1, OT-IRF) goal met  -TM --     Time Frame (Toileting Goal 1, OT-IRF) -- long-term goal (LTG);by discharge  -TM           User Key  (r) = Recorded By, (t) = Taken By, (c) = Cosigned By    Initials Name Provider Type     Linn Colon OT Occupational Therapist                    Time Calculation:    Time Calculation- OT     Row Name 04/26/23 1503             Time Calculation- OT    Total Timed Code Minutes- OT 15 minute(s)  -TM      OT Non-Billable Time (min) 30 min  -TM            User Key  (r) = Recorded By, (t) = Taken By, (c) = Cosigned By    Initials Name Provider Type     Linn Colon OT Occupational Therapist                Therapy Charges for Today     Code Description Service Date Service Provider Modifiers Qty    77794844966 HC OT SELF CARE/MGMT/TRAIN EA 15 MIN 4/25/2023 Linn Colon, OT GO 1    69101977843 HC OT THERAPEUTIC ACT EA 15 MIN 4/25/2023 Linn Colon OT GO 3    16231492973 HC OT THER PROC EA 15 MIN 4/25/2023 iLnn Colon, OT GO 1    95257038561 HC OT THER PROC EA 15 MIN 4/25/2023 Linn Colon OT GO 1    10467644029 HC OT SELF CARE/MGMT/TRAIN EA 15 MIN 4/25/2023 Linn Colon, OT GO 1    40429984818 HC OT SELF  CARE/MGMT/TRAIN EA 15 MIN 4/26/2023 Linn Colon OT GO 1               OT Discharge Summary  Reason for Discharge: Discharge from facility  Outcomes Achieved: Refer to plan of care for updates on goals achieved  Discharge Destination: Home with assist    Linn Colon OT  4/26/2023

## 2023-04-26 NOTE — THERAPY DISCHARGE NOTE
Inpatient Rehabilitation - Physical Therapy Treatment Note/Discharge  SAMUEL Massey     Patient Name: Augusto Lorenzana Jr.  : 1945  MRN: [id number]  Today's Date: 2023                Admit Date: 2023    Visit Dx:    ICD-10-CM ICD-9-CM   1. Critical illness myopathy  G72.81 359.81     Patient Active Problem List   Diagnosis   • NSTEMI 2021   • Hyperlipidemia LDL goal <70   • Essential hypertension   • T2DM on oral agents and insulin. HbA1c 7.4    • Coronary artery disease involving native coronary artery of native heart with unstable angina pectoris   • A/C kidney disease. ATN due to postoperative arrest. 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 Combination of anoxic insult and azotemia   • Debility   • Lower extremity edema   • Complete heart block   • GI bleed   • Third degree atrioventricular block   • Atrial fibrillation, persistent   • Gastrointestinal hemorrhage associated with gastric ulcer   • Critical illness myopathy     Past Medical History:   Diagnosis Date   • CAD (coronary artery disease)    • CKD (chronic kidney disease) stage 3, GFR 30-59 ml/min    • Diabetes mellitus    • Hyperlipidemia    • Hypertension    • NSTEMI (non-ST elevated myocardial infarction)      Past Surgical History:   Procedure Laterality Date   • CARDIAC CATHETERIZATION N/A 2021    Procedure: Left Heart Cath;  Surgeon: Blade Greene IV, MD;  Location:  [facility];  Service: Cardiovascular;  Laterality: N/A;   • CARDIAC SURGERY      2 stents placed .   • CENTRAL VENOUS LINE INSERTION Left 2023    Procedure: CENTRAL VENOUS LINE INSERTION;  Surgeon: Torey Easley MD;  Location:  [facility];  Service: General;  Laterality: Left;   • CORONARY ARTERY BYPASS GRAFT N/A 2021    Procedure: MEDIAN STERNOTOMY  CORONARY ARTERY BYPASS X 3 UTILIZING THE LEFT INTERNAL MAMMARY ARTERY GRAFT, EVH OF THE GREATER RIGHT SAPHENOUS VEIN, AND PILO PER ANESTHESIA;  Surgeon: Jacek Miller MD;  Location:  [facility];  Service: Cardiothoracic;  Laterality: N/A;   • ENDOSCOPY N/A 04/08/2023    Procedure: ESOPHAGOGASTRODUODENOSCOPY with CENTERAL LINE PLACEMENT;  Surgeon: Sabine Hudson MD;  Location:  [facility];  Service: General;  Laterality: N/A;   • ENDOSCOPY N/A 04/10/2023    Procedure: ESOPHAGOGASTRODUODENOSCOPY;  Surgeon: Sabine Hudson MD;  Location:  [facility];  Service: General;  Laterality: N/A;   • INSERTION HEMODIALYSIS CATHETER N/A 4/13/2023    Procedure: HEMODIALYSIS CATHETER INSERTION;  Surgeon: Torey Easley MD;  Location:  [facility];  Service: General;  Laterality: N/A;   • PACEMAKER IMPLANTATION         PT ASSESSMENT (last 12 hours)     IRF PT Evaluation and Treatment     Row Name 04/26/23 1456          PT Time and Intention    Document Type discharge evaluation  -RG     Mode of Treatment physical therapy  -RG     Patient/Family/Caregiver Comments/Observations Pt DC from skilled PT on this date with family education. Pt  given BTB, HEP, and Home Safety.  -RG     Row Name 04/26/23 1456          Pain Scale: FACES Pre/Post-Treatment    Pain: FACES Scale, Pretreatment 0-->no hurt  -RG     Posttreatment Pain Rating 0-->no hurt  -RG     Row Name 04/26/23 1456          Cognition/Psychosocial    Affect/Mental Status (Cognition) WFL  he is more oriented today  -RG     Follows Commands (Cognition) verbal cues/prompting required;physical/tactile prompts required  -RG     Row Name 04/26/23 1456          Gait/Stairs (Locomotion)    Bilateral Gait Deviations --  less drifting to right or staggering  -RG     Row Name 04/26/23 1456          Bed Mobility Goal 1 (PT-IRF)    Progress/Outcomes (Bed Mobility Goal 1, PT-IRF) goal partially met  -RG     Row Name 04/26/23 1456          Transfer Goal 1 (PT-IRF)    Progress/Outcomes  (Transfer Goal 1, PT-IRF) goal partially met  -RG     Row Name 04/26/23 1456          Gait/Walking Locomotion Goal 1 (PT-IRF)    Progress/Outcomes (Gait/Walking Locomotion Goal 1, PT-IRF) goal partially met  -RG           User Key  (r) = Recorded By, (t) = Taken By, (c) = Cosigned By    Initials Name Provider Type    Chace Jensen PTA Physical Therapist Assistant                Physical Therapy Education     Title: PT OT SLP Therapies (Done)     Topic: Physical Therapy (Resolved)     Point: Mobility training (Resolved)     Learning Progress Summary           Patient Acceptance, E,D, VU,DU by RG at 4/26/2023 1500    Acceptance, E,D, VU,NR by RG at 4/25/2023 1102    Acceptance, E, VU,NR by LB at 4/24/2023 0958    Acceptance, E, NR by DL at 4/22/2023 2006    Acceptance, E,D, VU,NR by LL at 4/22/2023 1208    Acceptance, E,TB, VU by DG at 4/22/2023 0018    Acceptance, E, VU,NR by LB at 4/21/2023 1458    Acceptance, E,TB, VU by DG at 4/20/2023 2223    Acceptance, E, VU,NR by LB at 4/20/2023 1045    Acceptance, E, VU,NR by LB at 4/19/2023 1559                   Point: Home exercise program (Resolved)     Learning Progress Summary           Patient Acceptance, E,D, VU,DU by RG at 4/26/2023 1500    Acceptance, E,D, VU,NR by RG at 4/25/2023 1102    Acceptance, E, VU,NR by LB at 4/24/2023 0958    Acceptance, E, NR by DL at 4/22/2023 2006    Acceptance, E,D, VU,NR by LL at 4/22/2023 1208    Acceptance, E,TB, VU by DG at 4/22/2023 0018    Acceptance, E, VU,NR by LB at 4/21/2023 1458    Acceptance, E,TB, VU by DG at 4/20/2023 2223    Acceptance, E, VU,NR by LB at 4/20/2023 1045    Acceptance, E, VU,NR by LB at 4/19/2023 1559                   Point: Body mechanics (Resolved)     Learning Progress Summary           Patient Acceptance, E,D, VU,DU by RG at 4/26/2023 1500    Acceptance, E,D, VU,NR by RG at 4/25/2023 1102    Acceptance, E, VU,NR by LB at 4/24/2023 0958    Acceptance, E, NR by DL at 4/22/2023 2006    Acceptance,  E,D, VU,NR by LL at 4/22/2023 1208    Acceptance, E,TB, VU by DG at 4/22/2023 0018    Acceptance, E, VU,NR by LB at 4/21/2023 1458    Acceptance, E,TB, VU by DG at 4/20/2023 2223    Acceptance, E, VU,NR by LB at 4/20/2023 1045    Acceptance, E, VU,NR by LB at 4/19/2023 1559                   Point: Precautions (Resolved)     Learning Progress Summary           Patient Acceptance, E,D, VU,DU by RG at 4/26/2023 1500    Acceptance, E,D, VU,NR by RG at 4/25/2023 1102    Acceptance, E, VU,NR by LB at 4/24/2023 0958    Acceptance, E, NR by DL at 4/22/2023 2006    Acceptance, E,D, VU,NR by LL at 4/22/2023 1208    Acceptance, E,TB, VU by DG at 4/22/2023 0018    Acceptance, E, VU,NR by LB at 4/21/2023 1458    Acceptance, E,TB, VU by DG at 4/20/2023 2223    Acceptance, E, VU,NR by LB at 4/20/2023 1045    Acceptance, E, VU,NR by LB at 4/19/2023 1559                               User Key     Initials Effective Dates Name Provider Type Discipline     06/16/21 -  Dariela Arteaga, RN Registered Nurse Nurse    LB 06/16/21 -  Jacqueline Porter, PT Physical Therapist PT    LL 05/02/16 -  Emilee Bowles PTA Physical Therapist Assistant PT    RG 06/16/21 -  Chace Elizabeth PTA Physical Therapist Assistant PT    DL 03/16/22 -  Misty Bowles, RN Registered Nurse Nurse                PT Recommendation and Plan                  Time Calculation:    PT Charges     Row Name 04/26/23 1501             Time Calculation    PT Received On 04/26/23  -         Time Calculation- PT    Total Timed Code Minutes- PT 15 minute(s)  -            User Key  (r) = Recorded By, (t) = Taken By, (c) = Cosigned By    Initials Name Provider Type    RG Chace Elizabeth, BLANCA Physical Therapist Assistant                Therapy Charges for Today     Code Description Service Date Service Provider Modifiers Qty    22936890093 HC GAIT TRAINING EA 15 MIN 4/25/2023 Chace Elizabeth, PTA GP, CQ 1    [id number]  PT THERAPEUTIC ACT EA 15 MIN 4/25/2023 Glenn  BLANCA Mas GP, CQ 2    95368080012 HC PT THER PROC EA 15 MIN 4/25/2023 Chace Elizabeth PTA GP, CQ 2    30591016852 HC PT NEUROMUSC RE EDUCATION EA 15 MIN 4/25/2023 Chace Elizabeth PTA GP, CQ 1    42690446630 HC PT THERAPEUTIC ACT EA 15 MIN 4/26/2023 Chace Elizabeth PTA GP, CQ 1          PT G-Codes  AM-PAC 6 Clicks Score (PT): 19    PT Discharge Summary  Reason for Discharge: Discharge from facility  Outcomes Achieved: Patient able to partially acheive established goals  Discharge Destination: Home with home health    Chace Elizabeth PTA  4/26/2023

## 2023-04-26 NOTE — PROGRESS NOTES
Patient was seen prior to going to dialysis while he was in the wheelchair, his exam was benign lungs clear heart regular rate and rhythm abdomen soft had eaten his breakfast, he arrived at dialysis, 2 L were put through his peritoneal dialysis catheter for flushing, he was also started on hemodialysis.  With all this he started vomiting.  Systolic blood pressure apparently was at 98.  Patient never lost consciousness or his pulse and states he feels fine now.  He probably had diverted blood towards his gastric area after eating and then with the 2 L placed in his abdomen shunted more blood towards his peritoneal cavity.  He was then hooked up to hemodialysis and most likely had a vagal event.  I discussed with nephrology was going to evaluate, EKG is benign/paced, reviewed image, patient stated that he feels fine.  He denies chest pain or shortness of breath.  We will still most likely be able to go home later today.  Exam remained benign postevent with soft abdomen, bowel sounds are active.

## 2023-04-26 NOTE — PROGRESS NOTES
"Patient Assessment Instrument  Quality Indicators - Discharge FY 2023    Section A. Transportation      Section A. Medication List  Medication List to Subsequent Provider:  Not applicable.  Patient was not  discharged to a subsequent provider.  Discharge Location:  01 - Home  Medication List to Patient at Discharge:  Yes - Current reconciled medication  list provided to the patient, family and/or caregiver  Route(s) of Medication List Transmission to Patient:  Paper-based (e.g., fax,  copies, printouts)    Section B. Health Literacy  Frequency of Needing Assistance Reading:  Rarely    Section C. BIMS  Brief Interview for Mental Status (BIMS) was conducted.  Repetition of Three Words: Three words  Able to report correct year: Correct  Able to report correct month: Accurate within 5 days  Able to report correct day of the week: Correct  Able to recall \"sock\": Yes, no cue required  Able to recall \"blue\": Yes, no cue required  Able to recall \"bed\": Yes, no cue required    BIMS SUMMARY SCORE: 15 Cognitively intact    Section C. Signs and Symptoms of Delirium (from CAM)  Acute Change in Mental Status:   No  Inattention:   Behavior not present  Disorganized Thinking:   Behavior not present  Altered Level of Consciousness:   Behavior not present    Section D. Mood  Presence of little interest or pleasure in doing things:   No  Frequency of having little interest or pleasure in doing things:   Never or 1  day  Presence of feeling down, depressed, or hopeless:   No  Frequency of feeling down, depressed, or hopeless:   Never or 1 day   Interview Ended. Above responses do not meet criteria to continue  Total Severity Score:   00    Section D. Social Isolation  Frequency of Feeling Lonely or Isolated:  Never    Section GG. Self-Care Performance      Section GG. Mobility Performance      Section J. Health Conditions Discharge      Section J. Health Conditions (Pain)  Pain Effect on Sleep:   Rarely or not at all  Pain Interference " with Therapy Activities:   Rarely or not at all  Pain Interference with Day-to-Day Activities:   Rarely or not at all    Section K. Swallowing/Nutritional Status  Nutritional Approaches Past 7 Days:  Nutritional Approaches at Discharge:    Section M. Skin Conditions Discharge  Unhealed Pressure Ulcer(s)/Injurie(s) at Stage 1 or Higher:  No    . Current Number of Unhealed Pressure Ulcers      Section N. Medication    Medication Intervention: Not applicable - There were no potential clinically  significant medication issues identified since admission or patient is not  taking any medications.      Section O. Special Treatments, Procedures, and Programs  Dialysis: Hemodialysis, Peritoneal dialysis    Signed by: Matt Crowley RN

## 2023-04-26 NOTE — PROGRESS NOTES
Patient Assessment Instrument  Quality Indicators - Discharge FY 2023    Section A. Transportation      Section A. Medication List        Section B. Health Literacy      Section C. BIMS      Section C. Signs and Symptoms of Delirium (from CAM)      Section D. Mood      Section D. Social Isolation      Section GG. Self-Care Performance     Eating: Patient completed the activities by themself with no assistance from a  helper.   Oral Hygiene: [place] sets up or cleans up; patient completes activity. [place]  assists only prior to or following the activity.   Toileting Hygiene: : [place] provides verbal cues and/or touching/steadying  and/or contact guard assistance as patient completes activity.   Shower/Bathe Self: [place] provides verbal cues and/or touching/steadying and/or  contact guard assistance as patient completes activity.   Upper Body Dressing: [place] sets up or cleans up; patient completes activity.  [place] assists only prior to or following the activity.   Lower Body Dressing: [place] provides verbal cues and/or touching/steadying  and/or contact guard assistance as patient completes activity.   Putting On/Taking Off Footwear: [place] provides verbal cues and/or  touching/steadying and/or contact guard assistance as patient completes  activity.    Section GG. Mobility Performance      Section J. Health Conditions Discharge      Section J. Health Conditions (Pain)      Section K. Swallowing/Nutritional Status  Nutritional Approaches Past 7 Days:  Nutritional Approaches at Discharge:    Section M. Skin Conditions Discharge      . Current Number of Unhealed Pressure Ulcers      Section N. Medication        Section O. Special Treatments, Procedures, and Programs    Signed by: Linn Colon OT

## 2023-04-26 NOTE — PROGRESS NOTES
Patient Assessment Instrument  Quality Indicators - Discharge FY 2023    Section A. Transportation      Section A. Medication List        Section B. Health Literacy      Section C. BIMS      Section C. Signs and Symptoms of Delirium (from CAM)      Section D. Mood      Section D. Social Isolation      Section GG. Self-Care Performance      Section GG. Mobility Performance     Roll Left and Right: [place] provides verbal cues and/or touching/steadying  and/or contact guard assistance as patient completes activity. Assistance may be  provided throughout the activity or intermittently.   Sit to Lying: [place] provides verbal cues and/or touching/steadying and/or  contact guard assistance as patient completes activity. Assistance may be  provided throughout the activity or intermittently.   Lying to Sitting on Side of Bed: [place] provides verbal cues and/or  touching/steadying and/or contact guard assistance as patient completes  activity. Assistance may be provided throughout the activity or intermittently.   Sit to Stand: [place] provides verbal cues and/or touching/steadying and/or  contact guard assistance as patient completes activity. Assistance may be  provided throughout the activity or intermittently.   Chair/Bed to Chair Transfer: [place] provides verbal cues and/or  touching/steadying and/or contact guard assistance as patient completes  activity. Assistance may be provided throughout the activity or intermittently.   Toilet Transfer [place] provides verbal cues and/or touching/steadying and/or  contact guard assistance as patient completes activity. Assistance may be  provided throughout the activity or intermittently.   Car Transfer: [place] provides verbal cues and/or touching/steadying and/or  contact guard assistance as patient completes activity. Assistance may be  provided throughout the activity or intermittently.   Walk 10 Feet:   [place] provides verbal cues and/or touching/steadying and/or  contact guard  assistance as patient completes activity. Assistance may be  provided throughout the activity or intermittently.  Walk 50 Feet with 2 Turns:   [place] provides verbal cues and/or  touching/steadying and/or contact guard assistance as patient completes  activity. Assistance may be provided throughout the activity or intermittently.  Walk 150 Feet:   [place] provides verbal cues and/or touching/steadying and/or  contact guard assistance as patient completes activity. Assistance may be  provided throughout the activity or intermittently.  Walking 10 Feet on Uneven Surfaces:   Not attempted due to medical or safety  concerns.  1 Step Over Curb or Up/Down Stair:   [place] provides verbal cues and/or  touching/steadying and/or contact guard assistance as patient completes  activity. Assistance may be provided throughout the activity or intermittently.  4 Steps Up and Down, With/Without Rail:   [place] provides verbal cues and/or  touching/steadying and/or contact guard assistance as patient completes  activity. Assistance may be provided throughout the activity or intermittently.  12 Steps Up and Down, With/Without Rail:   Not attempted due to medical or  safety concerns.  Picking up an Object:   Not attempted due to medical or safety concerns. Uses  Wheelchair and/or Scooter: Yes  Wheel 50 Feet with Two Turns: [place] sets up or cleans up; patient completes  activity. [place] assists only prior to or following the activity.  Type of Wheelchair or Scooter Used: Manual  Wheel 150 Feet: Not attempted due to medical or safety concerns.  Type of Wheelchair or Scooter Used: Manual    Section J. Health Conditions Discharge      Section J. Health Conditions (Pain)      Section K. Swallowing/Nutritional Status  Nutritional Approaches Past 7 Days:  Nutritional Approaches at Discharge:    Section M. Skin Conditions Discharge      . Current Number of Unhealed Pressure Ulcers      Section N. Medication        Section O. Special  Treatments, Procedures, and Programs    Signed by: Chace Elizabeth, PTA

## 2023-04-26 NOTE — PROGRESS NOTES
Patient Assessment Instrument  Quality Indicators - Discharge FY 2023    Section A. Transportation  Issues Due to Lack of Transportation:  No    Section A. Medication List        Section B. Health Literacy      Section C. BIMS      Section C. Signs and Symptoms of Delirium (from CAM)      Section D. Mood      Section D. Social Isolation      Section GG. Self-Care Performance      Section GG. Mobility Performance      Section J. Health Conditions Discharge      Section J. Health Conditions (Pain)      Section K. Swallowing/Nutritional Status  Nutritional Approaches Past 7 Days:  Nutritional Approaches at Discharge:    Section M. Skin Conditions Discharge      . Current Number of Unhealed Pressure Ulcers      Section N. Medication        Section O. Special Treatments, Procedures, and Programs    Signed by: BROCK Ibarra

## 2023-04-26 NOTE — SIGNIFICANT NOTE
04/26/23 1311   Plan   Plan Faxed ambulatory referral for outpatient PT 2 x wk x 4 wks and discharge summary to [facility] [phone].

## 2023-04-26 NOTE — DISCHARGE SUMMARY
Date of admission: 4/18/2023  Date of discharge: 4/26/2023    Principal diagnosis: Critical illness myopathy  Secondary diagnoses:  -ESRD requiring hemodialysis, previously on PD  -Anemia combination of acute blood loss on acute unit and chronic disease  -Coronary disease status post coronary artery bypass grafting and stenting in the past  -Diabetes mellitus  -Hypertension  -Dyslipidemia  -Encephalopathy, improved  -History of paroxysmal atrial fibrillation, maintaining sinus rhythm, not anticoagulated due to recent GI loss  -Gastric ulcer by recent EGD, no specimens taken due to high risk of bleeding, patient to follow-up with general surgery for possible repeat endoscopy to confirm healing.(D/W Dr Hudson)  -Recent third-degree heart block with pacemaker placement 3/31/2023  -Asymptomatic cholelithiasis noted by CT at outlying facility    Consultants:  Nephrology    Procedures:  Dialysis    Exam: See my note from earlier, vital signs: 128/63, 98% saturated, 18, 88, 97.9    Hospital course: Patient was admitted again to the acute rehab unit after a prolonged hospitalization with critical care myopathy.  Initially with Occupational Therapy, patient was mod assist with upper body dressing, max to mod with lower body dressing, min with grooming, max with toileting and set up for self-feeding.  Initially with physical therapy patient was contact-guard with transfers, min assist with a front wheel walker d and was able to walk 160 feet x 2 twice a day.  He has participated in occupational physical therapy.  Now with bathing patient is contact-guard assist, upper body dressing contact-guard, lower body dressing contact-guard, grooming set up, toileting contact-guard and toilet transfer supervision/contact-guard.  Patient is now standby assist for transfers and is walking up to 3 and 20 feet at a time with a rollator standby assist.  He has made marked improvement here, his mental status is not clear.  Patient had had a  previous gastric ulcer, he is on a PPI twice a day, he will be following up with general surgery in 3 weeks for possible repeat endoscopy to confirm healing.  Patient will continue to monitor his glucose at home, he is being discharged home on basal insulin alone, this can be adjusted for glucose control.  Patient will be getting dialysis Monday Wednesday and Friday, his PD catheter apparently is not functioning properly and he is going to be referred by nephrology as an outpatient to the [facility] for further evaluation.  Patient's hemoglobin has been stable here.  He has been receiving Procrit at the recommendation of nephrology.  He is receiving the Garamycin ointment around his PD catheter.  With his delirium he has been on Zyprexa, I would wean this off to half a tablet (2.5 mg) for 5 days and then he can stop this.  I do not think this is needed long-term.  Patient did have  an event this morning, see my note from this but I feel like this was a combination of patient having just eaten, being placed on hemodialysis and 2 L pushed through his PD catheter combining to lower his blood pressure and cause some emesis.  He immediately felt better after this.  I did discuss with nephrology.  He thought okay for the patient to be discharged as well.  I reevaluated the patient after he got back from dialysis he felt good and was eager to go home.  I did confirm that they have a functioning glucometer.  Wife was here for discharge    Follow-up:  Dialysis as scheduled  PCP 1 week  General surgery Dr. Hudson 3 weeks    Activity: As tolerated with a rollator    Disposition: Home with family    Condition: Stable and improved    Diet: Constant carbohydrate    Check ACHS glucometers    Medication,  -Garamycin ointment 1 application daily around PD catheter for infected skin around PD catheter  -Lantus 20 units nightly for diabetes type 2 insulin required  -Aspirin 81 mg daily for coronary artery disease  -Procrit  but only at nephrology discretion as an outpatient  -Zyloprim 50 mg twice weekly  -Norvasc 5 mg daily  -Lipitor 40 mg nightly  -Coreg 12.5 mg twice daily patient should check his blood pressure before this and this is to be held to systolic less than 130.  - Protonix 40 mg twice daily  -MiraLAX 17 g daily  -Zyprexa 2.5 mg daily for 5 days then stop  -Multivitamin a day  -Albuterol HFA 2 puffs twice a day as needed    Greater than 30-minute discharge

## 2023-04-26 NOTE — SIGNIFICANT NOTE
04/26/23 1335   Plan   Plan Faxed discharge summary to [facility] [phone].

## 2023-04-26 NOTE — PROGRESS NOTES
Nephrology Progress Note    Interval History:     No chest pain or shortness of breath, doing well going home tomorrow      Vital Signs  Temp:  [97.9 °F (36.6 °C)] 97.9 °F (36.6 °C)  Heart Rate:  [88-89] 88  Resp:  [18] 18  BP: (128-154)/(63-82) 128/63    Physical Exam:    General:           No distress      HEENT:  [place]               Neck:  No JVD       Lungs:    Clear   Heart:   Ocular,  no rub       Abdomen:   Normal bowel sounds, soft non-tender, non-distended, no guarding       Extremities:  No edema       Skin: No petechiae       Neurologic: Cranial nerves grossly intact, moves all extremities.        Results Review:    I reviewed the patient's new clinical results.    Lab Results (last 24 hours)     Procedure Component Value Units Date/Time    POC Glucose Once [[id number]]  (Abnormal) Collected: 04/26/23 0559    Specimen: Blood Updated: 04/26/23 0605     Glucose 139 mg/dL      Comment: Meter: [id number] : [id number] Remberto Crystal       Basic Metabolic Panel [[id number]]  (Abnormal) Collected: 04/26/23 0058    Specimen: Blood Updated: 04/26/23 0132     Glucose 118 mg/dL      BUN 59 mg/dL      Creatinine 4.54 mg/dL      Sodium 139 mmol/L      Potassium 4.0 mmol/L      Chloride 102 mmol/L      CO2 22.8 mmol/L      Calcium 8.9 mg/dL      BUN/Creatinine Ratio 13.0     Anion Gap 14.2 mmol/L      eGFR 12.7 mL/min/1.73      Comment: <15 Indicative of kidney failure       Narrative:      GFR Normal >60  Chronic Kidney Disease <60  Kidney Failure <15    The GFR formula is only valid for adults with stable renal function between ages 18 and 70.    CBC (No Diff) [[id number]]  (Abnormal) Collected: 04/26/23 0058    Specimen: Blood Updated: 04/26/23 0112     WBC 8.71 10*3/mm3      RBC 2.76 10*6/mm3      Hemoglobin 8.9 g/dL      Hematocrit 27.7 %      .4 fL      MCH 32.2 pg      MCHC 32.1 g/dL      RDW 18.6 %      RDW-SD 66.4 fl      MPV 9.3 fL       Platelets 149 10*3/mm3     POC Glucose Once [[id number]]  (Abnormal) Collected: 04/25/23 1609    Specimen: Blood Updated: 04/25/23 1635     Glucose 245 mg/dL      Comment: Meter: [id number] : [id number] Hira Caba       POC Glucose Once [[id number]]  (Abnormal) Collected: 04/25/23 1057    Specimen: Blood Updated: 04/25/23 1103     Glucose 163 mg/dL      Comment: Meter: [id number] : [id number] TRISTEN CASTELLANOS             Imaging Results (Last 24 Hours)     ** No results found for the last 24 hours. **          Assessment and Plan:    1.  End-stage renal disease on dialysis  2.  Cholelithiasis  3.  Heart failure with reduced ejection fraction  4.  Pacemaker implantation status  5.  History of CABG  6.  Diabetes poor control with nephropathy  7.  Hypertension  8.  Anemia of CKD    Evaluated on dialysis, tolerating well, likely has vasovagal episode that is completely resolved.   Can be discharged home from [facility].   Educated and counseled the patient, next dialysis on Friday at clinic      Alessandra Farias MD  04/26/23  10:58 EDT

## 2023-04-26 NOTE — PLAN OF CARE
Problem: Rehabilitation (IRF) Plan of Care  Goal: Plan of Care Review  Outcome: Ongoing, Progressing  Flowsheets (Taken 4/26/2023 0942)  Progress: improving  Plan of Care Reviewed With: patient  Outcome Evaluation: Patient currently in dialysis. Plans to be discharged later this afternoon. Continue with plan of care.  Goal: Patient-Specific Goal (Individualized)  Outcome: Ongoing, Progressing  Goal: Absence of New-Onset Illness or Injury  Outcome: Ongoing, Progressing  Intervention: Prevent Fall and Fall Injury  Recent Flowsheet Documentation  Taken 4/26/2023 0705 by Matt Crowley RN  Safety Promotion/Fall Prevention: safety round/check completed  Intervention: Prevent Infection  Recent Flowsheet Documentation  Taken 4/26/2023 0705 by Matt Crowley RN  Infection Prevention:   hand hygiene promoted   rest/sleep promoted  Intervention: Prevent VTE (Venous Thromboembolism)  Recent Flowsheet Documentation  Taken 4/26/2023 0705 by Matt Crowley RN  VTE Prevention/Management:   bilateral   sequential compression devices off   patient refused intervention  Goal: Optimal Comfort and Wellbeing  Outcome: Ongoing, Progressing  Goal: Home and Community Transition Plan Established  Outcome: Ongoing, Progressing     Problem: Fall Injury Risk  Goal: Absence of Fall and Fall-Related Injury  Outcome: Ongoing, Progressing  Intervention: Identify and Manage Contributors  Recent Flowsheet Documentation  Taken 4/26/2023 0705 by Matt Crowley RN  Medication Review/Management: medications reviewed  Intervention: Promote Injury-Free Environment  Recent Flowsheet Documentation  Taken 4/26/2023 0705 by Matt Crowley RN  Safety Promotion/Fall Prevention: safety round/check completed     Problem: Skin Injury Risk Increased  Goal: Skin Health and Integrity  Outcome: Ongoing, Progressing  Intervention: Promote and Optimize Oral Intake  Recent Flowsheet Documentation  Taken 4/26/2023 0705 by Matt Crowley RN  Oral Nutrition Promotion:  physical activity promoted  Intervention: Optimize Skin Protection  Recent Flowsheet Documentation  Taken 4/26/2023 0705 by Matt Crowley RN  Pressure Reduction Techniques:   frequent weight shift encouraged   heels elevated off bed   weight shift assistance provided  Pressure Reduction Devices:   pressure-redistributing mattress utilized   heel offloading device utilized   positioning supports utilized  Skin Protection: adhesive use limited     Problem: Diabetes Comorbidity  Goal: Blood Glucose Level Within Targeted Range  Outcome: Ongoing, Progressing  Intervention: Monitor and Manage Glycemia  Recent Flowsheet Documentation  Taken 4/26/2023 0705 by Matt Crowley RN  Glycemic Management: blood glucose monitored     Problem: Hypertension Comorbidity  Goal: Blood Pressure in Desired Range  Outcome: Ongoing, Progressing  Intervention: Maintain Blood Pressure Management  Recent Flowsheet Documentation  Taken 4/26/2023 0705 by Matt Crowley RN  Medication Review/Management: medications reviewed   Goal Outcome Evaluation:  Plan of Care Reviewed With: patient        Progress: improving  Outcome Evaluation: Patient currently in dialysis. Plans to be discharged later this afternoon. Continue with plan of care.

## 2023-04-26 NOTE — SIGNIFICANT NOTE
04/26/23 1306   Plan   Plan Spoke to pt and spouse about discharge, outpatient PT appointment at  [facility] on [facility] at 2:00 pm;arrive at 1:45 pm for paperwork, outpatient HD at [facility] on Monday, Wednesday and Friday arrive at 10:45 am/start HD at 11:00 am.  OT will provide home exercise program.  Pt will go to his first outpatient HD on 4-28-23 and arrive at 10:30 am to complete admission paperwork.  Spouse will transport pt to outpatient HD and outpatient PT.  Pt is returning home with spouse who will assist with caregiving needs.  Spouse will transport pt home today.   Patient/Family in Agreement with Plan yes   Final Discharge Disposition Code 01 - home or self-care

## 2023-04-26 NOTE — PROGRESS NOTES
Occupational Therapy: Individual: 15 minutes.    Physical Therapy:    Speech Language Pathology:    Signed by: Linn Colon OT

## 2023-05-01 NOTE — PROGRESS NOTES
PPS CMG Coordinator  Inpatient Rehabilitation Discharge    Mode of Locomotion: Walking.    Discharge Against Medical Advice:  No.  Discharge Information  Patient Discharged Alive:  Yes  Discharge Destination/Living Setting: Home.  At discharge, the patient was discharged to live (with) (02)  Family / Relatives    Diagnosis for Interruption/Death:    Impairment Group: 03.8 Neuromuscular Disorders    Comorbidities: Rank Code      Description      1    J96.01    Acute respiratory failure with hypoxia  2    N18.6     End stage renal disease  3    I44.2     Atrioventricular block, complete  4    I13.2     Hypertensive heart and chronic kidney disease                 with heart failure and with stage 5 chronic                 kidney disease, or end stage renal disease  5    D62       Acute posthemorrhagic anemia  6    E87.1     Hypo-osmolality and hyponatremia  7    F05       Delirium due to known physiological condition  8    Z99.2     Dependence on renal dialysis  9    F23       Brief psychotic disorder  10   E11.22    Type 2 diabetes mellitus with diabetic chronic                 kidney disease  11   K92.0     Hematemesis  12   I25.10    Atherosclerotic heart disease of native                 coronary artery without angina pectoris  13   I25.2     Old myocardial infarction  14   I45.9     Conduction disorder, unspecified  15   Z60.4     Social exclusion and rejection  16   Z87.11    Personal history of peptic ulcer disease  17   Z91.81    History of falling  18   Z95.1     Presence of aortocoronary bypass graft  19   Z95.5     Presence of coronary angioplasty implant and                 graft  20   I95.9     Hypotension, unspecified  21   E78.5     Hyperlipidemia, unspecified  22   E86.0     Dehydration  23   Y81.2     Prosthetic and other implants, materials and                 accessory general- and plastic-surgery devices                 associated with adverse incidents  24   Z95.0     Presence of cardiac  pacemaker  25   I50.20    Unspecified systolic (congestive) heart failure  26   T85.611A  Breakdown (mechanical) of intraperitoneal                 dialysis catheter, initial encounter  27   K92.1     Melena    Complications:      GEMA Bladder Accidents:   - Accidents.    GEMA Bowel Accident:   - Accidents.    Signed by: Layla Hernandez Nurse

## 2023-06-01 ENCOUNTER — OFFICE VISIT (OUTPATIENT)
Dept: SURGERY | Facility: CLINIC | Age: 78
End: 2023-06-01

## 2023-06-01 VITALS
HEIGHT: 67 IN | DIASTOLIC BLOOD PRESSURE: 70 MMHG | WEIGHT: 178 LBS | HEART RATE: 88 BPM | BODY MASS INDEX: 27.94 KG/M2 | SYSTOLIC BLOOD PRESSURE: 126 MMHG

## 2023-06-01 DIAGNOSIS — K25.0 ACUTE GASTRIC ULCER WITH HEMORRHAGE: Primary | ICD-10-CM

## 2023-06-01 PROCEDURE — 3074F SYST BP LT 130 MM HG: CPT | Performed by: SURGERY

## 2023-06-01 PROCEDURE — 1160F RVW MEDS BY RX/DR IN RCRD: CPT | Performed by: SURGERY

## 2023-06-01 PROCEDURE — 99213 OFFICE O/P EST LOW 20 MIN: CPT | Performed by: SURGERY

## 2023-06-01 PROCEDURE — 3078F DIAST BP <80 MM HG: CPT | Performed by: SURGERY

## 2023-06-01 PROCEDURE — 1159F MED LIST DOCD IN RCRD: CPT | Performed by: SURGERY

## 2023-06-01 NOTE — H&P
Subjective   Augusto Lorenzana Jr. is a 78 y.o. male here today for check.    History of Present Illness  Mr. Lorenzana was seen in the office today to discuss repeat EGD for his recent diagnosis of acute gastric ulcer with hematemesis.  Patient was recently on the rehab unit and developed hematemesis and endoscopic findings demonstrated a large gastric ulcer.  Patient has been on Protonix.  Patient is now at home.  He actually has a little to no recollection of his stay at rehab.  He was not aware of his history of ulcer.  He does not know if he is back on blood thinners.  He was not aware that he had gallstones.  He does state that he denies any abdominal pain.  He has restarted his peritoneal dialysis  No Known Allergies      Current Outpatient Medications   Medication Sig Dispense Refill   • albuterol sulfate  (90 Base) MCG/ACT inhaler Inhale 2 puffs 2 (Two) Times a Day As Needed for Wheezing.     • allopurinol (ZYLOPRIM) 100 MG tablet Take 0.5 tablets by mouth 2 (Two) Times a Week. 4 tablet 0   • amLODIPine (NORVASC) 5 MG tablet Take 1 tablet by mouth Daily.     • aspirin 81 MG EC tablet Take 1 tablet by mouth Daily.     • atorvastatin (LIPITOR) 40 MG tablet Take 1 tablet by mouth Daily.     • carvedilol (COREG) 12.5 MG tablet Take 1 tablet by mouth 2 (Two) Times a Day With Meals. Take blood pressure prior to each dose and if systolic blood pressure less than 130 do not take 60 tablet 0   • gentamicin (GARAMYCIN) 0.1 % ointment Apply 1 application topically to the appropriate area as directed Daily. 15 g 0   • Insulin Glargine (Lantus SoloStar) 100 UNIT/ML injection pen Inject 20 Units under the skin into the appropriate area as directed Daily.     • multivitamin with minerals tablet tablet Take 1 tablet by mouth Daily.     • pantoprazole (PROTONIX) 40 MG EC tablet Take 1 tablet by mouth 2 (Two) Times a Day Before Meals. 60 tablet 0   • polyethylene glycol (MIRALAX) 17 g packet Take 17 g by mouth Daily. 30  "packet 0   • OLANZapine zydis (zyPREXA) 5 MG disintegrating tablet Place 0.5 tablets on the tongue Daily for 5 days. 3 tablet 0     No current facility-administered medications for this visit.     Review of systems: Negative for chest pain or shortness of breath    Objective   /70 (BP Location: Left arm)   Pulse 88   Ht 170.2 cm (67\")   Wt 80.7 kg (178 lb)   BMI 27.88 kg/m²    Physical Exam  General:  This is a WD WN elderly male in no acute distress  Lungs:  Respiratory effort normal. Auscultation: Clear, without wheezes, rhonchi, rales  Heart:  Regular rate and rhythm.  2/6 systolic ejection murmur. no pedal edema  Abdomen: Nontender, nondistended    Results/Data      Procedures     Assessment & Plan   History of recent gastric ulcer with hemorrhage.    Given that patient is currently in sinus rhythm agree with not restarting blood thinners.  Recommend repeat EGD to verify healing of large gastric ulcer.  Patient agreeable      Discussion/Summary  BMI is >= 25 and <30. (Overweight) The following options were offered after discussion;: nutrition counseling/recommendations       No future appointments.      Please note that portions of this note were completed with a voice recognition program.    This document has been electronically signed by Sabine JOE MD on June 1, 2023 14:00 EDT  "

## 2023-06-01 NOTE — PROGRESS NOTES
Subjective   Augusto Lorenzana Jr. is a 78 y.o. male here today for check.    History of Present Illness  Mr. Lorenzana was seen in the office today to discuss repeat EGD for his recent diagnosis of acute gastric ulcer with hematemesis.  Patient was recently on the rehab unit and developed hematemesis and endoscopic findings demonstrated a large gastric ulcer.  Patient has been on Protonix.  Patient is now at home.  He actually has a little to no recollection of his stay at rehab.  He was not aware of his history of ulcer.  He does not know if he is back on blood thinners.  He was not aware that he had gallstones.  He does state that he denies any abdominal pain.  He has restarted his peritoneal dialysis  No Known Allergies      Current Outpatient Medications   Medication Sig Dispense Refill   • albuterol sulfate  (90 Base) MCG/ACT inhaler Inhale 2 puffs 2 (Two) Times a Day As Needed for Wheezing.     • allopurinol (ZYLOPRIM) 100 MG tablet Take 0.5 tablets by mouth 2 (Two) Times a Week. 4 tablet 0   • amLODIPine (NORVASC) 5 MG tablet Take 1 tablet by mouth Daily.     • aspirin 81 MG EC tablet Take 1 tablet by mouth Daily.     • atorvastatin (LIPITOR) 40 MG tablet Take 1 tablet by mouth Daily.     • carvedilol (COREG) 12.5 MG tablet Take 1 tablet by mouth 2 (Two) Times a Day With Meals. Take blood pressure prior to each dose and if systolic blood pressure less than 130 do not take 60 tablet 0   • gentamicin (GARAMYCIN) 0.1 % ointment Apply 1 application topically to the appropriate area as directed Daily. 15 g 0   • Insulin Glargine (Lantus SoloStar) 100 UNIT/ML injection pen Inject 20 Units under the skin into the appropriate area as directed Daily.     • multivitamin with minerals tablet tablet Take 1 tablet by mouth Daily.     • pantoprazole (PROTONIX) 40 MG EC tablet Take 1 tablet by mouth 2 (Two) Times a Day Before Meals. 60 tablet 0   • polyethylene glycol (MIRALAX) 17 g packet Take 17 g by mouth Daily. 30  "packet 0   • OLANZapine zydis (zyPREXA) 5 MG disintegrating tablet Place 0.5 tablets on the tongue Daily for 5 days. 3 tablet 0     No current facility-administered medications for this visit.     Review of systems: Negative for chest pain or shortness of breath    Objective   /70 (BP Location: Left arm)   Pulse 88   Ht 170.2 cm (67\")   Wt 80.7 kg (178 lb)   BMI 27.88 kg/m²    Physical Exam  General:  This is a WD WN elderly male in no acute distress  Lungs:  Respiratory effort normal. Auscultation: Clear, without wheezes, rhonchi, rales  Heart:  Regular rate and rhythm.  2/6 systolic ejection murmur. no pedal edema  Abdomen: Nontender, nondistended    Results/Data      Procedures     Assessment & Plan   History of recent gastric ulcer with hemorrhage.  Asymptomatic cholelithiasis  Renal failure, on peritoneal dialysis  Coronary artery disease, status post bypass and stenting in the past  Diabetes  Hypertension.  Hyperlipidemia    Given that patient is currently in sinus rhythm agree with not restarting blood thinners at this time.  Recommend repeat EGD to verify healing of large gastric ulcer.  Patient agreeable       Discussion/Summary: As patient has little recollection of recent stay at rehab and multiple diagnoses/comorbidities he was given a copy of the discharge summary including his recommended medications for his review.  BMI is >= 25 and <30. (Overweight) The following options were offered after discussion;: nutrition counseling/recommendations       No future appointments.      Please note that portions of this note were completed with a voice recognition program.  "

## 2023-09-19 NOTE — NURSING NOTE
Pt being transferred to inpatient rehab room 107A. Report called and spoke to [facility]. Report has been accepted. Pt and belongings will be transferred from dialysis suite to rehab once treatment is completed.   Yes

## 2024-06-24 NOTE — PROGRESS NOTES
[facility] REHAB PROGRESS NOTE     Patient Identification:  Name:  Augusto Lorenzana Jr.  Age:  76 y.o.  Sex:  male  :  1945  MRN:  [id number]  Visit Number:  [id number]  ROOM: [facility]     Primary Care Provider:  Rob Polanco MD    Length of stay in inpatient status:  1    Subjective     Chief Compliant:  No chief complaint on file.      History of Presenting Illness: Patient 76-year-old gentleman who was recently in the hospital with coronary disease x3 vessels.  Patient had CABG x3 and unfortunately afterwards did have 2 episodes of V. fib requiring ACLS protocol.  Patient also suffered acute kidney injury requiring hemodialysis which is since resolved.  Patient does have some difficulty with swallowing still.  Patient has no complaints at this time states he is feeling well this morning and rested reasonably well.    Objective     Current Hospital Meds:allopurinol, 300 mg, Oral, Daily  amLODIPine, 10 mg, Oral, Q24H  aspirin, 325 mg, Oral, Daily  atorvastatin, 40 mg, Oral, Nightly  carvedilol, 25 mg, Oral, BID With Meals  gabapentin, 600 mg, Oral, Nightly  glipizide, 5 mg, Oral, BID AC  heparin (porcine), 5,000 Units, Subcutaneous, Q12H  insulin detemir, 30 Units, Subcutaneous, Nightly  isosorbide dinitrate, 20 mg, Oral, TID - Nitrates  QUEtiapine, 12.5 mg, Oral, Q12H       ----------------------------------------------------------------------------------------------------------------------  Vital Signs:  Temp:  [98.1 °F (36.7 °C)-98.4 °F (36.9 °C)] 98.1 °F (36.7 °C)  Heart Rate:  [68-83] 79  Resp:  [18] 18  BP: (155-156)/(79-82) 156/82  SpO2:  [95 %-97 %] 95 %  on   ;   Device (Oxygen Therapy): room air  Body mass index is 27.78 kg/m².    Wt Readings from Last 3 Encounters:   21 80.3 kg (177 lb)   21 80.3 kg (177 lb 1.6 oz)     Intake & Output (last 3 days)        07 -  0700  07 -  0700  07 -  0700  07 -  0700    P.O.   240      Being assessed today for follow-up of migraine.  She reports that despite increasing the Qulipta dose she continues to have 20-25 migraine days per month associated with photophobia and nausea.  They are typically centralized in her forehead before they spread throughout her head.  As the migraine continues the intensity also increases.  These do become debilitating.  She has tried the rizatriptan for abortive treatment which may be every third or fourth time is effective.  In the past she has tried topiramate, propranolol, gabapentin, duloxetine, amitriptyline all of which have been ineffective for preventative treatment.  For abortive treatment we will try her on Trudhesa to see if that is more effective for her consistently.  For preventative treatment would like to try Botox treatments as those may be more beneficial for her.  We will start the process to get the Botox approved.  Discussed role of medicine, importance of taking medications, potential risks, benefits, and precautions to be taken.  Reviewed sleep hygiene and dietary modifications.  Follow-up upon approval of Botox treatments.    This note was created with voice recognition software and was not corrected for typographical or grammatical errors   Total Intake(mL/kg)   240 (3)     Net   +240             Urine Unmeasured Occurrence   2 x         Diet Soft Texture; Ground; Honey Thick; Cardiac, Consistent Carbohydrate  ----------------------------------------------------------------------------------------------------------------------  Physical exam:  Constitutional:   No acute distress  HEENT: Normocephalic atraumatic  Neck:    Supple  Cardiovascular: Regular rate and rhythm with a grade 3/6 systolic ejection murmur  Pulmonary/Chest: Clear to auscultation  Abdominal: Positive bowel sounds soft.   Musculoskeletal: No arthropathy  Neurological: No focal deficits  Skin: Saphenous vein harvest site on the right is healing; wound on anterior chest healing  Peripheral vascular:  Genitourinary:  ----------------------------------------------------------------------------------------------------------------------    Last echocardiogram:  Results for orders placed during the hospital encounter of 05/22/21    Adult Transthoracic Echo Complete w/ Color, Spectral and Contrast if necessary per protocol    Interpretation Summary  · Left ventricular systolic function is normal. Estimated left ventricular EF = 60%.  · Left ventricular diastolic function is consistent with (grade I) impaired relaxation.  · Mild aortic valve stenosis is present.  · Estimated right ventricular systolic pressure from tricuspid regurgitation is normal (<35 mmHg).    ----------------------------------------------------------------------------------------------------------------------  Results from last 7 days   Lab Units 06/19/21 0330 06/14/21  0518   WBC 10*3/mm3 6.80 8.24   HEMOGLOBIN g/dL 8.7* 8.8*   HEMATOCRIT % 27.6* 27.8*   MCV fL 95.2 94.6   MCHC g/dL 31.5 31.7   PLATELETS 10*3/mm3 256 291         Results from last 7 days   Lab Units 06/19/21  0331 06/16/21  0559 06/15/21  0426 06/14/21  0518   SODIUM mmol/L 143 138  --  137   POTASSIUM mmol/L 4.1 3.8  --  3.9   MAGNESIUM mg/dL 1.7  --   2.0 1.8   CHLORIDE mmol/L 111* 105  --  101   CO2 mmol/L 22.9 21.0*  --  26.0   BUN mg/dL 32* 68*  --  65*   CREATININE mg/dL 1.59* 1.69*  --  1.62*   EGFR IF NONAFRICN AM mL/min/1.73 43* 40*  --  42*   CALCIUM mg/dL 8.6 8.9  --  9.3   PHOSPHORUS mg/dL  --  4.3  --  3.9   GLUCOSE mg/dL 81 127*  --  188*   ALBUMIN g/dL 2.50* 2.60*  --  3.10*   BILIRUBIN mg/dL 0.3  --   --   --    ALK PHOS U/L 143*  --   --   --    AST (SGOT) U/L 21  --   --   --    ALT (SGPT) U/L 21  --   --   --    Estimated Creatinine Clearance: 41.3 mL/min (A) (by C-G formula based on SCr of 1.59 mg/dL (H)).  No results found for: AMMONIA              Glucose   Date/Time Value Ref Range Status   06/19/2021 0606 109 70 - 130 mg/dL Final   06/19/2021 0539 37 (C) 70 - 130 mg/dL Final   06/18/2021 2014 121 70 - 130 mg/dL Final   06/18/2021 1136 183 (H) 70 - 130 mg/dL Final   06/18/2021 0740 147 (H) 70 - 130 mg/dL Final   06/17/2021 2023 221 (H) 70 - 130 mg/dL Final   06/17/2021 1641 187 (H) 70 - 130 mg/dL Final   06/17/2021 1131 199 (H) 70 - 130 mg/dL Final     Lab Results   Component Value Date    TSH 4.030 06/03/2021     No results found for: PREGTESTUR, PREGSERUM, HCG, HCGQUANT  Pain Management Panel     Pain Management Panel Latest Ref Rng & Units 5/30/2021 5/28/2021    CREATININE UR mg/dL 136.1 -    AMPHETAMINES SCREEN, URINE Negative - Negative    BARBITURATES SCREEN Negative - Negative    BENZODIAZEPINE SCREEN, URINE Negative - Negative    BUPRENORPHINEUR Negative - Negative    COCAINE SCREEN, URINE Negative - Negative    METHADONE SCREEN, URINE Negative - Negative    METHAMPHETAMINEUR Negative - Negative        Brief Urine Lab Results  (Last result in the past 365 days)      Color   Clarity   Blood   Leuk Est   Nitrite   Protein   CREAT   Urine HCG        05/30/21 1416             136.1           No results found for: BLOODCX      No results found for: URINECX  No results found for: WOUNDCX  No results found for: STOOLCX        I have  personally looked at the labs and they are summarized above.  ----------------------------------------------------------------------------------------------------------------------  Detailed radiology reports for the last 24 hours:    Imaging Results (Last 24 Hours)     ** No results found for the last 24 hours. **        Final impressions for the last 30 days of radiology reports:    FL Video Swallow With Speech Single Contrast    Result Date: 6/15/2021  Fluoroscopy provided for a modified barium swallow. Please see speech therapy report for full details and recommendations.    This report was finalized on 6/15/2021 4:42 PM by Dr. Sherron Gandraa MD.      XR Chest 1 View    Result Date: 6/15/2021  Cardiac size enlarged without overt edema or pleural effusion of decompensation.  D:  06/15/2021 E:  06/15/2021  This report was finalized on 6/15/2021 5:20 PM by Dr. Shreyas Ames.      XR Chest 1 View    Result Date: 6/13/2021  Right IJ line terminates in the SVC, grossly unchanged. Unchanged aeration with prominent bilateral interstitial opacities. No effusion or pneumothorax. Unchanged degree of cardiac enlargement. Remaining support hardware also unchanged.  This report was finalized on 6/13/2021 6:14 PM by Aiden De Anda.      XR Chest 1 View    Result Date: 6/8/2021  No interval change.  D:  06/08/2021 E:  06/08/2021  This report was finalized on 6/8/2021 12:52 PM by Dr. Shreyas Ames.      XR Chest 1 View    Result Date: 6/7/2021  Some improvement seen in aeration of the left lung with minimal residual markings seen throughout the right perihilar region. Small bilateral pleural effusions with low lung volumes bilaterally.  DICTATED:   06/06/2021 EDITED/ls :   06/06/2021  This report was finalized on 6/7/2021 1:06 PM by Dr. Sherron Gandara MD.      XR Chest 1 View    Result Date: 6/5/2021  Slight worsening of the markings in the lung fields in the interval. Lung volumes remain low. Deep line catheter on the  right with tip in the SVC.  DICTATED:   06/05/2021 EDITED/ls :   06/05/2021  This report was finalized on 6/5/2021 3:51 PM by Dr. Sherron Gandara MD.      XR Chest 1 View    Result Date: 6/3/2021  Right IJ catheter terminates in the SVC. Remaining support hardware projects unchanged. Mild interval improvement in small bilateral pleural effusions. Persistent scattered airspace opacities. No pneumothorax.  This report was finalized on 6/3/2021 1:19 PM by Aiden De Anda.      XR Chest 1 View    Result Date: 6/2/2021  Mildly increased pulmonary vascular congestion. Stable left basilar atelectasis and effusion.  D:  06/02/2021 E:  06/02/2021     This report was finalized on 6/2/2021 10:41 PM by Dr. Jerod Bonilla MD.      XR Chest 1 View    Result Date: 6/1/2021  Chest tubes or pleural drains removed in the interim without pneumothorax overall stable appearance with right internal jugular central venous catheter remaining in place.  D:  06/01/2021 E:  06/01/2021  This report was finalized on 6/1/2021 7:03 PM by Dr. Shreyas Ames.      XR Chest 1 View    Result Date: 6/1/2021  No interval change.  D:  06/01/2021 E:  06/01/2021  This report was finalized on 6/1/2021 12:09 PM by Dr. Shreyas Ames.      XR Chest 1 View    Result Date: 6/1/2021  No interval change with persistent cardiomegaly and central vascular congestion.  DICTATED:   05/31/2021 EDITED/ls :   05/31/2021  This report was finalized on 6/1/2021 12:08 PM by Dr. Shreyas Ames.      XR Chest 1 View    Result Date: 5/30/2021  Stable exam. Low lung volumes with atelectasis.  This report was finalized on 5/30/2021 9:51 AM by Eliezer Yan.      XR Chest 1 View    Result Date: 5/29/2021  Interval extubation with slight improvement in aeration of both lungs. No pneumothorax is seen. Signer Name: August Engel MD  Signed: 5/29/2021 11:10 PM  Workstation Name: [facility]  [facility]    XR Chest 1 View    Result Date: 5/29/2021  Endotracheal tube  terminates just above the jonas. Slight retraction may be warranted.  Low lung volumes with airspace opacities bilaterally which may be related to crowded pulmonary markings or potential multifocal infection.  This report was finalized on 5/29/2021 8:57 AM by Eliezer Yan.      XR Chest 1 View    Result Date: 5/28/2021  1. No acute interval change from same day chest x-ray. Support hardware remains in appropriate position. Right internal jugular central venous catheter appears to have been advanced with tip terminating above the SVC.  This report was finalized on 5/28/2021 6:58 PM by Eliezer Yan.      XR Chest 1 View    Result Date: 5/28/2021  Endotracheal tube within 1 cm of the jonas with bilateral chest tubes in place and no definite pneumothorax. Increased markings seen throughout the lung fields with small bilateral pleural effusions.  D:  05/28/2021 E:  05/28/2021  This report was finalized on 5/28/2021 5:15 PM by Dr. Sherron Gandara MD.      XR Chest 1 View    Result Date: 5/27/2021  Borderline heart shadow enlargement and mild pulmonary venous hypertension. No new chest disease.   D:  05/27/2021 E:  05/27/2021  This report was finalized on 5/27/2021 10:34 PM by Dr. Jerod Bonilla MD.      XR Chest 1 View    Result Date: 5/22/2021  No acute cardiopulmonary findings. Signer Name: August Pineda MD  Signed: 5/22/2021 5:46 AM  Workstation Name: [id number]  [facility] Renal Bilateral    Result Date: 5/22/2021  No obstructing uropathy evident as there is no hydronephrosis with bilateral simple appearing renal cortical cysts. No calculi.  DICTATED:   05/22/2021 EDITED/ls :   05/22/2021    This report was finalized on 5/22/2021 6:51 PM by Dr. Shreyas Ames.      CT Angiogram Chest    Result Date: 5/22/2021  Prominent interstitial markings are noted in the lower lung field bilaterally with a trace volume of pleural fluid and subtle groundglass densities. Correlate with COVID-19 test  status. No evidence of pulmonary embolism. Signer Name: August Pineda MD  Signed: 5/22/2021 5:56 AM  Workstation Name: [id number]  [facility]    XR Abdomen KUB    Result Date: 6/12/2021  Feeding tube is coiled in the proximal stomach.  This report was finalized on 6/12/2021 10:14 AM by Aiden De Anda.      XR Abdomen KUB    Result Date: 6/11/2021  Nasogastric tube with tip in the stomach. Feeding tube identified with tip in the fourth portion of the duodenum.  D:  06/11/2021 E:  06/11/2021  This report was finalized on 6/11/2021 4:33 PM by Dr. Sherron Gandara MD.      XR Abdomen KUB    Result Date: 6/9/2021  A nasogastric tube terminates in the stomach. Bowel gas pattern is nonobstructive. There is mild fecal loading of the colon. No overt pneumoperitoneum.  This report was finalized on 6/9/2021 8:13 AM by Aiden De Anda.      I have personally looked at the radiology images and read the final radiology report.    Assessment & Plan    Debility--patient is to be evaluated today by PT and OT.    Dysphagia--speech therapy consult.  Patient does have restrictions on diet at this time    Coronary artery disease--patient is status post CABG x3--continue aspirin, Coreg, Lipitor and Isordil    Hypertension reasonably well-controlled continue Norvasc along with the Coreg as mentioned above    Gout--out allopurinol 300 mg daily    Acute kidney injury--stable    Diabetes mellitus continue Levemir and glipizide at this time.    VTE Prophylaxis:   Mechanical Order History:     None      Pharmalogical Order History:      Ordered     Dose Route Frequency Stop    06/18/21 1741  heparin (porcine) 5000 UNIT/ML injection 5,000 Units      5,000 Units SC Every 12 Hours Scheduled --                    Primitivo Stevens MD  [facility]  06/19/21  09:26 EDT

## 2024-08-09 NOTE — PROGRESS NOTES
Occupational Therapy:    Physical Therapy: Individual: 15 minutes.    Speech Language Pathology:    Signed by: Chace Elizabeth PTA      OB Discharge Instructions

## 2024-12-23 ENCOUNTER — APPOINTMENT (OUTPATIENT)
Dept: CT IMAGING | Facility: HOSPITAL | Age: 79
End: 2024-12-23
Payer: MEDICARE

## 2024-12-23 ENCOUNTER — APPOINTMENT (OUTPATIENT)
Dept: GENERAL RADIOLOGY | Facility: HOSPITAL | Age: 79
End: 2024-12-23
Payer: MEDICARE

## 2024-12-23 ENCOUNTER — HOSPITAL ENCOUNTER (INPATIENT)
Facility: HOSPITAL | Age: 79
LOS: 2 days | Discharge: HOME | End: 2024-12-25
Admitting: INTERNAL MEDICINE
Payer: MEDICARE

## 2024-12-23 DIAGNOSIS — R06.00 DYSPNEA, UNSPECIFIED TYPE: ICD-10-CM

## 2024-12-23 DIAGNOSIS — I50.9 ACUTE ON CHRONIC CONGESTIVE HEART FAILURE, UNSPECIFIED HEART FAILURE TYPE: Primary | ICD-10-CM

## 2024-12-23 DIAGNOSIS — R05.2 SUBACUTE COUGH: ICD-10-CM

## 2024-12-23 LAB
ALBUMIN SERPL-MCNC: 3.5 G/DL (ref 3.5–5.2)
ALBUMIN/GLOB SERPL: 1.5 G/DL
ALP SERPL-CCNC: 213 U/L (ref 39–117)
ALT SERPL W P-5'-P-CCNC: 101 U/L (ref 1–41)
ANION GAP SERPL CALCULATED.3IONS-SCNC: 15.4 MMOL/L (ref 5–15)
AST SERPL-CCNC: 60 U/L (ref 1–40)
BASOPHILS # BLD AUTO: 0.04 10*3/MM3 (ref 0–0.2)
BASOPHILS NFR BLD AUTO: 0.4 % (ref 0–1.5)
BILIRUB SERPL-MCNC: 0.3 MG/DL (ref 0–1.2)
BUN SERPL-MCNC: 79 MG/DL (ref 8–23)
BUN/CREAT SERPL: 18.3 (ref 7–25)
CALCIUM SPEC-SCNC: 9.1 MG/DL (ref 8.6–10.5)
CHLORIDE SERPL-SCNC: 101 MMOL/L (ref 98–107)
CO2 SERPL-SCNC: 24.6 MMOL/L (ref 22–29)
CREAT SERPL-MCNC: 4.32 MG/DL (ref 0.76–1.27)
CRP SERPL-MCNC: 0.72 MG/DL (ref 0–0.5)
D-LACTATE SERPL-SCNC: 1.2 MMOL/L (ref 0.5–2)
DEPRECATED RDW RBC AUTO: 58.4 FL (ref 37–54)
EGFRCR SERPLBLD CKD-EPI 2021: 13.4 ML/MIN/1.73
EOSINOPHIL # BLD AUTO: 0.06 10*3/MM3 (ref 0–0.4)
EOSINOPHIL NFR BLD AUTO: 0.5 % (ref 0.3–6.2)
ERYTHROCYTE [DISTWIDTH] IN BLOOD BY AUTOMATED COUNT: 16.6 % (ref 12.3–15.4)
FLUAV RNA RESP QL NAA+PROBE: NOT DETECTED
FLUBV RNA ISLT QL NAA+PROBE: NOT DETECTED
GLOBULIN UR ELPH-MCNC: 2.4 GM/DL
GLUCOSE BLDC GLUCOMTR-MCNC: 114 MG/DL (ref 70–130)
GLUCOSE BLDC GLUCOMTR-MCNC: 176 MG/DL (ref 70–130)
GLUCOSE SERPL-MCNC: 108 MG/DL (ref 65–99)
HAV IGM SERPL QL IA: NORMAL
HBV CORE IGM SERPL QL IA: NORMAL
HBV SURFACE AG SERPL QL IA: NORMAL
HCT VFR BLD AUTO: 30.1 % (ref 37.5–51)
HCV AB SER QL: NORMAL
HGB BLD-MCNC: 9.8 G/DL (ref 13–17.7)
HOLD SPECIMEN: NORMAL
HOLD SPECIMEN: NORMAL
IMM GRANULOCYTES # BLD AUTO: 0.13 10*3/MM3 (ref 0–0.05)
IMM GRANULOCYTES NFR BLD AUTO: 1.2 % (ref 0–0.5)
LYMPHOCYTES # BLD AUTO: 1.15 10*3/MM3 (ref 0.7–3.1)
LYMPHOCYTES NFR BLD AUTO: 10.4 % (ref 19.6–45.3)
MAGNESIUM SERPL-MCNC: 2 MG/DL (ref 1.6–2.4)
MCH RBC QN AUTO: 31.9 PG (ref 26.6–33)
MCHC RBC AUTO-ENTMCNC: 32.6 G/DL (ref 31.5–35.7)
MCV RBC AUTO: 98 FL (ref 79–97)
MONOCYTES # BLD AUTO: 0.92 10*3/MM3 (ref 0.1–0.9)
MONOCYTES NFR BLD AUTO: 8.3 % (ref 5–12)
NEUTROPHILS NFR BLD AUTO: 79.2 % (ref 42.7–76)
NEUTROPHILS NFR BLD AUTO: 8.79 10*3/MM3 (ref 1.7–7)
NRBC BLD AUTO-RTO: 0.3 /100 WBC (ref 0–0.2)
NT-PROBNP SERPL-MCNC: ABNORMAL PG/ML (ref 0–1800)
PLATELET # BLD AUTO: 143 10*3/MM3 (ref 140–450)
PMV BLD AUTO: 10 FL (ref 6–12)
POTASSIUM SERPL-SCNC: 3.9 MMOL/L (ref 3.5–5.2)
PROCALCITONIN SERPL-MCNC: 0.26 NG/ML (ref 0–0.25)
PROT SERPL-MCNC: 5.9 G/DL (ref 6–8.5)
RBC # BLD AUTO: 3.07 10*6/MM3 (ref 4.14–5.8)
SARS-COV-2 RNA RESP QL NAA+PROBE: NOT DETECTED
SODIUM SERPL-SCNC: 141 MMOL/L (ref 136–145)
WBC NRBC COR # BLD AUTO: 11.09 10*3/MM3 (ref 3.4–10.8)
WHOLE BLOOD HOLD COAG: NORMAL
WHOLE BLOOD HOLD SPECIMEN: NORMAL

## 2024-12-23 PROCEDURE — 71250 CT THORAX DX C-: CPT | Performed by: RADIOLOGY

## 2024-12-23 PROCEDURE — 80053 COMPREHEN METABOLIC PANEL: CPT | Performed by: PHYSICIAN ASSISTANT

## 2024-12-23 PROCEDURE — 99223 1ST HOSP IP/OBS HIGH 75: CPT

## 2024-12-23 PROCEDURE — 94799 UNLISTED PULMONARY SVC/PX: CPT

## 2024-12-23 PROCEDURE — 80074 ACUTE HEPATITIS PANEL: CPT | Performed by: INTERNAL MEDICINE

## 2024-12-23 PROCEDURE — 86140 C-REACTIVE PROTEIN: CPT | Performed by: PHYSICIAN ASSISTANT

## 2024-12-23 PROCEDURE — 87636 SARSCOV2 & INF A&B AMP PRB: CPT | Performed by: PHYSICIAN ASSISTANT

## 2024-12-23 PROCEDURE — 99285 EMERGENCY DEPT VISIT HI MDM: CPT

## 2024-12-23 PROCEDURE — 36415 COLL VENOUS BLD VENIPUNCTURE: CPT

## 2024-12-23 PROCEDURE — 83880 ASSAY OF NATRIURETIC PEPTIDE: CPT | Performed by: PHYSICIAN ASSISTANT

## 2024-12-23 PROCEDURE — 87040 BLOOD CULTURE FOR BACTERIA: CPT | Performed by: PHYSICIAN ASSISTANT

## 2024-12-23 PROCEDURE — 71045 X-RAY EXAM CHEST 1 VIEW: CPT

## 2024-12-23 PROCEDURE — 85025 COMPLETE CBC W/AUTO DIFF WBC: CPT | Performed by: PHYSICIAN ASSISTANT

## 2024-12-23 PROCEDURE — 71250 CT THORAX DX C-: CPT

## 2024-12-23 PROCEDURE — 84145 PROCALCITONIN (PCT): CPT | Performed by: PHYSICIAN ASSISTANT

## 2024-12-23 PROCEDURE — 25810000003 SODIUM CHLORIDE 0.9 % SOLUTION 250 ML FLEX CONT: Performed by: PHYSICIAN ASSISTANT

## 2024-12-23 PROCEDURE — 83735 ASSAY OF MAGNESIUM: CPT | Performed by: PHYSICIAN ASSISTANT

## 2024-12-23 PROCEDURE — 74176 CT ABD & PELVIS W/O CONTRAST: CPT | Performed by: RADIOLOGY

## 2024-12-23 PROCEDURE — 3E1M39Z IRRIGATION OF PERITONEAL CAVITY USING DIALYSATE, PERCUTANEOUS APPROACH: ICD-10-PCS | Performed by: INTERNAL MEDICINE

## 2024-12-23 PROCEDURE — 82948 REAGENT STRIP/BLOOD GLUCOSE: CPT

## 2024-12-23 PROCEDURE — 94640 AIRWAY INHALATION TREATMENT: CPT

## 2024-12-23 PROCEDURE — 25010000002 AZITHROMYCIN PER 500 MG: Performed by: PHYSICIAN ASSISTANT

## 2024-12-23 PROCEDURE — 25010000002 METHYLPREDNISOLONE PER 125 MG: Performed by: PHYSICIAN ASSISTANT

## 2024-12-23 PROCEDURE — 83605 ASSAY OF LACTIC ACID: CPT | Performed by: PHYSICIAN ASSISTANT

## 2024-12-23 PROCEDURE — 74176 CT ABD & PELVIS W/O CONTRAST: CPT

## 2024-12-23 PROCEDURE — 25010000002 BUMETANIDE PER 0.5 MG: Performed by: PHYSICIAN ASSISTANT

## 2024-12-23 PROCEDURE — 71045 X-RAY EXAM CHEST 1 VIEW: CPT | Performed by: RADIOLOGY

## 2024-12-23 PROCEDURE — 25010000002 HEPARIN (PORCINE) PER 1000 UNITS

## 2024-12-23 PROCEDURE — 25010000002 AMPICILLIN-SULBACTAM PER 1.5 G: Performed by: PHYSICIAN ASSISTANT

## 2024-12-23 RX ORDER — AMOXICILLIN 250 MG
2 CAPSULE ORAL 2 TIMES DAILY
Status: DISCONTINUED | OUTPATIENT
Start: 2024-12-23 | End: 2024-12-25 | Stop reason: HOSPADM

## 2024-12-23 RX ORDER — BISACODYL 10 MG
10 SUPPOSITORY, RECTAL RECTAL DAILY PRN
Status: DISCONTINUED | OUTPATIENT
Start: 2024-12-23 | End: 2024-12-25 | Stop reason: HOSPADM

## 2024-12-23 RX ORDER — POLYETHYLENE GLYCOL 3350 17 G/17G
17 POWDER, FOR SOLUTION ORAL DAILY PRN
Status: DISCONTINUED | OUTPATIENT
Start: 2024-12-23 | End: 2024-12-25 | Stop reason: HOSPADM

## 2024-12-23 RX ORDER — NITROGLYCERIN 0.4 MG/1
0.4 TABLET SUBLINGUAL
Status: DISCONTINUED | OUTPATIENT
Start: 2024-12-23 | End: 2024-12-25 | Stop reason: HOSPADM

## 2024-12-23 RX ORDER — BUMETANIDE 0.25 MG/ML
1 INJECTION, SOLUTION INTRAMUSCULAR; INTRAVENOUS ONCE
Status: COMPLETED | OUTPATIENT
Start: 2024-12-23 | End: 2024-12-23

## 2024-12-23 RX ORDER — IPRATROPIUM BROMIDE AND ALBUTEROL SULFATE 2.5; .5 MG/3ML; MG/3ML
3 SOLUTION RESPIRATORY (INHALATION) ONCE
Status: COMPLETED | OUTPATIENT
Start: 2024-12-23 | End: 2024-12-23

## 2024-12-23 RX ORDER — METHYLPREDNISOLONE SODIUM SUCCINATE 125 MG/2ML
80 INJECTION, POWDER, LYOPHILIZED, FOR SOLUTION INTRAMUSCULAR; INTRAVENOUS ONCE
Status: COMPLETED | OUTPATIENT
Start: 2024-12-23 | End: 2024-12-23

## 2024-12-23 RX ORDER — BISACODYL 5 MG/1
5 TABLET, DELAYED RELEASE ORAL DAILY PRN
Status: DISCONTINUED | OUTPATIENT
Start: 2024-12-23 | End: 2024-12-25 | Stop reason: HOSPADM

## 2024-12-23 RX ORDER — HEPARIN SODIUM 5000 [USP'U]/ML
5000 INJECTION, SOLUTION INTRAVENOUS; SUBCUTANEOUS EVERY 12 HOURS SCHEDULED
Status: DISCONTINUED | OUTPATIENT
Start: 2024-12-23 | End: 2024-12-25 | Stop reason: HOSPADM

## 2024-12-23 RX ORDER — SODIUM CHLORIDE 0.9 % (FLUSH) 0.9 %
10 SYRINGE (ML) INJECTION EVERY 12 HOURS SCHEDULED
Status: DISCONTINUED | OUTPATIENT
Start: 2024-12-23 | End: 2024-12-25 | Stop reason: HOSPADM

## 2024-12-23 RX ORDER — SODIUM CHLORIDE 0.9 % (FLUSH) 0.9 %
10 SYRINGE (ML) INJECTION AS NEEDED
Status: DISCONTINUED | OUTPATIENT
Start: 2024-12-23 | End: 2024-12-25 | Stop reason: HOSPADM

## 2024-12-23 RX ORDER — SODIUM CHLORIDE 9 MG/ML
40 INJECTION, SOLUTION INTRAVENOUS AS NEEDED
Status: DISCONTINUED | OUTPATIENT
Start: 2024-12-23 | End: 2024-12-25 | Stop reason: HOSPADM

## 2024-12-23 RX ADMIN — AZITHROMYCIN MONOHYDRATE 500 MG: 500 INJECTION, POWDER, LYOPHILIZED, FOR SOLUTION INTRAVENOUS at 12:11

## 2024-12-23 RX ADMIN — BUMETANIDE 1 MG: 0.25 INJECTION INTRAMUSCULAR; INTRAVENOUS at 11:59

## 2024-12-23 RX ADMIN — METHYLPREDNISOLONE SODIUM SUCCINATE 80 MG: 125 INJECTION, POWDER, FOR SOLUTION INTRAMUSCULAR; INTRAVENOUS at 11:11

## 2024-12-23 RX ADMIN — AMPICILLIN SODIUM, SULBACTAM SODIUM 3 G: 2; 1 INJECTION, POWDER, FOR SOLUTION INTRAMUSCULAR; INTRAVENOUS at 12:35

## 2024-12-23 RX ADMIN — IPRATROPIUM BROMIDE AND ALBUTEROL SULFATE 3 ML: 2.5; .5 SOLUTION RESPIRATORY (INHALATION) at 10:34

## 2024-12-23 RX ADMIN — SENNOSIDES AND DOCUSATE SODIUM 2 TABLET: 50; 8.6 TABLET ORAL at 20:16

## 2024-12-23 RX ADMIN — Medication 10 ML: at 20:16

## 2024-12-23 RX ADMIN — HEPARIN SODIUM 5000 UNITS: 5000 INJECTION INTRAVENOUS; SUBCUTANEOUS at 22:49

## 2024-12-23 NOTE — ED PROVIDER NOTES
Subjective   History of Present Illness  79-year-old male presents to the ER chief complaint of cough and shortness of breath for about 1 week.  Patient also verbalized weakness as well.  Past medical history is positive for recent transcatheter aortic valve replacement.  This was done in [place].  Known history of diabetes as well as coronary artery disease.  Patient also has history of CHF.  Patient is on CKD in which she does get peritoneal dialysis twice a week he does this at home.        Review of Systems   Constitutional:  Negative for fever.   HENT: Negative.     Respiratory:  Positive for cough and shortness of breath.    Cardiovascular: Negative.  Negative for chest pain.   Gastrointestinal: Negative.  Negative for abdominal pain.   Endocrine: Negative.    Genitourinary: Negative.  Negative for dysuria.   Skin: Negative.    Neurological:  Positive for weakness.   Psychiatric/Behavioral: Negative.     All other systems reviewed and are negative.      Past Medical History:   Diagnosis Date    Asthma     CAD (coronary artery disease)     CKD (chronic kidney disease) stage 3, GFR 30-59 ml/min     Diabetes mellitus     Heart failure 12/23/2024    History of transfusion     Hyperlipidemia     Hypertension     NSTEMI (non-ST elevated myocardial infarction)        No Known Allergies    Past Surgical History:   Procedure Laterality Date    CARDIAC CATHETERIZATION N/A 05/24/2021    Procedure: Left Heart Cath;  Surgeon: Blade Greene IV, MD;  Location:  [facility];  Service: Cardiovascular;  Laterality: N/A;    CARDIAC SURGERY      2 stents placed 2012.    CENTRAL VENOUS LINE INSERTION Left 04/13/2023    Procedure: CENTRAL VENOUS LINE INSERTION;  Surgeon: Torey Easley MD;  Location: [facility] OR;  Service: General;  Laterality: Left;    CORONARY ARTERY BYPASS GRAFT N/A 05/28/2021    Procedure: MEDIAN STERNOTOMY CORONARY ARTERY BYPASS X 3 UTILIZING THE LEFT INTERNAL MAMMARY ARTERY  GRAFT, EVH OF THE GREATER RIGHT SAPHENOUS VEIN, AND PILO PER ANESTHESIA;  Surgeon: Jacek Miller MD;  Location:  [facility] OR;  Service: Cardiothoracic;  Laterality: N/A;    CORONARY ARTERY BYPASS GRAFT      ENDOSCOPY N/A 04/08/2023    Procedure: ESOPHAGOGASTRODUODENOSCOPY with CENTERAL LINE PLACEMENT;  Surgeon: Sabine Hudson MD;  Location:  [facility] OR;  Service: General;  Laterality: N/A;    ENDOSCOPY N/A 04/10/2023    Procedure: ESOPHAGOGASTRODUODENOSCOPY;  Surgeon: Sabine Hudson MD;  Location:  [facility];  Service: General;  Laterality: N/A;    ENDOSCOPY N/A 6/27/2023    Procedure: ESOPHAGOGASTRODUODENOSCOPY;  Surgeon: Sabine Hudson MD;  Location:  [facility] OR;  Service: General;  Laterality: N/A;    INSERTION AND REMOVAL HEMODIALYSIS CATHETER Right 6/27/2023    Procedure: REMOVAL OF HEMODIALYSIS CATHETER;  Surgeon: Sabine Hudson MD;  Location:  [facility] OR;  Service: General;  Laterality: Right;    INSERTION HEMODIALYSIS CATHETER N/A 04/13/2023    Procedure: HEMODIALYSIS CATHETER INSERTION;  Surgeon: Torey Easley MD;  Location:  [facility];  Service: General;  Laterality: N/A;    PACEMAKER IMPLANTATION         Family History   Problem Relation Age of Onset    Heart disease Mother     Diabetes type I Father        Social History     Socioeconomic History    Marital status:    Tobacco Use    Smoking status: Never    Smokeless tobacco: Current     Types: Chew   Vaping Use    Vaping status: Never Used   Substance and Sexual Activity    Alcohol use: Never    Drug use: Never    Sexual activity: Defer           Objective   Physical Exam  Vitals and nursing note reviewed.   Constitutional:       General: He is not in acute distress.     Appearance: He is well-developed. He is not diaphoretic.   HENT:      Head: Normocephalic and atraumatic.      Right Ear: External ear normal.      Left Ear: External ear normal.      Nose: Nose normal.   Eyes:      Conjunctiva/sclera: Conjunctivae normal.    Neck:      Vascular: No JVD.      Trachea: No tracheal deviation.   Cardiovascular:      Rate and Rhythm: Normal rate and regular rhythm.      Heart sounds: Normal heart sounds. No murmur heard.  Pulmonary:      Effort: Pulmonary effort is normal. No respiratory distress.      Breath sounds: Wheezing present.      Comments: Decreased breath sounds.  Abdominal:      General: Bowel sounds are normal.      Palpations: Abdomen is soft.      Tenderness: There is no abdominal tenderness.   Musculoskeletal:         General: No deformity. Normal range of motion.      Cervical back: Normal range of motion and neck supple.      Right lower leg: Edema present.      Left lower leg: Edema present.   Skin:     General: Skin is warm and dry.      Coloration: Skin is not pale.      Findings: No erythema or rash.   Neurological:      Mental Status: He is alert and oriented to person, place, and time.      Cranial Nerves: No cranial nerve deficit.   Psychiatric:         Behavior: Behavior normal.         Thought Content: Thought content normal.         Procedures           ED Course  ED Course as of 12/23/24 1538   Mon Dec 23, 2024   1137 XR chest rad interpreted:  1.  Cardiomegaly and pulmonary vascular congestion.  2.  Aortic valvuloplasty changes noted.      [RB]   1256 CT chest rad interpreted:  1.  Small right pleural effusion. Right lower lobe pulmonary nodule  measuring 1.6 cm that appears grossly stable.  2.  Cardiomegaly.  3.  Free air is noted in the upper abdomen of uncertain source.   [RB]   1338 Discussed with Dr. Solo who is agreeable to admission, pending CT imaging of his abdomen and pelvis. [RB]   1536 Patient will be admitted by Dr. Solo. [RB]      ED Course User Index  [RB] Naveen Sarmiento II, PA                                                       Medical Decision Making      Final diagnoses:   Acute on chronic congestive heart failure, unspecified heart failure type       ED Disposition  ED Disposition        ED Disposition   Decision to Admit    Condition   --    Comment   Level of Care: Telemetry [5]   Diagnosis: Heart failure [655803]   Admitting Physician: ECTOR LOCO [[id number]]   Certification: I Certify That Inpatient Hospital Services Are Medically Necessary For Greater Than 2 Midnights                 No follow-up provider specified.       Medication List      No changes were made to your prescriptions during this visit.            Naveen Sarmiento II, PA  12/23/24 2195

## 2024-12-23 NOTE — PLAN OF CARE
Goal Outcome Evaluation:   Patient admitted to unit. Arouses to voice, oriented x4. On RA. VSS at this time. Plan of care ongoing.

## 2024-12-23 NOTE — ED NOTES
Contacted pharmacy r/t no azithromycin in [place]. Waiting delivery of IV ABX, provider made aware of delay.

## 2024-12-23 NOTE — CASE MANAGEMENT/SOCIAL WORK
Discharge Planning Assessment   [place]     Patient Name: Augusto Lorenzana Jr.  MRN: [id number]  Today's Date: 12/23/2024    Admit Date: 12/23/2024    Plan: Spoke with patient and spouse at bedside. Patient lives at home and will return at discharge. Patient has no POA or Living Will. Patient uses a walker and oxygen 2-3 l NC from [facility]. Patient receives no HH. Patients PCP Everette Rasheed and pharmacy [place], [place]. Patient denies any needs and family will transport home at discharge.   Discharge Needs Assessment       Row Name 12/23/24 1541       Living Environment    People in Home spouse    Name(s) of People in Home Hedy Lorenzana Spouse [phone]    Current Living Arrangements home    Potentially Unsafe Housing Conditions none    In the past 12 months has the electric, gas, oil, or water company threatened to shut off services in your home? No    Primary Care Provided by self    Provides Primary Care For no one    Family Caregiver if Needed spouse    Quality of Family Relationships helpful;involved;supportive    Able to Return to Prior Arrangements yes       Resource/Environmental Concerns    Resource/Environmental Concerns none    Transportation Concerns none       Transportation Needs    In the past 12 months, has lack of transportation kept you from medical appointments or from getting medications? no    In the past 12 months, has lack of transportation kept you from meetings, work, or from getting things needed for daily living? No       Food Insecurity    Within the past 12 months, you worried that your food would run out before you got the money to buy more. Never true    Within the past 12 months, the food you bought just didn't last and you didn't have money to get more. Never true       Transition Planning    Patient/Family Anticipates Transition to home    Patient/Family Anticipated Services at Transition none    Transportation Anticipated family or friend will provide       Discharge Needs  Assessment    Readmission Within the Last 30 Days no previous admission in last 30 days    Equipment Currently Used at Home oxygen;walker, standard    Concerns to be Addressed discharge planning    Do you want help finding or keeping work or a job? I do not need or want help    Do you want help with school or training? For example, starting or completing job training or getting a high school diploma, GED or equivalent No    Anticipated Changes Related to Illness none    Equipment Needed After Discharge none                   Discharge Plan       Row Name 12/23/24 1546       Plan    Plan Spoke with patient and spouse at bedside. Patient lives at home and will return at discharge. Patient has no POA or Living Will. Patient uses a walker and oxygen 2-3 l NC from [facility]. Patient receives no HH. Patients PCP Everette Rasheed and pharmacy [place], [place]. Patient denies any needs and family will transport home at discharge.    Patient/Family in Agreement with Plan yes                  Continued Care and Services - Admitted Since 12/23/2024    No active coordination exists for this encounter.       Expected Discharge Date and Time       Expected Discharge Date Expected Discharge Time    Dec 26, 2024            Demographic Summary       Row Name 12/23/24 7752       General Information    Admission Type inpatient    Arrived From home    Referral Source emergency department    Reason for Consult discharge planning    Preferred Language English                     Bambi Tarango

## 2024-12-23 NOTE — H&P
[facility]  History & Physical    Patient Identification:  Name:  Augusto Lorenzana Jr.  Age:  79 y.o.  Sex:  male  :  1945  MRN:  [id number]   Visit Number:  [id number]  Admit Date: 2024   Primary Care Physician:  Rob Polanco MD    Subjective     Chief complaint: Cough    History of presenting illness:      Augusto Lorenzana Jr. is a 79 y.o. male who presented for further evaluation of shortness of breath.  He was seen and examined in the ED with his daughter at the bedside.  He is a poor historian-daughter does AM providing history.  They report for the past several days patient has been progressively more short of breath.  He complains of orthopnea, has had to sleep on the couch sitting up straight.  No significant increase in lower extremity edema.  He reports urine output is at baseline and he has not missed any home doses of diuretics.  His daughter reports this has been a recurrent issue frequently requiring admission to the hospital for IV diuresis.  She reports that she is concerned that his peritoneal dialysis is not able to pull off enough fluid and they were told by his CT surgeon during recent hospitalization that he may benefit from initiating hemodialysis.  He does report a cough but has been dry and hacking.  He never smoked.  He denies any sick contacts, no fevers.  Denies any chest pain or palpitations.  No abdominal pain or reported diarrhea/constipation.    Past medical history is significant for CAD s/p CABG, aortic stenosis s/p TAVR (10/2024), ESRD on PD, HTN, HLD, third-degree heart block s/p PPM, PUD    Upon arrival to the ED, vital signs were temp 97.9, heart rate 73, respirations 22, /68, SpO2 95% on RA.  Notable laboratory findings included CRP 0.72, procalcitonin 0.26.  White blood cell count is slightly elevated at 11.09 with increased neutrophil percentage.  He is COVID/flu negative.  Chest x-ray showed cardiomegaly and  pulmonary vascular congestion, CT chest showed small right pleural effusion, cardiomegaly.  Also noted free air in the upper abdomen-source is uncertain.    Known Emergency Department medications received prior to my evaluation included Unasyn, azithromycin, Bumex, DuoNeb, Solu-Medrol.   Emergency Department Room location at the time of my evaluation was 406.     ---------------------------------------------------------------------------------------------------------------------   Review of Systems   Constitutional:  Negative for chills and fever.   HENT:  Negative for congestion and rhinorrhea.    Respiratory:  Positive for cough and shortness of breath.    Cardiovascular:  Positive for leg swelling (At baseline). Negative for chest pain and palpitations.   Gastrointestinal:  Negative for abdominal pain, diarrhea, nausea and vomiting.   Genitourinary:  Negative for decreased urine volume, difficulty urinating and dysuria.   Musculoskeletal:  Negative for arthralgias and myalgias.   Skin:  Negative for rash and wound.   Neurological:  Negative for dizziness and light-headedness.        ---------------------------------------------------------------------------------------------------------------------   Past Medical History:   Diagnosis Date    Asthma     CAD (coronary artery disease)     CKD (chronic kidney disease) stage 3, GFR 30-59 ml/min     Diabetes mellitus     History of transfusion     Hyperlipidemia     Hypertension     NSTEMI (non-ST elevated myocardial infarction)      Past Surgical History:   Procedure Laterality Date    CARDIAC CATHETERIZATION N/A 05/24/2021    Procedure: Left Heart Cath;  Surgeon: Blade Greene IV, MD;  Location:  [facility];  Service: Cardiovascular;  Laterality: N/A;    CARDIAC SURGERY      2 stents placed 2012.    CENTRAL VENOUS LINE INSERTION Left 04/13/2023    Procedure: CENTRAL VENOUS LINE INSERTION;  Surgeon: Torey Easley MD;  Location:   [facility];  Service: General;  Laterality: Left;    CORONARY ARTERY BYPASS GRAFT N/A 05/28/2021    Procedure: MEDIAN STERNOTOMY CORONARY ARTERY BYPASS X 3 UTILIZING THE LEFT INTERNAL MAMMARY ARTERY GRAFT, EVH OF THE GREATER RIGHT SAPHENOUS VEIN, AND PILO PER ANESTHESIA;  Surgeon: Jacek Miller MD;  Location:  [facility];  Service: Cardiothoracic;  Laterality: N/A;    CORONARY ARTERY BYPASS GRAFT      ENDOSCOPY N/A 04/08/2023    Procedure: ESOPHAGOGASTRODUODENOSCOPY with CENTERAL LINE PLACEMENT;  Surgeon: Sabine Hudson MD;  Location:  [facility];  Service: General;  Laterality: N/A;    ENDOSCOPY N/A 04/10/2023    Procedure: ESOPHAGOGASTRODUODENOSCOPY;  Surgeon: Sabine Hudson MD;  Location:  [facility];  Service: General;  Laterality: N/A;    ENDOSCOPY N/A 6/27/2023    Procedure: ESOPHAGOGASTRODUODENOSCOPY;  Surgeon: Sabine Hudson MD;  Location:  [facility];  Service: General;  Laterality: N/A;    INSERTION AND REMOVAL HEMODIALYSIS CATHETER Right 6/27/2023    Procedure: REMOVAL OF HEMODIALYSIS CATHETER;  Surgeon: Sabine Hudson MD;  Location:  [facility];  Service: General;  Laterality: Right;    INSERTION HEMODIALYSIS CATHETER N/A 04/13/2023    Procedure: HEMODIALYSIS CATHETER INSERTION;  Surgeon: Torey Easley MD;  Location:  [facility];  Service: General;  Laterality: N/A;    PACEMAKER IMPLANTATION       Family History   Problem Relation Age of Onset    Heart disease Mother     Diabetes type I Father      Social History     Socioeconomic History    Marital status:    Tobacco Use    Smoking status: Never    Smokeless tobacco: Current     Types: Chew   Vaping Use    Vaping status: Never Used   Substance and Sexual Activity    Alcohol use: Never    Drug use: Never    Sexual activity: Defer     ---------------------------------------------------------------------------------------------------------------------   Allergies:  Patient has no known  allergies.  ---------------------------------------------------------------------------------------------------------------------   Home medications:    Medications below are reported home medications pulling from within the system; at this time, these medications have not been reconciled unless otherwise specified and are in the verification process for further verifcation as current home medications.  (Not in a hospital admission)      Hospital Scheduled Meds:          Current listed hospital scheduled medications may not yet reflect those currently placed in orders that are signed and held awaiting patient's arrival to floor.   ---------------------------------------------------------------------------------------------------------------------     Objective     Vital Signs:  Temp:  [97.9 °F (36.6 °C)] 97.9 °F (36.6 °C)  Heart Rate:  [66-74] 70  Resp:  [18-22] 18  BP: (107-145)/(68-97) 110/69      12/23/24  1010   Weight: 74.8 kg (165 lb)     Body mass index is 25.84 kg/m².  ---------------------------------------------------------------------------------------------------------------------       Physical Exam  Vitals and nursing note reviewed.   Constitutional:       General: He is not in acute distress.     Appearance: He is obese. He is ill-appearing.   HENT:      Head: Normocephalic and atraumatic.   Eyes:      Extraocular Movements: Extraocular movements intact.   Cardiovascular:      Rate and Rhythm: Normal rate and regular rhythm.   Pulmonary:      Effort: Pulmonary effort is normal.      Breath sounds: Wheezing present. No rhonchi.   Abdominal:      Palpations: Abdomen is soft.      Tenderness: There is no abdominal tenderness.   Musculoskeletal:      Right lower leg: Edema present.      Left lower leg: Edema present.      Comments: Trace pitting BLE   Skin:     General: Skin is warm and dry.   Neurological:      Mental Status: He is alert. Mental status is at baseline.   Psychiatric:         Mood and  "Affect: Mood normal.         Behavior: Behavior normal.             ---------------------------------------------------------------------------------------------------------------------  EKG:      ---------------------------------------------------------------------------------------------------------------------   Results from last 7 days   Lab Units 12/23/24  1048   CRP mg/dL 0.72*   LACTATE mmol/L 1.2   WBC 10*3/mm3 11.09*   HEMOGLOBIN g/dL 9.8*   HEMATOCRIT % 30.1*   MCV fL 98.0*   MCHC g/dL 32.6   PLATELETS 10*3/mm3 143         Results from last 7 days   Lab Units 12/23/24  1048   SODIUM mmol/L 141   POTASSIUM mmol/L 3.9   MAGNESIUM mg/dL 2.0   CHLORIDE mmol/L 101   CO2 mmol/L 24.6   BUN mg/dL 79*   CREATININE mg/dL 4.32*   CALCIUM mg/dL 9.1   GLUCOSE mg/dL 108*   ALBUMIN g/dL 3.5   BILIRUBIN mg/dL 0.3   ALK PHOS U/L 213*   AST (SGOT) U/L 60*   ALT (SGPT) U/L 101*   Estimated Creatinine Clearance: 15.2 mL/min (A) (by C-G formula based on SCr of 4.32 mg/dL (H)).  No results found for: \"AMMONIA\"      Results from last 7 days   Lab Units 12/23/24  1048   PROBNP pg/mL 65,140.0*     Lab Results   Component Value Date    HGBA1C 6.5 (H) 04/04/2022     Lab Results   Component Value Date    TSH 4.030 06/03/2021    FREET4 1.1 03/31/2023     No results found for: \"PREGTESTUR\", \"PREGSERUM\", \"HCG\", \"HCGQUANT\"  Pain Management Panel  More data exists         Latest Ref Rng & Units 12/7/2022 5/30/2021   Pain Management Panel   Creatinine, Urine mg/dL 80.1  136.1       Details                 No results found for: \"BLOODCX\"  No results found for: \"URINECX\"  No results found for: \"WOUNDCX\"  No results found for: \"STOOLCX\"      ---------------------------------------------------------------------------------------------------------------------  Imaging Results (Last 7 Days)       Procedure Component Value Units Date/Time    CT Chest Without Contrast Diagnostic [[id number]] Collected: 12/23/24 1246     Updated: 12/23/24 1253    " Narrative:      EXAM:    CT Chest Without Intravenous Contrast     EXAM DATE:    12/23/2024 12:12 PM     CLINICAL HISTORY:    sob, abnormal cxr     TECHNIQUE:    Axial computed tomography images of the chest without intravenous  contrast.  Sagittal and coronal reformatted images were created and  reviewed.  This CT exam was performed using one or more of the following  dose reduction techniques:  automated exposure control, adjustment of  the mA and/or kV according to patient size, and/or use of iterative  reconstruction technique.     COMPARISON:    5/22/2021     FINDINGS:    LIMITATIONS:  Please note that reported measurements are made  manually, as computer generated (AI) measurements can over measure  lesions. Additionally, lesions identified by AI may have been present  presently, significant nodules will be discussed in the report and the  visual depictions of lesions provided by AI are for general reference  only.    LUNGS AND PLEURAL SPACES:  Small right pleural effusion.  Right lower  lobe pulmonary nodule measuring 1.6 cm that appears grossly stable.  No  consolidation.  No pneumothorax.    HEART:  Cardiomegaly.  No significant pericardial effusion.  No  significant coronary artery calcifications.    BONES/JOINTS:  Unremarkable as visualized.  No acute fracture.  No  dislocation.    SOFT TISSUES:  Unremarkable as visualized.    VASCULATURE:  Unremarkable as visualized.  No thoracic aortic  aneurysm.    LYMPH NODES:  Unremarkable as visualized.  No enlarged lymph nodes.    INTRAPERITONEAL SPACE:  Free air is noted in the upper abdomen of  uncertain source.    TUBES, LINES AND DEVICES:  Automatic implantable cardioverter  defibrillator (AICD).    OTHER FINDINGS:  Valvular change from replacement noted.       Impression:      1.  Small right pleural effusion. Right lower lobe pulmonary nodule  measuring 1.6 cm that appears grossly stable.  2.  Cardiomegaly.  3.  Free air is noted in the upper abdomen of  "uncertain source.     This report was finalized on 12/23/2024 12:51 PM by Dr. Live Samayoa MD.       XR Chest 1 View [[id number]] Collected: 12/23/24 1053     Updated: 12/23/24 1056    Narrative:      EXAM:    XR Chest, 1 View     EXAM DATE:    12/23/2024 10:30 AM     CLINICAL HISTORY:    cough x 1 week     TECHNIQUE:    Frontal view of the chest.     COMPARISON:    4/13/2023     FINDINGS:    Lungs and pleural spaces:  Low lung volumes.    Heart:  Cardiomegaly and pulmonary vascular congestion.    Mediastinum:  Unremarkable.  Normal mediastinal contour.    Bones/joints:  Unremarkable.  No acute fracture.    Vasculature:  Aortic valvuloplasty changes noted.    Tubes, lines and devices:  Left cardiac pacer device noted.       Impression:      1.  Cardiomegaly and pulmonary vascular congestion.  2.  Aortic valvuloplasty changes noted.     This report was finalized on 12/23/2024 10:54 AM by Dr. Burton Plasencia MD.               Cultures:  No results found for: \"BLOODCX\", \"URINECX\", \"WOUNDCX\", \"MRSACX\", \"RESPCX\", \"STOOLCX\"    Last echocardiogram:  Results for orders placed during the hospital encounter of 05/22/21    Adult Transthoracic Echo Complete w/ Color, Spectral and Contrast if necessary per protocol    Interpretation Summary  · Left ventricular systolic function is normal. Estimated left ventricular EF = 60%.  · Left ventricular diastolic function is consistent with (grade I) impaired relaxation.  · Mild aortic valve stenosis is present.  · Estimated right ventricular systolic pressure from tricuspid regurgitation is normal (<35 mmHg).          I have personally reviewed the above radiology images and read the final radiology report on 12/23/24  ---------------------------------------------------------------------------------------------------------------------  Assessment / Plan     There are no active hospital problems to display for this patient.      ASSESSMENT/PLAN:    Acute on chronic HFrEF  Complicated by " ESRD on PD  Rule out concomitant pneumonia contributing to dyspnea/cough  Patient presented to the ED secondary to shortness of breath and cough.  He was noted to be tachypneic but O2 sats mid 90s on room air.  On laboratory workup he was found to have elevated CRP at 0.72 and procalcitonin 0.26.  White blood cell count was slightly elevated at 11.09.  He was COVID/flu negative.  Chest x-ray concerning for cardiomegaly and pulmonary vascular congestion.  CT chest without contrast was again notable for cardiomegaly, no consolidation  Given elevated procalcitonin he was covered empirically with Unasyn and azithromycin in the ED-also received Bumex 1 mg injection and Solu-Medrol  Will admit to the telemetry unit for further workup and management  Plan to continue to diurese as clinically indicated.  Monitor I's and O's, daily weights, clinical volume status closely  Monitor respiratory status closely  Will plan to consult nephrology for inpatient management of ESRD on PD  Will monitor off of antibiotics for now  Will obtain sputum culture if able and follow-up  Continue to trend labs, temperature curve    Chronic:  CAD s/p CABG  Aortic stenosis s/p recent TAVR  HTN  HLD  Third-degree heart block s/p PPM  Peptic ulcer disease  Plan to resume home medication regimen as indicated once med rec is complete  Monitor vital signs closely  ----------  -Activity: Up with assistance   -Expected length of stay: INPATIENT status due to the need for care which can only be reasonably provided in an hospital setting such as aggressive/expedited ancillary services and/or consultation services, the necessity for IV medications, close physician monitoring and/or the possible need for procedures.  In such, I feel patient’s risk for adverse outcomes and need for care warrant INPATIENT evaluation and predict the patient’s care encounter to likely last beyond 2 midnights.   -Disposition pending further clinical course    High risk secondary  to acute on chronic HFrEF    There are no questions and answers to display.       Faustino Guevara PA-C   12/23/24  14:12 EST

## 2024-12-24 LAB
ALBUMIN SERPL-MCNC: 3.2 G/DL (ref 3.5–5.2)
ALBUMIN/GLOB SERPL: 1.2 G/DL
ALP SERPL-CCNC: 192 U/L (ref 39–117)
ALT SERPL W P-5'-P-CCNC: 87 U/L (ref 1–41)
ANION GAP SERPL CALCULATED.3IONS-SCNC: 17 MMOL/L (ref 5–15)
AST SERPL-CCNC: 38 U/L (ref 1–40)
BASOPHILS # BLD AUTO: 0.02 10*3/MM3 (ref 0–0.2)
BASOPHILS NFR BLD AUTO: 0.3 % (ref 0–1.5)
BILIRUB SERPL-MCNC: 0.3 MG/DL (ref 0–1.2)
BUN SERPL-MCNC: 75 MG/DL (ref 8–23)
BUN/CREAT SERPL: 18.3 (ref 7–25)
CALCIUM SPEC-SCNC: 8.9 MG/DL (ref 8.6–10.5)
CHLORIDE SERPL-SCNC: 98 MMOL/L (ref 98–107)
CO2 SERPL-SCNC: 22 MMOL/L (ref 22–29)
CREAT SERPL-MCNC: 4.1 MG/DL (ref 0.76–1.27)
DEPRECATED RDW RBC AUTO: 60.5 FL (ref 37–54)
EGFRCR SERPLBLD CKD-EPI 2021: 14.3 ML/MIN/1.73
EOSINOPHIL # BLD AUTO: 0 10*3/MM3 (ref 0–0.4)
EOSINOPHIL NFR BLD AUTO: 0 % (ref 0.3–6.2)
ERYTHROCYTE [DISTWIDTH] IN BLOOD BY AUTOMATED COUNT: 16.5 % (ref 12.3–15.4)
GLOBULIN UR ELPH-MCNC: 2.6 GM/DL
GLUCOSE BLDC GLUCOMTR-MCNC: 148 MG/DL (ref 70–130)
GLUCOSE BLDC GLUCOMTR-MCNC: 189 MG/DL (ref 70–130)
GLUCOSE BLDC GLUCOMTR-MCNC: 204 MG/DL (ref 70–130)
GLUCOSE BLDC GLUCOMTR-MCNC: 252 MG/DL (ref 70–130)
GLUCOSE BLDC GLUCOMTR-MCNC: 330 MG/DL (ref 70–130)
GLUCOSE BLDC GLUCOMTR-MCNC: 386 MG/DL (ref 70–130)
GLUCOSE SERPL-MCNC: 420 MG/DL (ref 65–99)
HCT VFR BLD AUTO: 31.2 % (ref 37.5–51)
HGB BLD-MCNC: 9.9 G/DL (ref 13–17.7)
IMM GRANULOCYTES # BLD AUTO: 0.15 10*3/MM3 (ref 0–0.05)
IMM GRANULOCYTES NFR BLD AUTO: 1.9 % (ref 0–0.5)
LYMPHOCYTES # BLD AUTO: 0.61 10*3/MM3 (ref 0.7–3.1)
LYMPHOCYTES NFR BLD AUTO: 7.9 % (ref 19.6–45.3)
MCH RBC QN AUTO: 31.9 PG (ref 26.6–33)
MCHC RBC AUTO-ENTMCNC: 31.7 G/DL (ref 31.5–35.7)
MCV RBC AUTO: 100.6 FL (ref 79–97)
MONOCYTES # BLD AUTO: 0.13 10*3/MM3 (ref 0.1–0.9)
MONOCYTES NFR BLD AUTO: 1.7 % (ref 5–12)
NEUTROPHILS NFR BLD AUTO: 6.85 10*3/MM3 (ref 1.7–7)
NEUTROPHILS NFR BLD AUTO: 88.2 % (ref 42.7–76)
NRBC BLD AUTO-RTO: 0 /100 WBC (ref 0–0.2)
PLATELET # BLD AUTO: 132 10*3/MM3 (ref 140–450)
PMV BLD AUTO: 11 FL (ref 6–12)
POTASSIUM SERPL-SCNC: 3.6 MMOL/L (ref 3.5–5.2)
PROT SERPL-MCNC: 5.8 G/DL (ref 6–8.5)
RBC # BLD AUTO: 3.1 10*6/MM3 (ref 4.14–5.8)
SODIUM SERPL-SCNC: 137 MMOL/L (ref 136–145)
WBC NRBC COR # BLD AUTO: 7.76 10*3/MM3 (ref 3.4–10.8)

## 2024-12-24 PROCEDURE — 80053 COMPREHEN METABOLIC PANEL: CPT | Performed by: INTERNAL MEDICINE

## 2024-12-24 PROCEDURE — 82948 REAGENT STRIP/BLOOD GLUCOSE: CPT

## 2024-12-24 PROCEDURE — 25010000002 HEPARIN (PORCINE) PER 1000 UNITS

## 2024-12-24 PROCEDURE — 94799 UNLISTED PULMONARY SVC/PX: CPT

## 2024-12-24 PROCEDURE — 63710000001 INSULIN LISPRO (HUMAN) PER 5 UNITS

## 2024-12-24 PROCEDURE — 99233 SBSQ HOSP IP/OBS HIGH 50: CPT | Performed by: INTERNAL MEDICINE

## 2024-12-24 PROCEDURE — 36415 COLL VENOUS BLD VENIPUNCTURE: CPT | Performed by: INTERNAL MEDICINE

## 2024-12-24 PROCEDURE — 85025 COMPLETE CBC W/AUTO DIFF WBC: CPT | Performed by: INTERNAL MEDICINE

## 2024-12-24 RX ORDER — INSULIN LISPRO 100 [IU]/ML
2-7 INJECTION, SOLUTION INTRAVENOUS; SUBCUTANEOUS
Status: DISCONTINUED | OUTPATIENT
Start: 2024-12-24 | End: 2024-12-25 | Stop reason: HOSPADM

## 2024-12-24 RX ORDER — MECLIZINE HYDROCHLORIDE 25 MG/1
25 TABLET ORAL 2 TIMES DAILY PRN
Status: CANCELLED | OUTPATIENT
Start: 2024-12-24

## 2024-12-24 RX ORDER — CALCITRIOL 0.25 UG/1
0.25 CAPSULE, LIQUID FILLED ORAL 3 TIMES WEEKLY
Status: DISCONTINUED | OUTPATIENT
Start: 2024-12-25 | End: 2024-12-25 | Stop reason: HOSPADM

## 2024-12-24 RX ORDER — TORSEMIDE 100 MG/1
100 TABLET ORAL DAILY
COMMUNITY
End: 2024-12-25 | Stop reason: HOSPADM

## 2024-12-24 RX ORDER — CARVEDILOL 25 MG/1
25 TABLET ORAL 2 TIMES DAILY
Status: DISCONTINUED | OUTPATIENT
Start: 2024-12-24 | End: 2024-12-25 | Stop reason: HOSPADM

## 2024-12-24 RX ORDER — IPRATROPIUM BROMIDE AND ALBUTEROL SULFATE 2.5; .5 MG/3ML; MG/3ML
3 SOLUTION RESPIRATORY (INHALATION) EVERY 6 HOURS PRN
Status: DISCONTINUED | OUTPATIENT
Start: 2024-12-24 | End: 2024-12-25 | Stop reason: HOSPADM

## 2024-12-24 RX ORDER — GLUCAGON 1 MG/ML
1 KIT INJECTION
Status: DISCONTINUED | OUTPATIENT
Start: 2024-12-24 | End: 2024-12-25 | Stop reason: HOSPADM

## 2024-12-24 RX ORDER — BUMETANIDE 1 MG/1
3 TABLET ORAL DAILY
Status: DISCONTINUED | OUTPATIENT
Start: 2024-12-24 | End: 2024-12-25 | Stop reason: HOSPADM

## 2024-12-24 RX ORDER — DEXTROSE MONOHYDRATE 25 G/50ML
25 INJECTION, SOLUTION INTRAVENOUS
Status: DISCONTINUED | OUTPATIENT
Start: 2024-12-24 | End: 2024-12-25 | Stop reason: HOSPADM

## 2024-12-24 RX ORDER — ATORVASTATIN CALCIUM 40 MG/1
40 TABLET, FILM COATED ORAL DAILY
Status: DISCONTINUED | OUTPATIENT
Start: 2024-12-24 | End: 2024-12-25 | Stop reason: HOSPADM

## 2024-12-24 RX ORDER — CALCITRIOL 0.25 UG/1
0.25 CAPSULE, LIQUID FILLED ORAL 3 TIMES WEEKLY
COMMUNITY

## 2024-12-24 RX ORDER — NICOTINE POLACRILEX 4 MG
15 LOZENGE BUCCAL
Status: DISCONTINUED | OUTPATIENT
Start: 2024-12-24 | End: 2024-12-25 | Stop reason: HOSPADM

## 2024-12-24 RX ORDER — TORSEMIDE 20 MG/1
100 TABLET ORAL DAILY
Status: CANCELLED | OUTPATIENT
Start: 2024-12-24

## 2024-12-24 RX ORDER — GUAIFENESIN 600 MG/1
1200 TABLET, EXTENDED RELEASE ORAL EVERY 12 HOURS SCHEDULED
Status: DISCONTINUED | OUTPATIENT
Start: 2024-12-24 | End: 2024-12-25 | Stop reason: HOSPADM

## 2024-12-24 RX ORDER — PANTOPRAZOLE SODIUM 20 MG/1
20 TABLET, DELAYED RELEASE ORAL 2 TIMES DAILY
Status: DISCONTINUED | OUTPATIENT
Start: 2024-12-24 | End: 2024-12-24

## 2024-12-24 RX ORDER — CARVEDILOL 25 MG/1
25 TABLET ORAL 2 TIMES DAILY
COMMUNITY

## 2024-12-24 RX ORDER — ALLOPURINOL 300 MG/1
300 TABLET ORAL DAILY
Status: DISCONTINUED | OUTPATIENT
Start: 2024-12-24 | End: 2024-12-25 | Stop reason: HOSPADM

## 2024-12-24 RX ORDER — MECLIZINE HYDROCHLORIDE 25 MG/1
25 TABLET ORAL 2 TIMES DAILY PRN
COMMUNITY

## 2024-12-24 RX ORDER — CLOPIDOGREL BISULFATE 75 MG/1
75 TABLET ORAL DAILY
COMMUNITY

## 2024-12-24 RX ORDER — ASPIRIN 81 MG/1
81 TABLET ORAL DAILY
Status: DISCONTINUED | OUTPATIENT
Start: 2024-12-24 | End: 2024-12-25 | Stop reason: HOSPADM

## 2024-12-24 RX ORDER — PANTOPRAZOLE SODIUM 40 MG/1
40 TABLET, DELAYED RELEASE ORAL
Status: DISCONTINUED | OUTPATIENT
Start: 2024-12-24 | End: 2024-12-25 | Stop reason: HOSPADM

## 2024-12-24 RX ORDER — ALLOPURINOL 300 MG/1
300 TABLET ORAL DAILY
COMMUNITY

## 2024-12-24 RX ORDER — CLOPIDOGREL BISULFATE 75 MG/1
75 TABLET ORAL DAILY
Status: DISCONTINUED | OUTPATIENT
Start: 2024-12-24 | End: 2024-12-25 | Stop reason: HOSPADM

## 2024-12-24 RX ORDER — PANTOPRAZOLE SODIUM 20 MG/1
20 TABLET, DELAYED RELEASE ORAL 2 TIMES DAILY
COMMUNITY

## 2024-12-24 RX ADMIN — GUAIFENESIN 1200 MG: 600 TABLET ORAL at 17:43

## 2024-12-24 RX ADMIN — INSULIN LISPRO 4 UNITS: 100 INJECTION, SOLUTION INTRAVENOUS; SUBCUTANEOUS at 09:02

## 2024-12-24 RX ADMIN — ASPIRIN 81 MG: 81 TABLET, DELAYED RELEASE ORAL at 17:43

## 2024-12-24 RX ADMIN — HEPARIN SODIUM 5000 UNITS: 5000 INJECTION INTRAVENOUS; SUBCUTANEOUS at 20:43

## 2024-12-24 RX ADMIN — ALLOPURINOL 300 MG: 300 TABLET ORAL at 17:43

## 2024-12-24 RX ADMIN — ATORVASTATIN CALCIUM 40 MG: 40 TABLET, FILM COATED ORAL at 17:43

## 2024-12-24 RX ADMIN — HEPARIN SODIUM 5000 UNITS: 5000 INJECTION INTRAVENOUS; SUBCUTANEOUS at 09:02

## 2024-12-24 RX ADMIN — CARVEDILOL 25 MG: 25 TABLET, FILM COATED ORAL at 20:43

## 2024-12-24 RX ADMIN — INSULIN LISPRO 2 UNITS: 100 INJECTION, SOLUTION INTRAVENOUS; SUBCUTANEOUS at 12:48

## 2024-12-24 RX ADMIN — PANTOPRAZOLE SODIUM 40 MG: 40 TABLET, DELAYED RELEASE ORAL at 17:43

## 2024-12-24 RX ADMIN — SENNOSIDES AND DOCUSATE SODIUM 2 TABLET: 50; 8.6 TABLET ORAL at 09:03

## 2024-12-24 RX ADMIN — SENNOSIDES AND DOCUSATE SODIUM 2 TABLET: 50; 8.6 TABLET ORAL at 20:43

## 2024-12-24 RX ADMIN — INSULIN LISPRO 3 UNITS: 100 INJECTION, SOLUTION INTRAVENOUS; SUBCUTANEOUS at 20:43

## 2024-12-24 RX ADMIN — IPRATROPIUM BROMIDE AND ALBUTEROL SULFATE 3 ML: .5; 2.5 SOLUTION RESPIRATORY (INHALATION) at 15:50

## 2024-12-24 RX ADMIN — BUMETANIDE 3 MG: 1 TABLET ORAL at 09:03

## 2024-12-24 RX ADMIN — Medication 10 ML: at 20:44

## 2024-12-24 RX ADMIN — INSULIN LISPRO 5 UNITS: 100 INJECTION, SOLUTION INTRAVENOUS; SUBCUTANEOUS at 04:01

## 2024-12-24 RX ADMIN — Medication 10 ML: at 09:03

## 2024-12-24 RX ADMIN — CLOPIDOGREL BISULFATE 75 MG: 75 TABLET ORAL at 17:43

## 2024-12-24 NOTE — CONSULTS
NEPHROLOGY CONSULT NOTE    Referring Provider: Dr. Solo  Reason for Consultation: End-stage renal disease on peritoneal dialysis    Chief complaint shortness of breath    Subjective .     History of present illness: Patient is a 79-year-old with past medical history of end-stage renal disease on peritoneal dialysis also has a history of TAVR for aortic stenosis hypertension coronary artery disease came to the hospital for shortness of breath patient right now has less shortness of breath has been having orthopnea also patient denies any chest pain no urgency frequency patient received dialysis overnight states that he is feeling better with ultrafiltration was only 15 cc patient normally does 2 treatments manually chest CT showed right pleural effusion and cardiomegaly blood pressure stable oxygen saturation was 95% on room air    Review of Systems  All systems were reviewed and negative except for: Above    History:  Past Medical History:   Diagnosis Date    Asthma     CAD (coronary artery disease)     CKD (chronic kidney disease) stage 3, GFR 30-59 ml/min     Diabetes mellitus     Heart failure 12/23/2024    History of transfusion     Hyperlipidemia     Hypertension     NSTEMI (non-ST elevated myocardial infarction)    ,   Past Surgical History:   Procedure Laterality Date    CARDIAC CATHETERIZATION N/A 05/24/2021    Procedure: Left Heart Cath;  Surgeon: Blade Greene IV, MD;  Location:  [facility];  Service: Cardiovascular;  Laterality: N/A;    CARDIAC SURGERY      2 stents placed 2012.    CENTRAL VENOUS LINE INSERTION Left 04/13/2023    Procedure: CENTRAL VENOUS LINE INSERTION;  Surgeon: Torey Easley MD;  Location: [facility] OR;  Service: General;  Laterality: Left;    CORONARY ARTERY BYPASS GRAFT N/A 05/28/2021    Procedure: MEDIAN STERNOTOMY CORONARY ARTERY BYPASS X 3 UTILIZING THE LEFT INTERNAL MAMMARY ARTERY GRAFT, EVH OF THE GREATER RIGHT SAPHENOUS VEIN, AND PILO PER  ANESTHESIA;  Surgeon: Jacek Miller MD;  Location:  [facility] OR;  Service: Cardiothoracic;  Laterality: N/A;    CORONARY ARTERY BYPASS GRAFT      ENDOSCOPY N/A 04/08/2023    Procedure: ESOPHAGOGASTRODUODENOSCOPY with CENTERAL LINE PLACEMENT;  Surgeon: Sabine Hudson MD;  Location:  [facility] OR;  Service: General;  Laterality: N/A;    ENDOSCOPY N/A 04/10/2023    Procedure: ESOPHAGOGASTRODUODENOSCOPY;  Surgeon: Sabine Hudson MD;  Location:  [facility];  Service: General;  Laterality: N/A;    ENDOSCOPY N/A 6/27/2023    Procedure: ESOPHAGOGASTRODUODENOSCOPY;  Surgeon: Sabine Hudson MD;  Location:  [facility];  Service: General;  Laterality: N/A;    INSERTION AND REMOVAL HEMODIALYSIS CATHETER Right 6/27/2023    Procedure: REMOVAL OF HEMODIALYSIS CATHETER;  Surgeon: Sabine Hudson MD;  Location:  [facility];  Service: General;  Laterality: Right;    INSERTION HEMODIALYSIS CATHETER N/A 04/13/2023    Procedure: HEMODIALYSIS CATHETER INSERTION;  Surgeon: Torey Easley MD;  Location:  [facility];  Service: General;  Laterality: N/A;    PACEMAKER IMPLANTATION     ,   Family History   Problem Relation Age of Onset    Heart disease Mother     Diabetes type I Father    ,   Social History     Tobacco Use    Smoking status: Never    Smokeless tobacco: Current     Types: Chew   Vaping Use    Vaping status: Never Used   Substance Use Topics    Alcohol use: Never    Drug use: Never   , Allergies:  Patient has no known allergies.,   Current Facility-Administered Medications:     sennosides-docusate (PERICOLACE) 8.6-50 MG per tablet 2 tablet, 2 tablet, Oral, BID, 2 tablet at 12/23/24 2016 **AND** polyethylene glycol (MIRALAX) packet 17 g, 17 g, Oral, Daily PRN **AND** bisacodyl (DULCOLAX) EC tablet 5 mg, 5 mg, Oral, Daily PRN **AND** bisacodyl (DULCOLAX) suppository 10 mg, 10 mg, Rectal, Daily PRN, Nupur Solo DO    dextrose (D50W) (25 g/50 mL) IV injection 25 g, 25 g, Intravenous, Q15 Min PRN, Saúl  Daniel MEMBRENO PA-C    dextrose (GLUTOSE) oral gel 15 g, 15 g, Oral, Q15 Min PRN, Daniel Hays PA-C    glucagon HCl (Diagnostic) injection 1 mg, 1 mg, Intramuscular, Q15 Min PRN, Daniel Hays PA-C    heparin (porcine) 5000 UNIT/ML injection 5,000 Units, 5,000 Units, Subcutaneous, Q12H, Daniel Hays PA-C, 5,000 Units at 12/23/24 2249    Insulin Lispro (humaLOG) injection 2-7 Units, 2-7 Units, Subcutaneous, 4x Daily AC & at Bedtime, Daniel Hays PA-C, 5 Units at 12/24/24 0401    nitroglycerin (NITROSTAT) SL tablet 0.4 mg, 0.4 mg, Sublingual, Q5 Min PRN, Nupur Solo DO    [COMPLETED] Insert Peripheral IV, , , Once **AND** sodium chloride 0.9 % flush 10 mL, 10 mL, Intravenous, PRN, Nupur Solo DO    sodium chloride 0.9 % flush 10 mL, 10 mL, Intravenous, Q12H, Nupur Solo DO, 10 mL at 12/23/24 2016    sodium chloride 0.9 % flush 10 mL, 10 mL, Intravenous, PRN, Nupur Solo,     sodium chloride 0.9 % infusion 40 mL, 40 mL, Intravenous, PRN, Nupur Solo DO     Medications Prior to Admission   Medication Sig Dispense Refill Last Dose/Taking    albuterol sulfate  (90 Base) MCG/ACT inhaler Inhale 2 puffs 2 (Two) Times a Day As Needed for Wheezing.       allopurinol (ZYLOPRIM) 100 MG tablet Take 0.5 tablets by mouth 2 (Two) Times a Week. 4 tablet 0     aspirin 81 MG EC tablet Take 1 tablet by mouth Daily.       atorvastatin (LIPITOR) 40 MG tablet Take 1 tablet by mouth Daily.       carvedilol (COREG) 12.5 MG tablet Take 1 tablet by mouth 2 (Two) Times a Day With Meals. Take blood pressure prior to each dose and if systolic blood pressure less than 130 do not take 60 tablet 0     cloNIDine (CATAPRES) 0.2 MG tablet Take 1 tablet by mouth 3 (Three) Times a Day.       Insulin Glargine (Lantus SoloStar) 100 UNIT/ML injection pen Inject 20 Units under the skin into the appropriate area as directed Daily.       Loratadine 10 MG capsule Take 1 capsule by  "mouth.       multivitamin with minerals tablet tablet Take 1 tablet by mouth Daily.       pantoprazole (PROTONIX) 40 MG EC tablet Take 1 tablet by mouth 2 (Two) Times a Day Before Meals. 60 tablet 0     polyethylene glycol (MIRALAX) 17 g packet Take 17 g by mouth Daily. 30 packet 0           Objective     Laboratory Data:     Albumin Albumin   Date Value Ref Range Status   12/24/2024 3.2 (L) 3.5 - 5.2 g/dL Final   12/23/2024 3.5 3.5 - 5.2 g/dL Final      Magnesium Magnesium   Date Value Ref Range Status   12/23/2024 2.0 1.6 - 2.4 mg/dL Final          PTH               No results found for: \"PTH\"    CBC and coagulation:  Results from last 7 days   Lab Units 12/24/24  0035 12/23/24  1048   PROCALCITONIN ng/mL  --  0.26*   LACTATE mmol/L  --  1.2   CRP mg/dL  --  0.72*   WBC 10*3/mm3 7.76 11.09*   HEMOGLOBIN g/dL 9.9* 9.8*   HEMATOCRIT % 31.2* 30.1*   MCV fL 100.6* 98.0*   MCHC g/dL 31.7 32.6   PLATELETS 10*3/mm3 132* 143     Acid/base balance:      Renal and electrolytes:    Results from last 7 days   Lab Units 12/24/24  0035 12/23/24  1048   SODIUM mmol/L 137 141   POTASSIUM mmol/L 3.6 3.9   MAGNESIUM mg/dL  --  2.0   CHLORIDE mmol/L 98 101   CO2 mmol/L 22.0 24.6   BUN mg/dL 75* 79*   CREATININE mg/dL 4.10* 4.32*   CALCIUM mg/dL 8.9 9.1     Estimated Creatinine Clearance: 16.6 mL/min (A) (by C-G formula based on SCr of 4.1 mg/dL (H)).    Liver and pancreatic function:  Results from last 7 days   Lab Units 12/24/24  0035 12/23/24  1048   ALBUMIN g/dL 3.2* 3.5   BILIRUBIN mg/dL 0.3 0.3   ALK PHOS U/L 192* 213*   AST (SGOT) U/L 38 60*   ALT (SGPT) U/L 87* 101*         Cardiac:  Results from last 7 days   Lab Units 12/23/24  1048   PROBNP pg/mL 65,140.0*     Liver and pancreatic function:  Results from last 7 days   Lab Units 12/24/24  0035 12/23/24  1048   ALBUMIN g/dL 3.2* 3.5   BILIRUBIN mg/dL 0.3 0.3   ALK PHOS U/L 192* 213*   AST (SGOT) U/L 38 60*   ALT (SGPT) U/L 87* 101*       CT Abdomen Pelvis Without " Contrast    Result Date: 12/23/2024  1.  Peritoneal dialysis catheter noted. Trace amount of air in the abdomen likely relates to this. Follow-up as clinically relevant. 2.  Otherwise no acute process in the abdomen or pelvis. 3.  Small to moderate volume right pleural effusion with underlying atelectasis.  This report was finalized on 12/23/2024 3:45 PM by Alex Pallas, DO.      CT Chest Without Contrast Diagnostic    Result Date: 12/23/2024  1.  Small right pleural effusion. Right lower lobe pulmonary nodule measuring 1.6 cm that appears grossly stable. 2.  Cardiomegaly. 3.  Free air is noted in the upper abdomen of uncertain source.  This report was finalized on 12/23/2024 12:51 PM by Dr. Live Samayoa MD.      XR Chest 1 View    Result Date: 12/23/2024  1.  Cardiomegaly and pulmonary vascular congestion. 2.  Aortic valvuloplasty changes noted.  This report was finalized on 12/23/2024 10:54 AM by Dr. Burton Plasencia MD.        Vital Signs:   Vitals:    12/23/24 1919 12/23/24 2306 12/24/24 0320 12/24/24 0500   BP: 157/74 139/77 151/84    BP Location: Right arm Left arm Left arm    Patient Position: Lying Lying Lying    Pulse: 77 80 75    Resp: 20 20 20    Temp: 98.8 °F (37.1 °C) 98.9 °F (37.2 °C) 98.5 °F (36.9 °C)    TempSrc: Oral Oral Oral    SpO2: 96% 95% 96%    Weight:    77.9 kg (171 lb 12.8 oz)   Height:            Intake/Output                   12/23/24 0701 - 12/24/24 0700     [id number] Total              Intake    P.O.  --  120 120    IV Piggyback  350  -- 350    Total Intake 350 120 470       Output    Urine  --  400 400    Total Output -- 400 400               Physical Exam:     General Appearance:  Alert, cooperative, in no acute distress, oriented x 3.   Head:  Normocephalic, without obvious abnormality.   Eyes:          Conjunctivae and sclerae normal, no icterus, no pallor, pupils CCERLA   Ears:  Ears appear intact.   Throat:      Neck: No adenopathy, no thyromegaly, no carotid bruit,  positive JVD.   Back:       Lungs:   Bilateral basal crackles    Heart:  Regular rhythm and normal rate, normal S1 and S2, no murmur, no gallop, no rub, no click.   Chest Wall:  No abnormalities observed.   Abdomen:   Normal bowel sounds, no masses, no organomegaly, soft, nontender, nondistended, no guarding, no rebound tenderness.  PD exit site clear   Rectal:   Deferred.   Extremities: Moves all extremities well, no edema, no cyanosis, no redness.   Pulses:    Skin: No petechiae, no nodules, bruising or rash.   Lymph nodes:      Neurologic: Cranial nerves grossly intact, sensation normal, moves all extremities.     Results Review:  I reviewed the patient's new clinical results.  I personally viewed and interpreted the patient's EKG/telemetry data.      Assessment and Plan:    -End-stage renal disease on peritoneal dialysis  -Acute on chronic systolic congestive heart failure  -Aortic stenosis status post TAVR  -Hypertension  -Pneumonia  -Coronary artery disease  -Anemia      12/24/2024  Patient had peritoneal dialysis treatment overnight with a cycler be used 2.5% x 3 with ultrafiltration was minimal patient was on Bumex at home will restart Bumex 3 mg daily  If patient stays overnight we will repeat 2.5% times for treatment tonight  Patient is on antibiotic also for possible pneumonia    Prognosis critical    Discussed with RN    Reviewed hospitalist notes  Reviewed consultant notes  Reviewed the labs  Reviewed radiology      Plan of care discussed with patient, answered all questions. Patient verbalized understanding and agreed.    MAJO Doyle MD  12/24/24  06:57 EST

## 2024-12-24 NOTE — PROGRESS NOTES
" [facility]     Progress Note    Patient Name: Augusto Lorenzana Jr.  : 1945  MRN: [id number]  Primary Care Physician:  Rob Polanco MD  Date of admission: 2024    Subjective   Subjective     Chief Complaint: 79-year-old male being seen in follow-up for shortness of air    History of Present Illness  Patient Reports feeling somewhat improved compared to admission.  Patient seen earlier today and was eating lunch.  Patient states that he has been having \"episodes\" when he will suddenly begin coughing and feel as if he has either congestion or something hung in his airway.  He states that it is not always related to eating and is at times more noticeable when he lies down at night.    Patient states he slept well; no acute overnight events reported to me per staff.     Review of Systems  Patient denies any nausea and/or vomiting.  He does not report any chest pains or pressure.    Objective   Objective     Vitals:   Temp:  [97.6 °F (36.4 °C)-99 °F (37.2 °C)] 98.2 °F (36.8 °C)  Heart Rate:  [74-80] 75  Resp:  [16-20] 18  BP: (129-157)/(74-90) 151/86    Physical Exam  Constitutional:       General: He is not in acute distress.     Appearance: He is well-developed.   HENT:      Head: Normocephalic and atraumatic.   Eyes:      Conjunctiva/sclera: Conjunctivae normal.   Neck:      Trachea: No tracheal deviation.   Cardiovascular:      Rate and Rhythm: Normal rate and regular rhythm.      Heart sounds: No murmur heard.     No friction rub. No gallop.   Pulmonary:      Effort: No respiratory distress.      Breath sounds: Normal breath sounds. No wheezing or rales.   Abdominal:      General: Bowel sounds are normal. There is distension (minimal).      Palpations: Abdomen is soft.      Tenderness: There is no abdominal tenderness. There is no guarding.      Comments: PD catheter in place   Musculoskeletal:         General: No tenderness. Normal range of motion.   Skin:     General: Skin is warm and dry.    "   Findings: No erythema or rash.   Neurological:      Mental Status: He is alert and oriented to person, place, and time.      Cranial Nerves: No cranial nerve deficit.          Result Review    Result Review:  I have personally reviewed the results from the time of this admission to 12/24/2024 15:38 EST and agree with these findings:  [x]  Laboratory list / accordion  []  Microbiology  []  Radiology  []  EKG/Telemetry   []  Cardiology/Vascular   []  Pathology  []  Old records  []  Other:  Most notable findings include:     Results from last 7 days   Lab Units 12/24/24  0035 12/23/24  1048   WBC 10*3/mm3 7.76 11.09*   HEMOGLOBIN g/dL 9.9* 9.8*   HEMATOCRIT % 31.2* 30.1*   PLATELETS 10*3/mm3 132* 143     Results from last 7 days   Lab Units 12/24/24  0035 12/23/24  1048   SODIUM mmol/L 137 141   POTASSIUM mmol/L 3.6 3.9   CHLORIDE mmol/L 98 101   CO2 mmol/L 22.0 24.6   BUN mg/dL 75* 79*   CREATININE mg/dL 4.10* 4.32*   CALCIUM mg/dL 8.9 9.1   BILIRUBIN mg/dL 0.3 0.3   ALK PHOS U/L 192* 213*   ALT (SGPT) U/L 87* 101*   AST (SGOT) U/L 38 60*   GLUCOSE mg/dL 420* 108*     CT chest without contrast:  FINDINGS:    LIMITATIONS:  Please note that reported measurements are made  manually, as computer generated (AI) measurements can over measure  lesions. Additionally, lesions identified by AI may have been present  presently, significant nodules will be discussed in the report and the  visual depictions of lesions provided by AI are for general reference  only.    LUNGS AND PLEURAL SPACES:  Small right pleural effusion.  Right lower  lobe pulmonary nodule measuring 1.6 cm that appears grossly stable.  No  consolidation.  No pneumothorax.    HEART:  Cardiomegaly.  No significant pericardial effusion.  No  significant coronary artery calcifications.    BONES/JOINTS:  Unremarkable as visualized.  No acute fracture.  No  dislocation.    SOFT TISSUES:  Unremarkable as visualized.    VASCULATURE:  Unremarkable as visualized.  No  thoracic aortic  aneurysm.    LYMPH NODES:  Unremarkable as visualized.  No enlarged lymph nodes.    INTRAPERITONEAL SPACE:  Free air is noted in the upper abdomen of  uncertain source.    TUBES, LINES AND DEVICES:  Automatic implantable cardioverter  defibrillator (AICD).    OTHER FINDINGS:  Valvular change from replacement noted.     IMPRESSION:  1.  Small right pleural effusion. Right lower lobe pulmonary nodule  measuring 1.6 cm that appears grossly stable.  2.  Cardiomegaly.  3.  Free air is noted in the upper abdomen of uncertain source.    CT abdomen/pelvis without contrast:  FINDINGS:  Mild cardiomegaly. Aortic valve noted. Small to moderate volume right  pleural effusion with underlying atelectasis.     Gallstones in the gallbladder. The liver, spleen, pancreas and adrenal  glands appear unremarkable. No renal calculus or hydronephrosis. 1 cm  right renal cyst noted.     No evidence of bowel obstruction/colitis/appendicitis.      Negative for abdominal aortic aneurysm.     Peritoneal dialysis catheter noted. Trace amount of air in the abdomen  likely relates to this.     The urinary bladder wall is thickened.     No acute osseous abnormality evident.     IMPRESSION:  1.  Peritoneal dialysis catheter noted. Trace amount of air in the  abdomen likely relates to this. Follow-up as clinically relevant.  2.  Otherwise no acute process in the abdomen or pelvis.  3.  Small to moderate volume right pleural effusion with underlying  atelectasis.    Echocardiogram November 2024:  Conclusions     Summary:     1. Left ventricle: The cavity size is mildly dilated. Systolic      function is severely reduced. The estimated ejection fraction is      25-30%, by visual assessment. Doppler parameters are consistent      with high ventricular filling pressure.   2. Mitral valve: There is moderate regurgitation.   3. Well seated normally functioning 26 mm EDWARD TAVR valve with no      significant perivaulvular leak. Mean  gradient across the valve      is 6 mmHg.     Impressions:     1. Well seated normally functioning 26 mm EDWARD TAVR valve with no      significant perivaulvular leak. Mean gradient across the valve      is 6 mmHg.   2. Moderate mitral regurgitation.   3. Elevated filling pressures based on doppler parameteres.      Based on IVC appearance, RA pressure is elevated.     Assessment & Plan   Assessment / Plan     #Acute HFrEF with chronic systolic dysfunction  #Progressive dyspnea, may be due to above, will rule out aspiration  #Aortic stenosis status post recent TAVR  #Moderate mitral valve regurgitation  #Status post AICD placement  #ESRD on peritoneal dialysis  #Elevated anion gap with mild metabolic acidosis  #Essential hypertension that is slightly uncontrolled  #Chronic macrocytic anemia  #Insulin-dependent diabetes mellitus type 2 complicated by hyperglycemia that is overall improving  #Mild ALT elevation  #Stable appearing 1.6 cm RLL nodule  -Clinically I suspect that the patient's presentation is most consistent with volume overload in the setting of known systolic dysfunction and ESRD on PD  -Nephrology is consulted and following for peritoneal dialysis and has recommended to continue the patient on his home diuretic regimen which is Bumex 3 mg by mouth daily  -I have requested a speech therapy evaluation to rule out possible aspiration as an etiology of the patient's intermittent dyspnea with cough  -Add BID Mucinex scheduled  -I have added PRN duonebs  -No CT evidence of consolidative pneumonia  -Pro-calcitonin minimally elevated at 0.26 upon admission   -Resume home PPI  -Will plan to resume home anti-hypertensive regimen     VTE Prophylaxis:  [facility]    CODE STATUS:    Code Status (Patient has no pulse and is not breathing): CPR (Attempt to Resuscitate)  Medical Interventions (Patient has pulse or is breathing): Full Support    Disposition:  I expect patient to be discharged home when medically stable;  possibly 1-2 days.    Nupurangelic Solo, DO

## 2024-12-24 NOTE — PLAN OF CARE
Goal Outcome Evaluation:   Pt resting in bed this shift. No visible acute s/s of distress noted. No complaints voiced at this time. Plan of care ongoing.

## 2024-12-24 NOTE — PLAN OF CARE
Goal Outcome Evaluation: Patient has been very pleasant today. Compliant with all medications and care as ordered. He remains on RA, saturations maintaining well above 90%. Family has been at bedside, updated on plan of care. He has been setup for each meal, good intake noted. He is currently resting in bed. Will continue with plan of care.

## 2024-12-25 ENCOUNTER — READMISSION MANAGEMENT (OUTPATIENT)
Dept: CALL CENTER | Facility: HOSPITAL | Age: 79
End: 2024-12-25
Payer: MEDICARE

## 2024-12-25 VITALS
RESPIRATION RATE: 20 BRPM | HEIGHT: 67 IN | WEIGHT: 177.47 LBS | OXYGEN SATURATION: 98 % | DIASTOLIC BLOOD PRESSURE: 74 MMHG | TEMPERATURE: 98.1 F | SYSTOLIC BLOOD PRESSURE: 141 MMHG | BODY MASS INDEX: 27.85 KG/M2 | HEART RATE: 63 BPM

## 2024-12-25 PROBLEM — I50.23 ACUTE ON CHRONIC HFREF (HEART FAILURE WITH REDUCED EJECTION FRACTION): Status: ACTIVE | Noted: 2024-12-25

## 2024-12-25 LAB
ANION GAP SERPL CALCULATED.3IONS-SCNC: 14.9 MMOL/L (ref 5–15)
BASOPHILS # BLD AUTO: 0.03 10*3/MM3 (ref 0–0.2)
BASOPHILS NFR BLD AUTO: 0.3 % (ref 0–1.5)
BUN SERPL-MCNC: 75 MG/DL (ref 8–23)
BUN/CREAT SERPL: 19 (ref 7–25)
CALCIUM SPEC-SCNC: 9 MG/DL (ref 8.6–10.5)
CHLORIDE SERPL-SCNC: 97 MMOL/L (ref 98–107)
CO2 SERPL-SCNC: 25.1 MMOL/L (ref 22–29)
CREAT SERPL-MCNC: 3.95 MG/DL (ref 0.76–1.27)
DEPRECATED RDW RBC AUTO: 59.7 FL (ref 37–54)
EGFRCR SERPLBLD CKD-EPI 2021: 14.9 ML/MIN/1.73
EOSINOPHIL # BLD AUTO: 0.02 10*3/MM3 (ref 0–0.4)
EOSINOPHIL NFR BLD AUTO: 0.2 % (ref 0.3–6.2)
ERYTHROCYTE [DISTWIDTH] IN BLOOD BY AUTOMATED COUNT: 16.7 % (ref 12.3–15.4)
GLUCOSE BLDC GLUCOMTR-MCNC: 139 MG/DL (ref 70–130)
GLUCOSE SERPL-MCNC: 159 MG/DL (ref 65–99)
HCT VFR BLD AUTO: 31 % (ref 37.5–51)
HGB BLD-MCNC: 9.9 G/DL (ref 13–17.7)
IMM GRANULOCYTES # BLD AUTO: 0.17 10*3/MM3 (ref 0–0.05)
IMM GRANULOCYTES NFR BLD AUTO: 1.8 % (ref 0–0.5)
LYMPHOCYTES # BLD AUTO: 1.35 10*3/MM3 (ref 0.7–3.1)
LYMPHOCYTES NFR BLD AUTO: 14.2 % (ref 19.6–45.3)
MCH RBC QN AUTO: 31.6 PG (ref 26.6–33)
MCHC RBC AUTO-ENTMCNC: 31.9 G/DL (ref 31.5–35.7)
MCV RBC AUTO: 99 FL (ref 79–97)
MONOCYTES # BLD AUTO: 0.63 10*3/MM3 (ref 0.1–0.9)
MONOCYTES NFR BLD AUTO: 6.6 % (ref 5–12)
NEUTROPHILS NFR BLD AUTO: 7.32 10*3/MM3 (ref 1.7–7)
NEUTROPHILS NFR BLD AUTO: 76.9 % (ref 42.7–76)
NRBC BLD AUTO-RTO: 0.8 /100 WBC (ref 0–0.2)
PLATELET # BLD AUTO: 129 10*3/MM3 (ref 140–450)
PMV BLD AUTO: 10.7 FL (ref 6–12)
POTASSIUM SERPL-SCNC: 3.2 MMOL/L (ref 3.5–5.2)
RBC # BLD AUTO: 3.13 10*6/MM3 (ref 4.14–5.8)
SODIUM SERPL-SCNC: 137 MMOL/L (ref 136–145)
WBC NRBC COR # BLD AUTO: 9.52 10*3/MM3 (ref 3.4–10.8)

## 2024-12-25 PROCEDURE — 99238 HOSP IP/OBS DSCHRG MGMT 30/<: CPT | Performed by: INTERNAL MEDICINE

## 2024-12-25 PROCEDURE — 94761 N-INVAS EAR/PLS OXIMETRY MLT: CPT

## 2024-12-25 PROCEDURE — 25010000002 HEPARIN (PORCINE) PER 1000 UNITS

## 2024-12-25 PROCEDURE — 82948 REAGENT STRIP/BLOOD GLUCOSE: CPT

## 2024-12-25 PROCEDURE — 80048 BASIC METABOLIC PNL TOTAL CA: CPT | Performed by: INTERNAL MEDICINE

## 2024-12-25 PROCEDURE — 94799 UNLISTED PULMONARY SVC/PX: CPT

## 2024-12-25 PROCEDURE — 85025 COMPLETE CBC W/AUTO DIFF WBC: CPT | Performed by: INTERNAL MEDICINE

## 2024-12-25 RX ORDER — BUMETANIDE 1 MG/1
3 TABLET ORAL DAILY
Qty: 90 TABLET | Refills: 0 | Status: SHIPPED | OUTPATIENT
Start: 2024-12-26 | End: 2024-12-25 | Stop reason: HOSPADM

## 2024-12-25 RX ORDER — BUMETANIDE 2 MG/1
3 TABLET ORAL DAILY
Qty: 45 TABLET | Refills: 11 | Status: SHIPPED | OUTPATIENT
Start: 2024-12-25 | End: 2025-12-25

## 2024-12-25 RX ORDER — POTASSIUM CHLORIDE 1500 MG/1
20 TABLET, EXTENDED RELEASE ORAL ONCE
Status: COMPLETED | OUTPATIENT
Start: 2024-12-25 | End: 2024-12-25

## 2024-12-25 RX ORDER — GUAIFENESIN 600 MG/1
1200 TABLET, EXTENDED RELEASE ORAL EVERY 12 HOURS SCHEDULED
Qty: 120 TABLET | Refills: 0 | Status: SHIPPED | OUTPATIENT
Start: 2024-12-25

## 2024-12-25 RX ADMIN — ALLOPURINOL 300 MG: 300 TABLET ORAL at 08:40

## 2024-12-25 RX ADMIN — ATORVASTATIN CALCIUM 40 MG: 40 TABLET, FILM COATED ORAL at 08:41

## 2024-12-25 RX ADMIN — CALCITRIOL CAPSULES 0.25 MCG 0.25 MCG: 0.25 CAPSULE ORAL at 08:41

## 2024-12-25 RX ADMIN — BUMETANIDE 3 MG: 1 TABLET ORAL at 08:41

## 2024-12-25 RX ADMIN — PANTOPRAZOLE SODIUM 40 MG: 40 TABLET, DELAYED RELEASE ORAL at 06:16

## 2024-12-25 RX ADMIN — CLOPIDOGREL BISULFATE 75 MG: 75 TABLET ORAL at 08:41

## 2024-12-25 RX ADMIN — SENNOSIDES AND DOCUSATE SODIUM 2 TABLET: 50; 8.6 TABLET ORAL at 08:40

## 2024-12-25 RX ADMIN — CARVEDILOL 25 MG: 25 TABLET, FILM COATED ORAL at 08:41

## 2024-12-25 RX ADMIN — POTASSIUM CHLORIDE 20 MEQ: 1500 TABLET, EXTENDED RELEASE ORAL at 08:41

## 2024-12-25 RX ADMIN — HEPARIN SODIUM 5000 UNITS: 5000 INJECTION INTRAVENOUS; SUBCUTANEOUS at 08:40

## 2024-12-25 RX ADMIN — Medication 10 ML: at 08:42

## 2024-12-25 RX ADMIN — ASPIRIN 81 MG: 81 TABLET, DELAYED RELEASE ORAL at 08:42

## 2024-12-25 RX ADMIN — GUAIFENESIN 1200 MG: 600 TABLET ORAL at 08:41

## 2024-12-25 NOTE — PLAN OF CARE
Goal Outcome Evaluation:  Plan of Care Reviewed With: patient        Progress: no change  Outcome Evaluation: peritoneal dialysis processing nightly. no complaints or acute changes this shift. continue POC

## 2024-12-25 NOTE — PROGRESS NOTES
Nephrology Progress Note      Subjective     12/25/2024 patient's shortness of breath is better had peritoneal dialysis yesterday    Objective       Vital signs:     Vitals:    12/24/24 2328 12/25/24 0327 12/25/24 0500 12/25/24 0701   BP: 145/79 135/74  141/74   BP Location: Right arm Right arm  Left arm   Patient Position: Lying Sitting  Lying   Pulse: 71 68  63   Resp: 20 20  20   Temp: 97.9 °F (36.6 °C) 98.3 °F (36.8 °C)  98.1 °F (36.7 °C)   TempSrc: Oral Oral  Oral   SpO2: 98% 96%  98%   Weight:   80.5 kg (177 lb 7.5 oz)    Height:            Intake/Output                         12/23/24 0701 - 12/24/24 0700 12/24/24 0701 - 12/25/24 0700     [id number] 6743-4815 Total 9147-3458 7005-7289 Total                 Intake    P.O.  --  120 120  460  -- 460    IV Piggyback  350  -- 350  --  -- --    Total Intake 350 120 470 460 -- 460       Output    Urine  --  400 400  --  250 250    Dialysis  --  -- --  10  25 35    Total Output -- 400 400 10 275 285           12/25/2024 examined and reviewed    Physical Exam:    General Appearance: Alert, pleasant, appears stated age, cooperative   Head: Normocephalic, without obvious abnormality and atraumatic.  Eyes: Conjunctivae and sclerae normal, no icterus, no pallor, and PERRLA.  Neck: No adenopathy, supple, no carotid bruit, no JVD.  Lungs: Clear to auscultation, respirations regular.  Heart: Regular rhythm & normal rate, normal S1, S2 and no murmur, no rub.  Abdomen: Normal bowel sounds, no masses, no hepatomegaly, no splenomegaly, soft, nontender, and no guarding.  Extremities: Moves extremities well, no edema, no cyanosis and no redness.  Skin: No bleeding, bruising or rash.  Neurologic: Oriented to person, place, time and situation. Grossly no focal deficits.  Access: PD catheter exit site clean    Laboratory Data:       CBC and coagulation:  Results from last 7 days   Lab Units 12/25/24  0154 12/24/24  0035 12/23/24  1048   PROCALCITONIN ng/mL  --   --  0.26*   LACTATE  "mmol/L  --   --  1.2   CRP mg/dL  --   --  0.72*   WBC 10*3/mm3 9.52 7.76 11.09*   HEMOGLOBIN g/dL 9.9* 9.9* 9.8*   HEMATOCRIT % 31.0* 31.2* 30.1*   MCV fL 99.0* 100.6* 98.0*   MCHC g/dL 31.9 31.7 32.6   PLATELETS 10*3/mm3 129* 132* 143     Acid/base balance:      Renal and electrolytes:    Results from last 7 days   Lab Units 12/25/24  0154 12/24/24  0035 12/23/24  1048   SODIUM mmol/L 137 137 141   POTASSIUM mmol/L 3.2* 3.6 3.9   MAGNESIUM mg/dL  --   --  2.0   CHLORIDE mmol/L 97* 98 101   CO2 mmol/L 25.1 22.0 24.6   BUN mg/dL 75* 75* 79*   CREATININE mg/dL 3.95* 4.10* 4.32*   CALCIUM mg/dL 9.0 8.9 9.1     Estimated Creatinine Clearance: 15.9 mL/min (A) (by C-G formula based on SCr of 3.95 mg/dL (H)).     Liver and pancreatic function:  Results from last 7 days   Lab Units 12/24/24  0035 12/23/24  1048   ALBUMIN g/dL 3.2* 3.5   BILIRUBIN mg/dL 0.3 0.3   ALK PHOS U/L 192* 213*   AST (SGOT) U/L 38 60*   ALT (SGPT) U/L 87* 101*       Albumin Albumin   Date Value Ref Range Status   12/24/2024 3.2 (L) 3.5 - 5.2 g/dL Final   12/23/2024 3.5 3.5 - 5.2 g/dL Final      Magnesium Magnesium   Date Value Ref Range Status   12/23/2024 2.0 1.6 - 2.4 mg/dL Final          PTH               No results found for: \"PTH\"    Cardiac:  Results from last 7 days   Lab Units 12/23/24  1048   PROBNP pg/mL 65,140.0*     Liver and pancreatic function:  Results from last 7 days   Lab Units 12/24/24  0035 12/23/24  1048   ALBUMIN g/dL 3.2* 3.5   BILIRUBIN mg/dL 0.3 0.3   ALK PHOS U/L 192* 213*   AST (SGOT) U/L 38 60*   ALT (SGPT) U/L 87* 101*       CT Abdomen Pelvis Without Contrast    Result Date: 12/23/2024  1.  Peritoneal dialysis catheter noted. Trace amount of air in the abdomen likely relates to this. Follow-up as clinically relevant. 2.  Otherwise no acute process in the abdomen or pelvis. 3.  Small to moderate volume right pleural effusion with underlying atelectasis.  This report was finalized on 12/23/2024 3:45 PM by Alex Pallas, DO.  "     CT Chest Without Contrast Diagnostic    Result Date: 12/23/2024  1.  Small right pleural effusion. Right lower lobe pulmonary nodule measuring 1.6 cm that appears grossly stable. 2.  Cardiomegaly. 3.  Free air is noted in the upper abdomen of uncertain source.  This report was finalized on 12/23/2024 12:51 PM by Dr. Live Samayoa MD.      XR Chest 1 View    Result Date: 12/23/2024  1.  Cardiomegaly and pulmonary vascular congestion. 2.  Aortic valvuloplasty changes noted.  This report was finalized on 12/23/2024 10:54 AM by Dr. Burton Plasencia MD.        Medications:     allopurinol, 300 mg, Oral, Daily  aspirin, 81 mg, Oral, Daily  atorvastatin, 40 mg, Oral, Daily  bumetanide, 3 mg, Oral, Daily  calcitriol, 0.25 mcg, Oral, Once per day on Monday Wednesday Friday  carvedilol, 25 mg, Oral, BID  clopidogrel, 75 mg, Oral, Daily  guaiFENesin, 1,200 mg, Oral, Q12H  heparin (porcine), 5,000 Units, Subcutaneous, Q12H  insulin lispro, 2-7 Units, Subcutaneous, 4x Daily AC & at Bedtime  pantoprazole, 40 mg, Oral, Q AM  senna-docusate sodium, 2 tablet, Oral, BID  sodium chloride, 10 mL, Intravenous, Q12H             Assessment & Plan     12/25/2024 reviewed and updated    -End-stage renal disease on peritoneal dialysis  -Acute hypokalemia  -Acute on chronic systolic congestive heart failure  -Aortic stenosis status post TAVR  -Hypertension  -Pneumonia  -Coronary artery disease  -Anemia        12/24/2024  Patient had peritoneal dialysis treatment overnight with a cycler be used 2.5% x 3 with ultrafiltration was minimal patient was on Bumex at home will restart Bumex 3 mg daily  If patient stays overnight we will repeat 2.5% times for treatment tonight  Patient is on antibiotic also for possible pneumonia     12/25/2024  Patient did not have much ultrafiltration with 8 hours of peritoneal dialysis treatment patient is making good amount of urine so we will continue Bumex 3 mg daily patient can go home from renal point of view  and continue her regular dialysis schedule of peritoneal dialysis at home  Will replace potassium  Avoid QT prolonging medications     Discussed with RN     Reviewed hospitalist notes  Reviewed consultant notes  Reviewed the labs  Reviewed radiology        Plan of care discussed with patient, answered all questions. Patient verbalized understanding and agreed.      MAJO Doyle MD  12/25/24  07:57 EST

## 2024-12-25 NOTE — DISCHARGE SUMMARY
[facility] HOSPITALISTS DISCHARGE SUMMARY    Patient Identification:  Name:  Augusto Lorenzana Jr.  Age:  79 y.o.  Sex:  male  :  1945  MRN:  [id number]  Visit Number:  [id number]    Date of Admission: 2024  Date of Discharge:  2024    PCP: Rob Polanco MD    DISCHARGE DIAGNOSIS  #Acute HFrEF with chronic systolic dysfunction, improved clinically and symptomatically  #Progressive dyspnea, may be due to above, will rule out aspiration  #ESRD on peritoneal dialysis  #Aortic stenosis status post recent TAVR  #Moderate mitral valve regurgitation  #Status post AICD placement  #Elevated anion gap with mild metabolic acidosis  #Essential hypertension that is slightly uncontrolled, improving  #Chronic macrocytic anemia  #Insulin-dependent diabetes mellitus type 2 complicated by hyperglycemia, improved  #Mild ALT elevation  #Stable appearing 1.6 cm RLL nodule; recommend ongoing outpatient evaluation    CONSULTS   Nephrology    PROCEDURES PERFORMED  None    HOSPITAL COURSE  Patient is a 79 y.o. male presented to [facility] complaining of cough and shortness of breath.  Please see the admitting history and physical for further details.    Workup in the emergency department revealed a chest x-ray and clinical presentation that appeared consistent with pulmonary vascular congestion and acute on chronic heart failure with reduced ejection fraction.  Patient with TAVR in 2024 and echocardiogram at in November revealing an EF of 25 to 30% with a well-seated and normally functioning TAVR valve.    Nephrology adjusted patient's peritoneal dialysis solutions and we restarted his home bumex but increased his dosing to 3 mg by mouth daily. On the day of discharge patient reported significant improvement in his symptoms. I also started patient on mucinex while hospitalized. I did have some concern for possible aspiration as patient stated that lying supine and at times eating  would induce this dyspnea and cough.  On the day prior to discharge, patient noted to have some cough while eating lunch.  Due to the holidays (Christmas Eve and [place] Day, in-house speech therapy services were not available.  I discussed with the patient and his wife at bedside on the day of discharge and they state that patient has seen speech therapy in the past for dysphagia.  I have given the patient an outpatient order for a speech therapy evaluation to evaluate for any aspiration.  It was felt that the patient had maximized benefit of his inpatient hospital stay the patient was discharged home with his wife in hemodynamically stable condition.  Patient encouraged to keep his follow-up appointment with his primary nephrologist on 1/7/2024.    VITAL SIGNS:  Temp:  [97.7 °F (36.5 °C)-98.6 °F (37 °C)] 98.1 °F (36.7 °C)  Heart Rate:  [63-77] 63  Resp:  [18-20] 20  BP: (135-152)/(74-90) 141/74  SpO2:  [96 %-99 %] 98 %  on   ;   Device (Oxygen Therapy): room air    Body mass index is 27.8 kg/m².  Wt Readings from Last 3 Encounters:   12/25/24 80.5 kg (177 lb 7.5 oz)   06/27/23 80.7 kg (178 lb)   06/01/23 80.7 kg (178 lb)       PHYSICAL EXAM:  Physical Exam  Constitutional:       General: He is not in acute distress.     Appearance: He is well-developed.   HENT:      Head: Normocephalic and atraumatic.   Eyes:      Conjunctiva/sclera: Conjunctivae normal.   Neck:      Trachea: No tracheal deviation.   Cardiovascular:      Rate and Rhythm: Normal rate and regular rhythm.      Heart sounds: No murmur heard.     No friction rub. No gallop.   Pulmonary:      Effort: No respiratory distress.      Breath sounds: Examination of the right-lower field reveals wheezing. Wheezing present. No rales.      Comments: Wheeze improves with cough  Abdominal:      General: Bowel sounds are normal. There is no distension.      Palpations: Abdomen is soft.      Tenderness: There is no abdominal tenderness. There is no guarding.    Musculoskeletal:         General: No tenderness. Normal range of motion.   Skin:     General: Skin is warm and dry.      Findings: No erythema or rash.   Neurological:      Mental Status: He is alert and oriented to person, place, and time.      Cranial Nerves: No cranial nerve deficit.     Present during visit: Patient's wife    DISCHARGE MEDICATION     Your medication list        START taking these medications        Instructions Last Dose Given Next Dose Due   bumetanide 2 MG tablet  Commonly known as: BUMEX      Take 1.5 tablets by mouth Daily.       guaiFENesin 600 MG 12 hr tablet  Commonly known as: MUCINEX      Take 2 tablets by mouth Every 12 (Twelve) Hours.              CONTINUE taking these medications        Instructions Last Dose Given Next Dose Due   albuterol sulfate  (90 Base) MCG/ACT inhaler  Commonly known as: PROVENTIL HFA;VENTOLIN HFA;PROAIR HFA      Inhale 2 puffs 2 (Two) Times a Day As Needed for Wheezing.       allopurinol 300 MG tablet  Commonly known as: ZYLOPRIM      Take 1 tablet by mouth Daily.       aspirin 81 MG EC tablet      Take 1 tablet by mouth Daily.       atorvastatin 40 MG tablet  Commonly known as: LIPITOR      Take 1 tablet by mouth Daily.       calcitriol 0.25 MCG capsule  Commonly known as: ROCALTROL      Take 1 capsule by mouth 3 (Three) Times a Week.       carvedilol 25 MG tablet  Commonly known as: COREG      Take 1 tablet by mouth 2 (Two) Times a Day.       clopidogrel 75 MG tablet  Commonly known as: PLAVIX      Take 1 tablet by mouth Daily.       meclizine 25 MG tablet  Commonly known as: ANTIVERT      Take 1 tablet by mouth 2 (Two) Times a Day As Needed for Dizziness.       pantoprazole 20 MG EC tablet  Commonly known as: PROTONIX      Take 1 tablet by mouth 2 (Two) Times a Day.              STOP taking these medications      torsemide 100 MG tablet  Commonly known as: DEMADEX                  Where to Get Your Medications        These medications were sent to  [id number] - [place], [place] - [facility] [facility] - [phone]  - [phone]   [address] [address] [address] [facility] [address]      Phone: [phone]   bumetanide 2 MG tablet  guaiFENesin 600 MG 12 hr tablet         DISCHARGE DISPOSITION   Stable    Diet Instructions       Diet: Cardiac Diets, Diabetic Diets; Healthy Heart (2-3 Na+); Thin (IDDSI 0); Consistent Carbohydrate      Discharge Diet:  Cardiac Diets  Diabetic Diets       Cardiac Diet: Healthy Heart (2-3 Na+)    Fluid Consistency: Thin (IDDSI 0)    Diabetic Diet: Consistent Carbohydrate          Activity Instructions       Gradually Increase Activity Until at Pre-Hospitalization Level      Measure Blood Pressure            No future appointments.    Additional Instructions for the Follow-ups that You Need to Schedule       Ambulatory Referral to Speech Therapy   As directed      Intermittent cough and dyspnea; evaluate for aspiration/swallowing difficulties    Order Comments: Intermittent cough and dyspnea; evaluate for aspiration/swallowing difficulties    Follow-up needed: Yes        Discharge Follow-up with PCP   As directed       Currently Documented PCP:    Rob Polanco MD    PCP Phone Number:    [phone]     Follow Up Details: 1 week; hospital follow up dyspnea, volume overload        Discharge Follow-up with Specified Provider: Dr. Hernandez as previously scheduled   As directed      To: Dr. Hernandez as previously scheduled               Follow-up Information       Rob Polanco MD .    Specialty: Internal Medicine  Why: 1 week; hospital follow up dyspnea, volume overload  Contact information:  [address]  [address]  [facility] [address] [phone]                              TEST  RESULTS PENDING AT DISCHARGE  Pending Labs       Order Current Status    Blood Culture - Blood, Arm, Left Preliminary result    Blood Culture - Blood, Arm, Right Preliminary result             CODE STATUS  Code Status and Medical Interventions: CPR  (Attempt to Resuscitate); Full Support   Ordered at: 12/23/24 1528     Code Status (Patient has no pulse and is not breathing):    CPR (Attempt to Resuscitate)     Medical Interventions (Patient has pulse or is breathing):    Full Support       Nupur Solo DO  12/25/24  10:54 EST    Please note that this discharge summary required less than 30 minutes to complete.    Please send a copy of this dictation to the following providers:  Rob Polanco MD

## 2024-12-25 NOTE — NURSING NOTE
Pt being discharged home today on room air. ADEN Early made aware the discharge is complete. Family to provide transportation.

## 2024-12-25 NOTE — OUTREACH NOTE
Prep Survey      Flowsheet Row Responses   [facility] facility patient discharged from? [facility]   Is LACE score < 7 ? No   Eligibility Readm Mgmt   Discharge diagnosis Heart failure   Does the patient have one of the following disease processes/diagnoses(primary or secondary)? CHF   Prep survey completed? Yes            Desirae SCHWARTZ - Registered Nurse

## 2024-12-28 LAB
BACTERIA SPEC AEROBE CULT: NORMAL
BACTERIA SPEC AEROBE CULT: NORMAL

## 2024-12-30 ENCOUNTER — READMISSION MANAGEMENT (OUTPATIENT)
Dept: CALL CENTER | Facility: HOSPITAL | Age: 79
End: 2024-12-30
Payer: MEDICARE

## 2024-12-30 NOTE — OUTREACH NOTE
CHF Week 1 Survey      Flowsheet Row Responses   [facility] patient discharged from? [facility]   Does the patient have one of the following disease processes/diagnoses(primary or secondary)? CHF   CHF Week 1 attempt successful? Yes   Call start time 1342   Call end time 1347   Discharge diagnosis Heart failure   Person spoke with today (if not patient) and relationship Spouse   Meds reviewed with patient/caregiver? Yes   Is the patient having any side effects they believe may be caused by any medication additions or changes? No   Does the patient have all medications ordered at discharge? Yes   Is the patient taking all medications as directed (includes completed medication regime)? Yes   Does the patient have a primary care provider?  Yes   Does the patient have an appointment with their PCP within 7 days of discharge? No   What is preventing the patient from scheduling follow up appointments within 7 days of discharge? Haven't had time   Has the patient kept scheduled appointments due by today? N/A   Has home health visited the patient within 72 hours of discharge? N/A   DME comments wears O2 at night   Pulse Ox monitoring Intermittent   Pulse Ox device source Patient   O2 Sat education comments has not checked sat levels   Psychosocial issues? No   Comments peritoneal dialysis BID, pt monitors BP daily, reports bp has been normal, pt monitors weight with dialysis   Did the patient receive a copy of their discharge instructions? Yes   Nursing interventions Reviewed instructions with patient   What is the patient's perception of their health status since discharge? Improving  [productive cough]   Nursing interventions Nurse provided patient education   Is the patient able to teach back signs and symptoms of worsening condition? (i.e. weight gain, shortness of air, etc.) Yes   CHF Zone this Call Green Zone   Green Zone Patient reports doing well   Green Zone Interventions Daily weight check, Meds as directed     CHF Week 1 call completed? Yes   Is the patient interested in additional calls from an ambulatory ? No   Would this patient benefit from a Referral to [facility] Social Work? No   Call end time [id number]            Marlin KASPER - Registered Nurse

## (undated) DEVICE — TBG SXN INTRACARD RIDGID FLUT 24F .25X13IN A/

## (undated) DEVICE — ADAPT/Y PERFUS DLP FML/LUER COLR/CODE/CLMP 8.9AND25.4CM

## (undated) DEVICE — CATH DIAG EXPO .056 FL3.5 6F 100CM

## (undated) DEVICE — DEV COMP RAD PRELUDESYNC 24CM

## (undated) DEVICE — TBG SXN RIGD MINI/SUCKER 9F 4.75IN

## (undated) DEVICE — 3M™ MEDIPORE™ H SOFT CLOTH SURGICAL TAPE, 2863, 3 IN X 10 YD, 12/CASE: Brand: 3M™ MEDIPORE™

## (undated) DEVICE — SUT ETHIB 2/0 SH SH 36IN X523H

## (undated) DEVICE — PK PERFUS CUST W/CARDIOPLEGIA

## (undated) DEVICE — ENDOGATOR TUBING FOR ENDOGATOR EGP-100 IRRIGATION PUMP,OLYMPUS OFP PUMP, OLYMPUS AFU-100 PUMP AND ERBE EIP2 PUMP: Brand: ENDOGATOR

## (undated) DEVICE — INTRAOPERATIVE COVER KIT, 10 PACK: Brand: SITE-RITE

## (undated) DEVICE — PAD ARMBRD SURG CONVOL 7.5X20X2IN

## (undated) DEVICE — PATIENT RETURN ELECTRODE, SINGLE-USE, CONTACT QUALITY MONITORING, ADULT, WITH 9FT CORD, FOR PATIENTS WEIGING OVER 33LBS. (15KG): Brand: MEGADYNE

## (undated) DEVICE — BLD SCLPL BEAVR MINI STR 2BVL 180D LF

## (undated) DEVICE — SUT MNCRYL 4/0 PS2 18 IN

## (undated) DEVICE — Device

## (undated) DEVICE — PK HEART OPN 10

## (undated) DEVICE — PK BASIC 70

## (undated) DEVICE — DRAPE,T,LAPARO,TRANS,STERILE: Brand: MEDLINE

## (undated) DEVICE — CATH DIAG EXPO M/ PK 6FR FL4/FR4 PIG 3PK

## (undated) DEVICE — SUT SILK 2 SUTUPAK TIE 60IN SA8H 2STRAND

## (undated) DEVICE — TBG PENCL TELESCP MEGADYNE SMOKE EVAC 10FT

## (undated) DEVICE — ADHS SKIN PREMIERPRO EXOFIN TOPICAL HI/VISC .5ML

## (undated) DEVICE — ELECTRD BLD EZ CLN STD 2.5IN

## (undated) DEVICE — CONN TBG Y 6 IN 1 LF STRL

## (undated) DEVICE — UNDERGLV SURG BIOGEL INDICAT PF 8 GRN

## (undated) DEVICE — KT INTRO SHEATH PERC W/FULL DRP 9F

## (undated) DEVICE — MODEL AT P65, P/N 701554-001KIT CONTENTS: HAND CONTROLLER, 3-WAY HIGH-PRESSURE STOPCOCK WITH ROTATING END AND PREMIUM HIGH-PRESSURE TUBING: Brand: ANGIOTOUCH® KIT

## (undated) DEVICE — BLD MICROSHARP 15D 3MM BLU LF

## (undated) DEVICE — BLAKE SILICONE DRAIN, 24 FR ROUND, HUBLESS: Brand: BLAKE

## (undated) DEVICE — CVR TRANSD FLX 3DIMEN 14X29.2CM LF STRL

## (undated) DEVICE — PK ATS CUST W CARDIOTOMY RESEVOIR

## (undated) DEVICE — DRP SLUSH MACH

## (undated) DEVICE — CONNECT Y INTERSEPT W/LL 3/8 X 3/8 X 3/8IN

## (undated) DEVICE — SENSR CERBRL O2 PK/2

## (undated) DEVICE — MODEL BT2000 P/N 700287-012KIT CONTENTS: MANIFOLD WITH SALINE AND CONTRAST PORTS, SALINE TUBING WITH SPIKE AND HAND SYRINGE, TRANSDUCER: Brand: BT2000 AUTOMATED MANIFOLD KIT

## (undated) DEVICE — TEMP PACING WIRE: Brand: MYO/WIRE

## (undated) DEVICE — SUT PROLN 4/0 RB1 D/A 36IN 8557H

## (undated) DEVICE — NDL SCLEROTHRPY INTERJECT 25G 4 240 CLR

## (undated) DEVICE — THE BITE BLOCK MAXI, LATEX FREE STRAP IS USED TO PROTECT THE ENDOSCOPE INSERTION TUBE FROM BEING BITTEN BY THE PATIENT.

## (undated) DEVICE — SUT PROLN 6/0 C1 D/A 30IN 8706H

## (undated) DEVICE — AVID DUAL STAGE VENOUS DRAINAGE CANNULA: Brand: AVID DUAL STAGE VENOUS DRAINAGE CANNULA

## (undated) DEVICE — GLV SURG SENSICARE PI MIC PF SZ7.5 LF STRL

## (undated) DEVICE — DR ROGERS OH: Brand: MEDLINE INDUSTRIES, INC.

## (undated) DEVICE — ELECTRD BLD EZ CLN MOD XLNG 2.75IN

## (undated) DEVICE — PK CATH CARD 10

## (undated) DEVICE — SUT NLY 2/0 664G

## (undated) DEVICE — FLTR RESERV PERFUS INTERSEPT 02 STRL

## (undated) DEVICE — SYR LUERLOK 30CC

## (undated) DEVICE — TRY DRSNG CENTRL LN UC44 FCP

## (undated) DEVICE — ANTIBACTERIAL UNDYED BRAIDED (POLYGLACTIN 910), SYNTHETIC ABSORBABLE SUTURE: Brand: COATED VICRYL

## (undated) DEVICE — SUT SILK 0/0 CT2 18IN C027D

## (undated) DEVICE — SWAN-GANZ CCOMBO V THERMODILUTION CATHETER: Brand: SWAN-GANZ CCOMBO V

## (undated) DEVICE — CLTH CLENS READYCLEANSE PERI CARE PK/5

## (undated) DEVICE — DRAPE,UTILTY,TAPE,15X26, 4EA/PK: Brand: MEDLINE

## (undated) DEVICE — LEVEL SENSORS PADS ARE USED TO ATTACH THE LEVEL SENSORS TO A HARD SHELL RESERVOIR. INCLUDES COUPLING GEL.: Brand: TERUMO® ADVANCED PERFUSION SYSTEM 1

## (undated) DEVICE — PENCL ROCKRSWCH MEGADYNE W/HOLSTR 10FT SS

## (undated) DEVICE — DRP C/ARM W/BAND W/CLIPS 41X74IN

## (undated) DEVICE — SUT PROLN 7/0 BV1 D/A 24IN 8702H

## (undated) DEVICE — SYS VASOVIEW HEMOPRO ENDOSCOPIC HARVST VESL

## (undated) DEVICE — EZ GLIDE AORTIC CANNULA: Brand: EDWARDS LIFESCIENCES EZ GLIDE AORTIC CANNULA

## (undated) DEVICE — MARKER,SKIN,W/RULER,DUAL,STOP: Brand: MEDLINE

## (undated) DEVICE — CVR PROB ULTRASND CIVFLEX GEN/PURP TELESCP/FOLD 5.5X58IN LF

## (undated) DEVICE — GLV SURG SENSICARE W/ALOE PF LF 7.5 STRL

## (undated) DEVICE — TRY CVC VANTEX PI 7F 3L 20CM CHG TEGADERM 10

## (undated) DEVICE — HOLDER: Brand: DEROYAL

## (undated) DEVICE — KT CATH HEMO CANNON2 PLS LNG TRM 15F 11IN

## (undated) DEVICE — BOWL UTIL STRL 32OZ

## (undated) DEVICE — CANN VESL DLP 1WY BLNT/TP 3MM

## (undated) DEVICE — 28 FR RIGHT ANGLE – SOFT PVC CATHETER: Brand: PVC THORACIC CATHETERS

## (undated) DEVICE — GLIDESHEATH BASIC HYDROPHILIC COATED INTRODUCER SHEATH: Brand: GLIDESHEATH

## (undated) DEVICE — SUCTION CANISTER, 2500CC, RIGID: Brand: DEROYAL

## (undated) DEVICE — Device: Brand: DEFENDO AIR/WATER/SUCTION AND BIOPSY VALVE

## (undated) DEVICE — SUT SILK 2/0 CT1 CR8 18IN C022D

## (undated) DEVICE — SUT SILK 4/0 TIES 18IN A183H

## (undated) DEVICE — CANN AORT/ROOT DLP W/VNT/LN 14G 7F 14.6CM